# Patient Record
Sex: FEMALE | Race: WHITE | NOT HISPANIC OR LATINO | Employment: OTHER | URBAN - METROPOLITAN AREA
[De-identification: names, ages, dates, MRNs, and addresses within clinical notes are randomized per-mention and may not be internally consistent; named-entity substitution may affect disease eponyms.]

---

## 2017-01-03 ENCOUNTER — HOSPITAL ENCOUNTER (OUTPATIENT)
Dept: CARDIOLOGY | Facility: HOSPITAL | Age: 82
Setting detail: RECURRING SERIES
Discharge: HOME | End: 2017-01-03

## 2017-01-03 PROCEDURE — 85610 PROTHROMBIN TIME: CPT | Performed by: INTERNAL MEDICINE

## 2017-01-03 PROCEDURE — 36416 COLLJ CAPILLARY BLOOD SPEC: CPT | Performed by: INTERNAL MEDICINE

## 2017-01-22 ENCOUNTER — OFFICE VISIT (OUTPATIENT)
Dept: RETAIL CLINIC | Facility: CLINIC | Age: 82
End: 2017-01-22

## 2017-01-22 VITALS — OXYGEN SATURATION: 97 % | TEMPERATURE: 98.2 F | HEART RATE: 54 BPM

## 2017-01-22 DIAGNOSIS — J32.9 SINUSITIS, UNSPECIFIED CHRONICITY, UNSPECIFIED LOCATION: Primary | ICD-10-CM

## 2017-01-22 PROCEDURE — 99213 OFFICE O/P EST LOW 20 MIN: CPT | Performed by: NURSE PRACTITIONER

## 2017-01-22 RX ORDER — DOXYCYCLINE HYCLATE 100 MG/1
100 TABLET, DELAYED RELEASE ORAL 2 TIMES DAILY
Qty: 20 TABLET | Refills: 0 | Status: SHIPPED | OUTPATIENT
Start: 2017-01-22 | End: 2017-02-02

## 2017-01-22 NOTE — PROGRESS NOTES
Subjective   Patient ID: Marleni Hummel is a 81 y.o. female presents with   Chief Complaint   Patient presents with   • URI     3 weeks       HPI Comments: 82 yo wf  PMH: multiple medical problems including chronic anti-coagulation for AFIB. Her present warfarin dose is 3mg daily with 1 1/2 tab on wed. She has been using this stable dose for a long period of time. She is followed by Dr. Tai.   Cc: purulent nasal d/c x 3 weeks. She has tried OTC agents and was seen in [facility] dec 30 and given sudafed with no relief. She has assoc malaise but no cough or fever.       Allergies   Allergen Reactions   • Ace Inhibitors    • Amoxicillin    • Lortab [Hydrocodone-Acetaminophen]    • Macrobid [Nitrofurantoin]    • Morphine And Related    • Oxycodone    • Levaquin [Levofloxacin] Itching, Rash and Other (See Comments)     insomnia       The following portions of the patient's history were reviewed and updated as appropriate: allergies, current medications, past family history, past medical history, past social history, past surgical history and problem list.      Review of Systems   Constitutional: Positive for fatigue. Negative for activity change, fever and unexpected weight change.   HENT: Positive for congestion, postnasal drip, rhinorrhea, sinus pressure and sore throat. Negative for ear pain.         Hoarseness   Eyes: Negative for pain and discharge.   Respiratory: Negative for cough, chest tightness, shortness of breath and wheezing.    Cardiovascular: Negative for chest pain and palpitations.   Gastrointestinal: Negative for abdominal pain, diarrhea and vomiting.   Endocrine: Negative.    Genitourinary: Negative.    Musculoskeletal: Negative for joint swelling.   Skin: Negative for color change, rash and wound.   Allergic/Immunologic: Negative.    Neurological: Negative for seizures and syncope.   Psychiatric/Behavioral: Negative.        Objective     Vitals:    01/22/17 1450   Pulse: 54   Temp: 98.2 °F (36.8 °C)    SpO2: 97%         Physical Exam   Constitutional: She is oriented to person, place, and time. She appears well-developed and well-nourished.  Non-toxic appearance. No distress.   HENT:   Head: Normocephalic and atraumatic. Hair is normal.   Right Ear: Hearing, tympanic membrane, external ear and ear canal normal. No drainage, swelling or tenderness.   Left Ear: Hearing, tympanic membrane, external ear and ear canal normal. No drainage, swelling or tenderness.   Nose: Mucosal edema and rhinorrhea present. No epistaxis.   Mouth/Throat: Uvula is midline and mucous membranes are normal. No oral lesions. No uvula swelling. Posterior oropharyngeal erythema present. No oropharyngeal exudate. Tonsils are 1+ on the right. Tonsils are 1+ on the left. No tonsillar exudate.   Eyes: Conjunctivae, EOM and lids are normal. Pupils are equal, round, and reactive to light. Right eye exhibits no discharge. Left eye exhibits no discharge. No scleral icterus.   Neck: Normal range of motion. Neck supple.   Cardiovascular: Normal rate, regular rhythm and normal heart sounds.  Exam reveals no gallop.    No murmur heard.  Pulmonary/Chest: Breath sounds normal. No stridor. No respiratory distress. She has no wheezes. She has no rales. She exhibits no tenderness.   Abdominal: Soft. There is no tenderness.   Lymphadenopathy:        Head (right side): No tonsillar adenopathy present.        Head (left side): No tonsillar adenopathy present.     She has no cervical adenopathy.   Neurological: She is alert and oriented to person, place, and time. She exhibits normal muscle tone.   Skin: Skin is warm and dry. No rash noted. She is not diaphoretic.   Psychiatric: She has a normal mood and affect. Her behavior is normal. Judgment and thought content normal.   Nursing note and vitals reviewed.        Marleni was seen today for uri.    Diagnoses and all orders for this visit:    Sinusitis, unspecified chronicity, unspecified location  -      doxycycline (DORYX) 100 MG enteric coated tablet; Take 1 tablet by mouth 2 (Two) Times a Day.    Increase fluids, rest, activity as tolerated, Tylenol and nasal saline rinses OTC as needed for pain   she will get INR tomorrow at [facility]. She will inform them of her abx use nad neil inr in 2 weeks.  Follow-up with Primary Care Physician in 24-48 hours if these symptoms worsen or fail to improve as anticipated.

## 2017-01-23 ENCOUNTER — HOSPITAL ENCOUNTER (OUTPATIENT)
Dept: CARDIOLOGY | Facility: HOSPITAL | Age: 82
Setting detail: RECURRING SERIES
Discharge: HOME | End: 2017-01-23

## 2017-01-23 PROCEDURE — 36416 COLLJ CAPILLARY BLOOD SPEC: CPT | Performed by: INTERNAL MEDICINE

## 2017-01-23 PROCEDURE — 85610 PROTHROMBIN TIME: CPT | Performed by: INTERNAL MEDICINE

## 2017-02-02 ENCOUNTER — OFFICE VISIT (OUTPATIENT)
Dept: FAMILY MEDICINE CLINIC | Facility: CLINIC | Age: 82
End: 2017-02-02

## 2017-02-02 VITALS
HEART RATE: 59 BPM | HEIGHT: 63 IN | BODY MASS INDEX: 26.4 KG/M2 | DIASTOLIC BLOOD PRESSURE: 70 MMHG | TEMPERATURE: 97.8 F | RESPIRATION RATE: 16 BRPM | SYSTOLIC BLOOD PRESSURE: 148 MMHG | WEIGHT: 149 LBS

## 2017-02-02 DIAGNOSIS — J30.1 NON-SEASONAL ALLERGIC RHINITIS DUE TO POLLEN: Primary | ICD-10-CM

## 2017-02-02 PROCEDURE — 99213 OFFICE O/P EST LOW 20 MIN: CPT | Performed by: FAMILY MEDICINE

## 2017-02-02 RX ORDER — MONTELUKAST SODIUM 10 MG/1
10 TABLET ORAL NIGHTLY
Qty: 30 TABLET | Refills: 2 | Status: SHIPPED | OUTPATIENT
Start: 2017-02-02 | End: 2017-03-13 | Stop reason: SINTOL

## 2017-02-02 NOTE — PROGRESS NOTES
"Chief Complaint   Patient presents with   • Urgent Care follow up     URI       Subjective   This patient presents the office to follow-up on 2 recent urgent care center visits.  She was seen on December 30, 2016 and diagnosed with viral illness.  She was treated with over-the-counter Sudafed.  Her symptoms persisted and she went back to the urgent care center on January 22.  Both records have been reviewed.  She was placed on doxycycline which helped her condition.  Today she has consistent runny nose with clear mucus and no fever.  She states that she has had chronic runny nose since November.  She has associated itchy eyes and runny nose and occasional sneezing since that time.  Review of Systems   Constitutional: Negative for fever.   HENT: Positive for rhinorrhea.        Objective   Visit Vitals   • /70 (BP Location: Right arm, Patient Position: Sitting, Cuff Size: Adult)   • Pulse 59   • Temp 97.8 °F (36.6 °C) (Oral)   • Resp 16   • Ht 63\" (160 cm)   • Wt 149 lb (67.6 kg)   • BMI 26.39 kg/m2     Body mass index is 26.39 kg/(m^2).  Physical Exam   Constitutional: She is oriented to person, place, and time. She appears well-developed and well-nourished.  Non-toxic appearance. No distress.   HENT:   Right Ear: External ear normal.   Left Ear: External ear normal.   Nose: Rhinorrhea present.   Mouth/Throat: Uvula is midline and mucous membranes are normal. No oral lesions. No uvula swelling. No oropharyngeal exudate or posterior oropharyngeal erythema.   Eyes: Conjunctivae are normal. Right eye exhibits no discharge. Left eye exhibits no discharge.   Neck: Neck supple.   Cardiovascular: Normal rate and regular rhythm.    Pulmonary/Chest: Effort normal. No respiratory distress. She has no wheezes.   Lymphadenopathy:     She has no cervical adenopathy.   Neurological: She is alert and oriented to person, place, and time.   Skin: No rash noted. She is not diaphoretic.   Nursing note and vitals " reviewed.      Assessment/Plan     Problem List Items Addressed This Visit        Respiratory    Allergic rhinitis - Primary    Relevant Medications    montelukast (SINGULAIR) 10 MG tablet          Outpatient Encounter Prescriptions as of 2/2/2017   Medication Sig Dispense Refill   • acetaminophen (TYLENOL) 500 MG tablet Take 1,000 mg by mouth 3 (three) times a day as needed for mild pain (1-3) or moderate pain (4-6).     • atorvastatin (LIPITOR) 20 MG tablet Take 1 tablet by mouth every night for 180 days. 90 tablet 1   • clopidogrel (PLAVIX) 75 MG tablet Take by mouth daily.     • DULoxetine (CYMBALTA) 30 MG capsule Take 1 capsule by mouth daily for 180 days. 90 capsule 1   • KLOR-CON 10 MEQ CR tablet      • levothyroxine (SYNTHROID, LEVOTHROID) 50 MCG tablet Take 1 tablet by mouth daily for 180 days. 90 tablet 1   • metoprolol succinate XL (TOPROL-XL) 50 MG 24 hr tablet Take 1 tablet by mouth daily for 180 days. 90 tablet 1   • phenol (CHLORASEPTIC) 1.4 % liquid liquid Apply 2 sprays to the mouth or throat Every 2 (Two) Hours As Needed (sore throat). 177 mL 0   • senna (SENOKOT) 8.6 MG tablet tablet Take 2 tablets by mouth As Needed.     • sodium chloride (OCEAN NASAL SPRAY) 0.65 % nasal spray 1 spray into each nostril As Needed for congestion. 104 mL 0   • warfarin (COUMADIN) 3 MG tablet TAKE ONE AND ONE-HALF TABLETS ON MONDAY, WEDNESDAY & FRIDAY AND ONE TABLET ON ALL OTHER DAYS OR AS DIRECTED. 115 tablet 1   • [DISCONTINUED] doxycycline (DORYX) 100 MG enteric coated tablet Take 1 tablet by mouth 2 (Two) Times a Day. 20 tablet 0   • [DISCONTINUED] pseudoephedrine (SUDAFED) 30 MG/5ML syrup Take 5 mL by mouth 4 (Four) Times a Day As Needed for congestion. 20 mL 0   • montelukast (SINGULAIR) 10 MG tablet Take 1 tablet by mouth Every Night for 90 days. 30 tablet 2     No facility-administered encounter medications on file as of 2/2/2017.        No orders of the defined types were placed in this  encounter.      Continue with current treatment plan.         RTC as needed or sooner if symptoms worsen or change

## 2017-02-06 ENCOUNTER — HOSPITAL ENCOUNTER (OUTPATIENT)
Dept: CARDIOLOGY | Facility: HOSPITAL | Age: 82
Setting detail: RECURRING SERIES
Discharge: HOME | End: 2017-02-06

## 2017-02-06 PROCEDURE — 85610 PROTHROMBIN TIME: CPT

## 2017-02-06 PROCEDURE — 36416 COLLJ CAPILLARY BLOOD SPEC: CPT

## 2017-02-09 ENCOUNTER — RESULTS ENCOUNTER (OUTPATIENT)
Dept: FAMILY MEDICINE CLINIC | Facility: CLINIC | Age: 82
End: 2017-02-09

## 2017-02-09 DIAGNOSIS — E78.00 HYPERCHOLESTEROLEMIA: ICD-10-CM

## 2017-02-09 DIAGNOSIS — E03.9 ACQUIRED HYPOTHYROIDISM: ICD-10-CM

## 2017-02-09 DIAGNOSIS — N18.30 CHRONIC KIDNEY DISEASE (CKD), STAGE III (MODERATE) (HCC): ICD-10-CM

## 2017-02-09 DIAGNOSIS — I10 ESSENTIAL HYPERTENSION: ICD-10-CM

## 2017-02-09 DIAGNOSIS — D64.9 ANEMIA OF UNKNOWN ETIOLOGY: ICD-10-CM

## 2017-02-20 RX ORDER — CLOPIDOGREL BISULFATE 75 MG/1
TABLET ORAL
Qty: 90 TABLET | Refills: 2 | Status: SHIPPED | OUTPATIENT
Start: 2017-02-20 | End: 2017-02-22 | Stop reason: SDUPTHER

## 2017-02-22 ENCOUNTER — OFFICE VISIT (OUTPATIENT)
Dept: CARDIOLOGY | Facility: CLINIC | Age: 82
End: 2017-02-22

## 2017-02-22 VITALS
DIASTOLIC BLOOD PRESSURE: 78 MMHG | WEIGHT: 148 LBS | HEIGHT: 63 IN | HEART RATE: 50 BPM | SYSTOLIC BLOOD PRESSURE: 146 MMHG

## 2017-02-22 DIAGNOSIS — E78.49 OTHER HYPERLIPIDEMIA: ICD-10-CM

## 2017-02-22 DIAGNOSIS — I48.0 PAROXYSMAL ATRIAL FIBRILLATION (HCC): Primary | ICD-10-CM

## 2017-02-22 DIAGNOSIS — I25.10 CORONARY ARTERY DISEASE INVOLVING NATIVE CORONARY ARTERY OF NATIVE HEART WITHOUT ANGINA PECTORIS: ICD-10-CM

## 2017-02-22 PROCEDURE — 99214 OFFICE O/P EST MOD 30 MIN: CPT | Performed by: INTERNAL MEDICINE

## 2017-02-22 PROCEDURE — 93000 ELECTROCARDIOGRAM COMPLETE: CPT | Performed by: INTERNAL MEDICINE

## 2017-02-22 RX ORDER — TRIMETHOPRIM 100 MG/1
100 TABLET ORAL DAILY
COMMUNITY
Start: 2017-02-07 | End: 2017-06-20 | Stop reason: HOSPADM

## 2017-02-22 NOTE — PROGRESS NOTES
Date of Office Visit: 17  Encounter Provider: Jason Tai MD  Place of Service: [facility] CARDIOLOGY  Patient Name: Marleni Hummel  :1935      Chief Complaint   Patient presents with   • Atrial Fibrillation   • Coronary Artery Disease     History of Present Illness  HPI Comments:  The patient is a pleasant, 81-year-old female with history of hypertension and  hyperlipidemia who presented with a cough and respiratory illness to the emergency room  but was found to be in rapid atrial fibrillation. Her thyroid studies were normal. Her 2-D  echocardiogram was unremarkable. Her perfusion study was unremarkable. She was rate  controlled and anticoagulated. She was also seen by pulmonary. Then, in 2011, she  had been adequately anticoagulated and was cardioverted but went back into atrial  fibrillation. She was started on flecainide and then underwent repeat cardioversion in May  2011. epidural for.  She then had to have hip surgery and had total hip arthroplasty from recent femur  fracture and unfortunately developed an infection of that. I believe had multiple  surgeries on that. Then on 10/02/2015 she came in again and was bradycardic but that was  after she was nauseated and vomiting. Her troponin was borderline elevated. Medications  were adjusted. 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 She did have a circumflex  lesion where they placed a drug eluting stent. She recovered from that. Obviously, we  had to stop the flecainide. We switched her to amiodarone. She then went to a nursing  home, while there concerned about the interaction between her amiodarone and her  psychiatric drug for depression. She had elevated QT interval, so we had to stop the  amiodarone.   She then was  readmitted for another hip surgery on 11/18/2015. She did reasonably well  with that. She now comes in for follow-up.  She was back in a hospital in January 2016 she fell and broke her hip again.  She had had yet another surgery.    She had a follow-up echocardiogram in February 2016 her left ventricular function returned to normal.  No significant valvular disease and normal RV pressure.  She comes in for follow-up.  She's had no total of 3 recurrent urinary tract infections.  Has cystoscopy performed the other day which looked good but she's now taking it daily antibiotic.  She overall though is doing better.  She is walking a lot the house and is can start walking outside of the weather stays good she denies chest pain or pressure.  She does still have some intermittent back problems thought she may need an epidural.  She denies palpitations, near-syncope or syncope.  Denies orthopnea and PND.  Denies any lower extremity edema.  And again overall she feels like she's doing well.    Atrial Fibrillation   Symptoms are negative for dizziness and weakness. Past medical history includes atrial fibrillation and CAD.   Coronary Artery Disease   Pertinent negatives include no dizziness, muscle weakness or weight gain. Her past medical history is significant for past myocardial infarction.         Past Medical History   Diagnosis Date   • Allergic rhinitis    • Arthropathy of knee      right   • Atrial fibrillation    • Back pain    • Bell's palsy    • Chronic kidney disease (CKD), stage III (moderate)    • Cough    • Edema    • Encounter for eye exam 02/2015   • Essential hypertension    • Fatigue    • GERD (gastroesophageal reflux disease)    • Heart attack    • Herpes zoster without complication    • Hip arthritis      left   • History of bone density study 02/28/2008   • Hypercholesterolemia    • IFG (impaired fasting glucose)    • Insomnia    • Osteopenia    • Panic disorder    • Rectal bleeding    • Sleep apnea    •  Stress    • Urinary incontinence    • Vitamin D deficiency          Past Surgical History   Procedure Laterality Date   • Cataract extraction Left 04/01/2013     Dr. Clarke   • Cataract extraction Right 04/16/2013     Dr. Clarke   • Colonoscopy  04/18/2014     Dr. Elizabeth; no polyps   • Joint replacement Left 2006     knee   • Joint replacement Right 2006     knee   • Mammo bilateral  11/2013   • Pap smear  02/2011   • Total hip arthroplasty Right 11/2015         Current Outpatient Prescriptions on File Prior to Visit   Medication Sig Dispense Refill   • acetaminophen (TYLENOL) 500 MG tablet Take 1,000 mg by mouth 3 (three) times a day as needed for mild pain (1-3) or moderate pain (4-6).     • atorvastatin (LIPITOR) 20 MG tablet Take 1 tablet by mouth every night for 180 days. 90 tablet 1   • clopidogrel (PLAVIX) 75 MG tablet Take by mouth daily.     • DULoxetine (CYMBALTA) 30 MG capsule Take 1 capsule by mouth daily for 180 days. 90 capsule 1   • KLOR-CON 10 MEQ CR tablet Take 10 mEq by mouth 2 (Two) Times a Day.     • levothyroxine (SYNTHROID, LEVOTHROID) 50 MCG tablet Take 1 tablet by mouth daily for 180 days. 90 tablet 1   • metoprolol succinate XL (TOPROL-XL) 50 MG 24 hr tablet Take 1 tablet by mouth daily for 180 days. 90 tablet 1   • montelukast (SINGULAIR) 10 MG tablet Take 1 tablet by mouth Every Night for 90 days. 30 tablet 2   • phenol (CHLORASEPTIC) 1.4 % liquid liquid Apply 2 sprays to the mouth or throat Every 2 (Two) Hours As Needed (sore throat). 177 mL 0   • senna (SENOKOT) 8.6 MG tablet tablet Take 2 tablets by mouth As Needed.     • sodium chloride (OCEAN NASAL SPRAY) 0.65 % nasal spray 1 spray into each nostril As Needed for congestion. 104 mL 0   • warfarin (COUMADIN) 3 MG tablet TAKE ONE AND ONE-HALF TABLETS ON MONDAY, WEDNESDAY & FRIDAY AND ONE TABLET ON ALL OTHER DAYS OR AS DIRECTED. 115 tablet 1   • [DISCONTINUED] clopidogrel (PLAVIX) 75 MG tablet TAKE 1 TABLET DAILY 90 tablet 2     No  current facility-administered medications on file prior to visit.          Social History     Social History   • Marital status:      Spouse name: N/A   • Number of children: N/A   • Years of education: N/A     Occupational History   • Not on file.     Social History Main Topics   • Smoking status: Former Smoker     Types: Cigarettes     Quit date: 2/22/1956   • Smokeless tobacco: Not on file   • Alcohol use No      Comment: rare   • Drug use: No   • Sexual activity: Defer     Other Topics Concern   • Not on file     Social History Narrative       History reviewed. No pertinent family history.      Review of Systems   Constitution: Negative for decreased appetite, diaphoresis, fever, weakness, malaise/fatigue, weight gain and weight loss.   HENT: Negative for congestion, headaches, hearing loss, nosebleeds and tinnitus.    Eyes: Negative for blurred vision, double vision, vision loss in left eye, vision loss in right eye and visual disturbance.   Cardiovascular:        As noted in HPI   Respiratory:        As noted HPI   Endocrine: Negative for cold intolerance and heat intolerance.   Hematologic/Lymphatic: Negative for bleeding problem. Does not bruise/bleed easily.   Skin: Negative for color change, flushing, itching and rash.   Musculoskeletal: Negative for arthritis, back pain, joint pain, joint swelling, muscle weakness and myalgias.   Gastrointestinal: Negative for bloating, abdominal pain, constipation, diarrhea, dysphagia, heartburn, hematemesis, hematochezia, melena, nausea and vomiting.   Genitourinary: Negative for bladder incontinence, dysuria, frequency, nocturia and urgency.   Neurological: Negative for dizziness, focal weakness, light-headedness, loss of balance, numbness, paresthesias and vertigo.   Psychiatric/Behavioral: Negative for depression, memory loss and substance abuse.       Procedures      ECG 12 Lead  Date/Time: 2/22/2017 11:24 AM  Performed by: CAMILLE FRANCO  Authorized by:  "CAMILLE FRANCO   Comparison: compared with previous ECG   Comparison to previous ECG: Back in SR  Rhythm: sinus bradycardia  Rate: normal  Conduction: 1st degree  QRS axis: normal and left                 Objective:      Visit Vitals   • /78 (BP Location: Left arm)   • Pulse 50   • Ht 63\" (160 cm)   • Wt 148 lb (67.1 kg)   • BMI 26.22 kg/m2          Physical Exam  Physical Exam   Constitutional: She is oriented to person, place, and time. She appears well-developed and well-nourished. No distress.   HENT:   Head: Normocephalic.   Eyes: Conjunctivae are normal. Pupils are equal, round, and reactive to light. No scleral icterus.   Neck: Normal carotid pulses, no hepatojugular reflux and no JVD present. Carotid bruit is not present. No tracheal deviation, no edema and no erythema present. No thyromegaly present.   Cardiovascular: Normal rate, regular rhythm, S1 normal, S2 normal, normal heart sounds and intact distal pulses.   No extrasystoles are present. PMI is not displaced.  Exam reveals no gallop, no distant heart sounds and no friction rub.    No murmur heard.  Pulses:       Carotid pulses are 2+ on the right side, and 2+ on the left side.       Radial pulses are 2+ on the right side, and 2+ on the left side.        Femoral pulses are 2+ on the right side, and 2+ on the left side.       Dorsalis pedis pulses are 2+ on the right side, and 2+ on the left side.        Posterior tibial pulses are 2+ on the right side, and 2+ on the left side.   Pulmonary/Chest: Effort normal and breath sounds normal. No respiratory distress. She has no decreased breath sounds. She has no wheezes. She has no rhonchi. She has no rales. She exhibits no tenderness.   Abdominal: Soft. Bowel sounds are normal. She exhibits no distension and no mass. There is no hepatosplenomegaly. There is no tenderness. There is no rebound and no guarding.   Musculoskeletal: She exhibits no edema, tenderness or deformity.   Neurological: She is " alert and oriented to person, place, and time.   Skin: Skin is warm and dry. No rash noted. She is not diaphoretic. No cyanosis or erythema. No pallor. Nails show no clubbing.   Psychiatric: She has a normal mood and affect. Her speech is normal and behavior is normal. Judgment and thought content normal.           Assessment:   1. This is a 81-year-old female with a history of paroxysmal atrial fibrillation status post electrocardioversion back to sinus rhythm.   Atrial Fibrillation and Atrial Flutter  Assessment  • The patient has paroxysmal atrial fibrillation  • This is non-valvular in etiology  • The patient's CHADS2-VASc score is 6  • A [id number] score of 2 or more is considered a high risk for a thromboembolic event  • Warfarin prescribed    Plan  • Attempt to maintain sinus rhythm  • Continue warfarin for antithrombotic therapy, bleeding issues discussed  • Continue beta blocker for rhythm control  Back in sinus rhythm.  Since she's asymptomatic would prefer to keep her on long-term anticoagulation.  She is however considering switching to  Eliquis she is to think about that and let us know.  If stable she'll see us skin in follow-up in a year.        2. Coronary artery disease status post angioplasty, drug eluting stent placement of the circumflex vessel in 10/2015.   Coronary Artery Disease  Assessment  • The patient has no angina  • On warfarin    Plan  • Lifestyle modifications discussed include adhering to a heart healthy diet, avoidance of tobacco products, maintenance of a healthy weight, medication compliance, regular exercise and regular monitoring of cholesterol and blood pressure    Subjective - Objective  • There is a history of past MI  • There has been a previous stent procedure using HAYLEY      She is to stop the clopidogrel. We'll see us skin in follow-up in one year      3. Stress induced cardiomyopathy. Initial left ventricular ejection fraction at 20%. Follow-up echocardiogram in February  2016 normal left her systolic function no significant valvular disease  4. Hyperlipidemia. She is now on atorvastatin.   5. Hypertension as outlined above.  6. Obstructive sleep apnea on CPAP.   7. 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  Still painful with difficulty walking.   8.  Recurrent urinary tract infections now on chronic antibiotic therapy.           Plan:

## 2017-02-24 ENCOUNTER — TELEPHONE (OUTPATIENT)
Dept: CARDIOLOGY | Facility: CLINIC | Age: 82
End: 2017-02-24

## 2017-02-24 DIAGNOSIS — I48.21 PERMANENT ATRIAL FIBRILLATION (HCC): Primary | ICD-10-CM

## 2017-03-02 ENCOUNTER — APPOINTMENT (OUTPATIENT)
Dept: GENERAL RADIOLOGY | Facility: HOSPITAL | Age: 82
End: 2017-03-02

## 2017-03-02 PROCEDURE — 71101 X-RAY EXAM UNILAT RIBS/CHEST: CPT | Performed by: GENERAL PRACTICE

## 2017-03-06 ENCOUNTER — HOSPITAL ENCOUNTER (OUTPATIENT)
Dept: CARDIOLOGY | Facility: HOSPITAL | Age: 82
Setting detail: RECURRING SERIES
Discharge: HOME | End: 2017-03-06

## 2017-03-06 PROCEDURE — 85610 PROTHROMBIN TIME: CPT

## 2017-03-06 PROCEDURE — 36416 COLLJ CAPILLARY BLOOD SPEC: CPT

## 2017-03-08 LAB
ALBUMIN SERPL-MCNC: 4.8 G/DL (ref 3.5–5.2)
ALBUMIN/GLOB SERPL: 1.9 G/DL
ALP SERPL-CCNC: 129 U/L (ref 39–117)
ALT SERPL-CCNC: 11 U/L (ref 1–33)
AST SERPL-CCNC: 18 U/L (ref 1–32)
BASOPHILS # BLD AUTO: 0.02 10*3/MM3 (ref 0–0.2)
BASOPHILS NFR BLD AUTO: 0.4 % (ref 0–1.5)
BILIRUB SERPL-MCNC: 0.5 MG/DL (ref 0.1–1.2)
BUN SERPL-MCNC: 27 MG/DL (ref 8–23)
BUN/CREAT SERPL: 19 (ref 7–25)
CALCIUM SERPL-MCNC: 10.1 MG/DL (ref 8.6–10.5)
CHLORIDE SERPL-SCNC: 102 MMOL/L (ref 98–107)
CHOLEST SERPL-MCNC: 214 MG/DL (ref 0–200)
CO2 SERPL-SCNC: 22.6 MMOL/L (ref 22–29)
CREAT SERPL-MCNC: 1.42 MG/DL (ref 0.57–1)
EOSINOPHIL # BLD AUTO: 0.13 10*3/MM3 (ref 0–0.7)
EOSINOPHIL NFR BLD AUTO: 2.7 % (ref 0.3–6.2)
ERYTHROCYTE [DISTWIDTH] IN BLOOD BY AUTOMATED COUNT: 14.2 % (ref 11.7–13)
GLOBULIN SER CALC-MCNC: 2.5 GM/DL
GLUCOSE SERPL-MCNC: 114 MG/DL (ref 65–99)
HCT VFR BLD AUTO: 36.2 % (ref 35.6–45.5)
HDLC SERPL-MCNC: 45 MG/DL (ref 40–60)
HGB BLD-MCNC: 11.6 G/DL (ref 11.9–15.5)
IMM GRANULOCYTES # BLD: 0 10*3/MM3 (ref 0–0.03)
IMM GRANULOCYTES NFR BLD: 0 % (ref 0–0.5)
LDLC SERPL CALC-MCNC: 102 MG/DL (ref 0–100)
LDLC/HDLC SERPL: 2.26 {RATIO}
LYMPHOCYTES # BLD AUTO: 1.41 10*3/MM3 (ref 0.9–4.8)
LYMPHOCYTES NFR BLD AUTO: 29.2 % (ref 19.6–45.3)
MCH RBC QN AUTO: 29.4 PG (ref 26.9–32)
MCHC RBC AUTO-ENTMCNC: 32 G/DL (ref 32.4–36.3)
MCV RBC AUTO: 91.9 FL (ref 80.5–98.2)
MONOCYTES # BLD AUTO: 0.32 10*3/MM3 (ref 0.2–1.2)
MONOCYTES NFR BLD AUTO: 6.6 % (ref 5–12)
NEUTROPHILS # BLD AUTO: 2.95 10*3/MM3 (ref 1.9–8.1)
NEUTROPHILS NFR BLD AUTO: 61.1 % (ref 42.7–76)
PLATELET # BLD AUTO: 234 10*3/MM3 (ref 140–500)
POTASSIUM SERPL-SCNC: 5.4 MMOL/L (ref 3.5–5.2)
PROT SERPL-MCNC: 7.3 G/DL (ref 6–8.5)
RBC # BLD AUTO: 3.94 10*6/MM3 (ref 3.9–5.2)
SODIUM SERPL-SCNC: 140 MMOL/L (ref 136–145)
TRIGL SERPL-MCNC: 336 MG/DL (ref 0–150)
TSH SERPL DL<=0.005 MIU/L-ACNC: 0.97 MIU/ML (ref 0.27–4.2)
VLDLC SERPL CALC-MCNC: 67.2 MG/DL (ref 5–40)
WBC # BLD AUTO: 4.83 10*3/MM3 (ref 4.5–10.7)

## 2017-03-13 ENCOUNTER — OFFICE VISIT (OUTPATIENT)
Dept: FAMILY MEDICINE CLINIC | Facility: CLINIC | Age: 82
End: 2017-03-13

## 2017-03-13 VITALS
BODY MASS INDEX: 26.4 KG/M2 | HEART RATE: 59 BPM | RESPIRATION RATE: 16 BRPM | WEIGHT: 149 LBS | DIASTOLIC BLOOD PRESSURE: 76 MMHG | HEIGHT: 63 IN | SYSTOLIC BLOOD PRESSURE: 150 MMHG | TEMPERATURE: 97.9 F

## 2017-03-13 DIAGNOSIS — I10 ESSENTIAL HYPERTENSION: Primary | ICD-10-CM

## 2017-03-13 DIAGNOSIS — F41.0 PANIC DISORDER: ICD-10-CM

## 2017-03-13 DIAGNOSIS — E03.9 ACQUIRED HYPOTHYROIDISM: ICD-10-CM

## 2017-03-13 DIAGNOSIS — I10 ESSENTIAL HYPERTENSION: ICD-10-CM

## 2017-03-13 DIAGNOSIS — R29.898 WEAKNESS OF RIGHT LEG: ICD-10-CM

## 2017-03-13 DIAGNOSIS — K21.9 GASTROESOPHAGEAL REFLUX DISEASE, ESOPHAGITIS PRESENCE NOT SPECIFIED: ICD-10-CM

## 2017-03-13 DIAGNOSIS — J30.1 NON-SEASONAL ALLERGIC RHINITIS DUE TO POLLEN: ICD-10-CM

## 2017-03-13 DIAGNOSIS — Z96.641 HISTORY OF RIGHT HIP REPLACEMENT: ICD-10-CM

## 2017-03-13 DIAGNOSIS — D64.9 ANEMIA, UNSPECIFIED: ICD-10-CM

## 2017-03-13 DIAGNOSIS — E78.00 HYPERCHOLESTEROLEMIA: ICD-10-CM

## 2017-03-13 DIAGNOSIS — R29.6 FREQUENT FALLS: ICD-10-CM

## 2017-03-13 PROBLEM — J32.9 SINUSITIS: Status: RESOLVED | Noted: 2017-01-22 | Resolved: 2017-03-13

## 2017-03-13 PROCEDURE — 99214 OFFICE O/P EST MOD 30 MIN: CPT | Performed by: FAMILY MEDICINE

## 2017-03-13 RX ORDER — ATORVASTATIN CALCIUM 20 MG/1
20 TABLET, FILM COATED ORAL NIGHTLY
Qty: 90 TABLET | Refills: 1 | Status: SHIPPED | OUTPATIENT
Start: 2017-03-13 | End: 2017-08-14

## 2017-03-13 RX ORDER — DULOXETIN HYDROCHLORIDE 30 MG/1
30 CAPSULE, DELAYED RELEASE ORAL DAILY
Qty: 90 CAPSULE | Refills: 1 | Status: SHIPPED | OUTPATIENT
Start: 2017-03-13 | End: 2017-09-28 | Stop reason: SDUPTHER

## 2017-03-13 RX ORDER — LEVOTHYROXINE SODIUM 0.05 MG/1
TABLET ORAL
Qty: 90 TABLET | Refills: 0 | OUTPATIENT
Start: 2017-03-13

## 2017-03-13 RX ORDER — LEVOTHYROXINE SODIUM 0.05 MG/1
50 TABLET ORAL DAILY
Qty: 90 TABLET | Refills: 1 | Status: SHIPPED | OUTPATIENT
Start: 2017-03-13 | End: 2017-08-14 | Stop reason: SDUPTHER

## 2017-03-13 RX ORDER — METOPROLOL SUCCINATE 50 MG
TABLET, EXTENDED RELEASE 24 HR ORAL
Qty: 90 TABLET | Refills: 0 | OUTPATIENT
Start: 2017-03-13

## 2017-03-13 RX ORDER — DULOXETIN HYDROCHLORIDE 30 MG/1
CAPSULE, DELAYED RELEASE ORAL
Qty: 90 CAPSULE | Refills: 0 | OUTPATIENT
Start: 2017-03-13

## 2017-03-13 RX ORDER — MIRABEGRON 25 MG/1
25 TABLET, FILM COATED, EXTENDED RELEASE ORAL DAILY
COMMUNITY
Start: 2017-02-22 | End: 2019-06-12

## 2017-03-13 RX ORDER — METOPROLOL SUCCINATE 50 MG/1
50 TABLET, EXTENDED RELEASE ORAL DAILY
Qty: 90 TABLET | Refills: 1 | Status: SHIPPED | OUTPATIENT
Start: 2017-03-13 | End: 2017-09-28 | Stop reason: SDUPTHER

## 2017-03-13 RX ORDER — ATORVASTATIN CALCIUM 20 MG/1
TABLET, FILM COATED ORAL
Qty: 90 TABLET | Refills: 0 | OUTPATIENT
Start: 2017-03-13

## 2017-03-13 RX ORDER — AMLODIPINE BESYLATE 2.5 MG/1
2.5 TABLET ORAL DAILY
Qty: 90 TABLET | Refills: 1 | Status: SHIPPED | OUTPATIENT
Start: 2017-03-13 | End: 2017-04-12 | Stop reason: SDUPTHER

## 2017-03-13 RX ORDER — PANTOPRAZOLE SODIUM 40 MG/1
TABLET, DELAYED RELEASE ORAL
Qty: 90 TABLET | Refills: 0 | OUTPATIENT
Start: 2017-03-13

## 2017-03-13 NOTE — PATIENT INSTRUCTIONS
"Fat and Cholesterol Restricted Diet  Getting too much fat and cholesterol in your diet may cause health problems. Following this diet helps keep your fat and cholesterol at normal levels. This can keep you from getting sick.  WHAT TYPES OF FAT SHOULD I CHOOSE?  · Choose monosaturated and polyunsaturated fats. These are found in foods such as olive oil, canola oil, flaxseeds, walnuts, almonds, and seeds.  · Eat more omega-3 fats. Good choices include salmon, mackerel, sardines, tuna, flaxseed oil, and ground flaxseeds.  · Limit saturated fats. These are in animal products such as meats, butter, and cream. They can also be in plant products such as palm oil, palm kernel oil, and coconut oil.  ·   ·   · Avoid foods with partially hydrogenated oils in them. These contain trans fats. Examples of foods that have trans fats are stick margarine, some tub margarines, cookies, crackers, and other baked goods.  WHAT GENERAL GUIDELINES DO I NEED TO FOLLOW?    · Check food labels. Look for the words \"trans fat\" and \"saturated fat.\"  · When preparing a meal:  ¨ Fill half of your plate with vegetables and green salads.  ¨ Fill one fourth of your plate with whole grains. Look for the word \"whole\" as the first word in the ingredient list.  ¨ Fill one fourth of your plate with lean protein foods.  · Limit fruit to two servings a day. Choose fruit instead of juice.  · Eat more foods with soluble fiber. Examples of foods with this type of fiber are apples, broccoli, carrots, beans, peas, and barley. Try to get 20-30 g (grams) of fiber per day.  · Eat more home-cooked foods. Eat less at restaurants and buffets.  · Limit or avoid alcohol.  · Limit foods high in starch and sugar.  · Limit fried foods.  · Cook foods without frying them. Baking, boiling, grilling, and broiling are all great options.  · Lose weight if you are overweight. Losing even a small amount of weight can help your overall health. It can also help prevent diseases such " as diabetes and heart disease.  WHAT FOODS CAN I EAT?  Grains  Whole grains, such as whole wheat or whole grain breads, crackers, cereals, and pasta. Unsweetened oatmeal, bulgur, barley, quinoa, or brown rice. Corn or whole wheat flour tortillas.  Vegetables  Fresh or frozen vegetables (raw, steamed, roasted, or grilled). Green salads.  Fruits  All fresh, canned (in natural juice), or frozen fruits.  Meat and Other Protein Products  Ground beef (85% or leaner), grass-fed beef, or beef trimmed of fat. Skinless chicken or turkey. Ground chicken or turkey. Pork trimmed of fat. All fish and seafood. Eggs. Dried beans, peas, or lentils. Unsalted nuts or seeds. Unsalted canned or dry beans.  Dairy  Low-fat dairy products, such as skim or 1% milk, 2% or reduced-fat cheeses, low-fat ricotta or cottage cheese, or plain low-fat yogurt.  Fats and Oils  Tub margarines without trans fats. Light or reduced-fat mayonnaise and salad dressings. Avocado. Olive, canola, sesame, or safflower oils. Natural peanut or almond butter (choose ones without added sugar and oil).  The items listed above may not be a complete list of recommended foods or beverages. Contact your dietitian for more options.  WHAT FOODS ARE NOT RECOMMENDED?  Grains  White bread. White pasta. White rice. Cornbread. Bagels, pastries, and croissants. Crackers that contain trans fat.  Vegetables  White potatoes. Corn. Creamed or fried vegetables. Vegetables in a cheese sauce.  Fruits  Dried fruits. Canned fruit in light or heavy syrup. Fruit juice.  Meat and Other Protein Products  Fatty cuts of meat. Ribs, chicken wings, finley, sausage, bologna, salami, chitterlings, fatback, hot dogs, bratwurst, and packaged luncheon meats. Liver and organ meats.  Dairy  Whole or 2% milk, cream, half-and-half, and cream cheese. Whole milk cheeses. Whole-fat or sweetened yogurt. Full-fat cheeses. Nondairy creamers and whipped toppings. Processed cheese, cheese spreads, or cheese  curds.  Sweets and Desserts  Corn syrup, sugars, honey, and molasses. Candy. Jam and jelly. Syrup. Sweetened cereals. Cookies, pies, cakes, donuts, muffins, and ice cream.  Fats and Oils  Butter, stick margarine, lard, shortening, ghee, or finley fat. Coconut, palm kernel, or palm oils.  Beverages  Alcohol. Sweetened drinks (such as sodas, lemonade, and fruit drinks or punches).  The items listed above may not be a complete list of foods and beverages to avoid. Contact your dietitian for more information.  Document Released: 06/18/2013 Document Revised: 08/21/2015 Document Reviewed: 03/19/2015  [employer]® Patient Information ©2015 [employer]. This information is not intended to replace advice given to you by your health care provider. Make sure you discuss any questions you have with your health care provider.

## 2017-03-13 NOTE — PROGRESS NOTES
"Chief Complaint   Patient presents with   • Hypertension   • Hyperlipidemia       Subjective   This patient presents the office to review labs and refill medicines.  Blood pressure is above goal.  We will add amlodipine low-dose to her current regimen.  Labs show elevation of potassium.  She is not currently on a water pill so we will discontinue supplemental potassium with short-term follow-up.  Her thyroid condition is stable on current dose of medication.    She has had 3 falls over the past year.  She is noticed some weakness of the right leg due to multiple surgeries.  Referral to physical therapy for gait training and strengthening is indicated.    She has had some intermittent dizziness.  We will discontinue montelukast for that condition.    Labs continue show uncontrolled lipids.  She will pay attention to her diet and exercise.  If this continues we may need to increase her atorvastatin.  With the most recent fall she did fracture her left ribs.  She is improving.    Review of Systems   Constitutional: Positive for fatigue.   Cardiovascular: Negative for chest pain.   Musculoskeletal:        Left rib pain, mild   Neurological: Positive for dizziness (intermittent).       Objective   Visit Vitals   • /76   • Pulse 59   • Temp 97.9 °F (36.6 °C) (Oral)   • Resp 16   • Ht 63\" (160 cm)   • Wt 149 lb (67.6 kg)   • BMI 26.39 kg/m2     Body mass index is 26.39 kg/(m^2).  Physical Exam   Constitutional: She is cooperative. No distress.   Eyes: Conjunctivae and lids are normal.   Neck: Carotid bruit is not present. No tracheal deviation present.   Cardiovascular: Normal rate, regular rhythm and normal heart sounds.    No murmur heard.  Pulmonary/Chest: Effort normal and breath sounds normal. She exhibits tenderness (left lateral rib cage, no deformity, mild tenderness to palpation).   Neurological: She is alert. She is not disoriented.   Skin: Skin is warm and dry.   Psychiatric: She has a normal mood and " affect. Her speech is normal and behavior is normal.   Vitals reviewed.      Assessment/Plan     Problem List Items Addressed This Visit        Cardiovascular and Mediastinum    Essential hypertension - Primary    Relevant Medications    amLODIPine (NORVASC) 2.5 MG tablet    metoprolol succinate XL (TOPROL-XL) 50 MG 24 hr tablet    Other Relevant Orders    Comprehensive metabolic panel    CBC and Differential    Hypercholesterolemia    Relevant Medications    atorvastatin (LIPITOR) 20 MG tablet    Other Relevant Orders    Lipid Panel With LDL/HDL Ratio       Respiratory    Allergic rhinitis       Endocrine    Acquired hypothyroidism    Relevant Medications    levothyroxine (SYNTHROID, LEVOTHROID) 50 MCG tablet    metoprolol succinate XL (TOPROL-XL) 50 MG 24 hr tablet    Other Relevant Orders    TSH       Nervous and Auditory    Weakness of right leg    Relevant Orders    Ambulatory Referral to Physical Therapy       Hematopoietic and Hemostatic    Anemia, unspecified    Relevant Orders    Ambulatory Referral to Hematology       Other    Panic disorder    Relevant Medications    DULoxetine (CYMBALTA) 30 MG capsule    History of right hip replacement    Relevant Orders    Ambulatory Referral to Physical Therapy    Frequent falls    Relevant Orders    Ambulatory Referral to Physical Therapy          Outpatient Encounter Prescriptions as of 3/13/2017   Medication Sig Dispense Refill   • acetaminophen (TYLENOL) 500 MG tablet Take 1,000 mg by mouth 3 (three) times a day as needed for mild pain (1-3) or moderate pain (4-6).     • phenol (CHLORASEPTIC) 1.4 % liquid liquid Apply 2 sprays to the mouth or throat Every 2 (Two) Hours As Needed (sore throat). 177 mL 0   • sodium chloride (OCEAN NASAL SPRAY) 0.65 % nasal spray 1 spray into each nostril As Needed for congestion. 104 mL 0   • trimethoprim (TRIMPEX) 100 MG tablet Take 100 mg by mouth Daily.     • warfarin (COUMADIN) 3 MG tablet TAKE ONE AND ONE-HALF TABLETS ON  MONDAY, WEDNESDAY & FRIDAY AND ONE TABLET ON ALL OTHER DAYS OR AS DIRECTED. 115 tablet 1   • [DISCONTINUED] clopidogrel (PLAVIX) 75 MG tablet Take by mouth daily.     • [DISCONTINUED] KLOR-CON 10 MEQ CR tablet Take 10 mEq by mouth 2 (Two) Times a Day.     • [DISCONTINUED] montelukast (SINGULAIR) 10 MG tablet Take 1 tablet by mouth Every Night for 90 days. 30 tablet 2   • [DISCONTINUED] senna (SENOKOT) 8.6 MG tablet tablet Take 2 tablets by mouth As Needed.     • amLODIPine (NORVASC) 2.5 MG tablet Take 1 tablet by mouth Daily for 180 days. 90 tablet 1   • atorvastatin (LIPITOR) 20 MG tablet Take 1 tablet by mouth Every Night for 180 days. 90 tablet 1   • DULoxetine (CYMBALTA) 30 MG capsule Take 1 capsule by mouth Daily for 180 days. 90 capsule 1   • levothyroxine (SYNTHROID, LEVOTHROID) 50 MCG tablet Take 1 tablet by mouth Daily for 180 days. 90 tablet 1   • metoprolol succinate XL (TOPROL-XL) 50 MG 24 hr tablet Take 1 tablet by mouth Daily for 180 days. 90 tablet 1   • MYRBETRIQ 25 MG tablet sustained-release 24 hour 24 hr tablet      • [DISCONTINUED] atorvastatin (LIPITOR) 20 MG tablet Take 1 tablet by mouth every night for 180 days. 90 tablet 1   • [DISCONTINUED] DULoxetine (CYMBALTA) 30 MG capsule Take 1 capsule by mouth daily for 180 days. 90 capsule 1   • [DISCONTINUED] levothyroxine (SYNTHROID, LEVOTHROID) 50 MCG tablet Take 1 tablet by mouth daily for 180 days. 90 tablet 1   • [DISCONTINUED] metoprolol succinate XL (TOPROL-XL) 50 MG 24 hr tablet Take 1 tablet by mouth daily for 180 days. 90 tablet 1     No facility-administered encounter medications on file as of 3/13/2017.        Orders Placed This Encounter   Procedures   • Comprehensive metabolic panel     Standing Status:   Future     Standing Expiration Date:   12/8/2017   • Lipid Panel With LDL/HDL Ratio     Standing Status:   Future     Standing Expiration Date:   12/8/2017   • TSH     Standing Status:   Future     Standing Expiration Date:   12/8/2017    • Ambulatory Referral to Physical Therapy     Referral Priority:   Routine     Referral Type:   Therapy     Referral Reason:   Specialty Services Required     Referral Location[facility]   [facility]     Referred to Provider:   Feliciano Wiley PT     Requested Specialty:   Physical Therapy     Number of Visits Requested:   1   • Ambulatory Referral to Hematology     Referral Priority:   Routine     Referral Type:   Consultation     Referral Reason:   Specialty Services Required     Requested Specialty:   Hematology     Number of Visits Requested:   1       Continue with current treatment plan.  Low cholesterol diet given       F/U in one month to recheck bp.

## 2017-03-14 ENCOUNTER — TELEPHONE (OUTPATIENT)
Dept: FAMILY MEDICINE CLINIC | Facility: CLINIC | Age: 82
End: 2017-03-14

## 2017-03-14 NOTE — TELEPHONE ENCOUNTER
Pt calls and said you wanted to know the name of an ATB that she was taking she called and reports Trimethoprim

## 2017-03-15 NOTE — TELEPHONE ENCOUNTER
Patient had labs done 3/8/16.  Comprehensive metabolic panel [LAB17] (Order [id number])   Lab   Date: 2/6/2017 Department: [facility] Released By/Authorizing: Ken Andujar MD (auto-released)          Comprehensive metabolic panel   Order: [id number]   Status:  Final result   Visible to patient:  No (Not Released) Dx:  Essential hypertension; Chronic kidne...         Ref Range & Units 7d ago  (3/8/17) 4mo ago  (10/19/16) 5mo ago  (10/10/16) 5mo ago  (10/9/16) 5mo ago  (10/8/16)    Glucose 65 - 99 mg/dL 114 (H) 101 (H) 99 91 87    BUN 8 - 23 mg/dL 27 (H) 29 (H) 12 14 17    Creatinine 0.57 - 1.00 mg/dL 1.42 (H) 1.27 (H) 0.95 1.16 (H) 1.15 (H)    eGFR Non African Am >60 mL/min/1.73 36 (L) 41 (L) 57 (L) 45 (L) 46 (L)   Comments: The MDRD GFR formula is only valid for adults with stable   renal function between ages 18 and 70.       eGFR African Am >60 mL/min/1.73 43 (L)        BUN/Creatinine Ratio 7.0 - 25.0 19.0 22.8 12.6 12.1 14.8    Sodium 136 - 145 mmol/L 140 133 (L) 140 140 139    Potassium 3.5 - 5.2 mmol/L 5.4 (H) 4.3 3.4 (L) 4.0 3.9    Chloride 98 - 107 mmol/L 102 94 (L) 105 104 104    Total CO2 22.0 - 29.0 mmol/L 22.6        Calcium 8.6 - 10.5 mg/dL 10.1 10.4 9.1 8.9 9.1    Total Protein 6.0 - 8.5 g/dL 7.3        Albumin 3.50 - 5.20 g/dL 4.80        Globulin gm/dL 2.5        A/G Ratio g/dL 1.9        Total Bilirubin 0.1 - 1.2 mg/dL 0.5        Alkaline Phosphatase 39 - 117 U/L 129 (H)        AST (SGOT) 1 - 32 U/L 18        ALT (SGPT) 1 - 33 U/L 11       Resulting Agency  [facility]  [facility]   Narrative   Performed at:  [address] [place] [address]  [id number]  : Satish Cordoba MD, Phone:  [phone]      Specimen Collected: 03/08/17 11:01 AM Last Resulted: 03/08/17  8:08 PM

## 2017-03-15 NOTE — TELEPHONE ENCOUNTER
Patient notified.  She verbalized understanding and will  samples of Eliquis 2.5 mg to start 3 days after stopping Warfarin.  She will have CMP repeated 2 weeks after starting Eliquis./ JIGNESH

## 2017-03-15 NOTE — TELEPHONE ENCOUNTER
She can take eliquis 2.5 mg bid needs to stop warfarin and start eliquis 3 days after off warfarin. Needs another CMP in 2 wks.MIKEL

## 2017-03-21 ENCOUNTER — TREATMENT (OUTPATIENT)
Dept: PHYSICAL THERAPY | Facility: CLINIC | Age: 82
End: 2017-03-21

## 2017-03-21 DIAGNOSIS — T84.84XD PAIN DUE TO TOTAL HIP REPLACEMENT, SUBSEQUENT ENCOUNTER: ICD-10-CM

## 2017-03-21 DIAGNOSIS — R29.6 FREQUENT FALLS: ICD-10-CM

## 2017-03-21 DIAGNOSIS — R26.2 DIFFICULTY WALKING: Primary | ICD-10-CM

## 2017-03-21 DIAGNOSIS — Z96.649 PAIN DUE TO TOTAL HIP REPLACEMENT, SUBSEQUENT ENCOUNTER: ICD-10-CM

## 2017-03-21 PROCEDURE — G8978 MOBILITY CURRENT STATUS: HCPCS | Performed by: PHYSICAL THERAPIST

## 2017-03-21 PROCEDURE — G8979 MOBILITY GOAL STATUS: HCPCS | Performed by: PHYSICAL THERAPIST

## 2017-03-21 PROCEDURE — 97110 THERAPEUTIC EXERCISES: CPT | Performed by: PHYSICAL THERAPIST

## 2017-03-21 PROCEDURE — 97162 PT EVAL MOD COMPLEX 30 MIN: CPT | Performed by: PHYSICAL THERAPIST

## 2017-03-21 NOTE — PROGRESS NOTES
Physical Therapy Initial Evaluation and Plan of Care    Patient: Marleni Hummel   : 1935  Diagnosis/ICD-10 Code:  Difficulty walking [R26.2]  Referring practitioner: Ken Andujar MD    Subjective Evaluation    History of Present Illness  Mechanism of injury: Hip Replacement Aug 2015 with complications 2nd Sx the next week to fix the femor, by Esteban Alvarado MD.  To  (R) knee problems of the distal femor cause by the THR.  ORIF of the (R) distal femor.  MI on October 15, 2015.  Allergic to the plate and removed and replaced in 2015, by Dr. Esteban Alvarado.  Went to [facility] until December and had complications with keeping food down and dehydrated and anemic.  Pt fell at home in the bathroom for unknown reason.  Injured the (R) hip again.  EMS took the pt to hospital on 2016.  Installed a red in the (R) femur.  Pt went to [facility] and did better food and tolerated PT.  Returned home 2016.   Performed PT until May 2016 at Dr. Moe office.    Hx of 3 falls at home in the garden in the last year.  Last fall 2 weeks ago.      Dr. 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  Pt has since transfer to Dr. Mccrary.      Dr. Andujar sent the pt to PT because of the falls.       Return to , with Dr. Bandar Mccrary.      Hx:  (B) TKR , (L) Knee did good but the (R) knee is still swollen. 2016 the pt had a Kidney infection.      Pain  At worst pain rating: 3  Location: (R) Hip Region  Relieving factors: medications (Tyenlol )  Exacerbated by: Walking >10 mins, Steps, Bending forward.    Social Support  Lives in: condominium  Lives with: spouse    Patient Goals  Patient goals for therapy: increased strength and improved balance             Objective     Strength/Myotome Testing     Left Hip   Planes of Motion   Flexion: 4+  Abduction: 5  Adduction: 4  External rotation: 5  Internal rotation: 5    Right Hip   Planes of Motion    Flexion: 2+  Abduction: 5  Adduction: 4  External rotation: 5  Internal rotation: 4    Left Knee   Flexion: 5  Extension: 5    Right Knee   Flexion: 4-  Extension: 4+    Left Ankle/Foot   Dorsiflexion: 5    Right Ankle/Foot   Dorsiflexion: 5    Ambulation     Observational Gait   Decreased walking speed and stride length.   Base of support: increased    Additional Observational Gait Details  Pt uses a straight cane for ambulation.           Assessment & Plan     Assessment  Impairments: abnormal gait, activity intolerance, impaired balance, impaired physical strength, lacks appropriate home exercise program, pain with function and weight-bearing intolerance  Assessment details: Pt presents to PT with symptoms consistent with extension complications of a hip replacement and multiple other surgeries.  The pt also demos balance deficits that were not measured today.   Pt would benefit from skilled PT intervention to address the deficits noted.       The patient is returning to Dr. Mccrary on 4-4-17 for re-evaluation.  Because of the pt's nervousness of starting PT before seeing Dr. Mccrary     Prognosis: good  Prognosis details:     SHORT TERM GOALS: Time for Goal Achievement: 4 weeks   1.  Patient to be compliant with HEP.  2.  Pain level < 6/10 overall with activities  3.  Increased lumbar and SIJ mobility to allow for improved pelvic alignment  4.  Increased hip mobility / flexibility to WNL degrees to allow for increased ease with dressing and squatting without pain.    LONG TERM GOALS: Time for Goal Achievement: 8 weeks   1.  Pt. to score > 75% perceived ability on LEFS  2.  Pain level < 2/10 with all activities including running and lateral motions  3.  Hip AROM to WNL to allow for return to recreational/walking/household activities without increased symptoms  4.  Hip and lower abdominal strength to 5/5 to allow for pushing, pulling and more strenuous activities to occur w/o risk of injury or  pain.        Plan  Therapy options: will be seen for skilled physical therapy services  Planned modality interventions: cryotherapy, electrical stimulation/Russian stimulation and thermotherapy (hydrocollator packs)  Planned therapy interventions: ADL retraining, balance/weight-bearing training, flexibility, functional ROM exercises, home exercise program, joint mobilization, manual therapy, neuromuscular re-education, soft tissue mobilization, spinal/joint mobilization, strengthening, stretching and therapeutic activities  Frequency: 2x week  Duration in weeks: 8  Treatment plan discussed with: patient        Discussed with patient about waiting to do further PT until after her orthopedic appointment with Dr. Bandar Mccrary.        Manual Therapy:         mins  57018;  Therapeutic Exercise:    10     mins  04750;     Neuromuscular Luis:        mins  99577;    Therapeutic Activity:          mins  74946;     Gait Training:           mins  91305;     Ultrasound:          mins  15498;    Electrical Stimulation:         mins  41326 ( );  Dry Needling          mins self-pay    Timed Treatment:   10   mins   Total Treatment:     70   mins    PT SIGNATURE: Feliciano Wiley PT   [place] Lic #[id number]    DATE TREATMENT INITIATED: 3-21-17    Medicare Initial Certification  Certification Period: 6-19-17  I certify that the therapy services are furnished while this patient is under my care.  The services outlined above are required by this patient, and will be reviewed every 90 days.     PHYSICIAN: Ken Andujar MD      DATE:     Please sign and return via fax to [phone].. Thank you, [facility].

## 2017-03-23 NOTE — PATIENT INSTRUCTIONS
Pt to return to PT after her return to her orthopedic, Bandar Mccrary MD, on 4-4-17.  HEP instructed in HEP and instructed to call if issues prior next visit.

## 2017-04-04 ENCOUNTER — APPOINTMENT (OUTPATIENT)
Dept: LAB | Facility: HOSPITAL | Age: 82
End: 2017-04-04

## 2017-04-04 LAB
ALBUMIN SERPL-MCNC: 4.1 G/DL (ref 3.5–5.2)
ALBUMIN/GLOB SERPL: 1.5 G/DL
ALP SERPL-CCNC: 109 U/L (ref 39–117)
ALT SERPL W P-5'-P-CCNC: 12 U/L (ref 1–33)
ANION GAP SERPL CALCULATED.3IONS-SCNC: 13 MMOL/L
AST SERPL-CCNC: 17 U/L (ref 1–32)
BILIRUB SERPL-MCNC: 0.5 MG/DL (ref 0.1–1.2)
BUN BLD-MCNC: 24 MG/DL (ref 8–23)
BUN/CREAT SERPL: 20.9 (ref 7–25)
CALCIUM SPEC-SCNC: 9.5 MG/DL (ref 8.6–10.5)
CHLORIDE SERPL-SCNC: 101 MMOL/L (ref 98–107)
CO2 SERPL-SCNC: 25 MMOL/L (ref 22–29)
CREAT BLD-MCNC: 1.15 MG/DL (ref 0.57–1)
GFR SERPL CREATININE-BSD FRML MDRD: 46 ML/MIN/1.73
GLOBULIN UR ELPH-MCNC: 2.8 GM/DL
GLUCOSE BLD-MCNC: 93 MG/DL (ref 65–99)
POTASSIUM BLD-SCNC: 5.3 MMOL/L (ref 3.5–5.2)
PROT SERPL-MCNC: 6.9 G/DL (ref 6–8.5)
SODIUM BLD-SCNC: 139 MMOL/L (ref 136–145)

## 2017-04-04 PROCEDURE — 36415 COLL VENOUS BLD VENIPUNCTURE: CPT

## 2017-04-04 PROCEDURE — 80053 COMPREHEN METABOLIC PANEL: CPT | Performed by: INTERNAL MEDICINE

## 2017-04-10 ENCOUNTER — LAB (OUTPATIENT)
Dept: LAB | Facility: HOSPITAL | Age: 82
End: 2017-04-10

## 2017-04-10 ENCOUNTER — CONSULT (OUTPATIENT)
Dept: ONCOLOGY | Facility: CLINIC | Age: 82
End: 2017-04-10

## 2017-04-10 VITALS
SYSTOLIC BLOOD PRESSURE: 152 MMHG | OXYGEN SATURATION: 98 % | DIASTOLIC BLOOD PRESSURE: 72 MMHG | HEIGHT: 62 IN | HEART RATE: 53 BPM | RESPIRATION RATE: 12 BRPM | BODY MASS INDEX: 27.33 KG/M2 | TEMPERATURE: 98.6 F | WEIGHT: 148.5 LBS

## 2017-04-10 DIAGNOSIS — D64.9 ANEMIA, UNSPECIFIED: Primary | ICD-10-CM

## 2017-04-10 DIAGNOSIS — D64.9 ANEMIA, UNSPECIFIED TYPE: Primary | ICD-10-CM

## 2017-04-10 DIAGNOSIS — N18.30 CHRONIC KIDNEY DISEASE (CKD), STAGE III (MODERATE) (HCC): ICD-10-CM

## 2017-04-10 LAB
BASOPHILS # BLD AUTO: 0.02 10*3/MM3 (ref 0–0.1)
BASOPHILS NFR BLD AUTO: 0.3 % (ref 0–1.1)
CRP SERPL-MCNC: 0.68 MG/DL (ref 0–0.5)
DEPRECATED RDW RBC AUTO: 46.5 FL (ref 37–49)
EOSINOPHIL # BLD AUTO: 0.11 10*3/MM3 (ref 0–0.36)
EOSINOPHIL NFR BLD AUTO: 1.8 % (ref 1–5)
ERYTHROCYTE [DISTWIDTH] IN BLOOD BY AUTOMATED COUNT: 13.6 % (ref 11.7–14.5)
ERYTHROCYTE [SEDIMENTATION RATE] IN BLOOD: 48 MM/HR (ref 0–30)
FERRITIN SERPL-MCNC: 263.3 NG/ML
HCT VFR BLD AUTO: 33.2 % (ref 34–45)
HGB BLD-MCNC: 10.6 G/DL (ref 11.5–14.9)
HGB RETIC QN: 33.5 PG (ref 29.8–36.1)
HOLD SPECIMEN: NORMAL
IMM GRANULOCYTES # BLD: 0.04 10*3/MM3 (ref 0–0.03)
IMM GRANULOCYTES NFR BLD: 0.7 % (ref 0–0.5)
IMM RETICS NFR: 10.6 % (ref 3–15.8)
IRON 24H UR-MRATE: 95 MCG/DL (ref 37–145)
IRON SATN MFR SERPL: 31 % (ref 14–48)
LYMPHOCYTES # BLD AUTO: 1.78 10*3/MM3 (ref 1–3.5)
LYMPHOCYTES NFR BLD AUTO: 29.1 % (ref 20–49)
MCH RBC QN AUTO: 29.5 PG (ref 27–33)
MCHC RBC AUTO-ENTMCNC: 31.9 G/DL (ref 32–35)
MCV RBC AUTO: 92.5 FL (ref 83–97)
MONOCYTES # BLD AUTO: 0.44 10*3/MM3 (ref 0.25–0.8)
MONOCYTES NFR BLD AUTO: 7.2 % (ref 4–12)
NEUTROPHILS # BLD AUTO: 3.73 10*3/MM3 (ref 1.5–7)
NEUTROPHILS NFR BLD AUTO: 60.9 % (ref 39–75)
NRBC BLD MANUAL-RTO: 0 /100 WBC (ref 0–0)
PLATELET # BLD AUTO: 210 10*3/MM3 (ref 150–375)
PMV BLD AUTO: 9.6 FL (ref 8.9–12.1)
RBC # BLD AUTO: 3.59 10*6/MM3 (ref 3.9–5)
RETICS/RBC NFR AUTO: 1.76 % (ref 0.6–2)
TIBC SERPL-MCNC: 302 MCG/DL (ref 249–505)
TRANSFERRIN SERPL-MCNC: 216 MG/DL (ref 200–360)
VIT B12 BLD-MCNC: 614 PG/ML (ref 250–999)
WBC NRBC COR # BLD: 6.12 10*3/MM3 (ref 4–10)
WHOLE BLOOD HOLD SPECIMEN: NORMAL

## 2017-04-10 PROCEDURE — 83540 ASSAY OF IRON: CPT | Performed by: INTERNAL MEDICINE

## 2017-04-10 PROCEDURE — 82607 VITAMIN B-12: CPT | Performed by: INTERNAL MEDICINE

## 2017-04-10 PROCEDURE — 36415 COLL VENOUS BLD VENIPUNCTURE: CPT

## 2017-04-10 PROCEDURE — 85652 RBC SED RATE AUTOMATED: CPT | Performed by: INTERNAL MEDICINE

## 2017-04-10 PROCEDURE — 85025 COMPLETE CBC W/AUTO DIFF WBC: CPT

## 2017-04-10 PROCEDURE — 85046 RETICYTE/HGB CONCENTRATE: CPT | Performed by: INTERNAL MEDICINE

## 2017-04-10 PROCEDURE — 86140 C-REACTIVE PROTEIN: CPT | Performed by: INTERNAL MEDICINE

## 2017-04-10 PROCEDURE — 99204 OFFICE O/P NEW MOD 45 MIN: CPT | Performed by: INTERNAL MEDICINE

## 2017-04-10 PROCEDURE — 84466 ASSAY OF TRANSFERRIN: CPT | Performed by: INTERNAL MEDICINE

## 2017-04-10 PROCEDURE — 82728 ASSAY OF FERRITIN: CPT | Performed by: INTERNAL MEDICINE

## 2017-04-10 RX ORDER — WARFARIN SODIUM 3 MG/1
3 TABLET ORAL
COMMUNITY
End: 2017-04-10

## 2017-04-10 NOTE — PROGRESS NOTES
Subjective     REASON FOR CONSULTATION:  Anemia  Provide an opinion on any further workup or treatment                             REQUESTING PHYSICIAN:  Ken Andujar MD    RECORDS OBTAINED:  Records of the patients history including those obtained from the referring provider were reviewed and summarized in detail.    HISTORY OF PRESENT ILLNESS:  The patient is a 81 y.o. year old female who is here for an opinion about the above issue.  She was referred to us by her primary care physician for evaluation of anemia.  Her hemoglobin is ranged from 8.5 g/dL to11.5 g/dL the past year.  She has a history of multiple surgeries and complications stemming from a right hip replacement surgery in August 2015.  We suspect that a portion of this anemia may certainly be anemia of chronic disease associated with chronic inflammation and healing from these multiple surgeries.    She also is on Eliquis for atrial fibrillation and reports that she does see bright red blood per rectum not infrequently.    She also has mild to moderate renal dysfunction and certainly could have a component of anemia of chronic kidney disease with low erythropoietin.    Her peripheral smear does not show any ominous findings.  She does have some anisocytosis and hypochromia.  White cells and platelets appear normal.    History of Present Illness     Past Medical History:   Diagnosis Date   • Allergic    • Allergic rhinitis    • Arthropathy of knee     right   • Atrial fibrillation    • Back pain    • Bell's palsy    • Chronic kidney disease (CKD), stage III (moderate)    • Cough    • Edema    • Encounter for eye exam 02/2015   • Essential hypertension    • Fatigue    • GERD (gastroesophageal reflux disease)    • H/O Heart murmur    • Heart attack    • Herpes zoster without complication    • Hip arthritis     left   • History of bone density study 02/28/2008   • History of pneumonia    • Hypercholesterolemia    • IFG (impaired fasting glucose)    •  Insomnia    • Nephropathy    • Osteoarthritis     Right hip   • Osteopenia    • Panic disorder    • Rectal bleeding    • Sleep apnea    • Stress    • Urinary incontinence    • Vitamin D deficiency         Past Surgical History:   Procedure Laterality Date   • CATARACT EXTRACTION Left 04/01/2013    Dr. Clarke   • CATARACT EXTRACTION Right 04/16/2013    Dr. Clarke   • COLONOSCOPY  04/18/2014    Dr. Elizabeth; no polyps   • JOINT REPLACEMENT Left 2006    knee   • JOINT REPLACEMENT Right 2006    knee   • MAMMO BILATERAL  11/2013   • PAP SMEAR  02/2011   • TOTAL HIP ARTHROPLASTY Right 11/2015        Current Outpatient Prescriptions on File Prior to Visit   Medication Sig Dispense Refill   • acetaminophen (TYLENOL) 500 MG tablet Take 1,000 mg by mouth 3 (three) times a day as needed for mild pain (1-3) or moderate pain (4-6).     • amLODIPine (NORVASC) 2.5 MG tablet Take 1 tablet by mouth Daily for 180 days. 90 tablet 1   • apixaban (ELIQUIS) 2.5 MG tablet tablet Take 1 tablet by mouth 2 (Two) Times a Day. 56 tablet 0   • atorvastatin (LIPITOR) 20 MG tablet Take 1 tablet by mouth Every Night for 180 days. 90 tablet 1   • DULoxetine (CYMBALTA) 30 MG capsule Take 1 capsule by mouth Daily for 180 days. 90 capsule 1   • levothyroxine (SYNTHROID, LEVOTHROID) 50 MCG tablet Take 1 tablet by mouth Daily for 180 days. 90 tablet 1   • metoprolol succinate XL (TOPROL-XL) 50 MG 24 hr tablet Take 1 tablet by mouth Daily for 180 days. 90 tablet 1   • MYRBETRIQ 25 MG tablet sustained-release 24 hour 24 hr tablet      • phenol (CHLORASEPTIC) 1.4 % liquid liquid Apply 2 sprays to the mouth or throat Every 2 (Two) Hours As Needed (sore throat). 177 mL 0   • sodium chloride (OCEAN NASAL SPRAY) 0.65 % nasal spray 1 spray into each nostril As Needed for congestion. 104 mL 0   • trimethoprim (TRIMPEX) 100 MG tablet Take 100 mg by mouth Daily.       No current facility-administered medications on file prior to visit.         ALLERGIES:     Allergies   Allergen Reactions   • Ace Inhibitors    • Amoxicillin    • Lortab [Hydrocodone-Acetaminophen]    • Macrobid [Nitrofurantoin]    • Morphine And Related    • Oxycodone    • Levaquin [Levofloxacin] Itching, Rash and Other (See Comments)     insomnia        Social History     Social History   • Marital status:      Spouse name: N/A   • Number of children: N/A   • Years of education: N/A     Occupational History   •  Retired     Social History Main Topics   • Smoking status: Former Smoker     Types: Cigarettes     Quit date: 2/22/1956   • Smokeless tobacco: None   • Alcohol use 0.6 - 1.2 oz/week     1 - 2 Glasses of wine per week      Comment: rare   • Drug use: No   • Sexual activity: Defer     Other Topics Concern   • None     Social History Narrative        Family History   Problem Relation Age of Onset   • Hypertension Other         Review of Systems   Constitutional: Positive for appetite change and fatigue. Negative for activity change, fever and unexpected weight change.   HENT: Positive for hearing loss. Negative for nosebleeds, trouble swallowing and voice change.    Eyes: Negative for visual disturbance.   Respiratory: Positive for shortness of breath. Negative for cough and wheezing.    Cardiovascular: Positive for leg swelling. Negative for chest pain and palpitations.   Gastrointestinal: Positive for blood in stool. Negative for abdominal pain, diarrhea, nausea and vomiting.   Genitourinary: Negative for difficulty urinating, frequency, hematuria and urgency.   Musculoskeletal: Positive for back pain and gait problem. Negative for neck pain.   Skin: Negative for rash.   Neurological: Negative for dizziness, seizures, syncope and headaches.        Frequent falls   Hematological: Negative for adenopathy. Bruises/bleeds easily.   Psychiatric/Behavioral: Negative for behavioral problems. The patient is not nervous/anxious.         Objective     Vitals:    04/10/17 1447   BP: 152/72   Pulse:  "53   Resp: 12   Temp: 98.6 °F (37 °C)   TempSrc: Oral   SpO2: 98%   Weight: 148 lb 8 oz (67.4 kg)   Height: 62.4\" (158.5 cm)  Comment: new height   PainSc: 2  Comment: RT knee pain     Current Status 4/10/2017   ECOG score 1       Physical Exam   Constitutional: She is oriented to person, place, and time. She appears well-developed and well-nourished. No distress.   HENT:   Head: Normocephalic.   Eyes: Conjunctivae and EOM are normal. Pupils are equal, round, and reactive to light. No scleral icterus.   Neck: Normal range of motion. Neck supple. No JVD present. No thyromegaly present.   Cardiovascular: Normal rate and regular rhythm.  Exam reveals no gallop and no friction rub.    No murmur heard.  Pulmonary/Chest: Effort normal and breath sounds normal. She has no wheezes. She has no rales.   Abdominal: Soft. She exhibits no distension and no mass. There is no tenderness.   Musculoskeletal: Normal range of motion. She exhibits no edema or deformity.   Lymphadenopathy:     She has no cervical adenopathy.   Neurological: She is alert and oriented to person, place, and time. She has normal reflexes. No cranial nerve deficit.   Skin: Skin is warm and dry. No rash noted. No erythema.   bruises   Psychiatric: She has a normal mood and affect. Her behavior is normal. Judgment normal.        RECENT LABS:  Hematology WBC   Date Value Ref Range Status   04/10/2017 6.12 4.00 - 10.00 10*3/mm3 Final     RBC   Date Value Ref Range Status   04/10/2017 3.59 (L) 3.90 - 5.00 10*6/mm3 Final     Hemoglobin   Date Value Ref Range Status   04/10/2017 10.6 (L) 11.5 - 14.9 g/dL Final     Hematocrit   Date Value Ref Range Status   04/10/2017 33.2 (L) 34.0 - 45.0 % Final     Platelets   Date Value Ref Range Status   04/10/2017 210 150 - 375 10*3/mm3 Final          Assessment/Plan     1.  Chronic anemia which is most likely multifactorial.  As noted above, she is on Eliquis and has had some visible bleeding in the past and could certainly " have iron deficiency as result of this.  She also may have a component of anemia of chronic disease due to her multiple surgeries and complications from her previous right hip replacement.  She also has some degree of renal insufficiency and could have a component of anemia of chronic kidney disease.  2.  Chronic anticoagulation with Eliquis due to atrial fibrillation.  This is managed by her cardiologist Dr. Jason Tai.    Recommendations  1.  I explained to the patient and her family that we cannot give them a definite cause of her anemia today but we will run additional anemia labs in the hopes of finding a correctable cause of anemia.  We also explained that anemia of chronic disease would not be something that we could correct unless her underlying chronic inflammatory process resolves.  2.  Additional labs were drawn today including an iron panel, ferritin, B12 level, and erythrocyte sedimentation rate, C-reactive protein, reticulocyte count and reticulated hemoglobin, and a serum protein electrophoresis with immunofixation.  3.  We have asked the patient to return to our office in 2 weeks to review the results of the laboratory workup but I did tell her that if we see any evidence of a correctable deficiency state that we may contact her sooner by phone with instructions for appropriate supplementation therapy.    Thanks for allowing us to see this nice patient in consultation.

## 2017-04-11 LAB
ALBUMIN SERPL-MCNC: 3.8 G/DL (ref 2.9–4.4)
ALBUMIN/GLOB SERPL: 1.3 {RATIO} (ref 0.7–1.7)
ALPHA1 GLOB FLD ELPH-MCNC: 0.2 G/DL (ref 0–0.4)
ALPHA2 GLOB SERPL ELPH-MCNC: 1 G/DL (ref 0.4–1)
B-GLOBULIN SERPL ELPH-MCNC: 0.9 G/DL (ref 0.7–1.3)
GAMMA GLOB SERPL ELPH-MCNC: 0.9 G/DL (ref 0.4–1.8)
GLOBULIN SER CALC-MCNC: 3 G/DL (ref 2.2–3.9)
IGA SERPL-MCNC: 144 MG/DL (ref 64–422)
IGG SERPL-MCNC: 805 MG/DL (ref 700–1600)
IGM SERPL-MCNC: 119 MG/DL (ref 26–217)
INTERPRETATION SERPL IEP-IMP: ABNORMAL
KAPPA LC SERPL-MCNC: 23.26 MG/L (ref 3.3–19.4)
KAPPA LC/LAMBDA SER: 1.14 {RATIO} (ref 0.26–1.65)
LAMBDA LC FREE SERPL-MCNC: 20.45 MG/L (ref 5.71–26.3)
Lab: ABNORMAL
M-SPIKE: ABNORMAL G/DL
PROT SERPL-MCNC: 6.8 G/DL (ref 6–8.5)

## 2017-04-12 ENCOUNTER — OFFICE VISIT (OUTPATIENT)
Dept: FAMILY MEDICINE CLINIC | Facility: CLINIC | Age: 82
End: 2017-04-12

## 2017-04-12 VITALS
SYSTOLIC BLOOD PRESSURE: 161 MMHG | DIASTOLIC BLOOD PRESSURE: 67 MMHG | HEART RATE: 57 BPM | BODY MASS INDEX: 27.6 KG/M2 | HEIGHT: 62 IN | RESPIRATION RATE: 16 BRPM | WEIGHT: 150 LBS | TEMPERATURE: 98.2 F

## 2017-04-12 DIAGNOSIS — D63.8 ANEMIA OF CHRONIC DISEASE: ICD-10-CM

## 2017-04-12 DIAGNOSIS — I10 ESSENTIAL HYPERTENSION: Primary | ICD-10-CM

## 2017-04-12 PROCEDURE — 99213 OFFICE O/P EST LOW 20 MIN: CPT | Performed by: FAMILY MEDICINE

## 2017-04-12 RX ORDER — AMLODIPINE BESYLATE 2.5 MG/1
2.5 TABLET ORAL 2 TIMES DAILY
Qty: 90 TABLET | Refills: 1
Start: 2017-04-12 | End: 2017-05-17 | Stop reason: SDUPTHER

## 2017-04-12 NOTE — PROGRESS NOTES
"Chief Complaint   Patient presents with   • Hypertension       Subjective   This patient returns the office to reevaluate her arterial hypertension.  It is still above goal.  She is tolerating amlodipine.  We will carefully increase the dose.  She saw the hematologist and he felt that it was anemia of chronic disease.  Follow-up visit with hematology is April 27, 2017.      Review of Systems   Constitutional: Positive for fatigue.   Musculoskeletal:        Left hip area pain   Neurological: Negative for dizziness and syncope.       Objective   /67  Pulse 57  Temp 98.2 °F (36.8 °C) (Oral)   Resp 16  Ht 62.4\" (158.5 cm)  Wt 150 lb (68 kg)  BMI 27.08 kg/m2  Body mass index is 27.08 kg/(m^2).  Physical Exam   Constitutional: She is cooperative. No distress.   Eyes: Conjunctivae and lids are normal.   Neck: Carotid bruit is not present. No tracheal deviation present.   Cardiovascular: Normal rate, regular rhythm and normal heart sounds.    No murmur heard.  Pulmonary/Chest: Effort normal and breath sounds normal.   Neurological: She is alert. She is not disoriented.   Skin: Skin is warm and dry.   Psychiatric: She has a normal mood and affect. Her speech is normal and behavior is normal.   Vitals reviewed.      Assessment/Plan     Problem List Items Addressed This Visit        Cardiovascular and Mediastinum    Essential hypertension - Primary    Relevant Medications    amLODIPine (NORVASC) 2.5 MG tablet       Hematopoietic and Hemostatic    Anemia of chronic disease          Outpatient Encounter Prescriptions as of 4/12/2017   Medication Sig Dispense Refill   • acetaminophen (TYLENOL) 500 MG tablet Take 1,000 mg by mouth 3 (three) times a day as needed for mild pain (1-3) or moderate pain (4-6).     • amLODIPine (NORVASC) 2.5 MG tablet Take 1 tablet by mouth 2 (Two) Times a Day for 180 days. 90 tablet 1   • apixaban (ELIQUIS) 2.5 MG tablet tablet Take 1 tablet by mouth 2 (Two) Times a Day. 56 tablet 0   • " atorvastatin (LIPITOR) 20 MG tablet Take 1 tablet by mouth Every Night for 180 days. 90 tablet 1   • DULoxetine (CYMBALTA) 30 MG capsule Take 1 capsule by mouth Daily for 180 days. 90 capsule 1   • levothyroxine (SYNTHROID, LEVOTHROID) 50 MCG tablet Take 1 tablet by mouth Daily for 180 days. 90 tablet 1   • metoprolol succinate XL (TOPROL-XL) 50 MG 24 hr tablet Take 1 tablet by mouth Daily for 180 days. 90 tablet 1   • MYRBETRIQ 25 MG tablet sustained-release 24 hour 24 hr tablet      • phenol (CHLORASEPTIC) 1.4 % liquid liquid Apply 2 sprays to the mouth or throat Every 2 (Two) Hours As Needed (sore throat). 177 mL 0   • sodium chloride (OCEAN NASAL SPRAY) 0.65 % nasal spray 1 spray into each nostril As Needed for congestion. 104 mL 0   • trimethoprim (TRIMPEX) 100 MG tablet Take 100 mg by mouth Daily.     • [DISCONTINUED] amLODIPine (NORVASC) 2.5 MG tablet Take 1 tablet by mouth Daily for 180 days. 90 tablet 1     No facility-administered encounter medications on file as of 4/12/2017.        No orders of the defined types were placed in this encounter.      Continue with current treatment plan.         rtc in may

## 2017-04-27 ENCOUNTER — OFFICE VISIT (OUTPATIENT)
Dept: ONCOLOGY | Facility: CLINIC | Age: 82
End: 2017-04-27

## 2017-04-27 ENCOUNTER — LAB (OUTPATIENT)
Dept: LAB | Facility: HOSPITAL | Age: 82
End: 2017-04-27

## 2017-04-27 VITALS
BODY MASS INDEX: 27.97 KG/M2 | SYSTOLIC BLOOD PRESSURE: 130 MMHG | DIASTOLIC BLOOD PRESSURE: 80 MMHG | RESPIRATION RATE: 16 BRPM | WEIGHT: 152 LBS | HEIGHT: 62 IN | TEMPERATURE: 98.3 F | HEART RATE: 56 BPM

## 2017-04-27 DIAGNOSIS — N18.30 CHRONIC KIDNEY DISEASE (CKD), STAGE III (MODERATE) (HCC): Primary | ICD-10-CM

## 2017-04-27 DIAGNOSIS — N18.30 CHRONIC KIDNEY DISEASE (CKD), STAGE III (MODERATE) (HCC): ICD-10-CM

## 2017-04-27 DIAGNOSIS — D64.9 ANEMIA, UNSPECIFIED: ICD-10-CM

## 2017-04-27 DIAGNOSIS — D63.8 ANEMIA OF CHRONIC DISEASE: ICD-10-CM

## 2017-04-27 LAB
BASOPHILS # BLD AUTO: 0.04 10*3/MM3 (ref 0–0.1)
BASOPHILS NFR BLD AUTO: 0.5 % (ref 0–1.1)
DEPRECATED RDW RBC AUTO: 44.9 FL (ref 37–49)
EOSINOPHIL # BLD AUTO: 0.16 10*3/MM3 (ref 0–0.36)
EOSINOPHIL NFR BLD AUTO: 2 % (ref 1–5)
ERYTHROCYTE [DISTWIDTH] IN BLOOD BY AUTOMATED COUNT: 13.3 % (ref 11.7–14.5)
HCT VFR BLD AUTO: 35.1 % (ref 34–45)
HGB BLD-MCNC: 11.8 G/DL (ref 11.5–14.9)
HGB RETIC QN: 34.8 PG (ref 29.8–36.1)
IMM GRANULOCYTES # BLD: 0.03 10*3/MM3 (ref 0–0.03)
IMM GRANULOCYTES NFR BLD: 0.4 % (ref 0–0.5)
IMM RETICS NFR: 7.8 % (ref 3–15.8)
LYMPHOCYTES # BLD AUTO: 2.46 10*3/MM3 (ref 1–3.5)
LYMPHOCYTES NFR BLD AUTO: 30.6 % (ref 20–49)
MCH RBC QN AUTO: 30.7 PG (ref 27–33)
MCHC RBC AUTO-ENTMCNC: 33.6 G/DL (ref 32–35)
MCV RBC AUTO: 91.4 FL (ref 83–97)
MONOCYTES # BLD AUTO: 0.51 10*3/MM3 (ref 0.25–0.8)
MONOCYTES NFR BLD AUTO: 6.4 % (ref 4–12)
NEUTROPHILS # BLD AUTO: 4.83 10*3/MM3 (ref 1.5–7)
NEUTROPHILS NFR BLD AUTO: 60.1 % (ref 39–75)
NRBC BLD MANUAL-RTO: 0 /100 WBC (ref 0–0)
PLATELET # BLD AUTO: 185 10*3/MM3 (ref 150–375)
PMV BLD AUTO: 10.8 FL (ref 8.9–12.1)
RBC # BLD AUTO: 3.84 10*6/MM3 (ref 3.9–5)
RETICS/RBC NFR AUTO: 1.76 % (ref 0.6–2)
WBC NRBC COR # BLD: 8.03 10*3/MM3 (ref 4–10)

## 2017-04-27 PROCEDURE — 36416 COLLJ CAPILLARY BLOOD SPEC: CPT

## 2017-04-27 PROCEDURE — 85046 RETICYTE/HGB CONCENTRATE: CPT

## 2017-04-27 PROCEDURE — 99213 OFFICE O/P EST LOW 20 MIN: CPT | Performed by: INTERNAL MEDICINE

## 2017-04-27 PROCEDURE — 85025 COMPLETE CBC W/AUTO DIFF WBC: CPT

## 2017-04-27 NOTE — PROGRESS NOTES
Subjective     REASON FOR FOLLOW UP:  Multifactorial anemia    HISTORY OF PRESENT ILLNESS:  The patient is a 81 y.o. year old female who is here for an opinion about the above issue.  She was referred to us by her primary care physician for evaluation of anemia.  Her hemoglobin is ranged from 8.5 g/dL to11.5 g/dL the past year.  She has a history of multiple surgeries and complications stemming from a right hip replacement surgery in August 2015.  We suspect that a portion of this anemia may certainly be anemia of chronic disease associated with chronic inflammation and healing from these multiple surgeries.    She also is on Eliquis for atrial fibrillation and reports that she does see bright red blood per rectum not infrequently.    She also has mild to moderate renal dysfunction and certainly could have a component of anemia of chronic kidney disease with low erythropoietin.    Her peripheral smear does not show any ominous findings.  She does have some anisocytosis and hypochromia.  White cells and platelets appear normal.    With her initial consult visit here on 4/10/2017 we sita additional labs.  She had elevated markers of inflammation with an erythrocyte sedimentation rate of 48 and a C-reactive protein of 0.68.  She had no evidence of B12 deficiency or iron deficiency.  She does have significant renal dysfunction with an estimated GFR of 46.  We therefore felt that her anemia was most likely multifactorial with component of anemia of chronic disease and a component of anemia of chronic renal insufficiency with low erythropoietin.    History of Present Illness          Current Outpatient Prescriptions on File Prior to Visit   Medication Sig Dispense Refill   • acetaminophen (TYLENOL) 500 MG tablet Take 1,000 mg by mouth 3 (three) times a day as needed for mild pain (1-3) or moderate pain (4-6).     • amLODIPine (NORVASC) 2.5 MG tablet Take 1 tablet by mouth 2 (Two) Times a Day for 180 days. 90 tablet 1    • apixaban (ELIQUIS) 2.5 MG tablet tablet Take 1 tablet by mouth 2 (Two) Times a Day. 180 tablet 0   • atorvastatin (LIPITOR) 20 MG tablet Take 1 tablet by mouth Every Night for 180 days. 90 tablet 1   • DULoxetine (CYMBALTA) 30 MG capsule Take 1 capsule by mouth Daily for 180 days. 90 capsule 1   • levothyroxine (SYNTHROID, LEVOTHROID) 50 MCG tablet Take 1 tablet by mouth Daily for 180 days. 90 tablet 1   • metoprolol succinate XL (TOPROL-XL) 50 MG 24 hr tablet Take 1 tablet by mouth Daily for 180 days. 90 tablet 1   • MYRBETRIQ 25 MG tablet sustained-release 24 hour 24 hr tablet      • phenol (CHLORASEPTIC) 1.4 % liquid liquid Apply 2 sprays to the mouth or throat Every 2 (Two) Hours As Needed (sore throat). 177 mL 0   • sodium chloride (OCEAN NASAL SPRAY) 0.65 % nasal spray 1 spray into each nostril As Needed for congestion. 104 mL 0   • trimethoprim (TRIMPEX) 100 MG tablet Take 100 mg by mouth Daily.       No current facility-administered medications on file prior to visit.         ALLERGIES:    Allergies   Allergen Reactions   • Ace Inhibitors    • Amoxicillin    • Lortab [Hydrocodone-Acetaminophen]    • Macrobid [Nitrofurantoin]    • Morphine And Related    • Oxycodone    • Levaquin [Levofloxacin] Itching, Rash and Other (See Comments)     insomnia           Review of Systems   Constitutional: Positive for appetite change and fatigue. Negative for activity change, fever and unexpected weight change.   HENT: Positive for hearing loss. Negative for nosebleeds, trouble swallowing and voice change.    Eyes: Negative for visual disturbance.   Respiratory: Positive for shortness of breath. Negative for cough and wheezing.    Cardiovascular: Positive for leg swelling. Negative for chest pain and palpitations.   Gastrointestinal: Positive for blood in stool. Negative for abdominal pain, diarrhea, nausea and vomiting.   Genitourinary: Negative for difficulty urinating, frequency, hematuria and urgency.  "  Musculoskeletal: Positive for back pain and gait problem. Negative for neck pain.   Skin: Negative for rash.   Neurological: Negative for dizziness, seizures, syncope and headaches.        Frequent falls   Hematological: Negative for adenopathy. Bruises/bleeds easily.   Psychiatric/Behavioral: Negative for behavioral problems. The patient is not nervous/anxious.         Objective     Vitals:    04/27/17 1554   BP: 130/80   Pulse: 56   Resp: 16   Temp: 98.3 °F (36.8 °C)   Weight: 152 lb (68.9 kg)   Height: 62.4\" (158.5 cm)   PainSc: 0-No pain     Current Status 4/27/2017   ECOG score 0       Physical Exam   Constitutional: She is oriented to person, place, and time. She appears well-developed and well-nourished. No distress.   HENT:   Head: Normocephalic.   Eyes: Conjunctivae and EOM are normal. Pupils are equal, round, and reactive to light. No scleral icterus.   Neck: Normal range of motion. Neck supple. No JVD present. No thyromegaly present.   Cardiovascular: Normal rate and regular rhythm.  Exam reveals no gallop and no friction rub.    No murmur heard.  Pulmonary/Chest: Effort normal and breath sounds normal. She has no wheezes. She has no rales.   Abdominal: Soft. She exhibits no distension and no mass. There is no tenderness.   Musculoskeletal: Normal range of motion. She exhibits no edema or deformity.   Lymphadenopathy:     She has no cervical adenopathy.   Neurological: She is alert and oriented to person, place, and time. She has normal reflexes. No cranial nerve deficit.   Skin: Skin is warm and dry. No rash noted. No erythema.   bruises   Psychiatric: She has a normal mood and affect. Her behavior is normal. Judgment normal.        RECENT LABS:  Hematology WBC   Date Value Ref Range Status   04/27/2017 8.03 4.00 - 10.00 10*3/mm3 Final     RBC   Date Value Ref Range Status   04/27/2017 3.84 (L) 3.90 - 5.00 10*6/mm3 Final     Hemoglobin   Date Value Ref Range Status   04/27/2017 11.8 11.5 - 14.9 g/dL " Final     Hematocrit   Date Value Ref Range Status   04/27/2017 35.1 34.0 - 45.0 % Final     Platelets   Date Value Ref Range Status   04/27/2017 185 150 - 375 10*3/mm3 Final          Assessment/Plan     1.  Chronic anemia which is  multifactorial.  She has a component of anemia of chronic disease due to her multiple surgeries and complications from her previous right hip replacement.  She also has some degree of renal insufficiency and most likely has a component of anemia of chronic kidney disease.  Her anemia is not severe enough at this point to consider any Procrit therapy.  2.  Chronic anticoagulation with Eliquis due to atrial fibrillation.  This is managed by her cardiologist Dr. Jason Tai.    Plan  1.  At this time, I think the best approach is continued observation.  We did not schedule routine follow-up in our office but certainly if her blood counts worsen over time we would be happy to see her again at any point in the future to reevaluate her anemia.        Thanks for allowing us to see this nice patient in consultation

## 2017-05-04 ENCOUNTER — OFFICE VISIT (OUTPATIENT)
Dept: FAMILY MEDICINE CLINIC | Facility: CLINIC | Age: 82
End: 2017-05-04

## 2017-05-04 VITALS
BODY MASS INDEX: 28.34 KG/M2 | DIASTOLIC BLOOD PRESSURE: 71 MMHG | HEART RATE: 57 BPM | TEMPERATURE: 98.1 F | RESPIRATION RATE: 16 BRPM | SYSTOLIC BLOOD PRESSURE: 146 MMHG | WEIGHT: 154 LBS | HEIGHT: 62 IN

## 2017-05-04 DIAGNOSIS — K21.9 GASTROESOPHAGEAL REFLUX DISEASE, ESOPHAGITIS PRESENCE NOT SPECIFIED: ICD-10-CM

## 2017-05-04 DIAGNOSIS — I10 ESSENTIAL HYPERTENSION: Primary | ICD-10-CM

## 2017-05-04 PROBLEM — R01.1 CARDIAC MURMUR: Status: ACTIVE | Noted: 2017-05-04

## 2017-05-04 PROCEDURE — 99213 OFFICE O/P EST LOW 20 MIN: CPT | Performed by: FAMILY MEDICINE

## 2017-05-04 RX ORDER — PANTOPRAZOLE SODIUM 20 MG/1
20 TABLET, DELAYED RELEASE ORAL DAILY
COMMUNITY
End: 2017-05-04 | Stop reason: SDUPTHER

## 2017-05-04 RX ORDER — PANTOPRAZOLE SODIUM 20 MG/1
20 TABLET, DELAYED RELEASE ORAL NIGHTLY
Qty: 90 TABLET | Refills: 1 | Status: SHIPPED | OUTPATIENT
Start: 2017-05-04 | End: 2017-09-28 | Stop reason: SDUPTHER

## 2017-05-08 RX ORDER — POTASSIUM CHLORIDE 750 MG/1
TABLET, EXTENDED RELEASE ORAL
Qty: 180 TABLET | Refills: 2 | Status: SHIPPED | OUTPATIENT
Start: 2017-05-08 | End: 2017-05-18

## 2017-05-17 ENCOUNTER — TELEPHONE (OUTPATIENT)
Dept: FAMILY MEDICINE CLINIC | Facility: CLINIC | Age: 82
End: 2017-05-17

## 2017-05-17 DIAGNOSIS — I10 ESSENTIAL HYPERTENSION: ICD-10-CM

## 2017-05-17 RX ORDER — AMLODIPINE BESYLATE 2.5 MG/1
2.5 TABLET ORAL 2 TIMES DAILY
Qty: 28 TABLET | Refills: 0 | Status: SHIPPED | OUTPATIENT
Start: 2017-05-17 | End: 2017-07-12 | Stop reason: SDUPTHER

## 2017-05-18 ENCOUNTER — OFFICE VISIT (OUTPATIENT)
Dept: CARDIOLOGY | Facility: CLINIC | Age: 82
End: 2017-05-18

## 2017-05-18 VITALS
HEIGHT: 63 IN | HEART RATE: 55 BPM | WEIGHT: 152 LBS | DIASTOLIC BLOOD PRESSURE: 70 MMHG | SYSTOLIC BLOOD PRESSURE: 148 MMHG | BODY MASS INDEX: 26.93 KG/M2

## 2017-05-18 DIAGNOSIS — I42.9 CARDIOMYOPATHY (HCC): ICD-10-CM

## 2017-05-18 DIAGNOSIS — I48.0 PAROXYSMAL ATRIAL FIBRILLATION (HCC): Primary | ICD-10-CM

## 2017-05-18 DIAGNOSIS — I25.10 CORONARY ARTERY DISEASE INVOLVING NATIVE CORONARY ARTERY OF NATIVE HEART WITHOUT ANGINA PECTORIS: ICD-10-CM

## 2017-05-18 DIAGNOSIS — E78.2 MIXED HYPERLIPIDEMIA: ICD-10-CM

## 2017-05-18 PROCEDURE — 93000 ELECTROCARDIOGRAM COMPLETE: CPT | Performed by: INTERNAL MEDICINE

## 2017-05-18 PROCEDURE — 99214 OFFICE O/P EST MOD 30 MIN: CPT | Performed by: INTERNAL MEDICINE

## 2017-06-01 RX ORDER — WARFARIN SODIUM 3 MG/1
TABLET ORAL
Qty: 90 TABLET | Refills: 0 | Status: ON HOLD | OUTPATIENT
Start: 2017-06-01 | End: 2017-06-20

## 2017-06-07 ENCOUNTER — DOCUMENTATION (OUTPATIENT)
Dept: PHYSICAL THERAPY | Facility: CLINIC | Age: 82
End: 2017-06-07

## 2017-06-07 DIAGNOSIS — R29.6 FREQUENT FALLS: ICD-10-CM

## 2017-06-07 DIAGNOSIS — T84.84XD PAIN DUE TO TOTAL HIP REPLACEMENT, SUBSEQUENT ENCOUNTER: ICD-10-CM

## 2017-06-07 DIAGNOSIS — Z96.649 PAIN DUE TO TOTAL HIP REPLACEMENT, SUBSEQUENT ENCOUNTER: ICD-10-CM

## 2017-06-07 DIAGNOSIS — R26.2 DIFFICULTY WALKING: Primary | ICD-10-CM

## 2017-06-07 NOTE — PROGRESS NOTES
Discharge Summary  Discharge Summary from Physical Therapy Report    Patient: Marleni Hummel   : 1935  Diagnosis/ICD-10 Code:  Difficulty walking [R26.2]  Referring practitioner: Ken Andujar MD  Date of Initial Visit: 3-21-17  Date of Last Visit: 3-21-17     Number of Visits: 1    Discharge Status of Patient: Discharged    Goals: Not Met    Comments:  Pt failed to return to PT after her initial visit.  She wanted to wait until after she saw Dr. Mccrary, before continuation, but the pt failed to return to PT.  The pt will be discharged from PT at this time for noncompliance with attendance.     Date of Discharge: 2017        Feliciano Wiley, PT  Physical Therapist

## 2017-06-19 ENCOUNTER — HOSPITAL ENCOUNTER (OUTPATIENT)
Facility: HOSPITAL | Age: 82
Setting detail: OBSERVATION
Discharge: HOME | End: 2017-06-20
Attending: EMERGENCY MEDICINE | Admitting: INTERNAL MEDICINE

## 2017-06-19 ENCOUNTER — APPOINTMENT (OUTPATIENT)
Dept: GENERAL RADIOLOGY | Facility: HOSPITAL | Age: 82
End: 2017-06-19

## 2017-06-19 DIAGNOSIS — E86.0 DEHYDRATION: ICD-10-CM

## 2017-06-19 DIAGNOSIS — I48.91 ATRIAL FIBRILLATION WITH RVR (HCC): Primary | ICD-10-CM

## 2017-06-19 DIAGNOSIS — N18.30 CHRONIC KIDNEY DISEASE (CKD), STAGE III (MODERATE) (HCC): ICD-10-CM

## 2017-06-19 LAB
ALBUMIN SERPL-MCNC: 4.6 G/DL (ref 3.5–5.2)
ALBUMIN/GLOB SERPL: 1.6 G/DL
ALP SERPL-CCNC: 113 U/L (ref 39–117)
ALT SERPL W P-5'-P-CCNC: 23 U/L (ref 1–33)
ANION GAP SERPL CALCULATED.3IONS-SCNC: 17.5 MMOL/L
AST SERPL-CCNC: 27 U/L (ref 1–32)
BASOPHILS # BLD AUTO: 0.02 10*3/MM3 (ref 0–0.2)
BASOPHILS NFR BLD AUTO: 0.3 % (ref 0–1.5)
BILIRUB SERPL-MCNC: 0.5 MG/DL (ref 0.1–1.2)
BUN BLD-MCNC: 36 MG/DL (ref 8–23)
BUN/CREAT SERPL: 17.8 (ref 7–25)
CALCIUM SPEC-SCNC: 9.5 MG/DL (ref 8.6–10.5)
CHLORIDE SERPL-SCNC: 100 MMOL/L (ref 98–107)
CO2 SERPL-SCNC: 21.5 MMOL/L (ref 22–29)
CREAT BLD-MCNC: 2.02 MG/DL (ref 0.57–1)
DEPRECATED RDW RBC AUTO: 45.4 FL (ref 37–54)
EOSINOPHIL # BLD AUTO: 0.04 10*3/MM3 (ref 0–0.7)
EOSINOPHIL NFR BLD AUTO: 0.5 % (ref 0.3–6.2)
ERYTHROCYTE [DISTWIDTH] IN BLOOD BY AUTOMATED COUNT: 13.4 % (ref 11.7–13)
GFR SERPL CREATININE-BSD FRML MDRD: 24 ML/MIN/1.73
GLOBULIN UR ELPH-MCNC: 2.9 GM/DL
GLUCOSE BLD-MCNC: 127 MG/DL (ref 65–99)
HCT VFR BLD AUTO: 38.1 % (ref 35.6–45.5)
HGB BLD-MCNC: 12.3 G/DL (ref 11.9–15.5)
HOLD SPECIMEN: NORMAL
HOLD SPECIMEN: NORMAL
IMM GRANULOCYTES # BLD: 0.02 10*3/MM3 (ref 0–0.03)
IMM GRANULOCYTES NFR BLD: 0.3 % (ref 0–0.5)
INR PPP: 1.19 (ref 0.9–1.1)
LYMPHOCYTES # BLD AUTO: 1.45 10*3/MM3 (ref 0.9–4.8)
LYMPHOCYTES NFR BLD AUTO: 19.6 % (ref 19.6–45.3)
MCH RBC QN AUTO: 29.9 PG (ref 26.9–32)
MCHC RBC AUTO-ENTMCNC: 32.3 G/DL (ref 32.4–36.3)
MCV RBC AUTO: 92.5 FL (ref 80.5–98.2)
MONOCYTES # BLD AUTO: 0.43 10*3/MM3 (ref 0.2–1.2)
MONOCYTES NFR BLD AUTO: 5.8 % (ref 5–12)
NEUTROPHILS # BLD AUTO: 5.42 10*3/MM3 (ref 1.9–8.1)
NEUTROPHILS NFR BLD AUTO: 73.5 % (ref 42.7–76)
PLATELET # BLD AUTO: 208 10*3/MM3 (ref 140–500)
PMV BLD AUTO: 10.4 FL (ref 6–12)
POTASSIUM BLD-SCNC: 4.1 MMOL/L (ref 3.5–5.2)
PROT SERPL-MCNC: 7.5 G/DL (ref 6–8.5)
PROTHROMBIN TIME: 14.6 SECONDS (ref 11.7–14.2)
RBC # BLD AUTO: 4.12 10*6/MM3 (ref 3.9–5.2)
SODIUM BLD-SCNC: 139 MMOL/L (ref 136–145)
TROPONIN T SERPL-MCNC: 0.01 NG/ML (ref 0–0.03)
TROPONIN T SERPL-MCNC: 0.02 NG/ML (ref 0–0.03)
WBC NRBC COR # BLD: 7.38 10*3/MM3 (ref 4.5–10.7)
WHOLE BLOOD HOLD SPECIMEN: NORMAL
WHOLE BLOOD HOLD SPECIMEN: NORMAL

## 2017-06-19 PROCEDURE — 84484 ASSAY OF TROPONIN QUANT: CPT | Performed by: EMERGENCY MEDICINE

## 2017-06-19 PROCEDURE — 93010 ELECTROCARDIOGRAM REPORT: CPT | Performed by: INTERNAL MEDICINE

## 2017-06-19 PROCEDURE — 85610 PROTHROMBIN TIME: CPT | Performed by: EMERGENCY MEDICINE

## 2017-06-19 PROCEDURE — 71020 HC CHEST PA AND LATERAL: CPT

## 2017-06-19 PROCEDURE — 85025 COMPLETE CBC W/AUTO DIFF WBC: CPT | Performed by: EMERGENCY MEDICINE

## 2017-06-19 PROCEDURE — 96366 THER/PROPH/DIAG IV INF ADDON: CPT

## 2017-06-19 PROCEDURE — 84484 ASSAY OF TROPONIN QUANT: CPT | Performed by: PHYSICIAN ASSISTANT

## 2017-06-19 PROCEDURE — 99284 EMERGENCY DEPT VISIT MOD MDM: CPT

## 2017-06-19 PROCEDURE — G0378 HOSPITAL OBSERVATION PER HR: HCPCS

## 2017-06-19 PROCEDURE — 96365 THER/PROPH/DIAG IV INF INIT: CPT

## 2017-06-19 PROCEDURE — 36415 COLL VENOUS BLD VENIPUNCTURE: CPT

## 2017-06-19 PROCEDURE — 93005 ELECTROCARDIOGRAM TRACING: CPT

## 2017-06-19 PROCEDURE — 81001 URINALYSIS AUTO W/SCOPE: CPT | Performed by: PHYSICIAN ASSISTANT

## 2017-06-19 PROCEDURE — 80053 COMPREHEN METABOLIC PANEL: CPT | Performed by: EMERGENCY MEDICINE

## 2017-06-19 RX ORDER — SODIUM CHLORIDE 0.9 % (FLUSH) 0.9 %
10 SYRINGE (ML) INJECTION AS NEEDED
Status: DISCONTINUED | OUTPATIENT
Start: 2017-06-19 | End: 2017-06-20 | Stop reason: HOSPADM

## 2017-06-19 RX ORDER — ASPIRIN 325 MG
325 TABLET ORAL ONCE
Status: DISCONTINUED | OUTPATIENT
Start: 2017-06-19 | End: 2017-06-19

## 2017-06-19 RX ADMIN — SODIUM CHLORIDE 500 ML: 9 INJECTION, SOLUTION INTRAVENOUS at 20:31

## 2017-06-19 RX ADMIN — DILTIAZEM HYDROCHLORIDE 5 MG/HR: 100 INJECTION, POWDER, LYOPHILIZED, FOR SOLUTION INTRAVENOUS at 21:17

## 2017-06-19 NOTE — ED TRIAGE NOTES
Patient presents to ER with light headedness that started on Friday.  Reports today reports that she dull pain between her shoulder blades that is reproducible with movement.  Reports dyspnea that is intermittent with diaphoresis.

## 2017-06-19 NOTE — ED PROVIDER NOTES
EMERGENCY DEPARTMENT ENCOUNTER    CHIEF COMPLAINT  Chief Complaint: Diaphoresis  History given by:Patient  History limited by:Nothing  Room Number: 23/23  PMD: Ken Andujar MD,  (Cardiology)       HPI:  Pt is a 81 y.o. female who presents to the ED complaining of persistent, diffuse diaphoresis, lightheadedness and acute dyspnea on little to no exertion for the past three days. Her Spouse notes that he's had to lay towels down in the patient's bed since she has been sweating so profusely. The patient had several intermittent episodes of emesis production PTA and she also had a few episodes of diarrhea three days ago. She notes that she's also experienced a dull pain between her shoulder blades and unusual foul smelling urine but she denies any chest pain, chest tightness, dysuria, fever or chills since onset. The patient reports that she saw [facility] last week although she did not have the unusual smelling urine at that time. No other complaints at this time.      Duration: 3 days  Timing:Intermittent  Location:Diffuse  Radiation: None  Intensity/Severity:Moderate  Progression:No Change  Associated Symptoms:Diaphoresis, lightheadedness, dyspnea, back pain, vomiting, nausea, diarrhea  Aggravating Factors:Unknown  Alleviating Factors:Unknown  Previous Episodes:Unknown  Treatment before arrival:None mentioned     MEDICAL RECORD REVIEW  Presents to the ED with dyspnea, lightheadedness and pain between shoulder blades. Extensive PMH: Afib on coumadin, CAD, HTN and previous MI. Cardiac cath 10/4/15 after she had anterior ST elevation. She had stents placed at that time.     PAST MEDICAL HISTORY  Active Ambulatory Problems     Diagnosis Date Noted   • Essential hypertension    • Atrial fibrillation    • Bell's palsy    • Chronic kidney disease (CKD), stage III (moderate)    • Fatigue    • GERD (gastroesophageal reflux disease)    • Hypercholesterolemia    • Insomnia    • Osteopenia    • Panic disorder    •  Allergic rhinitis    • Sleep apnea    • History of MI (myocardial infarction) 12/21/2015   • History of right hip replacement 12/21/2015   • Closed fracture of neck of right femur with routine healing 12/21/2015   • History of right knee joint replacement 12/21/2015   • History of left knee replacement 12/21/2015   • Chronic coronary artery disease 01/25/2016   • Acquired hypothyroidism 03/21/2016   • Anemia of chronic disease 09/12/2016   • Hematuria 12/12/2016   • Frequent falls 03/13/2017   • Weakness of right leg 03/13/2017   • Cardiac murmur 05/04/2017     Resolved Ambulatory Problems     Diagnosis Date Noted   • Arthropathy of knee    • Hip arthritis    • IFG (impaired fasting glucose)    • Rectal bleeding    • Vitamin D deficiency    • Urinary incontinence    • Stress-induced cardiomyopathy 01/25/2016   • Urinary tract infection 10/05/2016   • Acute pyelonephritis 10/06/2016   • Acute cystitis 12/03/2016   • Sinusitis 01/22/2017     Past Medical History:   Diagnosis Date   • Allergic    • Allergic rhinitis    • Arthropathy of knee    • Atrial fibrillation    • Back pain    • Bell's palsy    • Chronic kidney disease (CKD), stage III (moderate)    • Cough    • Edema    • Encounter for eye exam 02/2015   • Essential hypertension    • Fatigue    • GERD (gastroesophageal reflux disease)    • H/O Heart murmur    • Heart attack    • Herpes zoster without complication    • Hip arthritis    • History of bone density study 02/28/2008   • History of pneumonia    • Hypercholesterolemia    • IFG (impaired fasting glucose)    • Insomnia    • Nephropathy    • Osteoarthritis    • Osteopenia    • Panic disorder    • Rectal bleeding    • Sleep apnea    • Stress    • Urinary incontinence    • Vitamin D deficiency        PAST SURGICAL HISTORY  Past Surgical History:   Procedure Laterality Date   • CATARACT EXTRACTION Left 04/01/2013    Dr. Clarke   • CATARACT EXTRACTION Right 04/16/2013    Dr. Clarke   • COLONOSCOPY   04/18/2014    Dr. Elizabeth; no polyps   • JOINT REPLACEMENT Left 2006    knee   • JOINT REPLACEMENT Right 2006    knee   • MAMMO BILATERAL  11/2013   • PAP SMEAR  02/2011   • TOTAL HIP ARTHROPLASTY Right 11/2015       FAMILY HISTORY  Family History   Problem Relation Age of Onset   • Hypertension Other    • Hypertension Mother    • Stroke Mother    • Hypertension Maternal Grandmother        SOCIAL HISTORY  Social History     Social History   • Marital status:      Spouse name: N/A   • Number of children: N/A   • Years of education: High school     Occupational History   • Hairdresser Retired     Social History Main Topics   • Smoking status: Former Smoker     Packs/day: 1.00     Years: 3.00     Types: Cigarettes     Quit date: 2/22/1956   • Smokeless tobacco: Never Used   • Alcohol use 0.6 - 1.2 oz/week     1 - 2 Glasses of wine per week      Comment: rare   • Drug use: No   • Sexual activity: Defer     Other Topics Concern   • Not on file     Social History Narrative       ALLERGIES  Ace inhibitors; Amoxicillin; Lortab [hydrocodone-acetaminophen]; Macrobid [nitrofurantoin]; Morphine and related; Oxycodone; and Levaquin [levofloxacin]    REVIEW OF SYSTEMS  Review of Systems   Constitutional: Positive for diaphoresis. Negative for chills.   HENT: Negative for congestion, rhinorrhea and sore throat.    Eyes: Negative for pain.   Respiratory: Positive for shortness of breath. Negative for cough.    Cardiovascular: Negative for chest pain, palpitations and leg swelling.   Gastrointestinal: Positive for diarrhea, nausea and vomiting. Negative for abdominal pain.   Genitourinary: Negative for difficulty urinating, dysuria, flank pain and frequency.   Musculoskeletal: Positive for back pain. Negative for myalgias, neck pain and neck stiffness.   Skin: Negative for rash.   Neurological: Positive for light-headedness. Negative for dizziness, speech difficulty, weakness, numbness and headaches.   Psychiatric/Behavioral:  Negative.    All other systems reviewed and are negative.      PHYSICAL EXAM  ED Triage Vitals   Temp Heart Rate Resp BP SpO2   06/19/17 1907 06/19/17 1907 06/19/17 1907 06/19/17 1907 06/19/17 1907   99.7 °F (37.6 °C) 85 20 164/75 98 %      Temp src Heart Rate Source Patient Position BP Location FiO2 (%)   06/19/17 1907 06/19/17 1907 -- -- --   Tympanic Monitor          Physical Exam   Constitutional: She is oriented to person, place, and time and well-developed, well-nourished, and in no distress. No distress.   HENT:   Head: Normocephalic and atraumatic.   Mouth/Throat: Oropharynx is clear and moist.   Eyes: EOM are normal.   Neck: Normal range of motion. Neck supple.   Cardiovascular: Normal rate and regular rhythm.    Pulmonary/Chest: Effort normal and breath sounds normal. No respiratory distress. She has no wheezes. She exhibits no tenderness.   Abdominal: Soft. She exhibits no distension. There is no tenderness. There is no rebound.   Musculoskeletal: Normal range of motion. She exhibits no edema.   Lymphadenopathy:     She has no cervical adenopathy.   Neurological: She is alert and oriented to person, place, and time.   Skin: Skin is warm and dry. No rash noted. No pallor.   Psychiatric: Mood, memory, affect and judgment normal.   Nursing note and vitals reviewed.      LAB RESULTS  Recent Results (from the past 24 hour(s))   Protime-INR    Collection Time: 06/19/17  7:18 PM   Result Value Ref Range    Protime 14.6 (H) 11.7 - 14.2 Seconds    INR 1.19 (H) 0.90 - 1.10   Comprehensive Metabolic Panel    Collection Time: 06/19/17  7:18 PM   Result Value Ref Range    Glucose 127 (H) 65 - 99 mg/dL    BUN 36 (H) 8 - 23 mg/dL    Creatinine 2.02 (H) 0.57 - 1.00 mg/dL    Sodium 139 136 - 145 mmol/L    Potassium 4.1 3.5 - 5.2 mmol/L    Chloride 100 98 - 107 mmol/L    CO2 21.5 (L) 22.0 - 29.0 mmol/L    Calcium 9.5 8.6 - 10.5 mg/dL    Total Protein 7.5 6.0 - 8.5 g/dL    Albumin 4.60 3.50 - 5.20 g/dL    ALT (SGPT) 23 1 -  33 U/L    AST (SGOT) 27 1 - 32 U/L    Alkaline Phosphatase 113 39 - 117 U/L    Total Bilirubin 0.5 0.1 - 1.2 mg/dL    eGFR Non African Amer 24 (L) >60 mL/min/1.73    Globulin 2.9 gm/dL    A/G Ratio 1.6 g/dL    BUN/Creatinine Ratio 17.8 7.0 - 25.0    Anion Gap 17.5 mmol/L   Troponin    Collection Time: 06/19/17  7:18 PM   Result Value Ref Range    Troponin T 0.016 0.000 - 0.030 ng/mL   Light Blue Top    Collection Time: 06/19/17  7:18 PM   Result Value Ref Range    Extra Tube hold for add-on    Green Top (Gel)    Collection Time: 06/19/17  7:18 PM   Result Value Ref Range    Extra Tube Hold for add-ons.    Lavender Top    Collection Time: 06/19/17  7:18 PM   Result Value Ref Range    Extra Tube hold for add-on    Gold Top - SST    Collection Time: 06/19/17  7:18 PM   Result Value Ref Range    Extra Tube Hold for add-ons.    CBC Auto Differential    Collection Time: 06/19/17  7:18 PM   Result Value Ref Range    WBC 7.38 4.50 - 10.70 10*3/mm3    RBC 4.12 3.90 - 5.20 10*6/mm3    Hemoglobin 12.3 11.9 - 15.5 g/dL    Hematocrit 38.1 35.6 - 45.5 %    MCV 92.5 80.5 - 98.2 fL    MCH 29.9 26.9 - 32.0 pg    MCHC 32.3 (L) 32.4 - 36.3 g/dL    RDW 13.4 (H) 11.7 - 13.0 %    RDW-SD 45.4 37.0 - 54.0 fl    MPV 10.4 6.0 - 12.0 fL    Platelets 208 140 - 500 10*3/mm3    Neutrophil % 73.5 42.7 - 76.0 %    Lymphocyte % 19.6 19.6 - 45.3 %    Monocyte % 5.8 5.0 - 12.0 %    Eosinophil % 0.5 0.3 - 6.2 %    Basophil % 0.3 0.0 - 1.5 %    Immature Grans % 0.3 0.0 - 0.5 %    Neutrophils, Absolute 5.42 1.90 - 8.10 10*3/mm3    Lymphocytes, Absolute 1.45 0.90 - 4.80 10*3/mm3    Monocytes, Absolute 0.43 0.20 - 1.20 10*3/mm3    Eosinophils, Absolute 0.04 0.00 - 0.70 10*3/mm3    Basophils, Absolute 0.02 0.00 - 0.20 10*3/mm3    Immature Grans, Absolute 0.02 0.00 - 0.03 10*3/mm3   Troponin    Collection Time: 06/19/17  9:08 PM   Result Value Ref Range    Troponin T 0.013 0.000 - 0.030 ng/mL       I ordered the above labs and reviewed the  results    RADIOLOGY  XR Chest 2 View   Final Result   No focal pulmonary consolidation. COPD change. Follow-up as   clinical indications persist.       This report was finalized on 6/19/2017 7:40 PM by Dr. Jaspreet Whitt MD.            I ordered the above noted radiological studies and reviewed the images on the PACS system.        EKG    ekg was interpreted by Dr. Dang    COURSE & MEDICAL DECISION MAKING  Pertinent Labs and Imaging studies that were ordered and reviewed are noted above.  Results were reviewed/discussed with the patient and they were also made aware of online assess.  Pt also made aware that some labs, such as cultures, will not be resulted during ER visit and follow up with PMD is necessary.     HEARTSCORE    History  Highly suspicious              2    Moderately suspicious             1    Slightly or non-suspicious             0    ECG  Significant ST depression              2    Nonspecific repol disturbance            1    Normal               0    Age  > or = 65                          2     46-65                           1    < or = 45                          0    Risk factors (hypercholesterolemia, HTN, DM, smoking, pos fam hx, obesity)                            > or = to 3 RF for athero dx             2    1 or 2                 1    No risk factors                0    Troponin > or = 3x normal limit               2    1-3x normal limit    1    < or = Normal limit    0    Score  0 - 3 is low risk    This patient's HEART score is 4 (for age and CAD with stent)     We discussed the shared decision pathway regarding the patient's heart score and choice for being discharged home versus admitted to the hospital for further evaluation. Patient is in agreement to be discharged at this time for follow-up with her family physician and/ or cardiologist.      PROGRESS AND CONSULTS    Progress Notes:    19:52 I have reviewed the patient's labs prior to evaluation. The labs show the  "patient's GFR and kidney function are worse than normal. March GFR 36, 46 April and today it is 24.     22:10 Reviewed pt's history and workup with Dr. Dang.  After a bedside evaluation; Dr Dang agrees with the plan of care.     22:20 Discussed case with . Reviewed history, exam, results and treatments.  Discussed concerns and plan of care. Dr Loera accepts pt to be admitted.    Based on the patient's lab findings and presenting symptoms, the doctor and I feel it is appropriate to admit the patient for further management, evaluation, and treatment.  I have discussed this with the admitting team.  I have also discussed this with the patient/family.  They are in agreement with admission.        MEDICATIONS GIVEN IN ER  Medications   sodium chloride 0.9 % flush 10 mL (not administered)   diltiaZEM (CARDIZEM) 100 mg/100 mL (1 mg/mL) 0.9% NS (5 mg/hr Intravenous New Bag 6/19/17 2117)   sodium chloride 0.9 % bolus 500 mL (500 mL Intravenous New Bag 6/19/17 2031)   diltiazem (CARDIZEM) bolus from bag 1 mg/mL 5 mg (5 mg Intravenous Bolus from Bag 6/19/17 2115)       /92 (Patient Position: Standing)  Pulse 110  Temp 99.7 °F (37.6 °C) (Tympanic)   Resp 17  Ht 63\" (160 cm)  Wt 150 lb (68 kg)  SpO2 94%  BMI 26.57 kg/m2      DIAGNOSIS  Final diagnoses:   Atrial fibrillation with RVR   Dehydration   Chronic kidney disease (CKD), stage III (moderate)       I personally scribed for Carmen Robles PA-C on 6/19/2017 at 10:20 PM.  Electronically signed by Arron Sears on 6/19/2017 at time 10:20 PM             Arron Sears  06/19/17 6604       Carmen Robles PA-C  06/19/17 1092    "

## 2017-06-20 VITALS
RESPIRATION RATE: 18 BRPM | HEART RATE: 73 BPM | DIASTOLIC BLOOD PRESSURE: 64 MMHG | SYSTOLIC BLOOD PRESSURE: 111 MMHG | BODY MASS INDEX: 25.29 KG/M2 | WEIGHT: 142.7 LBS | TEMPERATURE: 98.8 F | HEIGHT: 63 IN | OXYGEN SATURATION: 98 %

## 2017-06-20 PROBLEM — E86.0 DEHYDRATION: Status: ACTIVE | Noted: 2017-06-20

## 2017-06-20 PROBLEM — R19.7 DIARRHEA: Status: RESOLVED | Noted: 2017-06-20 | Resolved: 2017-06-20

## 2017-06-20 PROBLEM — R11.2 NAUSEA & VOMITING: Status: RESOLVED | Noted: 2017-06-20 | Resolved: 2017-06-20

## 2017-06-20 PROBLEM — N17.9 AKI (ACUTE KIDNEY INJURY): Status: ACTIVE | Noted: 2017-06-20

## 2017-06-20 PROBLEM — E86.0 DEHYDRATION: Status: RESOLVED | Noted: 2017-06-20 | Resolved: 2017-06-20

## 2017-06-20 PROBLEM — A08.4 VIRAL GASTROENTERITIS: Status: ACTIVE | Noted: 2017-06-20

## 2017-06-20 PROBLEM — R11.2 NAUSEA & VOMITING: Status: ACTIVE | Noted: 2017-06-20

## 2017-06-20 PROBLEM — R19.7 DIARRHEA: Status: ACTIVE | Noted: 2017-06-20

## 2017-06-20 PROBLEM — I48.91 ATRIAL FIBRILLATION WITH RVR: Status: RESOLVED | Noted: 2017-06-19 | Resolved: 2017-06-20

## 2017-06-20 LAB
ANION GAP SERPL CALCULATED.3IONS-SCNC: 15.4 MMOL/L
BACTERIA UR QL AUTO: NORMAL /HPF
BASOPHILS # BLD AUTO: 0.02 10*3/MM3 (ref 0–0.2)
BASOPHILS NFR BLD AUTO: 0.3 % (ref 0–1.5)
BILIRUB UR QL STRIP: NEGATIVE
BUN BLD-MCNC: 29 MG/DL (ref 8–23)
BUN/CREAT SERPL: 16.6 (ref 7–25)
CALCIUM SPEC-SCNC: 9.1 MG/DL (ref 8.6–10.5)
CHLORIDE SERPL-SCNC: 102 MMOL/L (ref 98–107)
CLARITY UR: CLEAR
CO2 SERPL-SCNC: 21.6 MMOL/L (ref 22–29)
COLOR UR: YELLOW
CREAT BLD-MCNC: 1.53 MG/DL (ref 0.57–1)
CREAT BLD-MCNC: 1.75 MG/DL (ref 0.57–1)
DEPRECATED RDW RBC AUTO: 45.8 FL (ref 37–54)
EOSINOPHIL # BLD AUTO: 0.12 10*3/MM3 (ref 0–0.7)
EOSINOPHIL NFR BLD AUTO: 1.9 % (ref 0.3–6.2)
ERYTHROCYTE [DISTWIDTH] IN BLOOD BY AUTOMATED COUNT: 13.3 % (ref 11.7–13)
GFR SERPL CREATININE-BSD FRML MDRD: 28 ML/MIN/1.73
GFR SERPL CREATININE-BSD FRML MDRD: 33 ML/MIN/1.73
GLUCOSE BLD-MCNC: 109 MG/DL (ref 65–99)
GLUCOSE UR STRIP-MCNC: NEGATIVE MG/DL
HCT VFR BLD AUTO: 33.2 % (ref 35.6–45.5)
HGB BLD-MCNC: 10.7 G/DL (ref 11.9–15.5)
HGB UR QL STRIP.AUTO: ABNORMAL
HYALINE CASTS UR QL AUTO: NORMAL /LPF
IMM GRANULOCYTES # BLD: 0 10*3/MM3 (ref 0–0.03)
IMM GRANULOCYTES NFR BLD: 0 % (ref 0–0.5)
KETONES UR QL STRIP: NEGATIVE
LEUKOCYTE ESTERASE UR QL STRIP.AUTO: NEGATIVE
LYMPHOCYTES # BLD AUTO: 2.41 10*3/MM3 (ref 0.9–4.8)
LYMPHOCYTES NFR BLD AUTO: 37.3 % (ref 19.6–45.3)
MAGNESIUM SERPL-MCNC: 2.3 MG/DL (ref 1.6–2.4)
MCH RBC QN AUTO: 30.2 PG (ref 26.9–32)
MCHC RBC AUTO-ENTMCNC: 32.2 G/DL (ref 32.4–36.3)
MCV RBC AUTO: 93.8 FL (ref 80.5–98.2)
MONOCYTES # BLD AUTO: 0.58 10*3/MM3 (ref 0.2–1.2)
MONOCYTES NFR BLD AUTO: 9 % (ref 5–12)
NEUTROPHILS # BLD AUTO: 3.33 10*3/MM3 (ref 1.9–8.1)
NEUTROPHILS NFR BLD AUTO: 51.5 % (ref 42.7–76)
NITRITE UR QL STRIP: NEGATIVE
PH UR STRIP.AUTO: 6.5 [PH] (ref 5–8)
PHOSPHATE SERPL-MCNC: 4.4 MG/DL (ref 2.5–4.5)
PLATELET # BLD AUTO: 171 10*3/MM3 (ref 140–500)
PMV BLD AUTO: 10.4 FL (ref 6–12)
POTASSIUM BLD-SCNC: 4.3 MMOL/L (ref 3.5–5.2)
PROT UR QL STRIP: NEGATIVE
RBC # BLD AUTO: 3.54 10*6/MM3 (ref 3.9–5.2)
RBC # UR: NORMAL /HPF
REF LAB TEST METHOD: NORMAL
SODIUM BLD-SCNC: 139 MMOL/L (ref 136–145)
SP GR UR STRIP: 1.01 (ref 1–1.03)
SQUAMOUS #/AREA URNS HPF: NORMAL /HPF
UROBILINOGEN UR QL STRIP: ABNORMAL
WBC NRBC COR # BLD: 6.46 10*3/MM3 (ref 4.5–10.7)
WBC UR QL AUTO: NORMAL /HPF

## 2017-06-20 PROCEDURE — G0378 HOSPITAL OBSERVATION PER HR: HCPCS

## 2017-06-20 PROCEDURE — 85025 COMPLETE CBC W/AUTO DIFF WBC: CPT | Performed by: INTERNAL MEDICINE

## 2017-06-20 PROCEDURE — G8979 MOBILITY GOAL STATUS: HCPCS

## 2017-06-20 PROCEDURE — 96366 THER/PROPH/DIAG IV INF ADDON: CPT

## 2017-06-20 PROCEDURE — 97162 PT EVAL MOD COMPLEX 30 MIN: CPT

## 2017-06-20 PROCEDURE — 80048 BASIC METABOLIC PNL TOTAL CA: CPT | Performed by: INTERNAL MEDICINE

## 2017-06-20 PROCEDURE — 82565 ASSAY OF CREATININE: CPT | Performed by: INTERNAL MEDICINE

## 2017-06-20 PROCEDURE — G8978 MOBILITY CURRENT STATUS: HCPCS

## 2017-06-20 PROCEDURE — 84100 ASSAY OF PHOSPHORUS: CPT | Performed by: INTERNAL MEDICINE

## 2017-06-20 PROCEDURE — 83735 ASSAY OF MAGNESIUM: CPT | Performed by: INTERNAL MEDICINE

## 2017-06-20 PROCEDURE — 97110 THERAPEUTIC EXERCISES: CPT

## 2017-06-20 RX ORDER — AMLODIPINE BESYLATE 2.5 MG/1
2.5 TABLET ORAL
Status: DISCONTINUED | OUTPATIENT
Start: 2017-06-20 | End: 2017-06-20 | Stop reason: HOSPADM

## 2017-06-20 RX ORDER — OXYBUTYNIN CHLORIDE 5 MG/1
5 TABLET, EXTENDED RELEASE ORAL DAILY
Status: DISCONTINUED | OUTPATIENT
Start: 2017-06-20 | End: 2017-06-20 | Stop reason: HOSPADM

## 2017-06-20 RX ORDER — ATORVASTATIN CALCIUM 20 MG/1
20 TABLET, FILM COATED ORAL NIGHTLY
Status: DISCONTINUED | OUTPATIENT
Start: 2017-06-20 | End: 2017-06-20 | Stop reason: HOSPADM

## 2017-06-20 RX ORDER — ACETAMINOPHEN 500 MG
1000 TABLET ORAL 3 TIMES DAILY PRN
Status: DISCONTINUED | OUTPATIENT
Start: 2017-06-20 | End: 2017-06-20 | Stop reason: HOSPADM

## 2017-06-20 RX ORDER — DULOXETIN HYDROCHLORIDE 30 MG/1
30 CAPSULE, DELAYED RELEASE ORAL DAILY
Status: DISCONTINUED | OUTPATIENT
Start: 2017-06-20 | End: 2017-06-20 | Stop reason: HOSPADM

## 2017-06-20 RX ORDER — METOPROLOL SUCCINATE 50 MG/1
50 TABLET, EXTENDED RELEASE ORAL DAILY
Status: DISCONTINUED | OUTPATIENT
Start: 2017-06-20 | End: 2017-06-20 | Stop reason: HOSPADM

## 2017-06-20 RX ORDER — LEVOTHYROXINE SODIUM 0.05 MG/1
50 TABLET ORAL DAILY
Status: DISCONTINUED | OUTPATIENT
Start: 2017-06-20 | End: 2017-06-20 | Stop reason: HOSPADM

## 2017-06-20 RX ORDER — ACETAMINOPHEN 325 MG/1
650 TABLET ORAL EVERY 4 HOURS PRN
Status: DISCONTINUED | OUTPATIENT
Start: 2017-06-20 | End: 2017-06-20 | Stop reason: HOSPADM

## 2017-06-20 RX ORDER — SODIUM CHLORIDE 0.9 % (FLUSH) 0.9 %
1-10 SYRINGE (ML) INJECTION AS NEEDED
Status: DISCONTINUED | OUTPATIENT
Start: 2017-06-20 | End: 2017-06-20 | Stop reason: HOSPADM

## 2017-06-20 RX ORDER — ECHINACEA PURPUREA EXTRACT 125 MG
1 TABLET ORAL AS NEEDED
Status: DISCONTINUED | OUTPATIENT
Start: 2017-06-20 | End: 2017-06-20 | Stop reason: HOSPADM

## 2017-06-20 RX ORDER — SODIUM CHLORIDE 9 MG/ML
100 INJECTION, SOLUTION INTRAVENOUS CONTINUOUS
Status: DISCONTINUED | OUTPATIENT
Start: 2017-06-20 | End: 2017-06-20 | Stop reason: HOSPADM

## 2017-06-20 RX ORDER — ONDANSETRON 2 MG/ML
4 INJECTION INTRAMUSCULAR; INTRAVENOUS EVERY 6 HOURS PRN
Status: DISCONTINUED | OUTPATIENT
Start: 2017-06-20 | End: 2017-06-20 | Stop reason: HOSPADM

## 2017-06-20 RX ORDER — PANTOPRAZOLE SODIUM 20 MG/1
20 TABLET, DELAYED RELEASE ORAL NIGHTLY
Status: DISCONTINUED | OUTPATIENT
Start: 2017-06-20 | End: 2017-06-20 | Stop reason: HOSPADM

## 2017-06-20 RX ADMIN — METOPROLOL SUCCINATE 50 MG: 50 TABLET, FILM COATED, EXTENDED RELEASE ORAL at 09:07

## 2017-06-20 RX ADMIN — SODIUM CHLORIDE 250 ML: 9 INJECTION, SOLUTION INTRAVENOUS at 10:41

## 2017-06-20 RX ADMIN — DULOXETINE HYDROCHLORIDE 30 MG: 30 CAPSULE, DELAYED RELEASE ORAL at 09:07

## 2017-06-20 RX ADMIN — APIXABAN 2.5 MG: 2.5 TABLET, FILM COATED ORAL at 09:07

## 2017-06-20 RX ADMIN — SODIUM CHLORIDE 100 ML/HR: 9 INJECTION, SOLUTION INTRAVENOUS at 04:18

## 2017-06-20 RX ADMIN — OXYBUTYNIN CHLORIDE 5 MG: 5 TABLET, EXTENDED RELEASE ORAL at 09:07

## 2017-06-20 RX ADMIN — LEVOTHYROXINE SODIUM 50 MCG: 50 TABLET ORAL at 09:07

## 2017-06-20 RX ADMIN — AMLODIPINE BESYLATE 2.5 MG: 2.5 TABLET ORAL at 09:08

## 2017-06-20 NOTE — PLAN OF CARE
Problem: Patient Care Overview (Adult)  Goal: Plan of Care Review    06/20/17 1017   Coping/Psychosocial Response Interventions   Plan Of Care Reviewed With patient   Outcome Evaluation   Outcome Summary/Follow up Plan Pt. will benefit from skilled inpt. P.T. to address her functional deficits and to assist pt. in regaining her independence with functional mobility.         Problem: Inpatient Physical Therapy  Goal: Bed Mobility Goal LTG- PT    06/20/17 1017   Bed Mobility PT LTG   Bed Mobility PT LTG, Date Established 06/20/17   Bed Mobility PT LTG, Time to Achieve 5 - 7 days   Bed Mobility PT LTG, Activity Type all bed mobility   Bed Mobility PT LTG, [place] Level independent       Goal: Transfer Training Goal 1 LTG- PT    06/20/17 1017   Transfer Training PT LTG   Transfer Training PT LTG, Date Established 06/20/17   Transfer Training PT LTG, Time to Achieve 5 - 7 days   Transfer Training PT LTG, Activity Type all transfers   Transfer Training PT LTG, [place] Level independent   Transfer Training PT LTG, Additional Goal With A.A.D.       Goal: Gait Training Goal LTG- PT    06/20/17 1017   Gait Training PT LTG   Gait Training Goal PT LTG, Date Established 06/20/17   Gait Training Goal PT LTG, Time to Achieve 5 - 7 days   Gait Training Goal PT LTG, [place] Level independent   Gait Training Goal PT LTG, Distance to Achieve 300 feet   Gait Training Goal PT LTG, Additional Goal with A.A.D.

## 2017-06-20 NOTE — PLAN OF CARE
Problem: Patient Care Overview (Adult)  Goal: Plan of Care Review  Outcome: Ongoing (interventions implemented as appropriate)    06/20/17 0620   Coping/Psychosocial Response Interventions   Plan Of Care Reviewed With patient   Patient Care Overview   Progress progress toward functional goals as expected         Problem: Cardiac Output, Decreased (Adult)  Goal: Adequate Cardiac Output/Effective Tissue Perfusion  Outcome: Ongoing (interventions implemented as appropriate)    Problem: Fall Risk (Adult)  Goal: Identify Related Risk Factors and Signs and Symptoms  Outcome: Ongoing (interventions implemented as appropriate)  Goal: Absence of Falls  Outcome: Ongoing (interventions implemented as appropriate)

## 2017-06-20 NOTE — DISCHARGE SUMMARY
Date of Admission: 6/19/2017  Date of Discharge:  6/20/2017  Primary Care Physician: Ken Andujar MD     Discharge Diagnosis:  Active Hospital Problems (** Indicates Principal Problem)    Diagnosis Date Noted   • ANGY (acute kidney injury) [N17.9] 06/20/2017   • Viral gastroenteritis [A08.4] 06/20/2017      Resolved Hospital Problems    Diagnosis Date Noted Date Resolved   • **Atrial fibrillation with RVR [I48.91] 06/19/2017 06/20/2017   • Dehydration [E86.0] 06/20/2017 06/20/2017   • Nausea & vomiting [R11.2] 06/20/2017 06/20/2017   • Diarrhea [R19.7] 06/20/2017 06/20/2017       Presenting Problem/History of Present Illness  Dehydration [E86.0]  Chronic kidney disease (CKD), stage III (moderate) [N18.3]  Atrial fibrillation with RVR [I48.91]     Hospital Course  The patient is a 81 y.o. female who presented with some nausea and general weakness after having had some vomiting and diarrhea a few days prior, which has since resolved.  Please see H&P from 6/19/17 for further details. On admission she was found to be dehydrated with an ANGY on CKD stage 3 (Creatinine of 2 with known baseline of 1.1).  She was given IV fluids and monitored overnight.  She did quite well and was tolerating a regular diet without any nausea, vomiting, or diarrhea.  She had good urine output and was ambulating without difficulty. There were no major events on cardiac monitoring.  Her creatinine improved to 1.5 at the time of discharge.  She was instructed to stay well hydrated and out of the heat for at least a few days, and to avoid nephrotoxic medications such as NSAIDs, which she does not use anyway.  She is to follow up with her PCP within a week.  It is recommended that her renal function is assessed at that point.  Of note, she was tachycardic to the 110s in the ER.  This resolved with IV hydration and she did not require any further medication adjustment.    Exam Today:  Blood pressure 111/64, pulse 73, temperature 98.8 °F (37.1 °C),  "temperature source Oral, resp. rate 18, height 63\" (160 cm), weight 142 lb 11.2 oz (64.7 kg), SpO2 98 %.  General: Alert, no distress  Head:NCAT  EENT: conjunctivae normal, oropharynx clear and moist  Neck:Supple, no JVD  Cardiac: irregular rhythm, normal rate  Respiratory: clear to ascultation without wheezes or rales  Abdomen: soft, non-tender, bowel sounds are normoactive  Extremities: no cyanosis, no dependent edema  Skin:warm, dry, no rashes noted  Neuro:oriented x3, no gross deficits  Psych:appropriate mood and affect  Nursing note and vital signs reviewed.  Procedures Performed:         Consults:   Consults     Date and Time Order Name Status Description    6/19/2017 2201 [facility] (on-call MD unless specified) Completed            Discharge Disposition  Home or Self Care    Discharge Medications:   Marleni Hummel   Home Medication Instructions FABIOLA:[id number]    Printed on:06/20/17 5494   Medication Information                      acetaminophen (TYLENOL) 500 MG tablet  Take 1,000 mg by mouth 3 (three) times a day as needed for mild pain (1-3) or moderate pain (4-6).             amLODIPine (NORVASC) 2.5 MG tablet  Take 1 tablet by mouth 2 (Two) Times a Day.             apixaban (ELIQUIS) 2.5 MG tablet tablet  Take 1 tablet by mouth 2 (Two) Times a Day.             atorvastatin (LIPITOR) 20 MG tablet  Take 1 tablet by mouth Every Night for 180 days.             DULoxetine (CYMBALTA) 30 MG capsule  Take 1 capsule by mouth Daily for 180 days.             levothyroxine (SYNTHROID, LEVOTHROID) 50 MCG tablet  Take 1 tablet by mouth Daily for 180 days.             metoprolol succinate XL (TOPROL-XL) 50 MG 24 hr tablet  Take 1 tablet by mouth Daily for 180 days.             MYRBETRIQ 25 MG tablet sustained-release 24 hour 24 hr tablet               pantoprazole (PROTONIX) 20 MG EC tablet  Take 1 tablet by mouth Every Night for 180 days.             phenol (CHLORASEPTIC) 1.4 % liquid liquid  Apply 2 sprays to the mouth " or throat Every 2 (Two) Hours As Needed (sore throat).             sodium chloride (OCEAN NASAL SPRAY) 0.65 % nasal spray  1 spray into each nostril As Needed for congestion.                 Discharge Diet:   Diet Instructions     Diet: Cardiac; Thin Liquids, No Restrictions       Discharge Diet:  Cardiac   Fluid Consistency:  Thin Liquids, No Restrictions                 Activity at Discharge:   Activity Instructions     Activity as Tolerated       Drink plenty of fluids and avoid sustained exposure to heat for a few days.                 Follow-up Appointments:  Future Appointments  Date Time Provider Department Center   10/2/2017 1:00 PM MD [id number] None   11/22/2017 12:30 PM MD [id number] None     Additional Instructions for the Follow-ups that You Need to Schedule     Discharge Follow-up with PCP    As directed    Follow Up Details:  within 1 week                 Test Results Pending at Discharge       Edgardo Robbins MD  06/20/17  2:52 PM    Time Spent on Discharge Activities: Less than 30 minutes.

## 2017-06-20 NOTE — THERAPY EVALUATION
Acute Care - Physical Therapy Initial Evaluation  [facility]     Patient Name: Marleni Hummel  : 1935  MRN: [id number]  Today's Date: 2017   Onset of Illness/Injury or Date of Surgery Date: 17  Date of Referral to PT: 17  Referring Physician: Edgardo Redmond      Admit Date: 2017     Visit Dx:    ICD-10-CM ICD-9-CM   1. Atrial fibrillation with RVR I48.91 427.31   2. Dehydration E86.0 276.51   3. Chronic kidney disease (CKD), stage III (moderate) N18.3 585.3     Patient Active Problem List   Diagnosis   • Essential hypertension   • Atrial fibrillation   • Bell's palsy   • Chronic kidney disease (CKD), stage III (moderate)   • Fatigue   • GERD (gastroesophageal reflux disease)   • Hypercholesterolemia   • Insomnia   • Osteopenia   • Panic disorder   • Allergic rhinitis   • Sleep apnea   • History of MI (myocardial infarction)   • History of right hip replacement   • Closed fracture of neck of right femur with routine healing   • History of right knee joint replacement   • History of left knee replacement   • Chronic coronary artery disease   • Acquired hypothyroidism   • Anemia of chronic disease   • Hematuria   • Frequent falls   • Weakness of right leg   • Cardiac murmur   • Atrial fibrillation with RVR   • ANGY (acute kidney injury)   • Viral gastroenteritis   • Dehydration   • Nausea & vomiting   • Diarrhea     Past Medical History:   Diagnosis Date   • Allergic    • Allergic rhinitis    • Arthropathy of knee     right   • Atrial fibrillation    • Back pain    • Bell's palsy    • Chronic kidney disease (CKD), stage III (moderate)    • Cough    • Edema    • Encounter for eye exam 2015   • Essential hypertension    • Fatigue    • GERD (gastroesophageal reflux disease)    • H/O Heart murmur    • Heart attack    • Herpes zoster without complication    • Hip arthritis     left   • History of bone density study 2008   • History of pneumonia    • Hypercholesterolemia    •  "IFG (impaired fasting glucose)    • Insomnia    • Nephropathy    • Osteoarthritis     Right hip   • Osteopenia    • Panic disorder    • Rectal bleeding    • Sleep apnea    • Stress    • Urinary incontinence    • Vitamin D deficiency      Past Surgical History:   Procedure Laterality Date   • CATARACT EXTRACTION Left 04/01/2013    Dr. Clarke   • CATARACT EXTRACTION Right 04/16/2013    Dr. Clarke   • COLONOSCOPY  04/18/2014    Dr. Elizabeth; no polyps   • JOINT REPLACEMENT Left 2006    knee   • JOINT REPLACEMENT Right 2006    knee   • MAMMO BILATERAL  11/2013   • PAP SMEAR  02/2011   • TOTAL HIP ARTHROPLASTY Right 11/2015          PT ASSESSMENT (last 72 hours)      PT Evaluation       06/20/17 1013 06/20/17 0033    Rehab Evaluation    Document Type evaluation  -MS     Subjective Information agree to therapy;complains of;weakness;fatigue;dizziness  -MS     Patient Effort, Rehab Treatment good  -MS     Symptoms Noted Comment Pt. reports feeling \"tired\" and dizzy with upright mobility this day.  -MS     General Information    Onset of Illness/Injury or Date of Surgery Date 06/19/17  -MS     Referring Physician Edgardo Redmond  -MS     Pertinent History Of Current Problem Pt. admitted with dehydration; diaphoresis; feeling light headed  -MS     Precautions/Limitations fall precautions   Exit alarm  -MS     Prior Level of Function independent:  -MS     Equipment Currently Used at Home cane, straight  -MS cane, straight  -RR    Plans/Goals Discussed With patient;agreed upon  -MS     Risks Reviewed patient:  -MS     Benefits Reviewed patient:  -MS     Barriers to Rehab none identified  -MS     Living Environment    Lives With  spouse  -RR    Living Arrangements  house  -RR    Home Accessibility  no concerns  -RR    Stair Railings at Home  none  -RR    Type of Financial/Environmental Concern  none  -RR    Transportation Available  car  -RR    Living Environment Comment  none  -RR    Clinical Impression    Date of Referral " to PT 06/20/17  -MS     Criteria for Skilled Therapeutic Interventions Met treatment indicated  -MS     Rehab Potential good, to achieve stated therapy goals  -MS     Pain Assessment    Pain Assessment No/denies pain  -MS     Cognitive Assessment/Intervention    Current Cognitive/Communication Assessment functional  -MS     Orientation Status oriented x 4  -MS     Follows Commands/Answers Questions 100% of the time  -MS     Personal Safety WNL/WFL  -MS     Personal Safety Interventions fall prevention program maintained;gait belt;nonskid shoes/slippers when out of bed;supervised activity  -MS     ROM (Range of Motion)    General ROM no range of motion deficits identified  -MS     MMT (Manual Muscle Testing)    General MMT Assessment --   BUE/LE MMT (3+/5)  -MS     Bed Mobility, Assessment/Treatment    Bed Mob, Supine to Sit, [place] contact guard assist  -MS     Bed Mob, Sit to Supine, [place] contact guard assist  -MS     Transfer Assessment/Treatment    Transfers, Sit-Stand [place] contact guard assist  -MS     Transfers, Stand-Sit [place] contact guard assist  -MS     Gait Assessment/Treatment    Gait, [place] Level contact guard assist;hand held assist  -MS     Gait, Distance (Feet) 100  -MS     Gait, Gait Deviations david decreased;forward flexed posture;step length decreased  -MS     Gait, Safety Issues balance decreased during turns;step length decreased  -MS     Gait, Comment Pt. requires verbal/tactile cues for posture correction during gait cycle. Pt. limited by overall fatigue and feeling dizzy during upright mobility.  -MS     Positioning and Restraints    Pre-Treatment Position in bed  -MS     Post Treatment Position bed  -MS     In Bed notified nsg;supine;call light within reach;encouraged to call for assist;exit alarm on   All lines intact.  V.S.S.  -MS       User Key  (r) = Recorded By, (t) = Taken By, (c) = Cosigned By    Initials Name Provider Type    SULEMAN Shepard RN  Registered Nurse    MS Edgardo Phillips, PT Physical Therapist          Physical Therapy Education     Title: PT OT SLP Therapies (Active)     Topic: Physical Therapy (Active)     Point: Mobility training (Done)    Learning Progress Summary    Learner Readiness Method Response Comment Documented by Status   Patient Acceptance D,E VU,NR  MS 06/20/17 1017 Done               Point: Body mechanics (Done)    Learning Progress Summary    Learner Readiness Method Response Comment Documented by Status   Patient Acceptance D,E VU,NR  MS 06/20/17 1017 Done               Point: Precautions (Done)    Learning Progress Summary    Learner Readiness Method Response Comment Documented by Status   Patient Acceptance D,E VU,NR  MS 06/20/17 1017 Done                      User Key     Initials Effective Dates Name Provider Type Discipline    MS 12/01/15 -  Edgardo Phillips, PT Physical Therapist PT                PT Recommendation and Plan  Anticipated Discharge Disposition: home with assist, home with home health  Planned Therapy Interventions: balance training, bed mobility training, gait training, patient/family education, postural re-education, strengthening, transfer training  PT Frequency: daily  Plan of Care Review  Plan Of Care Reviewed With: patient  Outcome Summary/Follow up Plan: Pt. will benefit from skilled inpt. P.T. to address her functional deficits and to assist pt. in regaining her independence with functional mobility.          IP PT Goals       06/20/17 1017          Bed Mobility PT LTG    Bed Mobility PT LTG, Date Established 06/20/17  -MS      Bed Mobility PT LTG, Time to Achieve 5 - 7 days  -MS      Bed Mobility PT LTG, Activity Type all bed mobility  -MS      Bed Mobility PT LTG, [place] Level independent  -MS      Transfer Training PT LTG    Transfer Training PT LTG, Date Established 06/20/17  -MS      Transfer Training PT LTG, Time to Achieve 5 - 7 days  -MS      Transfer Training PT LTG, Activity Type  all transfers  -MS      Transfer Training PT LTG, [place] Level independent  -MS      Transfer Training PT LTG, Additional Goal With A.A.D.  -MS      Gait Training PT LTG    Gait Training Goal PT LTG, Date Established 06/20/17  -MS      Gait Training Goal PT LTG, Time to Achieve 5 - 7 days  -MS      Gait Training Goal PT LTG, [place] Level independent  -MS      Gait Training Goal PT LTG, Distance to Achieve 300 feet  -MS      Gait Training Goal PT LTG, Additional Goal with A.A.D.  -MS        User Key  (r) = Recorded By, (t) = Taken By, (c) = Cosigned By    Initials Name Provider Type    MS Edgardo MEIER Alan PT Physical Therapist                Outcome Measures       06/20/17 1000          How much help from another person do you currently need...    Turning from your back to your side while in flat bed without using bedrails? 4  -MS      Moving from lying on back to sitting on the side of a flat bed without bedrails? 3  -MS      Moving to and from a bed to a chair (including a wheelchair)? 3  -MS      Standing up from a chair using your arms (e.g., wheelchair, bedside chair)? 3  -MS      Climbing 3-5 steps with a railing? 3  -MS      To walk in hospital room? 3  -MS      AM-PAC 6 Clicks Score 19  -MS      Functional Assessment    Outcome Measure Options AM-PAC 6 Clicks Basic Mobility (PT)  -MS        User Key  (r) = Recorded By, (t) = Taken By, (c) = Cosigned By    Initials Name Provider Type    MS Mcclainew HARDEEP Phillips PT Physical Therapist           Time Calculation:         PT Charges       06/20/17 1018          Time Calculation    Start Time 0931  -MS      Stop Time 0944  -MS      Time Calculation (min) 13 min  -MS      PT Received On 06/20/17  -MS      PT - Next Appointment 06/21/17  -MS      PT Goal Re-Cert Due Date 06/27/17  -MS        User Key  (r) = Recorded By, (t) = Taken By, (c) = Cosigned By    Initials Name Provider Type    MS Edgardo Phillips PT Physical Therapist          Therapy Charges for  Today     Code Description Service Date Service Provider Modifiers Qty    95687607523 HC PT MOBILITY CURRENT 6/20/2017 Edgardo Phillips, PT GP,  1    43711570369 HC PT MOBILITY PROJECTED 6/20/2017 Edgardo Phillips, PT GP,  1    55093754433 HC PT EVAL MOD COMPLEXITY 2 6/20/2017 Edgardo Phillips, PT GP 1    96122273491 HC PT THER PROC EA 15 MIN 6/20/2017 Edgardo Phillips, PT GP 1          PT G-Codes  PT Professional Judgement Used?: Yes  Outcome Measure Options: AM-PAC 6 Clicks Basic Mobility (PT)  Functional Limitation: Mobility: Walking and moving around  Mobility: Walking and Moving Around Current Status (): At least 20 percent but less than 40 percent impaired, limited or restricted  Mobility: Walking and Moving Around Goal Status (): 0 percent impaired, limited or restricted      Edgardo Phillips, PT  6/20/2017

## 2017-06-20 NOTE — ED PROVIDER NOTES
Pt presents to ED complaining of nausea for several days. Pt also complains of SOA, diaphoresis, and abd pain. Pt is on Eliquis for A-fib. Upon exam, pt is alert and oriented x3. HR is irregularly irregular. Lungs are clear bilaterally. Normal neurologic exam.     EKG           EKG time: 1913  Rhythm/Rate: A-fib at 106  QRS, axis: normal   ST and T waves: nonspecific ST wave changes     Interpreted Contemporaneously by me, independently viewed  Unchanged compared to prior 01/06/16      Pt will be admitted due to A-fib, CKD, and dehydration.     I supervised care provided by the midlevel provider.    We have discussed this patient's history, physical exam, and treatment plan.   I have reviewed the note and personally saw and examined the patient and agree with the plan of care.    Documentation assistance provided by alana Hebert.  Information recorded by the scribe was done at my direction and has been verified and validated by me.       Margarita Hebert  06/19/17 [id number]       Gallo Dang MD  06/20/17 7226

## 2017-06-20 NOTE — PROGRESS NOTES
Discharge Planning Assessment  [facility]     Patient Name: Marleni Hummel  MRN: [id number]  Today's Date: 6/20/2017    Admit Date: 6/19/2017          Discharge Needs Assessment       06/20/17 1120    Living Environment    Lives With spouse    Living Arrangements condominium    Home Accessibility no concerns    Transportation Available car;family or friend will provide    Living Environment    Provides Primary Care For no one    Discharge Needs Assessment    Equipment Currently Used at Home cane, straight;commode;walker, rolling;grab bar;shower chair            Discharge Plan       06/20/17 1122    Case Management/Social Work Plan    Plan Home with     Patient/Family In Agreement With Plan yes    Additional Comments Spoke with patient at bedside.  Introduced self, explaiend CCP role, facesheet verified.  Pt lives in condo with .  1 step to enter home, there is an upstairs but pt states she does not go up there.  Has BSC, walker, cane, grab bars, and shower chair at home.   is able to assist if needed.  Pt states that she has been to [facility], and [facility] in past and has used [facility] in past.  Denies any discharge needs at this time.  CCP will follow. DANNI Riggs RN        Discharge Placement     No information found                Demographic Summary       06/20/17 1118    Referral Information    Admission Type observation    Arrived From home or self-care    Referral Source admission list    Reason For Consult discharge planning    Contact Information    Permission Granted to Share Information With family/designee   Rm Hummel [phone]    Primary Care Physician Information    Name Ken Andujar    Phone [phone]            Functional Status       06/20/17 1119    Functional Status Current    Ambulation 2-->assistive person    Transferring 2-->assistive person    Toileting 2-->assistive person    Bathing 0-->independent    Dressing 0-->independent    Eating 0-->independent     Communication 0-->understands/communicates without difficulty    Functional Status Prior    Ambulation 1-->assistive equipment    Transferring 0-->independent    Toileting 0-->independent    Bathing 0-->independent    Dressing 0-->independent    Eating 0-->independent    Communication 0-->understands/communicates without difficulty    Cognitive/Perceptual/Developmental    Current Mental Status/Cognitive Functioning no deficits noted            Psychosocial     None            Abuse/Neglect     None            Legal     None            Substance Abuse     None            Patient Forms     None          Jazmin Riggs

## 2017-06-20 NOTE — PLAN OF CARE
Problem: Patient Care Overview (Adult)  Goal: Plan of Care Review  Outcome: Ongoing (interventions implemented as appropriate)    06/20/17 1056   Coping/Psychosocial Response Interventions   Plan Of Care Reviewed With patient   Patient Care Overview   Progress progress toward functional goals is gradual   Outcome Evaluation   Outcome Summary/Follow up Plan VSS., no voiced c/o of pain noted. 250ml bolus given per MD order. NO S/S of distress or discomfort. will continue to monittor        Goal: Adult Individualization and Mutuality  Outcome: Ongoing (interventions implemented as appropriate)    Problem: Cardiac Output, Decreased (Adult)  Goal: Identify Related Risk Factors and Signs and Symptoms  Outcome: Ongoing (interventions implemented as appropriate)  Goal: Adequate Cardiac Output/Effective Tissue Perfusion  Outcome: Ongoing (interventions implemented as appropriate)    Problem: Fall Risk (Adult)  Goal: Identify Related Risk Factors and Signs and Symptoms  Outcome: Ongoing (interventions implemented as appropriate)  Goal: Absence of Falls  Outcome: Ongoing (interventions implemented as appropriate)

## 2017-06-20 NOTE — H&P
Name: Marleni Hummel ADMIT: 2017   : 1935  PCP: Ken Andujar MD    MRN: [id number] LOS: 0 days   AGE/SEX: 81 y.o. female  ROOM: 562/1     Chief Complaint   Patient presents with   • Syncope   • Nausea   • Vomiting       Subjective   Ms. Hummel is a 81 y.o. female who presents to [facility] complaining of nausea      History of Present Illness  She presented to the emergency room complaining of Diarrhea and lower abdominal pain that started on Friday.  The diarrhea resolved but she continued to feel bad and they had episode of vomiting.  Since Friday she's had profuse diaphoresis.  She was also having shortness of breath but chest x-ray is within normal limits.  Her sweating was so bad that her  had to delay towels down for the patient on bed because she was sweating so much.  She is also had several episodes of vomiting prior to arrival and some diarrhea the past 3 days.  Also had foul-smelling urine but urinalysis was not consistent with UTI.      She has history of paroxysmal atrial fibrillation and she takes Eliquis..  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  She had drug-eluting stent placed in her left circumflex.  In 2016 she had echocardiogram which showed that her EF return returned to normal and her cardiologist is Dr. Tai.    She's been admitted to our service for these above symptoms and atrial fibrillation.The emergency room placed her on diltiazem drip.  Currently her heart rate is in the 70s.  Past Medical History:   Diagnosis Date   • Allergic    • Allergic rhinitis    • Arthropathy of knee     right   • Atrial fibrillation    • Back pain    • Bell's palsy    • Chronic kidney disease (CKD), stage III (moderate)    • Cough    • Edema    • Encounter for eye exam 2015   • Essential hypertension    • Fatigue    • GERD (gastroesophageal reflux disease)    • H/O Heart  murmur    • Heart attack    • Herpes zoster without complication    • Hip arthritis     left   • History of bone density study 02/28/2008   • History of pneumonia    • Hypercholesterolemia    • IFG (impaired fasting glucose)    • Insomnia    • Nephropathy    • Osteoarthritis     Right hip   • Osteopenia    • Panic disorder    • Rectal bleeding    • Sleep apnea    • Stress    • Urinary incontinence    • Vitamin D deficiency      Past Surgical History:   Procedure Laterality Date   • CATARACT EXTRACTION Left 04/01/2013    Dr. Clarke   • CATARACT EXTRACTION Right 04/16/2013    Dr. Clarke   • COLONOSCOPY  04/18/2014    Dr. Elizabeth; no polyps   • JOINT REPLACEMENT Left 2006    knee   • JOINT REPLACEMENT Right 2006    knee   • MAMMO BILATERAL  11/2013   • PAP SMEAR  02/2011   • TOTAL HIP ARTHROPLASTY Right 11/2015     Family History   Problem Relation Age of Onset   • Hypertension Other    • Hypertension Mother    • Stroke Mother    • Hypertension Maternal Grandmother      Social History   Substance Use Topics   • Smoking status: Former Smoker     Packs/day: 1.00     Years: 3.00     Types: Cigarettes     Quit date: 2/22/1956   • Smokeless tobacco: Never Used   • Alcohol use 0.6 - 1.2 oz/week     1 - 2 Glasses of wine per week      Comment: rare     Prescriptions Prior to Admission   Medication Sig Dispense Refill Last Dose   • acetaminophen (TYLENOL) 500 MG tablet Take 1,000 mg by mouth 3 (three) times a day as needed for mild pain (1-3) or moderate pain (4-6).   Taking   • amLODIPine (NORVASC) 2.5 MG tablet Take 1 tablet by mouth 2 (Two) Times a Day. 28 tablet 0 Taking   • apixaban (ELIQUIS) 2.5 MG tablet tablet Take 1 tablet by mouth 2 (Two) Times a Day. 180 tablet 0 Taking   • atorvastatin (LIPITOR) 20 MG tablet Take 1 tablet by mouth Every Night for 180 days. 90 tablet 1 Taking   • DULoxetine (CYMBALTA) 30 MG capsule Take 1 capsule by mouth Daily for 180 days. 90 capsule 1 Taking   • levothyroxine (SYNTHROID,  LEVOTHROID) 50 MCG tablet Take 1 tablet by mouth Daily for 180 days. 90 tablet 1 Taking   • metoprolol succinate XL (TOPROL-XL) 50 MG 24 hr tablet Take 1 tablet by mouth Daily for 180 days. 90 tablet 1 Taking   • MYRBETRIQ 25 MG tablet sustained-release 24 hour 24 hr tablet    Taking   • pantoprazole (PROTONIX) 20 MG EC tablet Take 1 tablet by mouth Every Night for 180 days. 90 tablet 1 Taking   • phenol (CHLORASEPTIC) 1.4 % liquid liquid Apply 2 sprays to the mouth or throat Every 2 (Two) Hours As Needed (sore throat). 177 mL 0 Taking   • sodium chloride (OCEAN NASAL SPRAY) 0.65 % nasal spray 1 spray into each nostril As Needed for congestion. 104 mL 0 Taking   • trimethoprim (TRIMPEX) 100 MG tablet Take 100 mg by mouth Daily.   Taking     Allergies:  Ace inhibitors; Amoxicillin; Lortab [hydrocodone-acetaminophen]; Macrobid [nitrofurantoin]; Morphine and related; Oxycodone; and Levaquin [levofloxacin]    Review of Systems   Constitutional: Positive for diaphoresis and fatigue. Negative for activity change, appetite change, fever and unexpected weight change.   HENT: Negative for nosebleeds, sore throat and trouble swallowing.    Eyes: Negative for pain and visual disturbance.   Respiratory: Positive for shortness of breath. Negative for cough and chest tightness.    Cardiovascular: Negative for chest pain, palpitations and leg swelling.   Gastrointestinal: Negative for abdominal pain, constipation, diarrhea, nausea and vomiting.   Endocrine:        Negative for Diabetes or thyroid disease   Genitourinary: Negative for hematuria.        Foul-smelling urine     Musculoskeletal: Negative.  Negative for back pain, neck pain and neck stiffness.   Skin: Negative for rash and wound.   Neurological: Negative for dizziness, syncope, weakness, light-headedness and headaches.   Hematological: Negative for adenopathy. Does not bruise/bleed easily.   Psychiatric/Behavioral: Negative for agitation, behavioral problems and  confusion.        Objective    Vital Signs  Temp:  [97.6 °F (36.4 °C)-99.7 °F (37.6 °C)] 98.3 °F (36.8 °C)  Heart Rate:  [] 72  Resp:  [16-20] 18  BP: (135-168)/(66-99) 150/82  SpO2:  [94 %-98 %] 97 %  on   ;   O2 Device: room air  Body mass index is 25.28 kg/(m^2).    Physical Exam   Constitutional: She is oriented to person, place, and time. She appears well-developed and well-nourished.   HENT:   Head: Normocephalic and atraumatic.   Mouth/Throat: Oropharynx is clear and moist. No oropharyngeal exudate.   Eyes: Conjunctivae and EOM are normal. Pupils are equal, round, and reactive to light. No scleral icterus.   Neck: Normal range of motion. Neck supple. No JVD present. No thyromegaly present.   Cardiovascular: Normal rate and normal heart sounds.  An irregularly irregular rhythm present. Exam reveals no gallop and no friction rub.    No murmur heard.  Pulmonary/Chest: Effort normal and breath sounds normal. No stridor. No respiratory distress.   Abdominal: Soft. Bowel sounds are normal. She exhibits no distension. There is no tenderness. There is no rebound and no guarding.   Musculoskeletal: She exhibits no edema or tenderness.   Lymphadenopathy:     She has no cervical adenopathy.   Neurological: She is alert and oriented to person, place, and time. No cranial nerve deficit.   Skin: Skin is warm and dry.   Psychiatric: She has a normal mood and affect. Her behavior is normal.       Results Review:   I reviewed the patient's new clinical results.    Results from last 7 days  Lab Units 06/19/17 1918   WBC 10*3/mm3 7.38   HEMOGLOBIN g/dL 12.3   PLATELETS 10*3/mm3 208       Results from last 7 days  Lab Units 06/19/17 1918   SODIUM mmol/L 139   POTASSIUM mmol/L 4.1   CHLORIDE mmol/L 100   TOTAL CO2 mmol/L 21.5*   BUN mg/dL 36*   CREATININE mg/dL 2.02*   GLUCOSE mg/dL 127*   ALBUMIN g/dL 4.60   BILIRUBIN mg/dL 0.5   ALK PHOS U/L 113   AST (SGOT) U/L 27   ALT (SGPT) U/L 23   Estimated Creatinine Clearance:  20.4 mL/min (by C-G formula based on Cr of 2.02).    Results from last 7 days  Lab Units 06/19/17  2108 06/19/17  1918   INR   --  1.19*   TROPONIN T ng/mL 0.013 0.016         Results from last 7 days  Lab Units 06/19/17  2357   NITRITE UA  Negative   WBC UA /HPF None Seen   BACTERIA UA /HPF None Seen   SQUAM EPITHEL UA /HPF None Seen       XR Chest 2 View   Final Result   No focal pulmonary consolidation. COPD change. Follow-up as   clinical indications persist.       This report was finalized on 6/19/2017 7:40 PM by Dr. Jaspreet Whitt MD.            Assessment/Plan   Assessment:     Active Hospital Problems (** Indicates Principal Problem)    Diagnosis Date Noted   • **Atrial fibrillation with RVR [I48.91] 06/19/2017   • ANGY (acute kidney injury) [N17.9] 06/20/2017   • Viral gastroenteritis [A08.4] 06/20/2017   • Dehydration [E86.0] 06/20/2017   • Nausea & vomiting [R11.2] 06/20/2017   • Diarrhea [R19.7] 06/20/2017      Resolved Hospital Problems    Diagnosis Date Noted Date Resolved   No resolved problems to display.         Plan:   I believe she's had a viral gastroenteritis which she is now starting to recover from.  Unfortunately, she developed hypovolemia and acute kidney injury secondary to this.  Her rate is well controlled and I feel we can discontinue her diltiazem drip.  She remains in atrial fibrillation and does take Eliquis.  I will continue Eliquis and her metoprolol succinate.  She has acute kidney injury with creatinine of 2 up from a baseline of around 1.1.  This is likely hypovolemic I will continue IV fluids.  Continue amlodipine for hypertension.  Continue supportive care.  Further care based on patient's condition and as her clinical course unfolds.    I discussed the patients findings and my recommendations with patient, family and nursing staff.          Etseban Loera MD  [facility]  06/20/17  2:48 AM

## 2017-06-21 NOTE — PROGRESS NOTES
Continued Stay Note  [facility]     Patient Name: Marleni Hummel  MRN: [id number]  Today's Date: 6/21/2017    Admit Date: 6/19/2017          Discharge Plan       06/21/17 0817    Final Note    Final Note Pt discharged home.               Discharge Codes       06/21/17 0818    Discharge Codes    Discharge Codes 01  Discharge to home        Expected Discharge Date and Time     Expected Discharge Date Expected Discharge Time    Jun 20, 2017             Jazmin Riggs

## 2017-06-22 ENCOUNTER — OFFICE VISIT (OUTPATIENT)
Dept: FAMILY MEDICINE CLINIC | Facility: CLINIC | Age: 82
End: 2017-06-22

## 2017-06-22 VITALS
SYSTOLIC BLOOD PRESSURE: 150 MMHG | DIASTOLIC BLOOD PRESSURE: 82 MMHG | HEIGHT: 63 IN | HEART RATE: 68 BPM | RESPIRATION RATE: 16 BRPM | OXYGEN SATURATION: 98 % | WEIGHT: 153 LBS | BODY MASS INDEX: 27.11 KG/M2 | TEMPERATURE: 97.8 F

## 2017-06-22 DIAGNOSIS — N17.9 AKI (ACUTE KIDNEY INJURY) (HCC): ICD-10-CM

## 2017-06-22 DIAGNOSIS — R71.0 DECREASED HEMOGLOBIN: ICD-10-CM

## 2017-06-22 DIAGNOSIS — Z87.81 HISTORY OF FEMUR FRACTURE: ICD-10-CM

## 2017-06-22 DIAGNOSIS — M81.0 OSTEOPOROSIS: ICD-10-CM

## 2017-06-22 DIAGNOSIS — E86.0 DEHYDRATION: ICD-10-CM

## 2017-06-22 DIAGNOSIS — R10.32 LEFT GROIN PAIN: ICD-10-CM

## 2017-06-22 DIAGNOSIS — Z13.820 SCREENING FOR OSTEOPOROSIS: ICD-10-CM

## 2017-06-22 DIAGNOSIS — Z09 HOSPITAL DISCHARGE FOLLOW-UP: Primary | ICD-10-CM

## 2017-06-22 LAB
BASOPHILS # BLD AUTO: 0.01 10*3/MM3 (ref 0–0.2)
BASOPHILS NFR BLD AUTO: 0.2 % (ref 0–1.5)
BUN SERPL-MCNC: 23 MG/DL (ref 8–23)
BUN/CREAT SERPL: 18 (ref 7–25)
CALCIUM SERPL-MCNC: 9.7 MG/DL (ref 8.6–10.5)
CHLORIDE SERPL-SCNC: 103 MMOL/L (ref 98–107)
CO2 SERPL-SCNC: 23.3 MMOL/L (ref 22–29)
CREAT SERPL-MCNC: 1.28 MG/DL (ref 0.57–1)
EOSINOPHIL # BLD AUTO: 0.16 10*3/MM3 (ref 0–0.7)
EOSINOPHIL NFR BLD AUTO: 2.5 % (ref 0.3–6.2)
ERYTHROCYTE [DISTWIDTH] IN BLOOD BY AUTOMATED COUNT: 13.4 % (ref 11.7–13)
GLUCOSE SERPL-MCNC: 104 MG/DL (ref 65–99)
HCT VFR BLD AUTO: 34.7 % (ref 35.6–45.5)
HGB BLD-MCNC: 10.9 G/DL (ref 11.9–15.5)
IMM GRANULOCYTES # BLD: 0.02 10*3/MM3 (ref 0–0.03)
IMM GRANULOCYTES NFR BLD: 0.3 % (ref 0–0.5)
LYMPHOCYTES # BLD AUTO: 2 10*3/MM3 (ref 0.9–4.8)
LYMPHOCYTES NFR BLD AUTO: 31 % (ref 19.6–45.3)
MCH RBC QN AUTO: 30.1 PG (ref 26.9–32)
MCHC RBC AUTO-ENTMCNC: 31.4 G/DL (ref 32.4–36.3)
MCV RBC AUTO: 95.9 FL (ref 80.5–98.2)
MONOCYTES # BLD AUTO: 0.58 10*3/MM3 (ref 0.2–1.2)
MONOCYTES NFR BLD AUTO: 9 % (ref 5–12)
NEUTROPHILS # BLD AUTO: 3.68 10*3/MM3 (ref 1.9–8.1)
NEUTROPHILS NFR BLD AUTO: 57 % (ref 42.7–76)
PLATELET # BLD AUTO: 193 10*3/MM3 (ref 140–500)
POTASSIUM SERPL-SCNC: 4.4 MMOL/L (ref 3.5–5.2)
RBC # BLD AUTO: 3.62 10*6/MM3 (ref 3.9–5.2)
SODIUM SERPL-SCNC: 141 MMOL/L (ref 136–145)
WBC # BLD AUTO: 6.45 10*3/MM3 (ref 4.5–10.7)

## 2017-06-22 PROCEDURE — 77080 DXA BONE DENSITY AXIAL: CPT | Performed by: NURSE PRACTITIONER

## 2017-06-22 PROCEDURE — 73502 X-RAY EXAM HIP UNI 2-3 VIEWS: CPT | Performed by: NURSE PRACTITIONER

## 2017-06-22 PROCEDURE — 99214 OFFICE O/P EST MOD 30 MIN: CPT | Performed by: NURSE PRACTITIONER

## 2017-06-22 NOTE — PROGRESS NOTES
Subjective   Marleni Hummel is a 81 y.o. female.     History of Present Illness   Marleni Hummel 81 y.o. female presents today for Emergency Room follow up.  she was treated [facility] for dehydration secondary to viral gastroenteritis.  I reviewed all of the labs and diagnostic testing.  Renal function was decreased.    The patient's medications were not changed:  she does not have a follow up appointment with a specialist:       Hospital note as follows:    Hospital Course  The patient is a 81 y.o. female who presented with some nausea and general weakness after having had some vomiting and diarrhea a few days prior, which has since resolved. Please see H&P from 6/19/17 for further details. On admission she was found to be dehydrated with an ANGY on CKD stage 3 (Creatinine of 2 with known baseline of 1.1). She was given IV fluids and monitored overnight. She did quite well and was tolerating a regular diet without any nausea, vomiting, or diarrhea. She had good urine output and was ambulating without difficulty. There were no major events on cardiac monitoring. Her creatinine improved to 1.5 at the time of discharge. She was instructed to stay well hydrated and out of the heat for at least a few days, and to avoid nephrotoxic medications such as NSAIDs, which she does not use anyway. She is to follow up with her PCP within a week. It is recommended that her renal function is assessed at that point. Of note, she was tachycardic to the 110s in the ER. This resolved with IV hydration and she did not require any further medication adjustment.      Patient reports feeling improved but energy level is not quite back to baseline.  Needs repeat BMP to assess renal function.       Patient also reports history of right femur fracture without injury.  States she had stepped wrong and had severe pain and immobility. She has had 5 surgeries to right hip and leg. Has hx of osteopenia.  She states she has started to have  pain in her right groin that is similar to the pain she felt in her left groin after fracture.  She has not had injury.  Patient reports she does compensate with her left side for the weakness in her right leg.  She would like xray of left hip/pelvis. She is also due for dexa scan.   The following portions of the patient's history were reviewed and updated as appropriate: allergies, current medications, past family history, past medical history, past social history, past surgical history and problem list.    Review of Systems   Constitutional: Negative for unexpected weight change.   Respiratory: Negative for shortness of breath.    Cardiovascular: Negative for chest pain and palpitations.   Psychiatric/Behavioral: Negative for behavioral problems.       Objective   Physical Exam   Constitutional: She is oriented to person, place, and time. She appears well-developed and well-nourished.   Cardiovascular: Normal rate and regular rhythm.    Pulmonary/Chest: Effort normal and breath sounds normal.   Neurological: She is alert and oriented to person, place, and time.   Psychiatric: She has a normal mood and affect. Judgment normal.   Nursing note and vitals reviewed.      Assessment/Plan   Marleni was seen today for dehydration and shortness of breath.    Diagnoses and all orders for this visit:    Hospital discharge follow-up    ANGY (acute kidney injury)  -     Basic metabolic panel    Dehydration  Comments:  resolved  Orders:  -     Basic metabolic panel    Decreased hemoglobin  -     CBC & Differential    Left groin pain  -     XR Pelvis 1 or 2 View (In Office)  -     XR Hip With or Without Pelvis 2 - 3 View Left    Screening for osteoporosis  -     DEXA Bone Density Axial  -     XR Hip With or Without Pelvis 2 - 3 View Left    Osteoporosis  -     denosumab (PROLIA) 60 MG/ML solution syringe; Inject 1 mL under the skin 1 (One) Time for 1 dose.  -     Ambulatory Referral to [facility] For Infusion Treatment    History of  femur fracture  -     denosumab (PROLIA) 60 MG/ML solution syringe; Inject 1 mL under the skin 1 (One) Time for 1 dose.  -     Ambulatory Referral to [facility] For Infusion Treatment             Dexa showed t score of -2.9.  Patient is not taking calcium or vitamin D.  She is note taking bisphosphonate. Due to hx of fracture and tscore, will get patient set up for prolia injections.  Patient will make appt with ortho Dr. Chang for hip pain as xrays today were negative for fracture.

## 2017-06-30 PROBLEM — M81.0 SENILE OSTEOPOROSIS: Status: ACTIVE | Noted: 2017-06-30

## 2017-06-30 RX ORDER — APIXABAN 2.5 MG/1
TABLET, FILM COATED ORAL
Qty: 180 TABLET | Refills: 1 | Status: SHIPPED | OUTPATIENT
Start: 2017-06-30 | End: 2017-12-09 | Stop reason: SDUPTHER

## 2017-07-03 ENCOUNTER — HOSPITAL ENCOUNTER (OUTPATIENT)
Dept: INFUSION THERAPY | Facility: HOSPITAL | Age: 82
Discharge: HOME | End: 2017-07-03
Admitting: NURSE PRACTITIONER

## 2017-07-03 VITALS
RESPIRATION RATE: 18 BRPM | DIASTOLIC BLOOD PRESSURE: 82 MMHG | HEART RATE: 47 BPM | OXYGEN SATURATION: 98 % | HEIGHT: 63 IN | BODY MASS INDEX: 26.58 KG/M2 | SYSTOLIC BLOOD PRESSURE: 150 MMHG | TEMPERATURE: 98.6 F | WEIGHT: 150 LBS

## 2017-07-03 DIAGNOSIS — M81.0 SENILE OSTEOPOROSIS: ICD-10-CM

## 2017-07-03 PROCEDURE — 96401 CHEMO ANTI-NEOPL SQ/IM: CPT

## 2017-07-03 PROCEDURE — 25010000002 DENOSUMAB 60 MG/ML SOLUTION: Performed by: NURSE PRACTITIONER

## 2017-07-03 RX ADMIN — DENOSUMAB 60 MG: 60 INJECTION SUBCUTANEOUS at 11:38

## 2017-07-03 NOTE — PROGRESS NOTES
Tolerated well. Monitored 30 minutes post for first dose protocol without incident. D/C'd per ambulation with cane,  at side.

## 2017-07-03 NOTE — PATIENT INSTRUCTIONS
Denosumab injection  What is this medicine?  DENOSUMAB (den oh tanner mab) slows bone breakdown. Prolia is used to treat osteoporosis in women after menopause and in men. Xgeva is used to prevent bone fractures and other bone problems caused by cancer bone metastases. Xgeva is also used to treat giant cell tumor of the bone.  This medicine may be used for other purposes; ask your health care provider or pharmacist if you have questions.  COMMON BRAND NAME(S): Prolia, XGEVA  What should I tell my health care provider before I take this medicine?  They need to know if you have any of these conditions:  -dental disease  -eczema  -infection or history of infections  -kidney disease or on dialysis  -low blood calcium or vitamin D  -malabsorption syndrome  -scheduled to have surgery or tooth extraction  -taking medicine that contains denosumab  -thyroid or parathyroid disease  -an unusual reaction to denosumab, other medicines, foods, dyes, or preservatives  -pregnant or trying to get pregnant  -breast-feeding  How should I use this medicine?  This medicine is for injection under the skin. It is given by a health care professional in a hospital or clinic setting.  If you are getting Prolia, a special MedGuide will be given to you by the pharmacist with each prescription and refill. Be sure to read this information carefully each time.  For Prolia, talk to your pediatrician regarding the use of this medicine in children. Special care may be needed. For Xgeva, talk to your pediatrician regarding the use of this medicine in children. While this drug may be prescribed for children as young as 13 years for selected conditions, precautions do apply.  Overdosage: If you think you have taken too much of this medicine contact a poison control center or emergency room at once.  NOTE: This medicine is only for you. Do not share this medicine with others.  What if I miss a dose?  It is important not to miss your dose. Call your doctor  or health care professional if you are unable to keep an appointment.  What may interact with this medicine?  Do not take this medicine with any of the following medications:  -other medicines containing denosumab  This medicine may also interact with the following medications:  -medicines that suppress the immune system  -medicines that treat cancer  -steroid medicines like prednisone or cortisone  This list may not describe all possible interactions. Give your health care provider a list of all the medicines, herbs, non-prescription drugs, or dietary supplements you use. Also tell them if you smoke, drink alcohol, or use illegal drugs. Some items may interact with your medicine.  What should I watch for while using this medicine?  Visit your doctor or health care professional for regular checks on your progress. Your doctor or health care professional may order blood tests and other tests to see how you are doing.  Call your doctor or health care professional if you get a cold or other infection while receiving this medicine. Do not treat yourself. This medicine may decrease your body's ability to fight infection.  You should make sure you get enough calcium and vitamin D while you are taking this medicine, unless your doctor tells you not to. Discuss the foods you eat and the vitamins you take with your health care professional.  See your dentist regularly. Brush and floss your teeth as directed. Before you have any dental work done, tell your dentist you are receiving this medicine.  Do not become pregnant while taking this medicine or for 5 months after stopping it. Women should inform their doctor if they wish to become pregnant or think they might be pregnant. There is a potential for serious side effects to an unborn child. Talk to your health care professional or pharmacist for more information.  What side effects may I notice from receiving this medicine?  Side effects that you should report to your doctor  or health care professional as soon as possible:  -allergic reactions like skin rash, itching or hives, swelling of the face, lips, or tongue  -breathing problems  -chest pain  -fast, irregular heartbeat  -feeling faint or lightheaded, falls  -fever, chills, or any other sign of infection  -muscle spasms, tightening, or twitches  -numbness or tingling  -skin blisters or bumps, or is dry, peels, or red  -slow healing or unexplained pain in the mouth or jaw  -unusual bleeding or bruising  Side effects that usually do not require medical attention (report to your doctor or health care professional if they continue or are bothersome):  -muscle pain  -stomach upset, gas  This list may not describe all possible side effects. Call your doctor for medical advice about side effects. You may report side effects to FDA at 2-506-FDA-1145.  Where should I keep my medicine?  This medicine is only given in a clinic, doctor's office, or other health care setting and will not be stored at home.  NOTE: This sheet is a summary. It may not cover all possible information. If you have questions about this medicine, talk to your doctor, pharmacist, or health care provider.     © 2017, [employer]. (2017-01-19 10:06:55)

## 2017-07-12 ENCOUNTER — TELEPHONE (OUTPATIENT)
Dept: FAMILY MEDICINE CLINIC | Facility: CLINIC | Age: 82
End: 2017-07-12

## 2017-07-12 DIAGNOSIS — I10 ESSENTIAL HYPERTENSION: ICD-10-CM

## 2017-07-12 RX ORDER — AMLODIPINE BESYLATE 2.5 MG/1
2.5 TABLET ORAL 2 TIMES DAILY
Qty: 180 TABLET | Refills: 0 | Status: SHIPPED | OUTPATIENT
Start: 2017-07-12 | End: 2017-11-13 | Stop reason: SDUPTHER

## 2017-08-07 ENCOUNTER — OFFICE VISIT (OUTPATIENT)
Dept: FAMILY MEDICINE CLINIC | Facility: CLINIC | Age: 82
End: 2017-08-07

## 2017-08-07 VITALS
RESPIRATION RATE: 16 BRPM | DIASTOLIC BLOOD PRESSURE: 74 MMHG | SYSTOLIC BLOOD PRESSURE: 151 MMHG | HEIGHT: 63 IN | WEIGHT: 153 LBS | HEART RATE: 54 BPM | BODY MASS INDEX: 27.11 KG/M2 | TEMPERATURE: 98.1 F

## 2017-08-07 DIAGNOSIS — Z96.651 HISTORY OF RIGHT KNEE JOINT REPLACEMENT: ICD-10-CM

## 2017-08-07 DIAGNOSIS — Z96.652 HISTORY OF LEFT KNEE REPLACEMENT: ICD-10-CM

## 2017-08-07 DIAGNOSIS — Z96.641 HISTORY OF RIGHT HIP REPLACEMENT: ICD-10-CM

## 2017-08-07 DIAGNOSIS — M12.9 ARTHRITIS INVOLVING MULTIPLE SITES: Primary | ICD-10-CM

## 2017-08-07 PROBLEM — A08.4 VIRAL GASTROENTERITIS: Status: RESOLVED | Noted: 2017-06-20 | Resolved: 2017-08-07

## 2017-08-07 PROBLEM — R29.6 FREQUENT FALLS: Status: RESOLVED | Noted: 2017-03-13 | Resolved: 2017-08-07

## 2017-08-07 PROBLEM — N17.9 AKI (ACUTE KIDNEY INJURY) (HCC): Status: RESOLVED | Noted: 2017-06-20 | Resolved: 2017-08-07

## 2017-08-07 PROCEDURE — 99213 OFFICE O/P EST LOW 20 MIN: CPT | Performed by: FAMILY MEDICINE

## 2017-08-07 NOTE — PROGRESS NOTES
"Chief Complaint   Patient presents with   • Pain     joints       Subjective   This patient presents the office to discuss her arthritic problems.  Her main complaint is left knee.  She also has multiple joints that hurt including her right hip area and right knee.  Her hands have also shown some problems.  She had swelling of the right first index finger at the metacarpal phalangeal joint.  There is no family history of rheumatoid arthritis. Surgical history has been updated. Sx since early 2000's.   Referral to orthopedist is indicated.  Treatment complications include long-term use of Eliquis due to atrial fibrillation.  I have reviewed and updated her medications, medical history and problem list during today's office visit.        Social History   Substance Use Topics   • Smoking status: Former Smoker     Packs/day: 1.00     Years: 3.00     Types: Cigarettes     Quit date: 2/22/1956   • Smokeless tobacco: Never Used   • Alcohol use 0.6 - 1.2 oz/week     1 - 2 Glasses of wine per week      Comment: rare       Review of Systems   Musculoskeletal: Positive for arthralgias.       Objective   /74  Pulse 54  Temp 98.1 °F (36.7 °C) (Oral)   Resp 16  Ht 63\" (160 cm)  Wt 153 lb (69.4 kg)  BMI 27.1 kg/m2  Body mass index is 27.1 kg/(m^2).  Physical Exam   Constitutional: She appears well-developed. No distress.   Musculoskeletal:   mp joint right hand swollen. Scars knees, right hip   Vitals reviewed.        Assessment/Plan     Problem List Items Addressed This Visit        Musculoskeletal and Integument    Arthritis involving multiple sites - Primary    Relevant Orders    Uric Acid    Rheumatoid Factor, Quant    Sedimentation Rate    Ambulatory Referral to Orthopedic Surgery       Other    History of right hip replacement    Relevant Orders    Ambulatory Referral to Orthopedic Surgery    History of right knee joint replacement    Relevant Orders    Ambulatory Referral to Orthopedic Surgery    History of left " knee replacement    Relevant Orders    Ambulatory Referral to Orthopedic Surgery          Outpatient Encounter Prescriptions as of 8/7/2017   Medication Sig Dispense Refill   • acetaminophen (TYLENOL) 500 MG tablet Take 1,000 mg by mouth 3 (three) times a day as needed for mild pain (1-3) or moderate pain (4-6).     • amLODIPine (NORVASC) 2.5 MG tablet Take 1 tablet by mouth 2 (Two) Times a Day. 180 tablet 0   • atorvastatin (LIPITOR) 20 MG tablet Take 1 tablet by mouth Every Night for 180 days. 90 tablet 1   • DULoxetine (CYMBALTA) 30 MG capsule Take 1 capsule by mouth Daily for 180 days. 90 capsule 1   • ELIQUIS 2.5 MG tablet tablet TAKE 1 TABLET TWICE A  tablet 1   • levothyroxine (SYNTHROID, LEVOTHROID) 50 MCG tablet Take 1 tablet by mouth Daily for 180 days. 90 tablet 1   • metoprolol succinate XL (TOPROL-XL) 50 MG 24 hr tablet Take 1 tablet by mouth Daily for 180 days. 90 tablet 1   • MYRBETRIQ 25 MG tablet sustained-release 24 hour 24 hr tablet      • pantoprazole (PROTONIX) 20 MG EC tablet Take 1 tablet by mouth Every Night for 180 days. 90 tablet 1   • phenol (CHLORASEPTIC) 1.4 % liquid liquid Apply 2 sprays to the mouth or throat Every 2 (Two) Hours As Needed (sore throat). 177 mL 0   • sodium chloride (OCEAN NASAL SPRAY) 0.65 % nasal spray 1 spray into each nostril As Needed for congestion. 104 mL 0     No facility-administered encounter medications on file as of 8/7/2017.        Orders Placed This Encounter   Procedures   • Uric Acid   • Rheumatoid Factor, Quant   • Sedimentation Rate   • Ambulatory Referral to Orthopedic Surgery     Referral Priority:   Routine     Referral Type:   Consultation     Referral Reason:   Specialty Services Required     Referred to Provider:   Esteban Moe MD     Requested Specialty:   Orthopedic Surgery     Number of Visits Requested:   1       Continue with current treatment plan.         RTC as needed or sooner if symptoms worsen or change

## 2017-08-08 ENCOUNTER — APPOINTMENT (OUTPATIENT)
Dept: WOMENS IMAGING | Facility: HOSPITAL | Age: 82
End: 2017-08-08

## 2017-08-08 LAB
ALBUMIN SERPL-MCNC: 4.6 G/DL (ref 3.5–5.2)
ALBUMIN/GLOB SERPL: 1.5 G/DL
ALP SERPL-CCNC: 90 U/L (ref 39–117)
ALT SERPL-CCNC: 15 U/L (ref 1–33)
AST SERPL-CCNC: 18 U/L (ref 1–32)
BASOPHILS # BLD AUTO: 0.02 10*3/MM3 (ref 0–0.2)
BASOPHILS NFR BLD AUTO: 0.3 % (ref 0–1.5)
BILIRUB SERPL-MCNC: 0.6 MG/DL (ref 0.1–1.2)
BUN SERPL-MCNC: 19 MG/DL (ref 8–23)
BUN/CREAT SERPL: 17.1 (ref 7–25)
CALCIUM SERPL-MCNC: 9.4 MG/DL (ref 8.6–10.5)
CHLORIDE SERPL-SCNC: 99 MMOL/L (ref 98–107)
CHOLEST SERPL-MCNC: 202 MG/DL (ref 0–200)
CO2 SERPL-SCNC: 24.3 MMOL/L (ref 22–29)
CREAT SERPL-MCNC: 1.11 MG/DL (ref 0.57–1)
EOSINOPHIL # BLD AUTO: 0.11 10*3/MM3 (ref 0–0.7)
EOSINOPHIL NFR BLD AUTO: 1.9 % (ref 0.3–6.2)
ERYTHROCYTE [DISTWIDTH] IN BLOOD BY AUTOMATED COUNT: 14.2 % (ref 11.7–13)
ERYTHROCYTE [SEDIMENTATION RATE] IN BLOOD BY WESTERGREN METHOD: 48 MM/HR (ref 0–30)
GLOBULIN SER CALC-MCNC: 3 GM/DL
GLUCOSE SERPL-MCNC: 104 MG/DL (ref 65–99)
HCT VFR BLD AUTO: 39.8 % (ref 35.6–45.5)
HDLC SERPL-MCNC: 52 MG/DL (ref 40–60)
HGB BLD-MCNC: 12.3 G/DL (ref 11.9–15.5)
IMM GRANULOCYTES # BLD: 0 10*3/MM3 (ref 0–0.03)
IMM GRANULOCYTES NFR BLD: 0 % (ref 0–0.5)
LDLC SERPL CALC-MCNC: 104 MG/DL (ref 0–100)
LDLC/HDLC SERPL: 1.99 {RATIO}
LYMPHOCYTES # BLD AUTO: 1.77 10*3/MM3 (ref 0.9–4.8)
LYMPHOCYTES NFR BLD AUTO: 29.9 % (ref 19.6–45.3)
MCH RBC QN AUTO: 29.9 PG (ref 26.9–32)
MCHC RBC AUTO-ENTMCNC: 30.9 G/DL (ref 32.4–36.3)
MCV RBC AUTO: 96.6 FL (ref 80.5–98.2)
MONOCYTES # BLD AUTO: 0.37 10*3/MM3 (ref 0.2–1.2)
MONOCYTES NFR BLD AUTO: 6.3 % (ref 5–12)
NEUTROPHILS # BLD AUTO: 3.64 10*3/MM3 (ref 1.9–8.1)
NEUTROPHILS NFR BLD AUTO: 61.6 % (ref 42.7–76)
PLATELET # BLD AUTO: 228 10*3/MM3 (ref 140–500)
POTASSIUM SERPL-SCNC: 5.2 MMOL/L (ref 3.5–5.2)
PROT SERPL-MCNC: 7.6 G/DL (ref 6–8.5)
RBC # BLD AUTO: 4.12 10*6/MM3 (ref 3.9–5.2)
RHEUMATOID FACT SERPL-ACNC: <10 IU/ML (ref 0–13.9)
SODIUM SERPL-SCNC: 138 MMOL/L (ref 136–145)
TRIGL SERPL-MCNC: 232 MG/DL (ref 0–150)
TSH SERPL DL<=0.005 MIU/L-ACNC: 0.15 MIU/ML (ref 0.27–4.2)
URATE SERPL-MCNC: 6.9 MG/DL (ref 2.4–5.7)
VLDLC SERPL CALC-MCNC: 46.4 MG/DL (ref 5–40)
WBC # BLD AUTO: 5.91 10*3/MM3 (ref 4.5–10.7)

## 2017-08-08 PROCEDURE — 77063 BREAST TOMOSYNTHESIS BI: CPT | Performed by: RADIOLOGY

## 2017-08-08 PROCEDURE — G0202 SCR MAMMO BI INCL CAD: HCPCS | Performed by: RADIOLOGY

## 2017-08-09 NOTE — PROGRESS NOTES
Please call the patient regarding her abnormal result.TSH is abnormal, Kidneys are improved. Uric acid suggests gout as possible etiology. Lipids are abnormal. Appt to discuss treatment options. Dr. Andujar

## 2017-08-10 ENCOUNTER — RESULTS ENCOUNTER (OUTPATIENT)
Dept: FAMILY MEDICINE CLINIC | Facility: CLINIC | Age: 82
End: 2017-08-10

## 2017-08-10 DIAGNOSIS — E03.9 ACQUIRED HYPOTHYROIDISM: ICD-10-CM

## 2017-08-10 DIAGNOSIS — I10 ESSENTIAL HYPERTENSION: ICD-10-CM

## 2017-08-10 DIAGNOSIS — E78.00 HYPERCHOLESTEROLEMIA: ICD-10-CM

## 2017-08-14 ENCOUNTER — OFFICE VISIT (OUTPATIENT)
Dept: FAMILY MEDICINE CLINIC | Facility: CLINIC | Age: 82
End: 2017-08-14

## 2017-08-14 VITALS
WEIGHT: 153 LBS | BODY MASS INDEX: 27.11 KG/M2 | SYSTOLIC BLOOD PRESSURE: 142 MMHG | RESPIRATION RATE: 16 BRPM | HEART RATE: 60 BPM | DIASTOLIC BLOOD PRESSURE: 63 MMHG | TEMPERATURE: 98.3 F | HEIGHT: 63 IN

## 2017-08-14 DIAGNOSIS — M12.9 ARTHRITIS INVOLVING MULTIPLE SITES: ICD-10-CM

## 2017-08-14 DIAGNOSIS — E03.9 ACQUIRED HYPOTHYROIDISM: Primary | ICD-10-CM

## 2017-08-14 DIAGNOSIS — E78.00 HYPERCHOLESTEROLEMIA: ICD-10-CM

## 2017-08-14 DIAGNOSIS — E79.0 HYPERURICEMIA: ICD-10-CM

## 2017-08-14 PROCEDURE — 99213 OFFICE O/P EST LOW 20 MIN: CPT | Performed by: FAMILY MEDICINE

## 2017-08-14 RX ORDER — LEVOTHYROXINE SODIUM 0.07 MG/1
37.5 TABLET ORAL DAILY
Qty: 30 HALF TABLET | Refills: 1 | Status: SHIPPED | OUTPATIENT
Start: 2017-08-14 | End: 2017-08-15 | Stop reason: SDUPTHER

## 2017-08-14 RX ORDER — COLCHICINE 0.6 MG/1
0.6 TABLET ORAL 2 TIMES DAILY
Qty: 60 TABLET | Refills: 0 | Status: SHIPPED | OUTPATIENT
Start: 2017-08-14 | End: 2017-11-13

## 2017-08-14 NOTE — PROGRESS NOTES
"Chief Complaint   Patient presents with   • Results     labs       Subjective   This patient presents the office to follow-up on recent labs.  She continues to have joint discomfort.  For the most part labs are stable to slightly improved.  She is no longer anemic.  Her blood sugar is stable.  Kidney function has shown interval improvement.  Her TSH is supratherapeutic.  We will decrease her levothyroxine dose to 37.5 mg daily with short-term follow-up.  Rheumatoid arthritis factor was negative.  She did have mild elevation of uric acid which we will repeat prior to visit in 6 weeks.  I have reviewed and updated her medications, medical history and problem list during today's office visit.        Social History   Substance Use Topics   • Smoking status: Former Smoker     Packs/day: 1.00     Years: 3.00     Types: Cigarettes     Quit date: 2/22/1956   • Smokeless tobacco: Never Used   • Alcohol use 0.6 - 1.2 oz/week     1 - 2 Glasses of wine per week      Comment: rare       Review of Systems   Musculoskeletal: Positive for arthralgias.       Objective   /63  Pulse 60  Temp 98.3 °F (36.8 °C) (Oral)   Resp 16  Ht 63\" (160 cm)  Wt 153 lb (69.4 kg)  BMI 27.1 kg/m2  Body mass index is 27.1 kg/(m^2).  Physical Exam   Constitutional: She appears well-developed. No distress.   Musculoskeletal:   No swollen joints, right index is less prominent   Vitals reviewed.      Data Reviewed:    CMP:  Lab Results   Component Value Date     (H) 08/07/2017    BUN 19 08/07/2017    CREATININE 1.11 (H) 08/07/2017    EGFRIFNONA 47 (L) 08/07/2017    EGFRIFAFRI 57 (L) 08/07/2017     08/07/2017    K 5.2 08/07/2017    CL 99 08/07/2017    CALCIUM 9.4 08/07/2017    PROTENTOTREF 7.6 08/07/2017    ALBUMIN 4.60 08/07/2017    LABGLOBREF 3.0 08/07/2017    BILITOT 0.6 08/07/2017    ALKPHOS 90 08/07/2017    AST 18 08/07/2017    ALT 15 08/07/2017     CBC w/ diff:   Lab Results   Component Value Date    WBC 5.91 08/07/2017    RBC " 4.12 08/07/2017    HGB 12.3 08/07/2017    HCT 39.8 08/07/2017    MCV 96.6 08/07/2017    MCH 29.9 08/07/2017    MCHC 30.9 (L) 08/07/2017    RDW 14.2 (H) 08/07/2017     08/07/2017    NEUTRORELPCT 61.6 08/07/2017    AUTOIGPER 0.0 06/20/2017    LYMPHORELPCT 29.9 08/07/2017    MONORELPCT 6.3 08/07/2017    EOSRELPCT 1.9 08/07/2017    BASORELPCT 0.3 08/07/2017     LIPID PANEL:  Lab Results   Component Value Date    CHLPL 202 (H) 08/07/2017    TRIG 232 (H) 08/07/2017    HDL 52 08/07/2017    VLDL 46.4 (H) 08/07/2017     (H) 08/07/2017    LDLHDL 1.99 08/07/2017     TSH:  Lab Results   Component Value Date    TSH 0.154 (L) 08/07/2017       Assessment/Plan     Problem List Items Addressed This Visit        Cardiovascular and Mediastinum    Hypercholesterolemia  Hold atorvastatin until next visit       Endocrine    Acquired hypothyroidism - Primary    Relevant Medications    levothyroxine (SYNTHROID, LEVOTHROID) 37.5 MCG tablet    Other Relevant Orders    T3, Free    TSH+Free T4       Musculoskeletal and Integument    Arthritis involving multiple sites    Relevant Medications    colchicine 0.6 MG tablet    Other Relevant Orders    Sedimentation Rate      Other Visit Diagnoses     Hyperuricemia        Relevant Medications    colchicine 0.6 MG tablet    Other Relevant Orders    Uric Acid          Outpatient Encounter Prescriptions as of 8/14/2017   Medication Sig Dispense Refill   • acetaminophen (TYLENOL) 500 MG tablet Take 1,000 mg by mouth 3 (three) times a day as needed for mild pain (1-3) or moderate pain (4-6).     • amLODIPine (NORVASC) 2.5 MG tablet Take 1 tablet by mouth 2 (Two) Times a Day. 180 tablet 0   • DULoxetine (CYMBALTA) 30 MG capsule Take 1 capsule by mouth Daily for 180 days. 90 capsule 1   • ELIQUIS 2.5 MG tablet tablet TAKE 1 TABLET TWICE A  tablet 1   • levothyroxine (SYNTHROID, LEVOTHROID) 37.5 MCG tablet Take 1 half tablet by mouth Daily for 60 days. 30 half tablet 1   • metoprolol  succinate XL (TOPROL-XL) 50 MG 24 hr tablet Take 1 tablet by mouth Daily for 180 days. 90 tablet 1   • MYRBETRIQ 25 MG tablet sustained-release 24 hour 24 hr tablet      • pantoprazole (PROTONIX) 20 MG EC tablet Take 1 tablet by mouth Every Night for 180 days. 90 tablet 1   • phenol (CHLORASEPTIC) 1.4 % liquid liquid Apply 2 sprays to the mouth or throat Every 2 (Two) Hours As Needed (sore throat). 177 mL 0   • sodium chloride (OCEAN NASAL SPRAY) 0.65 % nasal spray 1 spray into each nostril As Needed for congestion. 104 mL 0   • [DISCONTINUED] atorvastatin (LIPITOR) 20 MG tablet Take 1 tablet by mouth Every Night for 180 days. 90 tablet 1   • [DISCONTINUED] levothyroxine (SYNTHROID, LEVOTHROID) 50 MCG tablet Take 1 tablet by mouth Daily for 180 days. 90 tablet 1   • colchicine 0.6 MG tablet Take 1 tablet by mouth 2 (Two) Times a Day for 30 days. 60 tablet 0     No facility-administered encounter medications on file as of 8/14/2017.        Orders Placed This Encounter   Procedures   • T3, Free     Standing Status:   Future     Standing Expiration Date:   2/10/2018   • TSH+Free T4     Standing Status:   Future     Standing Expiration Date:   2/10/2018   • Uric Acid     Standing Status:   Future     Standing Expiration Date:   2/10/2018   • Sedimentation Rate     Standing Status:   Future     Standing Expiration Date:   2/10/2018       Continue with current treatment plan.  Hold atorvastatin while on colchicine, take lower dose of levothyroxine, get labs in 1 month       F/U in 1 month

## 2017-08-15 ENCOUNTER — TELEPHONE (OUTPATIENT)
Dept: FAMILY MEDICINE CLINIC | Facility: CLINIC | Age: 82
End: 2017-08-15

## 2017-08-15 DIAGNOSIS — E03.9 ACQUIRED HYPOTHYROIDISM: ICD-10-CM

## 2017-08-15 RX ORDER — LEVOTHYROXINE SODIUM 0.07 MG/1
37.5 TABLET ORAL DAILY
Qty: 15 TABLET | Refills: 2 | Status: SHIPPED | OUTPATIENT
Start: 2017-08-15 | End: 2017-09-02 | Stop reason: HOSPADM

## 2017-08-28 ENCOUNTER — RESULTS ENCOUNTER (OUTPATIENT)
Dept: FAMILY MEDICINE CLINIC | Facility: CLINIC | Age: 82
End: 2017-08-28

## 2017-08-28 DIAGNOSIS — M12.9 ARTHRITIS INVOLVING MULTIPLE SITES: ICD-10-CM

## 2017-08-28 DIAGNOSIS — E03.9 ACQUIRED HYPOTHYROIDISM: ICD-10-CM

## 2017-08-28 DIAGNOSIS — E79.0 HYPERURICEMIA: ICD-10-CM

## 2017-08-29 ENCOUNTER — HOSPITAL ENCOUNTER (INPATIENT)
Facility: HOSPITAL | Age: 82
LOS: 3 days | Discharge: HOME HEALTH | End: 2017-09-02
Attending: FAMILY MEDICINE | Admitting: ORTHOPAEDIC SURGERY

## 2017-08-29 ENCOUNTER — APPOINTMENT (OUTPATIENT)
Dept: GENERAL RADIOLOGY | Facility: HOSPITAL | Age: 82
End: 2017-08-29

## 2017-08-29 ENCOUNTER — APPOINTMENT (OUTPATIENT)
Dept: MRI IMAGING | Facility: HOSPITAL | Age: 82
End: 2017-08-29

## 2017-08-29 ENCOUNTER — APPOINTMENT (OUTPATIENT)
Dept: GENERAL RADIOLOGY | Facility: HOSPITAL | Age: 82
End: 2017-08-29
Attending: ORTHOPAEDIC SURGERY

## 2017-08-29 DIAGNOSIS — Z74.09 IMPAIRED FUNCTIONAL MOBILITY, BALANCE, GAIT, AND ENDURANCE: Primary | ICD-10-CM

## 2017-08-29 LAB
ANION GAP SERPL CALCULATED.3IONS-SCNC: 15 MMOL/L
BACTERIA UR QL AUTO: ABNORMAL /HPF
BILIRUB UR QL STRIP: NEGATIVE
BUN BLD-MCNC: 26 MG/DL (ref 8–23)
BUN/CREAT SERPL: 23.6 (ref 7–25)
CALCIUM SPEC-SCNC: 9.7 MG/DL (ref 8.6–10.5)
CHLORIDE SERPL-SCNC: 103 MMOL/L (ref 98–107)
CLARITY UR: CLEAR
CO2 SERPL-SCNC: 23 MMOL/L (ref 22–29)
COLOR UR: YELLOW
CREAT BLD-MCNC: 1.1 MG/DL (ref 0.57–1)
CRP SERPL-MCNC: 0.52 MG/DL (ref 0–0.5)
DEPRECATED RDW RBC AUTO: 46.8 FL (ref 37–54)
ERYTHROCYTE [DISTWIDTH] IN BLOOD BY AUTOMATED COUNT: 13.9 % (ref 11.7–13)
GFR SERPL CREATININE-BSD FRML MDRD: 48 ML/MIN/1.73
GLUCOSE BLD-MCNC: 81 MG/DL (ref 65–99)
GLUCOSE UR STRIP-MCNC: NEGATIVE MG/DL
HCT VFR BLD AUTO: 34.6 % (ref 35.6–45.5)
HGB BLD-MCNC: 11 G/DL (ref 11.9–15.5)
HGB UR QL STRIP.AUTO: ABNORMAL
HYALINE CASTS UR QL AUTO: ABNORMAL /LPF
KETONES UR QL STRIP: NEGATIVE
LEUKOCYTE ESTERASE UR QL STRIP.AUTO: ABNORMAL
MCH RBC QN AUTO: 29.3 PG (ref 26.9–32)
MCHC RBC AUTO-ENTMCNC: 31.8 G/DL (ref 32.4–36.3)
MCV RBC AUTO: 92.3 FL (ref 80.5–98.2)
NITRITE UR QL STRIP: NEGATIVE
PH UR STRIP.AUTO: 5.5 [PH] (ref 5–8)
PLATELET # BLD AUTO: 190 10*3/MM3 (ref 140–500)
PMV BLD AUTO: 10.6 FL (ref 6–12)
POTASSIUM BLD-SCNC: 4.1 MMOL/L (ref 3.5–5.2)
PROT UR QL STRIP: NEGATIVE
RBC # BLD AUTO: 3.75 10*6/MM3 (ref 3.9–5.2)
RBC # UR: ABNORMAL /HPF
REF LAB TEST METHOD: ABNORMAL
SODIUM BLD-SCNC: 141 MMOL/L (ref 136–145)
SP GR UR STRIP: 1.02 (ref 1–1.03)
SQUAMOUS #/AREA URNS HPF: ABNORMAL /HPF
UROBILINOGEN UR QL STRIP: ABNORMAL
WBC NRBC COR # BLD: 7.21 10*3/MM3 (ref 4.5–10.7)
WBC UR QL AUTO: ABNORMAL /HPF

## 2017-08-29 PROCEDURE — 81001 URINALYSIS AUTO W/SCOPE: CPT | Performed by: ORTHOPAEDIC SURGERY

## 2017-08-29 PROCEDURE — 0 GADOBENATE DIMEGLUMINE 529 MG/ML SOLUTION: Performed by: ORTHOPAEDIC SURGERY

## 2017-08-29 PROCEDURE — 72195 MRI PELVIS W/O DYE: CPT

## 2017-08-29 PROCEDURE — 72110 X-RAY EXAM L-2 SPINE 4/>VWS: CPT

## 2017-08-29 PROCEDURE — A9577 INJ MULTIHANCE: HCPCS | Performed by: ORTHOPAEDIC SURGERY

## 2017-08-29 PROCEDURE — 85027 COMPLETE CBC AUTOMATED: CPT | Performed by: ORTHOPAEDIC SURGERY

## 2017-08-29 PROCEDURE — 86140 C-REACTIVE PROTEIN: CPT | Performed by: ORTHOPAEDIC SURGERY

## 2017-08-29 PROCEDURE — G0378 HOSPITAL OBSERVATION PER HR: HCPCS

## 2017-08-29 PROCEDURE — 72158 MRI LUMBAR SPINE W/O & W/DYE: CPT

## 2017-08-29 PROCEDURE — 80048 BASIC METABOLIC PNL TOTAL CA: CPT | Performed by: ORTHOPAEDIC SURGERY

## 2017-08-29 PROCEDURE — 73502 X-RAY EXAM HIP UNI 2-3 VIEWS: CPT

## 2017-08-29 RX ORDER — ASPIRIN 325 MG
325 TABLET ORAL DAILY
Status: DISCONTINUED | OUTPATIENT
Start: 2017-08-30 | End: 2017-08-31

## 2017-08-29 RX ORDER — LEVOTHYROXINE SODIUM 0.05 MG/1
50 TABLET ORAL
Status: DISCONTINUED | OUTPATIENT
Start: 2017-08-30 | End: 2017-09-02 | Stop reason: HOSPADM

## 2017-08-29 RX ORDER — AMLODIPINE BESYLATE 2.5 MG/1
2.5 TABLET ORAL 2 TIMES DAILY
Status: DISCONTINUED | OUTPATIENT
Start: 2017-08-29 | End: 2017-09-02 | Stop reason: HOSPADM

## 2017-08-29 RX ORDER — ONDANSETRON 4 MG/1
4 TABLET, FILM COATED ORAL EVERY 6 HOURS PRN
Status: DISCONTINUED | OUTPATIENT
Start: 2017-08-29 | End: 2017-09-02 | Stop reason: HOSPADM

## 2017-08-29 RX ORDER — HYDROMORPHONE HYDROCHLORIDE 2 MG/1
4 TABLET ORAL EVERY 4 HOURS PRN
Status: DISCONTINUED | OUTPATIENT
Start: 2017-08-29 | End: 2017-09-02 | Stop reason: HOSPADM

## 2017-08-29 RX ORDER — PANTOPRAZOLE SODIUM 20 MG/1
20 TABLET, DELAYED RELEASE ORAL
Status: DISCONTINUED | OUTPATIENT
Start: 2017-08-30 | End: 2017-09-02 | Stop reason: HOSPADM

## 2017-08-29 RX ORDER — DULOXETIN HYDROCHLORIDE 30 MG/1
30 CAPSULE, DELAYED RELEASE ORAL DAILY
Status: DISCONTINUED | OUTPATIENT
Start: 2017-08-30 | End: 2017-09-02 | Stop reason: HOSPADM

## 2017-08-29 RX ORDER — LEVOTHYROXINE SODIUM 0.05 MG/1
50 TABLET ORAL DAILY
COMMUNITY
End: 2017-11-13 | Stop reason: SDUPTHER

## 2017-08-29 RX ORDER — METOPROLOL SUCCINATE 50 MG/1
50 TABLET, EXTENDED RELEASE ORAL
Status: DISCONTINUED | OUTPATIENT
Start: 2017-08-30 | End: 2017-09-02 | Stop reason: HOSPADM

## 2017-08-29 RX ORDER — ACETAMINOPHEN 500 MG
500 TABLET ORAL EVERY 6 HOURS PRN
Status: DISCONTINUED | OUTPATIENT
Start: 2017-08-29 | End: 2017-09-02 | Stop reason: HOSPADM

## 2017-08-29 RX ORDER — COLCHICINE 0.6 MG/1
0.6 TABLET ORAL EVERY 12 HOURS SCHEDULED
Status: DISCONTINUED | OUTPATIENT
Start: 2017-08-29 | End: 2017-09-02 | Stop reason: HOSPADM

## 2017-08-29 RX ORDER — ATORVASTATIN CALCIUM 20 MG/1
20 TABLET, FILM COATED ORAL NIGHTLY
COMMUNITY
End: 2017-11-13 | Stop reason: SDUPTHER

## 2017-08-29 RX ORDER — ATORVASTATIN CALCIUM 20 MG/1
20 TABLET, FILM COATED ORAL NIGHTLY
Status: DISCONTINUED | OUTPATIENT
Start: 2017-08-29 | End: 2017-09-02 | Stop reason: HOSPADM

## 2017-08-29 RX ORDER — TRIMETHOPRIM 100 MG/1
100 TABLET ORAL DAILY
Status: DISCONTINUED | OUTPATIENT
Start: 2017-08-30 | End: 2017-09-01

## 2017-08-29 RX ORDER — CELECOXIB 200 MG/1
200 CAPSULE ORAL 2 TIMES DAILY
Status: DISCONTINUED | OUTPATIENT
Start: 2017-08-30 | End: 2017-09-01

## 2017-08-29 RX ORDER — ONDANSETRON 2 MG/ML
4 INJECTION INTRAMUSCULAR; INTRAVENOUS EVERY 6 HOURS PRN
Status: DISCONTINUED | OUTPATIENT
Start: 2017-08-29 | End: 2017-09-02 | Stop reason: HOSPADM

## 2017-08-29 RX ORDER — HYDROMORPHONE HYDROCHLORIDE 2 MG/1
2 TABLET ORAL EVERY 4 HOURS PRN
Status: DISCONTINUED | OUTPATIENT
Start: 2017-08-29 | End: 2017-09-02 | Stop reason: HOSPADM

## 2017-08-29 RX ORDER — TRIMETHOPRIM 100 MG/1
100 TABLET ORAL DAILY
COMMUNITY
End: 2017-10-19

## 2017-08-29 RX ADMIN — GADOBENATE DIMEGLUMINE 14 ML: 529 INJECTION, SOLUTION INTRAVENOUS at 21:47

## 2017-08-29 RX ADMIN — ACETAMINOPHEN 500 MG: 500 TABLET ORAL at 22:26

## 2017-08-29 RX ADMIN — APIXABAN 2.5 MG: 2.5 TABLET, FILM COATED ORAL at 22:19

## 2017-08-29 RX ADMIN — AMLODIPINE BESYLATE 2.5 MG: 2.5 TABLET ORAL at 22:18

## 2017-08-29 RX ADMIN — ATORVASTATIN CALCIUM 20 MG: 20 TABLET, FILM COATED ORAL at 22:19

## 2017-08-29 RX ADMIN — COLCHICINE 0.6 MG: 0.6 TABLET, FILM COATED ORAL at 22:19

## 2017-08-30 PROBLEM — R82.71 BACTERIURIA: Status: ACTIVE | Noted: 2017-08-30

## 2017-08-30 PROBLEM — S72.002A CLOSED DISPLACED FRACTURE OF LEFT FEMORAL NECK: Status: ACTIVE | Noted: 2017-08-30

## 2017-08-30 LAB — ERYTHROCYTE [SEDIMENTATION RATE] IN BLOOD: 36 MM/HR (ref 0–30)

## 2017-08-30 PROCEDURE — G8978 MOBILITY CURRENT STATUS: HCPCS

## 2017-08-30 PROCEDURE — 85652 RBC SED RATE AUTOMATED: CPT | Performed by: ORTHOPAEDIC SURGERY

## 2017-08-30 PROCEDURE — G8979 MOBILITY GOAL STATUS: HCPCS

## 2017-08-30 PROCEDURE — 93010 ELECTROCARDIOGRAM REPORT: CPT | Performed by: INTERNAL MEDICINE

## 2017-08-30 PROCEDURE — 93005 ELECTROCARDIOGRAM TRACING: CPT | Performed by: HOSPITALIST

## 2017-08-30 PROCEDURE — 25010000002 CEFTRIAXONE PER 250 MG: Performed by: HOSPITALIST

## 2017-08-30 PROCEDURE — 97162 PT EVAL MOD COMPLEX 30 MIN: CPT

## 2017-08-30 RX ORDER — CEFTRIAXONE SODIUM 1 G/50ML
1 INJECTION, SOLUTION INTRAVENOUS EVERY 24 HOURS
Status: DISCONTINUED | OUTPATIENT
Start: 2017-08-30 | End: 2017-09-02 | Stop reason: HOSPADM

## 2017-08-30 RX ADMIN — COLCHICINE 0.6 MG: 0.6 TABLET, FILM COATED ORAL at 21:26

## 2017-08-30 RX ADMIN — ACETAMINOPHEN 500 MG: 500 TABLET ORAL at 04:47

## 2017-08-30 RX ADMIN — APIXABAN 2.5 MG: 2.5 TABLET, FILM COATED ORAL at 08:52

## 2017-08-30 RX ADMIN — CELECOXIB 200 MG: 200 CAPSULE ORAL at 08:51

## 2017-08-30 RX ADMIN — CELECOXIB 200 MG: 200 CAPSULE ORAL at 17:26

## 2017-08-30 RX ADMIN — TRIMETHOPRIM 100 MG: 100 TABLET ORAL at 08:52

## 2017-08-30 RX ADMIN — AMLODIPINE BESYLATE 2.5 MG: 2.5 TABLET ORAL at 17:26

## 2017-08-30 RX ADMIN — COLCHICINE 0.6 MG: 0.6 TABLET, FILM COATED ORAL at 08:52

## 2017-08-30 RX ADMIN — PANTOPRAZOLE SODIUM 20 MG: 20 TABLET, DELAYED RELEASE ORAL at 06:45

## 2017-08-30 RX ADMIN — ACETAMINOPHEN 500 MG: 500 TABLET ORAL at 23:12

## 2017-08-30 RX ADMIN — METOPROLOL SUCCINATE 50 MG: 50 TABLET, FILM COATED, EXTENDED RELEASE ORAL at 08:52

## 2017-08-30 RX ADMIN — LEVOTHYROXINE SODIUM 50 MCG: 50 TABLET ORAL at 06:45

## 2017-08-30 RX ADMIN — AMLODIPINE BESYLATE 2.5 MG: 2.5 TABLET ORAL at 08:52

## 2017-08-30 RX ADMIN — DULOXETINE HYDROCHLORIDE 30 MG: 30 CAPSULE, DELAYED RELEASE ORAL at 08:52

## 2017-08-30 RX ADMIN — ASPIRIN 325 MG: 325 TABLET ORAL at 08:51

## 2017-08-30 RX ADMIN — ACETAMINOPHEN 500 MG: 500 TABLET ORAL at 10:21

## 2017-08-30 RX ADMIN — ACETAMINOPHEN 500 MG: 500 TABLET ORAL at 16:19

## 2017-08-30 RX ADMIN — CEFTRIAXONE SODIUM 1 G: 1 INJECTION, SOLUTION INTRAVENOUS at 21:55

## 2017-08-31 ENCOUNTER — APPOINTMENT (OUTPATIENT)
Dept: GENERAL RADIOLOGY | Facility: HOSPITAL | Age: 82
End: 2017-08-31

## 2017-08-31 ENCOUNTER — ANESTHESIA (OUTPATIENT)
Dept: PERIOP | Facility: HOSPITAL | Age: 82
End: 2017-08-31

## 2017-08-31 ENCOUNTER — ANESTHESIA EVENT (OUTPATIENT)
Dept: PERIOP | Facility: HOSPITAL | Age: 82
End: 2017-08-31

## 2017-08-31 PROCEDURE — C1713 ANCHOR/SCREW BN/BN,TIS/BN: HCPCS | Performed by: ORTHOPAEDIC SURGERY

## 2017-08-31 PROCEDURE — 25010000002 HYDROMORPHONE PER 4 MG: Performed by: NURSE ANESTHETIST, CERTIFIED REGISTERED

## 2017-08-31 PROCEDURE — 25010000002 FENTANYL CITRATE (PF) 100 MCG/2ML SOLUTION: Performed by: NURSE ANESTHETIST, CERTIFIED REGISTERED

## 2017-08-31 PROCEDURE — 25010000002 FENTANYL CITRATE (PF) 100 MCG/2ML SOLUTION: Performed by: ANESTHESIOLOGY

## 2017-08-31 PROCEDURE — 73502 X-RAY EXAM HIP UNI 2-3 VIEWS: CPT

## 2017-08-31 PROCEDURE — 25010000002 PROPOFOL 10 MG/ML EMULSION: Performed by: NURSE ANESTHETIST, CERTIFIED REGISTERED

## 2017-08-31 PROCEDURE — 0QH734Z INSERTION OF INTERNAL FIXATION DEVICE INTO LEFT UPPER FEMUR, PERCUTANEOUS APPROACH: ICD-10-PCS | Performed by: ORTHOPAEDIC SURGERY

## 2017-08-31 PROCEDURE — 87086 URINE CULTURE/COLONY COUNT: CPT | Performed by: HOSPITALIST

## 2017-08-31 PROCEDURE — 76000 FLUOROSCOPY <1 HR PHYS/QHP: CPT

## 2017-08-31 PROCEDURE — 25810000003 DEXTROSE-NACL PER 500 ML: Performed by: ORTHOPAEDIC SURGERY

## 2017-08-31 PROCEDURE — 25010000002 MIDAZOLAM PER 1 MG: Performed by: ANESTHESIOLOGY

## 2017-08-31 PROCEDURE — 25010000002 CEFTRIAXONE PER 250 MG: Performed by: HOSPITALIST

## 2017-08-31 DEVICE — SCRW CANN SD/ST THRD16MM 6.5X85MM: Type: IMPLANTABLE DEVICE | Status: FUNCTIONAL

## 2017-08-31 DEVICE — IMPLANTABLE DEVICE: Type: IMPLANTABLE DEVICE | Status: FUNCTIONAL

## 2017-08-31 RX ORDER — MIDAZOLAM HYDROCHLORIDE 1 MG/ML
2 INJECTION INTRAMUSCULAR; INTRAVENOUS
Status: DISCONTINUED | OUTPATIENT
Start: 2017-08-31 | End: 2017-08-31 | Stop reason: HOSPADM

## 2017-08-31 RX ORDER — DULOXETIN HYDROCHLORIDE 30 MG/1
30 CAPSULE, DELAYED RELEASE ORAL DAILY
Status: DISCONTINUED | OUTPATIENT
Start: 2017-08-31 | End: 2017-09-02 | Stop reason: HOSPADM

## 2017-08-31 RX ORDER — LABETALOL HYDROCHLORIDE 5 MG/ML
5 INJECTION, SOLUTION INTRAVENOUS
Status: DISCONTINUED | OUTPATIENT
Start: 2017-08-31 | End: 2017-08-31 | Stop reason: HOSPADM

## 2017-08-31 RX ORDER — DEXTROSE, SODIUM CHLORIDE, AND POTASSIUM CHLORIDE 5; .45; .15 G/100ML; G/100ML; G/100ML
INJECTION INTRAVENOUS
Status: COMPLETED
Start: 2017-08-31 | End: 2017-08-31

## 2017-08-31 RX ORDER — ATROPINE SULFATE 0.4 MG/ML
AMPUL (ML) INJECTION AS NEEDED
Status: DISCONTINUED | OUTPATIENT
Start: 2017-08-31 | End: 2017-08-31 | Stop reason: SURG

## 2017-08-31 RX ORDER — MAGNESIUM HYDROXIDE 1200 MG/15ML
LIQUID ORAL AS NEEDED
Status: DISCONTINUED | OUTPATIENT
Start: 2017-08-31 | End: 2017-08-31 | Stop reason: HOSPADM

## 2017-08-31 RX ORDER — PROMETHAZINE HYDROCHLORIDE 25 MG/1
25 SUPPOSITORY RECTAL ONCE AS NEEDED
Status: DISCONTINUED | OUTPATIENT
Start: 2017-08-31 | End: 2017-08-31 | Stop reason: HOSPADM

## 2017-08-31 RX ORDER — OXYBUTYNIN CHLORIDE 5 MG/1
5 TABLET, EXTENDED RELEASE ORAL DAILY
Status: DISCONTINUED | OUTPATIENT
Start: 2017-08-31 | End: 2017-09-02 | Stop reason: HOSPADM

## 2017-08-31 RX ORDER — FENTANYL CITRATE 50 UG/ML
50 INJECTION, SOLUTION INTRAMUSCULAR; INTRAVENOUS
Status: DISCONTINUED | OUTPATIENT
Start: 2017-08-31 | End: 2017-08-31 | Stop reason: HOSPADM

## 2017-08-31 RX ORDER — PROMETHAZINE HYDROCHLORIDE 25 MG/ML
6.25 INJECTION, SOLUTION INTRAMUSCULAR; INTRAVENOUS ONCE AS NEEDED
Status: DISCONTINUED | OUTPATIENT
Start: 2017-08-31 | End: 2017-08-31 | Stop reason: HOSPADM

## 2017-08-31 RX ORDER — ROCURONIUM BROMIDE 10 MG/ML
INJECTION, SOLUTION INTRAVENOUS AS NEEDED
Status: DISCONTINUED | OUTPATIENT
Start: 2017-08-31 | End: 2017-08-31 | Stop reason: SURG

## 2017-08-31 RX ORDER — PROPOFOL 10 MG/ML
VIAL (ML) INTRAVENOUS AS NEEDED
Status: DISCONTINUED | OUTPATIENT
Start: 2017-08-31 | End: 2017-08-31 | Stop reason: SURG

## 2017-08-31 RX ORDER — DEXTROSE AND SODIUM CHLORIDE 5; .9 G/100ML; G/100ML
75 INJECTION, SOLUTION INTRAVENOUS CONTINUOUS
Status: DISCONTINUED | OUTPATIENT
Start: 2017-08-31 | End: 2017-09-01

## 2017-08-31 RX ORDER — PROMETHAZINE HYDROCHLORIDE 25 MG/ML
12.5 INJECTION, SOLUTION INTRAMUSCULAR; INTRAVENOUS ONCE AS NEEDED
Status: DISCONTINUED | OUTPATIENT
Start: 2017-08-31 | End: 2017-08-31 | Stop reason: HOSPADM

## 2017-08-31 RX ORDER — NALOXONE HCL 0.4 MG/ML
0.2 VIAL (ML) INJECTION AS NEEDED
Status: DISCONTINUED | OUTPATIENT
Start: 2017-08-31 | End: 2017-08-31 | Stop reason: HOSPADM

## 2017-08-31 RX ORDER — MIDAZOLAM HYDROCHLORIDE 1 MG/ML
1 INJECTION INTRAMUSCULAR; INTRAVENOUS
Status: DISCONTINUED | OUTPATIENT
Start: 2017-08-31 | End: 2017-08-31 | Stop reason: HOSPADM

## 2017-08-31 RX ORDER — VANCOMYCIN HYDROCHLORIDE 1 G/200ML
15 INJECTION, SOLUTION INTRAVENOUS EVERY 12 HOURS
Status: DISCONTINUED | OUTPATIENT
Start: 2017-08-31 | End: 2017-08-31 | Stop reason: HOSPADM

## 2017-08-31 RX ORDER — FAMOTIDINE 10 MG/ML
20 INJECTION, SOLUTION INTRAVENOUS ONCE
Status: COMPLETED | OUTPATIENT
Start: 2017-08-31 | End: 2017-08-31

## 2017-08-31 RX ORDER — DEXTROSE, SODIUM CHLORIDE, AND POTASSIUM CHLORIDE 5; .45; .15 G/100ML; G/100ML; G/100ML
75 INJECTION INTRAVENOUS CONTINUOUS
Status: DISPENSED | OUTPATIENT
Start: 2017-08-31 | End: 2017-09-01

## 2017-08-31 RX ORDER — DIPHENHYDRAMINE HYDROCHLORIDE 50 MG/ML
12.5 INJECTION INTRAMUSCULAR; INTRAVENOUS
Status: DISCONTINUED | OUTPATIENT
Start: 2017-08-31 | End: 2017-08-31 | Stop reason: HOSPADM

## 2017-08-31 RX ORDER — PROMETHAZINE HYDROCHLORIDE 25 MG/1
12.5 TABLET ORAL ONCE AS NEEDED
Status: DISCONTINUED | OUTPATIENT
Start: 2017-08-31 | End: 2017-08-31 | Stop reason: HOSPADM

## 2017-08-31 RX ORDER — ONDANSETRON 2 MG/ML
4 INJECTION INTRAMUSCULAR; INTRAVENOUS ONCE AS NEEDED
Status: DISCONTINUED | OUTPATIENT
Start: 2017-08-31 | End: 2017-08-31 | Stop reason: HOSPADM

## 2017-08-31 RX ORDER — SODIUM CHLORIDE, SODIUM LACTATE, POTASSIUM CHLORIDE, CALCIUM CHLORIDE 600; 310; 30; 20 MG/100ML; MG/100ML; MG/100ML; MG/100ML
9 INJECTION, SOLUTION INTRAVENOUS CONTINUOUS
Status: DISCONTINUED | OUTPATIENT
Start: 2017-08-31 | End: 2017-09-01

## 2017-08-31 RX ORDER — EPHEDRINE SULFATE 50 MG/ML
INJECTION, SOLUTION INTRAVENOUS AS NEEDED
Status: DISCONTINUED | OUTPATIENT
Start: 2017-08-31 | End: 2017-08-31 | Stop reason: SURG

## 2017-08-31 RX ORDER — HYDROMORPHONE HYDROCHLORIDE 1 MG/ML
0.25 INJECTION, SOLUTION INTRAMUSCULAR; INTRAVENOUS; SUBCUTANEOUS
Status: DISCONTINUED | OUTPATIENT
Start: 2017-08-31 | End: 2017-08-31 | Stop reason: HOSPADM

## 2017-08-31 RX ORDER — SODIUM CHLORIDE 0.9 % (FLUSH) 0.9 %
1-10 SYRINGE (ML) INJECTION AS NEEDED
Status: DISCONTINUED | OUTPATIENT
Start: 2017-08-31 | End: 2017-08-31 | Stop reason: HOSPADM

## 2017-08-31 RX ORDER — HYDRALAZINE HYDROCHLORIDE 20 MG/ML
5 INJECTION INTRAMUSCULAR; INTRAVENOUS
Status: DISCONTINUED | OUTPATIENT
Start: 2017-08-31 | End: 2017-08-31 | Stop reason: HOSPADM

## 2017-08-31 RX ORDER — EPHEDRINE SULFATE 50 MG/ML
5 INJECTION, SOLUTION INTRAVENOUS ONCE AS NEEDED
Status: DISCONTINUED | OUTPATIENT
Start: 2017-08-31 | End: 2017-08-31 | Stop reason: HOSPADM

## 2017-08-31 RX ORDER — FLUMAZENIL 0.1 MG/ML
0.2 INJECTION INTRAVENOUS AS NEEDED
Status: DISCONTINUED | OUTPATIENT
Start: 2017-08-31 | End: 2017-08-31 | Stop reason: HOSPADM

## 2017-08-31 RX ORDER — PROMETHAZINE HYDROCHLORIDE 25 MG/1
25 TABLET ORAL ONCE AS NEEDED
Status: DISCONTINUED | OUTPATIENT
Start: 2017-08-31 | End: 2017-08-31 | Stop reason: HOSPADM

## 2017-08-31 RX ADMIN — FAMOTIDINE 20 MG: 10 INJECTION, SOLUTION INTRAVENOUS at 16:59

## 2017-08-31 RX ADMIN — APIXABAN 2.5 MG: 2.5 TABLET, FILM COATED ORAL at 22:44

## 2017-08-31 RX ADMIN — DEXTROSE, SODIUM CHLORIDE, AND POTASSIUM CHLORIDE 75 ML/HR: 5; .45; .15 INJECTION INTRAVENOUS at 19:27

## 2017-08-31 RX ADMIN — MIDAZOLAM 1 MG: 1 INJECTION INTRAMUSCULAR; INTRAVENOUS at 16:59

## 2017-08-31 RX ADMIN — FENTANYL CITRATE 50 MCG: 50 INJECTION INTRAMUSCULAR; INTRAVENOUS at 18:45

## 2017-08-31 RX ADMIN — METOPROLOL SUCCINATE 50 MG: 50 TABLET, FILM COATED, EXTENDED RELEASE ORAL at 09:02

## 2017-08-31 RX ADMIN — HYDROMORPHONE HYDROCHLORIDE 0.25 MG: 1 INJECTION, SOLUTION INTRAMUSCULAR; INTRAVENOUS; SUBCUTANEOUS at 19:00

## 2017-08-31 RX ADMIN — EPHEDRINE SULFATE 20 MG: 50 INJECTION INTRAMUSCULAR; INTRAVENOUS; SUBCUTANEOUS at 17:37

## 2017-08-31 RX ADMIN — FENTANYL CITRATE 25 MCG: 50 INJECTION INTRAMUSCULAR; INTRAVENOUS at 16:59

## 2017-08-31 RX ADMIN — PROPOFOL 140 MG: 10 INJECTION, EMULSION INTRAVENOUS at 17:30

## 2017-08-31 RX ADMIN — ACETAMINOPHEN 500 MG: 500 TABLET ORAL at 10:55

## 2017-08-31 RX ADMIN — SODIUM CHLORIDE, POTASSIUM CHLORIDE, SODIUM LACTATE AND CALCIUM CHLORIDE 9 ML/HR: 600; 310; 30; 20 INJECTION, SOLUTION INTRAVENOUS at 16:58

## 2017-08-31 RX ADMIN — HYDROMORPHONE HYDROCHLORIDE 0.25 MG: 1 INJECTION, SOLUTION INTRAMUSCULAR; INTRAVENOUS; SUBCUTANEOUS at 19:05

## 2017-08-31 RX ADMIN — LEVOTHYROXINE SODIUM 50 MCG: 50 TABLET ORAL at 07:06

## 2017-08-31 RX ADMIN — POTASSIUM CHLORIDE, DEXTROSE MONOHYDRATE AND SODIUM CHLORIDE 75 ML/HR: 150; 5; 450 INJECTION, SOLUTION INTRAVENOUS at 19:27

## 2017-08-31 RX ADMIN — ATROPINE SULFATE 0.2 MG: 0.4 INJECTION, SOLUTION INTRAMUSCULAR; INTRAVENOUS; SUBCUTANEOUS at 17:39

## 2017-08-31 RX ADMIN — FENTANYL CITRATE 50 MCG: 50 INJECTION INTRAMUSCULAR; INTRAVENOUS at 18:52

## 2017-08-31 RX ADMIN — PANTOPRAZOLE SODIUM 20 MG: 20 TABLET, DELAYED RELEASE ORAL at 07:06

## 2017-08-31 RX ADMIN — AMLODIPINE BESYLATE 2.5 MG: 2.5 TABLET ORAL at 21:20

## 2017-08-31 RX ADMIN — CEFTRIAXONE SODIUM 1 G: 1 INJECTION, SOLUTION INTRAVENOUS at 21:22

## 2017-08-31 RX ADMIN — HYDROMORPHONE HYDROCHLORIDE 2 MG: 2 TABLET ORAL at 22:44

## 2017-08-31 RX ADMIN — COLCHICINE 0.6 MG: 0.6 TABLET, FILM COATED ORAL at 21:21

## 2017-08-31 RX ADMIN — ACETAMINOPHEN 500 MG: 500 TABLET ORAL at 21:21

## 2017-08-31 RX ADMIN — DEXTROSE AND SODIUM CHLORIDE 75 ML/HR: 5; 900 INJECTION, SOLUTION INTRAVENOUS at 11:52

## 2017-08-31 RX ADMIN — FENTANYL CITRATE 100 MCG: 50 INJECTION INTRAMUSCULAR; INTRAVENOUS at 17:28

## 2017-08-31 RX ADMIN — ROCURONIUM BROMIDE 40 MG: 10 INJECTION INTRAVENOUS at 17:30

## 2017-08-31 RX ADMIN — SUGAMMADEX 150 MG: 100 INJECTION, SOLUTION INTRAVENOUS at 18:22

## 2017-09-01 LAB
ANION GAP SERPL CALCULATED.3IONS-SCNC: 13.5 MMOL/L
BUN BLD-MCNC: 16 MG/DL (ref 8–23)
BUN/CREAT SERPL: 16 (ref 7–25)
CALCIUM SPEC-SCNC: 8.1 MG/DL (ref 8.6–10.5)
CHLORIDE SERPL-SCNC: 102 MMOL/L (ref 98–107)
CO2 SERPL-SCNC: 22.5 MMOL/L (ref 22–29)
CREAT BLD-MCNC: 1 MG/DL (ref 0.57–1)
DEPRECATED RDW RBC AUTO: 46.8 FL (ref 37–54)
ERYTHROCYTE [DISTWIDTH] IN BLOOD BY AUTOMATED COUNT: 13.7 % (ref 11.7–13)
GFR SERPL CREATININE-BSD FRML MDRD: 53 ML/MIN/1.73
GLUCOSE BLD-MCNC: 139 MG/DL (ref 65–99)
HCT VFR BLD AUTO: 34.2 % (ref 35.6–45.5)
HGB BLD-MCNC: 10.9 G/DL (ref 11.9–15.5)
MCH RBC QN AUTO: 29.9 PG (ref 26.9–32)
MCHC RBC AUTO-ENTMCNC: 31.9 G/DL (ref 32.4–36.3)
MCV RBC AUTO: 93.7 FL (ref 80.5–98.2)
PLATELET # BLD AUTO: 155 10*3/MM3 (ref 140–500)
PMV BLD AUTO: 10.4 FL (ref 6–12)
POTASSIUM BLD-SCNC: 4.8 MMOL/L (ref 3.5–5.2)
POTASSIUM BLD-SCNC: 5.3 MMOL/L (ref 3.5–5.2)
RBC # BLD AUTO: 3.65 10*6/MM3 (ref 3.9–5.2)
SODIUM BLD-SCNC: 138 MMOL/L (ref 136–145)
WBC NRBC COR # BLD: 6.82 10*3/MM3 (ref 4.5–10.7)

## 2017-09-01 PROCEDURE — 97110 THERAPEUTIC EXERCISES: CPT

## 2017-09-01 PROCEDURE — 97535 SELF CARE MNGMENT TRAINING: CPT

## 2017-09-01 PROCEDURE — 97165 OT EVAL LOW COMPLEX 30 MIN: CPT

## 2017-09-01 PROCEDURE — 97164 PT RE-EVAL EST PLAN CARE: CPT

## 2017-09-01 PROCEDURE — 85027 COMPLETE CBC AUTOMATED: CPT | Performed by: INTERNAL MEDICINE

## 2017-09-01 PROCEDURE — 80048 BASIC METABOLIC PNL TOTAL CA: CPT | Performed by: INTERNAL MEDICINE

## 2017-09-01 PROCEDURE — 84132 ASSAY OF SERUM POTASSIUM: CPT | Performed by: INTERNAL MEDICINE

## 2017-09-01 PROCEDURE — 25010000002 ONDANSETRON PER 1 MG: Performed by: ORTHOPAEDIC SURGERY

## 2017-09-01 PROCEDURE — 25010000002 CEFTRIAXONE PER 250 MG: Performed by: HOSPITALIST

## 2017-09-01 RX ORDER — HYDROMORPHONE HYDROCHLORIDE 2 MG/1
2 TABLET ORAL EVERY 4 HOURS PRN
Qty: 56 TABLET | Refills: 0 | Status: SHIPPED | OUTPATIENT
Start: 2017-09-01 | End: 2017-09-07

## 2017-09-01 RX ADMIN — CEFTRIAXONE SODIUM 1 G: 1 INJECTION, SOLUTION INTRAVENOUS at 23:38

## 2017-09-01 RX ADMIN — LEVOTHYROXINE SODIUM 50 MCG: 50 TABLET ORAL at 06:36

## 2017-09-01 RX ADMIN — OXYBUTYNIN CHLORIDE 5 MG: 5 TABLET, EXTENDED RELEASE ORAL at 17:55

## 2017-09-01 RX ADMIN — ATORVASTATIN CALCIUM 20 MG: 20 TABLET, FILM COATED ORAL at 20:31

## 2017-09-01 RX ADMIN — APIXABAN 2.5 MG: 2.5 TABLET, FILM COATED ORAL at 20:31

## 2017-09-01 RX ADMIN — COLCHICINE 0.6 MG: 0.6 TABLET, FILM COATED ORAL at 17:55

## 2017-09-01 RX ADMIN — ONDANSETRON 4 MG: 4 TABLET, FILM COATED ORAL at 11:52

## 2017-09-01 RX ADMIN — DULOXETINE HYDROCHLORIDE 30 MG: 30 CAPSULE, DELAYED RELEASE ORAL at 09:32

## 2017-09-01 RX ADMIN — PANTOPRAZOLE SODIUM 20 MG: 20 TABLET, DELAYED RELEASE ORAL at 09:31

## 2017-09-01 RX ADMIN — COLCHICINE 0.6 MG: 0.6 TABLET, FILM COATED ORAL at 20:31

## 2017-09-01 RX ADMIN — ACETAMINOPHEN 500 MG: 500 TABLET ORAL at 12:55

## 2017-09-01 RX ADMIN — ACETAMINOPHEN 500 MG: 500 TABLET ORAL at 19:07

## 2017-09-01 RX ADMIN — ONDANSETRON 4 MG: 4 TABLET, FILM COATED ORAL at 17:56

## 2017-09-01 RX ADMIN — ACETAMINOPHEN 500 MG: 500 TABLET ORAL at 06:36

## 2017-09-01 RX ADMIN — ONDANSETRON 4 MG: 2 INJECTION INTRAMUSCULAR; INTRAVENOUS at 05:23

## 2017-09-01 RX ADMIN — METOPROLOL SUCCINATE 50 MG: 50 TABLET, FILM COATED, EXTENDED RELEASE ORAL at 09:32

## 2017-09-01 RX ADMIN — APIXABAN 2.5 MG: 2.5 TABLET, FILM COATED ORAL at 09:31

## 2017-09-01 NOTE — PROGRESS NOTES
Procedure(s):  LT HIP PERCUTANEOUS PINNING     LOS: 2 days     Subjective :   Complains of being tired.  Pain some better in left hip.      Objective :    Vital signs in last 24 hours:  Vitals:    08/31/17 2120 08/31/17 2319 09/01/17 0358 09/01/17 0700   BP:  140/73 131/73 121/64   BP Location:  Right arm Right arm Left arm   Patient Position:  Lying Lying Lying   Pulse: 65 56 65 64   Resp:  18 18 18   Temp:  97.3 °F (36.3 °C) 96.8 °F (36 °C) 98.4 °F (36.9 °C)   TempSrc:  Oral Oral Oral   SpO2:  94% 98% 100%   Weight:       Height:           PHYSICAL EXAM:  Patient is calm, in no acute distress, awake and oriented x 3.  Dressing is clean, dry and intact.  No signs of infection.  Swelling is appropriate in amount.  Ecchymosis is appropriate in amount.  Homans test is negative.  Patient is neurovascularly intact distally.    LABS:    Results from last 7 days  Lab Units 09/01/17  0401   WBC 10*3/mm3 6.82   HEMOGLOBIN g/dL 10.9*   HEMATOCRIT % 34.2*   PLATELETS 10*3/mm3 155       Results from last 7 days  Lab Units 09/01/17  0401   SODIUM mmol/L 138   POTASSIUM mmol/L 5.3*   CHLORIDE mmol/L 102   CO2 mmol/L 22.5   BUN mg/dL 16   CREATININE mg/dL 1.00   GLUCOSE mg/dL 139*   CALCIUM mg/dL 8.1*           ASSESSMENT:  Status post Procedure(s):  LT HIP PERCUTANEOUS PINNING      Plan:  Continue Physical Therapy, increase mobility and range of motion as tolerated.  Continue SCDs, Continue Lovenox for DVT prophylaxis while inpatient.  Dispo planning for home tomorrow.    Esteban Moe MD    Date: 9/1/2017  Time: 11:32 AM

## 2017-09-01 NOTE — PLAN OF CARE
Problem: Patient Care Overview (Adult)  Goal: Plan of Care Review  Outcome: Ongoing (interventions implemented as appropriate)    09/01/17 1014   Coping/Psychosocial Response Interventions   Plan Of Care Reviewed With patient   Outcome Evaluation   Outcome Summary/Follow up Plan pt is now s/p L hip pinning and presents with impaired functional mobility in gait and transfers. She had difficulty standing due to R hip and R knee past injuries/surgeries and now new L hip injury. She was able to maintain TTWB status once standing . Due to the difficulty with transferring from sit to stand, she may need short term rehab or 24/7 assist         Problem: Inpatient Physical Therapy  Goal: Bed Mobility Goal LTG- PT  Outcome: Ongoing (interventions implemented as appropriate)    09/01/17 1014   Bed Mobility PT LTG   Bed Mobility PT LTG, Date Established 09/01/17   Bed Mobility PT LTG, Time to Achieve 1 wk   Bed Mobility PT LTG, Activity Type supine to sit/sit to supine   Bed Mobility PT LTG, [place] Level contact guard assist       Goal: Transfer Training Goal 1 LTG- PT  Outcome: Ongoing (interventions implemented as appropriate)    09/01/17 1014   Transfer Training PT LTG   Transfer Training PT LTG, Date Established 09/01/17   Transfer Training PT LTG, Time to Achieve 1 wk   Transfer Training PT LTG, Activity Type sit to stand/stand to sit   Transfer Training PT LTG, [place] Level minimum assist (75% patient effort)       Goal: Gait Training Goal LTG- PT  Outcome: Ongoing (interventions implemented as appropriate)    09/01/17 1014   Gait Training PT LTG   Gait Training Goal PT LTG, Date Established 09/01/17   Gait Training Goal PT LTG, Time to Achieve 1 wk   Gait Training Goal PT LTG, [place] Level minimum assist (75% patient effort)   Gait Training Goal PT LTG, Assist Device walker, rolling   Gait Training Goal PT LTG, Distance to Achieve 50 ft       Goal: Stair Training Goal LTG- PT  Outcome: Revised     09/01/17 1014   Stair Training PT LTG   Stair Training Goal PT LTG, Date Established 09/01/17   Stair Training Goal PT LTG, Time to Achieve 1 wk   Stair Training Goal PT LTG, Number of Steps 2   Stair Training Goal PT LTG, [place] Level moderate assist (50% patient effort)   Stair Training Goal PT LTG, Assist Device 2 handrails   Stair Training Goal PT LTG, Outcome goal revised

## 2017-09-01 NOTE — PROGRESS NOTES
"     LOS: 2 days   Primary Care Physician: Ken Andujar MD     Subjective   I saw the patient this morning.  She was feeling okay.  Pain has been controlled.  No chest pain.  No shortness of breath.  Last bowel movement was 8/29/17    Vital Signs  Body mass index is 27.1 kg/(m^2).  Temp:  [96.8 °F (36 °C)-98.6 °F (37 °C)] 98.3 °F (36.8 °C)  Heart Rate:  [53-81] 64  Resp:  [12-18] 18  BP: (117-192)/(61-99) 117/64      Objective:  General Appearance:  In no acute distress.    Vital signs: (most recent): Blood pressure 117/64, pulse 64, temperature 98.3 °F (36.8 °C), temperature source Oral, resp. rate 18, height 63\" (160 cm), weight 153 lb (69.4 kg), SpO2 95 %.    Lungs:  There are decreased breath sounds.  No wheezes, rales or rhonchi.    Heart: Normal rate.  Regular rhythm.  No murmur.   Abdomen: Abdomen is soft and non-distended.  Bowel sounds are normal.   There is no abdominal tenderness.   There is no splenomegaly. There is no hepatomegaly.   Extremities: There is dependent edema.  (Trace)  Neurological: Patient is alert.          Results Review:    I reviewed the patient's new clinical results.      Results from last 7 days  Lab Units 09/01/17  0401 08/29/17  1937   WBC 10*3/mm3 6.82 7.21   HEMOGLOBIN g/dL 10.9* 11.0*   PLATELETS 10*3/mm3 155 190       Results from last 7 days  Lab Units 09/01/17  1406 09/01/17  0401 08/29/17  1937   SODIUM mmol/L  --  138 141   POTASSIUM mmol/L 4.8 5.3* 4.1   CHLORIDE mmol/L  --  102 103   CO2 mmol/L  --  22.5 23.0   BUN mg/dL  --  16 26*   CREATININE mg/dL  --  1.00 1.10*   CALCIUM mg/dL  --  8.1* 9.7   GLUCOSE mg/dL  --  139* 81         Hemoglobin A1C:No results found for: HGBA1C    Glucose Range:No results found for: POCGLU    Lab Results   Component Value Date    [id number] 614 04/10/2017       Lab Results   Component Value Date    TSH 0.154 (L) 08/07/2017       Assessment & Plan      Medication Review: Yes    Active Hospital Problems (** Indicates Principal Problem)    " Diagnosis Date Noted   • **Closed displaced fracture of left femoral neck [S72.002A] 08/30/2017   • Bacteriuria [R82.71] 08/30/2017   • Chronic coronary artery disease [I25.10] 01/25/2016   • Atrial fibrillation [I48.91]    • Essential hypertension [I10]    • Chronic kidney disease (CKD), stage III (moderate) [N18.3]    • GERD (gastroesophageal reflux disease) [K21.9]    • Arthritis involving multiple sites [M12.9]       Resolved Hospital Problems    Diagnosis Date Noted Date Resolved   No resolved problems to display.       Assessment/Plan  1.  Urinary tract infection.  Continue Rocephin.  Cultures still in progress.  2.  Chronic kidney disease stage III, creatinine stable. Mild hyperkalemia noted.  Recheck was improved.  Low potassium diet. I dc'd celebrex secondary to CKD  3.  PAF.  Eliquis restarted last night  4.  Hypothyroidism, on replacement.  TSH low one month ago.  Not sure if her current dosage was changed at that time.  Will check with patient  5.  Left femur fracture, postop day 1 percutaneous pinning    Sandra Young MD  09/01/17  3:59 PM

## 2017-09-01 NOTE — PLAN OF CARE
Problem: Patient Care Overview (Adult)  Goal: Plan of Care Review  Outcome: Outcome(s) achieved Date Met:  09/01/17 09/01/17 0905   Coping/Psychosocial Response Interventions   Plan Of Care Reviewed With patient   Outcome Evaluation   Outcome Summary/Follow up Plan Pt familiar with and can maintain TTWB during mobility. Pt owns and familiar with AE for LBD from past hip surgeries. Pt has nausea and vomitting during OT (RN aware) and assisted back to bed after using BSC. Ed on pt on hand placement and positinoing to increase safety. Pt wants to return home. Son is available and has assisted pt in the past. Pt aware will need some assist with mobility at this time due to pain. No further skilled acute care OT.          Problem: Inpatient Occupational Therapy  Goal: Patient Education Goal LTG- OT  Outcome: Ongoing (interventions implemented as appropriate)    09/01/17 0905   Patient Education OT LTG   Patient Education OT LTG, Date Established 09/01/17   Patient Education OT LTG, Time to Achieve 1 wk   Patient Education OT LTG, Education Type positioning;adaptive equipment mgmt;precautions per surgeon;posture/body mechanics;home safety   Patient Education OT LTG, Education Understanding verbalizes understanding;demonstrates adequately

## 2017-09-01 NOTE — PLAN OF CARE
Problem: Inpatient Occupational Therapy  Goal: Patient Education Goal LTG- OT    09/01/17 0909   Patient Education OT LTG   Patient Education OT LTG Outcome goal met

## 2017-09-01 NOTE — THERAPY DISCHARGE NOTE
Acute Care - Occupational Therapy Initial Eval/Discharge  [facility]     Patient Name: Marleni Hummel  : 1935  MRN: [id number]  Today's Date: 2017  Onset of Illness/Injury or Date of Surgery Date: 17  Date of Referral to OT: 17  Referring Physician: Dr Canales      Admit Date: 2017       ICD-10-CM ICD-9-CM   1. Impaired functional mobility, balance, gait, and endurance Z74.09 V49.89     Patient Active Problem List   Diagnosis   • Arthritis involving multiple sites   • Essential hypertension   • Atrial fibrillation   • Bell's palsy   • Chronic kidney disease (CKD), stage III (moderate)   • Fatigue   • GERD (gastroesophageal reflux disease)   • Hypercholesterolemia   • Insomnia   • Osteoporosis   • Panic disorder   • Allergic rhinitis   • Sleep apnea   • History of MI (myocardial infarction)   • History of right hip replacement   • Closed fracture of neck of right femur with routine healing   • History of right knee joint replacement   • History of left knee replacement   • Chronic coronary artery disease   • Acquired hypothyroidism   • Anemia of chronic disease   • Hematuria   • Weakness of right leg   • Cardiac murmur   • Senile osteoporosis   • Closed displaced fracture of left femoral neck   • Bacteriuria     Past Medical History:   Diagnosis Date   • Allergic    • Allergic rhinitis    • Arthropathy of knee     right   • Atrial fibrillation    • Back pain    • Bell's palsy    • Chronic kidney disease (CKD), stage III (moderate)    • Cough    • Edema    • Encounter for eye exam 2015   • Essential hypertension    • Fatigue    • GERD (gastroesophageal reflux disease)    • H/O Heart murmur    • Heart attack    • Herpes zoster without complication    • Hip arthritis     left   • History of bone density study 2008   • History of pneumonia    • Hypercholesterolemia    • IFG (impaired fasting glucose)    • Insomnia    • Nephropathy    • Osteoarthritis     Right hip   • Osteopenia     • Panic disorder    • Rectal bleeding    • Sleep apnea    • Stress    • Urinary incontinence    • Vitamin D deficiency      Past Surgical History:   Procedure Laterality Date   • CATARACT EXTRACTION Left 04/01/2013    Dr. Clarke   • CATARACT EXTRACTION Right 04/16/2013    Dr. Clarke   • COLONOSCOPY  04/18/2014    Dr. Elizabeth; no polyps   • JOINT REPLACEMENT Left 2004    knee Rubiack   • JOINT REPLACEMENT Right 2004    knee Popham   • MAMMO BILATERAL  11/2013   • PAP SMEAR  02/2011   • TOTAL HIP ARTHROPLASTY Right 08/2015          OT ASSESSMENT FLOWSHEET (last 72 hours)      OT Evaluation       09/01/17 0909 09/01/17 0854 08/30/17 1240 08/30/17 0327 08/29/17 1742    Rehab Evaluation    Document Type  evaluation;therapy note (daily note);discharge summary;discharge evaluation/summary  -LE evaluation  -MA      Subjective Information  agree to therapy  -LE agree to therapy;complains of;pain  -MA      Patient Effort, Rehab Treatment  good  -LE good  -MA      Symptoms Noted During/After Treatment  --   pt vomits, feels better after, RN called to room.  -LE fatigue;increased pain  -MA      General Information    Patient Profile Review  yes  -LE yes  -MA      Onset of Illness/Injury or Date of Surgery Date  08/29/17  -LE 08/30/17  -MA      Referring Physician  Dr Canales  -SANDRA Moe  -MA      General Observations  sitting on BSC, aid present, pt c/o nausea and feeling hot  -LE patient supine in bed with HOB elevated, spouse at bedside  -MA      Pertinent History Of Current Problem  s/p L hip pinning (pain from hip fracture).     h/o 5 surgeries R hip.  has been NWB R hip in past  -LE       Precautions/Limitations  fall precautions   TTWB L LE  -LE no known precautions/limitations  -MA      Prior Level of Function  independent:  -LE independent:;all household mobility;community mobility  -MA      Equipment Currently Used at Home  cane, straight;commode;dressing device;grab bar;shower chair;walker, rolling   bed rail,  plans to get w/c  -LE cane, straight  -MA cane, straight  -NL     Plans/Goals Discussed With   patient  -MA      Barriers to Rehab   none identified  -MA      Living Environment    Lives With  spouse   adult son is available 24/7 for pt/ assist  -LE   spouse  -AW    Living Arrangements     house;condominium  -AW    Home Accessibility     no concerns  -AW    Stair Railings at Home     none  -AW    Type of Financial/Environmental Concern     none  -AW    Transportation Available    car;family or friend will provide  -NL none  -AW    Living Environment Comment   with dementia but does not require physical assist.  -LE       Clinical Impression    Date of Referral to OT  08/31/17  -LE       OT Diagnosis  need for assist with personal care  -LE       Therapy Frequency  evaluation only  -LE       Anticipated Discharge Disposition home with assist  -LE home with assist   pt has been to [facility] in past, wants to return home, son can A  -LE       Vital Signs    Activity Duration  --   pt c/o no sleep last night, nausea, feels hot.  lays down  -LE       Pain Assessment    Pain Assessment  Aguero-Baker FACES  -LE 0-10  -MA      Aguero-Bundy FACES Pain Rating  6  -LE       Pain Score   3  -MA      Pain Type  Acute pain;Surgical pain  -LE Acute pain  -MA      Pain Location  Hip  -LE Hip  -MA      Pain Orientation  Left  -LE Left  -MA      Pain Intervention(s)  Repositioned   ed on positioning to reduce pain with xfers  -LE Repositioned;Ambulation/increased activity;Rest  -MA      Response to Interventions   tolerated  -MA      Vision Assessment/Intervention    Visual Impairment   WFL  -MA      Cognitive Assessment/Intervention    Current Cognitive/Communication Assessment  functional  -LE functional  -MA      Orientation Status   oriented x 4  -MA      Follows Commands/Answers Questions   100% of the time;able to follow single-step instructions;needs cueing;needs increased time;needs repetition  -MA      Personal Safety    WNL/WFL  -MA      Personal Safety Interventions   fall prevention program maintained;gait belt;nonskid shoes/slippers when out of bed  -MA      ROM (Range of Motion)    General ROM  no range of motion deficits identified   B UE  -LE       General ROM Detail   B LE ROM WFL  -MA      MMT (Manual Muscle Testing)    General MMT Assessment Detail   B LE MMT grossly 4/5  -MA      Mobility Assessment/Training    Extremity Weight-Bearing Status   left lower extremity;other (see comments)  -MA      Left Lower Extremity Weight-Bearing   other (see comments)   not specified in PT orders; patient performed TTWB this date  -MA      Bed Mobility, Assessment/Treatment    Bed Mobility, Assistive Device  --   flat bed, no rail  -LE head of bed elevated  -MA      Bed Mobility, Scoot/Bridge, Sawyer  supervision required  -LE contact guard assist;verbal cues required  -MA      Bed Mob, Supine to Sit, Sawyer   contact guard assist;verbal cues required  -MA      Bed Mob, Sit to Supine, Sawyer  minimum assist (75% patient effort)   assist with legs  -LE       Bed Mobility, Impairments  pain  -LE pain  -MA      Transfer Assessment/Treatment    Transfers, Sit-Stand Sawyer  contact guard assist  -LE contact guard assist;verbal cues required  -MA      Transfers, Stand-Sit Sawyer  contact guard assist  -LE contact guard assist;verbal cues required  -MA      Transfers, Sit-Stand-Sit, Assist Device  rolling walker  -LE rolling walker  -MA      Toilet Transfer, Sawyer  contact guard assist  -LE       Toilet Transfer, Assistive Device  rolling walker  -LE       Walk-In Shower Transfer, Sawyer  --   discuss limits of TTWB on stepping into shower.  -LE       Walk-In Shower Transfer, Assist Device  --   plan sponge bath  -LE       Transfer, Safety Issues  --   pt is able to maintain TTWB (at times does NWB) at walker  -LE step length decreased  -MA      Transfer, Impairments  pain  -LE pain  -MA       Transfer, Comment  cues for hand placement and positioning  -LE VC for posture and hand placement with sit<>stand  -MA      Upper Body Bathing Assessment/Training    UB Bathing Assess/Train, Position  supported sitting  -LE       UB Bathing Assess/Train, [place] Level  set up required  -LE       Lower Body Bathing Assessment/Training    LB Bathing Assess/Train, Position  supported sitting  -LE       LB Bathing Assess/Train, [place] Level  minimum assist (75% patient effort)   assist lower body.  balance support to wash bottom  -LE       Upper Body Dressing Assessment/Training    UB Dressing Assess/Train, Clothing Type  doffing:;donning:;hospital gown  -LE       UB Dressing Assess/Train, Position  supported sitting  -LE       UB Dressing Assess/Train, [place]  set up required  -LE       Lower Body Dressing Assessment/Training    LB Dressing Assess/Train, Comment  assist to jen new brief.  pt familiar with and owns AE for lower body dressing from past hip surgeries  -LE       Toileting Assessment/Training    Toileting Assess/Train, Position  supported sitting;supported standing  -LE       Toileting Assess/Train, Indepen Level  minimum assist (75% patient effort)   assist thread brief (could use reacher), balance support  -LE       Grooming Assessment/Training    Grooming Assess/Train, Comment  set up wash face   -LE       Positioning and Restraints    Pre-Treatment Position  bedside commode  -LE in bed  -MA      Post Treatment Position  bed  -LE chair  -MA      In Bed  notified nsg;supine;call light within reach;encouraged to call for assist;with other staff  -LE       In Chair   notified nsg;sitting;call light within reach;encouraged to call for assist;with family/caregiver;legs elevated  -MA        08/29/17 0703                General Information    Equipment Currently Used at Home cane, straight  -AW        Functional Level Prior    Ambulation 1-->assistive equipment  -AW        Transferring  1-->assistive equipment  -AW        Toileting 0-->independent  -AW        Bathing 0-->independent  -AW        Dressing 0-->independent  -AW        Eating 0-->independent  -AW        Communication 0-->understands/communicates without difficulty  -AW        Swallowing 0-->swallows foods/liquids without difficulty  -AW          User Key  (r) = Recorded By, (t) = Taken By, (c) = Cosigned By    Initials Name Effective Dates    SANDRA Zuniga, OTR 04/13/15 -     GRAHAM Doyle, DUGLAS 06/16/16 -     AW Claudia Guillen, RN 06/21/17 -     THELMA Rodriguez, PT 12/13/16 -           Occupational Therapy Education     Title: PT OT SLP Therapies (Done)     Topic: Occupational Therapy (Resolved)     Point: ADL training (Resolved)    Description: Instruct learner(s) on proper safety adaptation and remediation techniques during self care or transfers.   Instruct in proper use of assistive devices.    Learning Progress Summary    Learner Readiness Method Response Comment Documented by Status   Patient Acceptance E,TB,D JALEEL,SUSY Pt re-ed on using AE for LBD.  Discuss limits of TTWB on stepping into shower. Pt plans sponge bath.  Ed on hand placement/positioning for xfers and pt return demo without cueing.  Pt able to maintain TTWB. LE 09/01/17 0904 Done                      User Key     Initials Effective Dates Name Provider Type Discipline    SANDRA 04/13/15 -  Faye Zuniga OTR Occupational Therapist OT                OT Recommendation and Plan  Anticipated Discharge Disposition: home with assist  Therapy Frequency: evaluation only  Plan of Care Review  Plan Of Care Reviewed With: patient  Outcome Summary/Follow up Plan: Pt familiar with and can maintain TTWB during mobility.  Pt owns and familiar with AE for LBD from past hip surgeries.  Pt  has nausea and vomitting during OT (RN aware) and assisted back to bed after using BSC.  Ed on pt on hand placement and positinoing to increase safety.  Pt wants to return home.  Son is  available and has assisted pt in the past.  Pt aware will need some assist with mobility at this time due to pain.  No further skilled acute care OT.             OT Goals       09/01/17 0909 09/01/17 0905       Patient Education OT LTG    Patient Education OT LTG, Date Established  09/01/17  -LE     Patient Education OT LTG, Time to Achieve  1 wk  -LE     Patient Education OT LTG, Education Type  positioning;adaptive equipment mgmt;precautions per surgeon;posture/body mechanics;home safety  -LE     Patient Education OT LTG, Education Understanding  verbalizes understanding;demonstrates adequately  -LE     Patient Education OT LTG Outcome goal met  -LE        User Key  (r) = Recorded By, (t) = Taken By, (c) = Cosigned By    Initials Name Provider Type    IZAIAH Cabral Occupational Therapist                Outcome Measures       09/01/17 0909 09/01/17 0900 08/30/17 1200    How much help from another person do you currently need...    Turning from your back to your side while in flat bed without using bedrails?   3  -MA    Moving from lying on back to sitting on the side of a flat bed without bedrails?   3  -MA    Moving to and from a bed to a chair (including a wheelchair)?   3  -MA    Standing up from a chair using your arms (e.g., wheelchair, bedside chair)?   3  -MA    Climbing 3-5 steps with a railing?   3  -MA    To walk in hospital room?   3  -MA    AM-PAC 6 Clicks Score   18  -MA    How much help from another is currently needed...    Putting on and taking off regular lower body clothing? 3  -LE      Bathing (including washing, rinsing, and drying) 3  -LE      Toileting (which includes using toilet bed pan or urinal) 3  -LE      Putting on and taking off regular upper body clothing 3  -LE      Taking care of personal grooming (such as brushing teeth) 3  -LE      Eating meals 4  -LE      Score 19  -LE      Functional Assessment    Outcome Measure Options  AM-PAC 6 Clicks Daily Activity (OT)  -LE AM-PAC 6  Clicks Basic Mobility (PT)  -MA      User Key  (r) = Recorded By, (t) = Taken By, (c) = Cosigned By    Initials Name Provider Type    IZAIAH Cabral Occupational Therapist    THELMA Rodriguez, PT Physical Therapist          Time Calculation:         Time Calculation- OT       09/01/17 0909          Time Calculation- OT    OT Start Time 0828  -LE      OT Stop Time 0852  -LE      OT Time Calculation (min) 24 min  -LE      OT Received On 09/01/17  -LE        User Key  (r) = Recorded By, (t) = Taken By, (c) = Cosigned By    Initials Name Provider Type    IZAIAH Cabral Occupational Therapist          Therapy Charges for Today     Code Description Service Date Service Provider Modifiers Qty    [id number]  OT EVAL LOW COMPLEXITY 2 9/1/2017 IZAIAH Moya GO 1    [id number]  OT SELF CARE/MGMT/TRAIN EA 15 MIN 9/1/2017 IZAIAH Moya GO 1               OT Discharge Summary  Anticipated Discharge Disposition: home with assist  Reason for Discharge: All goals achieved  Discharge Destination: Home with assist    IZAIAH Moya  9/1/2017

## 2017-09-01 NOTE — THERAPY RE-EVALUATION
Acute Care - Physical Therapy Initial Evaluation  [facility]     Patient Name: Marleni Hummel  : 1935  MRN: [id number]  Today's Date: 2017   Onset of Illness/Injury or Date of Surgery Date: 17  Date of Referral to PT: 17  Referring Physician: Dr Canales      Admit Date: 2017     Visit Dx:    ICD-10-CM ICD-9-CM   1. Impaired functional mobility, balance, gait, and endurance Z74.09 V49.89     Patient Active Problem List   Diagnosis   • Arthritis involving multiple sites   • Essential hypertension   • Atrial fibrillation   • Bell's palsy   • Chronic kidney disease (CKD), stage III (moderate)   • Fatigue   • GERD (gastroesophageal reflux disease)   • Hypercholesterolemia   • Insomnia   • Osteoporosis   • Panic disorder   • Allergic rhinitis   • Sleep apnea   • History of MI (myocardial infarction)   • History of right hip replacement   • Closed fracture of neck of right femur with routine healing   • History of right knee joint replacement   • History of left knee replacement   • Chronic coronary artery disease   • Acquired hypothyroidism   • Anemia of chronic disease   • Hematuria   • Weakness of right leg   • Cardiac murmur   • Senile osteoporosis   • Closed displaced fracture of left femoral neck   • Bacteriuria     Past Medical History:   Diagnosis Date   • Allergic    • Allergic rhinitis    • Arthropathy of knee     right   • Atrial fibrillation    • Back pain    • Bell's palsy    • Chronic kidney disease (CKD), stage III (moderate)    • Cough    • Edema    • Encounter for eye exam 2015   • Essential hypertension    • Fatigue    • GERD (gastroesophageal reflux disease)    • H/O Heart murmur    • Heart attack    • Herpes zoster without complication    • Hip arthritis     left   • History of bone density study 2008   • History of pneumonia    • Hypercholesterolemia    • IFG (impaired fasting glucose)    • Insomnia    • Nephropathy    • Osteoarthritis     Right hip   •  Osteopenia    • Panic disorder    • Rectal bleeding    • Sleep apnea    • Stress    • Urinary incontinence    • Vitamin D deficiency      Past Surgical History:   Procedure Laterality Date   • CATARACT EXTRACTION Left 04/01/2013    Dr. Clarke   • CATARACT EXTRACTION Right 04/16/2013    Dr. Clarke   • COLONOSCOPY  04/18/2014    Dr. Elizabeth; no polyps   • JOINT REPLACEMENT Left 2004    knee Rubiack   • JOINT REPLACEMENT Right 2004    knee [place]am   • MAMMO BILATERAL  11/2013   • PAP SMEAR  02/2011   • TOTAL HIP ARTHROPLASTY Right 08/2015          PT ASSESSMENT (last 72 hours)      PT Evaluation       09/01/17 0959 09/01/17 0854    Rehab Evaluation    Document Type re-evaluation;therapy note (daily note)  -PC evaluation;therapy note (daily note);discharge summary;discharge evaluation/summary  -LE    Subjective Information agree to therapy  -PC agree to therapy  -LE    Patient Effort, Rehab Treatment good  -PC good  -LE    Symptoms Noted During/After Treatment increased pain  -PC --   pt vomits, feels better after, RN called to room.  -LE    Symptoms Noted Comment pt had a percutaneous hip pinning 8/31  -PC     General Information    Patient Profile Review  yes  -LE    Onset of Illness/Injury or Date of Surgery Date  08/29/17  -LE    Referring Physician  Dr Canales  -LE    General Observations  sitting on BSC, aid present, pt c/o nausea and feeling hot  -LE    Pertinent History Of Current Problem  s/p L hip pinning (pain from hip fracture).     h/o 5 surgeries R hip.  has been NWB R hip in past  -LE    Precautions/Limitations non-weight bearing status;fall precautions   TTWB LLE  -PC fall precautions   TTWB L LE  -LE    Prior Level of Function  independent:  -LE    Equipment Currently Used at Home  cane, straight;commode;dressing device;grab bar;shower chair;walker, rolling   bed rail, plans to get w/c  -LE    Living Environment    Lives With  spouse   adult son is available 24/7 for pt/ assist  -LE    Living  Environment Comment   with dementia but does not require physical assist.  -LE    Vital Signs    Activity Duration  --   pt c/o no sleep last night, nausea, feels hot.  lays down  -LE    Pain Assessment    Pain Assessment 0-10  -PC Aguero-Bundy FACES  -LE    Aguero-Bundy FACES Pain Rating  6  -LE    Pain Score 6  -PC     Pain Type Acute pain;Surgical pain  -PC Acute pain;Surgical pain  -LE    Pain Location Hip  -PC Hip  -LE    Pain Orientation Left  -PC Left  -LE    Pain Intervention(s) Repositioned;Cold applied  -PC Repositioned   ed on positioning to reduce pain with xfers  -LE    Cognitive Assessment/Intervention    Current Cognitive/Communication Assessment functional  -PC functional  -LE    Orientation Status oriented x 4  -PC     Follows Commands/Answers Questions 100% of the time  -PC     Personal Safety WNL/WFL  -PC     Personal Safety Interventions fall prevention program maintained;gait belt  -PC     ROM (Range of Motion)    General ROM  no range of motion deficits identified   B UE  -LE    General ROM Detail pt has pre-existing limited R hip and R knee limited mvmt, R knee 50 degress flexion , R hip limited flexion also from previous fxs  -PC     MMT (Manual Muscle Testing)    General MMT Assessment Detail LLE limited, not formally tested due to recent sx, she was unable to bring LLE to edge of bed without assist  -PC     Mobility Assessment/Training    Extremity Weight-Bearing Status left lower extremity  -PC     Left Lower Extremity Weight-Bearing touch down weight-bearing  -PC     Bed Mobility, Assessment/Treatment    Bed Mobility, Assistive Device  --   flat bed, no rail  -LE    Bed Mobility, Scoot/Bridge, [place]  supervision required  -LE    Bed Mob, Supine to Sit, [place] moderate assist (50% patient effort)   pt needed complete assist to bring LE to edge of bed  -PC     Bed Mob, Sit to Supine, [place]  minimum assist (75% patient effort)   assist with legs  -LE    Bed Mobility,  Impairments  pain  -LE    Transfer Assessment/Treatment    Transfers, Sit-Stand [place] moderate assist (50% patient effort)  -PC contact guard assist  -LE    Transfers, Stand-Sit [place] minimum assist (75% patient effort)  -PC contact guard assist  -LE    Transfers, Sit-Stand-Sit, Assist Device rolling walker  -PC rolling walker  -LE    Toilet Transfer, [place]  contact guard assist  -LE    Toilet Transfer, Assistive Device  rolling walker  -LE    Walk-In Shower Transfer, [place]  --   discuss limits of TTWB on stepping into shower.  -LE    Walk-In Shower Transfer, Assist Device  --   plan sponge bath  -LE    Transfer, Safety Issues  --   pt is able to maintain TTWB (at times does NWB) at walker  -LE    Transfer, Impairments  pain  -LE    Transfer, Comment pt lacks enough R knee and  R hip flexion due to previous injuries to get in position to perform sit to stand without moederate assist  -PC cues for hand placement and positioning  -LE    Gait Assessment/Treatment    Gait, [place] Level minimum assist (75% patient effort)  -PC     Gait, Assistive Device rolling walker  -PC     Gait, Distance (Feet) 5  -PC     Gait, Impairments strength decreased;pain  -PC     Gait, Comment verbal cues for correct sequencing, WBS  -PC     Therapy Exercises    Left Lower Extremity ankle pumps/circles;quad sets;10 reps  -PC     Positioning and Restraints    Pre-Treatment Position in bed  -PC bedside commode  -LE    Post Treatment Position chair  -PC bed  -LE    In Bed  notified nsg;supine;call light within reach;encouraged to call for assist;with other staff  -LE    In Chair reclined;call light within reach;encouraged to call for assist  -PC       08/30/17 1240 08/30/17 9978    Rehab Evaluation    Document Type evaluation  -MA     Subjective Information agree to therapy;complains of;pain  -MA     Patient Effort, Rehab Treatment good  -MA     Symptoms Noted During/After Treatment fatigue;increased pain  -MA      General Information    Patient Profile Review yes  -MA     Onset of Illness/Injury or Date of Surgery Date 08/30/17  -MA     Referring Physician Dr. Moe  -MA     General Observations patient supine in bed with HOB elevated, spouse at bedside  -MA     Precautions/Limitations no known precautions/limitations  -MA     Prior Level of Function independent:;all household mobility;community mobility  -MA     Equipment Currently Used at Home cane, straight  -MA cane, straight  -NL    Plans/Goals Discussed With patient  -MA     Barriers to Rehab none identified  -MA     Living Environment    Transportation Available  car;family or friend will provide  -NL    Clinical Impression    Date of Referral to PT 08/30/17  -MA     PT Diagnosis decr strength, decr endurance, decr funct mobility  -MA     Criteria for Skilled Therapeutic Interventions Met yes;treatment indicated  -MA     Pathology/Pathophysiology Noted (Describe Specifically for Each System) musculoskeletal  -MA     Impairments Found (describe specific impairments) aerobic capacity/endurance;gait, locomotion, and balance;ROM  -MA     Rehab Potential good, to achieve stated therapy goals  -MA     Pain Assessment    Pain Assessment 0-10  -MA     Pain Score 3  -MA     Pain Type Acute pain  -MA     Pain Location Hip  -MA     Pain Orientation Left  -MA     Pain Intervention(s) Repositioned;Ambulation/increased activity;Rest  -MA     Response to Interventions tolerated  -MA     Vision Assessment/Intervention    Visual Impairment WFL  -MA     Cognitive Assessment/Intervention    Current Cognitive/Communication Assessment functional  -MA     Orientation Status oriented x 4  -MA     Follows Commands/Answers Questions 100% of the time;able to follow single-step instructions;needs cueing;needs increased time;needs repetition  -MA     Personal Safety WNL/WFL  -MA     Personal Safety Interventions fall prevention program maintained;gait belt;nonskid shoes/slippers when out of  bed  -MA     ROM (Range of Motion)    General ROM Detail B LE ROM WFL  -MA     MMT (Manual Muscle Testing)    General MMT Assessment Detail B LE MMT grossly 4/5  -MA     Mobility Assessment/Training    Extremity Weight-Bearing Status left lower extremity;other (see comments)  -MA     Left Lower Extremity Weight-Bearing other (see comments)   not specified in PT orders; patient performed TTWB this date  -MA     Bed Mobility, Assessment/Treatment    Bed Mobility, Assistive Device head of bed elevated  -MA     Bed Mobility, Scoot/Bridge, [place] contact guard assist;verbal cues required  -MA     Bed Mob, Supine to Sit, [place] contact guard assist;verbal cues required  -MA     Bed Mobility, Impairments pain  -MA     Transfer Assessment/Treatment    Transfers, Sit-Stand [place] contact guard assist;verbal cues required  -MA     Transfers, Stand-Sit [place] contact guard assist;verbal cues required  -MA     Transfers, Sit-Stand-Sit, Assist Device rolling walker  -MA     Transfer, Safety Issues step length decreased  -MA     Transfer, Impairments pain  -MA     Transfer, Comment VC for posture and hand placement with sit<>stand  -MA     Gait Assessment/Treatment    Gait, [place] Level contact guard assist;verbal cues required  -MA     Gait, Assistive Device rolling walker  -MA     Gait, Distance (Feet) 75  -MA     Gait, Gait Pattern Analysis swing-to gait  -MA     Gait, Gait Deviations left:;step length decreased;bilateral:;david decreased  -MA     Gait, Safety Issues step length decreased  -MA     Gait, Impairments strength decreased;pain  -MA     Gait, Comment VC for david and WBing status  -MA     Positioning and Restraints    Pre-Treatment Position in bed  -MA     Post Treatment Position chair  -MA     In Chair notified nsg;sitting;call light within reach;encouraged to call for assist;with family/caregiver;legs elevated  -MA       08/29/17 1742 08/29/17 1734    General Information     Equipment Currently Used at Home  cane, straight  -AW    Living Environment    Lives With spouse  -AW     Living Arrangements house;condominium  -AW     Home Accessibility no concerns  -AW     Stair Railings at Home none  -AW     Type of Financial/Environmental Concern none  -AW     Transportation Available none  -AW       User Key  (r) = Recorded By, (t) = Taken By, (c) = Cosigned By    Initials Name Provider Type    SANDRA Zuniga, OTR Occupational Therapist    PC Amelia Reid, PT Physical Therapist    GRAHAM Doyle, RN Registered Nurse    SYED Guillen, RN Registered Nurse    THELMA Rodriguez, PT Physical Therapist          Physical Therapy Education     Title: PT OT SLP Therapies (Active)     Topic: Physical Therapy (Active)     Point: Mobility training (Active)    Learning Progress Summary    Learner Readiness Method Response Comment Documented by Status   Patient Acceptance E,D NR   09/01/17 1013 Active    Acceptance E VU  MA 08/30/17 1248 Done   Significant Other Acceptance E VU  MA 08/30/17 1248 Done               Point: Home exercise program (Active)    Learning Progress Summary    Learner Readiness Method Response Comment Documented by Status   Patient Acceptance E,D NR   09/01/17 1013 Active    Acceptance E VU  MA 08/30/17 1248 Done   Significant Other Acceptance E VU  MA 08/30/17 1248 Done               Point: Body mechanics (Active)    Learning Progress Summary    Learner Readiness Method Response Comment Documented by Status   Patient Acceptance E,D NR   09/01/17 1013 Active    Acceptance E VU  MA 08/30/17 1248 Done   Significant Other Acceptance E   MA 08/30/17 1248 Done               Point: Precautions (Active)    Learning Progress Summary    Learner Readiness Method Response Comment Documented by Status   Patient Acceptance E,D NR   09/01/17 1013 Active    Acceptance E VU  MA 08/30/17 1248 Done   Significant Other Acceptance E VU  MA 08/30/17 1248 Done                       User Key     Initials Effective Dates Name Provider Type Discipline    PC 12/01/15 -  Amelia Reid, PT Physical Therapist PT    MA 12/13/16 -  Delores Rodriguez, PT Physical Therapist PT                PT Recommendation and Plan  Anticipated Equipment Needs At Discharge:  (pending patient progress)  Anticipated Discharge Disposition: other (see comments) (pending patient progress)  Planned Therapy Interventions: balance training, bed mobility training, gait training, home exercise program, patient/family education, postural re-education, strengthening, transfer training  PT Frequency: daily  Plan of Care Review  Plan Of Care Reviewed With: patient  Outcome Summary/Follow up Plan: pt is now s/p L hip pinning and presents with impaired functional mobility in gait and transfers.  She had difficulty standing due to  R hip and R knee past injuries/surgeries and now new L hip injury.  She was able to maintain TTWB status once standing .  Due to the difficulty with transferring from sit to stand, she may need  short term rehab or 24/7 assist          IP PT Goals       09/01/17 1014 08/30/17 1248       Bed Mobility PT LTG    Bed Mobility PT LTG, Date Established 09/01/17  -PC 08/30/17  -MA     Bed Mobility PT LTG, Time to Achieve 1 wk  -PC 1 wk  -MA     Bed Mobility PT LTG, Activity Type supine to sit/sit to supine  -PC all bed mobility  -MA     Bed Mobility PT LTG, [place] Level contact guard assist  -PC supervision required  -MA     Transfer Training PT LTG    Transfer Training PT LTG, Date Established 09/01/17  -PC 08/30/17  -MA     Transfer Training PT LTG, Time to Achieve 1 wk  -PC 1 wk  -MA     Transfer Training PT LTG, Activity Type sit to stand/stand to sit  -PC all transfers  -MA     Transfer Training PT LTG, [place] Level minimum assist (75% patient effort)  -PC supervision required  -MA     Transfer Training PT LTG, Assist Device  cane, straight  -MA     Gait Training PT LTG    Gait Training Goal  PT LTG, Date Established 09/01/17  -PC 08/30/17  -MA     Gait Training Goal PT LTG, Time to Achieve 1 wk  -PC 1 wk  -MA     Gait Training Goal PT LTG, [place] Level minimum assist (75% patient effort)  -PC supervision required  -MA     Gait Training Goal PT LTG, Assist Device walker, rolling  -PC cane, straight  -MA     Gait Training Goal PT LTG, Distance to Achieve 50 ft  -  -MA     Stair Training PT LTG    Stair Training Goal PT LTG, Date Established 09/01/17  -PC 08/30/17  -MA     Stair Training Goal PT LTG, Time to Achieve 1 wk  -PC 1 wk  -MA     Stair Training Goal PT LTG, Number of Steps 2  -PC 1  -MA     Stair Training Goal PT LTG, [place] Level moderate assist (50% patient effort)  -PC supervision required  -MA     Stair Training Goal PT LTG, Assist Device 2 handrails  -PC cane, straight  -MA     Stair Training Goal PT LTG, Outcome goal revised  -PC        User Key  (r) = Recorded By, (t) = Taken By, (c) = Cosigned By    Initials Name Provider Type    PC Amelia Reid, PT Physical Therapist    THELMA Rodriguez, PT Physical Therapist                Outcome Measures       09/01/17 1000 09/01/17 0909 09/01/17 0900    How much help from another person do you currently need...    Turning from your back to your side while in flat bed without using bedrails? 3  -PC      Moving from lying on back to sitting on the side of a flat bed without bedrails? 2  -PC      Moving to and from a bed to a chair (including a wheelchair)? 2  -PC      Standing up from a chair using your arms (e.g., wheelchair, bedside chair)? 2  -PC      Climbing 3-5 steps with a railing? 1  -PC      To walk in hospital room? 2  -PC      AM-PAC 6 Clicks Score 12  -PC      How much help from another is currently needed...    Putting on and taking off regular lower body clothing?  3  -LE     Bathing (including washing, rinsing, and drying)  3  -LE     Toileting (which includes using toilet bed pan or urinal)  3  -LE     Putting  on and taking off regular upper body clothing  3  -LE     Taking care of personal grooming (such as brushing teeth)  3  -LE     Eating meals  4  -LE     Score  19  -LE     Functional Assessment    Outcome Measure Options   AM-PAC 6 Clicks Daily Activity (OT)  -LE      08/30/17 1200          How much help from another person do you currently need...    Turning from your back to your side while in flat bed without using bedrails? 3  -MA      Moving from lying on back to sitting on the side of a flat bed without bedrails? 3  -MA      Moving to and from a bed to a chair (including a wheelchair)? 3  -MA      Standing up from a chair using your arms (e.g., wheelchair, bedside chair)? 3  -MA      Climbing 3-5 steps with a railing? 3  -MA      To walk in hospital room? 3  -MA      AM-PAC 6 Clicks Score 18  -MA      Functional Assessment    Outcome Measure Options AM-PAC 6 Clicks Basic Mobility (PT)  -MA        User Key  (r) = Recorded By, (t) = Taken By, (c) = Cosigned By    Initials Name Provider Type    SANDRA Zuniga, OTR Occupational Therapist    PC Amelia Reid, PT Physical Therapist    THELMA Rodriguez, PT Physical Therapist           Time Calculation:         PT Charges       09/01/17 1027          Time Calculation    Start Time 0947  -PC      Stop Time 1005  -PC      Time Calculation (min) 18 min  -PC      PT Received On 09/01/17  -PC      PT - Next Appointment 09/02/17  -PC      PT Goal Re-Cert Due Date 09/08/17  -PC        User Key  (r) = Recorded By, (t) = Taken By, (c) = Cosigned By    Initials Name Provider Type    PC Amelia Reid PT Physical Therapist          Therapy Charges for Today     Code Description Service Date Service Provider Modifiers Qty    13645742100 HC PT RE-EVAL ESTABLISHED PLAN 2 9/1/2017 Amelia Reid, PT GP 1    72215451153 HC PT THER PROC EA 15 MIN 9/1/2017 Amelia Reid, PT GP 1    52042425990 HC PT THER SUPP EA 15 MIN 9/1/2017 Amelia Reid PT GP 1          PT Ruth  PT  Professional Judgement Used?: Yes  Outcome Measure Options: AM-PAC 6 Clicks Daily Activity (OT)  Functional Limitation: Mobility: Walking and moving around  Mobility: Walking and Moving Around Current Status (): At least 20 percent but less than 40 percent impaired, limited or restricted  Mobility: Walking and Moving Around Goal Status (): At least 1 percent but less than 20 percent impaired, limited or restricted      Amelia Reid, PT  9/1/2017

## 2017-09-01 NOTE — PLAN OF CARE
Problem: Patient Care Overview (Adult)  Goal: Plan of Care Review  Outcome: Ongoing (interventions implemented as appropriate)    09/01/17 0216   Coping/Psychosocial Response Interventions   Plan Of Care Reviewed With patient   Patient Care Overview   Progress improving   Outcome Evaluation   Outcome Summary/Follow up Plan po dilaudid for pain, effective, up to BSC voiding. educated on importance monitoring heart rate at home for atrial fib.       Goal: Adult Individualization and Mutuality  Outcome: Ongoing (interventions implemented as appropriate)  Goal: Discharge Needs Assessment  Outcome: Ongoing (interventions implemented as appropriate)    Problem: Pain, Acute (Adult)  Goal: Acceptable Pain Control/Comfort Level  Outcome: Ongoing (interventions implemented as appropriate)    Problem: Fall Risk (Adult)  Goal: Absence of Falls  Outcome: Ongoing (interventions implemented as appropriate)    Problem: Self-Care Deficit (Adult,Obstetrics,Pediatric)  Goal: Improved Ability to Perform BADL and IADL  Outcome: Ongoing (interventions implemented as appropriate)    Problem: Orthopaedic Fracture (Adult)  Goal: Signs and Symptoms of Listed Potential Problems Will be Absent or Manageable (Orthopaedic Fracture)  Outcome: Ongoing (interventions implemented as appropriate)

## 2017-09-01 NOTE — DISCHARGE PLACEMENT REQUEST
"Maria R Hummel (81 y.o. Female)     Date of Birth Social Security Number Address Home Phone MRN    1935  [id number] [phone] [id number]    Hindu Marital Status          [place]        Admission Date Admission Type Admitting Provider Attending Provider Department, Room/Bed    8/29/17 Elective Ken Andujar MD Rhoads, David, MD [facility] 8 Post, P882/1    Discharge Date Discharge Disposition Discharge Destination         [place] [facility] [place]             Attending Provider: Esteban Moe MD     Allergies:  Ace Inhibitors, Amoxicillin, Lortab [Hydrocodone-acetaminophen], Macrobid [Nitrofurantoin], Morphine And Related, Oxycodone, Levaquin [Levofloxacin]    Isolation:  None   Infection:  None   Code Status:  FULL    Ht:  63\" (160 cm)   Wt:  153 lb (69.4 kg)    Admission Cmt:  None   Principal Problem:  Closed displaced fracture of left femoral neck [S72.002A]                 Active Insurance as of 8/29/2017     Primary Coverage     Payor Plan Insurance Group Employer/Plan Group    MEDICARE MEDICARE A & B      Payor Plan Address Payor Plan Phone Number Effective From Effective To    [address] [phone] 12/1/2002     [place] [address]       Subscriber Name Subscriber Birth Date Member ID       MAIRA R HUMMEL 1935 [id number]           Secondary Coverage     Payor Plan Insurance Group Employer/Plan Group     FOR LIFE  FOR LIFE [employer] [id number]      Payor Plan Address Payor Plan Phone Number Effective From Effective To    [address] [phone] 12/21/2015     [place] [address]       Subscriber Name Subscriber Birth Date Member ID       MARIA R HUMMEL 1935 [id number]                 Emergency Contacts      (Rel.) Home Phone Work Phone Mobile Phone    Rm Hummel (Spouse) [phone] -- --    DarrickSharan (Son) [phone] -- --              "

## 2017-09-01 NOTE — PROGRESS NOTES
Discharge Planning Assessment  [facility]     Patient Name: Marleni Hummel  MRN: [id number]  Today's Date: 9/1/2017    Admit Date: 8/29/2017          Discharge Needs Assessment       09/01/17 1221    Living Environment    Lives With spouse    Living Arrangements condominium    Home Accessibility no concerns    Stair Railings at Home none    Type of Financial/Environmental Concern none    Transportation Available car;family or friend will provide    Living Environment Comment Lives with  and son Sharan can assist.     Living Environment    Provides Primary Care For spouse    Primary Care Provided By child(amy) (specify)    Quality Of Family Relationships supportive;helpful;involved    Able to Return to Prior Living Arrangements yes    Discharge Needs Assessment    Concerns To Be Addressed basic needs concerns;discharge planning concerns;home safety concerns    Concerns Comments Plans home with [facility]. Referral called to main ext: [phone] and left vmm. Patient requests a wheelchair and says she uses [facility] for DME. Called referra to Samantha/Hannah.    Readmission Within The Last 30 Days no previous admission in last 30 days    Outpatient/Agency/Support Group Needs homecare agency (specify level of care);other (see comments)    Community Agency Name(S) [facility] and [facility] for DME.     Anticipated Changes Related to Illness none    Equipment Currently Used at Home cane, straight;walker, rolling;dressing device;grab bar;shower chair;other (see comments);commode   Patient says she also has a cushion for a wheelchair.    Equipment Needed After Discharge wheelchair    Discharge Facility/Level Of Care Needs home with home health    Current Discharge Risk dependent with mobility/activities of daily living;physical impairment    Discharge Disposition still a patient    Discharge Contact Information if Applicable Rm Hummel  [phone] and Sharan Hollingsworth son [phone]    Discharge Planning Comments Home with [facility] and  a wheelchair from [facility]            Discharge Plan       09/01/17 1226    Case Management/Social Work Plan    Plan Home with [facility] and a wheelchair from [facility]    Patient/Family In Agreement With Plan yes    Additional Comments Confirmed face sheet correct. IMM 9/1/17.         Discharge Placement     Facility/Agency Request Status Selected? Address Phone Number Fax Number    [facility] Pending - Request Sent     [address] [address] [phone]        Jassi Miller RN 9/1/2017 12:16    Called to main ext: [phone] and left vmm                   Expected Discharge Date and Time     Expected Discharge Date Expected Discharge Time    Sep 2, 2017               Demographic Summary       09/01/17 1220    Referral Information    Admission Type inpatient    Arrived From admitted as an inpatient;home healthcare service    Referral Source admission list    Reason For Consult discharge planning    Record Reviewed clinical discipline documentation;history and physical;medical record    Contact Information    Permission Granted to Share Information With     Primary Care Physician Information    Name Dr. Ken Andujar            Functional Status       09/01/17 1221    Functional Status Current    Ambulation 3-->assistive equipment and person    Transferring 3-->assistive equipment and person    Toileting 3-->assistive equipment and person    Bathing 2-->assistive person    Dressing 2-->assistive person    Eating 0-->independent    Communication 0-->understands/communicates without difficulty    Swallowing (if score 2 or more for any item, consult Rehab Services) 0-->swallows foods/liquids without difficulty    Change in Functional Status Since Onset of Current Illness/Injury yes    Functional Status Prior    Ambulation 1-->assistive equipment    Transferring 1-->assistive equipment    Toileting 1-->assistive equipment    Bathing 1-->assistive  equipment    Dressing 0-->independent    Eating 0-->independent    Communication 0-->understands/communicates without difficulty    Swallowing 0-->swallows foods/liquids without difficulty    Activity Tolerance    Usual Activity Tolerance moderate    Current Activity Tolerance fair            Psychosocial     None            Abuse/Neglect     None            Legal     None            Substance Abuse     None            Patient Forms     None          Jassi Miller RN  Continued Stay Note  [facility]     Patient Name: Marleni Hummel  MRN: [id number]  Today's Date: 9/1/2017    Admit Date: 8/29/2017          Discharge Plan       09/01/17 1226    Case Management/Social Work Plan    Plan Home with [facility] and a wheelchair from [facility]    Patient/Family In Agreement With Plan yes    Additional Comments Confirmed face sheet correct. IMM 9/1/17.     9/1/17 4653 Samantha/[facility] here and has faxed a DWO to Dr. Baum's office for a wheelchair. She also left a copy of a filled out DWO with the patient to try to get MD to sign on round tomorrow.               Discharge Codes     None        Expected Discharge Date and Time     Expected Discharge Date Expected Discharge Time    Sep 2, 2017             Jassi Miller RN

## 2017-09-02 VITALS
WEIGHT: 153 LBS | TEMPERATURE: 97.4 F | DIASTOLIC BLOOD PRESSURE: 63 MMHG | SYSTOLIC BLOOD PRESSURE: 134 MMHG | BODY MASS INDEX: 27.11 KG/M2 | OXYGEN SATURATION: 96 % | HEIGHT: 63 IN | RESPIRATION RATE: 16 BRPM | HEART RATE: 69 BPM

## 2017-09-02 LAB
ANION GAP SERPL CALCULATED.3IONS-SCNC: 12.6 MMOL/L
BACTERIA SPEC AEROBE CULT: NORMAL
BUN BLD-MCNC: 17 MG/DL (ref 8–23)
BUN/CREAT SERPL: 14.9 (ref 7–25)
CALCIUM SPEC-SCNC: 8.4 MG/DL (ref 8.6–10.5)
CHLORIDE SERPL-SCNC: 101 MMOL/L (ref 98–107)
CO2 SERPL-SCNC: 24.4 MMOL/L (ref 22–29)
CREAT BLD-MCNC: 1.14 MG/DL (ref 0.57–1)
DEPRECATED RDW RBC AUTO: 48 FL (ref 37–54)
ERYTHROCYTE [DISTWIDTH] IN BLOOD BY AUTOMATED COUNT: 14.2 % (ref 11.7–13)
GFR SERPL CREATININE-BSD FRML MDRD: 46 ML/MIN/1.73
GLUCOSE BLD-MCNC: 106 MG/DL (ref 65–99)
HCT VFR BLD AUTO: 32.2 % (ref 35.6–45.5)
HGB BLD-MCNC: 10.2 G/DL (ref 11.9–15.5)
MCH RBC QN AUTO: 29.5 PG (ref 26.9–32)
MCHC RBC AUTO-ENTMCNC: 31.7 G/DL (ref 32.4–36.3)
MCV RBC AUTO: 93.1 FL (ref 80.5–98.2)
PLATELET # BLD AUTO: 153 10*3/MM3 (ref 140–500)
PMV BLD AUTO: 10.2 FL (ref 6–12)
POTASSIUM BLD-SCNC: 4.7 MMOL/L (ref 3.5–5.2)
RBC # BLD AUTO: 3.46 10*6/MM3 (ref 3.9–5.2)
SODIUM BLD-SCNC: 138 MMOL/L (ref 136–145)
WBC NRBC COR # BLD: 4.3 10*3/MM3 (ref 4.5–10.7)

## 2017-09-02 PROCEDURE — 85027 COMPLETE CBC AUTOMATED: CPT | Performed by: INTERNAL MEDICINE

## 2017-09-02 PROCEDURE — 25010000002 ONDANSETRON PER 1 MG: Performed by: ORTHOPAEDIC SURGERY

## 2017-09-02 PROCEDURE — 80048 BASIC METABOLIC PNL TOTAL CA: CPT | Performed by: INTERNAL MEDICINE

## 2017-09-02 PROCEDURE — 97110 THERAPEUTIC EXERCISES: CPT

## 2017-09-02 RX ADMIN — APIXABAN 2.5 MG: 2.5 TABLET, FILM COATED ORAL at 09:59

## 2017-09-02 RX ADMIN — ACETAMINOPHEN 500 MG: 500 TABLET ORAL at 06:35

## 2017-09-02 RX ADMIN — AMLODIPINE BESYLATE 2.5 MG: 2.5 TABLET ORAL at 09:59

## 2017-09-02 RX ADMIN — COLCHICINE 0.6 MG: 0.6 TABLET, FILM COATED ORAL at 09:58

## 2017-09-02 RX ADMIN — ONDANSETRON 4 MG: 2 INJECTION INTRAMUSCULAR; INTRAVENOUS at 01:14

## 2017-09-02 RX ADMIN — ONDANSETRON 4 MG: 4 TABLET, FILM COATED ORAL at 09:59

## 2017-09-02 RX ADMIN — OXYBUTYNIN CHLORIDE 5 MG: 5 TABLET, EXTENDED RELEASE ORAL at 09:59

## 2017-09-02 RX ADMIN — PANTOPRAZOLE SODIUM 20 MG: 20 TABLET, DELAYED RELEASE ORAL at 06:35

## 2017-09-02 RX ADMIN — METOPROLOL SUCCINATE 50 MG: 50 TABLET, FILM COATED, EXTENDED RELEASE ORAL at 09:59

## 2017-09-02 RX ADMIN — LEVOTHYROXINE SODIUM 50 MCG: 50 TABLET ORAL at 06:35

## 2017-09-02 RX ADMIN — DULOXETINE HYDROCHLORIDE 30 MG: 30 CAPSULE, DELAYED RELEASE ORAL at 09:59

## 2017-09-02 RX ADMIN — ACETAMINOPHEN 500 MG: 500 TABLET ORAL at 01:14

## 2017-09-02 NOTE — PROGRESS NOTES
Orthopedic Total Progress Note        Patient: Marleni Hummel    Date of Admission: 8/29/2017  5:26 PM    YOB: 1935    Medical Record Number: [id number]    Attending Physician: Esteban Moe MD      POD # 3     Status post: LT HIP PERCUTANEOUS PINNING      Systemic or Specific Complaints: No Complaints      Allergies   Allergen Reactions   • Ace Inhibitors    • Amoxicillin    • Lortab [Hydrocodone-Acetaminophen]    • Macrobid [Nitrofurantoin]    • Morphine And Related    • Oxycodone    • Levaquin [Levofloxacin] Itching, Rash and Other (See Comments)     insomnia         Current Medications:  Scheduled Meds:  amLODIPine 2.5 mg Oral BID   apixaban 2.5 mg Oral Q12H   atorvastatin 20 mg Oral Nightly   ceftriaxone 1 g Intravenous Q24H   colchicine 0.6 mg Oral Q12H   DULoxetine 30 mg Oral Daily   DULoxetine 30 mg Oral Daily   levothyroxine 50 mcg Oral Q AM   metoprolol succinate XL 50 mg Oral Q24H   oxybutynin XL 5 mg Oral Daily   pantoprazole 20 mg Oral Q AM     Continuous Infusions:   PRN Meds:.•  acetaminophen  •  HYDROmorphone **OR** HYDROmorphone  •  ondansetron **OR** ondansetron      Physical Exam: 81 y.o. female  General Appearance:    alert and oriented                Pain Relief: Patient reports some relief       Vitals:    09/01/17 1500 09/01/17 1915 09/01/17 2315 09/02/17 0255   BP: 117/64 133/62 124/66 139/68   BP Location: Right arm Left arm Right arm Right arm   Patient Position: Lying Lying Lying Lying   Pulse: 64 65 64 66   Resp: 18 14 14 14   Temp: 98.3 °F (36.8 °C) 99.4 °F (37.4 °C) 99.3 °F (37.4 °C) 98.2 °F (36.8 °C)   TempSrc: Oral Oral Oral Oral   SpO2: 95% 95% 94% 93%   Weight:       Height:               Extremities:   Operative extremity neurovascular status intact. ROM intact.    Incision intact w/out signs or  symptoms of infection. No           edema, no cyanosis, no calf tenderness     Pulses:     Pulses palpable and equal bilaterally     Skin:     Skin Warm/Dry w/out  ulceration, ecchymosis, rash, or   cyanosis     Activity: Mobilizing Per P.T.       Diagnostic Tests:   Admission on 08/29/2017   Component Date Value Ref Range Status   • WBC 08/29/2017 7.21  4.50 - 10.70 10*3/mm3 Final   • RBC 08/29/2017 3.75* 3.90 - 5.20 10*6/mm3 Final   • Hemoglobin 08/29/2017 11.0* 11.9 - 15.5 g/dL Final   • Hematocrit 08/29/2017 34.6* 35.6 - 45.5 % Final   • MCV 08/29/2017 92.3  80.5 - 98.2 fL Final   • MCH 08/29/2017 29.3  26.9 - 32.0 pg Final   • MCHC 08/29/2017 31.8* 32.4 - 36.3 g/dL Final   • RDW 08/29/2017 13.9* 11.7 - 13.0 % Final   • RDW-SD 08/29/2017 46.8  37.0 - 54.0 fl Final   • MPV 08/29/2017 10.6  6.0 - 12.0 fL Final   • Platelets 08/29/2017 190  140 - 500 10*3/mm3 Final   • Glucose 08/29/2017 81  65 - 99 mg/dL Final   • BUN 08/29/2017 26* 8 - 23 mg/dL Final   • Creatinine 08/29/2017 1.10* 0.57 - 1.00 mg/dL Final   • Sodium 08/29/2017 141  136 - 145 mmol/L Final   • Potassium 08/29/2017 4.1  3.5 - 5.2 mmol/L Final   • Chloride 08/29/2017 103  98 - 107 mmol/L Final   • CO2 08/29/2017 23.0  22.0 - 29.0 mmol/L Final   • Calcium 08/29/2017 9.7  8.6 - 10.5 mg/dL Final   • eGFR Non African Amer 08/29/2017 48* >60 mL/min/1.73 Final   • BUN/Creatinine Ratio 08/29/2017 23.6  7.0 - 25.0 Final   • Anion Gap 08/29/2017 15.0  mmol/L Final   • C-Reactive Protein 08/29/2017 0.52* 0.00 - 0.50 mg/dL Final   • Color, UA 08/29/2017 Yellow  Yellow, Straw Final   • Appearance, UA 08/29/2017 Clear  Clear Final   • pH, UA 08/29/2017 5.5  5.0 - 8.0 Final   • Specific Gravity, UA 08/29/2017 1.022  1.005 - 1.030 Final   • Glucose, UA 08/29/2017 Negative  Negative Final   • Ketones, UA 08/29/2017 Negative  Negative Final   • Bilirubin, UA 08/29/2017 Negative  Negative Final   • Blood, UA 08/29/2017 Small (1+)* Negative Final   • Protein, UA 08/29/2017 Negative  Negative Final   • Leuk Esterase, UA 08/29/2017 Small (1+)* Negative Final   • Nitrite, UA 08/29/2017 Negative  Negative Final   • Urobilinogen, UA  08/29/2017 0.2 E.U./dL  0.2 - 1.0 E.U./dL Final   • RBC, UA 08/29/2017 3-5* None Seen, 0-2 /HPF Final   • WBC, UA 08/29/2017 3-5* None Seen, 0-2 /HPF Final   • Bacteria, UA 08/29/2017 2+* None Seen /HPF Final   • Squamous Epithelial Cells, UA 08/29/2017 0-2  None Seen, 0-2 /HPF Final   • Hyaline Casts, UA 08/29/2017 0-2  None Seen /LPF Final   • Methodology 08/29/2017 Automated Microscopy   Final   • Sed Rate 08/30/2017 36* 0 - 30 mm/hr Final   • Urine Culture 08/31/2017 Growth present, too young to evaluate   Preliminary   • Glucose 09/01/2017 139* 65 - 99 mg/dL Final   • BUN 09/01/2017 16  8 - 23 mg/dL Final   • Creatinine 09/01/2017 1.00  0.57 - 1.00 mg/dL Final   • Sodium 09/01/2017 138  136 - 145 mmol/L Final   • Potassium 09/01/2017 5.3* 3.5 - 5.2 mmol/L Final   • Chloride 09/01/2017 102  98 - 107 mmol/L Final   • CO2 09/01/2017 22.5  22.0 - 29.0 mmol/L Final   • Calcium 09/01/2017 8.1* 8.6 - 10.5 mg/dL Final   • eGFR Non African Amer 09/01/2017 53* >60 mL/min/1.73 Final   • BUN/Creatinine Ratio 09/01/2017 16.0  7.0 - 25.0 Final   • Anion Gap 09/01/2017 13.5  mmol/L Final   • WBC 09/01/2017 6.82  4.50 - 10.70 10*3/mm3 Final   • RBC 09/01/2017 3.65* 3.90 - 5.20 10*6/mm3 Final   • Hemoglobin 09/01/2017 10.9* 11.9 - 15.5 g/dL Final   • Hematocrit 09/01/2017 34.2* 35.6 - 45.5 % Final   • MCV 09/01/2017 93.7  80.5 - 98.2 fL Final   • MCH 09/01/2017 29.9  26.9 - 32.0 pg Final   • MCHC 09/01/2017 31.9* 32.4 - 36.3 g/dL Final   • RDW 09/01/2017 13.7* 11.7 - 13.0 % Final   • RDW-SD 09/01/2017 46.8  37.0 - 54.0 fl Final   • MPV 09/01/2017 10.4  6.0 - 12.0 fL Final   • Platelets 09/01/2017 155  140 - 500 10*3/mm3 Final   • Potassium 09/01/2017 4.8  3.5 - 5.2 mmol/L Final   • Glucose 09/02/2017 106* 65 - 99 mg/dL Final   • BUN 09/02/2017 17  8 - 23 mg/dL Final   • Creatinine 09/02/2017 1.14* 0.57 - 1.00 mg/dL Final   • Sodium 09/02/2017 138  136 - 145 mmol/L Final   • Potassium 09/02/2017 4.7  3.5 - 5.2 mmol/L Final   •  Chloride 09/02/2017 101  98 - 107 mmol/L Final   • CO2 09/02/2017 24.4  22.0 - 29.0 mmol/L Final   • Calcium 09/02/2017 8.4* 8.6 - 10.5 mg/dL Final   • eGFR Non  Amer 09/02/2017 46* >60 mL/min/1.73 Final   • BUN/Creatinine Ratio 09/02/2017 14.9  7.0 - 25.0 Final   • Anion Gap 09/02/2017 12.6  mmol/L Final   • WBC 09/02/2017 4.30* 4.50 - 10.70 10*3/mm3 Final   • RBC 09/02/2017 3.46* 3.90 - 5.20 10*6/mm3 Final   • Hemoglobin 09/02/2017 10.2* 11.9 - 15.5 g/dL Final   • Hematocrit 09/02/2017 32.2* 35.6 - 45.5 % Final   • MCV 09/02/2017 93.1  80.5 - 98.2 fL Final   • MCH 09/02/2017 29.5  26.9 - 32.0 pg Final   • MCHC 09/02/2017 31.7* 32.4 - 36.3 g/dL Final   • RDW 09/02/2017 14.2* 11.7 - 13.0 % Final   • RDW-SD 09/02/2017 48.0  37.0 - 54.0 fl Final   • MPV 09/02/2017 10.2  6.0 - 12.0 fL Final   • Platelets 09/02/2017 153  140 - 500 10*3/mm3 Final       Mri Pelvis Without Contrast    Result Date: 8/30/2017  Narrative:  MRI PELVIS WITHOUT CONTRAST  HISTORY: Left-sided hip pain for approximately one month. Pain radiates to the left groin.  TECHNIQUE: MRI of the pelvis includes axial T1, STIR, as well as coronal T1, STIR through both hips, as well as sagittal PD weighted sequences of the left hip.  COMPARISON: Pelvis and left hip x-rays 08/29/2017.  FINDINGS: There is bone marrow edema along the medial left femoral neck extending approximately 4 x 1.5 cm. This is associated with microtrabecular stress fracture. Left hip joint effusion is present. There is mild osteoarthritis of the left hip joint with marginal osteophyte formation. There is no paralabral cyst formation. The iliopsoas tendon and left hamstring tendon origins are intact. Mild edema extends adjacent to the left gluteus minimus tendon which is intact.  There is metal related artifact overlying the right hip and right proximal femur related to previous instrumentation, limiting evaluation.  Degenerative changes are present in the pubic symphyseal joint.  There is degenerative disc disease in the lumbar spine with multilevel disc space narrowing.      Impression: Stress fracture of the medial left femoral neck. Left hip joint effusion.  This report was finalized on 8/30/2017 12:22 PM by Dr. Wong Sheth MD.      Fl C Arm During Surgery    Result Date: 8/31/2017  Narrative: This procedure was auto-finalized with no dictation required.    Xr Spine Lumbar 4+ View    Result Date: 8/30/2017  Narrative: XR SPINE LUMBAR 4+ VW-, XR HIP W OR WO PELVIS 2-3 VIEW LEFT-  CLINICAL: Left hip pain, back pain.  COMPARISON: Lumbar spine 06/14/2015 and pelvis 09/17/2015.  LEFT HIP FINDINGS: There is mild left hip joint narrowing, no fracture, dislocation or avascular necrosis. The left hemipelvis is satisfactory in appearance. Surrounding soft tissues are normal.  Images of the right hip prosthesis demonstrates hypertrophic bone formation about the joints. The prosthesis itself is satisfactory in appearance.  CONCLUSION: Stable left hip, mild hip joint narrowing.  LUMBAR SPINE FINDINGS: There is multilevel disk degeneration involving the lower thoracic spine and lumbar spine at L3-4, L4-5 and L5-S1. There is lower lumbar facet hypertrophy. No compression deformity, spondylolysis or spondylolisthesis.  CONCLUSION: Lower thoracic and lumbar degenerative change, no acute osseous abnormality.  This report was finalized on 8/30/2017 3:47 PM by Dr. Jose Armando Conte MD.      Mri Lumbar Spine With & Without Contrast    Result Date: 8/29/2017  Narrative: MRI OF THE LUMBAR SPINE WITHOUT AND WITH CONTRAST.  TECHNIQUE: Multisequence multiplanar MRI images of the lumbar spine.  HISTORY: Left hip pain.  COMPARISON: No prior studies for comparison.  FINDINGS:  Vertebral body height and signal is normal, no acute fracture.  Intervertebral discs demonstrate multilevel desiccation changes and multilevel loss of intervertebral disc height. Conus is at the level of L1-2, cauda equina is unremarkable.   T12-L1: Broad disc bulge effaces the thecal sac, mild to moderate bilateral neural foraminal stenosis.  L1-2: Broad disc bulge effaces the thecal sac, mild spinal canal stenosis, mild facet joint disease, mild bilateral neural foraminal stenosis.  L2-3: Broad disc bulge, moderate to severe spinal canal stenosis, mild facet joint disease, mild neural foraminal stenosis.  L3-4: Broad disc bulge causes severe spinal canal stenosis, there appears to be a posterior disc radial tear. Mild bilateral neural foraminal stenosis. Moderate facet joint disease.  L4-5: Broad disc bulge causes severe spinal canal stenosis, mild facet joint disease. Severe right, moderate to severe left neural foraminal stenosis.  L5-S1: Broad disc bulge causes mild spinal canal stenosis, mild to moderate facet joint disease. Severe left, moderate to severe right neural foraminal stenosis.      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 Milder changes are seen at other levels. No acute fracture or dislocation.      Xr Hip With Or Without Pelvis 2 - 3 View Left    Result Date: 8/31/2017  Narrative: LEFT HIP X-RAYS  CLINICAL HISTORY: Internal fixation of femur fracture. Hip pain.  4 spot images obtained with the C-arm demonstrate 3 Ruby pins that appear in satisfactory position within the left femoral neck and head.  Total fluoroscopy time was 1 minute and 14 seconds  This report was finalized on 8/31/2017 7:20 PM by Dr. Esteban Colorado MD.      Xr Hip With Or Without Pelvis 2 - 3 View Left    Result Date: 8/30/2017  Narrative: XR SPINE LUMBAR 4+ VW-, XR HIP W OR WO PELVIS 2-3 VIEW LEFT-  CLINICAL: Left hip pain, back pain.  COMPARISON: Lumbar spine 06/14/2015 and pelvis 09/17/2015.  LEFT HIP FINDINGS: There is mild left hip joint narrowing, no fracture, dislocation or avascular necrosis. The left hemipelvis is satisfactory in appearance. Surrounding soft tissues are  normal.  Images of the right hip prosthesis demonstrates hypertrophic bone formation about the joints. The prosthesis itself is satisfactory in appearance.  CONCLUSION: Stable left hip, mild hip joint narrowing.  LUMBAR SPINE FINDINGS: There is multilevel disk degeneration involving the lower thoracic spine and lumbar spine at L3-4, L4-5 and L5-S1. There is lower lumbar facet hypertrophy. No compression deformity, spondylolysis or spondylolisthesis.  CONCLUSION: Lower thoracic and lumbar degenerative change, no acute osseous abnormality.  This report was finalized on 8/30/2017 3:47 PM by Dr. Jose Armando Conte MD.          Assessment:  Patient Active Problem List   Diagnosis   • Arthritis involving multiple sites   • Essential hypertension   • Atrial fibrillation   • Bell's palsy   • Chronic kidney disease (CKD), stage III (moderate)   • Fatigue   • GERD (gastroesophageal reflux disease)   • Hypercholesterolemia   • Insomnia   • Osteoporosis   • Panic disorder   • Allergic rhinitis   • Sleep apnea   • History of MI (myocardial infarction)   • History of right hip replacement   • Closed fracture of neck of right femur with routine healing   • History of right knee joint replacement   • History of left knee replacement   • Chronic coronary artery disease   • Acquired hypothyroidism   • Anemia of chronic disease   • Hematuria   • Weakness of right leg   • Cardiac murmur   • Senile osteoporosis   • Closed displaced fracture of left femoral neck   • Bacteriuria     Post-operative Pain  Immobility    Plan:    Continue Physical Therapy, increase mobility as tolerated.  Continue SCDs, Continue DVT prophalaxis.  Continue Pain management efforts  Continue Incisional care      Discharge Plan: today to home and home health    Date: 9/2/2017   Time: 6:44 AM    Aldair Mccrary MD

## 2017-09-02 NOTE — PLAN OF CARE
Problem: Patient Care Overview (Adult)  Goal: Plan of Care Review  Outcome: Outcome(s) achieved Date Met:  09/02/17 09/02/17 1045   Coping/Psychosocial Response Interventions   Plan Of Care Reviewed With patient   Patient Care Overview   Progress progress toward functional goals as expected   Outcome Evaluation   Outcome Summary/Follow up Plan pt vss, no s/s of distress noted. pt denies c/p, soa, n/v/d. pt education completed, awaiting d/c to home with help from family. d/c equipment recieved to home. pt pain controlled well at this time, d/c instructions explained and completed. will continue to monitor .         Problem: Pain, Acute (Adult)  Goal: Acceptable Pain Control/Comfort Level  Outcome: Outcome(s) achieved Date Met:  09/02/17    Problem: Fall Risk (Adult)  Goal: Absence of Falls  Outcome: Outcome(s) achieved Date Met:  09/02/17    Problem: Self-Care Deficit (Adult,Obstetrics,Pediatric)  Goal: Improved Ability to Perform BADL and IADL  Outcome: Outcome(s) achieved Date Met:  09/02/17    Problem: Mobility, Physical Impaired (Adult)  Goal: Enhanced Mobility Skills  Outcome: Outcome(s) achieved Date Met:  09/02/17  Goal: Enhanced Functionality Ability  Outcome: Outcome(s) achieved Date Met:  09/02/17    Problem: Orthopaedic Fracture (Adult)  Goal: Signs and Symptoms of Listed Potential Problems Will be Absent or Manageable (Orthopaedic Fracture)  Outcome: Outcome(s) achieved Date Met:  09/02/17

## 2017-09-02 NOTE — PLAN OF CARE
Problem: Patient Care Overview (Adult)  Goal: Plan of Care Review  Outcome: Ongoing (interventions implemented as appropriate)    09/01/17 2021   Coping/Psychosocial Response Interventions   Plan Of Care Reviewed With patient   Patient Care Overview   Progress progress toward functional goals as expected   Outcome Evaluation   Outcome Summary/Follow up Plan patient pain controlled by APAP 500 mg q 6 hrs. Pt. has limited mobility with the right leg, so standing with partial wt bearing may be an issue for patient going home. leaving note for ortho.        Goal: Adult Individualization and Mutuality  Outcome: Outcome(s) achieved Date Met:  09/01/17  Goal: Discharge Needs Assessment  Outcome: Ongoing (interventions implemented as appropriate)    Problem: Pain, Acute (Adult)  Goal: Acceptable Pain Control/Comfort Level  Outcome: Ongoing (interventions implemented as appropriate)    Problem: Fall Risk (Adult)  Goal: Absence of Falls  Outcome: Ongoing (interventions implemented as appropriate)    Problem: Self-Care Deficit (Adult,Obstetrics,Pediatric)  Goal: Improved Ability to Perform BADL and IADL  Outcome: Ongoing (interventions implemented as appropriate)    Problem: Mobility, Physical Impaired (Adult)  Goal: Enhanced Mobility Skills  Outcome: Ongoing (interventions implemented as appropriate)  Goal: Enhanced Functionality Ability  Outcome: Ongoing (interventions implemented as appropriate)    Problem: Orthopaedic Fracture (Adult)  Goal: Signs and Symptoms of Listed Potential Problems Will be Absent or Manageable (Orthopaedic Fracture)  Outcome: Ongoing (interventions implemented as appropriate)    Problem: Perioperative Period (Adult)  Goal: Signs and Symptoms of Listed Potential Problems Will be Absent or Manageable (Perioperative Period)  Outcome: Outcome(s) achieved Date Met:  09/01/17

## 2017-09-02 NOTE — PROGRESS NOTES
Case Management Discharge Note    Final Note: pt dc'd to home with [facility] to follow and DME from [facility]    Discharge Placement     Facility/Agency Request Status Selected? Address Phone Number Fax Number    [facility] Accepted    Yes [address] [place] [address], [facility] [address] [phone]        Jassi Miller RN 9/1/2017 12:16    Called to main ext: [phone] and left vmm                        Discharge Codes: 06  Discharged/transferred to home under care of organized home health service organization in anticipation of skilled care

## 2017-09-02 NOTE — PLAN OF CARE
Problem: Patient Care Overview (Adult)  Goal: Plan of Care Review    09/02/17 0927   Outcome Evaluation   Outcome Summary/Follow up Plan Improved independence w/ mobility during PT, able to maintain TTWB LLE. Discussed safety recommendations, equipment, and PT POC.

## 2017-09-02 NOTE — THERAPY TREATMENT NOTE
Acute Care - Physical Therapy Treatment Note  [facility]     Patient Name: Marleni Hummel  : 1935  MRN: [id number]  Today's Date: 2017  Onset of Illness/Injury or Date of Surgery Date: 17  Date of Referral to PT: 17  Referring Physician: Dr Canales    Admit Date: 2017    Visit Dx:    ICD-10-CM ICD-9-CM   1. Impaired functional mobility, balance, gait, and endurance Z74.09 V49.89     Patient Active Problem List   Diagnosis   • Arthritis involving multiple sites   • Essential hypertension   • Atrial fibrillation   • Bell's palsy   • Chronic kidney disease (CKD), stage III (moderate)   • Fatigue   • GERD (gastroesophageal reflux disease)   • Hypercholesterolemia   • Insomnia   • Osteoporosis   • Panic disorder   • Allergic rhinitis   • Sleep apnea   • History of MI (myocardial infarction)   • History of right hip replacement   • Closed fracture of neck of right femur with routine healing   • History of right knee joint replacement   • History of left knee replacement   • Chronic coronary artery disease   • Acquired hypothyroidism   • Anemia of chronic disease   • Hematuria   • Weakness of right leg   • Cardiac murmur   • Senile osteoporosis   • Closed displaced fracture of left femoral neck   • Bacteriuria               Adult Rehabilitation Note       17 0925 17 0959       Rehab Assessment/Intervention    Discipline physical therapist  -AR physical therapist  -PC     Document Type  re-evaluation;therapy note (daily note)  -PC     Subjective Information  agree to therapy  -PC     Patient Effort, Rehab Treatment good  -AR good  -PC     Symptoms Noted During/After Treatment increased pain  -AR increased pain  -PC     Symptoms Noted Comment  pt had a percutaneous hip pinning   -PC     Precautions/Limitations fall precautions;non-weight bearing status   TTWB LLE  -AR non-weight bearing status;fall precautions   TTWB LLE  -PC     Recorded by [AR] Dari Gant, PT [PC]  Amelia Reid, PT     Pain Assessment    Pain Assessment 0-10  -AR 0-10  -PC     Pain Score 4  -AR 6  -PC     Pain Type Surgical pain  -AR Acute pain;Surgical pain  -PC     Pain Location Hip  -AR Hip  -PC     Pain Orientation  Left  -PC     Pain Intervention(s) Repositioned  -AR Repositioned;Cold applied  -PC     Recorded by [AR] Dari Gant PT [PC] Amelia Reid PT     Cognitive Assessment/Intervention    Current Cognitive/Communication Assessment functional  -AR functional  -PC     Orientation Status oriented x 4  -AR oriented x 4  -PC     Follows Commands/Answers Questions  100% of the time  -PC     Personal Safety  WNL/WFL  -PC     Personal Safety Interventions  fall prevention program maintained;gait belt  -PC     Recorded by [AR] Dari Gant PT [PC] Amelia Reid PT     ROM (Range of Motion)    General ROM Detail  pt has pre-existing limited R hip and R knee limited mvmt, R knee 50 degress flexion , R hip limited flexion also from previous fxs  -PC     Recorded by  [PC] Amelia Reid PT     MMT (Manual Muscle Testing)    General MMT Assessment Detail  LLE limited, not formally tested due to recent sx, she was unable to bring LLE to edge of bed without assist  -PC     Recorded by  [PC] Amelia Reid PT     Mobility Assessment/Training    Extremity Weight-Bearing Status  left lower extremity  -PC     Left Lower Extremity Weight-Bearing  touch down weight-bearing  -PC     Recorded by  [PC] Amelia Reid PT     Bed Mobility, Assessment/Treatment    Bed Mob, Supine to Sit, [place] minimum assist (75% patient effort)  -AR moderate assist (50% patient effort)   pt needed complete assist to bring LE to edge of bed  -PC     Bed Mob, Sit to Supine, [place] not tested  -AR      Bed Mobility, Comment HOB flat, use of bed rail  -AR      Recorded by [AR] Dari Gant PT [PC] Amelia Reid PT     Transfer Assessment/Treatment    Transfers, Bed-Chair [place] minimum assist (75%  patient effort)  -AR      Transfers, Bed-Chair-Bed, Assist Device rolling walker  -AR      Transfers, Sit-Stand [place] minimum assist (75% patient effort)  -AR moderate assist (50% patient effort)  -PC     Transfers, Stand-Sit [place] minimum assist (75% patient effort)  -AR minimum assist (75% patient effort)  -PC     Transfers, Sit-Stand-Sit, Assist Device rolling walker  -AR rolling walker  -PC     Transfer, Comment  pt lacks enough R knee and  R hip flexion due to previous injuries to get in position to perform sit to stand without moederate assist  -PC     Recorded by [AR] Dari Gant PT [PC] Amelia Reid PT     Gait Assessment/Treatment    Gait, [place] Level minimum assist (75% patient effort)  -AR minimum assist (75% patient effort)  -PC     Gait, Assistive Device rolling walker  -AR rolling walker  -PC     Gait, Distance (Feet) 1  -AR 5  -PC     Gait, Gait Deviations antalgic;step length decreased  -AR      Gait, Maintain Weight Bearing Status able to maintain weight bearing status  -AR      Gait, Safety Issues step length decreased  -AR      Gait, Impairments  strength decreased;pain  -PC     Gait, Comment few steps bed>chair  -AR verbal cues for correct sequencing, WBS  -PC     Recorded by [AR] Dari Gant PT [PC] Amelia Reid PT     Therapy Exercises    Left Lower Extremity  ankle pumps/circles;quad sets;10 reps  -PC     Recorded by  [PC] Amelia Reid PT     Positioning and Restraints    Pre-Treatment Position in bed  -AR in bed  -PC     Post Treatment Position chair  -AR chair  -PC     In Chair reclined;sitting;call light within reach;encouraged to call for assist  -AR reclined;call light within reach;encouraged to call for assist  -PC     Recorded by [AR] Dari Gant PT [PC] Amelia Reid, PT       User Key  (r) = Recorded By, (t) = Taken By, (c) = Cosigned By    Initials Name Effective Dates    PC Amelia Reid PT 12/01/15 -     AR Dari Gant PT  06/27/16 -                 IP PT Goals       09/01/17 1014 08/30/17 1248       Bed Mobility PT LTG    Bed Mobility PT LTG, Date Established 09/01/17  -PC 08/30/17  -MA     Bed Mobility PT LTG, Time to Achieve 1 wk  -PC 1 wk  -MA     Bed Mobility PT LTG, Activity Type supine to sit/sit to supine  -PC all bed mobility  -MA     Bed Mobility PT LTG, [place] Level contact guard assist  -PC supervision required  -MA     Transfer Training PT LTG    Transfer Training PT LTG, Date Established 09/01/17  -PC 08/30/17  -MA     Transfer Training PT LTG, Time to Achieve 1 wk  -PC 1 wk  -MA     Transfer Training PT LTG, Activity Type sit to stand/stand to sit  -PC all transfers  -MA     Transfer Training PT LTG, [place] Level minimum assist (75% patient effort)  -PC supervision required  -MA     Transfer Training PT LTG, Assist Device  cane, straight  -MA     Gait Training PT LTG    Gait Training Goal PT LTG, Date Established 09/01/17  -PC 08/30/17  -MA     Gait Training Goal PT LTG, Time to Achieve 1 wk  -PC 1 wk  -MA     Gait Training Goal PT LTG, [place] Level minimum assist (75% patient effort)  -PC supervision required  -MA     Gait Training Goal PT LTG, Assist Device walker, rolling  -PC cane, straight  -MA     Gait Training Goal PT LTG, Distance to Achieve 50 ft  -  -MA     Stair Training PT LTG    Stair Training Goal PT LTG, Date Established 09/01/17  -PC 08/30/17  -MA     Stair Training Goal PT LTG, Time to Achieve 1 wk  -PC 1 wk  -MA     Stair Training Goal PT LTG, Number of Steps 2  -PC 1  -MA     Stair Training Goal PT LTG, [place] Level moderate assist (50% patient effort)  -PC supervision required  -MA     Stair Training Goal PT LTG, Assist Device 2 handrails  -PC cane, straight  -MA     Stair Training Goal PT LTG, Outcome goal revised  -PC        User Key  (r) = Recorded By, (t) = Taken By, (c) = Cosigned By    Initials Name Provider Type    DEEDEE Reid, PT Physical Therapist    MA  Delores Rodriguez, PT Physical Therapist          Physical Therapy Education     Title: PT OT SLP Therapies (Done)     Topic: Physical Therapy (Done)     Point: Mobility training (Done)    Learning Progress Summary    Learner Readiness Method Response Comment Documented by Status   Patient Acceptance E VU  AR 09/02/17 0927 Done    Acceptance E,D NR  PC 09/01/17 1013 Active    Acceptance E VU  MA 08/30/17 1248 Done   Significant Other Acceptance E VU  MA 08/30/17 1248 Done               Point: Home exercise program (Done)    Learning Progress Summary    Learner Readiness Method Response Comment Documented by Status   Patient Acceptance E VU  AR 09/02/17 0927 Done    Acceptance E,D NR   09/01/17 1013 Active    Acceptance E VU  MA 08/30/17 1248 Done   Significant Other Acceptance E VU  MA 08/30/17 1248 Done               Point: Body mechanics (Done)    Learning Progress Summary    Learner Readiness Method Response Comment Documented by Status   Patient Acceptance E VU  AR 09/02/17 0927 Done    Acceptance E,D NR   09/01/17 1013 Active    Acceptance E VU  MA 08/30/17 1248 Done   Significant Other Acceptance E VU  MA 08/30/17 1248 Done               Point: Precautions (Done)    Learning Progress Summary    Learner Readiness Method Response Comment Documented by Status   Patient Acceptance E VU  AR 09/02/17 0927 Done    Acceptance E,D NR   09/01/17 1013 Active    Acceptance E VU  MA 08/30/17 1248 Done   Significant Other Acceptance E VU  MA 08/30/17 1248 Done                      User Key     Initials Effective Dates Name Provider Type Discipline     12/01/15 -  Amelia Reid, PT Physical Therapist PT    AR 06/27/16 -  Dari Gant PT Physical Therapist PT    MA 12/13/16 -  Delores Rodriguez, PT Physical Therapist PT                    PT Recommendation and Plan  Anticipated Equipment Needs At Discharge:  (pending patient progress)  Anticipated Discharge Disposition: other (see comments) (pending patient  progress)  Planned Therapy Interventions: balance training, bed mobility training, gait training, home exercise program, patient/family education, postural re-education, strengthening, transfer training  PT Frequency: daily  Plan of Care Review  Outcome Summary/Follow up Plan: Improved independence w/ mobility during PT, able to maintain TTWB LLE.  Discussed safety recommendations, equipment, and PT POC.            Outcome Measures       09/02/17 0900 09/01/17 1000 09/01/17 0909    How much help from another person do you currently need...    Turning from your back to your side while in flat bed without using bedrails? 3  -AR 3  -PC     Moving from lying on back to sitting on the side of a flat bed without bedrails? 3  -AR 2  -PC     Moving to and from a bed to a chair (including a wheelchair)? 3  -AR 2  -PC     Standing up from a chair using your arms (e.g., wheelchair, bedside chair)? 3  -AR 2  -PC     Climbing 3-5 steps with a railing? 1  -AR 1  -PC     To walk in hospital room? 3  -AR 2  -PC     AM-PAC 6 Clicks Score 16  -AR 12  -PC     How much help from another is currently needed...    Putting on and taking off regular lower body clothing?   3  -LE    Bathing (including washing, rinsing, and drying)   3  -LE    Toileting (which includes using toilet bed pan or urinal)   3  -LE    Putting on and taking off regular upper body clothing   3  -LE    Taking care of personal grooming (such as brushing teeth)   3  -LE    Eating meals   4  -LE    Score   19  -LE      09/01/17 0900 08/30/17 1200       How much help from another person do you currently need...    Turning from your back to your side while in flat bed without using bedrails?  3  -MA     Moving from lying on back to sitting on the side of a flat bed without bedrails?  3  -MA     Moving to and from a bed to a chair (including a wheelchair)?  3  -MA     Standing up from a chair using your arms (e.g., wheelchair, bedside chair)?  3  -MA     Climbing 3-5 steps  with a railing?  3  -MA     To walk in hospital room?  3  -MA     AM-PAC 6 Clicks Score  18  -MA     Functional Assessment    Outcome Measure Options AM-PAC 6 Clicks Daily Activity (OT)  -LE AM-PAC 6 Clicks Basic Mobility (PT)  -MA       User Key  (r) = Recorded By, (t) = Taken By, (c) = Cosigned By    Initials Name Provider Type    SANDRA Zuniga, OTR Occupational Therapist    DEEDEE Reid, PT Physical Therapist    AR Dari Gant, PT Physical Therapist    THELMA Rodriguez, PT Physical Therapist           Time Calculation:         PT Charges       09/02/17 0928          Time Calculation    Start Time 0908  -AR      Stop Time 0928  -AR      Time Calculation (min) 20 min  -AR      PT Received On 09/02/17  -AR      PT - Next Appointment 09/03/17  -AR        User Key  (r) = Recorded By, (t) = Taken By, (c) = Cosigned By    Initials Name Provider Type    SHAREE Gant, PT Physical Therapist          Therapy Charges for Today     Code Description Service Date Service Provider Modifiers Qty    [id number] HC PT THER PROC EA 15 MIN 9/2/2017 Dari Gant, PT GP 1          PT G-Codes  PT Professional Judgement Used?: Yes  Outcome Measure Options: AM-PAC 6 Clicks Daily Activity (OT)  Functional Limitation: Mobility: Walking and moving around  Mobility: Walking and Moving Around Current Status (): At least 20 percent but less than 40 percent impaired, limited or restricted  Mobility: Walking and Moving Around Goal Status (): At least 1 percent but less than 20 percent impaired, limited or restricted    Dari Gant PT  9/2/2017

## 2017-09-02 NOTE — PLAN OF CARE
Problem: Patient Care Overview (Adult)  Goal: Plan of Care Review  Outcome: Ongoing (interventions implemented as appropriate)    09/02/17 0451   Coping/Psychosocial Response Interventions   Plan Of Care Reviewed With patient   Patient Care Overview   Progress progress toward functional goals as expected   Outcome Evaluation   Outcome Summary/Follow up Plan Patient slept the majority of the night with some c/o pain. VSS and on RA. Partial weight bearing on right leg. Note left for MD regarding patient going home vs. rehab per previous RN. Will continue to monitor.

## 2017-09-04 NOTE — PROGRESS NOTES
Case Management Discharge Note    Final Note: pt dc'd to home with [facility] to follow and DME from [place]    Discharge Placement     Facility/Agency Request Status Selected? Address Phone Number Fax Number    [facility] Accepted    Yes [address] [place] [address], [facility] [address] [phone]        Jassi Miller RN 9/1/2017 12:16    Called to main ext: [phone] and left vmm                   Other: Other (Car)    Discharge Codes: 06  Discharged/transferred to home under care of organized home health service organization in anticipation of skilled care

## 2017-09-07 ENCOUNTER — OFFICE VISIT (OUTPATIENT)
Dept: FAMILY MEDICINE CLINIC | Facility: CLINIC | Age: 82
End: 2017-09-07

## 2017-09-07 VITALS
DIASTOLIC BLOOD PRESSURE: 65 MMHG | RESPIRATION RATE: 16 BRPM | SYSTOLIC BLOOD PRESSURE: 137 MMHG | TEMPERATURE: 98.5 F | HEART RATE: 46 BPM

## 2017-09-07 DIAGNOSIS — E03.9 ACQUIRED HYPOTHYROIDISM: ICD-10-CM

## 2017-09-07 DIAGNOSIS — D63.8 ANEMIA OF CHRONIC DISEASE: ICD-10-CM

## 2017-09-07 DIAGNOSIS — E79.0 HYPERURICEMIA: ICD-10-CM

## 2017-09-07 DIAGNOSIS — Z13.6 SCREENING FOR ISCHEMIC HEART DISEASE: ICD-10-CM

## 2017-09-07 DIAGNOSIS — Z09 HOSPITAL DISCHARGE FOLLOW-UP: Primary | ICD-10-CM

## 2017-09-07 DIAGNOSIS — I10 ESSENTIAL HYPERTENSION: ICD-10-CM

## 2017-09-07 DIAGNOSIS — K21.9 GASTROESOPHAGEAL REFLUX DISEASE, ESOPHAGITIS PRESENCE NOT SPECIFIED: ICD-10-CM

## 2017-09-07 PROCEDURE — 99214 OFFICE O/P EST MOD 30 MIN: CPT | Performed by: FAMILY MEDICINE

## 2017-09-07 NOTE — PROGRESS NOTES
Chief Complaint   Patient presents with   • Hospital Follow UP   • Gout       Subjective   This patient presents the office to follow-up on recent hospital stay.  She was admitted on August 29 and discharged on September 2, 2017.  She had fracture of left femoral neck.  She underwent surgical pinning and is now in the recovery phase.  Medication changes included decreasing levothyroxine 50 µg daily.  She was given pain medicines but cannot tolerate those.  She is nonweightbearing for 6 weeks.  She has follow-up with her orthopedist on September 11, 2017.  Care team has been updated during today's visit. She is seeing therapist at home 3 times a week.   I have reviewed and updated her medications, medical history and problem list during today's office visit.  Labs and recent MRI reports reviewed and seen below.   Current outpatient and discharge medications have been reconciled for the patient.  eKn Andujar MD       Social History   Substance Use Topics   • Smoking status: Former Smoker     Packs/day: 1.00     Years: 3.00     Types: Cigarettes     Quit date: 2/22/1956   • Smokeless tobacco: Never Used   • Alcohol use 0.6 - 1.2 oz/week     1 - 2 Glasses of wine per week      Comment: rare       Review of Systems   Constitutional: Negative for fatigue.   Cardiovascular: Negative for chest pain.   Musculoskeletal: Positive for arthralgias.       Objective   /65  Pulse (!) 46  Temp 98.5 °F (36.9 °C) (Oral)   Resp 16  There is no height or weight on file to calculate BMI.  Physical Exam   Constitutional: She is cooperative. No distress.   Eyes: Conjunctivae and lids are normal.   Neck: Carotid bruit is not present. No tracheal deviation present.   Cardiovascular: Normal rate, regular rhythm and normal heart sounds.    No murmur heard.  Pulmonary/Chest: Effort normal and breath sounds normal.   Musculoskeletal:   WC   Neurological: She is alert. She is not disoriented.   Skin: Skin is warm and dry.    Psychiatric: She has a normal mood and affect. Her speech is normal and behavior is normal.   Vitals reviewed.      Data Reviewed:  MRI PELVIS WITHOUT CONTRAST      HISTORY: Left-sided hip pain for approximately one month. Pain radiates  to the left groin.      TECHNIQUE: MRI of the pelvis includes axial T1, STIR, as well as coronal  T1, STIR through both hips, as well as sagittal PD weighted sequences of  the left hip.      COMPARISON: Pelvis and left hip x-rays 08/29/2017.      FINDINGS: There is bone marrow edema along the medial left femoral neck  extending approximately 4 x 1.5 cm. This is associated with  microtrabecular stress fracture. Left hip joint effusion is present.  There is mild osteoarthritis of the left hip joint with marginal  osteophyte formation. There is no paralabral cyst formation. The  iliopsoas tendon and left hamstring tendon origins are intact. Mild  edema extends adjacent to the left gluteus minimus tendon which is  intact.      There is metal related artifact overlying the right hip and right  proximal femur related to previous instrumentation, limiting evaluation.   Degenerative changes are present in the pubic symphyseal joint. There  is degenerative disc disease in the lumbar spine with multilevel disc  space narrowing.      IMPRESSION:  Stress fracture of the medial left femoral neck. Left hip  joint effusion.      This report was finalized on 8/30/2017 12:22 PM by Dr. Wong Sheth MD.    MRI OF THE LUMBAR SPINE WITHOUT AND WITH CONTRAST.      TECHNIQUE: Multisequence multiplanar MRI images of the lumbar spine.      HISTORY: Left hip pain.      COMPARISON: No prior studies for comparison.      FINDINGS:      Vertebral body height and signal is normal, no acute fracture.      Intervertebral discs demonstrate multilevel desiccation changes and  multilevel loss of intervertebral disc height. Conus is at the level of  L1-2, cauda equina is unremarkable.      T12-L1: Broad disc  bulge effaces the thecal sac, mild to moderate  bilateral neural foraminal stenosis.       L1-2: Broad disc bulge effaces the thecal sac, mild spinal canal  stenosis, mild facet joint disease, mild bilateral neural foraminal  stenosis.      L2-3: Broad disc bulge, moderate to severe spinal canal stenosis, mild  facet joint disease, mild neural foraminal stenosis.      L3-4: Broad disc bulge causes severe spinal canal stenosis, there  appears to be a posterior disc radial tear. Mild bilateral neural  foraminal stenosis. Moderate facet joint disease.      L4-5: Broad disc bulge causes severe spinal canal stenosis, mild facet  joint disease. Severe right, moderate to severe left neural foraminal  stenosis.      L5-S1: Broad disc bulge causes mild spinal canal stenosis, mild to  moderate facet joint disease. Severe left, moderate to severe right  neural foraminal stenosis.      IMPRESSION:  Multifactorial multilevel spondylosis of the lumbar spine most severe  changes are seen at L3-4 through L5-S1. Milder changes are seen at other  levels. No acute fracture or dislocation.      This report was finalized on 9/3/2017 6:27 AM by Dr. Wu   CMP:  Lab Results   Component Value Date     (H) 08/07/2017    BUN 17 09/02/2017    CREATININE 1.14 (H) 09/02/2017    EGFRIFNONA 46 (L) 09/02/2017    EGFRIFAFRI 57 (L) 08/07/2017     09/02/2017    K 4.7 09/02/2017     09/02/2017    CALCIUM 8.4 (L) 09/02/2017    PROTENTOTREF 7.6 08/07/2017    ALBUMIN 4.60 08/07/2017    LABGLOBREF 3.0 08/07/2017    BILITOT 0.6 08/07/2017    ALKPHOS 90 08/07/2017    AST 18 08/07/2017    ALT 15 08/07/2017     CBC w/ diff:   Lab Results   Component Value Date    WBC 4.30 (L) 09/02/2017    RBC 3.46 (L) 09/02/2017    HGB 10.2 (L) 09/02/2017    HCT 32.2 (L) 09/02/2017    MCV 93.1 09/02/2017    MCH 29.5 09/02/2017    MCHC 31.7 (L) 09/02/2017    RDW 14.2 (H) 09/02/2017     09/02/2017    NEUTRORELPCT 61.6 08/07/2017    AUTOIGPER 0.0  06/20/2017    LYMPHORELPCT 29.9 08/07/2017    MONORELPCT 6.3 08/07/2017    EOSRELPCT 1.9 08/07/2017    BASORELPCT 0.3 08/07/2017     LIPID PANEL:  Lab Results   Component Value Date    CHLPL 202 (H) 08/07/2017    TRIG 232 (H) 08/07/2017    HDL 52 08/07/2017    VLDL 46.4 (H) 08/07/2017     (H) 08/07/2017    LDLHDL 1.99 08/07/2017     TSH:  Lab Results   Component Value Date    TSH 0.154 (L) 08/07/2017       Assessment/Plan     Problem List Items Addressed This Visit        Cardiovascular and Mediastinum    Essential hypertension    Relevant Orders    Comprehensive metabolic panel    CBC and Differential       Digestive    GERD (gastroesophageal reflux disease)       Endocrine    Acquired hypothyroidism    Relevant Orders    TSH    T4, Free       Hematopoietic and Hemostatic    Anemia of chronic disease    Relevant Orders    CBC and Differential       Other    Hyperuricemia    Relevant Orders    Uric Acid      Other Visit Diagnoses     Hospital discharge follow-up    -  Primary    Screening for ischemic heart disease        Relevant Orders    Lipid Panel With LDL/HDL Ratio          Outpatient Encounter Prescriptions as of 9/7/2017   Medication Sig Dispense Refill   • acetaminophen (TYLENOL) 500 MG tablet Take 1,000 mg by mouth 3 (three) times a day as needed for mild pain (1-3) or moderate pain (4-6).     • amLODIPine (NORVASC) 2.5 MG tablet Take 1 tablet by mouth 2 (Two) Times a Day. 180 tablet 0   • atorvastatin (LIPITOR) 20 MG tablet Take 20 mg by mouth Every Night.     • colchicine 0.6 MG tablet Take 1 tablet by mouth 2 (Two) Times a Day for 30 days. 60 tablet 0   • DULoxetine (CYMBALTA) 30 MG capsule Take 1 capsule by mouth Daily for 180 days. 90 capsule 1   • ELIQUIS 2.5 MG tablet tablet TAKE 1 TABLET TWICE A  tablet 1   • levothyroxine (SYNTHROID, LEVOTHROID) 50 MCG tablet Take 50 mcg by mouth Daily.     • metoprolol succinate XL (TOPROL-XL) 50 MG 24 hr tablet Take 1 tablet by mouth Daily for 180  days. 90 tablet 1   • MYRBETRIQ 25 MG tablet sustained-release 24 hour 24 hr tablet      • pantoprazole (PROTONIX) 20 MG EC tablet Take 1 tablet by mouth Every Night for 180 days. 90 tablet 1   • phenol (CHLORASEPTIC) 1.4 % liquid liquid Apply 2 sprays to the mouth or throat Every 2 (Two) Hours As Needed (sore throat). 177 mL 0   • sodium chloride (OCEAN NASAL SPRAY) 0.65 % nasal spray 1 spray into each nostril As Needed for congestion. 104 mL 0   • trimethoprim (TRIMPEX) 100 MG tablet Take 100 mg by mouth Daily.     • [DISCONTINUED] HYDROmorphone (DILAUDID) 2 MG tablet Take 1 tablet by mouth Every 4 (Four) Hours As Needed for Severe Pain  for up to 7 days. 56 tablet 0     No facility-administered encounter medications on file as of 9/7/2017.        Orders Placed This Encounter   Procedures   • Comprehensive metabolic panel     Standing Status:   Future     Standing Expiration Date:   3/6/2018   • Lipid Panel With LDL/HDL Ratio     Standing Status:   Future     Standing Expiration Date:   3/6/2018   • TSH     Standing Status:   Future     Standing Expiration Date:   3/6/2018   • Uric Acid     Standing Status:   Future     Standing Expiration Date:   3/6/2018   • T4, Free     Standing Status:   Future     Standing Expiration Date:   3/6/2018   • CBC and Differential     Standing Status:   Future     Standing Expiration Date:   3/6/2018     Order Specific Question:   Manual Differential     Answer:   No       Continue with current treatment plan.         RTC as needed or sooner if symptoms worsen or change

## 2017-09-09 DIAGNOSIS — E78.00 HYPERCHOLESTEROLEMIA: ICD-10-CM

## 2017-09-09 DIAGNOSIS — F41.0 PANIC DISORDER: ICD-10-CM

## 2017-09-09 DIAGNOSIS — I10 ESSENTIAL HYPERTENSION: ICD-10-CM

## 2017-09-09 DIAGNOSIS — E03.9 ACQUIRED HYPOTHYROIDISM: ICD-10-CM

## 2017-09-11 RX ORDER — ATORVASTATIN CALCIUM 20 MG/1
TABLET, FILM COATED ORAL
Qty: 90 TABLET | Refills: 1 | OUTPATIENT
Start: 2017-09-11

## 2017-09-11 RX ORDER — METOPROLOL SUCCINATE 50 MG
TABLET, EXTENDED RELEASE 24 HR ORAL
Qty: 90 TABLET | Refills: 1 | OUTPATIENT
Start: 2017-09-11

## 2017-09-11 RX ORDER — LEVOTHYROXINE SODIUM 0.05 MG/1
TABLET ORAL
Qty: 90 TABLET | Refills: 1 | OUTPATIENT
Start: 2017-09-11

## 2017-09-11 RX ORDER — DULOXETIN HYDROCHLORIDE 30 MG/1
CAPSULE, DELAYED RELEASE ORAL
Qty: 90 CAPSULE | Refills: 1 | OUTPATIENT
Start: 2017-09-11

## 2017-09-12 ENCOUNTER — TRANSCRIBE ORDERS (OUTPATIENT)
Dept: ADMINISTRATIVE | Facility: HOSPITAL | Age: 82
End: 2017-09-12

## 2017-09-12 DIAGNOSIS — M54.40 ACUTE LEFT-SIDED LOW BACK PAIN WITH SCIATICA, SCIATICA LATERALITY UNSPECIFIED: Primary | ICD-10-CM

## 2017-09-21 ENCOUNTER — ANESTHESIA (OUTPATIENT)
Dept: PAIN MEDICINE | Facility: HOSPITAL | Age: 82
End: 2017-09-21

## 2017-09-21 ENCOUNTER — ANESTHESIA EVENT (OUTPATIENT)
Dept: PAIN MEDICINE | Facility: HOSPITAL | Age: 82
End: 2017-09-21

## 2017-09-21 ENCOUNTER — HOSPITAL ENCOUNTER (OUTPATIENT)
Dept: PAIN MEDICINE | Facility: HOSPITAL | Age: 82
Discharge: HOME | End: 2017-09-21
Attending: ORTHOPAEDIC SURGERY | Admitting: ORTHOPAEDIC SURGERY

## 2017-09-21 ENCOUNTER — TRANSCRIBE ORDERS (OUTPATIENT)
Dept: PAIN MEDICINE | Facility: HOSPITAL | Age: 82
End: 2017-09-21

## 2017-09-21 ENCOUNTER — HOSPITAL ENCOUNTER (OUTPATIENT)
Dept: GENERAL RADIOLOGY | Facility: HOSPITAL | Age: 82
Discharge: HOME | End: 2017-09-21

## 2017-09-21 VITALS
HEIGHT: 64 IN | RESPIRATION RATE: 16 BRPM | WEIGHT: 150 LBS | SYSTOLIC BLOOD PRESSURE: 155 MMHG | DIASTOLIC BLOOD PRESSURE: 70 MMHG | BODY MASS INDEX: 25.61 KG/M2 | OXYGEN SATURATION: 99 % | HEART RATE: 52 BPM

## 2017-09-21 DIAGNOSIS — M54.40 ACUTE LEFT-SIDED LOW BACK PAIN WITH SCIATICA, SCIATICA LATERALITY UNSPECIFIED: Primary | ICD-10-CM

## 2017-09-21 DIAGNOSIS — R52 PAIN: ICD-10-CM

## 2017-09-21 PROCEDURE — 77003 FLUOROGUIDE FOR SPINE INJECT: CPT

## 2017-09-21 PROCEDURE — 25010000002 MIDAZOLAM PER 1 MG: Performed by: ANESTHESIOLOGY

## 2017-09-21 PROCEDURE — 25010000002 METHYLPREDNISOLONE PER 80 MG: Performed by: ANESTHESIOLOGY

## 2017-09-21 PROCEDURE — C1755 CATHETER, INTRASPINAL: HCPCS

## 2017-09-21 RX ORDER — LIDOCAINE HYDROCHLORIDE 10 MG/ML
1 INJECTION, SOLUTION INFILTRATION; PERINEURAL ONCE AS NEEDED
Status: DISCONTINUED | OUTPATIENT
Start: 2017-09-21 | End: 2017-09-22 | Stop reason: HOSPADM

## 2017-09-21 RX ORDER — DULOXETIN HYDROCHLORIDE 30 MG/1
30 CAPSULE, DELAYED RELEASE ORAL DAILY
COMMUNITY
End: 2017-09-28 | Stop reason: SDUPTHER

## 2017-09-21 RX ORDER — SENNOSIDES 8.6 MG
2 TABLET ORAL NIGHTLY PRN
COMMUNITY
End: 2018-05-10

## 2017-09-21 RX ORDER — MIDAZOLAM HYDROCHLORIDE 1 MG/ML
1 INJECTION INTRAMUSCULAR; INTRAVENOUS AS NEEDED
Status: DISCONTINUED | OUTPATIENT
Start: 2017-09-21 | End: 2017-09-22 | Stop reason: HOSPADM

## 2017-09-21 RX ORDER — METHYLPREDNISOLONE ACETATE 80 MG/ML
80 INJECTION, SUSPENSION INTRA-ARTICULAR; INTRALESIONAL; INTRAMUSCULAR; SOFT TISSUE ONCE
Status: COMPLETED | OUTPATIENT
Start: 2017-09-21 | End: 2017-09-21

## 2017-09-21 RX ORDER — SODIUM CHLORIDE 0.9 % (FLUSH) 0.9 %
1-10 SYRINGE (ML) INJECTION AS NEEDED
Status: DISCONTINUED | OUTPATIENT
Start: 2017-09-21 | End: 2017-09-22 | Stop reason: HOSPADM

## 2017-09-21 RX ORDER — FENTANYL CITRATE 50 UG/ML
50 INJECTION, SOLUTION INTRAMUSCULAR; INTRAVENOUS AS NEEDED
Status: DISCONTINUED | OUTPATIENT
Start: 2017-09-21 | End: 2017-09-22 | Stop reason: HOSPADM

## 2017-09-21 RX ORDER — LIDOCAINE HYDROCHLORIDE 10 MG/ML
0.5 INJECTION, SOLUTION INFILTRATION; PERINEURAL ONCE AS NEEDED
Status: CANCELLED | OUTPATIENT
Start: 2017-10-18

## 2017-09-21 RX ORDER — METOPROLOL SUCCINATE 50 MG/1
50 TABLET, EXTENDED RELEASE ORAL DAILY
COMMUNITY
End: 2017-09-28 | Stop reason: SDUPTHER

## 2017-09-21 RX ORDER — MONTELUKAST SODIUM 10 MG/1
10 TABLET ORAL NIGHTLY
COMMUNITY
End: 2017-11-13

## 2017-09-21 RX ADMIN — METHYLPREDNISOLONE ACETATE 80 MG: 80 INJECTION, SUSPENSION INTRA-ARTICULAR; INTRALESIONAL; INTRAMUSCULAR; SOFT TISSUE at 08:47

## 2017-09-21 RX ADMIN — MIDAZOLAM 1 MG: 1 INJECTION INTRAMUSCULAR; INTRAVENOUS at 08:43

## 2017-09-21 NOTE — H&P
[facility]    History and Physical    Patient Name: Marleni Hummel  :  1935  MRN:  [id number]  Date of Admission: 2017    Subjective     Patient is a 81 y.o. female presents with chief complaint of chronic low back, hips: bilateral and leg: bilateral pain.  Onset of symptoms was gradual starting a few months ago.  Symptoms are associated/aggravated by standing or walking for more than a few minutes. Symptoms improve with rest. She had an MRI that showed multilevel spondylosis with severe spinal stenosis and facet arthropathy. Her last epidurals were in . Of which she had a very good response. She has been off Eliquis 5 days.    The following portions of the patients history were reviewed and updated as appropriate: current medications, allergies, past medical history, past surgical history, past family history, past social history and problem list                Objective     Past Medical History:   Past Medical History:   Diagnosis Date   • Allergic    • Allergic rhinitis    • Arthropathy of knee     right   • Atrial fibrillation    • Back pain    • Bell's palsy    • Chronic kidney disease (CKD), stage III (moderate)    • Cough    • Edema    • Encounter for eye exam 2015   • Essential hypertension    • Fatigue    • GERD (gastroesophageal reflux disease)    • H/O Heart murmur    • Heart attack    • Herpes zoster without complication    • Hip arthritis     left   • History of bone density study 2008   • History of pneumonia    • Hypercholesterolemia    • IFG (impaired fasting glucose)    • Insomnia    • Nephropathy    • Osteoarthritis     Right hip   • Osteopenia    • Panic disorder    • Rectal bleeding    • Sleep apnea    • Stress    • Urinary incontinence    • Vitamin D deficiency      Past Surgical History:   Past Surgical History:   Procedure Laterality Date   • CATARACT EXTRACTION Left 2013    Dr. Clarke   • CATARACT EXTRACTION Right 2013    Dr. Clarke   •  "COLONOSCOPY  04/18/2014    Dr. Elizabeth; no polyps   • HIP PERCUTANEOUS PINNING Left 8/31/2017    Procedure: LT HIP PERCUTANEOUS PINNING;  Surgeon: eSan Canales MD;  Location: [facility] OR;  Service:    • JOINT REPLACEMENT Left 2004    knee Petrick   • JOINT REPLACEMENT Right 2004    knee Popham   • MAMMO BILATERAL  11/2013   • PAP SMEAR  02/2011   • TOTAL HIP ARTHROPLASTY Right 08/2015     Family History:   Family History   Problem Relation Age of Onset   • Hypertension Other    • Hypertension Mother    • Stroke Mother    • Hypertension Maternal Grandmother      Social History:   Social History   Substance Use Topics   • Smoking status: Former Smoker     Packs/day: 1.00     Years: 3.00     Types: Cigarettes     Quit date: 2/22/1956   • Smokeless tobacco: Never Used   • Alcohol use 0.6 - 1.2 oz/week     1 - 2 Glasses of wine per week      Comment: rare       Vital Signs Range for the last 24 hours  Temperature:     Temp Source:     BP:     Pulse:     Respirations:     SPO2:     O2 Amount (l/min):     O2 Devices     Weight: Weight:  [150 lb (68 kg)] 150 lb (68 kg)     Flowsheet Rows         First Filed Value    Admission Height  64\" (162.6 cm) Documented at 09/21/2017 0745    Admission Weight  150 lb (68 kg) Documented at 09/21/2017 0745          --------------------------------------------------------------------------------    Current Outpatient Prescriptions   Medication Sig Dispense Refill   • acetaminophen (TYLENOL) 500 MG tablet Take 1,000 mg by mouth 3 (three) times a day as needed for mild pain (1-3) or moderate pain (4-6).     • amLODIPine (NORVASC) 2.5 MG tablet Take 1 tablet by mouth 2 (Two) Times a Day. 180 tablet 0   • atorvastatin (LIPITOR) 20 MG tablet Take 20 mg by mouth Every Night.     • DULoxetine (CYMBALTA) 30 MG capsule Take 30 mg by mouth Daily.     • levothyroxine (SYNTHROID, LEVOTHROID) 50 MCG tablet Take 50 mcg by mouth Daily.     • metoprolol succinate XL (TOPROL-XL) 50 MG 24 hr tablet " Take 50 mg by mouth Daily.     • montelukast (SINGULAIR) 10 MG tablet Take 10 mg by mouth Every Night. As needed     • MYRBETRIQ 25 MG tablet sustained-release 24 hour 24 hr tablet Take 50 mg by mouth Daily.     • pantoprazole (PROTONIX) 20 MG EC tablet Take 1 tablet by mouth Every Night for 180 days. 90 tablet 1   • senna (SENOKOT) 8.6 MG tablet tablet Take 2 tablets by mouth At Night As Needed for Constipation.     • sodium chloride (OCEAN NASAL SPRAY) 0.65 % nasal spray 1 spray into each nostril As Needed for congestion. 104 mL 0   • colchicine 0.6 MG tablet Take 1 tablet by mouth 2 (Two) Times a Day for 30 days. 60 tablet 0   • DULoxetine (CYMBALTA) 30 MG capsule Take 1 capsule by mouth Daily for 180 days. 90 capsule 1   • ELIQUIS 2.5 MG tablet tablet TAKE 1 TABLET TWICE A  tablet 1   • metoprolol succinate XL (TOPROL-XL) 50 MG 24 hr tablet Take 1 tablet by mouth Daily for 180 days. 90 tablet 1   • trimethoprim (TRIMPEX) 100 MG tablet Take 100 mg by mouth Daily.       Current Facility-Administered Medications   Medication Dose Route Frequency Provider Last Rate Last Dose   • sodium chloride 0.9 % flush 1-10 mL  1-10 mL Intravenous PRN Aldair Fraga MD           --------------------------------------------------------------------------------  Assessment/Plan      Anesthesia Evaluation            Airway   Mallampati: II  Dental    (+) upper dentures and lower dentures    Pulmonary    Cardiovascular     Rhythm: regular  Rate: normal        Neuro/Psych    PE Comment: Not tested due to TKAs  GI/Hepatic/Renal/Endo      Musculoskeletal     (+) Straight Leg Test,   Abdominal    Substance History      OB/GYN          Other                                   Diagnosis and Plan    Treatment Plan  ASA 3      Procedures: Lumbar Epidural Steroid Injection(LESI), With fluoroscopy, With sedation      Anesthetic plan and risks discussed with patient and spouse.          Diagnosis     * Lumbar degenerative disc disease  [M51.36]     * Lumbar spinal stenosis [M48.06]     * Lumbar radicular pain [M54.16]

## 2017-09-21 NOTE — ANESTHESIA PROCEDURE NOTES
PAIN Epidural block    Patient location during procedure: pain clinic  Start Time: 9/21/2017 8:43 AM  Stop Time: 9/21/2017 8:50 AM  Indication:procedure for pain  Performed By  Anesthesiologist: SIXTO BRIZUELA  Preanesthetic Checklist  Completed: patient identified, site marked, surgical consent, pre-op evaluation, timeout performed, IV checked, risks and benefits discussed and monitors and equipment checked  Additional Notes  Post-Op Diagnosis Codes:     * Lumbar degenerative disc disease (M51.36)     * Lumbar spinal stenosis (M48.06)     * Lumbar radicular pain (M54.16)  Performed under fluoroscopic guidance  Prep:  Pt Position:prone  Sterile Tech:cap, gloves, mask and sterile barrier  Prep:chlorhexidine gluconate and isopropyl alcohol  Monitoring:blood pressure monitoring, continuous pulse oximetry and EKG  Procedure:  Approach:right paramedian  Guidance: fluoroscopy  Location:lumbar  Interspace: L5-S1.  Needle Type:Tuohy  Needle Gauge:20  Aspiration:negative  Medications:  Depomedrol:80  Preservative Free Saline:4mL  Comments:No contrast due to Chronic Kidney Disease  Post Assessment:  Post-procedure: Band-aid.  Pt Tolerance:patient tolerated the procedure well with no apparent complications  Complications:no

## 2017-09-24 DIAGNOSIS — I10 ESSENTIAL HYPERTENSION: ICD-10-CM

## 2017-09-25 RX ORDER — AMLODIPINE BESYLATE 2.5 MG/1
TABLET ORAL
Qty: 180 TABLET | Refills: 0 | OUTPATIENT
Start: 2017-09-25

## 2017-09-27 DIAGNOSIS — I10 ESSENTIAL HYPERTENSION: ICD-10-CM

## 2017-09-27 RX ORDER — AMLODIPINE BESYLATE 2.5 MG/1
TABLET ORAL
Qty: 90 TABLET | Refills: 1 | Status: SHIPPED | OUTPATIENT
Start: 2017-09-27 | End: 2017-11-13 | Stop reason: SDUPTHER

## 2017-09-28 ENCOUNTER — TELEPHONE (OUTPATIENT)
Dept: FAMILY MEDICINE CLINIC | Facility: CLINIC | Age: 82
End: 2017-09-28

## 2017-09-28 DIAGNOSIS — F41.0 PANIC DISORDER: ICD-10-CM

## 2017-09-28 DIAGNOSIS — I10 ESSENTIAL HYPERTENSION: ICD-10-CM

## 2017-09-28 DIAGNOSIS — K21.9 GASTROESOPHAGEAL REFLUX DISEASE, ESOPHAGITIS PRESENCE NOT SPECIFIED: ICD-10-CM

## 2017-09-28 RX ORDER — DULOXETIN HYDROCHLORIDE 30 MG/1
30 CAPSULE, DELAYED RELEASE ORAL DAILY
Qty: 90 CAPSULE | Refills: 1 | Status: SHIPPED | OUTPATIENT
Start: 2017-09-28 | End: 2017-11-13 | Stop reason: SDUPTHER

## 2017-09-28 RX ORDER — METOPROLOL SUCCINATE 50 MG/1
50 TABLET, EXTENDED RELEASE ORAL DAILY
Qty: 90 TABLET | Refills: 1 | Status: SHIPPED | OUTPATIENT
Start: 2017-09-28 | End: 2017-11-13 | Stop reason: SDUPTHER

## 2017-09-28 RX ORDER — PANTOPRAZOLE SODIUM 20 MG/1
20 TABLET, DELAYED RELEASE ORAL NIGHTLY
Qty: 90 TABLET | Refills: 1 | Status: SHIPPED | OUTPATIENT
Start: 2017-09-28 | End: 2017-11-13 | Stop reason: SDUPTHER

## 2017-10-07 ENCOUNTER — RESULTS ENCOUNTER (OUTPATIENT)
Dept: FAMILY MEDICINE CLINIC | Facility: CLINIC | Age: 82
End: 2017-10-07

## 2017-10-07 DIAGNOSIS — D63.8 ANEMIA OF CHRONIC DISEASE: ICD-10-CM

## 2017-10-07 DIAGNOSIS — E03.9 ACQUIRED HYPOTHYROIDISM: ICD-10-CM

## 2017-10-07 DIAGNOSIS — E79.0 HYPERURICEMIA: ICD-10-CM

## 2017-10-07 DIAGNOSIS — I10 ESSENTIAL HYPERTENSION: ICD-10-CM

## 2017-10-07 DIAGNOSIS — Z13.6 SCREENING FOR ISCHEMIC HEART DISEASE: ICD-10-CM

## 2017-10-19 ENCOUNTER — ANESTHESIA EVENT (OUTPATIENT)
Dept: PAIN MEDICINE | Facility: HOSPITAL | Age: 82
End: 2017-10-19

## 2017-10-19 ENCOUNTER — ANESTHESIA (OUTPATIENT)
Dept: PAIN MEDICINE | Facility: HOSPITAL | Age: 82
End: 2017-10-19

## 2017-10-19 ENCOUNTER — HOSPITAL ENCOUNTER (OUTPATIENT)
Dept: GENERAL RADIOLOGY | Facility: HOSPITAL | Age: 82
Discharge: HOME | End: 2017-10-19

## 2017-10-19 ENCOUNTER — HOSPITAL ENCOUNTER (OUTPATIENT)
Dept: PAIN MEDICINE | Facility: HOSPITAL | Age: 82
Discharge: HOME | End: 2017-10-19
Admitting: ORTHOPAEDIC SURGERY

## 2017-10-19 VITALS
RESPIRATION RATE: 16 BRPM | DIASTOLIC BLOOD PRESSURE: 80 MMHG | TEMPERATURE: 98.1 F | OXYGEN SATURATION: 97 % | HEART RATE: 72 BPM | SYSTOLIC BLOOD PRESSURE: 130 MMHG

## 2017-10-19 DIAGNOSIS — M54.40 ACUTE LEFT-SIDED LOW BACK PAIN WITH SCIATICA, SCIATICA LATERALITY UNSPECIFIED: ICD-10-CM

## 2017-10-19 DIAGNOSIS — R52 PAIN: ICD-10-CM

## 2017-10-19 PROCEDURE — C1755 CATHETER, INTRASPINAL: HCPCS

## 2017-10-19 PROCEDURE — 77003 FLUOROGUIDE FOR SPINE INJECT: CPT

## 2017-10-19 PROCEDURE — 25010000002 METHYLPREDNISOLONE PER 80 MG: Performed by: ANESTHESIOLOGY

## 2017-10-19 PROCEDURE — 25010000002 MIDAZOLAM PER 1 MG: Performed by: ANESTHESIOLOGY

## 2017-10-19 PROCEDURE — 0 IOPAMIDOL 41 % SOLUTION: Performed by: ANESTHESIOLOGY

## 2017-10-19 RX ORDER — SODIUM CHLORIDE 0.9 % (FLUSH) 0.9 %
1-10 SYRINGE (ML) INJECTION AS NEEDED
Status: DISCONTINUED | OUTPATIENT
Start: 2017-10-19 | End: 2017-10-20 | Stop reason: HOSPADM

## 2017-10-19 RX ORDER — LIDOCAINE HYDROCHLORIDE 10 MG/ML
0.5 INJECTION, SOLUTION INFILTRATION; PERINEURAL ONCE AS NEEDED
Status: DISCONTINUED | OUTPATIENT
Start: 2017-10-19 | End: 2017-10-20 | Stop reason: HOSPADM

## 2017-10-19 RX ORDER — METHYLPREDNISOLONE ACETATE 80 MG/ML
80 INJECTION, SUSPENSION INTRA-ARTICULAR; INTRALESIONAL; INTRAMUSCULAR; SOFT TISSUE ONCE
Status: COMPLETED | OUTPATIENT
Start: 2017-10-19 | End: 2017-10-19

## 2017-10-19 RX ORDER — MIDAZOLAM HYDROCHLORIDE 1 MG/ML
1 INJECTION INTRAMUSCULAR; INTRAVENOUS AS NEEDED
Status: DISCONTINUED | OUTPATIENT
Start: 2017-10-19 | End: 2017-10-20 | Stop reason: HOSPADM

## 2017-10-19 RX ORDER — LIDOCAINE HYDROCHLORIDE 10 MG/ML
1 INJECTION, SOLUTION INFILTRATION; PERINEURAL ONCE AS NEEDED
Status: DISCONTINUED | OUTPATIENT
Start: 2017-10-19 | End: 2017-10-20 | Stop reason: HOSPADM

## 2017-10-19 RX ORDER — FENTANYL CITRATE 50 UG/ML
50 INJECTION, SOLUTION INTRAMUSCULAR; INTRAVENOUS AS NEEDED
Status: DISCONTINUED | OUTPATIENT
Start: 2017-10-19 | End: 2017-10-20 | Stop reason: HOSPADM

## 2017-10-19 RX ADMIN — METHYLPREDNISOLONE ACETATE 80 MG: 80 INJECTION, SUSPENSION INTRA-ARTICULAR; INTRALESIONAL; INTRAMUSCULAR; SOFT TISSUE at 09:17

## 2017-10-19 RX ADMIN — IOPAMIDOL 10 ML: 408 INJECTION, SOLUTION INTRATHECAL at 09:17

## 2017-10-19 RX ADMIN — MIDAZOLAM 1 MG: 1 INJECTION INTRAMUSCULAR; INTRAVENOUS at 09:14

## 2017-10-19 NOTE — H&P
INTERVAL HISTORY:    The patient returns for another Lumbar epidural steroid injection today.  They have received 80% relief for over 6 weeks.  Her pain scale is 4 out of 10 now.  She is doing home exercise and walking as well as taking Cymbalta Tylenol.  Her MRI shows spinal stenosis.      Exam:  /72 (BP Location: Left arm, Patient Position: Sitting)  Pulse 70  Temp 36.7 °C (98.1 °F) (Oral)   Resp 16  SpO2 97%  Airway Mallampatti 2  Alert and oriented      Diagnosis:  Post-Op Diagnosis Codes:     * Spinal stenosis, lumbar region, without neurogenic claudication [M48.061]     * Lumbar neuritis [M54.16]    Plan:  Lumbar epidural steroid injection under fluoroscopic guidance    I have encouraged them to continue:  1.  Physical therapy exercises at home as prescribed by physical therapy or from the pain clinic handout (given to the patient).  Continuation of these exercises every day, or multiple times per week, even when the patient has good pain relief, was stressed to the patient as a preventative measure to decrease the frequency and severity of future pain episodes.  2.  Continue pain medicines as already prescribed.  If patient not currently taking any, it is recommended to begin Acetaminophen 1000 mg po q 8 hours.  If other medicines containing Acetaminophen are currently prescribed, maintain daily dose at 3000mg.    3.  If they can tolerate NSAIDS, it is recommended to take Ibuprofen 600 mg po q 6 hours for 7 days during pain exacerbations.   Alternatively, they may substitute an NSAID of their choice (e.g. Aleve)  4.  Heat and ice to the affected area as tolerated for pain control.  It was discussed that heating pads can cause burns.  5.  Low impact exercise such as walking or water exercise was recommended to maintain overall health and aid in weight control.   6.  Follow up as needed for subsequent injections.  7.  Patient was counseled to abstain from tobacco products.

## 2017-10-19 NOTE — ANESTHESIA PROCEDURE NOTES
PAIN Epidural block    Patient location during procedure: pain clinic  Start Time: 10/19/2017 9:12 AM  Stop Time: 10/19/2017 9:24 AM  Indication:procedure for pain  Performed By  Anesthesiologist: TIMBO DELEON  Preanesthetic Checklist  Completed: patient identified and risks and benefits discussed  Additional Notes  Diagnosis:     Post-Op Diagnosis Codes:     * Spinal stenosis, lumbar region, without neurogenic claudication (M48.061)     * Lumbar neuritis (M54.16)    Sedation:  Versed 1 mg    A lumbar epidural steroid injection with fluoroscopic guidance and an Isovue dye epidurogram was performed.  Under fluoroscopic guidance, the epidural space was identified and accessed, confirmed by loss of resistance to saline followed by injection of Isovue dye, which shows a good epidurogram.  The above medications were injected uneventfully.    Prep:  Pt Position:prone  Sterile Tech:cap, gloves, mask and sterile barrier  Prep:chlorhexidine gluconate and isopropyl alcohol  Monitoring:blood pressure monitoring, continuous pulse oximetry and EKG  Procedure:  Approach:midline  Guidance: fluoroscopy  Location:lumbar  Level:4-5  Needle Type:Tuohy  Needle Gauge:20  Aspiration:negative  Medications:  Depomedrol:80  Preservative Free Saline:3mL  Isovue:2mL    Post Assessment:  Pt Tolerance:patient tolerated the procedure well with no apparent complications  Complications:no

## 2017-11-10 LAB
ALBUMIN SERPL-MCNC: 5 G/DL (ref 3.5–5.2)
ALBUMIN/GLOB SERPL: 1.7 G/DL
ALP SERPL-CCNC: 62 U/L (ref 39–117)
ALT SERPL-CCNC: 17 U/L (ref 1–33)
AST SERPL-CCNC: 16 U/L (ref 1–32)
BASOPHILS # BLD AUTO: 0.02 10*3/MM3 (ref 0–0.2)
BASOPHILS NFR BLD AUTO: 0.2 % (ref 0–1.5)
BILIRUB SERPL-MCNC: 0.7 MG/DL (ref 0.1–1.2)
BUN SERPL-MCNC: 31 MG/DL (ref 8–23)
BUN/CREAT SERPL: 22.3 (ref 7–25)
CALCIUM SERPL-MCNC: 10.4 MG/DL (ref 8.6–10.5)
CHLORIDE SERPL-SCNC: 99 MMOL/L (ref 98–107)
CHOLEST SERPL-MCNC: 228 MG/DL (ref 0–200)
CO2 SERPL-SCNC: 27.4 MMOL/L (ref 22–29)
CREAT SERPL-MCNC: 1.39 MG/DL (ref 0.57–1)
EOSINOPHIL # BLD AUTO: 0.09 10*3/MM3 (ref 0–0.7)
EOSINOPHIL NFR BLD AUTO: 1 % (ref 0.3–6.2)
ERYTHROCYTE [DISTWIDTH] IN BLOOD BY AUTOMATED COUNT: 14.8 % (ref 11.7–13)
GFR SERPLBLD CREATININE-BSD FMLA CKD-EPI: 37 ML/MIN/1.73
GFR SERPLBLD CREATININE-BSD FMLA CKD-EPI: 44 ML/MIN/1.73
GLOBULIN SER CALC-MCNC: 2.9 GM/DL
GLUCOSE SERPL-MCNC: 122 MG/DL (ref 65–99)
HCT VFR BLD AUTO: 41.7 % (ref 35.6–45.5)
HDLC SERPL-MCNC: 67 MG/DL (ref 40–60)
HGB BLD-MCNC: 13.1 G/DL (ref 11.9–15.5)
IMM GRANULOCYTES # BLD: 0.04 10*3/MM3 (ref 0–0.03)
IMM GRANULOCYTES NFR BLD: 0.5 % (ref 0–0.5)
LDLC SERPL CALC-MCNC: 123 MG/DL (ref 0–100)
LDLC/HDLC SERPL: 1.83 {RATIO}
LYMPHOCYTES # BLD AUTO: 2.02 10*3/MM3 (ref 0.9–4.8)
LYMPHOCYTES NFR BLD AUTO: 23.1 % (ref 19.6–45.3)
MCH RBC QN AUTO: 30.5 PG (ref 26.9–32)
MCHC RBC AUTO-ENTMCNC: 31.4 G/DL (ref 32.4–36.3)
MCV RBC AUTO: 97.2 FL (ref 80.5–98.2)
MONOCYTES # BLD AUTO: 0.7 10*3/MM3 (ref 0.2–1.2)
MONOCYTES NFR BLD AUTO: 8 % (ref 5–12)
NEUTROPHILS # BLD AUTO: 5.88 10*3/MM3 (ref 1.9–8.1)
NEUTROPHILS NFR BLD AUTO: 67.2 % (ref 42.7–76)
PLATELET # BLD AUTO: 242 10*3/MM3 (ref 140–500)
POTASSIUM SERPL-SCNC: 5.1 MMOL/L (ref 3.5–5.2)
PROT SERPL-MCNC: 7.9 G/DL (ref 6–8.5)
RBC # BLD AUTO: 4.29 10*6/MM3 (ref 3.9–5.2)
SODIUM SERPL-SCNC: 140 MMOL/L (ref 136–145)
T4 FREE SERPL-MCNC: 2.07 NG/DL (ref 0.93–1.7)
TRIGL SERPL-MCNC: 192 MG/DL (ref 0–150)
TSH SERPL DL<=0.005 MIU/L-ACNC: 0.32 MIU/ML (ref 0.27–4.2)
URATE SERPL-MCNC: 7.5 MG/DL (ref 2.4–5.7)
VLDLC SERPL CALC-MCNC: 38.4 MG/DL (ref 5–40)
WBC # BLD AUTO: 8.75 10*3/MM3 (ref 4.5–10.7)

## 2017-11-13 ENCOUNTER — OFFICE VISIT (OUTPATIENT)
Dept: FAMILY MEDICINE CLINIC | Facility: CLINIC | Age: 82
End: 2017-11-13

## 2017-11-13 VITALS
BODY MASS INDEX: 26.29 KG/M2 | SYSTOLIC BLOOD PRESSURE: 143 MMHG | HEIGHT: 64 IN | TEMPERATURE: 97.2 F | RESPIRATION RATE: 16 BRPM | HEART RATE: 62 BPM | WEIGHT: 154 LBS | DIASTOLIC BLOOD PRESSURE: 82 MMHG

## 2017-11-13 DIAGNOSIS — I25.10 CHRONIC CORONARY ARTERY DISEASE: ICD-10-CM

## 2017-11-13 DIAGNOSIS — K21.9 GASTROESOPHAGEAL REFLUX DISEASE, ESOPHAGITIS PRESENCE NOT SPECIFIED: ICD-10-CM

## 2017-11-13 DIAGNOSIS — Z23 IMMUNIZATION DUE: ICD-10-CM

## 2017-11-13 DIAGNOSIS — I10 ESSENTIAL HYPERTENSION: Primary | ICD-10-CM

## 2017-11-13 DIAGNOSIS — E03.9 ACQUIRED HYPOTHYROIDISM: ICD-10-CM

## 2017-11-13 DIAGNOSIS — E79.0 HYPERURICEMIA: ICD-10-CM

## 2017-11-13 DIAGNOSIS — F41.0 PANIC DISORDER: ICD-10-CM

## 2017-11-13 PROBLEM — S72.002A CLOSED DISPLACED FRACTURE OF LEFT FEMORAL NECK: Status: RESOLVED | Noted: 2017-08-30 | Resolved: 2017-11-13

## 2017-11-13 PROBLEM — R82.71 BACTERIURIA: Status: RESOLVED | Noted: 2017-08-30 | Resolved: 2017-11-13

## 2017-11-13 PROCEDURE — G0008 ADMIN INFLUENZA VIRUS VAC: HCPCS | Performed by: FAMILY MEDICINE

## 2017-11-13 PROCEDURE — 99214 OFFICE O/P EST MOD 30 MIN: CPT | Performed by: FAMILY MEDICINE

## 2017-11-13 RX ORDER — AMLODIPINE BESYLATE 2.5 MG/1
2.5 TABLET ORAL 2 TIMES DAILY
Qty: 180 TABLET | Refills: 1 | Status: SHIPPED | OUTPATIENT
Start: 2017-11-13 | End: 2018-05-10 | Stop reason: SDUPTHER

## 2017-11-13 RX ORDER — LEVOTHYROXINE SODIUM 0.03 MG/1
25 TABLET ORAL DAILY
Qty: 90 TABLET | Refills: 1 | Status: SHIPPED | OUTPATIENT
Start: 2017-11-13 | End: 2018-05-10

## 2017-11-13 RX ORDER — PANTOPRAZOLE SODIUM 20 MG/1
20 TABLET, DELAYED RELEASE ORAL NIGHTLY
Qty: 90 TABLET | Refills: 1 | Status: SHIPPED | OUTPATIENT
Start: 2017-11-13 | End: 2018-05-10 | Stop reason: SDUPTHER

## 2017-11-13 RX ORDER — LEVOTHYROXINE SODIUM 0.05 MG/1
50 TABLET ORAL DAILY
Qty: 90 TABLET | Refills: 1 | Status: SHIPPED | OUTPATIENT
Start: 2017-11-13 | End: 2017-11-13 | Stop reason: SDUPTHER

## 2017-11-13 RX ORDER — ALLOPURINOL 100 MG/1
100 TABLET ORAL DAILY
Qty: 90 TABLET | Refills: 1 | Status: SHIPPED | OUTPATIENT
Start: 2017-11-13 | End: 2018-05-10 | Stop reason: SDUPTHER

## 2017-11-13 RX ORDER — DULOXETIN HYDROCHLORIDE 30 MG/1
30 CAPSULE, DELAYED RELEASE ORAL DAILY
Qty: 90 CAPSULE | Refills: 1 | Status: SHIPPED | OUTPATIENT
Start: 2017-11-13 | End: 2018-05-10 | Stop reason: SDUPTHER

## 2017-11-13 RX ORDER — ATORVASTATIN CALCIUM 20 MG/1
20 TABLET, FILM COATED ORAL NIGHTLY
Qty: 90 TABLET | Refills: 1 | Status: SHIPPED | OUTPATIENT
Start: 2017-11-13 | End: 2018-05-10 | Stop reason: SDUPTHER

## 2017-11-13 RX ORDER — METOPROLOL SUCCINATE 50 MG/1
50 TABLET, EXTENDED RELEASE ORAL DAILY
Qty: 90 TABLET | Refills: 1 | Status: SHIPPED | OUTPATIENT
Start: 2017-11-13 | End: 2018-05-10 | Stop reason: SDUPTHER

## 2017-11-13 NOTE — PROGRESS NOTES
"Chief Complaint   Patient presents with   • Hypertension       Subjective     Marleni presents to the office today to refill her medications and review recent labs. No medication side effects are reported.  Her T4 level is elevated.  We will decrease the thyroxine to 25 µg daily.  Uric acid level remains elevated along with slight worsening of kidney function.  We will add allopurinol to her current regimen.  She has not had recent gout attacks.  GERD symptoms are stable with pantoprazole.  Her panic disorder is controlled with current medication.  Blood pressure is stable.  Lipids are slightly above goal.  She will watch diet and exercise as she is able to help with that condition.  We will continue with atorvastatin at current dose.    I have reviewed and updated her medications, medical history and problem list during today's office visit.        Social History   Substance Use Topics   • Smoking status: Former Smoker     Packs/day: 1.00     Years: 3.00     Types: Cigarettes     Quit date: 2/22/1956   • Smokeless tobacco: Never Used   • Alcohol use 0.6 - 1.2 oz/week     1 - 2 Glasses of wine per week      Comment: rare       Review of Systems   Constitutional: Negative for fatigue.   Cardiovascular: Negative for chest pain.   Genitourinary: Negative for difficulty urinating.       Objective   /82  Pulse 62  Temp 97.2 °F (36.2 °C) (Oral)   Resp 16  Ht 64\" (162.6 cm)  Wt 154 lb (69.9 kg)  BMI 26.43 kg/m2  Body mass index is 26.43 kg/(m^2).  Physical Exam   Constitutional: She is cooperative. No distress.   Eyes: Conjunctivae and lids are normal.   Neck: Carotid bruit is not present. No tracheal deviation present.   Cardiovascular: Normal rate, regular rhythm and normal heart sounds.    No murmur heard.  Pulmonary/Chest: Effort normal and breath sounds normal.   Neurological: She is alert. She is not disoriented.   Skin: Skin is warm and dry.   Psychiatric: She has a normal mood and affect. Her speech is " normal and behavior is normal.   Vitals reviewed.      Data Reviewed:      CMP:  Lab Results   Component Value Date     (H) 11/09/2017    BUN 31 (H) 11/09/2017    CREATININE 1.39 (H) 11/09/2017    EGFRIFNONA 37 (L) 11/09/2017    EGFRIFAFRI 44 (L) 11/09/2017     11/09/2017    K 5.1 11/09/2017    CL 99 11/09/2017    CALCIUM 10.4 11/09/2017    PROTENTOTREF 7.9 11/09/2017    ALBUMIN 5.00 11/09/2017    LABGLOBREF 2.9 11/09/2017    BILITOT 0.7 11/09/2017    ALKPHOS 62 11/09/2017    AST 16 11/09/2017    ALT 17 11/09/2017     CBC w/ diff:   Lab Results   Component Value Date    WBC 8.75 11/09/2017    RBC 4.29 11/09/2017    HGB 13.1 11/09/2017    HCT 41.7 11/09/2017    MCV 97.2 11/09/2017    MCH 30.5 11/09/2017    MCHC 31.4 (L) 11/09/2017    RDW 14.8 (H) 11/09/2017     11/09/2017    NEUTRORELPCT 67.2 11/09/2017    AUTOIGPER 0.0 06/20/2017    LYMPHORELPCT 23.1 11/09/2017    MONORELPCT 8.0 11/09/2017    EOSRELPCT 1.0 11/09/2017    BASORELPCT 0.2 11/09/2017     LIPID PANEL:  Lab Results   Component Value Date    CHLPL 228 (H) 11/09/2017    TRIG 192 (H) 11/09/2017    HDL 67 (H) 11/09/2017    VLDL 38.4 11/09/2017     (H) 11/09/2017    LDLHDL 1.83 11/09/2017     TSH:  Lab Results   Component Value Date    TSH 0.323 11/09/2017       Assessment/Plan     Problem List Items Addressed This Visit        Cardiovascular and Mediastinum    Essential hypertension - Primary    Relevant Medications    amLODIPine (NORVASC) 2.5 MG tablet    metoprolol succinate XL (TOPROL-XL) 50 MG 24 hr tablet    Other Relevant Orders    Comprehensive metabolic panel    CBC and Differential    Chronic coronary artery disease    Relevant Medications    amLODIPine (NORVASC) 2.5 MG tablet    metoprolol succinate XL (TOPROL-XL) 50 MG 24 hr tablet    Other Relevant Orders    Lipid Panel With LDL/HDL Ratio       Digestive    GERD (gastroesophageal reflux disease)    Relevant Medications    pantoprazole (PROTONIX) 20 MG EC tablet        Endocrine    Acquired hypothyroidism    Relevant Medications    metoprolol succinate XL (TOPROL-XL) 50 MG 24 hr tablet    levothyroxine (SYNTHROID, LEVOTHROID) 25 MCG tablet    Other Relevant Orders    TSH    T4, Free       Other    Panic disorder    Relevant Medications    DULoxetine (CYMBALTA) 30 MG capsule    Hyperuricemia    Relevant Medications    allopurinol (ZYLOPRIM) 100 MG tablet    Other Relevant Orders    Uric Acid      Other Visit Diagnoses     Immunization due        Relevant Orders    Flu Vaccine Quad PF 3YR+ (FLUARIX/FLUZONE 5314-5161)          Outpatient Encounter Prescriptions as of 11/13/2017   Medication Sig Dispense Refill   • acetaminophen (TYLENOL) 500 MG tablet Take 1,000 mg by mouth 3 (three) times a day as needed for mild pain (1-3) or moderate pain (4-6).     • amLODIPine (NORVASC) 2.5 MG tablet Take 1 tablet by mouth 2 (Two) Times a Day for 180 days. 180 tablet 1   • atorvastatin (LIPITOR) 20 MG tablet Take 1 tablet by mouth Every Night for 180 days. 90 tablet 1   • DULoxetine (CYMBALTA) 30 MG capsule Take 1 capsule by mouth Daily for 180 days. 90 capsule 1   • ELIQUIS 2.5 MG tablet tablet TAKE 1 TABLET TWICE A  tablet 1   • levothyroxine (SYNTHROID, LEVOTHROID) 25 MCG tablet Take 1 tablet by mouth Daily for 180 days. 90 tablet 1   • metoprolol succinate XL (TOPROL-XL) 50 MG 24 hr tablet Take 1 tablet by mouth Daily for 180 days. 90 tablet 1   • MYRBETRIQ 25 MG tablet sustained-release 24 hour 24 hr tablet Take 50 mg by mouth Daily.     • pantoprazole (PROTONIX) 20 MG EC tablet Take 1 tablet by mouth Every Night for 180 days. 90 tablet 1   • senna (SENOKOT) 8.6 MG tablet tablet Take 2 tablets by mouth At Night As Needed for Constipation.     • sodium chloride (OCEAN NASAL SPRAY) 0.65 % nasal spray 1 spray into each nostril As Needed for congestion. 104 mL 0   • [DISCONTINUED] amLODIPine (NORVASC) 2.5 MG tablet Take 1 tablet by mouth 2 (Two) Times a Day. 180 tablet 0   •  [DISCONTINUED] atorvastatin (LIPITOR) 20 MG tablet Take 20 mg by mouth Every Night.     • [DISCONTINUED] DULoxetine (CYMBALTA) 30 MG capsule Take 1 capsule by mouth Daily for 180 days. 90 capsule 1   • [DISCONTINUED] levothyroxine (SYNTHROID, LEVOTHROID) 50 MCG tablet Take 50 mcg by mouth Daily.     • [DISCONTINUED] levothyroxine (SYNTHROID, LEVOTHROID) 50 MCG tablet Take 1 tablet by mouth Daily for 180 days. 90 tablet 1   • [DISCONTINUED] metoprolol succinate XL (TOPROL-XL) 50 MG 24 hr tablet Take 1 tablet by mouth Daily for 180 days. 90 tablet 1   • [DISCONTINUED] montelukast (SINGULAIR) 10 MG tablet Take 10 mg by mouth Every Night. As needed     • [DISCONTINUED] pantoprazole (PROTONIX) 20 MG EC tablet Take 1 tablet by mouth Every Night for 180 days. 90 tablet 1   • allopurinol (ZYLOPRIM) 100 MG tablet Take 1 tablet by mouth Daily for 180 days. 90 tablet 1   • [DISCONTINUED] amLODIPine (NORVASC) 2.5 MG tablet TAKE 1 TABLET DAILY 90 tablet 1   • [DISCONTINUED] colchicine 0.6 MG tablet Take 1 tablet by mouth 2 (Two) Times a Day for 30 days. 60 tablet 0     No facility-administered encounter medications on file as of 11/13/2017.        Orders Placed This Encounter   Procedures   • Flu Vaccine Quad PF 3YR+ (FLUARIX/FLUZONE 8535-3946)   • Comprehensive metabolic panel     Standing Status:   Future     Standing Expiration Date:   8/10/2018   • Lipid Panel With LDL/HDL Ratio     Standing Status:   Future     Standing Expiration Date:   8/10/2018   • TSH     Standing Status:   Future     Standing Expiration Date:   8/10/2018   • T4, Free     Standing Status:   Future     Standing Expiration Date:   8/10/2018   • Uric Acid     Standing Status:   Future     Standing Expiration Date:   8/10/2018   • CBC and Differential     Standing Status:   Future     Standing Expiration Date:   8/10/2018     Order Specific Question:   Manual Differential     Answer:   No            F/U in 6 months

## 2017-11-22 ENCOUNTER — OFFICE VISIT (OUTPATIENT)
Dept: CARDIOLOGY | Facility: CLINIC | Age: 82
End: 2017-11-22

## 2017-11-22 VITALS
HEART RATE: 74 BPM | HEIGHT: 63 IN | BODY MASS INDEX: 27.11 KG/M2 | WEIGHT: 153 LBS | SYSTOLIC BLOOD PRESSURE: 136 MMHG | DIASTOLIC BLOOD PRESSURE: 84 MMHG

## 2017-11-22 DIAGNOSIS — E78.2 MIXED HYPERLIPIDEMIA: ICD-10-CM

## 2017-11-22 DIAGNOSIS — I42.0 DILATED CARDIOMYOPATHY (HCC): ICD-10-CM

## 2017-11-22 DIAGNOSIS — I25.10 CORONARY ARTERY DISEASE INVOLVING NATIVE CORONARY ARTERY OF NATIVE HEART WITHOUT ANGINA PECTORIS: ICD-10-CM

## 2017-11-22 DIAGNOSIS — I48.0 PAROXYSMAL ATRIAL FIBRILLATION (HCC): Primary | ICD-10-CM

## 2017-11-22 PROCEDURE — 99214 OFFICE O/P EST MOD 30 MIN: CPT | Performed by: INTERNAL MEDICINE

## 2017-11-22 PROCEDURE — 93000 ELECTROCARDIOGRAM COMPLETE: CPT | Performed by: INTERNAL MEDICINE

## 2017-11-22 NOTE — PROGRESS NOTES
Date of Office Visit: 17  Encounter Provider: Jason Tai MD  Place of Service: [facility] CARDIOLOGY  Patient Name: Marleni Hummel  :1935      Chief Complaint   Patient presents with   • Atrial Fibrillation     History of Present Illness  HPI Comments:  The patient is a pleasant, 81-year-old female with history of hypertension and  hyperlipidemia who presented with a cough and respiratory illness to the emergency room  but was found to be in rapid atrial fibrillation. Her thyroid studies were normal. Her 2-D  echocardiogram was unremarkable. Her perfusion study was unremarkable. She was rate  controlled and anticoagulated. She was also seen by pulmonary. Then, in 2011, she  had been adequately anticoagulated and was cardioverted but went back into atrial  fibrillation. She was started on flecainide and then underwent repeat cardioversion in May  2011. epidural for.  She then had to have hip surgery and had total hip arthroplasty from recent femur  fracture and unfortunately developed an infection of that. I believe had multiple  surgeries on that. Then on 10/02/2015 she came in again and was bradycardic but that was  after she was nauseated and vomiting. Her troponin was borderline elevated. Medications  were adjusted. 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 She did have a circumflex  lesion where they placed a drug eluting stent. She recovered from that. Obviously, we  had to stop the flecainide. We switched her to amiodarone. She then went to a nursing  home, while there concerned about the interaction between her amiodarone and her  psychiatric drug for depression. She had elevated QT interval, so we had to stop the  amiodarone.   She then was readmitted for another hip  surgery on 11/18/2015. She did reasonably well  with that. She now comes in for follow-up.  She was back in a hospital in January 2016 she fell and broke her hip again.  She had had yet another surgery.    She had a follow-up echocardiogram in February 2016 her left ventricular function returned to normal.  No significant valvular disease and normal RV pressure.  She comes in for follow-up.  She had been doing reasonably well she did have another fracture of the circumflex or pelvis and in August 2017 had that and it.  She does get some shortness of breath when walking probably about a block but this is been unchanged she has been a little more fatigued over the past month but her  is now turned into, total care she's checking his blood sugars 4 times a day her son comes and helps during the day but after 4 he leaves.  She's had no chest pain or pressure.  No palpitations, near-syncope or syncope no stroke type symptoms no blood in her stool or black tarry stool.    Atrial Fibrillation   Symptoms are negative for dizziness and weakness. Past medical history includes atrial fibrillation and CAD.   Coronary Artery Disease   Pertinent negatives include no dizziness, muscle weakness or weight gain. Her past medical history is significant for past myocardial infarction.         Past Medical History:   Diagnosis Date   • Allergic    • Allergic rhinitis    • Arthropathy of knee     right   • Atrial fibrillation    • Back pain    • Bell's palsy    • Chronic kidney disease (CKD), stage III (moderate)    • Cough    • Edema    • Encounter for eye exam 02/2015   • Essential hypertension    • Fatigue    • GERD (gastroesophageal reflux disease)    • H/O Heart murmur    • Heart attack    • Herpes zoster without complication    • Hip arthritis     left   • History of bone density study 02/28/2008   • History of pneumonia    • Hypercholesterolemia    • IFG (impaired fasting glucose)    • Insomnia    • Nephropathy    •  Osteoarthritis     Right hip   • Osteopenia    • Panic disorder    • Rectal bleeding    • Sleep apnea    • Stress    • Urinary incontinence    • Vitamin D deficiency          Past Surgical History:   Procedure Laterality Date   • CATARACT EXTRACTION Left 04/01/2013    Dr. Clarke   • CATARACT EXTRACTION Right 04/16/2013    Dr. Clarke   • COLONOSCOPY  04/18/2014    Dr. Elizabeth; no polyps   • HIP PERCUTANEOUS PINNING Left 8/31/2017    Procedure: LT HIP PERCUTANEOUS PINNING;  Surgeon: Sean Canales MD;  Location: [facility] OR;  Service:    • JOINT REPLACEMENT Left 2004    knee Petrick   • JOINT REPLACEMENT Right 2004    knee Popham   • MAMMO BILATERAL  11/2013   • PAP SMEAR  02/2011   • TOTAL HIP ARTHROPLASTY Right 08/2015         Current Outpatient Prescriptions on File Prior to Visit   Medication Sig Dispense Refill   • acetaminophen (TYLENOL) 500 MG tablet Take 1,000 mg by mouth 3 (three) times a day as needed for mild pain (1-3) or moderate pain (4-6).     • allopurinol (ZYLOPRIM) 100 MG tablet Take 1 tablet by mouth Daily for 180 days. 90 tablet 1   • amLODIPine (NORVASC) 2.5 MG tablet Take 1 tablet by mouth 2 (Two) Times a Day for 180 days. 180 tablet 1   • atorvastatin (LIPITOR) 20 MG tablet Take 1 tablet by mouth Every Night for 180 days. 90 tablet 1   • DULoxetine (CYMBALTA) 30 MG capsule Take 1 capsule by mouth Daily for 180 days. 90 capsule 1   • ELIQUIS 2.5 MG tablet tablet TAKE 1 TABLET TWICE A  tablet 1   • levothyroxine (SYNTHROID, LEVOTHROID) 25 MCG tablet Take 1 tablet by mouth Daily for 180 days. 90 tablet 1   • metoprolol succinate XL (TOPROL-XL) 50 MG 24 hr tablet Take 1 tablet by mouth Daily for 180 days. 90 tablet 1   • MYRBETRIQ 25 MG tablet sustained-release 24 hour 24 hr tablet Take 25 mg by mouth Daily.     • pantoprazole (PROTONIX) 20 MG EC tablet Take 1 tablet by mouth Every Night for 180 days. 90 tablet 1   • senna (SENOKOT) 8.6 MG tablet tablet Take 2 tablets by mouth At Night  As Needed for Constipation.     • sodium chloride (OCEAN NASAL SPRAY) 0.65 % nasal spray 1 spray into each nostril As Needed for congestion. 104 mL 0     No current facility-administered medications on file prior to visit.          Social History     Social History   • Marital status:      Spouse name: N/A   • Number of children: N/A   • Years of education: High school     Occupational History   • Hairdresser Retired     Social History Main Topics   • Smoking status: Former Smoker     Packs/day: 1.00     Years: 3.00     Types: Cigarettes     Quit date: 2/22/1956   • Smokeless tobacco: Never Used   • Alcohol use 0.6 - 1.2 oz/week     1 - 2 Glasses of wine per week      Comment: rare   • Drug use: No   • Sexual activity: Defer     Other Topics Concern   • Not on file     Social History Narrative       Family History   Problem Relation Age of Onset   • Hypertension Other    • Hypertension Mother    • Stroke Mother    • Hypertension Maternal Grandmother          Review of Systems   Constitution: Positive for malaise/fatigue. Negative for decreased appetite, diaphoresis, fever, weakness, weight gain and weight loss.   HENT: Negative for congestion, hearing loss, nosebleeds and tinnitus.    Eyes: Negative for blurred vision, double vision, vision loss in left eye, vision loss in right eye and visual disturbance.   Cardiovascular:        As noted in HPI   Respiratory:        As noted HPI   Endocrine: Negative for cold intolerance and heat intolerance.   Hematologic/Lymphatic: Negative for bleeding problem. Does not bruise/bleed easily.   Skin: Negative for color change, flushing, itching and rash.   Musculoskeletal: Negative for arthritis, back pain, joint pain, joint swelling, muscle weakness and myalgias.   Gastrointestinal: Negative for bloating, abdominal pain, constipation, diarrhea, dysphagia, heartburn, hematemesis, hematochezia, melena, nausea and vomiting.   Genitourinary: Negative for bladder incontinence,  "dysuria, frequency, nocturia and urgency.   Neurological: Negative for dizziness, focal weakness, headaches, light-headedness, loss of balance, numbness, paresthesias and vertigo.   Psychiatric/Behavioral: Negative for depression, memory loss and substance abuse.       Procedures      ECG 12 Lead  Date/Time: 11/22/2017 1:09 PM  Performed by: CAMILLE FRANCO  Authorized by: CAMILLE FRANCO   Comparison: compared with previous ECG   Comparison to previous ECG: Afib is new  Rhythm: atrial fibrillation  Rate: normal  QRS axis: left                 Objective:    /84 (BP Location: Right arm)  Pulse 74  Ht 63\" (160 cm)  Wt 153 lb (69.4 kg)  BMI 27.1 kg/m2       Physical Exam  Physical Exam   Constitutional: She is oriented to person, place, and time. She appears well-developed and well-nourished. No distress.   HENT:   Head: Normocephalic.   Eyes: Conjunctivae are normal. Pupils are equal, round, and reactive to light. No scleral icterus.   Neck: Normal carotid pulses, no hepatojugular reflux and no JVD present. Carotid bruit is not present. No tracheal deviation, no edema and no erythema present. No thyromegaly present.   Cardiovascular: Normal rate, S1 normal, S2 normal, normal heart sounds and intact distal pulses.  An irregularly irregular rhythm present.  No extrasystoles are present. PMI is not displaced.  Exam reveals no gallop, no distant heart sounds and no friction rub.    No murmur heard.  Pulses:       Carotid pulses are 2+ on the right side, and 2+ on the left side.       Radial pulses are 2+ on the right side, and 2+ on the left side.        Femoral pulses are 2+ on the right side, and 2+ on the left side.       Dorsalis pedis pulses are 2+ on the right side, and 2+ on the left side.        Posterior tibial pulses are 2+ on the right side, and 2+ on the left side.   Pulmonary/Chest: Effort normal and breath sounds normal. No respiratory distress. She has no decreased breath sounds. She has no " wheezes. She has no rhonchi. She has no rales. She exhibits no tenderness.   Abdominal: Soft. Bowel sounds are normal. She exhibits no distension and no mass. There is no hepatosplenomegaly. There is no tenderness. There is no rebound and no guarding.   Musculoskeletal: She exhibits no edema, tenderness or deformity.   Neurological: She is alert and oriented to person, place, and time.   Skin: Skin is warm and dry. No rash noted. She is not diaphoretic. No cyanosis or erythema. No pallor. Nails show no clubbing.   Psychiatric: She has a normal mood and affect. Her speech is normal and behavior is normal. Judgment and thought content normal.           Assessment:   1. This is a 81-year-old female with a history of paroxysmal atrial fibrillation status post electrocardioversion back to sinus rhythm.   Atrial Fibrillation and Atrial Flutter  Assessment  • The patient has paroxysmal atrial fibrillation  • This is non-valvular in etiology  • The patient's CHADS2-VASc score is 6  • A [id number] score of 2 or more is considered a high risk for a thromboembolic event  • Warfarin prescribed    Plan  • Attempt to maintain sinus rhythm  • Continue warfarin for antithrombotic therapy, bleeding issues discussed  • Continue beta blocker for rhythm control  She was back in atrial fibrillation but I don't believe that that's causing her fatigue symptoms.  She does see us in 6 months but if she's getting worse or not improving she'll call us back.      2. Coronary artery disease status post angioplasty, drug eluting stent placement of the circumflex vessel in 10/2015.   Coronary Artery Disease  Assessment  • The patient has no angina  • On warfarin    Plan  • Lifestyle modifications discussed include adhering to a heart healthy diet, avoidance of tobacco products, maintenance of a healthy weight, medication compliance, regular exercise and regular monitoring of cholesterol and blood pressure    Subjective - Objective  • There is a  history of past MI  • There has been a previous stent procedure using HAYLEY          3. Stress induced cardiomyopathy. Initial left ventricular ejection fraction at 20%. Follow-up echocardiogram in February 2016 normal left her systolic function no significant valvular disease  4. Hyperlipidemia. She is now on atorvastatin.   5. Hypertension as outlined above.  6. Obstructive sleep apnea on CPAP.   7. 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 Still painful with difficulty walking.   8. Recurrent urinary tract infections now on chronic antibiotic therapy.    9.  Fatigue.  This is actually been chronic and not sure that it's cardiac related either.  She had this last time she was here and she was in sinus rhythm.         Plan:

## 2017-11-27 ENCOUNTER — TRANSCRIBE ORDERS (OUTPATIENT)
Dept: PAIN MEDICINE | Facility: HOSPITAL | Age: 82
End: 2017-11-27

## 2017-11-27 DIAGNOSIS — M54.40 ACUTE LEFT-SIDED LOW BACK PAIN WITH SCIATICA, SCIATICA LATERALITY UNSPECIFIED: Primary | ICD-10-CM

## 2017-12-05 ENCOUNTER — APPOINTMENT (OUTPATIENT)
Dept: PAIN MEDICINE | Facility: HOSPITAL | Age: 82
End: 2017-12-05

## 2017-12-08 ENCOUNTER — ANESTHESIA (OUTPATIENT)
Dept: PAIN MEDICINE | Facility: HOSPITAL | Age: 82
End: 2017-12-08

## 2017-12-08 ENCOUNTER — HOSPITAL ENCOUNTER (OUTPATIENT)
Dept: GENERAL RADIOLOGY | Facility: HOSPITAL | Age: 82
Discharge: HOME | End: 2017-12-08

## 2017-12-08 ENCOUNTER — HOSPITAL ENCOUNTER (OUTPATIENT)
Dept: PAIN MEDICINE | Facility: HOSPITAL | Age: 82
Discharge: HOME | End: 2017-12-08
Attending: ORTHOPAEDIC SURGERY | Admitting: ORTHOPAEDIC SURGERY

## 2017-12-08 ENCOUNTER — ANESTHESIA EVENT (OUTPATIENT)
Dept: PAIN MEDICINE | Facility: HOSPITAL | Age: 82
End: 2017-12-08

## 2017-12-08 VITALS
DIASTOLIC BLOOD PRESSURE: 82 MMHG | RESPIRATION RATE: 16 BRPM | SYSTOLIC BLOOD PRESSURE: 133 MMHG | HEART RATE: 84 BPM | OXYGEN SATURATION: 98 % | TEMPERATURE: 97.9 F

## 2017-12-08 DIAGNOSIS — R52 PAIN: ICD-10-CM

## 2017-12-08 PROCEDURE — 0 IOPAMIDOL 41 % SOLUTION: Performed by: ANESTHESIOLOGY

## 2017-12-08 PROCEDURE — 25010000002 METHYLPREDNISOLONE PER 80 MG: Performed by: ANESTHESIOLOGY

## 2017-12-08 PROCEDURE — 25010000002 MIDAZOLAM PER 1 MG: Performed by: ANESTHESIOLOGY

## 2017-12-08 PROCEDURE — C1755 CATHETER, INTRASPINAL: HCPCS

## 2017-12-08 PROCEDURE — 77003 FLUOROGUIDE FOR SPINE INJECT: CPT

## 2017-12-08 RX ORDER — LIDOCAINE HYDROCHLORIDE 10 MG/ML
1 INJECTION, SOLUTION INFILTRATION; PERINEURAL ONCE AS NEEDED
Status: DISCONTINUED | OUTPATIENT
Start: 2017-12-08 | End: 2017-12-09 | Stop reason: HOSPADM

## 2017-12-08 RX ORDER — MIDAZOLAM HYDROCHLORIDE 1 MG/ML
1 INJECTION INTRAMUSCULAR; INTRAVENOUS AS NEEDED
Status: DISCONTINUED | OUTPATIENT
Start: 2017-12-08 | End: 2017-12-09 | Stop reason: HOSPADM

## 2017-12-08 RX ORDER — METHYLPREDNISOLONE ACETATE 80 MG/ML
80 INJECTION, SUSPENSION INTRA-ARTICULAR; INTRALESIONAL; INTRAMUSCULAR; SOFT TISSUE ONCE
Status: COMPLETED | OUTPATIENT
Start: 2017-12-08 | End: 2017-12-08

## 2017-12-08 RX ORDER — SODIUM CHLORIDE 0.9 % (FLUSH) 0.9 %
1-10 SYRINGE (ML) INJECTION AS NEEDED
Status: DISCONTINUED | OUTPATIENT
Start: 2017-12-08 | End: 2017-12-09 | Stop reason: HOSPADM

## 2017-12-08 RX ORDER — FENTANYL CITRATE 50 UG/ML
50 INJECTION, SOLUTION INTRAMUSCULAR; INTRAVENOUS AS NEEDED
Status: DISCONTINUED | OUTPATIENT
Start: 2017-12-08 | End: 2017-12-09 | Stop reason: HOSPADM

## 2017-12-08 RX ADMIN — METHYLPREDNISOLONE ACETATE 80 MG: 80 INJECTION, SUSPENSION INTRA-ARTICULAR; INTRALESIONAL; INTRAMUSCULAR; SOFT TISSUE at 09:09

## 2017-12-08 RX ADMIN — IOPAMIDOL 10 ML: 408 INJECTION, SOLUTION INTRATHECAL at 09:09

## 2017-12-08 RX ADMIN — Medication 1 MG: at 09:06

## 2017-12-08 NOTE — INTERVAL H&P NOTE
[facility]  H&P reviewed. No changes since last visit.  Patient states   50-75% improvement since the last procedure/injection.    Diagnosis     * Lumbar radiculopathy [M54.16]     IMPRESSION:  Multifactorial multilevel spondylosis of the lumbar spine most severe  changes are seen at L3-4 through L5-S1. Milder changes are seen at other  levels. No acute fracture or dislocation.      Airway assessed since last visit. Airway class equals: 2.

## 2017-12-08 NOTE — ANESTHESIA PROCEDURE NOTES
PAIN Epidural block    Patient location during procedure: pain clinic  Start Time: 12/8/2017 9:02 AM  Stop Time: 12/8/2017 9:10 AM  Indication:at surgeon's request and procedure for pain  Performed By  Anesthesiologist: JOSE ANTONIO MARTIN  Preanesthetic Checklist  Completed: patient identified, site marked, surgical consent, pre-op evaluation, timeout performed, IV checked, risks and benefits discussed and monitors and equipment checked  Additional Notes  Post-Op Diagnosis Codes:     * Lumbar radiculopathy (M54.16)  Prep:  Pt Position:prone  Sterile Tech:cap, gloves, mask and sterile barrier  Prep:chlorhexidine gluconate and isopropyl alcohol  Monitoring:blood pressure monitoring, continuous pulse oximetry and EKG  Procedure:  Sedation: no   Approach:left paramedian  Guidance: fluoroscopy  Location:lumbar  Level:4-5  Needle Type:[employer]dim[employer]  Needle Gauge:17 G  Cath Depth at skin:7 cm  Aspiration:negative  Test Dose:negative  Medications:  Depomedrol:80 mg  Preservative Free Saline:4mL  Isovue:2mL    Post Assessment:  Dressing:occlusive dressing applied  Pt Tolerance:patient tolerated the procedure well with no apparent complications  Complications:no

## 2017-12-08 NOTE — H&P
H&P  Date of Service: 10/19/2017 8:58 AM  Eddie Sandra MD   Anesthesiology      []Hide copied text  []Hover for attribution information  INTERVAL HISTORY:     The patient returns for another Lumbar epidural steroid injection today.  They have received 80% relief for over 6 weeks.  Her pain scale is 4 out of 10 now.  She is doing home exercise and walking as well as taking Cymbalta Tylenol.  Her MRI shows spinal stenosis.       Exam:  /72 (BP Location: Left arm, Patient Position: Sitting)  Pulse 70  Temp 36.7 °C (98.1 °F) (Oral)   Resp 16  SpO2 97%  Airway Mallampatti 2  Alert and oriented        Diagnosis:  Post-Op Diagnosis Codes:     * Spinal stenosis, lumbar region, without neurogenic claudication [M48.061]     * Lumbar neuritis [M54.16]     Plan:  Lumbar epidural steroid injection under fluoroscopic guidance     I have encouraged them to continue:  1.  Physical therapy exercises at home as prescribed by physical therapy or from the pain clinic handout (given to the patient).  Continuation of these exercises every day, or multiple times per week, even when the patient has good pain relief, was stressed to the patient as a preventative measure to decrease the frequency and severity of future pain episodes.  2.  Continue pain medicines as already prescribed.  If patient not currently taking any, it is recommended to begin Acetaminophen 1000 mg po q 8 hours.  If other medicines containing Acetaminophen are currently prescribed, maintain daily dose at 3000mg.    3.  If they can tolerate NSAIDS, it is recommended to take Ibuprofen 600 mg po q 6 hours for 7 days during pain exacerbations.   Alternatively, they may substitute an NSAID of their choice (e.g. Aleve)  4.  Heat and ice to the affected area as tolerated for pain control.  It was discussed that heating pads can cause burns.  5.  Low impact exercise such as walking or water exercise was recommended to maintain overall health and aid in weight  control.   6.  Follow up as needed for subsequent injections.  7.  Patient was counseled to abstain from tobacco products.         Over 50% improvement

## 2017-12-12 RX ORDER — APIXABAN 2.5 MG/1
TABLET, FILM COATED ORAL
Qty: 180 TABLET | Refills: 1 | Status: SHIPPED | OUTPATIENT
Start: 2017-12-12 | End: 2018-05-22 | Stop reason: SDUPTHER

## 2018-01-03 ENCOUNTER — TELEPHONE (OUTPATIENT)
Dept: FAMILY MEDICINE CLINIC | Facility: CLINIC | Age: 83
End: 2018-01-03

## 2018-01-03 DIAGNOSIS — M81.0 POST-MENOPAUSAL OSTEOPOROSIS: Primary | ICD-10-CM

## 2018-01-03 DIAGNOSIS — Z87.81 HX OF FRACTURE OF FEMUR: ICD-10-CM

## 2018-01-08 ENCOUNTER — HOSPITAL ENCOUNTER (OUTPATIENT)
Dept: INFUSION THERAPY | Facility: HOSPITAL | Age: 83
Discharge: HOME | End: 2018-01-08
Admitting: NURSE PRACTITIONER

## 2018-01-08 VITALS
SYSTOLIC BLOOD PRESSURE: 139 MMHG | OXYGEN SATURATION: 97 % | HEART RATE: 76 BPM | TEMPERATURE: 98.2 F | RESPIRATION RATE: 16 BRPM | DIASTOLIC BLOOD PRESSURE: 80 MMHG

## 2018-01-08 DIAGNOSIS — M81.0 SENILE OSTEOPOROSIS: ICD-10-CM

## 2018-01-08 DIAGNOSIS — M81.0 POST-MENOPAUSAL OSTEOPOROSIS: ICD-10-CM

## 2018-01-08 DIAGNOSIS — Z87.81 HX OF FRACTURE OF FEMUR: ICD-10-CM

## 2018-01-08 LAB
ANION GAP SERPL CALCULATED.3IONS-SCNC: 11.1 MMOL/L
BUN BLD-MCNC: 29 MG/DL (ref 8–23)
BUN/CREAT SERPL: 24 (ref 7–25)
CALCIUM SPEC-SCNC: 9.4 MG/DL (ref 8.6–10.5)
CHLORIDE SERPL-SCNC: 100 MMOL/L (ref 98–107)
CO2 SERPL-SCNC: 26.9 MMOL/L (ref 22–29)
CREAT BLD-MCNC: 1.21 MG/DL (ref 0.57–1)
GFR SERPL CREATININE-BSD FRML MDRD: 43 ML/MIN/1.73
GLUCOSE BLD-MCNC: 104 MG/DL (ref 65–99)
POTASSIUM BLD-SCNC: 4.4 MMOL/L (ref 3.5–5.2)
SODIUM BLD-SCNC: 138 MMOL/L (ref 136–145)

## 2018-01-08 PROCEDURE — 80048 BASIC METABOLIC PNL TOTAL CA: CPT | Performed by: FAMILY MEDICINE

## 2018-01-08 PROCEDURE — 96372 THER/PROPH/DIAG INJ SC/IM: CPT

## 2018-01-08 PROCEDURE — 36415 COLL VENOUS BLD VENIPUNCTURE: CPT

## 2018-01-08 PROCEDURE — 25010000002 DENOSUMAB 60 MG/ML SOLUTION: Performed by: NURSE PRACTITIONER

## 2018-01-08 RX ADMIN — DENOSUMAB 60 MG: 60 INJECTION SUBCUTANEOUS at 13:29

## 2018-01-08 NOTE — PROGRESS NOTES
Please accept these satisfactory lab results. Please keep regular follow up appointment as scheduled.                                                    Dr. Andujar

## 2018-01-09 ENCOUNTER — TELEPHONE (OUTPATIENT)
Dept: CARDIOLOGY | Facility: CLINIC | Age: 83
End: 2018-01-09

## 2018-01-09 NOTE — TELEPHONE ENCOUNTER
Pt is scheduled to have a tooth extracted, ok to hold Eliquis 2 days?  Please advise-amk  [phone]

## 2018-02-01 ENCOUNTER — TELEPHONE (OUTPATIENT)
Dept: FAMILY MEDICINE CLINIC | Facility: CLINIC | Age: 83
End: 2018-02-01

## 2018-02-18 ENCOUNTER — APPOINTMENT (OUTPATIENT)
Dept: CT IMAGING | Facility: HOSPITAL | Age: 83
End: 2018-02-18

## 2018-02-18 ENCOUNTER — APPOINTMENT (OUTPATIENT)
Dept: GENERAL RADIOLOGY | Facility: HOSPITAL | Age: 83
End: 2018-02-18

## 2018-02-18 ENCOUNTER — HOSPITAL ENCOUNTER (EMERGENCY)
Facility: HOSPITAL | Age: 83
Discharge: HOME | End: 2018-02-18
Attending: EMERGENCY MEDICINE | Admitting: EMERGENCY MEDICINE

## 2018-02-18 VITALS
TEMPERATURE: 98 F | SYSTOLIC BLOOD PRESSURE: 128 MMHG | RESPIRATION RATE: 18 BRPM | HEART RATE: 81 BPM | DIASTOLIC BLOOD PRESSURE: 66 MMHG | WEIGHT: 150 LBS | HEIGHT: 63 IN | BODY MASS INDEX: 26.58 KG/M2 | OXYGEN SATURATION: 99 %

## 2018-02-18 DIAGNOSIS — M54.2 NECK PAIN: ICD-10-CM

## 2018-02-18 DIAGNOSIS — Z79.01 ANTICOAGULATED BY ANTICOAGULATION TREATMENT: ICD-10-CM

## 2018-02-18 DIAGNOSIS — S09.90XA INJURY OF HEAD, INITIAL ENCOUNTER: Primary | ICD-10-CM

## 2018-02-18 PROCEDURE — 70450 CT HEAD/BRAIN W/O DYE: CPT

## 2018-02-18 PROCEDURE — 72050 X-RAY EXAM NECK SPINE 4/5VWS: CPT

## 2018-02-18 PROCEDURE — 99283 EMERGENCY DEPT VISIT LOW MDM: CPT

## 2018-02-18 NOTE — ED PROVIDER NOTES
EMERGENCY DEPARTMENT ENCOUNTER    CHIEF COMPLAINT  Chief Complaint: neck pain  History given by: patient  History limited by: none  Room Number: 51/51  PMD: Ken Andujar MD      HPI:  Pt is a 82 y.o. female who presents complaining of left eye pain, neck pain, and mouth pain from a trip and fall last night at 1930. She advised when she fell she struck a door and landed on her left side. She is unsure if she LOC, but she denies CP, SOA, and recent fever. She has been ambulatory w/o difficulty since the fall. Pt has a hx of afib and is currently taking Eloquis.    Duration: one day  Onset: sudden  Timing: constant  Location: neck  Radiation: none  Quality: 'pain'  Intensity/Severity: moderate  Progression: unchanged  Associated Symptoms: left eye pain, mouth pain  Aggravating Factors: movement  Alleviating Factors: none  Previous Episodes: none  Treatment before arrival: none    PAST MEDICAL HISTORY  Active Ambulatory Problems     Diagnosis Date Noted   • Arthritis involving multiple sites    • Essential hypertension    • Atrial fibrillation    • Bell's palsy    • Chronic kidney disease (CKD), stage III (moderate)    • GERD (gastroesophageal reflux disease)    • Hypercholesterolemia    • Insomnia    • Panic disorder    • Allergic rhinitis    • Sleep apnea    • History of MI (myocardial infarction) 12/21/2015   • Chronic coronary artery disease 01/25/2016   • Acquired hypothyroidism 03/21/2016   • Anemia of chronic disease 09/12/2016   • Hematuria 12/12/2016   • Weakness of right leg 03/13/2017   • Cardiac murmur 05/04/2017   • Senile osteoporosis 06/30/2017   • Hyperuricemia 09/07/2017     Resolved Ambulatory Problems     Diagnosis Date Noted   • Fatigue    • Hip arthritis    • IFG (impaired fasting glucose)    • Osteoporosis    • Rectal bleeding    • Vitamin D deficiency    • Urinary incontinence    • History of right hip replacement 12/21/2015   • Closed fracture of neck of right femur with routine healing  12/21/2015   • History of right knee joint replacement 12/21/2015   • History of left knee replacement 12/21/2015   • Stress-induced cardiomyopathy 01/25/2016   • Urinary tract infection 10/05/2016   • Acute pyelonephritis 10/06/2016   • Acute cystitis 12/03/2016   • Sinusitis 01/22/2017   • Frequent falls 03/13/2017   • Atrial fibrillation with RVR 06/19/2017   • ANGY (acute kidney injury) 06/20/2017   • Viral gastroenteritis 06/20/2017   • Dehydration 06/20/2017   • Nausea & vomiting 06/20/2017   • Diarrhea 06/20/2017   • Closed displaced fracture of left femoral neck 08/30/2017   • Bacteriuria 08/30/2017     Past Medical History:   Diagnosis Date   • Allergic    • Allergic rhinitis    • Arthropathy of knee    • Atrial fibrillation    • Back pain    • Bell's palsy    • Chronic kidney disease (CKD), stage III (moderate)    • Cough    • Edema    • Encounter for eye exam 02/2015   • Essential hypertension    • Fatigue    • GERD (gastroesophageal reflux disease)    • H/O Heart murmur    • Heart attack    • Herpes zoster without complication    • Hip arthritis    • History of bone density study 02/28/2008   • History of pneumonia    • Hypercholesterolemia    • IFG (impaired fasting glucose)    • Insomnia    • Nephropathy    • Osteoarthritis    • Osteopenia    • Panic disorder    • Rectal bleeding    • Sleep apnea    • Stress    • Urinary incontinence    • Vitamin D deficiency        PAST SURGICAL HISTORY  Past Surgical History:   Procedure Laterality Date   • CATARACT EXTRACTION Left 04/01/2013    Dr. Clarke   • CATARACT EXTRACTION Right 04/16/2013    Dr. Clarke   • COLONOSCOPY  04/18/2014    Dr. Elizabeth; no polyps   • HIP PERCUTANEOUS PINNING Left 8/31/2017    Procedure: LT HIP PERCUTANEOUS PINNING;  Surgeon: Sean Canales MD;  Location: [facility] OR;  Service:    • JOINT REPLACEMENT Left 2004    knee Liam   • JOINT REPLACEMENT Right 2004    knee Lizeth   • LEG SURGERY Left    • MAMMO BILATERAL  11/2013   •  PAP SMEAR  02/2011   • TOTAL HIP ARTHROPLASTY Right 08/2015       FAMILY HISTORY  Family History   Problem Relation Age of Onset   • Hypertension Other    • Hypertension Mother    • Stroke Mother    • Hypertension Maternal Grandmother        SOCIAL HISTORY  Social History     Social History   • Marital status:      Spouse name: N/A   • Number of children: N/A   • Years of education: High school     Occupational History   • Hairdresser Retired     Social History Main Topics   • Smoking status: Former Smoker     Packs/day: 1.00     Years: 3.00     Types: Cigarettes     Quit date: 2/22/1956   • Smokeless tobacco: Never Used   • Alcohol use 0.6 - 1.2 oz/week     1 - 2 Glasses of wine per week      Comment: rare   • Drug use: No   • Sexual activity: Defer     Other Topics Concern   • Not on file     Social History Narrative       ALLERGIES  Ace inhibitors; Amoxicillin; Lortab [hydrocodone-acetaminophen]; Macrobid [nitrofurantoin]; Morphine and related; Oxycodone; and Levaquin [levofloxacin]    REVIEW OF SYSTEMS  Review of Systems   Constitutional: Negative for fever.   HENT:        Traumatic mouth pain   Eyes: Positive for pain (L>R).   Respiratory: Negative for shortness of breath.    Cardiovascular: Negative for chest pain.   Musculoskeletal: Positive for neck pain.       PHYSICAL EXAM  ED Triage Vitals   Temp Heart Rate Resp BP SpO2   02/18/18 1304 02/18/18 1304 02/18/18 1304 02/18/18 1311 02/18/18 1304   98.8 °F (37.1 °C) 119 16 132/70 99 %      Temp src Heart Rate Source Patient Position BP Location FiO2 (%)   02/18/18 1304 -- -- -- --   Tympanic           Physical Exam   Constitutional: No distress.   HENT:   Head: Normocephalic and atraumatic.   Mild swelling to the left upper with slight abrasion.   Eyes: EOM are normal. Pupils are equal, round, and reactive to light.   Bilaterally periorbital ecchymosis, L>R. Globe intact.   Neck:   Lower cervical tenderness to palpation.   Cardiovascular: An irregularly  irregular rhythm present.   Pulmonary/Chest: No respiratory distress. She exhibits no tenderness.   Abdominal: There is no tenderness.   Musculoskeletal: She exhibits no tenderness.   Neurological: She is alert. She has normal sensation and normal strength.   No neuro focal deficits.   Skin: No rash noted.   Nursing note and vitals reviewed.    RADIOLOGY  CT Head Without Contrast   Final Result   1. No evidence of acute intracranial process.       This report was finalized on 2/18/2018 2:44 PM by Dr. Chon Vera MD.          XR Spine Cervical Complete 4 or 5 View   Final Result   1. Diffuse spondylosis with disc space narrowing most pronounced C5 C3-4  and C5-C6-7.  2. Bilateral foraminal narrowing at C5-6-7.  3. No traumatic abnormality seen on plain film exam.        I ordered the above noted radiological studies. Interpreted by radiologist. Reviewed by me in PACS.       PROCEDURES  Procedures      PROGRESS AND CONSULTS  ED Course   1356  XR cervical spine and CT head ordered.    1501  BP- 132/70 HR- 90 Temp- 98.8 °F (37.1 °C) (Tympanic) O2 sat- 99%  Rechecked the patient who is in NAD and is resting comfortably. Discussed imaging being negative and the plan to d/c with management of sx. Pt and family were instructed to have pt come back to the ED if she develops any new or worsening sx. Pt understands and agrees with the plan, all questions answered.    MEDICAL DECISION MAKING  Results were reviewed/discussed with the patient and they were also made aware of online access. Pt also made aware that some labs, such as cultures, will not be resulted during ER visit and follow up with PMD is necessary.     MDM  Number of Diagnoses or Management Options     Amount and/or Complexity of Data Reviewed  Tests in the radiology section of CPT®: reviewed (XR cervical spine-negative acute fx  CT head-negative)           DIAGNOSIS  Final diagnoses:   Injury of head, initial encounter   Anticoagulated by anticoagulation  treatment   Neck pain       DISPOSITION  DISCHARGE    Patient discharged in stable condition.    Reviewed implications of results, diagnosis, meds, responsibility to follow up, warning signs and symptoms of possible worsening, potential complications and reasons to return to ER.    Patient/Family voiced understanding of above instructions.    Discussed plan for discharge, as there is no emergent indication for admission.  Pt/family is agreeable and understands need for follow up and repeat testing.  Pt is aware that discharge does not mean that nothing is wrong but it indicates no emergency is present that requires admission and they must continue care with follow-up as given below or physician of their choice.     FOLLOW-UP  Ken Andujar MD  [id number] [address]  [phone]      As needed         Medication List      Notice     No changes were made to your prescriptions during this visit.        Latest Documented Vital Signs:  As of 3:06 PM  BP- 132/70 HR- 90 Temp- 98.8 °F (37.1 °C) (Tympanic) O2 sat- 99%    --  Documentation assistance provided by alana Akbar for Dr. Bermudez.  Information recorded by the scribe was done at my direction and has been verified and validated by me.     Ana Lilia Akbar  02/18/18 1509       Esteban Bermudez MD  02/18/18 0721

## 2018-02-21 ENCOUNTER — TELEPHONE (OUTPATIENT)
Dept: SOCIAL WORK | Facility: HOSPITAL | Age: 83
End: 2018-02-21

## 2018-02-22 ENCOUNTER — PATIENT OUTREACH (OUTPATIENT)
Dept: CASE MANAGEMENT | Facility: OTHER | Age: 83
End: 2018-02-22

## 2018-02-22 NOTE — OUTREACH NOTE
"Pt. Reports her eyes are still \"black/blue\" and a lump remains on her head. She is using ice PRN. She called today for PCP appointment and the office will call her back. Explained role of Care Advisor and contact information given to patient.No other questions or concerns voiced at this time. Reviewed Gaps in Care and need to schedule Annual Wellness Visit.  "

## 2018-03-27 ENCOUNTER — TRANSCRIBE ORDERS (OUTPATIENT)
Dept: PAIN MEDICINE | Facility: HOSPITAL | Age: 83
End: 2018-03-27

## 2018-03-27 DIAGNOSIS — M54.40 ACUTE LEFT-SIDED LOW BACK PAIN WITH SCIATICA, SCIATICA LATERALITY UNSPECIFIED: Primary | ICD-10-CM

## 2018-03-28 ENCOUNTER — OFFICE VISIT (OUTPATIENT)
Dept: FAMILY MEDICINE CLINIC | Facility: CLINIC | Age: 83
End: 2018-03-28

## 2018-03-28 VITALS
SYSTOLIC BLOOD PRESSURE: 130 MMHG | RESPIRATION RATE: 16 BRPM | DIASTOLIC BLOOD PRESSURE: 80 MMHG | WEIGHT: 158 LBS | TEMPERATURE: 97.5 F | BODY MASS INDEX: 28 KG/M2 | HEIGHT: 63 IN

## 2018-03-28 DIAGNOSIS — S09.90XD CLOSED HEAD INJURY, SUBSEQUENT ENCOUNTER: Primary | ICD-10-CM

## 2018-03-28 PROCEDURE — 99213 OFFICE O/P EST LOW 20 MIN: CPT | Performed by: FAMILY MEDICINE

## 2018-03-28 NOTE — PROGRESS NOTES
"Chief Complaint   Patient presents with   • Other     Hospital Follow up       Subjective   She presents to the office to follow-up on emergency room visit from February 18.  Her foot got caught and she fell forward and hit the corner of the door.  She had a closed head injury and CT scan results did not show any intracranial hemorrhage or fractures.  Neck x-rays were also reviewed and did not show any acute fractures but did show multilevel arthritic changes.  Her bruises and hematomas have resolved.  She has minimal scalp tenderness on the left forehead area.  We briefly talked about fall prevention of the home and information is been shared.  I have reviewed and updated her medications, medical history and problem list during today's office visit.       Assessment/Plan     Problem List Items Addressed This Visit     None      Visit Diagnoses     Closed head injury, subsequent encounter    -  Primary          No orders of the defined types were placed in this encounter.               Review of Systems   Skin:        Left scalp tenderness   Neurological: Negative for headaches.     Objective   /80   Temp 97.5 °F (36.4 °C) (Oral)   Resp 16   Ht 160 cm (63\")   Wt 71.7 kg (158 lb)   BMI 27.99 kg/m²   Body mass index is 27.99 kg/m².  Physical Exam   Constitutional: She is oriented to person, place, and time. She appears well-developed. No distress.   Musculoskeletal:   Arthritis knee R>L   Neurological: She is alert and oriented to person, place, and time.   Nl balance, nl F-N, Nl XANDER        Social History   Substance Use Topics   • Smoking status: Former Smoker     Packs/day: 1.00     Years: 3.00     Types: Cigarettes     Quit date: 2/22/1956   • Smokeless tobacco: Never Used   • Alcohol use 0.6 - 1.2 oz/week     1 - 2 Glasses of wine per week      Comment: rare       Data Reviewed:                     "

## 2018-03-28 NOTE — PATIENT INSTRUCTIONS
Fall Prevention in the Home  Falls can cause injuries and can affect people from all age groups. There are many simple things that you can do to make your home safe and to help prevent falls.  What can I do on the outside of my home?  · Regularly repair the edges of walkways and driveways and fix any cracks.  · Remove high doorway thresholds.  · Trim any shrubbery on the main path into your home.  · Use bright outdoor lighting.  · Clear walkways of debris and clutter, including tools and rocks.  · Regularly check that handrails are securely fastened and in good repair. Both sides of any steps should have handrails.  · Install guardrails along the edges of any raised decks or porches.  · Have leaves, snow, and ice cleared regularly.  · Use sand or salt on walkways during winter months.  · In the garage, clean up any spills right away, including grease or oil spills.  What can I do in the bathroom?  · Use night lights.  · Install grab bars by the toilet and in the tub and shower. Do not use towel bars as grab bars.  · Use non-skid mats or decals on the floor of the tub or shower.  · If you need to sit down while you are in the shower, use a plastic, non-slip stool.  · Keep the floor dry. Immediately clean up any water that spills on the floor.  · Remove soap buildup in the tub or shower on a regular basis.  · Attach bath mats securely with double-sided non-slip rug tape.  · Remove throw rugs and other tripping hazards from the floor.  What can I do in the bedroom?  · Use night lights.  · Make sure that a bedside light is easy to reach.  · Do not use oversized bedding that drapes onto the floor.  · Have a firm chair that has side arms to use for getting dressed.  · Remove throw rugs and other tripping hazards from the floor.  What can I do in the kitchen?  · Clean up any spills right away.  · Avoid walking on wet floors.  · Place frequently used items in easy-to-reach places.  · If you need to reach for something  above you, use a sturdy step stool that has a grab bar.  · Keep electrical cables out of the way.  · Do not use floor polish or wax that makes floors slippery. If you have to use wax, make sure that it is non-skid floor wax.  · Remove throw rugs and other tripping hazards from the floor.  What can I do in the stairways?  · Do not leave any items on the stairs.  · Make sure that there are handrails on both sides of the stairs. Fix handrails that are broken or loose. Make sure that handrails are as long as the stairways.  · Check any carpeting to make sure that it is firmly attached to the stairs. Fix any carpet that is loose or worn.  · Avoid having throw rugs at the top or bottom of stairways, or secure the rugs with carpet tape to prevent them from moving.  · Make sure that you have a light switch at the top of the stairs and the bottom of the stairs. If you do not have them, have them installed.  What are some other fall prevention tips?  · Wear closed-toe shoes that fit well and support your feet. Wear shoes that have rubber soles or low heels.  · When you use a stepladder, make sure that it is completely opened and that the sides are firmly locked. Have someone hold the ladder while you are using it. Do not climb a closed stepladder.  · Add color or contrast paint or tape to grab bars and handrails in your home. Place contrasting color strips on the first and last steps.  · Use mobility aids as needed, such as canes, walkers, scooters, and crutches.  · Turn on lights if it is dark. Replace any light bulbs that burn out.  · Set up furniture so that there are clear paths. Keep the furniture in the same spot.  · Fix any uneven floor surfaces.  · Choose a carpet design that does not hide the edge of steps of a stairway.  · Be aware of any and all pets.  · Review your medicines with your healthcare provider. Some medicines can cause dizziness or changes in blood pressure, which increase your risk of falling.  Talk  with your health care provider about other ways that you can decrease your risk of falls. This may include working with a physical therapist or  to improve your strength, balance, and endurance.  This information is not intended to replace advice given to you by your health care provider. Make sure you discuss any questions you have with your health care provider.  Document Released: 12/08/2003 Document Revised: 05/16/2017 Document Reviewed: 01/22/2016  Elsevier Interactive Patient Education © 2017 [employer]

## 2018-04-04 ENCOUNTER — ANESTHESIA EVENT (OUTPATIENT)
Dept: PAIN MEDICINE | Facility: HOSPITAL | Age: 83
End: 2018-04-04

## 2018-04-04 ENCOUNTER — HOSPITAL ENCOUNTER (OUTPATIENT)
Dept: PAIN MEDICINE | Facility: HOSPITAL | Age: 83
Discharge: HOME | End: 2018-04-04
Admitting: ORTHOPAEDIC SURGERY

## 2018-04-04 ENCOUNTER — HOSPITAL ENCOUNTER (OUTPATIENT)
Dept: GENERAL RADIOLOGY | Facility: HOSPITAL | Age: 83
Discharge: HOME | End: 2018-04-04

## 2018-04-04 ENCOUNTER — ANESTHESIA (OUTPATIENT)
Dept: PAIN MEDICINE | Facility: HOSPITAL | Age: 83
End: 2018-04-04

## 2018-04-04 VITALS
RESPIRATION RATE: 16 BRPM | HEART RATE: 83 BPM | DIASTOLIC BLOOD PRESSURE: 78 MMHG | SYSTOLIC BLOOD PRESSURE: 125 MMHG | OXYGEN SATURATION: 95 %

## 2018-04-04 DIAGNOSIS — R52 PAIN: ICD-10-CM

## 2018-04-04 DIAGNOSIS — M54.40 ACUTE LEFT-SIDED LOW BACK PAIN WITH SCIATICA, SCIATICA LATERALITY UNSPECIFIED: ICD-10-CM

## 2018-04-04 PROCEDURE — C1755 CATHETER, INTRASPINAL: HCPCS

## 2018-04-04 PROCEDURE — 25010000002 METHYLPREDNISOLONE PER 80 MG: Performed by: ANESTHESIOLOGY

## 2018-04-04 PROCEDURE — 77003 FLUOROGUIDE FOR SPINE INJECT: CPT

## 2018-04-04 PROCEDURE — 0 IOPAMIDOL 41 % SOLUTION: Performed by: ANESTHESIOLOGY

## 2018-04-04 PROCEDURE — 25010000002 MIDAZOLAM PER 1 MG: Performed by: ANESTHESIOLOGY

## 2018-04-04 RX ORDER — METHYLPREDNISOLONE ACETATE 80 MG/ML
80 INJECTION, SUSPENSION INTRA-ARTICULAR; INTRALESIONAL; INTRAMUSCULAR; SOFT TISSUE ONCE
Status: COMPLETED | OUTPATIENT
Start: 2018-04-04 | End: 2018-04-04

## 2018-04-04 RX ORDER — SODIUM CHLORIDE 0.9 % (FLUSH) 0.9 %
1-10 SYRINGE (ML) INJECTION AS NEEDED
Status: DISCONTINUED | OUTPATIENT
Start: 2018-04-04 | End: 2018-04-05 | Stop reason: HOSPADM

## 2018-04-04 RX ORDER — MIDAZOLAM HYDROCHLORIDE 1 MG/ML
1 INJECTION INTRAMUSCULAR; INTRAVENOUS AS NEEDED
Status: DISCONTINUED | OUTPATIENT
Start: 2018-04-04 | End: 2018-04-05 | Stop reason: HOSPADM

## 2018-04-04 RX ORDER — FENTANYL CITRATE 50 UG/ML
50 INJECTION, SOLUTION INTRAMUSCULAR; INTRAVENOUS AS NEEDED
Status: DISCONTINUED | OUTPATIENT
Start: 2018-04-04 | End: 2018-04-05 | Stop reason: HOSPADM

## 2018-04-04 RX ORDER — LIDOCAINE HYDROCHLORIDE 10 MG/ML
1 INJECTION, SOLUTION INFILTRATION; PERINEURAL ONCE AS NEEDED
Status: DISCONTINUED | OUTPATIENT
Start: 2018-04-04 | End: 2018-04-05 | Stop reason: HOSPADM

## 2018-04-04 RX ADMIN — METHYLPREDNISOLONE ACETATE 80 MG: 80 INJECTION, SUSPENSION INTRA-ARTICULAR; INTRALESIONAL; INTRAMUSCULAR; SOFT TISSUE at 10:59

## 2018-04-04 RX ADMIN — IOPAMIDOL 10 ML: 408 INJECTION, SOLUTION INTRATHECAL at 10:59

## 2018-04-04 RX ADMIN — MIDAZOLAM 1 MG: 1 INJECTION INTRAMUSCULAR; INTRAVENOUS at 10:53

## 2018-04-04 NOTE — ANESTHESIA PROCEDURE NOTES
PAIN Epidural block    Patient location during procedure: pain clinic  Start Time: 4/4/2018 10:52 AM  Stop Time: 4/4/2018 11:02 AM  Indication:at surgeon's request and procedure for pain  Performed By  Anesthesiologist: JOSE ANTONIO MARTIN  Preanesthetic Checklist  Completed: patient identified, site marked, surgical consent, pre-op evaluation, timeout performed, IV checked, risks and benefits discussed and monitors and equipment checked  Additional Notes  Post-Op Diagnosis Codes:     * Lumbar spinal stenosis (M48.061)  Prep:  Pt Position:prone  Sterile Tech:cap, gloves, mask and sterile barrier  Prep:chlorhexidine gluconate and isopropyl alcohol  Monitoring:blood pressure monitoring, continuous pulse oximetry and EKG  Procedure:  Sedation: yes   Approach:left paramedian  Guidance: fluoroscopy  Location:lumbar  Level:4-5  Needle Type:Gerry  Needle Gauge:17 G  Cath Depth at skin:7 cm  Aspiration:negative  Test Dose:negative  Medications:  Depomedrol:80 mg  Preservative Free Saline:4mL  Isovue:2mL    Post Assessment:  Dressing:occlusive dressing applied  Pt Tolerance:patient tolerated the procedure well with no apparent complications  Complications:no

## 2018-04-04 NOTE — INTERVAL H&P NOTE
[facility]  H&P reviewed. No changes since last visit.  Patient states   50-75% improvement since the last procedure/injection.    Diagnosis     * Lumbar spinal stenosis [M48.061]      Airway assessed since last visit. Airway class equals: 2.

## 2018-04-04 NOTE — H&P
H&P  Date of Service: 12/8/2017 8:42 AM  Braeden Fonseca MD   Pain Management      []Manual[]Template  []Copied     H&P  Date of Service: 10/19/2017 8:58 AM  Eddie Sandra MD   Anesthesiology      []Hide copied text  []Hover for attribution information  INTERVAL HISTORY:     The patient returns for another Lumbar epidural steroid injection today.  They have received 80% relief for over 6 weeks.  Her pain scale is 4 out of 10 now.  She is doing home exercise and walking as well as taking Cymbalta Tylenol.  Her MRI shows spinal stenosis.       Exam:  /72 (BP Location: Left arm, Patient Position: Sitting)  Pulse 70  Temp 36.7 °C (98.1 °F) (Oral)   Resp 16  SpO2 97%  Airway Mallampatti 2  Alert and oriented        Diagnosis:  Post-Op Diagnosis Codes:     * Spinal stenosis, lumbar region, without neurogenic claudication [M48.061]     * Lumbar neuritis [M54.16]     Plan:  Lumbar epidural steroid injection under fluoroscopic guidance     I have encouraged them to continue:  1.  Physical therapy exercises at home as prescribed by physical therapy or from the pain clinic handout (given to the patient).  Continuation of these exercises every day, or multiple times per week, even when the patient has good pain relief, was stressed to the patient as a preventative measure to decrease the frequency and severity of future pain episodes.  2.  Continue pain medicines as already prescribed.  If patient not currently taking any, it is recommended to begin Acetaminophen 1000 mg po q 8 hours.  If other medicines containing Acetaminophen are currently prescribed, maintain daily dose at 3000mg.    3.  If they can tolerate NSAIDS, it is recommended to take Ibuprofen 600 mg po q 6 hours for 7 days during pain exacerbations.   Alternatively, they may substitute an NSAID of their choice (e.g. Aleve)  4.  Heat and ice to the affected area as tolerated for pain control.  It was discussed that heating pads can cause burns.  5.  Low  impact exercise such as walking or water exercise was recommended to maintain overall health and aid in weight control.   6.  Follow up as needed for subsequent injections.  7.  Patient was counseled to abstain from tobacco products.         Over 50% improvement  ·

## 2018-04-12 ENCOUNTER — RESULTS ENCOUNTER (OUTPATIENT)
Dept: FAMILY MEDICINE CLINIC | Facility: CLINIC | Age: 83
End: 2018-04-12

## 2018-04-12 DIAGNOSIS — I25.10 CHRONIC CORONARY ARTERY DISEASE: ICD-10-CM

## 2018-04-12 DIAGNOSIS — E03.9 ACQUIRED HYPOTHYROIDISM: ICD-10-CM

## 2018-04-12 DIAGNOSIS — I10 ESSENTIAL HYPERTENSION: ICD-10-CM

## 2018-04-12 DIAGNOSIS — E79.0 HYPERURICEMIA: ICD-10-CM

## 2018-04-25 ENCOUNTER — TRANSCRIBE ORDERS (OUTPATIENT)
Dept: ADMINISTRATIVE | Facility: HOSPITAL | Age: 83
End: 2018-04-25

## 2018-04-25 DIAGNOSIS — M46.1 INFLAMMATION OF SACROILIAC JOINT (HCC): Primary | ICD-10-CM

## 2018-04-25 DIAGNOSIS — E79.0 HYPERURICEMIA: ICD-10-CM

## 2018-04-25 DIAGNOSIS — M46.1 SACROILIITIS, NOT ELSEWHERE CLASSIFIED (HCC): Primary | ICD-10-CM

## 2018-04-25 DIAGNOSIS — E03.9 ACQUIRED HYPOTHYROIDISM: ICD-10-CM

## 2018-04-25 DIAGNOSIS — I10 ESSENTIAL HYPERTENSION: ICD-10-CM

## 2018-04-25 RX ORDER — ALLOPURINOL 100 MG/1
TABLET ORAL
Qty: 90 TABLET | Refills: 1 | OUTPATIENT
Start: 2018-04-25

## 2018-04-25 RX ORDER — AMLODIPINE BESYLATE 2.5 MG/1
TABLET ORAL
Qty: 180 TABLET | Refills: 1 | OUTPATIENT
Start: 2018-04-25

## 2018-04-25 RX ORDER — LEVOTHYROXINE SODIUM 0.03 MG/1
TABLET ORAL
Qty: 90 TABLET | Refills: 1 | OUTPATIENT
Start: 2018-04-25

## 2018-05-09 ENCOUNTER — ANESTHESIA EVENT (OUTPATIENT)
Dept: PAIN MEDICINE | Facility: HOSPITAL | Age: 83
End: 2018-05-09

## 2018-05-09 ENCOUNTER — TRANSCRIBE ORDERS (OUTPATIENT)
Dept: PAIN MEDICINE | Facility: HOSPITAL | Age: 83
End: 2018-05-09

## 2018-05-09 ENCOUNTER — ANESTHESIA (OUTPATIENT)
Dept: PAIN MEDICINE | Facility: HOSPITAL | Age: 83
End: 2018-05-09

## 2018-05-09 ENCOUNTER — HOSPITAL ENCOUNTER (OUTPATIENT)
Dept: GENERAL RADIOLOGY | Facility: HOSPITAL | Age: 83
Discharge: HOME | End: 2018-05-09

## 2018-05-09 ENCOUNTER — HOSPITAL ENCOUNTER (OUTPATIENT)
Dept: PAIN MEDICINE | Facility: HOSPITAL | Age: 83
Discharge: HOME | End: 2018-05-09
Attending: ORTHOPAEDIC SURGERY | Admitting: ORTHOPAEDIC SURGERY

## 2018-05-09 VITALS
HEART RATE: 67 BPM | HEIGHT: 63 IN | SYSTOLIC BLOOD PRESSURE: 152 MMHG | DIASTOLIC BLOOD PRESSURE: 82 MMHG | RESPIRATION RATE: 16 BRPM | OXYGEN SATURATION: 96 % | WEIGHT: 150 LBS | TEMPERATURE: 97.7 F | BODY MASS INDEX: 26.58 KG/M2

## 2018-05-09 DIAGNOSIS — M46.1 SACROILIITIS, NOT ELSEWHERE CLASSIFIED (HCC): Primary | ICD-10-CM

## 2018-05-09 DIAGNOSIS — R52 PAIN: ICD-10-CM

## 2018-05-09 DIAGNOSIS — M46.1 SACROILIITIS, NOT ELSEWHERE CLASSIFIED (HCC): ICD-10-CM

## 2018-05-09 LAB
ALBUMIN SERPL-MCNC: 4.6 G/DL (ref 3.5–5.2)
ALBUMIN/GLOB SERPL: 2.2 G/DL
ALP SERPL-CCNC: 44 U/L (ref 39–117)
ALT SERPL-CCNC: 15 U/L (ref 1–33)
AST SERPL-CCNC: 17 U/L (ref 1–32)
BASOPHILS # BLD AUTO: 0.01 10*3/MM3 (ref 0–0.2)
BASOPHILS NFR BLD AUTO: 0.2 % (ref 0–1.5)
BILIRUB SERPL-MCNC: 0.6 MG/DL (ref 0.1–1.2)
BUN SERPL-MCNC: 32 MG/DL (ref 8–23)
BUN/CREAT SERPL: 26 (ref 7–25)
CALCIUM SERPL-MCNC: 9.7 MG/DL (ref 8.6–10.5)
CHLORIDE SERPL-SCNC: 102 MMOL/L (ref 98–107)
CHOLEST SERPL-MCNC: 200 MG/DL (ref 0–200)
CO2 SERPL-SCNC: 27.6 MMOL/L (ref 22–29)
CREAT SERPL-MCNC: 1.23 MG/DL (ref 0.57–1)
EOSINOPHIL # BLD AUTO: 0.09 10*3/MM3 (ref 0–0.7)
EOSINOPHIL NFR BLD AUTO: 1.7 % (ref 0.3–6.2)
ERYTHROCYTE [DISTWIDTH] IN BLOOD BY AUTOMATED COUNT: 15.2 % (ref 11.7–13)
GFR SERPLBLD CREATININE-BSD FMLA CKD-EPI: 42 ML/MIN/1.73
GFR SERPLBLD CREATININE-BSD FMLA CKD-EPI: 51 ML/MIN/1.73
GLOBULIN SER CALC-MCNC: 2.1 GM/DL
GLUCOSE SERPL-MCNC: 98 MG/DL (ref 65–99)
HCT VFR BLD AUTO: 39.7 % (ref 35.6–45.5)
HDLC SERPL-MCNC: 72 MG/DL (ref 40–60)
HGB BLD-MCNC: 12 G/DL (ref 11.9–15.5)
IMM GRANULOCYTES # BLD: 0.02 10*3/MM3 (ref 0–0.03)
IMM GRANULOCYTES NFR BLD: 0.4 % (ref 0–0.5)
LDLC SERPL CALC-MCNC: 105 MG/DL (ref 0–100)
LDLC/HDLC SERPL: 1.45 {RATIO}
LYMPHOCYTES # BLD AUTO: 1.64 10*3/MM3 (ref 0.9–4.8)
LYMPHOCYTES NFR BLD AUTO: 30.9 % (ref 19.6–45.3)
MCH RBC QN AUTO: 29.9 PG (ref 26.9–32)
MCHC RBC AUTO-ENTMCNC: 30.2 G/DL (ref 32.4–36.3)
MCV RBC AUTO: 98.8 FL (ref 80.5–98.2)
MONOCYTES # BLD AUTO: 0.42 10*3/MM3 (ref 0.2–1.2)
MONOCYTES NFR BLD AUTO: 7.9 % (ref 5–12)
NEUTROPHILS # BLD AUTO: 3.14 10*3/MM3 (ref 1.9–8.1)
NEUTROPHILS NFR BLD AUTO: 59.3 % (ref 42.7–76)
PLATELET # BLD AUTO: 200 10*3/MM3 (ref 140–500)
POTASSIUM SERPL-SCNC: 4.8 MMOL/L (ref 3.5–5.2)
PROT SERPL-MCNC: 6.7 G/DL (ref 6–8.5)
RBC # BLD AUTO: 4.02 10*6/MM3 (ref 3.9–5.2)
SODIUM SERPL-SCNC: 142 MMOL/L (ref 136–145)
T4 FREE SERPL-MCNC: 1.87 NG/DL (ref 0.93–1.7)
TRIGL SERPL-MCNC: 117 MG/DL (ref 0–150)
TSH SERPL DL<=0.005 MIU/L-ACNC: 0.46 MIU/ML (ref 0.27–4.2)
URATE SERPL-MCNC: 5.1 MG/DL (ref 2.4–5.7)
VLDLC SERPL CALC-MCNC: 23.4 MG/DL (ref 5–40)
WBC # BLD AUTO: 5.3 10*3/MM3 (ref 4.5–10.7)

## 2018-05-09 PROCEDURE — 77003 FLUOROGUIDE FOR SPINE INJECT: CPT

## 2018-05-09 PROCEDURE — 25010000002 METHYLPREDNISOLONE PER 40 MG: Performed by: ANESTHESIOLOGY

## 2018-05-09 PROCEDURE — 25010000002 MIDAZOLAM PER 1 MG: Performed by: ANESTHESIOLOGY

## 2018-05-09 RX ORDER — BUPIVACAINE HYDROCHLORIDE 2.5 MG/ML
10 INJECTION, SOLUTION EPIDURAL; INFILTRATION; INTRACAUDAL ONCE
Status: COMPLETED | OUTPATIENT
Start: 2018-05-09 | End: 2018-05-09

## 2018-05-09 RX ORDER — LIDOCAINE HYDROCHLORIDE 10 MG/ML
0.5 INJECTION, SOLUTION INFILTRATION; PERINEURAL ONCE AS NEEDED
Status: CANCELLED | OUTPATIENT
Start: 2018-05-18

## 2018-05-09 RX ORDER — SODIUM CHLORIDE 0.9 % (FLUSH) 0.9 %
1-10 SYRINGE (ML) INJECTION AS NEEDED
Status: DISCONTINUED | OUTPATIENT
Start: 2018-05-09 | End: 2018-05-10 | Stop reason: HOSPADM

## 2018-05-09 RX ORDER — MIDAZOLAM HYDROCHLORIDE 1 MG/ML
1 INJECTION INTRAMUSCULAR; INTRAVENOUS AS NEEDED
Status: DISCONTINUED | OUTPATIENT
Start: 2018-05-09 | End: 2018-05-10 | Stop reason: HOSPADM

## 2018-05-09 RX ORDER — METHYLPREDNISOLONE ACETATE 40 MG/ML
40 INJECTION, SUSPENSION INTRA-ARTICULAR; INTRALESIONAL; INTRAMUSCULAR; SOFT TISSUE ONCE
Status: COMPLETED | OUTPATIENT
Start: 2018-05-09 | End: 2018-05-09

## 2018-05-09 RX ORDER — LIDOCAINE HYDROCHLORIDE 10 MG/ML
1 INJECTION, SOLUTION INFILTRATION; PERINEURAL ONCE AS NEEDED
Status: DISCONTINUED | OUTPATIENT
Start: 2018-05-09 | End: 2018-05-10 | Stop reason: HOSPADM

## 2018-05-09 RX ADMIN — MIDAZOLAM HYDROCHLORIDE 1 MG: 2 INJECTION, SOLUTION INTRAMUSCULAR; INTRAVENOUS at 10:03

## 2018-05-09 RX ADMIN — METHYLPREDNISOLONE ACETATE 40 MG: 40 INJECTION, SUSPENSION INTRA-ARTICULAR; INTRALESIONAL; INTRAMUSCULAR; SOFT TISSUE at 10:15

## 2018-05-09 RX ADMIN — BUPIVACAINE HYDROCHLORIDE 10 ML: 2.5 INJECTION, SOLUTION EPIDURAL; INFILTRATION; INTRACAUDAL at 10:15

## 2018-05-09 NOTE — H&P
[facility]    History and Physical    Patient Name: Marleni Hummel  :  1935  MRN:  [id number]  Date of Admission: 2018    Subjective     Patient is an 82-year-old lady with pain in her lower back which radiates to her left lower extremity.  She showed no improvement from her last injection which was a lumbar epidural steroid injection.  Dr. Lai has requested a sacroiliac injection this time.  Her pain level today is a 5/10.    The following portions of the patients history were reviewed and updated as appropriate: current medications, allergies, past medical history, past surgical history, past family history, past social history and problem list                Objective     Past Medical History:   Past Medical History:   Diagnosis Date   • Allergic    • Allergic rhinitis    • Arthropathy of knee     right   • Atrial fibrillation    • Back pain    • Bell's palsy    • Chronic kidney disease (CKD), stage III (moderate)    • Cough    • Edema    • Encounter for eye exam 2015   • Essential hypertension    • Fatigue    • GERD (gastroesophageal reflux disease)    • H/O Heart murmur    • Heart attack    • Herpes zoster without complication    • Hip arthritis     left   • History of bone density study 2008   • History of pneumonia    • Hypercholesterolemia    • IFG (impaired fasting glucose)    • Insomnia    • Nephropathy    • Osteoarthritis     Right hip   • Osteopenia    • Panic disorder    • Rectal bleeding    • Sleep apnea    • Stress    • Urinary incontinence    • Vitamin D deficiency      Past Surgical History:   Past Surgical History:   Procedure Laterality Date   • CATARACT EXTRACTION Left 2013    Dr. Clarke   • CATARACT EXTRACTION Right 2013    Dr. Clarke   • COLONOSCOPY  2014    Dr. Elizabeth; no polyps   • HIP PERCUTANEOUS PINNING Left 2017    Procedure: LT HIP PERCUTANEOUS PINNING;  Surgeon: Sean Canales MD;  Location: [facility] OR;  Service:    •  JOINT REPLACEMENT Left 2004    knee Rubiack   • JOINT REPLACEMENT Right 2004    knee Popham   • LEG SURGERY Left    • MAMMO BILATERAL  11/2013   • PAP SMEAR  02/2011   • TOTAL HIP ARTHROPLASTY Right 08/2015     Family History:   Family History   Problem Relation Age of Onset   • Hypertension Other    • Hypertension Mother    • Stroke Mother    • Hypertension Maternal Grandmother      Social History:   Social History   Substance Use Topics   • Smoking status: Former Smoker     Packs/day: 1.00     Years: 3.00     Types: Cigarettes     Quit date: 2/22/1956   • Smokeless tobacco: Never Used   • Alcohol use 0.6 - 1.2 oz/week     1 - 2 Glasses of wine per week      Comment: rare       Vital Signs Range for the last 24 hours  Temperature:     Temp Source:     BP:     Pulse:     Respirations:     SPO2:     O2 Amount (l/min):     O2 Devices     Weight:           --------------------------------------------------------------------------------    Current Outpatient Prescriptions   Medication Sig Dispense Refill   • acetaminophen (TYLENOL) 500 MG tablet Take 1,000 mg by mouth 3 (three) times a day as needed for mild pain (1-3) or moderate pain (4-6).     • allopurinol (ZYLOPRIM) 100 MG tablet Take 1 tablet by mouth Daily for 180 days. 90 tablet 1   • amLODIPine (NORVASC) 2.5 MG tablet Take 1 tablet by mouth 2 (Two) Times a Day for 180 days. 180 tablet 1   • atorvastatin (LIPITOR) 20 MG tablet Take 1 tablet by mouth Every Night for 180 days. 90 tablet 1   • DULoxetine (CYMBALTA) 30 MG capsule Take 1 capsule by mouth Daily for 180 days. 90 capsule 1   • ELIQUIS 2.5 MG tablet tablet TAKE 1 TABLET TWICE A  tablet 1   • levothyroxine (SYNTHROID, LEVOTHROID) 25 MCG tablet Take 1 tablet by mouth Daily for 180 days. 90 tablet 1   • metoprolol succinate XL (TOPROL-XL) 50 MG 24 hr tablet Take 1 tablet by mouth Daily for 180 days. 90 tablet 1   • MYRBETRIQ 25 MG tablet sustained-release 24 hour 24 hr tablet Take 25 mg by mouth  Daily.     • pantoprazole (PROTONIX) 20 MG EC tablet Take 1 tablet by mouth Every Night for 180 days. 90 tablet 1   • senna (SENOKOT) 8.6 MG tablet tablet Take 2 tablets by mouth At Night As Needed for Constipation.     • sodium chloride (OCEAN NASAL SPRAY) 0.65 % nasal spray 1 spray into each nostril As Needed for congestion. 104 mL 0     No current facility-administered medications for this encounter.        --------------------------------------------------------------------------------  Assessment/Plan      Anesthesia Evaluation                  Airway   Mallampati: II  TM distance: >3 FB  Neck ROM: full  Dental - normal exam     Pulmonary - normal exam    breath sounds clear to auscultation  (+) sleep apnea,   Cardiovascular - normal exam    Rhythm: regular  Rate: normal    (+) hypertension, valvular problems/murmurs murmur, past MI , CAD, dysrhythmias Atrial Fib, hyperlipidemia,       Neuro/Psych  GI/Hepatic/Renal/Endo    (+)  GERD, GI bleeding, hypothyroidism,     Musculoskeletal     (+) back pain, chronic pain,   Abdominal  - normal exam    Abdomen: soft.  Bowel sounds: normal.   Substance History      OB/GYN          Other   (+) arthritis                Diagnosis and Plan    Treatment Plan  ASA 3      Procedures: Nerve block type: SI Joint Injection., With fluoroscopy,       Anesthetic plan and risks discussed with patient.        Diagnosis:    Sacroiliitis [M46.1]

## 2018-05-09 NOTE — ANESTHESIA PROCEDURE NOTES
PAIN SI joint injection    Patient location during procedure: Pain Clinic  Start time: 5/9/2018 9:52 AM  Stop time: 5/9/2018 10:18 AM    Reason for block: procedure for pain  Performed by  Anesthesiologist: ROBYN MENSAH  Preanesthetic Checklist  Completed: patient identified, site marked, surgical consent, pre-op evaluation, timeout performed, risks and benefits discussed and monitors and equipment checked  Prep:  Patient position: prone  Sterile barriers:gloves, mask, sterile barrier and cap  Prep: ChloraPrep  Patient monitoring: blood pressure monitoring, continuous pulse oximetry and EKG  Procedure:  Sedation:yes  Guidance:fluoroscopy  Contrast Medium:no  Location:Left SIJ  Needle Type:Quincke  Needle Gauge:22 G  Aspiration:negative  Medications:  Depomedrol:40 mg  Comment:2 ml of 1/4% bupivicaine    Post Assessment  Injection Assessment: negative  Patient Tolerance:comfortable throughout block  Complications:no  Additional Notes  Injection was performed under fluoroscopic guidance.    Diagnosis:     Sacroiliitis (M46.1)

## 2018-05-10 ENCOUNTER — OFFICE VISIT (OUTPATIENT)
Dept: FAMILY MEDICINE CLINIC | Facility: CLINIC | Age: 83
End: 2018-05-10

## 2018-05-10 VITALS
SYSTOLIC BLOOD PRESSURE: 140 MMHG | HEART RATE: 79 BPM | RESPIRATION RATE: 16 BRPM | TEMPERATURE: 97.5 F | DIASTOLIC BLOOD PRESSURE: 80 MMHG | HEIGHT: 63 IN | BODY MASS INDEX: 27.29 KG/M2 | WEIGHT: 154 LBS

## 2018-05-10 DIAGNOSIS — Z00.00 MEDICARE ANNUAL WELLNESS VISIT, SUBSEQUENT: Primary | ICD-10-CM

## 2018-05-10 DIAGNOSIS — F41.0 PANIC DISORDER: ICD-10-CM

## 2018-05-10 DIAGNOSIS — J30.89 CHRONIC NONSEASONAL ALLERGIC RHINITIS DUE TO POLLEN: ICD-10-CM

## 2018-05-10 DIAGNOSIS — E79.0 HYPERURICEMIA: ICD-10-CM

## 2018-05-10 DIAGNOSIS — K21.9 GASTROESOPHAGEAL REFLUX DISEASE, ESOPHAGITIS PRESENCE NOT SPECIFIED: ICD-10-CM

## 2018-05-10 DIAGNOSIS — E78.00 HYPERCHOLESTEROLEMIA: ICD-10-CM

## 2018-05-10 DIAGNOSIS — I10 ESSENTIAL HYPERTENSION: ICD-10-CM

## 2018-05-10 DIAGNOSIS — E03.9 ACQUIRED HYPOTHYROIDISM: ICD-10-CM

## 2018-05-10 PROCEDURE — 99214 OFFICE O/P EST MOD 30 MIN: CPT | Performed by: FAMILY MEDICINE

## 2018-05-10 PROCEDURE — G0439 PPPS, SUBSEQ VISIT: HCPCS | Performed by: FAMILY MEDICINE

## 2018-05-10 RX ORDER — DULOXETIN HYDROCHLORIDE 30 MG/1
30 CAPSULE, DELAYED RELEASE ORAL DAILY
Qty: 90 CAPSULE | Refills: 1 | Status: SHIPPED | OUTPATIENT
Start: 2018-05-10 | End: 2018-10-22 | Stop reason: SDUPTHER

## 2018-05-10 RX ORDER — METOPROLOL SUCCINATE 50 MG/1
50 TABLET, EXTENDED RELEASE ORAL DAILY
Qty: 90 TABLET | Refills: 1 | Status: SHIPPED | OUTPATIENT
Start: 2018-05-10 | End: 2018-10-22 | Stop reason: SDUPTHER

## 2018-05-10 RX ORDER — AMLODIPINE BESYLATE 2.5 MG/1
2.5 TABLET ORAL 2 TIMES DAILY
Qty: 180 TABLET | Refills: 1 | Status: SHIPPED | OUTPATIENT
Start: 2018-05-10 | End: 2018-10-22 | Stop reason: ALTCHOICE

## 2018-05-10 RX ORDER — ATORVASTATIN CALCIUM 20 MG/1
20 TABLET, FILM COATED ORAL NIGHTLY
Qty: 90 TABLET | Refills: 1 | Status: SHIPPED | OUTPATIENT
Start: 2018-05-10 | End: 2018-10-22 | Stop reason: SDUPTHER

## 2018-05-10 RX ORDER — PANTOPRAZOLE SODIUM 20 MG/1
20 TABLET, DELAYED RELEASE ORAL NIGHTLY
Qty: 90 TABLET | Refills: 1 | Status: SHIPPED | OUTPATIENT
Start: 2018-05-10 | End: 2018-10-22 | Stop reason: SDUPTHER

## 2018-05-10 RX ORDER — ALLOPURINOL 100 MG/1
100 TABLET ORAL DAILY
Qty: 90 TABLET | Refills: 1 | Status: SHIPPED | OUTPATIENT
Start: 2018-05-10 | End: 2018-05-22

## 2018-05-10 RX ORDER — EZETIMIBE 10 MG/1
10 TABLET ORAL NIGHTLY
Qty: 90 TABLET | Refills: 1 | Status: SHIPPED | OUTPATIENT
Start: 2018-05-10 | End: 2018-10-22 | Stop reason: SDUPTHER

## 2018-05-10 NOTE — PATIENT INSTRUCTIONS
Check on insurance coverage and cost for adacel Tdap(tetanus plus whooping cough vaccine) and get the immunization at your local pharmacy  Check on insurance coverage and cost for Shingrix (newest shingles vaccine) and get the immunization at your local pharmacy. It is more effective than the old Zostavax vaccine and is recommended even if you have had the Zostavax vaccine in the past. For more information, please look at the website below:    Https://www.cdc.gov/vaccines/vpd/shingles/public/shingrix/index.html      Medicare Wellness  Personal Prevention Plan of Service     Date of Office Visit:  05/10/2018  Encounter Provider:  Ken Andujar MD  Place of Service:  [facility]  Patient Name: Marleni Hummel  :  1935    As part of the Medicare Wellness portion of your visit today, we are providing you with this personalized preventive plan of services (PPPS). This plan is based upon recommendations of the [place] Preventive Services Task Force (USPSTF) and the Advisory Committee on Immunization Practices (ACIP).    This lists the preventive care services that should be considered, and provides dates of when you are due. Items listed as completed are up-to-date and do not require any further intervention.    Health Maintenance   Topic Date Due   • MEDICARE ANNUAL WELLNESS  2016   • TDAP/TD VACCINES (1 - Tdap) 2018 (Originally 12/10/1954)   • ZOSTER VACCINE  2018 (Originally 3/9/2016)   • INFLUENZA VACCINE  2018   • LIPID PANEL  2019   • DXA SCAN  2019   • MAMMOGRAM  2019   • PNEUMOCOCCAL VACCINES (65+ LOW/MEDIUM RISK)  Completed       Orders Placed This Encounter   Procedures   • TSH+Free T4     Standing Status:   Future     Standing Expiration Date:   2018   • T3, Free     Standing Status:   Future   • Thyroid Antibodies     Standing Status:   Future       Return in about 2 months (around 7/10/2018) for Recheck  thyroid.    Serving Sizes  A serving size is a measured amount of food or drink, such as one slice of bread, that has an associated nutrient content. Knowing the serving size of a food or drink can help you determine how much of that food you should consume.  What is the size of one serving?  The size of one healthy serving depends on the food or drink. To determine a serving size, read the food label. If the food or drink does not have a food label, try to find serving size information online. Or, use the following to estimate the size of one adult serving:  Grain   1 slice bread. ½ bagel. ½ cup pasta.  Vegetable   ½ cup cooked or canned vegetables. 1 cup raw, leafy greens.  Fruit   ½ cup canned fruit. 1 medium fruit. ¼ cup dried fruit.  Meat and Other Protein Sources   1 oz meat, poultry, or fish. ¼ cup cooked beans. 1 egg. ¼ cup nuts or seeds. 1 Tbsp nut butter. ¼ cup tofu or tempeh. 2 Tbsp hummus.  Dairy   An individual container of yogurt (6-8 oz). 1 piece of cheese the size of your thumb (1 oz). 1 cup (8 oz) milk or milk alternative.  Fat   A piece the size of one dice. 1 tsp soft margarine. 1 Tbsp mayonnaise. 1 tsp vegetable oil. 1 Tbsp regular salad dressing. 2 Tbsp low-fat salad dressing.  How many servings should I eat from each food group each day?  The following are the suggested number of servings to try and have every day from each food group. You can also look at your eating throughout the week and aim for meeting these requirements on most days for overall healthy eating.  Grain   6-8 servings. Try to have half of your grains from whole grains, such as whole wheat bread, corn tortillas, oatmeal, brown rice, whole wheat pasta, and bulgur.  Vegetable   At least 2½-3 servings.  Fruit   2 servings.  Meat and Other Protein Foods   5-6 servings. Aim to have lean proteins, such as chicken, turkey, fish, beans, or tofu.  Dairy   3 servings. Choose low-fat or nonfat if you are trying to control your  weight.  Fat   2-3 servings.  Is a serving the same thing as a portion?  No. A portion is the actual amount you eat, which may be more than one serving. Knowing the specific serving size of a food and the nutritional information that goes with it can help you make a healthy decision on what size portion to eat.  What are some tips to help me learn healthy serving sizes?  · Check food labels for serving sizes. Many foods that come as a single portion actually contain multiple servings.  · Determine the serving size of foods you commonly eat and figure out how large a portion you usually eat.  · Measure the number of servings that can be held by the bowls, glasses, cups, and plates you typically use. For example, pour your breakfast cereal into your regular bowl and then pour it into a measuring cup.  · For 1-2 days, measure the serving sizes of all the foods you eat.  · Practice estimating serving sizes and determining how big your portions should be.  This information is not intended to replace advice given to you by your health care provider. Make sure you discuss any questions you have with your health care provider.  Document Released: 09/15/2004 Document Revised: 08/12/2017 Document Reviewed: 03/17/2015  [employer] Interactive Patient Education © 2017 [employer]      Exercising to Lose Weight  Exercising can help you to lose weight. In order to lose weight through exercise, you need to do vigorous-intensity exercise. You can tell that you are exercising with vigorous intensity if you are breathing very hard and fast and cannot hold a conversation while exercising.  Moderate-intensity exercise helps to maintain your current weight. You can tell that you are exercising at a moderate level if you have a higher heart rate and faster breathing, but you are still able to hold a conversation.  How often should I exercise?  Choose an activity that you enjoy and set realistic goals. Your health care provider can help  you to make an activity plan that works for you. Exercise regularly as directed by your health care provider. This may include:  · Doing resistance training twice each week, such as:  ¨ Push-ups.  ¨ Sit-ups.  ¨ Lifting weights.  ¨ Using resistance bands.  · Doing a given intensity of exercise for a given amount of time. Choose from these options:  ¨ 150 minutes of moderate-intensity exercise every week.  ¨ 75 minutes of vigorous-intensity exercise every week.  ¨ A mix of moderate-intensity and vigorous-intensity exercise every week.  Children, pregnant women, people who are out of shape, people who are overweight, and older adults may need to consult a health care provider for individual recommendations. If you have any sort of medical condition, be sure to consult your health care provider before starting a new exercise program.  What are some activities that can help me to lose weight?  · Walking at a rate of at least 4.5 miles an hour.  · Jogging or running at a rate of 5 miles per hour.  · Biking at a rate of at least 10 miles per hour.  · Lap swimming.  · Roller-skating or in-line skating.  · Cross-country skiing.  · Vigorous competitive sports, such as football, basketball, and soccer.  · Jumping rope.  · Aerobic dancing.  How can I be more active in my day-to-day activities?  · Use the stairs instead of the elevator.  · Take a walk during your lunch break.  · If you drive, park your car farther away from work or school.  · If you take public transportation, get off one stop early and walk the rest of the way.  · Make all of your phone calls while standing up and walking around.  · Get up, stretch, and walk around every 30 minutes throughout the day.  What guidelines should I follow while exercising?  · Do not exercise so much that you hurt yourself, feel dizzy, or get very short of breath.  · Consult your health care provider prior to starting a new exercise program.  · Wear comfortable clothes and shoes with  good support.  · Drink plenty of water while you exercise to prevent dehydration or heat stroke. Body water is lost during exercise and must be replaced.  · Work out until you breathe faster and your heart beats faster.  This information is not intended to replace advice given to you by your health care provider. Make sure you discuss any questions you have with your health care provider.  Document Released: 01/20/2012 Document Revised: 05/25/2017 Document Reviewed: 05/21/2015  [employer] Interactive Patient Education © 2017 [employer]      Fall Prevention in the Home  Falls can cause injuries and can affect people from all age groups. There are many simple things that you can do to make your home safe and to help prevent falls.  What can I do on the outside of my home?  · Regularly repair the edges of walkways and driveways and fix any cracks.  · Remove high doorway thresholds.  · Trim any shrubbery on the main path into your home.  · Use bright outdoor lighting.  · Clear walkways of debris and clutter, including tools and rocks.  · Regularly check that handrails are securely fastened and in good repair. Both sides of any steps should have handrails.  · Install guardrails along the edges of any raised decks or porches.  · Have leaves, snow, and ice cleared regularly.  · Use sand or salt on walkways during winter months.  · In the garage, clean up any spills right away, including grease or oil spills.  What can I do in the bathroom?  · Use night lights.  · Install grab bars by the toilet and in the tub and shower. Do not use towel bars as grab bars.  · Use non-skid mats or decals on the floor of the tub or shower.  · If you need to sit down while you are in the shower, use a plastic, non-slip stool.  · Keep the floor dry. Immediately clean up any water that spills on the floor.  · Remove soap buildup in the tub or shower on a regular basis.  · Attach bath mats securely with double-sided non-slip rug tape.  · Remove  throw rugs and other tripping hazards from the floor.  What can I do in the bedroom?  · Use night lights.  · Make sure that a bedside light is easy to reach.  · Do not use oversized bedding that drapes onto the floor.  · Have a firm chair that has side arms to use for getting dressed.  · Remove throw rugs and other tripping hazards from the floor.  What can I do in the kitchen?  · Clean up any spills right away.  · Avoid walking on wet floors.  · Place frequently used items in easy-to-reach places.  · If you need to reach for something above you, use a sturdy step stool that has a grab bar.  · Keep electrical cables out of the way.  · Do not use floor polish or wax that makes floors slippery. If you have to use wax, make sure that it is non-skid floor wax.  · Remove throw rugs and other tripping hazards from the floor.  What can I do in the stairways?  · Do not leave any items on the stairs.  · Make sure that there are handrails on both sides of the stairs. Fix handrails that are broken or loose. Make sure that handrails are as long as the stairways.  · Check any carpeting to make sure that it is firmly attached to the stairs. Fix any carpet that is loose or worn.  · Avoid having throw rugs at the top or bottom of stairways, or secure the rugs with carpet tape to prevent them from moving.  · Make sure that you have a light switch at the top of the stairs and the bottom of the stairs. If you do not have them, have them installed.  What are some other fall prevention tips?  · Wear closed-toe shoes that fit well and support your feet. Wear shoes that have rubber soles or low heels.  · When you use a stepladder, make sure that it is completely opened and that the sides are firmly locked. Have someone hold the ladder while you are using it. Do not climb a closed stepladder.  · Add color or contrast paint or tape to grab bars and handrails in your home. Place contrasting color strips on the first and last steps.  · Use  mobility aids as needed, such as canes, walkers, scooters, and crutches.  · Turn on lights if it is dark. Replace any light bulbs that burn out.  · Set up furniture so that there are clear paths. Keep the furniture in the same spot.  · Fix any uneven floor surfaces.  · Choose a carpet design that does not hide the edge of steps of a stairway.  · Be aware of any and all pets.  · Review your medicines with your healthcare provider. Some medicines can cause dizziness or changes in blood pressure, which increase your risk of falling.  Talk with your health care provider about other ways that you can decrease your risk of falls. This may include working with a physical therapist or  to improve your strength, balance, and endurance.  This information is not intended to replace advice given to you by your health care provider. Make sure you discuss any questions you have with your health care provider.  Document Released: 12/08/2003 Document Revised: 05/16/2017 Document Reviewed: 01/22/2016  [employer] Interactive Patient Education © 2017 [employer]

## 2018-05-10 NOTE — PROGRESS NOTES
QUICK REFERENCE INFORMATION:  The ABCs of the Annual Wellness Visit    Subsequent Medicare Wellness Visit    HEALTH RISK ASSESSMENT    1935    Recent Hospitalizations:  No hospitalization(s) within the last year..        Current Medical Providers:  Patient Care Team:  Ken Andujar MD as PCP - General  Ken Andujar MD as PCP - Claims Attributed  Chuckie Mcalughlin MD as Consulting Physician (Urology)  Jason Tai MD as Consulting Physician (Cardiology)  Geoffrey Mills MD as Consulting Physician (Nephrology)  Anayeli Alanis MD as Consulting Physician (Obstetrics and Gynecology)  BROOK Clarke MD as Consulting Physician (Ophthalmology)  Jose Alfredo Krishnamurthy MD as Consulting Physician (Hematology and Oncology)  Sean Canales MD as Consulting Physician (Orthopedic Surgery)  Esteban Moe MD as Consulting Physician (Orthopedic Surgery)  Kalie Braga RN as Care Coordinator (Population Health)  Feliciano Elizabeth MD as Consulting Physician (Gastroenterology)        Smoking Status:  History   Smoking Status   • Former Smoker   • Packs/day: 1.00   • Years: 3.00   • Types: Cigarettes   • Quit date: 2/22/1956   Smokeless Tobacco   • Never Used       Alcohol Consumption:  History   Alcohol Use   • 0.6 - 1.2 oz/week   • 1 - 2 Glasses of wine per week     Comment: rare       Depression Screen:   PHQ-2/PHQ-9 Depression Screening 5/10/2018   Little interest or pleasure in doing things 0   Feeling down, depressed, or hopeless 0   Total Score 0       Health Habits and Functional and Cognitive Screening:  Functional & Cognitive Status 5/10/2018   Do you have difficulty preparing food and eating? No   Do you have difficulty bathing yourself, getting dressed or grooming yourself? No   Do you have difficulty using the toilet? No   Do you have difficulty moving around from place to place? No   Do you have trouble with steps or getting out of a bed or a chair? Yes   In the past year have you fallen or  experienced a near fall? Yes   Current Diet Well Balanced Diet   Dental Exam Up to date   Eye Exam Up to date   Exercise (times per week) 0 times per week   Current Exercise Activities Include None   Do you need help using the phone?  No   Are you deaf or do you have serious difficulty hearing?  No   Do you need help with transportation? No   Do you need help shopping? Yes   Do you need help preparing meals?  No   Do you need help with housework?  Yes   Do you need help with laundry? No   Do you need help taking your medications? No   Do you need help managing money? No   Do you ever drive or ride in a car without wearing a seat belt? No           Does the patient have evidence of cognitive impairment? No    Aspirin use counseling: Does not need ASA (and currently is not on it)      Recent Lab Results:  CMP:  Lab Results   Component Value Date    GLU 98 05/08/2018    BUN 32 (H) 05/08/2018    CREATININE 1.23 (H) 05/08/2018    EGFRIFNONA 42 (L) 05/08/2018    EGFRIFAFRI 51 (L) 05/08/2018    BCR 26.0 (H) 05/08/2018     05/08/2018    K 4.8 05/08/2018    CO2 27.6 05/08/2018    CALCIUM 9.7 05/08/2018    PROTENTOTREF 6.7 05/08/2018    ALBUMIN 4.60 05/08/2018    LABGLOBREF 2.1 05/08/2018    LABIL2 2.2 05/08/2018    BILITOT 0.6 05/08/2018    ALKPHOS 44 05/08/2018    AST 17 05/08/2018    ALT 15 05/08/2018     Lipid Panel:  Lab Results   Component Value Date    TRIG 117 05/08/2018    HDL 72 (H) 05/08/2018    VLDL 23.4 05/08/2018    LDLHDL 1.45 05/08/2018     HbA1c:       Visual Acuity:  No exam data present    Age-appropriate Screening Schedule:  Refer to the list below for future screening recommendations based on patient's age, sex and/or medical conditions. Orders for these recommended tests are listed in the plan section. The patient has been provided with a written plan.    Health Maintenance   Topic Date Due   • TDAP/TD VACCINES (1 - Tdap) 11/01/2018 (Originally 12/10/1954)   • ZOSTER VACCINE  11/01/2018 (Originally  3/9/2016)   • INFLUENZA VACCINE  08/01/2018   • LIPID PANEL  05/08/2019   • DXA SCAN  06/23/2019   • MAMMOGRAM  08/08/2019   • PNEUMOCOCCAL VACCINES (65+ LOW/MEDIUM RISK)  Completed        Chief Complaint   Patient presents with   • Other     Medicare Wellness   • Hypertension   • Hyperlipidemia       Subjective   History of Present Illness    Marleni Hummel is a 82 y.o. female who presents for an Subsequent Wellness Visit.So here to refill medicines and review labs.  Her thyroid exam shows decreased TSH and elevated T4 despite being on the lowest dose of levothyroxine.  She has been on this medicine since 2016.  We will eliminate the medication with short-term follow-up and repeat labs.  Lipids are improved but not to goal yet.  We will add Zetia to her current regimen to try to attain LDL goal is less than 70 due to her history of myocardial infarction in the past.  She remains under the care of cardiology for her atrial fibrillation and that condition is stable.  Panic disorder is controlled on current therapy.  Initial blood pressure is elevated but recently it was normal.  She is tolerating her current medical regimen. Immunizations due, info shared with patient.    The following portions of the patient's history were reviewed and updated as appropriate: allergies, current medications, past family history, past medical history, past social history, past surgical history and problem list.    Outpatient Medications Prior to Visit   Medication Sig Dispense Refill   • acetaminophen (TYLENOL) 500 MG tablet Take 1,000 mg by mouth 3 (three) times a day as needed for mild pain (1-3) or moderate pain (4-6).     • ELIQUIS 2.5 MG tablet tablet TAKE 1 TABLET TWICE A  tablet 1   • MYRBETRIQ 25 MG tablet sustained-release 24 hour 24 hr tablet Take 25 mg by mouth Daily.     • allopurinol (ZYLOPRIM) 100 MG tablet Take 1 tablet by mouth Daily for 180 days. 90 tablet 1   • amLODIPine (NORVASC) 2.5 MG tablet Take 1  tablet by mouth 2 (Two) Times a Day for 180 days. 180 tablet 1   • atorvastatin (LIPITOR) 20 MG tablet Take 1 tablet by mouth Every Night for 180 days. 90 tablet 1   • DULoxetine (CYMBALTA) 30 MG capsule Take 1 capsule by mouth Daily for 180 days. 90 capsule 1   • levothyroxine (SYNTHROID, LEVOTHROID) 25 MCG tablet Take 1 tablet by mouth Daily for 180 days. 90 tablet 1   • metoprolol succinate XL (TOPROL-XL) 50 MG 24 hr tablet Take 1 tablet by mouth Daily for 180 days. 90 tablet 1   • pantoprazole (PROTONIX) 20 MG EC tablet Take 1 tablet by mouth Every Night for 180 days. 90 tablet 1   • senna (SENOKOT) 8.6 MG tablet tablet Take 2 tablets by mouth At Night As Needed for Constipation.     • sodium chloride (OCEAN NASAL SPRAY) 0.65 % nasal spray 1 spray into each nostril As Needed for congestion. 104 mL 0     No facility-administered medications prior to visit.        Patient Active Problem List   Diagnosis   • Arthritis involving multiple sites   • Essential hypertension   • Atrial fibrillation   • Bell's palsy   • Chronic kidney disease (CKD), stage III (moderate)   • GERD (gastroesophageal reflux disease)   • Hypercholesterolemia   • Insomnia   • Panic disorder   • Chronic nonseasonal allergic rhinitis due to pollen   • Sleep apnea   • History of MI (myocardial infarction)   • Chronic coronary artery disease   • Acquired hypothyroidism   • Anemia of chronic disease   • Hematuria   • Weakness of right leg   • Cardiac murmur   • Senile osteoporosis   • Hyperuricemia       Advance Care Planning:  has an advance directive - a copy has been provided and is in file    Identification of Risk Factors:  Risk factors include: weight , cardiovascular risk, inactivity, increased fall risk, chronic pain, caretaker stress, hearing limitations and incontinence.    Review of Systems   Constitutional: Negative for fatigue.   Cardiovascular: Negative for chest pain.   Psychiatric/Behavioral:        Caregiver stress       Compared to  "one year ago, the patient feels her physical health is better.  Compared to one year ago, the patient feels her mental health is better.    Objective     Physical Exam   Constitutional: She is cooperative. No distress.   Eyes: Conjunctivae and lids are normal.   Neck: Carotid bruit is not present. No tracheal deviation present.   Cardiovascular: Normal rate and normal heart sounds.  An irregularly irregular rhythm present.   No murmur heard.  Pulmonary/Chest: Effort normal and breath sounds normal.   Neurological: She is alert. She is not disoriented.   Skin: Skin is warm and dry.   Psychiatric: She has a normal mood and affect. Her speech is normal and behavior is normal.   Vitals reviewed.      Vitals:    05/10/18 1328 05/10/18 1429   BP: 160/96 140/80   BP Location:  Right arm   Patient Position:  Sitting   Cuff Size:  Large Adult   Pulse: 79    Resp: 16    Temp: 97.5 °F (36.4 °C)    TempSrc: Oral    Weight: 69.9 kg (154 lb)    Height: 160 cm (63\")    PainSc:   6    PainLoc: Hip  Comment: knee        Patient's Body mass index is 27.28 kg/m². BMI is above normal parameters. Recommendations include: exercise counseling and nutrition counseling.      Assessment/Plan   Patient Self-Management and Personalized Health Advice  The patient has been provided with information about: diet, exercise, weight management, prevention of cardiac or vascular disease and fall prevention and preventive services including:   · Exercise counseling provided, Fall Risk assessment done, Nutrition counseling provided, TdaP vaccine, Shingrix.    Visit Diagnoses:    ICD-10-CM ICD-9-CM   1. Medicare annual wellness visit, subsequent  Fall prevention, exercise, diet to lose weight, adacel tdap, shingrix advised Z00.00 V70.0   2. Essential hypertension  Continue same I10 401.9   3. Hypercholesterolemia  Add Zetia, goal LDL <70 due to history of MI in the past E78.00 272.0   4. Chronic nonseasonal allergic rhinitis due to pollen J30.89 477.0 "   5. Acquired hypothyroidism  Stop levothyroxine, recheck labs in mid June and appointment in July E03.9 244.9   6. Hyperuricemia   E79.0 790.6   7. Panic disorder  Continue same medication F41.0 300.01   8. Gastroesophageal reflux disease, esophagitis presence not specified  Continue PPI. Pros and cons of medication discussed with patient, her son and in the presence of her .  K21.9 530.81       Orders Placed This Encounter   Procedures   • TSH+Free T4     Standing Status:   Future     Standing Expiration Date:   11/6/2018   • T3, Free     Standing Status:   Future   • Thyroid Antibodies     Standing Status:   Future       Outpatient Encounter Prescriptions as of 5/10/2018   Medication Sig Dispense Refill   • acetaminophen (TYLENOL) 500 MG tablet Take 1,000 mg by mouth 3 (three) times a day as needed for mild pain (1-3) or moderate pain (4-6).     • allopurinol (ZYLOPRIM) 100 MG tablet Take 1 tablet by mouth Daily for 180 days. 90 tablet 1   • amLODIPine (NORVASC) 2.5 MG tablet Take 1 tablet by mouth 2 (Two) Times a Day for 180 days. 180 tablet 1   • atorvastatin (LIPITOR) 20 MG tablet Take 1 tablet by mouth Every Night for 180 days. 90 tablet 1   • DULoxetine (CYMBALTA) 30 MG capsule Take 1 capsule by mouth Daily for 180 days. 90 capsule 1   • ELIQUIS 2.5 MG tablet tablet TAKE 1 TABLET TWICE A  tablet 1   • metoprolol succinate XL (TOPROL-XL) 50 MG 24 hr tablet Take 1 tablet by mouth Daily for 180 days. 90 tablet 1   • MYRBETRIQ 25 MG tablet sustained-release 24 hour 24 hr tablet Take 25 mg by mouth Daily.     • pantoprazole (PROTONIX) 20 MG EC tablet Take 1 tablet by mouth Every Night for 180 days. 90 tablet 1   • [DISCONTINUED] allopurinol (ZYLOPRIM) 100 MG tablet Take 1 tablet by mouth Daily for 180 days. 90 tablet 1   • [DISCONTINUED] amLODIPine (NORVASC) 2.5 MG tablet Take 1 tablet by mouth 2 (Two) Times a Day for 180 days. 180 tablet 1   • [DISCONTINUED] atorvastatin (LIPITOR) 20 MG tablet Take  1 tablet by mouth Every Night for 180 days. 90 tablet 1   • [DISCONTINUED] DULoxetine (CYMBALTA) 30 MG capsule Take 1 capsule by mouth Daily for 180 days. 90 capsule 1   • [DISCONTINUED] levothyroxine (SYNTHROID, LEVOTHROID) 25 MCG tablet Take 1 tablet by mouth Daily for 180 days. 90 tablet 1   • [DISCONTINUED] metoprolol succinate XL (TOPROL-XL) 50 MG 24 hr tablet Take 1 tablet by mouth Daily for 180 days. 90 tablet 1   • [DISCONTINUED] pantoprazole (PROTONIX) 20 MG EC tablet Take 1 tablet by mouth Every Night for 180 days. 90 tablet 1   • [DISCONTINUED] senna (SENOKOT) 8.6 MG tablet tablet Take 2 tablets by mouth At Night As Needed for Constipation.     • [DISCONTINUED] sodium chloride (OCEAN NASAL SPRAY) 0.65 % nasal spray 1 spray into each nostril As Needed for congestion. 104 mL 0   • ezetimibe (ZETIA) 10 MG tablet Take 1 tablet by mouth Every Night for 180 days. 90 tablet 1     Facility-Administered Encounter Medications as of 5/10/2018   Medication Dose Route Frequency Provider Last Rate Last Dose   • [DISCONTINUED] lidocaine (XYLOCAINE) 1 % injection 1 mL  1 mL Intradermal Once PRN Jaspreet Muñoz MD       • [DISCONTINUED] midazolam (VERSED) injection 1 mg  1 mg Intravenous PRN Jaspreet Muñoz MD   1 mg at 05/09/18 1003   • [DISCONTINUED] sodium chloride 0.9 % flush 1-10 mL  1-10 mL Intravenous PRN Esteban Moe MD       • [DISCONTINUED] sodium chloride 0.9 % flush 1-10 mL  1-10 mL Intravenous PRN Jaspreet Muñoz MD           Reviewed use of high risk medication in the elderly: not applicable  Reviewed for potential of harmful drug interactions in the elderly: not applicable    Follow Up:  Return in about 2 months (around 7/10/2018) for Recheck thyroid.     An After Visit Summary and PPPS with all of these plans were given to the patient.

## 2018-05-22 ENCOUNTER — OFFICE VISIT (OUTPATIENT)
Dept: CARDIOLOGY | Facility: CLINIC | Age: 83
End: 2018-05-22

## 2018-05-22 VITALS
HEART RATE: 65 BPM | SYSTOLIC BLOOD PRESSURE: 142 MMHG | DIASTOLIC BLOOD PRESSURE: 82 MMHG | HEIGHT: 63 IN | RESPIRATION RATE: 22 BRPM | WEIGHT: 156 LBS | BODY MASS INDEX: 27.64 KG/M2

## 2018-05-22 DIAGNOSIS — I25.10 CORONARY ARTERY DISEASE INVOLVING NATIVE CORONARY ARTERY OF NATIVE HEART WITHOUT ANGINA PECTORIS: Primary | ICD-10-CM

## 2018-05-22 DIAGNOSIS — I48.21 PERMANENT ATRIAL FIBRILLATION (HCC): ICD-10-CM

## 2018-05-22 DIAGNOSIS — I51.81 STRESS-INDUCED CARDIOMYOPATHY: ICD-10-CM

## 2018-05-22 DIAGNOSIS — E78.2 MIXED HYPERLIPIDEMIA: ICD-10-CM

## 2018-05-22 PROCEDURE — 99214 OFFICE O/P EST MOD 30 MIN: CPT | Performed by: INTERNAL MEDICINE

## 2018-05-22 PROCEDURE — 93000 ELECTROCARDIOGRAM COMPLETE: CPT | Performed by: INTERNAL MEDICINE

## 2018-05-22 NOTE — PROGRESS NOTES
Date of Office Visit: 18  Encounter Provider: Jason Tai MD  Place of Service: [facility] CARDIOLOGY  Patient Name: Marleni Hummel  :1935  Referral Provider:Ken Andujar MD      Chief Complaint   Patient presents with   • Atrial Fibrillation     History of Present Illness   The patient is a pleasant, 82-year-old female with history of hypertension and  hyperlipidemia who presented with a cough and respiratory illness to the emergency room  but was found to be in rapid atrial fibrillation. Her thyroid studies were normal. Her 2-D  echocardiogram was unremarkable. Her perfusion study was unremarkable. She was rate  controlled and anticoagulated. She was also seen by pulmonary. Then, in 2011, she  had been adequately anticoagulated and was cardioverted but went back into atrial  fibrillation. She was started on flecainide and then underwent repeat cardioversion in May  2011. epidural for.  She then had to have hip surgery and had total hip arthroplasty from recent femur  fracture and unfortunately developed an infection of that. I believe had multiple  surgeries on that. Then on 10/02/2015 she came in again and was bradycardic but that was  after she was nauseated and vomiting. Her troponin was borderline elevated. Medications  were adjusted. 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 She did have a circumflex  lesion where they placed a drug eluting stent. She recovered from that. Obviously, we  had to stop the flecainide. We switched her to amiodarone. She then went to a nursing  home, while there concerned about the interaction between her amiodarone and her  psychiatric drug for depression. She had elevated QT interval, so we had to stop the  amiodarone.   She then was  readmitted for another hip surgery on 11/18/2015. She did reasonably well  with that. She now comes in for follow-up.  She was back in a hospital in January 2016 she fell and broke her hip again.  She had had yet another surgery.    She had a follow-up echocardiogram in February 2016 her left ventricular function returned to normal.  No significant valvular disease and normal RV pressure.     She comes in for follow up. The patient has occasional chest pain 2-3 times a week moves around last only a few seconds, no pressure and heaviness. No shortness of breath, othopnea or PND. No palpitations, near syncope or syncope. No stroke type symptoms like paralysis, paresthesia, abrupt vision loss and dysarthria. No bleeding like blood in the stool or dark stools.  She does have fatigue but doesn't sleep well because she worries and is up all night.  Check she does state her breathing is better and overall she feels like she's doing better.  She has had some back problems has been getting some epidurals and now getting some sciatic nerve injections.      Atrial Fibrillation   Symptoms are negative for dizziness and weakness. Past medical history includes atrial fibrillation and CAD.   Coronary Artery Disease   Pertinent negatives include no dizziness, muscle weakness or weight gain. Her past medical history is significant for past myocardial infarction.         Past Medical History:   Diagnosis Date   • Allergic    • Allergic rhinitis    • Arthropathy of knee     right   • Atrial fibrillation    • Back pain    • Bell's palsy    • Chronic kidney disease (CKD), stage III (moderate)    • Cough    • Edema    • Encounter for eye exam 02/2015   • Essential hypertension    • Fatigue    • GERD (gastroesophageal reflux disease)    • H/O Heart murmur    • Heart attack    • Herpes zoster without complication    • Hip arthritis     left   • History of bone density study 02/28/2008   • History of pneumonia    • Hypercholesterolemia    •  IFG (impaired fasting glucose)    • Insomnia    • Nephropathy    • Osteoarthritis     Right hip   • Osteopenia    • Panic disorder    • Rectal bleeding    • Sleep apnea    • Stress    • Urinary incontinence    • Vitamin D deficiency          Past Surgical History:   Procedure Laterality Date   • CATARACT EXTRACTION Left 04/01/2013    Dr. Clarke   • CATARACT EXTRACTION Right 04/16/2013    Dr. Clarke   • COLONOSCOPY  04/18/2014    Dr. Elizabeth; no polyps   • EPIDURAL BLOCK     • HIP PERCUTANEOUS PINNING Left 8/31/2017    Procedure: LT HIP PERCUTANEOUS PINNING;  Surgeon: Sean Canales MD;  Location: [facility] MAIN OR;  Service:    • JOINT REPLACEMENT Left 2004    knee Petrick   • JOINT REPLACEMENT Right 2004    knee Popham   • LEG SURGERY Left    • MAMMO BILATERAL  11/2013   • PAP SMEAR  02/2011   • TOTAL HIP ARTHROPLASTY Right 08/2015         Current Outpatient Prescriptions on File Prior to Visit   Medication Sig Dispense Refill   • acetaminophen (TYLENOL) 500 MG tablet Take 1,000 mg by mouth 3 (three) times a day as needed for mild pain (1-3) or moderate pain (4-6).     • amLODIPine (NORVASC) 2.5 MG tablet Take 1 tablet by mouth 2 (Two) Times a Day for 180 days. 180 tablet 1   • atorvastatin (LIPITOR) 20 MG tablet Take 1 tablet by mouth Every Night for 180 days. 90 tablet 1   • DULoxetine (CYMBALTA) 30 MG capsule Take 1 capsule by mouth Daily for 180 days. 90 capsule 1   • ezetimibe (ZETIA) 10 MG tablet Take 1 tablet by mouth Every Night for 180 days. 90 tablet 1   • metoprolol succinate XL (TOPROL-XL) 50 MG 24 hr tablet Take 1 tablet by mouth Daily for 180 days. 90 tablet 1   • MYRBETRIQ 25 MG tablet sustained-release 24 hour 24 hr tablet Take 25 mg by mouth Daily.     • pantoprazole (PROTONIX) 20 MG EC tablet Take 1 tablet by mouth Every Night for 180 days. 90 tablet 1   • [DISCONTINUED] allopurinol (ZYLOPRIM) 100 MG tablet Take 1 tablet by mouth Daily for 180 days. 90 tablet 1   • [DISCONTINUED] ELIQUIS 2.5 MG  tablet tablet TAKE 1 TABLET TWICE A  tablet 1     No current facility-administered medications on file prior to visit.          Social History     Social History   • Marital status:      Spouse name: N/A   • Number of children: N/A   • Years of education: High school     Occupational History   • Hairdresser Retired     Social History Main Topics   • Smoking status: Former Smoker     Packs/day: 1.00     Years: 3.00     Types: Cigarettes     Quit date: 2/22/1956   • Smokeless tobacco: Never Used   • Alcohol use 0.6 - 1.2 oz/week     1 - 2 Glasses of wine per week      Comment: rare   • Drug use: No   • Sexual activity: Defer     Other Topics Concern   • Not on file     Social History Narrative   • No narrative on file       Family History   Problem Relation Age of Onset   • Hypertension Other    • Hypertension Mother    • Stroke Mother    • Hypertension Maternal Grandmother          Review of Systems   Constitution: Negative for decreased appetite, diaphoresis, fever, weakness, malaise/fatigue, weight gain and weight loss.   HENT: Negative for congestion, hearing loss, nosebleeds and tinnitus.    Eyes: Negative for blurred vision, double vision, vision loss in left eye, vision loss in right eye and visual disturbance.   Cardiovascular:        As noted in HPI   Respiratory:        As noted HPI   Endocrine: Negative for cold intolerance and heat intolerance.   Hematologic/Lymphatic: Negative for bleeding problem. Does not bruise/bleed easily.   Skin: Negative for color change, flushing, itching and rash.   Musculoskeletal: Negative for arthritis, back pain, joint pain, joint swelling, muscle weakness and myalgias.   Gastrointestinal: Negative for bloating, abdominal pain, constipation, diarrhea, dysphagia, heartburn, hematemesis, hematochezia, melena, nausea and vomiting.   Genitourinary: Negative for bladder incontinence, dysuria, frequency, nocturia and urgency.   Neurological: Negative for dizziness,  "focal weakness, headaches, light-headedness, loss of balance, numbness, paresthesias and vertigo.   Psychiatric/Behavioral: Negative for depression, memory loss and substance abuse.       Procedures      ECG 12 Lead  Date/Time: 5/22/2018 1:06 PM  Performed by: CAMILLE FRANCO  Authorized by: CAMILLE FRANCO   Comparison: compared with previous ECG   Similar to previous ECG  Rhythm: atrial fibrillation  Rate: normal  QRS axis: left                  Objective:    /82 (BP Location: Left arm)   Pulse 65   Resp 22   Ht 160 cm (63\")   Wt 70.8 kg (156 lb)   BMI 27.63 kg/m²        Physical Exam  Physical Exam   Constitutional: She is oriented to person, place, and time. She appears well-developed and well-nourished. No distress.   HENT:   Head: Normocephalic.   Eyes: Conjunctivae are normal. Pupils are equal, round, and reactive to light. No scleral icterus.   Neck: Normal carotid pulses, no hepatojugular reflux and no JVD present. Carotid bruit is not present. No tracheal deviation, no edema and no erythema present. No thyromegaly present.   Cardiovascular: Normal rate, S1 normal, S2 normal, normal heart sounds and intact distal pulses.  An irregularly irregular rhythm present.  No extrasystoles are present. PMI is not displaced.  Exam reveals no gallop, no distant heart sounds and no friction rub.    No murmur heard.  Pulses:       Carotid pulses are 2+ on the right side, and 2+ on the left side.       Radial pulses are 2+ on the right side, and 2+ on the left side.        Femoral pulses are 2+ on the right side, and 2+ on the left side.       Dorsalis pedis pulses are 2+ on the right side, and 2+ on the left side.        Posterior tibial pulses are 2+ on the right side, and 2+ on the left side.   Pulmonary/Chest: Effort normal and breath sounds normal. No respiratory distress. She has no decreased breath sounds. She has no wheezes. She has no rhonchi. She has no rales. She exhibits no tenderness. "   Abdominal: Soft. Bowel sounds are normal. She exhibits no distension and no mass. There is no hepatosplenomegaly. There is no tenderness. There is no rebound and no guarding.   Musculoskeletal: She exhibits no edema, tenderness or deformity.   Neurological: She is alert and oriented to person, place, and time.   Skin: Skin is warm and dry. No rash noted. She is not diaphoretic. No cyanosis or erythema. No pallor. Nails show no clubbing.   Psychiatric: She has a normal mood and affect. Her speech is normal and behavior is normal. Judgment and thought content normal.           Assessment:    1. This is a 82-year-old female with a history of paroxysmal atrial fibrillation status post electrocardioversion back to sinus rhythm.   Atrial Fibrillation and Atrial Flutter  Assessment  • The patient has persistent atrial fibrillation  • This is non-valvular in etiology  • The patient's CHADS2-VASc score is 6  • A [id number] score of 2 or more is considered a high risk for a thromboembolic event  • Warfarin prescribed    Plan  • Attempt to maintain sinus rhythm  • Continue warfarin for antithrombotic therapy, bleeding issues discussed  • Continue beta blocker for rhythm control  She is in atrial fibrillation but asymptomatic we'll continue the same.  She will see us in follow-up in 6 months.      2. Coronary artery disease status post angioplasty, drug eluting stent placement of the circumflex vessel in 10/2015.   Coronary Artery Disease  Assessment  • The patient has no angina  • On warfarin    Plan  • Lifestyle modifications discussed include adhering to a heart healthy diet, avoidance of tobacco products, maintenance of a healthy weight, medication compliance, regular exercise and regular monitoring of cholesterol and blood pressure    Subjective - Objective  • There is a history of past MI  • There has been a previous stent procedure using HAYLEY          3. Stress induced cardiomyopathy. Initial left ventricular ejection  fraction at 20%. Follow-up echocardiogram in February 2016 normal left her systolic function no significant valvular disease  4. Hyperlipidemia. She is now on atorvastatin.   5. Hypertension as outlined above.  6. Obstructive sleep apnea on CPAP.   7. 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 Still painful with difficulty walking.   8. Recurrent urinary tract infections now on chronic antibiotic therapy.    9.  Fatigue.  May be getting better.         Plan:

## 2018-06-06 ENCOUNTER — ANESTHESIA EVENT (OUTPATIENT)
Dept: PAIN MEDICINE | Facility: HOSPITAL | Age: 83
End: 2018-06-06

## 2018-06-06 ENCOUNTER — HOSPITAL ENCOUNTER (OUTPATIENT)
Dept: GENERAL RADIOLOGY | Facility: HOSPITAL | Age: 83
Discharge: HOME | End: 2018-06-06

## 2018-06-06 ENCOUNTER — HOSPITAL ENCOUNTER (OUTPATIENT)
Dept: PAIN MEDICINE | Facility: HOSPITAL | Age: 83
Discharge: HOME | End: 2018-06-06
Admitting: ORTHOPAEDIC SURGERY

## 2018-06-06 ENCOUNTER — ANESTHESIA (OUTPATIENT)
Dept: PAIN MEDICINE | Facility: HOSPITAL | Age: 83
End: 2018-06-06

## 2018-06-06 VITALS
RESPIRATION RATE: 16 BRPM | HEART RATE: 105 BPM | BODY MASS INDEX: 26.58 KG/M2 | DIASTOLIC BLOOD PRESSURE: 80 MMHG | HEIGHT: 63 IN | OXYGEN SATURATION: 97 % | TEMPERATURE: 97.5 F | SYSTOLIC BLOOD PRESSURE: 114 MMHG | WEIGHT: 150 LBS

## 2018-06-06 DIAGNOSIS — R52 PAIN: ICD-10-CM

## 2018-06-06 DIAGNOSIS — M46.1 SACROILIITIS, NOT ELSEWHERE CLASSIFIED (HCC): ICD-10-CM

## 2018-06-06 PROCEDURE — C1755 CATHETER, INTRASPINAL: HCPCS

## 2018-06-06 PROCEDURE — 77003 FLUOROGUIDE FOR SPINE INJECT: CPT

## 2018-06-06 RX ORDER — MIDAZOLAM HYDROCHLORIDE 1 MG/ML
1 INJECTION INTRAMUSCULAR; INTRAVENOUS AS NEEDED
Status: DISCONTINUED | OUTPATIENT
Start: 2018-06-06 | End: 2018-06-07 | Stop reason: HOSPADM

## 2018-06-06 RX ORDER — BUPIVACAINE HYDROCHLORIDE 2.5 MG/ML
10 INJECTION, SOLUTION INFILTRATION; PERINEURAL ONCE
Status: DISCONTINUED | OUTPATIENT
Start: 2018-06-06 | End: 2018-06-07 | Stop reason: HOSPADM

## 2018-06-06 RX ORDER — BUPIVACAINE HYDROCHLORIDE 2.5 MG/ML
10 INJECTION, SOLUTION INFILTRATION; PERINEURAL ONCE
Status: CANCELLED | OUTPATIENT
Start: 2018-06-07 | End: 2018-06-07

## 2018-06-06 RX ORDER — LIDOCAINE HYDROCHLORIDE 10 MG/ML
0.5 INJECTION, SOLUTION INFILTRATION; PERINEURAL ONCE AS NEEDED
Status: DISCONTINUED | OUTPATIENT
Start: 2018-06-06 | End: 2018-06-07 | Stop reason: HOSPADM

## 2018-06-06 RX ORDER — FENTANYL CITRATE 50 UG/ML
50 INJECTION, SOLUTION INTRAMUSCULAR; INTRAVENOUS AS NEEDED
Status: DISCONTINUED | OUTPATIENT
Start: 2018-06-06 | End: 2018-06-07 | Stop reason: HOSPADM

## 2018-06-06 RX ORDER — SODIUM CHLORIDE 0.9 % (FLUSH) 0.9 %
1-10 SYRINGE (ML) INJECTION AS NEEDED
Status: DISCONTINUED | OUTPATIENT
Start: 2018-06-06 | End: 2018-06-07 | Stop reason: HOSPADM

## 2018-06-06 RX ORDER — METHYLPREDNISOLONE ACETATE 80 MG/ML
80 INJECTION, SUSPENSION INTRA-ARTICULAR; INTRALESIONAL; INTRAMUSCULAR; SOFT TISSUE ONCE
Status: DISCONTINUED | OUTPATIENT
Start: 2018-06-06 | End: 2018-06-07 | Stop reason: HOSPADM

## 2018-06-06 RX ORDER — LIDOCAINE HYDROCHLORIDE 10 MG/ML
1 INJECTION, SOLUTION INFILTRATION; PERINEURAL ONCE AS NEEDED
Status: DISCONTINUED | OUTPATIENT
Start: 2018-06-06 | End: 2018-06-07 | Stop reason: HOSPADM

## 2018-06-06 RX ORDER — METHYLPREDNISOLONE ACETATE 80 MG/ML
40 INJECTION, SUSPENSION INTRA-ARTICULAR; INTRALESIONAL; INTRAMUSCULAR; SOFT TISSUE ONCE
Status: CANCELLED | OUTPATIENT
Start: 2018-06-06

## 2018-06-06 NOTE — H&P (VIEW-ONLY)
[facility]    History and Physical    Patient Name: Marleni Hummel  :  1935  MRN:  [id number]  Date of Admission: 2018    Subjective     Patient is an 82-year-old lady with pain in her lower back which radiates to her left lower extremity.  She showed no improvement from her last injection which was a lumbar epidural steroid injection.  Dr. Lai has requested a sacroiliac injection this time.  Her pain level today is a 5/10.    The following portions of the patients history were reviewed and updated as appropriate: current medications, allergies, past medical history, past surgical history, past family history, past social history and problem list                Objective     Past Medical History:   Past Medical History:   Diagnosis Date   • Allergic    • Allergic rhinitis    • Arthropathy of knee     right   • Atrial fibrillation    • Back pain    • Bell's palsy    • Chronic kidney disease (CKD), stage III (moderate)    • Cough    • Edema    • Encounter for eye exam 2015   • Essential hypertension    • Fatigue    • GERD (gastroesophageal reflux disease)    • H/O Heart murmur    • Heart attack    • Herpes zoster without complication    • Hip arthritis     left   • History of bone density study 2008   • History of pneumonia    • Hypercholesterolemia    • IFG (impaired fasting glucose)    • Insomnia    • Nephropathy    • Osteoarthritis     Right hip   • Osteopenia    • Panic disorder    • Rectal bleeding    • Sleep apnea    • Stress    • Urinary incontinence    • Vitamin D deficiency      Past Surgical History:   Past Surgical History:   Procedure Laterality Date   • CATARACT EXTRACTION Left 2013    Dr. Clarke   • CATARACT EXTRACTION Right 2013    Dr. Clarke   • COLONOSCOPY  2014    Dr. Elizabeth; no polyps   • HIP PERCUTANEOUS PINNING Left 2017    Procedure: LT HIP PERCUTANEOUS PINNING;  Surgeon: Sean Canales MD;  Location: [facility] OR;  Service:    •  JOINT REPLACEMENT Left 2004    knee Rubiack   • JOINT REPLACEMENT Right 2004    knee Popham   • LEG SURGERY Left    • MAMMO BILATERAL  11/2013   • PAP SMEAR  02/2011   • TOTAL HIP ARTHROPLASTY Right 08/2015     Family History:   Family History   Problem Relation Age of Onset   • Hypertension Other    • Hypertension Mother    • Stroke Mother    • Hypertension Maternal Grandmother      Social History:   Social History   Substance Use Topics   • Smoking status: Former Smoker     Packs/day: 1.00     Years: 3.00     Types: Cigarettes     Quit date: 2/22/1956   • Smokeless tobacco: Never Used   • Alcohol use 0.6 - 1.2 oz/week     1 - 2 Glasses of wine per week      Comment: rare       Vital Signs Range for the last 24 hours  Temperature:     Temp Source:     BP:     Pulse:     Respirations:     SPO2:     O2 Amount (l/min):     O2 Devices     Weight:           --------------------------------------------------------------------------------    Current Outpatient Prescriptions   Medication Sig Dispense Refill   • acetaminophen (TYLENOL) 500 MG tablet Take 1,000 mg by mouth 3 (three) times a day as needed for mild pain (1-3) or moderate pain (4-6).     • allopurinol (ZYLOPRIM) 100 MG tablet Take 1 tablet by mouth Daily for 180 days. 90 tablet 1   • amLODIPine (NORVASC) 2.5 MG tablet Take 1 tablet by mouth 2 (Two) Times a Day for 180 days. 180 tablet 1   • atorvastatin (LIPITOR) 20 MG tablet Take 1 tablet by mouth Every Night for 180 days. 90 tablet 1   • DULoxetine (CYMBALTA) 30 MG capsule Take 1 capsule by mouth Daily for 180 days. 90 capsule 1   • ELIQUIS 2.5 MG tablet tablet TAKE 1 TABLET TWICE A  tablet 1   • levothyroxine (SYNTHROID, LEVOTHROID) 25 MCG tablet Take 1 tablet by mouth Daily for 180 days. 90 tablet 1   • metoprolol succinate XL (TOPROL-XL) 50 MG 24 hr tablet Take 1 tablet by mouth Daily for 180 days. 90 tablet 1   • MYRBETRIQ 25 MG tablet sustained-release 24 hour 24 hr tablet Take 25 mg by mouth  Daily.     • pantoprazole (PROTONIX) 20 MG EC tablet Take 1 tablet by mouth Every Night for 180 days. 90 tablet 1   • senna (SENOKOT) 8.6 MG tablet tablet Take 2 tablets by mouth At Night As Needed for Constipation.     • sodium chloride (OCEAN NASAL SPRAY) 0.65 % nasal spray 1 spray into each nostril As Needed for congestion. 104 mL 0     No current facility-administered medications for this encounter.        --------------------------------------------------------------------------------  Assessment/Plan      Anesthesia Evaluation                  Airway   Mallampati: II  TM distance: >3 FB  Neck ROM: full  Dental - normal exam     Pulmonary - normal exam    breath sounds clear to auscultation  (+) sleep apnea,   Cardiovascular - normal exam    Rhythm: regular  Rate: normal    (+) hypertension, valvular problems/murmurs murmur, past MI , CAD, dysrhythmias Atrial Fib, hyperlipidemia,       Neuro/Psych  GI/Hepatic/Renal/Endo    (+)  GERD, GI bleeding, hypothyroidism,     Musculoskeletal     (+) back pain, chronic pain,   Abdominal  - normal exam    Abdomen: soft.  Bowel sounds: normal.   Substance History      OB/GYN          Other   (+) arthritis                Diagnosis and Plan    Treatment Plan  ASA 3      Procedures: Nerve block type: SI Joint Injection., With fluoroscopy,       Anesthetic plan and risks discussed with patient.        Diagnosis:    Sacroiliitis [M46.1]

## 2018-06-06 NOTE — ANESTHESIA PROCEDURE NOTES
PAIN SI joint injection      Performed by  Anesthesiologist: SIXTO BRIZUELA  Preanesthetic Checklist  Completed: patient identified, site marked, surgical consent, pre-op evaluation, timeout performed, IV checked, risks and benefits discussed and monitors and equipment checked  Prep:  Patient position: prone  Sterile barriers:cap, gloves, mask and sterile barrier  Prep: ChloraPrep  Patient monitoring: blood pressure monitoring, continuous pulse oximetry and EKG  Procedure:  Sedation:no  Guidance:fluoroscopy  Contrast Medium:no  Location:Left SIJ  Needle Gauge:22 G  Aspiration:negative    Post Assessment  Injection Assessment: negative  Additional Notes  Procedure aborted, unable to advance needle into S-I joint after multiple attempts.

## 2018-06-06 NOTE — INTERVAL H&P NOTE
[facility]  H&P reviewed. No changes since last visit.  Patient states   % improvement since the last procedure/injection.    Diagnosis     * Sacroiliitis [M46.1]      Airway assessed since last visit. Airway class equals: 2.

## 2018-06-07 ENCOUNTER — TRANSCRIBE ORDERS (OUTPATIENT)
Dept: PAIN MEDICINE | Facility: HOSPITAL | Age: 83
End: 2018-06-07

## 2018-06-07 ENCOUNTER — RESULTS ENCOUNTER (OUTPATIENT)
Dept: FAMILY MEDICINE CLINIC | Facility: CLINIC | Age: 83
End: 2018-06-07

## 2018-06-07 DIAGNOSIS — E03.9 ACQUIRED HYPOTHYROIDISM: ICD-10-CM

## 2018-06-07 DIAGNOSIS — M46.1 SACROILIITIS, NOT ELSEWHERE CLASSIFIED (HCC): Primary | ICD-10-CM

## 2018-06-20 ENCOUNTER — HOSPITAL ENCOUNTER (OUTPATIENT)
Dept: PAIN MEDICINE | Facility: HOSPITAL | Age: 83
End: 2018-06-20

## 2018-06-25 ENCOUNTER — TRANSCRIBE ORDERS (OUTPATIENT)
Dept: PAIN MEDICINE | Facility: HOSPITAL | Age: 83
End: 2018-06-25

## 2018-06-25 DIAGNOSIS — M46.1 SACROILIITIS, NOT ELSEWHERE CLASSIFIED (HCC): Primary | ICD-10-CM

## 2018-07-06 LAB
T3FREE SERPL-MCNC: 3.2 PG/ML (ref 2–4.4)
T4 FREE SERPL-MCNC: 1.23 NG/DL (ref 0.93–1.7)
THYROGLOB AB SERPL-ACNC: <1 IU/ML (ref 0–0.9)
THYROPEROXIDASE AB SERPL-ACNC: 18 IU/ML (ref 0–34)
TSH SERPL DL<=0.005 MIU/L-ACNC: 2.09 MIU/ML (ref 0.27–4.2)

## 2018-07-10 ENCOUNTER — HOSPITAL ENCOUNTER (OUTPATIENT)
Dept: PAIN MEDICINE | Facility: HOSPITAL | Age: 83
Discharge: HOME | End: 2018-07-10
Admitting: ANESTHESIOLOGY

## 2018-07-10 ENCOUNTER — ANESTHESIA EVENT (OUTPATIENT)
Dept: PAIN MEDICINE | Facility: HOSPITAL | Age: 83
End: 2018-07-10

## 2018-07-10 ENCOUNTER — HOSPITAL ENCOUNTER (OUTPATIENT)
Dept: GENERAL RADIOLOGY | Facility: HOSPITAL | Age: 83
Discharge: HOME | End: 2018-07-10

## 2018-07-10 ENCOUNTER — ANESTHESIA (OUTPATIENT)
Dept: PAIN MEDICINE | Facility: HOSPITAL | Age: 83
End: 2018-07-10

## 2018-07-10 VITALS
OXYGEN SATURATION: 97 % | SYSTOLIC BLOOD PRESSURE: 133 MMHG | RESPIRATION RATE: 16 BRPM | DIASTOLIC BLOOD PRESSURE: 94 MMHG | TEMPERATURE: 98.2 F | HEART RATE: 73 BPM

## 2018-07-10 DIAGNOSIS — M46.1 SACROILIITIS, NOT ELSEWHERE CLASSIFIED (HCC): ICD-10-CM

## 2018-07-10 DIAGNOSIS — R52 PAIN: ICD-10-CM

## 2018-07-10 PROCEDURE — 25010000002 METHYLPREDNISOLONE PER 80 MG: Performed by: ANESTHESIOLOGY

## 2018-07-10 PROCEDURE — C1755 CATHETER, INTRASPINAL: HCPCS

## 2018-07-10 PROCEDURE — 25010000002 MIDAZOLAM PER 1 MG: Performed by: ANESTHESIOLOGY

## 2018-07-10 PROCEDURE — 77003 FLUOROGUIDE FOR SPINE INJECT: CPT

## 2018-07-10 RX ORDER — METHYLPREDNISOLONE ACETATE 80 MG/ML
80 INJECTION, SUSPENSION INTRA-ARTICULAR; INTRALESIONAL; INTRAMUSCULAR; SOFT TISSUE ONCE
Status: COMPLETED | OUTPATIENT
Start: 2018-07-10 | End: 2018-07-10

## 2018-07-10 RX ORDER — LIDOCAINE HYDROCHLORIDE 10 MG/ML
1 INJECTION, SOLUTION INFILTRATION; PERINEURAL ONCE AS NEEDED
Status: DISCONTINUED | OUTPATIENT
Start: 2018-07-10 | End: 2018-07-11 | Stop reason: HOSPADM

## 2018-07-10 RX ORDER — METHYLPREDNISOLONE ACETATE 40 MG/ML
40 INJECTION, SUSPENSION INTRA-ARTICULAR; INTRALESIONAL; INTRAMUSCULAR; SOFT TISSUE ONCE
Status: DISCONTINUED | OUTPATIENT
Start: 2018-07-10 | End: 2018-07-10

## 2018-07-10 RX ORDER — MIDAZOLAM HYDROCHLORIDE 1 MG/ML
1 INJECTION INTRAMUSCULAR; INTRAVENOUS AS NEEDED
Status: DISCONTINUED | OUTPATIENT
Start: 2018-07-10 | End: 2018-07-11 | Stop reason: HOSPADM

## 2018-07-10 RX ORDER — SODIUM CHLORIDE 0.9 % (FLUSH) 0.9 %
1-10 SYRINGE (ML) INJECTION AS NEEDED
Status: DISCONTINUED | OUTPATIENT
Start: 2018-07-10 | End: 2018-07-11 | Stop reason: HOSPADM

## 2018-07-10 RX ORDER — BUPIVACAINE HYDROCHLORIDE 2.5 MG/ML
10 INJECTION, SOLUTION EPIDURAL; INFILTRATION; INTRACAUDAL ONCE
Status: COMPLETED | OUTPATIENT
Start: 2018-07-10 | End: 2018-07-10

## 2018-07-10 RX ORDER — FENTANYL CITRATE 50 UG/ML
50 INJECTION, SOLUTION INTRAMUSCULAR; INTRAVENOUS AS NEEDED
Status: DISCONTINUED | OUTPATIENT
Start: 2018-07-10 | End: 2018-07-11 | Stop reason: HOSPADM

## 2018-07-10 RX ADMIN — METHYLPREDNISOLONE ACETATE 80 MG: 80 INJECTION, SUSPENSION INTRA-ARTICULAR; INTRALESIONAL; INTRAMUSCULAR; SOFT TISSUE at 12:42

## 2018-07-10 RX ADMIN — MIDAZOLAM 1 MG: 1 INJECTION INTRAMUSCULAR; INTRAVENOUS at 12:34

## 2018-07-10 RX ADMIN — BUPIVACAINE HYDROCHLORIDE 10 ML: 2.5 INJECTION, SOLUTION EPIDURAL; INFILTRATION; INTRACAUDAL at 12:41

## 2018-07-10 NOTE — H&P
[facility]    History and Physical    Patient Name: Marleni Hummel  :  1935  MRN:  [id number]  Date of Admission: 7/10/2018    Subjective     Patient is a 82-year-old female with pain in her sacral area and radiates to her left hip and left lower extremity.  She reports approximate 80% relief following her last sacroiliac injection.  Her pain level today ranges between a 3 and 4/10.    The following portions of the patients history were reviewed and updated as appropriate: current medications, allergies, past medical history, past surgical history, past family history, past social history and problem list                Objective     Past Medical History:   Past Medical History:   Diagnosis Date   • Allergic    • Allergic rhinitis    • Arthropathy of knee     right   • Atrial fibrillation (CMS/HCC)    • Back pain    • Bell's palsy    • Chronic kidney disease (CKD), stage III (moderate)    • Cough    • Edema    • Encounter for eye exam 2015   • Essential hypertension    • Fatigue    • GERD (gastroesophageal reflux disease)    • H/O Heart murmur    • Heart attack    • Herpes zoster without complication    • Hip arthritis     left   • History of bone density study 2008   • History of pneumonia    • Hypercholesterolemia    • IFG (impaired fasting glucose)    • Insomnia    • Nephropathy    • Osteoarthritis     Right hip   • Osteopenia    • Panic disorder    • Rectal bleeding    • Sleep apnea    • Stress    • Urinary incontinence    • Vitamin D deficiency      Past Surgical History:   Past Surgical History:   Procedure Laterality Date   • CATARACT EXTRACTION Left 2013    Dr. Clarke   • CATARACT EXTRACTION Right 2013    Dr. Clarke   • COLONOSCOPY  2014    Dr. Elizabeth; no polyps   • EPIDURAL BLOCK     • HIP PERCUTANEOUS PINNING Left 2017    Procedure: LT HIP PERCUTANEOUS PINNING;  Surgeon: Sean Canales MD;  Location: [facility] OR;  Service:    • JOINT REPLACEMENT Left  2004    knee Petrick   • JOINT REPLACEMENT Right 2004    knee Popham   • LEG SURGERY Left    • MAMMO BILATERAL  11/2013   • PAP SMEAR  02/2011   • TOTAL HIP ARTHROPLASTY Right 08/2015     Family History:   Family History   Problem Relation Age of Onset   • Hypertension Other    • Hypertension Mother    • Stroke Mother    • Hypertension Maternal Grandmother      Social History:   Social History   Substance Use Topics   • Smoking status: Former Smoker     Packs/day: 1.00     Years: 3.00     Types: Cigarettes     Quit date: 2/22/1956   • Smokeless tobacco: Never Used   • Alcohol use 0.6 - 1.2 oz/week     1 - 2 Glasses of wine per week      Comment: rare       Vital Signs Range for the last 24 hours  Temperature: Temp:  [36.8 °C (98.2 °F)] 36.8 °C (98.2 °F)   Temp Source: Temp src: Oral   BP: BP: (117)/(82) 117/82   Pulse: Heart Rate:  [71] 71   Respirations: Resp:  [16] 16   SPO2: SpO2:  [99 %] 99 %   O2 Amount (l/min):     O2 Devices Device (Oxygen Therapy): room air   Weight:           --------------------------------------------------------------------------------    Current Outpatient Prescriptions   Medication Sig Dispense Refill   • acetaminophen (TYLENOL) 500 MG tablet Take 1,000 mg by mouth 3 (three) times a day as needed for mild pain (1-3) or moderate pain (4-6).     • amLODIPine (NORVASC) 2.5 MG tablet Take 1 tablet by mouth 2 (Two) Times a Day for 180 days. 180 tablet 1   • atorvastatin (LIPITOR) 20 MG tablet Take 1 tablet by mouth Every Night for 180 days. 90 tablet 1   • DULoxetine (CYMBALTA) 30 MG capsule Take 1 capsule by mouth Daily for 180 days. 90 capsule 1   • ezetimibe (ZETIA) 10 MG tablet Take 1 tablet by mouth Every Night for 180 days. 90 tablet 1   • metoprolol succinate XL (TOPROL-XL) 50 MG 24 hr tablet Take 1 tablet by mouth Daily for 180 days. 90 tablet 1   • MYRBETRIQ 25 MG tablet sustained-release 24 hour 24 hr tablet Take 25 mg by mouth Daily.     • pantoprazole (PROTONIX) 20 MG EC tablet  Take 1 tablet by mouth Every Night for 180 days. 90 tablet 1   • apixaban (ELIQUIS) 2.5 MG tablet tablet Take 1 tablet by mouth 2 (Two) Times a Day. 180 tablet 1     Current Facility-Administered Medications   Medication Dose Route Frequency Provider Last Rate Last Dose   • sodium chloride 0.9 % flush 1-10 mL  1-10 mL Intravenous PRN Jaspreet Muñoz MD           --------------------------------------------------------------------------------  Assessment/Plan      Anesthesia Evaluation     Patient summary reviewed and Nursing notes reviewed   NPO Solid Status: > 8 hours  NPO Liquid Status: > 2 hours           Airway   Mallampati: II  TM distance: >3 FB  Neck ROM: full  Dental - normal exam     Pulmonary - normal exam    breath sounds clear to auscultation  (+) sleep apnea,   Cardiovascular - normal exam    Rhythm: regular  Rate: normal    (+) hypertension, valvular problems/murmurs murmur, past MI , CAD, dysrhythmias Atrial Fib, hyperlipidemia,   (-) angina, orthopnea, PND, CARROLL      Neuro/Psych- negative ROS  GI/Hepatic/Renal/Endo    (+)  GERD, GI bleeding, hypothyroidism,     Musculoskeletal     (+) back pain,   Abdominal  - normal exam    Abdomen: soft.  Bowel sounds: normal.   Substance History - negative use     OB/GYN negative ob/gyn ROS         Other   (+) arthritis                Diagnosis and Plan    Treatment Plan  ASA 3   Patient has had previous injection/procedure with % improvement.   Procedures: Nerve block type: Sacroiliac Injection., With fluoroscopy,       Anesthetic plan and risks discussed with patient.          Diagnosis     * Sacroiliitis (CMS/[facility]) [M46.1]

## 2018-07-10 NOTE — ANESTHESIA PROCEDURE NOTES
PAIN SI joint injection    Patient location during procedure: Pain Clinic  Start time: 7/10/2018 12:28 PM  Stop time: 7/10/2018 12:44 PM    Reason for block: procedure for pain  Performed by  Anesthesiologist: ROBYN MENSAH  Preanesthetic Checklist  Completed: patient identified, site marked, surgical consent, pre-op evaluation, timeout performed, risks and benefits discussed and monitors and equipment checked  Prep:  Patient position: prone  Sterile barriers:gloves, mask, sterile barrier and cap  Prep: ChloraPrep  Patient monitoring: blood pressure monitoring, continuous pulse oximetry and EKG  Procedure:  Sedation:yes  Guidance:fluoroscopy  Contrast Medium:no  Location:Left SIJ  Needle Type:Quincke  Needle Gauge:22 G  Aspiration:negative  Medications:  Depomedrol:40 mg  Comment:2 ml of 1/2% bupivicaine    Post Assessment  Injection Assessment: negative  Patient Tolerance:comfortable throughout block  Complications:no  Additional Notes  Injection was performed under fluoroscopic guidance.    Post-Op Diagnosis Codes:     * Sacroiliitis (CMS/[facility]) (M46.1)

## 2018-07-11 ENCOUNTER — TRANSCRIBE ORDERS (OUTPATIENT)
Dept: PAIN MEDICINE | Facility: HOSPITAL | Age: 83
End: 2018-07-11

## 2018-07-11 DIAGNOSIS — M46.1 SACROILIITIS, NOT ELSEWHERE CLASSIFIED (HCC): Primary | ICD-10-CM

## 2018-07-12 ENCOUNTER — RESULTS ENCOUNTER (OUTPATIENT)
Dept: FAMILY MEDICINE CLINIC | Facility: CLINIC | Age: 83
End: 2018-07-12

## 2018-07-12 ENCOUNTER — OFFICE VISIT (OUTPATIENT)
Dept: FAMILY MEDICINE CLINIC | Facility: CLINIC | Age: 83
End: 2018-07-12

## 2018-07-12 VITALS
HEART RATE: 62 BPM | TEMPERATURE: 97.5 F | HEIGHT: 63 IN | BODY MASS INDEX: 28 KG/M2 | RESPIRATION RATE: 16 BRPM | WEIGHT: 158 LBS | DIASTOLIC BLOOD PRESSURE: 79 MMHG | SYSTOLIC BLOOD PRESSURE: 155 MMHG

## 2018-07-12 DIAGNOSIS — E78.00 HYPERCHOLESTEROLEMIA: ICD-10-CM

## 2018-07-12 DIAGNOSIS — M81.0 SENILE OSTEOPOROSIS: Primary | ICD-10-CM

## 2018-07-12 DIAGNOSIS — M81.0 OSTEOPOROSIS, UNSPECIFIED OSTEOPOROSIS TYPE, UNSPECIFIED PATHOLOGICAL FRACTURE PRESENCE: ICD-10-CM

## 2018-07-12 DIAGNOSIS — I10 ESSENTIAL HYPERTENSION: ICD-10-CM

## 2018-07-12 DIAGNOSIS — M81.0 SENILE OSTEOPOROSIS: ICD-10-CM

## 2018-07-12 DIAGNOSIS — E03.9 ACQUIRED HYPOTHYROIDISM: Primary | ICD-10-CM

## 2018-07-12 PROCEDURE — 99213 OFFICE O/P EST LOW 20 MIN: CPT | Performed by: FAMILY MEDICINE

## 2018-07-12 NOTE — PATIENT INSTRUCTIONS
Check on insurance coverage and cost for adacel Tdap(tetanus plus whooping cough vaccine) and get the immunization at your local pharmacy    Check on insurance coverage and cost for Shingrix (newest shingles vaccine) and get the immunization at your local pharmacy. It is more effective than the old Zostavax vaccine and is recommended even if you have had the Zostavax vaccine in the past. For more information, please look at the website below:    https://www.cdc.gov/vaccines/vpd/shingles/public/shingrix/index.html

## 2018-07-12 NOTE — PROGRESS NOTES
"Chief Complaint   Patient presents with   • Hyperlipidemia   • Hypothyroidism       Subjective   This patient returns the office to follow-up on labs for thyroid.  She initially thought that labs would be done also for her cholesterol but this will be reevaluated either to her visit in November.  She tolerated discontinuance of levothyroxine.  Her current labs are within normal limits.    I have reviewed and updated her medications, medical history and problem list during today's office visit.     Patient Care Team:  Ken Andujar MD as PCP - General  Ken Andujar MD as PCP - Claims Attributed  Chuckie Mclaughlin MD as Consulting Physician (Urology)  Jason Tai MD as Consulting Physician (Cardiology)  Geoffrey Mills MD as Consulting Physician (Nephrology)  Anayeli Alanis MD as Consulting Physician (Obstetrics and Gynecology)  BROOK Clarke MD as Consulting Physician (Ophthalmology)  Jose Alfredo Krishnamurthy MD as Consulting Physician (Hematology and Oncology)  Sean Canales MD as Consulting Physician (Orthopedic Surgery)  Esteban Moe MD as Consulting Physician (Orthopedic Surgery)  Kalie Braga RN as Care Coordinator (Population Health)  Feliciano Elizabeth MD as Consulting Physician (Gastroenterology)    Social History   Substance Use Topics   • Smoking status: Former Smoker     Packs/day: 1.00     Years: 3.00     Types: Cigarettes     Quit date: 2/22/1956   • Smokeless tobacco: Never Used   • Alcohol use 0.6 - 1.2 oz/week     1 - 2 Glasses of wine per week      Comment: rare       Review of Systems   Psychiatric/Behavioral: The patient is not nervous/anxious.        Objective     /79   Pulse 62   Temp 97.5 °F (36.4 °C) (Oral)   Resp 16   Ht 160 cm (63\")   Wt 71.7 kg (158 lb)   BMI 27.99 kg/m²     Body mass index is 27.99 kg/m².    Physical Exam   Constitutional: She is oriented to person, place, and time. She appears well-developed. No distress.   Eyes: Conjunctivae and lids are " normal.   Neck: Trachea normal and phonation normal. No tracheal tenderness present. Carotid bruit is not present. No thyroid mass and no thyromegaly present.   Cardiovascular: Normal rate, regular rhythm and normal heart sounds.    Pulmonary/Chest: Effort normal and breath sounds normal.   Neurological: She is alert and oriented to person, place, and time.   Skin: Skin is warm and dry.   Psychiatric: She has a normal mood and affect. Her behavior is normal.   Vitals reviewed.       Data Reviewed:             Assessment/Plan     Problem List Items Addressed This Visit     Essential hypertension    Relevant Orders    Comprehensive metabolic panel    CBC and Differential    Hypercholesterolemia    Relevant Orders    Lipid Panel With LDL/HDL Ratio    Acquired hypothyroidism - Primary    Relevant Orders    TSH    T4, Free          Orders Placed This Encounter   Procedures   • Comprehensive metabolic panel     Standing Status:   Future     Standing Expiration Date:   1/8/2019   • Lipid Panel With LDL/HDL Ratio     Standing Status:   Future     Standing Expiration Date:   1/8/2019   • TSH     Standing Status:   Future     Standing Expiration Date:   1/8/2019   • T4, Free     Standing Status:   Future     Standing Expiration Date:   1/8/2019   • CBC and Differential     Standing Status:   Future     Standing Expiration Date:   1/8/2019     Order Specific Question:   Manual Differential     Answer:   No         Current Outpatient Prescriptions:   •  acetaminophen (TYLENOL) 500 MG tablet, Take 1,000 mg by mouth 3 (three) times a day as needed for mild pain (1-3) or moderate pain (4-6)., Disp: , Rfl:   •  amLODIPine (NORVASC) 2.5 MG tablet, Take 1 tablet by mouth 2 (Two) Times a Day for 180 days., Disp: 180 tablet, Rfl: 1  •  apixaban (ELIQUIS) 2.5 MG tablet tablet, Take 1 tablet by mouth 2 (Two) Times a Day., Disp: 180 tablet, Rfl: 1  •  atorvastatin (LIPITOR) 20 MG tablet, Take 1 tablet by mouth Every Night for 180 days.,  Disp: 90 tablet, Rfl: 1  •  DULoxetine (CYMBALTA) 30 MG capsule, Take 1 capsule by mouth Daily for 180 days., Disp: 90 capsule, Rfl: 1  •  ezetimibe (ZETIA) 10 MG tablet, Take 1 tablet by mouth Every Night for 180 days., Disp: 90 tablet, Rfl: 1  •  metoprolol succinate XL (TOPROL-XL) 50 MG 24 hr tablet, Take 1 tablet by mouth Daily for 180 days., Disp: 90 tablet, Rfl: 1  •  MYRBETRIQ 25 MG tablet sustained-release 24 hour 24 hr tablet, Take 25 mg by mouth Daily., Disp: , Rfl:   •  pantoprazole (PROTONIX) 20 MG EC tablet, Take 1 tablet by mouth Every Night for 180 days., Disp: 90 tablet, Rfl: 1    Return in about 4 months (around 11/12/2018) for Recheck.

## 2018-07-13 ENCOUNTER — INFUSION (OUTPATIENT)
Dept: ONCOLOGY | Facility: HOSPITAL | Age: 83
End: 2018-07-13

## 2018-07-13 VITALS
OXYGEN SATURATION: 97 % | HEART RATE: 82 BPM | BODY MASS INDEX: 27.81 KG/M2 | SYSTOLIC BLOOD PRESSURE: 152 MMHG | DIASTOLIC BLOOD PRESSURE: 89 MMHG | WEIGHT: 157 LBS | TEMPERATURE: 97.9 F

## 2018-07-13 DIAGNOSIS — M81.0 SENILE OSTEOPOROSIS: Primary | ICD-10-CM

## 2018-07-13 PROCEDURE — 96372 THER/PROPH/DIAG INJ SC/IM: CPT | Performed by: NURSE PRACTITIONER

## 2018-07-13 PROCEDURE — 25010000002 DENOSUMAB 60 MG/ML SOLUTION: Performed by: FAMILY MEDICINE

## 2018-07-13 RX ADMIN — DENOSUMAB 60 MG: 60 INJECTION SUBCUTANEOUS at 14:23

## 2018-07-13 NOTE — PROGRESS NOTES
Pt ambulated into clinic for prolia. Went over meds with patient, no changes. Prolia given, instructed to make f/u for 6 months. Pt ambulated out with .

## 2018-08-27 ENCOUNTER — HOSPITAL ENCOUNTER (OUTPATIENT)
Dept: GENERAL RADIOLOGY | Facility: HOSPITAL | Age: 83
Discharge: HOME | End: 2018-08-27

## 2018-08-27 ENCOUNTER — ANESTHESIA EVENT (OUTPATIENT)
Dept: PAIN MEDICINE | Facility: HOSPITAL | Age: 83
End: 2018-08-27

## 2018-08-27 ENCOUNTER — HOSPITAL ENCOUNTER (OUTPATIENT)
Dept: PAIN MEDICINE | Facility: HOSPITAL | Age: 83
Discharge: HOME | End: 2018-08-27
Attending: ORTHOPAEDIC SURGERY | Admitting: ORTHOPAEDIC SURGERY

## 2018-08-27 ENCOUNTER — ANESTHESIA (OUTPATIENT)
Dept: PAIN MEDICINE | Facility: HOSPITAL | Age: 83
End: 2018-08-27

## 2018-08-27 VITALS
HEART RATE: 75 BPM | SYSTOLIC BLOOD PRESSURE: 154 MMHG | TEMPERATURE: 97.5 F | OXYGEN SATURATION: 99 % | BODY MASS INDEX: 26.58 KG/M2 | RESPIRATION RATE: 16 BRPM | HEIGHT: 63 IN | DIASTOLIC BLOOD PRESSURE: 95 MMHG | WEIGHT: 150 LBS

## 2018-08-27 DIAGNOSIS — R52 PAIN: ICD-10-CM

## 2018-08-27 PROCEDURE — 25010000002 MIDAZOLAM PER 1 MG: Performed by: ANESTHESIOLOGY

## 2018-08-27 PROCEDURE — 25010000002 METHYLPREDNISOLONE PER 80 MG: Performed by: ANESTHESIOLOGY

## 2018-08-27 RX ORDER — SODIUM CHLORIDE 0.9 % (FLUSH) 0.9 %
1-10 SYRINGE (ML) INJECTION AS NEEDED
Status: DISCONTINUED | OUTPATIENT
Start: 2018-08-27 | End: 2018-08-28 | Stop reason: HOSPADM

## 2018-08-27 RX ORDER — LIDOCAINE HYDROCHLORIDE 10 MG/ML
1 INJECTION, SOLUTION INFILTRATION; PERINEURAL ONCE AS NEEDED
Status: DISCONTINUED | OUTPATIENT
Start: 2018-08-27 | End: 2018-08-28 | Stop reason: HOSPADM

## 2018-08-27 RX ORDER — METHYLPREDNISOLONE ACETATE 80 MG/ML
80 INJECTION, SUSPENSION INTRA-ARTICULAR; INTRALESIONAL; INTRAMUSCULAR; SOFT TISSUE ONCE
Status: COMPLETED | OUTPATIENT
Start: 2018-08-27 | End: 2018-08-27

## 2018-08-27 RX ORDER — MIDAZOLAM HYDROCHLORIDE 1 MG/ML
1 INJECTION INTRAMUSCULAR; INTRAVENOUS AS NEEDED
Status: DISCONTINUED | OUTPATIENT
Start: 2018-08-27 | End: 2018-08-28 | Stop reason: HOSPADM

## 2018-08-27 RX ORDER — FENTANYL CITRATE 50 UG/ML
50 INJECTION, SOLUTION INTRAMUSCULAR; INTRAVENOUS AS NEEDED
Status: DISCONTINUED | OUTPATIENT
Start: 2018-08-27 | End: 2018-08-28 | Stop reason: HOSPADM

## 2018-08-27 RX ORDER — BUPIVACAINE HYDROCHLORIDE 2.5 MG/ML
30 INJECTION, SOLUTION EPIDURAL; INFILTRATION; INTRACAUDAL ONCE
Status: COMPLETED | OUTPATIENT
Start: 2018-08-27 | End: 2018-08-27

## 2018-08-27 RX ADMIN — MIDAZOLAM HYDROCHLORIDE 1 MG: 2 INJECTION, SOLUTION INTRAMUSCULAR; INTRAVENOUS at 12:21

## 2018-08-27 RX ADMIN — BUPIVACAINE HYDROCHLORIDE 3 ML: 2.5 INJECTION, SOLUTION EPIDURAL; INFILTRATION; INTRACAUDAL at 12:29

## 2018-08-27 RX ADMIN — METHYLPREDNISOLONE ACETATE 40 MG: 80 INJECTION, SUSPENSION INTRA-ARTICULAR; INTRALESIONAL; INTRAMUSCULAR; SOFT TISSUE at 12:30

## 2018-08-27 NOTE — H&P
[facility]    History and Physical    Patient Name: Marleni Hummel  :  1935  MRN:  [id number]  Date of Admission: 2018    Subjective     Patient is an 82-year-old female with pain in her sacral area the radiates to her left buttock.  He reports approximate 70% relief following her last sacroiliac injection.  Her pain level today is a 4/10.  She is here for repeat sacroiliac joint injection.    The following portions of the patients history were reviewed and updated as appropriate: current medications, allergies, past medical history, past surgical history, past family history, past social history and problem list                Objective     Past Medical History:   Past Medical History:   Diagnosis Date   • Allergic    • Allergic rhinitis    • Arthropathy of knee     right   • Atrial fibrillation (CMS/HCC)    • Back pain    • Bell's palsy    • Chronic kidney disease (CKD), stage III (moderate)    • Cough    • Edema    • Encounter for eye exam 2015   • Essential hypertension    • Fatigue    • GERD (gastroesophageal reflux disease)    • H/O Heart murmur    • Heart attack    • Herpes zoster without complication    • Hip arthritis     left   • History of bone density study 2008   • History of pneumonia    • Hypercholesterolemia    • IFG (impaired fasting glucose)    • Insomnia    • Nephropathy    • Osteoarthritis     Right hip   • Osteopenia    • Panic disorder    • Rectal bleeding    • Sleep apnea    • Stress    • Urinary incontinence    • Vitamin D deficiency      Past Surgical History:   Past Surgical History:   Procedure Laterality Date   • CATARACT EXTRACTION Left 2013    Dr. Clarke   • CATARACT EXTRACTION Right 2013    Dr. Clarke   • COLONOSCOPY  2014    Dr. Elizabeth; no polyps   • EPIDURAL BLOCK     • HIP PERCUTANEOUS PINNING Left 2017    Procedure: LT HIP PERCUTANEOUS PINNING;  Surgeon: Sean Canales MD;  Location: [facility] OR;  Service:    • JOINT  "REPLACEMENT Left 2004    knee Liam   • JOINT REPLACEMENT Right 2004    knee Popham   • LEG SURGERY Left    • MAMMO BILATERAL  11/2013   • PAP SMEAR  02/2011   • TOTAL HIP ARTHROPLASTY Right 08/2015     Family History:   Family History   Problem Relation Age of Onset   • Hypertension Other    • Hypertension Mother    • Stroke Mother    • Hypertension Maternal Grandmother      Social History:   Social History   Substance Use Topics   • Smoking status: Former Smoker     Packs/day: 1.00     Years: 3.00     Types: Cigarettes     Quit date: 2/22/1956   • Smokeless tobacco: Never Used   • Alcohol use 0.6 - 1.2 oz/week     1 - 2 Glasses of wine per week      Comment: rare       Vital Signs Range for the last 24 hours  Temperature: Temp:  [36.4 °C (97.5 °F)] 36.4 °C (97.5 °F)   Temp Source:     BP: BP: (162)/(84) 162/84   Pulse: Heart Rate:  [62] 62   Respirations: Resp:  [16] 16   SPO2: SpO2:  [100 %] 100 %   O2 Amount (l/min):     O2 Devices Device (Oxygen Therapy): room air   Weight: Weight:  [68 kg (150 lb)] 68 kg (150 lb)     Flowsheet Rows      First Filed Value   Admission Height  160 cm (63\") Documented at 08/27/2018 1204   Admission Weight  68 kg (150 lb) Documented at 08/27/2018 1204          --------------------------------------------------------------------------------    Current Outpatient Prescriptions   Medication Sig Dispense Refill   • acetaminophen (TYLENOL) 500 MG tablet Take 1,000 mg by mouth 3 (three) times a day as needed for mild pain (1-3) or moderate pain (4-6).     • amLODIPine (NORVASC) 2.5 MG tablet Take 1 tablet by mouth 2 (Two) Times a Day for 180 days. 180 tablet 1   • atorvastatin (LIPITOR) 20 MG tablet Take 1 tablet by mouth Every Night for 180 days. 90 tablet 1   • DULoxetine (CYMBALTA) 30 MG capsule Take 1 capsule by mouth Daily for 180 days. 90 capsule 1   • ezetimibe (ZETIA) 10 MG tablet Take 1 tablet by mouth Every Night for 180 days. 90 tablet 1   • metoprolol succinate XL " (TOPROL-XL) 50 MG 24 hr tablet Take 1 tablet by mouth Daily for 180 days. 90 tablet 1   • MYRBETRIQ 25 MG tablet sustained-release 24 hour 24 hr tablet Take 25 mg by mouth Daily.     • pantoprazole (PROTONIX) 20 MG EC tablet Take 1 tablet by mouth Every Night for 180 days. 90 tablet 1   • apixaban (ELIQUIS) 2.5 MG tablet tablet Take 1 tablet by mouth 2 (Two) Times a Day. 180 tablet 1     Current Facility-Administered Medications   Medication Dose Route Frequency Provider Last Rate Last Dose   • sodium chloride 0.9 % flush 1-10 mL  1-10 mL Intravenous PRN Esteban Moe MD           --------------------------------------------------------------------------------  Assessment/Plan      Anesthesia Evaluation     Patient summary reviewed and Nursing notes reviewed   NPO Solid Status: > 8 hours  NPO Liquid Status: > 2 hours           Airway   Mallampati: II  TM distance: >3 FB  Neck ROM: full  Dental - normal exam     Pulmonary - normal exam    breath sounds clear to auscultation  (+) sleep apnea,   Cardiovascular - normal exam    Rhythm: regular  Rate: normal    (+) hypertension, valvular problems/murmurs murmur, past MI , CAD, dysrhythmias Atrial Fib, hyperlipidemia,   (-) angina, orthopnea, PND, CARROLL      Neuro/Psych  (+) psychiatric history Anxiety,     (-) left straight leg raise test, right straight leg raise test  GI/Hepatic/Renal/Endo    (+)  GERD, GI bleeding, hypothyroidism,     Musculoskeletal     (+) back pain, chronic pain,   Abdominal  - normal exam    Abdomen: soft.  Bowel sounds: normal.   Substance History - negative use     OB/GYN negative ob/gyn ROS         Other   (+) arthritis                Diagnosis and Plan    Treatment Plan  ASA 3   Patient has had previous injection/procedure with 50-75% improvement.   Procedures: Nerve block type: Sacroiliiac Injection., With fluoroscopy,       Anesthetic plan and risks discussed with patient.          Diagnosis     * Sacroiliitis (CMS/HCC) [M46.1]

## 2018-08-27 NOTE — ANESTHESIA PROCEDURE NOTES
PAIN SI joint injection    Patient location during procedure: Pain Clinic  Start time: 8/27/2018 12:17 PM  Stop time: 8/27/2018 12:32 PM    Reason for block: procedure for pain  Performed by  Anesthesiologist: ROBYN MENSAH  Preanesthetic Checklist  Completed: patient identified, site marked, surgical consent, pre-op evaluation, timeout performed, risks and benefits discussed and monitors and equipment checked  Prep:  Patient position: prone  Sterile barriers:gloves, mask, sterile barrier and cap  Prep: ChloraPrep  Patient monitoring: blood pressure monitoring, continuous pulse oximetry and EKG  Procedure:  Sedation:no  Guidance:fluoroscopy  Contrast Medium:no  Location:Left SIJ  Needle Type:Quincke  Needle Gauge:22 G  Aspiration:negative  Medications:  Depomedrol:40 mg  Comment:2 ml of 1/2% bupivicaine    Post Assessment  Injection Assessment: negative  Patient Tolerance:comfortable throughout block  Complications:no  Additional Notes  Injection was performed under fluoroscopic guidance.    Post-Op Diagnosis Codes:     * Sacroiliitis (CMS/[facility]) (M46.1)

## 2018-10-01 ENCOUNTER — RESULTS ENCOUNTER (OUTPATIENT)
Dept: FAMILY MEDICINE CLINIC | Facility: CLINIC | Age: 83
End: 2018-10-01

## 2018-10-01 DIAGNOSIS — E03.9 ACQUIRED HYPOTHYROIDISM: ICD-10-CM

## 2018-10-01 DIAGNOSIS — I10 ESSENTIAL HYPERTENSION: ICD-10-CM

## 2018-10-01 DIAGNOSIS — E78.00 HYPERCHOLESTEROLEMIA: ICD-10-CM

## 2018-10-08 DIAGNOSIS — I10 ESSENTIAL HYPERTENSION: ICD-10-CM

## 2018-10-08 RX ORDER — METOPROLOL SUCCINATE 50 MG/1
TABLET, EXTENDED RELEASE ORAL
Qty: 90 TABLET | Refills: 1 | OUTPATIENT
Start: 2018-10-08

## 2018-10-09 ENCOUNTER — OFFICE VISIT (OUTPATIENT)
Dept: ORTHOPEDIC SURGERY | Facility: CLINIC | Age: 83
End: 2018-10-09

## 2018-10-09 VITALS — WEIGHT: 161.8 LBS | BODY MASS INDEX: 28.67 KG/M2 | HEIGHT: 63 IN | TEMPERATURE: 98.4 F

## 2018-10-09 DIAGNOSIS — M25.552 HIP PAIN, LEFT: Primary | ICD-10-CM

## 2018-10-09 PROCEDURE — 73502 X-RAY EXAM HIP UNI 2-3 VIEWS: CPT | Performed by: ORTHOPAEDIC SURGERY

## 2018-10-09 PROCEDURE — 99203 OFFICE O/P NEW LOW 30 MIN: CPT | Performed by: ORTHOPAEDIC SURGERY

## 2018-10-09 NOTE — PROGRESS NOTES
Patient: Marleni Hummel  YOB: 1935 82 y.o. female  Medical Record Number: [id number]    Chief Complaints:   Chief Complaint   Patient presents with   • Left Hip - Establish Care   • 2nd opinion       History of Present Illness:Marleni Hummel is a 82 y.o. female who presents with  complaints of left lateral hip pain which is severe and constant.  She is here for second opinion.  She has been followed by Dr. Alvarado for multiple orthopedic issues including this hip pain.  Additionally she is seen by Dr. Oliva for her lumbar spine.  She has had both lumbar epidurals by Dr. Oliva and hip bursa injections by Dr. Alvarado and has not had much relief with either of these interventions.  Despite this she has a severe aching pain about the lateral aspect of the hip which occasionally will radiate to her anterior groin region.  She is status post percutaneous screws by Dr. Alvarado a few years back.  She has pain with certain activities she has severe pain when she wakes up in the morning does about a bed.    Allergies:   Allergies   Allergen Reactions   • Ace Inhibitors    • Amoxicillin    • Lortab [Hydrocodone-Acetaminophen]    • Macrobid [Nitrofurantoin]    • Morphine And Related    • Oxycodone    • Levaquin [Levofloxacin] Itching, Rash and Other (See Comments)     insomnia       Medications:   Current Outpatient Prescriptions   Medication Sig Dispense Refill   • acetaminophen (TYLENOL) 500 MG tablet Take 1,000 mg by mouth 3 (three) times a day as needed for mild pain (1-3) or moderate pain (4-6).     • amLODIPine (NORVASC) 2.5 MG tablet Take 1 tablet by mouth 2 (Two) Times a Day for 180 days. 180 tablet 1   • apixaban (ELIQUIS) 2.5 MG tablet tablet Take 1 tablet by mouth 2 (Two) Times a Day. 180 tablet 1   • atorvastatin (LIPITOR) 20 MG tablet Take 1 tablet by mouth Every Night for 180 days. 90 tablet 1   • DULoxetine (CYMBALTA) 30 MG capsule Take 1 capsule by mouth Daily for 180 days. 90 capsule 1  "  • ezetimibe (ZETIA) 10 MG tablet Take 1 tablet by mouth Every Night for 180 days. 90 tablet 1   • metoprolol succinate XL (TOPROL-XL) 50 MG 24 hr tablet Take 1 tablet by mouth Daily for 180 days. 90 tablet 1   • MYRBETRIQ 25 MG tablet sustained-release 24 hour 24 hr tablet Take 25 mg by mouth Daily.     • pantoprazole (PROTONIX) 20 MG EC tablet Take 1 tablet by mouth Every Night for 180 days. 90 tablet 1     No current facility-administered medications for this visit.          The following portions of the patient's history were reviewed and updated as appropriate: allergies, current medications, past family history, past medical history, past social history, past surgical history and problem list.    Review of Systems:   A 14 point review of systems was performed. All systems negative except pertinent positives/negative listed in HPI above    Physical Exam:   Vitals:    10/09/18 1149   Temp: 98.4 °F (36.9 °C)   TempSrc: Temporal Artery    Weight: 73.4 kg (161 lb 12.8 oz)   Height: 160 cm (63\")       General: A and O x 3, ASA, NAD    SCLERA:    Normal    DENTITION:   Normal  Hip:  left    LEG ALIGNMENT:     Neutral   ,    equal leg lengths    GAIT:     antalgic    SKIN:     No abnormality    RANGE OF MOTION:      Full without joint irritability    STRENGTH:     5 / 5    hip flexion and abduction    DISTAL PULSES:    Paplable    DISTAL SENSATION :   Intact    LYMPHATICS:     No   lymphadenopathy    OTHER:          - positive Stinchfeld test      - Negative log roll      +Tenderness to palpation trochanteric bursa        Radiology:  Xrays 2views left hip (AP bilateral hips, and lateral of the hip) ordered and reviewed for evaluation of hip pain  demonstrating  mild joint space narrowing.  There are 3 percutaneous screws from previous fracture.  I have no previous films for comparison    Assessment/Plan: Left hip region pain.  The etiology of this is difficult to determine.  I think the next most appropriate step " would be a fluoroscopy guided cortisone injection into her left hip.  I explained to her that if she had significant relief from that then she should entertain hip replacement.  If she has no relief from that then I think she should focus on her lumbar spine as a source.  She will follow back up with Dr. Alvarado and I be happy to be involved in any way in the future.      Abraham Chang MD  10/9/2018

## 2018-10-18 LAB
ALBUMIN SERPL-MCNC: 4.6 G/DL (ref 3.5–5.2)
ALBUMIN/GLOB SERPL: 1.8 G/DL
ALP SERPL-CCNC: 65 U/L (ref 39–117)
ALT SERPL-CCNC: 19 U/L (ref 1–33)
AST SERPL-CCNC: 18 U/L (ref 1–32)
BASOPHILS # BLD AUTO: 0.02 10*3/MM3 (ref 0–0.2)
BASOPHILS NFR BLD AUTO: 0.2 % (ref 0–1.5)
BILIRUB SERPL-MCNC: 0.9 MG/DL (ref 0.1–1.2)
BUN SERPL-MCNC: 24 MG/DL (ref 8–23)
BUN/CREAT SERPL: 20.2 (ref 7–25)
CALCIUM SERPL-MCNC: 10.1 MG/DL (ref 8.6–10.5)
CHLORIDE SERPL-SCNC: 98 MMOL/L (ref 98–107)
CHOLEST SERPL-MCNC: 146 MG/DL (ref 0–200)
CO2 SERPL-SCNC: 25.9 MMOL/L (ref 22–29)
CREAT SERPL-MCNC: 1.19 MG/DL (ref 0.57–1)
EOSINOPHIL # BLD AUTO: 0.15 10*3/MM3 (ref 0–0.7)
EOSINOPHIL NFR BLD AUTO: 1.8 % (ref 0.3–6.2)
ERYTHROCYTE [DISTWIDTH] IN BLOOD BY AUTOMATED COUNT: 14.2 % (ref 11.7–13)
GLOBULIN SER CALC-MCNC: 2.6 GM/DL
GLUCOSE SERPL-MCNC: 109 MG/DL (ref 65–99)
HCT VFR BLD AUTO: 38.9 % (ref 35.6–45.5)
HDLC SERPL-MCNC: 62 MG/DL (ref 40–60)
HGB BLD-MCNC: 11.8 G/DL (ref 11.9–15.5)
IMM GRANULOCYTES # BLD: 0.04 10*3/MM3 (ref 0–0.03)
IMM GRANULOCYTES NFR BLD: 0.5 % (ref 0–0.5)
LDLC SERPL CALC-MCNC: 58 MG/DL (ref 0–100)
LDLC/HDLC SERPL: 0.93 {RATIO}
LYMPHOCYTES # BLD AUTO: 1.5 10*3/MM3 (ref 0.9–4.8)
LYMPHOCYTES NFR BLD AUTO: 17.6 % (ref 19.6–45.3)
MCH RBC QN AUTO: 29.9 PG (ref 26.9–32)
MCHC RBC AUTO-ENTMCNC: 30.3 G/DL (ref 32.4–36.3)
MCV RBC AUTO: 98.5 FL (ref 80.5–98.2)
MONOCYTES # BLD AUTO: 0.62 10*3/MM3 (ref 0.2–1.2)
MONOCYTES NFR BLD AUTO: 7.3 % (ref 5–12)
NEUTROPHILS # BLD AUTO: 6.19 10*3/MM3 (ref 1.9–8.1)
NEUTROPHILS NFR BLD AUTO: 72.6 % (ref 42.7–76)
PLATELET # BLD AUTO: 243 10*3/MM3 (ref 140–500)
POTASSIUM SERPL-SCNC: 4.9 MMOL/L (ref 3.5–5.2)
PROT SERPL-MCNC: 7.2 G/DL (ref 6–8.5)
RBC # BLD AUTO: 3.95 10*6/MM3 (ref 3.9–5.2)
SODIUM SERPL-SCNC: 138 MMOL/L (ref 136–145)
T4 FREE SERPL-MCNC: 1.73 NG/DL (ref 0.93–1.7)
TRIGL SERPL-MCNC: 132 MG/DL (ref 0–150)
TSH SERPL DL<=0.005 MIU/L-ACNC: 0.53 MIU/ML (ref 0.27–4.2)
VLDLC SERPL CALC-MCNC: 26.4 MG/DL (ref 5–40)
WBC # BLD AUTO: 8.52 10*3/MM3 (ref 4.5–10.7)

## 2018-10-19 DIAGNOSIS — E78.00 HYPERCHOLESTEROLEMIA: ICD-10-CM

## 2018-10-19 RX ORDER — EZETIMIBE 10 MG/1
TABLET ORAL
Qty: 90 TABLET | Refills: 1 | OUTPATIENT
Start: 2018-10-19

## 2018-10-22 ENCOUNTER — OFFICE VISIT (OUTPATIENT)
Dept: FAMILY MEDICINE CLINIC | Facility: CLINIC | Age: 83
End: 2018-10-22

## 2018-10-22 VITALS
WEIGHT: 162 LBS | HEIGHT: 63 IN | SYSTOLIC BLOOD PRESSURE: 151 MMHG | DIASTOLIC BLOOD PRESSURE: 82 MMHG | RESPIRATION RATE: 16 BRPM | HEART RATE: 76 BPM | BODY MASS INDEX: 28.7 KG/M2 | TEMPERATURE: 97.3 F

## 2018-10-22 DIAGNOSIS — I25.10 CHRONIC CORONARY ARTERY DISEASE: ICD-10-CM

## 2018-10-22 DIAGNOSIS — I10 ESSENTIAL HYPERTENSION: Primary | ICD-10-CM

## 2018-10-22 DIAGNOSIS — F41.0 PANIC DISORDER: ICD-10-CM

## 2018-10-22 DIAGNOSIS — Z23 IMMUNIZATION DUE: ICD-10-CM

## 2018-10-22 DIAGNOSIS — R79.89 ELEVATED SERUM FREE T4 LEVEL: ICD-10-CM

## 2018-10-22 DIAGNOSIS — N18.30 CHRONIC KIDNEY DISEASE (CKD), STAGE III (MODERATE) (HCC): ICD-10-CM

## 2018-10-22 DIAGNOSIS — K21.9 GASTROESOPHAGEAL REFLUX DISEASE, ESOPHAGITIS PRESENCE NOT SPECIFIED: ICD-10-CM

## 2018-10-22 DIAGNOSIS — E78.00 HYPERCHOLESTEROLEMIA: ICD-10-CM

## 2018-10-22 PROCEDURE — 99214 OFFICE O/P EST MOD 30 MIN: CPT | Performed by: FAMILY MEDICINE

## 2018-10-22 PROCEDURE — G0008 ADMIN INFLUENZA VIRUS VAC: HCPCS | Performed by: FAMILY MEDICINE

## 2018-10-22 PROCEDURE — 90662 IIV NO PRSV INCREASED AG IM: CPT | Performed by: FAMILY MEDICINE

## 2018-10-22 RX ORDER — PANTOPRAZOLE SODIUM 20 MG/1
20 TABLET, DELAYED RELEASE ORAL NIGHTLY
Qty: 90 TABLET | Refills: 1 | Status: SHIPPED | OUTPATIENT
Start: 2018-10-22 | End: 2019-04-03 | Stop reason: SDUPTHER

## 2018-10-22 RX ORDER — EZETIMIBE 10 MG/1
10 TABLET ORAL NIGHTLY
Qty: 90 TABLET | Refills: 1 | Status: SHIPPED | OUTPATIENT
Start: 2018-10-22 | End: 2019-04-03 | Stop reason: SDUPTHER

## 2018-10-22 RX ORDER — AMLODIPINE AND OLMESARTAN MEDOXOMIL 5; 40 MG/1; MG/1
1 TABLET ORAL DAILY
Qty: 90 TABLET | Refills: 1 | Status: SHIPPED | OUTPATIENT
Start: 2018-10-22 | End: 2018-11-27 | Stop reason: HOSPADM

## 2018-10-22 RX ORDER — DULOXETIN HYDROCHLORIDE 30 MG/1
30 CAPSULE, DELAYED RELEASE ORAL DAILY
Qty: 90 CAPSULE | Refills: 1 | Status: SHIPPED | OUTPATIENT
Start: 2018-10-22 | End: 2019-04-03 | Stop reason: SDUPTHER

## 2018-10-22 RX ORDER — ATORVASTATIN CALCIUM 20 MG/1
20 TABLET, FILM COATED ORAL NIGHTLY
Qty: 90 TABLET | Refills: 1 | Status: SHIPPED | OUTPATIENT
Start: 2018-10-22 | End: 2019-04-03 | Stop reason: SDUPTHER

## 2018-10-22 RX ORDER — METOPROLOL SUCCINATE 50 MG/1
50 TABLET, EXTENDED RELEASE ORAL DAILY
Qty: 90 TABLET | Refills: 1 | Status: SHIPPED | OUTPATIENT
Start: 2018-10-22 | End: 2019-04-03 | Stop reason: SDUPTHER

## 2018-10-22 NOTE — ASSESSMENT & PLAN NOTE
Hypertension is worsening.  Continue current medications.  Blood pressure will be reassessed at the next regular appointment in 6 weeks.

## 2018-10-22 NOTE — PROGRESS NOTES
Chief Complaint   Patient presents with   • Hypertension   • Hyperlipidemia   • Thyroid Problem       Subjective     Marleni Hummel presents to the office today to refill her medications and review recent labs. No medication side effects are reported. Her blood pressure is not fully controlled on current therapy we will switch to Jimbo daily.  She will continue metoprolol.  Lipids are to goal on current therapy.  Her thyroid continues to show very mild elevation of free T4.  She is not taking medication currently.  GERD symptoms are controlled with medication.  Her labs did show mild improvement of chronic kidney disease.    She is planning on back surgery in the near future with possible left hip surgery after that.     I have reviewed and updated her medications, medical history and problem list during today's office visit.      Patient Care Team:  Ken Andujar MD as PCP - General  Ken Andujar MD as PCP - Claims Attributed  Chuckie Mclaughlin MD as Consulting Physician (Urology)  Jason Tai MD as Consulting Physician (Cardiology)  Geoffrey Mills MD as Consulting Physician (Nephrology)  Anayeli Alanis MD as Consulting Physician (Obstetrics and Gynecology)  BROOK Clarke MD as Consulting Physician (Ophthalmology)  Jose Alfredo Krishnamurthy MD as Consulting Physician (Hematology and Oncology)  Sean Canales MD as Consulting Physician (Orthopedic Surgery)  Esteban Moe MD as Consulting Physician (Orthopedic Surgery)  Feliciano Elizabeth MD as Consulting Physician (Gastroenterology)    Social History   Substance Use Topics   • Smoking status: Former Smoker     Packs/day: 1.00     Years: 3.00     Types: Cigarettes     Quit date: 2/22/1956   • Smokeless tobacco: Never Used   • Alcohol use 0.6 - 1.2 oz/week     1 - 2 Glasses of wine per week      Comment: rare       Review of Systems   Constitutional: Negative for fatigue.   Cardiovascular: Negative for chest pain.   Musculoskeletal: Positive  "for arthralgias and back pain.       Objective     /82   Pulse 76   Temp 97.3 °F (36.3 °C) (Oral)   Resp 16   Ht 160 cm (63\")   Wt 73.5 kg (162 lb)   BMI 28.70 kg/m²     Body mass index is 28.7 kg/m².    Physical Exam   Constitutional: She is oriented to person, place, and time. She appears well-developed. No distress.   Eyes: Conjunctivae and lids are normal.   Neck: Carotid bruit is not present.   Cardiovascular: Normal rate and normal heart sounds.  An irregularly irregular rhythm present.   Pulmonary/Chest: Effort normal and breath sounds normal.   Neurological: She is alert and oriented to person, place, and time.   Skin: Skin is warm and dry.   Psychiatric: She has a normal mood and affect. Her behavior is normal.   Vitals reviewed.      Data Reviewed:    Xr Hip With Or Without Pelvis 2 - 3 View Left    Result Date: 10/9/2018  Impression: Ordering physician's impression is located in the Encounter Note dated 10/09/18. X-ray performed in the DR room.       CMP:  Lab Results   Component Value Date     (H) 10/17/2018    BUN 24 (H) 10/17/2018    CREATININE 1.19 (H) 10/17/2018    EGFRIFNONA 44 (L) 10/17/2018    EGFRIFAFRI 53 (L) 10/17/2018     10/17/2018    K 4.9 10/17/2018    CL 98 10/17/2018    CALCIUM 10.1 10/17/2018    PROTENTOTREF 7.2 10/17/2018    ALBUMIN 4.60 10/17/2018    LABGLOBREF 2.6 10/17/2018    BILITOT 0.9 10/17/2018    ALKPHOS 65 10/17/2018    AST 18 10/17/2018    ALT 19 10/17/2018     CBC w/ diff:   Lab Results   Component Value Date    WBC 8.75 11/09/2017    RBC 3.95 10/17/2018    HGB 11.8 (L) 10/17/2018    HCT 38.9 10/17/2018    MCV 98.5 (H) 10/17/2018    MCH 29.9 10/17/2018    MCHC 30.3 (L) 10/17/2018    RDW 14.2 (H) 10/17/2018     10/17/2018    NEUTRORELPCT 72.6 10/17/2018    AUTOIGPER 0.0 06/20/2017    LYMPHORELPCT 17.6 (L) 10/17/2018    MONORELPCT 7.3 10/17/2018    EOSRELPCT 1.8 10/17/2018    BASORELPCT 0.2 10/17/2018     LIPID PANEL:  Lab Results   Component " Value Date    CHLPL 146 10/17/2018    TRIG 132 10/17/2018    HDL 62 (H) 10/17/2018    VLDL 26.4 10/17/2018    LDL 58 10/17/2018    LDLHDL 0.93 10/17/2018     TSH:  Lab Results   Component Value Date    TSH 0.529 10/17/2018       Assessment/Plan     Problem List Items Addressed This Visit     Essential hypertension - Primary     Hypertension is worsening.  Continue current medications.  Blood pressure will be reassessed at the next regular appointment in 6 weeks.         Relevant Medications    amlodipine-olmesartan (BETTY) 5-40 MG per tablet    metoprolol succinate XL (TOPROL-XL) 50 MG 24 hr tablet    GERD (gastroesophageal reflux disease)     The current medical regimen is effective;  continue present plan and medications.           Relevant Medications    pantoprazole (PROTONIX) 20 MG EC tablet    Hypercholesterolemia     Lipid abnormalities are improving with treatment.  Pharmacotherapy as ordered.  Lipids will be reassessed in 6 months.         Relevant Medications    atorvastatin (LIPITOR) 20 MG tablet    ezetimibe (ZETIA) 10 MG tablet    Panic disorder     Psychological condition is unchanged.  Continue current treatment regimen.  Psychological condition  will be reassessed at the next regular appointment.         Relevant Medications    DULoxetine (CYMBALTA) 30 MG capsule    Elevated serum free T4 level     Will recheck labs at 6 month appointment.          Chronic kidney disease (CKD), stage III (moderate) (CMS/HCC)     Renal condition is improving with treatment.  Continue current treatment regimen.  Renal condition will be reassessed in 6 months.         Chronic coronary artery disease    Relevant Medications    metoprolol succinate XL (TOPROL-XL) 50 MG 24 hr tablet      Other Visit Diagnoses     Immunization due        Relevant Orders    Flu Vaccine High Dose PF 65YR+ ([id number])          Orders Placed This Encounter   Procedures   • Flu Vaccine High Dose PF 65YR+ ([id number])         Current Outpatient  Prescriptions:   •  acetaminophen (TYLENOL) 500 MG tablet, Take 1,000 mg by mouth 3 (three) times a day as needed for mild pain (1-3) or moderate pain (4-6)., Disp: , Rfl:   •  apixaban (ELIQUIS) 2.5 MG tablet tablet, Take 1 tablet by mouth 2 (Two) Times a Day., Disp: 180 tablet, Rfl: 1  •  atorvastatin (LIPITOR) 20 MG tablet, Take 1 tablet by mouth Every Night for 180 days., Disp: 90 tablet, Rfl: 1  •  DULoxetine (CYMBALTA) 30 MG capsule, Take 1 capsule by mouth Daily for 180 days., Disp: 90 capsule, Rfl: 1  •  ezetimibe (ZETIA) 10 MG tablet, Take 1 tablet by mouth Every Night for 180 days., Disp: 90 tablet, Rfl: 1  •  metoprolol succinate XL (TOPROL-XL) 50 MG 24 hr tablet, Take 1 tablet by mouth Daily for 180 days., Disp: 90 tablet, Rfl: 1  •  MYRBETRIQ 25 MG tablet sustained-release 24 hour 24 hr tablet, Take 25 mg by mouth Daily., Disp: , Rfl:   •  pantoprazole (PROTONIX) 20 MG EC tablet, Take 1 tablet by mouth Every Night for 180 days., Disp: 90 tablet, Rfl: 1  •  amlodipine-olmesartan (BETTY) 5-40 MG per tablet, Take 1 tablet by mouth Daily for 180 days., Disp: 90 tablet, Rfl: 1    Return in about 6 weeks (around 12/3/2018) for BP Check.

## 2018-10-22 NOTE — ASSESSMENT & PLAN NOTE
Renal condition is improving with treatment.  Continue current treatment regimen.  Renal condition will be reassessed in 6 months.

## 2018-10-27 ENCOUNTER — HOSPITAL ENCOUNTER (EMERGENCY)
Facility: HOSPITAL | Age: 83
Discharge: HOME | End: 2018-10-27
Attending: EMERGENCY MEDICINE | Admitting: EMERGENCY MEDICINE

## 2018-10-27 ENCOUNTER — APPOINTMENT (OUTPATIENT)
Dept: GENERAL RADIOLOGY | Facility: HOSPITAL | Age: 83
End: 2018-10-27

## 2018-10-27 VITALS
RESPIRATION RATE: 16 BRPM | TEMPERATURE: 99.1 F | BODY MASS INDEX: 28.35 KG/M2 | SYSTOLIC BLOOD PRESSURE: 145 MMHG | HEIGHT: 63 IN | DIASTOLIC BLOOD PRESSURE: 75 MMHG | OXYGEN SATURATION: 98 % | WEIGHT: 160 LBS | HEART RATE: 76 BPM

## 2018-10-27 DIAGNOSIS — S51.811A SKIN TEAR OF RIGHT FOREARM WITHOUT COMPLICATION, INITIAL ENCOUNTER: ICD-10-CM
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
## 2018-10-27 DIAGNOSIS — S30.0XXA CONTUSION OF LOWER BACK, INITIAL ENCOUNTER: Primary | ICD-10-CM

## 2018-10-27 PROCEDURE — 25010000002 TDAP 5-2.5-18.5 LF-MCG/0.5 SUSPENSION: Performed by: NURSE PRACTITIONER

## 2018-10-27 PROCEDURE — 90715 TDAP VACCINE 7 YRS/> IM: CPT | Performed by: NURSE PRACTITIONER

## 2018-10-27 PROCEDURE — 99283 EMERGENCY DEPT VISIT LOW MDM: CPT

## 2018-10-27 PROCEDURE — 90471 IMMUNIZATION ADMIN: CPT | Performed by: NURSE PRACTITIONER

## 2018-10-27 PROCEDURE — 72110 X-RAY EXAM L-2 SPINE 4/>VWS: CPT

## 2018-10-27 RX ADMIN — TETANUS TOXOID, REDUCED DIPHTHERIA TOXOID AND ACELLULAR PERTUSSIS VACCINE, ADSORBED 0.5 ML: 5; 2.5; 8; 8; 2.5 SUSPENSION INTRAMUSCULAR at 18:14

## 2018-10-30 ENCOUNTER — TELEPHONE (OUTPATIENT)
Dept: FAMILY MEDICINE CLINIC | Facility: CLINIC | Age: 83
End: 2018-10-30

## 2018-11-06 DIAGNOSIS — E79.0 HYPERURICEMIA: ICD-10-CM

## 2018-11-06 RX ORDER — ALLOPURINOL 100 MG/1
TABLET ORAL
Qty: 90 TABLET | Refills: 1 | OUTPATIENT
Start: 2018-11-06

## 2018-11-19 RX ORDER — APIXABAN 2.5 MG/1
TABLET, FILM COATED ORAL
Qty: 180 TABLET | Refills: 1 | Status: SHIPPED | OUTPATIENT
Start: 2018-11-19 | End: 2019-05-17 | Stop reason: SDUPTHER

## 2018-11-26 ENCOUNTER — TELEPHONE (OUTPATIENT)
Dept: FAMILY MEDICINE CLINIC | Facility: CLINIC | Age: 83
End: 2018-11-26

## 2018-11-27 ENCOUNTER — OFFICE VISIT (OUTPATIENT)
Dept: CARDIOLOGY | Facility: CLINIC | Age: 83
End: 2018-11-27

## 2018-11-27 VITALS
BODY MASS INDEX: 28.35 KG/M2 | SYSTOLIC BLOOD PRESSURE: 130 MMHG | DIASTOLIC BLOOD PRESSURE: 70 MMHG | HEIGHT: 63 IN | WEIGHT: 160 LBS

## 2018-11-27 DIAGNOSIS — I51.81 STRESS-INDUCED CARDIOMYOPATHY: ICD-10-CM

## 2018-11-27 DIAGNOSIS — E78.2 MIXED HYPERLIPIDEMIA: ICD-10-CM

## 2018-11-27 DIAGNOSIS — I48.21 PERMANENT ATRIAL FIBRILLATION (HCC): ICD-10-CM

## 2018-11-27 DIAGNOSIS — R06.02 SHORTNESS OF BREATH: ICD-10-CM

## 2018-11-27 DIAGNOSIS — I25.10 CORONARY ARTERY DISEASE INVOLVING NATIVE CORONARY ARTERY OF NATIVE HEART WITHOUT ANGINA PECTORIS: Primary | ICD-10-CM

## 2018-11-27 PROCEDURE — 99214 OFFICE O/P EST MOD 30 MIN: CPT | Performed by: INTERNAL MEDICINE

## 2018-11-27 PROCEDURE — 93000 ELECTROCARDIOGRAM COMPLETE: CPT | Performed by: INTERNAL MEDICINE

## 2018-11-27 RX ORDER — AMLODIPINE BESYLATE 5 MG/1
5 TABLET ORAL DAILY
COMMUNITY
End: 2018-12-05

## 2018-11-27 NOTE — PROGRESS NOTES
Date of Office Visit: 18  Encounter Provider: Jason Tai MD  Place of Service: [facility] CARDIOLOGY  Patient Name: Marleni Hummel  :1935  Referral Provider:No ref. provider found      No chief complaint on file.    History of Present Illness   The patient is a pleasant, 82-year-old female with history of hypertension and  hyperlipidemia who presented with a cough and respiratory illness to the emergency room  but was found to be in rapid atrial fibrillation. Her thyroid studies were normal. Her 2-D  echocardiogram was unremarkable. Her perfusion study was unremarkable. She was rate  controlled and anticoagulated. She was also seen by pulmonary. Then, in 2011, she  had been adequately anticoagulated and was cardioverted but went back into atrial  fibrillation. She was started on flecainide and then underwent repeat cardioversion in May  2011. epidural for.  She then had to have hip surgery and had total hip arthroplasty from recent femur  fracture and unfortunately developed an infection of that. I believe had multiple  surgeries on that. Then on 10/02/2015 she came in again and was bradycardic but that was  after she was nauseated and vomiting. Her troponin was borderline elevated. Medications  were adjusted. 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 She did have a circumflex  lesion where they placed a drug eluting stent. She recovered from that. Obviously, we  had to stop the flecainide. We switched her to amiodarone. She then went to a nursing  home, while there concerned about the interaction between her amiodarone and her  psychiatric drug for depression. She had elevated QT interval, so we had to stop the  amiodarone.   She then was readmitted for another hip surgery on  11/18/2015. She did reasonably well  with that. She now comes in for follow-up. The patient denies chest pain, pressure and heaviness.  Over the past 2 months she's just been markedly short of breath with minimal activity and fatigue no orthopnea or PND no real lower extremity edema it's occasional chills.  She also gets intermittently diaphoretic and can occur at any time.  No palpitations, near syncope or syncope. No stroke type symptoms like paralysis, paresthesia, abrupt vision loss and dysarthria. No bleeding like blood in the stool or dark stools.  Are still a significant amount of stress at home she does care for her  has dementia but does get support from the family son stays with him during the day during the week and daughter helps at night on the weekend.  Overall however she does feel like she's doing worse.  She also may need hip replacement surgery.  She does have sleep apnea and has not been treating that.      Atrial Fibrillation   Symptoms are negative for dizziness and weakness. Past medical history includes atrial fibrillation and CAD.   Coronary Artery Disease   Pertinent negatives include no dizziness, muscle weakness or weight gain. Her past medical history is significant for past myocardial infarction.         Past Medical History:   Diagnosis Date   • Allergic    • Allergic rhinitis    • Arthropathy of knee     right   • Atrial fibrillation (CMS/HCC)    • Back pain    • Bell's palsy    • Chronic kidney disease (CKD), stage III (moderate) (CMS/HCC)    • Cough    • Edema    • Encounter for eye exam 02/2015   • Essential hypertension    • Fatigue    • GERD (gastroesophageal reflux disease)    • H/O Heart murmur    • Heart attack (CMS/HCC)    • Herpes zoster without complication    • Hip arthritis     left   • History of bone density study 02/28/2008   • History of pneumonia    • Hypercholesterolemia    • IFG (impaired fasting glucose)    • Insomnia    • Nephropathy    • Osteoarthritis      Right hip   • Osteopenia    • Panic disorder    • Rectal bleeding    • Sleep apnea    • Stress    • Urinary incontinence    • Vitamin D deficiency          Past Surgical History:   Procedure Laterality Date   • CATARACT EXTRACTION Left 04/01/2013    Dr. Clarke   • CATARACT EXTRACTION Right 04/16/2013    Dr. Clarke   • COLONOSCOPY  04/18/2014    Dr. Elizabeth; no polyps   • EPIDURAL BLOCK     • KNEE SURGERY Bilateral    • LEG SURGERY Left    • MAMMO BILATERAL  11/2013   • PAP SMEAR  02/2011   • TOTAL HIP ARTHROPLASTY Right 08/2015   • TOTAL HIP ARTHROPLASTY Right 09/2016         Current Outpatient Medications on File Prior to Visit   Medication Sig Dispense Refill   • acetaminophen (TYLENOL) 500 MG tablet Take 1,000 mg by mouth 3 (three) times a day as needed for mild pain (1-3) or moderate pain (4-6).     • amLODIPine (NORVASC) 5 MG tablet Take 5 mg by mouth Daily.     • atorvastatin (LIPITOR) 20 MG tablet Take 1 tablet by mouth Every Night for 180 days. 90 tablet 1   • DULoxetine (CYMBALTA) 30 MG capsule Take 1 capsule by mouth Daily for 180 days. 90 capsule 1   • ELIQUIS 2.5 MG tablet tablet TAKE 1 TABLET TWICE A  tablet 1   • ezetimibe (ZETIA) 10 MG tablet Take 1 tablet by mouth Every Night for 180 days. 90 tablet 1   • metoprolol succinate XL (TOPROL-XL) 50 MG 24 hr tablet Take 1 tablet by mouth Daily for 180 days. 90 tablet 1   • MYRBETRIQ 25 MG tablet sustained-release 24 hour 24 hr tablet Take 25 mg by mouth Daily.     • pantoprazole (PROTONIX) 20 MG EC tablet Take 1 tablet by mouth Every Night for 180 days. 90 tablet 1   • sulfamethoxazole-trimethoprim (BACTRIM,SEPTRA) 400-80 MG tablet Take 1 tablet by mouth 2 (Two) Times a Day for 7 days. 14 tablet 0   • [DISCONTINUED] amlodipine-olmesartan (BETTY) 5-40 MG per tablet Take 1 tablet by mouth Daily for 180 days. 90 tablet 1     No current facility-administered medications on file prior to visit.          Social History     Socioeconomic History   •  Marital status:      Spouse name: Not on file   • Number of children: Not on file   • Years of education: High school   • Highest education level: Not on file   Social Needs   • Financial resource strain: Not on file   • Food insecurity - worry: Not on file   • Food insecurity - inability: Not on file   • Transportation needs - medical: Not on file   • Transportation needs - non-medical: Not on file   Occupational History   • Occupation: [employer]     Employer: RETIRED   Tobacco Use   • Smoking status: Former Smoker     Packs/day: 1.00     Years: 3.00     Pack years: 3.00     Types: Cigarettes     Last attempt to quit: 1956     Years since quittin.8   • Smokeless tobacco: Never Used   Substance and Sexual Activity   • Alcohol use: No     Comment: rare   • Drug use: No   • Sexual activity: Defer   Other Topics Concern   • Not on file   Social History Narrative   • Not on file       Family History   Problem Relation Age of Onset   • Hypertension Other    • Hypertension Mother    • Stroke Mother    • Hypertension Maternal Grandmother          Review of Systems   Constitution: Positive for malaise/fatigue. Negative for decreased appetite, diaphoresis, fever, weakness, weight gain and weight loss.   HENT: Negative for congestion, hearing loss, nosebleeds and tinnitus.    Eyes: Negative for blurred vision, double vision, vision loss in left eye, vision loss in right eye and visual disturbance.   Cardiovascular:        As noted in HPI   Respiratory:        As noted HPI   Endocrine: Negative for cold intolerance and heat intolerance.   Hematologic/Lymphatic: Negative for bleeding problem. Does not bruise/bleed easily.   Skin: Negative for color change, flushing, itching and rash.   Musculoskeletal: Negative for arthritis, back pain, joint pain, joint swelling, muscle weakness and myalgias.   Gastrointestinal: Negative for bloating, abdominal pain, constipation, diarrhea, dysphagia, heartburn, hematemesis,  "hematochezia, melena, nausea and vomiting.   Genitourinary: Negative for bladder incontinence, dysuria, frequency, nocturia and urgency.   Neurological: Negative for dizziness, focal weakness, headaches, light-headedness, loss of balance, numbness, paresthesias and vertigo.   Psychiatric/Behavioral: Negative for depression, memory loss and substance abuse.       Procedures      ECG 12 Lead  Date/Time: 11/27/2018 11:45 AM  Performed by: Jason Tai MD  Authorized by: Jason Tai MD   Comparison: compared with previous ECG   Similar to previous ECG  Rhythm: atrial fibrillation  Rate: normal  QRS axis: normal                  Objective:    /70 (BP Location: Right arm)   Ht 160 cm (63\")   Wt 72.6 kg (160 lb)   BMI 28.34 kg/m²        Physical Exam  Physical Exam   Constitutional: She is oriented to person, place, and time. She appears well-developed and well-nourished. No distress.   HENT:   Head: Normocephalic.   Eyes: Conjunctivae are normal. Pupils are equal, round, and reactive to light. No scleral icterus.   Neck: Normal carotid pulses, no hepatojugular reflux and no JVD present. Carotid bruit is not present. No tracheal deviation, no edema and no erythema present. No thyromegaly present.   Cardiovascular: Normal rate, S1 normal, S2 normal, normal heart sounds and intact distal pulses. An irregularly irregular rhythm present.  No extrasystoles are present. PMI is not displaced. Exam reveals no gallop, no distant heart sounds and no friction rub.   No murmur heard.  Pulses:       Carotid pulses are 2+ on the right side, and 2+ on the left side.       Radial pulses are 2+ on the right side, and 2+ on the left side.        Femoral pulses are 2+ on the right side, and 2+ on the left side.       Dorsalis pedis pulses are 2+ on the right side, and 2+ on the left side.        Posterior tibial pulses are 2+ on the right side, and 2+ on the left side.   Pulmonary/Chest: Effort normal and breath sounds " normal. No respiratory distress. She has no decreased breath sounds. She has no wheezes. She has no rhonchi. She has no rales. She exhibits no tenderness.   Abdominal: Soft. Bowel sounds are normal. She exhibits no distension and no mass. There is no hepatosplenomegaly. There is no tenderness. There is no rebound and no guarding.   Musculoskeletal: She exhibits edema (1+ bilateral tibial). She exhibits no tenderness or deformity.   Neurological: She is alert and oriented to person, place, and time.   Skin: Skin is warm and dry. No rash noted. She is not diaphoretic. No cyanosis or erythema. No pallor. Nails show no clubbing.   Psychiatric: She has a normal mood and affect. Her speech is normal and behavior is normal. Judgment and thought content normal.           Assessment:    1. This is a 82-year-old female with a history of paroxysmal atrial fibrillation status post electrocardioversion back to sinus rhythm.   Atrial Fibrillation and Atrial Flutter  Assessment  • The patient has persistent atrial fibrillation  • This is non-valvular in etiology  • The patient's CHADS2-VASc score is 6  • A [id number] score of 2 or more is considered a high risk for a thromboembolic event  • Warfarin prescribed    Plan  • Attempt to maintain sinus rhythm  • Continue warfarin for antithrombotic therapy, bleeding issues discussed  • Continue beta blocker for rhythm control  This appears unchanged.      2. Coronary artery disease status post angioplasty, drug eluting stent placement of the circumflex vessel in 10/2015.   Coronary Artery Disease  Assessment  • The patient has no angina  • On warfarin    Plan  • Lifestyle modifications discussed include adhering to a heart healthy diet, avoidance of tobacco products, maintenance of a healthy weight, medication compliance, regular exercise and regular monitoring of cholesterol and blood pressure    Subjective - Objective  • There is a history of past MI  • There has been a previous  stent procedure using HAYLEY      No real angina.       3. Stress induced cardiomyopathy. Initial left ventricular ejection fraction at 20%. Follow-up echocardiogram in February 2016 normal left her systolic function no significant valvular disease  4. Hyperlipidemia. She is now on atorvastatin.   5. Hypertension as outlined above.  6. Obstructive sleep apnea on CPAP.   7. 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 Still painful with difficulty walking.   8. Recurrent urinary tract infections now on chronic antibiotic therapy.    9.  Fatigue/shortness of breath.  Unclear etiology.  It could be due to ischemic heart disease.  It could be to LV dysfunction.  I think it's probably related to her untreated sleep apnea and equal is important distress that she's been under at home taking care of her .  We'll get her set up for perfusion stress testing and echocardiogram and make recommendations pending those results.     Plan:

## 2018-12-04 ENCOUNTER — HOSPITAL ENCOUNTER (OUTPATIENT)
Dept: CARDIOLOGY | Facility: HOSPITAL | Age: 83
Discharge: HOME | End: 2018-12-04
Attending: INTERNAL MEDICINE | Admitting: INTERNAL MEDICINE

## 2018-12-04 ENCOUNTER — TELEPHONE (OUTPATIENT)
Dept: CARDIOLOGY | Facility: CLINIC | Age: 83
End: 2018-12-04

## 2018-12-04 ENCOUNTER — HOSPITAL ENCOUNTER (OUTPATIENT)
Dept: CARDIOLOGY | Facility: HOSPITAL | Age: 83
Discharge: HOME | End: 2018-12-04
Attending: INTERNAL MEDICINE

## 2018-12-04 VITALS
HEIGHT: 63 IN | WEIGHT: 160 LBS | SYSTOLIC BLOOD PRESSURE: 138 MMHG | HEART RATE: 98 BPM | OXYGEN SATURATION: 98 % | BODY MASS INDEX: 28.35 KG/M2 | DIASTOLIC BLOOD PRESSURE: 86 MMHG

## 2018-12-04 LAB
AORTIC ARCH: 2.2 CM
AORTIC DIMENSIONLESS INDEX: 0.6 (DI)
ASCENDING AORTA: 2.9 CM
BH CV ECHO MEAS - ACS: 1.8 CM
BH CV ECHO MEAS - AO MAX PG (FULL): 5.1 MMHG
BH CV ECHO MEAS - AO MAX PG: 7.4 MMHG
BH CV ECHO MEAS - AO MEAN PG (FULL): 2.9 MMHG
BH CV ECHO MEAS - AO MEAN PG: 4.1 MMHG
BH CV ECHO MEAS - AO ROOT AREA (BSA CORRECTED): 1.6
BH CV ECHO MEAS - AO ROOT AREA: 6 CM^2
BH CV ECHO MEAS - AO ROOT DIAM: 2.8 CM
BH CV ECHO MEAS - AO V2 MAX: 135.7 CM/SEC
BH CV ECHO MEAS - AO V2 MEAN: 95.6 CM/SEC
BH CV ECHO MEAS - AO V2 VTI: 25.9 CM
BH CV ECHO MEAS - ASC AORTA: 2.9 CM
BH CV ECHO MEAS - AVA(I,A): 1.6 CM^2
BH CV ECHO MEAS - AVA(I,D): 1.6 CM^2
BH CV ECHO MEAS - AVA(V,A): 1.5 CM^2
BH CV ECHO MEAS - AVA(V,D): 1.5 CM^2
BH CV ECHO MEAS - BSA(HAYCOCK): 1.8 M^2
BH CV ECHO MEAS - BSA: 1.8 M^2
BH CV ECHO MEAS - BZI_BMI: 28.3 KILOGRAMS/M^2
BH CV ECHO MEAS - BZI_METRIC_HEIGHT: 160 CM
BH CV ECHO MEAS - BZI_METRIC_WEIGHT: 72.6 KG
BH CV ECHO MEAS - EDV(CUBED): 153.1 ML
BH CV ECHO MEAS - EDV(MOD-SP2): 65 ML
BH CV ECHO MEAS - EDV(MOD-SP4): 62 ML
BH CV ECHO MEAS - EDV(TEICH): 138.3 ML
BH CV ECHO MEAS - EF(CUBED): 68.5 %
BH CV ECHO MEAS - EF(MOD-BP): 65 %
BH CV ECHO MEAS - EF(MOD-SP2): 64.6 %
BH CV ECHO MEAS - EF(MOD-SP4): 66.1 %
BH CV ECHO MEAS - EF(TEICH): 59.6 %
BH CV ECHO MEAS - ESV(CUBED): 48.3 ML
BH CV ECHO MEAS - ESV(MOD-SP2): 23 ML
BH CV ECHO MEAS - ESV(MOD-SP4): 21 ML
BH CV ECHO MEAS - ESV(TEICH): 55.9 ML
BH CV ECHO MEAS - FS: 31.9 %
BH CV ECHO MEAS - IVS/LVPW: 1
BH CV ECHO MEAS - IVSD: 1.1 CM
BH CV ECHO MEAS - LV DIASTOLIC VOL/BSA (35-75): 35.3 ML/M^2
BH CV ECHO MEAS - LV MASS(C)D: 227 GRAMS
BH CV ECHO MEAS - LV MASS(C)DI: 129.1 GRAMS/M^2
BH CV ECHO MEAS - LV MAX PG: 2.3 MMHG
BH CV ECHO MEAS - LV MEAN PG: 1.2 MMHG
BH CV ECHO MEAS - LV SYSTOLIC VOL/BSA (12-30): 11.9 ML/M^2
BH CV ECHO MEAS - LV V1 MAX: 75.9 CM/SEC
BH CV ECHO MEAS - LV V1 MEAN: 51.7 CM/SEC
BH CV ECHO MEAS - LV V1 VTI: 15.3 CM
BH CV ECHO MEAS - LVIDD: 5.4 CM
BH CV ECHO MEAS - LVIDS: 3.6 CM
BH CV ECHO MEAS - LVLD AP2: 6.1 CM
BH CV ECHO MEAS - LVLD AP4: 6.2 CM
BH CV ECHO MEAS - LVLS AP2: 5.1 CM
BH CV ECHO MEAS - LVLS AP4: 5.3 CM
BH CV ECHO MEAS - LVOT AREA (M): 2.5 CM^2
BH CV ECHO MEAS - LVOT AREA: 2.7 CM^2
BH CV ECHO MEAS - LVOT DIAM: 1.8 CM
BH CV ECHO MEAS - LVPWD: 1.1 CM
BH CV ECHO MEAS - MV DEC SLOPE: 468.1 CM/SEC^2
BH CV ECHO MEAS - MV DEC TIME: 0.19 SEC
BH CV ECHO MEAS - MV E MAX VEL: 104 CM/SEC
BH CV ECHO MEAS - MV MAX PG: 5.6 MMHG
BH CV ECHO MEAS - MV MEAN PG: 3.1 MMHG
BH CV ECHO MEAS - MV P1/2T MAX VEL: 115.4 CM/SEC
BH CV ECHO MEAS - MV P1/2T: 72.2 MSEC
BH CV ECHO MEAS - MV V2 MAX: 118.1 CM/SEC
BH CV ECHO MEAS - MV V2 MEAN: 81.8 CM/SEC
BH CV ECHO MEAS - MV V2 VTI: 24.5 CM
BH CV ECHO MEAS - MVA P1/2T LCG: 1.9 CM^2
BH CV ECHO MEAS - MVA(P1/2T): 3 CM^2
BH CV ECHO MEAS - MVA(VTI): 1.7 CM^2
BH CV ECHO MEAS - PA ACC TIME: 0.08 SEC
BH CV ECHO MEAS - PA MAX PG (FULL): 2.5 MMHG
BH CV ECHO MEAS - PA MAX PG: 4.1 MMHG
BH CV ECHO MEAS - PA PR(ACCEL): 42.6 MMHG
BH CV ECHO MEAS - PA V2 MAX: 101.8 CM/SEC
BH CV ECHO MEAS - PULM DIAS VEL: 36.6 CM/SEC
BH CV ECHO MEAS - PULM S/D: 0.78
BH CV ECHO MEAS - PULM SYS VEL: 28.4 CM/SEC
BH CV ECHO MEAS - PVA(V,A): 2.6 CM^2
BH CV ECHO MEAS - PVA(V,D): 2.6 CM^2
BH CV ECHO MEAS - QP/QS: 0.96
BH CV ECHO MEAS - RAP SYSTOLE: 3 MMHG
BH CV ECHO MEAS - RV MAX PG: 1.6 MMHG
BH CV ECHO MEAS - RV MEAN PG: 0.72 MMHG
BH CV ECHO MEAS - RV V1 MAX: 63.5 CM/SEC
BH CV ECHO MEAS - RV V1 MEAN: 39.1 CM/SEC
BH CV ECHO MEAS - RV V1 VTI: 9.5 CM
BH CV ECHO MEAS - RVOT AREA: 4.1 CM^2
BH CV ECHO MEAS - RVOT DIAM: 2.3 CM
BH CV ECHO MEAS - RVSP: 21 MMHG
BH CV ECHO MEAS - SI(AO): 88.5 ML/M^2
BH CV ECHO MEAS - SI(CUBED): 59.6 ML/M^2
BH CV ECHO MEAS - SI(LVOT): 23.2 ML/M^2
BH CV ECHO MEAS - SI(MOD-SP2): 23.9 ML/M^2
BH CV ECHO MEAS - SI(MOD-SP4): 23.3 ML/M^2
BH CV ECHO MEAS - SI(TEICH): 46.8 ML/M^2
BH CV ECHO MEAS - SUP REN AO DIAM: 2.05 CM
BH CV ECHO MEAS - SV(AO): 155.7 ML
BH CV ECHO MEAS - SV(CUBED): 104.8 ML
BH CV ECHO MEAS - SV(LVOT): 40.8 ML
BH CV ECHO MEAS - SV(MOD-SP2): 42 ML
BH CV ECHO MEAS - SV(MOD-SP4): 41 ML
BH CV ECHO MEAS - SV(RVOT): 39.1 ML
BH CV ECHO MEAS - SV(TEICH): 82.4 ML
BH CV ECHO MEAS - TAPSE (>1.6): 2.2 CM2
BH CV ECHO MEAS - TR MAX VEL: 213.8 CM/SEC
BH CV NUCLEAR PRIOR STUDY: 2
BH CV STRESS BP STAGE 1: NORMAL
BH CV STRESS COMMENTS STAGE 1: NORMAL
BH CV STRESS DOSE REGADENOSON STAGE 1: 0.4
BH CV STRESS DURATION MIN STAGE 1: 0
BH CV STRESS DURATION SEC STAGE 1: 10
BH CV STRESS HR STAGE 1: 108
BH CV STRESS PROTOCOL 1: NORMAL
BH CV STRESS RECOVERY BP: NORMAL MMHG
BH CV STRESS RECOVERY HR: 105 BPM
BH CV STRESS STAGE 1: 1
BH CV VAS BP RIGHT ARM: NORMAL MMHG
BH CV XLRA - RV BASE: 2.9 CM
BH CV XLRA - TDI S': 10 CM/SEC
LEFT ATRIUM VOLUME INDEX: 25 ML/M2
LV EF NUC BP: 61 %
MAXIMAL PREDICTED HEART RATE: 140 BPM
MAXIMAL PREDICTED HEART RATE: 140 BPM
PERCENT MAX PREDICTED HR: 77.14 %
SINUS: 2.5 CM
STJ: 2.7 CM
STRESS BASELINE BP: NORMAL MMHG
STRESS BASELINE HR: 91 BPM
STRESS PERCENT HR: 91 %
STRESS POST EXERCISE DUR SEC: 10 SEC
STRESS POST PEAK BP: NORMAL MMHG
STRESS POST PEAK HR: 108 BPM
STRESS TARGET HR: 119 BPM
STRESS TARGET HR: 119 BPM

## 2018-12-04 PROCEDURE — 93306 TTE W/DOPPLER COMPLETE: CPT | Performed by: INTERNAL MEDICINE

## 2018-12-04 PROCEDURE — 0 RUBIDIUM CHLORIDE: Performed by: INTERNAL MEDICINE

## 2018-12-04 PROCEDURE — 93018 CV STRESS TEST I&R ONLY: CPT | Performed by: INTERNAL MEDICINE

## 2018-12-04 PROCEDURE — A9555 RB82 RUBIDIUM: HCPCS

## 2018-12-04 PROCEDURE — 93016 CV STRESS TEST SUPVJ ONLY: CPT | Performed by: INTERNAL MEDICINE

## 2018-12-04 PROCEDURE — 25010000002 PERFLUTREN (DEFINITY) 8.476 MG IN SODIUM CHLORIDE 0.9 % 10 ML INJECTION: Performed by: INTERNAL MEDICINE

## 2018-12-04 PROCEDURE — 93306 TTE W/DOPPLER COMPLETE: CPT

## 2018-12-04 PROCEDURE — 25010000002 REGADENOSON 0.4 MG/5ML SOLUTION: Performed by: INTERNAL MEDICINE

## 2018-12-04 PROCEDURE — A9555 RB82 RUBIDIUM: HCPCS | Performed by: INTERNAL MEDICINE

## 2018-12-04 PROCEDURE — 93017 CV STRESS TEST TRACING ONLY: CPT

## 2018-12-04 PROCEDURE — 78492 MYOCRD IMG PET MLT RST&STRS: CPT | Performed by: INTERNAL MEDICINE

## 2018-12-04 PROCEDURE — 78492 MYOCRD IMG PET MLT RST&STRS: CPT

## 2018-12-04 PROCEDURE — 0 RUBIDIUM CHLORIDE

## 2018-12-04 PROCEDURE — 25010000002 AMINOPHYLLINE PER 250 MG: Performed by: INTERNAL MEDICINE

## 2018-12-04 RX ORDER — AMINOPHYLLINE DIHYDRATE 25 MG/ML
125 INJECTION, SOLUTION INTRAVENOUS ONCE
Status: COMPLETED | OUTPATIENT
Start: 2018-12-04 | End: 2018-12-04

## 2018-12-04 RX ADMIN — AMINOPHYLLINE 125 MG: 25 INJECTION, SOLUTION INTRAVENOUS at 11:36

## 2018-12-04 RX ADMIN — RUBIDIUM CHLORIDE RB-82 1 DOSE: 150 INJECTION, SOLUTION INTRAVENOUS at 11:23

## 2018-12-04 RX ADMIN — PERFLUTREN 2 ML: 6.52 INJECTION, SUSPENSION INTRAVENOUS at 11:13

## 2018-12-04 RX ADMIN — REGADENOSON 0.4 MG: 0.08 INJECTION, SOLUTION INTRAVENOUS at 11:35

## 2018-12-05 ENCOUNTER — OFFICE VISIT (OUTPATIENT)
Dept: FAMILY MEDICINE CLINIC | Facility: CLINIC | Age: 83
End: 2018-12-05

## 2018-12-05 VITALS
SYSTOLIC BLOOD PRESSURE: 138 MMHG | DIASTOLIC BLOOD PRESSURE: 70 MMHG | TEMPERATURE: 97.2 F | HEIGHT: 63 IN | HEART RATE: 96 BPM | WEIGHT: 160 LBS | BODY MASS INDEX: 28.35 KG/M2 | RESPIRATION RATE: 16 BRPM

## 2018-12-05 DIAGNOSIS — E78.00 HYPERCHOLESTEROLEMIA: ICD-10-CM

## 2018-12-05 DIAGNOSIS — I10 ESSENTIAL HYPERTENSION: Primary | ICD-10-CM

## 2018-12-05 PROCEDURE — 99213 OFFICE O/P EST LOW 20 MIN: CPT | Performed by: FAMILY MEDICINE

## 2018-12-05 NOTE — PROGRESS NOTES
"Chief Complaint   Patient presents with   • Hypertension       Subjective     Marleni Hummel presents to the office today to refill her medications. No medication side effects are reported.  She is here to recheck blood pressure.  It is controlled just on metoprolol.  She could not tolerate the Jimbo product previously.  That product caused dizziness.  Other problems continue to be caregiver stress.    I have reviewed and updated her medications, medical history and problem list during today's office visit.     Patient Care Team:  Ken Andujar MD as PCP - General  Ken Andujar MD as PCP - Claims Attributed  Chuckie Mclaughlin MD as Consulting Physician (Urology)  Jason Tai MD as Consulting Physician (Cardiology)  Geoffrey Mills MD as Consulting Physician (Nephrology)  Anayeli Alanis MD as Consulting Physician (Obstetrics and Gynecology)  BROOK Clarke MD as Consulting Physician (Ophthalmology)  Jose Alfredo Krishnamurthy MD as Consulting Physician (Hematology and Oncology)  Sean Canales MD as Consulting Physician (Orthopedic Surgery)  Esteban Moe MD as Consulting Physician (Orthopedic Surgery)  Feliciano Elizabeth MD as Consulting Physician (Gastroenterology)    Social History     Tobacco Use   • Smoking status: Former Smoker     Packs/day: 1.00     Years: 3.00     Pack years: 3.00     Types: Cigarettes     Last attempt to quit: 1956     Years since quittin.8   • Smokeless tobacco: Never Used   Substance Use Topics   • Alcohol use: No     Comment: rare       Review of Systems   Cardiovascular: Negative for chest pain.   Neurological: Positive for headaches.   Psychiatric/Behavioral:        Stress       Objective     /70 (BP Location: Right arm, Patient Position: Sitting, Cuff Size: Large Adult)   Pulse 96   Temp 97.2 °F (36.2 °C) (Oral)   Resp 16   Ht 160 cm (63\")   Wt 72.6 kg (160 lb)   BMI 28.34 kg/m²     Body mass index is 28.34 kg/m².    Physical Exam "   Constitutional: She is oriented to person, place, and time. She appears well-developed. No distress.   Eyes: Conjunctivae and lids are normal.   Neck: Carotid bruit is not present.   Cardiovascular: Normal rate, regular rhythm and normal heart sounds.   Pulmonary/Chest: Effort normal and breath sounds normal.   Neurological: She is alert and oriented to person, place, and time.   Skin: Skin is warm and dry.   Psychiatric: She has a normal mood and affect. Her behavior is normal.   Vitals reviewed.      Data Reviewed:             Lab Results   Component Value Date     (H) 10/17/2018    BUN 24 (H) 10/17/2018    BUN 29 (H) 01/08/2018    CREATININE 1.19 (H) 10/17/2018    CREATININE 1.21 (H) 01/08/2018    EGFRIFNONA 44 (L) 10/17/2018    EGFRIFNONA 43 (L) 01/08/2018    EGFRIFAFRI 53 (L) 10/17/2018     10/17/2018     01/08/2018    K 4.9 10/17/2018    K 4.4 01/08/2018    CL 98 10/17/2018     01/08/2018    CO2 25.9 10/17/2018    CO2 26.9 01/08/2018    CALCIUM 10.1 10/17/2018    CALCIUM 9.4 01/08/2018    ALBUMIN 4.60 10/17/2018    ALBUMIN 4.60 06/19/2017    LABGLOBREF 2.6 10/17/2018    BILITOT 0.9 10/17/2018    BILITOT 0.5 06/19/2017    ALKPHOS 65 10/17/2018    ALKPHOS 113 06/19/2017    AST 18 10/17/2018    AST 27 06/19/2017    ALT 19 10/17/2018    ALT 23 06/19/2017    CHLPL 146 10/17/2018    TRIG 132 10/17/2018    HDL 62 (H) 10/17/2018    VLDL 26.4 10/17/2018    LDL 58 10/17/2018    LDLHDL 0.93 10/17/2018    WBC 8.75 11/09/2017    WBC 4.30 (L) 09/02/2017    RBC 3.95 10/17/2018    HCT 38.9 10/17/2018    HCT 32.2 (L) 09/02/2017    MCV 98.5 (H) 10/17/2018    MCV 93.1 09/02/2017    MCH 29.9 10/17/2018    MCH 29.5 09/02/2017    MCHC 30.3 (L) 10/17/2018    MCHC 31.7 (L) 09/02/2017    RDW 14.2 (H) 10/17/2018    RDW 14.2 (H) 09/02/2017    TSH 0.529 10/17/2018          Assessment/Plan     Problem List Items Addressed This Visit     Essential hypertension - Primary    Relevant Orders    Comprehensive  Metabolic Panel    CBC & Differential    TSH    Hypercholesterolemia    Relevant Orders    Lipid Panel With / Chol / HDL Ratio    CK          Orders Placed This Encounter   Procedures   • Comprehensive Metabolic Panel     Standing Status:   Future     Standing Expiration Date:   6/3/2019   • Lipid Panel With / Chol / HDL Ratio     Standing Status:   Future     Standing Expiration Date:   6/3/2019   • CK     Standing Status:   Future     Standing Expiration Date:   6/3/2019   • TSH     Standing Status:   Future     Standing Expiration Date:   6/3/2019   • CBC & Differential     Standing Status:   Future     Standing Expiration Date:   6/3/2019     Order Specific Question:   Manual Differential     Answer:   No         Current Outpatient Medications:   •  acetaminophen (TYLENOL) 500 MG tablet, Take 1,000 mg by mouth 3 (three) times a day as needed for mild pain (1-3) or moderate pain (4-6)., Disp: , Rfl:   •  atorvastatin (LIPITOR) 20 MG tablet, Take 1 tablet by mouth Every Night for 180 days., Disp: 90 tablet, Rfl: 1  •  DULoxetine (CYMBALTA) 30 MG capsule, Take 1 capsule by mouth Daily for 180 days., Disp: 90 capsule, Rfl: 1  •  ELIQUIS 2.5 MG tablet tablet, TAKE 1 TABLET TWICE A DAY, Disp: 180 tablet, Rfl: 1  •  ezetimibe (ZETIA) 10 MG tablet, Take 1 tablet by mouth Every Night for 180 days., Disp: 90 tablet, Rfl: 1  •  metoprolol succinate XL (TOPROL-XL) 50 MG 24 hr tablet, Take 1 tablet by mouth Daily for 180 days., Disp: 90 tablet, Rfl: 1  •  MYRBETRIQ 25 MG tablet sustained-release 24 hour 24 hr tablet, Take 25 mg by mouth Daily., Disp: , Rfl:   •  pantoprazole (PROTONIX) 20 MG EC tablet, Take 1 tablet by mouth Every Night for 180 days., Disp: 90 tablet, Rfl: 1    Return in about 4 months (around 4/5/2019) for Recheck.

## 2019-01-08 ENCOUNTER — HOSPITAL ENCOUNTER (EMERGENCY)
Facility: HOSPITAL | Age: 84
Discharge: HOME | End: 2019-01-08
Attending: EMERGENCY MEDICINE | Admitting: EMERGENCY MEDICINE

## 2019-01-08 ENCOUNTER — APPOINTMENT (OUTPATIENT)
Dept: CT IMAGING | Facility: HOSPITAL | Age: 84
End: 2019-01-08

## 2019-01-08 VITALS
BODY MASS INDEX: 26.58 KG/M2 | HEART RATE: 75 BPM | SYSTOLIC BLOOD PRESSURE: 161 MMHG | TEMPERATURE: 99.2 F | RESPIRATION RATE: 16 BRPM | OXYGEN SATURATION: 100 % | HEIGHT: 63 IN | WEIGHT: 150 LBS | DIASTOLIC BLOOD PRESSURE: 93 MMHG

## 2019-01-08 DIAGNOSIS — R51.9 NONINTRACTABLE HEADACHE, UNSPECIFIED CHRONICITY PATTERN, UNSPECIFIED HEADACHE TYPE: Primary | ICD-10-CM

## 2019-01-08 LAB
ANION GAP SERPL CALCULATED.3IONS-SCNC: 10.6 MMOL/L
BASOPHILS # BLD AUTO: 0.02 10*3/MM3 (ref 0–0.2)
BASOPHILS NFR BLD AUTO: 0.3 % (ref 0–1.5)
BUN BLD-MCNC: 20 MG/DL (ref 8–23)
BUN/CREAT SERPL: 15.5 (ref 7–25)
CALCIUM SPEC-SCNC: 9.9 MG/DL (ref 8.6–10.5)
CHLORIDE SERPL-SCNC: 96 MMOL/L (ref 98–107)
CO2 SERPL-SCNC: 25.4 MMOL/L (ref 22–29)
CREAT BLD-MCNC: 1.29 MG/DL (ref 0.57–1)
CRP SERPL-MCNC: 0.08 MG/DL (ref 0–0.5)
DEPRECATED RDW RBC AUTO: 46.8 FL (ref 37–54)
EOSINOPHIL # BLD AUTO: 0.09 10*3/MM3 (ref 0–0.7)
EOSINOPHIL NFR BLD AUTO: 1.3 % (ref 0.3–6.2)
ERYTHROCYTE [DISTWIDTH] IN BLOOD BY AUTOMATED COUNT: 13.2 % (ref 11.7–13)
ERYTHROCYTE [SEDIMENTATION RATE] IN BLOOD: 25 MM/HR (ref 0–30)
GFR SERPL CREATININE-BSD FRML MDRD: 40 ML/MIN/1.73
GLUCOSE BLD-MCNC: 116 MG/DL (ref 65–99)
HCT VFR BLD AUTO: 37.6 % (ref 35.6–45.5)
HGB BLD-MCNC: 11.6 G/DL (ref 11.9–15.5)
IMM GRANULOCYTES # BLD AUTO: 0 10*3/MM3 (ref 0–0.03)
IMM GRANULOCYTES NFR BLD AUTO: 0 % (ref 0–0.5)
LYMPHOCYTES # BLD AUTO: 1.64 10*3/MM3 (ref 0.9–4.8)
LYMPHOCYTES NFR BLD AUTO: 23.7 % (ref 19.6–45.3)
MCH RBC QN AUTO: 30 PG (ref 26.9–32)
MCHC RBC AUTO-ENTMCNC: 30.9 G/DL (ref 32.4–36.3)
MCV RBC AUTO: 97.2 FL (ref 80.5–98.2)
MONOCYTES # BLD AUTO: 0.6 10*3/MM3 (ref 0.2–1.2)
MONOCYTES NFR BLD AUTO: 8.7 % (ref 5–12)
NEUTROPHILS # BLD AUTO: 4.58 10*3/MM3 (ref 1.9–8.1)
NEUTROPHILS NFR BLD AUTO: 66 % (ref 42.7–76)
PLATELET # BLD AUTO: 198 10*3/MM3 (ref 140–500)
PMV BLD AUTO: 10.1 FL (ref 6–12)
POTASSIUM BLD-SCNC: 4.4 MMOL/L (ref 3.5–5.2)
RBC # BLD AUTO: 3.87 10*6/MM3 (ref 3.9–5.2)
SODIUM BLD-SCNC: 132 MMOL/L (ref 136–145)
WBC NRBC COR # BLD: 6.93 10*3/MM3 (ref 4.5–10.7)

## 2019-01-08 PROCEDURE — 96361 HYDRATE IV INFUSION ADD-ON: CPT

## 2019-01-08 PROCEDURE — 63710000001 DIPHENHYDRAMINE PER 50 MG: Performed by: EMERGENCY MEDICINE

## 2019-01-08 PROCEDURE — 86140 C-REACTIVE PROTEIN: CPT | Performed by: EMERGENCY MEDICINE

## 2019-01-08 PROCEDURE — 70450 CT HEAD/BRAIN W/O DYE: CPT

## 2019-01-08 PROCEDURE — 80048 BASIC METABOLIC PNL TOTAL CA: CPT | Performed by: EMERGENCY MEDICINE

## 2019-01-08 PROCEDURE — 85025 COMPLETE CBC W/AUTO DIFF WBC: CPT | Performed by: EMERGENCY MEDICINE

## 2019-01-08 PROCEDURE — 25010000002 PROCHLORPERAZINE EDISYLATE PER 10 MG: Performed by: EMERGENCY MEDICINE

## 2019-01-08 PROCEDURE — 85652 RBC SED RATE AUTOMATED: CPT | Performed by: EMERGENCY MEDICINE

## 2019-01-08 PROCEDURE — 96374 THER/PROPH/DIAG INJ IV PUSH: CPT

## 2019-01-08 PROCEDURE — 99284 EMERGENCY DEPT VISIT MOD MDM: CPT

## 2019-01-08 RX ORDER — SODIUM CHLORIDE 0.9 % (FLUSH) 0.9 %
10 SYRINGE (ML) INJECTION AS NEEDED
Status: DISCONTINUED | OUTPATIENT
Start: 2019-01-08 | End: 2019-01-08 | Stop reason: HOSPADM

## 2019-01-08 RX ORDER — DIPHENHYDRAMINE HCL 25 MG
25 CAPSULE ORAL ONCE
Status: COMPLETED | OUTPATIENT
Start: 2019-01-08 | End: 2019-01-08

## 2019-01-08 RX ADMIN — SODIUM CHLORIDE, POTASSIUM CHLORIDE, SODIUM LACTATE AND CALCIUM CHLORIDE 1000 ML: 600; 310; 30; 20 INJECTION, SOLUTION INTRAVENOUS at 17:14

## 2019-01-08 RX ADMIN — PROCHLORPERAZINE EDISYLATE 10 MG: 5 INJECTION INTRAMUSCULAR; INTRAVENOUS at 17:16

## 2019-01-08 RX ADMIN — DIPHENHYDRAMINE HYDROCHLORIDE 25 MG: 25 CAPSULE ORAL at 17:14

## 2019-01-08 NOTE — ED PROVIDER NOTES
EMERGENCY DEPARTMENT ENCOUNTER    Room Number:  22/22  Date seen:  1/8/2019  Time seen: 4:52 PM  PCP: Ken Andujar MD  Historian: Patient      HPI:  Chief Complaint: Headache  Context: Marleni Hummel is a 83 y.o. female who presents to the ED c/o constant, severe left sided headache x1 month, worse today. Headache is aggravated by standing up and loud noises, improved when laying down. Was seen at urgent care at onset of symptoms and was told it was due to HTN medication, and stress. Denies nausea, vomiting, dizziness, numbness, tingling. No other complaints at this time.     Pain Location: Left occiput, left side of head  Radiation: none  Quality: n/a  Intensity/Severity: severe  Duration: 1 month  Onset quality: gradual  Timing: constant  Progression: worsening  Aggravating Factors: standing, noise  Alleviating Factors: laying flat  Previous Episodes: none  Treatment before arrival: none  Associated Symptoms: none    PAST MEDICAL HISTORY  Active Ambulatory Problems     Diagnosis Date Noted   • Arthritis involving multiple sites    • Essential hypertension    • Atrial fibrillation (CMS/HCC)    • Bell's palsy    • Chronic kidney disease (CKD), stage III (moderate) (CMS/HCC)    • GERD (gastroesophageal reflux disease)    • Hypercholesterolemia    • Insomnia    • Panic disorder    • Chronic nonseasonal allergic rhinitis due to pollen    • Sleep apnea    • History of MI (myocardial infarction) 12/21/2015   • Chronic coronary artery disease 01/25/2016   • Elevated serum free T4 level 03/21/2016   • Anemia of chronic disease 09/12/2016   • Hematuria 12/12/2016   • Weakness of right leg 03/13/2017   • Cardiac murmur 05/04/2017   • Senile osteoporosis 06/30/2017   • Hyperuricemia 09/07/2017     Resolved Ambulatory Problems     Diagnosis Date Noted   • Fatigue    • Hip arthritis    • IFG (impaired fasting glucose)    • Osteoporosis    • Rectal bleeding    • Vitamin D deficiency    • Urinary incontinence    • History of  right hip replacement 12/21/2015   • Closed fracture of neck of right femur with routine healing 12/21/2015   • History of right knee joint replacement 12/21/2015   • History of left knee replacement 12/21/2015   • Stress-induced cardiomyopathy 01/25/2016   • Urinary tract infection 10/05/2016   • Acute pyelonephritis 10/06/2016   • Acute cystitis 12/03/2016   • Sinusitis 01/22/2017   • Frequent falls 03/13/2017   • Atrial fibrillation with RVR (CMS/[facility]) 06/19/2017   • ANGY (acute kidney injury) (CMS/[facility]) 06/20/2017   • Viral gastroenteritis 06/20/2017   • Dehydration 06/20/2017   • Nausea & vomiting 06/20/2017   • Diarrhea 06/20/2017   • Closed displaced fracture of left femoral neck (CMS/[facility]) 08/30/2017   • Bacteriuria 08/30/2017     Past Medical History:   Diagnosis Date   • Allergic    • Allergic rhinitis    • Arthropathy of knee    • Atrial fibrillation (CMS/[facility])    • Back pain    • Bell's palsy    • Chronic kidney disease (CKD), stage III (moderate) (CMS/[facility])    • Cough    • Edema    • Encounter for eye exam 02/2015   • Essential hypertension    • Fatigue    • GERD (gastroesophageal reflux disease)    • H/O Heart murmur    • Heart attack (CMS/[facility])    • Herpes zoster without complication    • Hip arthritis    • History of bone density study 02/28/2008   • History of pneumonia    • Hypercholesterolemia    • IFG (impaired fasting glucose)    • Insomnia    • Nephropathy    • Osteoarthritis    • Osteopenia    • Panic disorder    • Rectal bleeding    • Sleep apnea    • Stress    • Urinary incontinence    • Vitamin D deficiency          PAST SURGICAL HISTORY  Past Surgical History:   Procedure Laterality Date   • CATARACT EXTRACTION Left 04/01/2013    Dr. Clarke   • CATARACT EXTRACTION Right 04/16/2013    Dr. Clarke   • COLONOSCOPY  04/18/2014    Dr. Elizabeth; no polyps   • EPIDURAL BLOCK     • HIP PERCUTANEOUS PINNING Left 8/31/2017    Procedure: LT HIP PERCUTANEOUS PINNING;  Surgeon: Sean Canales MD;  Location:  [facility] MAIN OR;  Service:    • KNEE SURGERY Bilateral    • LEG SURGERY Left    • MAMMO BILATERAL  2013   • PAP SMEAR  2011   • TOTAL HIP ARTHROPLASTY Right 2015   • TOTAL HIP ARTHROPLASTY Right 2016         FAMILY HISTORY  Family History   Problem Relation Age of Onset   • Hypertension Other    • Hypertension Mother    • Stroke Mother    • Hypertension Maternal Grandmother          SOCIAL HISTORY  Social History     Socioeconomic History   • Marital status:      Spouse name: Not on file   • Number of children: Not on file   • Years of education: High school   • Highest education level: Not on file   Social Needs   • Financial resource strain: Not on file   • Food insecurity - worry: Not on file   • Food insecurity - inability: Not on file   • Transportation needs - medical: Not on file   • Transportation needs - non-medical: Not on file   Occupational History   • Occupation: [employer]     Employer: RETIRED   Tobacco Use   • Smoking status: Former Smoker     Packs/day: 1.00     Years: 3.00     Pack years: 3.00     Types: Cigarettes     Last attempt to quit: 1956     Years since quittin.9   • Smokeless tobacco: Never Used   Substance and Sexual Activity   • Alcohol use: No     Comment: rare   • Drug use: No   • Sexual activity: Defer   Other Topics Concern   • Not on file   Social History Narrative   • Not on file         ALLERGIES  Ace inhibitors; Amoxicillin; Lortab [hydrocodone-acetaminophen]; Macrobid [nitrofurantoin]; Morphine and related; Oxycodone; and Levaquin [levofloxacin]        REVIEW OF SYSTEMS  Review of Systems   Constitutional: Negative for fever.   HENT: Negative for sore throat.    Respiratory: Negative for shortness of breath.    Cardiovascular: Negative for chest pain.   Gastrointestinal: Negative for abdominal pain.   Endocrine: Negative for polyuria.   Genitourinary: Negative for dysuria.   Musculoskeletal: Negative for neck pain.   Skin: Negative for rash.    Neurological: Positive for headaches. Negative for dizziness and facial asymmetry.   All other systems reviewed and are negative.           PHYSICAL EXAM  ED Triage Vitals [01/08/19 1639]   Temp Heart Rate Resp BP SpO2   99.2 °F (37.3 °C) 67 -- -- 100 %      Temp src Heart Rate Source Patient Position BP Location FiO2 (%)   Tympanic Monitor -- -- --         GENERAL: not distressed  HENT: nares patent  EYES: no scleral icterus  CV: regular rhythm, regular rate  RESPIRATORY: normal effort  ABDOMEN: soft  MUSCULOSKELETAL: no deformity  NEURO:     Mental status  LOC: awake, alert and fully oriented to person, place, time, and situation.  Attention and memory intact. Fund of knowledge normal for age and education.  Language is fluent with normal naming, comprehension, and repetition.   There is no neglect.    Cranial nerves  PERRL  Visual fields full to confrontation.  EOMI with full versions, normal pursuits, and saccades.   Facial strength is full and symmetric.   Facial sensation is intact in V1-V3.  Hearing is intact to finger rub bilaterally.   Tongue protrudes midline.   Uvula and palate elevate symmetrically.  Speech is clear without notable labial, lingual, or guttural dysarthria.   Shoulder shrug and sternocleidomastoid activation are full and symmetric.    Motor  Normal bulk and tone.   Strength is 5/5 in bilateral upper and lower extremities.     There is no pronator drift.    Sensory  Sensation is intact to light touch in bilateral upper and lower extremities.     Coordination  Normal rapid alternating movements.  Normal finger-nose-finger and heel-to-shin testing.    Station and gait  Normal station.  Normal gait.    Reflexes  No meningismus.       NIHSS 0    SKIN: warm, dry    Vital signs and nursing notes reviewed.          LAB RESULTS  Recent Results (from the past 24 hour(s))   Basic Metabolic Panel    Collection Time: 01/08/19  5:09 PM   Result Value Ref Range    Glucose 116 (H) 65 - 99 mg/dL    BUN  20 8 - 23 mg/dL    Creatinine 1.29 (H) 0.57 - 1.00 mg/dL    Sodium 132 (L) 136 - 145 mmol/L    Potassium 4.4 3.5 - 5.2 mmol/L    Chloride 96 (L) 98 - 107 mmol/L    CO2 25.4 22.0 - 29.0 mmol/L    Calcium 9.9 8.6 - 10.5 mg/dL    eGFR Non African Amer 40 (L) >60 mL/min/1.73    BUN/Creatinine Ratio 15.5 7.0 - 25.0    Anion Gap 10.6 mmol/L   C-reactive Protein    Collection Time: 01/08/19  5:09 PM   Result Value Ref Range    C-Reactive Protein 0.08 0.00 - 0.50 mg/dL   Sedimentation Rate    Collection Time: 01/08/19  5:09 PM   Result Value Ref Range    Sed Rate 25 0 - 30 mm/hr   CBC Auto Differential    Collection Time: 01/08/19  5:09 PM   Result Value Ref Range    WBC 6.93 4.50 - 10.70 10*3/mm3    RBC 3.87 (L) 3.90 - 5.20 10*6/mm3    Hemoglobin 11.6 (L) 11.9 - 15.5 g/dL    Hematocrit 37.6 35.6 - 45.5 %    MCV 97.2 80.5 - 98.2 fL    MCH 30.0 26.9 - 32.0 pg    MCHC 30.9 (L) 32.4 - 36.3 g/dL    RDW 13.2 (H) 11.7 - 13.0 %    RDW-SD 46.8 37.0 - 54.0 fl    MPV 10.1 6.0 - 12.0 fL    Platelets 198 140 - 500 10*3/mm3    Neutrophil % 66.0 42.7 - 76.0 %    Lymphocyte % 23.7 19.6 - 45.3 %    Monocyte % 8.7 5.0 - 12.0 %    Eosinophil % 1.3 0.3 - 6.2 %    Basophil % 0.3 0.0 - 1.5 %    Immature Grans % 0.0 0.0 - 0.5 %    Neutrophils, Absolute 4.58 1.90 - 8.10 10*3/mm3    Lymphocytes, Absolute 1.64 0.90 - 4.80 10*3/mm3    Monocytes, Absolute 0.60 0.20 - 1.20 10*3/mm3    Eosinophils, Absolute 0.09 0.00 - 0.70 10*3/mm3    Basophils, Absolute 0.02 0.00 - 0.20 10*3/mm3    Immature Grans, Absolute 0.00 0.00 - 0.03 10*3/mm3       Ordered the above labs and reviewed the results.        RADIOLOGY  CT Head Without Contrast    (Results Pending)          Ordered the above noted radiological studies. Reviewed by me in PACS.        PROCEDURES  Procedures    MEDICATIONS GIVEN IN ER  Medications   sodium chloride 0.9 % flush 10 mL (not administered)   lactated ringers bolus 1,000 mL (1,000 mL Intravenous New Bag 1/8/19 8947)   prochlorperazine  (COMPAZINE) injection 10 mg (10 mg Intravenous Given 1/8/19 1716)   diphenhydrAMINE (BENADRYL) capsule 25 mg (25 mg Oral Given 1/8/19 1714)       PROGRESS AND CONSULTS         MEDICAL DECISION MAKING      MDM  Number of Diagnoses or Management Options  Nonintractable headache, unspecified chronicity pattern, unspecified headache type:   Diagnosis management comments: Concnerned initially for SAH just based on patient description. However, this would be atypical given 1 month of sx. It almost seems more gradual and progressive with how she describes it. However, she then repeatedly states it's the worst headache of her life. Ct without tumor. Labs not c/w GCA. She says she feels much better with medicines. I offered MRA per radiology's request. However, family declines. Son takes me aside and said that urgent care instructed patient to say it was the worst headache of her life in order to be seen more promptly.        Amount and/or Complexity of Data Reviewed  Clinical lab tests: ordered and reviewed (Normal ESR/CRP)  Tests in the radiology section of CPT®: ordered and reviewed (Acutely negative CT)  Discussion of test results with the performing providers: yes (Dr. Martin)  Decide to obtain previous medical records or to obtain history from someone other than the patient: yes  Obtain history from someone other than the patient: yes (Son and )          Will treat with benadryl, compazine, fluids and obtain CT head.    1828: CT head negative for acute pathology per radiology.        DIAGNOSIS  Final diagnoses:   Nonintractable headache, unspecified chronicity pattern, unspecified headache type         DISPOSITION  Discharge            Latest Documented Vital Signs:  As of 6:26 PM  BP- 153/79 HR- 67 Temp- 99.2 °F (37.3 °C) (Tympanic) O2 sat- 100%        --  Documentation assistance provided by alana Mojica for Dr. Cecil MD.  Information recorded by the alana was done at my direction and has been verified  and validated by me.           Sara Mojica  01/08/19 1826       Sara Mojica  01/08/19 1828       Eddie Jacob II, MD  01/10/19 0916

## 2019-01-08 NOTE — ED TRIAGE NOTES
"PT REPORTS INTERMITTENT H/A X 1 MONTH; THE MOST RECENT H/A STARTING LAST NIGHT. PT DENIES BLURRED VISION, DIZZINESS, VOMITING OR ANY OTHER SYMPTOMS. \"THIS IS THE WORST HEADACHE EVER.\"  "

## 2019-01-09 ENCOUNTER — PATIENT OUTREACH (OUTPATIENT)
Dept: CASE MANAGEMENT | Facility: OTHER | Age: 84
End: 2019-01-09

## 2019-01-09 NOTE — OUTREACH NOTE
Care Management Plan 1/9/2019   Lifestyle Goals Maintain blood pressure < 130/80;Routine follow-up with doctor(s);Reduce blood pressure;Self monitor blood pressure   Barriers Disease education   Self Management Home BP Monitoring;Medication Adherence   Annual Wellness Visit:  Patient Has Completed   Specific Disease Process Teaching Hypertension   Does patient have depression diagnosis? No   Advanced Directives: Patient Has   Ed Visits past 12 months: 2 or 3   Medication Adherence Medications understood   Goal Progress Making Progress Toward Goal(s)   Readiness Scale 9   Confidence Scale 8   Health Literacy Good     The main concerns and/or symptoms the patient would like to address are: (ER visit for headache) Pt. Reports her headache is better. She feels like her headache may be related to having high blood pressure. Teaching done on s/s of HTN, pt.states she does not add any additional salt to her diet and she stays well hydrated drinking 3 large glasses of water daily (or more some days) Pt is able to take her B/P at home, teaching done on keeping a daily log of B/P and vary the time she takes her B/P. She will take this to her next PCP appointment 1/17/19. Health Maintenance gaps all reviewed, she will ask PCP about Hep A injections and she is not interested in Zoster. ACP will be brought in to be scanned. MyChart not in use.    Education/instruction provided by Care Coordinator: Explained role of Care Advisor and contact information given to patient.Nurse provided patient education.No other questions or concerns voiced at this time.    Follow Up Outreach Due: next week    Kalie Braga RN

## 2019-01-15 ENCOUNTER — INFUSION (OUTPATIENT)
Dept: ONCOLOGY | Facility: HOSPITAL | Age: 84
End: 2019-01-15

## 2019-01-15 VITALS
DIASTOLIC BLOOD PRESSURE: 83 MMHG | SYSTOLIC BLOOD PRESSURE: 143 MMHG | OXYGEN SATURATION: 96 % | HEART RATE: 74 BPM | BODY MASS INDEX: 28.77 KG/M2 | WEIGHT: 162.4 LBS | TEMPERATURE: 98.2 F

## 2019-01-15 DIAGNOSIS — M81.0 SENILE OSTEOPOROSIS: Primary | ICD-10-CM

## 2019-01-15 NOTE — PROGRESS NOTES
Patient here for Prolia today. Asked Dental questions to lj. Patient states she had a filling today at the dentist. Unable to give Prolia today. Education provided to patient along with number to call for next appointment. No further questions at this time.

## 2019-01-17 ENCOUNTER — OFFICE VISIT (OUTPATIENT)
Dept: FAMILY MEDICINE CLINIC | Facility: CLINIC | Age: 84
End: 2019-01-17

## 2019-01-17 VITALS
RESPIRATION RATE: 16 BRPM | WEIGHT: 159 LBS | DIASTOLIC BLOOD PRESSURE: 78 MMHG | BODY MASS INDEX: 28.17 KG/M2 | SYSTOLIC BLOOD PRESSURE: 151 MMHG | HEIGHT: 63 IN | TEMPERATURE: 97 F | HEART RATE: 69 BPM

## 2019-01-17 DIAGNOSIS — I10 ESSENTIAL HYPERTENSION: Primary | ICD-10-CM

## 2019-01-17 DIAGNOSIS — G44.52 NEW DAILY PERSISTENT HEADACHE: ICD-10-CM

## 2019-01-17 PROCEDURE — 99214 OFFICE O/P EST MOD 30 MIN: CPT | Performed by: FAMILY MEDICINE

## 2019-01-17 RX ORDER — AMLODIPINE BESYLATE 5 MG/1
5 TABLET ORAL DAILY
Qty: 90 TABLET | Refills: 1 | Status: SHIPPED | OUTPATIENT
Start: 2019-01-17 | End: 2019-04-03

## 2019-01-17 NOTE — PROGRESS NOTES
Chief Complaint   Patient presents with   • Headache     Hospital Follow Up       Subjective   New-onset of headaches.  Recently seen at urgent care center.  CT scan negative for headache cause.  Blood pressure is elevated since stoppingprevious angiomas tense and receptor blocker therapy.  She is continuing on metoprolol.  We will add amlodipine to her current regimen with short-term follow-up in April.  Headaches are left sided and seemed to be exacerbated by stressors or not eating at the proper time or not having enough fluid.  Sometimes headaches awaken her from sleep.  Headaches are throbbing in nature and can be severe and happen on a daily basis over the past month.  Some association with nausea but no vomiting.  No issues with vision or hearing during headaches.  I have reviewed and updated her medications, medical history and problem list during today's office visit.     Patient Care Team:  Ken Andujar MD as PCP - General  Chuckie Mclaughlin MD as Consulting Physician (Urology)  Jason Tai MD as Consulting Physician (Cardiology)  Geoffrey Mills MD as Consulting Physician (Nephrology)  Anayeli Alanis MD as Consulting Physician (Obstetrics and Gynecology)  BROOK Clarke MD as Consulting Physician (Ophthalmology)  Jose Alfredo Krishnamurthy MD as Consulting Physician (Hematology and Oncology)  Sean Canales MD as Consulting Physician (Orthopedic Surgery)  Esteban Moe MD as Consulting Physician (Orthopedic Surgery)  Feliciano Elizabeth MD as Consulting Physician (Gastroenterology)  Kalie Braga, DUGLAS as Care Coordinator (Population Health)    Social History     Tobacco Use   • Smoking status: Former Smoker     Packs/day: 1.00     Years: 3.00     Pack years: 3.00     Types: Cigarettes     Last attempt to quit: 1956     Years since quittin.9   • Smokeless tobacco: Never Used   Substance Use Topics   • Alcohol use: No     Comment: rare       Review of Systems  "  Gastrointestinal: Positive for nausea.   Neurological: Positive for headache.       Objective     /78   Pulse 69   Temp 97 °F (36.1 °C) (Oral)   Resp 16   Ht 160 cm (63\")   Wt 72.1 kg (159 lb)   BMI 28.17 kg/m²     Body mass index is 28.17 kg/m².    Physical Exam   Constitutional: She is oriented to person, place, and time. She appears well-developed. No distress.   Eyes: Conjunctivae and lids are normal.   Neck: Carotid bruit is not present.   Cardiovascular: Normal rate, regular rhythm and normal heart sounds.   Pulmonary/Chest: Effort normal and breath sounds normal.   Neurological: She is alert and oriented to person, place, and time.   Skin: Skin is warm and dry.   Psychiatric: She has a normal mood and affect. Her behavior is normal.   Vitals reviewed.       Data Reviewed:    Ct Head Without Contrast    Result Date: 1/9/2019  Impression: No acute process identified. Further evaluation could be performed with a MRI/MRA examination of the brain as indicated.    Radiation dose reduction techniques were utilized, including automated exposure control and exposure modulation based on body size.  This report was finalized on 1/9/2019 9:24 AM by Dr. Richard Martin M.D.            Lab Results   Component Value Date     (H) 10/17/2018    BUN 20 01/08/2019    CREATININE 1.29 (H) 01/08/2019    EGFRIFNONA 40 (L) 01/08/2019    EGFRIFAFRI 53 (L) 10/17/2018     (L) 01/08/2019    K 4.4 01/08/2019    CL 96 (L) 01/08/2019    CO2 25.4 01/08/2019    CALCIUM 9.9 01/08/2019    ALBUMIN 4.60 10/17/2018    ALBUMIN 4.60 06/19/2017    LABGLOBREF 2.6 10/17/2018    BILITOT 0.9 10/17/2018    BILITOT 0.5 06/19/2017    ALKPHOS 65 10/17/2018    ALKPHOS 113 06/19/2017    AST 18 10/17/2018    AST 27 06/19/2017    ALT 19 10/17/2018    ALT 23 06/19/2017    CHLPL 146 10/17/2018    TRIG 132 10/17/2018    HDL 62 (H) 10/17/2018    VLDL 26.4 10/17/2018    LDL 58 10/17/2018    LDLHDL 0.93 10/17/2018    WBC 6.93 01/08/2019    " WBC 8.52 10/17/2018    RBC 3.87 (L) 01/08/2019    RBC 3.95 10/17/2018    HCT 37.6 01/08/2019    MCV 97.2 01/08/2019    MCH 30.0 01/08/2019    MCHC 30.9 (L) 01/08/2019    RDW 13.2 (H) 01/08/2019    TSH 0.529 10/17/2018          Assessment/Plan     Problem List Items Addressed This Visit     Essential hypertension - Primary     Hypertension is worsening.  Medication changes per orders. re add amlodipine  Blood pressure will be reassessed at the next regular appointment.         Relevant Medications    amLODIPine (NORVASC) 5 MG tablet    New daily persistent headache     Headaches are newly identified.  Advised to keep a headache diary.             Relevant Medications    amLODIPine (NORVASC) 5 MG tablet          No orders of the defined types were placed in this encounter.        Current Outpatient Medications:   •  acetaminophen (TYLENOL) 500 MG tablet, Take 1,000 mg by mouth 3 (three) times a day as needed for mild pain (1-3) or moderate pain (4-6)., Disp: , Rfl:   •  atorvastatin (LIPITOR) 20 MG tablet, Take 1 tablet by mouth Every Night for 180 days., Disp: 90 tablet, Rfl: 1  •  DULoxetine (CYMBALTA) 30 MG capsule, Take 1 capsule by mouth Daily for 180 days., Disp: 90 capsule, Rfl: 1  •  ELIQUIS 2.5 MG tablet tablet, TAKE 1 TABLET TWICE A DAY, Disp: 180 tablet, Rfl: 1  •  ezetimibe (ZETIA) 10 MG tablet, Take 1 tablet by mouth Every Night for 180 days., Disp: 90 tablet, Rfl: 1  •  metoprolol succinate XL (TOPROL-XL) 50 MG 24 hr tablet, Take 1 tablet by mouth Daily for 180 days., Disp: 90 tablet, Rfl: 1  •  MYRBETRIQ 25 MG tablet sustained-release 24 hour 24 hr tablet, Take 25 mg by mouth Daily., Disp: , Rfl:   •  pantoprazole (PROTONIX) 20 MG EC tablet, Take 1 tablet by mouth Every Night for 180 days., Disp: 90 tablet, Rfl: 1  •  amLODIPine (NORVASC) 5 MG tablet, Take 1 tablet by mouth Daily for 180 days., Disp: 90 tablet, Rfl: 1    Return in 3 months (on 4/3/2019) for Next scheduled follow up.

## 2019-01-17 NOTE — ASSESSMENT & PLAN NOTE
Hypertension is worsening.  Medication changes per orders. re add amlodipine  Blood pressure will be reassessed at the next regular appointment.

## 2019-01-18 ENCOUNTER — PATIENT OUTREACH (OUTPATIENT)
Dept: CASE MANAGEMENT | Facility: OTHER | Age: 84
End: 2019-01-18

## 2019-01-23 ENCOUNTER — PATIENT OUTREACH (OUTPATIENT)
Dept: CASE MANAGEMENT | Facility: OTHER | Age: 84
End: 2019-01-23

## 2019-01-23 NOTE — OUTREACH NOTE
Pt. Reports her B/P has been in good range since adding the new medication. She knows to call PCP with B/P log as directed. Pt. Is maintaining a low salt diet and staying hydrated.Nurse provided patient education.No other questions or concerns voiced at this time. Pt. Declines further calls.

## 2019-01-25 ENCOUNTER — EPISODE CHANGES (OUTPATIENT)
Dept: CASE MANAGEMENT | Facility: OTHER | Age: 84
End: 2019-01-25

## 2019-01-25 ENCOUNTER — TRANSCRIBE ORDERS (OUTPATIENT)
Dept: ADMINISTRATIVE | Facility: HOSPITAL | Age: 84
End: 2019-01-25

## 2019-01-25 DIAGNOSIS — M25.552 LEFT HIP PAIN: Primary | ICD-10-CM

## 2019-01-28 ENCOUNTER — EPISODE CHANGES (OUTPATIENT)
Dept: CASE MANAGEMENT | Facility: OTHER | Age: 84
End: 2019-01-28

## 2019-02-03 ENCOUNTER — RESULTS ENCOUNTER (OUTPATIENT)
Dept: FAMILY MEDICINE CLINIC | Facility: CLINIC | Age: 84
End: 2019-02-03

## 2019-02-03 DIAGNOSIS — E78.00 HYPERCHOLESTEROLEMIA: ICD-10-CM

## 2019-02-03 DIAGNOSIS — I10 ESSENTIAL HYPERTENSION: ICD-10-CM

## 2019-02-09 PROCEDURE — 93005 ELECTROCARDIOGRAM TRACING: CPT | Performed by: ORTHOPAEDIC SURGERY

## 2019-02-09 PROCEDURE — 93010 ELECTROCARDIOGRAM REPORT: CPT | Performed by: INTERNAL MEDICINE

## 2019-02-11 ENCOUNTER — HOSPITAL ENCOUNTER (OUTPATIENT)
Dept: GENERAL RADIOLOGY | Facility: HOSPITAL | Age: 84
Discharge: HOME | End: 2019-02-11
Attending: ORTHOPAEDIC SURGERY | Admitting: ORTHOPAEDIC SURGERY

## 2019-02-11 DIAGNOSIS — M25.552 LEFT HIP PAIN: ICD-10-CM

## 2019-02-11 PROCEDURE — 77002 NEEDLE LOCALIZATION BY XRAY: CPT

## 2019-02-11 PROCEDURE — 25010000002 METHYLPREDNISOLONE PER 80 MG: Performed by: RADIOLOGY

## 2019-02-11 PROCEDURE — 93005 ELECTROCARDIOGRAM TRACING: CPT | Performed by: ORTHOPAEDIC SURGERY

## 2019-02-11 PROCEDURE — 25010000002 IOPAMIDOL 61 % SOLUTION: Performed by: RADIOLOGY

## 2019-02-11 RX ORDER — BUPIVACAINE HYDROCHLORIDE 2.5 MG/ML
10 INJECTION, SOLUTION EPIDURAL; INFILTRATION; INTRACAUDAL ONCE
Status: COMPLETED | OUTPATIENT
Start: 2019-02-11 | End: 2019-02-11

## 2019-02-11 RX ORDER — METHYLPREDNISOLONE ACETATE 80 MG/ML
80 INJECTION, SUSPENSION INTRA-ARTICULAR; INTRALESIONAL; INTRAMUSCULAR; SOFT TISSUE ONCE
Status: COMPLETED | OUTPATIENT
Start: 2019-02-11 | End: 2019-02-11

## 2019-02-11 RX ORDER — LIDOCAINE HYDROCHLORIDE 10 MG/ML
10 INJECTION, SOLUTION INFILTRATION; PERINEURAL ONCE
Status: COMPLETED | OUTPATIENT
Start: 2019-02-11 | End: 2019-02-11

## 2019-02-11 RX ADMIN — LIDOCAINE HYDROCHLORIDE 8 ML: 10 INJECTION, SOLUTION INFILTRATION; PERINEURAL at 12:47

## 2019-02-11 RX ADMIN — BUPIVACAINE HYDROCHLORIDE 2 ML: 2.5 INJECTION, SOLUTION EPIDURAL; INFILTRATION; INTRACAUDAL; PERINEURAL at 12:52

## 2019-02-11 RX ADMIN — METHYLPREDNISOLONE ACETATE 80 MG: 80 INJECTION, SUSPENSION INTRA-ARTICULAR; INTRALESIONAL; INTRAMUSCULAR; SOFT TISSUE at 12:51

## 2019-02-11 RX ADMIN — IOPAMIDOL 50 ML: 612 INJECTION, SOLUTION INTRAVENOUS at 12:48

## 2019-02-19 ENCOUNTER — INFUSION (OUTPATIENT)
Dept: ONCOLOGY | Facility: HOSPITAL | Age: 84
End: 2019-02-19

## 2019-02-19 VITALS
WEIGHT: 158.8 LBS | DIASTOLIC BLOOD PRESSURE: 72 MMHG | TEMPERATURE: 97.8 F | SYSTOLIC BLOOD PRESSURE: 126 MMHG | HEART RATE: 85 BPM | BODY MASS INDEX: 28.13 KG/M2 | OXYGEN SATURATION: 98 %

## 2019-02-19 DIAGNOSIS — M81.0 SENILE OSTEOPOROSIS: Primary | ICD-10-CM

## 2019-02-19 PROCEDURE — 96372 THER/PROPH/DIAG INJ SC/IM: CPT | Performed by: NURSE PRACTITIONER

## 2019-02-19 PROCEDURE — 25010000002 DENOSUMAB 60 MG/ML SOLUTION: Performed by: FAMILY MEDICINE

## 2019-02-19 RX ADMIN — DENOSUMAB 60 MG: 60 INJECTION SUBCUTANEOUS at 14:21

## 2019-02-19 NOTE — PROGRESS NOTES
Patient arrived ambulatory. Patient to get prolia injection. Patient states that she is not currently on a Calcium supplement but is on a vit.  D supplement and will speak with her MD regarding starting a calcium supplement. Patient educated on the importance of withholding dental work for 90 days after the injection and if she needed dental work to make sure the dentist is made aware of the date of her prolia injection. Patient stated understanding. Prolia injection was given in right arm and was tolerated without difficulty. Patient left ambulatory.

## 2019-03-21 LAB
ALBUMIN SERPL-MCNC: 4.7 G/DL (ref 3.5–5.2)
ALBUMIN/GLOB SERPL: 2.4 G/DL
ALP SERPL-CCNC: 60 U/L (ref 39–117)
ALT SERPL-CCNC: 12 U/L (ref 1–33)
AST SERPL-CCNC: 14 U/L (ref 1–32)
BASOPHILS # BLD AUTO: 0.03 10*3/MM3 (ref 0–0.2)
BASOPHILS NFR BLD AUTO: 0.5 % (ref 0–1.5)
BILIRUB SERPL-MCNC: 0.7 MG/DL (ref 0.2–1.2)
BUN SERPL-MCNC: 19 MG/DL (ref 8–23)
BUN/CREAT SERPL: 16.8 (ref 7–25)
CALCIUM SERPL-MCNC: 10.1 MG/DL (ref 8.6–10.5)
CHLORIDE SERPL-SCNC: 98 MMOL/L (ref 98–107)
CHOLEST SERPL-MCNC: 145 MG/DL (ref 0–200)
CHOLEST/HDLC SERPL: 2.5 {RATIO}
CK SERPL-CCNC: 54 U/L (ref 20–180)
CO2 SERPL-SCNC: 27.5 MMOL/L (ref 22–29)
CREAT SERPL-MCNC: 1.13 MG/DL (ref 0.57–1)
EOSINOPHIL # BLD AUTO: 0.09 10*3/MM3 (ref 0–0.4)
EOSINOPHIL NFR BLD AUTO: 1.4 % (ref 0.3–6.2)
ERYTHROCYTE [DISTWIDTH] IN BLOOD BY AUTOMATED COUNT: 13.2 % (ref 12.3–15.4)
GLOBULIN SER CALC-MCNC: 2 GM/DL
GLUCOSE SERPL-MCNC: 107 MG/DL (ref 65–99)
HCT VFR BLD AUTO: 38.9 % (ref 34–46.6)
HDLC SERPL-MCNC: 58 MG/DL (ref 40–60)
HGB BLD-MCNC: 11.6 G/DL (ref 12–15.9)
IMM GRANULOCYTES # BLD AUTO: 0.03 10*3/MM3 (ref 0–0.05)
IMM GRANULOCYTES NFR BLD AUTO: 0.5 % (ref 0–0.5)
LDLC SERPL CALC-MCNC: 57 MG/DL (ref 0–100)
LYMPHOCYTES # BLD AUTO: 1.74 10*3/MM3 (ref 0.7–3.1)
LYMPHOCYTES NFR BLD AUTO: 26.7 % (ref 19.6–45.3)
MCH RBC QN AUTO: 29.4 PG (ref 26.6–33)
MCHC RBC AUTO-ENTMCNC: 29.8 G/DL (ref 31.5–35.7)
MCV RBC AUTO: 98.7 FL (ref 79–97)
MONOCYTES # BLD AUTO: 0.41 10*3/MM3 (ref 0.1–0.9)
MONOCYTES NFR BLD AUTO: 6.3 % (ref 5–12)
NEUTROPHILS # BLD AUTO: 4.21 10*3/MM3 (ref 1.4–7)
NEUTROPHILS NFR BLD AUTO: 64.6 % (ref 42.7–76)
NRBC BLD AUTO-RTO: 0 /100 WBC (ref 0–0)
PLATELET # BLD AUTO: 189 10*3/MM3 (ref 140–450)
POTASSIUM SERPL-SCNC: 4.3 MMOL/L (ref 3.5–5.2)
PROT SERPL-MCNC: 6.7 G/DL (ref 6–8.5)
RBC # BLD AUTO: 3.94 10*6/MM3 (ref 3.77–5.28)
SODIUM SERPL-SCNC: 137 MMOL/L (ref 136–145)
TRIGL SERPL-MCNC: 152 MG/DL (ref 0–150)
TSH SERPL DL<=0.005 MIU/L-ACNC: 0.85 MIU/ML (ref 0.27–4.2)
VLDLC SERPL CALC-MCNC: 30.4 MG/DL (ref 5–40)
WBC # BLD AUTO: 6.51 10*3/MM3 (ref 3.4–10.8)

## 2019-04-03 ENCOUNTER — OFFICE VISIT (OUTPATIENT)
Dept: FAMILY MEDICINE CLINIC | Facility: CLINIC | Age: 84
End: 2019-04-03

## 2019-04-03 VITALS
RESPIRATION RATE: 16 BRPM | TEMPERATURE: 96.9 F | DIASTOLIC BLOOD PRESSURE: 81 MMHG | HEIGHT: 63 IN | BODY MASS INDEX: 28.35 KG/M2 | SYSTOLIC BLOOD PRESSURE: 142 MMHG | OXYGEN SATURATION: 98 % | HEART RATE: 70 BPM | WEIGHT: 160 LBS

## 2019-04-03 DIAGNOSIS — K21.9 GASTROESOPHAGEAL REFLUX DISEASE WITHOUT ESOPHAGITIS: ICD-10-CM

## 2019-04-03 DIAGNOSIS — I10 ESSENTIAL HYPERTENSION: Primary | ICD-10-CM

## 2019-04-03 DIAGNOSIS — E78.00 HYPERCHOLESTEROLEMIA: ICD-10-CM

## 2019-04-03 DIAGNOSIS — G44.53 THUNDERCLAP HEADACHE: ICD-10-CM

## 2019-04-03 DIAGNOSIS — F41.0 PANIC DISORDER: ICD-10-CM

## 2019-04-03 PROBLEM — G44.52 NEW DAILY PERSISTENT HEADACHE: Status: RESOLVED | Noted: 2018-12-17 | Resolved: 2019-04-03

## 2019-04-03 PROCEDURE — 99214 OFFICE O/P EST MOD 30 MIN: CPT | Performed by: FAMILY MEDICINE

## 2019-04-03 RX ORDER — METOPROLOL SUCCINATE 50 MG/1
50 TABLET, EXTENDED RELEASE ORAL DAILY
Qty: 90 TABLET | Refills: 1 | Status: SHIPPED | OUTPATIENT
Start: 2019-04-03 | End: 2019-09-30 | Stop reason: SDUPTHER

## 2019-04-03 RX ORDER — ATORVASTATIN CALCIUM 20 MG/1
20 TABLET, FILM COATED ORAL NIGHTLY
Qty: 90 TABLET | Refills: 1 | Status: SHIPPED | OUTPATIENT
Start: 2019-04-03 | End: 2019-09-30 | Stop reason: SDUPTHER

## 2019-04-03 RX ORDER — DULOXETIN HYDROCHLORIDE 30 MG/1
30 CAPSULE, DELAYED RELEASE ORAL DAILY
Qty: 90 CAPSULE | Refills: 1 | Status: SHIPPED | OUTPATIENT
Start: 2019-04-03 | End: 2019-09-30 | Stop reason: SDUPTHER

## 2019-04-03 RX ORDER — EZETIMIBE 10 MG/1
10 TABLET ORAL NIGHTLY
Qty: 90 TABLET | Refills: 1 | Status: SHIPPED | OUTPATIENT
Start: 2019-04-03 | End: 2019-09-30 | Stop reason: SDUPTHER

## 2019-04-03 RX ORDER — PANTOPRAZOLE SODIUM 20 MG/1
20 TABLET, DELAYED RELEASE ORAL NIGHTLY
Qty: 90 TABLET | Refills: 1 | Status: SHIPPED | OUTPATIENT
Start: 2019-04-03 | End: 2019-09-30 | Stop reason: SDUPTHER

## 2019-04-03 NOTE — PROGRESS NOTES
Chief Complaint   Patient presents with   • Hypertension   • Headache       Subjective     Marleni Hummel presents to the office today to refill her medications and review recent labs. No medication side effects are reported.  BP is stable, GERD is stable. Cholesterol is stable. Panic is stable, but caregiver stress is challenging.   Over the last month, the patient has had headaches.  She states that the headache is along the left posterior neck and left scalp region.  Headache is described as the worst in her life and occurred about 1 month ago.  She was seen in the emergency room and CT scan was done which showed no acute disease.  It was suggested that she might need MRI/MRA of the brain if symptoms continue.  Since that time the headache continues.  Intensity currently is 7 out of 10 and does interfere with rest.  Occasionally she has associated nausea with headaches.    I have reviewed and updated her medications, medical history and problem list during today's office visit.      Patient Care Team:  Ken Andujar MD as PCP - General  Ken Andujar MD as PCP - Claims Attributed  Chuckie Mclaughlin MD as Consulting Physician (Urology)  Jason Tai MD as Consulting Physician (Cardiology)  Geoffrey Mills MD as Consulting Physician (Nephrology)  Anayeli Alanis MD as Consulting Physician (Obstetrics and Gynecology)  BROOK Clarke MD as Consulting Physician (Ophthalmology)  Jose Alfredo Krishnamurthy MD as Consulting Physician (Hematology and Oncology)  Sean Canales MD as Consulting Physician (Orthopedic Surgery)  Esteban Moe MD as Consulting Physician (Orthopedic Surgery)  Feliciano Elizabeth MD as Consulting Physician (Gastroenterology)    Social History     Tobacco Use   • Smoking status: Former Smoker     Packs/day: 1.00     Years: 3.00     Pack years: 3.00     Types: Cigarettes     Last attempt to quit: 1956     Years since quittin.1   • Smokeless tobacco: Never Used  "  Substance Use Topics   • Alcohol use: No     Comment: rare       Review of Systems   Gastrointestinal: Negative for nausea.   Neurological: Positive for headaches.   Psychiatric/Behavioral: The patient is nervous/anxious.        Objective     /81   Pulse 70   Temp 96.9 °F (36.1 °C) (Oral)   Resp 16   Ht 160 cm (63\")   Wt 72.6 kg (160 lb)   SpO2 98%   BMI 28.34 kg/m²     Body mass index is 28.34 kg/m².    Physical Exam   Constitutional: She is oriented to person, place, and time. She appears well-developed. She appears distressed (appears in some distress, mild).   Eyes: Conjunctivae and lids are normal.   Neck: Carotid bruit is not present.   Cardiovascular: Normal rate, regular rhythm and normal heart sounds.   Pulmonary/Chest: Effort normal and breath sounds normal.   Neurological: She is alert and oriented to person, place, and time.   Skin: Skin is warm and dry. She is not diaphoretic.   Psychiatric: She has a normal mood and affect. Her behavior is normal.   Vitals reviewed.      Data Reviewed:             Lab Results   Component Value Date     (H) 03/20/2019    BUN 19 03/20/2019    BUN 20 01/08/2019    CREATININE 1.13 (H) 03/20/2019    CREATININE 1.29 (H) 01/08/2019    EGFRIFNONA 46 (L) 03/20/2019    EGFRIFNONA 40 (L) 01/08/2019    EGFRIFAFRI 56 (L) 03/20/2019     03/20/2019     (L) 01/08/2019    K 4.3 03/20/2019    K 4.4 01/08/2019    CL 98 03/20/2019    CL 96 (L) 01/08/2019    CO2 27.5 03/20/2019    CO2 25.4 01/08/2019    CALCIUM 10.1 03/20/2019    CALCIUM 9.9 01/08/2019    ALBUMIN 4.70 03/20/2019    ALBUMIN 4.60 06/19/2017    LABGLOBREF 2.0 03/20/2019    BILITOT 0.7 03/20/2019    BILITOT 0.5 06/19/2017    ALKPHOS 60 03/20/2019    ALKPHOS 113 06/19/2017    AST 14 03/20/2019    AST 27 06/19/2017    ALT 12 03/20/2019    ALT 23 06/19/2017    CHLPL 145 03/20/2019    TRIG 152 (H) 03/20/2019    HDL 58 03/20/2019    VLDL 30.4 03/20/2019    LDL 57 03/20/2019    LDLHDL 0.93 " 10/17/2018    WBC 6.51 03/20/2019    RBC 3.94 03/20/2019    HCT 38.9 03/20/2019    HCT 37.6 01/08/2019    MCV 98.7 (H) 03/20/2019    MCV 97.2 01/08/2019    MCH 29.4 03/20/2019    MCH 30.0 01/08/2019    MCHC 29.8 (L) 03/20/2019    MCHC 30.9 (L) 01/08/2019    RDW 13.2 03/20/2019    RDW 13.2 (H) 01/08/2019    TSH 0.849 03/20/2019          Assessment/Plan     Diagnoses and all orders for this visit:    1. Essential hypertension (Primary)  -     metoprolol succinate XL (TOPROL-XL) 50 MG 24 hr tablet; Take 1 tablet by mouth Daily for 180 days.  Dispense: 90 tablet; Refill: 1    2. Thunderclap headache  Assessment & Plan:  Headaches are newly identified.  Continue current treatment regimen.  Have short-term follow-up after MRA of head is complete.  Anticipate this follow-up appointment in about 2 weeks        Orders:  -     MRI Angiogram Head With & Without Contrast; Future    3. Hypercholesterolemia  -     ezetimibe (ZETIA) 10 MG tablet; Take 1 tablet by mouth Every Night for 180 days.  Dispense: 90 tablet; Refill: 1  -     atorvastatin (LIPITOR) 20 MG tablet; Take 1 tablet by mouth Every Night for 180 days.  Dispense: 90 tablet; Refill: 1    4. Panic disorder  -     DULoxetine (CYMBALTA) 30 MG capsule; Take 1 capsule by mouth Daily for 180 days.  Dispense: 90 capsule; Refill: 1    5. Gastroesophageal reflux disease without esophagitis  -     pantoprazole (PROTONIX) 20 MG EC tablet; Take 1 tablet by mouth Every Night for 180 days.  Dispense: 90 tablet; Refill: 1      Orders Placed This Encounter   Procedures   • MRI Angiogram Head With & Without Contrast     Standing Status:   Future     Standing Expiration Date:   9/30/2019     Order Specific Question:   Possibility Surgery May Be Needed Based on Result of This Exam     Answer:   Yes         Current Outpatient Medications:   •  acetaminophen (TYLENOL) 500 MG tablet, Take 1,000 mg by mouth 3 (three) times a day as needed for mild pain (1-3) or moderate pain (4-6)., Disp:  , Rfl:   •  atorvastatin (LIPITOR) 20 MG tablet, Take 1 tablet by mouth Every Night for 180 days., Disp: 90 tablet, Rfl: 1  •  DULoxetine (CYMBALTA) 30 MG capsule, Take 1 capsule by mouth Daily for 180 days., Disp: 90 capsule, Rfl: 1  •  ELIQUIS 2.5 MG tablet tablet, TAKE 1 TABLET TWICE A DAY, Disp: 180 tablet, Rfl: 1  •  ezetimibe (ZETIA) 10 MG tablet, Take 1 tablet by mouth Every Night for 180 days., Disp: 90 tablet, Rfl: 1  •  metoprolol succinate XL (TOPROL-XL) 50 MG 24 hr tablet, Take 1 tablet by mouth Daily for 180 days., Disp: 90 tablet, Rfl: 1  •  MYRBETRIQ 25 MG tablet sustained-release 24 hour 24 hr tablet, Take 25 mg by mouth Daily., Disp: , Rfl:   •  pantoprazole (PROTONIX) 20 MG EC tablet, Take 1 tablet by mouth Every Night for 180 days., Disp: 90 tablet, Rfl: 1    Return in about 2 weeks (around 4/17/2019), or if symptoms worsen or fail to improve, for Recheck.  Patient does know to go to emergency room if headache intensity worsens.

## 2019-04-03 NOTE — ASSESSMENT & PLAN NOTE
Headaches are newly identified.  Continue current treatment regimen.  Have short-term follow-up after MRA of head is complete.  Anticipate this follow-up appointment in about 2 weeks

## 2019-04-15 ENCOUNTER — HOSPITAL ENCOUNTER (OUTPATIENT)
Dept: MRI IMAGING | Facility: HOSPITAL | Age: 84
Discharge: HOME | End: 2019-04-15
Admitting: FAMILY MEDICINE

## 2019-04-15 DIAGNOSIS — G44.53 THUNDERCLAP HEADACHE: ICD-10-CM

## 2019-04-15 PROCEDURE — 70544 MR ANGIOGRAPHY HEAD W/O DYE: CPT

## 2019-04-17 ENCOUNTER — OFFICE VISIT (OUTPATIENT)
Dept: FAMILY MEDICINE CLINIC | Facility: CLINIC | Age: 84
End: 2019-04-17

## 2019-04-17 VITALS
TEMPERATURE: 96.9 F | DIASTOLIC BLOOD PRESSURE: 87 MMHG | BODY MASS INDEX: 28.35 KG/M2 | WEIGHT: 160 LBS | RESPIRATION RATE: 18 BRPM | SYSTOLIC BLOOD PRESSURE: 159 MMHG | OXYGEN SATURATION: 98 % | HEART RATE: 76 BPM | HEIGHT: 63 IN

## 2019-04-17 DIAGNOSIS — N30.01 ACUTE CYSTITIS WITH HEMATURIA: ICD-10-CM

## 2019-04-17 DIAGNOSIS — R45.7 CAREGIVER STRESS SYNDROME: ICD-10-CM

## 2019-04-17 DIAGNOSIS — G44.209 MUSCLE TENSION HEADACHE: Primary | ICD-10-CM

## 2019-04-17 PROBLEM — F43.89 CAREGIVER STRESS SYNDROME: Status: ACTIVE | Noted: 2019-04-17

## 2019-04-17 PROCEDURE — 99213 OFFICE O/P EST LOW 20 MIN: CPT | Performed by: FAMILY MEDICINE

## 2019-04-17 NOTE — PROGRESS NOTES
Chief Complaint   Patient presents with   • Follow-up     MRI for headaches pt had done on Monday.   • Hypertension       Subjective   Patient returns the office to follow-up on recent angiogram of the head.  The MR I MRA has been reviewed below and showed no evidence of stroke or aneurysm.  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  She states her headaches are slightly better.  They seem to have improved since her  is placed in 3 days a week a  situation from 10 AM to 3 PM.  Caregiver stress has been a significant factor for recent conditions.    She was just recently treated yesterday for urinary tract infection and has started on antibiotic course.  I have reviewed and updated her medications, medical history and problem list during today's office visit.     Patient Care Team:  Ken Andujar MD as PCP - General  Ken Andujar MD as PCP - Claims Attributed  Chuckie Mclaughlin MD as Consulting Physician (Urology)  Jason Tai MD as Consulting Physician (Cardiology)  Geoffrey Mills MD as Consulting Physician (Nephrology)  Anayeli Alanis MD as Consulting Physician (Obstetrics and Gynecology)  BROOK Clarke MD as Consulting Physician (Ophthalmology)  Jose Alfredo Krishnamurthy MD as Consulting Physician (Hematology and Oncology)  Sean Canales MD as Consulting Physician (Orthopedic Surgery)  Esteban Moe MD as Consulting Physician (Orthopedic Surgery)  Feliciano Elizabeth MD as Consulting Physician (Gastroenterology)    Social History     Tobacco Use   • Smoking status: Former Smoker     Packs/day: 1.00     Years: 3.00     Pack years: 3.00     Types: Cigarettes     Last attempt to quit: 1956     Years since quittin.1   • Smokeless tobacco: Never Used   Substance Use Topics   • Alcohol use: No     Comment: rare       Review of Systems   Constitutional: Negative for fever.   Psychiatric/Behavioral: Positive for stress.       Objective     /87   Pulse 76   Temp  "96.9 °F (36.1 °C) (Oral)   Resp 18   Ht 160 cm (63\")   Wt 72.6 kg (160 lb)   SpO2 98%   BMI 28.34 kg/m²     Body mass index is 28.34 kg/m².    Physical Exam   Constitutional: She is oriented to person, place, and time. She appears well-developed. No distress.   Cardiovascular: Normal rate and normal heart sounds.   Pulmonary/Chest: Effort normal and breath sounds normal.   Abdominal: Soft. There is no tenderness. There is no CVA tenderness.   Neurological: She is alert and oriented to person, place, and time.   Skin: She is not diaphoretic.   Psychiatric: She has a normal mood and affect. Her speech is normal. She is attentive.   Vitals reviewed.       Data Reviewed:    Mri Angiogram Head Without Contrast    Result Date: 4/16/2019  Impression: No evidence of focal high-grade stenosis or of aneurysm.  This report was finalized on 4/16/2019 9:37 AM by Dr. Richard Martin M.D.                   Assessment/Plan     Diagnoses and all orders for this visit:    1. Muscle tension headache (Primary)  Assessment & Plan:  Headaches are improving with lifestyle modifications.  noticed improvement with change in  for  Rm 3 days a week.           2. Caregiver stress syndrome  Assessment & Plan:  Psychological condition is improving with lifestyle modifications.  Continue current treatment regimen.  is going to  3 days a week from 10am-3pm.  Psychological condition  will be reassessed at the next regular appointment.      3. Acute cystitis with hematuria  Comments:  new problem, finish abx, get UA and culture in one week for test of cure  Orders:  -     Urine Culture - Urine, Urine, Clean Catch; Future  -     Urinalysis With Microscopic - Urine, Clean Catch; Future      Orders Placed This Encounter   Procedures   • Urine Culture - Urine, Urine, Clean Catch     Standing Status:   Future     Standing Expiration Date:   4/17/2020   • Urinalysis With Microscopic - Urine, Clean Catch     Standing " Status:   Future     Standing Expiration Date:   4/17/2020         Current Outpatient Medications:   •  acetaminophen (TYLENOL) 500 MG tablet, Take 1,000 mg by mouth 3 (three) times a day as needed for mild pain (1-3) or moderate pain (4-6)., Disp: , Rfl:   •  atorvastatin (LIPITOR) 20 MG tablet, Take 1 tablet by mouth Every Night for 180 days., Disp: 90 tablet, Rfl: 1  •  DULoxetine (CYMBALTA) 30 MG capsule, Take 1 capsule by mouth Daily for 180 days., Disp: 90 capsule, Rfl: 1  •  ELIQUIS 2.5 MG tablet tablet, TAKE 1 TABLET TWICE A DAY, Disp: 180 tablet, Rfl: 1  •  ezetimibe (ZETIA) 10 MG tablet, Take 1 tablet by mouth Every Night for 180 days., Disp: 90 tablet, Rfl: 1  •  metoprolol succinate XL (TOPROL-XL) 50 MG 24 hr tablet, Take 1 tablet by mouth Daily for 180 days., Disp: 90 tablet, Rfl: 1  •  MYRBETRIQ 25 MG tablet sustained-release 24 hour 24 hr tablet, Take 25 mg by mouth Daily., Disp: , Rfl:   •  pantoprazole (PROTONIX) 20 MG EC tablet, Take 1 tablet by mouth Every Night for 180 days., Disp: 90 tablet, Rfl: 1  •  sulfamethoxazole-trimethoprim (BACTRIM DS) 800-160 MG per tablet, Take 1 tablet by mouth 2 (Two) Times a Day., Disp: 14 tablet, Rfl: 0    Return in about 5 months (around 9/17/2019) for Recheck.

## 2019-04-17 NOTE — ASSESSMENT & PLAN NOTE
Headaches are improving with lifestyle modifications.  noticed improvement with change in  for  Rm 3 days a week.

## 2019-04-17 NOTE — ASSESSMENT & PLAN NOTE
Psychological condition is improving with lifestyle modifications.  Continue current treatment regimen.  is going to  3 days a week from 10am-3pm.  Psychological condition  will be reassessed at the next regular appointment.

## 2019-04-17 NOTE — PROGRESS NOTES
Please call Marleni about the normal result. Please send her a copy of the result.  Have her call us if there is any further questions. Thanks, Dr. Andujar

## 2019-04-20 ENCOUNTER — TELEPHONE (OUTPATIENT)
Dept: RETAIL CLINIC | Facility: CLINIC | Age: 84
End: 2019-04-20

## 2019-04-20 ENCOUNTER — OFFICE VISIT (OUTPATIENT)
Dept: RETAIL CLINIC | Facility: CLINIC | Age: 84
End: 2019-04-20

## 2019-04-20 VITALS
TEMPERATURE: 97.8 F | OXYGEN SATURATION: 98 % | RESPIRATION RATE: 18 BRPM | DIASTOLIC BLOOD PRESSURE: 70 MMHG | HEART RATE: 80 BPM | SYSTOLIC BLOOD PRESSURE: 114 MMHG

## 2019-04-20 DIAGNOSIS — N30.00 ACUTE CYSTITIS WITHOUT HEMATURIA: Primary | ICD-10-CM

## 2019-04-20 PROCEDURE — 99213 OFFICE O/P EST LOW 20 MIN: CPT | Performed by: NURSE PRACTITIONER

## 2019-04-20 RX ORDER — CIPROFLOXACIN 500 MG/1
500 TABLET, FILM COATED ORAL 2 TIMES DAILY
Qty: 14 TABLET | Refills: 0 | Status: SHIPPED | OUTPATIENT
Start: 2019-04-20 | End: 2019-04-27

## 2019-04-20 RX ORDER — CEPHALEXIN 500 MG/1
500 CAPSULE ORAL 3 TIMES DAILY
Qty: 21 CAPSULE | Refills: 0 | Status: SHIPPED | OUTPATIENT
Start: 2019-04-20 | End: 2019-04-27

## 2019-04-20 NOTE — TELEPHONE ENCOUNTER
Rasheed called and stated that after the first dose of the antibiotic patient is throwing up.  Instructed her to stop antibiotic now.  Will send rx for keflex at this time but recommend taking her to ER at this time due to inability to take any antibiotics and symptoms worsneing.

## 2019-04-20 NOTE — PATIENT INSTRUCTIONS
Urinary Tract Infection, Adult  A urinary tract infection (UTI) is an infection of any part of the urinary tract. The urinary tract includes the:  · Kidneys.  · Ureters.  · Bladder.  · Urethra.    These organs make, store, and get rid of pee (urine) in the body.  Follow these instructions at home:  · Take over-the-counter and prescription medicines only as told by your doctor.  · If you were prescribed an antibiotic medicine, take it as told by your doctor. Do not stop taking the antibiotic even if you start to feel better.  · Avoid the following drinks:  ? Alcohol.  ? Caffeine.  ? Tea.  ? Carbonated drinks.  · Drink enough fluid to keep your pee clear or pale yellow.  · Keep all follow-up visits as told by your doctor. This is important.  · Make sure to:  ? Empty your bladder often and completely. Do not to hold pee for long periods of time.  ? Empty your bladder before and after sex.  ? Wipe from front to back after a bowel movement if you are female. Use each tissue one time when you wipe.  Contact a doctor if:  · You have back pain.  · You have a fever.  · You feel sick to your stomach (nauseous).  · You throw up (vomit).  · Your symptoms do not get better after 3 days.  · Your symptoms go away and then come back.  Get help right away if:  · You have very bad back pain.  · You have very bad lower belly (abdominal) pain.  · You are throwing up and cannot keep down any medicines or water.  This information is not intended to replace advice given to you by your health care provider. Make sure you discuss any questions you have with your health care provider.  Document Released: 06/05/2009 Document Revised: 06/12/2018 Document Reviewed: 11/07/2016  [employer] Interactive Patient Education © 2019 [employer]

## 2019-04-20 NOTE — PROGRESS NOTES
Subjective   Marleni Hummel is a 83 y.o. female.     Urinary Tract Infection    This is a new problem. The current episode started in the past 7 days. The problem has been gradually worsening. The quality of the pain is described as aching. The pain is at a severity of 4/10. The pain is mild. There has been no fever. She is not sexually active. There is no history of pyelonephritis. Associated symptoms include chills, flank pain, nausea and urgency. Pertinent negatives include no hematuria or vomiting. Associated symptoms comments: Low back pain. Treatments tried: was treated with bactrim on tuesday, symptoms worse, some small hives on legs,, coricidin. Her past medical history is significant for recurrent UTIs. There is no history of kidney stones or a urological procedure.    Patient was seen in urgent care on 4/16 and treated with bactrim, no improvement and some hives on legs now. [facility]    The following portions of the patient's history were reviewed and updated as appropriate: allergies, current medications, past family history, past medical history, past social history, past surgical history and problem list.    Review of Systems   Constitutional: Positive for chills.   HENT: Negative.    Respiratory: Negative.    Cardiovascular: Negative.    Gastrointestinal: Positive for nausea. Negative for vomiting.   Genitourinary: Positive for decreased urine volume, flank pain, pelvic pain and urgency. Negative for hematuria.   Neurological: Positive for headache.       Objective   Physical Exam   Constitutional: She is cooperative. No distress.   HENT:   Head: Normocephalic.   Right Ear: Hearing, tympanic membrane, external ear and ear canal normal.   Left Ear: Hearing, tympanic membrane, external ear and ear canal normal.   Nose: Nose normal.   Mouth/Throat: Oropharynx is clear and moist.   Eyes: Conjunctivae, EOM and lids are normal. Pupils are equal, round, and reactive  to light.   Neck: Trachea normal and full passive range of motion without pain.   Cardiovascular: Normal rate, regular rhythm and normal pulses.   Pulmonary/Chest: Effort normal and breath sounds normal.   Abdominal: There is tenderness in the suprapubic area.   Lower back pain, across entire back, no true cva pain noted.   Neurological: She is alert.   Grandaugther who is with patient states she has been a little confused off and on today.   Skin: Skin is warm. Capillary refill takes less than 2 seconds.   Psychiatric: She has a normal mood and affect. Her speech is normal and behavior is normal.   Vitals reviewed.        Assessment/Plan   Marleni was seen today for urinary tract infection.    Diagnoses and all orders for this visit:    Acute cystitis without hematuria    Other orders  -     ciprofloxacin (CIPRO) 500 MG tablet; Take 1 tablet by mouth 2 (Two) Times a Day for 7 days.      Antibiotic changed at this time.  Educated patient and granddaughter on s/s of any allergic reaction to med and to stop immediately.  Instructed to follow up with Dr. Andujar or urology on MOnday for culture results and to follow up with any symptoms.  If symptoms continue to worsen or confusion gets more she was instructed to go strait to er for evaluation.  Verbalized understanding at this time.

## 2019-04-23 ENCOUNTER — RESULTS ENCOUNTER (OUTPATIENT)
Dept: FAMILY MEDICINE CLINIC | Facility: CLINIC | Age: 84
End: 2019-04-23

## 2019-04-23 DIAGNOSIS — N30.01 ACUTE CYSTITIS WITH HEMATURIA: ICD-10-CM

## 2019-04-24 ENCOUNTER — TRANSCRIBE ORDERS (OUTPATIENT)
Dept: ADMINISTRATIVE | Facility: HOSPITAL | Age: 84
End: 2019-04-24

## 2019-04-24 DIAGNOSIS — M25.552 LEFT HIP PAIN: Primary | ICD-10-CM

## 2019-05-01 ENCOUNTER — HOSPITAL ENCOUNTER (OUTPATIENT)
Dept: GENERAL RADIOLOGY | Facility: HOSPITAL | Age: 84
Discharge: HOME | End: 2019-05-01
Admitting: ORTHOPAEDIC SURGERY

## 2019-05-01 DIAGNOSIS — M25.552 LEFT HIP PAIN: ICD-10-CM

## 2019-05-01 PROCEDURE — 25010000002 IOPAMIDOL 61 % SOLUTION: Performed by: RADIOLOGY

## 2019-05-01 PROCEDURE — 77002 NEEDLE LOCALIZATION BY XRAY: CPT

## 2019-05-01 PROCEDURE — 25010000002 METHYLPREDNISOLONE PER 125 MG: Performed by: RADIOLOGY

## 2019-05-01 RX ORDER — LIDOCAINE HYDROCHLORIDE 10 MG/ML
5 INJECTION, SOLUTION INFILTRATION; PERINEURAL ONCE
Status: COMPLETED | OUTPATIENT
Start: 2019-05-01 | End: 2019-05-01

## 2019-05-01 RX ORDER — METHYLPREDNISOLONE SODIUM SUCCINATE 125 MG/2ML
80 INJECTION, POWDER, LYOPHILIZED, FOR SOLUTION INTRAMUSCULAR; INTRAVENOUS
Status: COMPLETED | OUTPATIENT
Start: 2019-05-01 | End: 2019-05-01

## 2019-05-01 RX ORDER — BUPIVACAINE HYDROCHLORIDE 2.5 MG/ML
10 INJECTION, SOLUTION EPIDURAL; INFILTRATION; INTRACAUDAL ONCE
Status: COMPLETED | OUTPATIENT
Start: 2019-05-01 | End: 2019-05-01

## 2019-05-01 RX ADMIN — LIDOCAINE HYDROCHLORIDE 3 ML: 10 INJECTION, SOLUTION INFILTRATION; PERINEURAL at 11:40

## 2019-05-01 RX ADMIN — BUPIVACAINE HYDROCHLORIDE 5 ML: 2.5 INJECTION, SOLUTION EPIDURAL; INFILTRATION; INTRACAUDAL; PERINEURAL at 11:40

## 2019-05-01 RX ADMIN — METHYLPREDNISOLONE SODIUM SUCCINATE 80 MG: 125 INJECTION, POWDER, LYOPHILIZED, FOR SOLUTION INTRAMUSCULAR; INTRAVENOUS at 11:40

## 2019-05-01 RX ADMIN — IOPAMIDOL 1 ML: 612 INJECTION, SOLUTION INTRAVENOUS at 11:40

## 2019-05-09 LAB
APPEARANCE UR: CLEAR
BACTERIA #/AREA URNS HPF: NORMAL /HPF
BACTERIA UR CULT: ABNORMAL
BACTERIA UR CULT: ABNORMAL
BILIRUB UR QL STRIP: NEGATIVE
CASTS URNS MICRO: NORMAL
COLOR UR: YELLOW
EPI CELLS #/AREA URNS HPF: NORMAL /HPF
GLUCOSE UR QL: NEGATIVE
HGB UR QL STRIP: NEGATIVE
KETONES UR QL STRIP: NEGATIVE
LEUKOCYTE ESTERASE UR QL STRIP: (no result)
NITRITE UR QL STRIP: NEGATIVE
OTHER ANTIBIOTIC SUSC ISLT: ABNORMAL
PH UR STRIP: 5.5 [PH] (ref 5–8)
PROT UR QL STRIP: NEGATIVE
RBC #/AREA URNS HPF: NORMAL /HPF
SP GR UR: 1.02 (ref 1–1.03)
UROBILINOGEN UR STRIP-MCNC: (no result) MG/DL
WBC #/AREA URNS HPF: NORMAL /HPF

## 2019-05-10 DIAGNOSIS — B37.9 YEAST INFECTION: Primary | ICD-10-CM

## 2019-05-10 RX ORDER — CIPROFLOXACIN 250 MG/1
250 TABLET, FILM COATED ORAL EVERY 12 HOURS SCHEDULED
Qty: 14 TABLET | Refills: 0 | Status: SHIPPED | OUTPATIENT
Start: 2019-05-10 | End: 2019-05-29

## 2019-05-10 RX ORDER — FLUCONAZOLE 150 MG/1
150 TABLET ORAL ONCE
Qty: 2 TABLET | Refills: 0 | Status: SHIPPED | OUTPATIENT
Start: 2019-05-10 | End: 2019-05-10

## 2019-05-10 NOTE — TELEPHONE ENCOUNTER
Called and spoke with patient and explained side effects vs allergies. Pt states she is not allergic to any of the ATB because she has never had those effects.

## 2019-05-10 NOTE — TELEPHONE ENCOUNTER
Pt states that she still has symptoms of UTI. I looked at her Urine culture and the bactrim is not susceptible. Please change this for the patient and send to [facility]. She also reports now having a yeast infections from the bactrim.

## 2019-05-10 NOTE — TELEPHONE ENCOUNTER
She is allergic to Levaquin, Macrobid, Amoxil;------------------? What allergies are;  No other choices    Est CC 44

## 2019-05-15 ENCOUNTER — OFFICE VISIT (OUTPATIENT)
Dept: FAMILY MEDICINE CLINIC | Facility: CLINIC | Age: 84
End: 2019-05-15

## 2019-05-15 VITALS
BODY MASS INDEX: 28.17 KG/M2 | HEART RATE: 73 BPM | SYSTOLIC BLOOD PRESSURE: 112 MMHG | OXYGEN SATURATION: 97 % | TEMPERATURE: 97.6 F | WEIGHT: 159 LBS | HEIGHT: 63 IN | DIASTOLIC BLOOD PRESSURE: 80 MMHG | RESPIRATION RATE: 16 BRPM

## 2019-05-15 DIAGNOSIS — N39.0 URINARY TRACT INFECTION WITHOUT HEMATURIA, SITE UNSPECIFIED: Primary | ICD-10-CM

## 2019-05-15 PROCEDURE — 99213 OFFICE O/P EST LOW 20 MIN: CPT | Performed by: PHYSICIAN ASSISTANT

## 2019-05-15 NOTE — PROGRESS NOTES
Subjective   Marleni Hummel is a 83 y.o. female.     History of Present Illness   Marleni Hummel 83 y.o.female complains of urinary symptoms. she complains of urgency and frequency. She has had symptoms for several weeks. The symptoms are moderate.  Patient denies fever, gross blood in urine, dysuria, nausea, vomiting and abdominal pain.  Patient has tried  Several antibiotics---less cloudy, no more burning; no more pelvic bladder pressure; some loss appetite. Was on Septra and changed to Cipro d/t cx.  Patient does have a history of recurrent UTI. Patient does have a history of pyelonephritis.  Here with son  She filled Cipro Friday and started ot and still has frequency and urgency  No more burning; onset was mos ago     Diflucan worked and tolerated it    Est CC was 44  No rash on Cipro;  Some arthritis noted shoulder  I will get urine with cx ready for her to go to lab if any symptoms at end of tx  Has been to urologist in past for UTI  Can send her back to urologist and has one  The following portions of the patient's history were reviewed and updated as appropriate: allergies, current medications, past family history, past medical history, past social history, past surgical history and problem list.    Review of Systems   Constitutional: Negative for activity change, appetite change and unexpected weight change.   HENT: Negative for nosebleeds and trouble swallowing.    Eyes: Negative for pain and visual disturbance.   Respiratory: Negative for chest tightness, shortness of breath and wheezing.    Cardiovascular: Negative for chest pain and palpitations.   Gastrointestinal: Negative for abdominal pain and blood in stool.   Endocrine: Negative.    Genitourinary: Positive for frequency and urgency. Negative for difficulty urinating and hematuria.   Musculoskeletal: Negative for joint swelling.   Skin: Negative for color change and rash.   Allergic/Immunologic: Negative.    Neurological: Negative for syncope  and speech difficulty.   Hematological: Negative for adenopathy.   Psychiatric/Behavioral: Negative for agitation and confusion.   All other systems reviewed and are negative.      Objective   Physical Exam   Constitutional: She is oriented to person, place, and time. She appears well-developed and well-nourished. No distress.   HENT:   Head: Normocephalic and atraumatic.   Eyes: Conjunctivae and EOM are normal. Pupils are equal, round, and reactive to light. Right eye exhibits no discharge. Left eye exhibits no discharge. No scleral icterus.   Neck: Normal range of motion. Neck supple. No tracheal deviation present.   Cardiovascular: Normal pulses.   No murmur heard.  irreg irreg---hx a fib   Pulmonary/Chest: Effort normal and breath sounds normal. No respiratory distress. She has no wheezes. She has no rales.   Abdominal: Soft. Bowel sounds are normal. There is no tenderness.   Musculoskeletal: Normal range of motion.   Neurological: She is alert and oriented to person, place, and time. She exhibits normal muscle tone. Coordination normal.   Skin: Skin is warm. No rash noted. No erythema. No pallor.   Psychiatric: She has a normal mood and affect. Her behavior is normal. Judgment and thought content normal.   Nursing note and vitals reviewed.      Assessment/Plan   There are no diagnoses linked to this encounter.    Will finish Cipro and is better  Stop if rash  Increase water  Stop if allergic reaction

## 2019-05-17 RX ORDER — APIXABAN 2.5 MG/1
TABLET, FILM COATED ORAL
Qty: 180 TABLET | Refills: 1 | Status: SHIPPED | OUTPATIENT
Start: 2019-05-17 | End: 2019-11-13 | Stop reason: SDUPTHER

## 2019-07-31 ENCOUNTER — OFFICE VISIT (OUTPATIENT)
Dept: FAMILY MEDICINE CLINIC | Facility: CLINIC | Age: 84
End: 2019-07-31

## 2019-07-31 VITALS
RESPIRATION RATE: 18 BRPM | BODY MASS INDEX: 28 KG/M2 | HEART RATE: 75 BPM | SYSTOLIC BLOOD PRESSURE: 165 MMHG | TEMPERATURE: 97.9 F | HEIGHT: 63 IN | WEIGHT: 158 LBS | OXYGEN SATURATION: 95 % | DIASTOLIC BLOOD PRESSURE: 82 MMHG

## 2019-07-31 DIAGNOSIS — B96.89 BACTERIAL VAGINOSIS: ICD-10-CM

## 2019-07-31 DIAGNOSIS — N76.0 BACTERIAL VAGINOSIS: ICD-10-CM

## 2019-07-31 DIAGNOSIS — R30.0 DYSURIA: Primary | ICD-10-CM

## 2019-07-31 DIAGNOSIS — N39.0 URINARY TRACT INFECTION WITHOUT HEMATURIA, SITE UNSPECIFIED: Primary | ICD-10-CM

## 2019-07-31 LAB
BILIRUB BLD-MCNC: NEGATIVE MG/DL
CLARITY, POC: CLEAR
COLOR UR: YELLOW
GLUCOSE UR STRIP-MCNC: NEGATIVE MG/DL
KETONES UR QL: NEGATIVE
LEUKOCYTE EST, POC: NEGATIVE
NITRITE UR-MCNC: NEGATIVE MG/ML
PH UR: 6 [PH] (ref 5–8)
PROT UR STRIP-MCNC: NEGATIVE MG/DL
RBC # UR STRIP: ABNORMAL /UL
SP GR UR: 1.01 (ref 1–1.03)
UROBILINOGEN UR QL: NORMAL

## 2019-07-31 PROCEDURE — 81003 URINALYSIS AUTO W/O SCOPE: CPT | Performed by: FAMILY MEDICINE

## 2019-07-31 PROCEDURE — 99214 OFFICE O/P EST MOD 30 MIN: CPT | Performed by: FAMILY MEDICINE

## 2019-07-31 RX ORDER — METRONIDAZOLE 500 MG/1
500 TABLET ORAL 2 TIMES DAILY
Qty: 14 TABLET | Refills: 0 | Status: SHIPPED | OUTPATIENT
Start: 2019-07-31 | End: 2019-08-08

## 2019-07-31 NOTE — PROGRESS NOTES
"Chief Complaint:   Subjective    Rooming Tab(CC,VS,Pt Hx,Fall Screen)   Chief Complaint   Patient presents with   • Urinary Tract Infection     2 weeks          History of Present Illness   Marleni Hummel is a 83 y.o. female who presents to the office today with chief complaint of urinary urgency along with some dysuria.  This has been worse over the past 2 weeks.  No fever.  Some left flank back pain noted.  She also has had some discharge that has odor.  She has known urgency and some incontinence.  She has tried Myrbetriq without success.  Last visit with urology was probably back in the spring or possibly even in February.  Remote history of cystoscopy in the past.  .  Patient has also been under some stress due to worsening illness of her  who is currently in the hospital and facing uncertain disposition.    I have reviewed and updated her medications, medical history and problem list during today's office visit.     Problem List Tab  Medications Tab  Synopsis Tab  Chart Review Tab  Care Everywhere Tab  Immunizations Tab  Patient History Tab  Social History     Tobacco Use   • Smoking status: Former Smoker     Packs/day: 1.00     Years: 3.00     Pack years: 3.00     Types: Cigarettes     Last attempt to quit: 1956     Years since quittin.4   • Smokeless tobacco: Never Used   Substance Use Topics   • Alcohol use: No     Comment: rare       Review of Systems   Constitutional: Negative for fever.   Genitourinary: Positive for urinary incontinence.        See hpi   Psychiatric/Behavioral: Positive for stress.         Physical Examination:   Objective   Rooming Tab(CC,VS,Pt Hx,Fall Screen)  /82   Pulse 75   Temp 97.9 °F (36.6 °C) (Oral)   Resp 18   Ht 160 cm (62.99\")   Wt 71.7 kg (158 lb)   SpO2 95%   BMI 28.00 kg/m²     Body mass index is 28 kg/m².    Physical Exam   Constitutional: She is oriented to person, place, and time. She appears well-developed. No distress.   Eyes: " Conjunctivae and lids are normal.   Neck: Carotid bruit is not present.   Cardiovascular: Normal rate, regular rhythm and normal heart sounds.   Pulmonary/Chest: Effort normal and breath sounds normal.   Abdominal: There is no CVA tenderness.       Neurological: She is alert and oriented to person, place, and time.   Skin: Skin is warm and dry.   Psychiatric: She has a normal mood and affect. Her behavior is normal.   Vitals reviewed.       Data Reviewed:                      Assessment/Plan:   Assessment/Plan   Order Review Tab  Health Maintenance Tab  Patient Plan/Order Tab  Diagnoses and all orders for this visit:    1. Dysuria (Primary)  Comments:  Will try 1 week therapy with follow-up in office next week.  Reevaluate flank tenderness next visit.  Orders:  -     Urine Culture - Urine, Urine, Clean Catch    2. Bacterial vaginosis  -     metroNIDAZOLE (FLAGYL) 500 MG tablet; Take 1 tablet by mouth 2 (Two) Times a Day for 7 days.  Dispense: 14 tablet; Refill: 0       Follow up:   Wrapup Tab  Return in about 1 week (around 8/7/2019) for Recheck.

## 2019-08-02 LAB
BACTERIA UR CULT: NORMAL
BACTERIA UR CULT: NORMAL
CONV .: NORMAL
Lab: NORMAL
Lab: NORMAL

## 2019-08-05 NOTE — PROGRESS NOTES
Please call Marleni about resubmitting the urine culture since no test was ordered.  Make sure that the order says urine culture with diagnosis of dysuria..  Please refer to documentation above.. Thanks, Dr. Andujar

## 2019-08-05 NOTE — PROGRESS NOTES
Call Marleni and let her know labs are satisfactory. No new orders are needed. Send a copy of the labs to her. Tell Marleni to keep same appointment followup schedule as arranged previously.

## 2019-08-08 ENCOUNTER — OFFICE VISIT (OUTPATIENT)
Dept: FAMILY MEDICINE CLINIC | Facility: CLINIC | Age: 84
End: 2019-08-08

## 2019-08-08 VITALS
DIASTOLIC BLOOD PRESSURE: 85 MMHG | WEIGHT: 158 LBS | HEART RATE: 65 BPM | SYSTOLIC BLOOD PRESSURE: 165 MMHG | OXYGEN SATURATION: 99 % | RESPIRATION RATE: 18 BRPM | BODY MASS INDEX: 28 KG/M2 | HEIGHT: 63 IN

## 2019-08-08 DIAGNOSIS — N76.0 ACUTE VAGINITIS: Primary | ICD-10-CM

## 2019-08-08 DIAGNOSIS — R45.7 CAREGIVER STRESS SYNDROME: ICD-10-CM

## 2019-08-08 PROCEDURE — 99213 OFFICE O/P EST LOW 20 MIN: CPT | Performed by: FAMILY MEDICINE

## 2019-08-08 RX ORDER — FLUCONAZOLE 150 MG/1
150 TABLET ORAL ONCE
Qty: 1 TABLET | Refills: 0 | Status: SHIPPED | OUTPATIENT
Start: 2019-08-08 | End: 2019-08-08

## 2019-08-08 NOTE — PROGRESS NOTES
"Chief Complaint:   Subjective    Rooming Tab(CC,VS,Pt Hx,Fall Screen)   Chief Complaint   Patient presents with   • Urinary Tract Infection     follow up pt stated she is stilling having itching          History of Present Illness   Marleni Hummel is a 83 y.o. female who presents to the office today to follow-up on urine condition.  Today her main issue is itching.  The discharge is cleared up.  The back pain is much improved.  No fevers.  She finished her antibiotics yesterday.    I have reviewed and updated her medications, medical history and problem list during today's office visit.     Problem List Tab  Medications Tab  Synopsis Tab  Chart Review Tab  Care Everywhere Tab  Immunizations Tab  Patient History Tab  Social History     Tobacco Use   • Smoking status: Former Smoker     Packs/day: 1.00     Years: 3.00     Pack years: 3.00     Types: Cigarettes     Last attempt to quit: 1956     Years since quittin.5   • Smokeless tobacco: Never Used   Substance Use Topics   • Alcohol use: No     Comment: rare       Review of Systems   Constitutional: Negative for fever.   Genitourinary: Negative for urgency.        See HPI   Psychiatric/Behavioral: Positive for stress.         Physical Examination:   Objective   Rooming Tab(CC,VS,Pt Hx,Fall Screen)  /85   Pulse 65   Resp 18   Ht 160 cm (62.99\")   Wt 71.7 kg (158 lb)   SpO2 99%   BMI 28.00 kg/m²     Body mass index is 28 kg/m².    Physical Exam   Constitutional: She appears well-developed.   Abdominal: There is no tenderness. There is CVA tenderness.   Psychiatric:   stressed She is attentive.        Data Reviewed:                      Assessment/Plan:   Assessment/Plan   Order Review Tab  Health Maintenance Tab  Patient Plan/Order Tab  Diagnoses and all orders for this visit:    1. Post Antibiotic vaginitis (Primary)  -     fluconazole (DIFLUCAN) 150 MG tablet; Take 1 tablet by mouth 1 (One) Time for 1 dose.  Dispense: 1 tablet; Refill: 0    2. " Caregiver stress syndrome  Assessment & Plan:  Ref psychology       Follow up:   Wrapup Tab  Return in 6 weeks (on 9/16/2019) for Next scheduled follow up.

## 2019-08-21 ENCOUNTER — OFFICE VISIT (OUTPATIENT)
Dept: FAMILY MEDICINE CLINIC | Facility: CLINIC | Age: 84
End: 2019-08-21

## 2019-08-21 VITALS
DIASTOLIC BLOOD PRESSURE: 87 MMHG | HEART RATE: 54 BPM | BODY MASS INDEX: 27.46 KG/M2 | RESPIRATION RATE: 18 BRPM | WEIGHT: 155 LBS | OXYGEN SATURATION: 99 % | HEIGHT: 63 IN | SYSTOLIC BLOOD PRESSURE: 168 MMHG

## 2019-08-21 DIAGNOSIS — M81.0 SENILE OSTEOPOROSIS: Primary | ICD-10-CM

## 2019-08-21 DIAGNOSIS — N76.2 ACUTE VULVITIS: ICD-10-CM

## 2019-08-21 DIAGNOSIS — I10 ESSENTIAL HYPERTENSION: ICD-10-CM

## 2019-08-21 PROCEDURE — 99214 OFFICE O/P EST MOD 30 MIN: CPT | Performed by: FAMILY MEDICINE

## 2019-08-21 RX ORDER — OLMESARTAN MEDOXOMIL 40 MG/1
40 TABLET ORAL DAILY
Qty: 30 TABLET | Refills: 1 | Status: SHIPPED | OUTPATIENT
Start: 2019-08-21 | End: 2019-09-30

## 2019-08-21 RX ORDER — CLOTRIMAZOLE AND BETAMETHASONE DIPROPIONATE 10; .64 MG/G; MG/G
CREAM TOPICAL 2 TIMES DAILY
Qty: 15 G | Refills: 0 | Status: SHIPPED | OUTPATIENT
Start: 2019-08-21 | End: 2019-08-28

## 2019-08-21 NOTE — ASSESSMENT & PLAN NOTE
Hypertension is unchanged.  Medication changes per orders. add olmesartan to current regimen  Blood pressure will be reassessed in 4 weeks.

## 2019-08-21 NOTE — ASSESSMENT & PLAN NOTE
The current medical regimen is effective;  continue present plan and medications.  Labs needed prior to prolia shot

## 2019-08-21 NOTE — PROGRESS NOTES
"Chief Complaint:   Subjective    Rooming Tab(CC,VS,Pt Hx,Fall Screen)   Chief Complaint   Patient presents with   • Urinary Tract Infection     still has itching    • Labs Only     pt wants to get calcium test done    • Hypertension         History of Present Illness   Marleni Hummel is a 83 y.o. female who presents to the office today to follow up on vaginal itching. It is external. No drainage. The diflucan did not help her situation.   She needs a calcium prior to getting prolia shot.   BP continues to be elevated.   Stress levels high since her  is now on day 9 of palliative care.      I have reviewed and updated her medications, medical history and problem list during today's office visit.     Problem List Tab  Medications Tab  Synopsis Tab  Chart Review Tab  Care Everywhere Tab  Immunizations Tab  Patient History Tab  Social History     Tobacco Use   • Smoking status: Former Smoker     Packs/day: 1.00     Years: 3.00     Pack years: 3.00     Types: Cigarettes     Last attempt to quit: 1956     Years since quittin.5   • Smokeless tobacco: Never Used   Substance Use Topics   • Alcohol use: No     Comment: rare       Review of Systems   Genitourinary:        See HPI   Psychiatric/Behavioral: Positive for stress.         Physical Examination:   Objective   Rooming Tab(CC,VS,Pt Hx,Fall Screen)  /87   Pulse 54   Resp 18   Ht 160 cm (62.99\")   Wt 70.3 kg (155 lb)   SpO2 99%   BMI 27.46 kg/m²     Body mass index is 27.46 kg/m².    Physical Exam   Constitutional: She is oriented to person, place, and time. She appears well-developed. No distress.   Eyes: Conjunctivae and lids are normal.   Neck: Carotid bruit is not present.   Cardiovascular: Normal rate, regular rhythm and normal heart sounds.   Pulmonary/Chest: Effort normal and breath sounds normal.   Genitourinary:   Genitourinary Comments: Vulvar erythema, mild, no vaginal discharge   Neurological: She is alert and oriented to " person, place, and time.   Skin: Skin is warm and dry.   Psychiatric: She has a normal mood and affect. Her behavior is normal.   Vitals reviewed.       Data Reviewed:                      Assessment/Plan:   Assessment/Plan   Order Review Tab  Health Maintenance Tab  Patient Plan/Order Tab  Diagnoses and all orders for this visit:    1. Senile osteoporosis (Primary)  Assessment & Plan:  The current medical regimen is effective;  continue present plan and medications.  Labs needed prior to prolia shot    Orders:  -     Calcium  -     BUN  -     Creatinine, Serum  -     Electrolyte Panel    2. Essential hypertension  Assessment & Plan:  Hypertension is unchanged.  Medication changes per orders. add olmesartan to current regimen  Blood pressure will be reassessed in 4 weeks.    Orders:  -     olmesartan (BENICAR) 40 MG tablet; Take 1 tablet by mouth Daily for 60 days.  Dispense: 30 tablet; Refill: 1    3. Acute vulvitis  -     clotrimazole-betamethasone (LOTRISONE) 1-0.05 % cream; Apply  topically to the appropriate area as directed 2 (Two) Times a Day for 7 days.  Dispense: 15 g; Refill: 0     Follow up:   Wrapup Tab  Return in 4 weeks (on 9/16/2019) for Next scheduled follow up.

## 2019-08-30 DIAGNOSIS — M81.0 OSTEOPOROSIS, UNSPECIFIED OSTEOPOROSIS TYPE, UNSPECIFIED PATHOLOGICAL FRACTURE PRESENCE: Primary | ICD-10-CM

## 2019-08-30 LAB
BUN SERPL-MCNC: 21 MG/DL (ref 8–23)
CHLORIDE SERPL-SCNC: 100 MMOL/L (ref 98–107)
CO2 SERPL-SCNC: 26.8 MMOL/L (ref 22–29)
CREAT SERPL-MCNC: 1.21 MG/DL (ref 0.57–1)
POTASSIUM SERPL-SCNC: 4.5 MMOL/L (ref 3.5–5.2)
SODIUM SERPL-SCNC: 140 MMOL/L (ref 136–145)

## 2019-09-04 LAB
CALCIUM SERPL-MCNC: 9.7 MG/DL (ref 8.6–10.5)
WRITTEN AUTHORIZATION: NORMAL

## 2019-09-05 ENCOUNTER — INFUSION (OUTPATIENT)
Dept: ONCOLOGY | Facility: HOSPITAL | Age: 84
End: 2019-09-05

## 2019-09-05 VITALS
WEIGHT: 157.4 LBS | BODY MASS INDEX: 27.89 KG/M2 | DIASTOLIC BLOOD PRESSURE: 76 MMHG | TEMPERATURE: 97.9 F | OXYGEN SATURATION: 95 % | SYSTOLIC BLOOD PRESSURE: 150 MMHG | HEART RATE: 64 BPM

## 2019-09-05 DIAGNOSIS — M81.0 SENILE OSTEOPOROSIS: Primary | ICD-10-CM

## 2019-09-05 PROCEDURE — 25010000002 DENOSUMAB 60 MG/ML SOLUTION PREFILLED SYRINGE: Performed by: NURSE PRACTITIONER

## 2019-09-05 PROCEDURE — 96372 THER/PROPH/DIAG INJ SC/IM: CPT | Performed by: NURSE PRACTITIONER

## 2019-09-05 RX ADMIN — DENOSUMAB 60 MG: 60 INJECTION SUBCUTANEOUS at 14:24

## 2019-09-05 NOTE — PROGRESS NOTES
Arrived ambulatory with assistance of a cane  for prolia injection. Indication and side effects reviewed. Denies recent dental work. Labs and medications verified. Patient states she does not take a calcium supplement as she produces too much naturally. Prolia administered in right arm without incidence. Instructed to call prescribing MD for any concerns or questions and instructed on how to schedule future appts.  Pt vu and discharged ambulatory.

## 2019-09-24 ENCOUNTER — TELEPHONE (OUTPATIENT)
Dept: FAMILY MEDICINE CLINIC | Facility: CLINIC | Age: 84
End: 2019-09-24

## 2019-09-24 DIAGNOSIS — E78.00 HYPERCHOLESTEROLEMIA: ICD-10-CM

## 2019-09-24 DIAGNOSIS — N18.30 CHRONIC KIDNEY DISEASE (CKD), STAGE III (MODERATE) (HCC): ICD-10-CM

## 2019-09-24 DIAGNOSIS — I48.0 PAROXYSMAL ATRIAL FIBRILLATION (HCC): Primary | ICD-10-CM

## 2019-09-24 DIAGNOSIS — R31.9 HEMATURIA, UNSPECIFIED TYPE: ICD-10-CM

## 2019-09-28 LAB
ALBUMIN SERPL-MCNC: 4.7 G/DL (ref 3.5–5.2)
ALBUMIN/GLOB SERPL: 2.4 G/DL
ALP SERPL-CCNC: 62 U/L (ref 39–117)
ALT SERPL-CCNC: 12 U/L (ref 1–33)
APPEARANCE UR: CLEAR
AST SERPL-CCNC: 15 U/L (ref 1–32)
BACTERIA #/AREA URNS HPF: NORMAL /HPF
BACTERIA UR CULT: NORMAL
BACTERIA UR CULT: NORMAL
BASOPHILS # BLD AUTO: 0.02 10*3/MM3 (ref 0–0.2)
BASOPHILS NFR BLD AUTO: 0.3 % (ref 0–1.5)
BILIRUB SERPL-MCNC: 0.7 MG/DL (ref 0.2–1.2)
BILIRUB UR QL STRIP: NEGATIVE
BUN SERPL-MCNC: 22 MG/DL (ref 8–23)
BUN/CREAT SERPL: 19 (ref 7–25)
CALCIUM SERPL-MCNC: 9.2 MG/DL (ref 8.6–10.5)
CHLORIDE SERPL-SCNC: 102 MMOL/L (ref 98–107)
CHOLEST SERPL-MCNC: 142 MG/DL (ref 0–200)
CO2 SERPL-SCNC: 27.5 MMOL/L (ref 22–29)
COLOR UR: YELLOW
CREAT SERPL-MCNC: 1.16 MG/DL (ref 0.57–1)
EOSINOPHIL # BLD AUTO: 0.11 10*3/MM3 (ref 0–0.4)
EOSINOPHIL NFR BLD AUTO: 1.7 % (ref 0.3–6.2)
EPI CELLS #/AREA URNS HPF: NORMAL /HPF
ERYTHROCYTE [DISTWIDTH] IN BLOOD BY AUTOMATED COUNT: 13.3 % (ref 12.3–15.4)
GLOBULIN SER CALC-MCNC: 2 GM/DL
GLUCOSE SERPL-MCNC: 109 MG/DL (ref 65–99)
GLUCOSE UR QL: NEGATIVE
HCT VFR BLD AUTO: 36.5 % (ref 34–46.6)
HDLC SERPL-MCNC: 56 MG/DL (ref 40–60)
HGB BLD-MCNC: 11.7 G/DL (ref 12–15.9)
HGB UR QL STRIP: NEGATIVE
IMM GRANULOCYTES # BLD AUTO: 0.03 10*3/MM3 (ref 0–0.05)
IMM GRANULOCYTES NFR BLD AUTO: 0.5 % (ref 0–0.5)
KETONES UR QL STRIP: NEGATIVE
LDLC SERPL CALC-MCNC: 55 MG/DL (ref 0–100)
LDLC/HDLC SERPL: 0.98 {RATIO}
LEUKOCYTE ESTERASE UR QL STRIP: ABNORMAL
LYMPHOCYTES # BLD AUTO: 1.61 10*3/MM3 (ref 0.7–3.1)
LYMPHOCYTES NFR BLD AUTO: 25.5 % (ref 19.6–45.3)
MCH RBC QN AUTO: 30.1 PG (ref 26.6–33)
MCHC RBC AUTO-ENTMCNC: 32.1 G/DL (ref 31.5–35.7)
MCV RBC AUTO: 93.8 FL (ref 79–97)
MICRO URNS: ABNORMAL
MONOCYTES # BLD AUTO: 0.4 10*3/MM3 (ref 0.1–0.9)
MONOCYTES NFR BLD AUTO: 6.3 % (ref 5–12)
MUCOUS THREADS URNS QL MICRO: PRESENT /HPF
NEUTROPHILS # BLD AUTO: 4.14 10*3/MM3 (ref 1.7–7)
NEUTROPHILS NFR BLD AUTO: 65.7 % (ref 42.7–76)
NITRITE UR QL STRIP: NEGATIVE
NRBC BLD AUTO-RTO: 0.2 /100 WBC (ref 0–0.2)
PH UR STRIP: 6 [PH] (ref 5–7.5)
PLATELET # BLD AUTO: 184 10*3/MM3 (ref 140–450)
POTASSIUM SERPL-SCNC: 4.7 MMOL/L (ref 3.5–5.2)
PROT SERPL-MCNC: 6.7 G/DL (ref 6–8.5)
PROT UR QL STRIP: NEGATIVE
RBC # BLD AUTO: 3.89 10*6/MM3 (ref 3.77–5.28)
RBC #/AREA URNS HPF: NORMAL /HPF
SODIUM SERPL-SCNC: 139 MMOL/L (ref 136–145)
SP GR UR: 1.02 (ref 1–1.03)
TRIGL SERPL-MCNC: 155 MG/DL (ref 0–150)
TSH SERPL DL<=0.005 MIU/L-ACNC: 0.85 UIU/ML (ref 0.27–4.2)
URINALYSIS REFLEX: ABNORMAL
UROBILINOGEN UR STRIP-MCNC: 0.2 MG/DL (ref 0.2–1)
VLDLC SERPL CALC-MCNC: 31 MG/DL
WBC # BLD AUTO: 6.31 10*3/MM3 (ref 3.4–10.8)
WBC #/AREA URNS HPF: NORMAL /HPF

## 2019-09-30 ENCOUNTER — OFFICE VISIT (OUTPATIENT)
Dept: FAMILY MEDICINE CLINIC | Facility: CLINIC | Age: 84
End: 2019-09-30

## 2019-09-30 VITALS
BODY MASS INDEX: 28.35 KG/M2 | WEIGHT: 160 LBS | HEART RATE: 60 BPM | SYSTOLIC BLOOD PRESSURE: 124 MMHG | HEIGHT: 63 IN | RESPIRATION RATE: 16 BRPM | OXYGEN SATURATION: 98 % | DIASTOLIC BLOOD PRESSURE: 64 MMHG

## 2019-09-30 DIAGNOSIS — F43.21 GRIEF REACTION: ICD-10-CM

## 2019-09-30 DIAGNOSIS — E78.00 HYPERCHOLESTEROLEMIA: ICD-10-CM

## 2019-09-30 DIAGNOSIS — N18.30 CHRONIC KIDNEY DISEASE (CKD), STAGE III (MODERATE) (HCC): ICD-10-CM

## 2019-09-30 DIAGNOSIS — I10 ESSENTIAL HYPERTENSION: Primary | ICD-10-CM

## 2019-09-30 DIAGNOSIS — Z23 NEED FOR IMMUNIZATION AGAINST INFLUENZA: ICD-10-CM

## 2019-09-30 DIAGNOSIS — K21.9 GASTROESOPHAGEAL REFLUX DISEASE WITHOUT ESOPHAGITIS: ICD-10-CM

## 2019-09-30 DIAGNOSIS — F41.0 PANIC DISORDER: ICD-10-CM

## 2019-09-30 PROBLEM — R45.7 CAREGIVER STRESS SYNDROME: Status: RESOLVED | Noted: 2019-04-17 | Resolved: 2019-09-30

## 2019-09-30 PROBLEM — F43.89 CAREGIVER STRESS SYNDROME: Status: RESOLVED | Noted: 2019-04-17 | Resolved: 2019-09-30

## 2019-09-30 PROBLEM — G44.209 MUSCLE TENSION HEADACHE: Status: RESOLVED | Noted: 2019-04-03 | Resolved: 2019-09-30

## 2019-09-30 PROBLEM — N76.2 ACUTE VULVITIS: Status: RESOLVED | Noted: 2019-08-21 | Resolved: 2019-09-30

## 2019-09-30 PROBLEM — F43.20 GRIEF REACTION: Status: ACTIVE | Noted: 2019-09-30

## 2019-09-30 PROCEDURE — 90653 IIV ADJUVANT VACCINE IM: CPT | Performed by: FAMILY MEDICINE

## 2019-09-30 PROCEDURE — 99214 OFFICE O/P EST MOD 30 MIN: CPT | Performed by: FAMILY MEDICINE

## 2019-09-30 PROCEDURE — G0008 ADMIN INFLUENZA VIRUS VAC: HCPCS | Performed by: FAMILY MEDICINE

## 2019-09-30 RX ORDER — EZETIMIBE 10 MG/1
10 TABLET ORAL NIGHTLY
Qty: 90 TABLET | Refills: 1 | Status: SHIPPED | OUTPATIENT
Start: 2019-09-30 | End: 2020-08-13 | Stop reason: SDUPTHER

## 2019-09-30 RX ORDER — METOPROLOL SUCCINATE 50 MG/1
50 TABLET, EXTENDED RELEASE ORAL DAILY
Qty: 90 TABLET | Refills: 1 | Status: SHIPPED | OUTPATIENT
Start: 2019-09-30 | End: 2020-04-02 | Stop reason: SDUPTHER

## 2019-09-30 RX ORDER — ATORVASTATIN CALCIUM 20 MG/1
20 TABLET, FILM COATED ORAL NIGHTLY
Qty: 90 TABLET | Refills: 1 | Status: SHIPPED | OUTPATIENT
Start: 2019-09-30 | End: 2020-04-02 | Stop reason: SDUPTHER

## 2019-09-30 RX ORDER — DULOXETIN HYDROCHLORIDE 30 MG/1
30 CAPSULE, DELAYED RELEASE ORAL DAILY
Qty: 90 CAPSULE | Refills: 1 | Status: SHIPPED | OUTPATIENT
Start: 2019-09-30 | End: 2020-04-02 | Stop reason: SDUPTHER

## 2019-09-30 RX ORDER — PANTOPRAZOLE SODIUM 20 MG/1
20 TABLET, DELAYED RELEASE ORAL NIGHTLY
Qty: 90 TABLET | Refills: 1 | Status: SHIPPED | OUTPATIENT
Start: 2019-09-30 | End: 2020-04-02 | Stop reason: SDUPTHER

## 2019-09-30 NOTE — ASSESSMENT & PLAN NOTE
Hypertension is improving with lifestyle modifications.  Continue current treatment regimen.  Blood pressure will be reassessed at the next regular appointment.

## 2019-09-30 NOTE — ASSESSMENT & PLAN NOTE
Renal condition is unchanged.  Continue current treatment regimen.  Renal condition will be reassessed in 6 months.

## 2019-09-30 NOTE — PROGRESS NOTES
"Chief Complaint:   Subjective    Rooming Tab(CC,VS,Pt Hx,Fall Screen)   Chief Complaint   Patient presents with   • Hypertension     med refill    • Hyperlipidemia         History of Present Illness   Marleni Hummel is a 83 y.o. female who presents to the office today to refill medicines.  Blood pressure controlled.  She never did start olmesartan.  Her blood pressure is controlled on beta-blocker therapy only.  GERD symptoms are stable.  Her stress is somewhat improved.  Since last visit her  has passed away.  She is still grieving and getting used to living in a house alone.  Today she is here with her son.  Insomnia still is a problem.  Labs are stable.    I have reviewed and updated her medications, medical history and problem list during today's office visit.     Problem List Tab  Medications Tab  Synopsis Tab  Chart Review Tab  Care Everywhere Tab  Immunizations Tab  Patient History Tab  Social History     Tobacco Use   • Smoking status: Former Smoker     Packs/day: 1.00     Years: 3.00     Pack years: 3.00     Types: Cigarettes     Last attempt to quit: 1956     Years since quittin.6   • Smokeless tobacco: Never Used   Substance Use Topics   • Alcohol use: No     Comment: rare       Review of Systems   Constitutional: Negative for fatigue.   Cardiovascular: Negative for chest pain.   Psychiatric/Behavioral:        See HPI         Physical Examination:   Objective   Rooming Tab(CC,VS,Pt Hx,Fall Screen)  /64   Pulse 60   Resp 16   Ht 160 cm (62.99\")   Wt 72.6 kg (160 lb)   SpO2 98%   BMI 28.35 kg/m²     Body mass index is 28.35 kg/m².    Physical Exam   Constitutional: She is oriented to person, place, and time. She appears well-developed. No distress.   Eyes: Conjunctivae and lids are normal.   Neck: Carotid bruit is not present.   Cardiovascular: Normal rate, regular rhythm and normal heart sounds.   Pulmonary/Chest: Effort normal and breath sounds normal.   Neurological: She is " alert and oriented to person, place, and time.   Skin: Skin is warm and dry.   Psychiatric: She has a normal mood and affect. Her behavior is normal.   Vitals reviewed.       Data Reviewed:              Lab Results   Component Value Date     (H) 09/26/2019    BUN 22 09/26/2019    CREATININE 1.16 (H) 09/26/2019    EGFRIFNONA 45 (L) 09/26/2019    EGFRIFAFRI 54 (L) 09/26/2019     09/26/2019    K 4.7 09/26/2019     09/26/2019    CALCIUM 9.2 09/26/2019    ALBUMIN 4.70 09/26/2019    BILITOT 0.7 09/26/2019    ALKPHOS 62 09/26/2019    AST 15 09/26/2019    ALT 12 09/26/2019    CHLPL 142 09/26/2019    TRIG 155 (H) 09/26/2019    HDL 56 09/26/2019    VLDL 31 09/26/2019    LDL 55 09/26/2019    LDLHDL 0.98 09/26/2019    WBC 6.31 09/26/2019    RBC 3.89 09/26/2019    HCT 36.5 09/26/2019    MCV 93.8 09/26/2019    MCH 30.1 09/26/2019    TSH 0.848 09/26/2019          Assessment/Plan:   Assessment/Plan   Order Review Tab  Health Maintenance Tab  Patient Plan/Order Tab  Diagnoses and all orders for this visit:    1. Essential hypertension (Primary)  Assessment & Plan:  Hypertension is improving with lifestyle modifications.  Continue current treatment regimen.  Blood pressure will be reassessed at the next regular appointment.    Orders:  -     metoprolol succinate XL (TOPROL-XL) 50 MG 24 hr tablet; Take 1 tablet by mouth Daily for 180 days.  Dispense: 90 tablet; Refill: 1  -     Comprehensive Metabolic Panel; Future  -     CBC & Differential; Future  -     TSH; Future    2. Need for immunization against influenza  -     Fluad Tri 65yr+    3. Gastroesophageal reflux disease without esophagitis  Assessment & Plan:  The current medical regimen is effective;  continue present plan and medications.      Orders:  -     pantoprazole (PROTONIX) 20 MG EC tablet; Take 1 tablet by mouth Every Night for 180 days.  Dispense: 90 tablet; Refill: 1  -     CBC & Differential; Future    4. Hypercholesterolemia  Assessment &  Plan:  Lipid abnormalities are unchanged.  Pharmacotherapy as ordered.  Lipids will be reassessed in 6 months.    Orders:  -     ezetimibe (ZETIA) 10 MG tablet; Take 1 tablet by mouth Every Night for 180 days.  Dispense: 90 tablet; Refill: 1  -     atorvastatin (LIPITOR) 20 MG tablet; Take 1 tablet by mouth Every Night for 180 days.  Dispense: 90 tablet; Refill: 1  -     Lipid Panel With / Chol / HDL Ratio; Future  -     CK; Future    5. Panic disorder  Assessment & Plan:  Psychological condition is unchanged.  Continue current treatment regimen.  Psychological condition  will be reassessed at the next regular appointment.    Orders:  -     DULoxetine (CYMBALTA) 30 MG capsule; Take 1 capsule by mouth Daily for 180 days.  Dispense: 90 capsule; Refill: 1    6. Chronic kidney disease (CKD), stage III (moderate) (CMS/HCC)  Assessment & Plan:  Renal condition is unchanged.  Continue current treatment regimen.  Renal condition will be reassessed in 6 months.    Orders:  -     Comprehensive Metabolic Panel; Future    7. Grief reaction  Assessment & Plan:  New problem, information on grief shared with patient in AVS       Follow up:   Wrapup Tab  Return in about 6 months (around 3/30/2020) for Medicare Wellness and regular visit, 30 minutes.

## 2019-09-30 NOTE — PATIENT INSTRUCTIONS
Coping With Loss, Adult  People experience loss in many different ways throughout their lives. Events such as moving, changing jobs, and losing friends can create a sense of loss. The loss may be as serious as a major health change, divorce, death of a pet, or death of a loved one. All of these types of loss are likely to create a physical and emotional reaction known as grief. Grief is the result of a major change or an absence of something or someone that you count on. Grief is a normal reaction to loss.  How to recognize changes  A variety of factors can affect your grieving experience, including:  · The nature of your loss.  · Your relationship to what or whom you lost.  · Your understanding of grief and how to cope with it.  · Your support system.  The way that you deal with your grief will affect your ability to function as you normally do. When you are grieving, you may experience:  · Numbness, shock, sadness, anxiety, anger, denial, and guilt.  · Thoughts about death.  · Unexpected crying.  · A physical sensation of emptiness in your gut.  · Problems sleeping and eating.  · Fatigue.  · Loss of interest in normal activities.  · Dreaming about or imagining seeing the person who .  · A need to remember what or whom you lost.  · Difficulty thinking about anything other than your loss for a period of time.  · Relief. If you have been expecting the loss for a while, you may feel a sense of relief when it happens.  Where to find support  To get support for coping with loss:  · Ask your health care provider for help and recommendations, such as grief counseling or therapy.  · Think about joining a support group for people who are coping with loss.  Follow these instructions at home:    · Be patient with yourself and others. Allow the grieving process to happen, and remember that grieving takes time.  ? It is likely that you may never feel completely done with some grief. You may find a way to move on while still  cherishing memories and feelings about your loss.  ? Accepting your loss is a process. It can take months or longer to adjust.  · Express your feelings in healthy ways, such as:  ? Talking with others about your loss. It may be helpful to find others who have had a similar loss, such as a support group.  ? Writing down your feelings in a journal.  ? Doing physical activities to release stress and emotional energy.  ? Doing creative activities like painting, sculpting, or playing or listening to music.  ? Practicing resilience. This is the ability to recover and adjust after facing challenges. Reading some resources that encourage resilience may help you to learn ways to practice those behaviors.  · Keep to your normal routine as much as possible. If you have trouble focusing or doing normal activities, it is acceptable to take some time away from your normal routine.  · Spend time with friends and loved ones.  · Eat a healthy diet, get plenty of sleep, and rest when you feel tired.  Where to find more information  You can find more information about coping with loss from:  · American Society of Clinical Oncology: www.cancer.net  · American Psychological Association: www.apa.org  Contact a health care provider if:  · Your grief is extreme and keeps getting worse.  · You have ongoing grief that does not improve.  · Your body shows symptoms of grief, such as illness.  · You feel depressed, anxious, or lonely.  Get help right away if:  · You have thoughts about hurting yourself or others.  If you ever feel like you may hurt yourself or others, or have thoughts about taking your own life, get help right away. You can go to your nearest emergency department or call:  · Your local emergency services (911 in the [place]  · A suicide crisis helpline, such as the National Suicide Prevention Lifeline at 1-655.964.1883. This is open 24 hours a day.  Summary  · Grief is a normal part of experiencing a loss. It is the result of a  major change or an absence of something or someone that you count on.  · The depth of grief and the period of recovery depend on the type of loss as well as your ability to adjust to the change and process your feelings.  · Processing grief requires patience and a willingness to accept your feelings and talk about your loss with people who are supportive.  · It is important to find resources that work for you and to realize that we are all different when it comes to grief. There is not one single grieving process that works for everyone in the same way.  · Be aware that when grief becomes extreme, it can lead to more severe issues like isolation, depression, anxiety, or suicidal thoughts. Talk with your health care provider if you have any of these issues.  This information is not intended to replace advice given to you by your health care provider. Make sure you discuss any questions you have with your health care provider.  Document Released: 05/03/2018 Document Revised: 05/03/2018 Document Reviewed: 05/03/2018  [employer] Interactive Patient Education © 2019 [employer]

## 2019-11-13 RX ORDER — APIXABAN 2.5 MG/1
TABLET, FILM COATED ORAL
Qty: 180 TABLET | Refills: 4 | Status: SHIPPED | OUTPATIENT
Start: 2019-11-13 | End: 2020-02-23

## 2019-11-26 ENCOUNTER — TELEPHONE (OUTPATIENT)
Dept: CARDIOLOGY | Facility: CLINIC | Age: 84
End: 2019-11-26

## 2019-11-26 DIAGNOSIS — Z51.81 ENCOUNTER FOR THERAPEUTIC DRUG LEVEL MONITORING: Primary | ICD-10-CM

## 2019-11-26 RX ORDER — FUROSEMIDE 20 MG/1
20 TABLET ORAL DAILY
Qty: 15 TABLET | Refills: 0 | OUTPATIENT
Start: 2019-11-26 | End: 2019-11-30

## 2019-11-26 RX ORDER — POTASSIUM CHLORIDE 750 MG/1
10 TABLET, FILM COATED, EXTENDED RELEASE ORAL DAILY
Qty: 15 TABLET | Refills: 0 | OUTPATIENT
Start: 2019-11-26 | End: 2019-11-30

## 2019-11-26 NOTE — TELEPHONE ENCOUNTER
11/26/19  2:21 PM  Marleni Hummel  1935  Home Phone [phone]     Myrna Barton called today. She said that she has been SOA for the last couple months, but it has been progressively getting worse. She said it has gotten so bad that she can barely walk from her bedroom to her bathroom. She has to stop and take breaths before she can continue. She said she is wheezing and very fatigued. Her B/P is 140/83 and HR 70.    Cardiac-related medications:  Eliquis 2.5 mg BID  Atorvastatin 20 mg q pm  Zetia 10 mg q pm  Metoprolol 50 mg daily    She has not been seen since 11-. She and her  had an appt scheduled her for today, but her  passed away. When she called to cancel his appt, she said her appt must have gotten cancelled too. I don't see a cancelled appt for her, so I'm not sure what happened. She is scheduled for January. Do you think she needs to be seen sooner because of her symptoms?    Thank you!    Rosy Gresham, RN  Triage [facility] [place]

## 2019-11-26 NOTE — TELEPHONE ENCOUNTER
Janeen scheduled pt for for 12-2-19. I also called pt and went over/orders/recommendations/instructions. She verbalized understanding.    Rosy Gresham, RN  [place] [facility] [place]

## 2019-11-26 NOTE — TELEPHONE ENCOUNTER
Yes I would like to see her next week please let me know if I do not have anything available I will get her seen.  In the meantime, would like to start her on low-dose furosemide 20 mg with low dose potassium replacement. I will plan to check BMP when she comes to the office next week.  Prescription sent to [facility].  If she is able to weigh herself daily advised her to do that every morning.  She is to limit sodium intake.  Bring the list of her daily weights to the office visit next week.      Janeen-can you please get her on my schedule next week.  3p slot any day if available is also okay    Thank you   AL

## 2019-11-30 ENCOUNTER — APPOINTMENT (OUTPATIENT)
Dept: GENERAL RADIOLOGY | Facility: HOSPITAL | Age: 84
End: 2019-11-30

## 2019-11-30 ENCOUNTER — HOSPITAL ENCOUNTER (EMERGENCY)
Facility: HOSPITAL | Age: 84
Discharge: HOME | End: 2019-11-30
Attending: EMERGENCY MEDICINE | Admitting: EMERGENCY MEDICINE

## 2019-11-30 VITALS
SYSTOLIC BLOOD PRESSURE: 118 MMHG | HEART RATE: 82 BPM | OXYGEN SATURATION: 94 % | DIASTOLIC BLOOD PRESSURE: 84 MMHG | BODY MASS INDEX: 27.82 KG/M2 | TEMPERATURE: 98 F | WEIGHT: 157 LBS | HEIGHT: 63 IN | RESPIRATION RATE: 16 BRPM

## 2019-11-30 DIAGNOSIS — R55 NEAR SYNCOPE: Primary | ICD-10-CM

## 2019-11-30 DIAGNOSIS — N18.9 ACUTE KIDNEY INJURY SUPERIMPOSED ON CHRONIC KIDNEY DISEASE (HCC): ICD-10-CM

## 2019-11-30 DIAGNOSIS — N17.9 ACUTE KIDNEY INJURY SUPERIMPOSED ON CHRONIC KIDNEY DISEASE (HCC): ICD-10-CM

## 2019-11-30 LAB
ALBUMIN SERPL-MCNC: 4.9 G/DL (ref 3.5–5.2)
ALBUMIN/GLOB SERPL: 1.7 G/DL
ALP SERPL-CCNC: 66 U/L (ref 39–117)
ALT SERPL W P-5'-P-CCNC: 14 U/L (ref 1–33)
ANION GAP SERPL CALCULATED.3IONS-SCNC: 13.2 MMOL/L (ref 5–15)
AST SERPL-CCNC: 21 U/L (ref 1–32)
BASOPHILS # BLD AUTO: 0.03 10*3/MM3 (ref 0–0.2)
BASOPHILS NFR BLD AUTO: 0.4 % (ref 0–1.5)
BILIRUB SERPL-MCNC: 1.1 MG/DL (ref 0.2–1.2)
BUN BLD-MCNC: 26 MG/DL (ref 8–23)
BUN/CREAT SERPL: 15.5 (ref 7–25)
CALCIUM SPEC-SCNC: 9.9 MG/DL (ref 8.6–10.5)
CHLORIDE SERPL-SCNC: 98 MMOL/L (ref 98–107)
CO2 SERPL-SCNC: 25.8 MMOL/L (ref 22–29)
CREAT BLD-MCNC: 1.68 MG/DL (ref 0.57–1)
DEPRECATED RDW RBC AUTO: 45.1 FL (ref 37–54)
EOSINOPHIL # BLD AUTO: 0.12 10*3/MM3 (ref 0–0.4)
EOSINOPHIL NFR BLD AUTO: 1.6 % (ref 0.3–6.2)
ERYTHROCYTE [DISTWIDTH] IN BLOOD BY AUTOMATED COUNT: 13.5 % (ref 12.3–15.4)
GFR SERPL CREATININE-BSD FRML MDRD: 29 ML/MIN/1.73
GLOBULIN UR ELPH-MCNC: 2.9 GM/DL
GLUCOSE BLD-MCNC: 116 MG/DL (ref 65–99)
HCT VFR BLD AUTO: 37.8 % (ref 34–46.6)
HGB BLD-MCNC: 12.7 G/DL (ref 12–15.9)
IMM GRANULOCYTES # BLD AUTO: 0.03 10*3/MM3 (ref 0–0.05)
IMM GRANULOCYTES NFR BLD AUTO: 0.4 % (ref 0–0.5)
LYMPHOCYTES # BLD AUTO: 1.04 10*3/MM3 (ref 0.7–3.1)
LYMPHOCYTES NFR BLD AUTO: 13.9 % (ref 19.6–45.3)
MCH RBC QN AUTO: 30.5 PG (ref 26.6–33)
MCHC RBC AUTO-ENTMCNC: 33.6 G/DL (ref 31.5–35.7)
MCV RBC AUTO: 90.6 FL (ref 79–97)
MONOCYTES # BLD AUTO: 0.43 10*3/MM3 (ref 0.1–0.9)
MONOCYTES NFR BLD AUTO: 5.7 % (ref 5–12)
NEUTROPHILS # BLD AUTO: 5.83 10*3/MM3 (ref 1.7–7)
NEUTROPHILS NFR BLD AUTO: 78 % (ref 42.7–76)
NRBC BLD AUTO-RTO: 0 /100 WBC (ref 0–0.2)
NT-PROBNP SERPL-MCNC: 455.9 PG/ML (ref 5–1800)
PLATELET # BLD AUTO: 208 10*3/MM3 (ref 140–450)
PMV BLD AUTO: 10 FL (ref 6–12)
POTASSIUM BLD-SCNC: 4.7 MMOL/L (ref 3.5–5.2)
PROT SERPL-MCNC: 7.8 G/DL (ref 6–8.5)
RBC # BLD AUTO: 4.17 10*6/MM3 (ref 3.77–5.28)
SODIUM BLD-SCNC: 137 MMOL/L (ref 136–145)
TROPONIN T SERPL-MCNC: <0.01 NG/ML (ref 0–0.03)
WBC NRBC COR # BLD: 7.48 10*3/MM3 (ref 3.4–10.8)

## 2019-11-30 PROCEDURE — 93010 ELECTROCARDIOGRAM REPORT: CPT | Performed by: INTERNAL MEDICINE

## 2019-11-30 PROCEDURE — 84484 ASSAY OF TROPONIN QUANT: CPT | Performed by: PHYSICIAN ASSISTANT

## 2019-11-30 PROCEDURE — 83880 ASSAY OF NATRIURETIC PEPTIDE: CPT | Performed by: PHYSICIAN ASSISTANT

## 2019-11-30 PROCEDURE — 71046 X-RAY EXAM CHEST 2 VIEWS: CPT

## 2019-11-30 PROCEDURE — 85025 COMPLETE CBC W/AUTO DIFF WBC: CPT | Performed by: PHYSICIAN ASSISTANT

## 2019-11-30 PROCEDURE — 93005 ELECTROCARDIOGRAM TRACING: CPT | Performed by: PHYSICIAN ASSISTANT

## 2019-11-30 PROCEDURE — 80053 COMPREHEN METABOLIC PANEL: CPT | Performed by: PHYSICIAN ASSISTANT

## 2019-11-30 PROCEDURE — 99284 EMERGENCY DEPT VISIT MOD MDM: CPT

## 2019-11-30 RX ADMIN — SODIUM CHLORIDE, POTASSIUM CHLORIDE, SODIUM LACTATE AND CALCIUM CHLORIDE 500 ML: 600; 310; 30; 20 INJECTION, SOLUTION INTRAVENOUS at 18:01

## 2019-12-02 ENCOUNTER — OFFICE VISIT (OUTPATIENT)
Dept: CARDIOLOGY | Facility: CLINIC | Age: 84
End: 2019-12-02

## 2019-12-02 ENCOUNTER — HOSPITAL ENCOUNTER (OUTPATIENT)
Dept: CARDIOLOGY | Facility: HOSPITAL | Age: 84
Discharge: HOME | End: 2019-12-02
Admitting: NURSE PRACTITIONER

## 2019-12-02 VITALS
DIASTOLIC BLOOD PRESSURE: 88 MMHG | HEART RATE: 77 BPM | WEIGHT: 157 LBS | SYSTOLIC BLOOD PRESSURE: 150 MMHG | BODY MASS INDEX: 27.82 KG/M2 | HEIGHT: 63 IN

## 2019-12-02 DIAGNOSIS — I48.21 PERMANENT ATRIAL FIBRILLATION (HCC): ICD-10-CM

## 2019-12-02 DIAGNOSIS — R06.09 DOE (DYSPNEA ON EXERTION): ICD-10-CM

## 2019-12-02 DIAGNOSIS — Z51.81 ENCOUNTER FOR THERAPEUTIC DRUG LEVEL MONITORING: ICD-10-CM

## 2019-12-02 DIAGNOSIS — I25.10 CORONARY ARTERY DISEASE INVOLVING NATIVE CORONARY ARTERY OF NATIVE HEART WITHOUT ANGINA PECTORIS: Primary | ICD-10-CM

## 2019-12-02 LAB
ANION GAP SERPL CALCULATED.3IONS-SCNC: 13.7 MMOL/L (ref 5–15)
BUN BLD-MCNC: 26 MG/DL (ref 8–23)
BUN/CREAT SERPL: 19.3 (ref 7–25)
CALCIUM SPEC-SCNC: 10 MG/DL (ref 8.6–10.5)
CHLORIDE SERPL-SCNC: 96 MMOL/L (ref 98–107)
CO2 SERPL-SCNC: 26.3 MMOL/L (ref 22–29)
CREAT BLD-MCNC: 1.35 MG/DL (ref 0.57–1)
GFR SERPL CREATININE-BSD FRML MDRD: 38 ML/MIN/1.73
GLUCOSE BLD-MCNC: 100 MG/DL (ref 65–99)
POTASSIUM BLD-SCNC: 4.2 MMOL/L (ref 3.5–5.2)
SODIUM BLD-SCNC: 136 MMOL/L (ref 136–145)

## 2019-12-02 PROCEDURE — 93000 ELECTROCARDIOGRAM COMPLETE: CPT | Performed by: NURSE PRACTITIONER

## 2019-12-02 PROCEDURE — 80048 BASIC METABOLIC PNL TOTAL CA: CPT | Performed by: NURSE PRACTITIONER

## 2019-12-02 PROCEDURE — 99214 OFFICE O/P EST MOD 30 MIN: CPT | Performed by: NURSE PRACTITIONER

## 2019-12-02 PROCEDURE — 36415 COLL VENOUS BLD VENIPUNCTURE: CPT

## 2019-12-02 NOTE — PROGRESS NOTES
Date of Office Visit: 19  Encounter Provider: ENRRIQUE Curtis  Place of Service: [facility] CARDIOLOGY  Patient Name: Marleni Hummel  :1935    Chief Complaint   Patient presents with   • Atrial Fibrillation   • Coronary Artery Disease   • Follow-up   :     HPI: Marleni Hummel is a 83 y.o. female  with history of hypertension, hyperlipidemia, atrial fibrillation, stress-induced cardiomyopathy, obstructive sleep apnea, and dyspnea.  She is followed by Dr. Jason Tai.  I will see her for the first time today and have reviewed her medical record.  She has history of atrial fibrillation with rapid ventricular response in the setting of normal thyroid studies and unremarkable echocardiogram.  She had a perfusion stress test which was also unremarkable.  She had continued issues with dyspnea and was evaluated by pulmonary due to respiratory illness.  In 2011 she went back into atrial fibrillation after cardioversion.  She was started on flecainide and underwent repeat cardioversion.  She has some issues with right femur and hip surgery had some infection and multiple surgeries after that.  In 2015 she was found to be bradycardic after having some nausea and vomiting which was felt to be vagal response.  Her troponin was borderline elevated medications were adjusted.  She ultimately had ST elevation infarct taken to the cardiac catheterization laboratory which is felt to have cardiomyopathy with an ejection fraction of 20%.  She did have a circumflex lesion where she had drug-eluting stent placed.  She recovered from that.  Obviously, we had to stop flecainide and she was switched to amiodarone.  She had elevated QT interval so that was stopped.      She complained of shortness of breath in 2018.  She had an echocardiogram which showed normal left ventricular systolic function with an EF of 65%, borderline concentric hypertrophy, mild to  moderate Sunil dilated left atrial cavity.  The right ventricular cavity was mild to moderately dilated with mild mitral regurgitation, mild tricuspid regurgitation and normal RVSP.  At that time, she was under significant stress at home with her  battling dementia    In 2019 she reported increased shortness of breath and was started on Lasix 20 mg.  She had an episode of near syncope after starting Lasix so that was stopped.  She also reported having poor appetite and that she had not been eating well after her  .  She had normal troponin and normal BNP.  She also had acute kidney injury and was found to have a creatinine of 1.68, BUN 26, GFR 29 compared to 2 months prior in September when creatinine was 1.16, BUN 22, GFR 54.  She received 500 cc IV fluid.  She presents today for emergency department room follow-up.  She currently lives alone has been adjusting to that since the death of her .  She reports shortness of breath for years at least 2.  She has shortness of breath with walking and activities of daily living.  She takes multiple breaks.  She denies chest pain tightness pressure, palpitation, edema.    Allergies   Allergen Reactions   • Ace Inhibitors Cough   • Morphine Swelling   • Oxycodone    • Levaquin [Levofloxacin] Itching and Rash     insomnia       Past Medical History:   Diagnosis Date   • Acute vulvitis 2019   • Allergic    • Allergic rhinitis    • Arthropathy of knee     right   • Atrial fibrillation (CMS/HCC)    • Back pain    • Bell's palsy    • Caregiver stress syndrome 2019   • Chronic kidney disease (CKD), stage III (moderate) (CMS/HCC)    • Cough    • Edema    • Elevated serum free T4 level 3/21/2016   • Encounter for eye exam 2015   • Essential hypertension    • Fatigue    • GERD (gastroesophageal reflux disease)    • H/O Heart murmur    • Heart attack (CMS/HCC)    • Hematuria 2016   • Herpes zoster without complication    • Hip  "arthritis     left   • History of bone density study 02/28/2008   • History of pneumonia    • Hypercholesterolemia    • IFG (impaired fasting glucose)    • Insomnia    • Muscle tension headache 4/3/2019   • Nephropathy    • New daily persistent headache 12/17/2018   • Osteoarthritis     Right hip   • Osteopenia    • Panic disorder    • Rectal bleeding    • Sleep apnea    • Stress    • Urinary incontinence    • Vitamin D deficiency        Past Surgical History:   Procedure Laterality Date   • CATARACT EXTRACTION Left 04/01/2013    Dr. Clarke   • CATARACT EXTRACTION Right 04/16/2013    Dr. Clarke   • COLONOSCOPY  04/18/2014    Dr. Elizabeth; no polyps   • EPIDURAL BLOCK     • HIP PERCUTANEOUS PINNING Left 8/31/2017    Procedure: LT HIP PERCUTANEOUS PINNING;  Surgeon: Sean Canales MD;  Location: [facility] OR;  Service:    • KNEE SURGERY Bilateral    • LEG SURGERY Left    • MAMMO BILATERAL  11/2013   • PAP SMEAR  02/2011   • TOTAL HIP ARTHROPLASTY Right 08/2015   • TOTAL HIP ARTHROPLASTY Right 09/2016         Family and social history reviewed.     Review of Systems   Constitution: Positive for malaise/fatigue.   Cardiovascular: Positive for near-syncope.   Respiratory: Positive for shortness of breath.    Gastrointestinal: Positive for nausea.   Neurological: Positive for light-headedness.   Psychiatric/Behavioral: Positive for depression.     All other systems were reviewed and are negative          Objective:     Vitals:    12/02/19 1456   BP: 150/88   BP Location: Left arm   Patient Position: Sitting   Pulse: 77   Weight: 71.2 kg (157 lb)   Height: 160 cm (63\")     Body mass index is 27.81 kg/m².    PHYSICAL EXAM:  Physical Exam   Constitutional: She is oriented to person, place, and time. She appears well-developed and well-nourished. No distress.   HENT:   Head: Normocephalic.   Eyes: Conjunctivae are normal.   Neck: Normal range of motion. No JVD present.   Cardiovascular: Normal rate, normal heart sounds " and intact distal pulses. An irregularly irregular rhythm present.   No murmur heard.  Pulses:       Carotid pulses are 2+ on the right side, and 2+ on the left side.       Radial pulses are 2+ on the right side, and 2+ on the left side.        Posterior tibial pulses are 2+ on the right side, and 2+ on the left side.   Pulmonary/Chest: Effort normal and breath sounds normal. No respiratory distress. She has no wheezes. She has no rhonchi. She has no rales. She exhibits no tenderness.   Abdominal: Soft. Bowel sounds are normal. She exhibits no distension.   Musculoskeletal: Normal range of motion. She exhibits no edema.   Neurological: She is alert and oriented to person, place, and time.   Skin: Skin is warm, dry and intact. No rash noted. She is not diaphoretic. No cyanosis.   Psychiatric: She has a normal mood and affect. Her behavior is normal. Judgment and thought content normal.         ECG 12 Lead  Date/Time: 12/2/2019 3:08 PM  Performed by: Oralia Lutz APRN  Authorized by: Oralia Lutz APRN   Comparison: compared with previous ECG   Similar to previous ECG  Rhythm: atrial fibrillation  Conduction: left anterior fascicular block  QRS axis: left    Clinical impression: abnormal EKG            Current Outpatient Medications   Medication Sig Dispense Refill   • acetaminophen (TYLENOL) 500 MG tablet Take 1,000 mg by mouth 3 (three) times a day as needed for mild pain (1-3) or moderate pain (4-6).     • atorvastatin (LIPITOR) 20 MG tablet Take 1 tablet by mouth Every Night for 180 days. 90 tablet 1   • Cholecalciferol (VITAMIN D3) 5000 units capsule capsule Take 5,000 Units by mouth Daily.     • DULoxetine (CYMBALTA) 30 MG capsule Take 1 capsule by mouth Daily for 180 days. 90 capsule 1   • ELIQUIS 2.5 MG tablet tablet TAKE 1 TABLET TWICE A  tablet 4   • ezetimibe (ZETIA) 10 MG tablet Take 1 tablet by mouth Every Night for 180 days. 90 tablet 1   • metoprolol succinate XL (TOPROL-XL) 50 MG 24 hr tablet  Take 1 tablet by mouth Daily for 180 days. 90 tablet 1   • Mirabegron ER (MYRBETRIQ) 25 MG tablet sustained-release 24 hour 24 hr tablet Take 25 mg by mouth Daily.     • pantoprazole (PROTONIX) 20 MG EC tablet Take 1 tablet by mouth Every Night for 180 days. 90 tablet 1     No current facility-administered medications for this visit.      Assessment:       Diagnosis Plan   1. Coronary artery disease involving native coronary artery of native heart without angina pectoris  Stress Test With Myocardial Perfusion One Day   2. Encounter for therapeutic drug level monitoring  Basic Metabolic Panel   3. CARROLL (dyspnea on exertion)  Adult Transthoracic Echo Complete W/ Cont if Necessary Per Protocol    Stress Test With Myocardial Perfusion One Day   4. Permanent atrial fibrillation          Orders Placed This Encounter   Procedures   • Basic Metabolic Panel     Standing Status:   Future     Number of Occurrences:   1     Standing Expiration Date:   12/2/2020   • Stress Test With Myocardial Perfusion One Day     Standing Status:   Future     Standing Expiration Date:   12/1/2020     Order Specific Question:   What stress agent will be used?     Answer:   Regadenoson (Lexiscan)     Order Specific Question:   Difficulty walking criteria?     Answer:   Poor Exercise Tolerance     Order Specific Question:   Difficulty walking criteria?     Answer:   Severe COPD O2 dependence, CHF, Dyspnea     Order Specific Question:   Reason for Exam:     Answer:   SOB, anginal equivalent   • ECG 12 Lead     This order was created via procedure documentation   • Adult Transthoracic Echo Complete W/ Cont if Necessary Per Protocol     Standing Status:   Future     Standing Expiration Date:   12/1/2020     Order Specific Question:   Reason for exam?     Answer:   Dyspnea         Plan:   1.  83-year-old female with chronic atrial fibrillation despite at least 2 cardioversions anticoagulated with Eliquis rate is controlled  Atrial Fibrillation and  Atrial Flutter  Assessment  • The patient has persistent atrial fibrillation  • This is non-valvular in etiology  • The patient's CHADS2-VASc score is 6  • A [id number] score of 2 or more is considered a high risk for a thromboembolic event  • Warfarin prescribed    Plan  • Attempt to maintain sinus rhythm  • Continue warfarin for antithrombotic therapy, bleeding issues discussed  • Continue beta blocker for rhythm control    2.  Coronary artery disease status post drug-eluting stent placed to the left circumflex in October 2015 with initial left ventricular ejection fraction of 20% last normal October 2018  Coronary Artery Disease  Assessment  • The patient has no angina  • On warfarin    Plan  • Lifestyle modifications discussed include adhering to a heart healthy diet, avoidance of tobacco products, maintenance of a healthy weight, medication compliance, regular exercise and regular monitoring of cholesterol and blood pressure    Subjective - Objective  • There is a history of past MI  • There has been a previous stent procedure using HAYLEY        She continues to complain of dyspnea on exertion is concerned about a new blockage.  She will return for perfusion stress test and echocardiogram to rule out ischemia and reevaluate left ventricular systolic function  3.  Hypertension blood pressure today is elevated she is to follow this at home call if this is sustained  4.  Hyperlipidemia on atorvastatin 20 mg  5.  Acute renal insufficiency repeat BMP today shows improved creatinine down to 1.35 from 1.6 encouraged to increase water intake  6.  History of prolonged QT on amiodarone    Follow-up to be determined once her echo and nuclear has resulted.  If those are unremarkable we will have her follow-up in 1 year        It has been a pleasure to participate in this patient's care.      Thank you,  ENRRIQUE Curtis      **I used Dragon to dictate this note:**

## 2019-12-16 ENCOUNTER — TELEPHONE (OUTPATIENT)
Dept: CARDIOLOGY | Facility: CLINIC | Age: 84
End: 2019-12-16

## 2019-12-16 ENCOUNTER — HOSPITAL ENCOUNTER (OUTPATIENT)
Dept: CARDIOLOGY | Facility: HOSPITAL | Age: 84
Discharge: HOME | End: 2019-12-16

## 2019-12-16 ENCOUNTER — HOSPITAL ENCOUNTER (OUTPATIENT)
Dept: CARDIOLOGY | Facility: HOSPITAL | Age: 84
Discharge: HOME | End: 2019-12-16
Admitting: NURSE PRACTITIONER

## 2019-12-16 VITALS
SYSTOLIC BLOOD PRESSURE: 130 MMHG | DIASTOLIC BLOOD PRESSURE: 70 MMHG | WEIGHT: 157 LBS | HEIGHT: 63 IN | BODY MASS INDEX: 27.82 KG/M2 | HEART RATE: 82 BPM

## 2019-12-16 DIAGNOSIS — R06.09 DOE (DYSPNEA ON EXERTION): ICD-10-CM

## 2019-12-16 DIAGNOSIS — I25.10 CORONARY ARTERY DISEASE INVOLVING NATIVE CORONARY ARTERY OF NATIVE HEART WITHOUT ANGINA PECTORIS: ICD-10-CM

## 2019-12-16 LAB
AORTIC ARCH: 2.5 CM
AORTIC ROOT ANNULUS: 2 CM
ASCENDING AORTA: 2.8 CM
BH CV ECHO MEAS - ACS: 1.8 CM
BH CV ECHO MEAS - AO MAX PG (FULL): 4.5 MMHG
BH CV ECHO MEAS - AO MAX PG: 6.7 MMHG
BH CV ECHO MEAS - AO MEAN PG (FULL): 2.9 MMHG
BH CV ECHO MEAS - AO MEAN PG: 4 MMHG
BH CV ECHO MEAS - AO V2 MAX: 129 CM/SEC
BH CV ECHO MEAS - AO V2 MEAN: 96.3 CM/SEC
BH CV ECHO MEAS - AO V2 VTI: 24.7 CM
BH CV ECHO MEAS - ASC AORTA: 2.8 CM
BH CV ECHO MEAS - AVA(I,A): 1.5 CM^2
BH CV ECHO MEAS - AVA(I,D): 1.5 CM^2
BH CV ECHO MEAS - AVA(V,A): 1.6 CM^2
BH CV ECHO MEAS - AVA(V,D): 1.6 CM^2
BH CV ECHO MEAS - BSA(HAYCOCK): 1.8 M^2
BH CV ECHO MEAS - BSA: 1.7 M^2
BH CV ECHO MEAS - BZI_BMI: 27.8 KILOGRAMS/M^2
BH CV ECHO MEAS - BZI_METRIC_HEIGHT: 160 CM
BH CV ECHO MEAS - BZI_METRIC_WEIGHT: 71.2 KG
BH CV ECHO MEAS - EDV(MOD-SP2): 54 ML
BH CV ECHO MEAS - EDV(MOD-SP4): 61 ML
BH CV ECHO MEAS - EDV(TEICH): 106.1 ML
BH CV ECHO MEAS - EF(CUBED): 70.8 %
BH CV ECHO MEAS - EF(MOD-BP): 64 %
BH CV ECHO MEAS - EF(MOD-SP2): 63 %
BH CV ECHO MEAS - EF(MOD-SP4): 65.6 %
BH CV ECHO MEAS - EF(TEICH): 62.4 %
BH CV ECHO MEAS - ESV(MOD-SP2): 20 ML
BH CV ECHO MEAS - ESV(MOD-SP4): 21 ML
BH CV ECHO MEAS - ESV(TEICH): 39.9 ML
BH CV ECHO MEAS - FS: 33.7 %
BH CV ECHO MEAS - IVS/LVPW: 0.86
BH CV ECHO MEAS - IVSD: 0.79 CM
BH CV ECHO MEAS - LAT PEAK E' VEL: 9 CM/SEC
BH CV ECHO MEAS - LV DIASTOLIC VOL/BSA (35-75): 35 ML/M^2
BH CV ECHO MEAS - LV MASS(C)D: 135.3 GRAMS
BH CV ECHO MEAS - LV MASS(C)DI: 77.6 GRAMS/M^2
BH CV ECHO MEAS - LV MAX PG: 2.1 MMHG
BH CV ECHO MEAS - LV MEAN PG: 1 MMHG
BH CV ECHO MEAS - LV SYSTOLIC VOL/BSA (12-30): 12 ML/M^2
BH CV ECHO MEAS - LV V1 MAX: 73.2 CM/SEC
BH CV ECHO MEAS - LV V1 MEAN: 48.4 CM/SEC
BH CV ECHO MEAS - LV V1 VTI: 13.8 CM
BH CV ECHO MEAS - LVIDD: 4.8 CM
BH CV ECHO MEAS - LVIDS: 3.2 CM
BH CV ECHO MEAS - LVLD AP2: 6.1 CM
BH CV ECHO MEAS - LVLD AP4: 6 CM
BH CV ECHO MEAS - LVLS AP2: 5.4 CM
BH CV ECHO MEAS - LVLS AP4: 5.1 CM
BH CV ECHO MEAS - LVOT AREA (M): 2.8 CM^2
BH CV ECHO MEAS - LVOT AREA: 2.8 CM^2
BH CV ECHO MEAS - LVOT DIAM: 1.9 CM
BH CV ECHO MEAS - LVPWD: 0.91 CM
BH CV ECHO MEAS - MED PEAK E' VEL: 9 CM/SEC
BH CV ECHO MEAS - MR MAX PG: 30.4 MMHG
BH CV ECHO MEAS - MR MAX VEL: 275.5 CM/SEC
BH CV ECHO MEAS - MV DEC SLOPE: 448.3 CM/SEC^2
BH CV ECHO MEAS - MV DEC TIME: 0.21 SEC
BH CV ECHO MEAS - MV E MAX VEL: 94.1 CM/SEC
BH CV ECHO MEAS - MV MAX PG: 3.6 MMHG
BH CV ECHO MEAS - MV MEAN PG: 1.3 MMHG
BH CV ECHO MEAS - MV P1/2T MAX VEL: 95.6 CM/SEC
BH CV ECHO MEAS - MV P1/2T: 62.4 MSEC
BH CV ECHO MEAS - MV V2 MAX: 95 CM/SEC
BH CV ECHO MEAS - MV V2 MEAN: 51.6 CM/SEC
BH CV ECHO MEAS - MV V2 VTI: 27.8 CM
BH CV ECHO MEAS - MVA P1/2T LCG: 2.3 CM^2
BH CV ECHO MEAS - MVA(P1/2T): 3.5 CM^2
BH CV ECHO MEAS - MVA(VTI): 1.4 CM^2
BH CV ECHO MEAS - PA ACC TIME: 0.13 SEC
BH CV ECHO MEAS - PA MAX PG (FULL): 2.1 MMHG
BH CV ECHO MEAS - PA MAX PG: 2.7 MMHG
BH CV ECHO MEAS - PA PR(ACCEL): 18.8 MMHG
BH CV ECHO MEAS - PA V2 MAX: 81.4 CM/SEC
BH CV ECHO MEAS - PVA(V,A): 1.7 CM^2
BH CV ECHO MEAS - PVA(V,D): 1.7 CM^2
BH CV ECHO MEAS - QP/QS: 0.77
BH CV ECHO MEAS - RAP SYSTOLE: 8 MMHG
BH CV ECHO MEAS - RV MAX PG: 0.6 MMHG
BH CV ECHO MEAS - RV MEAN PG: 0.33 MMHG
BH CV ECHO MEAS - RV V1 MAX: 38.8 CM/SEC
BH CV ECHO MEAS - RV V1 MEAN: 26.8 CM/SEC
BH CV ECHO MEAS - RV V1 VTI: 8.3 CM
BH CV ECHO MEAS - RVOT AREA: 3.5 CM^2
BH CV ECHO MEAS - RVOT DIAM: 2.1 CM
BH CV ECHO MEAS - RVSP: 31 MMHG
BH CV ECHO MEAS - SI(CUBED): 44.1 ML/M^2
BH CV ECHO MEAS - SI(LVOT): 21.8 ML/M^2
BH CV ECHO MEAS - SI(MOD-SP2): 19.5 ML/M^2
BH CV ECHO MEAS - SI(MOD-SP4): 22.9 ML/M^2
BH CV ECHO MEAS - SI(TEICH): 37.9 ML/M^2
BH CV ECHO MEAS - SUP REN AO DIAM: 1.8 CM
BH CV ECHO MEAS - SV(CUBED): 77 ML
BH CV ECHO MEAS - SV(LVOT): 38.1 ML
BH CV ECHO MEAS - SV(MOD-SP2): 34 ML
BH CV ECHO MEAS - SV(MOD-SP4): 40 ML
BH CV ECHO MEAS - SV(RVOT): 29.2 ML
BH CV ECHO MEAS - SV(TEICH): 66.2 ML
BH CV ECHO MEAS - TAPSE (>1.6): 1.8 CM2
BH CV ECHO MEAS - TR MAX VEL: 237.5 CM/SEC
BH CV ECHO MEASUREMENTS AVERAGE E/E' RATIO: 10.46
BH CV NUCLEAR PRIOR STUDY: 2
BH CV STRESS BP STAGE 1: NORMAL
BH CV STRESS COMMENTS STAGE 1: NORMAL
BH CV STRESS DOSE REGADENOSON STAGE 1: 0.4
BH CV STRESS DURATION MIN STAGE 1: 0
BH CV STRESS DURATION SEC STAGE 1: 10
BH CV STRESS HR STAGE 1: 92
BH CV STRESS PROTOCOL 1: NORMAL
BH CV STRESS RECOVERY BP: NORMAL MMHG
BH CV STRESS RECOVERY HR: 81 BPM
BH CV STRESS STAGE 1: 1
BH CV XLRA - RV BASE: 2.4 CM
BH CV XLRA - RV LENGTH: 4.9 CM
BH CV XLRA - RV MID: 2.6 CM
BH CV XLRA - TDI S': 11 CM/SEC
LEFT ATRIUM VOLUME INDEX: 29 ML/M2
LV EF 2D ECHO EST: 64 %
LV EF NUC BP: 65 %
MAXIMAL PREDICTED HEART RATE: 138 BPM
PERCENT MAX PREDICTED HR: 66.67 %
SINUS: 3 CM
STJ: 2.6 CM
STRESS BASELINE BP: NORMAL MMHG
STRESS BASELINE HR: 65 BPM
STRESS PERCENT HR: 78 %
STRESS POST EXERCISE DUR SEC: 10 SEC
STRESS POST PEAK BP: NORMAL MMHG
STRESS POST PEAK HR: 92 BPM
STRESS TARGET HR: 117 BPM

## 2019-12-16 PROCEDURE — 93016 CV STRESS TEST SUPVJ ONLY: CPT | Performed by: INTERNAL MEDICINE

## 2019-12-16 PROCEDURE — 93018 CV STRESS TEST I&R ONLY: CPT | Performed by: INTERNAL MEDICINE

## 2019-12-16 PROCEDURE — 78452 HT MUSCLE IMAGE SPECT MULT: CPT

## 2019-12-16 PROCEDURE — 0 TECHNETIUM TETROFOSMIN KIT: Performed by: NURSE PRACTITIONER

## 2019-12-16 PROCEDURE — 25010000002 REGADENOSON 0.4 MG/5ML SOLUTION: Performed by: NURSE PRACTITIONER

## 2019-12-16 PROCEDURE — 93017 CV STRESS TEST TRACING ONLY: CPT

## 2019-12-16 PROCEDURE — 93306 TTE W/DOPPLER COMPLETE: CPT

## 2019-12-16 PROCEDURE — 93306 TTE W/DOPPLER COMPLETE: CPT | Performed by: INTERNAL MEDICINE

## 2019-12-16 PROCEDURE — 78452 HT MUSCLE IMAGE SPECT MULT: CPT | Performed by: INTERNAL MEDICINE

## 2019-12-16 PROCEDURE — A9502 TC99M TETROFOSMIN: HCPCS | Performed by: NURSE PRACTITIONER

## 2019-12-16 RX ADMIN — REGADENOSON 0.4 MG: 0.08 INJECTION, SOLUTION INTRAVENOUS at 12:25

## 2019-12-16 RX ADMIN — TETROFOSMIN 1 DOSE: 1.38 INJECTION, POWDER, LYOPHILIZED, FOR SOLUTION INTRAVENOUS at 12:25

## 2019-12-16 RX ADMIN — TETROFOSMIN 1 DOSE: 1.38 INJECTION, POWDER, LYOPHILIZED, FOR SOLUTION INTRAVENOUS at 11:36

## 2019-12-16 NOTE — TELEPHONE ENCOUNTER
LVM discussing echo results.  Normal EF mild LVH trace aortic regurgitation mild mitral and tricuspid regurgitation.  Perfusion stress test negative for ischemia.  No structural/perfusion issue that would cause near syncope.  She is to call with any questions or concerns otherwise we will see her in 1 month.

## 2019-12-16 NOTE — TELEPHONE ENCOUNTER
· Left ventricular systolic function is normal. Calculated EF = 64%. Estimated EF was in agreement with the calculated EF. Estimated EF = 64%. Normal left ventricular cavity size noted. All left ventricular wall segments contract normally. Left ventricular wall thickness is consistent with borderline concentric hypertrophy. Left ventricular diastolic function was indeterminate.  · The aortic valve is abnormal in structure. There is moderate thickening of the aortic valve. Trace aortic valve regurgitation is present. No aortic valve stenosis is present.  · Mild MAC is present. Mild mitral valve regurgitation is present.  · Mild tricuspid valve regurgitation is present. Estimated right ventricular systolic pressure from tricuspid regurgitation is normal (<35 mmHg). Calculated right ventricular systolic pressure from tricuspid regurgitation is 31 mmHg.

## 2020-01-13 ENCOUNTER — OFFICE VISIT (OUTPATIENT)
Dept: CARDIOLOGY | Facility: CLINIC | Age: 85
End: 2020-01-13

## 2020-01-13 VITALS
SYSTOLIC BLOOD PRESSURE: 120 MMHG | WEIGHT: 157.2 LBS | HEART RATE: 76 BPM | BODY MASS INDEX: 27.85 KG/M2 | HEIGHT: 63 IN | DIASTOLIC BLOOD PRESSURE: 60 MMHG

## 2020-01-13 DIAGNOSIS — I48.21 PERMANENT ATRIAL FIBRILLATION (HCC): ICD-10-CM

## 2020-01-13 DIAGNOSIS — E78.2 MIXED HYPERLIPIDEMIA: ICD-10-CM

## 2020-01-13 DIAGNOSIS — I51.81 STRESS-INDUCED CARDIOMYOPATHY: ICD-10-CM

## 2020-01-13 DIAGNOSIS — I25.10 CORONARY ARTERY DISEASE INVOLVING NATIVE CORONARY ARTERY OF NATIVE HEART WITHOUT ANGINA PECTORIS: Primary | ICD-10-CM

## 2020-01-13 PROCEDURE — 93000 ELECTROCARDIOGRAM COMPLETE: CPT | Performed by: INTERNAL MEDICINE

## 2020-01-13 PROCEDURE — 99214 OFFICE O/P EST MOD 30 MIN: CPT | Performed by: INTERNAL MEDICINE

## 2020-01-13 NOTE — PROGRESS NOTES
Date of Office Visit: 20  Encounter Provider: Jason Tai MD  Place of Service: [facility] CARDIOLOGY  Patient Name: Marleni Hummel  :1935  Referral Provider:No ref. provider found      Chief Complaint   Patient presents with   • Follow-up     History of Present Illness   The patient is a pleasant, 82-year-old female with history of hypertension and  hyperlipidemia who presented with a cough and respiratory illness to the emergency room  but was found to be in rapid atrial fibrillation. Her thyroid studies were normal. Her 2-D  echocardiogram was unremarkable. Her perfusion study was unremarkable. She was rate  controlled and anticoagulated. She was also seen by pulmonary. Then, in 2011, she  had been adequately anticoagulated and was cardioverted but went back into atrial  fibrillation. She was started on flecainide and then underwent repeat cardioversion in May  2011. epidural for.  She then had to have hip surgery and had total hip arthroplasty from recent femur  fracture and unfortunately developed an infection of that. I believe had multiple  surgeries on that. Then on 10/02/2015 she came in again and was bradycardic but that was  after she was nauseated and vomiting. Her troponin was borderline elevated. Medications  were adjusted. 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 She did have a circumflex  lesion where they placed a drug eluting stent. She recovered from that. Obviously, we  had to stop the flecainide. We switched her to amiodarone. She then went to a nursing  home, while there concerned about the interaction between her amiodarone and her  psychiatric drug for depression. She had elevated QT interval, so we had to stop the  amiodarone.   She then was readmitted for  another hip surgery on 11/18/2015. She did reasonably well  with that. She now comes in for follow-up.     Atrial Fibrillation   Symptoms are negative for dizziness and weakness. Past medical history includes atrial fibrillation and CAD.   Coronary Artery Disease   Pertinent negatives include no dizziness, muscle weakness or weight gain. Her past medical history is significant for past myocardial infarction.         Past Medical History:   Diagnosis Date   • Acute vulvitis 8/21/2019   • Allergic    • Allergic rhinitis    • Arthropathy of knee     right   • Atrial fibrillation (CMS/[facility])    • Back pain    • Bell's palsy    • Caregiver stress syndrome 4/17/2019   • Chronic kidney disease (CKD), stage III (moderate) (CMS/[facility])    • Cough    • Edema    • Elevated serum free T4 level 3/21/2016   • Encounter for eye exam 02/2015   • Essential hypertension    • Fatigue    • GERD (gastroesophageal reflux disease)    • H/O Heart murmur    • Heart attack (CMS/[facility])    • Hematuria 12/12/2016   • Herpes zoster without complication    • Hip arthritis     left   • History of bone density study 02/28/2008   • History of pneumonia    • Hypercholesterolemia    • IFG (impaired fasting glucose)    • Insomnia    • Muscle tension headache 4/3/2019   • Nephropathy    • New daily persistent headache 12/17/2018   • Osteoarthritis     Right hip   • Osteopenia    • Panic disorder    • Rectal bleeding    • Sleep apnea    • Stress    • Urinary incontinence    • Vitamin D deficiency          Past Surgical History:   Procedure Laterality Date   • CATARACT EXTRACTION Left 04/01/2013    Dr. Clarke   • CATARACT EXTRACTION Right 04/16/2013    Dr. Clarke   • COLONOSCOPY  04/18/2014    Dr. Elizabeth; no polyps   • EPIDURAL BLOCK     • HIP PERCUTANEOUS PINNING Left 8/31/2017    Procedure: LT HIP PERCUTANEOUS PINNING;  Surgeon: Sean Canales MD;  Location: [facility] OR;  Service:    • KNEE SURGERY Bilateral    • LEG SURGERY Left    • MAMMO BILATERAL   2013   • PAP SMEAR  2011   • TOTAL HIP ARTHROPLASTY Right 2015   • TOTAL HIP ARTHROPLASTY Right 2016         Current Outpatient Medications on File Prior to Visit   Medication Sig Dispense Refill   • acetaminophen (TYLENOL) 500 MG tablet Take 1,000 mg by mouth 3 (three) times a day as needed for mild pain (1-3) or moderate pain (4-6).     • atorvastatin (LIPITOR) 20 MG tablet Take 1 tablet by mouth Every Night for 180 days. 90 tablet 1   • Cholecalciferol (VITAMIN D3) 5000 units capsule capsule Take 5,000 Units by mouth Daily.     • DULoxetine (CYMBALTA) 30 MG capsule Take 1 capsule by mouth Daily for 180 days. 90 capsule 1   • ELIQUIS 2.5 MG tablet tablet TAKE 1 TABLET TWICE A  tablet 4   • ezetimibe (ZETIA) 10 MG tablet Take 1 tablet by mouth Every Night for 180 days. 90 tablet 1   • metoprolol succinate XL (TOPROL-XL) 50 MG 24 hr tablet Take 1 tablet by mouth Daily for 180 days. 90 tablet 1   • Mirabegron ER (MYRBETRIQ) 25 MG tablet sustained-release 24 hour 24 hr tablet Take 25 mg by mouth Daily.     • pantoprazole (PROTONIX) 20 MG EC tablet Take 1 tablet by mouth Every Night for 180 days. 90 tablet 1     No current facility-administered medications on file prior to visit.          Social History     Socioeconomic History   • Marital status:      Spouse name: Not on file   • Number of children: Not on file   • Years of education: High school   • Highest education level: Not on file   Occupational History   • Occupation: [employer]     Employer: RETIRED   Tobacco Use   • Smoking status: Former Smoker     Packs/day: 1.00     Years: 3.00     Pack years: 3.00     Types: Cigarettes     Last attempt to quit: 1956     Years since quittin.9   • Smokeless tobacco: Never Used   Substance and Sexual Activity   • Alcohol use: No     Comment: rare   • Drug use: No   • Sexual activity: Defer   Lifestyle   • Physical activity:     Days per week: 0 days     Minutes per session: 0 min   •  "Stress: Not on file   Social History Narrative      2019       Family History   Problem Relation Age of Onset   • Hypertension Other    • Hypertension Mother    • Stroke Mother    • Hypertension Maternal Grandmother          Review of Systems   Constitution: Positive for malaise/fatigue. Negative for decreased appetite, diaphoresis, fever, weight gain and weight loss.   HENT: Negative for congestion, hearing loss, nosebleeds and tinnitus.    Eyes: Negative for blurred vision, double vision, vision loss in left eye, vision loss in right eye and visual disturbance.   Cardiovascular:        As noted in HPI   Respiratory:        As noted HPI   Endocrine: Negative for cold intolerance and heat intolerance.   Hematologic/Lymphatic: Negative for bleeding problem. Does not bruise/bleed easily.   Skin: Negative for color change, flushing, itching and rash.   Musculoskeletal: Positive for joint pain. Negative for arthritis, back pain, joint swelling, muscle weakness and myalgias.   Gastrointestinal: Negative for bloating, abdominal pain, constipation, diarrhea, dysphagia, heartburn, hematemesis, hematochezia, melena, nausea and vomiting.   Genitourinary: Negative for bladder incontinence, dysuria, frequency, nocturia and urgency.   Neurological: Negative for dizziness, focal weakness, headaches, light-headedness, loss of balance, numbness, paresthesias, vertigo and weakness.   Psychiatric/Behavioral: Negative for depression, memory loss and substance abuse.       Procedures      ECG 12 Lead  Date/Time: 2020 2:30 PM  Performed by: Jason Tai MD  Authorized by: Jason Tai MD   Comparison: compared with previous ECG   Similar to previous ECG  Rhythm: atrial fibrillation  Rate: normal  Q wave noted on lead: Old anterior MI.    QRS axis: left                  Objective:    /60 (BP Location: Right arm)   Pulse 76   Ht 160 cm (63\")   Wt 71.3 kg (157 lb 3.2 oz)   BMI 27.85 kg/m²        " Physical Exam  Physical Exam   Constitutional: She is oriented to person, place, and time. She appears well-developed and well-nourished. No distress.   HENT:   Head: Normocephalic.   Eyes: Pupils are equal, round, and reactive to light. Conjunctivae are normal. No scleral icterus.   Neck: Normal carotid pulses, no hepatojugular reflux and no JVD present. Carotid bruit is not present. No tracheal deviation, no edema and no erythema present. No thyromegaly present.   Cardiovascular: Normal rate, regular rhythm, S1 normal, S2 normal, normal heart sounds and intact distal pulses.  No extrasystoles are present. PMI is not displaced. Exam reveals no gallop, no distant heart sounds and no friction rub.   No murmur heard.  Pulses:       Carotid pulses are 2+ on the right side, and 2+ on the left side.       Radial pulses are 2+ on the right side, and 2+ on the left side.        Femoral pulses are 2+ on the right side, and 2+ on the left side.       Dorsalis pedis pulses are 2+ on the right side, and 2+ on the left side.        Posterior tibial pulses are 2+ on the right side, and 2+ on the left side.   Pulmonary/Chest: Effort normal and breath sounds normal. No respiratory distress. She has no decreased breath sounds. She has no wheezes. She has no rhonchi. She has no rales. She exhibits no tenderness.   Abdominal: Soft. Bowel sounds are normal. She exhibits no distension and no mass. There is no hepatosplenomegaly. There is no tenderness. There is no rebound and no guarding.   Musculoskeletal: She exhibits no edema, tenderness or deformity.   Neurological: She is alert and oriented to person, place, and time.   Skin: Skin is warm and dry. No rash noted. She is not diaphoretic. No cyanosis or erythema. No pallor. Nails show no clubbing.   Psychiatric: She has a normal mood and affect. Her speech is normal and behavior is normal. Judgment and thought content normal.           Assessment:    1. This is a 82-year-old female  with a history of persistentl atrial fibrillation.. The patient's CHADS2-VASc score is 6.  Failed multiple cardioversions prolonged QT on amiodarone now rate controlled and anticoagulated.  She appears to be stable will continue the same.  Blood work done November December GFR is stable at 38.  proBNP was normal at that time.  2. Coronary artery disease status post angioplasty, drug eluting stent placement of the circumflex vessel in 10/2015.  No angina to continue the same.  3. Stress induced cardiomyopathy. Initial left ventricular ejection fraction at 20%. Follow-up echocardiogram in February 2016 normal left her systolic function no significant valvular disease.  No recurrence  4. Hyperlipidemia. She is now on atorvastatin.   5. Hypertension as outlined above.  6. Obstructive sleep apnea not using her CPAP and she does not seem interested in pursuing that further.  7. 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 Still painful with difficulty walking.   8. Recurrent urinary tract infections now on chronic antibiotic therapy.    9.  Fatigue/shortness of breath.    I think this is all due to her musculoskeletal issues and difficulty walking.  As I said proBNP was normal clinically she is not in heart failure does not appear to be angina.  She can continue to find a different ways to try to exercise see if that helps.         Plan:

## 2020-02-23 ENCOUNTER — APPOINTMENT (OUTPATIENT)
Dept: MRI IMAGING | Facility: HOSPITAL | Age: 85
End: 2020-02-23

## 2020-02-23 ENCOUNTER — HOSPITAL ENCOUNTER (OUTPATIENT)
Facility: HOSPITAL | Age: 85
Setting detail: OBSERVATION
Discharge: HOME | End: 2020-02-25
Attending: EMERGENCY MEDICINE | Admitting: INTERNAL MEDICINE

## 2020-02-23 ENCOUNTER — APPOINTMENT (OUTPATIENT)
Dept: GENERAL RADIOLOGY | Facility: HOSPITAL | Age: 85
End: 2020-02-23

## 2020-02-23 ENCOUNTER — APPOINTMENT (OUTPATIENT)
Dept: CT IMAGING | Facility: HOSPITAL | Age: 85
End: 2020-02-23

## 2020-02-23 DIAGNOSIS — R42 DIZZINESS: Primary | ICD-10-CM

## 2020-02-23 DIAGNOSIS — H81.393 PERIPHERAL VERTIGO OF BOTH EARS: ICD-10-CM

## 2020-02-23 PROBLEM — R20.0 LEFT FACIAL NUMBNESS: Status: ACTIVE | Noted: 2020-02-23

## 2020-02-23 PROBLEM — R29.90 STROKE-LIKE SYMPTOMS: Status: ACTIVE | Noted: 2020-02-23

## 2020-02-23 LAB
ALBUMIN SERPL-MCNC: 4.5 G/DL (ref 3.5–5.2)
ALBUMIN/GLOB SERPL: 2 G/DL
ALP SERPL-CCNC: 59 U/L (ref 39–117)
ALT SERPL W P-5'-P-CCNC: 12 U/L (ref 1–33)
ANION GAP SERPL CALCULATED.3IONS-SCNC: 9.9 MMOL/L (ref 5–15)
AST SERPL-CCNC: 20 U/L (ref 1–32)
BASOPHILS # BLD AUTO: 0.03 10*3/MM3 (ref 0–0.2)
BASOPHILS NFR BLD AUTO: 0.5 % (ref 0–1.5)
BILIRUB SERPL-MCNC: 0.8 MG/DL (ref 0.2–1.2)
BUN BLD-MCNC: 22 MG/DL (ref 8–23)
BUN/CREAT SERPL: 18.2 (ref 7–25)
CALCIUM SPEC-SCNC: 9.3 MG/DL (ref 8.6–10.5)
CHLORIDE SERPL-SCNC: 100 MMOL/L (ref 98–107)
CO2 SERPL-SCNC: 24.1 MMOL/L (ref 22–29)
CREAT BLD-MCNC: 1.21 MG/DL (ref 0.57–1)
DEPRECATED RDW RBC AUTO: 46.3 FL (ref 37–54)
EOSINOPHIL # BLD AUTO: 0.15 10*3/MM3 (ref 0–0.4)
EOSINOPHIL NFR BLD AUTO: 2.6 % (ref 0.3–6.2)
ERYTHROCYTE [DISTWIDTH] IN BLOOD BY AUTOMATED COUNT: 13.5 % (ref 12.3–15.4)
GFR SERPL CREATININE-BSD FRML MDRD: 43 ML/MIN/1.73
GLOBULIN UR ELPH-MCNC: 2.3 GM/DL
GLUCOSE BLD-MCNC: 128 MG/DL (ref 65–99)
GLUCOSE BLDC GLUCOMTR-MCNC: 133 MG/DL (ref 70–130)
HCT VFR BLD AUTO: 34.2 % (ref 34–46.6)
HGB BLD-MCNC: 11.1 G/DL (ref 12–15.9)
HOLD SPECIMEN: NORMAL
HOLD SPECIMEN: NORMAL
IMM GRANULOCYTES # BLD AUTO: 0.02 10*3/MM3 (ref 0–0.05)
IMM GRANULOCYTES NFR BLD AUTO: 0.3 % (ref 0–0.5)
INR PPP: 1.22 (ref 0.9–1.1)
LYMPHOCYTES # BLD AUTO: 1.55 10*3/MM3 (ref 0.7–3.1)
LYMPHOCYTES NFR BLD AUTO: 26.7 % (ref 19.6–45.3)
MCH RBC QN AUTO: 30.1 PG (ref 26.6–33)
MCHC RBC AUTO-ENTMCNC: 32.5 G/DL (ref 31.5–35.7)
MCV RBC AUTO: 92.7 FL (ref 79–97)
MONOCYTES # BLD AUTO: 0.5 10*3/MM3 (ref 0.1–0.9)
MONOCYTES NFR BLD AUTO: 8.6 % (ref 5–12)
NEUTROPHILS # BLD AUTO: 3.56 10*3/MM3 (ref 1.7–7)
NEUTROPHILS NFR BLD AUTO: 61.3 % (ref 42.7–76)
NRBC BLD AUTO-RTO: 0 /100 WBC (ref 0–0.2)
PLATELET # BLD AUTO: 179 10*3/MM3 (ref 140–450)
PMV BLD AUTO: 10.4 FL (ref 6–12)
POTASSIUM BLD-SCNC: 4.7 MMOL/L (ref 3.5–5.2)
PROT SERPL-MCNC: 6.8 G/DL (ref 6–8.5)
PROTHROMBIN TIME: 15.2 SECONDS (ref 11.7–14.2)
RBC # BLD AUTO: 3.69 10*6/MM3 (ref 3.77–5.28)
SODIUM BLD-SCNC: 134 MMOL/L (ref 136–145)
TROPONIN T SERPL-MCNC: <0.01 NG/ML (ref 0–0.03)
WBC NRBC COR # BLD: 5.81 10*3/MM3 (ref 3.4–10.8)
WHOLE BLOOD HOLD SPECIMEN: NORMAL
WHOLE BLOOD HOLD SPECIMEN: NORMAL

## 2020-02-23 PROCEDURE — 71046 X-RAY EXAM CHEST 2 VIEWS: CPT

## 2020-02-23 PROCEDURE — G0378 HOSPITAL OBSERVATION PER HR: HCPCS

## 2020-02-23 PROCEDURE — 99284 EMERGENCY DEPT VISIT MOD MDM: CPT

## 2020-02-23 PROCEDURE — 96361 HYDRATE IV INFUSION ADD-ON: CPT

## 2020-02-23 PROCEDURE — 70498 CT ANGIOGRAPHY NECK: CPT

## 2020-02-23 PROCEDURE — 80053 COMPREHEN METABOLIC PANEL: CPT | Performed by: NURSE PRACTITIONER

## 2020-02-23 PROCEDURE — 85610 PROTHROMBIN TIME: CPT | Performed by: NURSE PRACTITIONER

## 2020-02-23 PROCEDURE — 84484 ASSAY OF TROPONIN QUANT: CPT | Performed by: NURSE PRACTITIONER

## 2020-02-23 PROCEDURE — 99205 OFFICE O/P NEW HI 60 MIN: CPT | Performed by: PSYCHIATRY & NEUROLOGY

## 2020-02-23 PROCEDURE — 96360 HYDRATION IV INFUSION INIT: CPT

## 2020-02-23 PROCEDURE — 70551 MRI BRAIN STEM W/O DYE: CPT

## 2020-02-23 PROCEDURE — 25010000002 IOPAMIDOL 61 % SOLUTION: Performed by: EMERGENCY MEDICINE

## 2020-02-23 PROCEDURE — 85025 COMPLETE CBC W/AUTO DIFF WBC: CPT | Performed by: NURSE PRACTITIONER

## 2020-02-23 PROCEDURE — 82962 GLUCOSE BLOOD TEST: CPT

## 2020-02-23 PROCEDURE — 70450 CT HEAD/BRAIN W/O DYE: CPT

## 2020-02-23 PROCEDURE — 70496 CT ANGIOGRAPHY HEAD: CPT

## 2020-02-23 RX ORDER — ASPIRIN 300 MG/1
300 SUPPOSITORY RECTAL DAILY
Status: DISCONTINUED | OUTPATIENT
Start: 2020-02-23 | End: 2020-02-25 | Stop reason: HOSPADM

## 2020-02-23 RX ORDER — OXYBUTYNIN CHLORIDE 5 MG/1
5 TABLET, EXTENDED RELEASE ORAL DAILY
Status: DISCONTINUED | OUTPATIENT
Start: 2020-02-24 | End: 2020-02-25 | Stop reason: HOSPADM

## 2020-02-23 RX ORDER — MELATONIN
2000 DAILY
Status: DISCONTINUED | OUTPATIENT
Start: 2020-02-24 | End: 2020-02-25 | Stop reason: HOSPADM

## 2020-02-23 RX ORDER — NITROGLYCERIN 20 MG/100ML
10-50 INJECTION INTRAVENOUS
Status: DISCONTINUED | OUTPATIENT
Start: 2020-02-23 | End: 2020-02-23

## 2020-02-23 RX ORDER — METOPROLOL SUCCINATE 50 MG/1
50 TABLET, EXTENDED RELEASE ORAL DAILY
Status: DISCONTINUED | OUTPATIENT
Start: 2020-02-24 | End: 2020-02-25 | Stop reason: HOSPADM

## 2020-02-23 RX ORDER — SODIUM CHLORIDE 9 MG/ML
100 INJECTION, SOLUTION INTRAVENOUS CONTINUOUS
Status: DISCONTINUED | OUTPATIENT
Start: 2020-02-23 | End: 2020-02-24

## 2020-02-23 RX ORDER — ATORVASTATIN CALCIUM 80 MG/1
80 TABLET, FILM COATED ORAL NIGHTLY
Status: DISCONTINUED | OUTPATIENT
Start: 2020-02-23 | End: 2020-02-24

## 2020-02-23 RX ORDER — PANTOPRAZOLE SODIUM 20 MG/1
20 TABLET, DELAYED RELEASE ORAL NIGHTLY
Status: DISCONTINUED | OUTPATIENT
Start: 2020-02-23 | End: 2020-02-23 | Stop reason: CLARIF

## 2020-02-23 RX ORDER — SODIUM CHLORIDE 0.9 % (FLUSH) 0.9 %
10 SYRINGE (ML) INJECTION AS NEEDED
Status: DISCONTINUED | OUTPATIENT
Start: 2020-02-23 | End: 2020-02-25 | Stop reason: HOSPADM

## 2020-02-23 RX ORDER — ATORVASTATIN CALCIUM 20 MG/1
20 TABLET, FILM COATED ORAL NIGHTLY
Status: DISCONTINUED | OUTPATIENT
Start: 2020-02-23 | End: 2020-02-25 | Stop reason: HOSPADM

## 2020-02-23 RX ORDER — ASPIRIN 81 MG/1
81 TABLET, CHEWABLE ORAL DAILY
Status: DISCONTINUED | OUTPATIENT
Start: 2020-02-23 | End: 2020-02-25 | Stop reason: HOSPADM

## 2020-02-23 RX ORDER — SODIUM CHLORIDE 0.9 % (FLUSH) 0.9 %
10 SYRINGE (ML) INJECTION EVERY 12 HOURS SCHEDULED
Status: DISCONTINUED | OUTPATIENT
Start: 2020-02-23 | End: 2020-02-25 | Stop reason: HOSPADM

## 2020-02-23 RX ORDER — DULOXETIN HYDROCHLORIDE 30 MG/1
30 CAPSULE, DELAYED RELEASE ORAL DAILY
Status: DISCONTINUED | OUTPATIENT
Start: 2020-02-24 | End: 2020-02-25 | Stop reason: HOSPADM

## 2020-02-23 RX ORDER — PANTOPRAZOLE SODIUM 40 MG/1
40 TABLET, DELAYED RELEASE ORAL NIGHTLY
Status: DISCONTINUED | OUTPATIENT
Start: 2020-02-23 | End: 2020-02-25 | Stop reason: HOSPADM

## 2020-02-23 RX ADMIN — IOPAMIDOL 95 ML: 612 INJECTION, SOLUTION INTRAVENOUS at 16:04

## 2020-02-23 RX ADMIN — SODIUM CHLORIDE, PRESERVATIVE FREE 10 ML: 5 INJECTION INTRAVENOUS at 20:34

## 2020-02-23 RX ADMIN — APIXABAN 2.5 MG: 2.5 TABLET, FILM COATED ORAL at 21:55

## 2020-02-23 RX ADMIN — PANTOPRAZOLE SODIUM 40 MG: 40 TABLET, DELAYED RELEASE ORAL at 21:55

## 2020-02-23 RX ADMIN — ATORVASTATIN CALCIUM 20 MG: 20 TABLET, FILM COATED ORAL at 21:55

## 2020-02-23 RX ADMIN — SODIUM CHLORIDE, PRESERVATIVE FREE 10 ML: 5 INJECTION INTRAVENOUS at 14:34

## 2020-02-23 RX ADMIN — ASPIRIN 81 MG: 81 TABLET, CHEWABLE ORAL at 18:02

## 2020-02-23 RX ADMIN — SODIUM CHLORIDE 100 ML/HR: 9 INJECTION, SOLUTION INTRAVENOUS at 20:34

## 2020-02-24 DIAGNOSIS — R42 DIZZINESS: Primary | ICD-10-CM

## 2020-02-24 LAB
CHOLEST SERPL-MCNC: 115 MG/DL (ref 0–200)
GLUCOSE BLDC GLUCOMTR-MCNC: 117 MG/DL (ref 70–130)
GLUCOSE BLDC GLUCOMTR-MCNC: 122 MG/DL (ref 70–130)
HBA1C MFR BLD: 5.5 % (ref 4.8–5.6)
HDLC SERPL-MCNC: 47 MG/DL (ref 40–60)
LDLC SERPL CALC-MCNC: 41 MG/DL (ref 0–100)
LDLC/HDLC SERPL: 0.87 {RATIO}
TRIGL SERPL-MCNC: 136 MG/DL (ref 0–150)
VLDLC SERPL-MCNC: 27.2 MG/DL (ref 5–40)

## 2020-02-24 PROCEDURE — 97110 THERAPEUTIC EXERCISES: CPT

## 2020-02-24 PROCEDURE — 82962 GLUCOSE BLOOD TEST: CPT

## 2020-02-24 PROCEDURE — 99214 OFFICE O/P EST MOD 30 MIN: CPT | Performed by: NURSE PRACTITIONER

## 2020-02-24 PROCEDURE — 36415 COLL VENOUS BLD VENIPUNCTURE: CPT | Performed by: INTERNAL MEDICINE

## 2020-02-24 PROCEDURE — 80061 LIPID PANEL: CPT | Performed by: INTERNAL MEDICINE

## 2020-02-24 PROCEDURE — 96361 HYDRATE IV INFUSION ADD-ON: CPT

## 2020-02-24 PROCEDURE — G0378 HOSPITAL OBSERVATION PER HR: HCPCS

## 2020-02-24 PROCEDURE — 97165 OT EVAL LOW COMPLEX 30 MIN: CPT

## 2020-02-24 PROCEDURE — 97162 PT EVAL MOD COMPLEX 30 MIN: CPT

## 2020-02-24 PROCEDURE — 83036 HEMOGLOBIN GLYCOSYLATED A1C: CPT | Performed by: INTERNAL MEDICINE

## 2020-02-24 RX ORDER — CHOLECALCIFEROL (VITAMIN D3) 125 MCG
5 CAPSULE ORAL NIGHTLY
Status: DISCONTINUED | OUTPATIENT
Start: 2020-02-24 | End: 2020-02-25 | Stop reason: HOSPADM

## 2020-02-24 RX ADMIN — SODIUM CHLORIDE 100 ML/HR: 9 INJECTION, SOLUTION INTRAVENOUS at 06:37

## 2020-02-24 RX ADMIN — SODIUM CHLORIDE, PRESERVATIVE FREE 10 ML: 5 INJECTION INTRAVENOUS at 20:28

## 2020-02-24 RX ADMIN — OXYBUTYNIN CHLORIDE 5 MG: 5 TABLET, EXTENDED RELEASE ORAL at 09:18

## 2020-02-24 RX ADMIN — APIXABAN 2.5 MG: 2.5 TABLET, FILM COATED ORAL at 20:28

## 2020-02-24 RX ADMIN — VITAMIN D, TAB 1000IU (100/BT) 2000 UNITS: 25 TAB at 09:19

## 2020-02-24 RX ADMIN — METOPROLOL SUCCINATE 50 MG: 50 TABLET, EXTENDED RELEASE ORAL at 09:18

## 2020-02-24 RX ADMIN — APIXABAN 2.5 MG: 2.5 TABLET, FILM COATED ORAL at 09:19

## 2020-02-24 RX ADMIN — SODIUM CHLORIDE 100 ML/HR: 9 INJECTION, SOLUTION INTRAVENOUS at 17:20

## 2020-02-24 RX ADMIN — DULOXETINE HYDROCHLORIDE 30 MG: 30 CAPSULE, DELAYED RELEASE ORAL at 09:18

## 2020-02-24 RX ADMIN — PANTOPRAZOLE SODIUM 40 MG: 40 TABLET, DELAYED RELEASE ORAL at 20:28

## 2020-02-24 RX ADMIN — ASPIRIN 81 MG: 81 TABLET, CHEWABLE ORAL at 09:19

## 2020-02-24 RX ADMIN — ATORVASTATIN CALCIUM 20 MG: 20 TABLET, FILM COATED ORAL at 20:28

## 2020-02-25 VITALS
RESPIRATION RATE: 18 BRPM | HEART RATE: 63 BPM | HEIGHT: 63 IN | WEIGHT: 159.83 LBS | SYSTOLIC BLOOD PRESSURE: 155 MMHG | DIASTOLIC BLOOD PRESSURE: 75 MMHG | BODY MASS INDEX: 28.32 KG/M2 | OXYGEN SATURATION: 98 % | TEMPERATURE: 98 F

## 2020-02-25 PROBLEM — R29.90 STROKE-LIKE SYMPTOMS: Status: RESOLVED | Noted: 2020-02-23 | Resolved: 2020-02-25

## 2020-02-25 PROBLEM — R20.0 LEFT FACIAL NUMBNESS: Status: RESOLVED | Noted: 2020-02-23 | Resolved: 2020-02-25

## 2020-02-25 PROBLEM — H81.399 PERIPHERAL VERTIGO: Status: ACTIVE | Noted: 2020-02-25

## 2020-02-25 PROCEDURE — G0378 HOSPITAL OBSERVATION PER HR: HCPCS

## 2020-02-25 PROCEDURE — 97112 NEUROMUSCULAR REEDUCATION: CPT

## 2020-02-25 PROCEDURE — 97535 SELF CARE MNGMENT TRAINING: CPT

## 2020-02-25 RX ADMIN — ASPIRIN 81 MG: 81 TABLET, CHEWABLE ORAL at 08:30

## 2020-02-25 RX ADMIN — METOPROLOL SUCCINATE 50 MG: 50 TABLET, EXTENDED RELEASE ORAL at 08:30

## 2020-02-25 RX ADMIN — VITAMIN D, TAB 1000IU (100/BT) 2000 UNITS: 25 TAB at 08:31

## 2020-02-25 RX ADMIN — SODIUM CHLORIDE, PRESERVATIVE FREE 10 ML: 5 INJECTION INTRAVENOUS at 08:31

## 2020-02-25 RX ADMIN — DULOXETINE HYDROCHLORIDE 30 MG: 30 CAPSULE, DELAYED RELEASE ORAL at 08:31

## 2020-02-25 RX ADMIN — OXYBUTYNIN CHLORIDE 5 MG: 5 TABLET, EXTENDED RELEASE ORAL at 08:30

## 2020-02-25 RX ADMIN — APIXABAN 2.5 MG: 2.5 TABLET, FILM COATED ORAL at 08:31

## 2020-02-26 ENCOUNTER — TRANSITIONAL CARE MANAGEMENT TELEPHONE ENCOUNTER (OUTPATIENT)
Dept: FAMILY MEDICINE CLINIC | Facility: CLINIC | Age: 85
End: 2020-02-26

## 2020-02-26 NOTE — OUTREACH NOTE
Spoke with pt, still has vertigo, but seems not quite as severe. Does have some nausea associated with the vertigo, but is able to eat and drink. Quite tired from hospital stay but slept a long time at home last night. Confirmed receipt and understanding of d/c orders and there were no changes made to pt meds in hospital. Pt has an appt 02/28/20 at  [facility] For Vestibular Eval. This is in same bldg as Dr Andujar. Pt wants to see about fwp appts with this and will come by Dr Andujar's ofc when finished with this appt on Fri and sched TCM.

## 2020-02-27 ENCOUNTER — RESULTS ENCOUNTER (OUTPATIENT)
Dept: FAMILY MEDICINE CLINIC | Facility: CLINIC | Age: 85
End: 2020-02-27

## 2020-02-27 ENCOUNTER — TELEPHONE (OUTPATIENT)
Dept: CARDIOLOGY | Facility: CLINIC | Age: 85
End: 2020-02-27

## 2020-02-27 DIAGNOSIS — E78.00 HYPERCHOLESTEROLEMIA: ICD-10-CM

## 2020-02-27 DIAGNOSIS — I10 ESSENTIAL HYPERTENSION: ICD-10-CM

## 2020-02-27 DIAGNOSIS — N18.30 CHRONIC KIDNEY DISEASE (CKD), STAGE III (MODERATE) (HCC): ICD-10-CM

## 2020-02-27 DIAGNOSIS — K21.9 GASTROESOPHAGEAL REFLUX DISEASE WITHOUT ESOPHAGITIS: ICD-10-CM

## 2020-02-27 NOTE — TELEPHONE ENCOUNTER
Yes, the podiatrist(Dr. Spangler) is requesting Ms. Hummel hold her Eliquis for 3 days prior.  Thanks/f    Dr. Spangler# [phone]

## 2020-02-27 NOTE — TELEPHONE ENCOUNTER
Dr. Tai's pt    Pt called and is having a procedure(ingrown toe nail) on 3/6/20.  She would like to know how many days she can hold her Eliquis?  Please advise./[facility]    # [phone]

## 2020-02-27 NOTE — TELEPHONE ENCOUNTER
Is the podiatrist requesting that she hold her Eliquis for removal of ingrown toenail?  If so, how many days are they requesting that it is held for?  Please contact podiatrist's office to inquire further.

## 2020-02-28 ENCOUNTER — TREATMENT (OUTPATIENT)
Dept: PHYSICAL THERAPY | Facility: CLINIC | Age: 85
End: 2020-02-28

## 2020-02-28 DIAGNOSIS — R42 DIZZINESS: ICD-10-CM

## 2020-02-28 DIAGNOSIS — H81.11 BPPV (BENIGN PAROXYSMAL POSITIONAL VERTIGO), RIGHT: Primary | ICD-10-CM

## 2020-02-28 PROCEDURE — 95992 CANALITH REPOSITIONING PROC: CPT | Performed by: PHYSICAL THERAPIST

## 2020-02-28 PROCEDURE — 97161 PT EVAL LOW COMPLEX 20 MIN: CPT | Performed by: PHYSICAL THERAPIST

## 2020-02-28 NOTE — TELEPHONE ENCOUNTER
Courtney with Dr. Spangler's office called back to relay Dr. DOLL  informed patient to stop Eliquis 1 day prior to toenail removal and restart the day after.  Do you think this is OK? / JIGNESH

## 2020-02-28 NOTE — TELEPHONE ENCOUNTER
Okay with this from a heart standpoint if they feel that this is safe from a bleeding standpoint.
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
## 2020-02-28 NOTE — TELEPHONE ENCOUNTER
I called and s/w Courtney at Dr. Spangler's office.  She understands verbally and will inform Ms. Hummel as well./amber

## 2020-02-28 NOTE — TELEPHONE ENCOUNTER
In the past Dr. Tai was okay with patient holding Eliquis 2 days prior to tooth extraction if needed though he preferred just 1 day if possible.     Would relay the same recommendations to surgeon to see if they are okay with minimizing time off of Eliquis if safe from a bleeding standpoint.  Recommended resuming as soon as possible afterwards when cleared to resume by the health care provider removing the ingrown toenail.

## 2020-03-03 ENCOUNTER — TREATMENT (OUTPATIENT)
Dept: PHYSICAL THERAPY | Facility: CLINIC | Age: 85
End: 2020-03-03

## 2020-03-03 DIAGNOSIS — R42 DIZZINESS: ICD-10-CM

## 2020-03-03 DIAGNOSIS — H81.11 BPPV (BENIGN PAROXYSMAL POSITIONAL VERTIGO), RIGHT: Primary | ICD-10-CM

## 2020-03-03 PROCEDURE — 95992 CANALITH REPOSITIONING PROC: CPT | Performed by: PHYSICAL THERAPIST

## 2020-03-03 NOTE — PROGRESS NOTES
Physical Therapy Daily Progress Note  Visit: 2    Marleni Hummel reports: I felt better for about 6 hrs, then the symptoms gradually came back.  I am still dizzy today.      Subjective     Objective   See Exercise, Manual, and Modality Logs for complete treatment.     See Vestibular Section of Logs for testing and Rx.    S/P Rx provided pt was escorted to a chair with CGA to a full seated position.  Pt sat for 20 mins with head stationary.      Pt not to lay down or do activities that change head level beyond 45 degrees from upright until laying down for bed for the day.        Assessment & Plan     Assessment  Assessment details: The pt demos improved with BPPV.  Continues to demo (R) side involvement with DHP testing.      Plan  Plan details: Re-assess upon next visit for continued BPPV symptoms and treat appropriately.           Manual Therapy:          mins  76637;  Therapeutic Exercise:          mins  58605;     Neuromuscular Luis:         mins  79964;    Therapeutic Activity:           mins  95294;     Gait Training:            mins  11625;     Ultrasound:           mins  18755;    Electrical Stimulation:          mins  04419 ( );  Dry Needling           mins self-pay  Traction           mins 85886  Canalith Repositioning    15     mins 44123      Timed Treatment:   15   mins   Total Treatment:     35   mins    Feliciano Wiley, PT  [place] License #: [id number]    Physical Therapist

## 2020-03-10 ENCOUNTER — TREATMENT (OUTPATIENT)
Dept: PHYSICAL THERAPY | Facility: CLINIC | Age: 85
End: 2020-03-10

## 2020-03-10 DIAGNOSIS — R42 DIZZINESS: ICD-10-CM

## 2020-03-10 DIAGNOSIS — H81.11 BPPV (BENIGN PAROXYSMAL POSITIONAL VERTIGO), RIGHT: Primary | ICD-10-CM

## 2020-03-10 PROCEDURE — 95992 CANALITH REPOSITIONING PROC: CPT | Performed by: PHYSICAL THERAPIST

## 2020-03-10 NOTE — PROGRESS NOTES
Physical Therapy Daily Progress Note  Visit: 3    Marleni Hummel reports: Overall I am feeling better.  I am noticing the dizziness less.  I am still avoiding a lot of activities.  I did notice dizziness the other day when looking out the window.      Subjective     Objective   See Exercise, Manual, and Modality Logs for complete treatment.     See Vestibular Section of Logs for testing and Rx.    S/P Rx provided pt was escorted to a chair with CGA to a full seated position.  Pt sat for 20 mins with head stationary.      Pt not to lay down or do activities that change head level beyond 45 degrees from upright until laying down for bed for the day.        Assessment & Plan     Assessment  Assessment details: The pt is dulce maria Rx well with continued to demo symptoms on the (R) side with a strong nystagmus, but lessening duration.  Pt had a lot of increased vertigo sensation in the 3rd position.      Plan  Plan details: Re-assess upon next visit for continued BPPV symptoms and treat appropriately.           Manual Therapy:          mins  52405;  Therapeutic Exercise:          mins  56803;     Neuromuscular Luis:         mins  33076;    Therapeutic Activity:           mins  30015;     Gait Training:            mins  62243;     Ultrasound:           mins  19303;    Electrical Stimulation:          mins  47213 ( );  Dry Needling           mins self-pay  Traction           mins 28466  Canalith Repositioning    15     mins 44679      Timed Treatment:  15    mins   Total Treatment:     35   mins    DAILY Black License #: [id number]    Physical Therapist

## 2020-03-12 ENCOUNTER — INFUSION (OUTPATIENT)
Dept: ONCOLOGY | Facility: HOSPITAL | Age: 85
End: 2020-03-12

## 2020-03-12 VITALS
DIASTOLIC BLOOD PRESSURE: 63 MMHG | BODY MASS INDEX: 27.53 KG/M2 | OXYGEN SATURATION: 98 % | HEART RATE: 70 BPM | TEMPERATURE: 97.9 F | SYSTOLIC BLOOD PRESSURE: 119 MMHG | WEIGHT: 155.4 LBS

## 2020-03-12 DIAGNOSIS — M81.0 SENILE OSTEOPOROSIS: Primary | ICD-10-CM

## 2020-03-12 PROCEDURE — 96372 THER/PROPH/DIAG INJ SC/IM: CPT

## 2020-03-12 PROCEDURE — 25010000002 DENOSUMAB 60 MG/ML SOLUTION PREFILLED SYRINGE: Performed by: FAMILY MEDICINE

## 2020-03-12 RX ADMIN — DENOSUMAB 60 MG: 60 INJECTION SUBCUTANEOUS at 14:03

## 2020-03-12 NOTE — NURSING NOTE
Arrived  for prolia injection. Indication and side effects reviewed. Denies recent dental work. Labs and medications verified. Prolia administered in right  arm without incidence. Instructed to call prescribing MD for any concerns or questions. F/U appt has been previously made and this was given to her on appt. Magnet.  Pt vu and discharged ambulatory.

## 2020-03-18 ENCOUNTER — TREATMENT (OUTPATIENT)
Dept: PHYSICAL THERAPY | Facility: CLINIC | Age: 85
End: 2020-03-18

## 2020-03-18 DIAGNOSIS — H81.11 BPPV (BENIGN PAROXYSMAL POSITIONAL VERTIGO), RIGHT: Primary | ICD-10-CM

## 2020-03-18 DIAGNOSIS — R42 DIZZINESS: ICD-10-CM

## 2020-03-18 LAB
ALBUMIN SERPL-MCNC: 4.6 G/DL (ref 3.5–5.2)
ALBUMIN/GLOB SERPL: 2.1 G/DL
ALP SERPL-CCNC: 71 U/L (ref 39–117)
ALT SERPL-CCNC: 13 U/L (ref 1–33)
AST SERPL-CCNC: 14 U/L (ref 1–32)
BASOPHILS # BLD AUTO: 0.04 10*3/MM3 (ref 0–0.2)
BASOPHILS NFR BLD AUTO: 0.6 % (ref 0–1.5)
BILIRUB SERPL-MCNC: 0.8 MG/DL (ref 0.2–1.2)
BUN SERPL-MCNC: 21 MG/DL (ref 8–23)
BUN/CREAT SERPL: 15.2 (ref 7–25)
CALCIUM SERPL-MCNC: 9.6 MG/DL (ref 8.6–10.5)
CHLORIDE SERPL-SCNC: 98 MMOL/L (ref 98–107)
CHOLEST SERPL-MCNC: 148 MG/DL (ref 0–200)
CHOLEST/HDLC SERPL: 2.74 {RATIO}
CK SERPL-CCNC: 63 U/L (ref 20–180)
CO2 SERPL-SCNC: 28.2 MMOL/L (ref 22–29)
CREAT SERPL-MCNC: 1.38 MG/DL (ref 0.57–1)
EOSINOPHIL # BLD AUTO: 0.13 10*3/MM3 (ref 0–0.4)
EOSINOPHIL NFR BLD AUTO: 2 % (ref 0.3–6.2)
ERYTHROCYTE [DISTWIDTH] IN BLOOD BY AUTOMATED COUNT: 13 % (ref 12.3–15.4)
GLOBULIN SER CALC-MCNC: 2.2 GM/DL
GLUCOSE SERPL-MCNC: 115 MG/DL (ref 65–99)
HCT VFR BLD AUTO: 36 % (ref 34–46.6)
HDLC SERPL-MCNC: 54 MG/DL (ref 40–60)
HGB BLD-MCNC: 12.2 G/DL (ref 12–15.9)
IMM GRANULOCYTES # BLD AUTO: 0.02 10*3/MM3 (ref 0–0.05)
IMM GRANULOCYTES NFR BLD AUTO: 0.3 % (ref 0–0.5)
LDLC SERPL CALC-MCNC: 53 MG/DL (ref 0–100)
LYMPHOCYTES # BLD AUTO: 1.57 10*3/MM3 (ref 0.7–3.1)
LYMPHOCYTES NFR BLD AUTO: 24.4 % (ref 19.6–45.3)
MCH RBC QN AUTO: 31.1 PG (ref 26.6–33)
MCHC RBC AUTO-ENTMCNC: 33.9 G/DL (ref 31.5–35.7)
MCV RBC AUTO: 91.8 FL (ref 79–97)
MONOCYTES # BLD AUTO: 0.48 10*3/MM3 (ref 0.1–0.9)
MONOCYTES NFR BLD AUTO: 7.5 % (ref 5–12)
NEUTROPHILS # BLD AUTO: 4.2 10*3/MM3 (ref 1.7–7)
NEUTROPHILS NFR BLD AUTO: 65.2 % (ref 42.7–76)
NRBC BLD AUTO-RTO: 0 /100 WBC (ref 0–0.2)
PLATELET # BLD AUTO: 203 10*3/MM3 (ref 140–450)
POTASSIUM SERPL-SCNC: 4.6 MMOL/L (ref 3.5–5.2)
PROT SERPL-MCNC: 6.8 G/DL (ref 6–8.5)
RBC # BLD AUTO: 3.92 10*6/MM3 (ref 3.77–5.28)
SODIUM SERPL-SCNC: 138 MMOL/L (ref 136–145)
TRIGL SERPL-MCNC: 203 MG/DL (ref 0–150)
TSH SERPL DL<=0.005 MIU/L-ACNC: 2.21 UIU/ML (ref 0.27–4.2)
VLDLC SERPL CALC-MCNC: 40.6 MG/DL
WBC # BLD AUTO: 6.44 10*3/MM3 (ref 3.4–10.8)

## 2020-03-18 PROCEDURE — 95992 CANALITH REPOSITIONING PROC: CPT | Performed by: PHYSICAL THERAPIST

## 2020-03-18 PROCEDURE — 97112 NEUROMUSCULAR REEDUCATION: CPT | Performed by: PHYSICAL THERAPIST

## 2020-03-18 NOTE — PROGRESS NOTES
Physical Therapy Daily Progress Note  Visit: 4    Marleni Hummel reports: I am still noticing dizziness with looking down, laying down, sitting up and rolling over.      Subjective     Objective   See Exercise, Manual, and Modality Logs for complete treatment.     See Vestibular Section of Logs for testing and Rx.    S/P Rx provided pt was escorted to a chair with CGA to a full seated position.  Pt sat for 20 mins with head stationary.      Pt not to lay down or do activities that change head level beyond 45 degrees from upright until laying down for bed for the day.        Assessment & Plan     Assessment  Assessment details: Pt's symptoms seemed much better with the DH test, but strong on the roll test.  Performed the BBQ Roll x2 with symptoms produced later in the positions the second time.      Plan  Plan details: Re-assess upon next visit for continued BPPV symptoms and treat appropriately.           Manual Therapy:          mins  65998;  Therapeutic Exercise:          mins  85688;     Neuromuscular Luis:    15     mins  24272 (testing and education);    Therapeutic Activity:           mins  40936;     Gait Training:            mins  50555;     Ultrasound:           mins  21823;    Electrical Stimulation:          mins  16642 ( );  Dry Needling           mins self-pay  Traction           mins 89351  Canalith Repositioning    15     mins 29714      Timed Treatment:   30   mins   Total Treatment:     50   mins    Feliciano Wiley PT  [place] License #: [id number]    Physical Therapist

## 2020-03-25 ENCOUNTER — TELEPHONE (OUTPATIENT)
Dept: FAMILY MEDICINE CLINIC | Facility: CLINIC | Age: 85
End: 2020-03-25

## 2020-03-25 NOTE — TELEPHONE ENCOUNTER
Patient calling requesting something for vertigo  - seen at urgent care for 2/23/2020 - ( pt babar is pending )  [phone]

## 2020-03-25 NOTE — TELEPHONE ENCOUNTER
Patient calling requesting medication for vertigo. Pt seen at urgent care on 2/23/2020  (my chart is pending ) thanks

## 2020-03-26 NOTE — TELEPHONE ENCOUNTER
Kitty I also sent a message to Dillon to see if she could set her up for my chart and schedule video visit today.

## 2020-07-21 DIAGNOSIS — E78.00 HYPERCHOLESTEROLEMIA: ICD-10-CM

## 2020-07-21 RX ORDER — EZETIMIBE 10 MG/1
TABLET ORAL
Qty: 90 TABLET | Refills: 3 | OUTPATIENT
Start: 2020-07-21

## 2020-08-13 ENCOUNTER — OFFICE VISIT (OUTPATIENT)
Dept: FAMILY MEDICINE CLINIC | Facility: CLINIC | Age: 85
End: 2020-08-13

## 2020-08-13 VITALS
WEIGHT: 153 LBS | OXYGEN SATURATION: 98 % | BODY MASS INDEX: 27.11 KG/M2 | RESPIRATION RATE: 18 BRPM | SYSTOLIC BLOOD PRESSURE: 148 MMHG | DIASTOLIC BLOOD PRESSURE: 74 MMHG | TEMPERATURE: 96.9 F | HEIGHT: 63 IN | HEART RATE: 72 BPM

## 2020-08-13 DIAGNOSIS — R61 CHRONIC NIGHT SWEATS: Primary | ICD-10-CM

## 2020-08-13 DIAGNOSIS — E78.00 HYPERCHOLESTEROLEMIA: ICD-10-CM

## 2020-08-13 PROCEDURE — 99213 OFFICE O/P EST LOW 20 MIN: CPT | Performed by: FAMILY MEDICINE

## 2020-08-13 RX ORDER — EZETIMIBE 10 MG/1
10 TABLET ORAL NIGHTLY
Qty: 90 TABLET | Refills: 0 | Status: SHIPPED | OUTPATIENT
Start: 2020-08-13 | End: 2020-10-01 | Stop reason: SDUPTHER

## 2020-08-13 NOTE — PROGRESS NOTES
"   Subjective       Chief Complaint   Patient presents with   • Night Sweats         HPI:       This patient presents for night sweats. No weight loss noted. Duration of one year. Correlates to duloxetine.  She also needs refill of Zetia.  It was inadvertently omitted from recent refills request  History of Present Illness       Review of Systems   Constitutional: Negative for unexpected weight change.        Night sweats      I have reviewed the ROS as documented above. Ken Andujar MD     This patient's ACP documentation is up to date, and there's nothing further left to document.        PE:   Objective   /74   Pulse 72   Temp 96.9 °F (36.1 °C) (Tympanic)   Resp 18   Ht 160 cm (63\")   Wt 69.4 kg (153 lb)   SpO2 98%   BMI 27.10 kg/m²  Body mass index is 27.1 kg/m².    Physical Exam   Constitutional: She is oriented to person, place, and time. She appears well-developed. No distress.   Eyes: Conjunctivae and lids are normal.   Neck: Carotid bruit is not present.   Cardiovascular: Normal rate, regular rhythm and normal heart sounds.   Pulmonary/Chest: Effort normal and breath sounds normal.   Neurological: She is alert and oriented to person, place, and time.   Skin: Skin is warm and dry.   Psychiatric: She has a normal mood and affect. Her behavior is normal.   Vitals reviewed.      Procedures      Data Reviewed:                  A/P:     Assessment/Plan   Diagnoses and all orders for this visit:    1. Chronic night sweats (Primary)  Comments:  New problem.  Suspect due to duloxetine.  Discontinue medicine.  Short-term follow-up in September 2. Hypercholesterolemia  -     ezetimibe (ZETIA) 10 MG tablet; Take 1 tablet by mouth Every Night for 90 days.  Dispense: 90 tablet; Refill: 0          Follow up:    Return in about 7 weeks (around 9/30/2020) for Medicare Wellness and regular visit, 30 minutes.     "

## 2020-08-17 ENCOUNTER — OFFICE VISIT (OUTPATIENT)
Dept: FAMILY MEDICINE CLINIC | Facility: CLINIC | Age: 85
End: 2020-08-17

## 2020-08-17 ENCOUNTER — OFFICE VISIT (OUTPATIENT)
Dept: CARDIOLOGY | Facility: CLINIC | Age: 85
End: 2020-08-17

## 2020-08-17 VITALS
DIASTOLIC BLOOD PRESSURE: 76 MMHG | RESPIRATION RATE: 18 BRPM | TEMPERATURE: 97.1 F | HEIGHT: 63 IN | OXYGEN SATURATION: 98 % | BODY MASS INDEX: 27.29 KG/M2 | WEIGHT: 154 LBS | HEART RATE: 70 BPM | SYSTOLIC BLOOD PRESSURE: 144 MMHG

## 2020-08-17 VITALS
HEART RATE: 67 BPM | HEIGHT: 63 IN | WEIGHT: 154 LBS | BODY MASS INDEX: 27.29 KG/M2 | DIASTOLIC BLOOD PRESSURE: 70 MMHG | SYSTOLIC BLOOD PRESSURE: 170 MMHG

## 2020-08-17 DIAGNOSIS — I10 ESSENTIAL HYPERTENSION: ICD-10-CM

## 2020-08-17 DIAGNOSIS — N28.89 LEFT KIDNEY MASS: Primary | ICD-10-CM

## 2020-08-17 DIAGNOSIS — R06.09 DOE (DYSPNEA ON EXERTION): ICD-10-CM

## 2020-08-17 DIAGNOSIS — I25.10 CORONARY ARTERY DISEASE INVOLVING NATIVE CORONARY ARTERY OF NATIVE HEART WITHOUT ANGINA PECTORIS: Primary | ICD-10-CM

## 2020-08-17 DIAGNOSIS — F41.0 PANIC DISORDER: ICD-10-CM

## 2020-08-17 PROCEDURE — 99214 OFFICE O/P EST MOD 30 MIN: CPT | Performed by: FAMILY MEDICINE

## 2020-08-17 PROCEDURE — 99214 OFFICE O/P EST MOD 30 MIN: CPT | Performed by: NURSE PRACTITIONER

## 2020-08-17 PROCEDURE — 93000 ELECTROCARDIOGRAM COMPLETE: CPT | Performed by: NURSE PRACTITIONER

## 2020-08-17 RX ORDER — DULOXETIN HYDROCHLORIDE 30 MG/1
30 CAPSULE, DELAYED RELEASE ORAL DAILY
Qty: 90 CAPSULE | Refills: 1 | Status: SHIPPED | OUTPATIENT
Start: 2020-08-17 | End: 2020-10-01 | Stop reason: SDUPTHER

## 2020-08-17 RX ORDER — DULOXETIN HYDROCHLORIDE 30 MG/1
30 CAPSULE, DELAYED RELEASE ORAL DAILY
COMMUNITY
End: 2020-08-17 | Stop reason: SDUPTHER

## 2020-08-17 NOTE — PROGRESS NOTES
Date of Office Visit: 20  Encounter Provider: ENRRIQUE Curtis  Place of Service: [facility] CARDIOLOGY  Patient Name: Marleni Hummel  :1935    Chief Complaint   Patient presents with   • Coronary Artery Disease   • Follow-up   :     HPI: Marleni Hummel is a 84 y.o. female  with history of hypertension, hyperlipidemia, atrial fibrillation, stress-induced cardiomyopathy, obstructive sleep apnea, and dyspnea.  She is followed by Dr. Jason Tai and I will follow up with her today.       She has history of atrial fibrillation with rapid ventricular response in the setting of normal thyroid studies and unremarkable echocardiogram.  She had a perfusion stress test which was also unremarkable.  She had continued issues with dyspnea and was evaluated by pulmonary due to respiratory illness.  In 2011 she went back into atrial fibrillation after cardioversion.  She was started on flecainide and underwent repeat cardioversion.  She has some issues with right femur and hip surgery had some infection and multiple surgeries after that.  In 2015 she was found to be bradycardic after having some nausea and vomiting which was felt to be vagal response.  Her troponin was borderline elevated medications were adjusted.  She ultimately had ST elevation infarct taken to the cardiac catheterization laboratory which is felt to have cardiomyopathy with an ejection fraction of 20%.  She did have a circumflex lesion where she had drug-eluting stent placed.  She recovered from that.  Obviously, we had to stop flecainide and she was switched to amiodarone.  She had elevated QT interval so that was stopped.       She complained of shortness of breath in 2018.  She had an echocardiogram which showed normal left ventricular systolic function with an EF of 65%, borderline concentric hypertrophy, mild to moderate Sunil dilated left atrial cavity.  The right ventricular  cavity was mild to moderately dilated with mild mitral regurgitation, mild tricuspid regurgitation and normal RVSP.  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  She had an episode of near syncope after starting Lasix so that was stopped.  She also reported having poor appetite and that she had not been eating well after her  .  She had normal troponin and normal BNP.  She also had acute kidney injury and was found to have a creatinine of 1.68, BUN 26, GFR 29 compared to 2 months prior in September when creatinine was 1.16, BUN 22, GFR 54.  She received 500 cc IV fluid.      In 2019, she c/o CARROLL and had echo and perfusion stress test.  Echocardiogram revealed normal left ventricular ejection fraction and EF of 64%, borderline concentric hypertrophy, moderate thickening of the aortic valve with trace aortic valve regurgitation no aortic stenosis, mild mitral annular calcification was present with mild mitral regurgitation.  RVSP was normal.  Perfusion stress test was negative for ischemia.    She presents today for reassessment.  She has been having some increased shortness of breath with exertion as well as sweating profusely in the day and night.  She does not have chest pain associated with that sweating.  At night she has started to notice a stabbing sharp pain when lying down which only last for seconds.  This resolved spontaneously and has not occurred during the day but she reports this reminds her of when she needed stents placed to her heart.  She fell 3 weeks ago trying to line dance broke her tailbone.  She had an MRI of the spine which noted a renal cyst.  She is to follow-up with her PCP regarding that.  She is not been able to exercise due to orthopedic issues with her right leg.  She still is on Cymbalta and plans to discuss discontinuing that with  her PCP today as that was felt to be contributing to her sweating.  She has not been following her blood pressure routinely at home.  She has history of sleep apnea but stopped treating that like 5 years ago.  She lives alone.  She is accompanied by her daughter today.      Allergies   Allergen Reactions   • Morphine Anaphylaxis   • Oxycodone Anaphylaxis   • Ace Inhibitors Cough   • Levaquin [Levofloxacin] Itching and Rash     insomnia       Past Medical History:   Diagnosis Date   • Acute vulvitis 8/21/2019   • Allergic    • Allergic rhinitis    • Anemia of chronic disease    • Arthropathy of knee     right   • Atrial fibrillation (CMS/HCC)    • Back pain    • Bell's palsy    • Caregiver stress syndrome 4/17/2019   • Chronic coronary artery disease    • Chronic kidney disease (CKD), stage III (moderate) (CMS/HCC)    • Cough    • Edema    • Elevated serum free T4 level 3/21/2016   • Encounter for eye exam 02/2015   • Essential hypertension    • Fatigue    • GERD (gastroesophageal reflux disease)    • H/O Heart murmur    • Heart attack (CMS/HCC)    • Hematuria 12/12/2016   • Herpes zoster without complication    • Hip arthritis     left   • History of bone density study 02/28/2008   • History of pneumonia    • Hypercholesterolemia    • IFG (impaired fasting glucose)    • Insomnia    • Mixed hyperlipidemia    • Muscle tension headache 4/3/2019   • Nephropathy    • New daily persistent headache 12/17/2018   • Osteoarthritis     Right hip   • Osteopenia    • Panic disorder    • Peripheral vertigo    • Rectal bleeding    • Sleep apnea    • Stress    • Stress-induced cardiomyopathy    • Urinary incontinence    • Vitamin D deficiency        Past Surgical History:   Procedure Laterality Date   • CATARACT EXTRACTION Left 04/01/2013    Dr. Clarke   • CATARACT EXTRACTION Right 04/16/2013    Dr. Clarke   • COLONOSCOPY  04/18/2014    Dr. Elizabeth; no polyps   • EPIDURAL BLOCK     • HIP PERCUTANEOUS PINNING Left 8/31/2017     "Procedure: LT HIP PERCUTANEOUS PINNING;  Surgeon: Sean Canales MD;  Location: [facility] OR;  Service:    • KNEE SURGERY Bilateral    • LEG SURGERY Left    • MAMMO BILATERAL  11/2013   • PAP SMEAR  02/2011   • TOTAL HIP ARTHROPLASTY Right 08/2015   • TOTAL HIP ARTHROPLASTY Right 09/2016         Family and social history reviewed.     Review of Systems   Constitution: Positive for malaise/fatigue.   Cardiovascular: Positive for chest pain, dyspnea on exertion and leg swelling.   Musculoskeletal: Positive for joint pain (right hip).     All other systems were reviewed and are negative          Objective:     Vitals:    08/17/20 1432   BP: 170/70   BP Location: Left arm   Patient Position: Sitting   Pulse: 67   Weight: 69.9 kg (154 lb)   Height: 160 cm (63\")     Body mass index is 27.28 kg/m².    PHYSICAL EXAM:  Physical Exam   Constitutional: She is oriented to person, place, and time. She appears well-developed and well-nourished. No distress.   HENT:   Head: Normocephalic.   Eyes: Conjunctivae are normal.   Neck: Normal range of motion. No JVD present.   Cardiovascular: Normal rate, normal heart sounds and intact distal pulses. An irregularly irregular rhythm present.   No murmur heard.  Pulses:       Carotid pulses are 2+ on the right side, and 2+ on the left side.       Radial pulses are 2+ on the right side, and 2+ on the left side.        Posterior tibial pulses are 2+ on the right side, and 2+ on the left side.   Pulmonary/Chest: Effort normal and breath sounds normal. No respiratory distress. She has no wheezes. She has no rhonchi. She has no rales. She exhibits no tenderness.   Abdominal: Soft. Bowel sounds are normal. She exhibits no distension.   Musculoskeletal: Normal range of motion. She exhibits no edema.   Neurological: She is alert and oriented to person, place, and time.   Skin: Skin is warm, dry and intact. No rash noted. She is not diaphoretic. No cyanosis.   Psychiatric: She has a normal mood " and affect. Her behavior is normal. Judgment and thought content normal.         ECG 12 Lead  Date/Time: 8/17/2020 2:50 PM  Performed by: Oralia Lutz APRN  Authorized by: Oralia Lutz APRN   Comparison: compared with previous ECG   Similar to previous ECG  Rhythm: atrial fibrillation  Rate: normal  Q waves: V1 and V2      Clinical impression: abnormal EKG            Current Outpatient Medications   Medication Sig Dispense Refill   • acetaminophen (TYLENOL) 500 MG tablet Take 1,000 mg by mouth 3 (three) times a day as needed for mild pain (1-3) or moderate pain (4-6).     • apixaban (ELIQUIS) 2.5 MG tablet tablet Take 2.5 mg by mouth 2 (Two) Times a Day.     • atorvastatin (LIPITOR) 20 MG tablet Take 1 tablet by mouth Every Night for 180 days. 90 tablet 1   • Cholecalciferol (VITAMIN D3) 5000 units capsule capsule Take 5,000 Units by mouth Daily.     • DULoxetine (CYMBALTA) 30 MG capsule Take 30 mg by mouth Daily.     • ezetimibe (ZETIA) 10 MG tablet Take 1 tablet by mouth Every Night for 90 days. 90 tablet 0   • metoprolol succinate XL (TOPROL-XL) 50 MG 24 hr tablet Take 1 tablet by mouth Daily for 180 days. 90 tablet 1   • Mirabegron ER (Myrbetriq) 50 MG tablet sustained-release 24 hour 24 hr tablet Take 50 mg by mouth Daily.     • pantoprazole (PROTONIX) 20 MG EC tablet Take 1 tablet by mouth Every Night for 180 days. 90 tablet 1     No current facility-administered medications for this visit.      Assessment:       Diagnosis Plan   1. Coronary artery disease involving native coronary artery of native heart without angina pectoris  ECG 12 Lead    Stress Test With Myocardial Perfusion One Day    Adult Transthoracic Echo Complete W/ Cont if Necessary Per Protocol   2. CARROLL (dyspnea on exertion)  Adult Transthoracic Echo Complete W/ Cont if Necessary Per Protocol   3. Essential hypertension          Orders Placed This Encounter   Procedures   • Stress Test With Myocardial Perfusion One Day     Standing Status:    Future     Standing Expiration Date:   8/17/2021     Order Specific Question:   What stress agent will be used?     Answer:   Regadenoson (Lexiscan)     Order Specific Question:   Difficulty walking criteria?     Answer:   Musculoskeletal (hips, knees, feet, back, amputee)     Order Specific Question:   Reason for exam?     Answer:   Chest Pain   • ECG 12 Lead     This order was created via procedure documentation   • Adult Transthoracic Echo Complete W/ Cont if Necessary Per Protocol     Standing Status:   Future     Standing Expiration Date:   8/17/2021     Order Specific Question:   Reason for exam?     Answer:   Chest Pain     Order Specific Question:   Reason for exam?     Answer:   Dyspnea     Order Specific Question:   Chest pain specification?     Answer:   Ischemic Equivalent         Plan:       1.84 year-old female with chronic atrial fibrillation despite at least 2 cardioversions anticoagulated with Eliquis rate is controlled. CHADS2-VASc score is 6.  Anticoagulated with Eliquis  2.  Coronary artery disease status post drug-eluting stent placed to the left circumflex in October 2015 with initial left ventricular ejection fraction of 20% last normal October 2018.  Normal perfusion stress test December 2019  -She is having some atypical symptoms which remind her of when she has had intervention in the past.  She is to return for perfusion stress testing  3.  Hypertension blood pressure today is elevated she is to follow this at home call if this is sustained  4.  Hyperlipidemia on atorvastatin 20 mg target LDL 70 or less this was at goal in March of  this year  5.    History of renal insufficiency was found to have a renal lesion is to have follow-up arranged by her PCP for that  6.  History of prolonged QT on amiodarone  7.  Dyspnea on exertion repeat echocardiogram at that is unremarkable encouraged increase physical activity  8.  History of obstructive sleep apnea-she stopped treating that 5 years  ago  9.  Night sweats-Cymbalta felt to be contributing however she still on that plans to discuss with her PCP today      Follow-up in 6 months if echocardiogram perfusion stress test unremarkable.          It has been a pleasure to participate in this patient's care.      Thank you,  ENRRIQUE Curtis      **I used Dragon to dictate this note:**

## 2020-08-17 NOTE — ASSESSMENT & PLAN NOTE
New problem found incidentally on MRI dated August 8, 2020.  Masslike lesion is complex and posterior aspect of left kidney measures 3.1 x 3.5 cm.  Work-up follow-up recommended is ultrasound.  Also referral to urologist for further evaluation.

## 2020-08-17 NOTE — PROGRESS NOTES
"   Subjective       Chief Complaint   Patient presents with   • Other     MRI results          HPI:       This patient presents for follow-up of recent MRI.  She was dancing and lost her balance and fell and after about a week with ongoing symptoms eventually got MRI of low back.  MRI was obtained on August 8 and results are reviewed today.  The results show multilevel arthritic changes including spinal stenosis and facet stenosis.  It also showed a 3.1 x 3.5 cm masslike lesion arising from posterior aspect of the left kidney.  It is for this last reason that she presents the office today.  Recommended follow-up was further evaluation of this masslike structure of the left kidney.  She also is following with her orthopedist for the traumatic sacral fracture which is healing.  Her back pain is controlled with over-the-counter BIOflex and Tylenol and heat.  She has follow-up for sacral fracture with orthopedist in about 3 months.  She will also discuss the findings of the MRI with her orthopedist during that visit.  Patient would like to delay stopping duloxetine because of upcoming stress test and work-up of kidney for this timeframe.  We talked about probability of this medicine being the cause of night sweats.  History of Present Illness       Review of Systems   Genitourinary:        Some urinary leakage   Neurological: Negative for numbness.      I have reviewed the ROS as documented above. Ken Andujar MD     This patient's ACP documentation is up to date, and there's nothing further left to document.        PE:   Objective   /76   Pulse 70   Temp 97.1 °F (36.2 °C) (Tympanic)   Resp 18   Ht 160 cm (63\")   Wt 69.9 kg (154 lb)   SpO2 98%   BMI 27.28 kg/m²  Body mass index is 27.28 kg/m².    Physical Exam   Constitutional: She appears well-developed. No distress.       Procedures      Data Reviewed:                  A/P:     Assessment/Plan   Diagnoses and all orders for this visit:    1. Left kidney " mass (Primary)  Assessment & Plan:  New problem found incidentally on MRI dated August 8, 2020.  Masslike lesion is complex and posterior aspect of left kidney measures 3.1 x 3.5 cm.  Work-up follow-up recommended is ultrasound.  Also referral to urologist for further evaluation.    Orders:  -     Ambulatory Referral to Urology  -      Renal Limited; Future    2. Panic disorder  -     DULoxetine (CYMBALTA) 30 MG capsule; Take 1 capsule by mouth Daily for 180 days.  Dispense: 90 capsule; Refill: 1        Follow up:    Return if symptoms worsen or fail to improve.

## 2020-08-19 ENCOUNTER — TELEPHONE (OUTPATIENT)
Dept: FAMILY MEDICINE CLINIC | Facility: CLINIC | Age: 85
End: 2020-08-19

## 2020-08-19 NOTE — TELEPHONE ENCOUNTER
Silvia   [phone]    Stated pt refused to do US they called and told pt that she had to drink 24 oz of water and not go to restroom , because she cant drink that much water at once and not go to rest room for 1/2 hours and wants to know if she can just do the CAT scan.

## 2020-08-19 NOTE — TELEPHONE ENCOUNTER
Call patient, she has abnormal kidney function. Safer to do ultrasound. Since she can't do this, set up urgent referral to her urologist. If she does not have one, then set her up to see Dr. Fraga from [facility].

## 2020-08-30 ENCOUNTER — RESULTS ENCOUNTER (OUTPATIENT)
Dept: FAMILY MEDICINE CLINIC | Facility: CLINIC | Age: 85
End: 2020-08-30

## 2020-08-30 DIAGNOSIS — K21.9 GASTROESOPHAGEAL REFLUX DISEASE WITHOUT ESOPHAGITIS: ICD-10-CM

## 2020-08-30 DIAGNOSIS — I10 ESSENTIAL HYPERTENSION: ICD-10-CM

## 2020-08-30 DIAGNOSIS — E78.00 HYPERCHOLESTEROLEMIA: ICD-10-CM

## 2020-09-04 ENCOUNTER — HOSPITAL ENCOUNTER (OUTPATIENT)
Dept: CARDIOLOGY | Facility: HOSPITAL | Age: 85
Discharge: HOME | End: 2020-09-04

## 2020-09-04 ENCOUNTER — TELEPHONE (OUTPATIENT)
Dept: CARDIOLOGY | Facility: CLINIC | Age: 85
End: 2020-09-04

## 2020-09-04 ENCOUNTER — HOSPITAL ENCOUNTER (OUTPATIENT)
Dept: CARDIOLOGY | Facility: HOSPITAL | Age: 85
Discharge: HOME | End: 2020-09-04
Admitting: NURSE PRACTITIONER

## 2020-09-04 VITALS — BODY MASS INDEX: 27.3 KG/M2 | WEIGHT: 154.1 LBS | HEIGHT: 63 IN

## 2020-09-04 VITALS
SYSTOLIC BLOOD PRESSURE: 144 MMHG | WEIGHT: 154 LBS | HEART RATE: 66 BPM | DIASTOLIC BLOOD PRESSURE: 70 MMHG | BODY MASS INDEX: 27.29 KG/M2 | HEIGHT: 63 IN

## 2020-09-04 DIAGNOSIS — I25.10 CORONARY ARTERY DISEASE INVOLVING NATIVE CORONARY ARTERY OF NATIVE HEART WITHOUT ANGINA PECTORIS: ICD-10-CM

## 2020-09-04 DIAGNOSIS — R06.09 DOE (DYSPNEA ON EXERTION): ICD-10-CM

## 2020-09-04 LAB
BH CV NUCLEAR PRIOR STUDY: 3
BH CV STRESS BP STAGE 1: NORMAL
BH CV STRESS COMMENTS STAGE 1: NORMAL
BH CV STRESS DOSE REGADENOSON STAGE 1: 0.4
BH CV STRESS DURATION MIN STAGE 1: 0
BH CV STRESS DURATION SEC STAGE 1: 10
BH CV STRESS HR STAGE 1: 74
BH CV STRESS PROTOCOL 1: NORMAL
BH CV STRESS RECOVERY BP: NORMAL MMHG
BH CV STRESS RECOVERY HR: 81 BPM
BH CV STRESS STAGE 1: 1
LV EF NUC BP: 75 %
MAXIMAL PREDICTED HEART RATE: 138 BPM
PERCENT MAX PREDICTED HR: 53.62 %
STRESS BASELINE BP: NORMAL MMHG
STRESS BASELINE HR: 62 BPM
STRESS PERCENT HR: 63 %
STRESS POST EXERCISE DUR SEC: 10 SEC
STRESS POST PEAK BP: NORMAL MMHG
STRESS POST PEAK HR: 74 BPM
STRESS TARGET HR: 117 BPM

## 2020-09-04 PROCEDURE — 93017 CV STRESS TEST TRACING ONLY: CPT

## 2020-09-04 PROCEDURE — 25010000002 REGADENOSON 0.4 MG/5ML SOLUTION: Performed by: NURSE PRACTITIONER

## 2020-09-04 PROCEDURE — 0 RUBIDIUM CHLORIDE: Performed by: NURSE PRACTITIONER

## 2020-09-04 PROCEDURE — 78492 MYOCRD IMG PET MLT RST&STRS: CPT | Performed by: INTERNAL MEDICINE

## 2020-09-04 PROCEDURE — 93018 CV STRESS TEST I&R ONLY: CPT | Performed by: INTERNAL MEDICINE

## 2020-09-04 PROCEDURE — A9555 RB82 RUBIDIUM: HCPCS | Performed by: NURSE PRACTITIONER

## 2020-09-04 PROCEDURE — 78492 MYOCRD IMG PET MLT RST&STRS: CPT

## 2020-09-04 PROCEDURE — 93306 TTE W/DOPPLER COMPLETE: CPT | Performed by: INTERNAL MEDICINE

## 2020-09-04 PROCEDURE — 93016 CV STRESS TEST SUPVJ ONLY: CPT | Performed by: INTERNAL MEDICINE

## 2020-09-04 PROCEDURE — 93306 TTE W/DOPPLER COMPLETE: CPT

## 2020-09-04 RX ADMIN — REGADENOSON 0.4 MG: 0.08 INJECTION, SOLUTION INTRAVENOUS at 11:55

## 2020-09-04 NOTE — TELEPHONE ENCOUNTER
Received BP log from patient.  I have placed this in your inbox for review. / JIGNESH     Patient can be reached at [phone]

## 2020-09-04 NOTE — TELEPHONE ENCOUNTER
Called and spoke with patient.  Discussed blood pressure log and no changes are necessary.  She is to call if she sustains elevated above 160.  Also discussed stress test which showed no evidence of ischemia.  Echocardiogram has no change from prior normal LV function mild MR.  She is to follow-up in 6 months call me with questions or concerns.      Lillie-please cancel her appointment late January with Dr. Tai and schedule her with me in 6 months thank you

## 2020-09-08 LAB
AORTIC ARCH: 2.4 CM
ASCENDING AORTA: 2.9 CM
BH CV ECHO MEAS - ACS: 2 CM
BH CV ECHO MEAS - AI DEC SLOPE: 216.2 CM/SEC^2
BH CV ECHO MEAS - AI MAX PG: 63.9 MMHG
BH CV ECHO MEAS - AI MAX VEL: 399.6 CM/SEC
BH CV ECHO MEAS - AI P1/2T: 541.4 MSEC
BH CV ECHO MEAS - AO MAX PG (FULL): 6 MMHG
BH CV ECHO MEAS - AO MAX PG: 9 MMHG
BH CV ECHO MEAS - AO MEAN PG (FULL): 2.8 MMHG
BH CV ECHO MEAS - AO MEAN PG: 4.4 MMHG
BH CV ECHO MEAS - AO ROOT AREA (BSA CORRECTED): 1.3
BH CV ECHO MEAS - AO ROOT AREA: 4.3 CM^2
BH CV ECHO MEAS - AO ROOT DIAM: 2.3 CM
BH CV ECHO MEAS - AO V2 MAX: 149.9 CM/SEC
BH CV ECHO MEAS - AO V2 MEAN: 99.2 CM/SEC
BH CV ECHO MEAS - AO V2 VTI: 30.9 CM
BH CV ECHO MEAS - ASC AORTA: 2.9 CM
BH CV ECHO MEAS - AVA(I,A): 1.3 CM^2
BH CV ECHO MEAS - AVA(I,D): 1.3 CM^2
BH CV ECHO MEAS - AVA(V,A): 1.3 CM^2
BH CV ECHO MEAS - AVA(V,D): 1.3 CM^2
BH CV ECHO MEAS - BSA(HAYCOCK): 1.8 M^2
BH CV ECHO MEAS - BSA: 1.7 M^2
BH CV ECHO MEAS - BZI_BMI: 27.3 KILOGRAMS/M^2
BH CV ECHO MEAS - BZI_METRIC_HEIGHT: 160 CM
BH CV ECHO MEAS - BZI_METRIC_WEIGHT: 69.9 KG
BH CV ECHO MEAS - EDV(MOD-SP2): 61 ML
BH CV ECHO MEAS - EDV(MOD-SP4): 77 ML
BH CV ECHO MEAS - EDV(TEICH): 114.2 ML
BH CV ECHO MEAS - EF(CUBED): 74.7 %
BH CV ECHO MEAS - EF(MOD-BP): 69 %
BH CV ECHO MEAS - EF(MOD-SP2): 68.9 %
BH CV ECHO MEAS - EF(MOD-SP4): 68.8 %
BH CV ECHO MEAS - EF(TEICH): 66.4 %
BH CV ECHO MEAS - ESV(MOD-SP2): 19 ML
BH CV ECHO MEAS - ESV(MOD-SP4): 24 ML
BH CV ECHO MEAS - ESV(TEICH): 38.4 ML
BH CV ECHO MEAS - FS: 36.7 %
BH CV ECHO MEAS - IVS/LVPW: 0.84
BH CV ECHO MEAS - IVSD: 0.9 CM
BH CV ECHO MEAS - LAT PEAK E' VEL: 10.1 CM/SEC
BH CV ECHO MEAS - LV DIASTOLIC VOL/BSA (35-75): 44.5 ML/M^2
BH CV ECHO MEAS - LV MASS(C)D: 174.3 GRAMS
BH CV ECHO MEAS - LV MASS(C)DI: 100.7 GRAMS/M^2
BH CV ECHO MEAS - LV MAX PG: 3 MMHG
BH CV ECHO MEAS - LV MEAN PG: 1.6 MMHG
BH CV ECHO MEAS - LV SYSTOLIC VOL/BSA (12-30): 13.9 ML/M^2
BH CV ECHO MEAS - LV V1 MAX: 87 CM/SEC
BH CV ECHO MEAS - LV V1 MEAN: 59.2 CM/SEC
BH CV ECHO MEAS - LV V1 VTI: 18.8 CM
BH CV ECHO MEAS - LVIDD: 4.9 CM
BH CV ECHO MEAS - LVIDS: 3.1 CM
BH CV ECHO MEAS - LVLD AP2: 6.1 CM
BH CV ECHO MEAS - LVLD AP4: 6.6 CM
BH CV ECHO MEAS - LVLS AP2: 5.6 CM
BH CV ECHO MEAS - LVLS AP4: 5.1 CM
BH CV ECHO MEAS - LVOT AREA (M): 2.3 CM^2
BH CV ECHO MEAS - LVOT AREA: 2.2 CM^2
BH CV ECHO MEAS - LVOT DIAM: 1.7 CM
BH CV ECHO MEAS - LVPWD: 1.1 CM
BH CV ECHO MEAS - MED PEAK E' VEL: 9.9 CM/SEC
BH CV ECHO MEAS - MR MAX PG: 126.6 MMHG
BH CV ECHO MEAS - MR MAX VEL: 562.5 CM/SEC
BH CV ECHO MEAS - MV DEC SLOPE: 446.6 CM/SEC^2
BH CV ECHO MEAS - MV DEC TIME: 0.21 SEC
BH CV ECHO MEAS - MV E MAX VEL: 103 CM/SEC
BH CV ECHO MEAS - MV MAX PG: 7.3 MMHG
BH CV ECHO MEAS - MV MEAN PG: 3.5 MMHG
BH CV ECHO MEAS - MV P1/2T MAX VEL: 147.7 CM/SEC
BH CV ECHO MEAS - MV P1/2T: 96.8 MSEC
BH CV ECHO MEAS - MV V2 MAX: 134.7 CM/SEC
BH CV ECHO MEAS - MV V2 MEAN: 86.6 CM/SEC
BH CV ECHO MEAS - MV V2 VTI: 33.4 CM
BH CV ECHO MEAS - MVA P1/2T LCG: 1.5 CM^2
BH CV ECHO MEAS - MVA(P1/2T): 2.3 CM^2
BH CV ECHO MEAS - MVA(VTI): 1.2 CM^2
BH CV ECHO MEAS - PA ACC TIME: 0.14 SEC
BH CV ECHO MEAS - PA MAX PG (FULL): 1.4 MMHG
BH CV ECHO MEAS - PA MAX PG: 2.8 MMHG
BH CV ECHO MEAS - PA PR(ACCEL): 17.2 MMHG
BH CV ECHO MEAS - PA V2 MAX: 84.2 CM/SEC
BH CV ECHO MEAS - PULM DIAS VEL: 63 CM/SEC
BH CV ECHO MEAS - PULM S/D: 0.63
BH CV ECHO MEAS - PULM SYS VEL: 39.8 CM/SEC
BH CV ECHO MEAS - PVA(V,A): 2.8 CM^2
BH CV ECHO MEAS - PVA(V,D): 2.8 CM^2
BH CV ECHO MEAS - QP/QS: 1.3
BH CV ECHO MEAS - RAP SYSTOLE: 8 MMHG
BH CV ECHO MEAS - RV MAX PG: 1.4 MMHG
BH CV ECHO MEAS - RV MEAN PG: 0.83 MMHG
BH CV ECHO MEAS - RV V1 MAX: 59.1 CM/SEC
BH CV ECHO MEAS - RV V1 MEAN: 43.3 CM/SEC
BH CV ECHO MEAS - RV V1 VTI: 13.5 CM
BH CV ECHO MEAS - RVOT AREA: 4 CM^2
BH CV ECHO MEAS - RVOT DIAM: 2.2 CM
BH CV ECHO MEAS - RVSP: 38 MMHG
BH CV ECHO MEAS - SI(AO): 76.2 ML/M^2
BH CV ECHO MEAS - SI(CUBED): 51.6 ML/M^2
BH CV ECHO MEAS - SI(LVOT): 23.9 ML/M^2
BH CV ECHO MEAS - SI(MOD-SP2): 24.3 ML/M^2
BH CV ECHO MEAS - SI(MOD-SP4): 30.6 ML/M^2
BH CV ECHO MEAS - SI(TEICH): 43.8 ML/M^2
BH CV ECHO MEAS - SUP REN AO DIAM: 1.7 CM
BH CV ECHO MEAS - SV(AO): 131.8 ML
BH CV ECHO MEAS - SV(CUBED): 89.2 ML
BH CV ECHO MEAS - SV(LVOT): 41.3 ML
BH CV ECHO MEAS - SV(MOD-SP2): 42 ML
BH CV ECHO MEAS - SV(MOD-SP4): 53 ML
BH CV ECHO MEAS - SV(RVOT): 53.6 ML
BH CV ECHO MEAS - SV(TEICH): 75.8 ML
BH CV ECHO MEAS - TAPSE (>1.6): 2.4 CM
BH CV ECHO MEAS - TR MAX VEL: 272.8 CM/SEC
BH CV ECHO MEASUREMENTS AVERAGE E/E' RATIO: 10.3
BH CV XLRA - RV BASE: 2.5 CM
BH CV XLRA - RV LENGTH: 5.8 CM
BH CV XLRA - RV MID: 2 CM
BH CV XLRA - TDI S': 11.3 CM/SEC
LEFT ATRIUM VOLUME INDEX: 26 ML/M2
LV EF 2D ECHO EST: 69 %
MAXIMAL PREDICTED HEART RATE: 138 BPM
SINUS: 3.1 CM
STJ: 3 CM
STRESS TARGET HR: 117 BPM

## 2020-09-12 LAB
ALBUMIN SERPL-MCNC: 4.6 G/DL (ref 3.5–5.2)
ALBUMIN/GLOB SERPL: 2.7 G/DL
ALP SERPL-CCNC: 59 U/L (ref 39–117)
ALT SERPL-CCNC: 15 U/L (ref 1–33)
AST SERPL-CCNC: 17 U/L (ref 1–32)
BASOPHILS # BLD AUTO: 0.03 10*3/MM3 (ref 0–0.2)
BASOPHILS NFR BLD AUTO: 0.5 % (ref 0–1.5)
BILIRUB SERPL-MCNC: 0.7 MG/DL (ref 0–1.2)
BUN SERPL-MCNC: 24 MG/DL (ref 8–23)
BUN/CREAT SERPL: 19.8 (ref 7–25)
CALCIUM SERPL-MCNC: 9.5 MG/DL (ref 8.6–10.5)
CHLORIDE SERPL-SCNC: 101 MMOL/L (ref 98–107)
CHOLEST SERPL-MCNC: 153 MG/DL (ref 0–200)
CHOLEST/HDLC SERPL: 2.47 {RATIO}
CK SERPL-CCNC: 61 U/L (ref 20–180)
CO2 SERPL-SCNC: 27.4 MMOL/L (ref 22–29)
CREAT SERPL-MCNC: 1.21 MG/DL (ref 0.57–1)
EOSINOPHIL # BLD AUTO: 0.14 10*3/MM3 (ref 0–0.4)
EOSINOPHIL NFR BLD AUTO: 2.4 % (ref 0.3–6.2)
ERYTHROCYTE [DISTWIDTH] IN BLOOD BY AUTOMATED COUNT: 13.4 % (ref 12.3–15.4)
GLOBULIN SER CALC-MCNC: 1.7 GM/DL
GLUCOSE SERPL-MCNC: 109 MG/DL (ref 65–99)
HCT VFR BLD AUTO: 33.4 % (ref 34–46.6)
HDLC SERPL-MCNC: 62 MG/DL (ref 40–60)
HGB BLD-MCNC: 11.1 G/DL (ref 12–15.9)
IMM GRANULOCYTES # BLD AUTO: 0.01 10*3/MM3 (ref 0–0.05)
IMM GRANULOCYTES NFR BLD AUTO: 0.2 % (ref 0–0.5)
LDLC SERPL CALC-MCNC: 64 MG/DL (ref 0–100)
LYMPHOCYTES # BLD AUTO: 1.41 10*3/MM3 (ref 0.7–3.1)
LYMPHOCYTES NFR BLD AUTO: 24.1 % (ref 19.6–45.3)
MCH RBC QN AUTO: 30.7 PG (ref 26.6–33)
MCHC RBC AUTO-ENTMCNC: 33.2 G/DL (ref 31.5–35.7)
MCV RBC AUTO: 92.5 FL (ref 79–97)
MONOCYTES # BLD AUTO: 0.43 10*3/MM3 (ref 0.1–0.9)
MONOCYTES NFR BLD AUTO: 7.4 % (ref 5–12)
NEUTROPHILS # BLD AUTO: 3.83 10*3/MM3 (ref 1.7–7)
NEUTROPHILS NFR BLD AUTO: 65.4 % (ref 42.7–76)
NRBC BLD AUTO-RTO: 0 /100 WBC (ref 0–0.2)
PLATELET # BLD AUTO: 177 10*3/MM3 (ref 140–450)
POTASSIUM SERPL-SCNC: 4.6 MMOL/L (ref 3.5–5.2)
PROT SERPL-MCNC: 6.3 G/DL (ref 6–8.5)
RBC # BLD AUTO: 3.61 10*6/MM3 (ref 3.77–5.28)
SODIUM SERPL-SCNC: 137 MMOL/L (ref 136–145)
TRIGL SERPL-MCNC: 135 MG/DL (ref 0–150)
TSH SERPL DL<=0.005 MIU/L-ACNC: 3.49 UIU/ML (ref 0.27–4.2)
VLDLC SERPL CALC-MCNC: 27 MG/DL
WBC # BLD AUTO: 5.85 10*3/MM3 (ref 3.4–10.8)

## 2020-09-16 ENCOUNTER — INFUSION (OUTPATIENT)
Dept: ONCOLOGY | Facility: HOSPITAL | Age: 85
End: 2020-09-16

## 2020-09-16 VITALS — HEIGHT: 62 IN | WEIGHT: 154.2 LBS | BODY MASS INDEX: 28.37 KG/M2

## 2020-09-16 DIAGNOSIS — M81.0 SENILE OSTEOPOROSIS: Primary | ICD-10-CM

## 2020-09-16 PROCEDURE — 25010000002 DENOSUMAB 60 MG/ML SOLUTION PREFILLED SYRINGE: Performed by: FAMILY MEDICINE

## 2020-09-16 PROCEDURE — 96372 THER/PROPH/DIAG INJ SC/IM: CPT

## 2020-09-16 RX ADMIN — DENOSUMAB 60 MG: 60 INJECTION SUBCUTANEOUS at 14:40

## 2020-09-16 NOTE — NURSING NOTE
Arrived  for prolia injection. Indication and side effects reviewed. Denies recent dental work. Labs and medications verified. Prolia administered in left  arm without incidence. Instructed to call prescribing MD for any concerns or questions and instructed on how to schedule future appts.  Pt vu and discharged ambulatory.

## 2020-10-01 ENCOUNTER — OFFICE VISIT (OUTPATIENT)
Dept: FAMILY MEDICINE CLINIC | Facility: CLINIC | Age: 85
End: 2020-10-01

## 2020-10-01 VITALS
HEIGHT: 62 IN | OXYGEN SATURATION: 98 % | DIASTOLIC BLOOD PRESSURE: 66 MMHG | HEART RATE: 62 BPM | TEMPERATURE: 96.6 F | BODY MASS INDEX: 28.34 KG/M2 | RESPIRATION RATE: 16 BRPM | SYSTOLIC BLOOD PRESSURE: 122 MMHG | WEIGHT: 154 LBS

## 2020-10-01 DIAGNOSIS — K21.9 GASTROESOPHAGEAL REFLUX DISEASE WITHOUT ESOPHAGITIS: ICD-10-CM

## 2020-10-01 DIAGNOSIS — I10 ESSENTIAL HYPERTENSION: ICD-10-CM

## 2020-10-01 DIAGNOSIS — Z91.81 AT MODERATE RISK FOR FALL: ICD-10-CM

## 2020-10-01 DIAGNOSIS — E78.00 HYPERCHOLESTEROLEMIA: ICD-10-CM

## 2020-10-01 DIAGNOSIS — F41.0 PANIC DISORDER: ICD-10-CM

## 2020-10-01 DIAGNOSIS — Z00.00 MEDICARE ANNUAL WELLNESS VISIT, SUBSEQUENT: Primary | ICD-10-CM

## 2020-10-01 DIAGNOSIS — N18.32 STAGE 3B CHRONIC KIDNEY DISEASE (HCC): ICD-10-CM

## 2020-10-01 DIAGNOSIS — Z23 IMMUNIZATION DUE: ICD-10-CM

## 2020-10-01 PROBLEM — R42 DIZZINESS: Status: RESOLVED | Noted: 2020-02-23 | Resolved: 2020-10-01

## 2020-10-01 PROCEDURE — G0008 ADMIN INFLUENZA VIRUS VAC: HCPCS | Performed by: FAMILY MEDICINE

## 2020-10-01 PROCEDURE — 99214 OFFICE O/P EST MOD 30 MIN: CPT | Performed by: FAMILY MEDICINE

## 2020-10-01 PROCEDURE — G0439 PPPS, SUBSEQ VISIT: HCPCS | Performed by: FAMILY MEDICINE

## 2020-10-01 PROCEDURE — 90694 VACC AIIV4 NO PRSRV 0.5ML IM: CPT | Performed by: FAMILY MEDICINE

## 2020-10-01 RX ORDER — DULOXETIN HYDROCHLORIDE 30 MG/1
30 CAPSULE, DELAYED RELEASE ORAL DAILY
Qty: 90 CAPSULE | Refills: 1 | Status: SHIPPED | OUTPATIENT
Start: 2020-10-01 | End: 2021-04-19 | Stop reason: SDUPTHER

## 2020-10-01 RX ORDER — PANTOPRAZOLE SODIUM 20 MG/1
20 TABLET, DELAYED RELEASE ORAL NIGHTLY
Qty: 90 TABLET | Refills: 1 | Status: SHIPPED | OUTPATIENT
Start: 2020-10-01 | End: 2021-10-04

## 2020-10-01 RX ORDER — EZETIMIBE 10 MG/1
10 TABLET ORAL NIGHTLY
Qty: 90 TABLET | Refills: 0 | Status: SHIPPED | OUTPATIENT
Start: 2020-10-01 | End: 2021-02-17

## 2020-10-01 RX ORDER — ATORVASTATIN CALCIUM 20 MG/1
20 TABLET, FILM COATED ORAL NIGHTLY
Qty: 90 TABLET | Refills: 1 | Status: SHIPPED | OUTPATIENT
Start: 2020-10-01 | End: 2021-04-19 | Stop reason: SDUPTHER

## 2020-10-01 RX ORDER — METOPROLOL SUCCINATE 50 MG/1
50 TABLET, EXTENDED RELEASE ORAL DAILY
Qty: 90 TABLET | Refills: 1 | Status: SHIPPED | OUTPATIENT
Start: 2020-10-01 | End: 2021-04-19 | Stop reason: SDUPTHER

## 2020-10-01 NOTE — PATIENT INSTRUCTIONS
Check on insurance coverage and cost for Shingrix (newest shingles vaccine) and get the immunization at your local pharmacy. It is more effective than the old Zostavax vaccine and is recommended even if you have had the Zostavax vaccine in the past. For more information, please look at the website below:    https://www.cdc.gov/vaccines/vpd/shingles/public/shingrix/index.html      Calorie Counting for Weight Loss  Calories are units of energy. Your body needs a certain amount of calories from food to keep you going throughout the day. When you eat more calories than your body needs, your body stores the extra calories as fat. When you eat fewer calories than your body needs, your body burns fat to get the energy it needs.  Calorie counting means keeping track of how many calories you eat and drink each day. Calorie counting can be helpful if you need to lose weight. If you make sure to eat fewer calories than your body needs, you should lose weight. Ask your health care provider what a healthy weight is for you.  For calorie counting to work, you will need to eat the right number of calories in a day in order to lose a healthy amount of weight per week. A dietitian can help you determine how many calories you need in a day and will give you suggestions on how to reach your calorie goal.  · A healthy amount of weight to lose per week is usually 1-2 lb (0.5-0.9 kg). This usually means that your daily calorie intake should be reduced by 500-750 calories.  · Eating 1,200 - 1,500 calories per day can help most women lose weight.  · Eating 1,500 - 1,800 calories per day can help most men lose weight.  What is my plan?  My goal is to have __________ calories per day.  If I have this many calories per day, I should lose around __________ pounds per week.  What do I need to know about calorie counting?  In order to meet your daily calorie goal, you will need to:  · Find out how many calories are in each food you would like to  eat. Try to do this before you eat.  · Decide how much of the food you plan to eat.  · Write down what you ate and how many calories it had. Doing this is called keeping a food log.  To successfully lose weight, it is important to balance calorie counting with a healthy lifestyle that includes regular activity. Aim for 150 minutes of moderate exercise (such as walking) or 75 minutes of vigorous exercise (such as running) each week.  Where do I find calorie information?    The number of calories in a food can be found on a Nutrition Facts label. If a food does not have a Nutrition Facts label, try to look up the calories online or ask your dietitian for help.  Remember that calories are listed per serving. If you choose to have more than one serving of a food, you will have to multiply the calories per serving by the amount of servings you plan to eat. For example, the label on a package of bread might say that a serving size is 1 slice and that there are 90 calories in a serving. If you eat 1 slice, you will have eaten 90 calories. If you eat 2 slices, you will have eaten 180 calories.  How do I keep a food log?  Immediately after each meal, record the following information in your food log:  · What you ate. Don't forget to include toppings, sauces, and other extras on the food.  · How much you ate. This can be measured in cups, ounces, or number of items.  · How many calories each food and drink had.  · The total number of calories in the meal.  Keep your food log near you, such as in a small notebook in your pocket, or use a mobile paresh or website. Some programs will calculate calories for you and show you how many calories you have left for the day to meet your goal.  What are some calorie counting tips?    · Use your calories on foods and drinks that will fill you up and not leave you hungry:  ? Some examples of foods that fill you up are nuts and nut butters, vegetables, lean proteins, and high-fiber foods like  "whole grains. High-fiber foods are foods with more than 5 g fiber per serving.  ? Drinks such as sodas, specialty coffee drinks, alcohol, and juices have a lot of calories, yet do not fill you up.  · Eat nutritious foods and avoid empty calories. Empty calories are calories you get from foods or beverages that do not have many vitamins or protein, such as candy, sweets, and soda. It is better to have a nutritious high-calorie food (such as an avocado) than a food with few nutrients (such as a bag of chips).  · Know how many calories are in the foods you eat most often. This will help you calculate calorie counts faster.  · Pay attention to calories in drinks. Low-calorie drinks include water and unsweetened drinks.  · Pay attention to nutrition labels for \"low fat\" or \"fat free\" foods. These foods sometimes have the same amount of calories or more calories than the full fat versions. They also often have added sugar, starch, or salt, to make up for flavor that was removed with the fat.  · Find a way of tracking calories that works for you. Get creative. Try different apps or programs if writing down calories does not work for you.  What are some portion control tips?  · Know how many calories are in a serving. This will help you know how many servings of a certain food you can have.  · Use a measuring cup to measure serving sizes. You could also try weighing out portions on a kitchen scale. With time, you will be able to estimate serving sizes for some foods.  · Take some time to put servings of different foods on your favorite plates, bowls, and cups so you know what a serving looks like.  · Try not to eat straight from a bag or box. Doing this can lead to overeating. Put the amount you would like to eat in a cup or on a plate to make sure you are eating the right portion.  · Use smaller plates, glasses, and bowls to prevent overeating.  · Try not to multitask (for example, watch TV or use your computer) while " "eating. If it is time to eat, sit down at a table and enjoy your food. This will help you to know when you are full. It will also help you to be aware of what you are eating and how much you are eating.  What are tips for following this plan?  Reading food labels  · Check the calorie count compared to the serving size. The serving size may be smaller than what you are used to eating.  · Check the source of the calories. Make sure the food you are eating is high in vitamins and protein and low in saturated and trans fats.  Shopping  · Read nutrition labels while you shop. This will help you make healthy decisions before you decide to purchase your food.  · Make a grocery list and stick to it.  Cooking  · Try to cook your favorite foods in a healthier way. For example, try baking instead of frying.  · Use low-fat dairy products.  Meal planning  · Use more fruits and vegetables. Half of your plate should be fruits and vegetables.  · Include lean proteins like poultry and fish.  How do I count calories when eating out?  · Ask for smaller portion sizes.  · Consider sharing an entree and sides instead of getting your own entree.  · If you get your own entree, eat only half. Ask for a box at the beginning of your meal and put the rest of your entree in it so you are not tempted to eat it.  · If calories are listed on the menu, choose the lower calorie options.  · Choose dishes that include vegetables, fruits, whole grains, low-fat dairy products, and lean protein.  · Choose items that are boiled, broiled, grilled, or steamed. Stay away from items that are buttered, battered, fried, or served with cream sauce. Items labeled \"crispy\" are usually fried, unless stated otherwise.  · Choose water, low-fat milk, unsweetened iced tea, or other drinks without added sugar. If you want an alcoholic beverage, choose a lower calorie option such as a glass of wine or light beer.  · Ask for dressings, sauces, and syrups on the side. " These are usually high in calories, so you should limit the amount you eat.  · If you want a salad, choose a garden salad and ask for grilled meats. Avoid extra toppings like finley, cheese, or fried items. Ask for the dressing on the side, or ask for olive oil and vinegar or lemon to use as dressing.  · Estimate how many servings of a food you are given. For example, a serving of cooked rice is ½ cup or about the size of half a baseball. Knowing serving sizes will help you be aware of how much food you are eating at restaurants. The list below tells you how big or small some common portion sizes are based on everyday objects:  ? 1 oz--4 stacked dice.  ? 3 oz--1 deck of cards.  ? 1 tsp--1 die.  ? 1 Tbsp--½ a ping-pong ball.  ? 2 Tbsp--1 ping-pong ball.  ? ½ cup--½ baseball.  ? 1 cup--1 baseball.  Summary  · Calorie counting means keeping track of how many calories you eat and drink each day. If you eat fewer calories than your body needs, you should lose weight.  · A healthy amount of weight to lose per week is usually 1-2 lb (0.5-0.9 kg). This usually means reducing your daily calorie intake by 500-750 calories.  · The number of calories in a food can be found on a Nutrition Facts label. If a food does not have a Nutrition Facts label, try to look up the calories online or ask your dietitian for help.  · Use your calories on foods and drinks that will fill you up, and not on foods and drinks that will leave you hungry.  · Use smaller plates, glasses, and bowls to prevent overeating.  This information is not intended to replace advice given to you by your health care provider. Make sure you discuss any questions you have with your health care provider.  Document Released: 12/18/2006 Document Revised: 09/06/2019 Document Reviewed: 11/17/2017  Elsevier Patient Education © 2020 [employer]      Exercising to Lose Weight  Exercise is structured, repetitive physical activity to improve fitness and health. Getting  regular exercise is important for everyone. It is especially important if you are overweight. Being overweight increases your risk of heart disease, stroke, diabetes, high blood pressure, and several types of cancer. Reducing your calorie intake and exercising can help you lose weight.  Exercise is usually categorized as moderate or vigorous intensity. To lose weight, most people need to do a certain amount of moderate-intensity or vigorous-intensity exercise each week.  Moderate-intensity exercise    Moderate-intensity exercise is any activity that gets you moving enough to burn at least three times more energy (calories) than if you were sitting.  Examples of moderate exercise include:  · Walking a mile in 15 minutes.  · Doing light yard work.  · Biking at an easy pace.  Most people should get at least 150 minutes (2 hours and 30 minutes) a week of moderate-intensity exercise to maintain their body weight.  Vigorous-intensity exercise  Vigorous-intensity exercise is any activity that gets you moving enough to burn at least six times more calories than if you were sitting. When you exercise at this intensity, you should be working hard enough that you are not able to carry on a conversation.  Examples of vigorous exercise include:  · Running.  · Playing a team sport, such as football, basketball, and soccer.  · Jumping rope.  Most people should get at least 75 minutes (1 hour and 15 minutes) a week of vigorous-intensity exercise to maintain their body weight.  How can exercise affect me?  When you exercise enough to burn more calories than you eat, you lose weight. Exercise also reduces body fat and builds muscle. The more muscle you have, the more calories you burn. Exercise also:  · Improves mood.  · Reduces stress and tension.  · Improves your overall fitness, flexibility, and endurance.  · Increases bone strength.  The amount of exercise you need to lose weight depends on:  · Your age.  · The type of  exercise.  · Any health conditions you have.  · Your overall physical ability.  Talk to your health care provider about how much exercise you need and what types of activities are safe for you.  What actions can I take to lose weight?  Nutrition    · Make changes to your diet as told by your health care provider or diet and nutrition specialist (dietitian). This may include:  ? Eating fewer calories.  ? Eating more protein.  ? Eating less unhealthy fats.  ? Eating a diet that includes fresh fruits and vegetables, whole grains, low-fat dairy products, and lean protein.  ? Avoiding foods with added fat, salt, and sugar.  · Drink plenty of water while you exercise to prevent dehydration or heat stroke.  Activity  · Choose an activity that you enjoy and set realistic goals. Your health care provider can help you make an exercise plan that works for you.  · Exercise at a moderate or vigorous intensity most days of the week.  ? The intensity of exercise may vary from person to person. You can tell how intense a workout is for you by paying attention to your breathing and heartbeat. Most people will notice their breathing and heartbeat get faster with more intense exercise.  · Do resistance training twice each week, such as:  ? Push-ups.  ? Sit-ups.  ? Lifting weights.  ? Using resistance bands.  · Getting short amounts of exercise can be just as helpful as long structured periods of exercise. If you have trouble finding time to exercise, try to include exercise in your daily routine.  ? Get up, stretch, and walk around every 30 minutes throughout the day.  ? Go for a walk during your lunch break.  ? Park your car farther away from your destination.  ? If you take public transportation, get off one stop early and walk the rest of the way.  ? Make phone calls while standing up and walking around.  ? Take the stairs instead of elevators or escalators.  · Wear comfortable clothes and shoes with good support.  · Do not  exercise so much that you hurt yourself, feel dizzy, or get very short of breath.  Where to find more information  · [place] Department of Health and Human Services: www.hhs.gov  · Centers for Disease Control and Prevention (CDC): www.cdc.gov  Contact a health care provider:  · Before starting a new exercise program.  · If you have questions or concerns about your weight.  · If you have a medical problem that keeps you from exercising.  Get help right away if you have any of the following while exercising:  · Injury.  · Dizziness.  · Difficulty breathing or shortness of breath that does not go away when you stop exercising.  · Chest pain.  · Rapid heartbeat.  Summary  · Being overweight increases your risk of heart disease, stroke, diabetes, high blood pressure, and several types of cancer.  · Losing weight happens when you burn more calories than you eat.  · Reducing the amount of calories you eat in addition to getting regular moderate or vigorous exercise each week helps you lose weight.  This information is not intended to replace advice given to you by your health care provider. Make sure you discuss any questions you have with your health care provider.  Document Released: 01/20/2012 Document Revised: 12/31/2018 Document Reviewed: 12/31/2018  [employer] Patient Education © 2020 [employer]        Fall Prevention in the Home, Adult  Falls can cause injuries and can affect people from all age groups. There are many simple things that you can do to make your home safe and to help prevent falls. Ask for help when making these changes, if needed.  What actions can I take to prevent falls?  General instructions  · Use good lighting in all rooms. Replace any light bulbs that burn out.  · Turn on lights if it is dark. Use night-lights.  · Place frequently used items in easy-to-reach places. Lower the shelves around your home if necessary.  · Set up furniture so that there are clear paths around it. Avoid moving your  furniture around.  · Remove throw rugs and other tripping hazards from the floor.  · Avoid walking on wet floors.  · Fix any uneven floor surfaces.  · Add color or contrast paint or tape to grab bars and handrails in your home. Place contrasting color strips on the first and last steps of stairways.  · When you use a stepladder, make sure that it is completely opened and that the sides are firmly locked. Have someone hold the ladder while you are using it. Do not climb a closed stepladder.  · Be aware of any and all pets.  What can I do in the bathroom?         · Keep the floor dry. Immediately clean up any water that spills onto the floor.  · Remove soap buildup in the tub or shower on a regular basis.  · Use non-skid mats or decals on the floor of the tub or shower.  · Attach bath mats securely with double-sided, non-slip rug tape.  · If you need to sit down while you are in the shower, use a plastic, non-slip stool.  · Install grab bars by the toilet and in the tub and shower. Do not use towel bars as grab bars.  What can I do in the bedroom?  · Make sure that a bedside light is easy to reach.  · Do not use oversized bedding that drapes onto the floor.  · Have a firm chair that has side arms to use for getting dressed.  What can I do in the kitchen?  · Clean up any spills right away.  · If you need to reach for something above you, use a sturdy step stool that has a grab bar.  · Keep electrical cables out of the way.  · Do not use floor polish or wax that makes floors slippery. If you must use wax, make sure that it is non-skid floor wax.  What can I do in the stairways?  · Do not leave any items on the stairs.  · Make sure that you have a light switch at the top of the stairs and the bottom of the stairs. Have them installed if you do not have them.  · Make sure that there are handrails on both sides of the stairs. Fix handrails that are broken or loose. Make sure that handrails are as long as the  stairways.  · Install non-slip stair treads on all stairs in your home.  · Avoid having throw rugs at the top or bottom of stairways, or secure the rugs with carpet tape to prevent them from moving.  · Choose a carpet design that does not hide the edge of steps on the stairway.  · Check any carpeting to make sure that it is firmly attached to the stairs. Fix any carpet that is loose or worn.  What can I do on the outside of my home?  · Use bright outdoor lighting.  · Regularly repair the edges of walkways and driveways and fix any cracks.  · Remove high doorway thresholds.  · Trim any shrubbery on the main path into your home.  · Regularly check that handrails are securely fastened and in good repair. Both sides of any steps should have handrails.  · Install guardrails along the edges of any raised decks or porches.  · Clear walkways of debris and clutter, including tools and rocks.  · Have leaves, snow, and ice cleared regularly.  · Use sand or salt on walkways during winter months.  · In the garage, clean up any spills right away, including grease or oil spills.  What other actions can I take?  · Wear closed-toe shoes that fit well and support your feet. Wear shoes that have rubber soles or low heels.  · Use mobility aids as needed, such as canes, walkers, scooters, and crutches.  · Review your medicines with your health care provider. Some medicines can cause dizziness or changes in blood pressure, which increase your risk of falling.  Talk with your health care provider about other ways that you can decrease your risk of falls. This may include working with a physical therapist or  to improve your strength, balance, and endurance.  Where to find more information  · Centers for Disease Control and Prevention, [place]: https://www.cdc.gov  · [employer] [place] [employer] Aging: https://sr7gwfd.ramiro.nih.gov  Contact a health care provider if:  · You are afraid of falling at home.  · You feel weak, drowsy, or dizzy at  home.  · You fall at home.  Summary  · There are many simple things that you can do to make your home safe and to help prevent falls.  · Ways to make your home safe include removing tripping hazards and installing grab bars in the bathroom.  · Ask for help when making these changes in your home.  This information is not intended to replace advice given to you by your health care provider. Make sure you discuss any questions you have with your health care provider.  Document Released: 12/08/2003 Document Revised: 11/30/2018 Document Reviewed: 08/02/2018  [employer]vier Patient Education © 2020 [employer]      Sit-to-Stand Exercise    The sit-to-stand exercise (also known as the chair stand or chair rise exercise) strengthens your lower body and helps you maintain or improve your mobility and independence. The goal is to do the sit-to-stand exercise without using your hands. This will be easier as you become stronger. You should always talk with your health care provider before starting any exercise program, especially if you have had recent surgery.  Do the exercise exactly as told by your health care provider and adjust it as directed. It is normal to feel mild stretching, pulling, tightness, or discomfort as you do this exercise, but you should stop right away if you feel sudden pain or your pain gets worse. Do not begin doing this exercise until told by your health care provider.  What the sit-to-stand exercise does  The sit-to-stand exercise helps to strengthen the muscles in your thighs and the muscles in the center of your body that give you stability (core muscles). This exercise is especially helpful if:  · You have had knee or hip surgery.  · You have trouble getting up from a chair, out of a car, or off the toilet.  How to do the sit-to-stand exercise  1. Sit toward the front edge of a sturdy chair without armrests. Your knees should be bent and your feet should be flat on the floor and shoulder-width  apart.  2. Place your hands lightly on each side of the seat. Keep your back and neck as straight as possible, with your chest slightly forward.  3. Breathe in slowly. Lean forward and slightly shift your weight to the front of your feet.  4. Breathe out as you slowly stand up. Use your hands as little as possible.  5. Stand and pause for a full breath in and out.  6. Breathe in as you sit down slowly. Tighten your core and abdominal muscles to control your lowering as much as possible.  7. Breathe out slowly.  8. Do this exercise 10-15 times. If needed, do it fewer times until you build up strength.  9. Rest for 1 minute, then do another set of 10-15 repetitions.  To change the difficulty of the sit-to-stand exercise  · If the exercise is too difficult, use a chair with sturdy armrests, and push off the armrests to help you come to the standing position. You can also use the armrests to help slowly lower yourself back to sitting. As this gets easier, try to use your arms less. You can also place a firm cushion or pillow on the chair to make the surface higher.  · If this exercise is too easy, do not use your arms to help raise or lower yourself. You can also wear a weighted vest, use hand weights, increase your repetitions, or try a lower chair.  General tips  · You may feel tired when starting an exercise routine. This is normal.  · You may have muscle soreness that lasts a few days. This is normal. As you get stronger, you may not feel muscle soreness.  · Use smooth, steady movements.  · Do not  hold your breath during strength exercises. This can cause unsafe changes in your blood pressure.  · Breathe in slowly through your nose, and breathe out slowly through your mouth.  Summary  · Strengthening your lower body is an important step to help you move safely and independently.  · The sit-to-stand exercise helps strengthen the muscles in your thighs and core.  · You should always talk with your health care  provider before starting any exercise program, especially if you have had recent surgery.  This information is not intended to replace advice given to you by your health care provider. Make sure you discuss any questions you have with your health care provider.  Document Released: 02/08/2018 Document Revised: 10/16/2019 Document Reviewed: 02/08/2018  Elsevier Patient Education © 2020 [employer]

## 2020-10-01 NOTE — PROGRESS NOTES
The ABCs of the Annual Wellness Visit  Subsequent Medicare Wellness Visit    Chief Complaint   Patient presents with   • Medicare Wellness-subsequent       Subjective   History of Present Illness:  Marleni Hummel is a 84 y.o. female who presents for a Subsequent Medicare Wellness Visit.  She is also here to review labs and refill medicines.  Cholesterol is stable to improved.  GERD symptoms stable.  Blood pressure controlled.  Panic disorder stable.    She is planning on getting surgery to remove mass from left kidney in the near future.  We will delay mammogram and DEXA scanning until she is postoperative and controlled after surgery.  Patient had labs and these have been reviewed.  Interval improvement in kidney function noted.  Her hemoglobin is back down to 11.6 which was similar in February of this year.    Preventative measures also include immunizations including Shingrix.  Information shared in after visit summary.  Flu shot given today.    HEALTH RISK ASSESSMENT    Recent Hospitalizations:  No hospitalization(s) within the last year.    Current Medical Providers:  Patient Care Team:  Ken Andujar MD as PCP - General  Ken Andujar MD as PCP - Claims Attributed  Chuckie Mclaughlin MD as Consulting Physician (Urology)  Jason Tai MD as Consulting Physician (Cardiology)  Geoffrey Mills MD as Consulting Physician (Nephrology)  Anayeli Alanis MD as Consulting Physician (Obstetrics and Gynecology)  BROOK Clarke MD as Consulting Physician (Ophthalmology)  Jose Alfredo Krishnamruthy MD as Consulting Physician (Hematology and Oncology)  Sean Canales MD as Consulting Physician (Orthopedic Surgery)  Esteban Moe MD as Consulting Physician (Orthopedic Surgery)  Feliciano Elizabeth MD as Consulting Physician (Gastroenterology)    Smoking Status:  Social History     Tobacco Use   Smoking Status Former Smoker   • Packs/day: 1.00   • Years: 3.00   • Pack years: 3.00   • Types: Cigarettes   •  Quit date: 1956   • Years since quittin.6   Smokeless Tobacco Never Used   Tobacco Comment    decaf coffee        Alcohol Consumption:  Social History     Substance and Sexual Activity   Alcohol Use Yes   • Alcohol/week: 1.0 standard drinks   • Types: 1 Glasses of wine per week    Comment: rare       Depression Screen:   PHQ-2/PHQ-9 Depression Screening 10/1/2020   Little interest or pleasure in doing things 1   Feeling down, depressed, or hopeless 1   Trouble falling or staying asleep, or sleeping too much 1   Feeling tired or having little energy 1   Poor appetite or overeating 1   Feeling bad about yourself - or that you are a failure or have let yourself or your family down 0   Trouble concentrating on things, such as reading the newspaper or watching television 0   Moving or speaking so slowly that other people could have noticed. Or the opposite - being so fidgety or restless that you have been moving around a lot more than usual 1   Thoughts that you would be better off dead, or of hurting yourself in some way 0   Total Score 6   If you checked off any problems, how difficult have these problems made it for you to do your work, take care of things at home, or get along with other people? Somewhat difficult       Fall Risk Screen:  STEADI Fall Risk Assessment was completed, and patient is at MODERATE risk for falls. Assessment completed on:10/1/2020    Health Habits and Functional and Cognitive Screening:  Functional & Cognitive Status 10/1/2020   Do you have difficulty preparing food and eating? No   Do you have difficulty bathing yourself, getting dressed or grooming yourself? No   Do you have difficulty using the toilet? No   Do you have difficulty moving around from place to place? Yes   Do you have trouble with steps or getting out of a bed or a chair? Yes   Current Diet Well Balanced Diet   Dental Exam Up to date   Eye Exam Up to date   Exercise (times per week) -   Current Exercise Activities  Include None   Do you need help using the phone?  No   Are you deaf or do you have serious difficulty hearing?  No   Do you need help with transportation? No   Do you need help shopping? Yes   Do you need help preparing meals?  No   Do you need help with housework?  Yes   Do you need help with laundry? No   Do you need help taking your medications? No   Do you need help managing money? No   Do you ever drive or ride in a car without wearing a seat belt? No   Have you felt unusual stress, anger or loneliness in the last month? Yes   Who do you live with? Alone   If you need help, do you have trouble finding someone available to you? No   Have you been bothered in the last four weeks by sexual problems? No   Do you have difficulty concentrating, remembering or making decisions? No         Does the patient have evidence of cognitive impairment? No    Asprin use counseling:Contraindicated from taking ASA    Age-appropriate Screening Schedule:  Refer to the list below for future screening recommendations based on patient's age, sex and/or medical conditions. Orders for these recommended tests are listed in the plan section. The patient has been provided with a written plan.    Health Maintenance   Topic Date Due   • ZOSTER VACCINE (1 of 2) 12/10/1987   • MAMMOGRAM  04/01/2021 (Originally 8/8/2019)   • DXA SCAN  04/01/2021 (Originally 6/23/2019)   • LIPID PANEL  09/11/2021   • TDAP/TD VACCINES (2 - Td) 10/27/2028   • INFLUENZA VACCINE  Completed          The following portions of the patient's history were reviewed and updated as appropriate: allergies, current medications, past family history, past medical history, past social history, past surgical history and problem list.    Outpatient Medications Prior to Visit   Medication Sig Dispense Refill   • acetaminophen (TYLENOL) 500 MG tablet Take 1,000 mg by mouth 3 (three) times a day as needed for mild pain (1-3) or moderate pain (4-6).     • apixaban (ELIQUIS) 2.5 MG  tablet tablet Take 2.5 mg by mouth 2 (Two) Times a Day.     • Cholecalciferol (VITAMIN D3) 5000 units capsule capsule Take 5,000 Units by mouth Daily.     • Mirabegron ER (Myrbetriq) 50 MG tablet sustained-release 24 hour 24 hr tablet Take 50 mg by mouth Daily.     • atorvastatin (LIPITOR) 20 MG tablet Take 1 tablet by mouth Every Night for 180 days. 90 tablet 1   • DULoxetine (CYMBALTA) 30 MG capsule Take 1 capsule by mouth Daily for 180 days. 90 capsule 1   • ezetimibe (ZETIA) 10 MG tablet Take 1 tablet by mouth Every Night for 90 days. 90 tablet 0   • metoprolol succinate XL (TOPROL-XL) 50 MG 24 hr tablet Take 1 tablet by mouth Daily for 180 days. 90 tablet 1   • pantoprazole (PROTONIX) 20 MG EC tablet Take 1 tablet by mouth Every Night for 180 days. 90 tablet 1     No facility-administered medications prior to visit.        Patient Active Problem List   Diagnosis   • Arthritis involving multiple sites   • Essential hypertension   • Atrial fibrillation (CMS/HCC)   • History of Bell's palsy   • Chronic kidney disease (CKD), stage III (moderate) (CMS/HCC)   • Gastroesophageal reflux disease without esophagitis   • Hypercholesterolemia   • Insomnia   • Panic disorder   • Chronic nonseasonal allergic rhinitis due to pollen   • Sleep apnea   • History of MI (myocardial infarction)   • Chronic coronary artery disease   • Anemia of chronic disease   • Weakness of right leg   • Cardiac murmur   • Senile osteoporosis   • Hyperuricemia   • Grief reaction   • Peripheral vertigo   • Left kidney mass       Advanced Care Planning:  ACP discussion was held with the patient during this visit. Patient has an advance directive in EMR which is still valid.     Review of Systems   Constitutional: Positive for fatigue.   Neurological: Positive for weakness.       Compared to one year ago, the patient feels her physical health is worse. Due to kidney mass  Compared to one year ago, the patient feels her mental health is the  "same.    Reviewed chart for potential of high risk medication in the elderly: yes  Reviewed chart for potential of harmful drug interactions in the elderly:yes    Objective         Vitals:    10/01/20 1400   BP: 122/66   Pulse: 62   Resp: 16   Temp: 96.6 °F (35.9 °C)   TempSrc: Tympanic   SpO2: 98%   Weight: 69.9 kg (154 lb)   Height: 157.5 cm (62\")   PainSc: 0-No pain       Body mass index is 28.17 kg/m².  Discussed the patient's BMI with her. The BMI is above average; BMI management plan is completed.    Physical Exam  Vitals signs reviewed.   Constitutional:       General: She is not in acute distress.  Eyes:      General: Lids are normal.      Conjunctiva/sclera: Conjunctivae normal.   Neck:      Vascular: No carotid bruit.      Trachea: No tracheal deviation.   Cardiovascular:      Rate and Rhythm: Normal rate and regular rhythm.      Heart sounds: Normal heart sounds. No murmur.   Pulmonary:      Effort: Pulmonary effort is normal.      Breath sounds: Normal breath sounds.   Skin:     General: Skin is warm and dry.   Neurological:      Mental Status: She is alert. She is not disoriented.   Psychiatric:         Speech: Speech normal.         Behavior: Behavior normal. Behavior is cooperative.         Lab Results   Component Value Date     (H) 09/11/2020    CHLPL 153 09/11/2020    TRIG 135 09/11/2020    HDL 62 (H) 09/11/2020    LDL 64 09/11/2020    VLDL 27 09/11/2020        Assessment/Plan   Medicare Risks and Personalized Health Plan  CMS Preventative Services Quick Reference  Advance Directive Discussion  Cardiovascular risk  Diabetic Lab Screening   Fall Risk  Immunizations Discussed/Encouraged (specific immunizations; Shingrix )  Inactivity/Sedentary  Obesity/Overweight     The above risks/problems have been discussed with the patient.  Pertinent information has been shared with the patient in the After Visit Summary.  Follow up plans and orders are seen below in the Assessment/Plan " Section.    Diagnoses and all orders for this visit:    1. Medicare annual wellness visit, subsequent (Primary)  Comments:  Information on fall prevention and immunization shared in after visit summary.  Diet and exercise information shared as well    2. Hypercholesterolemia  Assessment & Plan:  Lipid abnormalities are unchanged.  Pharmacotherapy as ordered.  Lipids will be reassessed in 6 months.    Orders:  -     atorvastatin (LIPITOR) 20 MG tablet; Take 1 tablet by mouth Every Night for 180 days.  Dispense: 90 tablet; Refill: 1  -     ezetimibe (ZETIA) 10 MG tablet; Take 1 tablet by mouth Every Night for 90 days.  Dispense: 90 tablet; Refill: 0    3. Panic disorder  Assessment & Plan:  Psychological condition is unchanged.  Continue current treatment regimen.  Psychological condition  will be reassessed at the next regular appointment.    Orders:  -     DULoxetine (CYMBALTA) 30 MG capsule; Take 1 capsule by mouth Daily for 180 days.  Dispense: 90 capsule; Refill: 1    4. Essential hypertension  Assessment & Plan:  Hypertension is unchanged.  Continue current treatment regimen.  Blood pressure will be reassessed at the next regular appointment.    Orders:  -     metoprolol succinate XL (TOPROL-XL) 50 MG 24 hr tablet; Take 1 tablet by mouth Daily for 180 days.  Dispense: 90 tablet; Refill: 1    5. Gastroesophageal reflux disease without esophagitis  Assessment & Plan:  The current medical regimen is effective;  continue present plan and medications.        Orders:  -     pantoprazole (PROTONIX) 20 MG EC tablet; Take 1 tablet by mouth Every Night for 180 days.  Dispense: 90 tablet; Refill: 1    6. Immunization due  -     Fluad Quad 65+ yrs ([id number])    7. Stage 3b chronic kidney disease  Assessment & Plan:  Renal condition is improving with treatment.  Continue current treatment regimen.  Renal condition will be reassessed in 6 months.      8. At moderate risk for fall  Fall prevention information and sit to stand  exercise shared in after visit summary.  Follow Up:  Return in about 6 months (around 4/1/2021) for Recheck/Medication.     An After Visit Summary and PPPS were given to the patient.

## 2020-10-12 ENCOUNTER — TELEPHONE (OUTPATIENT)
Dept: CARDIOLOGY | Facility: CLINIC | Age: 85
End: 2020-10-12

## 2020-10-12 NOTE — TELEPHONE ENCOUNTER
Received fax request for cardiac clearance.  Patient is scheduled for a hand assisted laparoscopic nephrectomy under general anesthesia by Dr. Chuckie Mclaughlin.   He requests patient hold Plavix x 7 days and Eliquis x 3 days prior to procedure.     I have placed the request in your inbox for review. / JIGNESH Mclaughlin MD  phone [phone]  fax# [phone]

## 2020-10-13 NOTE — TELEPHONE ENCOUNTER
Faxed completed cardiac clearance request form from Dr. Mclaughlin. Fax# [phone].  Faxed confirmation received. / JIGNESH

## 2020-10-14 ENCOUNTER — TELEPHONE (OUTPATIENT)
Dept: FAMILY MEDICINE CLINIC | Facility: CLINIC | Age: 85
End: 2020-10-14

## 2020-10-14 DIAGNOSIS — Z12.31 ENCOUNTER FOR SCREENING MAMMOGRAM FOR MALIGNANT NEOPLASM OF BREAST: Primary | ICD-10-CM

## 2020-11-03 ENCOUNTER — TRANSCRIBE ORDERS (OUTPATIENT)
Dept: PREADMISSION TESTING | Facility: HOSPITAL | Age: 85
End: 2020-11-03

## 2020-11-03 DIAGNOSIS — Z01.818 OTHER SPECIFIED PRE-OPERATIVE EXAMINATION: Primary | ICD-10-CM

## 2020-11-09 ENCOUNTER — HOSPITAL ENCOUNTER (OUTPATIENT)
Dept: GENERAL RADIOLOGY | Facility: HOSPITAL | Age: 85
Discharge: HOME | End: 2020-11-09

## 2020-11-09 ENCOUNTER — APPOINTMENT (OUTPATIENT)
Dept: PREADMISSION TESTING | Facility: HOSPITAL | Age: 85
End: 2020-11-09

## 2020-11-09 VITALS
HEART RATE: 61 BPM | RESPIRATION RATE: 16 BRPM | WEIGHT: 152.7 LBS | SYSTOLIC BLOOD PRESSURE: 127 MMHG | OXYGEN SATURATION: 99 % | TEMPERATURE: 97.6 F | HEIGHT: 62 IN | BODY MASS INDEX: 28.1 KG/M2 | DIASTOLIC BLOOD PRESSURE: 73 MMHG

## 2020-11-09 LAB
ABO GROUP BLD: NORMAL
ANION GAP SERPL CALCULATED.3IONS-SCNC: 10.6 MMOL/L (ref 5–15)
BLD GP AB SCN SERPL QL: NEGATIVE
BUN SERPL-MCNC: 24 MG/DL (ref 8–23)
BUN/CREAT SERPL: 19 (ref 7–25)
CALCIUM SPEC-SCNC: 9.7 MG/DL (ref 8.6–10.5)
CHLORIDE SERPL-SCNC: 102 MMOL/L (ref 98–107)
CO2 SERPL-SCNC: 24.4 MMOL/L (ref 22–29)
CREAT SERPL-MCNC: 1.26 MG/DL (ref 0.57–1)
DEPRECATED RDW RBC AUTO: 44.5 FL (ref 37–54)
ERYTHROCYTE [DISTWIDTH] IN BLOOD BY AUTOMATED COUNT: 13.1 % (ref 12.3–15.4)
GFR SERPL CREATININE-BSD FRML MDRD: 41 ML/MIN/1.73
GLUCOSE SERPL-MCNC: 124 MG/DL (ref 65–99)
HCT VFR BLD AUTO: 34.5 % (ref 34–46.6)
HGB BLD-MCNC: 11.4 G/DL (ref 12–15.9)
MCH RBC QN AUTO: 30.6 PG (ref 26.6–33)
MCHC RBC AUTO-ENTMCNC: 33 G/DL (ref 31.5–35.7)
MCV RBC AUTO: 92.5 FL (ref 79–97)
PLATELET # BLD AUTO: 169 10*3/MM3 (ref 140–450)
PMV BLD AUTO: 10 FL (ref 6–12)
POTASSIUM SERPL-SCNC: 4.8 MMOL/L (ref 3.5–5.2)
RBC # BLD AUTO: 3.73 10*6/MM3 (ref 3.77–5.28)
RH BLD: POSITIVE
SODIUM SERPL-SCNC: 137 MMOL/L (ref 136–145)
T&S EXPIRATION DATE: NORMAL
WBC # BLD AUTO: 5.51 10*3/MM3 (ref 3.4–10.8)

## 2020-11-09 PROCEDURE — 85027 COMPLETE CBC AUTOMATED: CPT | Performed by: UROLOGY

## 2020-11-09 PROCEDURE — 86900 BLOOD TYPING SEROLOGIC ABO: CPT | Performed by: UROLOGY

## 2020-11-09 PROCEDURE — 86920 COMPATIBILITY TEST SPIN: CPT

## 2020-11-09 PROCEDURE — 36415 COLL VENOUS BLD VENIPUNCTURE: CPT

## 2020-11-09 PROCEDURE — 71046 X-RAY EXAM CHEST 2 VIEWS: CPT

## 2020-11-09 PROCEDURE — 80048 BASIC METABOLIC PNL TOTAL CA: CPT | Performed by: UROLOGY

## 2020-11-09 PROCEDURE — 86901 BLOOD TYPING SEROLOGIC RH(D): CPT | Performed by: UROLOGY

## 2020-11-09 PROCEDURE — 86850 RBC ANTIBODY SCREEN: CPT | Performed by: UROLOGY

## 2020-11-09 RX ORDER — MULTIPLE VITAMINS W/ MINERALS TAB 9MG-400MCG
1 TAB ORAL DAILY
COMMUNITY
End: 2021-12-06

## 2020-11-09 NOTE — DISCHARGE INSTRUCTIONS
Take the following medications the morning of surgery with a small sip of water:    METOPROLOL, CYMBALTA    If you are on prescription narcotic pain medication to control your pain you may also take that medication the morning of surgery.    General Instructions:  • Do not eat or drink anything after midnight the night before surgery.  • Bring any papers given to you in the doctor’s office.  • Wear clean comfortable clothes.  • Do not wear contact lenses, false eyelashes or make-up.  Bring a case for your glasses.   • Remove all piercings.  Leave jewelry and any other valuables at home.  • Hair extensions with metal clips must be removed prior to surgery.  • The Pre-Admission Testing nurse will instruct you to bring medications if unable to obtain an accurate list in Pre-Admission Testing.   • REPORT TO MAIN SURGERY ON  11- AT 0530 AM       If you were given a blood bank ID arm band remember to bring it with you the day of surgery.    Preventing a Surgical Site Infection:  • For 2 to 3 days before surgery, avoid shaving with a razor because the razor can irritate skin and make it easier to develop an infection.    • Any areas of open skin can increase the risk of a post-operative wound infection by allowing bacteria to enter and travel throughout the body.  Notify your surgeon if you have any skin wounds / rashes even if it is not near the expected surgical site.  The area will need assessed to determine if surgery should be delayed until it is healed.  • The night prior to surgery shower using a fresh bar of anti-bacterial soap (such as Dial) and clean washcloth.  Sleep in a clean bed with clean clothing.  Do not allow pets to sleep with you.  • Shower on the morning of surgery using a fresh bar of anti-bacterial soap (such as Dial) and clean washcloth.  Dry with a clean towel and dress in clean clothing.  • Ask your surgeon if you will be receiving antibiotics prior to surgery.  • Make sure you, your  family, and all healthcare providers clean their hands with soap and water or an alcohol based hand  before caring for you or your wound.    Day of surgery:  Your arrival time is approximately two hours before your scheduled surgery time.  Upon arrival, a Pre-op nurse and Anesthesiologist will review your health history, obtain vital signs, and answer questions you may have.  The only belongings needed at this time will be your home medications and if applicable your C-PAP/BI-PAP machine.  If you are staying overnight your family can leave the rest of your belongings in the car and bring them to your room later.  A Pre-op nurse will start an IV and you may receive medication in preparation for surgery, including something to help you relax.  While you are in surgery your family should notify the waiting room  if they leave the waiting room area and provide a contact phone number.    Please be aware that surgery does come with discomfort.  We want to make every effort to control your discomfort so please discuss any uncontrolled symptoms with your nurse.   Your doctor will most likely have prescribed pain medications.        If you are staying overnight following surgery, you will be transported to your hospital room following the recovery period.  [facility] has all private rooms.    If you have any questions please call Pre-Admission Testing at [phone].  Deductibles and co-payments are collected on the day of service. Please be prepared to pay the required co-pay, deductible or deposit on the day of service as defined by your plan.    Patient Education for Self-Quarantine Process    Following your COVID testing, we strongly recommend that you do not leave your home after you have been tested for COVID except to get medical care. This includes not going to work, school or to public areas.  If this is not possible for you to do please limit your activities to only required  outings.  Be sure to wear a mask when you are with other people, practice social distancing and wash your hands frequently.      The following items provide additional details to keep you safe.  • Wash your hands with soap and water frequently for at least 20 seconds.   • Avoid touching your eyes, nose and mouth with unwashed hands.  • Do not share anything - utensils, towels, food from the same bowl.   • Have your own utensils, drinking glass, dishes, towels and bedding.   • Do not have visitors.   • Do use FaceTime to stay in touch with family and friends.  • You should stay in a specific room away from others if possible.   • Stay at least 6 feet away from others in the home if you cannot have a dedicated room to yourself.   • Do not snuggle with your pet. While the CDC says there is no evidence that pets can spread COVID-19 or be infected from humans, it is probably best to avoid “petting, snuggling, being kissed or licked and sharing food (during self-quarantine)”, according to the CDC.   • Sanitize household surfaces daily. Include all high touch areas (door handles, light switches, phones, countertops, etc.)  • Do not share a bathroom with others, if possible.   • Wear a mask around others in your home if you are unable to stay in a separate room or 6 feet apart. If  you are unable to wear a mask, have your family member wear a mask if they must be within 6 feet of you.   Call your surgeon immediately if you experience any of the following symptoms:  • Sore Throat  • Shortness of Breath or difficulty breathing  • Cough  • Chills  • Body soreness or muscle pain  • Headache  • Fever  • New loss of taste or smell  • Do not arrive for your surgery ill.  Your procedure will need to be rescheduled to another time.  You will need to call your physician before the day of surgery to avoid any unnecessary exposure to hospital staff as well as other patients.

## 2020-11-13 ENCOUNTER — LAB (OUTPATIENT)
Dept: LAB | Facility: HOSPITAL | Age: 85
End: 2020-11-13

## 2020-11-13 DIAGNOSIS — Z01.818 OTHER SPECIFIED PRE-OPERATIVE EXAMINATION: ICD-10-CM

## 2020-11-13 PROCEDURE — U0004 COV-19 TEST NON-CDC HGH THRU: HCPCS

## 2020-11-13 PROCEDURE — C9803 HOPD COVID-19 SPEC COLLECT: HCPCS

## 2020-11-14 LAB — SARS-COV-2 RNA RESP QL NAA+PROBE: NOT DETECTED

## 2020-11-16 ENCOUNTER — ANESTHESIA (OUTPATIENT)
Dept: PERIOP | Facility: HOSPITAL | Age: 85
End: 2020-11-16

## 2020-11-16 ENCOUNTER — ANESTHESIA EVENT (OUTPATIENT)
Dept: PERIOP | Facility: HOSPITAL | Age: 85
End: 2020-11-16

## 2020-11-16 ENCOUNTER — HOSPITAL ENCOUNTER (INPATIENT)
Facility: HOSPITAL | Age: 85
LOS: 5 days | Discharge: HOME HEALTH | End: 2020-11-21
Attending: UROLOGY | Admitting: UROLOGY

## 2020-11-16 DIAGNOSIS — N28.89 LEFT RENAL MASS: Primary | ICD-10-CM

## 2020-11-16 DIAGNOSIS — R53.1 GENERALIZED WEAKNESS: ICD-10-CM

## 2020-11-16 PROCEDURE — 88307 TISSUE EXAM BY PATHOLOGIST: CPT | Performed by: UROLOGY

## 2020-11-16 PROCEDURE — 25010000002 FENTANYL CITRATE (PF) 100 MCG/2ML SOLUTION: Performed by: NURSE ANESTHETIST, CERTIFIED REGISTERED

## 2020-11-16 PROCEDURE — 25010000002 PROPOFOL 10 MG/ML EMULSION: Performed by: NURSE ANESTHETIST, CERTIFIED REGISTERED

## 2020-11-16 PROCEDURE — 25010000002 HYDROMORPHONE PER 4 MG: Performed by: UROLOGY

## 2020-11-16 PROCEDURE — 88341 IMHCHEM/IMCYTCHM EA ADD ANTB: CPT | Performed by: UROLOGY

## 2020-11-16 PROCEDURE — 25010000002 ONDANSETRON PER 1 MG: Performed by: NURSE ANESTHETIST, CERTIFIED REGISTERED

## 2020-11-16 PROCEDURE — 25010000002 ONDANSETRON PER 1 MG: Performed by: UROLOGY

## 2020-11-16 PROCEDURE — 0TT10ZZ RESECTION OF LEFT KIDNEY, OPEN APPROACH: ICD-10-PCS | Performed by: UROLOGY

## 2020-11-16 PROCEDURE — 25010000002 NEOSTIGMINE PER 0.5 MG: Performed by: NURSE ANESTHETIST, CERTIFIED REGISTERED

## 2020-11-16 PROCEDURE — 88321 CONSLTJ&REPRT SLD PREP ELSWR: CPT

## 2020-11-16 PROCEDURE — 25010000002 ONDANSETRON PER 1 MG: Performed by: ANESTHESIOLOGY

## 2020-11-16 PROCEDURE — 88342 IMHCHEM/IMCYTCHM 1ST ANTB: CPT | Performed by: UROLOGY

## 2020-11-16 PROCEDURE — 88341 IMHCHEM/IMCYTCHM EA ADD ANTB: CPT

## 2020-11-16 PROCEDURE — 25010000002 DEXAMETHASONE PER 1 MG: Performed by: NURSE ANESTHETIST, CERTIFIED REGISTERED

## 2020-11-16 PROCEDURE — 25010000003 CEFAZOLIN IN DEXTROSE 2-4 GM/100ML-% SOLUTION: Performed by: UROLOGY

## 2020-11-16 PROCEDURE — 25010000002 VANCOMYCIN PER 500 MG: Performed by: UROLOGY

## 2020-11-16 DEVICE — CLIP LIG HEMOLOK PA LG 6CT PRP: Type: IMPLANTABLE DEVICE | Site: ABDOMEN | Status: FUNCTIONAL

## 2020-11-16 DEVICE — ENDOPATH ETS45 2.5MM RELOADS (VASCULAR/THIN)
Type: IMPLANTABLE DEVICE | Site: ABDOMEN | Status: FUNCTIONAL
Brand: ENDOPATH

## 2020-11-16 DEVICE — FLOSEAL HEMOSTATIC MATRIX, 5ML
Type: IMPLANTABLE DEVICE | Site: ABDOMEN | Status: FUNCTIONAL
Brand: FLOSEAL HEMOSTATIC MATRIX

## 2020-11-16 RX ORDER — ACETAMINOPHEN 325 MG/1
650 TABLET ORAL EVERY 4 HOURS PRN
Status: DISCONTINUED | OUTPATIENT
Start: 2020-11-16 | End: 2020-11-21 | Stop reason: HOSPADM

## 2020-11-16 RX ORDER — LIDOCAINE HYDROCHLORIDE 20 MG/ML
INJECTION, SOLUTION INFILTRATION; PERINEURAL AS NEEDED
Status: DISCONTINUED | OUTPATIENT
Start: 2020-11-16 | End: 2020-11-16 | Stop reason: SURG

## 2020-11-16 RX ORDER — PANTOPRAZOLE SODIUM 20 MG/1
20 TABLET, DELAYED RELEASE ORAL NIGHTLY
Status: DISCONTINUED | OUTPATIENT
Start: 2020-11-16 | End: 2020-11-16

## 2020-11-16 RX ORDER — SODIUM CHLORIDE 450 MG/100ML
100 INJECTION, SOLUTION INTRAVENOUS CONTINUOUS
Status: DISCONTINUED | OUTPATIENT
Start: 2020-11-16 | End: 2020-11-17

## 2020-11-16 RX ORDER — NALOXONE HCL 0.4 MG/ML
0.2 VIAL (ML) INJECTION AS NEEDED
Status: DISCONTINUED | OUTPATIENT
Start: 2020-11-16 | End: 2020-11-16 | Stop reason: HOSPADM

## 2020-11-16 RX ORDER — SODIUM CHLORIDE 9 MG/ML
INJECTION, SOLUTION INTRAVENOUS AS NEEDED
Status: DISCONTINUED | OUTPATIENT
Start: 2020-11-16 | End: 2020-11-16 | Stop reason: HOSPADM

## 2020-11-16 RX ORDER — FAMOTIDINE 10 MG/ML
20 INJECTION, SOLUTION INTRAVENOUS ONCE
Status: COMPLETED | OUTPATIENT
Start: 2020-11-16 | End: 2020-11-16

## 2020-11-16 RX ORDER — PROMETHAZINE HYDROCHLORIDE 25 MG/1
25 SUPPOSITORY RECTAL ONCE AS NEEDED
Status: DISCONTINUED | OUTPATIENT
Start: 2020-11-16 | End: 2020-11-16 | Stop reason: HOSPADM

## 2020-11-16 RX ORDER — DEXAMETHASONE SODIUM PHOSPHATE 10 MG/ML
INJECTION INTRAMUSCULAR; INTRAVENOUS AS NEEDED
Status: DISCONTINUED | OUTPATIENT
Start: 2020-11-16 | End: 2020-11-16 | Stop reason: SURG

## 2020-11-16 RX ORDER — ONDANSETRON 2 MG/ML
INJECTION INTRAMUSCULAR; INTRAVENOUS AS NEEDED
Status: DISCONTINUED | OUTPATIENT
Start: 2020-11-16 | End: 2020-11-16 | Stop reason: SURG

## 2020-11-16 RX ORDER — NITROGLYCERIN 0.4 MG/1
0.4 TABLET SUBLINGUAL
Status: DISCONTINUED | OUTPATIENT
Start: 2020-11-16 | End: 2020-11-21 | Stop reason: HOSPADM

## 2020-11-16 RX ORDER — DIPHENHYDRAMINE HYDROCHLORIDE 50 MG/ML
12.5 INJECTION INTRAMUSCULAR; INTRAVENOUS
Status: DISCONTINUED | OUTPATIENT
Start: 2020-11-16 | End: 2020-11-16 | Stop reason: HOSPADM

## 2020-11-16 RX ORDER — CEFAZOLIN SODIUM 2 G/100ML
2 INJECTION, SOLUTION INTRAVENOUS EVERY 12 HOURS
Status: COMPLETED | OUTPATIENT
Start: 2020-11-16 | End: 2020-11-17

## 2020-11-16 RX ORDER — GLYCOPYRROLATE 0.2 MG/ML
INJECTION INTRAMUSCULAR; INTRAVENOUS AS NEEDED
Status: DISCONTINUED | OUTPATIENT
Start: 2020-11-16 | End: 2020-11-16 | Stop reason: SURG

## 2020-11-16 RX ORDER — BUPIVACAINE HYDROCHLORIDE 2.5 MG/ML
INJECTION, SOLUTION EPIDURAL; INFILTRATION; INTRACAUDAL AS NEEDED
Status: DISCONTINUED | OUTPATIENT
Start: 2020-11-16 | End: 2020-11-16 | Stop reason: HOSPADM

## 2020-11-16 RX ORDER — METOPROLOL SUCCINATE 50 MG/1
50 TABLET, EXTENDED RELEASE ORAL DAILY
Status: DISCONTINUED | OUTPATIENT
Start: 2020-11-17 | End: 2020-11-18

## 2020-11-16 RX ORDER — FENTANYL CITRATE 50 UG/ML
50 INJECTION, SOLUTION INTRAMUSCULAR; INTRAVENOUS
Status: DISCONTINUED | OUTPATIENT
Start: 2020-11-16 | End: 2020-11-16 | Stop reason: HOSPADM

## 2020-11-16 RX ORDER — SODIUM CHLORIDE 0.9 % (FLUSH) 0.9 %
3-10 SYRINGE (ML) INJECTION AS NEEDED
Status: DISCONTINUED | OUTPATIENT
Start: 2020-11-16 | End: 2020-11-16 | Stop reason: HOSPADM

## 2020-11-16 RX ORDER — PROPOFOL 10 MG/ML
VIAL (ML) INTRAVENOUS AS NEEDED
Status: DISCONTINUED | OUTPATIENT
Start: 2020-11-16 | End: 2020-11-16 | Stop reason: SURG

## 2020-11-16 RX ORDER — ONDANSETRON 2 MG/ML
4 INJECTION INTRAMUSCULAR; INTRAVENOUS ONCE AS NEEDED
Status: COMPLETED | OUTPATIENT
Start: 2020-11-16 | End: 2020-11-16

## 2020-11-16 RX ORDER — FENTANYL CITRATE 50 UG/ML
INJECTION, SOLUTION INTRAMUSCULAR; INTRAVENOUS AS NEEDED
Status: DISCONTINUED | OUTPATIENT
Start: 2020-11-16 | End: 2020-11-16 | Stop reason: SURG

## 2020-11-16 RX ORDER — ONDANSETRON 4 MG/1
4 TABLET, FILM COATED ORAL EVERY 6 HOURS PRN
Status: DISCONTINUED | OUTPATIENT
Start: 2020-11-16 | End: 2020-11-21 | Stop reason: HOSPADM

## 2020-11-16 RX ORDER — AMOXICILLIN 250 MG
2 CAPSULE ORAL 2 TIMES DAILY
Status: DISCONTINUED | OUTPATIENT
Start: 2020-11-16 | End: 2020-11-21 | Stop reason: HOSPADM

## 2020-11-16 RX ORDER — DULOXETIN HYDROCHLORIDE 30 MG/1
30 CAPSULE, DELAYED RELEASE ORAL DAILY
Status: DISCONTINUED | OUTPATIENT
Start: 2020-11-17 | End: 2020-11-21 | Stop reason: HOSPADM

## 2020-11-16 RX ORDER — EPHEDRINE SULFATE 50 MG/ML
5 INJECTION, SOLUTION INTRAVENOUS ONCE AS NEEDED
Status: DISCONTINUED | OUTPATIENT
Start: 2020-11-16 | End: 2020-11-16 | Stop reason: HOSPADM

## 2020-11-16 RX ORDER — ATORVASTATIN CALCIUM 20 MG/1
20 TABLET, FILM COATED ORAL NIGHTLY
Status: DISCONTINUED | OUTPATIENT
Start: 2020-11-16 | End: 2020-11-21 | Stop reason: HOSPADM

## 2020-11-16 RX ORDER — ACETAMINOPHEN 650 MG/1
650 SUPPOSITORY RECTAL EVERY 4 HOURS PRN
Status: DISCONTINUED | OUTPATIENT
Start: 2020-11-16 | End: 2020-11-21 | Stop reason: HOSPADM

## 2020-11-16 RX ORDER — ONDANSETRON 2 MG/ML
4 INJECTION INTRAMUSCULAR; INTRAVENOUS EVERY 6 HOURS PRN
Status: DISCONTINUED | OUTPATIENT
Start: 2020-11-16 | End: 2020-11-21 | Stop reason: HOSPADM

## 2020-11-16 RX ORDER — MIDAZOLAM HYDROCHLORIDE 1 MG/ML
1 INJECTION INTRAMUSCULAR; INTRAVENOUS
Status: DISCONTINUED | OUTPATIENT
Start: 2020-11-16 | End: 2020-11-16 | Stop reason: HOSPADM

## 2020-11-16 RX ORDER — LIDOCAINE HYDROCHLORIDE 10 MG/ML
0.5 INJECTION, SOLUTION EPIDURAL; INFILTRATION; INTRACAUDAL; PERINEURAL ONCE AS NEEDED
Status: DISCONTINUED | OUTPATIENT
Start: 2020-11-16 | End: 2020-11-16 | Stop reason: HOSPADM

## 2020-11-16 RX ORDER — LABETALOL HYDROCHLORIDE 5 MG/ML
5 INJECTION, SOLUTION INTRAVENOUS
Status: DISCONTINUED | OUTPATIENT
Start: 2020-11-16 | End: 2020-11-16 | Stop reason: HOSPADM

## 2020-11-16 RX ORDER — ACETAMINOPHEN 500 MG
1000 TABLET ORAL 3 TIMES DAILY PRN
Status: DISCONTINUED | OUTPATIENT
Start: 2020-11-16 | End: 2020-11-21 | Stop reason: HOSPADM

## 2020-11-16 RX ORDER — SODIUM CHLORIDE, SODIUM LACTATE, POTASSIUM CHLORIDE, CALCIUM CHLORIDE 600; 310; 30; 20 MG/100ML; MG/100ML; MG/100ML; MG/100ML
9 INJECTION, SOLUTION INTRAVENOUS CONTINUOUS
Status: DISCONTINUED | OUTPATIENT
Start: 2020-11-16 | End: 2020-11-17

## 2020-11-16 RX ORDER — PANTOPRAZOLE SODIUM 40 MG/1
40 TABLET, DELAYED RELEASE ORAL NIGHTLY
Status: DISCONTINUED | OUTPATIENT
Start: 2020-11-16 | End: 2020-11-21 | Stop reason: HOSPADM

## 2020-11-16 RX ORDER — HYDRALAZINE HYDROCHLORIDE 20 MG/ML
5 INJECTION INTRAMUSCULAR; INTRAVENOUS
Status: DISCONTINUED | OUTPATIENT
Start: 2020-11-16 | End: 2020-11-16 | Stop reason: HOSPADM

## 2020-11-16 RX ORDER — DIPHENHYDRAMINE HCL 25 MG
25 CAPSULE ORAL
Status: DISCONTINUED | OUTPATIENT
Start: 2020-11-16 | End: 2020-11-16 | Stop reason: HOSPADM

## 2020-11-16 RX ORDER — ROCURONIUM BROMIDE 10 MG/ML
INJECTION, SOLUTION INTRAVENOUS AS NEEDED
Status: DISCONTINUED | OUTPATIENT
Start: 2020-11-16 | End: 2020-11-16 | Stop reason: SURG

## 2020-11-16 RX ORDER — HYDROMORPHONE HYDROCHLORIDE 1 MG/ML
0.5 INJECTION, SOLUTION INTRAMUSCULAR; INTRAVENOUS; SUBCUTANEOUS
Status: DISCONTINUED | OUTPATIENT
Start: 2020-11-16 | End: 2020-11-21 | Stop reason: HOSPADM

## 2020-11-16 RX ORDER — VANCOMYCIN HYDROCHLORIDE 1 G/200ML
1000 INJECTION, SOLUTION INTRAVENOUS ONCE
Status: COMPLETED | OUTPATIENT
Start: 2020-11-16 | End: 2020-11-16

## 2020-11-16 RX ORDER — SODIUM CHLORIDE 0.9 % (FLUSH) 0.9 %
3 SYRINGE (ML) INJECTION EVERY 12 HOURS SCHEDULED
Status: DISCONTINUED | OUTPATIENT
Start: 2020-11-16 | End: 2020-11-16 | Stop reason: HOSPADM

## 2020-11-16 RX ORDER — PROMETHAZINE HYDROCHLORIDE 25 MG/1
25 TABLET ORAL ONCE AS NEEDED
Status: DISCONTINUED | OUTPATIENT
Start: 2020-11-16 | End: 2020-11-16 | Stop reason: HOSPADM

## 2020-11-16 RX ORDER — EPHEDRINE SULFATE 50 MG/ML
INJECTION, SOLUTION INTRAVENOUS AS NEEDED
Status: DISCONTINUED | OUTPATIENT
Start: 2020-11-16 | End: 2020-11-16 | Stop reason: SURG

## 2020-11-16 RX ORDER — NALOXONE HCL 0.4 MG/ML
0.1 VIAL (ML) INJECTION
Status: DISCONTINUED | OUTPATIENT
Start: 2020-11-16 | End: 2020-11-21 | Stop reason: HOSPADM

## 2020-11-16 RX ORDER — HYDROCODONE BITARTRATE AND ACETAMINOPHEN 5; 325 MG/1; MG/1
1 TABLET ORAL EVERY 4 HOURS PRN
Status: DISCONTINUED | OUTPATIENT
Start: 2020-11-16 | End: 2020-11-21 | Stop reason: HOSPADM

## 2020-11-16 RX ADMIN — FENTANYL CITRATE 50 MCG: 50 INJECTION, SOLUTION INTRAMUSCULAR; INTRAVENOUS at 11:24

## 2020-11-16 RX ADMIN — SODIUM CHLORIDE 100 ML/HR: 4.5 INJECTION, SOLUTION INTRAVENOUS at 15:35

## 2020-11-16 RX ADMIN — ONDANSETRON 4 MG: 2 INJECTION INTRAMUSCULAR; INTRAVENOUS at 06:41

## 2020-11-16 RX ADMIN — NEOSTIGMINE METHYLSULFATE 4 MG: 1 INJECTION INTRAMUSCULAR; INTRAVENOUS; SUBCUTANEOUS at 10:25

## 2020-11-16 RX ADMIN — VANCOMYCIN HYDROCHLORIDE 1000 MG: 1 INJECTION, SOLUTION INTRAVENOUS at 07:28

## 2020-11-16 RX ADMIN — PROPOFOL 140 MG: 10 INJECTION, EMULSION INTRAVENOUS at 08:01

## 2020-11-16 RX ADMIN — ROCURONIUM BROMIDE 40 MG: 10 INJECTION INTRAVENOUS at 08:01

## 2020-11-16 RX ADMIN — EPHEDRINE SULFATE 10 MG: 50 INJECTION INTRAVENOUS at 08:16

## 2020-11-16 RX ADMIN — FENTANYL CITRATE 50 MCG: 50 INJECTION, SOLUTION INTRAMUSCULAR; INTRAVENOUS at 10:59

## 2020-11-16 RX ADMIN — ROCURONIUM BROMIDE 10 MG: 10 INJECTION INTRAVENOUS at 09:25

## 2020-11-16 RX ADMIN — LIDOCAINE HYDROCHLORIDE 80 MG: 20 INJECTION, SOLUTION INFILTRATION; PERINEURAL at 08:01

## 2020-11-16 RX ADMIN — METOPROLOL TARTRATE 2.5 MG: 5 INJECTION, SOLUTION INTRAVENOUS at 09:01

## 2020-11-16 RX ADMIN — ATORVASTATIN CALCIUM 20 MG: 20 TABLET, FILM COATED ORAL at 21:28

## 2020-11-16 RX ADMIN — SODIUM CHLORIDE, POTASSIUM CHLORIDE, SODIUM LACTATE AND CALCIUM CHLORIDE 9 ML/HR: 600; 310; 30; 20 INJECTION, SOLUTION INTRAVENOUS at 06:39

## 2020-11-16 RX ADMIN — GLYCOPYRROLATE 0.6 MG: 0.2 INJECTION INTRAMUSCULAR; INTRAVENOUS at 10:24

## 2020-11-16 RX ADMIN — FENTANYL CITRATE 100 MCG: 50 INJECTION INTRAMUSCULAR; INTRAVENOUS at 08:01

## 2020-11-16 RX ADMIN — ONDANSETRON 4 MG: 2 INJECTION INTRAMUSCULAR; INTRAVENOUS at 15:35

## 2020-11-16 RX ADMIN — ONDANSETRON 4 MG: 2 INJECTION INTRAMUSCULAR; INTRAVENOUS at 10:58

## 2020-11-16 RX ADMIN — FENTANYL CITRATE 50 MCG: 50 INJECTION INTRAMUSCULAR; INTRAVENOUS at 08:47

## 2020-11-16 RX ADMIN — FENTANYL CITRATE 50 MCG: 50 INJECTION INTRAMUSCULAR; INTRAVENOUS at 09:25

## 2020-11-16 RX ADMIN — ONDANSETRON HYDROCHLORIDE 4 MG: 2 SOLUTION INTRAMUSCULAR; INTRAVENOUS at 09:50

## 2020-11-16 RX ADMIN — HYDROMORPHONE HYDROCHLORIDE 0.5 MG: 1 INJECTION, SOLUTION INTRAMUSCULAR; INTRAVENOUS; SUBCUTANEOUS at 15:35

## 2020-11-16 RX ADMIN — CEFAZOLIN SODIUM 2 G: 2 INJECTION, SOLUTION INTRAVENOUS at 15:36

## 2020-11-16 RX ADMIN — DOCUSATE SODIUM 50MG AND SENNOSIDES 8.6MG 2 TABLET: 8.6; 5 TABLET, FILM COATED ORAL at 21:28

## 2020-11-16 RX ADMIN — PANTOPRAZOLE SODIUM 40 MG: 40 TABLET, DELAYED RELEASE ORAL at 21:28

## 2020-11-16 RX ADMIN — DEXAMETHASONE SODIUM PHOSPHATE 6 MG: 10 INJECTION INTRAMUSCULAR; INTRAVENOUS at 08:30

## 2020-11-16 RX ADMIN — FAMOTIDINE 20 MG: 10 INJECTION INTRAVENOUS at 06:40

## 2020-11-16 RX ADMIN — FENTANYL CITRATE 50 MCG: 50 INJECTION, SOLUTION INTRAMUSCULAR; INTRAVENOUS at 13:04

## 2020-11-16 RX ADMIN — HYDROCODONE BITARTRATE AND ACETAMINOPHEN 1 TABLET: 5; 325 TABLET ORAL at 21:29

## 2020-11-16 NOTE — ANESTHESIA PREPROCEDURE EVALUATION
Anesthesia Evaluation     Patient summary reviewed and Nursing notes reviewed   history of anesthetic complications: PONV  NPO Solid Status: > 8 hours  NPO Liquid Status: > 4 hours           Airway   Mallampati: II  Dental    (+) partials    Pulmonary - normal exam   (+) a smoker Former, sleep apnea,   Cardiovascular - normal exam    (+) hypertension less than 2 medications, valvular problems/murmurs murmur, past MI  >12 months, CAD, dysrhythmias Atrial Fib, hyperlipidemia,       Neuro/Psych  (+) headaches, numbness, psychiatric history,     GI/Hepatic/Renal/Endo    (+)  GERD, GI bleeding lower resolved, renal disease CRI,     Musculoskeletal     (+) back pain,   Abdominal    Substance History      OB/GYN          Other                        Anesthesia Plan    ASA 4     general     intravenous induction     Anesthetic plan, all risks, benefits, and alternatives have been provided, discussed and informed consent has been obtained with: patient.

## 2020-11-16 NOTE — ANESTHESIA PROCEDURE NOTES
Airway  Urgency: elective    Date/Time: 11/16/2020 8:03 AM  Airway not difficult    General Information and Staff    Patient location during procedure: OR  CRNA: Yen Tafoya CRNA    Indications and Patient Condition  Indications for airway management: airway protection    Preoxygenated: yes  Mask difficulty assessment: 1 - vent by mask    Final Airway Details  Final airway type: endotracheal airway      Successful airway: ETT  Cuffed: yes   Successful intubation technique: direct laryngoscopy  Endotracheal tube insertion site: oral  Blade: Ranjit  Blade size: 3  ETT size (mm): 7.0  Cormack-Lehane Classification: grade I - full view of glottis  Placement verified by: chest auscultation and capnometry   Measured from: lips  ETT/EBT  to lips (cm): 21  Number of attempts at approach: 1  Assessment: lips, teeth, and gum same as pre-op and atraumatic intubation    Additional Comments   ett cuff up at MOP

## 2020-11-16 NOTE — H&P
[facility] H&P      Patient Identification:  NAME:  Marleni Hummel  Age:  84 y.o.   Sex:  female   :  1935   MRN:  [id number]       Chief complaint:  Left renal mass    History of present illness:      83 yo female with solid left renal mass, partially endophytic.  Not present 2 years ago on MRI of spine, not present on CT or renal US in 2016.  Solid with central necrosis.  Here for left JULIO Nx.    Has chronic A Fib, off eliquis since Thursday last week.  Had normal stress test this past summer and is cleared to proceed.  No prior upper abdominal surgery.    Baseline creat 1.2  H/H   Feeling well, neg COVID test    Past medical history:  Past Medical History:   Diagnosis Date   • Acute vulvitis 2019   • Allergic    • Allergic rhinitis    • Anemia of chronic disease    • Arthropathy of knee     right   • Atrial fibrillation (CMS/HCC)    • Back pain    • Bell's palsy    • Caregiver stress syndrome 2019   • Chronic coronary artery disease    • Chronic kidney disease (CKD), stage III (moderate)    • Edema    • Elevated serum free T4 level 3/21/2016   • Encounter for eye exam 2020   • Essential hypertension    • Fatigue    • GERD (gastroesophageal reflux disease)    • H/O Heart murmur    • Heart attack (CMS/HCC) 10/05/2015   • Hematuria 2016   • Herpes zoster without complication    • Hip arthritis     left   • History of bone density study 2008   • History of pelvic fracture    • History of pneumonia    • History of transfusion        • Hypercholesterolemia    • IFG (impaired fasting glucose)    • Insomnia    • Left kidney mass 2020   • Limited joint range of motion     RIGHT KNEE   • Mixed hyperlipidemia    • Muscle tension headache 4/3/2019   • New daily persistent headache 2018   • Osteoarthritis     Right hip   • Osteoporosis    • Panic disorder    • Peripheral vertigo    • PONV (postoperative nausea and vomiting)    • Rectal bleeding     HISTORY OF    • Sleep apnea     DOES NOT USE C-PAP   • Stress    • Stress-induced cardiomyopathy    • Urinary incontinence    • Vitamin D deficiency        Past surgical history:  Past Surgical History:   Procedure Laterality Date   • CATARACT EXTRACTION Left 04/01/2013    Dr. Clarke   • CATARACT EXTRACTION Right 04/16/2013    Dr. Clarke   • COLONOSCOPY  04/18/2014    Dr. Elizabeth; no polyps   • EPIDURAL BLOCK     • HIP PERCUTANEOUS PINNING Left 8/31/2017    Procedure: LT HIP PERCUTANEOUS PINNING;  Surgeon: Sean Canales MD;  Location: [facility] OR;  Service:    • MAMMO BILATERAL  11/2013   • PAP SMEAR  02/2011   • TIBIA FRACTURE SURGERY Right 2015    REMOVED PLATE LATER IN 2015   • TONSILLECTOMY  1947   • TOTAL HIP ARTHROPLASTY Right 08/2015   • TOTAL HIP ARTHROPLASTY Right 09/2016   • TOTAL KNEE ARTHROPLASTY Bilateral 2004       Allergies:  Morphine, Oxycodone, Ace inhibitors, and Levaquin [levofloxacin]    Home medications:  Medications Prior to Admission   Medication Sig Dispense Refill Last Dose   • acetaminophen (TYLENOL) 500 MG tablet Take 1,000 mg by mouth 3 (three) times a day as needed for mild pain (1-3) or moderate pain (4-6).   Past Week at Unknown time   • atorvastatin (LIPITOR) 20 MG tablet Take 1 tablet by mouth Every Night for 180 days. 90 tablet 1 11/15/2020 at 2100   • Denosumab (PROLIA SC) Inject 1 dose under the skin into the appropriate area as directed Every 6 (Six) Months. Pt unsure of dose   9/15/2020   • DULoxetine (CYMBALTA) 30 MG capsule Take 1 capsule by mouth Daily for 180 days. 90 capsule 1 11/16/2020 at 0445   • ezetimibe (ZETIA) 10 MG tablet Take 1 tablet by mouth Every Night for 90 days. 90 tablet 0 11/15/2020 at 2100   • metoprolol succinate XL (TOPROL-XL) 50 MG 24 hr tablet Take 1 tablet by mouth Daily for 180 days. (Patient taking differently: Take 50 mg by mouth Every Morning.) 90 tablet 1 11/16/2020 at 0445   • Mirabegron ER (Myrbetriq) 50 MG tablet sustained-release 24 hour 24 hr  tablet Take 50 mg by mouth Every Evening.   11/15/2020 at 2100   • pantoprazole (PROTONIX) 20 MG EC tablet Take 1 tablet by mouth Every Night for 180 days. 90 tablet 1 11/15/2020 at 2100   • apixaban (ELIQUIS) 2.5 MG tablet tablet Take 2.5 mg by mouth 2 (Two) Times a Day. TO STOP 3 DAYS BEFORE SURGERY   2020 at 2100   • Cholecalciferol (VITAMIN D3) 5000 units capsule capsule Take 5,000 Units by mouth Daily.   2020   • multivitamin with minerals (HAIR/SKIN/NAILS PO) Take 1 tablet by mouth Daily.   2020        Hospital medications:  sodium chloride, 3 mL, Intravenous, Q12H  vancomycin, 1,000 mg, Intravenous, Once      lactated ringers, 9 mL/hr, Last Rate: 9 mL/hr (20 0639)      •  fentanyl  •  lidocaine PF 1%  •  midazolam  •  sodium chloride    Family history:  Family History   Problem Relation Age of Onset   • Hypertension Other    • Hypertension Mother    • Stroke Mother    • Heart disease Mother    • Hypertension Maternal Grandmother    • Malig Hyperthermia Neg Hx        Social history:  Social History     Tobacco Use   • Smoking status: Former Smoker     Packs/day: 1.00     Years: 3.00     Pack years: 3.00     Types: Cigarettes     Quit date: 1956     Years since quittin.7   • Smokeless tobacco: Never Used   Substance Use Topics   • Alcohol use: Not Currently     Alcohol/week: 3.0 standard drinks     Types: 3 Glasses of wine per week     Comment: rare   • Drug use: Never       Review of systems:      Positive for:  nothing  Negative for:  Chest pain, cough, sob, o/w neg 10 point ROS    Objective:  TMax 24 hours:   Temp (24hrs), Av.2 °F (36.8 °C), Min:98.2 °F (36.8 °C), Max:98.2 °F (36.8 °C)      Vitals Ranges:   Temp:  [98.2 °F (36.8 °C)] 98.2 °F (36.8 °C)  Heart Rate:  [64] 64  Resp:  [20] 20  BP: (168)/(61) 168/61    Intake/Output Last 3 shifts:  No intake/output data recorded.     Physical Exam:    General Appearance:    Alert, cooperative, NAD   HEENT:    No trauma, pupils  reactive, hearing intact   Back:     No CVA tenderness   Lungs:     Respirations unlabored, no wheezing    Heart:    RRR, intact peripheral pulses   Abdomen:     Soft, NDNT, no masses, no guarding   :    Pelvic not performed, bladder nondistended and nontender   Extremities:   No edema, no deformity   Skin:   No bleeding, bruising or rashes   Neuro/Psych:   Orientation intact, mood/affect pleasant, no focal findings       Results review:   I reviewed the patient's new clinical results.    Data review:  Lab Results (last 24 hours)     ** No results found for the last 24 hours. **           Imaging:  Imaging Results (Last 24 Hours)     ** No results found for the last 24 hours. **             Assessment:       Left renal mass    Left renal mass  CRF    Plan:     Left JULIO Nx, poss open  -RBO explained, agrees to proceed  -Will have post op renal insufficiency  -T/C x 2 units, SCDs  -IV vanco octor - allergic to amoxil in our office chart  -Signed IC, site marked    Chuckie Mclaughlin MD  11/16/20  07:37 EST

## 2020-11-16 NOTE — PLAN OF CARE
Goal Outcome Evaluation:  Plan of Care Reviewed With: patient  Progress: no change  Outcome Summary: Pt admitted to floor; 2 lap sites and abdominal incision- clean, tender; pt getting IV zofran to manage nausea; Khan cath in place; clear liquids; assist x1 to BSC; pt is in afib and A&Ox4; pt on 2 L of O2 for comfort;l VSS.

## 2020-11-16 NOTE — PERIOPERATIVE NURSING NOTE
Dr. Mclaughlin at bedside aware pt's last dose of Eliquis Thursday night. SCDS on in preop, Vanc running,  pt type and cross active and confirmed with blood bank 2 units PRBC ready. MD aware needs to sign H&P

## 2020-11-16 NOTE — ANESTHESIA POSTPROCEDURE EVALUATION
Patient: Marleni Hummel    Procedure Summary     Date: 11/16/20 Room / Location: [facility] OR [address] [place] [address] MAIN OR    Anesthesia Start: 0756 Anesthesia Stop: 1040    Procedure: LAPAROSCOPIC NEPHRECTOMY (Left Abdomen) Diagnosis:     Surgeon: Chuckie Mclaughlin MD Provider: Esteban Curran MD    Anesthesia Type: general ASA Status: 4          Anesthesia Type: general    Vitals  Vitals Value Taken Time   /81 11/16/20 1340   Temp 36.9 °C (98.4 °F) 11/16/20 1040   Pulse 80 11/16/20 1342   Resp 16 11/16/20 1325   SpO2 98 % 11/16/20 1342   Vitals shown include unvalidated device data.        Post Anesthesia Care and Evaluation    Patient location during evaluation: PACU  Patient participation: complete - patient participated  Level of consciousness: awake and alert  Pain management: adequate  Airway patency: patent  Anesthetic complications: No anesthetic complications    Cardiovascular status: acceptable  Respiratory status: acceptable  Hydration status: acceptable    Comments: --------------------            11/16/20               1325     --------------------   BP:       126/84     Pulse:      90       Resp:       16       Temp:                SpO2:      98%      --------------------

## 2020-11-17 LAB
ANION GAP SERPL CALCULATED.3IONS-SCNC: 9.3 MMOL/L (ref 5–15)
BASOPHILS # BLD AUTO: 0.01 10*3/MM3 (ref 0–0.2)
BASOPHILS NFR BLD AUTO: 0.1 % (ref 0–1.5)
BUN SERPL-MCNC: 22 MG/DL (ref 8–23)
BUN/CREAT SERPL: 11.5 (ref 7–25)
CALCIUM SPEC-SCNC: 8 MG/DL (ref 8.6–10.5)
CHLORIDE SERPL-SCNC: 104 MMOL/L (ref 98–107)
CO2 SERPL-SCNC: 20.7 MMOL/L (ref 22–29)
CREAT SERPL-MCNC: 1.92 MG/DL (ref 0.57–1)
DEPRECATED RDW RBC AUTO: 40.9 FL (ref 37–54)
EOSINOPHIL # BLD AUTO: 0 10*3/MM3 (ref 0–0.4)
EOSINOPHIL NFR BLD AUTO: 0 % (ref 0.3–6.2)
ERYTHROCYTE [DISTWIDTH] IN BLOOD BY AUTOMATED COUNT: 12.6 % (ref 12.3–15.4)
FERRITIN SERPL-MCNC: 208 NG/ML (ref 13–150)
GFR SERPL CREATININE-BSD FRML MDRD: 25 ML/MIN/1.73
GLUCOSE SERPL-MCNC: 137 MG/DL (ref 65–99)
HCT VFR BLD AUTO: 34 % (ref 34–46.6)
HGB BLD-MCNC: 11.1 G/DL (ref 12–15.9)
IMM GRANULOCYTES # BLD AUTO: 0.03 10*3/MM3 (ref 0–0.05)
IMM GRANULOCYTES NFR BLD AUTO: 0.3 % (ref 0–0.5)
IRON 24H UR-MRATE: 38 MCG/DL (ref 37–145)
IRON SATN MFR SERPL: 13 % (ref 20–50)
LYMPHOCYTES # BLD AUTO: 0.48 10*3/MM3 (ref 0.7–3.1)
LYMPHOCYTES NFR BLD AUTO: 4.9 % (ref 19.6–45.3)
MCH RBC QN AUTO: 29.3 PG (ref 26.6–33)
MCHC RBC AUTO-ENTMCNC: 32.6 G/DL (ref 31.5–35.7)
MCV RBC AUTO: 89.7 FL (ref 79–97)
MONOCYTES # BLD AUTO: 0.85 10*3/MM3 (ref 0.1–0.9)
MONOCYTES NFR BLD AUTO: 8.7 % (ref 5–12)
NEUTROPHILS NFR BLD AUTO: 8.41 10*3/MM3 (ref 1.7–7)
NEUTROPHILS NFR BLD AUTO: 86 % (ref 42.7–76)
NRBC BLD AUTO-RTO: 0 /100 WBC (ref 0–0.2)
PLATELET # BLD AUTO: 179 10*3/MM3 (ref 140–450)
PMV BLD AUTO: 10.5 FL (ref 6–12)
POTASSIUM SERPL-SCNC: 5.1 MMOL/L (ref 3.5–5.2)
RBC # BLD AUTO: 3.79 10*6/MM3 (ref 3.77–5.28)
SODIUM SERPL-SCNC: 134 MMOL/L (ref 136–145)
TIBC SERPL-MCNC: 291 MCG/DL (ref 298–536)
TRANSFERRIN SERPL-MCNC: 195 MG/DL (ref 200–360)
WBC # BLD AUTO: 9.78 10*3/MM3 (ref 3.4–10.8)

## 2020-11-17 PROCEDURE — 94799 UNLISTED PULMONARY SVC/PX: CPT

## 2020-11-17 PROCEDURE — 83883 ASSAY NEPHELOMETRY NOT SPEC: CPT | Performed by: INTERNAL MEDICINE

## 2020-11-17 PROCEDURE — 86900 BLOOD TYPING SEROLOGIC ABO: CPT

## 2020-11-17 PROCEDURE — 86901 BLOOD TYPING SEROLOGIC RH(D): CPT

## 2020-11-17 PROCEDURE — 25010000002 ONDANSETRON PER 1 MG: Performed by: UROLOGY

## 2020-11-17 PROCEDURE — 82728 ASSAY OF FERRITIN: CPT | Performed by: INTERNAL MEDICINE

## 2020-11-17 PROCEDURE — 84466 ASSAY OF TRANSFERRIN: CPT | Performed by: INTERNAL MEDICINE

## 2020-11-17 PROCEDURE — 86334 IMMUNOFIX E-PHORESIS SERUM: CPT | Performed by: INTERNAL MEDICINE

## 2020-11-17 PROCEDURE — 25010000003 CEFAZOLIN IN DEXTROSE 2-4 GM/100ML-% SOLUTION: Performed by: UROLOGY

## 2020-11-17 PROCEDURE — 83540 ASSAY OF IRON: CPT | Performed by: INTERNAL MEDICINE

## 2020-11-17 PROCEDURE — 82784 ASSAY IGA/IGD/IGG/IGM EACH: CPT | Performed by: INTERNAL MEDICINE

## 2020-11-17 PROCEDURE — 85025 COMPLETE CBC W/AUTO DIFF WBC: CPT | Performed by: UROLOGY

## 2020-11-17 PROCEDURE — 80048 BASIC METABOLIC PNL TOTAL CA: CPT | Performed by: UROLOGY

## 2020-11-17 RX ORDER — SODIUM CHLORIDE 9 MG/ML
100 INJECTION, SOLUTION INTRAVENOUS CONTINUOUS
Status: DISCONTINUED | OUTPATIENT
Start: 2020-11-17 | End: 2020-11-20

## 2020-11-17 RX ADMIN — METOPROLOL SUCCINATE 50 MG: 50 TABLET, EXTENDED RELEASE ORAL at 08:10

## 2020-11-17 RX ADMIN — PANTOPRAZOLE SODIUM 40 MG: 40 TABLET, DELAYED RELEASE ORAL at 20:02

## 2020-11-17 RX ADMIN — DOCUSATE SODIUM 50MG AND SENNOSIDES 8.6MG 2 TABLET: 8.6; 5 TABLET, FILM COATED ORAL at 08:10

## 2020-11-17 RX ADMIN — SODIUM CHLORIDE 100 ML/HR: 9 INJECTION, SOLUTION INTRAVENOUS at 08:14

## 2020-11-17 RX ADMIN — ATORVASTATIN CALCIUM 20 MG: 20 TABLET, FILM COATED ORAL at 20:02

## 2020-11-17 RX ADMIN — DOCUSATE SODIUM 50MG AND SENNOSIDES 8.6MG 2 TABLET: 8.6; 5 TABLET, FILM COATED ORAL at 20:02

## 2020-11-17 RX ADMIN — HYDROCODONE BITARTRATE AND ACETAMINOPHEN 1 TABLET: 5; 325 TABLET ORAL at 08:09

## 2020-11-17 RX ADMIN — DULOXETINE HYDROCHLORIDE 30 MG: 30 CAPSULE, DELAYED RELEASE ORAL at 08:10

## 2020-11-17 RX ADMIN — ONDANSETRON 4 MG: 2 INJECTION INTRAMUSCULAR; INTRAVENOUS at 05:05

## 2020-11-17 RX ADMIN — SODIUM CHLORIDE 500 ML: 9 INJECTION, SOLUTION INTRAVENOUS at 15:54

## 2020-11-17 RX ADMIN — HYDROCODONE BITARTRATE AND ACETAMINOPHEN 1 TABLET: 5; 325 TABLET ORAL at 17:07

## 2020-11-17 RX ADMIN — CEFAZOLIN SODIUM 2 G: 2 INJECTION, SOLUTION INTRAVENOUS at 03:35

## 2020-11-17 RX ADMIN — SODIUM CHLORIDE 100 ML/HR: 9 INJECTION, SOLUTION INTRAVENOUS at 23:46

## 2020-11-17 NOTE — PROGRESS NOTES
POD #1 left JULIO Nx  Vomiting post op, better overnight  Slept most of day yesterday  Not much PO intake  Did not ambulate as ordered    Afebrile, stable VSs  Good rate control   cc overnight  Resting, NAD, conversant  On O2  Unlabored respirations  Strong peripheral pulses  Surgical tenderness noted  Incisions CDI    Creat 1.92  H/H 11/34    Fluid bolus this AM  Nephrology consult while here - anticipate moderate CRF post op  Await pathology  Slow on diet  IS use  Ambulation must be done today - I spoke with RNs  Home when tolerates diet

## 2020-11-17 NOTE — CONSULTS
Referring Provider:  Chuckie Mclaughlin MD  Reason for Consultation: Evaluation of patient with acute on chronic kidney disease    Subjective     Chief complaint No chief complaint on file.      History of present illness:    This is a very pleasant 84-year-old  female who has history of chronic kidney disease, she reported that she has not been seen by a nephrologist before but it was recommended that at one point in the past that she needed to be seen by a nephrologist.  Her baseline creatinine around 1.2-1.3 as noted on 11/9/2020.  She underwent left nephrectomy yesterday in anticipation of worsening renal function, nephrology consult was requested.  Patient has a history of atrial fibrillation, chronic back pain, coronary artery disease, GERD, degenerative joint disease, prior history of zoster, dyslipidemia, history of osteoporosis and osteopenia, history of stress incontinence, she has right total hip arthroplasty and bilateral total knee replacement.    Present she denies any chest pain or shortness of air, no orthopnea or PND, no nausea or vomiting, she has abdominal discomfort related to her surgery, she has not voided since her Khan catheter was removed earlier this morning, but she has no urge to void at this time, she had no lower extremity edema, no lightheadedness, she is not diabetic.    Past Medical History:   Diagnosis Date   • Acute vulvitis 8/21/2019   • Allergic    • Allergic rhinitis    • Anemia of chronic disease    • Arthropathy of knee     right   • Atrial fibrillation (CMS/HCC)    • Back pain    • Bell's palsy    • Caregiver stress syndrome 4/17/2019   • Chronic coronary artery disease    • Chronic kidney disease (CKD), stage III (moderate)    • Edema    • Elevated serum free T4 level 3/21/2016   • Encounter for eye exam 09/2020   • Essential hypertension    • Fatigue    • GERD (gastroesophageal reflux disease)    • H/O Heart murmur    • Heart attack (CMS/HCC) 10/05/2015    • Hematuria 12/12/2016   • Herpes zoster without complication    • Hip arthritis     left   • History of bone density study 02/28/2008   • History of pelvic fracture 2015   • History of pneumonia    • History of transfusion     2015   • Hypercholesterolemia    • IFG (impaired fasting glucose)    • Insomnia    • Left kidney mass 07/2020   • Limited joint range of motion     RIGHT KNEE   • Mixed hyperlipidemia    • Muscle tension headache 4/3/2019   • New daily persistent headache 12/17/2018   • Osteoarthritis     Right hip   • Osteoporosis    • Panic disorder    • Peripheral vertigo    • PONV (postoperative nausea and vomiting)    • Rectal bleeding     HISTORY OF   • Sleep apnea     DOES NOT USE C-PAP   • Stress    • Stress-induced cardiomyopathy    • Urinary incontinence    • Vitamin D deficiency      Past Surgical History:   Procedure Laterality Date   • CATARACT EXTRACTION Left 04/01/2013    Dr. Clarke   • CATARACT EXTRACTION Right 04/16/2013    Dr. Clarke   • COLONOSCOPY  04/18/2014    Dr. Elizabeth; no polyps   • EPIDURAL BLOCK     • HIP PERCUTANEOUS PINNING Left 8/31/2017    Procedure: LT HIP PERCUTANEOUS PINNING;  Surgeon: Sean Canales MD;  Location: [facility];  Service:    • MAMMO BILATERAL  11/2013   • NEPHRECTOMY Left 11/16/2020    Procedure: LAPAROSCOPIC NEPHRECTOMY;  Surgeon: Chuckie Mclaughlin MD;  Location: [facility];  Service: Urology;  Laterality: Left;   • PAP SMEAR  02/2011   • TIBIA FRACTURE SURGERY Right 2015    REMOVED PLATE LATER IN 2015   • TONSILLECTOMY  1947   • TOTAL HIP ARTHROPLASTY Right 08/2015   • TOTAL HIP ARTHROPLASTY Right 09/2016   • TOTAL KNEE ARTHROPLASTY Bilateral 2004     Family History   Problem Relation Age of Onset   • Hypertension Other    • Hypertension Mother    • Stroke Mother    • Heart disease Mother    • Hypertension Maternal Grandmother    • Malig Hyperthermia Neg Hx        Social History     Tobacco Use   • Smoking status: Former Smoker      Packs/day: 1.00     Years: 3.00     Pack years: 3.00     Types: Cigarettes     Quit date: 1956     Years since quittin.7   • Smokeless tobacco: Never Used   Substance Use Topics   • Alcohol use: Not Currently     Alcohol/week: 3.0 standard drinks     Types: 3 Glasses of wine per week     Comment: rare   • Drug use: Never     Medications Prior to Admission   Medication Sig Dispense Refill Last Dose   • acetaminophen (TYLENOL) 500 MG tablet Take 1,000 mg by mouth 3 (three) times a day as needed for mild pain (1-3) or moderate pain (4-6).   Past Week at Unknown time   • apixaban (ELIQUIS) 2.5 MG tablet tablet Take 2.5 mg by mouth 2 (Two) Times a Day. TO STOP 3 DAYS BEFORE SURGERY   Past Week at 2100   • atorvastatin (LIPITOR) 20 MG tablet Take 1 tablet by mouth Every Night for 180 days. 90 tablet 1 11/15/2020 at 2100   • Cholecalciferol (VITAMIN D3) 5000 units capsule capsule Take 5,000 Units by mouth Daily.   11/15/2020 at Unknown time   • DULoxetine (CYMBALTA) 30 MG capsule Take 1 capsule by mouth Daily for 180 days. 90 capsule 1 2020 at 0445   • ezetimibe (ZETIA) 10 MG tablet Take 1 tablet by mouth Every Night for 90 days. 90 tablet 0 11/15/2020 at 2100   • metoprolol succinate XL (TOPROL-XL) 50 MG 24 hr tablet Take 1 tablet by mouth Daily for 180 days. (Patient taking differently: Take 50 mg by mouth Every Morning.) 90 tablet 1 2020 at 0445   • Mirabegron ER (Myrbetriq) 50 MG tablet sustained-release 24 hour 24 hr tablet Take 50 mg by mouth Every Evening.   11/15/2020 at 2100   • multivitamin with minerals (HAIR/SKIN/NAILS PO) Take 1 tablet by mouth Daily.   11/15/2020 at Unknown time   • pantoprazole (PROTONIX) 20 MG EC tablet Take 1 tablet by mouth Every Night for 180 days. 90 tablet 1 11/15/2020 at 2100   • Denosumab (PROLIA SC) Inject 1 dose under the skin into the appropriate area as directed Every 6 (Six) Months. Pt unsure of dose   Unknown at Unknown time     Allergies:  Morphine,  "Oxycodone, Ace inhibitors, and Levaquin [levofloxacin]    Review of Systems  14 point review of system was performed and it was negative other than what noted above in the HPI    Objective     Vital Signs  Temp:  [98.3 °F (36.8 °C)-98.4 °F (36.9 °C)] 98.3 °F (36.8 °C)  Heart Rate:  [] 97  Resp:  [16-18] 16  BP: (111-152)/(59-93) 111/59    Flowsheet Rows      First Filed Value   Admission Height  157.5 cm (62\") Documented at 11/16/2020 0601   Admission Weight  68.3 kg (150 lb 9.2 oz) Documented at 11/16/2020 0601           I/O this shift:  In: 300 [P.O.:300]  Out: -   I/O last 3 completed shifts:  In: 1200 [I.V.:1200]  Out: 825 [Urine:775; Blood:50]    Intake/Output Summary (Last 24 hours) at 11/17/2020 1124  Last data filed at 11/17/2020 0818  Gross per 24 hour   Intake 700 ml   Output 725 ml   Net -25 ml       Physical Exam:  General Appearance: alert, oriented x 3, no acute distress, appears to be chronically ill  Skin: warm and dry  HEENT: pupils round and reactive to light, oral mucosa normal, nonicteric sclerae  Neck: supple, no JVD, trachea midline, faint right carotid bruit  Lungs: Creased breath sounds in the bases, unlabored breathing effort  Heart: Irregularly irregular, normal S1 and S2, no S3, no rub  Abdomen: soft, tenderness associated with surgery yesterday, normoactive bowel sound : no palpable bladder,  Extremities: no edema, cyanosis or clubbing  Lymphatics: No cervical or supraclavicular lymphadenopathy.  Neuro: normal speech and mental status        Results Review:  Results for orders placed or performed during the hospital encounter of 11/16/20   Basic Metabolic Panel    Specimen: Blood   Result Value Ref Range    Glucose 137 (H) 65 - 99 mg/dL    BUN 22 8 - 23 mg/dL    Creatinine 1.92 (H) 0.57 - 1.00 mg/dL    Sodium 134 (L) 136 - 145 mmol/L    Potassium 5.1 3.5 - 5.2 mmol/L    Chloride 104 98 - 107 mmol/L    CO2 20.7 (L) 22.0 - 29.0 mmol/L    Calcium 8.0 (L) 8.6 - 10.5 mg/dL    eGFR Non "  Amer 25 (L) >60 mL/min/1.73    BUN/Creatinine Ratio 11.5 7.0 - 25.0    Anion Gap 9.3 5.0 - 15.0 mmol/L   CBC Auto Differential    Specimen: Blood   Result Value Ref Range    WBC 9.78 3.40 - 10.80 10*3/mm3    RBC 3.79 3.77 - 5.28 10*6/mm3    Hemoglobin 11.1 (L) 12.0 - 15.9 g/dL    Hematocrit 34.0 34.0 - 46.6 %    MCV 89.7 79.0 - 97.0 fL    MCH 29.3 26.6 - 33.0 pg    MCHC 32.6 31.5 - 35.7 g/dL    RDW 12.6 12.3 - 15.4 %    RDW-SD 40.9 37.0 - 54.0 fl    MPV 10.5 6.0 - 12.0 fL    Platelets 179 140 - 450 10*3/mm3    Neutrophil % 86.0 (H) 42.7 - 76.0 %    Lymphocyte % 4.9 (L) 19.6 - 45.3 %    Monocyte % 8.7 5.0 - 12.0 %    Eosinophil % 0.0 (L) 0.3 - 6.2 %    Basophil % 0.1 0.0 - 1.5 %    Immature Grans % 0.3 0.0 - 0.5 %    Neutrophils, Absolute 8.41 (H) 1.70 - 7.00 10*3/mm3    Lymphocytes, Absolute 0.48 (L) 0.70 - 3.10 10*3/mm3    Monocytes, Absolute 0.85 0.10 - 0.90 10*3/mm3    Eosinophils, Absolute 0.00 0.00 - 0.40 10*3/mm3    Basophils, Absolute 0.01 0.00 - 0.20 10*3/mm3    Immature Grans, Absolute 0.03 0.00 - 0.05 10*3/mm3    nRBC 0.0 0.0 - 0.2 /100 WBC   Prepare RBC, 2 Units   Result Value Ref Range    Product Code X4835P22     Unit Number [id number]     UNIT  ABO A     UNIT  RH POS     Crossmatch Interpretation Compatible     Dispense Status XM     Blood Expiration Date [id number]     Blood Type Barcode 6200     Product Code H0758Q04     Unit Number [id number]     UNIT  ABO A     UNIT  RH POS     Crossmatch Interpretation Compatible     Dispense Status XM     Blood Expiration Date [id number]     Blood Type Barcode 6200      Imaging Results (Last 72 Hours)     ** No results found for the last 72 hours. **            atorvastatin, 20 mg, Oral, Nightly  DULoxetine, 30 mg, Oral, Daily  metoprolol succinate XL, 50 mg, Oral, Daily  pantoprazole, 40 mg, Oral, Nightly  sennosides-docusate, 2 tablet, Oral, BID      sodium chloride, 100 mL/hr, Last Rate: 100 mL/hr (11/17/20 0814)        Assessment/Plan    1.  Acute kidney disease on chronic kidney disease associated with the loss of renal mass based on the patient who had chronic kidney disease stage III at baseline with baseline creatinine around 1.3.  With the removal left kidney right kidney is unable to compensate at this time and the creatinine has risen to 1.92.  Her electrolytes within acceptable range at this time.  The patient appears to be euvolemic  2.  Chronic kidney disease at baseline probably associate with nephrosclerosis  3.  Status post left nephrectomy because of renal mass pathology is still pending  4.  Chronic anemia, hemoglobin 11.1 probably associate with chronic kidney disease  5.  History of depression  6.  GERD  7.  Dyslipidemia  8.  Degenerative joint disease      Plan:  1.  Check random urine for sodium, chloride and protein to creatinine ratio  2.  Check iron stores and immunofixation  3.  I agree with the present treatment  4.  When her diet is started we will restrict sodium intake to 2 g daily  5.  Avoid all type of nonsteroidal anti-inflammatory drugs  6.  Avoid nephrotoxins and adjust medication for decreased GFR if needed  7.  Surveillance labs            Thank you  for asking me to see this patient in consultation        I discussed the patient's findings and my recommendations with the patient    Joseph Leonard MD  11/17/20  11:24 EST      Much of this encounter note is an electronic transcription/translation of spoken language to printed text. The electronic translation of spoken language may permit erroneous, or at times, nonsensical words or phrases to be inadvertently transcribed; Although I have reviewed the note for such errors, some may still exist

## 2020-11-17 NOTE — PLAN OF CARE
Goal Outcome Evaluation:  Plan of Care Reviewed With: patient  Progress: no change  Outcome Summary: Pt is a post op day 1 of a Left partial nephrectomy. Pt has 2 lap sites and a midline abdominal incision. All incisions are dermabonded. Pt continues with the bruno to bedside drainage. Draining clear to yellow urine. Pt continues with a clear liquid diet, tolerated well. Nausea and vomiting have gotten better. Pt has been sleeping between care. Pt continues with PO pain meds that provide relief. Pt educated on the importnace of monitoring heart rate related to comorbidity of AFIB. Pt voiced understanding. Pt is resting at this time, will continue to monitor.

## 2020-11-17 NOTE — NURSING NOTE
Patient unable to void after 2 attempts.  Bladder scan 238 and does not feel like she needs to urinate.  Spoke with Dr. Mclaughlin and orders for NS bolus of 500 mls and bladder scan again and in and out cath > 500mls.

## 2020-11-18 LAB
ALBUMIN SERPL-MCNC: 3.2 G/DL (ref 3.5–5.2)
ANION GAP SERPL CALCULATED.3IONS-SCNC: 7.1 MMOL/L (ref 5–15)
BASOPHILS # BLD AUTO: 0.02 10*3/MM3 (ref 0–0.2)
BASOPHILS NFR BLD AUTO: 0.2 % (ref 0–1.5)
BUN SERPL-MCNC: 24 MG/DL (ref 8–23)
BUN/CREAT SERPL: 12.8 (ref 7–25)
CALCIUM SPEC-SCNC: 7.2 MG/DL (ref 8.6–10.5)
CHLORIDE SERPL-SCNC: 104 MMOL/L (ref 98–107)
CHLORIDE UR-SCNC: <20 MMOL/L
CO2 SERPL-SCNC: 21.9 MMOL/L (ref 22–29)
CREAT SERPL-MCNC: 1.88 MG/DL (ref 0.57–1)
CREAT UR-MCNC: 88.1 MG/DL
DEPRECATED RDW RBC AUTO: 43.2 FL (ref 37–54)
EOSINOPHIL # BLD AUTO: 0.04 10*3/MM3 (ref 0–0.4)
EOSINOPHIL NFR BLD AUTO: 0.5 % (ref 0.3–6.2)
ERYTHROCYTE [DISTWIDTH] IN BLOOD BY AUTOMATED COUNT: 12.9 % (ref 12.3–15.4)
GFR SERPL CREATININE-BSD FRML MDRD: 26 ML/MIN/1.73
GLUCOSE SERPL-MCNC: 102 MG/DL (ref 65–99)
HCT VFR BLD AUTO: 28.7 % (ref 34–46.6)
HGB BLD-MCNC: 9.5 G/DL (ref 12–15.9)
IGA SERPL-MCNC: 106 MG/DL (ref 64–422)
IGG SERPL-MCNC: 647 MG/DL (ref 586–1602)
IGM SERPL-MCNC: 82 MG/DL (ref 26–217)
IMM GRANULOCYTES # BLD AUTO: 0.04 10*3/MM3 (ref 0–0.05)
IMM GRANULOCYTES NFR BLD AUTO: 0.5 % (ref 0–0.5)
KAPPA LC FREE SER-MCNC: 17.3 MG/L (ref 3.3–19.4)
KAPPA LC FREE/LAMBDA FREE SER: 1.24 {RATIO} (ref 0.26–1.65)
LAMBDA LC FREE SERPL-MCNC: 13.9 MG/L (ref 5.7–26.3)
LYMPHOCYTES # BLD AUTO: 0.84 10*3/MM3 (ref 0.7–3.1)
LYMPHOCYTES NFR BLD AUTO: 10.2 % (ref 19.6–45.3)
MAGNESIUM SERPL-MCNC: 2 MG/DL (ref 1.6–2.4)
MCH RBC QN AUTO: 30.5 PG (ref 26.6–33)
MCHC RBC AUTO-ENTMCNC: 33.1 G/DL (ref 31.5–35.7)
MCV RBC AUTO: 92.3 FL (ref 79–97)
MONOCYTES # BLD AUTO: 0.7 10*3/MM3 (ref 0.1–0.9)
MONOCYTES NFR BLD AUTO: 8.5 % (ref 5–12)
NEUTROPHILS NFR BLD AUTO: 6.62 10*3/MM3 (ref 1.7–7)
NEUTROPHILS NFR BLD AUTO: 80.1 % (ref 42.7–76)
NRBC BLD AUTO-RTO: 0 /100 WBC (ref 0–0.2)
PHOSPHATE SERPL-MCNC: 2.4 MG/DL (ref 2.5–4.5)
PLATELET # BLD AUTO: 137 10*3/MM3 (ref 140–450)
PMV BLD AUTO: 10.4 FL (ref 6–12)
POTASSIUM SERPL-SCNC: 4.7 MMOL/L (ref 3.5–5.2)
PROT PATTERN SERPL IFE-IMP: NORMAL
PROT UR-MCNC: 32.4 MG/DL
PROT/CREAT UR: 367.8 MG/G CREA (ref 0–200)
RBC # BLD AUTO: 3.11 10*6/MM3 (ref 3.77–5.28)
SODIUM SERPL-SCNC: 133 MMOL/L (ref 136–145)
SODIUM UR-SCNC: 64 MMOL/L
URATE SERPL-MCNC: 6.2 MG/DL (ref 2.4–5.7)
WBC # BLD AUTO: 8.26 10*3/MM3 (ref 3.4–10.8)

## 2020-11-18 PROCEDURE — 25010000002 IRON SUCROSE PER 1 MG: Performed by: INTERNAL MEDICINE

## 2020-11-18 PROCEDURE — 84300 ASSAY OF URINE SODIUM: CPT | Performed by: INTERNAL MEDICINE

## 2020-11-18 PROCEDURE — 97161 PT EVAL LOW COMPLEX 20 MIN: CPT

## 2020-11-18 PROCEDURE — 83735 ASSAY OF MAGNESIUM: CPT | Performed by: INTERNAL MEDICINE

## 2020-11-18 PROCEDURE — 82570 ASSAY OF URINE CREATININE: CPT | Performed by: INTERNAL MEDICINE

## 2020-11-18 PROCEDURE — 85025 COMPLETE CBC W/AUTO DIFF WBC: CPT | Performed by: INTERNAL MEDICINE

## 2020-11-18 PROCEDURE — 97530 THERAPEUTIC ACTIVITIES: CPT

## 2020-11-18 PROCEDURE — 80069 RENAL FUNCTION PANEL: CPT | Performed by: INTERNAL MEDICINE

## 2020-11-18 PROCEDURE — 84156 ASSAY OF PROTEIN URINE: CPT | Performed by: INTERNAL MEDICINE

## 2020-11-18 PROCEDURE — 82436 ASSAY OF URINE CHLORIDE: CPT | Performed by: INTERNAL MEDICINE

## 2020-11-18 PROCEDURE — 63710000001 DIPHENHYDRAMINE PER 50 MG: Performed by: INTERNAL MEDICINE

## 2020-11-18 PROCEDURE — 84550 ASSAY OF BLOOD/URIC ACID: CPT | Performed by: INTERNAL MEDICINE

## 2020-11-18 RX ORDER — METOPROLOL SUCCINATE 25 MG/1
25 TABLET, EXTENDED RELEASE ORAL DAILY
Status: DISCONTINUED | OUTPATIENT
Start: 2020-11-18 | End: 2020-11-21 | Stop reason: HOSPADM

## 2020-11-18 RX ORDER — DIPHENHYDRAMINE HCL 50 MG
50 CAPSULE ORAL ONCE
Status: COMPLETED | OUTPATIENT
Start: 2020-11-18 | End: 2020-11-18

## 2020-11-18 RX ORDER — BISACODYL 10 MG
10 SUPPOSITORY, RECTAL RECTAL DAILY
Status: DISCONTINUED | OUTPATIENT
Start: 2020-11-18 | End: 2020-11-21 | Stop reason: HOSPADM

## 2020-11-18 RX ORDER — ACETAMINOPHEN 325 MG/1
650 TABLET ORAL ONCE
Status: COMPLETED | OUTPATIENT
Start: 2020-11-18 | End: 2020-11-18

## 2020-11-18 RX ADMIN — DOCUSATE SODIUM 50MG AND SENNOSIDES 8.6MG 2 TABLET: 8.6; 5 TABLET, FILM COATED ORAL at 20:50

## 2020-11-18 RX ADMIN — SODIUM CHLORIDE 100 ML/HR: 9 INJECTION, SOLUTION INTRAVENOUS at 09:51

## 2020-11-18 RX ADMIN — DULOXETINE HYDROCHLORIDE 30 MG: 30 CAPSULE, DELAYED RELEASE ORAL at 09:16

## 2020-11-18 RX ADMIN — PANTOPRAZOLE SODIUM 40 MG: 40 TABLET, DELAYED RELEASE ORAL at 20:49

## 2020-11-18 RX ADMIN — SODIUM CHLORIDE 100 ML/HR: 9 INJECTION, SOLUTION INTRAVENOUS at 20:51

## 2020-11-18 RX ADMIN — DIPHENHYDRAMINE HYDROCHLORIDE 50 MG: 50 CAPSULE ORAL at 11:39

## 2020-11-18 RX ADMIN — IRON SUCROSE 100 MG: 20 INJECTION, SOLUTION INTRAVENOUS at 11:40

## 2020-11-18 RX ADMIN — METOPROLOL SUCCINATE 25 MG: 25 TABLET, EXTENDED RELEASE ORAL at 09:15

## 2020-11-18 RX ADMIN — ACETAMINOPHEN 650 MG: 325 TABLET, FILM COATED ORAL at 11:39

## 2020-11-18 RX ADMIN — DOCUSATE SODIUM 50MG AND SENNOSIDES 8.6MG 2 TABLET: 8.6; 5 TABLET, FILM COATED ORAL at 09:15

## 2020-11-18 RX ADMIN — ATORVASTATIN CALCIUM 20 MG: 20 TABLET, FILM COATED ORAL at 20:50

## 2020-11-18 RX ADMIN — HYDROCODONE BITARTRATE AND ACETAMINOPHEN 1 TABLET: 5; 325 TABLET ORAL at 09:14

## 2020-11-18 RX ADMIN — BISACODYL 10 MG: 10 SUPPOSITORY RECTAL at 09:17

## 2020-11-18 NOTE — PLAN OF CARE
Problem: Adult Inpatient Plan of Care  Goal: Plan of Care Review  Outcome: Ongoing, Progressing  Flowsheets (Taken 11/18/2020 0653)  Plan of Care Reviewed With: patient  Outcome Summary: Prn norco given for complaints of pain. Straight cath once this shift for bladder scan of 560mL. Pt up to BSC but still struggling to void. Needs to get up and ambulate today. Will continue to monitor.

## 2020-11-18 NOTE — PROGRESS NOTES
POD2 s/p laparoscopic radical left nephroureterectomy    S: Feels very fatigued. She has not ambulated as of yet. No flatus or BM. +GERD. Required straight catheterization overnight.    O: AVSS  Good UO  Abd soft, nondistended  Inc c/d/i    Cr 1.88 (1.92), Hb 9.5, WBC 8.2    Plan:  - ambulate  - dulcolax  - physical therapy  - appreciate nephrology recommendations, will avoid NSAIDS

## 2020-11-18 NOTE — PROGRESS NOTES
Discharge Planning Assessment  [facility]     Patient Name: Marleni Hummel  MRN: [id number]  Today's Date: 11/18/2020    Admit Date: 11/16/2020    Discharge Needs Assessment     Row Name 11/18/20 1315       Living Environment    Lives With  child(amy), adult    Name(s) of Who Lives With Patient  daughterSilvia    Current Living Arrangements  home/apartment/condo    Provides Primary Care For  no one    Family Caregiver if Needed  child(amy), adult    Quality of Family Relationships  involved;helpful;supportive    Able to Return to Prior Arrangements  yes       Resource/Environmental Concerns    Resource/Environmental Concerns  none       Transition Planning    Patient/Family Anticipates Transition to  home with family    Patient/Family Anticipated Services at Transition  none    Transportation Anticipated  family or friend will provide       Discharge Needs Assessment    Equipment Currently Used at Home  walker, rolling    Concerns to be Addressed  discharge planning    Provided Post Acute Provider List?  Refused    Refused Provider List Comment  Declines HH referral    Discharge Coordination/Progress  Home with daughter        Discharge Plan     Row Name 11/18/20 1316       Plan    Plan  Home with daughter, declines HH referral    Patient/Family in Agreement with Plan  yes    Plan Comments  IMM letter checked. CCP met with pt to verify information and discuss d/c planning. Pt resides with her daughter in a two level condo in which she accesses only the main level with no steps. Pt uses a walker as needed, has used [facility] in the past, and been to rehab at [facility] and an unspecified [facility] facility. [facility] [place] [facility] [place]. Pt declines HH referral/denies needs at this time. CCP to follow to assist should needs arise. Alia Rodriguez LCSW        Continued Care and Services - Admitted Since 11/16/2020    Coordination has not been started for this encounter.          Demographic Summary     Row Name 11/18/20 1315       General Information    Admission Type  inpatient    Arrived From  home    Required Notices Provided  Important Message from Medicare    Referral Source  admission list    Reason for Consult  discharge planning    Preferred Language  English        Functional Status     Row Name 11/18/20 1315       Functional Status    Usual Activity Tolerance  good    Current Activity Tolerance  moderate       Functional Status, IADL    Medications  independent    Meal Preparation  independent    Housekeeping  independent    Laundry  independent    Shopping  independent       Mental Status Summary    Recent Changes in Mental Status/Cognitive Functioning  no changes        Psychosocial    No documentation.       Abuse/Neglect    No documentation.       Legal    No documentation.       Substance Abuse    No documentation.       Patient Forms    No documentation.           Tanvi Rodriguez LCSW

## 2020-11-18 NOTE — PROGRESS NOTES
"   LOS: 2 days    Patient Care Team:  Ken Andujar MD as PCP - General  Chuckie Mclaughlin MD as Consulting Physician (Urology)  Jason Tai MD as Consulting Physician (Cardiology)  Geoffrey Mills MD as Consulting Physician (Nephrology)  Anayeli Alanis MD as Consulting Physician (Obstetrics and Gynecology)  BROOK Clarke MD as Consulting Physician (Ophthalmology)  Jose Alfredo Krishnamurthy MD as Consulting Physician (Hematology and Oncology)  Sean Canales MD as Consulting Physician (Orthopedic Surgery)  Esteban Moe MD as Consulting Physician (Orthopedic Surgery)  Feliciano Elizabeth MD as Consulting Physician (Gastroenterology)    Chief Complaint:  No chief complaint on file.    Follow UP ANGY on CKD3  Subjective     Interval History:     Exhausted.  In and out cath yesterday. Did get up to chair and walked.  Took regular diet this am, ate a little. No flatus, but belching. Has GERD.  Pain as expected.  At home has soa with exertion and right hip pain with walking.     Review of Systems:   See above    Objective     Vital Signs  Temp:  [97.6 °F (36.4 °C)-97.8 °F (36.6 °C)] 97.8 °F (36.6 °C)  Heart Rate:  [81-96] 96  Resp:  [20] 20  BP: ()/(52-75) 119/75    Flowsheet Rows      First Filed Value   Admission Height  157.5 cm (62\") Documented at 11/16/2020 0601   Admission Weight  68.3 kg (150 lb 9.2 oz) Documented at 11/16/2020 0601          I/O this shift:  In: 120 [P.O.:120]  Out: -   I/O last 3 completed shifts:  In: 800 [P.O.:800]  Out: 1275 [Urine:1275]    Intake/Output Summary (Last 24 hours) at 11/18/2020 0844  Last data filed at 11/18/2020 0728  Gross per 24 hour   Intake 620 ml   Output 550 ml   Net 70 ml       Physical Exam:  Appears stated age. Lying in bed. Awake alert. Nasal 02  Oral mucosa moist. Mask applied  No scleral icterus  Heart iRREG, iRREG, No s3  Lungs decreased bs bases.  Abd Distended, + bs, body wall edema trace. Incisions intact. Tender as expected  Ext no " edema  Skin pale, no rash      Results Review:    Results from last 7 days   Lab Units 11/18/20 0444 11/17/20 0455   SODIUM mmol/L 133* 134*   POTASSIUM mmol/L 4.7 5.1   CHLORIDE mmol/L 104 104   CO2 mmol/L 21.9* 20.7*   BUN mg/dL 24* 22   CREATININE mg/dL 1.88* 1.92*   CALCIUM mg/dL 7.2* 8.0*   GLUCOSE mg/dL 102* 137*       Estimated Creatinine Clearance: 20.9 mL/min (A) (by C-G formula based on SCr of 1.88 mg/dL (H)).    Results from last 7 days   Lab Units 11/18/20 0444   MAGNESIUM mg/dL 2.0   PHOSPHORUS mg/dL 2.4*       Results from last 7 days   Lab Units 11/18/20 0444   URIC ACID mg/dL 6.2*       Results from last 7 days   Lab Units 11/18/20 0444 11/17/20 0455   WBC 10*3/mm3 8.26 9.78   HEMOGLOBIN g/dL 9.5* 11.1*   PLATELETS 10*3/mm3 137* 179               Imaging Results (Last 24 Hours)     ** No results found for the last 24 hours. **        atorvastatin, 20 mg, Oral, Nightly  bisacodyl, 10 mg, Rectal, Daily  DULoxetine, 30 mg, Oral, Daily  metoprolol succinate XL, 25 mg, Oral, Daily  pantoprazole, 40 mg, Oral, Nightly  sennosides-docusate, 2 tablet, Oral, BID      sodium chloride, 100 mL/hr, Last Rate: 100 mL/hr (11/17/20 4026)        Medication Review:   Current Facility-Administered Medications   Medication Dose Route Frequency Provider Last Rate Last Admin   • acetaminophen (TYLENOL) tablet 650 mg  650 mg Oral Q4H PRN Chuckie Mclaughlin MD        Or   • acetaminophen (TYLENOL) suppository 650 mg  650 mg Rectal Q4H PRN Chuckie Mclaughlin MD       • acetaminophen (TYLENOL) tablet 1,000 mg  1,000 mg Oral TID PRN Chuckie Mclaughlin MD       • atorvastatin (LIPITOR) tablet 20 mg  20 mg Oral Nightly Chuckie Mclaughlin MD   20 mg at 11/17/20 2002   • bisacodyl (DULCOLAX) suppository 10 mg  10 mg Rectal Daily Arden Montes Jr., MD       • DULoxetine (CYMBALTA) DR capsule 30 mg  30 mg Oral Daily Chuckie Mclaughlin MD   30 mg at 11/17/20 0810   •  HYDROcodone-acetaminophen (NORCO) 5-325 MG per tablet 1 tablet  1 tablet Oral Q4H PRN Chuckie Mclaughlin MD   1 tablet at 11/17/20 1707   • HYDROmorphone (DILAUDID) injection 0.5 mg  0.5 mg Intravenous Q2H PRN Chuckie Mclaughlin MD   0.5 mg at 11/16/20 1535    And   • naloxone (NARCAN) injection 0.1 mg  0.1 mg Intravenous Q5 Min PRN Chuckie Mclaughlin MD       • metoprolol succinate XL (TOPROL-XL) 24 hr tablet 25 mg  25 mg Oral Daily Yazmin Parmar MD       • nitroglycerin (NITROSTAT) SL tablet 0.4 mg  0.4 mg Sublingual Q5 Min PRN Chuckie Mclaughlin MD       • ondansetron (ZOFRAN) tablet 4 mg  4 mg Oral Q6H PRN Chuckie Mclaughlin MD        Or   • ondansetron (ZOFRAN) injection 4 mg  4 mg Intravenous Q6H PRN Chuckie Mclaughlin MD   4 mg at 11/17/20 0505   • pantoprazole (PROTONIX) EC tablet 40 mg  40 mg Oral Nightly Chuckie Mclaughlin MD   40 mg at 11/17/20 2002   • sennosides-docusate (PERICOLACE) 8.6-50 MG per tablet 2 tablet  2 tablet Oral BID Chuckie Mclaughlin MD   2 tablet at 11/17/20 2002   • sodium chloride 0.9 % infusion  100 mL/hr Intravenous Continuous Chuckie Mclaughlin  mL/hr at 11/17/20 2346 100 mL/hr at 11/17/20 2346       Assessment/Plan   1. ANGY on CKD 3. basline creatinine 1.3.  Stable after left nephrectomy.  BP a little too controlled, reduce metoprolol with parameters.  Cont IVF until taking po well.   Replace Phos with milk/    Requiring straight cath.  Ambulation should help.  2. 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 Path pending  3. HTN too controlled at times. Reduce metoprolol  4. Anemia CKD and blood loss.  Iron deficient.  IV iron.  5. Mild TCP watch closely .  6. Persistent afib. On eliquis at home.     IS and walking encouraged.       Yazmin Parmar MD  11/18/20  08:44 EST

## 2020-11-18 NOTE — PLAN OF CARE
Problem: Adult Inpatient Plan of Care  Goal: Plan of Care Review  Recent Flowsheet Documentation  Taken 11/18/2020 1500 by Faye Rojas PT  Plan of Care Reviewed With: patient  Outcome Summary: Pt 83 yo female post op lap L nephrectomy, upon asssessment, antalgic slowed gait david- chronic R hip pain. Pt ambulated 30ft MIn A rwx altered gait mechanics, assist for IV pole management. Pt reports baseline doesnt use AD, independent w mobility. Pt may benefit from skilled PT acutely to address functional deficits and assist w DC planning- pt hopeful to return home w daughter assist- no steps to enter.      ..Patient was wearing a face mask during this therapy encounter. Therapist used appropriate personal protective equipment including eye protection, mask, and gloves.  Mask used was standard procedure mask. Appropriate PPE was worn during the entire therapy session. Hand hygiene was completed before and after therapy session. Patient is not in enhanced droplet precautions.

## 2020-11-18 NOTE — THERAPY EVALUATION
Acute Care - Physical Therapy Initial Evaluation  [facility]     Patient Name: Marleni Hummel  : 1935  MRN: [id number]  Today's Date: 2020   Onset of Illness/Injury or Date of Surgery: 20       PT Assessment (last 12 hours)      PT Evaluation and Treatment     Row Name 20 1500          Physical Therapy Time and Intention    Subjective Information  complains of;weakness;fatigue;pain  -     Document Type  evaluation  -     Mode of Treatment  individual therapy;physical therapy  -     Patient Effort  good  -     Symptoms Noted During/After Treatment  none  -     Row Name 20 1500          General Information    Patient Profile Reviewed  yes  -     Onset of Illness/Injury or Date of Surgery  20  -     Referring Physician  Simon  -     Patient Observations  alert;cooperative;agree to therapy  -     General Observations of Patient  pt reclined in chair no acute distress  -     Prior Level of Function  independent:  -     Equipment Currently Used at Home  none has rwx per pt report  -     Pertinent History of Current Functional Problem  s/p lap L nephrectomy  -     Existing Precautions/Restrictions  no known precautions/restrictions  -     Risks Reviewed  patient:  -     Benefits Reviewed  patient:  -     Row Name 20 1500          Home Use of Assistive/Adaptive Equipment    Equipment Currently Used at Home  walker, rolling  -     Row Name 20 1500          Cognition    Affect/Mental Status (Cognitive)  WFL  -     Orientation Status (Cognition)  oriented x 4  -     Row Name 20 1500          Pain Scale: Word Pre/Post-Treatment    Pain: Word Scale, Pretreatment  6 - moderate-severe pain  -LH     Posttreatment Pain Rating  6 - moderate-severe pain  -     Pain Location - Side  Left  -     Pain Location - Orientation  incisional  -     Pain Intervention(s)  Ambulation/increased activity;Repositioned  -     Row Name 20  "1500          Range of Motion Comprehensive    Comment, General Range of Motion  BLEs functional  -[facility] Name 11/18/20 1500          Strength (Manual Muscle Testing)    Strength (Manual Muscle Testing)  strength is WNL;bilateral lower extremities  -[facility] Name 11/18/20 1500          Bed Mobility    Bed Mobility  supine-sit;sit-supine  -     Comment (Bed Mobility)  NT in chair  -[facility] Name 11/18/20 1500          Transfers    Transfers  sit-stand transfer;stand-sit transfer  -     Sit-Stand [place] (Transfers)  minimum assist (75% patient effort);verbal cues;nonverbal cues (demo/gesture)  -     Stand-Sit [place] (Transfers)  minimum assist (75% patient effort);verbal cues;nonverbal cues (demo/gesture)  -[facility] Name 11/18/20 1500          Sit-Stand Transfer    Assistive Device (Sit-Stand Transfers)  walker, front-wheeled  -[facility] Name 11/18/20 1500          Stand-Sit Transfer    Assistive Device (Stand-Sit Transfers)  walker, front-wheeled  -[facility] Name 11/18/20 1500          Gait/Stairs (Locomotion)    [place] Level (Gait)  minimum assist (75% patient effort);1 person to manage equipment  -     Assistive Device (Gait)  walker, front-wheeled  -     Distance in Feet (Gait)  30  -     Pattern (Gait)  step-through  -     Deviations/Abnormal Patterns (Gait)  david decreased;antalgic  -     Bilateral Gait Deviations  forward flexed posture;heel strike decreased  -     Right Sided Gait Deviations  -- R hip ER (hx of \"hardware\"/multiple hip sxs)  -[facility] Name 11/18/20 1500          Motor Skills    Therapeutic Exercise  knee;ankle  -[facility] Name 11/18/20 1500          Knee (Therapeutic Exercise)    Knee (Therapeutic Exercise)  AROM (active range of motion)  -     Knee AROM (Therapeutic Exercise)  bilateral;LAQ (long arc quad);10 repetitions;sitting  -[facility] Name 11/18/20 1500          Ankle (Therapeutic Exercise)    Ankle (Therapeutic Exercise)  AROM " (active range of motion)  -     Ankle AROM (Therapeutic Exercise)  bilateral;dorsiflexion;plantarflexion;10 repetitions;sitting  -     Row Name             Wound 11/16/20 0826 Left abdomen Incision    Wound - Properties Group Placement Date: 11/16/20  -SR Placement Time: 0826  -SR Present on Hospital Admission: N  -SR Side: Left  -SR Location: abdomen  -SR Primary Wound Type: Incision  -SR    Retired Wound - Properties Group Date first assessed: 11/16/20  -SR Time first assessed: 0826  -SR Present on Hospital Admission: N  -SR Side: Left  -SR Location: abdomen  -SR Primary Wound Type: Incision  -SR    Row Name             Wound 11/16/20 0829 abdomen Incision    Wound - Properties Group Placement Date: 11/16/20  -SR Placement Time: 0829 -SR Present on Hospital Admission: N  -SR Location: abdomen  -SR Primary Wound Type: Incision  -SR    Retired Wound - Properties Group Date first assessed: 11/16/20  -SR Time first assessed: 0829  -SR Present on Hospital Admission: N  -SR Location: abdomen  -SR Primary Wound Type: Incision  -SR    Row Name 11/18/20 1500          Plan of Care Review    Plan of Care Reviewed With  patient  -     Outcome Summary  Pt 85 yo female post op lap L nephrectomy, upon asssessment, antalgic slowed gait david- chronic R hip pain. Pt ambulated 30ft MIn A rwx altered gait mechanics, assist for IV pole management. Pt reports baseline doesnt use AD, independent w mobility. Pt may benefit from skilled PT acutely to address functional deficits and assist w DC planning- pt hopeful to return home w daughter assist- no steps to enter.   -     Row Name 11/18/20 1500          Vital Signs    O2 Delivery Pre Treatment  supplemental O2 2L  -     O2 Delivery Post Treatment  supplemental O2 2L  -     Row Name 11/18/20 1500          Physical Therapy Goals    Bed Mobility Goal Selection (PT)  bed mobility, PT goal 1  -     Transfer Goal Selection (PT)  transfer, PT goal 1  -     Gait Training  Goal Selection (PT)  gait training, PT goal 1  -     Row Name 11/18/20 1500          Bed Mobility Goal 1 (PT)    Activity/Assistive Device (Bed Mobility Goal 1, PT)  bed mobility activities, all  -     [place] Level/Cues Needed (Bed Mobility Goal 1, PT)  standby assist  -     Time Frame (Bed Mobility Goal 1, PT)  2 weeks  -     Row Name 11/18/20 1500          Transfer Goal 1 (PT)    Activity/Assistive Device (Transfer Goal 1, PT)  sit-to-stand/stand-to-sit;bed-to-chair/chair-to-bed;walker, rolling  -     [place] Level/Cues Needed (Transfer Goal 1, PT)  standby assist  -     Time Frame (Transfer Goal 1, PT)  2 weeks  -Critical access hospital Name 11/18/20 1500          Gait Training Goal 1 (PT)    Activity/Assistive Device (Gait Training Goal 1, PT)  walker, rolling;assistive device use  -     [place] Level (Gait Training Goal 1, PT)  standby assist  -     Distance (Gait Training Goal 1, PT)  150  -     Time Frame (Gait Training Goal 1, PT)  2 weeks  -Critical access hospital Name 11/18/20 1500          Positioning and Restraints    Pre-Treatment Position  sitting in chair/recliner  -     Post Treatment Position  chair  -LH     In Chair  reclined;notified nsg;call light within reach;encouraged to call for assist;exit alarm on  -Critical access hospital Name 11/18/20 1500          Therapy Assessment/Plan (PT)    Rehab Potential (PT)  good, to achieve stated therapy goals  -     Criteria for Skilled Interventions Met (PT)  yes  -Critical access hospital Name 11/18/20 1500          PT Evaluation Complexity    History, PT Evaluation Complexity  1-2 personal factors and/or comorbidities  -     Examination of Body Systems (PT Eval Complexity)  1-2 elements  -     Clinical Presentation (PT Evaluation Complexity)  evolving  -     Clinical Decision Making (PT Evaluation Complexity)  moderate complexity  -     Overall Complexity (PT Evaluation Complexity)  low complexity  -       User Key  (r) = Recorded By, (t) = Taken By, (c) =  Cosigned By    Initials Name Provider Type     Faye Rojas, PT Physical Therapist    Magnolia Adames RN Registered Nurse        Physical Therapy Education                 Title: PT OT SLP Therapies (Done)     Topic: Physical Therapy (Done)     Point: Mobility training (Done)     Learning Progress Summary           Patient Acceptance, E, VU,NR by  at 11/18/2020 1542                   Point: Home exercise program (Done)     Learning Progress Summary           Patient Acceptance, E, VU,NR by  at 11/18/2020 1542                   Point: Body mechanics (Done)     Learning Progress Summary           Patient Acceptance, E, VU,NR by  at 11/18/2020 1542                   Point: Precautions (Done)     Learning Progress Summary           Patient Acceptance, E, VU,NR by  at 11/18/2020 1542                               User Key     Initials Effective Dates Name Provider Type Discipline     04/03/18 -  Faye Rojas, PT Physical Therapist PT              PT Recommendation and Plan  Anticipated Discharge Disposition (PT): home with assist, home with home health  Planned Therapy Interventions (PT): balance training, bed mobility training, gait training, home exercise program, ROM (range of motion), stair training, strengthening, stretching, transfer training  Therapy Frequency (PT): daily  Plan of Care Reviewed With: patient  Outcome Summary: Pt 83 yo female post op lap L nephrectomy, upon asssessment, antalgic slowed gait david- chronic R hip pain. Pt ambulated 30ft MIn A rwx altered gait mechanics, assist for IV pole management. Pt reports baseline doesnt use AD, independent w mobility. Pt may benefit from skilled PT acutely to address functional deficits and assist w DC planning- pt hopeful to return home w daughter assist- no steps to enter.   Outcome Measures     Row Name 11/18/20 1500             How much help from another person do you currently need...    Turning from your back to your side while in flat  bed without using bedrails?  3  -LH      Moving from lying on back to sitting on the side of a flat bed without bedrails?  3  -LH      Moving to and from a bed to a chair (including a wheelchair)?  3  -LH      Standing up from a chair using your arms (e.g., wheelchair, bedside chair)?  3  -LH      Climbing 3-5 steps with a railing?  3  -LH      To walk in hospital room?  3  -      AM-PAC 6 Clicks Score (PT)  18  -         Functional Assessment    Outcome Measure Options  AM-PAC 6 Clicks Basic Mobility (PT)  -        User Key  (r) = Recorded By, (t) = Taken By, (c) = Cosigned By    Initials Name Provider Type     Faye Rojas, PT Physical Therapist           Time Calculation:   PT Charges     Row Name 11/18/20 1544             Time Calculation    Start Time  1353  -      Stop Time  1413  -      Time Calculation (min)  20 min  -      PT Received On  11/18/20  -      PT - Next Appointment  11/19/20  -      PT Goal Re-Cert Due Date  12/02/20  -         Time Calculation- PT    Total Timed Code Minutes- PT  10 minute(s)  -        User Key  (r) = Recorded By, (t) = Taken By, (c) = Cosigned By    Initials Name Provider Type     Faye Rojas, PT Physical Therapist        Therapy Charges for Today     Code Description Service Date Service Provider Modifiers Qty    [id number]  PT EVAL LOW COMPLEXITY 2 11/18/2020 Faye Rojas, PT GP 1    [id number]  PT THERAPEUTIC ACT EA 15 MIN 11/18/2020 Faye Rojas, PT GP 1          PT G-Codes  Outcome Measure Options: AM-PAC 6 Clicks Basic Mobility (PT)  AM-PAC 6 Clicks Score (PT): 18    Faye Rojas PT  11/18/2020

## 2020-11-19 LAB
ALBUMIN SERPL-MCNC: 3.2 G/DL (ref 3.5–5.2)
ALBUMIN/GLOB SERPL: 1.3 G/DL
ALP SERPL-CCNC: 45 U/L (ref 39–117)
ALT SERPL W P-5'-P-CCNC: <5 U/L (ref 1–33)
ANION GAP SERPL CALCULATED.3IONS-SCNC: 8.3 MMOL/L (ref 5–15)
AST SERPL-CCNC: 19 U/L (ref 1–32)
BASOPHILS # BLD AUTO: 0.02 10*3/MM3 (ref 0–0.2)
BASOPHILS NFR BLD AUTO: 0.3 % (ref 0–1.5)
BILIRUB SERPL-MCNC: 0.7 MG/DL (ref 0–1.2)
BUN SERPL-MCNC: 23 MG/DL (ref 8–23)
BUN/CREAT SERPL: 13.1 (ref 7–25)
CALCIUM SPEC-SCNC: 7 MG/DL (ref 8.6–10.5)
CHLORIDE SERPL-SCNC: 108 MMOL/L (ref 98–107)
CO2 SERPL-SCNC: 17.7 MMOL/L (ref 22–29)
CREAT SERPL-MCNC: 1.76 MG/DL (ref 0.57–1)
DEPRECATED RDW RBC AUTO: 43.5 FL (ref 37–54)
EOSINOPHIL # BLD AUTO: 0.1 10*3/MM3 (ref 0–0.4)
EOSINOPHIL NFR BLD AUTO: 1.5 % (ref 0.3–6.2)
ERYTHROCYTE [DISTWIDTH] IN BLOOD BY AUTOMATED COUNT: 12.9 % (ref 12.3–15.4)
GFR SERPL CREATININE-BSD FRML MDRD: 28 ML/MIN/1.73
GLOBULIN UR ELPH-MCNC: 2.4 GM/DL
GLUCOSE SERPL-MCNC: 81 MG/DL (ref 65–99)
HCT VFR BLD AUTO: 27.1 % (ref 34–46.6)
HGB BLD-MCNC: 8.9 G/DL (ref 12–15.9)
IMM GRANULOCYTES # BLD AUTO: 0.02 10*3/MM3 (ref 0–0.05)
IMM GRANULOCYTES NFR BLD AUTO: 0.3 % (ref 0–0.5)
LYMPHOCYTES # BLD AUTO: 0.74 10*3/MM3 (ref 0.7–3.1)
LYMPHOCYTES NFR BLD AUTO: 10.9 % (ref 19.6–45.3)
MCH RBC QN AUTO: 30.5 PG (ref 26.6–33)
MCHC RBC AUTO-ENTMCNC: 32.8 G/DL (ref 31.5–35.7)
MCV RBC AUTO: 92.8 FL (ref 79–97)
MONOCYTES # BLD AUTO: 0.49 10*3/MM3 (ref 0.1–0.9)
MONOCYTES NFR BLD AUTO: 7.2 % (ref 5–12)
NEUTROPHILS NFR BLD AUTO: 5.42 10*3/MM3 (ref 1.7–7)
NEUTROPHILS NFR BLD AUTO: 79.8 % (ref 42.7–76)
NRBC BLD AUTO-RTO: 0 /100 WBC (ref 0–0.2)
PHOSPHATE SERPL-MCNC: 2 MG/DL (ref 2.5–4.5)
PLATELET # BLD AUTO: 120 10*3/MM3 (ref 140–450)
PMV BLD AUTO: 10.2 FL (ref 6–12)
POTASSIUM SERPL-SCNC: 4.5 MMOL/L (ref 3.5–5.2)
PROT SERPL-MCNC: 5.6 G/DL (ref 6–8.5)
RBC # BLD AUTO: 2.92 10*6/MM3 (ref 3.77–5.28)
SODIUM SERPL-SCNC: 134 MMOL/L (ref 136–145)
WBC # BLD AUTO: 6.79 10*3/MM3 (ref 3.4–10.8)

## 2020-11-19 PROCEDURE — 25010000002 IRON SUCROSE PER 1 MG: Performed by: INTERNAL MEDICINE

## 2020-11-19 PROCEDURE — 85025 COMPLETE CBC W/AUTO DIFF WBC: CPT | Performed by: UROLOGY

## 2020-11-19 PROCEDURE — 97110 THERAPEUTIC EXERCISES: CPT

## 2020-11-19 PROCEDURE — 80053 COMPREHEN METABOLIC PANEL: CPT | Performed by: INTERNAL MEDICINE

## 2020-11-19 PROCEDURE — 25010000002 CALCIUM GLUCONATE PER 10 ML: Performed by: INTERNAL MEDICINE

## 2020-11-19 PROCEDURE — 84100 ASSAY OF PHOSPHORUS: CPT | Performed by: INTERNAL MEDICINE

## 2020-11-19 RX ADMIN — DOCUSATE SODIUM 50MG AND SENNOSIDES 8.6MG 2 TABLET: 8.6; 5 TABLET, FILM COATED ORAL at 20:29

## 2020-11-19 RX ADMIN — HYDROCODONE BITARTRATE AND ACETAMINOPHEN 1 TABLET: 5; 325 TABLET ORAL at 06:15

## 2020-11-19 RX ADMIN — PANTOPRAZOLE SODIUM 40 MG: 40 TABLET, DELAYED RELEASE ORAL at 20:29

## 2020-11-19 RX ADMIN — BISACODYL 10 MG: 10 SUPPOSITORY RECTAL at 18:31

## 2020-11-19 RX ADMIN — ATORVASTATIN CALCIUM 20 MG: 20 TABLET, FILM COATED ORAL at 20:29

## 2020-11-19 RX ADMIN — SODIUM CHLORIDE 100 ML/HR: 9 INJECTION, SOLUTION INTRAVENOUS at 06:17

## 2020-11-19 RX ADMIN — POTASSIUM & SODIUM PHOSPHATES POWDER PACK 280-160-250 MG 1 PACKET: 280-160-250 PACK at 12:16

## 2020-11-19 RX ADMIN — DOCUSATE SODIUM 50MG AND SENNOSIDES 8.6MG 2 TABLET: 8.6; 5 TABLET, FILM COATED ORAL at 10:19

## 2020-11-19 RX ADMIN — POTASSIUM & SODIUM PHOSPHATES POWDER PACK 280-160-250 MG 1 PACKET: 280-160-250 PACK at 10:19

## 2020-11-19 RX ADMIN — METOPROLOL SUCCINATE 25 MG: 25 TABLET, EXTENDED RELEASE ORAL at 10:19

## 2020-11-19 RX ADMIN — HYDROCODONE BITARTRATE AND ACETAMINOPHEN 1 TABLET: 5; 325 TABLET ORAL at 22:22

## 2020-11-19 RX ADMIN — DULOXETINE HYDROCHLORIDE 30 MG: 30 CAPSULE, DELAYED RELEASE ORAL at 12:16

## 2020-11-19 RX ADMIN — IRON SUCROSE 100 MG: 20 INJECTION, SOLUTION INTRAVENOUS at 12:17

## 2020-11-19 RX ADMIN — POTASSIUM & SODIUM PHOSPHATES POWDER PACK 280-160-250 MG 1 PACKET: 280-160-250 PACK at 18:31

## 2020-11-19 RX ADMIN — CALCIUM GLUCONATE 1 G: 98 INJECTION, SOLUTION INTRAVENOUS at 10:19

## 2020-11-19 RX ADMIN — SODIUM CHLORIDE 100 ML/HR: 9 INJECTION, SOLUTION INTRAVENOUS at 12:54

## 2020-11-19 RX ADMIN — HYDROCODONE BITARTRATE AND ACETAMINOPHEN 1 TABLET: 5; 325 TABLET ORAL at 12:18

## 2020-11-19 RX ADMIN — SODIUM CHLORIDE 100 ML/HR: 9 INJECTION, SOLUTION INTRAVENOUS at 22:22

## 2020-11-19 NOTE — PROGRESS NOTES
POD3 s/p laparoscopic radical left nephrectomy    S: Comfortable, fatigued. +flatus but no BM. Dr. Leonard is overseeing electrolyte replacement in the setting of acute on chronic renal insufficiency.    AVSS  Abd soft, nondistended  Inc c/d/i    Cr 1.76, Hb 8.9, WBC 6.8    Plan:  - dulcolax suppository  - ambulate  - minimize narcotics  - electrolyte replacement

## 2020-11-19 NOTE — PROGRESS NOTES
"   LOS: 3 days    Patient Care Team:  Ken Andujar MD as PCP - General  Chuckie Mclaughlin MD as Consulting Physician (Urology)  Jason Tai MD as Consulting Physician (Cardiology)  Geoffrey Mills MD as Consulting Physician (Nephrology)  Anayeli Alanis MD as Consulting Physician (Obstetrics and Gynecology)  BROOK Clarke MD as Consulting Physician (Ophthalmology)  Jose Alfredo Krishnamurthy MD as Consulting Physician (Hematology and Oncology)  Sean Canales MD as Consulting Physician (Orthopedic Surgery)  Esteban Moe MD as Consulting Physician (Orthopedic Surgery)  Feliciano Elizabeth MD as Consulting Physician (Gastroenterology)    Chief Complaint:  No chief complaint on file.    Follow-up acute kidney injury on chronic kidney disease  Subjective     Interval History:   Patient complaining of feeling weak and fatigued, she took pain meds early this morning, no nausea or vomiting, no abdominal pain, no lightheadedness      Review of Systems:   As noted above    Objective     Vital Signs  Temp:  [97.6 °F (36.4 °C)-98.3 °F (36.8 °C)] 98.3 °F (36.8 °C)  Heart Rate:  [78-88] 88  Resp:  [20] 20  BP: (110-153)/(59-91) 110/59    Flowsheet Rows      First Filed Value   Admission Height  157.5 cm (62\") Documented at 11/16/2020 0601   Admission Weight  68.3 kg (150 lb 9.2 oz) Documented at 11/16/2020 0601          No intake/output data recorded.  I/O last 3 completed shifts:  In: 120 [P.O.:120]  Out: 1500 [Urine:1500]    Intake/Output Summary (Last 24 hours) at 11/19/2020 0746  Last data filed at 11/18/2020 2100  Gross per 24 hour   Intake 0 ml   Output 950 ml   Net -950 ml       Physical Exam:  General Appearance: alert, oriented x 3, no acute distress, appears to be chronically ill  Skin: warm and dry  HEENT: pupils round and reactive to light, oral mucosa normal, nonicteric sclerae  Neck: supple, no JVD, trachea midline, faint right carotid bruit  Lungs: Creased breath sounds in the bases, " unlabored breathing effort  Heart: Irregularly irregular, normal S1 and S2, no S3, no rub  Abdomen: soft,  mild tenderness related to surgery, normoactive bowel sounds : no palpable bladder,  Extremities: no edema, cyanosis or clubbing  Lymphatics: No cervical or supraclavicular lymphadenopathy.  Neuro: normal speech and mental status      Results Review:    Results from last 7 days   Lab Units 11/19/20 0435 11/18/20 0444 11/17/20 0455   SODIUM mmol/L 134* 133* 134*   POTASSIUM mmol/L 4.5 4.7 5.1   CHLORIDE mmol/L 108* 104 104   CO2 mmol/L 17.7* 21.9* 20.7*   BUN mg/dL 23 24* 22   CREATININE mg/dL 1.76* 1.88* 1.92*   CALCIUM mg/dL 7.0* 7.2* 8.0*   BILIRUBIN mg/dL 0.7  --   --    ALK PHOS U/L 45  --   --    ALT (SGPT) U/L <5  --   --    AST (SGOT) U/L 19  --   --    GLUCOSE mg/dL 81 102* 137*       Estimated Creatinine Clearance: 22.3 mL/min (A) (by C-G formula based on SCr of 1.76 mg/dL (H)).    Results from last 7 days   Lab Units 11/19/20 0435 11/18/20 0444   MAGNESIUM mg/dL  --  2.0   PHOSPHORUS mg/dL 2.0* 2.4*       Results from last 7 days   Lab Units 11/18/20 0444   URIC ACID mg/dL 6.2*       Results from last 7 days   Lab Units 11/19/20 0435 11/18/20 0444 11/17/20 0455   WBC 10*3/mm3 6.79 8.26 9.78   HEMOGLOBIN g/dL 8.9* 9.5* 11.1*   PLATELETS 10*3/mm3 120* 137* 179               Imaging Results (Last 24 Hours)     ** No results found for the last 24 hours. **        atorvastatin, 20 mg, Oral, Nightly  bisacodyl, 10 mg, Rectal, Daily  DULoxetine, 30 mg, Oral, Daily  iron sucrose, 100 mg, Intravenous, Daily  metoprolol succinate XL, 25 mg, Oral, Daily  pantoprazole, 40 mg, Oral, Nightly  sennosides-docusate, 2 tablet, Oral, BID      sodium chloride, 100 mL/hr, Last Rate: 100 mL/hr (11/19/20 0617)        Medication Review:   Current Facility-Administered Medications   Medication Dose Route Frequency Provider Last Rate Last Admin   • acetaminophen (TYLENOL) tablet 650 mg  650 mg Oral Q4H PRN  Chuckie Mclaughlin MD        Or   • acetaminophen (TYLENOL) suppository 650 mg  650 mg Rectal Q4H PRN Chuckie Mclaughlin MD       • acetaminophen (TYLENOL) tablet 1,000 mg  1,000 mg Oral TID PRN Chuckie Mclaughlin MD       • atorvastatin (LIPITOR) tablet 20 mg  20 mg Oral Nightly Chuckie Mclaughlin MD   20 mg at 11/18/20 2050   • bisacodyl (DULCOLAX) suppository 10 mg  10 mg Rectal Daily Arden Montes Jr., MD   10 mg at 11/18/20 0917   • DULoxetine (CYMBALTA) DR capsule 30 mg  30 mg Oral Daily Chuckie Mclaughlin MD   30 mg at 11/18/20 0916   • HYDROcodone-acetaminophen (NORCO) 5-325 MG per tablet 1 tablet  1 tablet Oral Q4H PRN Chuckie Mclaughlin MD   1 tablet at 11/19/20 0615   • HYDROmorphone (DILAUDID) injection 0.5 mg  0.5 mg Intravenous Q2H PRN Chuckie Mclaughlin MD   0.5 mg at 11/16/20 1535    And   • naloxone (NARCAN) injection 0.1 mg  0.1 mg Intravenous Q5 Min PRN Chuckie Mclaughlin MD       • iron sucrose (VENOFER) 100 mg in sodium chloride 0.9 % 100 mL IVPB  100 mg Intravenous Daily Yazmin Parmar MD   100 mg at 11/18/20 1140   • metoprolol succinate XL (TOPROL-XL) 24 hr tablet 25 mg  25 mg Oral Daily Yazmin Parmar MD   25 mg at 11/18/20 0915   • nitroglycerin (NITROSTAT) SL tablet 0.4 mg  0.4 mg Sublingual Q5 Min PRN Chuckie Mclaughlin MD       • ondansetron (ZOFRAN) tablet 4 mg  4 mg Oral Q6H PRN Chuckie Mcalughlin MD        Or   • ondansetron (ZOFRAN) injection 4 mg  4 mg Intravenous Q6H PRN Chuckie Mclaughlin MD   4 mg at 11/17/20 0505   • pantoprazole (PROTONIX) EC tablet 40 mg  40 mg Oral Nightly Chuckie Mclaughlin MD   40 mg at 11/18/20 2049   • sennosides-docusate (PERICOLACE) 8.6-50 MG per tablet 2 tablet  2 tablet Oral BID Chuckie Mclaughlin MD   2 tablet at 11/18/20 2050   • sodium chloride 0.9 % infusion  100 mL/hr Intravenous Continuous  Chuckie Mclaughlin  mL/hr at 11/19/20 0617 100 mL/hr at 11/19/20 0617       Assessment/Plan   1.  Acute kidney disease on chronic kidney disease associated with the loss of renal mass based on the patient who had chronic kidney disease stage III at baseline with baseline creatinine around 1.3.  With the removal left kidney right kidney is unable to compensate at this time and the creatinine has risen to 1.92.  Today the creatinine 1.76, the electrolyte within acceptable range other than total CO2 17.7.  Phosphorus is 2, albumin 3.2, calcium 7. 2.  Chronic kidney disease at baseline probably associate with nephrosclerosis  3.  Status post left nephrectomy because of renal mass, the pathology revealed renal cell carcinoma  4.  Chronic anemia, hemoglobin today is 8.9  5.  History of depression  6.  GERD  7.  Dyslipidemia  8.  Degenerative joint disease        Plan:  1.  Replete calcium and phosphorus  2.  Add oral sodium bicarbonate  3.  Surveillance labs        Joseph Leonard MD  11/19/20  07:46 EST

## 2020-11-19 NOTE — THERAPY TREATMENT NOTE
Patient Name: Marleni Hummel  : 1935    MRN: [id number]                              Today's Date: 2020       Admit Date: 2020    Visit Dx:     ICD-10-CM ICD-9-CM   1. Generalized weakness  R53.1 780.79   2. Left renal mass  N28.89 593.9     Patient Active Problem List   Diagnosis   • Arthritis involving multiple sites   • Essential hypertension   • Atrial fibrillation (CMS/HCC)   • History of Bell's palsy   • Chronic kidney disease (CKD), stage III (moderate) (CMS/HCC)   • Gastroesophageal reflux disease without esophagitis   • Hypercholesterolemia   • Insomnia   • Panic disorder   • Chronic nonseasonal allergic rhinitis due to pollen   • Sleep apnea   • History of MI (myocardial infarction)   • Chronic coronary artery disease   • Anemia of chronic disease   • Weakness of right leg   • Cardiac murmur   • Senile osteoporosis   • Hyperuricemia   • Grief reaction   • Peripheral vertigo   • Left kidney mass   • Left renal mass     Past Medical History:   Diagnosis Date   • Acute vulvitis 2019   • Allergic    • Allergic rhinitis    • Anemia of chronic disease    • Arthropathy of knee     right   • Atrial fibrillation (CMS/HCC)    • Back pain    • Bell's palsy    • Caregiver stress syndrome 2019   • Chronic coronary artery disease    • Chronic kidney disease (CKD), stage III (moderate)    • Edema    • Elevated serum free T4 level 3/21/2016   • Encounter for eye exam 2020   • Essential hypertension    • Fatigue    • GERD (gastroesophageal reflux disease)    • H/O Heart murmur    • Heart attack (CMS/HCC) 10/05/2015   • Hematuria 2016   • Herpes zoster without complication    • Hip arthritis     left   • History of bone density study 2008   • History of pelvic fracture    • History of pneumonia    • History of transfusion        • Hypercholesterolemia    • IFG (impaired fasting glucose)    • Insomnia    • Left kidney mass 2020   • Limited joint range of motion      RIGHT KNEE   • Mixed hyperlipidemia    • Muscle tension headache 4/3/2019   • New daily persistent headache 12/17/2018   • Osteoarthritis     Right hip   • Osteoporosis    • Panic disorder    • Peripheral vertigo    • PONV (postoperative nausea and vomiting)    • Rectal bleeding     HISTORY OF   • Sleep apnea     DOES NOT USE C-PAP   • Stress    • Stress-induced cardiomyopathy    • Urinary incontinence    • Vitamin D deficiency      Past Surgical History:   Procedure Laterality Date   • CATARACT EXTRACTION Left 04/01/2013    Dr. Clarke   • CATARACT EXTRACTION Right 04/16/2013    Dr. Clarke   • COLONOSCOPY  04/18/2014    Dr. Elizabeth; no polyps   • EPIDURAL BLOCK     • HIP PERCUTANEOUS PINNING Left 8/31/2017    Procedure: LT HIP PERCUTANEOUS PINNING;  Surgeon: Sean Canales MD;  Location: [facility] OR;  Service:    • MAMMO BILATERAL  11/2013   • NEPHRECTOMY Left 11/16/2020    Procedure: LAPAROSCOPIC NEPHRECTOMY;  Surgeon: Chuckie Mclaughlin MD;  Location: [facility] OR;  Service: Urology;  Laterality: Left;   • PAP SMEAR  02/2011   • TIBIA FRACTURE SURGERY Right 2015    REMOVED PLATE LATER IN 2015   • TONSILLECTOMY  1947   • TOTAL HIP ARTHROPLASTY Right 08/2015   • TOTAL HIP ARTHROPLASTY Right 09/2016   • TOTAL KNEE ARTHROPLASTY Bilateral 2004     General Information     Row Name 11/19/20 1002          Physical Therapy Time and Intention    Document Type  therapy note (daily note)  -DJ     Mode of Treatment  individual therapy;physical therapy  -DJ     Row Name 11/19/20 1002          General Information    Patient Profile Reviewed  yes  -DJ     Existing Precautions/Restrictions  fall  -DJ     Row Name 11/19/20 1002          Stairs Within Home, Primary    Stairs, Within Home, Primary  1 flight in home but optional  -DJ     Row Name 11/19/20 1002          Cognition    Orientation Status (Cognition)  oriented x 4 Pleasant and cooperative  -DJ     Row Name 11/19/20 1002          Safety Issues, Functional  Mobility    Impairments Affecting Function (Mobility)  endurance/activity tolerance  -DJ     Comment, Safety Issues/Impairments (Mobility)  gt belt, grippy socks  -DJ       User Key  (r) = Recorded By, (t) = Taken By, (c) = Cosigned By    Initials Name Provider Type    Sherry Stephen, PT Physical Therapist        Mobility     Row Name 11/19/20 1003          Bed Mobility    Comment (Bed Mobility)  UIC  -DJ     Row Name 11/19/20 1003          Transfers    Comment (Transfers)  sit/stand from recliner chair  -DJ     Row Name 11/19/20 1003          Bed-Chair Transfer    Bed-Chair [place] (Transfers)  not tested  -DJ     Row Name 11/19/20 1003          Sit-Stand Transfer    Sit-Stand [place] (Transfers)  contact guard;verbal cues  -DJ     Assistive Device (Sit-Stand Transfers)  walker, front-wheeled  -DJ     Row Name 11/19/20 1003          Gait/Stairs (Locomotion)    [place] Level (Gait)  contact guard;verbal cues;1 person to manage equipment  -DJ     Assistive Device (Gait)  walker, front-wheeled  -DJ     Distance in Feet (Gait)  30'  -DJ     Deviations/Abnormal Patterns (Gait)  david decreased;antalgic  -DJ     Bilateral Gait Deviations  forward flexed posture  -DJ     [place] Level (Stairs)  not tested  -DJ     Comment (Gait/Stairs)  Pt amb VERY slowly and cautiously with r wx and CGA/vc to stand up straight and engage abs. Pt amb 30', pacve is slow, balance is fair, endurance is poor. Min c/o R hip pain  -DJ       User Key  (r) = Recorded By, (t) = Taken By, (c) = Cosigned By    Initials Name Provider Type    Sherry Stephen, PT Physical Therapist        Obj/Interventions     Row Name 11/19/20 1005          Motor Skills    Therapeutic Exercise  -- AP, engaging abs durinf functional mobility; bracing with pillow for coughing and sneezing  -DJ     Row Name 11/19/20 1005          Balance    Balance Interventions  standing;weight shifting activity  -DJ     Comment, Balance  fair balance with r wx   -DJ       User Key  (r) = Recorded By, (t) = Taken By, (c) = Cosigned By    Initials Name Provider Type    DJ Sherry Kearns, PT Physical Therapist        Goals/Plan    No documentation.       Clinical Impression     Row Name 11/19/20 1006          Pain    Additional Documentation  Pain Scale: Numbers Pre/Post-Treatment (Group)  -DJ     Row Name 11/19/20 1006          Pain Scale: Numbers Pre/Post-Treatment    Posttreatment Pain Rating  6/10  -DJ     Pain Location  abdomen  -DJ     Pre/Posttreatment Pain Comment  expected c/o abd pain with movement  -DJ     Pain Intervention(s)  Rest;Repositioned  -DJ     Row Name 11/19/20 1006          Plan of Care Review    Plan of Care Reviewed With  patient  -DJ     Progress  no change  -DJ     Outcome Summary  Pt req CGA for sit/stand transfer today and amb 30' with r wx and CGA/vc for posture and to engage abs. Pt is slow and fatigues quickly. No increase in amb distance today due to fatigue. Pt encouraged to eat better today to keep strength up. Cont PT for ther ex, gt/transfer training, bed mobility, balance, and activity tolerance as appropriate.  -DJ     Row Name 11/19/20 1006          Therapy Assessment/Plan (PT)    Patient/Family Therapy Goals Statement (PT)  return home  -DJ     Criteria for Skilled Interventions Met (PT)  yes;skilled treatment is necessary  -DJ     Row Name 11/19/20 1006          Vital Signs    O2 Delivery Pre Treatment  room air  -DJ     O2 Delivery Intra Treatment  room air  -DJ     O2 Delivery Post Treatment  room air  -DJ     Pre Patient Position  Sitting  -DJ     Intra Patient Position  Standing  -DJ     Post Patient Position  Sitting  -DJ     Row Name 11/19/20 1006          Positioning and Restraints    Pre-Treatment Position  sitting in chair/recliner  -DJ     Post Treatment Position  chair  -DJ     In Chair  notified nsg;reclined;call light within reach;encouraged to call for assist  -DJ       User Key  (r) = Recorded By, (t) = Taken By, (c) =  Cosigned By    Initials Name Provider Type    Sherry Stephen, PT Physical Therapist        Outcome Measures     Row Name 11/19/20 1009          How much help from another person do you currently need...    Turning from your back to your side while in flat bed without using bedrails?  3  -DJ     Moving from lying on back to sitting on the side of a flat bed without bedrails?  3  -DJ     Moving to and from a bed to a chair (including a wheelchair)?  3  -DJ     Standing up from a chair using your arms (e.g., wheelchair, bedside chair)?  3  -DJ     Climbing 3-5 steps with a railing?  2  -DJ     To walk in hospital room?  3  -DJ     AM-PAC 6 Clicks Score (PT)  17  -DJ     Row Name 11/19/20 1009          Functional Assessment    Outcome Measure Options  AM-PAC 6 Clicks Basic Mobility (PT)  -DJ       User Key  (r) = Recorded By, (t) = Taken By, (c) = Cosigned By    Initials Name Provider Type    Sherry Stephen, PT Physical Therapist        Physical Therapy Education                 Title: PT OT SLP Therapies (Done)     Topic: Physical Therapy (Done)     Point: Mobility training (Done)     Learning Progress Summary           Patient Acceptance, E, VU,NR by  at 11/19/2020 1010    Acceptance, E, VU,NR by  at 11/18/2020 1542                   Point: Home exercise program (Done)     Learning Progress Summary           Patient Acceptance, E, VU,NR by  at 11/19/2020 1010    Acceptance, E, VU,NR by  at 11/18/2020 1542                   Point: Body mechanics (Done)     Learning Progress Summary           Patient Acceptance, E, VU,NR by  at 11/19/2020 1010    Acceptance, E, VU,NR by  at 11/18/2020 1542                   Point: Precautions (Done)     Learning Progress Summary           Patient Acceptance, E, VU,NR by  at 11/19/2020 1010    Acceptance, E, VU,NR by  at 11/18/2020 1542                               User Key     Initials Effective Dates Name Provider Type [facility] 04/03/18 -  Faye Rojas, PT  Physical Therapist PT    DJ 10/25/19 -  Sherry Kearns PT Physical Therapist PT              PT Recommendation and Plan     Plan of Care Reviewed With: patient  Progress: no change  Outcome Summary: Pt req CGA for sit/stand transfer today and amb 30' with r wx and CGA/vc for posture and to engage abs. Pt is slow and fatigues quickly. No increase in amb distance today due to fatigue. Pt encouraged to eat better today to keep strength up. Cont PT for ther ex, gt/transfer training, bed mobility, balance, and activity tolerance as appropriate.     Time Calculation:   PT Charges     Row Name 11/19/20 1011             Time Calculation    Start Time  0943  -DJ      Stop Time  1003  -DJ      Time Calculation (min)  20 min  -DJ      PT Non-Billable Time (min)  10 min  -DJ      PT Received On  11/19/20  -DJ      PT - Next Appointment  11/20/20  -LENARD        User Key  (r) = Recorded By, (t) = Taken By, (c) = Cosigned By    Initials Name Provider Type    DJ Sherry Kearns PT Physical Therapist        Therapy Charges for Today     Code Description Service Date Service Provider Modifiers Qty    25304042558 HC PT THER PROC EA 15 MIN 11/19/2020 Sherry Kaerns PT GP 1          PT G-Codes  Outcome Measure Options: AM-PAC 6 Clicks Basic Mobility (PT)  AM-PAC 6 Clicks Score (PT): 17    Sherry Kearns PT  11/19/2020

## 2020-11-19 NOTE — PLAN OF CARE
Problem: Adult Inpatient Plan of Care  Goal: Plan of Care Review  Recent Flowsheet Documentation  Taken 11/19/2020 1006 by Sherry Kearns, DAILY  Progress: no change  Plan of Care Reviewed With: patient  Outcome Summary: Pt req CGA for sit/stand transfer today and amb 30' with r wx and CGA/vc for posture and to engage abs. Pt is slow and fatigues quickly. No increase in amb distance today due to fatigue. Pt encouraged to eat better today to keep strength up. Cont PT for ther ex, gt/transfer training, bed mobility, balance, and activity tolerance as appropriate.  Patient was intermittently wearing a face mask during this therapy encounter. Therapist used appropriate personal protective equipment including eye protection, mask, and gloves.  Mask used was standard procedure mask. Appropriate PPE was worn during the entire therapy session. Hand hygiene was completed before and after therapy session. Patient is not in enhanced droplet precautions.

## 2020-11-20 LAB
ALBUMIN SERPL-MCNC: 3.1 G/DL (ref 3.5–5.2)
ANION GAP SERPL CALCULATED.3IONS-SCNC: 6.4 MMOL/L (ref 5–15)
BASOPHILS # BLD AUTO: 0.02 10*3/MM3 (ref 0–0.2)
BASOPHILS NFR BLD AUTO: 0.3 % (ref 0–1.5)
BUN SERPL-MCNC: 26 MG/DL (ref 8–23)
BUN/CREAT SERPL: 16.6 (ref 7–25)
CALCIUM SPEC-SCNC: 6.9 MG/DL (ref 8.6–10.5)
CHLORIDE SERPL-SCNC: 108 MMOL/L (ref 98–107)
CO2 SERPL-SCNC: 17.6 MMOL/L (ref 22–29)
CREAT SERPL-MCNC: 1.57 MG/DL (ref 0.57–1)
DEPRECATED RDW RBC AUTO: 44.4 FL (ref 37–54)
DX PRELIMINARY: NORMAL
EOSINOPHIL # BLD AUTO: 0.13 10*3/MM3 (ref 0–0.4)
EOSINOPHIL NFR BLD AUTO: 1.7 % (ref 0.3–6.2)
ERYTHROCYTE [DISTWIDTH] IN BLOOD BY AUTOMATED COUNT: 13.1 % (ref 12.3–15.4)
GFR SERPL CREATININE-BSD FRML MDRD: 32 ML/MIN/1.73
GLUCOSE SERPL-MCNC: 82 MG/DL (ref 65–99)
HCT VFR BLD AUTO: 27.5 % (ref 34–46.6)
HGB BLD-MCNC: 9.3 G/DL (ref 12–15.9)
IMM GRANULOCYTES # BLD AUTO: 0.02 10*3/MM3 (ref 0–0.05)
IMM GRANULOCYTES NFR BLD AUTO: 0.3 % (ref 0–0.5)
LAB AP CASE REPORT: NORMAL
LAB AP DIAGNOSIS COMMENT: NORMAL
LAB AP SPECIAL STAINS: NORMAL
LAB AP SYNOPTIC CHECKLIST: NORMAL
LYMPHOCYTES # BLD AUTO: 0.75 10*3/MM3 (ref 0.7–3.1)
LYMPHOCYTES NFR BLD AUTO: 9.7 % (ref 19.6–45.3)
MAGNESIUM SERPL-MCNC: 1.9 MG/DL (ref 1.6–2.4)
MCH RBC QN AUTO: 31.2 PG (ref 26.6–33)
MCHC RBC AUTO-ENTMCNC: 33.8 G/DL (ref 31.5–35.7)
MCV RBC AUTO: 92.3 FL (ref 79–97)
MONOCYTES # BLD AUTO: 0.61 10*3/MM3 (ref 0.1–0.9)
MONOCYTES NFR BLD AUTO: 7.9 % (ref 5–12)
NEUTROPHILS NFR BLD AUTO: 6.18 10*3/MM3 (ref 1.7–7)
NEUTROPHILS NFR BLD AUTO: 80.1 % (ref 42.7–76)
NRBC BLD AUTO-RTO: 0 /100 WBC (ref 0–0.2)
PATH REPORT.FINAL DX SPEC: NORMAL
PATH REPORT.GROSS SPEC: NORMAL
PHOSPHATE SERPL-MCNC: 2.5 MG/DL (ref 2.5–4.5)
PLATELET # BLD AUTO: 135 10*3/MM3 (ref 140–450)
PMV BLD AUTO: 10.4 FL (ref 6–12)
POTASSIUM SERPL-SCNC: 4.5 MMOL/L (ref 3.5–5.2)
RBC # BLD AUTO: 2.98 10*6/MM3 (ref 3.77–5.28)
SODIUM SERPL-SCNC: 132 MMOL/L (ref 136–145)
URATE SERPL-MCNC: 6.4 MG/DL (ref 2.4–5.7)
WBC # BLD AUTO: 7.71 10*3/MM3 (ref 3.4–10.8)

## 2020-11-20 PROCEDURE — 83735 ASSAY OF MAGNESIUM: CPT | Performed by: INTERNAL MEDICINE

## 2020-11-20 PROCEDURE — 80069 RENAL FUNCTION PANEL: CPT | Performed by: INTERNAL MEDICINE

## 2020-11-20 PROCEDURE — 85025 COMPLETE CBC W/AUTO DIFF WBC: CPT | Performed by: INTERNAL MEDICINE

## 2020-11-20 PROCEDURE — 97110 THERAPEUTIC EXERCISES: CPT

## 2020-11-20 PROCEDURE — 84550 ASSAY OF BLOOD/URIC ACID: CPT | Performed by: INTERNAL MEDICINE

## 2020-11-20 PROCEDURE — 25010000002 ONDANSETRON PER 1 MG: Performed by: UROLOGY

## 2020-11-20 PROCEDURE — 25010000002 IRON SUCROSE PER 1 MG: Performed by: INTERNAL MEDICINE

## 2020-11-20 PROCEDURE — 94799 UNLISTED PULMONARY SVC/PX: CPT

## 2020-11-20 RX ORDER — HYDROCODONE BITARTRATE AND ACETAMINOPHEN 5; 325 MG/1; MG/1
1-2 TABLET ORAL EVERY 4 HOURS PRN
Qty: 8 TABLET | Refills: 0 | Status: SHIPPED | OUTPATIENT
Start: 2020-11-20 | End: 2020-11-21 | Stop reason: SDUPTHER

## 2020-11-20 RX ORDER — CALCIUM CARBONATE 200(500)MG
2 TABLET,CHEWABLE ORAL 2 TIMES DAILY
Status: DISCONTINUED | OUTPATIENT
Start: 2020-11-20 | End: 2020-11-21 | Stop reason: HOSPADM

## 2020-11-20 RX ORDER — DOCUSATE SODIUM 250 MG
250 CAPSULE ORAL 2 TIMES DAILY PRN
Qty: 30 CAPSULE | Refills: 1 | Status: SHIPPED | OUTPATIENT
Start: 2020-11-20 | End: 2020-11-21 | Stop reason: SDUPTHER

## 2020-11-20 RX ORDER — CALCIUM CARBONATE 500 MG/1
2 TABLET ORAL 2 TIMES DAILY
Qty: 56 TABLET | Refills: 0 | Status: SHIPPED | OUTPATIENT
Start: 2020-11-20 | End: 2020-12-04

## 2020-11-20 RX ADMIN — ATORVASTATIN CALCIUM 20 MG: 20 TABLET, FILM COATED ORAL at 21:16

## 2020-11-20 RX ADMIN — ANTACID TABLETS 2 TABLET: 500 TABLET, CHEWABLE ORAL at 21:18

## 2020-11-20 RX ADMIN — ONDANSETRON 4 MG: 2 INJECTION INTRAMUSCULAR; INTRAVENOUS at 11:37

## 2020-11-20 RX ADMIN — DULOXETINE HYDROCHLORIDE 30 MG: 30 CAPSULE, DELAYED RELEASE ORAL at 10:17

## 2020-11-20 RX ADMIN — ANTACID TABLETS 2 TABLET: 500 TABLET, CHEWABLE ORAL at 10:17

## 2020-11-20 RX ADMIN — DOCUSATE SODIUM 50MG AND SENNOSIDES 8.6MG 2 TABLET: 8.6; 5 TABLET, FILM COATED ORAL at 10:17

## 2020-11-20 RX ADMIN — METOPROLOL SUCCINATE 25 MG: 25 TABLET, EXTENDED RELEASE ORAL at 10:17

## 2020-11-20 RX ADMIN — DOCUSATE SODIUM 50MG AND SENNOSIDES 8.6MG 2 TABLET: 8.6; 5 TABLET, FILM COATED ORAL at 21:16

## 2020-11-20 RX ADMIN — HYDROCODONE BITARTRATE AND ACETAMINOPHEN 1 TABLET: 5; 325 TABLET ORAL at 13:07

## 2020-11-20 RX ADMIN — PANTOPRAZOLE SODIUM 40 MG: 40 TABLET, DELAYED RELEASE ORAL at 21:16

## 2020-11-20 RX ADMIN — IRON SUCROSE 100 MG: 20 INJECTION, SOLUTION INTRAVENOUS at 13:04

## 2020-11-20 NOTE — PROGRESS NOTES
Continued Stay Note  [facility]     Patient Name: Marleni Hummel  MRN: [id number]  Today's Date: 11/20/2020    Admit Date: 11/16/2020    Discharge Plan     Row Name 11/20/20 0337       Plan    Plan  Home with daughter's assist, referral to [facility]    Provided Post Acute Provider List?  Yes    Post Acute Provider List  Home Health    Delivered To  Patient;Support Person    Support Person  daughter    Method of Delivery  In person    Patient/Family in Agreement with Plan  yes    Plan Comments  CCP followed up with pt and daughter at bedside who agree to home health referral and select [facility] (placed in [facility], called to [phone]). Daughter plans to stay with pt in her home at d/c to assist as needed. Alia Rodriguez LCSW        Discharge Codes    No documentation.       Expected Discharge Date and Time     Expected Discharge Date Expected Discharge Time    Nov 20, 2020             Tanvi Rodriguez LCSW

## 2020-11-20 NOTE — PLAN OF CARE
Problem: Adult Inpatient Plan of Care  Goal: Plan of Care Review  Outcome: Ongoing, Progressing  Flowsheets (Taken 11/20/2020 0441)  Plan of Care Reviewed With: patient  Outcome Summary: Pt rested well, complaints of pain tx with prn norco. A&O x4 on RA. Pt had a BM overnight. IV fluids. VSS, will continue to monitor.

## 2020-11-20 NOTE — DISCHARGE SUMMARY
Date of admission:  11/16/2020   Date of discharge:   11/21/2020  Admitting diagnosis:  Left renal mass  Discharge diagnosis:  Same    Procedures:  Left hand-assisted laparoscopic radical nephrectomy    Hospital course:  The patient was taken to the OR on the date of admission for the above procedure. Postoperatively, her diet and activity were advanced without difficulty. She developed acute renal failure, as expected,, and a nephrology consultation was requested for assistance with electrolyte abnormalities. She remained afebrile and hemodynamically stable throughout his admission. She ambulated, and his pain was well controlled on oral medications. Path revealed oncocytoma.    Discharge medications:       Discharge Medications      New Medications      Instructions Start Date   docusate sodium 250 MG capsule  Commonly known as: COLACE   250 mg, Oral, 2 Times Daily PRN      HYDROcodone-acetaminophen 5-325 MG per tablet  Commonly known as: NORCO   1-2 tablets, Oral, Every 4 Hours PRN         Changes to Medications      Instructions Start Date   metoprolol succinate XL 50 MG 24 hr tablet  Commonly known as: TOPROL-XL  What changed: when to take this   50 mg, Oral, Daily         Continue These Medications      Instructions Start Date   acetaminophen 500 MG tablet  Commonly known as: TYLENOL   1,000 mg, Oral, 3 Times Daily PRN      atorvastatin 20 MG tablet  Commonly known as: LIPITOR   20 mg, Oral, Nightly      DULoxetine 30 MG capsule  Commonly known as: CYMBALTA   30 mg, Oral, Daily      ezetimibe 10 MG tablet  Commonly known as: ZETIA   10 mg, Oral, Nightly      multivitamin with minerals tablet tablet   1 tablet, Oral, Daily      Myrbetriq 50 MG tablet sustained-release 24 hour 24 hr tablet  Generic drug: Mirabegron ER   50 mg, Oral, Every Evening      pantoprazole 20 MG EC tablet  Commonly known as: PROTONIX   20 mg, Oral, Nightly      PROLIA SC   1 dose, Subcutaneous, Every 6 Months, Pt unsure of dose        vitamin D3 125 MCG (5000 UT) capsule capsule   5,000 Units, Oral, Daily         Stop These Medications    apixaban 2.5 MG tablet tablet  Commonly known as: ELITRUDI             ROV:  2 week

## 2020-11-20 NOTE — PROGRESS NOTES
"   LOS: 4 days    Patient Care Team:  Ken Andujar MD as PCP - General  Chuckie Mclaughlin MD as Consulting Physician (Urology)  Jason Tai MD as Consulting Physician (Cardiology)  Geoffrey Mills MD as Consulting Physician (Nephrology)  Anayeli Alanis MD as Consulting Physician (Obstetrics and Gynecology)  BROOK Clarke MD as Consulting Physician (Ophthalmology)  Jose Alfredo Krishnamurthy MD as Consulting Physician (Hematology and Oncology)  Sean Canales MD as Consulting Physician (Orthopedic Surgery)  Esteban Moe MD as Consulting Physician (Orthopedic Surgery)  Feliciano Elizabeth MD as Consulting Physician (Gastroenterology)    Chief Complaint:  No chief complaint on file.    Follow-up acute kidney injury on chronic kidney disease  Subjective     Interval History:   Patient complaining of feeling better today, she took pain meds early this morning, no nausea or vomiting, no abdominal pain, no lightheadedness      Review of Systems:   As noted above    Objective     Vital Signs  Temp:  [97.3 °F (36.3 °C)-98.6 °F (37 °C)] 98.6 °F (37 °C)  Heart Rate:  [85-92] 91  Resp:  [18] 18  BP: (112-132)/(75-97) 132/97    Flowsheet Rows      First Filed Value   Admission Height  157.5 cm (62\") Documented at 11/16/2020 0601   Admission Weight  68.3 kg (150 lb 9.2 oz) Documented at 11/16/2020 0601          I/O this shift:  In: 60 [P.O.:60]  Out: -   I/O last 3 completed shifts:  In: 420 [P.O.:420]  Out: 200 [Urine:200]    Intake/Output Summary (Last 24 hours) at 11/20/2020 0925  Last data filed at 11/20/2020 0844  Gross per 24 hour   Intake 480 ml   Output --   Net 480 ml       Physical Exam:  General Appearance: alert, oriented x 3, no acute distress, appears to be chronically ill  Skin: warm and dry  HEENT: pupils round and reactive to light, oral mucosa normal, nonicteric sclerae  Neck: supple, no JVD, trachea midline, faint right carotid bruit  Lungs: Creased breath sounds in the bases, unlabored " breathing effort  Heart: Irregularly irregular, normal S1 and S2, no S3, no rub  Abdomen: soft,  mild tenderness related to surgery, normoactive bowel sounds : no palpable bladder,  Extremities: no edema, cyanosis or clubbing  Lymphatics: No cervical or supraclavicular lymphadenopathy.  Neuro: normal speech and mental status      Results Review:    Results from last 7 days   Lab Units 11/20/20 0453 11/19/20 0435 11/18/20 0444   SODIUM mmol/L 132* 134* 133*   POTASSIUM mmol/L 4.5 4.5 4.7   CHLORIDE mmol/L 108* 108* 104   CO2 mmol/L 17.6* 17.7* 21.9*   BUN mg/dL 26* 23 24*   CREATININE mg/dL 1.57* 1.76* 1.88*   CALCIUM mg/dL 6.9* 7.0* 7.2*   BILIRUBIN mg/dL  --  0.7  --    ALK PHOS U/L  --  45  --    ALT (SGPT) U/L  --  <5  --    AST (SGOT) U/L  --  19  --    GLUCOSE mg/dL 82 81 102*       Estimated Creatinine Clearance: 25 mL/min (A) (by C-G formula based on SCr of 1.57 mg/dL (H)).    Results from last 7 days   Lab Units 11/20/20 0453 11/19/20 0435 11/18/20 0444   MAGNESIUM mg/dL 1.9  --  2.0   PHOSPHORUS mg/dL 2.5 2.0* 2.4*       Results from last 7 days   Lab Units 11/20/20 0453 11/18/20 0444   URIC ACID mg/dL 6.4* 6.2*       Results from last 7 days   Lab Units 11/20/20 0453 11/19/20 0435 11/18/20 0444 11/17/20 0455   WBC 10*3/mm3 7.71 6.79 8.26 9.78   HEMOGLOBIN g/dL 9.3* 8.9* 9.5* 11.1*   PLATELETS 10*3/mm3 135* 120* 137* 179               Imaging Results (Last 24 Hours)     ** No results found for the last 24 hours. **        atorvastatin, 20 mg, Oral, Nightly  bisacodyl, 10 mg, Rectal, Daily  DULoxetine, 30 mg, Oral, Daily  iron sucrose, 100 mg, Intravenous, Daily  metoprolol succinate XL, 25 mg, Oral, Daily  pantoprazole, 40 mg, Oral, Nightly  sennosides-docusate, 2 tablet, Oral, BID      sodium chloride, 100 mL/hr, Last Rate: 100 mL/hr (11/19/20 2222)        Medication Review:   Current Facility-Administered Medications   Medication Dose Route Frequency Provider Last Rate Last Admin   •  acetaminophen (TYLENOL) tablet 650 mg  650 mg Oral Q4H PRN Chuckie Mclaughlin MD        Or   • acetaminophen (TYLENOL) suppository 650 mg  650 mg Rectal Q4H PRN Chuckie Mclaughlin MD       • acetaminophen (TYLENOL) tablet 1,000 mg  1,000 mg Oral TID PRN Chuckie Mclaughlin MD       • atorvastatin (LIPITOR) tablet 20 mg  20 mg Oral Nightly Chuckie Mclaughlin MD   20 mg at 11/19/20 2029   • bisacodyl (DULCOLAX) suppository 10 mg  10 mg Rectal Daily Arden Montes Jr., MD   10 mg at 11/19/20 1831   • DULoxetine (CYMBALTA) DR capsule 30 mg  30 mg Oral Daily Chuckie Mclaughlin MD   30 mg at 11/19/20 1216   • HYDROcodone-acetaminophen (NORCO) 5-325 MG per tablet 1 tablet  1 tablet Oral Q4H PRN Chuckie Mclaughlin MD   1 tablet at 11/19/20 2222   • HYDROmorphone (DILAUDID) injection 0.5 mg  0.5 mg Intravenous Q2H PRN Chuckie Mclaughlin MD   0.5 mg at 11/16/20 1535    And   • naloxone (NARCAN) injection 0.1 mg  0.1 mg Intravenous Q5 Min PRN Chuckie Mclaughlin MD       • iron sucrose (VENOFER) 100 mg in sodium chloride 0.9 % 100 mL IVPB  100 mg Intravenous Daily Yazmin Parmar MD   100 mg at 11/19/20 1217   • metoprolol succinate XL (TOPROL-XL) 24 hr tablet 25 mg  25 mg Oral Daily Yazmin Parmar MD   25 mg at 11/19/20 1019   • nitroglycerin (NITROSTAT) SL tablet 0.4 mg  0.4 mg Sublingual Q5 Min PRN Chuckie Mclaughlin MD       • ondansetron (ZOFRAN) tablet 4 mg  4 mg Oral Q6H PRN Chuckie Mclaughlin MD        Or   • ondansetron (ZOFRAN) injection 4 mg  4 mg Intravenous Q6H PRN Chuckie Mclaughlin MD   4 mg at 11/17/20 0505   • pantoprazole (PROTONIX) EC tablet 40 mg  40 mg Oral Nightly Chuckie Mclaughlin MD   40 mg at 11/19/20 2029   • sennosides-docusate (PERICOLACE) 8.6-50 MG per tablet 2 tablet  2 tablet Oral BID Chuckie Mclaughlin MD   2 tablet at 11/19/20 2029   • sodium chloride  0.9 % infusion  100 mL/hr Intravenous Continuous Chuckie Mclaughlin  mL/hr at 11/19/20 2222 100 mL/hr at 11/19/20 2222       Assessment/Plan   1.  Acute kidney disease on chronic kidney disease associated with the loss of renal mass based on the patient who had chronic kidney disease stage III at baseline with baseline creatinine around 1.3.  With the removal left kidney right kidney is unable to compensate at this time and the creatinine has risen to 1.92.  Today the creatinine is down to 1.57, electrolyte within normal range other than sodium 132 and total CO2 of 17.6, uric acid 6.4, phosphorus 2.5, magnesium 1.9, calcium 6.9 and albumin three-point  2.  Chronic kidney disease at baseline probably associate with nephrosclerosis  3.  Status post left nephrectomy because of renal mass, the pathology revealed renal cell carcinoma  4.  Chronic anemia, hemoglobin today is 9.3  5.  History of depression  6.  GERD  7.  Dyslipidemia  8.  Degenerative joint disease  9.  Metabolic acidosis treated with oral sodium bicarbonate  10.  Hypocalcemia, normal magnesium      Plan:  1.  Discontinue IV fluid, add calcium carbonate  2.  Surveillance labs        Joseph Leonard MD  11/20/20  09:25 EST

## 2020-11-20 NOTE — THERAPY TREATMENT NOTE
Patient Name: Marleni Hummel  : 1935    MRN: [id number]                              Today's Date: 2020       Admit Date: 2020    Visit Dx:     ICD-10-CM ICD-9-CM   1. Generalized weakness  R53.1 780.79   2. Left renal mass  N28.89 593.9     Patient Active Problem List   Diagnosis   • Arthritis involving multiple sites   • Essential hypertension   • Atrial fibrillation (CMS/HCC)   • History of Bell's palsy   • Chronic kidney disease (CKD), stage III (moderate) (CMS/HCC)   • Gastroesophageal reflux disease without esophagitis   • Hypercholesterolemia   • Insomnia   • Panic disorder   • Chronic nonseasonal allergic rhinitis due to pollen   • Sleep apnea   • History of MI (myocardial infarction)   • Chronic coronary artery disease   • Anemia of chronic disease   • Weakness of right leg   • Cardiac murmur   • Senile osteoporosis   • Hyperuricemia   • Grief reaction   • Peripheral vertigo   • Left kidney mass   • Left renal mass     Past Medical History:   Diagnosis Date   • Acute vulvitis 2019   • Allergic    • Allergic rhinitis    • Anemia of chronic disease    • Arthropathy of knee     right   • Atrial fibrillation (CMS/HCC)    • Back pain    • Bell's palsy    • Caregiver stress syndrome 2019   • Chronic coronary artery disease    • Chronic kidney disease (CKD), stage III (moderate)    • Edema    • Elevated serum free T4 level 3/21/2016   • Encounter for eye exam 2020   • Essential hypertension    • Fatigue    • GERD (gastroesophageal reflux disease)    • H/O Heart murmur    • Heart attack (CMS/HCC) 10/05/2015   • Hematuria 2016   • Herpes zoster without complication    • Hip arthritis     left   • History of bone density study 2008   • History of pelvic fracture    • History of pneumonia    • History of transfusion        • Hypercholesterolemia    • IFG (impaired fasting glucose)    • Insomnia    • Left kidney mass 2020   • Limited joint range of motion      RIGHT KNEE   • Mixed hyperlipidemia    • Muscle tension headache 4/3/2019   • New daily persistent headache 12/17/2018   • Osteoarthritis     Right hip   • Osteoporosis    • Panic disorder    • Peripheral vertigo    • PONV (postoperative nausea and vomiting)    • Rectal bleeding     HISTORY OF   • Sleep apnea     DOES NOT USE C-PAP   • Stress    • Stress-induced cardiomyopathy    • Urinary incontinence    • Vitamin D deficiency      Past Surgical History:   Procedure Laterality Date   • CATARACT EXTRACTION Left 04/01/2013    Dr. Clarke   • CATARACT EXTRACTION Right 04/16/2013    Dr. Clarke   • COLONOSCOPY  04/18/2014    Dr. Elizabeth; no polyps   • EPIDURAL BLOCK     • HIP PERCUTANEOUS PINNING Left 8/31/2017    Procedure: LT HIP PERCUTANEOUS PINNING;  Surgeon: Sean Canales MD;  Location: [facility] MAIN OR;  Service:    • MAMMO BILATERAL  11/2013   • NEPHRECTOMY Left 11/16/2020    Procedure: LAPAROSCOPIC NEPHRECTOMY;  Surgeon: Chuckie Mclaughlin MD;  Location: [facility] MAIN OR;  Service: Urology;  Laterality: Left;   • PAP SMEAR  02/2011   • TIBIA FRACTURE SURGERY Right 2015    REMOVED PLATE LATER IN 2015   • TONSILLECTOMY  1947   • TOTAL HIP ARTHROPLASTY Right 08/2015   • TOTAL HIP ARTHROPLASTY Right 09/2016   • TOTAL KNEE ARTHROPLASTY Bilateral 2004     General Information     Row Name 11/20/20 1149          Physical Therapy Time and Intention    Document Type  therapy note (daily note)  -DJ     Mode of Treatment  individual therapy;physical therapy  -DJ     Row Name 11/20/20 1149          General Information    Patient Profile Reviewed  yes  -DJ     Existing Precautions/Restrictions  fall  -DJ     Row Name 11/20/20 1149          Cognition    Orientation Status (Cognition)  oriented x 4 Pleasant and cooperative; cande present and helpful  -DJ     Row Name 11/20/20 1149          Safety Issues, Functional Mobility    Comment, Safety Issues/Impairments (Mobility)  gt belt grippy socks  -DJ       User Key  (r) =  Recorded By, (t) = Taken By, (c) = Cosigned By    Initials Name Provider Type    Sherry Stephen PT Physical Therapist        Mobility     Row Name 11/20/20 1150          Bed Mobility    Bed Mobility  supine-sit  -DJ     Supine-Sit [place] (Bed Mobility)  minimum assist (75% patient effort);verbal cues  -DJ     Assistive Device (Bed Mobility)  bed rails;head of bed elevated  -DJ     Comment (Bed Mobility)  Pt moves slowly and cautiously using pillow for abd brace  -DJ     Row Name 11/20/20 1150          Transfers    Comment (Transfers)  sit/stand from EOB  -DJ     Row Name 11/20/20 1150          Bed-Chair Transfer    Bed-Chair [place] (Transfers)  not tested  -DJ     Row Name 11/20/20 1150          Sit-Stand Transfer    Sit-Stand [place] (Transfers)  contact guard;verbal cues  -DJ     Assistive Device (Sit-Stand Transfers)  walker, front-wheeled  -DJ     Row Name 11/20/20 1150          Gait/Stairs (Locomotion)    [place] Level (Gait)  contact guard;verbal cues  -DJ     Assistive Device (Gait)  walker, front-wheeled  -DJ     Distance in Feet (Gait)  20'  -DJ     Deviations/Abnormal Patterns (Gait)  antalgic;david decreased;gait speed decreased;stride length decreased  -DJ     Bilateral Gait Deviations  forward flexed posture  -DJ     [place]annister Level (Stairs)  not tested  -DJ     Comment (Gait/Stairs)  Pt amb decreased distance today (20') but with improved pace and balance. Pt fatigues very quickly. Posture is flexed, endurance is poor and limits further amb distance, balance is fair  -DJ       User Key  (r) = Recorded By, (t) = Taken By, (c) = Cosigned By    Initials Name Provider Type    Sherry Stephen PT Physical Therapist        Obj/Interventions     Row Name 11/20/20 1152          Motor Skills    Therapeutic Exercise  -- AP, abd bracing with coughing, sneezing, LE movement  -DJ     Row Name 11/20/20 1152          Balance    Balance Assessment  sitting static balance;standing static  "balance  -DJ     Static Sitting Balance  WFL  -DJ     Static Standing Balance  WFL  -DJ       User Key  (r) = Recorded By, (t) = Taken By, (c) = Cosigned By    Initials Name Provider Type    Sherry Stephen, PT Physical Therapist        Goals/Plan    No documentation.       Clinical Impression     Row Name 11/20/20 1152          Pain    Additional Documentation  Pain Scale: Numbers Pre/Post-Treatment (Group)  -DJ     Row Name 11/20/20 3606          Pain Scale: Numbers Pre/Post-Treatment    Pre/Posttreatment Pain Comment  Pt c/o abd pain with movement. Pt also states \" Im not doing well today\" - reports she has been coughing and spit up a lot of mucous  -DJ     Pain Intervention(s)  Repositioned;Rest  -DJ     Row Name 11/20/20 1154          Plan of Care Review    Plan of Care Reviewed With  patient  -DJ     Progress  no change  -DJ     Outcome Summary  Pt stated, \"Im not doing well today\" because she had been coughing and spitting up mucous. Pt req min A for bed mobility to move LE due to abd pain. Pt req CGA for sit/stand transfer. Pt only amb 20' around bed to chair today but demonstrated improved speed and posture. Pt fatigues quickly. Encouraged pt to brace abd with pillow and to pace activities throughout the day. Cont PT for ther ex, gt/transfer training, bed mobility, balance, and activity tolerance as appropriate. Family prefers d/c home vs rehab due to Covid concerns  -DJ     Row Name 11/20/20 1159          Therapy Assessment/Plan (PT)    Criteria for Skilled Interventions Met (PT)  yes;skilled treatment is necessary  -DJ     Row Name 11/20/20 1155          Vital Signs    O2 Delivery Pre Treatment  room air  -DJ     O2 Delivery Intra Treatment  room air  -DJ     O2 Delivery Post Treatment  room air  -DJ     Pre Patient Position  Supine  -DJ     Intra Patient Position  Standing  -DJ     Post Patient Position  Sitting  -DJ     Row Name 11/20/20 1156          Positioning and Restraints    Pre-Treatment Position "  in bed  -DJ     Post Treatment Position  chair  -DJ     In Chair  reclined;with nsg;encouraged to call for assist;exit alarm on;call light within reach  -DJ       User Key  (r) = Recorded By, (t) = Taken By, (c) = Cosigned By    Initials Name Provider Type    Sherry Stephen, PT Physical Therapist        Outcome Measures     Row Name 11/20/20 1158          How much help from another person do you currently need...    Turning from your back to your side while in flat bed without using bedrails?  3  -DJ     Moving from lying on back to sitting on the side of a flat bed without bedrails?  3  -DJ     Moving to and from a bed to a chair (including a wheelchair)?  3  -DJ     Standing up from a chair using your arms (e.g., wheelchair, bedside chair)?  3  -DJ     Climbing 3-5 steps with a railing?  2  -DJ     To walk in hospital room?  3  -DJ     AM-PAC 6 Clicks Score (PT)  17  -DJ     Row Name 11/20/20 1158          Functional Assessment    Outcome Measure Options  AM-PAC 6 Clicks Basic Mobility (PT)  -DJ       User Key  (r) = Recorded By, (t) = Taken By, (c) = Cosigned By    Initials Name Provider Type    Sherry Stephen, DAILY Physical Therapist        Physical Therapy Education                 Title: PT OT SLP Therapies (Done)     Topic: Physical Therapy (Done)     Point: Mobility training (Done)     Learning Progress Summary           Patient Acceptance, TB, VU by LENARD at 11/20/2020 1159    Acceptance, E, VU,NR by LENARD at 11/19/2020 1010    Acceptance, E, VU,NR by  at 11/18/2020 1542   Family Acceptance, TB, VU by LENARD at 11/20/2020 1159                   Point: Home exercise program (Done)     Learning Progress Summary           Patient Acceptance, TB, VU by LENARD at 11/20/2020 1159    Acceptance, E, VU,NR by LENARD at 11/19/2020 1010    Acceptance, E, VU,NR by  at 11/18/2020 1542   Family Acceptance, TB, VU by LENARD at 11/20/2020 1159                   Point: Body mechanics (Done)     Learning Progress Summary           Patient  "Acceptance, TB, VU by  at 11/20/2020 1159    Acceptance, E, VU,NR by  at 11/19/2020 1010    Acceptance, E, VU,NR by  at 11/18/2020 1542   Family Acceptance, TB, VU by  at 11/20/2020 1159                   Point: Precautions (Done)     Learning Progress Summary           Patient Acceptance, TB, VU by  at 11/20/2020 1159    Acceptance, E, VU,NR by  at 11/19/2020 1010    Acceptance, E, VU,NR by  at 11/18/2020 1542   Family Acceptance, TB, VU by  at 11/20/2020 1159                               User Key     Initials Effective Dates Name Provider Type [facility] 04/03/18 -  Faye Rojas, PT Physical Therapist PT     10/25/19 -  Sherry Kearns, PT Physical Therapist PT              PT Recommendation and Plan     Plan of Care Reviewed With: patient  Progress: no change  Outcome Summary: Pt stated, \"Im not doing well today\" because she had been coughing and spitting up mucous. Pt req min A for bed mobility to move LE due to abd pain. Pt req CGA for sit/stand transfer. Pt only amb 20' around bed to chair today but demonstrated improved speed and posture. Pt fatigues quickly. Encouraged pt to brace abd with pillow and to pace activities throughout the day. Cont PT for ther ex, gt/transfer training, bed mobility, balance, and activity tolerance as appropriate. Family prefers d/c home vs rehab due to Covid concerns     Time Calculation:   PT Charges     Row Name 11/20/20 1200             Time Calculation    Start Time  1109  -DJ      Stop Time  1126  -DJ      Time Calculation (min)  17 min  -DJ      PT Non-Billable Time (min)  10 min  -DJ      PT Received On  11/20/20  -DJ      PT - Next Appointment  11/21/20  -DJ        User Key  (r) = Recorded By, (t) = Taken By, (c) = Cosigned By    Initials Name Provider Type    Sherry Stephen, PT Physical Therapist        Therapy Charges for Today     Code Description Service Date Service Provider Modifiers Qty    37064308645 HC PT THER PROC EA 15 MIN 11/19/2020 " Sherry Kearns, PT GP 1    01317711948 HC PT THER PROC EA 15 MIN 11/20/2020 Sheryr Kearns, PT GP 1          PT G-Codes  Outcome Measure Options: AM-PAC 6 Clicks Basic Mobility (PT)  AM-PAC 6 Clicks Score (PT): 17    Sherry Kearns, PT  11/20/2020

## 2020-11-20 NOTE — PROGRESS NOTES
"Adult Nutrition  Assessment/PES    Patient Name:  Marleni Hummel  YOB: 1935  MRN: [id number]  Admit Date:  11/16/2020    Assessment Date:  11/20/2020    Comments:  Nutrition screen completed for decrease po intake. Pt on [place], GI soft, 2000mg Na diet with no appetite. She did agree to a olman boost milkshake. Food pref's obtained. Will continue to monitor.    Reason for Assessment     Row Name 11/20/20 1233          Reason for Assessment    Reason For Assessment  identified at risk by screening criteria     Diagnosis  -- left kidney mass, CKD, AFIB, HTN, GERD         Nutrition/Diet History     Row Name 11/20/20 1235          Nutrition/Diet History    Typical Food/Fluid Intake  decrease po intake, spoke with dgt     Food Preferences  Korean toast, eggs, soups     Supplemental Drinks/Foods/Additives  will drink boost plus         Anthropometrics     Row Name 11/20/20 1235          Anthropometrics    Height  157.5 cm (62.01\")     Weight  68.3 kg (150 lb 9.2 oz) not weighed by RD        Ideal Body Weight (IBW)    Ideal Body Weight (IBW) (kg)  50.45     % Ideal Body Weight  135.38        Body Mass Index (BMI)    BMI (kg/m2)  27.59     BMI Assessment  BMI 25-29.9: overweight         Labs/Tests/Procedures/Meds     Row Name 11/20/20 1237          Labs/Procedures/Meds    Lab Results Reviewed  reviewed     Lab Results Comments  na, bun, cr, phos, alb, h/h        Medications    Pertinent Medications Reviewed  reviewed     Pertinent Medications Comments  lipitor, laxative, protonix,         Physical Findings     Row Name 11/20/20 1238          Physical Findings    Skin  -- maxime 17         Estimated/Assessed Needs     Row Name 11/20/20 1235          Calculation Measurements    Height  157.5 cm (62.01\")         Nutrition Prescription Ordered     Row Name 11/20/20 1239          Nutrition Prescription PO    Current PO Diet  Regular     Common Modifiers  GI Soft/[place];Low Sodium     Low Sodium Details  2,000 mg " Sodium         Evaluation of Received Nutrient/Fluid Intake     Row Name 11/20/20 1239          PO Evaluation    % PO Intake  poor po               Problem/Interventions:  Problem 1     Row Name 11/20/20 1240          Nutrition Diagnoses Problem 1    Problem 1  Inadequate Nutrient Intake     Etiology (related to)  Medical Diagnosis               Intervention Goal     Row Name 11/20/20 1240          Intervention Goal    General  Maintain nutrition;Reduce/improve symptoms;Meet nutritional needs for age/condition;Disease management/therapy     PO  Increase intake;PO intake (%)     PO Intake %  75 %     Weight  Maintain weight         Nutrition Intervention     Row Name 11/20/20 1241          Nutrition Intervention    RD/Tech Action  Follow Tx progress;Care plan reviewd;Interview for preference;Encourage intake;Supplement provided         Nutrition Prescription     Row Name 11/20/20 1241          Nutrition Prescription PO    PO Prescription  Begin/change supplement     Supplement  Boost Plus         Education/Evaluation     Row Name 11/20/20 1241          Education    Education  Will Instruct as appropriate        Monitor/Evaluation    Monitor  Per protocol           Electronically signed by:  Jennifer Maldonado RD  11/20/20 12:42 EST

## 2020-11-20 NOTE — NURSING NOTE
Spoke with Dr. Montes in regards to patient discharge.  Daughter Silvia is concerned because the patient has not really ate anything substantial since surgery and is still very weak.  She has intermittent nausea but mainly no appetite.  RD was consulted today.  Dr. Montes said to hold discharge and re-evaluate tomorrow.   I have asked Dr. Montes to give Silvia a call so they can discuss the plan of care and Silvia's concerns.  Her phone number is in the chart.

## 2020-11-20 NOTE — PLAN OF CARE
"  Problem: Adult Inpatient Plan of Care  Goal: Plan of Care Review  Recent Flowsheet Documentation  Taken 11/20/2020 1153 by Sherry Kearns, DAILY  Progress: no change  Plan of Care Reviewed With: patient  Outcome Summary: Pt stated, \"Im not doing well today\" because she had been coughing and spitting up mucous. Pt req min A for bed mobility to move LE due to abd pain. Pt req CGA for sit/stand transfer. Pt only amb 20' around bed to chair today but demonstrated improved speed and posture. Pt fatigues quickly. Encouraged pt to brace abd with pillow and to pace activities throughout the day. Cont PT for ther ex, gt/transfer training, bed mobility, balance, and activity tolerance as appropriate. Family prefers d/c home vs rehab due to Covid concerns  Patient was intermittently wearing a face mask during this therapy encounter. Therapist used appropriate personal protective equipment including eye protection, mask, and gloves.  Mask used was standard procedure mask. Appropriate PPE was worn during the entire therapy session. Hand hygiene was completed before and after therapy session. Patient is not in enhanced droplet precautions.       "

## 2020-11-20 NOTE — DISCHARGE PLACEMENT REQUEST
"Maria R Gardner (84 y.o. Female)     Date of Birth Social Security Number Address Home Phone MRN    1935  [id number] [facility] [address] [phone] [id number]    Rastafarian Marital Status          Catholic        Admission Date Admission Type Admitting Provider Attending Provider Department, Room/Bed    11/16/20 Elective Chuckie Mclaughlin MD Schrepferman, Christopher G, MD [address], [address]    Discharge Date Discharge Disposition Discharge Destination                       Attending Provider: Chuckie Mclaughlin MD    Allergies: Morphine, Oxycodone, Ace Inhibitors, Levaquin [Levofloxacin]    Isolation: None   Infection: None   Code Status: CPR    Ht: 157.5 cm (62.01\")   Wt: 68.3 kg (150 lb 9.2 oz)    Admission Cmt: None   Principal Problem: Left renal mass [N28.89]                 Active Insurance as of 11/16/2020     Primary Coverage     Payor Plan Insurance Group Employer/Plan Group    MEDICARE MEDICARE A & B      Payor Plan Address Payor Plan Phone Number Payor Plan Fax Number Effective Dates    [address] [phone]  12/1/2002 - None Entered    [facility] [id number]       Subscriber Name Subscriber Birth Date Member ID       MARIA R GARDNER 1935 [id number]           Secondary Coverage     Payor Plan Insurance Group Employer/Plan Group     FOR LIFE  FOR LIFE MC [id number]       Payor Plan Address Payor Plan Phone Number Payor Plan Fax Number Effective Dates    [address] [phone]  1/8/2018 - None Entered    [facility] [address]       Subscriber Name Subscriber Birth Date Member ID       TAMI GARDNER 12/23/1926 [id number]                 Emergency Contacts      (Rel.) Home Phone Work Phone Mobile Phone    DarrickSharan (Son) [phone] -- --    NEAL HERNANDEZ (Daughter) -- -- [phone]              "

## 2020-11-21 ENCOUNTER — READMISSION MANAGEMENT (OUTPATIENT)
Dept: CALL CENTER | Facility: HOSPITAL | Age: 85
End: 2020-11-21

## 2020-11-21 VITALS
HEART RATE: 84 BPM | OXYGEN SATURATION: 92 % | WEIGHT: 150.57 LBS | DIASTOLIC BLOOD PRESSURE: 64 MMHG | RESPIRATION RATE: 18 BRPM | HEIGHT: 62 IN | BODY MASS INDEX: 27.71 KG/M2 | SYSTOLIC BLOOD PRESSURE: 135 MMHG | TEMPERATURE: 98.6 F

## 2020-11-21 PROBLEM — D36.9 ONCOCYTOMA: Status: ACTIVE | Noted: 2020-11-21

## 2020-11-21 LAB
ALBUMIN SERPL-MCNC: 2.9 G/DL (ref 3.5–5.2)
ANION GAP SERPL CALCULATED.3IONS-SCNC: 7.1 MMOL/L (ref 5–15)
BASOPHILS # BLD AUTO: 0.01 10*3/MM3 (ref 0–0.2)
BASOPHILS NFR BLD AUTO: 0.1 % (ref 0–1.5)
BUN SERPL-MCNC: 26 MG/DL (ref 8–23)
BUN/CREAT SERPL: 16.6 (ref 7–25)
CALCIUM SPEC-SCNC: 7.4 MG/DL (ref 8.6–10.5)
CHLORIDE SERPL-SCNC: 104 MMOL/L (ref 98–107)
CO2 SERPL-SCNC: 17.9 MMOL/L (ref 22–29)
CREAT SERPL-MCNC: 1.57 MG/DL (ref 0.57–1)
DEPRECATED RDW RBC AUTO: 45 FL (ref 37–54)
EOSINOPHIL # BLD AUTO: 0.18 10*3/MM3 (ref 0–0.4)
EOSINOPHIL NFR BLD AUTO: 2.1 % (ref 0.3–6.2)
ERYTHROCYTE [DISTWIDTH] IN BLOOD BY AUTOMATED COUNT: 13 % (ref 12.3–15.4)
GFR SERPL CREATININE-BSD FRML MDRD: 32 ML/MIN/1.73
GLUCOSE SERPL-MCNC: 122 MG/DL (ref 65–99)
HCT VFR BLD AUTO: 28.9 % (ref 34–46.6)
HGB BLD-MCNC: 9.4 G/DL (ref 12–15.9)
IMM GRANULOCYTES # BLD AUTO: 0.03 10*3/MM3 (ref 0–0.05)
IMM GRANULOCYTES NFR BLD AUTO: 0.4 % (ref 0–0.5)
LYMPHOCYTES # BLD AUTO: 0.95 10*3/MM3 (ref 0.7–3.1)
LYMPHOCYTES NFR BLD AUTO: 11.1 % (ref 19.6–45.3)
MAGNESIUM SERPL-MCNC: 1.9 MG/DL (ref 1.6–2.4)
MCH RBC QN AUTO: 30.8 PG (ref 26.6–33)
MCHC RBC AUTO-ENTMCNC: 32.5 G/DL (ref 31.5–35.7)
MCV RBC AUTO: 94.8 FL (ref 79–97)
MONOCYTES # BLD AUTO: 0.85 10*3/MM3 (ref 0.1–0.9)
MONOCYTES NFR BLD AUTO: 9.9 % (ref 5–12)
NEUTROPHILS NFR BLD AUTO: 6.54 10*3/MM3 (ref 1.7–7)
NEUTROPHILS NFR BLD AUTO: 76.4 % (ref 42.7–76)
NRBC BLD AUTO-RTO: 0.1 /100 WBC (ref 0–0.2)
PHOSPHATE SERPL-MCNC: 1.7 MG/DL (ref 2.5–4.5)
PLATELET # BLD AUTO: 146 10*3/MM3 (ref 140–450)
PMV BLD AUTO: 10.3 FL (ref 6–12)
POTASSIUM SERPL-SCNC: 4.4 MMOL/L (ref 3.5–5.2)
RBC # BLD AUTO: 3.05 10*6/MM3 (ref 3.77–5.28)
SODIUM SERPL-SCNC: 129 MMOL/L (ref 136–145)
URATE SERPL-MCNC: 6.1 MG/DL (ref 2.4–5.7)
WBC # BLD AUTO: 8.56 10*3/MM3 (ref 3.4–10.8)

## 2020-11-21 PROCEDURE — 85025 COMPLETE CBC W/AUTO DIFF WBC: CPT | Performed by: UROLOGY

## 2020-11-21 PROCEDURE — 83735 ASSAY OF MAGNESIUM: CPT | Performed by: INTERNAL MEDICINE

## 2020-11-21 PROCEDURE — 84550 ASSAY OF BLOOD/URIC ACID: CPT | Performed by: INTERNAL MEDICINE

## 2020-11-21 PROCEDURE — 80069 RENAL FUNCTION PANEL: CPT | Performed by: INTERNAL MEDICINE

## 2020-11-21 RX ORDER — DOCUSATE SODIUM 250 MG
250 CAPSULE ORAL 2 TIMES DAILY
Qty: 60 CAPSULE | Refills: 1 | Status: SHIPPED | OUTPATIENT
Start: 2020-11-21

## 2020-11-21 RX ORDER — HYDROCODONE BITARTRATE AND ACETAMINOPHEN 5; 325 MG/1; MG/1
1 TABLET ORAL EVERY 4 HOURS PRN
Qty: 20 TABLET | Refills: 0 | Status: SHIPPED | OUTPATIENT
Start: 2020-11-21 | End: 2021-03-05 | Stop reason: ALTCHOICE

## 2020-11-21 RX ORDER — HYDROCODONE BITARTRATE AND ACETAMINOPHEN 5; 325 MG/1; MG/1
1 TABLET ORAL EVERY 4 HOURS PRN
Qty: 20 TABLET | Refills: 0 | Status: SHIPPED | OUTPATIENT
Start: 2020-11-21 | End: 2020-11-21 | Stop reason: SDUPTHER

## 2020-11-21 RX ADMIN — ANTACID TABLETS 2 TABLET: 500 TABLET, CHEWABLE ORAL at 09:18

## 2020-11-21 RX ADMIN — DOCUSATE SODIUM 50MG AND SENNOSIDES 8.6MG 2 TABLET: 8.6; 5 TABLET, FILM COATED ORAL at 09:18

## 2020-11-21 RX ADMIN — METOPROLOL SUCCINATE 25 MG: 25 TABLET, EXTENDED RELEASE ORAL at 09:17

## 2020-11-21 RX ADMIN — DULOXETINE HYDROCHLORIDE 30 MG: 30 CAPSULE, DELAYED RELEASE ORAL at 09:18

## 2020-11-21 RX ADMIN — BISACODYL 10 MG: 10 SUPPOSITORY RECTAL at 09:18

## 2020-11-21 NOTE — OUTREACH NOTE
Prep Survey      Responses   [facility] patient discharged from?  [place]   Is LACE score < 7 ?  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  General Surgery   Does the patient have Home health ordered?  Yes   What is the Home health agency?   [facility]   Is there a DME ordered?  No   Prep survey completed?  Yes          Emilie Winters RN

## 2020-11-21 NOTE — PROGRESS NOTES
"   LOS: 5 days    Patient Care Team:  Ken Andujar MD as PCP - General  Chuckie Mclaughlin MD as Consulting Physician (Urology)  Jason Tai MD as Consulting Physician (Cardiology)  Geoffrey Mills MD as Consulting Physician (Nephrology)  Anayeli Alanis MD as Consulting Physician (Obstetrics and Gynecology)  BROOK Clarke MD as Consulting Physician (Ophthalmology)  Jose Alfredo Krishnamurthy MD as Consulting Physician (Hematology and Oncology)  Sean Canales MD as Consulting Physician (Orthopedic Surgery)  Esteban Moe MD as Consulting Physician (Orthopedic Surgery)  Feliciano Elizabeth MD as Consulting Physician (Gastroenterology)    Chief Complaint:  No chief complaint on file.    Follow-up acute kidney injury on chronic kidney disease  Subjective     Interval History:   Patient complaining of feeling better today, she took pain meds early this morning, no nausea or vomiting, no abdominal pain, no lightheadedness      Review of Systems:   As noted above    Objective     Vital Signs  Temp:  [98.3 °F (36.8 °C)-98.6 °F (37 °C)] 98.6 °F (37 °C)  Heart Rate:  [] 84  Resp:  [18-20] 18  BP: (118-155)/(59-99) 135/64    Flowsheet Rows      First Filed Value   Admission Height  157.5 cm (62\") Documented at 11/16/2020 0601   Admission Weight  68.3 kg (150 lb 9.2 oz) Documented at 11/16/2020 0601          No intake/output data recorded.  I/O last 3 completed shifts:  In: 450 [P.O.:450]  Out: -     Intake/Output Summary (Last 24 hours) at 11/21/2020 1245  Last data filed at 11/20/2020 2056  Gross per 24 hour   Intake 390 ml   Output --   Net 390 ml       Physical Exam:  General Appearance: alert, oriented x 3, no acute distress, appears to be chronically ill  Skin: warm and dry  HEENT: pupils round and reactive to light, oral mucosa normal, nonicteric sclerae  Neck: supple, no JVD, trachea midline, faint right carotid bruit  Lungs: Creased breath sounds in the bases, unlabored breathing " effort  Heart: Irregularly irregular, normal S1 and S2, no S3, no rub  Abdomen: soft,  mild tenderness related to surgery, normoactive bowel sounds : no palpable bladder,  Extremities: no edema, cyanosis or clubbing  Lymphatics: No cervical or supraclavicular lymphadenopathy.  Neuro: normal speech and mental status      Results Review:    Results from last 7 days   Lab Units 11/21/20 0359 11/20/20 0453 11/19/20 0435   SODIUM mmol/L 129* 132* 134*   POTASSIUM mmol/L 4.4 4.5 4.5   CHLORIDE mmol/L 104 108* 108*   CO2 mmol/L 17.9* 17.6* 17.7*   BUN mg/dL 26* 26* 23   CREATININE mg/dL 1.57* 1.57* 1.76*   CALCIUM mg/dL 7.4* 6.9* 7.0*   BILIRUBIN mg/dL  --   --  0.7   ALK PHOS U/L  --   --  45   ALT (SGPT) U/L  --   --  <5   AST (SGOT) U/L  --   --  19   GLUCOSE mg/dL 122* 82 81       Estimated Creatinine Clearance: 25 mL/min (A) (by C-G formula based on SCr of 1.57 mg/dL (H)).    Results from last 7 days   Lab Units 11/21/20 0359 11/20/20 0453 11/19/20 0435 11/18/20 0444   MAGNESIUM mg/dL 1.9 1.9  --  2.0   PHOSPHORUS mg/dL 1.7* 2.5 2.0* 2.4*       Results from last 7 days   Lab Units 11/21/20 0359 11/20/20 0453 11/18/20  0444   URIC ACID mg/dL 6.1* 6.4* 6.2*       Results from last 7 days   Lab Units 11/21/20 0359 11/20/20 0453 11/19/20 0435 11/18/20 0444 11/17/20 0455   WBC 10*3/mm3 8.56 7.71 6.79 8.26 9.78   HEMOGLOBIN g/dL 9.4* 9.3* 8.9* 9.5* 11.1*   PLATELETS 10*3/mm3 146 135* 120* 137* 179               Imaging Results (Last 24 Hours)     ** No results found for the last 24 hours. **        atorvastatin, 20 mg, Oral, Nightly  bisacodyl, 10 mg, Rectal, Daily  calcium carbonate, 2 tablet, Oral, BID  DULoxetine, 30 mg, Oral, Daily  metoprolol succinate XL, 25 mg, Oral, Daily  pantoprazole, 40 mg, Oral, Nightly  sennosides-docusate, 2 tablet, Oral, BID           Medication Review:   Current Facility-Administered Medications   Medication Dose Route Frequency Provider Last Rate Last Admin   • acetaminophen  (TYLENOL) tablet 650 mg  650 mg Oral Q4H PRN Chuckie Mclaughlin MD        Or   • acetaminophen (TYLENOL) suppository 650 mg  650 mg Rectal Q4H PRN Chuckie Mclaughlin MD       • acetaminophen (TYLENOL) tablet 1,000 mg  1,000 mg Oral TID PRN Chuckie Mclaughlin MD       • atorvastatin (LIPITOR) tablet 20 mg  20 mg Oral Nightly Chuckie Mclaughlin MD   20 mg at 11/20/20 2116   • bisacodyl (DULCOLAX) suppository 10 mg  10 mg Rectal Daily Arden Montes Jr., MD   10 mg at 11/21/20 0918   • calcium carbonate (TUMS) chewable tablet 500 mg (200 mg elemental)  2 tablet Oral BID Joseph Leonard MD   2 tablet at 11/21/20 0918   • DULoxetine (CYMBALTA) DR capsule 30 mg  30 mg Oral Daily Chuckie Mclaughlin MD   30 mg at 11/21/20 0918   • HYDROcodone-acetaminophen (NORCO) 5-325 MG per tablet 1 tablet  1 tablet Oral Q4H PRN Chuckie Mclaughlin MD   1 tablet at 11/20/20 1307   • HYDROmorphone (DILAUDID) injection 0.5 mg  0.5 mg Intravenous Q2H PRN Chuckie Mclaughlin MD   0.5 mg at 11/16/20 1535    And   • naloxone (NARCAN) injection 0.1 mg  0.1 mg Intravenous Q5 Min PRN Chuckie Mclaughlin MD       • metoprolol succinate XL (TOPROL-XL) 24 hr tablet 25 mg  25 mg Oral Daily Yazmin Parmar MD   25 mg at 11/21/20 0917   • nitroglycerin (NITROSTAT) SL tablet 0.4 mg  0.4 mg Sublingual Q5 Min PRN Chuckie Mclaughlin MD       • ondansetron (ZOFRAN) tablet 4 mg  4 mg Oral Q6H PRN Chuckie Mclaughlin MD        Or   • ondansetron (ZOFRAN) injection 4 mg  4 mg Intravenous Q6H PRN Chuckie Mclaughlin MD   4 mg at 11/20/20 1137   • pantoprazole (PROTONIX) EC tablet 40 mg  40 mg Oral Nightly Chuckie Mclaughlin MD   40 mg at 11/20/20 2116   • sennosides-docusate (PERICOLACE) 8.6-50 MG per tablet 2 tablet  2 tablet Oral BID Chuckie Mclaughlin MD   2 tablet at 11/21/20 5401       Assessment/Plan   1.  Acute  kidney disease on chronic kidney disease associated with the loss of renal mass based on the patient who had chronic kidney disease stage III at baseline with baseline creatinine around 1.3.    2.  Chronic kidney disease at baseline probably associate with nephrosclerosis  3.  Status post left nephrectomy because of renal mass, the pathology revealed renal cell carcinoma  4.  Chronic anemia, hemoglobin today is 9.3  5.  History of depression  6.  GERD  7.  Dyslipidemia  8.  Degenerative joint disease  9.  Metabolic acidosis treated with oral sodium bicarbonate  10.  Hypocalcemia, normal magnesium      Plan:  -Continue calcium carbonate to be taken between meals  -Liberalize phosphorus intake can utilize high phosphorus drinks like Coke or Pepsi for now.  -We will need follow-up in 1 week with renal panel per nephrology        Deidre Woody MD  11/21/20  12:45 EST

## 2020-11-21 NOTE — PLAN OF CARE
Goal Outcome Evaluation:  Plan of Care Reviewed With: patient  Progress: improving  Outcome Summary: Pt on RA, Afib on heart monitor. Pt no c/o pain this shift, rested well. Incisions open to air. Boost given last night. Poss D/C today. VSS. Will continue to monitor.

## 2020-11-22 PROBLEM — C64.2 RENAL CELL CARCINOMA OF LEFT KIDNEY (HCC): Status: ACTIVE | Noted: 2020-11-22

## 2020-11-23 ENCOUNTER — TRANSITIONAL CARE MANAGEMENT TELEPHONE ENCOUNTER (OUTPATIENT)
Dept: CALL CENTER | Facility: HOSPITAL | Age: 85
End: 2020-11-23

## 2020-11-23 NOTE — PROGRESS NOTES
Case Management Discharge Note      Final Note: DC'd home with [facility] following 11/21    Provided Post Acute Provider List?: Yes  Post Acute Provider List: Home Health  Refused Provider List Comment: Declines HH referral  Delivered To: Patient, Support Person  Support Person: daughter  Method of Delivery: In person             Transportation Services  Private: Car    Final Discharge Disposition Code: 06 - home with home health care

## 2020-11-23 NOTE — OUTREACH NOTE
Call Center TCM Note      Responses   [facility] patient discharged from?  [place]   Does the patient have one of the following disease processes/diagnoses(primary or secondary)?  General Surgery   TCM attempt successful?  No   Unsuccessful attempts  Attempt 1          Francesca Cordoba RN    11/23/2020, 13:02 EST

## 2020-11-23 NOTE — OUTREACH NOTE
Call Center TCM Note      Responses   [facility] patient discharged from?  [place]   Does the patient have one of the following disease processes/diagnoses(primary or secondary)?  General Surgery   TCM attempt successful?  No   Unsuccessful attempts  Attempt 2          Francesca Cordoba RN    11/23/2020, 13:51 EST

## 2020-11-24 ENCOUNTER — TRANSITIONAL CARE MANAGEMENT TELEPHONE ENCOUNTER (OUTPATIENT)
Dept: CALL CENTER | Facility: HOSPITAL | Age: 85
End: 2020-11-24

## 2020-11-24 LAB
BH BB BLOOD EXPIRATION DATE: NORMAL
BH BB BLOOD EXPIRATION DATE: NORMAL
BH BB BLOOD TYPE BARCODE: 6200
BH BB BLOOD TYPE BARCODE: 6200
BH BB DISPENSE STATUS: NORMAL
BH BB DISPENSE STATUS: NORMAL
BH BB PRODUCT CODE: NORMAL
BH BB PRODUCT CODE: NORMAL
BH BB UNIT NUMBER: NORMAL
BH BB UNIT NUMBER: NORMAL
CROSSMATCH INTERPRETATION: NORMAL
CROSSMATCH INTERPRETATION: NORMAL
UNIT  ABO: NORMAL
UNIT  ABO: NORMAL
UNIT  RH: NORMAL
UNIT  RH: NORMAL

## 2020-11-24 NOTE — OUTREACH NOTE
Call Center TCM Note      Responses   [facility] patient discharged from?  [place]   Does the patient have one of the following disease processes/diagnoses(primary or secondary)?  General Surgery   TCM attempt successful?  No   Unsuccessful attempts  Attempt 3          Gallo Andujar RN    11/24/2020, 12:05 EST

## 2020-12-08 ENCOUNTER — READMISSION MANAGEMENT (OUTPATIENT)
Dept: CALL CENTER | Facility: HOSPITAL | Age: 85
End: 2020-12-08

## 2020-12-08 NOTE — OUTREACH NOTE
General Surgery Week 3 Survey      Responses   [facility] patient discharged from?  [place]   Does the patient have one of the following disease processes/diagnoses(primary or secondary)?  General Surgery   Week 3 attempt successful?  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  Yes   Is the patient having any side effects they believe may be caused by any medication additions or changes?  No   Does the patient have all medications related to this admission filled (includes all antibiotics, pain medications, etc.)  Yes   Is the patient taking all medications as directed (includes completed medication regime)?  Yes   Does the patient have a follow up appointment scheduled with their surgeon?  Yes   Has the patient kept scheduled appointments due by today?  Yes   What is the Home health agency?   [facility]   Has home health visited the patient within 72 hours of discharge?  Yes   Psychosocial issues?  No   Did the patient receive a copy of their discharge instructions?  Yes   Nursing interventions  Reviewed instructions with patient   What is the patient's perception of their health status since discharge?  Improving   Nursing interventions  Nurse provided patient education   Is the patient/caregiver able to teach back the hierarchy of who to call/visit for symptoms/problems? PCP, Specialist, Home health nurse, Urgent Care, ED, 911  Yes   Additional teach back comments  With  at time of call, ended without education.   Week 3 call completed?  Yes   Wrap up additional comments  Improving she says.          Emilie Winters RN

## 2020-12-28 ENCOUNTER — TELEMEDICINE (OUTPATIENT)
Dept: FAMILY MEDICINE CLINIC | Facility: CLINIC | Age: 85
End: 2020-12-28

## 2020-12-28 DIAGNOSIS — I10 ESSENTIAL HYPERTENSION: ICD-10-CM

## 2020-12-28 DIAGNOSIS — D30.02 RENAL ONCOCYTOMA OF LEFT KIDNEY: Primary | ICD-10-CM

## 2020-12-28 DIAGNOSIS — R09.82 PND (POST-NASAL DRIP): ICD-10-CM

## 2020-12-28 DIAGNOSIS — E78.00 HYPERCHOLESTEROLEMIA: ICD-10-CM

## 2020-12-28 DIAGNOSIS — N18.32 STAGE 3B CHRONIC KIDNEY DISEASE (HCC): ICD-10-CM

## 2020-12-28 DIAGNOSIS — C64.2 RENAL CELL CARCINOMA OF LEFT KIDNEY (HCC): ICD-10-CM

## 2020-12-28 PROBLEM — N28.89 LEFT KIDNEY MASS: Status: RESOLVED | Noted: 2020-08-17 | Resolved: 2020-12-28

## 2020-12-28 PROBLEM — D36.9 ONCOCYTOMA: Status: RESOLVED | Noted: 2020-11-21 | Resolved: 2020-12-28

## 2020-12-28 PROBLEM — N28.89 LEFT RENAL MASS: Status: RESOLVED | Noted: 2020-11-16 | Resolved: 2020-12-28

## 2020-12-28 PROCEDURE — 99443 PR PHYS/QHP TELEPHONE EVALUATION 21-30 MIN: CPT | Performed by: FAMILY MEDICINE

## 2020-12-28 RX ORDER — FEXOFENADINE HCL 180 MG/1
180 TABLET ORAL DAILY
Qty: 30 TABLET | Refills: 5
Start: 2020-12-28 | End: 2021-08-23

## 2020-12-28 NOTE — PROGRESS NOTES
Mode of Visit: Telephone originally this visit set up for video but patient and daughter could not get this to work through either my chart video or [employer].  We change the visit type to telephone visit  The visit included telephone interaction. No technical issues occurred during this visit.   more than 10 minutes up to 20 minutes.   Subjective       Chief Complaint   Patient presents with   • Hospital Follow Up Visit     Follow up          HPI:      Marleni Hummel is a 85 y.o. female who presents to  [address] [facility] today to update progress regarding most recent surgery.  She had left nephrectomy and diagnosis of left renal cell carcinoma with pathology showing oncocytoma.  She has follow-up with urology in March.  She is now back to her baseline and is no longer requiring pain medicines.  She will ask urology when she may resume driving.  No medication changes.  Her only problem today is drippy nose and she will try fexofenadine over-the-counter.  I have reviewed and updated her medications, medical history and problem list during today's office visit.     Social History     Tobacco Use   • Smoking status: Former Smoker     Packs/day: 1.00     Years: 3.00     Pack years: 3.00     Types: Cigarettes     Quit date: 1956     Years since quittin.8   • Smokeless tobacco: Never Used   Substance Use Topics   • Alcohol use: Not Currently     Alcohol/week: 3.0 standard drinks     Types: 3 Glasses of wine per week     Comment: rare       Review of Systems      PE:   Objective   There were no vitals taken for this visit.    There is no height or weight on file to calculate BMI.    Physical Exam     Data Reviewed:   Information below has been reviewed by Ken Andujar M.D. on 2020.  Data Reviewed:  [facility] IM - SCAN (2020)          A/P:     Assessment/Plan   Diagnoses and all orders for this visit:    1. Renal oncocytoma of left kidney  (Primary)  Comments:  New problem.  Continue work-up and follow-up with specialty.  Orders:  -     Comprehensive Metabolic Panel; Future    2. PND (post-nasal drip)  -     fexofenadine (Allegra Allergy) 180 MG tablet; Take 1 tablet by mouth Daily for 180 days.  Dispense: 30 tablet; Refill: 5    3. Renal cell carcinoma of left kidney (CMS/HCC)  -     Comprehensive Metabolic Panel; Future    4. Essential hypertension  -     Comprehensive Metabolic Panel; Future  -     CBC & Differential; Future  -     TSH; Future    5. Hypercholesterolemia  -     Lipid Panel With / Chol / HDL Ratio; Future  -     CK; Future    6. Stage 3b chronic kidney disease  -     Comprehensive Metabolic Panel; Future          Follow up:    Return in about 3 months (around 3/28/2021) for Recheck/Medication.

## 2021-01-11 ENCOUNTER — TELEPHONE (OUTPATIENT)
Dept: CARDIOLOGY | Facility: CLINIC | Age: 86
End: 2021-01-11

## 2021-01-11 NOTE — TELEPHONE ENCOUNTER
Pt notified to hold Eliquis 48 hrs prior to tooth extraction per Oralia Lutz's instruction.  Patient verbalized understanding./ JIGNESH

## 2021-01-11 NOTE — TELEPHONE ENCOUNTER
Patient called to inquire how long to hold Eliquis for tooth extraction. / JIGNESH     Patient's phone  [phone]

## 2021-02-16 DIAGNOSIS — E78.00 HYPERCHOLESTEROLEMIA: ICD-10-CM

## 2021-02-17 RX ORDER — EZETIMIBE 10 MG/1
TABLET ORAL
Qty: 90 TABLET | Refills: 0 | Status: SHIPPED | OUTPATIENT
Start: 2021-02-17 | End: 2021-04-19 | Stop reason: SDUPTHER

## 2021-03-02 DIAGNOSIS — Z23 IMMUNIZATION DUE: ICD-10-CM

## 2021-03-05 ENCOUNTER — OFFICE VISIT (OUTPATIENT)
Dept: CARDIOLOGY | Facility: CLINIC | Age: 86
End: 2021-03-05

## 2021-03-05 VITALS
BODY MASS INDEX: 27.79 KG/M2 | HEIGHT: 62 IN | SYSTOLIC BLOOD PRESSURE: 136 MMHG | DIASTOLIC BLOOD PRESSURE: 72 MMHG | HEART RATE: 67 BPM | WEIGHT: 151 LBS

## 2021-03-05 DIAGNOSIS — I10 ESSENTIAL HYPERTENSION: ICD-10-CM

## 2021-03-05 DIAGNOSIS — E78.2 MIXED HYPERLIPIDEMIA: ICD-10-CM

## 2021-03-05 DIAGNOSIS — I51.81 STRESS-INDUCED CARDIOMYOPATHY: ICD-10-CM

## 2021-03-05 DIAGNOSIS — I25.10 CORONARY ARTERY DISEASE INVOLVING NATIVE CORONARY ARTERY OF NATIVE HEART WITHOUT ANGINA PECTORIS: Primary | ICD-10-CM

## 2021-03-05 DIAGNOSIS — I48.21 PERMANENT ATRIAL FIBRILLATION (HCC): ICD-10-CM

## 2021-03-05 PROCEDURE — 99214 OFFICE O/P EST MOD 30 MIN: CPT | Performed by: NURSE PRACTITIONER

## 2021-03-05 PROCEDURE — 93000 ELECTROCARDIOGRAM COMPLETE: CPT | Performed by: NURSE PRACTITIONER

## 2021-03-05 NOTE — PROGRESS NOTES
Date of Office Visit: 21  Encounter Provider: ENRRIQUE Curtis  Place of Service: [facility]  Patient Name: Marleni Hummel  :1935    Chief Complaint   Patient presents with   • Coronary artery disease involving native coronary artery of    • Follow-up   :     HPI: Marleni Hummel is a 85 y.o. female  with history of hypertension, hyperlipidemia, atrial fibrillation, stress-induced cardiomyopathy, obstructive sleep apnea, left renal carcinoma status post nephrectomy and dyspnea.  She is followed by Dr. Jason Tai and I will follow up with her today.         She has history of atrial fibrillation with rapid ventricular response in the setting of normal thyroid studies and unremarkable echocardiogram.  She had a perfusion stress test which was also unremarkable.  She had continued issues with dyspnea and was evaluated by pulmonary due to respiratory illness.  In 2011 she went back into atrial fibrillation after cardioversion.  She was started on flecainide and underwent repeat cardioversion.  She has some issues with right femur and hip surgery had some infection and multiple surgeries after that.  In 2015 she was found to be bradycardic after having some nausea and vomiting which was felt to be vagal response.  Her troponin was borderline elevated medications were adjusted.  She ultimately had ST elevation infarct taken to the cardiac catheterization laboratory which is felt to have cardiomyopathy with an ejection fraction of 20%.  She did have a circumflex lesion where she had drug-eluting stent placed.  She recovered from that.  Obviously, we had to stop flecainide and she was switched to amiodarone.  She had elevated QT interval so that was stopped.        She complained of shortness of breath in 2018.  She had an echocardiogram which showed cordelia EF of 65%, borderline concentric hypertrophy, mild to moderately dilated left  atrial cavity.  The right ventricular cavity was mild to moderately dilated with mild mitral regurgitation, mild tricuspid regurgitation and normal RVSP.  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  She had an episode of near syncope after starting Lasix so that was stopped.  She also reported having poor appetite and that she had not been eating well after her  .  She had normal troponin and normal BNP.  She also had acute kidney injury and was found to have a creatinine of 1.68, BUN 26, GFR 29 compared to 2 months prior in September when creatinine was 1.16, BUN 22, GFR 54.  She received 500 cc IV fluid.        In 2019, she c/o CARROLL and had echo and perfusion stress test.  Echocardiogram revealed normal left ventricular ejection fraction and EF of 64%, borderline concentric hypertrophy, moderate thickening of the aortic valve with trace aortic valve regurgitation no aortic stenosis, mild mitral annular calcification was present with mild mitral regurgitation.  RVSP was normal.  Perfusion stress test was negative for ischemia.      She has history of sleep apnea but stopped treating that like 5-6 years ago.  She lives alone.  She is accompanied by her daughter today.  She had left nephrectomy in November due to renal cell carcinoma.  She is to have a full body scan soon.  She started taking vitamin B12 a month ago for low energy and noticed slight improvement.  She denies chest pain tightness pressure, palpitation, shortness breath, edema, lightheadedness.  She is maintained on Eliquis 2.5 mg twice daily        Allergies   Allergen Reactions   • Morphine Anaphylaxis   • Oxycodone Anaphylaxis   • Ace Inhibitors Cough   • Levaquin [Levofloxacin] Itching and Rash     insomnia           Family and social history reviewed.     ROS  All other systems were reviewed and  "are negative          Objective:     Vitals:    03/05/21 1440   BP: 136/72   BP Location: Left arm   Patient Position: Sitting   Pulse: 67   Weight: 68.5 kg (151 lb)   Height: 157.5 cm (62\")     Body mass index is 27.62 kg/m².    PHYSICAL EXAM:  Constitutional:       General: Not in acute distress.     Appearance: Well-developed. Not diaphoretic.   HENT:      Head: Normocephalic.   Pulmonary:      Effort: Pulmonary effort is normal. No respiratory distress.      Breath sounds: Normal breath sounds. No wheezing. No rhonchi. No rales.   Cardiovascular:      Normal rate. Irregularly irregular rhythm.   Pulses:     Radial: 2+ bilaterally.  Skin:     General: Skin is warm and dry. There is no cyanosis.      Findings: No rash.   Neurological:      Mental Status: Alert and oriented to person, place, and time.   Psychiatric:         Behavior: Behavior normal.         Thought Content: Thought content normal.         Judgment: Judgment normal.           ECG 12 Lead    Date/Time: 3/5/2021 3:58 PM  Performed by: Oralia Lutz APRN  Authorized by: Oralia Lutz APRN   Comparison: compared with previous ECG   Rhythm: atrial fibrillation  Rate: normal    Clinical impression: abnormal EKG              Current Outpatient Medications   Medication Sig Dispense Refill   • acetaminophen (TYLENOL) 500 MG tablet Take 1,000 mg by mouth 3 (three) times a day as needed for mild pain (1-3) or moderate pain (4-6).     • apixaban (ELIQUIS) 2.5 MG tablet tablet Take 2.5 mg by mouth 2 (Two) Times a Day.     • atorvastatin (LIPITOR) 20 MG tablet Take 1 tablet by mouth Every Night for 180 days. 90 tablet 1   • Cholecalciferol (VITAMIN D3) 5000 units capsule capsule Take 5,000 Units by mouth Daily.     • Denosumab (PROLIA SC) Inject 1 dose under the skin into the appropriate area as directed Every 6 (Six) Months. Pt unsure of dose     • docusate sodium (COLACE) 250 MG capsule Take 1 capsule by mouth 2 (Two) Times a Day. 60 capsule 1   • DULoxetine " (CYMBALTA) 30 MG capsule Take 1 capsule by mouth Daily for 180 days. 90 capsule 1   • ezetimibe (ZETIA) 10 MG tablet TAKE 1 TABLET EVERY NIGHT 90 tablet 0   • fexofenadine (Allegra Allergy) 180 MG tablet Take 1 tablet by mouth Daily for 180 days. 30 tablet 5   • metoprolol succinate XL (TOPROL-XL) 50 MG 24 hr tablet Take 1 tablet by mouth Daily for 180 days. (Patient taking differently: Take 50 mg by mouth Every Morning.) 90 tablet 1   • Mirabegron ER (Myrbetriq) 50 MG tablet sustained-release 24 hour 24 hr tablet Take 50 mg by mouth Every Evening.     • multivitamin with minerals (HAIR/SKIN/NAILS PO) Take 1 tablet by mouth Daily.     • pantoprazole (PROTONIX) 20 MG EC tablet Take 1 tablet by mouth Every Night for 180 days. 90 tablet 1     No current facility-administered medications for this visit.      Assessment:       Diagnosis Plan   1. Coronary artery disease involving native coronary artery of native heart without angina pectoris     2. Essential hypertension     3. Permanent atrial fibrillation (CMS/HCC)     4. Mixed hyperlipidemia     5. Stress-induced cardiomyopathy          Orders Placed This Encounter   Procedures   • ECG 12 Lead     This order was created via procedure documentation         Plan:       1. 84 year-old female with chronic atrial fibrillation despite at least 2 cardioversions anticoagulated with Eliquis rate is controlled. CHADS2-VASc score is 6.  Anticoagulated with Eliquis  2. Coronary artery disease status post drug-eluting stent placed to the left circumflex in October 2015 with initial left ventricular ejection fraction of 20% last normal 09/2020.  Normal perfusion stress test 09/2020  - no angina  3. Hypertension blood pressure today is stable  4.  Hyperlipidemia on atorvastatin 20 mg   5.  History of renal insufficiency was found to have a renal lesion is to have follow-up arranged by her PCP metastatic disease that  6.  History of prolonged QT on amiodarone  7.    Mild mitral valve  regurgitation with moderate mitral annular calcification, calcification of the aortic valve and trace regurgitation on echo September 2020  8.  History of obstructive sleep apnea-she stopped treating that 5-6 years ago  9.   Left renal carcinoma status post total nephrectomy she is to have a full body scan        Follow up in 6 months with Dr. Parmar call with questions or concerns            It has been a pleasure to participate in this patient's care.      Thank you,  ENRRIQUE Curtis      **I used Dragon to dictate this note:**

## 2021-03-18 ENCOUNTER — INFUSION (OUTPATIENT)
Dept: ONCOLOGY | Facility: HOSPITAL | Age: 86
End: 2021-03-18

## 2021-03-18 VITALS — BODY MASS INDEX: 27.62 KG/M2 | TEMPERATURE: 97.1 F | HEIGHT: 62 IN | RESPIRATION RATE: 18 BRPM

## 2021-03-18 DIAGNOSIS — M81.0 SENILE OSTEOPOROSIS: Primary | ICD-10-CM

## 2021-03-18 PROCEDURE — 96372 THER/PROPH/DIAG INJ SC/IM: CPT

## 2021-03-18 PROCEDURE — 25010000002 DENOSUMAB 60 MG/ML SOLUTION PREFILLED SYRINGE: Performed by: OBSTETRICS & GYNECOLOGY

## 2021-03-18 RX ADMIN — DENOSUMAB 60 MG: 60 INJECTION SUBCUTANEOUS at 16:18

## 2021-03-18 NOTE — NURSING NOTE
Arrived  for prolia injection. Indication and side effects reviewed. Pt initially denied any dental work, but then remember she did have a tooth extracted on January 13th. She called her dentist office and they confirmed the date of the procedure and were okay with her to receive her injection. Labs and medications verified. Prolia administered in L arm without incidence. Instructed to call prescribing MD for any concerns or questions and instructed on how to schedule future appts.  Pt vu and discharged ambulatory.

## 2021-04-13 ENCOUNTER — APPOINTMENT (OUTPATIENT)
Dept: WOMENS IMAGING | Facility: HOSPITAL | Age: 86
End: 2021-04-13

## 2021-04-13 PROCEDURE — 77063 BREAST TOMOSYNTHESIS BI: CPT | Performed by: RADIOLOGY

## 2021-04-13 PROCEDURE — 77067 SCR MAMMO BI INCL CAD: CPT | Performed by: RADIOLOGY

## 2021-04-19 ENCOUNTER — OFFICE VISIT (OUTPATIENT)
Dept: FAMILY MEDICINE CLINIC | Facility: CLINIC | Age: 86
End: 2021-04-19

## 2021-04-19 VITALS
SYSTOLIC BLOOD PRESSURE: 146 MMHG | HEIGHT: 62 IN | DIASTOLIC BLOOD PRESSURE: 86 MMHG | TEMPERATURE: 97.1 F | BODY MASS INDEX: 27.79 KG/M2 | HEART RATE: 87 BPM | RESPIRATION RATE: 16 BRPM | OXYGEN SATURATION: 97 % | WEIGHT: 151 LBS

## 2021-04-19 DIAGNOSIS — K21.9 GASTROESOPHAGEAL REFLUX DISEASE WITHOUT ESOPHAGITIS: ICD-10-CM

## 2021-04-19 DIAGNOSIS — R09.82 PND (POST-NASAL DRIP): ICD-10-CM

## 2021-04-19 DIAGNOSIS — F41.0 PANIC DISORDER: ICD-10-CM

## 2021-04-19 DIAGNOSIS — I10 ESSENTIAL HYPERTENSION: Primary | ICD-10-CM

## 2021-04-19 DIAGNOSIS — E78.00 HYPERCHOLESTEROLEMIA: ICD-10-CM

## 2021-04-19 PROBLEM — Z90.5 HISTORY OF LEFT NEPHRECTOMY: Status: ACTIVE | Noted: 2021-04-19

## 2021-04-19 PROCEDURE — 99214 OFFICE O/P EST MOD 30 MIN: CPT | Performed by: FAMILY MEDICINE

## 2021-04-19 RX ORDER — DULOXETIN HYDROCHLORIDE 30 MG/1
30 CAPSULE, DELAYED RELEASE ORAL DAILY
Qty: 90 CAPSULE | Refills: 1 | Status: SHIPPED | OUTPATIENT
Start: 2021-04-19 | End: 2021-10-04 | Stop reason: SDUPTHER

## 2021-04-19 RX ORDER — EZETIMIBE 10 MG/1
10 TABLET ORAL NIGHTLY
Qty: 90 TABLET | Refills: 1 | Status: SHIPPED | OUTPATIENT
Start: 2021-04-19 | End: 2021-09-30

## 2021-04-19 RX ORDER — ATORVASTATIN CALCIUM 20 MG/1
20 TABLET, FILM COATED ORAL NIGHTLY
Qty: 90 TABLET | Refills: 1 | Status: SHIPPED | OUTPATIENT
Start: 2021-04-19 | End: 2021-09-30

## 2021-04-19 RX ORDER — METOPROLOL SUCCINATE 50 MG/1
50 TABLET, EXTENDED RELEASE ORAL EVERY MORNING
Qty: 90 TABLET | Refills: 1 | Status: SHIPPED | OUTPATIENT
Start: 2021-04-19 | End: 2021-09-21

## 2021-04-19 NOTE — ASSESSMENT & PLAN NOTE
GERD symptoms are controlled.  Kidney function is worsening.  Will discontinue proton pump inhibitor and patient will treat GERD symptoms with Tums.

## 2021-04-19 NOTE — PROGRESS NOTES
"Chief Complaint  Hypertension (6 month follow up)    Subjective      History of Present Illness      Marleni Hummel presents to [facility]               Objective     Vital Signs:   /86   Pulse 87   Temp 97.1 °F (36.2 °C) (Tympanic)   Resp 16   Ht 157.5 cm (62\")   Wt 68.5 kg (151 lb)   SpO2 97%   BMI 27.62 kg/m²       Physical Exam  Vitals reviewed.   Constitutional:       General: She is not in acute distress.  Eyes:      General: Lids are normal.      Conjunctiva/sclera: Conjunctivae normal.   Neck:      Vascular: No carotid bruit.      Trachea: No tracheal deviation.   Cardiovascular:      Rate and Rhythm: Normal rate and regular rhythm.      Heart sounds: Normal heart sounds. No murmur heard.     Pulmonary:      Effort: Pulmonary effort is normal.      Breath sounds: Normal breath sounds.   Skin:     General: Skin is warm and dry.   Neurological:      Mental Status: She is alert. She is not disoriented.   Psychiatric:         Speech: Speech normal.         Behavior: Behavior normal. Behavior is cooperative.              Result Review :     The data below has been reviewed by Ken Andujar MD on 04/19/2021.  Lab Results - Last 18 Months   Lab Units 03/12/21  0926 11/21/20  0359 11/20/20  0453 11/19/20  0435 11/18/20  0444 09/11/20  1044 03/18/20  1032 03/18/20  1032 02/24/20  0553 02/23/20  1434   GLUCOSE mg/dL 110*  --   --   --   --  109*  --  115*  --   --    BUN mg/dL 52* 26* 26* 23 24* 24*   < > 21  --  22   CREATININE mg/dL 2.00* 1.57* 1.57* 1.76* 1.88* 1.21*   < > 1.38*  --  1.21*   EGFR IF NONAFRICN AM mL/min/1.73 24* 32* 32* 28* 26* 43*   < > 37*  --  43*   EGFR IF AFRICN AM mL/min/1.73 29*  --   --   --   --  52*  --  44*  --   --    SODIUM mmol/L 138 129* 132* 134* 133* 137   < > 138  --  134*   POTASSIUM mmol/L 4.9 4.4 4.5 4.5 4.7 4.6   < > 4.6  --  4.7   CHLORIDE mmol/L 101 104 108* 108* 104 101   < > 98  --  100   CALCIUM mg/dL 10.0 7.4* 6.9* 7.0* 7.2* " 9.5   < > 9.6  --  9.3   ALBUMIN g/dL 4.60 2.90* 3.10* 3.20* 3.20* 4.60  --  4.60  --  4.50   BILIRUBIN mg/dL 0.7  --   --  0.7  --  0.7  --  0.8  --  0.8   ALK PHOS U/L 47  --   --  45  --  59  --  71  --  59   AST (SGOT) U/L 19  --   --  19  --  17  --  14  --  20   ALT (SGPT) U/L 11  --   --  <5  --  15  --  13  --  12   CHOLESTEROL mg/dL 147  --   --   --   --  153  --  148  --   --    TRIGLYCERIDES mg/dL 137  --   --   --   --  135  --  203* 136  --    HDL CHOL mg/dL 53  --   --   --   --  62*  --  54 47  --    VLDL CHOL mg/dL  --   --   --   --   --   --   --  40.6 27.2  --    VLDL CHOLESTEROL ALE mg/dL 24  --   --   --   --  27  --   --   --   --    LDL CHOL mg/dL 70  --   --   --   --  64  --  53 41  --    LDL/HDL RATIO   --   --   --   --   --   --   --   --  0.87  --    CK TOTAL U/L 54  --   --   --   --  61  --  63  --   --    HEMOGLOBIN A1C %  --   --   --   --   --   --   --   --  5.50  --    WBC 10*3/mm3 5.29 8.56 7.71 6.79 8.26 5.85   < > 6.44  --  5.81   RBC 10*6/mm3 3.62* 3.05* 2.98* 2.92* 3.11* 3.61*   < > 3.92  --  3.69*   HEMATOCRIT % 33.7* 28.9* 27.5* 27.1* 28.7* 33.4*   < > 36.0  --  34.2   MCV fL 93.1 94.8 92.3 92.8 92.3 92.5   < > 91.8  --  92.7   MCH pg 31.8 30.8 31.2 30.5 30.5 30.7   < > 31.1  --  30.1   TSH uIU/mL 3.280  --   --   --   --  3.490  --  2.210  --   --    INR   --   --   --   --   --   --   --   --   --  1.22*   URIC ACID mg/dL  --  6.1* 6.4*  --  6.2*  --   --   --   --   --     < > = values in this interval not displayed.       Information below has been reviewed by Ken Andujar M.D. on 04/19/2021.  Data Reviewed:  SCANNED - IMAGING (11/16/2020)  SCANNED - IMAGING (03/01/2021)                   Assessment/Plan     Assessment and Plan      Diagnoses and all orders for this visit:    1. Hypercholesterolemia  Assessment & Plan:  Lipid abnormalities are improving with treatment.  Pharmacotherapy as ordered.  Lipids will be reassessed in 6 months.    Orders:  -     ezetimibe  (ZETIA) 10 MG tablet; Take 1 tablet by mouth Every Night for 180 days.  Dispense: 90 tablet; Refill: 1  -     atorvastatin (LIPITOR) 20 MG tablet; Take 1 tablet by mouth Every Night for 180 days.  Dispense: 90 tablet; Refill: 1    2. PND (post-nasal drip)    3. Panic disorder  Assessment & Plan:  Psychological condition is unchanged.  Continue current treatment regimen.  Psychological condition  will be reassessed at the next regular appointment.    Orders:  -     DULoxetine (CYMBALTA) 30 MG capsule; Take 1 capsule by mouth Daily for 180 days.  Dispense: 90 capsule; Refill: 1    4. Essential hypertension  Assessment & Plan:  Hypertension is unchanged.  Continue current treatment regimen.  Blood pressure will be reassessed at the next regular appointment.    Orders:  -     metoprolol succinate XL (TOPROL-XL) 50 MG 24 hr tablet; Take 1 tablet by mouth Every Morning for 180 days.  Dispense: 90 tablet; Refill: 1    5. Gastroesophageal reflux disease without esophagitis  Assessment & Plan:  GERD symptoms are controlled.  Kidney function is worsening.  Will discontinue proton pump inhibitor and patient will treat GERD symptoms with Tums.            Follow Up   No follow-ups on file.    Patient was given instructions and counseling regarding her condition or for health maintenance advice. Please see specific information pulled into the AVS if appropriate.

## 2021-05-07 ENCOUNTER — TELEPHONE (OUTPATIENT)
Dept: CARDIOLOGY | Facility: CLINIC | Age: 86
End: 2021-05-07

## 2021-05-07 NOTE — TELEPHONE ENCOUNTER
Correct she is to be on it. I [facility] with a message that she to remain on eliquis 2.5 mg twice dialy

## 2021-05-07 NOTE — TELEPHONE ENCOUNTER
Patient called to report [facility] would not fill Eliquis because we had informed them we were stopping it.  I have reviewed the chart and do see any mention that patient is to stop taking it.  I did see he held it for a dental procedure.  I called patient back and left a message informing her that I did not see any documentation in the chart that she was to stop it. I asked that if she was informed by a provider to discontinue use then please call back and let me know.  / JIGNESH Masters, I am waiting for patient to call back.  Please advise if she is to continue on Eliquis. Thank you./ JIGNESH

## 2021-05-14 ENCOUNTER — TELEPHONE (OUTPATIENT)
Dept: FAMILY MEDICINE CLINIC | Facility: CLINIC | Age: 86
End: 2021-05-14

## 2021-05-14 DIAGNOSIS — M81.0 SENILE OSTEOPOROSIS: Primary | ICD-10-CM

## 2021-05-18 ENCOUNTER — TELEPHONE (OUTPATIENT)
Dept: FAMILY MEDICINE CLINIC | Facility: CLINIC | Age: 86
End: 2021-05-18

## 2021-06-03 ENCOUNTER — TELEPHONE (OUTPATIENT)
Dept: CARDIOLOGY | Facility: CLINIC | Age: 86
End: 2021-06-03

## 2021-06-03 NOTE — TELEPHONE ENCOUNTER
Patient called to ask how long she should hold Eliquis prior to her Epidural injection scheduled for Monday. Please advise. / JIGNESH     Patient can be reached at [phone]

## 2021-06-03 NOTE — TELEPHONE ENCOUNTER
Called and left a message for patient informing her if the provider giving the injection only wants her to hold for 2 days then that is fine.  She can hold it up to 3 days prior to procedure per Oralia.  I asked that she call back should she have any further questions./ JIGNESH

## 2021-07-17 ENCOUNTER — TELEPHONE (OUTPATIENT)
Dept: URGENT CARE | Facility: CLINIC | Age: 86
End: 2021-07-17

## 2021-07-17 NOTE — TELEPHONE ENCOUNTER
"Patient reports she was prescribed sulfa antibiotics by NP at urologist office and was seen here the next day for hives from sulfa, had antibiotic changed to amoxicillin, and was treated for scabies. She reports she still has rash but is no longer itching, she is no longer having dysuria and is going to the bathroom to urinate less frequently \"I am on the mend.\" Her urine culture from UC was sent after her starting on antibiotics. Continue medications prescribed by NP Mike De La Garza.   "

## 2021-07-29 ENCOUNTER — HOSPITAL ENCOUNTER (OUTPATIENT)
Dept: BONE DENSITY | Facility: HOSPITAL | Age: 86
Discharge: HOME | End: 2021-07-29
Admitting: FAMILY MEDICINE

## 2021-07-29 DIAGNOSIS — M81.0 SENILE OSTEOPOROSIS: ICD-10-CM

## 2021-07-29 PROCEDURE — 77080 DXA BONE DENSITY AXIAL: CPT

## 2021-07-29 NOTE — PROGRESS NOTES
Call patient and let her know that the bone density shows osteoporosis.  Make sure she is still getting Prolia every 6 months.  Thanks,

## 2021-07-30 ENCOUNTER — TELEPHONE (OUTPATIENT)
Dept: FAMILY MEDICINE CLINIC | Facility: CLINIC | Age: 86
End: 2021-07-30

## 2021-08-23 ENCOUNTER — OFFICE VISIT (OUTPATIENT)
Dept: FAMILY MEDICINE CLINIC | Facility: CLINIC | Age: 86
End: 2021-08-23

## 2021-08-23 VITALS
WEIGHT: 149 LBS | DIASTOLIC BLOOD PRESSURE: 70 MMHG | RESPIRATION RATE: 16 BRPM | HEART RATE: 74 BPM | OXYGEN SATURATION: 97 % | SYSTOLIC BLOOD PRESSURE: 122 MMHG | HEIGHT: 62 IN | BODY MASS INDEX: 27.42 KG/M2 | TEMPERATURE: 97.5 F

## 2021-08-23 DIAGNOSIS — R05.3 CHRONIC COUGH: Primary | ICD-10-CM

## 2021-08-23 DIAGNOSIS — R09.82 POST-NASAL DRAINAGE: ICD-10-CM

## 2021-08-23 PROCEDURE — 99213 OFFICE O/P EST LOW 20 MIN: CPT | Performed by: NURSE PRACTITIONER

## 2021-08-23 RX ORDER — FLUTICASONE PROPIONATE 50 MCG
2 SPRAY, SUSPENSION (ML) NASAL DAILY
Qty: 48 G | Refills: 3 | Status: SHIPPED | OUTPATIENT
Start: 2021-08-23 | End: 2022-01-29

## 2021-08-23 NOTE — PROGRESS NOTES
Subjective   Marleni Hummel is a 85 y.o. female.     History of Present Illness   Marleni Hummel 85 y.o. female who presents for evaluation of upper respiratory congestion. Symptoms include congestion, post nasal drip, dry cough, runny nose and clear nasal discharge.  Onset of symptoms was 1 year ago, unchanged since that time. Patient denies wheezing, hemoptysis, pleuritic chest pain, fever, dyspnea on exertion, sinus pain.   Evaluation to date: has been seen in this office.  Treatment to date:  OTC antihistamines and nasal steroid sprary. She does not feel the antihistamine has helped but states the Flonase did while she was using. Reports symptoms are worse certain times of the year spring to fall.       The following portions of the patient's history were reviewed and updated as appropriate: allergies, current medications, past family history, past medical history, past social history, past surgical history and problem list.    Review of Systems   Constitutional: Negative for fatigue and unexpected weight change.   HENT: Positive for postnasal drip and rhinorrhea. Negative for ear discharge, ear pain, sinus pressure, sinus pain and sore throat.    Respiratory: Positive for cough. Negative for chest tightness, shortness of breath and wheezing.    Cardiovascular: Negative for chest pain and palpitations.   Neurological: Negative for dizziness and headaches.   Psychiatric/Behavioral: Negative for behavioral problems.       Objective   Physical Exam  Vitals and nursing note reviewed.   Constitutional:       Appearance: Normal appearance. She is well-developed.   HENT:      Right Ear: Tympanic membrane, ear canal and external ear normal.      Left Ear: Tympanic membrane, ear canal and external ear normal.      Nose: Rhinorrhea present.      Mouth/Throat:      Comments: Cobblestoning to posterior pharynx   Cardiovascular:      Rate and Rhythm: Normal rate and regular rhythm.   Pulmonary:      Effort: Pulmonary  effort is normal.      Breath sounds: Normal breath sounds.   Neurological:      Mental Status: She is alert and oriented to person, place, and time.   Psychiatric:         Mood and Affect: Mood normal.         Behavior: Behavior normal.         Thought Content: Thought content normal.         Judgment: Judgment normal.         Assessment/Plan   Diagnoses and all orders for this visit:    1. Chronic cough (Primary)  -     fluticasone (Flonase) 50 MCG/ACT nasal spray; 2 sprays into the nostril(s) as directed by provider Daily.  Dispense: 48 g; Refill: 3    2. Post-nasal drainage  -     fluticasone (Flonase) 50 MCG/ACT nasal spray; 2 sprays into the nostril(s) as directed by provider Daily.  Dispense: 48 g; Refill: 3      Gave referral information for Dr. Shaw.  Patient will call to schedule own appt.

## 2021-08-26 DIAGNOSIS — E78.00 HYPERCHOLESTEROLEMIA: ICD-10-CM

## 2021-08-26 DIAGNOSIS — I10 ESSENTIAL HYPERTENSION: ICD-10-CM

## 2021-08-30 ENCOUNTER — TELEPHONE (OUTPATIENT)
Dept: FAMILY MEDICINE CLINIC | Facility: CLINIC | Age: 86
End: 2021-08-30

## 2021-08-30 DIAGNOSIS — E87.5 HYPERKALEMIA: Primary | ICD-10-CM

## 2021-08-31 DIAGNOSIS — E87.5 HYPERKALEMIA: ICD-10-CM

## 2021-08-31 DIAGNOSIS — E87.5 HYPERKALEMIA: Primary | ICD-10-CM

## 2021-08-31 LAB
ALBUMIN SERPL-MCNC: 4.7 G/DL (ref 3.5–5.2)
ALBUMIN/GLOB SERPL: 2 G/DL
ALP SERPL-CCNC: 47 U/L (ref 39–117)
ALT SERPL-CCNC: 14 U/L (ref 1–33)
AST SERPL-CCNC: 22 U/L (ref 1–32)
BASOPHILS # BLD AUTO: 0.04 10*3/MM3 (ref 0–0.2)
BASOPHILS NFR BLD AUTO: 0.7 % (ref 0–1.5)
BILIRUB SERPL-MCNC: 0.5 MG/DL (ref 0–1.2)
BUN SERPL-MCNC: 35 MG/DL (ref 8–23)
BUN/CREAT SERPL: 17.6 (ref 7–25)
CALCIUM SERPL-MCNC: 10.2 MG/DL (ref 8.6–10.5)
CHLORIDE SERPL-SCNC: 103 MMOL/L (ref 98–107)
CHOLEST SERPL-MCNC: 126 MG/DL (ref 0–200)
CHOLEST/HDLC SERPL: 2.07 {RATIO}
CK SERPL-CCNC: 53 U/L (ref 20–180)
CO2 SERPL-SCNC: 24.8 MMOL/L (ref 22–29)
CREAT SERPL-MCNC: 1.99 MG/DL (ref 0.57–1)
EOSINOPHIL # BLD AUTO: 0.17 10*3/MM3 (ref 0–0.4)
EOSINOPHIL NFR BLD AUTO: 3.1 % (ref 0.3–6.2)
ERYTHROCYTE [DISTWIDTH] IN BLOOD BY AUTOMATED COUNT: 12.8 % (ref 12.3–15.4)
GLOBULIN SER CALC-MCNC: 2.4 GM/DL
GLUCOSE SERPL-MCNC: 112 MG/DL (ref 65–99)
HCT VFR BLD AUTO: 33.3 % (ref 34–46.6)
HDLC SERPL-MCNC: 61 MG/DL (ref 40–60)
HGB BLD-MCNC: 11.1 G/DL (ref 12–15.9)
IMM GRANULOCYTES # BLD AUTO: 0.02 10*3/MM3 (ref 0–0.05)
IMM GRANULOCYTES NFR BLD AUTO: 0.4 % (ref 0–0.5)
LDLC SERPL CALC-MCNC: 47 MG/DL (ref 0–100)
LYMPHOCYTES # BLD AUTO: 1.37 10*3/MM3 (ref 0.7–3.1)
LYMPHOCYTES NFR BLD AUTO: 24.6 % (ref 19.6–45.3)
MCH RBC QN AUTO: 31.4 PG (ref 26.6–33)
MCHC RBC AUTO-ENTMCNC: 33.3 G/DL (ref 31.5–35.7)
MCV RBC AUTO: 94.3 FL (ref 79–97)
MONOCYTES # BLD AUTO: 0.41 10*3/MM3 (ref 0.1–0.9)
MONOCYTES NFR BLD AUTO: 7.4 % (ref 5–12)
NEUTROPHILS # BLD AUTO: 3.56 10*3/MM3 (ref 1.7–7)
NEUTROPHILS NFR BLD AUTO: 63.8 % (ref 42.7–76)
NRBC BLD AUTO-RTO: 0 /100 WBC (ref 0–0.2)
PLATELET # BLD AUTO: 190 10*3/MM3 (ref 140–450)
POTASSIUM SERPL-SCNC: 6.2 MMOL/L (ref 3.5–5.2)
PROT SERPL-MCNC: 7.1 G/DL (ref 6–8.5)
RBC # BLD AUTO: 3.53 10*6/MM3 (ref 3.77–5.28)
SODIUM SERPL-SCNC: 138 MMOL/L (ref 136–145)
TRIGL SERPL-MCNC: 96 MG/DL (ref 0–150)
TSH SERPL DL<=0.005 MIU/L-ACNC: 4.06 UIU/ML (ref 0.27–4.2)
VLDLC SERPL CALC-MCNC: 18 MG/DL (ref 5–40)
WBC # BLD AUTO: 5.57 10*3/MM3 (ref 3.4–10.8)

## 2021-08-31 NOTE — PROGRESS NOTES
Neena, her potassium is very high, call her to get this stat potassium level this morning. I have signed the order. Thanks, Dr. Andujar

## 2021-08-31 NOTE — TELEPHONE ENCOUNTER
She is getting repeat stat labs today at 2 pm, thanks [employer]. If you get a call about her after hours, then just call me. I have my cell phone on. Abe

## 2021-08-31 NOTE — TELEPHONE ENCOUNTER
On call and lab reports pt's K+ of 6.2. Called pt and she reports feeling fine w/o issues. Review of chart shows this not to be an issue prior and pt not on meds to do this. Will have pt simply repeat this labs tomorrow and f/u with Dr Andujar.

## 2021-09-01 LAB
BUN SERPL-MCNC: 28 MG/DL (ref 8–23)
BUN/CREAT SERPL: 15.6 (ref 7–25)
CALCIUM SERPL-MCNC: 10.5 MG/DL (ref 8.6–10.5)
CHLORIDE SERPL-SCNC: 100 MMOL/L (ref 98–107)
CO2 SERPL-SCNC: 26.2 MMOL/L (ref 22–29)
CREAT SERPL-MCNC: 1.79 MG/DL (ref 0.57–1)
GLUCOSE SERPL-MCNC: 113 MG/DL (ref 65–99)
POTASSIUM SERPL-SCNC: 5.3 MMOL/L (ref 3.5–5.2)
SODIUM SERPL-SCNC: 138 MMOL/L (ref 136–145)

## 2021-09-20 DIAGNOSIS — I10 ESSENTIAL HYPERTENSION: ICD-10-CM

## 2021-09-21 RX ORDER — METOPROLOL SUCCINATE 50 MG/1
TABLET, EXTENDED RELEASE ORAL
Qty: 90 TABLET | Refills: 0 | Status: SHIPPED | OUTPATIENT
Start: 2021-09-21 | End: 2021-10-04 | Stop reason: SDUPTHER

## 2021-09-23 ENCOUNTER — INFUSION (OUTPATIENT)
Dept: ONCOLOGY | Facility: HOSPITAL | Age: 86
End: 2021-09-23

## 2021-09-23 ENCOUNTER — LAB (OUTPATIENT)
Dept: OTHER | Facility: HOSPITAL | Age: 86
End: 2021-09-23

## 2021-09-23 VITALS — TEMPERATURE: 96.8 F | RESPIRATION RATE: 18 BRPM | BODY MASS INDEX: 27.25 KG/M2 | HEIGHT: 62 IN

## 2021-09-23 DIAGNOSIS — M81.0 SENILE OSTEOPOROSIS: Primary | ICD-10-CM

## 2021-09-23 PROCEDURE — 25010000002 DENOSUMAB 60 MG/ML SOLUTION PREFILLED SYRINGE: Performed by: OBSTETRICS & GYNECOLOGY

## 2021-09-23 PROCEDURE — 96372 THER/PROPH/DIAG INJ SC/IM: CPT

## 2021-09-23 RX ADMIN — DENOSUMAB 60 MG: 60 INJECTION SUBCUTANEOUS at 14:50

## 2021-09-23 NOTE — NURSING NOTE
Arrived ambulatory  for prolia injection. Indication and side effects reviewed. Denies recent dental work. Labs and medications verified. Prolia administered in  right arm without incidence. Instructed to call prescribing MD for any concerns or questions and instructed on how to schedule future appts.  Pt vu and discharged ambulatory.

## 2021-09-30 DIAGNOSIS — E78.00 HYPERCHOLESTEROLEMIA: ICD-10-CM

## 2021-09-30 RX ORDER — EZETIMIBE 10 MG/1
TABLET ORAL
Qty: 14 TABLET | Refills: 0 | Status: SHIPPED | OUTPATIENT
Start: 2021-09-30 | End: 2021-10-04 | Stop reason: SDUPTHER

## 2021-09-30 RX ORDER — ATORVASTATIN CALCIUM 20 MG/1
TABLET, FILM COATED ORAL
Qty: 14 TABLET | Refills: 0 | Status: SHIPPED | OUTPATIENT
Start: 2021-09-30 | End: 2021-10-04 | Stop reason: SDUPTHER

## 2021-10-04 ENCOUNTER — OFFICE VISIT (OUTPATIENT)
Dept: FAMILY MEDICINE CLINIC | Facility: CLINIC | Age: 86
End: 2021-10-04

## 2021-10-04 VITALS
WEIGHT: 152 LBS | DIASTOLIC BLOOD PRESSURE: 84 MMHG | HEIGHT: 62 IN | HEART RATE: 95 BPM | RESPIRATION RATE: 18 BRPM | OXYGEN SATURATION: 98 % | SYSTOLIC BLOOD PRESSURE: 146 MMHG | TEMPERATURE: 98 F | BODY MASS INDEX: 27.97 KG/M2

## 2021-10-04 DIAGNOSIS — F41.0 PANIC DISORDER: ICD-10-CM

## 2021-10-04 DIAGNOSIS — E79.0 HYPERURICEMIA: ICD-10-CM

## 2021-10-04 DIAGNOSIS — E78.00 HYPERCHOLESTEROLEMIA: ICD-10-CM

## 2021-10-04 DIAGNOSIS — N18.32 STAGE 3B CHRONIC KIDNEY DISEASE (HCC): ICD-10-CM

## 2021-10-04 DIAGNOSIS — I10 ESSENTIAL HYPERTENSION: Primary | ICD-10-CM

## 2021-10-04 PROCEDURE — 99214 OFFICE O/P EST MOD 30 MIN: CPT | Performed by: FAMILY MEDICINE

## 2021-10-04 RX ORDER — ATORVASTATIN CALCIUM 20 MG/1
20 TABLET, FILM COATED ORAL NIGHTLY
Qty: 90 TABLET | Refills: 1 | Status: SHIPPED | OUTPATIENT
Start: 2021-10-04 | End: 2022-03-07 | Stop reason: SDUPTHER

## 2021-10-04 RX ORDER — DULOXETIN HYDROCHLORIDE 30 MG/1
30 CAPSULE, DELAYED RELEASE ORAL DAILY
Qty: 90 CAPSULE | Refills: 1 | Status: SHIPPED | OUTPATIENT
Start: 2021-10-04 | End: 2022-03-07 | Stop reason: ALTCHOICE

## 2021-10-04 RX ORDER — EZETIMIBE 10 MG/1
10 TABLET ORAL NIGHTLY
Qty: 90 TABLET | Refills: 1 | Status: SHIPPED | OUTPATIENT
Start: 2021-10-04 | End: 2022-03-07 | Stop reason: SDUPTHER

## 2021-10-04 RX ORDER — METOPROLOL SUCCINATE 50 MG/1
75 TABLET, EXTENDED RELEASE ORAL DAILY
Qty: 135 TABLET | Refills: 1 | Status: SHIPPED | OUTPATIENT
Start: 2021-10-04 | End: 2022-03-07 | Stop reason: SDUPTHER

## 2021-10-04 NOTE — ASSESSMENT & PLAN NOTE
Kidney function has maintained stability after her nephrectomy last November.  Most recent creatinine is 1.79 which is actually an improvement.

## 2021-10-04 NOTE — PROGRESS NOTES
"Chief Complaint  Hypertension    Subjective          Marleni presents to [facility] to refill medications and review recent lab results. No medication side effects are reported. Overall the patient is feeling well.  Initial blood pressure is elevated.          Objective   Vital Signs:   Vitals:    10/04/21 1330 10/04/21 1407   BP: 156/69 146/84   BP Location:  Right arm   Patient Position:  Sitting   Cuff Size:  Adult   Pulse: 95    Resp: 18    Temp: 98 °F (36.7 °C)    TempSrc: Skin    SpO2: 98%    Weight: 68.9 kg (152 lb)    Height: 157.5 cm (62\")         Physical Exam  Vitals reviewed.   Constitutional:       General: She is not in acute distress.  Eyes:      General: Lids are normal.      Conjunctiva/sclera: Conjunctivae normal.   Neck:      Vascular: No carotid bruit.      Trachea: No tracheal deviation.   Cardiovascular:      Rate and Rhythm: Normal rate and regular rhythm.      Heart sounds: Normal heart sounds. No murmur heard.      Pulmonary:      Effort: Pulmonary effort is normal.      Breath sounds: Normal breath sounds.   Skin:     General: Skin is warm and dry.   Neurological:      Mental Status: She is alert. She is not disoriented.   Psychiatric:         Speech: Speech normal.         Behavior: Behavior normal. Behavior is cooperative.          Result Review :       Comprehensive Metabolic Panel (08/30/2021 10:43)  CBC & Differential (08/30/2021 10:43)  Lipid Panel With / Chol / HDL Ratio (08/30/2021 10:43)  CK (08/30/2021 10:43)  TSH (08/30/2021 10:43)  Basic Metabolic Panel (08/31/2021 14:12)  Potassium (08/31/2021 13:53)           Assessment and Plan    Diagnoses and all orders for this visit:    1. Essential hypertension (Primary)  Assessment & Plan:  Blood pressure not quite to goal.  Will increase metoprolol to 75 mg daily.    Orders:  -     metoprolol succinate XL (TOPROL-XL) 50 MG 24 hr tablet; Take 1.5 tablets by mouth Daily for 180 days.  Dispense: 135 tablet; " Refill: 1  -     Comprehensive Metabolic Panel; Future  -     CBC & Differential; Future  -     TSH; Future    2. Hypercholesterolemia  Assessment & Plan:  Condition is stable. The current medical regimen is effective;  continue present plan and medications.    Orders:  -     atorvastatin (LIPITOR) 20 MG tablet; Take 1 tablet by mouth Every Night.  Dispense: 90 tablet; Refill: 1  -     ezetimibe (ZETIA) 10 MG tablet; Take 1 tablet by mouth Every Night.  Dispense: 90 tablet; Refill: 1  -     Comprehensive Metabolic Panel; Future  -     CBC & Differential; Future  -     Lipid Panel; Future  -     CK; Future    3. Panic disorder  Assessment & Plan:  Condition is stable. The current medical regimen is effective;  continue present plan and medications.    Orders:  -     DULoxetine (CYMBALTA) 30 MG capsule; Take 1 capsule by mouth Daily for 180 days.  Dispense: 90 capsule; Refill: 1    4. Hyperuricemia  Assessment & Plan:  Check uric acid levels in 6 months.    Orders:  -     Uric Acid; Future    5. Stage 3b chronic kidney disease (HCC)  Assessment & Plan:  Kidney function has maintained stability after her nephrectomy last November.  Most recent creatinine is 1.79 which is actually an improvement.        Follow Up   Return in about 2 months (around 12/4/2021) for Medicare Wellness Visit, schedule 30 min.  Patient was given instructions and counseling regarding her condition or for health maintenance advice. Please see specific information pulled into the AVS if appropriate.

## 2021-10-13 ENCOUNTER — OFFICE VISIT (OUTPATIENT)
Dept: CARDIOLOGY | Facility: CLINIC | Age: 86
End: 2021-10-13

## 2021-10-13 VITALS
BODY MASS INDEX: 27.79 KG/M2 | OXYGEN SATURATION: 100 % | HEIGHT: 62 IN | WEIGHT: 151 LBS | SYSTOLIC BLOOD PRESSURE: 140 MMHG | DIASTOLIC BLOOD PRESSURE: 72 MMHG | HEART RATE: 83 BPM

## 2021-10-13 DIAGNOSIS — C64.2 RENAL CELL CARCINOMA OF LEFT KIDNEY (HCC): ICD-10-CM

## 2021-10-13 DIAGNOSIS — I25.10 CHRONIC CORONARY ARTERY DISEASE: Primary | ICD-10-CM

## 2021-10-13 DIAGNOSIS — E78.00 HYPERCHOLESTEROLEMIA: ICD-10-CM

## 2021-10-13 DIAGNOSIS — I25.2 HISTORY OF MI (MYOCARDIAL INFARCTION): ICD-10-CM

## 2021-10-13 DIAGNOSIS — N18.32 STAGE 3B CHRONIC KIDNEY DISEASE (HCC): ICD-10-CM

## 2021-10-13 PROCEDURE — 93000 ELECTROCARDIOGRAM COMPLETE: CPT | Performed by: INTERNAL MEDICINE

## 2021-10-13 PROCEDURE — 99214 OFFICE O/P EST MOD 30 MIN: CPT | Performed by: INTERNAL MEDICINE

## 2021-10-13 NOTE — PROGRESS NOTES
CARDIOLOGY    Sybil Parmar MD    ENCOUNTER DATE:  10/13/2021    Marleni Hummel / 85 y.o. / female        CHIEF COMPLAINT / REASON FOR OFFICE VISIT     Coronary Artery Disease (6 month follow up) and Surgical Clearance      HISTORY OF PRESENT ILLNESS       HPI    Marleni Hummel is a 85 y.o. female     This is a patient who previously followed with Dr. Hernandez. She has a history of hypertension, hyperlipidemia, atrial fibrillation, stress induced cardiomyopathy, obstructive sleep apnea, left renal carcinoma status post nephrectomy.     She had atrial fibrillation, rapid ventricular response in the setting of abnormal thyroid studies and unremarkable echo. Nuclear stress test was also unremarkable. In March 2011 she went back into atrial fibrillation after cardioversion and was started on flecainide and had a repeat cardioversion. In October 2015 she had a non-ST elevation myocardial infarction and heart catheterization which showed a cardiomyopathy with an ejection fraction of 20%. She did have a lesion of her circumflex and was stented with a drug eluting stent. Flecainide was discontinued and switched to amiodarone. Her QT interval increased and so amiodarone was discontinued.    In 2018 she had an echocardiogram showing ejection fraction of 65% with borderline left ventricular hypertrophy and mild to moderate left atrial enlargement. She had no significant valve disease. In November 2019 she had shortness of breath and was started on Lasix but had an episode of near syncope and so that was discontinued. In 2019 she had shortness of breath and had a nuclear stress test which was negative for ischemia. Echo showed normal left ventricular function and was otherwise unchanged.     She has chronic shortness of breath.  She is limited in what she can do physically because of back and problems with her right leg.  She is going to have an epidural injection soon.  She is not having any chest pain except for  "indigestion.  She denies fatigue.    REVIEW OF SYSTEMS     Review of Systems   Constitutional: Negative for chills, fever, weight gain and weight loss.   Cardiovascular: Negative for leg swelling.   Respiratory: Negative for cough, snoring and wheezing.    Hematologic/Lymphatic: Negative for bleeding problem. Does not bruise/bleed easily.   Skin: Negative for color change.   Musculoskeletal: Positive for back pain, joint pain, myalgias and stiffness. Negative for falls.   Gastrointestinal: Negative for melena.   Genitourinary: Negative for hematuria.   Neurological: Negative for excessive daytime sleepiness.   Psychiatric/Behavioral: Negative for depression. The patient is not nervous/anxious.          VITAL SIGNS     Visit Vitals  /72 (BP Location: Left arm)   Pulse 83   Ht 157.5 cm (62\")   Wt 68.5 kg (151 lb)   SpO2 100%   BMI 27.62 kg/m²         Wt Readings from Last 3 Encounters:   10/13/21 68.5 kg (151 lb)   10/04/21 68.9 kg (152 lb)   08/23/21 67.6 kg (149 lb)     Body mass index is 27.62 kg/m².      PHYSICAL EXAMINATION     Constitutional:       General: Not in acute distress.  Neck:      Vascular: No carotid bruit or JVD.   Pulmonary:      Effort: Pulmonary effort is normal.      Breath sounds: Normal breath sounds.   Cardiovascular:      Normal rate. Irregularly irregular rhythm.      Murmurs: There is no murmur.   Psychiatric:         Mood and Affect: Mood and affect normal.           REVIEWED DATA       ECG 12 Lead    Date/Time: 10/13/2021 10:18 AM  Performed by: Sybil Parmar MD  Authorized by: Sybil Parmar MD   Comparison: compared with previous ECG from 3/5/2021  Similar to previous ECG  Rhythm: atrial fibrillation  BPM: 69  Conduction: left anterior fascicular block  ST Segments: ST segments normal  T Waves: T waves normal    Clinical impression: abnormal EKG              Lipid Panel    Lipid Panel 3/12/21 8/30/21   Total Cholesterol 147 126   Triglycerides 137 96   HDL Cholesterol 53 " 61 (A)   VLDL Cholesterol 24 18   LDL Cholesterol  70 47   (A) Abnormal value       Comments are available for some flowsheets but are not being displayed.             Lab Results   Component Value Date    GLUCOSE 122 (H) 11/21/2020    BUN 28 (H) 08/31/2021    CREATININE 1.79 (H) 08/31/2021    EGFRIFNONA 27 (L) 08/31/2021    EGFRIFAFRI 33 (L) 08/31/2021    BCR 15.6 08/31/2021    K 5.3 (H) 08/31/2021    CO2 26.2 08/31/2021    CALCIUM 10.5 08/31/2021    PROTENTOTREF 7.1 08/30/2021    ALBUMIN 4.70 08/30/2021    LABIL2 2.0 08/30/2021    AST 22 08/30/2021    ALT 14 08/30/2021       ASSESSMENT & PLAN      Diagnosis Plan   1. Chronic coronary artery disease     2. Hypercholesterolemia     3. Stage 3b chronic kidney disease (HCC)     4. History of MI (myocardial infarction)     5. Renal cell carcinoma of left kidney (HCC)       1.  Permanent atrial fibrillation.  Rate controlled.  On Eliquis.  No bleeding issues.  2.  Coronary artery disease status post drug-eluting stent to the circumflex in October 2015.  Normal nuclear stress test in September 2020.  No anginal symptoms.  Continue medical therapy.  3.  Hypertension.  Blood pressure is stable.  4.  Hyperlipidemia.  On atorvastatin and Zetia.  Lipids followed by Dr. Andujar  5.  History of renal insufficiency with a history of nephrectomy due to cancer.  6.  History of prolonged QT interval on amiodarone.  8.  History of obstructive sleep apnea not currently on therapy.    Continue current medications.    She can hold Eliquis for 3 days prior to an epidural injection.  Follow-up with Oralia in 6 months.    Preprocedure letter in epic.    Orders Placed This Encounter   Procedures   • ECG 12 Lead     This order was created via procedure documentation     Order Specific Question:   Release to patient     Answer:   Immediate           MEDICATIONS         Discharge Medications          Accurate as of October 13, 2021 10:21 AM. If you have any questions, ask your nurse or doctor.             Changes to Medications      Instructions Start Date   docusate sodium 250 MG capsule  Commonly known as: COLACE  What changed:   · when to take this  · reasons to take this   250 mg, Oral, 2 Times Daily         Continue These Medications      Instructions Start Date   acetaminophen 500 MG tablet  Commonly known as: TYLENOL   1,000 mg, Oral, 3 Times Daily PRN      apixaban 2.5 MG tablet tablet  Commonly known as: ELIQUIS   2.5 mg, Oral, Every 12 Hours Scheduled      atorvastatin 20 MG tablet  Commonly known as: LIPITOR   20 mg, Oral, Nightly      diazePAM 5 MG tablet  Commonly known as: VALIUM   5 mg, Oral, Every 8 Hours PRN      DULoxetine 30 MG capsule  Commonly known as: CYMBALTA   30 mg, Oral, Daily      ezetimibe 10 MG tablet  Commonly known as: ZETIA   10 mg, Oral, Nightly      fluticasone 50 MCG/ACT nasal spray  Commonly known as: Flonase   2 sprays, Nasal, Daily      metoprolol succinate XL 50 MG 24 hr tablet  Commonly known as: TOPROL-XL   75 mg, Oral, Daily      multivitamin with minerals tablet tablet   1 tablet, Oral, Daily      Myrbetriq 50 MG tablet sustained-release 24 hour 24 hr tablet  Generic drug: Mirabegron ER   50 mg, Oral, Every Evening      nystatin-triamcinolone 585630-1.1 UNIT/GM-% cream  Commonly known as: MYCOLOG II   No dose, route, or frequency recorded.      permethrin 5 % cream  Commonly known as: ELIMITE   Apply at bedtime from neck to soles of feet and shower in the morning.      PROLIA SC   1 dose, Subcutaneous, Every 6 Months, Pt unsure of dose       vitamin D3 125 MCG (5000 UT) capsule capsule   5,000 Units, Oral, Daily               Sybil Parmar MD  10/13/21  10:21 EDT    **Duc Disclaimer:   Much of this encounter note is an electronic transcription/translation of spoken language to printed text. The electronic translation of spoken language may permit erroneous, or at times, nonsensical words or phrases to be inadvertently transcribed. Although I have reviewed  the note for such errors, some may still exist.

## 2021-11-29 ENCOUNTER — APPOINTMENT (OUTPATIENT)
Dept: MRI IMAGING | Facility: HOSPITAL | Age: 86
End: 2021-11-29

## 2021-11-29 ENCOUNTER — HOSPITAL ENCOUNTER (INPATIENT)
Facility: HOSPITAL | Age: 86
LOS: 4 days | Discharge: HOME | End: 2021-12-03
Attending: EMERGENCY MEDICINE | Admitting: STUDENT IN AN ORGANIZED HEALTH CARE EDUCATION/TRAINING PROGRAM

## 2021-11-29 ENCOUNTER — APPOINTMENT (OUTPATIENT)
Dept: CT IMAGING | Facility: HOSPITAL | Age: 86
End: 2021-11-29

## 2021-11-29 DIAGNOSIS — M48.062 SPINAL STENOSIS, LUMBAR REGION WITH NEUROGENIC CLAUDICATION: Chronic | ICD-10-CM

## 2021-11-29 DIAGNOSIS — D63.8 ANEMIA OF CHRONIC DISEASE: ICD-10-CM

## 2021-11-29 DIAGNOSIS — I63.81 CEREBROVASCULAR ACCIDENT (CVA) DUE TO STENOSIS OF SMALL ARTERY (HCC): Primary | ICD-10-CM

## 2021-11-29 DIAGNOSIS — N18.4 ACUTE RENAL FAILURE SUPERIMPOSED ON STAGE 4 CHRONIC KIDNEY DISEASE, UNSPECIFIED ACUTE RENAL FAILURE TYPE (HCC): ICD-10-CM

## 2021-11-29 DIAGNOSIS — N17.9 ACUTE RENAL FAILURE SUPERIMPOSED ON STAGE 4 CHRONIC KIDNEY DISEASE, UNSPECIFIED ACUTE RENAL FAILURE TYPE (HCC): ICD-10-CM

## 2021-11-29 LAB
ABO GROUP BLD: NORMAL
ALBUMIN SERPL-MCNC: 4.5 G/DL (ref 3.5–5.2)
ALBUMIN/GLOB SERPL: 2 G/DL
ALP SERPL-CCNC: 48 U/L (ref 39–117)
ALT SERPL W P-5'-P-CCNC: 19 U/L (ref 1–33)
ANION GAP SERPL CALCULATED.3IONS-SCNC: 10.3 MMOL/L (ref 5–15)
AST SERPL-CCNC: 23 U/L (ref 1–32)
BASOPHILS # BLD AUTO: 0.02 10*3/MM3 (ref 0–0.2)
BASOPHILS NFR BLD AUTO: 0.3 % (ref 0–1.5)
BILIRUB SERPL-MCNC: 0.7 MG/DL (ref 0–1.2)
BLD GP AB SCN SERPL QL: NEGATIVE
BUN SERPL-MCNC: 41 MG/DL (ref 8–23)
BUN/CREAT SERPL: 19.3 (ref 7–25)
CALCIUM SPEC-SCNC: 10 MG/DL (ref 8.6–10.5)
CHLORIDE SERPL-SCNC: 100 MMOL/L (ref 98–107)
CO2 SERPL-SCNC: 27.7 MMOL/L (ref 22–29)
CREAT SERPL-MCNC: 2.12 MG/DL (ref 0.57–1)
DEPRECATED RDW RBC AUTO: 44.1 FL (ref 37–54)
EOSINOPHIL # BLD AUTO: 0.2 10*3/MM3 (ref 0–0.4)
EOSINOPHIL NFR BLD AUTO: 3.5 % (ref 0.3–6.2)
ERYTHROCYTE [DISTWIDTH] IN BLOOD BY AUTOMATED COUNT: 12.9 % (ref 12.3–15.4)
GFR SERPL CREATININE-BSD FRML MDRD: 22 ML/MIN/1.73
GLOBULIN UR ELPH-MCNC: 2.3 GM/DL
GLUCOSE SERPL-MCNC: 128 MG/DL (ref 65–99)
HCT VFR BLD AUTO: 32.9 % (ref 34–46.6)
HGB BLD-MCNC: 11.1 G/DL (ref 12–15.9)
IMM GRANULOCYTES # BLD AUTO: 0.01 10*3/MM3 (ref 0–0.05)
IMM GRANULOCYTES NFR BLD AUTO: 0.2 % (ref 0–0.5)
INR PPP: 1.11 (ref 0.9–1.1)
LYMPHOCYTES # BLD AUTO: 1.48 10*3/MM3 (ref 0.7–3.1)
LYMPHOCYTES NFR BLD AUTO: 25.6 % (ref 19.6–45.3)
MCH RBC QN AUTO: 31.6 PG (ref 26.6–33)
MCHC RBC AUTO-ENTMCNC: 33.7 G/DL (ref 31.5–35.7)
MCV RBC AUTO: 93.7 FL (ref 79–97)
MONOCYTES # BLD AUTO: 0.44 10*3/MM3 (ref 0.1–0.9)
MONOCYTES NFR BLD AUTO: 7.6 % (ref 5–12)
NEUTROPHILS NFR BLD AUTO: 3.62 10*3/MM3 (ref 1.7–7)
NEUTROPHILS NFR BLD AUTO: 62.8 % (ref 42.7–76)
NRBC BLD AUTO-RTO: 0 /100 WBC (ref 0–0.2)
PLATELET # BLD AUTO: 161 10*3/MM3 (ref 140–450)
PMV BLD AUTO: 9.6 FL (ref 6–12)
POTASSIUM SERPL-SCNC: 4.6 MMOL/L (ref 3.5–5.2)
PROT SERPL-MCNC: 6.8 G/DL (ref 6–8.5)
PROTHROMBIN TIME: 14.1 SECONDS (ref 11.7–14.2)
RBC # BLD AUTO: 3.51 10*6/MM3 (ref 3.77–5.28)
RH BLD: POSITIVE
SARS-COV-2 ORF1AB RESP QL NAA+PROBE: NOT DETECTED
SODIUM SERPL-SCNC: 138 MMOL/L (ref 136–145)
T&S EXPIRATION DATE: NORMAL
WBC NRBC COR # BLD: 5.77 10*3/MM3 (ref 3.4–10.8)

## 2021-11-29 PROCEDURE — 86901 BLOOD TYPING SEROLOGIC RH(D): CPT | Performed by: EMERGENCY MEDICINE

## 2021-11-29 PROCEDURE — 85610 PROTHROMBIN TIME: CPT | Performed by: EMERGENCY MEDICINE

## 2021-11-29 PROCEDURE — 70547 MR ANGIOGRAPHY NECK W/O DYE: CPT

## 2021-11-29 PROCEDURE — 86900 BLOOD TYPING SEROLOGIC ABO: CPT | Performed by: EMERGENCY MEDICINE

## 2021-11-29 PROCEDURE — 93005 ELECTROCARDIOGRAM TRACING: CPT | Performed by: EMERGENCY MEDICINE

## 2021-11-29 PROCEDURE — 70450 CT HEAD/BRAIN W/O DYE: CPT

## 2021-11-29 PROCEDURE — 70544 MR ANGIOGRAPHY HEAD W/O DYE: CPT

## 2021-11-29 PROCEDURE — 85025 COMPLETE CBC W/AUTO DIFF WBC: CPT | Performed by: EMERGENCY MEDICINE

## 2021-11-29 PROCEDURE — 99284 EMERGENCY DEPT VISIT MOD MDM: CPT

## 2021-11-29 PROCEDURE — 70551 MRI BRAIN STEM W/O DYE: CPT

## 2021-11-29 PROCEDURE — U0004 COV-19 TEST NON-CDC HGH THRU: HCPCS | Performed by: EMERGENCY MEDICINE

## 2021-11-29 PROCEDURE — 93010 ELECTROCARDIOGRAM REPORT: CPT | Performed by: INTERNAL MEDICINE

## 2021-11-29 PROCEDURE — 80053 COMPREHEN METABOLIC PANEL: CPT | Performed by: EMERGENCY MEDICINE

## 2021-11-29 PROCEDURE — 82565 ASSAY OF CREATININE: CPT

## 2021-11-29 PROCEDURE — 82962 GLUCOSE BLOOD TEST: CPT

## 2021-11-29 PROCEDURE — 86850 RBC ANTIBODY SCREEN: CPT | Performed by: EMERGENCY MEDICINE

## 2021-11-29 PROCEDURE — U0005 INFEC AGEN DETEC AMPLI PROBE: HCPCS | Performed by: EMERGENCY MEDICINE

## 2021-11-29 RX ORDER — SODIUM CHLORIDE 0.9 % (FLUSH) 0.9 %
10 SYRINGE (ML) INJECTION AS NEEDED
Status: DISCONTINUED | OUTPATIENT
Start: 2021-11-29 | End: 2021-12-03 | Stop reason: HOSPADM

## 2021-11-29 RX ORDER — ASPIRIN 325 MG
325 TABLET ORAL ONCE
Status: COMPLETED | OUTPATIENT
Start: 2021-11-29 | End: 2021-11-29

## 2021-11-29 RX ADMIN — SODIUM CHLORIDE 1000 ML: 9 INJECTION, SOLUTION INTRAVENOUS at 18:27

## 2021-11-29 RX ADMIN — ASPIRIN 325 MG: 325 TABLET ORAL at 22:05

## 2021-11-30 ENCOUNTER — APPOINTMENT (OUTPATIENT)
Dept: CARDIOLOGY | Facility: HOSPITAL | Age: 86
End: 2021-11-30

## 2021-11-30 PROBLEM — Z85.528 HISTORY OF RENAL CELL CARCINOMA: Status: ACTIVE | Noted: 2021-11-30

## 2021-11-30 PROBLEM — G45.9 TIA (TRANSIENT ISCHEMIC ATTACK): Status: ACTIVE | Noted: 2021-11-30

## 2021-11-30 LAB
ALBUMIN SERPL-MCNC: 4.2 G/DL (ref 3.5–5.2)
ALBUMIN/GLOB SERPL: 2.2 G/DL
ALP SERPL-CCNC: 37 U/L (ref 39–117)
ALT SERPL W P-5'-P-CCNC: 16 U/L (ref 1–33)
ANION GAP SERPL CALCULATED.3IONS-SCNC: 12.1 MMOL/L (ref 5–15)
APTT PPP: 105.7 SECONDS (ref 22.7–35.4)
APTT PPP: 29.1 SECONDS (ref 22.7–35.4)
AST SERPL-CCNC: 18 U/L (ref 1–32)
BASOPHILS # BLD AUTO: 0.01 10*3/MM3 (ref 0–0.2)
BASOPHILS NFR BLD AUTO: 0.2 % (ref 0–1.5)
BILIRUB SERPL-MCNC: 0.6 MG/DL (ref 0–1.2)
BUN SERPL-MCNC: 36 MG/DL (ref 8–23)
BUN/CREAT SERPL: 19.8 (ref 7–25)
CALCIUM SPEC-SCNC: 9.1 MG/DL (ref 8.6–10.5)
CHLORIDE SERPL-SCNC: 103 MMOL/L (ref 98–107)
CHOLEST SERPL-MCNC: 142 MG/DL (ref 0–200)
CO2 SERPL-SCNC: 23.9 MMOL/L (ref 22–29)
CREAT SERPL-MCNC: 1.82 MG/DL (ref 0.57–1)
DEPRECATED RDW RBC AUTO: 42.4 FL (ref 37–54)
DEPRECATED RDW RBC AUTO: 43.7 FL (ref 37–54)
EOSINOPHIL # BLD AUTO: 0.23 10*3/MM3 (ref 0–0.4)
EOSINOPHIL NFR BLD AUTO: 4.1 % (ref 0.3–6.2)
ERYTHROCYTE [DISTWIDTH] IN BLOOD BY AUTOMATED COUNT: 12.7 % (ref 12.3–15.4)
ERYTHROCYTE [DISTWIDTH] IN BLOOD BY AUTOMATED COUNT: 12.8 % (ref 12.3–15.4)
GFR SERPL CREATININE-BSD FRML MDRD: 27 ML/MIN/1.73
GLOBULIN UR ELPH-MCNC: 1.9 GM/DL
GLUCOSE BLDC GLUCOMTR-MCNC: 153 MG/DL (ref 70–130)
GLUCOSE BLDC GLUCOMTR-MCNC: 160 MG/DL (ref 70–130)
GLUCOSE BLDC GLUCOMTR-MCNC: 97 MG/DL (ref 70–130)
GLUCOSE BLDC GLUCOMTR-MCNC: 99 MG/DL (ref 70–130)
GLUCOSE SERPL-MCNC: 101 MG/DL (ref 65–99)
HBA1C MFR BLD: 5.5 % (ref 4.8–5.6)
HCT VFR BLD AUTO: 28.4 % (ref 34–46.6)
HCT VFR BLD AUTO: 30.3 % (ref 34–46.6)
HDLC SERPL-MCNC: 50 MG/DL (ref 40–60)
HGB BLD-MCNC: 10.1 G/DL (ref 12–15.9)
HGB BLD-MCNC: 9.7 G/DL (ref 12–15.9)
HOLD SPECIMEN: NORMAL
HOLD SPECIMEN: NORMAL
IMM GRANULOCYTES # BLD AUTO: 0.02 10*3/MM3 (ref 0–0.05)
IMM GRANULOCYTES NFR BLD AUTO: 0.4 % (ref 0–0.5)
INR PPP: 1.12 (ref 0.9–1.1)
LDLC SERPL CALC-MCNC: 65 MG/DL (ref 0–100)
LDLC/HDLC SERPL: 1.21 {RATIO}
LYMPHOCYTES # BLD AUTO: 1.41 10*3/MM3 (ref 0.7–3.1)
LYMPHOCYTES NFR BLD AUTO: 25.3 % (ref 19.6–45.3)
MCH RBC QN AUTO: 31.3 PG (ref 26.6–33)
MCH RBC QN AUTO: 31.5 PG (ref 26.6–33)
MCHC RBC AUTO-ENTMCNC: 33.3 G/DL (ref 31.5–35.7)
MCHC RBC AUTO-ENTMCNC: 34.2 G/DL (ref 31.5–35.7)
MCV RBC AUTO: 92.2 FL (ref 79–97)
MCV RBC AUTO: 93.8 FL (ref 79–97)
MONOCYTES # BLD AUTO: 0.51 10*3/MM3 (ref 0.1–0.9)
MONOCYTES NFR BLD AUTO: 9.2 % (ref 5–12)
NEUTROPHILS NFR BLD AUTO: 3.39 10*3/MM3 (ref 1.7–7)
NEUTROPHILS NFR BLD AUTO: 60.8 % (ref 42.7–76)
NRBC BLD AUTO-RTO: 0 /100 WBC (ref 0–0.2)
PLATELET # BLD AUTO: 138 10*3/MM3 (ref 140–450)
PLATELET # BLD AUTO: 149 10*3/MM3 (ref 140–450)
PMV BLD AUTO: 9.8 FL (ref 6–12)
PMV BLD AUTO: 9.9 FL (ref 6–12)
POTASSIUM SERPL-SCNC: 4.6 MMOL/L (ref 3.5–5.2)
PROT SERPL-MCNC: 6.1 G/DL (ref 6–8.5)
PROTHROMBIN TIME: 14.3 SECONDS (ref 11.7–14.2)
QT INTERVAL: 407 MS
RBC # BLD AUTO: 3.08 10*6/MM3 (ref 3.77–5.28)
RBC # BLD AUTO: 3.23 10*6/MM3 (ref 3.77–5.28)
SODIUM SERPL-SCNC: 139 MMOL/L (ref 136–145)
TRIGL SERPL-MCNC: 158 MG/DL (ref 0–150)
TSH SERPL DL<=0.05 MIU/L-ACNC: 1.94 UIU/ML (ref 0.27–4.2)
VLDLC SERPL-MCNC: 27 MG/DL (ref 5–40)
WBC NRBC COR # BLD: 5.57 10*3/MM3 (ref 3.4–10.8)
WBC NRBC COR # BLD: 6.24 10*3/MM3 (ref 3.4–10.8)
WHOLE BLOOD HOLD SPECIMEN: NORMAL
WHOLE BLOOD HOLD SPECIMEN: NORMAL

## 2021-11-30 PROCEDURE — 93306 TTE W/DOPPLER COMPLETE: CPT | Performed by: INTERNAL MEDICINE

## 2021-11-30 PROCEDURE — 80061 LIPID PANEL: CPT | Performed by: NURSE PRACTITIONER

## 2021-11-30 PROCEDURE — 97165 OT EVAL LOW COMPLEX 30 MIN: CPT

## 2021-11-30 PROCEDURE — 85025 COMPLETE CBC W/AUTO DIFF WBC: CPT | Performed by: STUDENT IN AN ORGANIZED HEALTH CARE EDUCATION/TRAINING PROGRAM

## 2021-11-30 PROCEDURE — 83036 HEMOGLOBIN GLYCOSYLATED A1C: CPT | Performed by: NURSE PRACTITIONER

## 2021-11-30 PROCEDURE — 80053 COMPREHEN METABOLIC PANEL: CPT | Performed by: NURSE PRACTITIONER

## 2021-11-30 PROCEDURE — 93306 TTE W/DOPPLER COMPLETE: CPT

## 2021-11-30 PROCEDURE — 84443 ASSAY THYROID STIM HORMONE: CPT | Performed by: NURSE PRACTITIONER

## 2021-11-30 PROCEDURE — 99222 1ST HOSP IP/OBS MODERATE 55: CPT | Performed by: STUDENT IN AN ORGANIZED HEALTH CARE EDUCATION/TRAINING PROGRAM

## 2021-11-30 PROCEDURE — 85730 THROMBOPLASTIN TIME PARTIAL: CPT | Performed by: STUDENT IN AN ORGANIZED HEALTH CARE EDUCATION/TRAINING PROGRAM

## 2021-11-30 PROCEDURE — 25010000002 HEPARIN (PORCINE) PER 1000 UNITS: Performed by: STUDENT IN AN ORGANIZED HEALTH CARE EDUCATION/TRAINING PROGRAM

## 2021-11-30 PROCEDURE — 82962 GLUCOSE BLOOD TEST: CPT

## 2021-11-30 PROCEDURE — 85027 COMPLETE CBC AUTOMATED: CPT | Performed by: NURSE PRACTITIONER

## 2021-11-30 PROCEDURE — 36415 COLL VENOUS BLD VENIPUNCTURE: CPT | Performed by: NURSE PRACTITIONER

## 2021-11-30 PROCEDURE — 85610 PROTHROMBIN TIME: CPT | Performed by: STUDENT IN AN ORGANIZED HEALTH CARE EDUCATION/TRAINING PROGRAM

## 2021-11-30 RX ORDER — BISACODYL 10 MG
10 SUPPOSITORY, RECTAL RECTAL DAILY PRN
Status: DISCONTINUED | OUTPATIENT
Start: 2021-11-30 | End: 2021-12-03 | Stop reason: HOSPADM

## 2021-11-30 RX ORDER — OXYBUTYNIN CHLORIDE 10 MG/1
10 TABLET, EXTENDED RELEASE ORAL DAILY
Status: DISCONTINUED | OUTPATIENT
Start: 2021-11-30 | End: 2021-12-03 | Stop reason: HOSPADM

## 2021-11-30 RX ORDER — DULOXETIN HYDROCHLORIDE 30 MG/1
30 CAPSULE, DELAYED RELEASE ORAL DAILY
Status: DISCONTINUED | OUTPATIENT
Start: 2021-11-30 | End: 2021-12-03 | Stop reason: HOSPADM

## 2021-11-30 RX ORDER — CALCIUM CARBONATE 200(500)MG
2 TABLET,CHEWABLE ORAL 3 TIMES DAILY PRN
Status: DISCONTINUED | OUTPATIENT
Start: 2021-11-30 | End: 2021-12-03 | Stop reason: HOSPADM

## 2021-11-30 RX ORDER — SODIUM CHLORIDE 9 MG/ML
75 INJECTION, SOLUTION INTRAVENOUS CONTINUOUS
Status: DISCONTINUED | OUTPATIENT
Start: 2021-11-30 | End: 2021-12-01

## 2021-11-30 RX ORDER — ASPIRIN 325 MG
325 TABLET ORAL DAILY
Status: DISCONTINUED | OUTPATIENT
Start: 2021-11-30 | End: 2021-11-30

## 2021-11-30 RX ORDER — ASPIRIN 300 MG/1
300 SUPPOSITORY RECTAL DAILY
Status: DISCONTINUED | OUTPATIENT
Start: 2021-11-30 | End: 2021-11-30

## 2021-11-30 RX ORDER — HEPARIN SODIUM 5000 [USP'U]/ML
50 INJECTION, SOLUTION INTRAVENOUS; SUBCUTANEOUS ONCE
Status: COMPLETED | OUTPATIENT
Start: 2021-11-30 | End: 2021-11-30

## 2021-11-30 RX ORDER — FLUTICASONE PROPIONATE 50 MCG
2 SPRAY, SUSPENSION (ML) NASAL DAILY
Status: DISCONTINUED | OUTPATIENT
Start: 2021-11-30 | End: 2021-12-03 | Stop reason: HOSPADM

## 2021-11-30 RX ORDER — ONDANSETRON 2 MG/ML
4 INJECTION INTRAMUSCULAR; INTRAVENOUS EVERY 6 HOURS PRN
Status: DISCONTINUED | OUTPATIENT
Start: 2021-11-30 | End: 2021-12-03 | Stop reason: HOSPADM

## 2021-11-30 RX ORDER — DOCUSATE SODIUM 100 MG/1
200 CAPSULE, LIQUID FILLED ORAL 2 TIMES DAILY
Status: DISCONTINUED | OUTPATIENT
Start: 2021-11-30 | End: 2021-12-03 | Stop reason: HOSPADM

## 2021-11-30 RX ORDER — ATORVASTATIN CALCIUM 80 MG/1
80 TABLET, FILM COATED ORAL NIGHTLY
Status: DISCONTINUED | OUTPATIENT
Start: 2021-11-30 | End: 2021-12-03 | Stop reason: HOSPADM

## 2021-11-30 RX ORDER — HEPARIN SODIUM 10000 [USP'U]/100ML
12 INJECTION, SOLUTION INTRAVENOUS
Status: DISCONTINUED | OUTPATIENT
Start: 2021-11-30 | End: 2021-12-03

## 2021-11-30 RX ORDER — ACETAMINOPHEN 325 MG/1
650 TABLET ORAL EVERY 4 HOURS PRN
Status: DISCONTINUED | OUTPATIENT
Start: 2021-11-30 | End: 2021-12-03 | Stop reason: HOSPADM

## 2021-11-30 RX ORDER — HEPARIN SODIUM 5000 [USP'U]/ML
30-58.8 INJECTION, SOLUTION INTRAVENOUS; SUBCUTANEOUS EVERY 6 HOURS PRN
Status: DISCONTINUED | OUTPATIENT
Start: 2021-11-30 | End: 2021-12-03

## 2021-11-30 RX ORDER — FAMOTIDINE 20 MG/1
20 TABLET, FILM COATED ORAL
Status: DISCONTINUED | OUTPATIENT
Start: 2021-11-30 | End: 2021-12-01

## 2021-11-30 RX ORDER — LABETALOL HYDROCHLORIDE 5 MG/ML
10 INJECTION, SOLUTION INTRAVENOUS
Status: DISCONTINUED | OUTPATIENT
Start: 2021-11-30 | End: 2021-12-03 | Stop reason: HOSPADM

## 2021-11-30 RX ORDER — ACETAMINOPHEN 650 MG/1
650 SUPPOSITORY RECTAL EVERY 4 HOURS PRN
Status: DISCONTINUED | OUTPATIENT
Start: 2021-11-30 | End: 2021-12-03 | Stop reason: HOSPADM

## 2021-11-30 RX ORDER — DIAZEPAM 5 MG/1
5 TABLET ORAL EVERY 8 HOURS PRN
Status: DISCONTINUED | OUTPATIENT
Start: 2021-11-30 | End: 2021-12-03 | Stop reason: HOSPADM

## 2021-11-30 RX ADMIN — SODIUM CHLORIDE 75 ML/HR: 9 INJECTION, SOLUTION INTRAVENOUS at 21:35

## 2021-11-30 RX ADMIN — METOPROLOL SUCCINATE 75 MG: 25 TABLET, EXTENDED RELEASE ORAL at 09:40

## 2021-11-30 RX ADMIN — ASPIRIN 325 MG: 325 TABLET ORAL at 09:40

## 2021-11-30 RX ADMIN — HEPARIN SODIUM 2500 UNITS: 5000 INJECTION INTRAVENOUS; SUBCUTANEOUS at 15:29

## 2021-11-30 RX ADMIN — HEPARIN SODIUM 12 UNITS/KG/HR: 10000 INJECTION, SOLUTION INTRAVENOUS at 15:25

## 2021-11-30 RX ADMIN — FAMOTIDINE 20 MG: 20 TABLET, FILM COATED ORAL at 22:49

## 2021-11-30 RX ADMIN — DOCUSATE SODIUM 200 MG: 100 CAPSULE, LIQUID FILLED ORAL at 09:40

## 2021-11-30 RX ADMIN — ANTACID TABLETS 2 TABLET: 500 TABLET, CHEWABLE ORAL at 13:58

## 2021-11-30 RX ADMIN — OXYBUTYNIN CHLORIDE 10 MG: 10 TABLET, EXTENDED RELEASE ORAL at 09:41

## 2021-11-30 RX ADMIN — SODIUM CHLORIDE 75 ML/HR: 9 INJECTION, SOLUTION INTRAVENOUS at 06:45

## 2021-11-30 RX ADMIN — ACETAMINOPHEN 650 MG: 325 TABLET, FILM COATED ORAL at 20:41

## 2021-11-30 RX ADMIN — ATORVASTATIN CALCIUM 80 MG: 80 TABLET, FILM COATED ORAL at 20:41

## 2021-11-30 RX ADMIN — ACETAMINOPHEN 650 MG: 325 TABLET, FILM COATED ORAL at 13:58

## 2021-11-30 RX ADMIN — DULOXETINE HYDROCHLORIDE 30 MG: 30 CAPSULE, DELAYED RELEASE ORAL at 09:41

## 2021-12-01 ENCOUNTER — APPOINTMENT (OUTPATIENT)
Dept: CT IMAGING | Facility: HOSPITAL | Age: 86
End: 2021-12-01

## 2021-12-01 LAB
AORTIC DIMENSIONLESS INDEX: 0.6 (DI)
APTT PPP: 78.3 SECONDS (ref 22.7–35.4)
BASOPHILS # BLD AUTO: 0.04 10*3/MM3 (ref 0–0.2)
BASOPHILS NFR BLD AUTO: 0.8 % (ref 0–1.5)
BH CV ECHO MEAS - AI DEC SLOPE: 245.8 CM/SEC^2
BH CV ECHO MEAS - AI MAX PG: 86.1 MMHG
BH CV ECHO MEAS - AI MAX VEL: 464 CM/SEC
BH CV ECHO MEAS - AI P1/2T: 553 MSEC
BH CV ECHO MEAS - AO MAX PG (FULL): 10.2 MMHG
BH CV ECHO MEAS - AO MAX PG: 14 MMHG
BH CV ECHO MEAS - AO MEAN PG (FULL): 4.5 MMHG
BH CV ECHO MEAS - AO MEAN PG: 6.4 MMHG
BH CV ECHO MEAS - AO ROOT AREA (BSA CORRECTED): 1.6
BH CV ECHO MEAS - AO ROOT AREA: 6 CM^2
BH CV ECHO MEAS - AO ROOT DIAM: 2.8 CM
BH CV ECHO MEAS - AO V2 MAX: 187.3 CM/SEC
BH CV ECHO MEAS - AO V2 MEAN: 117.8 CM/SEC
BH CV ECHO MEAS - AO V2 VTI: 38 CM
BH CV ECHO MEAS - ASC AORTA: 3 CM
BH CV ECHO MEAS - AVA(I,A): 1.4 CM^2
BH CV ECHO MEAS - AVA(I,D): 1.4 CM^2
BH CV ECHO MEAS - AVA(V,A): 1.3 CM^2
BH CV ECHO MEAS - AVA(V,D): 1.3 CM^2
BH CV ECHO MEAS - BSA(HAYCOCK): 1.7 M^2
BH CV ECHO MEAS - BSA: 1.7 M^2
BH CV ECHO MEAS - BZI_BMI: 27.4 KILOGRAMS/M^2
BH CV ECHO MEAS - BZI_METRIC_HEIGHT: 157.5 CM
BH CV ECHO MEAS - BZI_METRIC_WEIGHT: 68 KG
BH CV ECHO MEAS - EDV(CUBED): 120.2 ML
BH CV ECHO MEAS - EDV(MOD-SP2): 62 ML
BH CV ECHO MEAS - EDV(MOD-SP4): 65 ML
BH CV ECHO MEAS - EDV(TEICH): 114.7 ML
BH CV ECHO MEAS - EF(CUBED): 71.8 %
BH CV ECHO MEAS - EF(MOD-BP): 62 %
BH CV ECHO MEAS - EF(MOD-SP2): 61.3 %
BH CV ECHO MEAS - EF(MOD-SP4): 60 %
BH CV ECHO MEAS - EF(TEICH): 63.3 %
BH CV ECHO MEAS - ESV(CUBED): 34 ML
BH CV ECHO MEAS - ESV(MOD-SP2): 24 ML
BH CV ECHO MEAS - ESV(MOD-SP4): 26 ML
BH CV ECHO MEAS - ESV(TEICH): 42.2 ML
BH CV ECHO MEAS - FS: 34.4 %
BH CV ECHO MEAS - IVS/LVPW: 0.94
BH CV ECHO MEAS - IVSD: 0.98 CM
BH CV ECHO MEAS - LAT PEAK E' VEL: 10.1 CM/SEC
BH CV ECHO MEAS - LV DIASTOLIC VOL/BSA (35-75): 38.4 ML/M^2
BH CV ECHO MEAS - LV MASS(C)D: 182.4 GRAMS
BH CV ECHO MEAS - LV MASS(C)DI: 107.8 GRAMS/M^2
BH CV ECHO MEAS - LV MAX PG: 3.8 MMHG
BH CV ECHO MEAS - LV MEAN PG: 1.9 MMHG
BH CV ECHO MEAS - LV SYSTOLIC VOL/BSA (12-30): 15.4 ML/M^2
BH CV ECHO MEAS - LV V1 MAX: 97.4 CM/SEC
BH CV ECHO MEAS - LV V1 MEAN: 64.9 CM/SEC
BH CV ECHO MEAS - LV V1 VTI: 22.1 CM
BH CV ECHO MEAS - LVIDD: 4.9 CM
BH CV ECHO MEAS - LVIDS: 3.2 CM
BH CV ECHO MEAS - LVLD AP2: 6.5 CM
BH CV ECHO MEAS - LVLD AP4: 6.1 CM
BH CV ECHO MEAS - LVLS AP2: 5.3 CM
BH CV ECHO MEAS - LVLS AP4: 5.3 CM
BH CV ECHO MEAS - LVOT AREA (M): 2.5 CM^2
BH CV ECHO MEAS - LVOT AREA: 2.4 CM^2
BH CV ECHO MEAS - LVOT DIAM: 1.8 CM
BH CV ECHO MEAS - LVPWD: 1.1 CM
BH CV ECHO MEAS - MED PEAK E' VEL: 7.4 CM/SEC
BH CV ECHO MEAS - MR MAX PG: 83.2 MMHG
BH CV ECHO MEAS - MR MAX VEL: 456.1 CM/SEC
BH CV ECHO MEAS - MV A DUR: 0.11 SEC
BH CV ECHO MEAS - MV A MAX VEL: 54.8 CM/SEC
BH CV ECHO MEAS - MV DEC SLOPE: 606.1 CM/SEC^2
BH CV ECHO MEAS - MV DEC TIME: 207 SEC
BH CV ECHO MEAS - MV E MAX VEL: 111 CM/SEC
BH CV ECHO MEAS - MV E/A: 2
BH CV ECHO MEAS - MV MAX PG: 8.7 MMHG
BH CV ECHO MEAS - MV MEAN PG: 2.6 MMHG
BH CV ECHO MEAS - MV P1/2T MAX VEL: 149.6 CM/SEC
BH CV ECHO MEAS - MV P1/2T: 72.3 MSEC
BH CV ECHO MEAS - MV V2 MAX: 147.2 CM/SEC
BH CV ECHO MEAS - MV V2 MEAN: 72.2 CM/SEC
BH CV ECHO MEAS - MV V2 VTI: 42.2 CM
BH CV ECHO MEAS - MVA P1/2T LCG: 1.5 CM^2
BH CV ECHO MEAS - MVA(P1/2T): 3 CM^2
BH CV ECHO MEAS - MVA(VTI): 1.3 CM^2
BH CV ECHO MEAS - PA ACC TIME: 0.1 SEC
BH CV ECHO MEAS - PA MAX PG (FULL): 2.1 MMHG
BH CV ECHO MEAS - PA MAX PG: 3.1 MMHG
BH CV ECHO MEAS - PA PR(ACCEL): 35 MMHG
BH CV ECHO MEAS - PA V2 MAX: 88.4 CM/SEC
BH CV ECHO MEAS - PI END-D VEL: 95.4 CM/SEC
BH CV ECHO MEAS - RAP SYSTOLE: 8 MMHG
BH CV ECHO MEAS - RV MAX PG: 1.1 MMHG
BH CV ECHO MEAS - RV MEAN PG: 0.55 MMHG
BH CV ECHO MEAS - RV V1 MAX: 51.8 CM/SEC
BH CV ECHO MEAS - RV V1 MEAN: 35.4 CM/SEC
BH CV ECHO MEAS - RV V1 VTI: 12.9 CM
BH CV ECHO MEAS - RVSP: 31 MMHG
BH CV ECHO MEAS - SI(AO): 134.2 ML/M^2
BH CV ECHO MEAS - SI(CUBED): 51 ML/M^2
BH CV ECHO MEAS - SI(LVOT): 31.7 ML/M^2
BH CV ECHO MEAS - SI(MOD-SP2): 22.5 ML/M^2
BH CV ECHO MEAS - SI(MOD-SP4): 23.1 ML/M^2
BH CV ECHO MEAS - SI(TEICH): 42.9 ML/M^2
BH CV ECHO MEAS - SV(AO): 227 ML
BH CV ECHO MEAS - SV(CUBED): 86.3 ML
BH CV ECHO MEAS - SV(LVOT): 53.6 ML
BH CV ECHO MEAS - SV(MOD-SP2): 38 ML
BH CV ECHO MEAS - SV(MOD-SP4): 39 ML
BH CV ECHO MEAS - SV(TEICH): 72.6 ML
BH CV ECHO MEAS - TAPSE (>1.6): 1.1 CM
BH CV ECHO MEAS - TR MAX VEL: 237.8 CM/SEC
BH CV ECHO MEASUREMENTS AVERAGE E/E' RATIO: 12.69
BH CV XLRA - RV BASE: 3.3 CM
BH CV XLRA - RV LENGTH: 6.6 CM
BH CV XLRA - RV MID: 2 CM
BH CV XLRA - TDI S': 8.3 CM/SEC
DEPRECATED RDW RBC AUTO: 43.5 FL (ref 37–54)
EOSINOPHIL # BLD AUTO: 0.22 10*3/MM3 (ref 0–0.4)
EOSINOPHIL NFR BLD AUTO: 4.6 % (ref 0.3–6.2)
ERYTHROCYTE [DISTWIDTH] IN BLOOD BY AUTOMATED COUNT: 12.8 % (ref 12.3–15.4)
GLUCOSE BLDC GLUCOMTR-MCNC: 102 MG/DL (ref 70–130)
GLUCOSE BLDC GLUCOMTR-MCNC: 84 MG/DL (ref 70–130)
GLUCOSE BLDC GLUCOMTR-MCNC: 96 MG/DL (ref 70–130)
GLUCOSE BLDC GLUCOMTR-MCNC: 97 MG/DL (ref 70–130)
GLUCOSE BLDC GLUCOMTR-MCNC: 97 MG/DL (ref 70–130)
HCT VFR BLD AUTO: 27.4 % (ref 34–46.6)
HGB BLD-MCNC: 9.2 G/DL (ref 12–15.9)
IMM GRANULOCYTES # BLD AUTO: 0.02 10*3/MM3 (ref 0–0.05)
IMM GRANULOCYTES NFR BLD AUTO: 0.4 % (ref 0–0.5)
LEFT ATRIUM VOLUME INDEX: 31.2 ML/M2
LYMPHOCYTES # BLD AUTO: 1.53 10*3/MM3 (ref 0.7–3.1)
LYMPHOCYTES NFR BLD AUTO: 32.3 % (ref 19.6–45.3)
MCH RBC QN AUTO: 31.5 PG (ref 26.6–33)
MCHC RBC AUTO-ENTMCNC: 33.6 G/DL (ref 31.5–35.7)
MCV RBC AUTO: 93.8 FL (ref 79–97)
MONOCYTES # BLD AUTO: 0.42 10*3/MM3 (ref 0.1–0.9)
MONOCYTES NFR BLD AUTO: 8.9 % (ref 5–12)
NEUTROPHILS NFR BLD AUTO: 2.51 10*3/MM3 (ref 1.7–7)
NEUTROPHILS NFR BLD AUTO: 53 % (ref 42.7–76)
NRBC BLD AUTO-RTO: 0 /100 WBC (ref 0–0.2)
PLATELET # BLD AUTO: 126 10*3/MM3 (ref 140–450)
PMV BLD AUTO: 9.9 FL (ref 6–12)
RBC # BLD AUTO: 2.92 10*6/MM3 (ref 3.77–5.28)
WBC NRBC COR # BLD: 4.74 10*3/MM3 (ref 3.4–10.8)

## 2021-12-01 PROCEDURE — 25010000002 MAGNESIUM SULFATE 2 GM/50ML SOLUTION: Performed by: STUDENT IN AN ORGANIZED HEALTH CARE EDUCATION/TRAINING PROGRAM

## 2021-12-01 PROCEDURE — 82962 GLUCOSE BLOOD TEST: CPT

## 2021-12-01 PROCEDURE — 25010000002 ONDANSETRON PER 1 MG: Performed by: NURSE PRACTITIONER

## 2021-12-01 PROCEDURE — 85025 COMPLETE CBC W/AUTO DIFF WBC: CPT | Performed by: STUDENT IN AN ORGANIZED HEALTH CARE EDUCATION/TRAINING PROGRAM

## 2021-12-01 PROCEDURE — 85730 THROMBOPLASTIN TIME PARTIAL: CPT | Performed by: STUDENT IN AN ORGANIZED HEALTH CARE EDUCATION/TRAINING PROGRAM

## 2021-12-01 PROCEDURE — 70450 CT HEAD/BRAIN W/O DYE: CPT

## 2021-12-01 PROCEDURE — 63710000001 DIPHENHYDRAMINE PER 50 MG: Performed by: STUDENT IN AN ORGANIZED HEALTH CARE EDUCATION/TRAINING PROGRAM

## 2021-12-01 RX ORDER — FAMOTIDINE 20 MG/1
20 TABLET, FILM COATED ORAL DAILY
Status: DISCONTINUED | OUTPATIENT
Start: 2021-12-01 | End: 2021-12-03 | Stop reason: HOSPADM

## 2021-12-01 RX ORDER — MAGNESIUM SULFATE HEPTAHYDRATE 40 MG/ML
2 INJECTION, SOLUTION INTRAVENOUS ONCE
Status: COMPLETED | OUTPATIENT
Start: 2021-12-01 | End: 2021-12-01

## 2021-12-01 RX ORDER — AMLODIPINE BESYLATE 5 MG/1
5 TABLET ORAL
Status: DISCONTINUED | OUTPATIENT
Start: 2021-12-01 | End: 2021-12-03 | Stop reason: HOSPADM

## 2021-12-01 RX ORDER — DIPHENHYDRAMINE HCL 25 MG
25 CAPSULE ORAL ONCE
Status: DISCONTINUED | OUTPATIENT
Start: 2021-12-01 | End: 2021-12-03 | Stop reason: HOSPADM

## 2021-12-01 RX ORDER — HYDRALAZINE HYDROCHLORIDE 25 MG/1
25 TABLET, FILM COATED ORAL EVERY 8 HOURS SCHEDULED
Status: DISCONTINUED | OUTPATIENT
Start: 2021-12-01 | End: 2021-12-03 | Stop reason: HOSPADM

## 2021-12-01 RX ADMIN — ACETAMINOPHEN 650 MG: 325 TABLET, FILM COATED ORAL at 18:52

## 2021-12-01 RX ADMIN — OXYBUTYNIN CHLORIDE 10 MG: 10 TABLET, EXTENDED RELEASE ORAL at 08:09

## 2021-12-01 RX ADMIN — FLUTICASONE PROPIONATE 2 SPRAY: 50 SPRAY, METERED NASAL at 18:53

## 2021-12-01 RX ADMIN — ONDANSETRON 4 MG: 2 INJECTION INTRAMUSCULAR; INTRAVENOUS at 19:45

## 2021-12-01 RX ADMIN — FAMOTIDINE 20 MG: 20 TABLET, FILM COATED ORAL at 22:05

## 2021-12-01 RX ADMIN — MAGNESIUM SULFATE HEPTAHYDRATE 2 G: 2 INJECTION, SOLUTION INTRAVENOUS at 18:48

## 2021-12-01 RX ADMIN — METOPROLOL SUCCINATE 75 MG: 25 TABLET, EXTENDED RELEASE ORAL at 08:13

## 2021-12-01 RX ADMIN — DOCUSATE SODIUM 200 MG: 100 CAPSULE, LIQUID FILLED ORAL at 08:08

## 2021-12-01 RX ADMIN — SODIUM CHLORIDE 75 ML/HR: 9 INJECTION, SOLUTION INTRAVENOUS at 10:52

## 2021-12-01 RX ADMIN — DULOXETINE HYDROCHLORIDE 30 MG: 30 CAPSULE, DELAYED RELEASE ORAL at 08:09

## 2021-12-01 RX ADMIN — AMLODIPINE BESYLATE 5 MG: 5 TABLET ORAL at 12:34

## 2021-12-01 RX ADMIN — HYDRALAZINE HYDROCHLORIDE 25 MG: 25 TABLET, FILM COATED ORAL at 13:51

## 2021-12-01 RX ADMIN — FAMOTIDINE 20 MG: 20 TABLET, FILM COATED ORAL at 06:50

## 2021-12-01 RX ADMIN — ATORVASTATIN CALCIUM 80 MG: 80 TABLET, FILM COATED ORAL at 22:05

## 2021-12-01 RX ADMIN — HYDRALAZINE HYDROCHLORIDE 25 MG: 25 TABLET, FILM COATED ORAL at 22:05

## 2021-12-01 RX ADMIN — ANTACID TABLETS 2 TABLET: 500 TABLET, CHEWABLE ORAL at 09:43

## 2021-12-01 NOTE — PLAN OF CARE
"Goal Outcome Evaluation:              Stable throughout the day.  NIH 1 this AM.  NIH at 1800 2- NIH done due to left frontal \"dull\" headache (patient did not notify RN for 1 hour).  Subtle symptom of left side of left eye \"blurry\" vision but can still see and identify objects on left side of left eye. Tylenol and mag sulfate given per neurology for headache (patient refused the benadryl that was ordered because it keeps her awake at night).  Dr. Gamboa aware of patient condition and that CT head ordered STAT.  MD agrees with course of treatment.  Meds added for HTN today. Dr. Gamboa aware heparin gtt still on and therapeutic.  BP at time of headache 168/78.    "

## 2021-12-01 NOTE — PROGRESS NOTES
Name: Marelni Hummel ADMIT: 2021   : 1935  PCP: Ken Andujar MD    MRN: [id number] LOS: 2 days   AGE/SEX: 85 y.o. female  ROOM: [facility]     Subjective   Subjective     Patient having some more GERD symptoms today.  She tells me that her nephrologist does not order to be on pantoprazole.  She was on it previously for 10 years, but he discontinued it.  She is not sure if it caused any interstitial nephritis or other kidney damage.       Objective   Objective   Vital Signs  Temp:  [97.3 °F (36.3 °C)-99 °F (37.2 °C)] 98.7 °F (37.1 °C)  Heart Rate:  [56-71] 62  Resp:  [17-18] 18  BP: (141-191)/() 191/85  SpO2:  [93 %-100 %] 94 %  on   ;   Device (Oxygen Therapy): room air  Body mass index is 27.44 kg/m².  Physical Exam  Constitutional:       General: She is not in acute distress.  Cardiovascular:      Rate and Rhythm: Normal rate and regular rhythm.      Heart sounds: Normal heart sounds.   Pulmonary:      Effort: Pulmonary effort is normal.      Breath sounds: Normal breath sounds.   Abdominal:      General: Bowel sounds are normal.      Palpations: Abdomen is soft.   Musculoskeletal:         General: No tenderness.      Right lower leg: No edema.      Left lower leg: No edema.   Neurological:      Mental Status: She is alert.   Psychiatric:         Mood and Affect: Mood normal.         Behavior: Behavior normal.         Results Review     I reviewed the patient's new clinical results.  Results from last 7 days   Lab Units 21  0609 21  1422 21  0649 21  1812   WBC 10*3/mm3 4.74 5.57 6.24 5.77   HEMOGLOBIN g/dL 9.2* 10.1* 9.7* 11.1*   PLATELETS 10*3/mm3 126* 149 138* 161     Results from last 7 days   Lab Units 21  0649 21  1806   SODIUM mmol/L 139 138   POTASSIUM mmol/L 4.6 4.6   CHLORIDE mmol/L 103 100   CO2 mmol/L 23.9 27.7   BUN mg/dL 36* 41*   CREATININE mg/dL 1.82* 2.12*   GLUCOSE mg/dL 101* 128*   Estimated Creatinine Clearance: 21.2 mL/min (A) (by  C-G formula based on SCr of 1.82 mg/dL (H)).  Results from last 7 days   Lab Units 11/30/21  0649 11/29/21  1806   ALBUMIN g/dL 4.20 4.50   BILIRUBIN mg/dL 0.6 0.7   ALK PHOS U/L 37* 48   AST (SGOT) U/L 18 23   ALT (SGPT) U/L 16 19     Results from last 7 days   Lab Units 11/30/21  0649 11/29/21  1806   CALCIUM mg/dL 9.1 10.0   ALBUMIN g/dL 4.20 4.50       COVID19   Date Value Ref Range Status   11/29/2021 Not Detected Not Detected - Ref. Range Final     SARS-CoV-2 PCR   Date Value Ref Range Status   11/13/2020 Not Detected Not Detected Final     Comment:     Nucleic acid specific to SARS-CoV-2 (COVID-19) virus was not detected in  this sample by the TaqPath (TM) COVID-19 Combo Kit.          SARS-CoV-2 (COVID-19) nucleic acid testing performed using [employer] Aptima (R) SARS-CoV-2 Assay or [employer] TaqPath (TM)  COVID-19 Combo Kit as indicated above under Emergency Use Authorization (EUA) from the FDA. Aptima (R) and TaqPath (TM) test performance  characteristics verified by [facility] in accordance with the FDAs Guidance Document (Policy for Diagnostic Tests for Coronavirus Disease-2019  during the Public Health Emergency) issued on March 16, 2020. The laboratory is regulated under CLIA as qualified to perform high-complexity testing  Unless otherwise noted, all testing was performed at [facility], CLIA No. [id number], [facility] Licensee No. [id number]     Hemoglobin A1C   Date/Time Value Ref Range Status   11/30/2021 0649 5.50 4.80 - 5.60 % Final     Glucose   Date/Time Value Ref Range Status   12/01/2021 1120 97 70 - 130 mg/dL Final     Comment:     Meter: [id number] : [id number] Emerysweta Salas BILLY   12/01/2021 0750 97 70 - 130 mg/dL Final     Comment:     Meter: [id number] : [id number] Sebastian Josue NA   12/01/2021 0516 96 70 - 130 mg/dL Final     Comment:     Meter: [id number] : [id number] Paul CERVANTES   11/30/2021 1727 160 (H) 70 - 130 mg/dL Final     Comment:     Meter:  [id number] : [id number] Dawit Alanis NA   11/30/2021 1058 153 (H) 70 - 130 mg/dL Final     Comment:     Meter: [id number] : [id number] Sebastian Salas NA   11/30/2021 0746 99 70 - 130 mg/dL Final     Comment:     Meter: [id number] : [id number] Sebastian Salas NA   11/29/2021 2358 97 70 - 130 mg/dL Final     Comment:     Meter: [id number] : [id number] Jose Elias CERVANTES       Adult transthoracic echo complete  · Left ventricular ejection fraction appears to be 61 - 65%. Left   ventricular systolic function is normal.  · Left ventricular diastolic function was indeterminate.  · Normal right ventricular cavity size and systolic function noted.  · The left atrial cavity is moderately dilated  · Saline test results are negative.  · Mild to moderate aortic valve regurgitation is present  · The aortic valve leaflets are mildly to moderately calcified (aortic   sclerosis)  · Trace to mild tricuspid valve regurgitation is present.  · Calculated right ventricular systolic pressure from tricuspid   regurgitation is 31 mmHg.  · There is no evidence of pericardial effusion.       Scheduled Medications  amLODIPine, 5 mg, Oral, Q24H  atorvastatin, 80 mg, Oral, Nightly  docusate sodium, 200 mg, Oral, BID  DULoxetine, 30 mg, Oral, Daily  [START ON 12/2/2021] famotidine, 20 mg, Oral, Daily  fluticasone, 2 spray, Nasal, Daily  hydrALAZINE, 25 mg, Oral, Q8H  metoprolol succinate XL, 75 mg, Oral, Daily  oxybutynin XL, 10 mg, Oral, Daily    Infusions  heparin (porcine), 12 Units/kg/hr, Last Rate: 9 Units/kg/hr (11/30/21 2346)  sodium chloride, 75 mL/hr, Last Rate: 75 mL/hr (12/01/21 1052)    Diet  Diet Regular; Cardiac, Consistent Carbohydrate       Assessment/Plan     Active Hospital Problems    Diagnosis  POA   • **TIA (transient ischemic attack) [G45.9]  Yes   • History of renal cell carcinoma [Z85.528]  Not Applicable   • Cerebrovascular accident (CVA) due to stenosis of small artery (HCC) [I63.81]  Yes   • History of  left nephrectomy [Z90.5]  Not Applicable   • Anemia of chronic disease [D63.8]  Yes   • Chronic coronary artery disease [I25.10]  Yes   • History of MI (myocardial infarction) [I25.2]  Not Applicable   • Sleep apnea [G47.30]  Yes   • Hypercholesterolemia [E78.00]  Yes   • Gastroesophageal reflux disease without esophagitis [K21.9]  Yes   • Essential hypertension [I10]  Yes   • Chronic kidney disease (CKD), stage III (moderate) (CMS/HCC) [N18.30]  Yes   • Atrial fibrillation (HCC) [I48.91]  Yes      Resolved Hospital Problems   No resolved problems to display.       85 y.o. female admitted with TIA (transient ischemic attack).    · TIA after holding Eliquis in anticipation of lumbar epidural injection-on heparin drip, with plans for epidural injection tomorrow with pain management.  · Essential hypertension-bp is uncontrolled. Add amlodipine and hydralazine.  · GERD-started famotidine and tums. Pt reports that her nephrologist wishes for her to avoid PPIs  · Chronic afib on eliquis at home  · History of renal cell carcinoma s/p left nephrectomy with CKD3-4  · CAD  · Sleep apnea  · Borderline RAMBO in the setting of CKD  · Lumbar spinal stenosis  · heparin drip for DVT prophylaxis.  · Full code.  · Discussed with patient and nursing staff.  · Anticipate discharge home likely Friday after her LSI on Thursday      Ulises Phillips MD  [facility]  12/01/21  14:13 EST    I wore protective equipment throughout this patient encounter including a face mask, gloves and protective eyewear.  Hand hygiene was performed before donning protective equipment and after removal when leaving the room.

## 2021-12-01 NOTE — PLAN OF CARE
Goal Outcome Evaluation:      VSS. Patient oriented x4, forgetful at times. NIH of 2. She is up with assist x1 to bathroom. Heparin gtt supratherapeutic, adjusted per protocol and next PTT to be drawn. Patient slept well in between care.         Problem: Adult Inpatient Plan of Care  Goal: Plan of Care Review  Outcome: Ongoing, Progressing  Goal: Patient-Specific Goal (Individualized)  Outcome: Ongoing, Progressing  Goal: Absence of Hospital-Acquired Illness or Injury  Outcome: Ongoing, Progressing  Intervention: Identify and Manage Fall Risk  Recent Flowsheet Documentation  Taken 12/1/2021 0410 by Iman West RN  Safety Promotion/Fall Prevention: safety round/check completed  Taken 12/1/2021 0205 by Iman West RN  Safety Promotion/Fall Prevention: safety round/check completed  Taken 12/1/2021 0007 by Iman West RN  Safety Promotion/Fall Prevention: safety round/check completed  Taken 11/30/2021 2245 by Iman West RN  Safety Promotion/Fall Prevention: safety round/check completed  Taken 11/30/2021 2040 by Iman West RN  Safety Promotion/Fall Prevention:   activity supervised   assistive device/personal items within reach   clutter free environment maintained   fall prevention program maintained   nonskid shoes/slippers when out of bed   safety round/check completed  Intervention: Prevent Skin Injury  Recent Flowsheet Documentation  Taken 12/1/2021 0410 by Iman West RN  Body Position: position changed independently  Taken 12/1/2021 0205 by Iman West RN  Body Position: position changed independently  Taken 12/1/2021 0007 by Iman West RN  Body Position:   position changed independently   side-lying, left  Taken 11/30/2021 2245 by Iman West RN  Body Position: position changed independently  Taken 11/30/2021 2040 by Iman West RN  Body Position: position changed independently  Intervention: Prevent and Manage VTE (venous thromboembolism) Risk  Recent  Flowsheet Documentation  Taken 11/30/2021 2040 by Iman West RN  VTE Prevention/Management: (Heparin) --  Goal: Optimal Comfort and Wellbeing  Outcome: Ongoing, Progressing  Intervention: Provide Person-Centered Care  Recent Flowsheet Documentation  Taken 11/30/2021 2040 by Iman West RN  Trust Relationship/Rapport:   care explained   emotional support provided   empathic listening provided   questions answered   questions encouraged   thoughts/feelings acknowledged  Goal: Readiness for Transition of Care  Outcome: Ongoing, Progressing     Problem: Fall Injury Risk  Goal: Absence of Fall and Fall-Related Injury  Outcome: Ongoing, Progressing  Intervention: Identify and Manage Contributors to Fall Injury Risk  Recent Flowsheet Documentation  Taken 11/30/2021 2040 by Iman West RN  Medication Review/Management: medications reviewed  Intervention: Promote Injury-Free Environment  Recent Flowsheet Documentation  Taken 12/1/2021 0410 by Iman West RN  Safety Promotion/Fall Prevention: safety round/check completed  Taken 12/1/2021 0205 by Iman West RN  Safety Promotion/Fall Prevention: safety round/check completed  Taken 12/1/2021 0007 by Iman West RN  Safety Promotion/Fall Prevention: safety round/check completed  Taken 11/30/2021 2245 by Iman West RN  Safety Promotion/Fall Prevention: safety round/check completed  Taken 11/30/2021 2040 by Iman West RN  Safety Promotion/Fall Prevention:   activity supervised   assistive device/personal items within reach   clutter free environment maintained   fall prevention program maintained   nonskid shoes/slippers when out of bed   safety round/check completed     Problem: Skin Injury Risk Increased  Goal: Skin Health and Integrity  Outcome: Ongoing, Progressing  Intervention: Optimize Skin Protection  Recent Flowsheet Documentation  Taken 12/1/2021 0410 by Iman West RN  Head of Bed (HOB): HOB at 15 degrees  Taken 12/1/2021  0205 by Iman West RN  Head of Bed (HOB): HOB at 15 degrees  Taken 12/1/2021 0007 by Iman West RN  Head of Bed (HOB): HOB at 15 degrees  Taken 11/30/2021 2245 by Iman West RN  Head of Bed (HOB): HOB at 15 degrees  Taken 11/30/2021 2040 by Iman West RN  Head of Bed (HOB): HOB at 20-30 degrees

## 2021-12-02 ENCOUNTER — ANESTHESIA (OUTPATIENT)
Dept: PAIN MEDICINE | Facility: HOSPITAL | Age: 86
End: 2021-12-02

## 2021-12-02 ENCOUNTER — ANESTHESIA EVENT (OUTPATIENT)
Dept: PAIN MEDICINE | Facility: HOSPITAL | Age: 86
End: 2021-12-02

## 2021-12-02 ENCOUNTER — APPOINTMENT (OUTPATIENT)
Dept: GENERAL RADIOLOGY | Facility: HOSPITAL | Age: 86
End: 2021-12-02

## 2021-12-02 ENCOUNTER — APPOINTMENT (OUTPATIENT)
Dept: PAIN MEDICINE | Facility: HOSPITAL | Age: 86
End: 2021-12-02

## 2021-12-02 PROBLEM — M48.062 SPINAL STENOSIS, LUMBAR REGION WITH NEUROGENIC CLAUDICATION: Chronic | Status: ACTIVE | Noted: 2021-12-02

## 2021-12-02 LAB
ANION GAP SERPL CALCULATED.3IONS-SCNC: 10.1 MMOL/L (ref 5–15)
APTT PPP: 28.1 SECONDS (ref 22.7–35.4)
APTT PPP: 73.7 SECONDS (ref 22.7–35.4)
BASOPHILS # BLD AUTO: 0.02 10*3/MM3 (ref 0–0.2)
BASOPHILS NFR BLD AUTO: 0.3 % (ref 0–1.5)
BUN SERPL-MCNC: 24 MG/DL (ref 8–23)
BUN/CREAT SERPL: 14.4 (ref 7–25)
CALCIUM SPEC-SCNC: 8.7 MG/DL (ref 8.6–10.5)
CHLORIDE SERPL-SCNC: 102 MMOL/L (ref 98–107)
CO2 SERPL-SCNC: 22.9 MMOL/L (ref 22–29)
CREAT SERPL-MCNC: 1.67 MG/DL (ref 0.57–1)
DEPRECATED RDW RBC AUTO: 44 FL (ref 37–54)
EOSINOPHIL # BLD AUTO: 0.26 10*3/MM3 (ref 0–0.4)
EOSINOPHIL NFR BLD AUTO: 3.9 % (ref 0.3–6.2)
ERYTHROCYTE [DISTWIDTH] IN BLOOD BY AUTOMATED COUNT: 12.4 % (ref 12.3–15.4)
GFR SERPL CREATININE-BSD FRML MDRD: 29 ML/MIN/1.73
GLUCOSE BLDC GLUCOMTR-MCNC: 112 MG/DL (ref 70–130)
GLUCOSE BLDC GLUCOMTR-MCNC: 82 MG/DL (ref 70–130)
GLUCOSE BLDC GLUCOMTR-MCNC: 88 MG/DL (ref 70–130)
GLUCOSE SERPL-MCNC: 90 MG/DL (ref 65–99)
HCT VFR BLD AUTO: 31.1 % (ref 34–46.6)
HGB BLD-MCNC: 10.1 G/DL (ref 12–15.9)
IMM GRANULOCYTES # BLD AUTO: 0.03 10*3/MM3 (ref 0–0.05)
IMM GRANULOCYTES NFR BLD AUTO: 0.5 % (ref 0–0.5)
LYMPHOCYTES # BLD AUTO: 1.32 10*3/MM3 (ref 0.7–3.1)
LYMPHOCYTES NFR BLD AUTO: 20 % (ref 19.6–45.3)
MCH RBC QN AUTO: 31.3 PG (ref 26.6–33)
MCHC RBC AUTO-ENTMCNC: 32.5 G/DL (ref 31.5–35.7)
MCV RBC AUTO: 96.3 FL (ref 79–97)
MONOCYTES # BLD AUTO: 0.54 10*3/MM3 (ref 0.1–0.9)
MONOCYTES NFR BLD AUTO: 8.2 % (ref 5–12)
NEUTROPHILS NFR BLD AUTO: 4.44 10*3/MM3 (ref 1.7–7)
NEUTROPHILS NFR BLD AUTO: 67.1 % (ref 42.7–76)
NRBC BLD AUTO-RTO: 0 /100 WBC (ref 0–0.2)
PLATELET # BLD AUTO: 142 10*3/MM3 (ref 140–450)
PMV BLD AUTO: 10.2 FL (ref 6–12)
POTASSIUM SERPL-SCNC: 5.2 MMOL/L (ref 3.5–5.2)
RBC # BLD AUTO: 3.23 10*6/MM3 (ref 3.77–5.28)
SODIUM SERPL-SCNC: 135 MMOL/L (ref 136–145)
WBC NRBC COR # BLD: 6.61 10*3/MM3 (ref 3.4–10.8)

## 2021-12-02 PROCEDURE — 25010000002 MIDAZOLAM PER 1 MG: Performed by: ANESTHESIOLOGY

## 2021-12-02 PROCEDURE — 25010000002 HEPARIN (PORCINE) PER 1000 UNITS: Performed by: STUDENT IN AN ORGANIZED HEALTH CARE EDUCATION/TRAINING PROGRAM

## 2021-12-02 PROCEDURE — 85730 THROMBOPLASTIN TIME PARTIAL: CPT | Performed by: STUDENT IN AN ORGANIZED HEALTH CARE EDUCATION/TRAINING PROGRAM

## 2021-12-02 PROCEDURE — 82962 GLUCOSE BLOOD TEST: CPT

## 2021-12-02 PROCEDURE — 25010000002 METHYLPREDNISOLONE PER 80 MG: Performed by: ANESTHESIOLOGY

## 2021-12-02 PROCEDURE — 85025 COMPLETE CBC W/AUTO DIFF WBC: CPT | Performed by: STUDENT IN AN ORGANIZED HEALTH CARE EDUCATION/TRAINING PROGRAM

## 2021-12-02 PROCEDURE — 77003 FLUOROGUIDE FOR SPINE INJECT: CPT

## 2021-12-02 PROCEDURE — 25010000002 ONDANSETRON PER 1 MG: Performed by: NURSE PRACTITIONER

## 2021-12-02 PROCEDURE — 80048 BASIC METABOLIC PNL TOTAL CA: CPT | Performed by: INTERNAL MEDICINE

## 2021-12-02 PROCEDURE — 3E0R3BZ INTRODUCTION OF ANESTHETIC AGENT INTO SPINAL CANAL, PERCUTANEOUS APPROACH: ICD-10-PCS | Performed by: ANESTHESIOLOGY

## 2021-12-02 PROCEDURE — 3E0R33Z INTRODUCTION OF ANTI-INFLAMMATORY INTO SPINAL CANAL, PERCUTANEOUS APPROACH: ICD-10-PCS | Performed by: ANESTHESIOLOGY

## 2021-12-02 RX ORDER — LIDOCAINE HYDROCHLORIDE 10 MG/ML
1 INJECTION, SOLUTION INFILTRATION; PERINEURAL ONCE AS NEEDED
Status: DISCONTINUED | OUTPATIENT
Start: 2021-12-02 | End: 2021-12-03 | Stop reason: HOSPADM

## 2021-12-02 RX ORDER — SODIUM CHLORIDE 0.9 % (FLUSH) 0.9 %
1-10 SYRINGE (ML) INJECTION AS NEEDED
Status: DISCONTINUED | OUTPATIENT
Start: 2021-12-02 | End: 2021-12-03 | Stop reason: HOSPADM

## 2021-12-02 RX ORDER — MIDAZOLAM HYDROCHLORIDE 1 MG/ML
1 INJECTION INTRAMUSCULAR; INTRAVENOUS AS NEEDED
Status: DISCONTINUED | OUTPATIENT
Start: 2021-12-02 | End: 2021-12-03 | Stop reason: HOSPADM

## 2021-12-02 RX ORDER — METHYLPREDNISOLONE ACETATE 80 MG/ML
80 INJECTION, SUSPENSION INTRA-ARTICULAR; INTRALESIONAL; INTRAMUSCULAR; SOFT TISSUE ONCE
Status: COMPLETED | OUTPATIENT
Start: 2021-12-02 | End: 2021-12-02

## 2021-12-02 RX ORDER — FENTANYL CITRATE 50 UG/ML
50 INJECTION, SOLUTION INTRAMUSCULAR; INTRAVENOUS AS NEEDED
Status: DISCONTINUED | OUTPATIENT
Start: 2021-12-02 | End: 2021-12-03 | Stop reason: HOSPADM

## 2021-12-02 RX ADMIN — HEPARIN SODIUM 4000 UNITS: 5000 INJECTION INTRAVENOUS; SUBCUTANEOUS at 19:48

## 2021-12-02 RX ADMIN — DULOXETINE HYDROCHLORIDE 30 MG: 30 CAPSULE, DELAYED RELEASE ORAL at 10:33

## 2021-12-02 RX ADMIN — OXYBUTYNIN CHLORIDE 10 MG: 10 TABLET, EXTENDED RELEASE ORAL at 16:01

## 2021-12-02 RX ADMIN — FLUTICASONE PROPIONATE 2 SPRAY: 50 SPRAY, METERED NASAL at 10:32

## 2021-12-02 RX ADMIN — MIDAZOLAM 1 MG: 1 INJECTION INTRAMUSCULAR; INTRAVENOUS at 14:14

## 2021-12-02 RX ADMIN — FAMOTIDINE 20 MG: 20 TABLET, FILM COATED ORAL at 22:01

## 2021-12-02 RX ADMIN — ACETAMINOPHEN 650 MG: 325 TABLET, FILM COATED ORAL at 10:33

## 2021-12-02 RX ADMIN — HYDRALAZINE HYDROCHLORIDE 25 MG: 25 TABLET, FILM COATED ORAL at 16:01

## 2021-12-02 RX ADMIN — ONDANSETRON 4 MG: 2 INJECTION INTRAMUSCULAR; INTRAVENOUS at 11:26

## 2021-12-02 RX ADMIN — ATORVASTATIN CALCIUM 80 MG: 80 TABLET, FILM COATED ORAL at 22:01

## 2021-12-02 RX ADMIN — HEPARIN SODIUM 9 UNITS/KG/HR: 10000 INJECTION, SOLUTION INTRAVENOUS at 04:15

## 2021-12-02 RX ADMIN — HYDRALAZINE HYDROCHLORIDE 25 MG: 25 TABLET, FILM COATED ORAL at 22:01

## 2021-12-02 RX ADMIN — METOPROLOL SUCCINATE 75 MG: 25 TABLET, EXTENDED RELEASE ORAL at 10:32

## 2021-12-02 RX ADMIN — HEPARIN SODIUM 12 UNITS/KG/HR: 10000 INJECTION, SOLUTION INTRAVENOUS at 19:48

## 2021-12-02 RX ADMIN — METHYLPREDNISOLONE ACETATE 80 MG: 80 INJECTION, SUSPENSION INTRA-ARTICULAR; INTRALESIONAL; INTRAMUSCULAR; SOFT TISSUE at 14:18

## 2021-12-02 NOTE — PLAN OF CARE
Goal Outcome Evaluation:      Vitals more stable, BP has improved. Patient had emesis at start of shift, then reports that headache has improved after emesis. NIH score of 2, continues to have left hemianopia. Patient up with assist with generalized weakness and unsteady gait at times. Patient has slept well in between care.         Problem: Adult Inpatient Plan of Care  Goal: Plan of Care Review  Outcome: Ongoing, Progressing  Goal: Patient-Specific Goal (Individualized)  Outcome: Ongoing, Progressing  Goal: Absence of Hospital-Acquired Illness or Injury  Outcome: Ongoing, Progressing  Intervention: Identify and Manage Fall Risk  Recent Flowsheet Documentation  Taken 12/2/2021 0415 by Iman West RN  Safety Promotion/Fall Prevention: safety round/check completed  Taken 12/2/2021 0249 by Iman West RN  Safety Promotion/Fall Prevention: safety round/check completed  Taken 12/2/2021 0042 by Iman West RN  Safety Promotion/Fall Prevention: safety round/check completed  Taken 12/1/2021 2200 by Iman West RN  Safety Promotion/Fall Prevention: safety round/check completed  Taken 12/1/2021 2030 by Iman West RN  Safety Promotion/Fall Prevention: safety round/check completed  Taken 12/1/2021 1940 by Iman West RN  Safety Promotion/Fall Prevention: (Patient charisma to CT) other (see comments)  Intervention: Prevent Skin Injury  Recent Flowsheet Documentation  Taken 12/2/2021 0415 by Iman West RN  Body Position: position changed independently  Taken 12/2/2021 0249 by Iman West RN  Body Position: position changed independently  Taken 12/2/2021 0042 by Iman West RN  Body Position: position changed independently  Taken 12/1/2021 2200 by Iman West RN  Body Position: position changed independently  Taken 12/1/2021 2030 by Iman West RN  Body Position: position changed independently  Taken 12/1/2021 1940 by Iman West RN  Body Position: position changed  independently  Goal: Optimal Comfort and Wellbeing  Outcome: Ongoing, Progressing  Goal: Readiness for Transition of Care  Outcome: Ongoing, Progressing     Problem: Fall Injury Risk  Goal: Absence of Fall and Fall-Related Injury  Outcome: Ongoing, Progressing  Intervention: Promote Injury-Free Environment  Recent Flowsheet Documentation  Taken 12/2/2021 0415 by Iman West RN  Safety Promotion/Fall Prevention: safety round/check completed  Taken 12/2/2021 0249 by Iman West RN  Safety Promotion/Fall Prevention: safety round/check completed  Taken 12/2/2021 0042 by Iman West RN  Safety Promotion/Fall Prevention: safety round/check completed  Taken 12/1/2021 2200 by Iman West RN  Safety Promotion/Fall Prevention: safety round/check completed  Taken 12/1/2021 2030 by Iman West RN  Safety Promotion/Fall Prevention: safety round/check completed  Taken 12/1/2021 1940 by Iman West RN  Safety Promotion/Fall Prevention: (Patient charisma to CT) other (see comments)     Problem: Skin Injury Risk Increased  Goal: Skin Health and Integrity  Outcome: Ongoing, Progressing  Intervention: Optimize Skin Protection  Recent Flowsheet Documentation  Taken 12/2/2021 0042 by Iman West RN  Head of Bed (HOB): HOB at 15 degrees  Taken 12/1/2021 2030 by Iman West RN  Head of Bed (HOB): HOB at 15 degrees

## 2021-12-02 NOTE — SIGNIFICANT NOTE
12/02/21 1326   OTHER   Discipline physical therapist   Rehab Time/Intention   Session Not Performed unable to evaluate, medical status change  (RN asked PT to hold this date as pt is experiencing nausea, blurred vision and a HA.  PT will follow up 12/3 pending pt medical status.)   Recommendation   PT - Next Appointment 12/03/21

## 2021-12-02 NOTE — PROGRESS NOTES
Name: Marleni Hummel ADMIT: 2021   : 1935  PCP: Ken Andujar MD    MRN: [id number] LOS: 3 days   AGE/SEX: 85 y.o. female  ROOM: [facility]/   Subjective   Chief Complaint   Patient presents with   • Speech Problem      No CP SOA NVD. Asking about discharge after epidural. Discussed monitoring after her procedure this evening.    Objective   Vital Signs  Temp:  [97.7 °F (36.5 °C)-98.7 °F (37.1 °C)] 97.9 °F (36.6 °C)  Heart Rate:  [52-68] 52  Resp:  [16-18] 16  BP: (113-191)/(60-91) 137/67  SpO2:  [94 %-100 %] 96 %  on  Flow (L/min):  [2] 2;   Device (Oxygen Therapy): nasal cannula  Body mass index is 27.44 kg/m².    Physical Exam  Vitals and nursing note reviewed.   Constitutional:       General: She is not in acute distress.     Appearance: She is not diaphoretic.   HENT:      Head: Normocephalic and atraumatic.   Eyes:      General:         Right eye: No discharge.         Left eye: No discharge.      Conjunctiva/sclera: Conjunctivae normal.   Cardiovascular:      Rate and Rhythm: Normal rate. Rhythm irregular.      Pulses: Normal pulses.   Pulmonary:      Effort: Pulmonary effort is normal.      Breath sounds: No wheezing.   Abdominal:      General: There is no distension.      Palpations: Abdomen is soft.      Tenderness: There is no abdominal tenderness. There is no guarding or rebound.   Musculoskeletal:         General: Tenderness present.      Cervical back: Neck supple. No tenderness.      Right lower leg: No edema.      Left lower leg: No edema.   Skin:     General: Skin is warm and dry.   Neurological:      Mental Status: She is alert. Mental status is at baseline.   Psychiatric:         Mood and Affect: Mood normal.         Behavior: Behavior normal.         Results Review:       I reviewed the patient's new clinical results.     I reviewed imaging, agree with interpretation.     I reviewed telemetry/EKG results, afib, rate ok     I reviewed other test results, agree with  interpretation.    Results from last 7 days   Lab Units 12/02/21  0656 12/01/21  0609 11/30/21  1422 11/30/21  0649   WBC 10*3/mm3 6.61 4.74 5.57 6.24   HEMOGLOBIN g/dL 10.1* 9.2* 10.1* 9.7*   PLATELETS 10*3/mm3 142 126* 149 138*     Results from last 7 days   Lab Units 11/30/21  0649 11/29/21  1806   SODIUM mmol/L 139 138   POTASSIUM mmol/L 4.6 4.6   CHLORIDE mmol/L 103 100   CO2 mmol/L 23.9 27.7   BUN mg/dL 36* 41*   CREATININE mg/dL 1.82* 2.12*   GLUCOSE mg/dL 101* 128*   Estimated Creatinine Clearance: 21.2 mL/min (A) (by C-G formula based on SCr of 1.82 mg/dL (H)).  Results from last 7 days   Lab Units 11/30/21  0649 11/29/21  1806   CALCIUM mg/dL 9.1 10.0   ALBUMIN g/dL 4.20 4.50     Results from last 7 days   Lab Units 12/02/21  0656 12/01/21  0609 11/30/21  2131 11/30/21  1422 11/30/21  1422 11/29/21  1806   INR   --   --   --   --  1.12* 1.11*   APTT seconds 73.7* 78.3* 105.7*   < > 29.1  --     < > = values in this interval not displayed.        amLODIPine, 5 mg, Oral, Q24H  atorvastatin, 80 mg, Oral, Nightly  diphenhydrAMINE, 25 mg, Oral, Once  docusate sodium, 200 mg, Oral, BID  DULoxetine, 30 mg, Oral, Daily  famotidine, 20 mg, Oral, Daily  fluticasone, 2 spray, Nasal, Daily  hydrALAZINE, 25 mg, Oral, Q8H  metoprolol succinate XL, 75 mg, Oral, Daily  oxybutynin XL, 10 mg, Oral, Daily      heparin (porcine), 12 Units/kg/hr, Last Rate: Stopped (12/02/21 0800)    NPO Diet    Assessment/Plan      Active Hospital Problems    Diagnosis  POA   • **TIA (transient ischemic attack) [G45.9]  Yes   • History of renal cell carcinoma [Z85.528]  Not Applicable   • Cerebrovascular accident (CVA) due to stenosis of small artery (HCC) [I63.81]  Yes   • History of left nephrectomy [Z90.5]  Not Applicable   • Anemia of chronic disease [D63.8]  Yes   • Chronic coronary artery disease [I25.10]  Yes   • History of MI (myocardial infarction) [I25.2]  Not Applicable   • Sleep apnea [G47.30]  Yes   • Hypercholesterolemia  [E78.00]  Yes   • Gastroesophageal reflux disease without esophagitis [K21.9]  Yes   • Essential hypertension [I10]  Yes   • Chronic kidney disease (CKD), stage III (moderate) (CMS/HCC) [N18.30]  Yes   • Atrial fibrillation (HCC) [I48.91]  Yes      Resolved Hospital Problems   No resolved problems to display.       · TIA after holding Eliquis in anticipation of lumbar epidural injection-Heparin until post procedure then resume Eliquis when able  · Essential hypertension-Improved today. Monitor with current regimen.  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    During the entire encounter, I was wearing recommended PPE including face mask and eye protection. Hand sanitization was performed prior to entering room and upon exit.

## 2021-12-02 NOTE — ANESTHESIA PROCEDURE NOTES
PAIN Epidural block      Patient reassessed immediately prior to procedure    Patient location during procedure: pain clinic  Start Time: 12/2/2021 2:01 PM  Stop Time: 12/2/2021 2:20 PM  Indication:procedure for pain  Performed By  Anesthesiologist: Jaspreet Muñoz MD  Preanesthetic Checklist  Completed: patient identified, site marked, risks and benefits discussed, surgical consent, monitors and equipment checked, pre-op evaluation and timeout performed  Additional Notes  Post-Op Diagnosis Codes:     * Spinal stenosis of lumbar region with neurogenic claudication (M48.062)     * Sacroiliitis (HCC) (M46.1)    The patient was observed in recovery with no evidence of neurological deficits or other problems. All questions were answered. The patient was discharged with appropriate instructions.  Prep:  Pt Position:prone  Sterile Tech:cap, gloves, mask and sterile barrier  Prep:chlorhexidine gluconate and isopropyl alcohol  Monitoring:blood pressure monitoring, continuous pulse oximetry and EKG  Procedure:Sedation: yes (Midazolam 1 mg IV)     Approach:left paramedian  Guidance: fluoroscopy  Location:lumbar  Level:4-5 (Interlaminar)  Needle Type:f-star [employer]tead  Needle Gauge:20 G  Aspiration:negative  Medications:  Depomedrol:80 mg  Preservative Free Saline:3mL  Comments:Contrast not utilized due to poor kidney function.  Post Assessment:  Dressing:occlusive dressing applied  Pt Tolerance:patient tolerated the procedure well with no apparent complications  Complications:no

## 2021-12-02 NOTE — INTERVAL H&P NOTE
[facility]  H&P reviewed. No changes since last visit.  Patient states   0% improvement since the last procedure/injection.    Diagnosis     * Spinal stenosis of lumbar region with neurogenic claudication [M48.062]     * Sacroiliitis (HCC) [M46.1]      Airway assessed since last visit. Airway class equals: 2.  She has pain in her lower back which radiates to her right equal to left lower extremity.  Today she rates her pain as a 7/10.  She is here for a lumbar epidural steroid injection.

## 2021-12-02 NOTE — NURSING NOTE
Report given to Sarah RN on floor.  Pt s/p LESI with 1mg IV versed given prior to procedure.  Pt c/o no pain at this time.  Pt has bandaide on back, clean and intacted.  Pt restrictions not to drive for 24 hours or no heating today, Sarah voiced understanding.

## 2021-12-02 NOTE — NURSING NOTE
Came to assist DUGLAS Paz in restarting heparin for this patient. Patient states they were going to restart eliquis and wanted to clarify. Page out to MD.

## 2021-12-03 ENCOUNTER — READMISSION MANAGEMENT (OUTPATIENT)
Dept: CALL CENTER | Facility: HOSPITAL | Age: 86
End: 2021-12-03

## 2021-12-03 VITALS
OXYGEN SATURATION: 95 % | HEIGHT: 62 IN | DIASTOLIC BLOOD PRESSURE: 66 MMHG | RESPIRATION RATE: 20 BRPM | HEART RATE: 61 BPM | SYSTOLIC BLOOD PRESSURE: 145 MMHG | BODY MASS INDEX: 27.6 KG/M2 | TEMPERATURE: 98.7 F | WEIGHT: 150 LBS

## 2021-12-03 LAB
ANION GAP SERPL CALCULATED.3IONS-SCNC: 10.6 MMOL/L (ref 5–15)
ANION GAP SERPL CALCULATED.3IONS-SCNC: 7.7 MMOL/L (ref 5–15)
APTT PPP: 162 SECONDS (ref 22.7–35.4)
APTT PPP: 32.4 SECONDS (ref 22.7–35.4)
BASOPHILS # BLD AUTO: 0.02 10*3/MM3 (ref 0–0.2)
BASOPHILS NFR BLD AUTO: 0.4 % (ref 0–1.5)
BUN SERPL-MCNC: 33 MG/DL (ref 8–23)
BUN SERPL-MCNC: 34 MG/DL (ref 8–23)
BUN/CREAT SERPL: 17.6 (ref 7–25)
BUN/CREAT SERPL: 19.3 (ref 7–25)
CALCIUM SPEC-SCNC: 7.9 MG/DL (ref 8.6–10.5)
CALCIUM SPEC-SCNC: 8.1 MG/DL (ref 8.6–10.5)
CHLORIDE SERPL-SCNC: 107 MMOL/L (ref 98–107)
CHLORIDE SERPL-SCNC: 107 MMOL/L (ref 98–107)
CO2 SERPL-SCNC: 19.4 MMOL/L (ref 22–29)
CO2 SERPL-SCNC: 23.3 MMOL/L (ref 22–29)
CREAT SERPL-MCNC: 1.71 MG/DL (ref 0.57–1)
CREAT SERPL-MCNC: 1.93 MG/DL (ref 0.57–1)
DEPRECATED RDW RBC AUTO: 46 FL (ref 37–54)
EOSINOPHIL # BLD AUTO: 0.09 10*3/MM3 (ref 0–0.4)
EOSINOPHIL NFR BLD AUTO: 1.6 % (ref 0.3–6.2)
ERYTHROCYTE [DISTWIDTH] IN BLOOD BY AUTOMATED COUNT: 12.7 % (ref 12.3–15.4)
GFR SERPL CREATININE-BSD FRML MDRD: 25 ML/MIN/1.73
GFR SERPL CREATININE-BSD FRML MDRD: 29 ML/MIN/1.73
GLUCOSE BLDC GLUCOMTR-MCNC: 100 MG/DL (ref 70–130)
GLUCOSE BLDC GLUCOMTR-MCNC: 111 MG/DL (ref 70–130)
GLUCOSE BLDC GLUCOMTR-MCNC: 138 MG/DL (ref 70–130)
GLUCOSE SERPL-MCNC: 109 MG/DL (ref 65–99)
GLUCOSE SERPL-MCNC: 127 MG/DL (ref 65–99)
HCT VFR BLD AUTO: 29.9 % (ref 34–46.6)
HGB BLD-MCNC: 9.4 G/DL (ref 12–15.9)
IMM GRANULOCYTES # BLD AUTO: 0.01 10*3/MM3 (ref 0–0.05)
IMM GRANULOCYTES NFR BLD AUTO: 0.2 % (ref 0–0.5)
LYMPHOCYTES # BLD AUTO: 0.67 10*3/MM3 (ref 0.7–3.1)
LYMPHOCYTES NFR BLD AUTO: 12.1 % (ref 19.6–45.3)
MCH RBC QN AUTO: 31 PG (ref 26.6–33)
MCHC RBC AUTO-ENTMCNC: 31.4 G/DL (ref 31.5–35.7)
MCV RBC AUTO: 98.7 FL (ref 79–97)
MONOCYTES # BLD AUTO: 0.36 10*3/MM3 (ref 0.1–0.9)
MONOCYTES NFR BLD AUTO: 6.5 % (ref 5–12)
NEUTROPHILS NFR BLD AUTO: 4.37 10*3/MM3 (ref 1.7–7)
NEUTROPHILS NFR BLD AUTO: 79.2 % (ref 42.7–76)
NRBC BLD AUTO-RTO: 0 /100 WBC (ref 0–0.2)
PLATELET # BLD AUTO: 136 10*3/MM3 (ref 140–450)
PMV BLD AUTO: 10.1 FL (ref 6–12)
POTASSIUM SERPL-SCNC: 5.2 MMOL/L (ref 3.5–5.2)
POTASSIUM SERPL-SCNC: 5.9 MMOL/L (ref 3.5–5.2)
QT INTERVAL: 456 MS
RBC # BLD AUTO: 3.03 10*6/MM3 (ref 3.77–5.28)
SODIUM SERPL-SCNC: 137 MMOL/L (ref 136–145)
SODIUM SERPL-SCNC: 138 MMOL/L (ref 136–145)
WBC NRBC COR # BLD: 5.52 10*3/MM3 (ref 3.4–10.8)

## 2021-12-03 PROCEDURE — 80048 BASIC METABOLIC PNL TOTAL CA: CPT | Performed by: INTERNAL MEDICINE

## 2021-12-03 PROCEDURE — 82962 GLUCOSE BLOOD TEST: CPT

## 2021-12-03 PROCEDURE — 97110 THERAPEUTIC EXERCISES: CPT

## 2021-12-03 PROCEDURE — 97162 PT EVAL MOD COMPLEX 30 MIN: CPT

## 2021-12-03 PROCEDURE — 93005 ELECTROCARDIOGRAM TRACING: CPT | Performed by: INTERNAL MEDICINE

## 2021-12-03 PROCEDURE — 85025 COMPLETE CBC W/AUTO DIFF WBC: CPT | Performed by: STUDENT IN AN ORGANIZED HEALTH CARE EDUCATION/TRAINING PROGRAM

## 2021-12-03 PROCEDURE — 93010 ELECTROCARDIOGRAM REPORT: CPT | Performed by: INTERNAL MEDICINE

## 2021-12-03 PROCEDURE — 85730 THROMBOPLASTIN TIME PARTIAL: CPT | Performed by: INTERNAL MEDICINE

## 2021-12-03 RX ORDER — SODIUM CHLORIDE 9 MG/ML
125 INJECTION, SOLUTION INTRAVENOUS CONTINUOUS
Status: DISCONTINUED | OUTPATIENT
Start: 2021-12-03 | End: 2021-12-03

## 2021-12-03 RX ADMIN — FLUTICASONE PROPIONATE 2 SPRAY: 50 SPRAY, METERED NASAL at 08:15

## 2021-12-03 RX ADMIN — OXYBUTYNIN CHLORIDE 10 MG: 10 TABLET, EXTENDED RELEASE ORAL at 08:16

## 2021-12-03 RX ADMIN — DOCUSATE SODIUM 200 MG: 100 CAPSULE, LIQUID FILLED ORAL at 08:16

## 2021-12-03 RX ADMIN — SODIUM CHLORIDE 1000 ML: 9 INJECTION, SOLUTION INTRAVENOUS at 09:15

## 2021-12-03 RX ADMIN — DULOXETINE HYDROCHLORIDE 30 MG: 30 CAPSULE, DELAYED RELEASE ORAL at 08:16

## 2021-12-03 RX ADMIN — HYDRALAZINE HYDROCHLORIDE 25 MG: 25 TABLET, FILM COATED ORAL at 14:53

## 2021-12-03 RX ADMIN — APIXABAN 2.5 MG: 2.5 TABLET, FILM COATED ORAL at 12:13

## 2021-12-03 RX ADMIN — SODIUM CHLORIDE, PRESERVATIVE FREE 10 ML: 5 INJECTION INTRAVENOUS at 08:21

## 2021-12-03 RX ADMIN — HYDRALAZINE HYDROCHLORIDE 25 MG: 25 TABLET, FILM COATED ORAL at 06:06

## 2021-12-03 RX ADMIN — METOPROLOL SUCCINATE 75 MG: 25 TABLET, EXTENDED RELEASE ORAL at 08:16

## 2021-12-03 RX ADMIN — AMLODIPINE BESYLATE 5 MG: 5 TABLET ORAL at 08:16

## 2021-12-03 NOTE — PLAN OF CARE
Goal Outcome Evaluation:  Plan of Care Reviewed With: patient        Progress: no change  Outcome Summary: Pt down for epidural this shift, Hep gtt held prior to procedure, Neuro MD made aware of pt return to floor and order rec'd to restart pt on Hep gtt and transiton to eliquis in AM, pt up in chair for part of shift, [facility] this shift a 2, pt c/o headache x1 controlled with prn tylenol, pt c/o nausea x1 and controlled with prn zofran, HR down in the 50s this shift, MD made aware, will continue to monitor.

## 2021-12-03 NOTE — PLAN OF CARE
Goal Outcome Evaluation:   No new event this shift.     Problem: Adult Inpatient Plan of Care  Goal: Plan of Care Review  Outcome: Ongoing, Progressing  Goal: Patient-Specific Goal (Individualized)  Outcome: Ongoing, Progressing  Goal: Absence of Hospital-Acquired Illness or Injury  Outcome: Ongoing, Progressing  Intervention: Identify and Manage Fall Risk  Recent Flowsheet Documentation  Taken 12/3/2021 0402 by Binta West RN  Safety Promotion/Fall Prevention: safety round/check completed  Taken 12/3/2021 0201 by Binta West RN  Safety Promotion/Fall Prevention: safety round/check completed  Taken 12/3/2021 0000 by Binta West RN  Safety Promotion/Fall Prevention: safety round/check completed  Taken 12/2/2021 2200 by Binta West RN  Safety Promotion/Fall Prevention:   fall prevention program maintained   nonskid shoes/slippers when out of bed   safety round/check completed   activity supervised  Taken 12/2/2021 2035 by Binta West RN  Safety Promotion/Fall Prevention: safety round/check completed  Intervention: Prevent Skin Injury  Recent Flowsheet Documentation  Taken 12/3/2021 0402 by Binta West RN  Body Position: position changed independently  Taken 12/3/2021 0201 by Binta West RN  Body Position: position changed independently  Taken 12/3/2021 0000 by Binta West RN  Body Position: position changed independently  Taken 12/2/2021 2200 by Binta West RN  Body Position: position changed independently  Skin Protection:   adhesive use limited   tubing/devices free from skin contact  Taken 12/2/2021 2035 by Binta West RN  Body Position: position changed independently  Intervention: Prevent and Manage VTE (venous thromboembolism) Risk  Recent Flowsheet Documentation  Taken 12/2/2021 2200 by Binta West RN  VTE Prevention/Management:   bilateral   dorsiflexion/plantar flexion performed   sequential compression devices on  Goal: Optimal  Comfort and Wellbeing  Outcome: Ongoing, Progressing  Intervention: Provide Person-Centered Care  Recent Flowsheet Documentation  Taken 12/2/2021 2200 by Binta West RN  Trust Relationship/Rapport:   care explained   thoughts/feelings acknowledged  Goal: Readiness for Transition of Care  Outcome: Ongoing, Progressing     Problem: Fall Injury Risk  Goal: Absence of Fall and Fall-Related Injury  Outcome: Ongoing, Progressing  Intervention: Identify and Manage Contributors to Fall Injury Risk  Recent Flowsheet Documentation  Taken 12/2/2021 2200 by Binta West RN  Medication Review/Management: medications reviewed  Intervention: Promote Injury-Free Environment  Recent Flowsheet Documentation  Taken 12/3/2021 0402 by Binta West RN  Safety Promotion/Fall Prevention: safety round/check completed  Taken 12/3/2021 0201 by Binta West RN  Safety Promotion/Fall Prevention: safety round/check completed  Taken 12/3/2021 0000 by Binta West RN  Safety Promotion/Fall Prevention: safety round/check completed  Taken 12/2/2021 2200 by Binta West RN  Safety Promotion/Fall Prevention:   fall prevention program maintained   nonskid shoes/slippers when out of bed   safety round/check completed   activity supervised  Taken 12/2/2021 2035 by Binta West RN  Safety Promotion/Fall Prevention: safety round/check completed     Problem: Skin Injury Risk Increased  Goal: Skin Health and Integrity  Outcome: Ongoing, Progressing  Intervention: Optimize Skin Protection  Recent Flowsheet Documentation  Taken 12/2/2021 2200 by Binta West RN  Pressure Reduction Techniques:   frequent weight shift encouraged   weight shift assistance provided  Pressure Reduction Devices: alternating pressure pump (ADD)  Skin Protection:   adhesive use limited   tubing/devices free from skin contact     Problem: Cerebral Tissue Perfusion Risk (Stroke, Ischemic/Transient Ischemic Attack)  Goal: Optimal Cerebral  Tissue Perfusion  Outcome: Ongoing, Progressing     Problem: Communication Impairment (Stroke, Ischemic/Transient Ischemic Attack)  Goal: Improved Communication Skills  Outcome: Ongoing, Progressing

## 2021-12-03 NOTE — CONSULTS
[facility] Consult Note      Patient Name: Marleni Hummel  : 1935  MRN: [id number]  Primary Care Physician:  Ken Andujar MD  Referring Physician: Richard Smith,*  Date of admission: 2021    Subjective     Reason for Consult:  hyperK and CKD3    HPI:   Marleni Hummel is a 85 y.o. female with CKD 3 (baseline SCR 1.5) against a background left nephrectomy , admitted  for further evaluation of slurred speech and left-sided weakness.  TIA suspected.  SCR 2.1 on arrival and level 1.7 today.  Full PMH outlined below; she reports knowledge of mild elevation in potassium levels previously.    Hospitalization notable for use of IV heparin.  Slurred speech resolved, and left hemiparesis also has improved.  No urinary complaints.  Appetite fine.  No shortness of breath.  No orthopnea.    Review of Systems:   14 point review of systems is otherwise negative except for mentioned above on HPI    Personal History     Past Medical History:   Diagnosis Date   • Acute vulvitis 2019   • Allergic    • Allergic rhinitis    • Anemia of chronic disease    • Arthropathy of knee     right   • Atrial fibrillation (HCC)    • Back pain    • Bell's palsy    • Caregiver stress syndrome 2019   • Chronic coronary artery disease    • Chronic kidney disease (CKD), stage III (moderate) (HCC)    • Edema    • Elevated serum free T4 level 2016   • Encounter for eye exam 2020   • Essential hypertension    • Fatigue    • GERD (gastroesophageal reflux disease)    • H/O Heart murmur    • Heart attack (HCC) 10/05/2015   • Hematuria 2016   • Herpes zoster without complication    • Hip arthritis     left   • History of bone density study 2008   • History of pelvic fracture    • History of pneumonia    • History of transfusion        • Hypercholesterolemia    • IFG (impaired fasting glucose)    • Insomnia    • Left kidney mass 2020   • Limited  joint range of motion     RIGHT KNEE   • Mixed hyperlipidemia    • Muscle tension headache 04/03/2019   • New daily persistent headache 12/17/2018   • 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   • Osteoarthritis     Right hip   • Osteoporosis    • Panic disorder    • Peripheral vertigo    • PONV (postoperative nausea and vomiting)    • Rectal bleeding     HISTORY OF   • Sleep apnea     DOES NOT USE C-PAP   • Spinal stenosis, lumbar region with neurogenic claudication 12/02/2021   • Stress    • Stress-induced cardiomyopathy    • Urinary incontinence    • Vitamin D deficiency        Past Surgical History:   Procedure Laterality Date   • CATARACT EXTRACTION Left 04/01/2013    Dr. Clarke   • CATARACT EXTRACTION Right 04/16/2013    Dr. Clarke   • COLONOSCOPY  04/18/2014    Dr. Elizabeth; no polyps   • EPIDURAL BLOCK     • HIP PERCUTANEOUS PINNING Left 8/31/2017    Procedure: LT HIP PERCUTANEOUS PINNING;  Surgeon: Sean Canales MD;  Location: [facility];  Service:    • MAMMO BILATERAL  11/2013   • NEPHRECTOMY Left 11/16/2020    Procedure: LAPAROSCOPIC NEPHRECTOMY;  Surgeon: Chuckie Mclaughlin MD;  Location: [facility];  Service: Urology;  Laterality: Left;   • PAP SMEAR  02/2011   • TIBIA FRACTURE SURGERY Right 2015    REMOVED PLATE LATER IN 2015   • TONSILLECTOMY  1947   • TOTAL HIP ARTHROPLASTY Right 08/2015   • TOTAL HIP ARTHROPLASTY Right 09/2016   • TOTAL KNEE ARTHROPLASTY Bilateral 2004       Family History: family history includes Heart disease in her mother; Hypertension in her maternal grandmother, mother, and another family member; Stroke in her mother.    Social History:  reports that she quit smoking about 65 years ago. Her smoking use included cigarettes. She has a 3.00 pack-year smoking history. She has never used smokeless tobacco. She reports previous alcohol use of about 3.0 standard drinks of alcohol per week. She reports that she does not use drugs.    Home Medications:  Prior to Admission medications     Medication Sig Start Date End Date Taking? Authorizing Provider   acetaminophen (TYLENOL) 500 MG tablet Take 1,000 mg by mouth 3 (three) times a day as needed for mild pain (1-3) or moderate pain (4-6).   Yes Zach Hidalgo MD   apixaban (ELIQUIS) 2.5 MG tablet tablet Take 1 tablet by mouth Every 12 (Twelve) Hours. 5/7/21  Yes Oralia Lutz APRN   atorvastatin (LIPITOR) 20 MG tablet Take 1 tablet by mouth Every Night. 10/4/21  Yes Ken Andujar MD   Cholecalciferol (VITAMIN D3) 5000 units capsule capsule Take 5,000 Units by mouth Daily.   Yes Zach Hidalgo MD   docusate sodium (COLACE) 250 MG capsule Take 1 capsule by mouth 2 (Two) Times a Day.  Patient taking differently: Take 250 mg by mouth Daily As Needed. 11/21/20  Yes Chuckie Fraga MD   DULoxetine (CYMBALTA) 30 MG capsule Take 1 capsule by mouth Daily for 180 days. 10/4/21 4/2/22 Yes Ken Andujar MD   ezetimibe (ZETIA) 10 MG tablet Take 1 tablet by mouth Every Night. 10/4/21  Yes Ken Andujar MD   fluticasone (Flonase) 50 MCG/ACT nasal spray 2 sprays into the nostril(s) as directed by provider Daily. 8/23/21  Yes Vi Oliveira APRN   metoprolol succinate XL (TOPROL-XL) 50 MG 24 hr tablet Take 1.5 tablets by mouth Daily for 180 days. 10/4/21 4/2/22 Yes Ken Andujar MD   Mirabegron ER (Myrbetriq) 50 MG tablet sustained-release 24 hour 24 hr tablet Take 50 mg by mouth Every Evening.   Yes Zach Hidalgo MD   multivitamin with minerals (HAIR/SKIN/NAILS PO) Take 1 tablet by mouth Daily.   Yes Zach Hidalgo MD   Denosumab (PROLIA SC) Inject 1 dose under the skin into the appropriate area as directed Every 6 (Six) Months. Pt unsure of dose    Zach Hidalgo MD   diazePAM (VALIUM) 5 MG tablet Take 5 mg by mouth Every 8 (Eight) Hours As Needed. 4/29/21   Emergency, Nurse Epic, RN   nystatin-triamcinolone (MYCOLOG II) 550249-1.1 UNIT/GM-% cream  10/8/21   Provider, MD Zach   permethrin (ELIMITE) 5  % cream Apply at bedtime from neck to soles of feet and shower in the morning.  Patient not taking: Reported on 11/30/2021 7/14/21   Mike De La Garza, ENRRIQUE       Allergies:  Allergies   Allergen Reactions   • Morphine Anaphylaxis   • Oxycodone Anaphylaxis   • Ace Inhibitors Cough   • Bactrim [Sulfamethoxazole-Trimethoprim] Rash   • Levaquin [Levofloxacin] Itching and Rash     insomnia       Objective     Vitals:   Temp:  [98.4 °F (36.9 °C)-98.9 °F (37.2 °C)] 98.7 °F (37.1 °C)  Heart Rate:  [59-72] 61  Resp:  [16-20] 20  BP: (112-149)/(58-75) 145/66  Flow (L/min):  [2] 2  No intake or output data in the 24 hours ending 12/03/21 1551    Physical Exam:   Constitutional: Awake, alert, NAD, appropriate, slight aphasia  HEENT: Sclera anicteric, no conjunctival injection AT/NC  Neck: Supple, trachea at midline, no JVD  Respiratory: Clear to auscultation bilaterally, nonlabored respiration  Cardiovascular: Irregularly irregular, not tachycardic, no rub   Gastrointestinal: BS +, soft, nontender and nondistended  : No palpable bladder  Musculoskeletal: Trace edema  Psychiatric: Appropriate affect, cooperative  Neurologic: Oriented x3, moving all extremities, normal speech and mental status  Skin: Warm and dry       Scheduled Meds:     amLODIPine, 5 mg, Oral, Q24H  apixaban, 2.5 mg, Oral, Q12H  atorvastatin, 80 mg, Oral, Nightly  diphenhydrAMINE, 25 mg, Oral, Once  docusate sodium, 200 mg, Oral, BID  DULoxetine, 30 mg, Oral, Daily  famotidine, 20 mg, Oral, Daily  fluticasone, 2 spray, Nasal, Daily  hydrALAZINE, 25 mg, Oral, Q8H  iopamidol, 12 mL, Epidural, Once in imaging  metoprolol succinate XL, 75 mg, Oral, Daily  oxybutynin XL, 10 mg, Oral, Daily      IV Meds:        Results Reviewed:   I have personally reviewed the results from the time of this admission to 12/3/2021 15:51 EST     Lab Results   Component Value Date    GLUCOSE 109 (H) 12/03/2021    CALCIUM 7.9 (L) 12/03/2021     12/03/2021    K 5.2 12/03/2021     CO2 19.4 (L) 12/03/2021     12/03/2021    BUN 33 (H) 12/03/2021    CREATININE 1.71 (H) 12/03/2021    EGFRIFAFRI 33 (L) 08/31/2021    EGFRIFNONA 29 (L) 12/03/2021    BCR 19.3 12/03/2021    ANIONGAP 10.6 12/03/2021      Lab Results   Component Value Date    MG 1.9 11/21/2020    PHOS 1.7 (C) 11/21/2020    ALBUMIN 4.20 11/30/2021           Assessment / Plan     ASSESSMENT:  1.  CKD3, stable.  Left nephrectomy 13 months ago.  Volume status fine.  Electrolytes acceptable, with high-normal potassium upon repeat this morning; expected following serum bicarbonate following IVF.  2.  Hyperkalemia, multifactorial, stable.  Suspect use of IV heparin is primary culprit; heparin blocks production of aldosterone (aldosterone promotes potassium secretion into the urine).  Unrestricted diet (with respect to potassium) and her abnormal kidney function likely lowered her threshold for mild hyperkalemia.  3.  Suspected TIA    PLAN:  1.  No objection to discharge today from renal view  2.  We discussed fundamentals of a low potassium diet  3.  Will arrange follow-up in our office  4.  Discussed with Dr. Solano earlier this evening    Thank you for involving us in the care of Marleni SCOUT Hummel.  Please feel free to call with any questions.    Mark Young MD  12/03/21  15:51 EST    [facility]  [phone]      Much of this encounter note is an electronic transcription/translation of spoken language to printed text. The electronic translation of spoken language may permit erroneous, or at times, nonsensical words or phrases to be inadvertently transcribed; Although I have reviewed the note for such errors, some may still exist.

## 2021-12-03 NOTE — PLAN OF CARE
Goal Outcome Evaluation:  Plan of Care Reviewed With: patient           Outcome Summary: 86yo white female admitted 11/29/21 for TIA and L UE weakness. PMH includes hbp, afib, CKD 3, acid reflux, high cholesterol, sleep apnea, MI, anemia, [place], panic disorder, vertigo, osteoporosis, spinal stenosis, kidney removal. PLOF: Pt lives at home alone and used a r wx for mobility. She has 1 talha, she was independent with ADLs and mobility. She is primarily limited at this tiem by generalized weakness and decreased activity tolerance that limit her functional mobility. Today, she was in bathroom upon PT arrival. Pt was essentially independent with sit/stand from commode. Pt amb 160' with r wx slowly with CGA, poor endurance limits further amb distance. She would benefit from skilled PT for ther ex, gt/transfer training, bed mobility, balance, endurance and HEP instruction. Pt plans to return home and attend outpt PT.  Patient was intermittently wearing a face mask during this therapy encounter. Therapist used appropriate personal protective equipment including eye protection, mask, and gloves.  Mask used was standard procedure mask. Appropriate PPE was worn during the entire therapy session. Hand hygiene was completed before and after therapy session. Patient is not in enhanced droplet precautions.

## 2021-12-03 NOTE — THERAPY EVALUATION
Patient Name: Marleni Hummel  : 1935    MRN: [id number]                              Today's Date: 12/3/2021       Admit Date: 2021    Visit Dx:     ICD-10-CM ICD-9-CM   1. Cerebrovascular accident (CVA) due to stenosis of small artery (HCC)  I63.81 434.91   2. Acute renal failure superimposed on stage 4 chronic kidney disease, unspecified acute renal failure type (HCC)  N17.9 584.9    N18.4 585.4   3. Anemia of chronic disease  D63.8 285.29   4. Spinal stenosis, lumbar region with neurogenic claudication  M48.062 724.03     Patient Active Problem List   Diagnosis   • Arthritis involving multiple sites   • Essential hypertension   • Atrial fibrillation (HCC)   • History of Bell's palsy   • Chronic kidney disease (CKD), stage III (moderate) (CMS/HCC)   • Gastroesophageal reflux disease without esophagitis   • Hypercholesterolemia   • Insomnia   • Panic disorder   • Chronic nonseasonal allergic rhinitis due to pollen   • Sleep apnea   • History of MI (myocardial infarction)   • Chronic coronary artery disease   • Anemia of chronic disease   • Weakness of right leg   • Cardiac murmur   • Senile osteoporosis   • Hyperuricemia   • Grief reaction   • Peripheral vertigo   • Renal cell carcinoma of left kidney (HCC)   • Renal oncocytoma of left kidney   • History of left nephrectomy   • Cerebrovascular accident (CVA) due to stenosis of small artery (HCC)   • History of renal cell carcinoma   • TIA (transient ischemic attack)   • Spinal stenosis, lumbar region with neurogenic claudication     Past Medical History:   Diagnosis Date   • Acute vulvitis 2019   • Allergic    • Allergic rhinitis    • Anemia of chronic disease    • Arthropathy of knee     right   • Atrial fibrillation (HCC)    • Back pain    • Bell's palsy    • Caregiver stress syndrome 2019   • Chronic coronary artery disease    • Chronic kidney disease (CKD), stage III (moderate) ([facility])    • Edema    • Elevated serum free T4 level  03/21/2016   • Encounter for eye exam 09/2020   • Essential hypertension    • Fatigue    • GERD (gastroesophageal reflux disease)    • H/O Heart murmur    • Heart attack (HCC) 10/05/2015   • Hematuria 12/12/2016   • Herpes zoster without complication    • Hip arthritis     left   • History of bone density study 02/28/2008   • History of pelvic fracture 2015   • History of pneumonia    • History of transfusion     2015   • Hypercholesterolemia    • IFG (impaired fasting glucose)    • Insomnia    • Left kidney mass 07/2020   • Limited joint range of motion     RIGHT KNEE   • Mixed hyperlipidemia    • Muscle tension headache 04/03/2019   • New daily persistent headache 12/17/2018   • 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   • Osteoarthritis     Right hip   • Osteoporosis    • Panic disorder    • Peripheral vertigo    • PONV (postoperative nausea and vomiting)    • Rectal bleeding     HISTORY OF   • Sleep apnea     DOES NOT USE C-PAP   • Spinal stenosis, lumbar region with neurogenic claudication 12/02/2021   • Stress    • Stress-induced cardiomyopathy    • Urinary incontinence    • Vitamin D deficiency      Past Surgical History:   Procedure Laterality Date   • CATARACT EXTRACTION Left 04/01/2013    Dr. Clarke   • CATARACT EXTRACTION Right 04/16/2013    Dr. Clarke   • COLONOSCOPY  04/18/2014    Dr. Elizabeth; no polyps   • EPIDURAL BLOCK     • HIP PERCUTANEOUS PINNING Left 8/31/2017    Procedure: LT HIP PERCUTANEOUS PINNING;  Surgeon: Sean Canales MD;  Location: [facility] OR;  Service:    • MAMMO BILATERAL  11/2013   • NEPHRECTOMY Left 11/16/2020    Procedure: LAPAROSCOPIC NEPHRECTOMY;  Surgeon: Chuckie Mclaughlin MD;  Location: [facility] OR;  Service: Urology;  Laterality: Left;   • PAP SMEAR  02/2011   • TIBIA FRACTURE SURGERY Right 2015    REMOVED PLATE LATER IN 2015   • TONSILLECTOMY  1947   • TOTAL HIP ARTHROPLASTY Right 08/2015   • TOTAL HIP ARTHROPLASTY Right 09/2016   • TOTAL KNEE ARTHROPLASTY Bilateral 2004       General Information     Row Name 12/03/21 1535          Physical Therapy Time and Intention    Document Type evaluation  -DJ     Mode of Treatment individual therapy; physical therapy  -DJ     Row Name 12/03/21 1549 12/03/21 1535       General Information    Patient Profile Reviewed -- yes  -DJ    Prior Level of Function independent:; ADL's; all household mobility  -DJ --    Existing Precautions/Restrictions fall  -DJ --    Barriers to Rehab medically complex  -DJ --    Row Name 12/03/21 1549          Living Environment    Lives With alone  -DJ     Row Name 12/03/21 1549          Home Main Entrance    Number of Stairs, Main Entrance one  -DJ     Row Name 12/03/21 1549          Stairs Within Home, Primary    Stairs, Within Home, Primary optional  -DJ     Row Name 12/03/21 1549          Safety Issues, Functional Mobility    Impairments Affecting Function (Mobility) balance; endurance/activity tolerance  -DJ     Comment, Safety Issues/Impairments (Mobility) gt belt nonskid socks  -DJ           User Key  (r) = Recorded By, (t) = Taken By, (c) = Cosigned By    Initials Name Provider Type    DJ Sherry Kearns, PT Physical Therapist               Mobility     Row Name 12/03/21 1550          Bed Mobility    Bed Mobility sit-supine; scooting/bridging  -DJ     Scooting/Bridging [place] (Bed Mobility) standby assist; verbal cues  -DJ     Sit-Supine [place] (Bed Mobility) standby assist; verbal cues  -DJ     Assistive Device (Bed Mobility) bed rails; head of bed elevated  -DJ     Comment (Bed Mobility) pt moves well in bed; pt on commode when PT arrived  -DJ     Row Name 12/03/21 1550          Transfers    Comment (Transfers) sit/stand from commode  -DJ     Row Name 12/03/21 1550          Bed-Chair Transfer    Bed-Chair [place] (Transfers) not tested  -DJ     Row Name 12/03/21 1550          Sit-Stand Transfer    Sit-Stand [place] (Transfers) modified independence; verbal cues  -DJ     Assistive Device  (Sit-Stand Transfers) walker, front-wheeled  -DJ     Row Name 12/03/21 1550          Gait/Stairs (Locomotion)    [place] Level (Gait) contact guard; verbal cues  -DJ     Assistive Device (Gait) walker, front-wheeled  -DJ     Distance in Feet (Gait) 160'  -DJ     Deviations/Abnormal Patterns (Gait) david decreased; gait speed decreased; stride length decreased  -DJ     Bilateral Gait Deviations forward flexed posture  -DJ     [place] Level (Stairs) not tested  -DJ     Comment (Gait/Stairs) Pt amb 160' with r wx and CGA; slow pace, decreased endurance limtis further distance  -DJ           User Key  (r) = Recorded By, (t) = Taken By, (c) = Cosigned By    Initials Name Provider Type    Sherry Stephen, DAILY Physical Therapist               Obj/Interventions     Row Name 12/03/21 1554          Range of Motion Comprehensive    General Range of Motion no range of motion deficits identified  -DJ     Row Name 12/03/21 1554          Strength Comprehensive (MMT)    Comment, General Manual Muscle Testing (MMT) Assessment good extremity strength  -DJ     Row Name 12/03/21 1554          Motor Skills    Motor Skills functional endurance  -DJ     Functional Endurance fatigues with amb  -DJ     Row Name 12/03/21 1554          Balance    Balance Assessment sitting static balance; standing static balance; standing dynamic balance  -DJ     Static Sitting Balance WNL; unsupported; sitting in chair  -DJ     Static Standing Balance WFL; supported; standing  -DJ     Dynamic Standing Balance WFL; supported; standing  -DJ     Balance Interventions sitting; standing; sit to stand; supported; weight shifting activity  -DJ     Comment, Balance fair with r wx  -DJ     Row Name 12/03/21 1554          Sensory Assessment (Somatosensory)    Sensory Assessment (Somatosensory) not tested  -DJ           User Key  (r) = Recorded By, (t) = Taken By, (c) = Cosigned By    Initials Name Provider Type    Sherry Stephen, PT Physical Therapist                Goals/Plan     Row Name 12/03/21 1601          Bed Mobility Goal 1 (PT)    Activity/Assistive Device (Bed Mobility Goal 1, PT) sit to supine; supine to sit  -DJ     [place] Level/Cues Needed (Bed Mobility Goal 1, PT) modified independence; verbal cues required  -DJ     Time Frame (Bed Mobility Goal 1, PT) 1 week  -DJ     Row Name 12/03/21 1601          Transfer Goal 1 (PT)    Activity/Assistive Device (Transfer Goal 1, PT) sit-to-stand/stand-to-sit  -DJ     [place] Level/Cues Needed (Transfer Goal 1, PT) modified independence; verbal cues required  -DJ     Time Frame (Transfer Goal 1, PT) 5 days  -DJ     Row Name 12/03/21 1601          Gait Training Goal 1 (PT)    Activity/Assistive Device (Gait Training Goal 1, PT) gait (walking locomotion); assistive device use; improve balance and speed; increase endurance/gait distance; increase energy conservation; walker, rolling  -DJ     [place] Level (Gait Training Goal 1, PT) standby assist  -DJ     Distance (Gait Training Goal 1, PT) >250'  -DJ     Time Frame (Gait Training Goal 1, PT) 5 days  -DJ     Row Name 12/03/21 1601          Patient Education Goal (PT)    Activity (Patient Education Goal, PT) HEP  -DJ     [place]/Cues/Accuracy (Memory Goal 2, PT) demonstrates adequately; verbalizes understanding  -DJ     Time Frame (Patient Education Goal, PT) 3 days; short term goal (STG)  -DJ           User Key  (r) = Recorded By, (t) = Taken By, (c) = Cosigned By    Initials Name Provider Type    Sherry Stephen, PT Physical Therapist               Clinical Impression     Row Name 12/03/21 9639          Pain    Additional Documentation Pain Scale: Numbers Pre/Post-Treatment (Group)  -DJ     Row Name 12/03/21 6141          Pain Scale: Numbers Pre/Post-Treatment    Pretreatment Pain Rating 0/10 - no pain  -DJ     Row Name 12/03/21 1783          Plan of Care Review    Plan of Care Reviewed With patient  -DJ     Outcome Summary 86yo white female  admitted 11/29/21 for TIA and L UE weakness. PMH includes hbp, afib, CKD 3, acid reflux, high cholesterol, sleep apnea, MI, anemia, [place] Palsey, panic disorder, vertigo, osteoporosis, spinal stenosis, kidney removal. PLOF: Pt lives at home alone and used a r wx for mobility. She has 1 talha, she was independent with ADLs and mobility. She is primarily limited at this tiem by generalized weakness and decreased activity tolerance that limit her functional mobility. Today, she was in bathroom upon PT arrival. Pt was essentially independent with sit/stand from commode. Pt amb 160' with r wx slowly with CGA, poor endurance limits further amb distance. She would benefit from skilled PT for ther ex, gt/transfer training, bed mobility, balance, endurance and HEP instruction. Pt plans to return home and attend outpt PT.  -DJ     Row Name 12/03/21 1853          Therapy Assessment/Plan (PT)    Patient/Family Therapy Goals Statement (PT) home asap  -DJ     Rehab Potential (PT) good, to achieve stated therapy goals  -DJ     Criteria for Skilled Interventions Met (PT) yes; meets criteria; skilled treatment is necessary  -DJ     Row Name 12/03/21 3375          Vital Signs    O2 Delivery Pre Treatment room air  -DJ     O2 Delivery Intra Treatment room air  -DJ     O2 Delivery Post Treatment room air  -DJ     Pre Patient Position Sitting  -DJ     Intra Patient Position Standing  -DJ     Post Patient Position Supine  -DJ     Row Name 12/03/21 1447          Positioning and Restraints    Pre-Treatment Position bathroom  -DJ     Post Treatment Position bed  -DJ     In Bed notified nsg; supine; call light within reach; encouraged to call for assist  -DJ           User Key  (r) = Recorded By, (t) = Taken By, (c) = Cosigned By    Initials Name Provider Type    Sherry Stephen, PT Physical Therapist               Outcome Measures     Row Name 12/03/21 0114          How much help from another person do you currently need...    Turning from  your back to your side while in flat bed without using bedrails? 4  -DJ     Moving from lying on back to sitting on the side of a flat bed without bedrails? 3  -DJ     Moving to and from a bed to a chair (including a wheelchair)? 3  -DJ     Standing up from a chair using your arms (e.g., wheelchair, bedside chair)? 4  -DJ     Climbing 3-5 steps with a railing? 2  -DJ     To walk in hospital room? 3  -DJ     AM-PAC 6 Clicks Score (PT) 19  -DJ     Row Name 12/03/21 1603          Modified [place] Scale    Modified [place] Scale 4 - Moderately severe disability.  Unable to walk without assistance, and unable to attend to own bodily needs without assistance.  -DJ     Row Name 12/03/21 1603          Functional Assessment    Outcome Measure Options AM-PAC 6 Clicks Basic Mobility (PT)  -           User Key  (r) = Recorded By, (t) = Taken By, (c) = Cosigned By    Initials Name Provider Type    Sherry Stephen, PT Physical Therapist                             Physical Therapy Education                 Title: PT OT SLP Therapies (In Progress)     Topic: Physical Therapy (In Progress)     Point: Mobility training (Done)     Learning Progress Summary           Patient DILCIA Mendez VU by LENARD at 12/3/2021 1603                   Point: Home exercise program (Not Started)     Learner Progress:  Not documented in this visit.          Point: Body mechanics (Done)     Learning Progress Summary           Patient DILCIA Mendez, VU by LENARD at 12/3/2021 1603                   Point: Precautions (Done)     Learning Progress Summary           Patient DILCIA Mendez, VU by LENARD at 12/3/2021 1603                               User Key     Initials Effective Dates Name Provider Type Discipline    LENARD 10/25/19 -  Sherry Kearns, PT Physical Therapist PT              PT Recommendation and Plan  Planned Therapy Interventions (PT): balance training, bed mobility training, gait training, home exercise program, strengthening, postural re-education, patient/family  education, transfer training  Plan of Care Reviewed With: patient  Outcome Summary: 86yo white female admitted 11/29/21 for TIA and L UE weakness. PMH includes hbp, afib, CKD 3, acid reflux, high cholesterol, sleep apnea, MI, anemia, [place] Palsey, panic disorder, vertigo, osteoporosis, spinal stenosis, kidney removal. PLOF: Pt lives at home alone and used a r wx for mobility. She has 1 talha, she was independent with ADLs and mobility. She is primarily limited at this tiem by generalized weakness and decreased activity tolerance that limit her functional mobility. Today, she was in bathroom upon PT arrival. Pt was essentially independent with sit/stand from commode. Pt amb 160' with r wx slowly with CGA, poor endurance limits further amb distance. She would benefit from skilled PT for ther ex, gt/transfer training, bed mobility, balance, endurance and HEP instruction. Pt plans to return home and attend outpt PT.     Time Calculation:    PT Charges     Row Name 12/03/21 1605             Time Calculation    Start Time 1528  -DJ      Stop Time 1551  -DJ      Time Calculation (min) 23 min  -DJ      PT Non-Billable Time (min) 10 min  -DJ      PT Received On 12/03/21  -DJ      PT - Next Appointment 12/04/21  -DJ      PT Goal Re-Cert Due Date 12/08/21  -DJ            User Key  (r) = Recorded By, (t) = Taken By, (c) = Cosigned By    Initials Name Provider Type    Sherry Stephen PT Physical Therapist              Therapy Charges for Today     Code Description Service Date Service Provider Modifiers Qty    27705042296 HC PT EVAL MOD COMPLEXITY 2 12/3/2021 Sherry Kearns, PT GP 1    14705492668 HC PT THER PROC EA 15 MIN 12/3/2021 Sherry Kearns, PT GP 2          PT G-Codes  Outcome Measure Options: AM-PAC 6 Clicks Basic Mobility (PT)  AM-PAC 6 Clicks Score (PT): 19  AM-PAC 6 Clicks Score (OT): 23  Modified [place] Scale: 4 - Moderately severe disability.  Unable to walk without assistance, and unable to attend to own bodily needs  without assistance.    Sherry Kearns, PT  12/3/2021

## 2021-12-03 NOTE — DISCHARGE SUMMARY
Date of Admission: 11/29/2021  Date of Discharge:  12/3/2021  Primary Care Physician: Ken Andujar MD     Discharge Diagnosis:  Active Hospital Problems    Diagnosis  POA   • **TIA (transient ischemic attack) [G45.9]  Yes   • Spinal stenosis, lumbar region with neurogenic claudication [M48.062]  Yes   • History of renal cell carcinoma [Z85.528]  Not Applicable   • Cerebrovascular accident (CVA) due to stenosis of small artery (HCC) [I63.81]  Yes   • History of left nephrectomy [Z90.5]  Not Applicable   • Anemia of chronic disease [D63.8]  Yes   • Chronic coronary artery disease [I25.10]  Yes   • History of MI (myocardial infarction) [I25.2]  Not Applicable   • Sleep apnea [G47.30]  Yes   • Hypercholesterolemia [E78.00]  Yes   • Gastroesophageal reflux disease without esophagitis [K21.9]  Yes   • Essential hypertension [I10]  Yes   • Chronic kidney disease (CKD), stage III (moderate) (CMS/HCC) [N18.30]  Yes   • Atrial fibrillation (HCC) [I48.91]  Yes      Resolved Hospital Problems   No resolved problems to display.       Presenting Problem/History of Present Illness:  Anemia of chronic disease [D63.8]  Acute renal failure superimposed on stage 4 chronic kidney disease, unspecified acute renal failure type (HCC) [N17.9, N18.4]  Cerebrovascular accident (CVA) due to stenosis of small artery (HCC) [I63.81]     Ms. Hummel is a 85 y.o. former smoker with a history of sleep apnea, HLD, renal cell carcinoma status post left nephrectomy, MI, HTN, GERD, CKD stage III, CAD, anemia, and atrial fibrillation on [facility] that presents to [facility] complaining of difficulty speaking.  Patient reports her symptoms began around 5pm yesterday while talking on the phone with her daughter.  Her daughter went over to the house to check on her and found her with slurred speech and left-sided weakness so EMS was notified.  Upon EMS arrival, it was noted that she had expressive aphasia with mild weakness of her left  arm.  She was brought to the ER for further evaluation.  Team D was called in the ED in which patient had an MRI performed that shows no acute intracranial abnormalities.  She has been admitted to our service for further evaluation and treatment.    Hospital Course:  The patient is a 85 y.o. female who presented with TIA after holding eliquis for procedure. She was admitted and neurology evaluated. She was placed on heparin drip for the epidural. After epidural her home eliquis has been resumed.    She did have hyperkalemia this morning and this improved with IVF. Nephrology evaluated and it probably was from NPO status and the heparin drip. She should continue to follow up with her providers to monitor CKD and electrolytes.    She has CKD3 and hx of nephrectomy for renal cell carcinoma. She is on eliquis for AFib.    Discussed with Dr Young today.    Exam Today:  Constitutional:       General: She is not in acute distress.     Appearance: She is not diaphoretic.   HENT:      Head: Normocephalic and atraumatic.   Eyes:      General:         Right eye: No discharge.         Left eye: No discharge.      Conjunctiva/sclera: Conjunctivae normal.   Cardiovascular:      Rate and Rhythm: Normal rate. Rhythm irregular.      Pulses: Normal pulses.   Pulmonary:      Effort: Pulmonary effort is normal.      Breath sounds: No wheezing.   Abdominal:      General: There is no distension.      Palpations: Abdomen is soft.      Tenderness: There is no abdominal tenderness. There is no guarding or rebound.   Musculoskeletal:         General: Tenderness present.      Cervical back: Neck supple. No tenderness.      Right lower leg: No edema.      Left lower leg: No edema.   Skin:     General: Skin is warm and dry.   Neurological:      Mental Status: She is alert. Mental status is at baseline.   Psychiatric:         Mood and Affect: Mood normal.         Behavior: Behavior normal.     Results:  CT Head  1. No acute intracranial  abnormality is seen. There is mild small vessel  disease in the cerebral white matter, calcified plaques in the  intracranial segment of the distal right vertebral artery and cavernous  segments of the internal carotid arteries bilaterally. The remainder of  the head CT is within normal limits. The results were communicated to  Dr. Gamboa, the stroke neurologist while the patient was still on our  scanner 11/29/2021 at 6:15 PM and due to the patient's low GFR no CT  angiogram of the head and neck and no CT perfusion study was performed  and the patient is going to get a STAT MRI of the brain and MRA of the  head and neck to further evaluate. Results were also communicated to Dr. Ray in the ER by telephone 11/29/2021 at 6:20 PM.    MRI Brain/MRA Head and Neck  1. No acute intracranial abnormality.    2. No intracranial stenosis or occlusion.  3. Mild narrowing involving the proximal right internal carotid artery,  in the range of 20-30%. Remainder of the cervical vasculature appears  widely patent.    CT Head  No acute intracranial hemorrhage or hydrocephalus. If there is further  clinical concern, MRI could be considered for further evaluation.    TTE  · Left ventricular ejection fraction appears to be 61 - 65%. Left ventricular systolic function is normal.  · Left ventricular diastolic function was indeterminate.  · Normal right ventricular cavity size and systolic function noted.  · The left atrial cavity is moderately dilated  · Saline test results are negative.  · Mild to moderate aortic valve regurgitation is present  · The aortic valve leaflets are mildly to moderately calcified (aortic sclerosis)  · Trace to mild tricuspid valve regurgitation is present.  · Calculated right ventricular systolic pressure from tricuspid regurgitation is 31 mmHg.  · There is no evidence of pericardial effusion.    Procedures Performed:         Consults:   Consults     Date and Time Order Name Status Description     12/3/2021  8:22 AM Inpatient Nephrology Consult      11/30/2021 12:49 AM Inpatient Neurology Consult Stroke Completed     11/29/2021  9:22 PM [facility] (on-call MD unless specified) Details             Discharge Disposition:  Home or Self Care    Discharge Medications:     Discharge Medications      Changes to Medications      Instructions Start Date   docusate sodium 250 MG capsule  Commonly known as: COLACE  What changed:   · when to take this  · reasons to take this   250 mg, Oral, 2 Times Daily         Continue These Medications      Instructions Start Date   acetaminophen 500 MG tablet  Commonly known as: TYLENOL  Notes to patient: You will just take this as needed for mild pain or discomfort.   1,000 mg, Oral, 3 Times Daily PRN      apixaban 2.5 MG tablet tablet  Commonly known as: ELIQUIS  Notes to patient: You already took this medication today at 1213. You will need to take it again tonight 12 hours from last dose.   2.5 mg, Oral, Every 12 Hours Scheduled      atorvastatin 20 MG tablet  Commonly known as: LIPITOR  Notes to patient: You will need to take this medication tonight   20 mg, Oral, Nightly      diazePAM 5 MG tablet  Commonly known as: VALIUM   5 mg, Oral, Every 8 Hours PRN      DULoxetine 30 MG capsule  Commonly known as: CYMBALTA  Notes to patient: You already took this medication today.   30 mg, Oral, Daily      ezetimibe 10 MG tablet  Commonly known as: ZETIA  Notes to patient: You will need to take this medication tonight..   10 mg, Oral, Nightly      fluticasone 50 MCG/ACT nasal spray  Commonly known as: Flonase  Notes to patient: You already took this medication today.   2 sprays, Nasal, Daily      metoprolol succinate XL 50 MG 24 hr tablet  Commonly known as: TOPROL-XL   75 mg, Oral, Daily      multivitamin with minerals tablet tablet   1 tablet, Oral, Daily      Myrbetriq 50 MG tablet sustained-release 24 hour 24 hr tablet  Generic drug: Mirabegron ER   50 mg, Oral, Every Evening       nystatin-triamcinolone 417160-7.1 UNIT/GM-% cream  Commonly known as: MYCOLOG II   No dose, route, or frequency recorded.      permethrin 5 % cream  Commonly known as: ELIMITE   Apply at bedtime from neck to soles of feet and shower in the morning.      PROLIA SC   1 dose, Subcutaneous, Every 6 Months, Pt unsure of dose       vitamin D3 125 MCG (5000 UT) capsule capsule   5,000 Units, Oral, Daily             Discharge Diet:   Diet Instructions     Advance Diet As Tolerated            Activity at Discharge:   Activity Instructions     Activity as Tolerated            Follow-up Appointments:   Follow-up Information     Ken Andujar MD Follow up.    Specialty: Family Medicine  Contact information:  [id number] [id number] [phone]                         Test Results Pending at Discharge:       Mark Solano MD  12/03/21  16:38 EST    Time Spent on Discharge Activities: >30 minutes    Dictated portions using Dragon dictation software.  During the entire encounter, I was wearing recommended PPE including face mask and eye protection. Hand sanitization was performed prior to entering room and upon exit.

## 2021-12-03 NOTE — PROGRESS NOTES
Name: Marleni Hummel ADMIT: 2021   : 1935  PCP: Ken Andujar MD    MRN: [id number] LOS: 4 days   AGE/SEX: 85 y.o. female  ROOM: [facility]/   Subjective   Chief Complaint   Patient presents with   • Speech Problem      No CP SOA NVD. Feeling well and wants to be discharged. Discussed potassium level today and my plan to give NS bolus and then repeat labs. She was agreeable to stay and have nephrology review.    Objective   Vital Signs  Temp:  [97.1 °F (36.2 °C)-98.7 °F (37.1 °C)] 98.7 °F (37.1 °C)  Heart Rate:  [54-72] 65  Resp:  [14-20] 20  BP: (112-151)/(58-83) 142/58  SpO2:  [85 %-100 %] 100 %  on  Flow (L/min):  [2] 2;   Device (Oxygen Therapy): nasal cannula  Body mass index is 27.44 kg/m².    Physical Exam  Vitals and nursing note reviewed.   Constitutional:       General: She is not in acute distress.     Appearance: She is not diaphoretic.   HENT:      Head: Normocephalic and atraumatic.   Eyes:      General:         Right eye: No discharge.         Left eye: No discharge.      Conjunctiva/sclera: Conjunctivae normal.   Cardiovascular:      Rate and Rhythm: Normal rate. Rhythm irregular.      Pulses: Normal pulses.   Pulmonary:      Effort: Pulmonary effort is normal.      Breath sounds: No wheezing.   Abdominal:      General: There is no distension.      Palpations: Abdomen is soft.      Tenderness: There is no abdominal tenderness. There is no guarding or rebound.   Musculoskeletal:         General: Tenderness present.      Cervical back: Neck supple. No tenderness.      Right lower leg: No edema.      Left lower leg: No edema.   Skin:     General: Skin is warm and dry.   Neurological:      Mental Status: She is alert. Mental status is at baseline.   Psychiatric:         Mood and Affect: Mood normal.         Behavior: Behavior normal.         Results Review:       I reviewed the patient's new clinical results.     I reviewed imaging, agree with interpretation.     I reviewed telemetry/EKG  results, afib, rate ok      Results from last 7 days   Lab Units 12/03/21 0227 12/02/21  0656 12/01/21  0609 11/30/21  1422   WBC 10*3/mm3 5.52 6.61 4.74 5.57   HEMOGLOBIN g/dL 9.4* 10.1* 9.2* 10.1*   PLATELETS 10*3/mm3 136* 142 126* 149     Results from last 7 days   Lab Units 12/03/21 0227 12/02/21  1607 11/30/21  0649 11/29/21  1806   SODIUM mmol/L 138 135* 139 138   POTASSIUM mmol/L 5.9* 5.2 4.6 4.6   CHLORIDE mmol/L 107 102 103 100   CO2 mmol/L 23.3 22.9 23.9 27.7   BUN mg/dL 34* 24* 36* 41*   CREATININE mg/dL 1.93* 1.67* 1.82* 2.12*   GLUCOSE mg/dL 127* 90 101* 128*   Estimated Creatinine Clearance: 20 mL/min (A) (by C-G formula based on SCr of 1.93 mg/dL (H)).  Results from last 7 days   Lab Units 12/03/21 0227 12/02/21  1607 11/30/21  0649 11/29/21  1806   CALCIUM mg/dL 8.1* 8.7 9.1 10.0   ALBUMIN g/dL  --   --  4.20 4.50     Results from last 7 days   Lab Units 12/03/21 0227 12/02/21  1734 12/02/21  0656 11/30/21  2131 11/30/21  1422 11/29/21  1806   INR   --   --   --   --  1.12* 1.11*   APTT seconds 162.0* 28.1 73.7*   < > 29.1  --     < > = values in this interval not displayed.        amLODIPine, 5 mg, Oral, Q24H  atorvastatin, 80 mg, Oral, Nightly  diphenhydrAMINE, 25 mg, Oral, Once  docusate sodium, 200 mg, Oral, BID  DULoxetine, 30 mg, Oral, Daily  famotidine, 20 mg, Oral, Daily  fluticasone, 2 spray, Nasal, Daily  hydrALAZINE, 25 mg, Oral, Q8H  iopamidol, 12 mL, Epidural, Once in imaging  metoprolol succinate XL, 75 mg, Oral, Daily  oxybutynin XL, 10 mg, Oral, Daily  sodium chloride, 1,000 mL, Intravenous, Once      heparin (porcine), 12 Units/kg/hr, Last Rate: 9 Units/kg/hr (12/03/21 0503)    Diet Regular; Low Potassium    Assessment/Plan      Active Hospital Problems    Diagnosis  POA   • **TIA (transient ischemic attack) [G45.9]  Yes   • Spinal stenosis, lumbar region with neurogenic claudication [M48.062]  Yes   • History of renal cell carcinoma [Z85.528]  Not Applicable   • Cerebrovascular  accident (CVA) due to stenosis of small artery (HCC) [I63.81]  Yes   • History of left nephrectomy [Z90.5]  Not Applicable   • Anemia of chronic disease [D63.8]  Yes   • Chronic coronary artery disease [I25.10]  Yes   • History of MI (myocardial infarction) [I25.2]  Not Applicable   • Sleep apnea [G47.30]  Yes   • Hypercholesterolemia [E78.00]  Yes   • Gastroesophageal reflux disease without esophagitis [K21.9]  Yes   • Essential hypertension [I10]  Yes   • Chronic kidney disease (CKD), stage III (moderate) (CMS/HCC) [N18.30]  Yes   • Atrial fibrillation (HCC) [I48.91]  Yes      Resolved Hospital Problems   No resolved problems to display.       · TIA after holding Eliquis in anticipation of lumbar epidural injection- Epidural yesterday. Heparin was continued overnight. Stop heparin and resume eliquis.  · Essential hypertension-Improved today. Monitor with current regimen.  · GERD-Famotidine  · Chronic afib: rate ok, resume eliquis when able as above  · History of renal cell carcinoma s/p left nephrectomy with CKD3-4: Hyperkalemia this morning without symptoms or ekg change. She was NPO yesterday. Give 1L NS. Repeat labs, consult nephrology.  · CAD  · Sleep apnea  · Borderline RAMBO in the setting of CKD  · Lumbar spinal stenosis  · Disposition: Home/potentially this afternoon if repeat labs ok, otherwise possibly tomorrow.    Mark Solano MD  [facility]  12/03/21  09:50 EST    Dictated portions using Dragon dictation software.    During the entire encounter, I was wearing recommended PPE including face mask and eye protection. Hand sanitization was performed prior to entering room and upon exit.

## 2021-12-04 NOTE — OUTREACH NOTE
Prep Survey      Responses   [facility] patient discharged from? [facility]   Is LACE score < 7 ? No   Emergency Room discharge w/ pulse ox? No   Eligibility [facility]   Date of Admission 11/29/21   Date of Discharge 12/03/21   Discharge Disposition Home or Self Care   Discharge diagnosis transient ischemic attack   Does the patient have one of the following disease processes/diagnoses(primary or secondary)? Stroke (TIA)   Does the patient have Home health ordered? No   Is there a DME ordered? No   Prep survey completed? Yes          Shanti Gale RN

## 2021-12-06 ENCOUNTER — TRANSITIONAL CARE MANAGEMENT TELEPHONE ENCOUNTER (OUTPATIENT)
Dept: CALL CENTER | Facility: HOSPITAL | Age: 86
End: 2021-12-06

## 2021-12-06 ENCOUNTER — OFFICE VISIT (OUTPATIENT)
Dept: FAMILY MEDICINE CLINIC | Facility: CLINIC | Age: 86
End: 2021-12-06

## 2021-12-06 VITALS
TEMPERATURE: 97.2 F | HEART RATE: 64 BPM | BODY MASS INDEX: 27.79 KG/M2 | WEIGHT: 151 LBS | DIASTOLIC BLOOD PRESSURE: 81 MMHG | SYSTOLIC BLOOD PRESSURE: 182 MMHG | OXYGEN SATURATION: 98 % | RESPIRATION RATE: 16 BRPM | HEIGHT: 62 IN

## 2021-12-06 DIAGNOSIS — Z09 HOSPITAL DISCHARGE FOLLOW-UP: Primary | ICD-10-CM

## 2021-12-06 DIAGNOSIS — E78.00 HYPERCHOLESTEROLEMIA: ICD-10-CM

## 2021-12-06 DIAGNOSIS — G47.39 OTHER SLEEP APNEA: ICD-10-CM

## 2021-12-06 DIAGNOSIS — G45.9 TIA (TRANSIENT ISCHEMIC ATTACK): ICD-10-CM

## 2021-12-06 DIAGNOSIS — I10 ESSENTIAL HYPERTENSION: ICD-10-CM

## 2021-12-06 PROCEDURE — 99495 TRANSJ CARE MGMT MOD F2F 14D: CPT | Performed by: FAMILY MEDICINE

## 2021-12-06 PROCEDURE — 1111F DSCHRG MED/CURRENT MED MERGE: CPT | Performed by: FAMILY MEDICINE

## 2021-12-06 RX ORDER — AMLODIPINE BESYLATE 2.5 MG/1
2.5 TABLET ORAL DAILY
Qty: 90 TABLET | Refills: 0 | Status: SHIPPED | OUTPATIENT
Start: 2021-12-06 | End: 2022-03-07

## 2021-12-06 NOTE — ASSESSMENT & PLAN NOTE
TIA affecting right arm and speech.  Currently clear.  Patient on Eliquis.  Needs gentle improvement of blood pressure.  Short-term follow-up.  No new medications.  Check labs prior to next visit.

## 2021-12-06 NOTE — PROGRESS NOTES
Transitional Care Follow Up Visit  Subjective     Marleni is a 85 y.o. female who presents for a transitional care management visit.    Within 48 business hours after discharge our office contacted her via telephone to coordinate her care and needs.      I reviewed and discussed the details of that call along with the discharge summary, hospital problems, inpatient lab results, inpatient diagnostic studies, and consultation reports with Marleni.     Current outpatient and discharge medications have been reconciled for the patient.  Reviewed by: Ken Andujar MD      Date of TCM Phone Call 2/26/2020 11/21/2020 12/3/2021   [facility]   Date of Admission 2/23/2020 11/16/2020 11/29/2021   Date of Discharge 2/25/2020 11/21/2020 12/3/2021   Discharge Disposition Home or Self Care Home or Self Care Home or Self Care       Risk for Readmission (LACE) Score: 13 (12/3/2021  6:01 AM)      Patient was admitted to hospital on November 29 and out on December 3.  She was diagnosed with TIA which affected his speech and also strength in her right arm.  Her symptoms have mostly gone back to normal.  Medications have been reviewed.  Most recent lipid panel shows LDL cholesterol below 70.  Her x-rays show white matter disease but no completed stroke.  Her blood pressure today is elevated above goal.  Previously she was on amlodipine up to 5 mg and this was discontinued upon coming home.  She was on amlodipine briefly in the hospital and was well controlled.  We will carefully resume this medication with short-term follow-up.  She has an appointment with nephrology in early January.  Medicines have been reconciled.  CT scans and recent labs have been reviewed during today's visit.    Patient has history of sleep apnea but has not used her CPAP machine for many years.  She does need referral back to sleep medicine for reevaluation of that condition.       Course During  "Hospital Stay The following information was reviewed by: Ken Andujar MD on 12/06/2021:   ED to Hosp-Admission (Discharged) with Mark Solano MD; Greg Ray MD (11/29/2021)  Lipid Panel (11/30/2021 06:49)     The following portions of the patient's history were reviewed and updated as appropriate: allergies, current medications, past family history, past medical history, past social history, past surgical history and problem list.     Vitals:    12/06/21 1545   BP: (!) 182/81   Pulse: 64   Resp: 16   Temp: 97.2 °F (36.2 °C)   TempSrc: Skin   SpO2: 98%   Weight: 68.5 kg (151 lb)   Height: 157.5 cm (62\")       Advance Care Planning   ACP discussion was held with the patient during this visit. Patient has an advance directive in EMR which is still valid.            Objective   Physical Exam  Vitals reviewed.   Constitutional:       General: She is not in acute distress.  Eyes:      General: Lids are normal.      Conjunctiva/sclera: Conjunctivae normal.   Neck:      Vascular: No carotid bruit.      Trachea: No tracheal deviation.   Cardiovascular:      Rate and Rhythm: Normal rate and regular rhythm.      Heart sounds: Normal heart sounds. No murmur heard.      Pulmonary:      Effort: Pulmonary effort is normal.      Breath sounds: Normal breath sounds.   Skin:     General: Skin is warm and dry.   Neurological:      Mental Status: She is alert. She is not disoriented.   Psychiatric:         Speech: Speech normal.         Behavior: Behavior normal. Behavior is cooperative.         DATA REVIEWED:    The following data was reviewed by: Ken Andujar MD on 12/06/2021:    CT Head Without Contrast    Result Date: 12/1/2021  Impression:  No acute intracranial hemorrhage or hydrocephalus. If there is further clinical concern, MRI could be considered for further evaluation.  This report was finalized on 12/1/2021 8:31 PM by Dr. Jaspreet Whitt M.D.      CT Head Without Contrast    Result Date: " 11/30/2021  Impression: 1. No acute intracranial abnormality is seen. There is mild small vessel disease in the cerebral white matter, calcified plaques in the intracranial segment of the distal right vertebral artery and cavernous segments of the internal carotid arteries bilaterally. The remainder of the head CT is within normal limits. The results were communicated to Dr. Gamboa, the stroke neurologist while the patient was still on our scanner 11/29/2021 at 6:15 PM and due to the patient's low GFR no CT angiogram of the head and neck and no CT perfusion study was performed and the patient is going to get a STAT MRI of the brain and MRA of the head and neck to further evaluate. Results were also communicated to Dr. Ray in the ER by telephone 11/29/2021 at 6:20 PM.  Radiation dose reduction techniques were utilized, including automated exposure control and exposure modulation based on body size.  This report was finalized on 11/30/2021 6:11 AM by Dr. Pawan Kate M.D.      MRI Angiogram Head Without Contrast    Result Date: 11/29/2021  Impression: 1. No acute intracranial abnormality.  2. No intracranial stenosis or occlusion. 3. Mild narrowing involving the proximal right internal carotid artery, in the range of 20-30%. Remainder of the cervical vasculature appears widely patent.  This report was finalized on 11/29/2021 10:11 PM by Dr. Beata Osuna M.D.      MRI Angiogram Neck Without Contrast    Result Date: 11/29/2021  Impression: 1. No acute intracranial abnormality.  2. No intracranial stenosis or occlusion. 3. Mild narrowing involving the proximal right internal carotid artery, in the range of 20-30%. Remainder of the cervical vasculature appears widely patent.  This report was finalized on 11/29/2021 10:11 PM by Dr. Beata Osuna M.D.      MRI Brain Without Contrast    Result Date: 11/29/2021  Impression: 1. No acute intracranial abnormality.  2. No intracranial stenosis or occlusion. 3. Mild  narrowing involving the proximal right internal carotid artery, in the range of 20-30%. Remainder of the cervical vasculature appears widely patent.  This report was finalized on 11/29/2021 10:11 PM by Dr. Beata Osuna M.D.        Assessment/Plan     Diagnoses and all orders for this visit:    1. Hospital discharge follow-up (Primary)    2. Essential hypertension  Assessment & Plan:  Hypertension is worsening.  Add amlodipine 2.5 mg daily to current regimen  Medication changes per orders.  Blood pressure will be reassessed in 3 months.    Orders:  -     amLODIPine (NORVASC) 2.5 MG tablet; Take 1 tablet by mouth Daily for 90 days.  Dispense: 90 tablet; Refill: 0  -     Comprehensive Metabolic Panel; Future  -     CBC & Differential; Future    3. Other sleep apnea  -     Ambulatory Referral to Sleep Medicine    4. TIA (transient ischemic attack)  Assessment & Plan:    TIA affecting right arm and speech.  Currently clear.  Patient on Eliquis.  Needs gentle improvement of blood pressure.  Short-term follow-up.  No new medications.  Check labs prior to next visit.    Orders:  -     Lipid Panel With / Chol / HDL Ratio; Future    5. Hypercholesterolemia  -     Lipid Panel With / Chol / HDL Ratio; Future  -     CK; Future      Follow Up     Return in about 3 months (around 3/6/2022) for Medicare Wellness & regular visit, [id number] min.

## 2021-12-06 NOTE — OUTREACH NOTE
Call Center TCM Note      Responses   [facility] patient discharged from? [place]   Does the patient have one of the following disease processes/diagnoses(primary or secondary)? Stroke (TIA)   TCM attempt successful? Yes  [Silvia, daughter]   Call start time 1417   Call end time 1422   Discharge diagnosis transient ischemic attack   Person spoke with today (if not patient) and relationship C   Meds reviewed with patient/caregiver? Yes   Is the patient having any side effects they believe may be caused by any medication additions or changes? No   Does the patient have all medications ordered at discharge? N/A   Is the patient taking all medications as directed (includes completed medication regime)? Yes   Does the patient have a primary care provider?  Yes   Does the patient have an appointment with their PCP within 7 days of discharge? Yes   Has the patient kept scheduled appointments due by today? N/A   Psychosocial issues? No   Does the patient require any assistance with activities of daily living such as eating, bathing, dressing, walking, etc.? No   Does the patient have any residual symptoms from stroke/TIA? No   Does the patient understand the diet ordered at discharge? Yes   Did the patient receive a copy of their discharge instructions? Yes   Nursing interventions Reviewed instructions with patient   What is the patient's perception of their health status since discharge? Improving   Nursing interventions Nurse provided patient education   Is the patient able to teach back FAST for Stroke? Yes   Is the patient/caregiver able to teach back the risk factors for a stroke? Physical inactivity and obesity,  High Cholesterol,  Diabetes,  Smoking,  High blood pressure-goal below 120/80,  Sleep apnea,  History of TIAs   Is the patient/caregiver able to teach back signs and symptoms related to disease process for when to call PCP? Yes   Is the patient/caregiver able to teach back signs and symptoms related  to disease process for when to call 911? Yes   If the patient is a current smoker, are they able to teach back resources for cessation? Not a smoker   Is the patient/caregiver able to teach back the hierarchy of who to call/visit for symptoms/problems? PCP, Specialist, Home health nurse, Urgent Care, ED, 911 Yes   TCM call completed? Yes   Wrap up additional comments Silvia,daughter, states pt is doing really good,  no residual s/s. No questions/concerns.          Daisha Carrera RN    12/6/2021, 14:27 EST

## 2021-12-06 NOTE — ASSESSMENT & PLAN NOTE
Hypertension is worsening.  Add amlodipine 2.5 mg daily to current regimen  Medication changes per orders.  Blood pressure will be reassessed in 3 months.

## 2021-12-08 LAB — CREAT BLDA-MCNC: 2.3 MG/DL (ref 0.6–1.3)

## 2021-12-14 ENCOUNTER — READMISSION MANAGEMENT (OUTPATIENT)
Dept: CALL CENTER | Facility: HOSPITAL | Age: 86
End: 2021-12-14

## 2021-12-14 NOTE — OUTREACH NOTE
Stroke Week 2 Survey      Responses   [facility] patient discharged from? [place]   Does the patient have one of the following disease processes/diagnoses(primary or secondary)? Stroke (TIA)   Week 2 attempt successful? Yes   Call start time 0850   Call end time 0854   Discharge diagnosis transient ischemic attack   Person spoke with today (if not patient) and relationship Daughter / Silvia   Is the patient taking all medications as directed (includes completed medication regime)? Yes   Medication comments PCP added BP medications   Does the patient have a primary care provider?  Yes   Has the patient kept scheduled appointments due by today? Yes   Psychosocial issues? No   Does the patient require any assistance with activities of daily living such as eating, bathing, dressing, walking, etc.? No   Does the patient have any residual symptoms from stroke/TIA? No   Does the patient understand the diet ordered at discharge? Yes   What is the patient's perception of their health status since discharge? Improving   Is the patient able to teach back FAST for Stroke? Yes   Is the patient/caregiver able to teach back the risk factors for a stroke? High blood pressure-goal below 120/80,  History of TIAs,  High Cholesterol   Is the patient/caregiver able to teach back signs and symptoms related to disease process for when to call PCP? Yes   Is the patient/caregiver able to teach back signs and symptoms related to disease process for when to call 911? Yes   If the patient is a current smoker, are they able to teach back resources for cessation? Not a smoker   Is the patient/caregiver able to teach back the hierarchy of who to call/visit for symptoms/problems? PCP, Specialist, Home health nurse, Urgent Care, ED, 911 Yes   Additional teach back comments States she is doing much better.  Saw her PCP and her bp was 180 over something and he added bp medication and it has gone down.  They monitor it closely.     Week 2  call completed? Yes   Revoked No further contact(revokes)-requires comment   Is the patient interested in additional calls from an ambulatory ?  NOTE:  applies to high risk patients requiring additional follow-up. No   Graduated/Revoked comments Denies questions or needs at this time          Yazmin Zuniga LPN

## 2022-01-29 ENCOUNTER — APPOINTMENT (OUTPATIENT)
Dept: CT IMAGING | Facility: HOSPITAL | Age: 87
End: 2022-01-29

## 2022-01-29 ENCOUNTER — HOSPITAL ENCOUNTER (EMERGENCY)
Facility: HOSPITAL | Age: 87
Discharge: HOME | End: 2022-01-29
Attending: EMERGENCY MEDICINE | Admitting: EMERGENCY MEDICINE

## 2022-01-29 VITALS
TEMPERATURE: 98.6 F | DIASTOLIC BLOOD PRESSURE: 82 MMHG | OXYGEN SATURATION: 95 % | HEART RATE: 57 BPM | RESPIRATION RATE: 16 BRPM | SYSTOLIC BLOOD PRESSURE: 157 MMHG

## 2022-01-29 DIAGNOSIS — R51.9 NONINTRACTABLE HEADACHE, UNSPECIFIED CHRONICITY PATTERN, UNSPECIFIED HEADACHE TYPE: ICD-10-CM

## 2022-01-29 DIAGNOSIS — D68.9 COAGULOPATHY: ICD-10-CM

## 2022-01-29 DIAGNOSIS — R10.30 LOWER ABDOMINAL PAIN: ICD-10-CM

## 2022-01-29 DIAGNOSIS — R11.2 NON-INTRACTABLE VOMITING WITH NAUSEA, UNSPECIFIED VOMITING TYPE: Primary | ICD-10-CM

## 2022-01-29 DIAGNOSIS — N18.9 CHRONIC RENAL FAILURE, UNSPECIFIED CKD STAGE: ICD-10-CM

## 2022-01-29 LAB
ALBUMIN SERPL-MCNC: 4.4 G/DL (ref 3.5–5.2)
ALBUMIN/GLOB SERPL: 1.6 G/DL
ALP SERPL-CCNC: 42 U/L (ref 39–117)
ALT SERPL W P-5'-P-CCNC: 13 U/L (ref 1–33)
ANION GAP SERPL CALCULATED.3IONS-SCNC: 9.4 MMOL/L (ref 5–15)
AST SERPL-CCNC: 19 U/L (ref 1–32)
BACTERIA UR QL AUTO: ABNORMAL /HPF
BASOPHILS # BLD AUTO: 0.03 10*3/MM3 (ref 0–0.2)
BASOPHILS NFR BLD AUTO: 0.5 % (ref 0–1.5)
BILIRUB SERPL-MCNC: 0.7 MG/DL (ref 0–1.2)
BILIRUB UR QL STRIP: NEGATIVE
BUN SERPL-MCNC: 32 MG/DL (ref 8–23)
BUN/CREAT SERPL: 17.3 (ref 7–25)
CALCIUM SPEC-SCNC: 9.6 MG/DL (ref 8.6–10.5)
CHLORIDE SERPL-SCNC: 99 MMOL/L (ref 98–107)
CLARITY UR: CLEAR
CO2 SERPL-SCNC: 23.6 MMOL/L (ref 22–29)
COLOR UR: YELLOW
CREAT SERPL-MCNC: 1.85 MG/DL (ref 0.57–1)
DEPRECATED RDW RBC AUTO: 45.5 FL (ref 37–54)
EOSINOPHIL # BLD AUTO: 0.09 10*3/MM3 (ref 0–0.4)
EOSINOPHIL NFR BLD AUTO: 1.4 % (ref 0.3–6.2)
ERYTHROCYTE [DISTWIDTH] IN BLOOD BY AUTOMATED COUNT: 13.1 % (ref 12.3–15.4)
GFR SERPL CREATININE-BSD FRML MDRD: 26 ML/MIN/1.73
GLOBULIN UR ELPH-MCNC: 2.8 GM/DL
GLUCOSE SERPL-MCNC: 114 MG/DL (ref 65–99)
GLUCOSE UR STRIP-MCNC: NEGATIVE MG/DL
HCT VFR BLD AUTO: 34.6 % (ref 34–46.6)
HGB BLD-MCNC: 11.6 G/DL (ref 12–15.9)
HGB UR QL STRIP.AUTO: ABNORMAL
HYALINE CASTS UR QL AUTO: ABNORMAL /LPF
IMM GRANULOCYTES # BLD AUTO: 0.02 10*3/MM3 (ref 0–0.05)
IMM GRANULOCYTES NFR BLD AUTO: 0.3 % (ref 0–0.5)
INR PPP: 1.26 (ref 0.9–1.1)
KETONES UR QL STRIP: NEGATIVE
LEUKOCYTE ESTERASE UR QL STRIP.AUTO: NEGATIVE
LIPASE SERPL-CCNC: 38 U/L (ref 13–60)
LYMPHOCYTES # BLD AUTO: 1.12 10*3/MM3 (ref 0.7–3.1)
LYMPHOCYTES NFR BLD AUTO: 17.3 % (ref 19.6–45.3)
MAGNESIUM SERPL-MCNC: 2.3 MG/DL (ref 1.6–2.4)
MCH RBC QN AUTO: 31.6 PG (ref 26.6–33)
MCHC RBC AUTO-ENTMCNC: 33.5 G/DL (ref 31.5–35.7)
MCV RBC AUTO: 94.3 FL (ref 79–97)
MONOCYTES # BLD AUTO: 0.48 10*3/MM3 (ref 0.1–0.9)
MONOCYTES NFR BLD AUTO: 7.4 % (ref 5–12)
NEUTROPHILS NFR BLD AUTO: 4.73 10*3/MM3 (ref 1.7–7)
NEUTROPHILS NFR BLD AUTO: 73.1 % (ref 42.7–76)
NITRITE UR QL STRIP: NEGATIVE
NRBC BLD AUTO-RTO: 0 /100 WBC (ref 0–0.2)
PH UR STRIP.AUTO: 5.5 [PH] (ref 5–8)
PLATELET # BLD AUTO: 175 10*3/MM3 (ref 140–450)
PMV BLD AUTO: 9.9 FL (ref 6–12)
POTASSIUM SERPL-SCNC: 5.1 MMOL/L (ref 3.5–5.2)
PROT SERPL-MCNC: 7.2 G/DL (ref 6–8.5)
PROT UR QL STRIP: ABNORMAL
PROTHROMBIN TIME: 15.6 SECONDS (ref 11.7–14.2)
RBC # BLD AUTO: 3.67 10*6/MM3 (ref 3.77–5.28)
RBC # UR STRIP: ABNORMAL /HPF
REF LAB TEST METHOD: ABNORMAL
SODIUM SERPL-SCNC: 132 MMOL/L (ref 136–145)
SP GR UR STRIP: 1.02 (ref 1–1.03)
SQUAMOUS #/AREA URNS HPF: ABNORMAL /HPF
TROPONIN T SERPL-MCNC: <0.01 NG/ML (ref 0–0.03)
UROBILINOGEN UR QL STRIP: ABNORMAL
WBC # UR STRIP: ABNORMAL /HPF
WBC NRBC COR # BLD: 6.47 10*3/MM3 (ref 3.4–10.8)

## 2022-01-29 PROCEDURE — 84484 ASSAY OF TROPONIN QUANT: CPT | Performed by: EMERGENCY MEDICINE

## 2022-01-29 PROCEDURE — 96374 THER/PROPH/DIAG INJ IV PUSH: CPT

## 2022-01-29 PROCEDURE — 25010000002 ONDANSETRON PER 1 MG: Performed by: EMERGENCY MEDICINE

## 2022-01-29 PROCEDURE — 93005 ELECTROCARDIOGRAM TRACING: CPT | Performed by: EMERGENCY MEDICINE

## 2022-01-29 PROCEDURE — 81001 URINALYSIS AUTO W/SCOPE: CPT | Performed by: EMERGENCY MEDICINE

## 2022-01-29 PROCEDURE — 80053 COMPREHEN METABOLIC PANEL: CPT | Performed by: EMERGENCY MEDICINE

## 2022-01-29 PROCEDURE — 83735 ASSAY OF MAGNESIUM: CPT | Performed by: EMERGENCY MEDICINE

## 2022-01-29 PROCEDURE — 83690 ASSAY OF LIPASE: CPT | Performed by: EMERGENCY MEDICINE

## 2022-01-29 PROCEDURE — 85025 COMPLETE CBC W/AUTO DIFF WBC: CPT | Performed by: EMERGENCY MEDICINE

## 2022-01-29 PROCEDURE — 96360 HYDRATION IV INFUSION INIT: CPT

## 2022-01-29 PROCEDURE — 85610 PROTHROMBIN TIME: CPT | Performed by: EMERGENCY MEDICINE

## 2022-01-29 PROCEDURE — 74176 CT ABD & PELVIS W/O CONTRAST: CPT

## 2022-01-29 PROCEDURE — 96361 HYDRATE IV INFUSION ADD-ON: CPT

## 2022-01-29 PROCEDURE — 93010 ELECTROCARDIOGRAM REPORT: CPT | Performed by: INTERNAL MEDICINE

## 2022-01-29 PROCEDURE — 70450 CT HEAD/BRAIN W/O DYE: CPT

## 2022-01-29 PROCEDURE — 36415 COLL VENOUS BLD VENIPUNCTURE: CPT

## 2022-01-29 PROCEDURE — 99284 EMERGENCY DEPT VISIT MOD MDM: CPT

## 2022-01-29 RX ORDER — ONDANSETRON 2 MG/ML
4 INJECTION INTRAMUSCULAR; INTRAVENOUS ONCE
Status: COMPLETED | OUTPATIENT
Start: 2022-01-29 | End: 2022-01-29

## 2022-01-29 RX ORDER — FEXOFENADINE HCL 180 MG/1
180 TABLET ORAL DAILY PRN
COMMUNITY
End: 2022-04-20 | Stop reason: ALTCHOICE

## 2022-01-29 RX ORDER — SODIUM CHLORIDE 0.9 % (FLUSH) 0.9 %
10 SYRINGE (ML) INJECTION AS NEEDED
Status: DISCONTINUED | OUTPATIENT
Start: 2022-01-29 | End: 2022-01-29 | Stop reason: HOSPADM

## 2022-01-29 RX ORDER — SODIUM CHLORIDE 9 MG/ML
125 INJECTION, SOLUTION INTRAVENOUS CONTINUOUS
Status: DISCONTINUED | OUTPATIENT
Start: 2022-01-29 | End: 2022-01-29 | Stop reason: HOSPADM

## 2022-01-29 RX ADMIN — ONDANSETRON 4 MG: 2 INJECTION INTRAMUSCULAR; INTRAVENOUS at 13:37

## 2022-01-29 RX ADMIN — SODIUM CHLORIDE 125 ML/HR: 9 INJECTION, SOLUTION INTRAVENOUS at 13:38

## 2022-01-30 LAB — QT INTERVAL: 447 MS

## 2022-02-03 ENCOUNTER — PATIENT OUTREACH (OUTPATIENT)
Dept: CASE MANAGEMENT | Facility: OTHER | Age: 87
End: 2022-02-03

## 2022-02-03 NOTE — OUTREACH NOTE
Ambulatory Case Management Note    Care Evaluation    Questions/Answers      Most Recent Value   Suggested Appointments Other (See Comment)  [Patient followed up with [facility] Monday, 1/31/22. ]   Annual Wellness Visit:  Patient Will Schedule  [scheduled for March 2022]   Other Patient Education/Resources  [employer] Education Method --  [active]   Advanced Directives: Patient Has   Medication Adherence Medications understood   Healthy Lifestyle (Self-Efficacy) recognizes when to contact medical assistance,  self-reports important symptoms to medical professional      Patient Outreach    Introduced self, explained ACM RN role and provided contact information. Spoke with patient regarding health and wellness. Patient states she is doing better. She was able to follow up with [facility] on 1/31/22 after ED visit. Patient received Amoxicillin injection and PO prescription. Patient feels better today and knows to follow up with Urology for further needs. Social Determinants survey sent to patient via [employer]. Follow up review scheduled in 1 month.     There are no recently modified care plans to display for this patient.      Bren Sarmiento RN  Ambulatory Case Management    2/3/2022, 12:10 EST

## 2022-02-14 ENCOUNTER — TELEPHONE (OUTPATIENT)
Dept: CARDIOLOGY | Facility: CLINIC | Age: 87
End: 2022-02-14

## 2022-02-14 ENCOUNTER — TELEPHONE (OUTPATIENT)
Dept: PAIN MEDICINE | Facility: HOSPITAL | Age: 87
End: 2022-02-14

## 2022-02-14 NOTE — TELEPHONE ENCOUNTER
RECEIVED EMAIL THAT PT WANTED TO SCHEDULE HER 2ND EPIDURAL. CALLED PT AND SHE STATED THAT SHE CANNOT COME OFF BLOOD THINNER. I INSTRUCTED HER TO CONSULT HER HEART DR. SHE WAS GIVEN OUR DIRECT NUMBER AND WILL CALL BACK IF ABLE TO STOP BLOOD THINNERS.

## 2022-02-14 NOTE — TELEPHONE ENCOUNTER
She can hold Eliquis 3 days prior to the epidural and have the epidural and day 4.  She should resume Eliquis as soon as possible after the procedure but should ask the doctor performing the procedure when she should resume it.

## 2022-02-14 NOTE — TELEPHONE ENCOUNTER
Patient calling stating she  has been having epidurals in her back and is needing to have some more done. She is calling to get approval to continue having these epidurals, and asking if it is ok for her to Hold her Eliquis prior to the epidurals. She is asking how long she can hold the Eliquis. Patient states she needs to have them done soon, however she is unsure if it is safe since she has a history of strokes. Please advise.

## 2022-02-17 ENCOUNTER — TELEPHONE (OUTPATIENT)
Dept: FAMILY MEDICINE CLINIC | Facility: CLINIC | Age: 87
End: 2022-02-17

## 2022-02-17 NOTE — TELEPHONE ENCOUNTER
Caller: NEAL HERNANDEZ    Relationship to patient: Emergency Contact    Best call back number: [phone]    Patient is needing: PATIENT'S DAUGHTER CALLED IN AND SAID THE PATIENT NEEDED TO BE SOON BECAUSE SHE HAS NAUSEA AND VOMITING. SHE WAS TOLD BY ANOTHER DOCTOR THAT SHE HAD AN INFECTION THAT IS CLEARED UP BUT SHE DOES NOT NEED TO WAIT UNTIL 3/7 TO BE SEEN. PATIENT WAS OFFERED APPOINTMENTS THIS AFTERNOON WITH THE APRN'S BUT THE PATIENT REFUSED AND SAID SHE WOULD NOT COME IN UNTIL Monday. PATIENT HAS AN APPOINTMENT ON 2/21 AT 1:15PM.

## 2022-02-17 NOTE — TELEPHONE ENCOUNTER
Noted.  Call patient tomorrow and see if she needs to be seen in the urgent care or emergency room if her symptoms are changing or worsening.

## 2022-02-21 ENCOUNTER — OFFICE VISIT (OUTPATIENT)
Dept: FAMILY MEDICINE CLINIC | Facility: CLINIC | Age: 87
End: 2022-02-21

## 2022-02-21 VITALS
SYSTOLIC BLOOD PRESSURE: 114 MMHG | HEART RATE: 71 BPM | WEIGHT: 144 LBS | TEMPERATURE: 96.9 F | RESPIRATION RATE: 16 BRPM | HEIGHT: 62 IN | BODY MASS INDEX: 26.5 KG/M2 | OXYGEN SATURATION: 98 % | DIASTOLIC BLOOD PRESSURE: 70 MMHG

## 2022-02-21 DIAGNOSIS — R11.2 NAUSEA AND VOMITING IN ADULT: Primary | ICD-10-CM

## 2022-02-21 DIAGNOSIS — R19.7 DIARRHEA, UNSPECIFIED TYPE: ICD-10-CM

## 2022-02-21 PROCEDURE — 99213 OFFICE O/P EST LOW 20 MIN: CPT

## 2022-02-21 NOTE — PROGRESS NOTES
"Chief Complaint  Nausea and Vomiting    Subjective          Marleni Hummel presents to [facility]    History of Present Illness    aMrleni Hummel 86 y.o. female who presents for evaluation of nausea and vomiting.     She was diagnosed with a UTI by [facility]. Urine culture was positive and she was started on Macrobid. She started experiencing vomiting and nausea after starting Macrobid. She then presented to [facility] ED on 01/29/2022 for nausea, vomiting, and generalized weakness. CT of abdomen and pelvis and CT of the head was performed. CT abdomen and pelvis showed colonic diverticulosis and no acute inflammatory process of bowel, no hydronephrosis, and status-post left nephrectomy. CT head showed no acute intracranial hemorrhage or hydrocephalus. She was then discharged home.     The nausea and vomiting both stopped on Thursday. She reports taking one dose of Pepto Bismol and then her symptoms improved. All symptoms have resolved since Thursday. Her appetite has returned. She is eating a regular diet with no nausea, vomiting, diarrhea, or fever. She is able to tolerate fluids and food now. She is voiding regularly now. She was having regular bowel movements every day, other than one episode of a small amount of loose stool this morning.     She  denies medication side effects.    The following data was reviewed by: ENRRIQUE Woo on 02/21/2022:  ED with Feliciano Blount MD (01/29/2022)  CT Head Without Contrast (01/29/2022 15:50)  CT Abdomen Pelvis Without Contrast (01/29/2022 14:39)    Objective     Vital Signs:   /70   Pulse 71   Temp 96.9 °F (36.1 °C)   Resp 16   Ht 157.5 cm (62\")   Wt 65.3 kg (144 lb)   SpO2 98%   BMI 26.34 kg/m²      Review of Systems   Constitutional: Negative for chills and fever.   Gastrointestinal: Positive for diarrhea (one loose stool this morning), nausea (resolved) and vomiting (resolved). Negative for abdominal " distention, abdominal pain, anal bleeding, blood in stool, constipation, rectal pain and indigestion.   Genitourinary: Negative for decreased urine volume, difficulty urinating, dysuria, flank pain, frequency, pelvic pain, pelvic pressure and urgency.   Musculoskeletal: Negative for back pain.   Neurological: Negative for dizziness, facial asymmetry, speech difficulty, weakness, light-headedness, numbness, headache and confusion.         Physical Exam  Vitals and nursing note reviewed.   Constitutional:       General: She is not in acute distress.     Appearance: Normal appearance. She is well-developed. She is not ill-appearing or toxic-appearing.   HENT:      Head: Normocephalic.      Right Ear: External ear normal.      Left Ear: External ear normal.   Eyes:      General: No scleral icterus.     Pupils: Pupils are equal, round, and reactive to light.   Neck:      Thyroid: No thyromegaly.   Cardiovascular:      Rate and Rhythm: Normal rate and regular rhythm.      Heart sounds: Murmur heard.       Pulmonary:      Effort: Pulmonary effort is normal. No respiratory distress.      Breath sounds: Normal breath sounds. No stridor.   Abdominal:      General: Abdomen is flat. Bowel sounds are normal. There is no distension. There are no signs of injury.      Palpations: Abdomen is soft.      Tenderness: There is no abdominal tenderness. There is no right CVA tenderness, left CVA tenderness, guarding or rebound.      Comments: Negative bilateral CVA tenderness. Bowel sounds are active in all quadrants.    Musculoskeletal:         General: No deformity.      Cervical back: Normal range of motion and neck supple.   Skin:     General: Skin is warm.      Coloration: Skin is not jaundiced.   Neurological:      General: No focal deficit present.      Mental Status: She is alert and oriented to person, place, and time.      Motor: No weakness.      Gait: Gait normal.   Psychiatric:         Mood and Affect: Mood normal.          Behavior: Behavior normal.         Thought Content: Thought content normal.         Judgment: Judgment normal.                     Assessment and Plan      Diagnoses and all orders for this visit:    1. Nausea and vomiting in adult (Primary)  Comments:  Resolved  Labs today    2. Diarrhea, unspecified type  Comments:  One loose stool this AM  Clear liquid diet for 24 hours then bland diet for 24 hours  Drink plenty of fluids  Labs done today that were ordered by Dr. Andujar            Follow Up     Return in 2 weeks (on 3/7/2022) for Next scheduled follow up scheduled with Dr. Andujar.    Patient was given instructions and counseling regarding her condition or for health maintenance advice. Please see specific information pulled into the AVS if appropriate.     -Clear liquid diet and bowel rest for 24 hours, then bland diet for 24 hours, then slowly introduce foods back into diet.  -Start taking a probiotic daily.  -Drink plenty of fluids and get plenty of rest.  -Report to the ED for worsening abdominal pain, fever, chills, vomiting, stools that are bloody/black/tarry, inability to have a bowel movement, or any other worsening signs or symptoms.  -Labs were done today that were ordered by Dr. Andujar on 02/01/2022 for further evaluation.  -Follow-up with PCP for scheduled appointment on 03/07/2022.

## 2022-02-21 NOTE — OP NOTE
Operative Report    [facility] MAIN OR    Patient: Marleni Hummel  Age:      84 y.o.  :     1935  Sex:      female    Medical Record:  [id number]    Date of Operation/Procedure:  2020    Pre-op Diagnosis:   Left renal mass    Post-Op Diagnosis Codes:  same    Name of Operation/Procedure:  Procedure(s) and Anesthesia Type:    Left hand-assisted lap nephrectomy    Surgeon:  Chuckie Mclaughlin MD  Assistant:  Sybil Angel RN    Findings/Complications:     Solid left renal mass palpable, completed without difficulty  No complications    Description of procedure:    Patient was taken to the OR and placed under GA.  She was placed in left side up decubitus position and axillary roll was placed.  All pressure points were padded and airway was secured.  Surgical time out was taken, site marking completed, imaging was reviewed.     A hand-assist upper midline incision was made and the rectus fascia identified.  The fascia was opened in its midline and the peritoneum entered without difficulty.  Manual inspection of the abdomen demonstrated minimal adhesions near the splenic flexure.  The Gelport was placed.  A 5 mm trocar was placed through the Gelport and insufflation established.  Visual inspection of the abdomen was performed using a 5 mm camera.  I then placed a 12 mm working port in the LLQ and a 12 mm camera port in the periumbilical area.    I then carefully mobilized the adhesions of the left lai-colon from the lateral sidewall and from the anterior surface of the kidney and hilum.  The lower pole was mobilized by identifying the psoas tendon.  More medially the ureter was easily identified and encircled.  The ureter was mobilized inferiorly as much as possible and divided between Hemo-o-lock clips.  The lower pole was further mobilized along the aorta to the level of the left renal vein.  The ureter was dissected away from the left gonadal vein, which was clipped distally and divided  using the Harmonic scalpel off the lower pole fat.  I mobilized the medial lower pole of the kidney along the ureter into the hilum proper.  I then mobilized the Gerota's fascia off the adrenal gland and incised the peritoneum along the upper pole.  The kidney was posteriorly adherent near the adrenal gland and the mass.  I was then able to divide some fat above the renal artery and vein, allowing space there to admit the vascular stapler.  The entire hilum was stapled using the 45 mm vascular loads on the articulating AVINASH stapler.  Good hemostasis was observed.  I completed the nephrectomy by continuing to mobilize the upper pole, sparing the adrenal gland.  The adrenal was closely apposed to the kidney with little fat seen - hemo-lock clips were used to control adrenal bleeding, which was minor.  I then divided the splenorenal ligament using the Harmonic scalpel only.  Finally the lateral attachments were divided with the Harmonic scalpel and the specimen was free.  It was removed through the GelPort.  Careful irrigation was used and no bleeding seen.  Floseal was placed on the hilum, the distal stump of the gonadal vein, and the adrenal bed.  The 12 mm port sites were removed and closed with 0-Vicryl and the Endo Close device.  No bleeding seen even with pneumoperitoneum set at 5 mm. The GelPort was removed and the midline incision closed with 0-looped PDS in a running fashion.  The wounds were irrigated.  The skin was closed with a running 4-0 Monocryl suture as were all port site skin incisions.  All were dressed with Telfa and Tegaderm.      The patient was awakened and taken to the  in good condition, no complications.  The assistant surgeon was present for the entire case.    Estimated Blood Loss: 100ml    Specimens:  Left kidney    Fluids/Drains:  Erin Mclaughlin MD  11/16/2020  07:51 EST     + L flank pain

## 2022-03-07 ENCOUNTER — OFFICE VISIT (OUTPATIENT)
Dept: FAMILY MEDICINE CLINIC | Facility: CLINIC | Age: 87
End: 2022-03-07

## 2022-03-07 VITALS
SYSTOLIC BLOOD PRESSURE: 138 MMHG | RESPIRATION RATE: 16 BRPM | HEIGHT: 62 IN | DIASTOLIC BLOOD PRESSURE: 72 MMHG | HEART RATE: 67 BPM | BODY MASS INDEX: 26.13 KG/M2 | OXYGEN SATURATION: 98 % | WEIGHT: 142 LBS | TEMPERATURE: 97.2 F

## 2022-03-07 DIAGNOSIS — I10 ESSENTIAL HYPERTENSION: ICD-10-CM

## 2022-03-07 DIAGNOSIS — E78.00 HYPERCHOLESTEROLEMIA: ICD-10-CM

## 2022-03-07 DIAGNOSIS — N18.4 CKD (CHRONIC KIDNEY DISEASE) STAGE 4, GFR 15-29 ML/MIN: ICD-10-CM

## 2022-03-07 DIAGNOSIS — I10 ESSENTIAL HYPERTENSION: Primary | ICD-10-CM

## 2022-03-07 DIAGNOSIS — F41.0 PANIC DISORDER: ICD-10-CM

## 2022-03-07 PROCEDURE — 99214 OFFICE O/P EST MOD 30 MIN: CPT | Performed by: FAMILY MEDICINE

## 2022-03-07 RX ORDER — EZETIMIBE 10 MG/1
10 TABLET ORAL NIGHTLY
Qty: 90 TABLET | Refills: 1 | Status: SHIPPED | OUTPATIENT
Start: 2022-03-07 | End: 2022-07-11 | Stop reason: SDUPTHER

## 2022-03-07 RX ORDER — ESCITALOPRAM OXALATE 10 MG/1
10 TABLET ORAL DAILY
Qty: 90 TABLET | Refills: 1 | Status: SHIPPED | OUTPATIENT
Start: 2022-03-07 | End: 2022-05-23 | Stop reason: SDUPTHER

## 2022-03-07 RX ORDER — AMLODIPINE BESYLATE 2.5 MG/1
TABLET ORAL
Qty: 30 TABLET | Refills: 0 | Status: SHIPPED | OUTPATIENT
Start: 2022-03-07 | End: 2022-03-07 | Stop reason: SDUPTHER

## 2022-03-07 RX ORDER — DULOXETIN HYDROCHLORIDE 30 MG/1
30 CAPSULE, DELAYED RELEASE ORAL DAILY
Qty: 90 CAPSULE | Refills: 1 | Status: CANCELLED | OUTPATIENT
Start: 2022-03-07 | End: 2022-09-03

## 2022-03-07 RX ORDER — METOPROLOL SUCCINATE 50 MG/1
75 TABLET, EXTENDED RELEASE ORAL DAILY
Qty: 135 TABLET | Refills: 1 | Status: SHIPPED | OUTPATIENT
Start: 2022-03-07 | End: 2022-07-11 | Stop reason: SDUPTHER

## 2022-03-07 RX ORDER — AMLODIPINE BESYLATE 2.5 MG/1
2.5 TABLET ORAL DAILY
Qty: 90 TABLET | Refills: 1 | Status: SHIPPED | OUTPATIENT
Start: 2022-03-07 | End: 2022-07-11 | Stop reason: SDUPTHER

## 2022-03-07 RX ORDER — ATORVASTATIN CALCIUM 20 MG/1
20 TABLET, FILM COATED ORAL NIGHTLY
Qty: 90 TABLET | Refills: 1 | Status: SHIPPED | OUTPATIENT
Start: 2022-03-07 | End: 2022-07-11 | Stop reason: SDUPTHER

## 2022-03-07 NOTE — PROGRESS NOTES
"Chief Complaint  Hypertension (3 month follow up )    Subjective          Marleni presents to [facility]   to refill medications and review labs.  Overall she feels well. No medication side effects are reported. Labs reviewed and seen below.             Objective   Vital Signs:   Vitals:    03/07/22 1326 03/07/22 1410   BP: 164/82 138/72   BP Location:  Right arm   Patient Position:  Sitting   Cuff Size:  Adult   Pulse: 67    Resp: 16    Temp: 97.2 °F (36.2 °C)    TempSrc: Skin    SpO2: 98%    Weight: 64.4 kg (142 lb)    Height: 157.5 cm (62\")         Physical Exam  Vitals reviewed.   Constitutional:       General: She is not in acute distress.  Cardiovascular:      Rate and Rhythm: Normal rate and regular rhythm.      Heart sounds: Normal heart sounds.   Pulmonary:      Effort: Pulmonary effort is normal.      Breath sounds: Normal breath sounds.   Neurological:      General: No focal deficit present.      Mental Status: She is alert.   Psychiatric:         Attention and Perception: Attention normal.         Mood and Affect: Mood normal.         Behavior: Behavior normal.          Result Review :     The following data was reviewed by: Ken Andujar MD on 03/07/2022:  CK (02/23/2022 08:56)  Lipid Panel With / Chol / HDL Ratio (02/23/2022 08:56)-  CBC & Differential (02/23/2022 08:56)  Comprehensive Metabolic Panel (02/23/2022 08:56)-creatinine 2.09           Assessment and Plan    Diagnoses and all orders for this visit:    1. Essential hypertension (Primary)  Assessment & Plan:  Condition is stable. The current medical regimen is effective;  continue present plan and medications.    Orders:  -     amLODIPine (NORVASC) 2.5 MG tablet; Take 1 tablet by mouth Daily.  Dispense: 90 tablet; Refill: 1  -     metoprolol succinate XL (TOPROL-XL) 50 MG 24 hr tablet; Take 1.5 tablets by mouth Daily for 180 days.  Dispense: 135 tablet; Refill: 1    2. Hypercholesterolemia  Assessment & " Plan:  Condition is stable. The current medical regimen is effective;  continue present plan and medications.    Orders:  -     atorvastatin (LIPITOR) 20 MG tablet; Take 1 tablet by mouth Every Night.  Dispense: 90 tablet; Refill: 1  -     ezetimibe (ZETIA) 10 MG tablet; Take 1 tablet by mouth Every Night.  Dispense: 90 tablet; Refill: 1    3. Panic disorder  Assessment & Plan:  Due to worsening kidney function, stop duloxetine and switch to escitalopram at bedtime dosing.  Follow-up 2 months    Orders:  -     escitalopram (LEXAPRO) 10 MG tablet; Take 1 tablet by mouth Daily for 180 days.  Dispense: 90 tablet; Refill: 1    4. CKD (chronic kidney disease) stage 4, GFR 15-29 ml/min ([facility])  Assessment & Plan:  Renal condition is worsening.  Medication changes per orders.  Patient has follow-up appointment with nephrologist in June.  Renal condition will be reassessed by me in 6 months.        Follow Up   Return in about 2 months (around 5/7/2022) for recheck of current condition.  Patient was given instructions and counseling regarding her condition or for health maintenance advice. Please see specific information pulled into the AVS if appropriate.

## 2022-03-07 NOTE — ASSESSMENT & PLAN NOTE
Due to worsening kidney function, stop duloxetine and switch to escitalopram at bedtime dosing.  Follow-up 2 months

## 2022-03-07 NOTE — ASSESSMENT & PLAN NOTE
Renal condition is worsening.  Medication changes per orders.  Patient has follow-up appointment with nephrologist in June.  Renal condition will be reassessed by me in 6 months.

## 2022-03-25 ENCOUNTER — LAB (OUTPATIENT)
Dept: OTHER | Facility: HOSPITAL | Age: 87
End: 2022-03-25

## 2022-03-25 ENCOUNTER — INFUSION (OUTPATIENT)
Dept: ONCOLOGY | Facility: HOSPITAL | Age: 87
End: 2022-03-25

## 2022-03-25 VITALS — HEIGHT: 62 IN | TEMPERATURE: 97 F | BODY MASS INDEX: 26.98 KG/M2 | WEIGHT: 146.6 LBS

## 2022-03-25 DIAGNOSIS — M81.0 SENILE OSTEOPOROSIS: ICD-10-CM

## 2022-03-25 DIAGNOSIS — M81.0 SENILE OSTEOPOROSIS: Primary | ICD-10-CM

## 2022-03-25 LAB
ALBUMIN SERPL-MCNC: 4.5 G/DL (ref 3.5–5.2)
ALBUMIN/GLOB SERPL: 2 G/DL
ALP SERPL-CCNC: 47 U/L (ref 39–117)
ALT SERPL W P-5'-P-CCNC: 22 U/L (ref 1–33)
ANION GAP SERPL CALCULATED.3IONS-SCNC: 11 MMOL/L (ref 5–15)
AST SERPL-CCNC: 24 U/L (ref 1–32)
BILIRUB SERPL-MCNC: 0.8 MG/DL (ref 0–1.2)
BUN SERPL-MCNC: 34 MG/DL (ref 8–23)
BUN/CREAT SERPL: 17.4 (ref 7–25)
CALCIUM SPEC-SCNC: 10.4 MG/DL (ref 8.6–10.5)
CHLORIDE SERPL-SCNC: 102 MMOL/L (ref 98–107)
CO2 SERPL-SCNC: 27 MMOL/L (ref 22–29)
CREAT SERPL-MCNC: 1.95 MG/DL (ref 0.57–1)
EGFRCR SERPLBLD CKD-EPI 2021: 25 ML/MIN/1.73
GLOBULIN UR ELPH-MCNC: 2.2 GM/DL
GLUCOSE SERPL-MCNC: 118 MG/DL (ref 65–99)
MAGNESIUM SERPL-MCNC: 2 MG/DL (ref 1.6–2.4)
PHOSPHATE SERPL-MCNC: 4.4 MG/DL (ref 2.5–4.5)
POTASSIUM SERPL-SCNC: 4.8 MMOL/L (ref 3.5–5.2)
PROT SERPL-MCNC: 6.7 G/DL (ref 6–8.5)
SODIUM SERPL-SCNC: 140 MMOL/L (ref 136–145)

## 2022-03-25 PROCEDURE — 25010000002 DENOSUMAB 60 MG/ML SOLUTION PREFILLED SYRINGE: Performed by: OBSTETRICS & GYNECOLOGY

## 2022-03-25 PROCEDURE — 80053 COMPREHEN METABOLIC PANEL: CPT | Performed by: OBSTETRICS & GYNECOLOGY

## 2022-03-25 PROCEDURE — 96372 THER/PROPH/DIAG INJ SC/IM: CPT

## 2022-03-25 PROCEDURE — 83735 ASSAY OF MAGNESIUM: CPT | Performed by: OBSTETRICS & GYNECOLOGY

## 2022-03-25 PROCEDURE — 84100 ASSAY OF PHOSPHORUS: CPT | Performed by: OBSTETRICS & GYNECOLOGY

## 2022-03-25 RX ADMIN — DENOSUMAB 60 MG: 60 INJECTION SUBCUTANEOUS at 14:47

## 2022-04-20 ENCOUNTER — OFFICE VISIT (OUTPATIENT)
Dept: CARDIOLOGY | Facility: CLINIC | Age: 87
End: 2022-04-20

## 2022-04-20 ENCOUNTER — TELEPHONE (OUTPATIENT)
Dept: FAMILY MEDICINE CLINIC | Facility: CLINIC | Age: 87
End: 2022-04-20

## 2022-04-20 VITALS
SYSTOLIC BLOOD PRESSURE: 130 MMHG | HEIGHT: 62 IN | HEART RATE: 54 BPM | OXYGEN SATURATION: 99 % | BODY MASS INDEX: 26.67 KG/M2 | DIASTOLIC BLOOD PRESSURE: 80 MMHG | WEIGHT: 144.9 LBS

## 2022-04-20 DIAGNOSIS — E78.2 MIXED HYPERLIPIDEMIA: ICD-10-CM

## 2022-04-20 DIAGNOSIS — C64.2 RENAL CELL CARCINOMA OF LEFT KIDNEY: ICD-10-CM

## 2022-04-20 DIAGNOSIS — I25.10 CORONARY ARTERY DISEASE INVOLVING NATIVE CORONARY ARTERY OF NATIVE HEART WITHOUT ANGINA PECTORIS: Primary | ICD-10-CM

## 2022-04-20 DIAGNOSIS — I51.81 STRESS-INDUCED CARDIOMYOPATHY: ICD-10-CM

## 2022-04-20 DIAGNOSIS — I48.21 PERMANENT ATRIAL FIBRILLATION: ICD-10-CM

## 2022-04-20 DIAGNOSIS — E78.00 HYPERCHOLESTEROLEMIA: ICD-10-CM

## 2022-04-20 DIAGNOSIS — I10 ESSENTIAL HYPERTENSION: ICD-10-CM

## 2022-04-20 DIAGNOSIS — Z12.31 ENCOUNTER FOR SCREENING MAMMOGRAM FOR MALIGNANT NEOPLASM OF BREAST: Primary | ICD-10-CM

## 2022-04-20 DIAGNOSIS — N18.32 STAGE 3B CHRONIC KIDNEY DISEASE: ICD-10-CM

## 2022-04-20 PROCEDURE — 99214 OFFICE O/P EST MOD 30 MIN: CPT | Performed by: NURSE PRACTITIONER

## 2022-04-20 NOTE — TELEPHONE ENCOUNTER
Caller: NEAL HERNANDEZ    Relationship: Emergency Contact    Best call back number: [phone]    What orders are you requesting (i.e. lab or imaging): MAMMOGRAM     In what timeframe would the patient need to come in: PATIENT IS SCHEDULED FOR 05/05/2022 AT 10:45 AM    Where will you receive your lab/imaging services: [facility] [place]    Additional notes: PATIENT HAS BEEN SCHEDULED FOR 05/05/2022 FOR A MAMMOGRAM BUT THE PATIENT IS NEEDING AN ORDER BEFORE THE APPOINTMENT.

## 2022-04-20 NOTE — PROGRESS NOTES
Date of Office Visit: 22  Encounter Provider: ENRRIQUE Curtis  Place of Service: [facility] CARDIOLOGY  Patient Name: Marleni Hummel  :1935    Chief Complaint   Patient presents with   • Follow-up   • Sleep Apnea   • Atrial Fibrillation   • Congestive Heart Failure   • Hypertension   :     HPI: Marleni Hummel is a 86 y.o. female  with  hypertension, hyperlipidemia, atrial fibrillation, stress-induced cardiomyopathy, obstructive sleep apnea, left renal carcinoma status post nephrectomy and dyspnea.  She was previously followed by Dr. Tai and is now followed by Dr. Parmar.  I will follow up with her today.         She has history of atrial fibrillation with rapid ventricular response in the setting of normal thyroid studies and unremarkable echocardiogram.  She had a perfusion stress test which was also unremarkable.  She had continued issues with dyspnea and was evaluated by pulmonary due to respiratory illness.  In 2011 she went back into atrial fibrillation after cardioversion.  She was started on flecainide and underwent repeat cardioversion.  She has some issues with right femur and hip surgery had some infection and multiple surgeries after that.  In 2015 she was found to be bradycardic after having some nausea and vomiting which was felt to be vagal response.  Her troponin was borderline elevated medications were adjusted.  She ultimately had ST elevation infarct taken to the cardiac catheterization laboratory which is felt to have cardiomyopathy with an ejection fraction of 20%.  She did have a circumflex lesion where she had drug-eluting stent placed.  She recovered from that.  Obviously, we had to stop flecainide and she was switched to amiodarone.  She had elevated QT interval so that was stopped.  She later was started on Lasix.     In 2019, she c/o CARROLL and had echo and perfusion stress test.  Echocardiogram revealed normal left  ventricular ejection fraction and EF of 64%, borderline concentric hypertrophy, moderate thickening of the aortic valve with trace aortic valve regurgitation no aortic stenosis, mild mitral annular calcification was present with mild mitral regurgitation.  RVSP was normal.  Perfusion stress test was negative for ischemia.       She had expressive aphasia and mild weakness in November 2021.  MRI did not show acute stroke.  It was felt that she had TIA but she also had hyperkalemia that improved with IV fluid.  No medications were changed at discharge.  There was a note that patient was holding Eliquis for procedure however patient states she had not started to hold Eliquis at that time.  After that admission she was treated with Macrobid PCP.  She had side effect from macrobid. She ultimately was started on amlodipine outpatient by her PCP    She presents today accompanied by her daughter.  Patient states her main concern is intermittent reflux but she is now on Prevacid as opposed to Protonix.  She does not need to take Prevacid often.  She denies neurologic changes.  She denies chest pain, shortness of breath, near-syncope or syncope or bleeding such as blood in the urine or stool.  She does not exercise.  Her blood pressure has been better controlled since being on amlodipine.  She had an epidural last week for chronic back pain and had improvement.  She had Eliquis 3 days prior and thankfully did not suffer any neurologic changes afterwards.          Allergies   Allergen Reactions   • Morphine Anaphylaxis   • Oxycodone Anaphylaxis   • Ace Inhibitors Cough   • Bactrim [Sulfamethoxazole-Trimethoprim] Rash   • Levaquin [Levofloxacin] Itching and Rash     insomnia           Family and social history reviewed.     ROS  All other systems were reviewed and are negative          Objective:     Vitals:    04/20/22 1106   BP: 130/80   BP Location: Right arm   Patient Position: Sitting   Cuff Size: Adult   Pulse: 54   SpO2:  "99%   Weight: 65.7 kg (144 lb 14.4 oz)   Height: 157.5 cm (62\")     Body mass index is 26.5 kg/m².    PHYSICAL EXAM:  Cardiovascular:      Bradycardia present. Irregularly irregular rhythm.         Procedures      Current Outpatient Medications   Medication Sig Dispense Refill   • acetaminophen (TYLENOL) 500 MG tablet Take 1,000 mg by mouth 3 (three) times a day as needed for mild pain (1-3) or moderate pain (4-6).     • amLODIPine (NORVASC) 2.5 MG tablet Take 1 tablet by mouth Daily. 90 tablet 1   • apixaban (ELIQUIS) 2.5 MG tablet tablet Take 1 tablet by mouth Every 12 (Twelve) Hours. 180 tablet 3   • atorvastatin (LIPITOR) 20 MG tablet Take 1 tablet by mouth Every Night. 90 tablet 1   • Cholecalciferol (VITAMIN D3) 5000 units capsule capsule Take 5,000 Units by mouth Daily.     • Denosumab (PROLIA SC) Inject 1 dose under the skin into the appropriate area as directed Every 6 (Six) Months.     • docusate sodium (COLACE) 250 MG capsule Take 1 capsule by mouth 2 (Two) Times a Day. (Patient taking differently: Take 250 mg by mouth Daily As Needed.) 60 capsule 1   • escitalopram (LEXAPRO) 10 MG tablet Take 1 tablet by mouth Daily for 180 days. 90 tablet 1   • ezetimibe (ZETIA) 10 MG tablet Take 1 tablet by mouth Every Night. 90 tablet 1   • metoprolol succinate XL (TOPROL-XL) 50 MG 24 hr tablet Take 1.5 tablets by mouth Daily for 180 days. (Patient taking differently: Take 50 mg by mouth Daily.) 135 tablet 1   • Mirabegron ER (MYRBETRIQ) 50 MG tablet sustained-release 24 hour 24 hr tablet Take 50 mg by mouth Every Evening.     • fexofenadine (ALLEGRA) 180 MG tablet Take 180 mg by mouth Daily As Needed.       No current facility-administered medications for this visit.     Assessment:       Diagnosis Plan   1. Coronary artery disease involving native coronary artery of native heart without angina pectoris     2. Permanent atrial fibrillation (HCC)     3. Stress-induced cardiomyopathy     4. Stage 3b chronic kidney " disease (HCC)     5. Hypercholesterolemia     6. Essential hypertension          No orders of the defined types were placed in this encounter.        Plan:         1. 86 year-old female with chronic atrial fibrillation despite at least 2 cardioversions anticoagulated with Eliquis rate is controlled. CHADS2-VASc score is 6.  Anticoagulated with Eliquis  2. Coronary artery disease status post drug-eluting stent placed to the left circumflex in October 2015 with initial left ventricular ejection fraction of 20% last normal 09/2020.  Normal perfusion stress test 09/2020  - no angina   3. Hypertension blood pressure today is stable  4.  Hyperlipidemia on atorvastatin 20 mg   5.  Chronic kidney disease, stage 3 followed by Dr. Leonard  6.  History of prolonged QT on amiodarone  7.    Mild mitral valve regurgitation with moderate mitral annular calcification, calcification of the aortic valve and trace regurgitation on echo September 2020  8.  History of obstructive sleep apnea-she stopped treating that 5-6 years ago  9.   Left renal carcinoma status post total nephrectomy   10.  TIA 11/2021 in retrospect, neurologic changes and weakness could have been related to developing UTI  11.  Chronic back pain she is following with Dr. Hook..  She recently had symptom improvement with epidural.  She did well at this time holding Eliquis just 3 days          It has been a pleasure to participate in this patient's care.      Thank you,  ENRRIQUE Curtis      **I used Dragon to dictate this note:**

## 2022-05-02 RX ORDER — APIXABAN 2.5 MG/1
TABLET, FILM COATED ORAL
Qty: 180 TABLET | Refills: 3 | Status: SHIPPED | OUTPATIENT
Start: 2022-05-02 | End: 2022-11-07 | Stop reason: SDUPTHER

## 2022-05-05 ENCOUNTER — APPOINTMENT (OUTPATIENT)
Dept: WOMENS IMAGING | Facility: HOSPITAL | Age: 87
End: 2022-05-05

## 2022-05-05 PROCEDURE — 77063 BREAST TOMOSYNTHESIS BI: CPT | Performed by: RADIOLOGY

## 2022-05-05 PROCEDURE — 77067 SCR MAMMO BI INCL CAD: CPT | Performed by: RADIOLOGY

## 2022-05-23 ENCOUNTER — OFFICE VISIT (OUTPATIENT)
Dept: FAMILY MEDICINE CLINIC | Facility: CLINIC | Age: 87
End: 2022-05-23

## 2022-05-23 VITALS
OXYGEN SATURATION: 99 % | RESPIRATION RATE: 16 BRPM | TEMPERATURE: 97.2 F | DIASTOLIC BLOOD PRESSURE: 75 MMHG | HEIGHT: 62 IN | WEIGHT: 142 LBS | BODY MASS INDEX: 26.13 KG/M2 | SYSTOLIC BLOOD PRESSURE: 155 MMHG | HEART RATE: 63 BPM

## 2022-05-23 DIAGNOSIS — F41.0 PANIC DISORDER: ICD-10-CM

## 2022-05-23 PROCEDURE — 99214 OFFICE O/P EST MOD 30 MIN: CPT | Performed by: FAMILY MEDICINE

## 2022-05-23 RX ORDER — ESCITALOPRAM OXALATE 20 MG/1
20 TABLET ORAL DAILY
Qty: 90 TABLET | Refills: 0 | Status: SHIPPED | OUTPATIENT
Start: 2022-05-23 | End: 2022-07-11 | Stop reason: SDUPTHER

## 2022-05-23 NOTE — PROGRESS NOTES
"Chief Complaint  Anxiety and Back Pain    Subjective          Marleni presents to [facility] to follow-up on recent change.  No perceived side effects to escitalopram but she feels it may not be strong enough.  She has had episodes of shaking while cooking and also ongoing issues with her legs periodically.  Some increased fatigue is reported.          Objective   Vital Signs:   Vitals:    05/23/22 1336   BP: 155/75   Pulse: 63   Resp: 16   Temp: 97.2 °F (36.2 °C)   TempSrc: Skin   SpO2: 99%   Weight: 64.4 kg (142 lb)   Height: 157.5 cm (62\")               Physical Exam  Constitutional:       General: She is not in acute distress.  Neurological:      Mental Status: She is alert and oriented to person, place, and time.   Psychiatric:         Attention and Perception: Attention normal. She is attentive.         Mood and Affect: Mood normal. Mood is not anxious.         Speech: Speech normal.         Behavior: Behavior normal.         Thought Content: Thought content normal. Thought content does not include homicidal or suicidal ideation.         Judgment: Judgment normal.          Result Review :                Assessment and Plan    Diagnoses and all orders for this visit:    1. Panic disorder  Assessment & Plan:  Psychological condition is worsening.  Medication changes per orders. increase lexapro to 20 mg daily  Psychological condition  will be reassessed in 6 weeks. .    Orders:  -     escitalopram (LEXAPRO) 20 MG tablet; Take 1 tablet by mouth Daily for 90 days.  Dispense: 90 tablet; Refill: 0      Follow Up   Return in about 6 weeks (around 7/4/2022) for recheck of current condition.  Patient was given instructions and counseling regarding her condition or for health maintenance advice. Please see specific information pulled into the AVS if appropriate.   "

## 2022-05-23 NOTE — ASSESSMENT & PLAN NOTE
Psychological condition is worsening.  Medication changes per orders. increase lexapro to 20 mg daily  Psychological condition  will be reassessed in 6 weeks. .

## 2022-05-25 DIAGNOSIS — F41.0 PANIC DISORDER: ICD-10-CM

## 2022-05-25 RX ORDER — DULOXETIN HYDROCHLORIDE 30 MG/1
CAPSULE, DELAYED RELEASE ORAL
Qty: 90 CAPSULE | Refills: 3 | OUTPATIENT
Start: 2022-05-25

## 2022-05-30 ENCOUNTER — HOSPITAL ENCOUNTER (OUTPATIENT)
Facility: HOSPITAL | Age: 87
Setting detail: OBSERVATION
Discharge: HOME | End: 2022-05-31
Attending: EMERGENCY MEDICINE | Admitting: HOSPITALIST

## 2022-05-30 ENCOUNTER — APPOINTMENT (OUTPATIENT)
Dept: GENERAL RADIOLOGY | Facility: HOSPITAL | Age: 87
End: 2022-05-30

## 2022-05-30 DIAGNOSIS — R05.9 COUGH: ICD-10-CM

## 2022-05-30 DIAGNOSIS — N17.9 ACUTE KIDNEY INJURY: Primary | ICD-10-CM

## 2022-05-30 DIAGNOSIS — E87.5 HYPERKALEMIA: ICD-10-CM

## 2022-05-30 LAB
ALBUMIN SERPL-MCNC: 4.2 G/DL (ref 3.5–5.2)
ALBUMIN/GLOB SERPL: 1.4 G/DL
ALP SERPL-CCNC: 70 U/L (ref 39–117)
ALT SERPL W P-5'-P-CCNC: 16 U/L (ref 1–33)
ANION GAP SERPL CALCULATED.3IONS-SCNC: 11.5 MMOL/L (ref 5–15)
AST SERPL-CCNC: 20 U/L (ref 1–32)
BASOPHILS # BLD AUTO: 0.03 10*3/MM3 (ref 0–0.2)
BASOPHILS NFR BLD AUTO: 0.4 % (ref 0–1.5)
BILIRUB SERPL-MCNC: 0.5 MG/DL (ref 0–1.2)
BUN SERPL-MCNC: 35 MG/DL (ref 8–23)
BUN/CREAT SERPL: 14.7 (ref 7–25)
CALCIUM SPEC-SCNC: 9.9 MG/DL (ref 8.6–10.5)
CHLORIDE SERPL-SCNC: 99 MMOL/L (ref 98–107)
CO2 SERPL-SCNC: 23.5 MMOL/L (ref 22–29)
CREAT SERPL-MCNC: 2.38 MG/DL (ref 0.57–1)
D-LACTATE SERPL-SCNC: 1.3 MMOL/L (ref 0.5–2)
DEPRECATED RDW RBC AUTO: 47.4 FL (ref 37–54)
EGFRCR SERPLBLD CKD-EPI 2021: 19.7 ML/MIN/1.73
EOSINOPHIL # BLD AUTO: 0.34 10*3/MM3 (ref 0–0.4)
EOSINOPHIL NFR BLD AUTO: 4.7 % (ref 0.3–6.2)
ERYTHROCYTE [DISTWIDTH] IN BLOOD BY AUTOMATED COUNT: 13.1 % (ref 12.3–15.4)
GLOBULIN UR ELPH-MCNC: 3.1 GM/DL
GLUCOSE SERPL-MCNC: 107 MG/DL (ref 65–99)
HCT VFR BLD AUTO: 33.6 % (ref 34–46.6)
HGB BLD-MCNC: 11.1 G/DL (ref 12–15.9)
HOLD SPECIMEN: NORMAL
IMM GRANULOCYTES # BLD AUTO: 0.03 10*3/MM3 (ref 0–0.05)
IMM GRANULOCYTES NFR BLD AUTO: 0.4 % (ref 0–0.5)
LYMPHOCYTES # BLD AUTO: 1.36 10*3/MM3 (ref 0.7–3.1)
LYMPHOCYTES NFR BLD AUTO: 18.9 % (ref 19.6–45.3)
MAGNESIUM SERPL-MCNC: 2.8 MG/DL (ref 1.6–2.4)
MCH RBC QN AUTO: 32.1 PG (ref 26.6–33)
MCHC RBC AUTO-ENTMCNC: 33 G/DL (ref 31.5–35.7)
MCV RBC AUTO: 97.1 FL (ref 79–97)
MONOCYTES # BLD AUTO: 0.7 10*3/MM3 (ref 0.1–0.9)
MONOCYTES NFR BLD AUTO: 9.7 % (ref 5–12)
NEUTROPHILS NFR BLD AUTO: 4.74 10*3/MM3 (ref 1.7–7)
NEUTROPHILS NFR BLD AUTO: 65.9 % (ref 42.7–76)
NRBC BLD AUTO-RTO: 0 /100 WBC (ref 0–0.2)
NT-PROBNP SERPL-MCNC: 1208 PG/ML (ref 0–1800)
PLATELET # BLD AUTO: 214 10*3/MM3 (ref 140–450)
PMV BLD AUTO: 10 FL (ref 6–12)
POTASSIUM SERPL-SCNC: 5.6 MMOL/L (ref 3.5–5.2)
PROCALCITONIN SERPL-MCNC: 0.04 NG/ML (ref 0–0.25)
PROT SERPL-MCNC: 7.3 G/DL (ref 6–8.5)
RBC # BLD AUTO: 3.46 10*6/MM3 (ref 3.77–5.28)
SODIUM SERPL-SCNC: 134 MMOL/L (ref 136–145)
TROPONIN T SERPL-MCNC: <0.01 NG/ML (ref 0–0.03)
WBC NRBC COR # BLD: 7.2 10*3/MM3 (ref 3.4–10.8)
WHOLE BLOOD HOLD COAG: NORMAL
WHOLE BLOOD HOLD SPECIMEN: NORMAL

## 2022-05-30 PROCEDURE — 94640 AIRWAY INHALATION TREATMENT: CPT

## 2022-05-30 PROCEDURE — 99284 EMERGENCY DEPT VISIT MOD MDM: CPT

## 2022-05-30 PROCEDURE — 83880 ASSAY OF NATRIURETIC PEPTIDE: CPT | Performed by: PHYSICIAN ASSISTANT

## 2022-05-30 PROCEDURE — G0378 HOSPITAL OBSERVATION PER HR: HCPCS

## 2022-05-30 PROCEDURE — 93010 ELECTROCARDIOGRAM REPORT: CPT | Performed by: INTERNAL MEDICINE

## 2022-05-30 PROCEDURE — 0202U NFCT DS 22 TRGT SARS-COV-2: CPT | Performed by: PHYSICIAN ASSISTANT

## 2022-05-30 PROCEDURE — 93005 ELECTROCARDIOGRAM TRACING: CPT | Performed by: PHYSICIAN ASSISTANT

## 2022-05-30 PROCEDURE — 94664 DEMO&/EVAL PT USE INHALER: CPT

## 2022-05-30 PROCEDURE — 84145 PROCALCITONIN (PCT): CPT | Performed by: PHYSICIAN ASSISTANT

## 2022-05-30 PROCEDURE — 80053 COMPREHEN METABOLIC PANEL: CPT | Performed by: PHYSICIAN ASSISTANT

## 2022-05-30 PROCEDURE — 71045 X-RAY EXAM CHEST 1 VIEW: CPT

## 2022-05-30 PROCEDURE — 36415 COLL VENOUS BLD VENIPUNCTURE: CPT

## 2022-05-30 PROCEDURE — 83605 ASSAY OF LACTIC ACID: CPT | Performed by: PHYSICIAN ASSISTANT

## 2022-05-30 PROCEDURE — 84484 ASSAY OF TROPONIN QUANT: CPT | Performed by: PHYSICIAN ASSISTANT

## 2022-05-30 PROCEDURE — 83735 ASSAY OF MAGNESIUM: CPT | Performed by: PHYSICIAN ASSISTANT

## 2022-05-30 PROCEDURE — 85025 COMPLETE CBC W/AUTO DIFF WBC: CPT | Performed by: PHYSICIAN ASSISTANT

## 2022-05-30 RX ORDER — IPRATROPIUM BROMIDE AND ALBUTEROL SULFATE 2.5; .5 MG/3ML; MG/3ML
3 SOLUTION RESPIRATORY (INHALATION) ONCE
Status: COMPLETED | OUTPATIENT
Start: 2022-05-30 | End: 2022-05-30

## 2022-05-30 RX ORDER — METOPROLOL SUCCINATE 50 MG/1
50 TABLET, EXTENDED RELEASE ORAL DAILY
Status: DISCONTINUED | OUTPATIENT
Start: 2022-05-31 | End: 2022-05-31 | Stop reason: HOSPADM

## 2022-05-30 RX ORDER — MELATONIN
5000 DAILY
Status: DISCONTINUED | OUTPATIENT
Start: 2022-05-31 | End: 2022-05-31 | Stop reason: HOSPADM

## 2022-05-30 RX ORDER — UREA 10 %
3 LOTION (ML) TOPICAL NIGHTLY PRN
Status: DISCONTINUED | OUTPATIENT
Start: 2022-05-30 | End: 2022-05-31 | Stop reason: HOSPADM

## 2022-05-30 RX ORDER — BENZONATATE 100 MG/1
100 CAPSULE ORAL 3 TIMES DAILY PRN
Status: DISCONTINUED | OUTPATIENT
Start: 2022-05-30 | End: 2022-05-31 | Stop reason: HOSPADM

## 2022-05-30 RX ORDER — AMLODIPINE BESYLATE 2.5 MG/1
2.5 TABLET ORAL DAILY
Status: DISCONTINUED | OUTPATIENT
Start: 2022-05-31 | End: 2022-05-31 | Stop reason: HOSPADM

## 2022-05-30 RX ORDER — NITROGLYCERIN 0.4 MG/1
0.4 TABLET SUBLINGUAL
Status: DISCONTINUED | OUTPATIENT
Start: 2022-05-30 | End: 2022-05-31 | Stop reason: HOSPADM

## 2022-05-30 RX ORDER — SODIUM CHLORIDE 0.9 % (FLUSH) 0.9 %
10 SYRINGE (ML) INJECTION AS NEEDED
Status: DISCONTINUED | OUTPATIENT
Start: 2022-05-30 | End: 2022-05-31 | Stop reason: HOSPADM

## 2022-05-30 RX ORDER — ESCITALOPRAM OXALATE 20 MG/1
20 TABLET ORAL DAILY
Status: DISCONTINUED | OUTPATIENT
Start: 2022-05-31 | End: 2022-05-31 | Stop reason: HOSPADM

## 2022-05-30 RX ORDER — ONDANSETRON 2 MG/ML
4 INJECTION INTRAMUSCULAR; INTRAVENOUS EVERY 6 HOURS PRN
Status: DISCONTINUED | OUTPATIENT
Start: 2022-05-30 | End: 2022-05-31 | Stop reason: HOSPADM

## 2022-05-30 RX ORDER — ACETAMINOPHEN 325 MG/1
650 TABLET ORAL EVERY 4 HOURS PRN
Status: DISCONTINUED | OUTPATIENT
Start: 2022-05-30 | End: 2022-05-31 | Stop reason: HOSPADM

## 2022-05-30 RX ORDER — ATORVASTATIN CALCIUM 20 MG/1
20 TABLET, FILM COATED ORAL NIGHTLY
Status: DISCONTINUED | OUTPATIENT
Start: 2022-05-30 | End: 2022-05-31 | Stop reason: HOSPADM

## 2022-05-30 RX ORDER — CEFDINIR 300 MG/1
300 CAPSULE ORAL NIGHTLY
Status: DISCONTINUED | OUTPATIENT
Start: 2022-05-30 | End: 2022-05-31

## 2022-05-30 RX ORDER — ONDANSETRON 4 MG/1
4 TABLET, FILM COATED ORAL EVERY 6 HOURS PRN
Status: DISCONTINUED | OUTPATIENT
Start: 2022-05-30 | End: 2022-05-31 | Stop reason: HOSPADM

## 2022-05-30 RX ADMIN — IPRATROPIUM BROMIDE AND ALBUTEROL SULFATE 3 ML: .5; 3 SOLUTION RESPIRATORY (INHALATION) at 17:57

## 2022-05-30 RX ADMIN — SODIUM CHLORIDE 1000 ML: 9 INJECTION, SOLUTION INTRAVENOUS at 19:02

## 2022-05-30 RX ADMIN — CEFDINIR 300 MG: 300 CAPSULE ORAL at 23:06

## 2022-05-30 RX ADMIN — ATORVASTATIN CALCIUM 20 MG: 20 TABLET, FILM COATED ORAL at 23:06

## 2022-05-30 RX ADMIN — SODIUM ZIRCONIUM CYCLOSILICATE 10 G: 10 POWDER, FOR SUSPENSION ORAL at 23:06

## 2022-05-30 RX ADMIN — APIXABAN 2.5 MG: 2.5 TABLET, FILM COATED ORAL at 23:06

## 2022-05-31 ENCOUNTER — READMISSION MANAGEMENT (OUTPATIENT)
Dept: CALL CENTER | Facility: HOSPITAL | Age: 87
End: 2022-05-31

## 2022-05-31 VITALS
BODY MASS INDEX: 24.72 KG/M2 | RESPIRATION RATE: 18 BRPM | SYSTOLIC BLOOD PRESSURE: 142 MMHG | OXYGEN SATURATION: 94 % | HEART RATE: 61 BPM | DIASTOLIC BLOOD PRESSURE: 60 MMHG | TEMPERATURE: 99.4 F | HEIGHT: 63 IN | WEIGHT: 139.5 LBS

## 2022-05-31 PROBLEM — E87.5 HYPERKALEMIA: Status: ACTIVE | Noted: 2022-05-31

## 2022-05-31 PROBLEM — J20.6 ACUTE BRONCHITIS DUE TO RHINOVIRUS: Status: ACTIVE | Noted: 2022-05-31

## 2022-05-31 LAB
ALBUMIN SERPL-MCNC: 3.9 G/DL (ref 3.5–5.2)
ANION GAP SERPL CALCULATED.3IONS-SCNC: 12 MMOL/L (ref 5–15)
B PARAPERT DNA SPEC QL NAA+PROBE: NOT DETECTED
B PERT DNA SPEC QL NAA+PROBE: NOT DETECTED
BUN SERPL-MCNC: 35 MG/DL (ref 8–23)
BUN/CREAT SERPL: 16.7 (ref 7–25)
C PNEUM DNA NPH QL NAA+NON-PROBE: NOT DETECTED
CALCIUM SPEC-SCNC: 8.9 MG/DL (ref 8.6–10.5)
CHLORIDE SERPL-SCNC: 102 MMOL/L (ref 98–107)
CO2 SERPL-SCNC: 21 MMOL/L (ref 22–29)
CREAT SERPL-MCNC: 2.09 MG/DL (ref 0.57–1)
DEPRECATED RDW RBC AUTO: 48.4 FL (ref 37–54)
EGFRCR SERPLBLD CKD-EPI 2021: 23 ML/MIN/1.73
ERYTHROCYTE [DISTWIDTH] IN BLOOD BY AUTOMATED COUNT: 13.2 % (ref 12.3–15.4)
FLUAV SUBTYP SPEC NAA+PROBE: NOT DETECTED
FLUBV RNA ISLT QL NAA+PROBE: NOT DETECTED
GLUCOSE SERPL-MCNC: 126 MG/DL (ref 65–99)
HADV DNA SPEC NAA+PROBE: NOT DETECTED
HCOV 229E RNA SPEC QL NAA+PROBE: NOT DETECTED
HCOV HKU1 RNA SPEC QL NAA+PROBE: NOT DETECTED
HCOV NL63 RNA SPEC QL NAA+PROBE: NOT DETECTED
HCOV OC43 RNA SPEC QL NAA+PROBE: NOT DETECTED
HCT VFR BLD AUTO: 30.9 % (ref 34–46.6)
HGB BLD-MCNC: 9.8 G/DL (ref 12–15.9)
HMPV RNA NPH QL NAA+NON-PROBE: NOT DETECTED
HPIV1 RNA ISLT QL NAA+PROBE: NOT DETECTED
HPIV2 RNA SPEC QL NAA+PROBE: NOT DETECTED
HPIV3 RNA NPH QL NAA+PROBE: NOT DETECTED
HPIV4 P GENE NPH QL NAA+PROBE: NOT DETECTED
M PNEUMO IGG SER IA-ACNC: NOT DETECTED
MAGNESIUM SERPL-MCNC: 2 MG/DL (ref 1.6–2.4)
MCH RBC QN AUTO: 31.4 PG (ref 26.6–33)
MCHC RBC AUTO-ENTMCNC: 31.7 G/DL (ref 31.5–35.7)
MCV RBC AUTO: 99 FL (ref 79–97)
PHOSPHATE SERPL-MCNC: 3.8 MG/DL (ref 2.5–4.5)
PLATELET # BLD AUTO: 178 10*3/MM3 (ref 140–450)
PMV BLD AUTO: 10.1 FL (ref 6–12)
POTASSIUM SERPL-SCNC: 4.6 MMOL/L (ref 3.5–5.2)
QT INTERVAL: 447 MS
RBC # BLD AUTO: 3.12 10*6/MM3 (ref 3.77–5.28)
RHINOVIRUS RNA SPEC NAA+PROBE: DETECTED
RSV RNA NPH QL NAA+NON-PROBE: NOT DETECTED
SARS-COV-2 RNA NPH QL NAA+NON-PROBE: NOT DETECTED
SODIUM SERPL-SCNC: 135 MMOL/L (ref 136–145)
URATE SERPL-MCNC: 6.5 MG/DL (ref 2.4–5.7)
WBC NRBC COR # BLD: 6.6 10*3/MM3 (ref 3.4–10.8)

## 2022-05-31 PROCEDURE — 83735 ASSAY OF MAGNESIUM: CPT | Performed by: INTERNAL MEDICINE

## 2022-05-31 PROCEDURE — 84550 ASSAY OF BLOOD/URIC ACID: CPT | Performed by: INTERNAL MEDICINE

## 2022-05-31 PROCEDURE — 85027 COMPLETE CBC AUTOMATED: CPT | Performed by: INTERNAL MEDICINE

## 2022-05-31 PROCEDURE — 36415 COLL VENOUS BLD VENIPUNCTURE: CPT | Performed by: INTERNAL MEDICINE

## 2022-05-31 PROCEDURE — G0378 HOSPITAL OBSERVATION PER HR: HCPCS

## 2022-05-31 PROCEDURE — 80069 RENAL FUNCTION PANEL: CPT | Performed by: INTERNAL MEDICINE

## 2022-05-31 RX ORDER — ALBUTEROL SULFATE 90 UG/1
2 AEROSOL, METERED RESPIRATORY (INHALATION) EVERY 4 HOURS PRN
Qty: 6.7 G | Refills: 0 | OUTPATIENT
Start: 2022-05-31 | End: 2022-06-15

## 2022-05-31 RX ADMIN — Medication 5000 UNITS: at 09:21

## 2022-05-31 RX ADMIN — METOPROLOL SUCCINATE 50 MG: 50 TABLET, EXTENDED RELEASE ORAL at 09:21

## 2022-05-31 RX ADMIN — AMLODIPINE BESYLATE 2.5 MG: 2.5 TABLET ORAL at 09:21

## 2022-05-31 RX ADMIN — ESCITALOPRAM 20 MG: 20 TABLET, FILM COATED ORAL at 09:21

## 2022-05-31 RX ADMIN — ACETAMINOPHEN 650 MG: 325 TABLET ORAL at 04:25

## 2022-05-31 RX ADMIN — APIXABAN 2.5 MG: 2.5 TABLET, FILM COATED ORAL at 09:21

## 2022-06-01 ENCOUNTER — TRANSITIONAL CARE MANAGEMENT TELEPHONE ENCOUNTER (OUTPATIENT)
Dept: CALL CENTER | Facility: HOSPITAL | Age: 87
End: 2022-06-01

## 2022-06-01 NOTE — OUTREACH NOTE
Call Center TCM Note    Flowsheet Row Responses   [facility] patient discharged from? [place]   Does the patient have one of the following disease processes/diagnoses(primary or secondary)? Other   TCM attempt successful? No   Unsuccessful attempts Attempt 2          Samantha Plascencia MA    6/1/2022, 14:51 EDT

## 2022-06-01 NOTE — OUTREACH NOTE
Call Center TCM Note    Flowsheet Row Responses   [facility] patient discharged from? [place]   Does the patient have one of the following disease processes/diagnoses(primary or secondary)? Other   TCM attempt successful? No   Unsuccessful attempts Attempt 1          Samantha Plascencia MA    6/1/2022, 09:53 EDT

## 2022-06-02 ENCOUNTER — TRANSITIONAL CARE MANAGEMENT TELEPHONE ENCOUNTER (OUTPATIENT)
Dept: CALL CENTER | Facility: HOSPITAL | Age: 87
End: 2022-06-02

## 2022-06-02 NOTE — OUTREACH NOTE
Call Center TCM Note    Flowsheet Row Responses   [facility] patient discharged from? [place]   Does the patient have one of the following disease processes/diagnoses(primary or secondary)? Other   TCM attempt successful? No  [No current verbal release from PCP office on file]   Unsuccessful attempts Attempt 3          Eveline Morgan RN    6/2/2022, 13:18 EDT

## 2022-06-10 ENCOUNTER — READMISSION MANAGEMENT (OUTPATIENT)
Dept: CALL CENTER | Facility: HOSPITAL | Age: 87
End: 2022-06-10

## 2022-06-10 NOTE — OUTREACH NOTE
Medical Week 2 Survey    Flowsheet Row Responses   [facility] [place] patient discharged from? [place]   Does the patient have one of the following disease processes/diagnoses(primary or secondary)? Other   Week 2 attempt successful? No   Unsuccessful attempts Attempt 1          STEVEN MCGOWAN - Registered Nurse

## 2022-06-14 ENCOUNTER — READMISSION MANAGEMENT (OUTPATIENT)
Dept: CALL CENTER | Facility: HOSPITAL | Age: 87
End: 2022-06-14

## 2022-06-14 NOTE — OUTREACH NOTE
Medical Week 2 Survey    Flowsheet Row Responses   [facility] patient discharged from? [place]   Does the patient have one of the following disease processes/diagnoses(primary or secondary)? Other   Week 2 attempt successful? No   Unsuccessful attempts Attempt 2          MATT POST - Licensed Nurse

## 2022-06-20 ENCOUNTER — OFFICE VISIT (OUTPATIENT)
Dept: FAMILY MEDICINE CLINIC | Facility: CLINIC | Age: 87
End: 2022-06-20

## 2022-06-20 VITALS
DIASTOLIC BLOOD PRESSURE: 81 MMHG | BODY MASS INDEX: 26.68 KG/M2 | HEART RATE: 84 BPM | WEIGHT: 145 LBS | SYSTOLIC BLOOD PRESSURE: 147 MMHG | TEMPERATURE: 97.7 F | OXYGEN SATURATION: 98 % | HEIGHT: 62 IN

## 2022-06-20 DIAGNOSIS — J20.6 ACUTE BRONCHITIS DUE TO RHINOVIRUS: Primary | ICD-10-CM

## 2022-06-20 PROCEDURE — 99214 OFFICE O/P EST MOD 30 MIN: CPT | Performed by: FAMILY MEDICINE

## 2022-06-20 RX ORDER — DOXYCYCLINE HYCLATE 100 MG
100 TABLET ORAL 2 TIMES DAILY
Qty: 20 TABLET | Refills: 0 | Status: SHIPPED | OUTPATIENT
Start: 2022-06-20 | End: 2022-06-30

## 2022-06-20 NOTE — PROGRESS NOTES
"Chief Complaint  Cough and Bronchitis    Subjective          Marleni presents to [facility]  To follow-up on bronchitis.  Patient was diagnosed with rhinovirus recently through respiratory panel.  She continues to cough.  Cough has been productive with green mucus.  This is unchanged.  She is not running fever.  She is getting tired of coughing.  The Tessalon Perles are not completely effective.  Unfortunately she is allergic to narcotics and cannot be placed on stronger cough suppressants.        Objective   Vital Signs:   Vitals:    06/20/22 1522   BP: 147/81   Pulse: 84   Temp: 97.7 °F (36.5 °C)   SpO2: 98%   Weight: 65.8 kg (145 lb)   Height: 157.5 cm (62.01\")                Physical Exam  Constitutional:       General: She is not in acute distress.     Comments: Some cough during exam.    Pulmonary:      Effort: Pulmonary effort is normal.      Breath sounds: Normal breath sounds.   Neurological:      Mental Status: She is alert.          Result Review :                Assessment and Plan    Diagnoses and all orders for this visit:    1. Acute bronchitis due to Rhinovirus (Primary)  Assessment & Plan:  Continue same, if symptoms persist to next appointment then trial of antibiotics.     Orders:  -     doxycycline (VIBRAMYICN) 100 MG tablet; Take 1 tablet by mouth 2 (Two) Times a Day for 10 days. No dairy products within 2 hours of a dose  Dispense: 20 tablet; Refill: 0      Follow Up   Return if symptoms worsen or fail to improve.  Patient was given instructions and counseling regarding her condition or for health maintenance advice. Please see specific information pulled into the AVS if appropriate.   "

## 2022-06-21 ENCOUNTER — READMISSION MANAGEMENT (OUTPATIENT)
Dept: CALL CENTER | Facility: HOSPITAL | Age: 87
End: 2022-06-21

## 2022-06-21 NOTE — OUTREACH NOTE
Medical Week 4 Survey    Flowsheet Row Responses   [facility] patient discharged from? [place]   Does the patient have one of the following disease processes/diagnoses(primary or secondary)? Other   Week 4 attempt successful? Yes   Call start time 1333   Call end time 1336   Discharge diagnosis Bronchitis   Meds reviewed with patient/caregiver? Yes   Is the patient having any side effects they believe may be caused by any medication additions or changes? No   Prescription comments additional round of antibiotics added 6/20/22   Is the patient taking all medications as directed (includes completed medication regime)? Yes   Has the patient kept scheduled appointments due by today? Yes   Is the patient still receiving Home Health Services? Yes   Psychosocial issues? No   What is the patient's perception of their health status since discharge? Improving   Is the patient/caregiver able to teach back signs and symptoms related to disease process for when to call PCP? Yes   Is the patient/caregiver able to teach back signs and symptoms related to disease process for when to call 911? Yes   Is the patient/caregiver able to teach back the hierarchy of who to call/visit for symptoms/problems? PCP, Specialist, Home health nurse, Urgent Care, ED, 911 Yes   Additional teach back comments states bronchitis resolving   Week 4 Call Completed? Yes   Would the patient like one additional call? No   Graduated Yes   Is the patient interested in additional calls from an ambulatory ?  NOTE:  applies to high risk patients requiring additional follow-up. No   Did the patient feel the follow up calls were helpful during their recovery period? Yes   Was the number of calls appropriate? Yes          BAINCA POST - Registered Nurse

## 2022-07-11 ENCOUNTER — OFFICE VISIT (OUTPATIENT)
Dept: FAMILY MEDICINE CLINIC | Facility: CLINIC | Age: 87
End: 2022-07-11

## 2022-07-11 VITALS
DIASTOLIC BLOOD PRESSURE: 64 MMHG | OXYGEN SATURATION: 100 % | RESPIRATION RATE: 18 BRPM | TEMPERATURE: 98.3 F | HEIGHT: 62 IN | BODY MASS INDEX: 26.68 KG/M2 | SYSTOLIC BLOOD PRESSURE: 138 MMHG | HEART RATE: 70 BPM | WEIGHT: 145 LBS

## 2022-07-11 DIAGNOSIS — E78.00 HYPERCHOLESTEROLEMIA: ICD-10-CM

## 2022-07-11 DIAGNOSIS — I10 ESSENTIAL HYPERTENSION: Primary | ICD-10-CM

## 2022-07-11 DIAGNOSIS — F41.0 PANIC DISORDER: ICD-10-CM

## 2022-07-11 PROCEDURE — 99214 OFFICE O/P EST MOD 30 MIN: CPT | Performed by: FAMILY MEDICINE

## 2022-07-11 RX ORDER — AMLODIPINE BESYLATE 2.5 MG/1
2.5 TABLET ORAL DAILY
Qty: 90 TABLET | Refills: 1 | Status: SHIPPED | OUTPATIENT
Start: 2022-07-11 | End: 2022-12-14 | Stop reason: SDUPTHER

## 2022-07-11 RX ORDER — METOPROLOL SUCCINATE 50 MG/1
75 TABLET, EXTENDED RELEASE ORAL DAILY
Qty: 135 TABLET | Refills: 1 | Status: SHIPPED | OUTPATIENT
Start: 2022-07-11 | End: 2022-12-14 | Stop reason: SDUPTHER

## 2022-07-11 RX ORDER — ATORVASTATIN CALCIUM 20 MG/1
20 TABLET, FILM COATED ORAL NIGHTLY
Qty: 90 TABLET | Refills: 1 | Status: SHIPPED | OUTPATIENT
Start: 2022-07-11 | End: 2022-12-14 | Stop reason: SDUPTHER

## 2022-07-11 RX ORDER — EZETIMIBE 10 MG/1
10 TABLET ORAL NIGHTLY
Qty: 90 TABLET | Refills: 1 | Status: SHIPPED | OUTPATIENT
Start: 2022-07-11 | End: 2022-12-14 | Stop reason: SDUPTHER

## 2022-07-11 RX ORDER — ESCITALOPRAM OXALATE 20 MG/1
20 TABLET ORAL DAILY
Qty: 90 TABLET | Refills: 1 | Status: SHIPPED | OUTPATIENT
Start: 2022-07-11 | End: 2022-12-14 | Stop reason: SDUPTHER

## 2022-07-11 NOTE — PROGRESS NOTES
"Chief Complaint  Follow-up (Bronchitis/Anxiety med check )    Subjective          Marleni presents to [facility]  To follow-up on bronchitis.  Those symptoms are resolved except for fatigue.  She will need some refills of medications.  Her next follow-up will be in 6 months.  The increased dose of escitalopram has helped.  No other side effects with medications reported.        Objective   Vital Signs:   Vitals:    07/11/22 1350   BP: 138/64   Pulse: 70   Resp: 18   Temp: 98.3 °F (36.8 °C)   SpO2: 100%   Weight: 65.8 kg (145 lb)   Height: 157.5 cm (62\")                Physical Exam  Vitals reviewed.   Constitutional:       General: She is not in acute distress.  Eyes:      General: Lids are normal.      Conjunctiva/sclera: Conjunctivae normal.   Neck:      Vascular: No carotid bruit.      Trachea: No tracheal deviation.   Cardiovascular:      Rate and Rhythm: Normal rate and regular rhythm.      Heart sounds: Normal heart sounds. No murmur heard.  Pulmonary:      Effort: Pulmonary effort is normal.      Breath sounds: Normal breath sounds.   Skin:     General: Skin is warm and dry.   Neurological:      Mental Status: She is alert. She is not disoriented.   Psychiatric:         Speech: Speech normal.         Behavior: Behavior normal. Behavior is cooperative.          Result Review :                Assessment and Plan    Diagnoses and all orders for this visit:    1. Essential hypertension (Primary)  Assessment & Plan:  Condition is stable. The current medical regimen is effective;  continue present plan and medications.    Orders:  -     amLODIPine (NORVASC) 2.5 MG tablet; Take 1 tablet by mouth Daily.  Dispense: 90 tablet; Refill: 1  -     metoprolol succinate XL (TOPROL-XL) 50 MG 24 hr tablet; Take 1.5 tablets by mouth Daily for 180 days.  Dispense: 135 tablet; Refill: 1  -     Comprehensive Metabolic Panel; Future  -     CBC & Differential; Future  -     TSH; Future    2. " Hypercholesterolemia  Assessment & Plan:  Condition is stable. The current medical regimen is effective;  continue present plan and medications.    Orders:  -     atorvastatin (LIPITOR) 20 MG tablet; Take 1 tablet by mouth Every Night.  Dispense: 90 tablet; Refill: 1  -     ezetimibe (ZETIA) 10 MG tablet; Take 1 tablet by mouth Every Night.  Dispense: 90 tablet; Refill: 1  -     Lipid Panel With / Chol / HDL Ratio; Future  -     CK; Future    3. Panic disorder  Assessment & Plan:  Condition is stable. The current medical regimen is effective;  continue present plan and medications.    Orders:  -     escitalopram (LEXAPRO) 20 MG tablet; Take 1 tablet by mouth Daily for 180 days.  Dispense: 90 tablet; Refill: 1      Follow Up   Return in about 6 months (around 1/11/2023) for Medicare Wellness & regular visit, [id number] min.  Patient was given instructions and counseling regarding her condition or for health maintenance advice. Please see specific information pulled into the AVS if appropriate.

## 2022-08-08 ENCOUNTER — TELEPHONE (OUTPATIENT)
Dept: FAMILY MEDICINE CLINIC | Facility: CLINIC | Age: 87
End: 2022-08-08

## 2022-08-08 NOTE — TELEPHONE ENCOUNTER
Caller: NEAL HERNANDEZ    Relationship to patient: Emergency Contact    Best call back number: [phone] (H)    Patient is needing:  TO BE SCHEDULED FOR LABS

## 2022-09-26 ENCOUNTER — TELEPHONE (OUTPATIENT)
Dept: FAMILY MEDICINE CLINIC | Facility: CLINIC | Age: 87
End: 2022-09-26

## 2022-09-26 NOTE — TELEPHONE ENCOUNTER
Caller: NEAL HERNANDEZ    Relationship: Emergency Contact    Best call back number: [phone]    Do you require a callback: YES-KIDNEY DOCTOR WOULD LIKE COPY OF LAST LABS.    ATTENTION LEIF OSUNA  [phone]

## 2022-09-27 ENCOUNTER — INFUSION (OUTPATIENT)
Dept: ONCOLOGY | Facility: HOSPITAL | Age: 87
End: 2022-09-27

## 2022-09-27 ENCOUNTER — LAB (OUTPATIENT)
Dept: OTHER | Facility: HOSPITAL | Age: 87
End: 2022-09-27

## 2022-09-27 VITALS — WEIGHT: 138.2 LBS | RESPIRATION RATE: 18 BRPM | BODY MASS INDEX: 25.28 KG/M2 | TEMPERATURE: 96.5 F

## 2022-09-27 DIAGNOSIS — M81.0 SENILE OSTEOPOROSIS: ICD-10-CM

## 2022-09-27 DIAGNOSIS — M81.0 SENILE OSTEOPOROSIS: Primary | ICD-10-CM

## 2022-09-27 LAB — 25(OH)D3 SERPL-MCNC: 159 NG/ML (ref 30–100)

## 2022-09-27 PROCEDURE — 25010000002 DENOSUMAB 60 MG/ML SOLUTION PREFILLED SYRINGE: Performed by: OBSTETRICS & GYNECOLOGY

## 2022-09-27 PROCEDURE — 82306 VITAMIN D 25 HYDROXY: CPT | Performed by: OBSTETRICS & GYNECOLOGY

## 2022-09-27 PROCEDURE — 96372 THER/PROPH/DIAG INJ SC/IM: CPT

## 2022-09-27 RX ADMIN — DENOSUMAB 60 MG: 60 INJECTION SUBCUTANEOUS at 16:25

## 2022-11-07 ENCOUNTER — OFFICE VISIT (OUTPATIENT)
Dept: CARDIOLOGY | Facility: CLINIC | Age: 87
End: 2022-11-07

## 2022-11-07 VITALS
WEIGHT: 135 LBS | BODY MASS INDEX: 24.84 KG/M2 | DIASTOLIC BLOOD PRESSURE: 66 MMHG | RESPIRATION RATE: 18 BRPM | HEART RATE: 60 BPM | SYSTOLIC BLOOD PRESSURE: 136 MMHG | HEIGHT: 62 IN | OXYGEN SATURATION: 98 %

## 2022-11-07 DIAGNOSIS — E78.00 HYPERCHOLESTEROLEMIA: ICD-10-CM

## 2022-11-07 DIAGNOSIS — N18.4 CKD (CHRONIC KIDNEY DISEASE) STAGE 4, GFR 15-29 ML/MIN: ICD-10-CM

## 2022-11-07 DIAGNOSIS — I25.10 CHRONIC CORONARY ARTERY DISEASE: Primary | ICD-10-CM

## 2022-11-07 DIAGNOSIS — I48.21 PERMANENT ATRIAL FIBRILLATION: ICD-10-CM

## 2022-11-07 DIAGNOSIS — I10 ESSENTIAL HYPERTENSION: ICD-10-CM

## 2022-11-07 PROCEDURE — 99214 OFFICE O/P EST MOD 30 MIN: CPT | Performed by: INTERNAL MEDICINE

## 2022-11-07 PROCEDURE — 93000 ELECTROCARDIOGRAM COMPLETE: CPT | Performed by: INTERNAL MEDICINE

## 2022-11-07 RX ORDER — FAMOTIDINE 10 MG
10 TABLET ORAL NIGHTLY
COMMUNITY

## 2022-11-07 NOTE — PROGRESS NOTES
CARDIOLOGY    Sybil Parmar MD    ENCOUNTER DATE:  11/07/2022    Marleni Hummel / 86 y.o. / female        CHIEF COMPLAINT / REASON FOR OFFICE VISIT     Heart Problem (6 Month follow up CAD )      HISTORY OF PRESENT ILLNESS       HPI    Marleni Hummel is a 86 y.o. female     This is a patient who previously followed with Dr. Tai. She has a history of hypertension, hyperlipidemia, atrial fibrillation, stress induced cardiomyopathy, obstructive sleep apnea, left renal carcinoma status post nephrectomy.      She had atrial fibrillation with rapid ventricular response in the setting of abnormal thyroid studies and unremarkable echo. Nuclear stress test was also unremarkable. In March 2011 she went back into atrial fibrillation after cardioversion and was started on flecainide and had a repeat cardioversion. In October 2015 she had a non-ST elevation myocardial infarction and heart catheterization which showed a cardiomyopathy with an ejection fraction of 20%. She did have a lesion of her circumflex and was stented with a drug eluting stent. Flecainide was discontinued and switched to amiodarone. Her QT interval increased and so amiodarone was discontinued.     In 2018 she had an echocardiogram showing ejection fraction of 65% with borderline left ventricular hypertrophy and mild to moderate left atrial enlargement. She had no significant valve disease. In November 2019 she had shortness of breath and was started on Lasix but had an episode of near syncope and so that was discontinued. In 2019 she had shortness of breath and had a nuclear stress test which was negative for ischemia. Echo showed normal left ventricular function and was otherwise unchanged.       She says she feels fatigued. She is not doing routine exercise but does cooking and things around her house. No bleeding issues.         REVIEW OF SYSTEMS     Review of Systems   Constitutional: Negative for chills, fever, weight gain and weight  "loss.   Cardiovascular: Negative for leg swelling.   Respiratory: Positive for cough. Negative for snoring and wheezing.    Hematologic/Lymphatic: Negative for bleeding problem. Does not bruise/bleed easily.   Skin: Negative for color change.   Musculoskeletal: Negative for falls, joint pain and myalgias.   Gastrointestinal: Negative for melena.   Genitourinary: Negative for hematuria.   Neurological: Negative for excessive daytime sleepiness.   Psychiatric/Behavioral: Negative for depression. The patient is nervous/anxious.          VITAL SIGNS     Visit Vitals  /66 (BP Location: Right arm, Patient Position: Sitting, Cuff Size: Adult)   Pulse 60   Resp 18   Ht 157.5 cm (62\")   Wt 61.2 kg (135 lb)   SpO2 98%   BMI 24.69 kg/m²         Wt Readings from Last 3 Encounters:   11/07/22 61.2 kg (135 lb)   09/27/22 62.7 kg (138 lb 3.2 oz)   07/11/22 65.8 kg (145 lb)     Body mass index is 24.69 kg/m².      PHYSICAL EXAMINATION     Constitutional:       General: Not in acute distress.  Neck:      Vascular: No carotid bruit or JVD.   Pulmonary:      Effort: Pulmonary effort is normal.      Breath sounds: Normal breath sounds.   Cardiovascular:      Normal rate. Irregularly irregular rhythm.      Murmurs: There is no murmur.   Psychiatric:         Mood and Affect: Mood and affect normal.           REVIEWED DATA       ECG 12 Lead    Date/Time: 11/7/2022 11:05 AM  Performed by: Sybil Parmar MD  Authorized by: Sybil Parmar MD   Comparison: compared with previous ECG from 5/30/2022  Similar to previous ECG  Rhythm: atrial fibrillation  BPM: 60  Conduction: conduction normal  ST Segments: ST segments normal  T Waves: T waves normal    Clinical impression: abnormal EKG              Lipid Panel    Lipid Panel 11/30/21 2/23/22 9/22/22   Total Cholesterol 142     Total Cholesterol  169 138   Triglycerides 158 (A) 189 (A) 140   HDL Cholesterol 50 58 52   VLDL Cholesterol 27 32 24   LDL Cholesterol  65 79 62   LDL/HDL " Ratio 1.21     (A) Abnormal value       Comments are available for some flowsheets but are not being displayed.             Lab Results   Component Value Date    GLUCOSE 102 (H) 09/22/2022    BUN 41 (H) 09/22/2022    CREATININE 2.03 (H) 09/22/2022    EGFRIFNONA 21 (L) 02/23/2022    EGFRIFAFRI 25 (L) 02/23/2022    BCR 20.2 09/22/2022    K 4.3 09/22/2022    CO2 27.3 09/22/2022    CALCIUM 9.1 09/22/2022    PROTENTOTREF 6.1 09/22/2022    ALBUMIN 4.50 09/22/2022    LABIL2 2.8 09/22/2022    AST 20 09/22/2022    ALT 13 09/22/2022       ASSESSMENT & PLAN      Diagnosis Plan   1. Chronic coronary artery disease        2. Hypercholesterolemia        3. Permanent atrial fibrillation (HCC)        4. CKD (chronic kidney disease) stage 4, GFR 15-29 ml/min ([facility])        5. Essential hypertension          1.  Permanent atrial fibrillation.  Rate controlled.  On Eliquis.  No bleeding issues.  2.  Coronary artery disease status post drug-eluting stent to the circumflex in October 2015.  Normal nuclear stress test in September 2020.  No anginal symptoms.  Continue medical therapy.  3.  Hypertension.  Blood pressure is stable.  4.  Hyperlipidemia.  On atorvastatin and Zetia.  Lipids followed by Dr. Andujar  5.  History of renal insufficiency with a history of nephrectomy due to cancer.  6.  History of prolonged QT interval on amiodarone.  8.  History of obstructive sleep apnea not currently on therapy.      Follow-up with me in 1 year unless change in her symptoms sooner.    Orders Placed This Encounter   Procedures   • ECG 12 Lead     This order was created via procedure documentation     Order Specific Question:   Release to patient     Answer:   Routine Release     1.  Permanent atrial fibrillation.  Rate controlled.  On Eliquis.  No bleeding issues.  2.  Coronary artery disease status post drug-eluting stent to the circumflex in October 2015.  Normal nuclear stress test in September 2020.  No anginal symptoms.  Continue medical  therapy.  3.  Hypertension.  Blood pressure is stable.  4.  Hyperlipidemia.  On atorvastatin and Zetia.  Lipids followed by Dr. Andujar  5.  History of renal insufficiency with a history of nephrectomy due to cancer.  6.  History of prolonged QT interval on amiodarone.  8.  History of obstructive sleep apnea not currently on therapy.         MEDICATIONS         Discharge Medications          Accurate as of November 7, 2022 11:06 AM. If you have any questions, ask your nurse or doctor.            Changes to Medications      Instructions Start Date   docusate sodium 250 MG capsule  Commonly known as: COLACE  What changed:   · when to take this  · reasons to take this   250 mg, Oral, 2 Times Daily         Continue These Medications      Instructions Start Date   acetaminophen 500 MG tablet  Commonly known as: TYLENOL   1,000 mg, Oral, 3 Times Daily PRN      amLODIPine 2.5 MG tablet  Commonly known as: NORVASC   2.5 mg, Oral, Daily      atorvastatin 20 MG tablet  Commonly known as: LIPITOR   20 mg, Oral, Nightly      Eliquis 2.5 MG tablet tablet  Generic drug: apixaban   TAKE 1 TABLET EVERY 12 HOURS (TWICE A DAY)      escitalopram 20 MG tablet  Commonly known as: LEXAPRO   20 mg, Oral, Daily      ezetimibe 10 MG tablet  Commonly known as: ZETIA   10 mg, Oral, Nightly      famotidine 10 MG tablet  Commonly known as: PEPCID   10 mg, Oral, 2 Times Daily PRN      metoprolol succinate XL 50 MG 24 hr tablet  Commonly known as: TOPROL-XL   75 mg, Oral, Daily      Mirabegron ER 50 MG tablet sustained-release 24 hour 24 hr tablet  Commonly known as: MYRBETRIQ   50 mg, Oral, Every Evening      PROLIA SC   1 dose, Subcutaneous, Every 6 Months         Stop These Medications    vitamin D3 125 MCG (5000 UT) capsule capsule  Stopped by: MD Sybil Hastings MD  11/07/22  11:06 EST    Part of this note may be an electronic transcription/translation of spoken language to printed text using the Dragon  dictation system.

## 2022-12-14 ENCOUNTER — OFFICE VISIT (OUTPATIENT)
Dept: FAMILY MEDICINE CLINIC | Facility: CLINIC | Age: 87
End: 2022-12-14

## 2022-12-14 VITALS
OXYGEN SATURATION: 98 % | SYSTOLIC BLOOD PRESSURE: 128 MMHG | HEART RATE: 54 BPM | DIASTOLIC BLOOD PRESSURE: 74 MMHG | HEIGHT: 62 IN | TEMPERATURE: 97.3 F | BODY MASS INDEX: 24.66 KG/M2 | RESPIRATION RATE: 18 BRPM | WEIGHT: 134 LBS

## 2022-12-14 DIAGNOSIS — E78.00 HYPERCHOLESTEROLEMIA: ICD-10-CM

## 2022-12-14 DIAGNOSIS — I10 ESSENTIAL HYPERTENSION: ICD-10-CM

## 2022-12-14 DIAGNOSIS — F41.0 PANIC DISORDER: ICD-10-CM

## 2022-12-14 DIAGNOSIS — K57.92 DIVERTICULITIS: Primary | ICD-10-CM

## 2022-12-14 DIAGNOSIS — K58.1 IRRITABLE BOWEL SYNDROME WITH CONSTIPATION: ICD-10-CM

## 2022-12-14 PROBLEM — E87.5 HYPERKALEMIA: Status: RESOLVED | Noted: 2022-05-31 | Resolved: 2022-12-14

## 2022-12-14 PROBLEM — J20.6 ACUTE BRONCHITIS DUE TO RHINOVIRUS: Status: RESOLVED | Noted: 2022-05-31 | Resolved: 2022-12-14

## 2022-12-14 PROBLEM — N17.9 ACUTE KIDNEY INJURY: Status: RESOLVED | Noted: 2022-05-30 | Resolved: 2022-12-14

## 2022-12-14 PROBLEM — K57.90 DIVERTICULOSIS: Status: ACTIVE | Noted: 2022-12-14

## 2022-12-14 PROBLEM — C64.2 MALIGNANT NEOPLASM OF LEFT KIDNEY, EXCEPT RENAL PELVIS: Status: ACTIVE | Noted: 2022-06-07

## 2022-12-14 PROCEDURE — 99214 OFFICE O/P EST MOD 30 MIN: CPT | Performed by: FAMILY MEDICINE

## 2022-12-14 RX ORDER — EZETIMIBE 10 MG/1
10 TABLET ORAL NIGHTLY
Qty: 90 TABLET | Refills: 2 | Status: SHIPPED | OUTPATIENT
Start: 2022-12-14 | End: 2023-01-09 | Stop reason: SDUPTHER

## 2022-12-14 RX ORDER — SODIUM BICARBONATE 650 MG/1
650 TABLET ORAL
COMMUNITY
Start: 2022-12-07 | End: 2023-03-07

## 2022-12-14 RX ORDER — ESCITALOPRAM OXALATE 20 MG/1
20 TABLET ORAL DAILY
Qty: 90 TABLET | Refills: 2 | Status: SHIPPED | OUTPATIENT
Start: 2022-12-14 | End: 2023-01-09 | Stop reason: SDUPTHER

## 2022-12-14 RX ORDER — AMLODIPINE BESYLATE 2.5 MG/1
2.5 TABLET ORAL DAILY
Qty: 90 TABLET | Refills: 2 | Status: SHIPPED | OUTPATIENT
Start: 2022-12-14 | End: 2023-01-09 | Stop reason: SDUPTHER

## 2022-12-14 RX ORDER — FAMOTIDINE 10 MG
1 TABLET ORAL
COMMUNITY
End: 2023-03-22 | Stop reason: HOSPADM

## 2022-12-14 RX ORDER — AMOXICILLIN AND CLAVULANATE POTASSIUM 875; 125 MG/1; MG/1
1 TABLET, FILM COATED ORAL 2 TIMES DAILY
Qty: 20 TABLET | Refills: 0 | Status: SHIPPED | OUTPATIENT
Start: 2022-12-14 | End: 2022-12-24

## 2022-12-14 RX ORDER — METOPROLOL SUCCINATE 50 MG/1
75 TABLET, EXTENDED RELEASE ORAL DAILY
Qty: 135 TABLET | Refills: 2 | Status: SHIPPED | OUTPATIENT
Start: 2022-12-14 | End: 2023-01-09 | Stop reason: SDUPTHER

## 2022-12-14 RX ORDER — ATORVASTATIN CALCIUM 20 MG/1
20 TABLET, FILM COATED ORAL NIGHTLY
Qty: 90 TABLET | Refills: 2 | Status: SHIPPED | OUTPATIENT
Start: 2022-12-14 | End: 2023-01-09 | Stop reason: SDUPTHER

## 2022-12-14 NOTE — ASSESSMENT & PLAN NOTE
Irritable: Constipation dependent newly identified.  Add Linzess to current regimen.  Recheck 3 weeks

## 2022-12-14 NOTE — ASSESSMENT & PLAN NOTE
New problem since Thanksgiving.  Pain off and on located left lower quadrant.  Treat with antibiotics.  Recheck in 3 weeks.

## 2022-12-14 NOTE — PROGRESS NOTES
"   Chief Complaint  Abdominal Pain (Hx diverticulitis, pain in LLQ x 3wks )    Subjective          Marleni presents to [facility]  Originally for AWV. This visit will be changed due to LLQ abdomen pain off and on since thanksgiving. History of diverticulitis. No fever, No vomiting or diarrhea. Dry heaves at night.         Objective   Vital Signs:   Vitals:    12/14/22 1328   BP: 128/74   Pulse: 54   Resp: 18   Temp: 97.3 °F (36.3 °C)   SpO2: 98%   Weight: 60.8 kg (134 lb)   Height: 157.5 cm (62\")        BMI is within normal parameters. No other follow-up for BMI required.        Physical Exam  Constitutional:       General: She is not in acute distress.  Abdominal:      General: Abdomen is flat.      Palpations: Abdomen is soft.      Tenderness: There is abdominal tenderness in the left lower quadrant.   Neurological:      Mental Status: She is alert.          Result Review :                Assessment and Plan    Diagnoses and all orders for this visit:    1. Diverticulitis (Primary)  Assessment & Plan:  New problem since Thanksgiving.  Pain off and on located left lower quadrant.  Treat with antibiotics.  Recheck in 3 weeks.    Orders:  -     amoxicillin-clavulanate (Augmentin) 875-125 MG per tablet; Take 1 tablet by mouth 2 (Two) Times a Day for 10 days.  Dispense: 20 tablet; Refill: 0    2. Irritable bowel syndrome with constipation  Assessment & Plan:  Irritable: Constipation dependent newly identified.  Add Linzess to current regimen.  Recheck 3 weeks    Orders:  -     linaclotide (Linzess) 72 MCG capsule capsule; Take 1 capsule by mouth Every Morning Before Breakfast for 270 days.  Dispense: 90 capsule; Refill: 2    3. Essential hypertension  -     amLODIPine (NORVASC) 2.5 MG tablet; Take 1 tablet by mouth Daily for 270 days.  Dispense: 90 tablet; Refill: 2  -     metoprolol succinate XL (TOPROL-XL) 50 MG 24 hr tablet; Take 1.5 tablets by mouth Daily for 270 days.  Dispense: 135 " tablet; Refill: 2    4. Hypercholesterolemia  -     atorvastatin (LIPITOR) 20 MG tablet; Take 1 tablet by mouth Every Night for 270 days.  Dispense: 90 tablet; Refill: 2  -     ezetimibe (ZETIA) 10 MG tablet; Take 1 tablet by mouth Every Night for 270 days.  Dispense: 90 tablet; Refill: 2    5. Panic disorder  -     escitalopram (LEXAPRO) 20 MG tablet; Take 1 tablet by mouth Daily for 270 days.  Dispense: 90 tablet; Refill: 2      Follow Up   Return in about 3 weeks (around 1/4/2023) for Medicare Wellness Visit, schedule 30 min, recheck of current condition.  Patient was given instructions and counseling regarding her condition or for health maintenance advice. Please see specific information pulled into the AVS if appropriate.

## 2022-12-23 ENCOUNTER — PRIOR AUTHORIZATION (OUTPATIENT)
Dept: FAMILY MEDICINE CLINIC | Facility: CLINIC | Age: 87
End: 2022-12-23

## 2023-01-03 ENCOUNTER — TELEPHONE (OUTPATIENT)
Dept: FAMILY MEDICINE CLINIC | Facility: CLINIC | Age: 88
End: 2023-01-03
Payer: MEDICARE

## 2023-01-03 DIAGNOSIS — R10.9 ABDOMINAL PAIN, UNSPECIFIED ABDOMINAL LOCATION: Primary | ICD-10-CM

## 2023-01-03 NOTE — TELEPHONE ENCOUNTER
Dorothy, reach out to the patient and see if she is still having abdomen discomfort.  If she is, then set her up to see her gastroenterologist as soon as possible.  If she does not have one, then set her up to see Dr. Miller.  Let her know that the insurance would not cover Linzess for this condition for her.  Thanks, Dr. Andujar

## 2023-01-09 ENCOUNTER — OFFICE VISIT (OUTPATIENT)
Dept: FAMILY MEDICINE CLINIC | Facility: CLINIC | Age: 88
End: 2023-01-09
Payer: MEDICARE

## 2023-01-09 VITALS
RESPIRATION RATE: 18 BRPM | SYSTOLIC BLOOD PRESSURE: 108 MMHG | DIASTOLIC BLOOD PRESSURE: 54 MMHG | HEIGHT: 62 IN | TEMPERATURE: 97.2 F | HEART RATE: 49 BPM | WEIGHT: 133 LBS | OXYGEN SATURATION: 98 % | BODY MASS INDEX: 24.48 KG/M2

## 2023-01-09 DIAGNOSIS — Z00.00 MEDICARE ANNUAL WELLNESS VISIT, SUBSEQUENT: Primary | ICD-10-CM

## 2023-01-09 DIAGNOSIS — I10 ESSENTIAL HYPERTENSION: ICD-10-CM

## 2023-01-09 DIAGNOSIS — F41.0 PANIC DISORDER: ICD-10-CM

## 2023-01-09 DIAGNOSIS — Z23 IMMUNIZATION DUE: ICD-10-CM

## 2023-01-09 DIAGNOSIS — E78.00 HYPERCHOLESTEROLEMIA: ICD-10-CM

## 2023-01-09 PROCEDURE — G0439 PPPS, SUBSEQ VISIT: HCPCS | Performed by: FAMILY MEDICINE

## 2023-01-09 PROCEDURE — 1126F AMNT PAIN NOTED NONE PRSNT: CPT | Performed by: FAMILY MEDICINE

## 2023-01-09 PROCEDURE — 1159F MED LIST DOCD IN RCRD: CPT | Performed by: FAMILY MEDICINE

## 2023-01-09 PROCEDURE — 90677 PCV20 VACCINE IM: CPT | Performed by: FAMILY MEDICINE

## 2023-01-09 PROCEDURE — 1160F RVW MEDS BY RX/DR IN RCRD: CPT | Performed by: FAMILY MEDICINE

## 2023-01-09 PROCEDURE — G0009 ADMIN PNEUMOCOCCAL VACCINE: HCPCS | Performed by: FAMILY MEDICINE

## 2023-01-09 PROCEDURE — 1170F FXNL STATUS ASSESSED: CPT | Performed by: FAMILY MEDICINE

## 2023-01-09 PROCEDURE — 1125F AMNT PAIN NOTED PAIN PRSNT: CPT | Performed by: FAMILY MEDICINE

## 2023-01-09 RX ORDER — ATORVASTATIN CALCIUM 20 MG/1
20 TABLET, FILM COATED ORAL NIGHTLY
Qty: 90 TABLET | Refills: 2 | Status: SHIPPED | OUTPATIENT
Start: 2023-01-09 | End: 2023-10-06

## 2023-01-09 RX ORDER — AMLODIPINE BESYLATE 2.5 MG/1
2.5 TABLET ORAL DAILY
Qty: 90 TABLET | Refills: 2 | Status: SHIPPED | OUTPATIENT
Start: 2023-01-09 | End: 2023-03-22 | Stop reason: HOSPADM

## 2023-01-09 RX ORDER — EZETIMIBE 10 MG/1
10 TABLET ORAL NIGHTLY
Qty: 90 TABLET | Refills: 2 | Status: SHIPPED | OUTPATIENT
Start: 2023-01-09 | End: 2023-03-22 | Stop reason: HOSPADM

## 2023-01-09 RX ORDER — METOPROLOL SUCCINATE 50 MG/1
75 TABLET, EXTENDED RELEASE ORAL DAILY
Qty: 135 TABLET | Refills: 2 | Status: SHIPPED | OUTPATIENT
Start: 2023-01-09 | End: 2023-03-22 | Stop reason: HOSPADM

## 2023-01-09 RX ORDER — ESCITALOPRAM OXALATE 20 MG/1
20 TABLET ORAL DAILY
Qty: 90 TABLET | Refills: 2 | Status: SHIPPED | OUTPATIENT
Start: 2023-01-09 | End: 2023-10-06

## 2023-01-09 NOTE — PATIENT INSTRUCTIONS
Call Dr. Brandon Miller at area code [phone] to schedule follow-up appointment for recent and recurrent diverticulosis/diverticulitis diagnosis.  Referral order has been placed already.      You are due for Shingrix vaccination series ( the newest shingles vaccine).  It is a two shot series spaced 2-6 months apart. Please get this vaccine series started at your earliest convenience at your local pharmacy to help avoid shingles outbreak. It is more effective than the old Zostavax vaccine and is recommended even if you have had the Zostavax vaccine in the past.  Once the Shingrix series is completed, it does not need to be repeated.   For more information, please look at the website below:  [facility] Shingrix Vaccine Information      Medicare Wellness  Personal Prevention Plan of Service     Date of Office Visit:    Encounter Provider:  Ken Andujar MD  Place of Service:  [facility]  Patient Name: Marleni Hummel  :  1935    As part of the Medicare Wellness portion of your visit today, we are providing you with this personalized preventive plan of services (PPPS). This plan is based upon recommendations of the [place] Preventive Services Task Force (USPSTF) and the Advisory Committee on Immunization Practices (ACIP).    This lists the preventive care services that should be considered, and provides dates of when you are due. Items listed as completed are up-to-date and do not require any further intervention.    Health Maintenance   Topic Date Due    ZOSTER VACCINE (1 of 2) Never done    Pneumococcal Vaccine 65+ (2 - PCV) 2017    DXA SCAN  2023    ANNUAL WELLNESS VISIT  2023    LIPID PANEL  2024    MAMMOGRAM  2024    TDAP/TD VACCINES (2 - Td or Tdap) 10/27/2028    COVID-19 Vaccine  Completed    INFLUENZA VACCINE  Completed       Orders Placed This Encounter   Procedures    Pneumococcal Conjugate Vaccine 20-Valent All       No follow-ups on  file.

## 2023-01-09 NOTE — PROGRESS NOTES
The ABCs of the Annual Wellness Visit  Subsequent Medicare Wellness Visit    Subjective      Marleni Hummel is a 87 y.o. female who presents for a Subsequent Medicare Wellness Visit.  She has improved from her recent diverticulitis.  He does need her medications sent to the correct pharmacy of [facility].    The following portions of the patient's history were reviewed and   updated as appropriate: allergies, current medications, past family history, past medical history, past social history, past surgical history and problem list.    Compared to one year ago, the patient feels her physical   health is better.    Compared to one year ago, the patient feels her mental   health is the same.    Recent Hospitalizations:  This patient has had a [facility] admission record on file within the last 365 days.    Current Medical Providers:  Patient Care Team:  Ken Andujar MD as PCP - General  Chuckie Mclaughlin MD as Consulting Physician (Urology)  Geoffrey Mills MD as Consulting Physician (Nephrology)  Anayeli Alanis MD as Consulting Physician (Obstetrics and Gynecology)  BROOK Clarke MD as Consulting Physician (Ophthalmology)  Jose Alfredo Krishnamurthy MD as Consulting Physician (Hematology and Oncology)  Sean Canales MD as Consulting Physician (Orthopedic Surgery)  Esteban Moe MD as Consulting Physician (Orthopedic Surgery)  Feliciano Elizabeth MD as Consulting Physician (Gastroenterology)  Wallace Hook MD as Consulting Physician (Pain Medicine)  Sarah Beth Marcus APRN as Nurse Practitioner (Nephrology)  Brandon Miller MD as Consulting Physician (Gastroenterology)    Outpatient Medications Prior to Visit   Medication Sig Dispense Refill   • acetaminophen (TYLENOL) 500 MG tablet Take 1,000 mg by mouth 3 (three) times a day as needed for mild pain (1-3) or moderate pain (4-6).     • apixaban (Eliquis) 2.5 MG tablet tablet Take 1 tablet by mouth Every 12 (Twelve) Hours. 180 tablet  3   • Denosumab (PROLIA SC) Inject 1 dose under the skin into the appropriate area as directed Every 6 (Six) Months.     • docusate sodium (COLACE) 250 MG capsule Take 1 capsule by mouth 2 (Two) Times a Day. (Patient taking differently: Take 1 capsule by mouth Daily As Needed.) 60 capsule 1   • famotidine (PEPCID) 10 MG tablet Take 1 tablet by mouth 2 (Two) Times a Day As Needed for Heartburn.     • famotidine (PEPCID) 10 MG tablet Take 1 tablet by mouth.     • Mirabegron ER (MYRBETRIQ) 50 MG tablet sustained-release 24 hour 24 hr tablet Take 50 mg by mouth Every Evening.     • sodium bicarbonate 650 MG tablet Take 650 mg by mouth.     • amLODIPine (NORVASC) 2.5 MG tablet Take 1 tablet by mouth Daily for 270 days. 90 tablet 2   • atorvastatin (LIPITOR) 20 MG tablet Take 1 tablet by mouth Every Night for 270 days. 90 tablet 2   • escitalopram (LEXAPRO) 20 MG tablet Take 1 tablet by mouth Daily for 270 days. 90 tablet 2   • ezetimibe (ZETIA) 10 MG tablet Take 1 tablet by mouth Every Night for 270 days. 90 tablet 2   • linaclotide (Linzess) 72 MCG capsule capsule Take 1 capsule by mouth Every Morning Before Breakfast for 270 days. 90 capsule 2   • metoprolol succinate XL (TOPROL-XL) 50 MG 24 hr tablet Take 1.5 tablets by mouth Daily for 270 days. 135 tablet 2     No facility-administered medications prior to visit.       No opioid medication identified on active medication list. I have reviewed chart for other potential  high risk medication/s and harmful drug interactions in the elderly.          Aspirin is not on active medication list.  Aspirin use is contraindicated for this patient due to: current use of Eliquis.  .    Patient Active Problem List   Diagnosis   • Arthritis involving multiple sites   • Essential hypertension   • Permanent atrial fibrillation (HCC)   • History of Bell's palsy   • CKD (chronic kidney disease) stage 4, GFR 15-29 ml/min ([facility])   • Gastroesophageal reflux disease without esophagitis   •  Hypercholesterolemia   • Insomnia   • Localized osteoporosis without current pathological fracture   • Panic disorder   • Chronic nonseasonal allergic rhinitis due to pollen   • Other sleep apnea   • History of MI (myocardial infarction)   • Chronic coronary artery disease   • Anemia of chronic disease   • Weakness of right leg   • Cardiac murmur   • Senile osteoporosis   • Hyperuricemia   • Grief reaction   • Peripheral vertigo   • Renal cell carcinoma of left kidney (HCC)   • Renal oncocytoma of left kidney   • History of left nephrectomy   • Cerebrovascular accident (CVA) due to stenosis of small artery (HCC)   • History of renal cell carcinoma   • TIA (transient ischemic attack)   • Spinal stenosis, lumbar region with neurogenic claudication   • Malignant neoplasm of left kidney, except renal pelvis (HCC)   • Diverticulosis   • Irritable bowel syndrome with constipation   • Diverticulitis     Advance Care Planning  Advance Directive is on file.  ACP discussion was held with the patient during this visit. Patient has an advance directive in EMR which is still valid.      Objective    Vitals:    01/09/23 1338   BP: 108/54   Pulse: (!) 49   Resp: 18   Temp: 97.2 °F (36.2 °C)   SpO2: 98%   Weight: 60.3 kg (133 lb)   Height: 157.5 cm (62\")     Estimated body mass index is 24.33 kg/m² as calculated from the following:    Height as of this encounter: 157.5 cm (62\").    Weight as of this encounter: 60.3 kg (133 lb).    BMI is within normal parameters. No other follow-up for BMI required.      Does the patient have evidence of cognitive impairment?   No    Lab Results   Component Value Date    CHLPL 132 01/04/2023    TRIG 118 01/04/2023    HDL 56 01/04/2023    LDL 55 01/04/2023    VLDL 21 01/04/2023          HEALTH RISK ASSESSMENT    Smoking Status:  Social History     Tobacco Use   Smoking Status Former   • Packs/day: 1.00   • Years: 3.00   • Pack years: 3.00   • Types: Cigarettes   • Quit date: 2/22/1956   • Years  since quittin.9   Smokeless Tobacco Never   Tobacco Comments    caffeine - 1/2 cup coffee daily      Alcohol Consumption:  Social History     Substance and Sexual Activity   Alcohol Use Not Currently   • Alcohol/week: 3.0 standard drinks   • Types: 3 Glasses of wine per week    Comment: couple times a week     Fall Risk Screen:    TRISTAN Fall Risk Assessment was completed, and patient is at LOW risk for falls.Assessment completed on:2023    Depression Screening:  PHQ-2/PHQ-9 Depression Screening 2023   Little Interest or Pleasure in Doing Things 0-->not at all   Feeling Down, Depressed or Hopeless 0-->not at all   PHQ-9: Brief Depression Severity Measure Score 0       Health Habits and Functional and Cognitive Screening:  Functional & Cognitive Status 2023   Do you have difficulty preparing food and eating? No   Do you have difficulty bathing yourself, getting dressed or grooming yourself? No   Do you have difficulty using the toilet? No   Do you have difficulty moving around from place to place? No   Do you have trouble with steps or getting out of a bed or a chair? No   Current Diet Well Balanced Diet   Dental Exam Up to date   Eye Exam Up to date   Exercise (times per week) 0 times per week   Current Exercise Activities Include -   Do you need help using the phone?  No   Are you deaf or do you have serious difficulty hearing?  Yes   Do you need help with transportation? Yes   Do you need help shopping? Yes   Do you need help preparing meals?  No   Do you need help with housework?  Yes   Do you need help with laundry? No   Do you need help taking your medications? No   Do you need help managing money? No   Do you ever drive or ride in a car without wearing a seat belt? No   Have you felt unusual stress, anger or loneliness in the last month? No   Who do you live with? Alone   If you need help, do you have trouble finding someone available to you? No   Have you been bothered in the last four weeks  by sexual problems? -   Do you have difficulty concentrating, remembering or making decisions? No       Age-appropriate Screening Schedule:  Refer to the list below for future screening recommendations based on patient's age, sex and/or medical conditions. Orders for these recommended tests are listed in the plan section. The patient has been provided with a written plan.    Health Maintenance   Topic Date Due   • ZOSTER VACCINE (1 of 2) Never done   • DXA SCAN  07/29/2023   • LIPID PANEL  01/04/2024   • MAMMOGRAM  05/12/2024   • TDAP/TD VACCINES (2 - Td or Tdap) 10/27/2028   • INFLUENZA VACCINE  Completed                CMS Preventative Services Quick Reference  Risk Factors Identified During Encounter:    Immunizations Discussed/Encouraged: Prevnar 20 (Pneumococcal 20-valent conjugate) and Shingrix  Inactivity/Sedentary: Patient was advised to exercise at least 150 minutes a week per CDC recommendations.    The above risks/problems have been discussed with the patient.  Pertinent information has been shared with the patient in the After Visit Summary.    Diagnoses and all orders for this visit:    1. Medicare annual wellness visit, subsequent (Primary)    2. Essential hypertension  -     amLODIPine (NORVASC) 2.5 MG tablet; Take 1 tablet by mouth Daily for 270 days.  Dispense: 90 tablet; Refill: 2  -     metoprolol succinate XL (TOPROL-XL) 50 MG 24 hr tablet; Take 1.5 tablets by mouth Daily for 270 days.  Dispense: 135 tablet; Refill: 2    3. Hypercholesterolemia  -     atorvastatin (LIPITOR) 20 MG tablet; Take 1 tablet by mouth Every Night for 270 days.  Dispense: 90 tablet; Refill: 2  -     ezetimibe (ZETIA) 10 MG tablet; Take 1 tablet by mouth Every Night for 270 days.  Dispense: 90 tablet; Refill: 2    4. Panic disorder  -     escitalopram (LEXAPRO) 20 MG tablet; Take 1 tablet by mouth Daily for 270 days.  Dispense: 90 tablet; Refill: 2    5. Immunization due  -     Pneumococcal Conjugate Vaccine 20-Valent  All        Follow Up:   Next Medicare Wellness visit to be scheduled in 1 year.      An After Visit Summary and PPPS were made available to the patient.

## 2023-03-17 ENCOUNTER — TELEPHONE (OUTPATIENT)
Dept: FAMILY MEDICINE CLINIC | Facility: CLINIC | Age: 88
End: 2023-03-17

## 2023-03-17 ENCOUNTER — APPOINTMENT (OUTPATIENT)
Dept: GENERAL RADIOLOGY | Facility: HOSPITAL | Age: 88
DRG: 682 | End: 2023-03-17
Payer: MEDICARE

## 2023-03-17 ENCOUNTER — HOSPITAL ENCOUNTER (INPATIENT)
Facility: HOSPITAL | Age: 88
LOS: 2 days | Discharge: HOME | DRG: 682 | End: 2023-03-22
Attending: EMERGENCY MEDICINE | Admitting: HOSPITALIST
Payer: MEDICARE

## 2023-03-17 DIAGNOSIS — R06.09 DOE (DYSPNEA ON EXERTION): Primary | ICD-10-CM

## 2023-03-17 DIAGNOSIS — R00.1 BRADYCARDIA: ICD-10-CM

## 2023-03-17 DIAGNOSIS — I50.9 ACUTE CONGESTIVE HEART FAILURE, UNSPECIFIED HEART FAILURE TYPE: ICD-10-CM

## 2023-03-17 LAB
ALBUMIN SERPL-MCNC: 4 G/DL (ref 3.5–5.2)
ALBUMIN/GLOB SERPL: 1.5 G/DL
ALP SERPL-CCNC: 51 U/L (ref 39–117)
ALT SERPL W P-5'-P-CCNC: 14 U/L (ref 1–33)
ANION GAP SERPL CALCULATED.3IONS-SCNC: 13.3 MMOL/L (ref 5–15)
AST SERPL-CCNC: 19 U/L (ref 1–32)
BASOPHILS # BLD AUTO: 0.03 10*3/MM3 (ref 0–0.2)
BASOPHILS NFR BLD AUTO: 0.5 % (ref 0–1.5)
BILIRUB SERPL-MCNC: 0.6 MG/DL (ref 0–1.2)
BUN SERPL-MCNC: 40 MG/DL (ref 8–23)
BUN/CREAT SERPL: 19.1 (ref 7–25)
CALCIUM SPEC-SCNC: 10.6 MG/DL (ref 8.6–10.5)
CHLORIDE SERPL-SCNC: 98 MMOL/L (ref 98–107)
CO2 SERPL-SCNC: 23.7 MMOL/L (ref 22–29)
CREAT SERPL-MCNC: 2.09 MG/DL (ref 0.57–1)
DEPRECATED RDW RBC AUTO: 42.3 FL (ref 37–54)
EGFRCR SERPLBLD CKD-EPI 2021: 22.8 ML/MIN/1.73
EOSINOPHIL # BLD AUTO: 0.15 10*3/MM3 (ref 0–0.4)
EOSINOPHIL NFR BLD AUTO: 2.3 % (ref 0.3–6.2)
ERYTHROCYTE [DISTWIDTH] IN BLOOD BY AUTOMATED COUNT: 13 % (ref 12.3–15.4)
GLOBULIN UR ELPH-MCNC: 2.7 GM/DL
GLUCOSE SERPL-MCNC: 125 MG/DL (ref 65–99)
HCT VFR BLD AUTO: 34.5 % (ref 34–46.6)
HGB BLD-MCNC: 11.2 G/DL (ref 12–15.9)
HOLD SPECIMEN: NORMAL
HOLD SPECIMEN: NORMAL
IMM GRANULOCYTES # BLD AUTO: 0.03 10*3/MM3 (ref 0–0.05)
IMM GRANULOCYTES NFR BLD AUTO: 0.5 % (ref 0–0.5)
LYMPHOCYTES # BLD AUTO: 1.54 10*3/MM3 (ref 0.7–3.1)
LYMPHOCYTES NFR BLD AUTO: 23.2 % (ref 19.6–45.3)
MCH RBC QN AUTO: 29.2 PG (ref 26.6–33)
MCHC RBC AUTO-ENTMCNC: 32.5 G/DL (ref 31.5–35.7)
MCV RBC AUTO: 90.1 FL (ref 79–97)
MONOCYTES # BLD AUTO: 0.48 10*3/MM3 (ref 0.1–0.9)
MONOCYTES NFR BLD AUTO: 7.2 % (ref 5–12)
NEUTROPHILS NFR BLD AUTO: 4.41 10*3/MM3 (ref 1.7–7)
NEUTROPHILS NFR BLD AUTO: 66.3 % (ref 42.7–76)
NRBC BLD AUTO-RTO: 0 /100 WBC (ref 0–0.2)
NT-PROBNP SERPL-MCNC: 6227 PG/ML (ref 0–1800)
PLATELET # BLD AUTO: 222 10*3/MM3 (ref 140–450)
PMV BLD AUTO: 9.6 FL (ref 6–12)
POTASSIUM SERPL-SCNC: 5.1 MMOL/L (ref 3.5–5.2)
PROT SERPL-MCNC: 6.7 G/DL (ref 6–8.5)
QT INTERVAL: 433 MS
RBC # BLD AUTO: 3.83 10*6/MM3 (ref 3.77–5.28)
SODIUM SERPL-SCNC: 135 MMOL/L (ref 136–145)
TROPONIN T SERPL HS-MCNC: 43 NG/L
WBC NRBC COR # BLD: 6.64 10*3/MM3 (ref 3.4–10.8)
WHOLE BLOOD HOLD COAG: NORMAL
WHOLE BLOOD HOLD SPECIMEN: NORMAL

## 2023-03-17 PROCEDURE — G0378 HOSPITAL OBSERVATION PER HR: HCPCS

## 2023-03-17 PROCEDURE — 93010 ELECTROCARDIOGRAM REPORT: CPT | Performed by: INTERNAL MEDICINE

## 2023-03-17 PROCEDURE — 80053 COMPREHEN METABOLIC PANEL: CPT

## 2023-03-17 PROCEDURE — 25010000002 FUROSEMIDE PER 20 MG: Performed by: EMERGENCY MEDICINE

## 2023-03-17 PROCEDURE — 84484 ASSAY OF TROPONIN QUANT: CPT

## 2023-03-17 PROCEDURE — 85025 COMPLETE CBC W/AUTO DIFF WBC: CPT

## 2023-03-17 PROCEDURE — 93005 ELECTROCARDIOGRAM TRACING: CPT

## 2023-03-17 PROCEDURE — 83880 ASSAY OF NATRIURETIC PEPTIDE: CPT

## 2023-03-17 PROCEDURE — 36415 COLL VENOUS BLD VENIPUNCTURE: CPT

## 2023-03-17 PROCEDURE — 99285 EMERGENCY DEPT VISIT HI MDM: CPT

## 2023-03-17 PROCEDURE — 71046 X-RAY EXAM CHEST 2 VIEWS: CPT

## 2023-03-17 RX ORDER — FLUTICASONE PROPIONATE 50 MCG
2 SPRAY, SUSPENSION (ML) NASAL DAILY
Status: DISCONTINUED | OUTPATIENT
Start: 2023-03-18 | End: 2023-03-22 | Stop reason: HOSPADM

## 2023-03-17 RX ORDER — DOCUSATE SODIUM 100 MG/1
100 CAPSULE, LIQUID FILLED ORAL 2 TIMES DAILY
Status: DISCONTINUED | OUTPATIENT
Start: 2023-03-17 | End: 2023-03-22 | Stop reason: HOSPADM

## 2023-03-17 RX ORDER — CHOLECALCIFEROL (VITAMIN D3) 125 MCG
10 CAPSULE ORAL NIGHTLY PRN
COMMUNITY
End: 2023-03-22 | Stop reason: HOSPADM

## 2023-03-17 RX ORDER — FUROSEMIDE 10 MG/ML
80 INJECTION INTRAMUSCULAR; INTRAVENOUS ONCE
Status: COMPLETED | OUTPATIENT
Start: 2023-03-17 | End: 2023-03-17

## 2023-03-17 RX ORDER — ESCITALOPRAM OXALATE 10 MG/1
20 TABLET ORAL DAILY
Status: DISCONTINUED | OUTPATIENT
Start: 2023-03-17 | End: 2023-03-22 | Stop reason: HOSPADM

## 2023-03-17 RX ORDER — ATORVASTATIN CALCIUM 20 MG/1
20 TABLET, FILM COATED ORAL NIGHTLY
Status: DISCONTINUED | OUTPATIENT
Start: 2023-03-17 | End: 2023-03-22 | Stop reason: HOSPADM

## 2023-03-17 RX ORDER — METOPROLOL SUCCINATE 25 MG/1
50 TABLET, EXTENDED RELEASE ORAL DAILY
Status: DISCONTINUED | OUTPATIENT
Start: 2023-03-18 | End: 2023-03-22

## 2023-03-17 RX ORDER — SODIUM BICARBONATE 650 MG/1
650 TABLET ORAL 3 TIMES DAILY
Status: DISCONTINUED | OUTPATIENT
Start: 2023-03-17 | End: 2023-03-19

## 2023-03-17 RX ORDER — AMLODIPINE BESYLATE 5 MG/1
2.5 TABLET ORAL DAILY
Status: DISCONTINUED | OUTPATIENT
Start: 2023-03-18 | End: 2023-03-19

## 2023-03-17 RX ORDER — SODIUM CHLORIDE 0.9 % (FLUSH) 0.9 %
10 SYRINGE (ML) INJECTION AS NEEDED
Status: DISCONTINUED | OUTPATIENT
Start: 2023-03-17 | End: 2023-03-22 | Stop reason: HOSPADM

## 2023-03-17 RX ADMIN — APIXABAN 2.5 MG: 2.5 TABLET, FILM COATED ORAL at 22:46

## 2023-03-17 RX ADMIN — MIRABEGRON 50 MG: 25 TABLET, FILM COATED, EXTENDED RELEASE ORAL at 22:53

## 2023-03-17 RX ADMIN — FUROSEMIDE 80 MG: 10 INJECTION, SOLUTION INTRAMUSCULAR; INTRAVENOUS at 20:08

## 2023-03-17 RX ADMIN — ATORVASTATIN CALCIUM 20 MG: 20 TABLET, FILM COATED ORAL at 22:46

## 2023-03-17 RX ADMIN — SODIUM BICARBONATE 650 MG: 650 TABLET ORAL at 22:46

## 2023-03-17 NOTE — ED PROVIDER NOTES
EMERGENCY DEPARTMENT ENCOUNTER    Room Number:  S402/1  Date seen:  3/17/2023  PCP: Ken Andujar MD  Historian: Patient      HPI:  Chief Complaint: Shortness of breath and weakness  A complete HPI/ROS/PMH/PSH/SH/FH are unobtainable due to: None  Context: Marleni Hummel is a 87 y.o. female who presents to the ED c/o shortness of breath and weakness.  Patient states she has history of chronic kidney disease.  States she has had increasing shortness of breath over the last day.  Has had increasing weakness.  States she has been unable to walk through her house without significant shortness of breath..  Has had no lower extremity edema.  Has had no vomiting or diarrhea.  Patient has had no recent medication changes.  No cough or congestion.  No fevers or chills.  Patient called her primary doctor and was sent in for evaluation            PAST MEDICAL HISTORY  Active Ambulatory Problems     Diagnosis Date Noted   • Arthritis involving multiple sites    • Essential hypertension    • Permanent atrial fibrillation (HCC)    • History of Bell's palsy    • CKD (chronic kidney disease) stage 4, GFR 15-29 ml/min ([facility])    • Gastroesophageal reflux disease without esophagitis    • Hypercholesterolemia    • Insomnia    • Localized osteoporosis without current pathological fracture    • Panic disorder    • Chronic nonseasonal allergic rhinitis due to pollen    • Other sleep apnea    • History of MI (myocardial infarction) 12/21/2015   • Chronic coronary artery disease 01/25/2016   • Anemia of chronic disease 09/12/2016   • Weakness of right leg 03/13/2017   • Cardiac murmur 05/04/2017   • Senile osteoporosis 06/30/2017   • Hyperuricemia 09/07/2017   • Grief reaction 09/30/2019   • Peripheral vertigo 02/25/2020   • Renal cell carcinoma of left kidney (HCC) 11/22/2020   • Renal oncocytoma of left kidney 12/28/2020   • History of left nephrectomy 04/19/2021   • Cerebrovascular accident (CVA) due to stenosis of small artery (HCC)  11/29/2021   • History of renal cell carcinoma 11/30/2021   • TIA (transient ischemic attack) 11/30/2021   • Spinal stenosis, lumbar region with neurogenic claudication 12/02/2021   • Malignant neoplasm of left kidney, except renal pelvis (HCC) 06/07/2022   • Diverticulosis 12/14/2022   • Irritable bowel syndrome with constipation 12/14/2022   • Diverticulitis 12/14/2022     Resolved Ambulatory Problems     Diagnosis Date Noted   • Fatigue    • Hip arthritis    • IFG (impaired fasting glucose)    • Rectal bleeding    • Vitamin D deficiency    • Urinary incontinence    • History of right hip replacement 12/21/2015   • Closed fracture of neck of right femur with routine healing 12/21/2015   • History of right knee joint replacement 12/21/2015   • History of left knee replacement 12/21/2015   • Stress-induced cardiomyopathy 01/25/2016   • Elevated serum free T4 level 03/21/2016   • Urinary tract infection 10/05/2016   • Acute pyelonephritis 10/06/2016   • Acute cystitis 12/03/2016   • Hematuria 12/12/2016   • Sinusitis 01/22/2017   • Frequent falls 03/13/2017   • Atrial fibrillation with RVR ([facility]) 06/19/2017   • ANGY (acute kidney injury) ([facility]) 06/20/2017   • Viral gastroenteritis 06/20/2017   • Dehydration 06/20/2017   • Nausea & vomiting 06/20/2017   • Diarrhea 06/20/2017   • Closed displaced fracture of left femoral neck ([facility]) 08/30/2017   • Bacteriuria 08/30/2017   • New daily persistent headache 12/17/2018   • Muscle tension headache 04/03/2019   • Caregiver stress syndrome 04/17/2019   • Acute vulvitis 08/21/2019   • Dizziness 02/23/2020   • Left facial numbness 02/23/2020   • Stroke-like symptoms 02/23/2020   • Left kidney mass 08/17/2020   • Left renal mass 11/16/2020   • 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   • Acute kidney injury (HCC) 05/30/2022   • Acute bronchitis due to Rhinovirus 05/31/2022   • Hyperkalemia 05/31/2022     Past Medical History:   Diagnosis Date   • Allergic    • Allergic rhinitis    • Arthropathy of knee     • Atrial fibrillation (HCC)    • Back pain    • Bell's palsy    • Chronic kidney disease (CKD), stage III (moderate) (HCC)    • Edema    • Encounter for eye exam 09/2020   • GERD (gastroesophageal reflux disease)    • H/O Heart murmur    • Heart attack (HCC) 10/05/2015   • Herpes zoster without complication    • History of bone density study 02/28/2008   • History of pelvic fracture 2015   • History of pneumonia    • History of transfusion    • Limited joint range of motion    • Mixed hyperlipidemia    • Osteoarthritis    • Osteoporosis    • PONV (postoperative nausea and vomiting)    • Sleep apnea    • Stress          PAST SURGICAL HISTORY  Past Surgical History:   Procedure Laterality Date   • CATARACT EXTRACTION Left 04/01/2013    Dr. Clarke   • CATARACT EXTRACTION Right 04/16/2013    Dr. Clarke   • COLONOSCOPY  04/18/2014    Dr. Elizabeth; no polyps   • EPIDURAL BLOCK     • HIP PERCUTANEOUS PINNING Left 8/31/2017    Procedure: LT HIP PERCUTANEOUS PINNING;  Surgeon: Sean Canales MD;  Location: [facility];  Service:    • MAMMO BILATERAL  11/2013   • NEPHRECTOMY Left 11/16/2020    Procedure: LAPAROSCOPIC NEPHRECTOMY;  Surgeon: Chuckie Mclaughlin MD;  Location: [facility];  Service: Urology;  Laterality: Left;   • PAP SMEAR  02/2011   • TIBIA FRACTURE SURGERY Right 2015    REMOVED PLATE LATER IN 2015   • TONSILLECTOMY  1947   • TOTAL HIP ARTHROPLASTY Right 08/2015   • TOTAL HIP ARTHROPLASTY Right 09/2016   • TOTAL KNEE ARTHROPLASTY Bilateral 2004         FAMILY HISTORY  Family History   Problem Relation Age of Onset   • Hypertension Other    • Hypertension Mother    • Stroke Mother    • Heart disease Mother    • Hypertension Maternal Grandmother    • Malig Hyperthermia Neg Hx          SOCIAL HISTORY  Social History     Socioeconomic History   • Marital status:    • Years of education: High school   Tobacco Use   • Smoking status: Former     Packs/day: 1.00     Years: 3.00     Pack  years: 3.00     Types: Cigarettes     Quit date: 1956     Years since quittin.1   • Smokeless tobacco: Never   • Tobacco comments:     caffeine - 1/2 cup coffee daily    Vaping Use   • Vaping Use: Never used   Substance and Sexual Activity   • Alcohol use: Not Currently     Alcohol/week: 3.0 standard drinks     Types: 3 Glasses of wine per week     Comment: couple times a week   • Drug use: Never   • Sexual activity: Defer         ALLERGIES  Morphine, Oxycodone, Ace inhibitors, Bactrim [sulfamethoxazole-trimethoprim], Levofloxacin, and Torsemide        REVIEW OF SYSTEMS  Review of Systems   Shortness of breath generalized weakness      PHYSICAL EXAM  ED Triage Vitals [23 1632]   Temp Heart Rate Resp BP SpO2   97.7 °F (36.5 °C) 58 18 129/85 100 %      Temp src Heart Rate Source Patient Position BP Location FiO2 (%)   Oral Monitor -- -- --       Physical Exam      GENERAL: no acute distress  HENT: nares patent  EYES: no scleral icterus  CV: regular rhythm, normal rate  RESPIRATORY: normal effort  ABDOMEN: soft  MUSCULOSKELETAL: no deformity  NEURO: alert, moves all extremities, follows commands  PSYCH:  calm, cooperative  SKIN: warm, dry    Vital signs and nursing notes reviewed.    Patient was wearing a face mask when I entered the room and they continued to wear a mask throughout their stay in the ED.  I wore PPE, including  face mask with shield or face mask with goggles whenever I was in the room with patient.         LAB RESULTS  Recent Results (from the past 24 hour(s))   Comprehensive Metabolic Panel    Collection Time: 23  5:10 PM    Specimen: Blood   Result Value Ref Range    Glucose 125 (H) 65 - 99 mg/dL    BUN 40 (H) 8 - 23 mg/dL    Creatinine 2.09 (H) 0.57 - 1.00 mg/dL    Sodium 135 (L) 136 - 145 mmol/L    Potassium 5.1 3.5 - 5.2 mmol/L    Chloride 98 98 - 107 mmol/L    CO2 23.7 22.0 - 29.0 mmol/L    Calcium 10.6 (H) 8.6 - 10.5 mg/dL    Total Protein 6.7 6.0 - 8.5 g/dL    Albumin 4.0  3.5 - 5.2 g/dL    ALT (SGPT) 14 1 - 33 U/L    AST (SGOT) 19 1 - 32 U/L    Alkaline Phosphatase 51 39 - 117 U/L    Total Bilirubin 0.6 0.0 - 1.2 mg/dL    Globulin 2.7 gm/dL    A/G Ratio 1.5 g/dL    BUN/Creatinine Ratio 19.1 7.0 - 25.0    Anion Gap 13.3 5.0 - 15.0 mmol/L    eGFR 22.8 (L) >60.0 mL/min/1.73   BNP    Collection Time: 03/17/23  5:10 PM    Specimen: Blood   Result Value Ref Range    proBNP 6,227.0 (H) 0.0 - 1,800.0 pg/mL   Single High Sensitivity Troponin T    Collection Time: 03/17/23  5:10 PM    Specimen: Blood   Result Value Ref Range    HS Troponin T 43 (H) <10 ng/L   Green Top (Gel)    Collection Time: 03/17/23  5:10 PM   Result Value Ref Range    Extra Tube Hold for add-ons.    Lavender Top    Collection Time: 03/17/23  5:10 PM   Result Value Ref Range    Extra Tube hold for add-on    Gold Top - SST    Collection Time: 03/17/23  5:10 PM   Result Value Ref Range    Extra Tube Hold for add-ons.    Light Blue Top    Collection Time: 03/17/23  5:10 PM   Result Value Ref Range    Extra Tube Hold for add-ons.    CBC Auto Differential    Collection Time: 03/17/23  5:10 PM    Specimen: Blood   Result Value Ref Range    WBC 6.64 3.40 - 10.80 10*3/mm3    RBC 3.83 3.77 - 5.28 10*6/mm3    Hemoglobin 11.2 (L) 12.0 - 15.9 g/dL    Hematocrit 34.5 34.0 - 46.6 %    MCV 90.1 79.0 - 97.0 fL    MCH 29.2 26.6 - 33.0 pg    MCHC 32.5 31.5 - 35.7 g/dL    RDW 13.0 12.3 - 15.4 %    RDW-SD 42.3 37.0 - 54.0 fl    MPV 9.6 6.0 - 12.0 fL    Platelets 222 140 - 450 10*3/mm3    Neutrophil % 66.3 42.7 - 76.0 %    Lymphocyte % 23.2 19.6 - 45.3 %    Monocyte % 7.2 5.0 - 12.0 %    Eosinophil % 2.3 0.3 - 6.2 %    Basophil % 0.5 0.0 - 1.5 %    Immature Grans % 0.5 0.0 - 0.5 %    Neutrophils, Absolute 4.41 1.70 - 7.00 10*3/mm3    Lymphocytes, Absolute 1.54 0.70 - 3.10 10*3/mm3    Monocytes, Absolute 0.48 0.10 - 0.90 10*3/mm3    Eosinophils, Absolute 0.15 0.00 - 0.40 10*3/mm3    Basophils, Absolute 0.03 0.00 - 0.20 10*3/mm3    Immature  Grans, Absolute 0.03 0.00 - 0.05 10*3/mm3    nRBC 0.0 0.0 - 0.2 /100 WBC   ECG 12 Lead ED Triage Standing Order; SOA    Collection Time: 03/17/23  5:47 PM   Result Value Ref Range    QT Interval 433 ms       Ordered the above labs and reviewed the results.        RADIOLOGY  XR Chest 2 View    Result Date: 3/17/2023  Chest radiograph  HISTORY:Severe  TECHNIQUE: Two PA and lateral radiographs  COMPARISON:Chest radiograph same back to 11/09/2020      FINDINGS AND IMPRESSION: No new pulmonary consolidation, pleural effusion or pneumothorax is seen. Sequela of prior to and almost disease is present. Cardiac silhouette is within normal limits for size. Pulmonary opacification overlying the anterior aspect of the lungs on the lateral radiograph was also seen on 6/15/2022 and 11/09/2020.  This report was finalized on 3/17/2023 5:10 PM by Dr. Boubacar Aguilar M.D.        Ordered the above noted radiological studies.  Chest x-ray independently interpreted by me and shows no evidence of pneumonia          PROCEDURES  Procedures        EKG          EKG time: 1747  Rhythm/Rate: Atrial flutter with 4:1 block.  RAte 46  P waves and CT: Flutter waves  QRS, axis: Normal QRS  ST and T waves: Nonspecific ST-T wave    Interpreted Contemporaneously by me, independently viewed  Prior EKG from 5/30/2022 shows atrial fibrillation with a rate of 61      MEDICATIONS GIVEN IN ER  Medications   sodium chloride 0.9 % flush 10 mL (has no administration in time range)   fluticasone (FLONASE) 50 MCG/ACT nasal spray 2 spray (has no administration in time range)   amLODIPine (NORVASC) tablet 2.5 mg (has no administration in time range)   apixaban (ELIQUIS) tablet 2.5 mg (has no administration in time range)   atorvastatin (LIPITOR) tablet 20 mg (has no administration in time range)   docusate sodium (COLACE) capsule 100 mg (100 mg Oral Not Given 3/17/23 2126)   escitalopram (LEXAPRO) tablet 20 mg (has no administration in time range)   sodium  bicarbonate tablet 650 mg (has no administration in time range)   metoprolol succinate XL (TOPROL-XL) 24 hr tablet 50 mg (has no administration in time range)   Mirabegron ER (MYRBETRIQ) 24 hr tablet 50 mg (has no administration in time range)   furosemide (LASIX) injection 80 mg (80 mg Intravenous Given 3/17/23 2008)                   MEDICAL DECISION MAKING, PROGRESS, and CONSULTS    All labs have been independently reviewed by me.  All radiology studies have been reviewed by me and I have also reviewed the radiology report.   EKG's independently viewed and interpreted by me.  Discussion below represents my analysis of pertinent findings related to patient's condition, differential diagnosis, treatment plan and final disposition.      Additional sources:  - Discussed/ obtained information from independent historians: Patient's family member states she does seem more short of breath with exertion    - External (non-ED) record review: Epic reviewed and patient last seen by her nephrologist 2/15/2023.  Diagnosed with stage IV kidney disease    - Chronic or social conditions impacting care: None none    - Shared decision making: None      Orders placed during this visit:  Orders Placed This Encounter   Procedures   • XR Chest 2 View   • [place] Draw   • Comprehensive Metabolic Panel   • BNP   • Single High Sensitivity Troponin T   • CBC Auto Differential   • NPO Diet NPO Type: Strict NPO   • Undress & Gown   • Continuous Pulse Oximetry   • Vital Signs   • Daily Weights   • Strict Intake & Output   • Cardiac Monitoring   • Vital Signs   • Code Status and Medical Interventions:   • LHA (on-call MD unless specified) Details   • Inpatient Cardiology Consult   • Inpatient Nephrology Consult   • LCG (on-call MD unless specified)   • Inpatient Case Management  Consult   • PT Consult: Eval & Treat Discharge Placement Assessment, Functional Mobility Below Baseline   • Oxygen Therapy- Nasal Cannula; 2 LPM;  Titrate for SPO2: equal to or greater than, 92%   • ECG 12 Lead ED Triage Standing Order; SOA   • Adult Transthoracic Echo Complete W/ Cont if Necessary Per Protocol   • Insert Peripheral IV   • Initiate Observation Status   • CBC & Differential   • Green Top (Gel)   • Lavender Top   • Gold Top - SST   • Light Blue Top         Additional orders considered but not ordered:  None        Differential diagnosis:    Differential includes but not limited to congestive heart failure, pneumonia, symptomatic bradycardia      Independent interpretation of labs, radiology studies, and discussions with consultants:  ED Course as of 03/17/23 2201   Fri Mar 17, 2023   1943 19:43 EDT  Patient presents for shortness of breath with exertion.  Patient appears to be in congestive failure with BNP of 6000.  Patient also has renal insufficiency that is not new.  Patient also seems to be over beta blocked as her EKG heart rate is 46.  Discussed with Dr. Dias.  Will be admitted.  Lasix.  Will need to hold metoprolol [SL]      ED Course User Index  [SL] Levi Fonseca MD                 DIAGNOSIS  Final diagnoses:   CARROLL (dyspnea on exertion)   Acute congestive heart failure, unspecified heart failure type (HCC)   Bradycardia         DISPOSITION  admit            Latest Documented Vital Signs:  As of 22:01 EDT  BP- 151/69 HR- 54 Temp- 97.8 °F (36.6 °C) (Oral) O2 sat- 97%              --    Please note that portions of this were completed with a voice recognition program.       Note Disclaimer: At [facility], we believe that sharing information builds trust and better relationships. You are receiving this note because you are receiving care at [facility] or recently visited. It is possible you will see health information before a provider has talked with you about it. This kind of information can be easy to misunderstand. To help you fully understand what it means for your health, we urge you to discuss this note with your  provider.           Levi Fonseca MD  03/17/23 2095

## 2023-03-17 NOTE — ED NOTES
Pt arrives via EMS from home for soa with exertion that worsened over the last week. Pt also reports fatigue. Pt has history of a-fib.

## 2023-03-17 NOTE — TELEPHONE ENCOUNTER
Caller: NEAL HERNANDEZ    Relationship to patient: Emergency Contact    Best call back number: [phone]    Patient is needing: PATIENTS DAUGHTER CALLED NEAL  AND STATED HER MOTHER WAS HAVING PROBLEMS BREATHING BUT HER SATS WAS IN THE 90'S AND HEART RATE WAS LOW. PATIENT STATED SHE WANTED TO COME IN THE OFFICE OR GO TO THE ER PATIENT WAS ADVISED TO GO TO THE ER . PATIENTS DAUGHTER STATED THEY WERE GOING TO [facility] [place], operated by [facility].PLEASE CALL

## 2023-03-18 ENCOUNTER — APPOINTMENT (OUTPATIENT)
Dept: CARDIOLOGY | Facility: HOSPITAL | Age: 88
DRG: 682 | End: 2023-03-18
Payer: MEDICARE

## 2023-03-18 PROBLEM — I50.31 DIASTOLIC CHF, ACUTE (HCC): Status: ACTIVE | Noted: 2023-03-18

## 2023-03-18 LAB
25(OH)D3 SERPL-MCNC: 53.6 NG/ML (ref 30–100)
AORTIC DIMENSIONLESS INDEX: 0.4 (DI)
B PARAPERT DNA SPEC QL NAA+PROBE: NOT DETECTED
B PERT DNA SPEC QL NAA+PROBE: NOT DETECTED
BH CV ECHO MEAS - AO MAX PG: 8.8 MMHG
BH CV ECHO MEAS - AO MEAN PG: 4.4 MMHG
BH CV ECHO MEAS - AO V2 MAX: 148.3 CM/SEC
BH CV ECHO MEAS - AO V2 VTI: 26.5 CM
BH CV ECHO MEAS - AVA(I,D): 1.18 CM2
BH CV ECHO MEAS - EDV(CUBED): 109 ML
BH CV ECHO MEAS - EDV(MOD-SP2): 68 ML
BH CV ECHO MEAS - EDV(MOD-SP4): 91 ML
BH CV ECHO MEAS - EF(MOD-BP): 60.4 %
BH CV ECHO MEAS - EF(MOD-SP2): 63.2 %
BH CV ECHO MEAS - EF(MOD-SP4): 51.6 %
BH CV ECHO MEAS - ESV(CUBED): 40.4 ML
BH CV ECHO MEAS - ESV(MOD-SP2): 25 ML
BH CV ECHO MEAS - ESV(MOD-SP4): 44 ML
BH CV ECHO MEAS - FS: 28.2 %
BH CV ECHO MEAS - IVS/LVPW: 1 CM
BH CV ECHO MEAS - IVSD: 0.73 CM
BH CV ECHO MEAS - LAT PEAK E' VEL: 9.7 CM/SEC
BH CV ECHO MEAS - LV DIASTOLIC VOL/BSA (35-75): 58.9 CM2
BH CV ECHO MEAS - LV MASS(C)D: 111.1 GRAMS
BH CV ECHO MEAS - LV MAX PG: 1.51 MMHG
BH CV ECHO MEAS - LV MEAN PG: 0.7 MMHG
BH CV ECHO MEAS - LV SYSTOLIC VOL/BSA (12-30): 28.5 CM2
BH CV ECHO MEAS - LV V1 MAX: 61.5 CM/SEC
BH CV ECHO MEAS - LV V1 VTI: 11.6 CM
BH CV ECHO MEAS - LVIDD: 4.8 CM
BH CV ECHO MEAS - LVIDS: 3.4 CM
BH CV ECHO MEAS - LVOT AREA: 2.7 CM2
BH CV ECHO MEAS - LVOT DIAM: 1.85 CM
BH CV ECHO MEAS - LVPWD: 0.73 CM
BH CV ECHO MEAS - MED PEAK E' VEL: 7.2 CM/SEC
BH CV ECHO MEAS - MV DEC SLOPE: 360.7 CM/SEC2
BH CV ECHO MEAS - MV DEC TIME: 137 MSEC
BH CV ECHO MEAS - MV E MAX VEL: 87.4 CM/SEC
BH CV ECHO MEAS - MV MAX PG: 2.5 MMHG
BH CV ECHO MEAS - MV MEAN PG: 0.87 MMHG
BH CV ECHO MEAS - MV P1/2T: 66.6 MSEC
BH CV ECHO MEAS - MV V2 VTI: 34.1 CM
BH CV ECHO MEAS - MVA(P1/2T): 3.3 CM2
BH CV ECHO MEAS - MVA(VTI): 0.92 CM2
BH CV ECHO MEAS - PA ACC TIME: 0.09 SEC
BH CV ECHO MEAS - PA PR(ACCEL): 39.4 MMHG
BH CV ECHO MEAS - PA V2 MAX: 75.5 CM/SEC
BH CV ECHO MEAS - QP/QS: 1.16
BH CV ECHO MEAS - RAP SYSTOLE: 3 MMHG
BH CV ECHO MEAS - RV MAX PG: 1.34 MMHG
BH CV ECHO MEAS - RV V1 MAX: 57.8 CM/SEC
BH CV ECHO MEAS - RV V1 VTI: 12.5 CM
BH CV ECHO MEAS - RVOT DIAM: 1.93 CM
BH CV ECHO MEAS - RVSP: 11.8 MMHG
BH CV ECHO MEAS - SI(MOD-SP2): 27.9 ML/M2
BH CV ECHO MEAS - SI(MOD-SP4): 30.4 ML/M2
BH CV ECHO MEAS - SV(LVOT): 31.4 ML
BH CV ECHO MEAS - SV(MOD-SP2): 43 ML
BH CV ECHO MEAS - SV(MOD-SP4): 47 ML
BH CV ECHO MEAS - SV(RVOT): 36.5 ML
BH CV ECHO MEAS - TAPSE (>1.6): 1.6 CM
BH CV ECHO MEAS - TR MAX PG: 8.8 MMHG
BH CV ECHO MEAS - TR MAX VEL: 148.3 CM/SEC
BH CV ECHO MEASUREMENTS AVERAGE E/E' RATIO: 10.34
BH CV XLRA - RV BASE: 3.6 CM
BH CV XLRA - RV LENGTH: 6 CM
BH CV XLRA - RV MID: 2.6 CM
BH CV XLRA - TDI S': 9.7 CM/SEC
C PNEUM DNA NPH QL NAA+NON-PROBE: NOT DETECTED
FLUAV SUBTYP SPEC NAA+PROBE: NOT DETECTED
FLUBV RNA ISLT QL NAA+PROBE: NOT DETECTED
GEN 5 2HR TROPONIN T REFLEX: 48 NG/L
HADV DNA SPEC NAA+PROBE: NOT DETECTED
HCOV 229E RNA SPEC QL NAA+PROBE: NOT DETECTED
HCOV HKU1 RNA SPEC QL NAA+PROBE: NOT DETECTED
HCOV NL63 RNA SPEC QL NAA+PROBE: NOT DETECTED
HCOV OC43 RNA SPEC QL NAA+PROBE: NOT DETECTED
HMPV RNA NPH QL NAA+NON-PROBE: NOT DETECTED
HPIV1 RNA ISLT QL NAA+PROBE: NOT DETECTED
HPIV2 RNA SPEC QL NAA+PROBE: NOT DETECTED
HPIV3 RNA NPH QL NAA+PROBE: NOT DETECTED
HPIV4 P GENE NPH QL NAA+PROBE: NOT DETECTED
LEFT ATRIUM VOLUME INDEX: 36.5 ML/M2
LEFT ATRIUM VOLUME: 56 ML
M PNEUMO IGG SER IA-ACNC: NOT DETECTED
MAXIMAL PREDICTED HEART RATE: 135 BPM
PTH-INTACT SERPL-MCNC: 81.5 PG/ML (ref 15–65)
RHINOVIRUS RNA SPEC NAA+PROBE: NOT DETECTED
RSV RNA NPH QL NAA+NON-PROBE: NOT DETECTED
SARS-COV-2 RNA NPH QL NAA+NON-PROBE: NOT DETECTED
SINUS: 2.9 CM
STRESS TARGET HR: 115 BPM
TROPONIN T DELTA: -1 NG/L
TROPONIN T SERPL HS-MCNC: 49 NG/L
URATE SERPL-MCNC: 8.5 MG/DL (ref 2.4–5.7)

## 2023-03-18 PROCEDURE — G0378 HOSPITAL OBSERVATION PER HR: HCPCS

## 2023-03-18 PROCEDURE — 25510000001 PERFLUTREN (DEFINITY) 8.476 MG IN SODIUM CHLORIDE (PF) 0.9 % 10 ML INJECTION: Performed by: STUDENT IN AN ORGANIZED HEALTH CARE EDUCATION/TRAINING PROGRAM

## 2023-03-18 PROCEDURE — 84550 ASSAY OF BLOOD/URIC ACID: CPT | Performed by: STUDENT IN AN ORGANIZED HEALTH CARE EDUCATION/TRAINING PROGRAM

## 2023-03-18 PROCEDURE — 82306 VITAMIN D 25 HYDROXY: CPT | Performed by: INTERNAL MEDICINE

## 2023-03-18 PROCEDURE — 93306 TTE W/DOPPLER COMPLETE: CPT | Performed by: INTERNAL MEDICINE

## 2023-03-18 PROCEDURE — 0202U NFCT DS 22 TRGT SARS-COV-2: CPT | Performed by: STUDENT IN AN ORGANIZED HEALTH CARE EDUCATION/TRAINING PROGRAM

## 2023-03-18 PROCEDURE — 83970 ASSAY OF PARATHORMONE: CPT | Performed by: INTERNAL MEDICINE

## 2023-03-18 PROCEDURE — 99232 SBSQ HOSP IP/OBS MODERATE 35: CPT | Performed by: INTERNAL MEDICINE

## 2023-03-18 PROCEDURE — 93306 TTE W/DOPPLER COMPLETE: CPT

## 2023-03-18 PROCEDURE — 84484 ASSAY OF TROPONIN QUANT: CPT | Performed by: STUDENT IN AN ORGANIZED HEALTH CARE EDUCATION/TRAINING PROGRAM

## 2023-03-18 RX ORDER — BUMETANIDE 1 MG/1
1 TABLET ORAL DAILY
Status: DISCONTINUED | OUTPATIENT
Start: 2023-03-18 | End: 2023-03-20

## 2023-03-18 RX ADMIN — MIRABEGRON 50 MG: 25 TABLET, FILM COATED, EXTENDED RELEASE ORAL at 18:11

## 2023-03-18 RX ADMIN — BUMETANIDE 1 MG: 1 TABLET ORAL at 18:10

## 2023-03-18 RX ADMIN — AMLODIPINE BESYLATE 2.5 MG: 5 TABLET ORAL at 08:32

## 2023-03-18 RX ADMIN — ESCITALOPRAM OXALATE 20 MG: 10 TABLET, FILM COATED ORAL at 08:33

## 2023-03-18 RX ADMIN — SODIUM BICARBONATE 650 MG: 650 TABLET ORAL at 20:36

## 2023-03-18 RX ADMIN — SODIUM BICARBONATE 650 MG: 650 TABLET ORAL at 08:33

## 2023-03-18 RX ADMIN — APIXABAN 2.5 MG: 2.5 TABLET, FILM COATED ORAL at 08:34

## 2023-03-18 RX ADMIN — ATORVASTATIN CALCIUM 20 MG: 20 TABLET, FILM COATED ORAL at 20:36

## 2023-03-18 RX ADMIN — APIXABAN 2.5 MG: 2.5 TABLET, FILM COATED ORAL at 20:36

## 2023-03-18 RX ADMIN — DOCUSATE SODIUM 100 MG: 100 CAPSULE, LIQUID FILLED ORAL at 08:34

## 2023-03-18 RX ADMIN — SODIUM BICARBONATE 650 MG: 650 TABLET ORAL at 18:11

## 2023-03-18 RX ADMIN — PERFLUTREN 2 ML: 6.52 INJECTION, SUSPENSION INTRAVENOUS at 11:41

## 2023-03-18 NOTE — SIGNIFICANT NOTE
03/18/23 1129   OTHER   Discipline physical therapist   Rehab Time/Intention   Session Not Performed other (see comments)  (Pt currently off the floor for ECHO. PT may follow up with pt this afternoon, time permitting, or next service date for evaluation as appropriate.)   Recommendation   PT - Next Appointment 03/19/23

## 2023-03-18 NOTE — ED NOTES
Nursing report ED to floor  Marleni Hummel  87 y.o.  female    HPI :   Chief Complaint   Patient presents with    Shortness of Breath    Fatigue       Admitting doctor:   Toby Dias MD    Admitting diagnosis:   The primary encounter diagnosis was CARROLL (dyspnea on exertion). Diagnoses of Acute congestive heart failure, unspecified heart failure type (HCC) and Bradycardia were also pertinent to this visit.    Code status:   Current Code Status       Date Active Code Status Order ID Comments User Context       Prior            Allergies:   Morphine, Oxycodone, Ace inhibitors, Bactrim [sulfamethoxazole-trimethoprim], Levofloxacin, and Torsemide    Isolation:   No active isolations    Intake and Output  No intake or output data in the 24 hours ending 03/17/23 2014    Weight:       03/17/23  1632   Weight: 59 kg (130 lb)       Most recent vitals:   Vitals:    03/17/23 1905 03/17/23 1943 03/17/23 1946 03/17/23 2013   BP:  (!) 146/113 147/71    Pulse: 51 53 (!) 47 53   Resp:       Temp:       TempSrc:       SpO2: 98% 96% 97% 96%   Weight:       Height:           Active LDAs/IV Access:   Lines, Drains & Airways       Active LDAs       Name Placement date Placement time Site Days    Peripheral IV 03/17/23 1858 Anterior;Proximal;Right Forearm 03/17/23  1858  Forearm  less than 1                    Labs (abnormal labs have a star):   Labs Reviewed   COMPREHENSIVE METABOLIC PANEL - Abnormal; Notable for the following components:       Result Value    Glucose 125 (*)     BUN 40 (*)     Creatinine 2.09 (*)     Sodium 135 (*)     Calcium 10.6 (*)     eGFR 22.8 (*)     All other components within normal limits    Narrative:     GFR Normal >60  Chronic Kidney Disease <60  Kidney Failure <15    The GFR formula is only valid for adults with stable renal function between ages 18 and 70.   BNP (IN-HOUSE) - Abnormal; Notable for the following components:    proBNP 6,227.0 (*)     All other components within normal limits    Narrative:      Among patients with dyspnea, NT-proBNP is highly sensitive for the detection of acute congestive heart failure. In addition NT-proBNP of <300 pg/ml effectively rules out acute congestive heart failure with 99% negative predictive value.    Results may be falsely decreased if patient taking Biotin.     SINGLE HSTROPONIN T - Abnormal; Notable for the following components:    HS Troponin T 43 (*)     All other components within normal limits    Narrative:     High Sensitive Troponin T Reference Range:  <10.0 ng/L- Negative Female for AMI  <15.0 ng/L- Negative Male for AMI  >=10 - Abnormal Female indicating possible myocardial injury.  >=15 - Abnormal Male indicating possible myocardial injury.   Clinicians would have to utilize clinical acumen, EKG, Troponin, and serial changes to determine if it is an Acute Myocardial Infarction or myocardial injury due to an underlying chronic condition.        CBC WITH AUTO DIFFERENTIAL - Abnormal; Notable for the following components:    Hemoglobin 11.2 (*)     All other components within normal limits   RAINBOW DRAW    Narrative:     The following orders were created for panel order [place] Draw.  Procedure                               Abnormality         Status                     ---------                               -----------         ------                     Green Top (Gel)[[id number]]                                  Final result               Lavender Top[[id number]]                                     Final result               Gold Top - SST[[id number]]                                   Final result               Light Blue Top[[id number]]                                   Final result                 Please view results for these tests on the individual orders.   CBC AND DIFFERENTIAL    Narrative:     The following orders were created for panel order CBC & Differential.  Procedure                               Abnormality         Status                     ---------                                -----------         ------                     CBC Auto Differential[[id number]]        Abnormal            Final result                 Please view results for these tests on the individual orders.   GREEN TOP   LAVENDER TOP   GOLD TOP - SST   LIGHT BLUE TOP       EKG:   ECG 12 Lead ED Triage Standing Order; SOA   Preliminary Result   HEART RATE= 46  bpm   RR Interval= 1304  ms   OH Interval= 225  ms   P Horizontal Axis= -5  deg   P Front Axis= 75  deg   QRSD Interval= 83  ms   QT Interval= 433  ms   QRS Axis= -66  deg   T Wave Axis= 48  deg   - ABNORMAL ECG -   Predominant 4:1 AV block   Abnormal R-wave progression, late transition   Inferior infarct, old   Electronically Signed By:    Date and Time of Study: 2023 17:47:15          Meds given in ED:   Medications   sodium chloride 0.9 % flush 10 mL (has no administration in time range)   furosemide (LASIX) injection 80 mg (80 mg Intravenous Given 3/17/23 2008)       Imaging results:  XR Chest 2 View    Result Date: 3/17/2023  FINDINGS AND IMPRESSION: No new pulmonary consolidation, pleural effusion or pneumothorax is seen. Sequela of prior to and almost disease is present. Cardiac silhouette is within normal limits for size. Pulmonary opacification overlying the anterior aspect of the lungs on the lateral radiograph was also seen on 6/15/2022 and 2020.  This report was finalized on 3/17/2023 5:10 PM by Dr. Boubacar Aguilar M.D.       Ambulatory status:   - up with assit    Social issues:   Social History     Socioeconomic History    Marital status:     Years of education: High school   Tobacco Use    Smoking status: Former     Packs/day: 1.00     Years: 3.00     Pack years: 3.00     Types: Cigarettes     Quit date: 1956     Years since quittin.1    Smokeless tobacco: Never    Tobacco comments:     caffeine - 1/2 cup coffee daily    Vaping Use    Vaping Use: Never used   Substance and Sexual Activity     Alcohol use: Not Currently     Alcohol/week: 3.0 standard drinks     Types: 3 Glasses of wine per week     Comment: couple times a week    Drug use: Never    Sexual activity: Defer       NIH Stroke Scale:         Catherine Del Castillo RN  03/17/23 20:14 EDT

## 2023-03-18 NOTE — CONSULTS
Date of Hospital Visit: 3/17/2023  Date of consult: 3/18/2023  Encounter Provider: Forest Cordero MD  Place of Service: [facility] CARDIOLOGY  Patient Name: Marleni Hummel  :1935  Referral Provider: Toby Dias MD    Chief complaint-exertional fatigue and shortness of breath    History of Present Illness  Mrs Hummel came to the ER for evaluation of progressively worsening exertional shortness of breath and fatigue specially over the past few days.  She denies any orthopnea, PND, palpitations, presyncope syncope or extremity swelling or exertional chest discomfort.  She had severe retrosternal pressure when she had a heart attack in .  She denies any cough, wheezing or fever.      Past Medical History:   Diagnosis Date   • Acute bronchitis due to Rhinovirus 2022   • Acute vulvitis 2019   • Allergic    • Allergic rhinitis    • Anemia of chronic disease    • Arthropathy of knee     right   • Atrial fibrillation (HCC)    • Back pain    • Bell's palsy    • Caregiver stress syndrome 2019   • Chronic coronary artery disease    • Chronic kidney disease (CKD), stage III (moderate) ([facility])    • Edema    • Elevated serum free T4 level 2016   • Encounter for eye exam 2020   • Essential hypertension    • Fatigue    • GERD (gastroesophageal reflux disease)    • H/O Heart murmur    • Heart attack (HCC) 10/05/2015   • Hematuria 2016   • Herpes zoster without complication    • Hip arthritis     left   • History of bone density study 2008   • History of pelvic fracture    • History of pneumonia    • History of transfusion        • Hypercholesterolemia    • IFG (impaired fasting glucose)    • Insomnia    • Left kidney mass 2020   • Limited joint range of motion     RIGHT KNEE   • Mixed hyperlipidemia    • Muscle tension headache 2019   • New daily persistent headache 2018   • Oncocytoma 2020   • Osteoarthritis      Right hip   • Osteoporosis    • Panic disorder    • Peripheral vertigo    • PONV (postoperative nausea and vomiting)    • Rectal bleeding     HISTORY OF   • Sleep apnea     DOES NOT USE C-PAP   • Spinal stenosis, lumbar region with neurogenic claudication 12/02/2021   • Stress    • Stress-induced cardiomyopathy    • Urinary incontinence    • Vitamin D deficiency        Past Surgical History:   Procedure Laterality Date   • CATARACT EXTRACTION Left 04/01/2013    Dr. Clarke   • CATARACT EXTRACTION Right 04/16/2013    Dr. Clarke   • COLONOSCOPY  04/18/2014    Dr. Elizabeth; no polyps   • EPIDURAL BLOCK     • HIP PERCUTANEOUS PINNING Left 8/31/2017    Procedure: LT HIP PERCUTANEOUS PINNING;  Surgeon: Sean Canales MD;  Location: [facility] OR;  Service:    • MAMMO BILATERAL  11/2013   • NEPHRECTOMY Left 11/16/2020    Procedure: LAPAROSCOPIC NEPHRECTOMY;  Surgeon: Chuckie Mclaughlin MD;  Location: [facility] OR;  Service: Urology;  Laterality: Left;   • PAP SMEAR  02/2011   • TIBIA FRACTURE SURGERY Right 2015    REMOVED PLATE LATER IN 2015   • TONSILLECTOMY  1947   • TOTAL HIP ARTHROPLASTY Right 08/2015   • TOTAL HIP ARTHROPLASTY Right 09/2016   • TOTAL KNEE ARTHROPLASTY Bilateral 2004       Medications Prior to Admission   Medication Sig Dispense Refill Last Dose   • famotidine (PEPCID) 10 MG tablet Take 1 tablet by mouth Every Night.   Patient Taking Differently   • acetaminophen (TYLENOL) 500 MG tablet Take 1,000 mg by mouth 3 (three) times a day as needed for mild pain (1-3) or moderate pain (4-6).      • amLODIPine (NORVASC) 2.5 MG tablet Take 1 tablet by mouth Daily for 270 days. 90 tablet 2    • apixaban (Eliquis) 2.5 MG tablet tablet Take 1 tablet by mouth Every 12 (Twelve) Hours. 180 tablet 3    • atorvastatin (LIPITOR) 20 MG tablet Take 1 tablet by mouth Every Night for 270 days. 90 tablet 2    • Denosumab (PROLIA SC) Inject 1 dose under the skin into the appropriate area as directed Every 6 (Six)  Months.      • docusate sodium (COLACE) 250 MG capsule Take 1 capsule by mouth 2 (Two) Times a Day. (Patient taking differently: Take 1 capsule by mouth Daily As Needed.) 60 capsule 1    • escitalopram (LEXAPRO) 20 MG tablet Take 1 tablet by mouth Daily for 270 days. 90 tablet 2    • ezetimibe (ZETIA) 10 MG tablet Take 1 tablet by mouth Every Night for 270 days. 90 tablet 2    • famotidine (PEPCID) 10 MG tablet Take 1 tablet by mouth.      • melatonin 5 MG tablet tablet Take 2 tablets by mouth At Night As Needed.      • metoprolol succinate XL (TOPROL-XL) 50 MG 24 hr tablet Take 1.5 tablets by mouth Daily for 270 days. 135 tablet 2    • Mirabegron ER (MYRBETRIQ) 50 MG tablet sustained-release 24 hour 24 hr tablet Take 50 mg by mouth Every Evening.          Current Meds  Scheduled Meds:amLODIPine, 2.5 mg, Oral, Daily  apixaban, 2.5 mg, Oral, Q12H  atorvastatin, 20 mg, Oral, Nightly  docusate sodium, 100 mg, Oral, BID  escitalopram, 20 mg, Oral, Daily  fluticasone, 2 spray, Each Nare, Daily  metoprolol succinate XL, 50 mg, Oral, Daily  Mirabegron ER, 50 mg, Oral, Q PM  sodium bicarbonate, 650 mg, Oral, TID      Continuous Infusions:   PRN Meds:.•  sodium chloride    Allergies as of 2023 - Reviewed 2023   Allergen Reaction Noted   • Morphine Anaphylaxis 2019   • Oxycodone Anaphylaxis 2015   • Ace inhibitors Cough and Unknown (See Comments) 2016   • Bactrim [sulfamethoxazole-trimethoprim] Rash 2021   • Levofloxacin Itching and Rash 2015   • Torsemide Dizziness 2022       Social History     Socioeconomic History   • Marital status:    • Years of education: High school   Tobacco Use   • Smoking status: Former     Packs/day: 1.00     Years: 3.00     Pack years: 3.00     Types: Cigarettes     Quit date: 1956     Years since quittin.1   • Smokeless tobacco: Never   • Tobacco comments:     caffeine - 1/2 cup coffee daily    Vaping Use   • Vaping Use: Never used  "  Substance and Sexual Activity   • Alcohol use: Not Currently     Alcohol/week: 3.0 standard drinks     Types: 3 Glasses of wine per week     Comment: couple times a week   • Drug use: Never   • Sexual activity: Defer       Family History   Problem Relation Age of Onset   • Hypertension Other    • Hypertension Mother    • Stroke Mother    • Heart disease Mother    • Hypertension Maternal Grandmother    • Malig Hyperthermia Neg Hx        REVIEW OF SYSTEMS:   All systems reviewed and pertinent positives include in HPI otherwise negative review of systems.       Objective:   Temp:  [96.5 °F (35.8 °C)-99.5 °F (37.5 °C)] 96.5 °F (35.8 °C)  Heart Rate:  [47-76] 76  Resp:  [16-18] 16  BP: (115-151)/() 144/72  Body mass index is 22.13 kg/m².  Flowsheet Rows    Flowsheet Row First Filed Value   Admission Height 157.5 cm (62\") Documented at 03/17/2023 1632   Admission Weight 59 kg (130 lb) Documented at 03/17/2023 1632        Vitals:    03/18/23 1328   BP: 144/72   Pulse: 76   Resp: 16   Temp: 96.5 °F (35.8 °C)   SpO2: 97%       General Appearance:    Alert, cooperative, in no acute distress   Head:    Normocephalic, without obvious abnormality, atraumatic   Eyes:            Lids and lashes normal, conjunctivae and sclerae normal, no   icterus, no pallor, corneas clear, PERRLA   Ears:    Ears appear intact with no abnormalities noted   Throat:   No oral lesions, no thrush, oral mucosa moist   Neck:   No adenopathy, supple, trachea midline, no thyromegaly, no   carotid bruit, no JVD   Back:     No kyphosis present, no scoliosis present, no skin lesions, erythema or scars, no tenderness to percussion or palpation, range of motion normal   Lungs:     Clear to auscultation,respirations regular, even and unlabored    Heart:    Regular rhythm and normal rate, normal S1 and S2, no murmur, no gallop, no rub, no click   Chest Wall:    No abnormalities observed   Abdomen:     Normal bowel sounds, no masses, no organomegaly, soft " nontender, nondistended, no guarding, no rebound  tenderness   Extremities:   Moves all extremities well, no edema, no cyanosis, no redness   Pulses:   Pulses palpable and equal bilaterally. Normal radial, carotid, femoral, dorsalis pedis and posterior tibial pulses bilaterally. Normal abdominal aorta   Skin:  Neurology:   Psychiatric:   No bleeding, bruising or rash   Normal speech and cranial nerve exam, no focal deficit   Alert and oriented x 3, normal mood and affect                 Review of Data:      Results from last 7 days   Lab Units 03/17/23  1710   SODIUM mmol/L 135*   POTASSIUM mmol/L 5.1   CHLORIDE mmol/L 98   CO2 mmol/L 23.7   BUN mg/dL 40*   CREATININE mg/dL 2.09*   CALCIUM mg/dL 10.6*   BILIRUBIN mg/dL 0.6   ALK PHOS U/L 51   ALT (SGPT) U/L 14   AST (SGOT) U/L 19   GLUCOSE mg/dL 125*     Results from last 7 days   Lab Units 03/18/23  0633 03/18/23  0349 03/17/23  1710   HSTROP T ng/L 48* 49* 43*     @LABRCNTbnp@  Results from last 7 days   Lab Units 03/17/23  1710   WBC 10*3/mm3 6.64   HEMOGLOBIN g/dL 11.2*   HEMATOCRIT % 34.5   PLATELETS 10*3/mm3 222             @LABRCNTIP(chol,trig,hdl,ldl)    I personally viewed and interpreted the patient's EKG/Telemetry data  )  Patient Active Problem List   Diagnosis   • Arthritis involving multiple sites   • Essential hypertension   • Permanent atrial fibrillation (HCC)   • History of Bell's palsy   • CKD (chronic kidney disease) stage 4, GFR 15-29 ml/min ([facility])   • Gastroesophageal reflux disease without esophagitis   • Hypercholesterolemia   • Insomnia   • Localized osteoporosis without current pathological fracture   • Panic disorder   • Chronic nonseasonal allergic rhinitis due to pollen   • Other sleep apnea   • History of MI (myocardial infarction)   • Chronic coronary artery disease   • Anemia of chronic disease   • Weakness of right leg   • Cardiac murmur   • Senile osteoporosis   • Hyperuricemia   • Grief reaction   • Peripheral vertigo   • Renal cell  carcinoma of left kidney (HCC)   • Renal oncocytoma of left kidney   • History of left nephrectomy   • Cerebrovascular accident (CVA) due to stenosis of small artery (HCC)   • History of renal cell carcinoma   • TIA (transient ischemic attack)   • Spinal stenosis, lumbar region with neurogenic claudication   • Malignant neoplasm of left kidney, except renal pelvis (HCC)   • Diverticulosis   • Irritable bowel syndrome with constipation   • Diverticulitis   • CARROLL (dyspnea on exertion)   • Diastolic CHF, acute (HCC)         Assessment and Plan:    Mrs Hummel is here for evaluation of exertional fatigue and shortness of breath.  Past medical history significant for CKD, persistent atrial fibrillation that is rate controlled and anticoagulated with Eliquis, coronary artery disease with left circumflex stent in 2015.  During ER evaluation noted to have mild bradycardia from atrial flutter with slow ventricular response with EKG showing heart rate in the 40s.  Blood pressure and oxygenation were normal.  She has elevated proBNP.  Echo showed mild anteroseptal wall hypokinesis but preserved left ventricular ejection fraction and normal diastolic function.  Last stress test was in 2020.  Elevated high sensitive troponin without any delta.   She received Lasix 80 mg IV in the ER and metoprolol has been on hold.  Heart rate today is in the 60s and 70s.  Patient reports feeling better.    1.  Exertional shortness of breath/fatigue-likely multifactorial from diastolic CHF, bradycardia.  Cannot rule out anginal equal symptoms.  She has some response to IV Lasix and withholding metoprolol.  Get myocardial perfusion study in a.m. Start oral diuretic.   Euvolemic on exam today  2.  Atrial flutter with slow ventricular response with HR of 46   3.  Elevated troponin-without evidence for ACS  4.  Essential hypertension  5. CKD/prior history of renal cell carcinoma and nephrectomy    Thank you for consulting with cardiology and allowing  me to participate in care of this patient. Cardiology will follow along.    Forest Cordero MD  03/18/23  14:41 EDT.  Time spent in reviewing chart, discussion and examination:

## 2023-03-18 NOTE — CONSULTS
[facility] Consult Note      Patient Name: Marleni Hummel  : 1935  MRN: [id number]  Primary Care Physician:  Ken Andujar MD  Referring Physician: Toby Dias MD  Date of admission: 3/17/2023    Subjective     Reason for Consult:  CKD4    HPI:   Marleni Hummel is a 87 y.o. female with CKD4 (baseline SCR 2 mg/dL) followed by Dr. Joseph Leonard of our group, admitted yesterday for further evaluation of progressive shortness of breath for the preceding 1 week.  Reported progressive dyspnea with even minimal exertion (walking in her kitchen), new orthopnea, and profound weakness.  SCR on arrival 2.1.  CXR negative for gross CHF.  Symptoms have improved, though, following diuresis with IV Lasix.  Full PMH outlined below; pertinent is left nephrectomy in  for malignancy; CAF on AC; hypertension on beta-blocker.    · Has lost 20-30 pounds since last year; this is unintentional  · Progressive dyspnea with even minimal exertion for the past 1 week, with new orthopnea for the last 2 days  · No significant change to leg swelling or abdominal girth  · No urinary complaints; bowel movements normal (she alternates between diarrhea and constipation)  · No chest pain  · No fever or chills    Review of Systems:   14 point review of systems is otherwise negative except for mentioned above on HPI    Personal History     Past Medical History:   Diagnosis Date   • Acute bronchitis due to Rhinovirus 2022   • Acute vulvitis 2019   • Allergic    • Allergic rhinitis    • Anemia of chronic disease    • Arthropathy of knee     right   • Atrial fibrillation (HCC)    • Back pain    • Bell's palsy    • Caregiver stress syndrome 2019   • Chronic coronary artery disease    • Chronic kidney disease (CKD), stage III (moderate) (HCC)    • Edema    • Elevated serum free T4 level 2016   • Encounter for eye exam 2020   • Essential hypertension    • Fatigue    • GERD  (gastroesophageal reflux disease)    • H/O Heart murmur    • Heart attack (HCC) 10/05/2015   • Hematuria 12/12/2016   • Herpes zoster without complication    • Hip arthritis     left   • History of bone density study 02/28/2008   • History of pelvic fracture 2015   • History of pneumonia    • History of transfusion     2015   • Hypercholesterolemia    • IFG (impaired fasting glucose)    • Insomnia    • Left kidney mass 07/2020   • Limited joint range of motion     RIGHT KNEE   • Mixed hyperlipidemia    • Muscle tension headache 04/03/2019   • New daily persistent headache 12/17/2018   • 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   • Osteoarthritis     Right hip   • Osteoporosis    • Panic disorder    • Peripheral vertigo    • PONV (postoperative nausea and vomiting)    • Rectal bleeding     HISTORY OF   • Sleep apnea     DOES NOT USE C-PAP   • Spinal stenosis, lumbar region with neurogenic claudication 12/02/2021   • Stress    • Stress-induced cardiomyopathy    • Urinary incontinence    • Vitamin D deficiency        Past Surgical History:   Procedure Laterality Date   • CATARACT EXTRACTION Left 04/01/2013    Dr. Clarke   • CATARACT EXTRACTION Right 04/16/2013    Dr. Clarke   • COLONOSCOPY  04/18/2014    Dr. Elizabeth; no polyps   • EPIDURAL BLOCK     • HIP PERCUTANEOUS PINNING Left 8/31/2017    Procedure: LT HIP PERCUTANEOUS PINNING;  Surgeon: Sean Canales MD;  Location: [facility] OR;  Service:    • MAMMO BILATERAL  11/2013   • NEPHRECTOMY Left 11/16/2020    Procedure: LAPAROSCOPIC NEPHRECTOMY;  Surgeon: Chuckie Mclaughlin MD;  Location: [facility] OR;  Service: Urology;  Laterality: Left;   • PAP SMEAR  02/2011   • TIBIA FRACTURE SURGERY Right 2015    REMOVED PLATE LATER IN 2015   • TONSILLECTOMY  1947   • TOTAL HIP ARTHROPLASTY Right 08/2015   • TOTAL HIP ARTHROPLASTY Right 09/2016   • TOTAL KNEE ARTHROPLASTY Bilateral 2004       Family History: family history includes Heart disease in her mother; Hypertension in her  maternal grandmother, mother, and another family member; Stroke in her mother.    Social History:  reports that she quit smoking about 67 years ago. Her smoking use included cigarettes. She has a 3.00 pack-year smoking history. She has never used smokeless tobacco. She reports that she does not currently use alcohol after a past usage of about 3.0 standard drinks per week. She reports that she does not use drugs.    Home Medications:  Prior to Admission medications    Medication Sig Start Date End Date Taking? Authorizing Provider   famotidine (PEPCID) 10 MG tablet Take 1 tablet by mouth Every Night.   Yes Zach Hidalgo MD   acetaminophen (TYLENOL) 500 MG tablet Take 1,000 mg by mouth 3 (three) times a day as needed for mild pain (1-3) or moderate pain (4-6).    Zach Hidalgo MD   amLODIPine (NORVASC) 2.5 MG tablet Take 1 tablet by mouth Daily for 270 days. 1/9/23 10/6/23  Ken Andujar MD   apixaban (Eliquis) 2.5 MG tablet tablet Take 1 tablet by mouth Every 12 (Twelve) Hours. 11/7/22   Sybil Parmar MD   atorvastatin (LIPITOR) 20 MG tablet Take 1 tablet by mouth Every Night for 270 days. 1/9/23 10/6/23  Ken Andujar MD   Denosumab (PROLIA SC) Inject 1 dose under the skin into the appropriate area as directed Every 6 (Six) Months.    Zach Hidalgo MD   docusate sodium (COLACE) 250 MG capsule Take 1 capsule by mouth 2 (Two) Times a Day.  Patient taking differently: Take 1 capsule by mouth Daily As Needed. 11/21/20   Chuckie Fraga MD   escitalopram (LEXAPRO) 20 MG tablet Take 1 tablet by mouth Daily for 270 days. 1/9/23 10/6/23  Ken Andujar MD   ezetimibe (ZETIA) 10 MG tablet Take 1 tablet by mouth Every Night for 270 days. 1/9/23 10/6/23  Ken Andujar MD   famotidine (PEPCID) 10 MG tablet Take 1 tablet by mouth.    Zach Hidalgo MD   melatonin 5 MG tablet tablet Take 2 tablets by mouth At Night As Needed.    Zach Hidalgo MD   metoprolol succinate XL  (TOPROL-XL) 50 MG 24 hr tablet Take 1.5 tablets by mouth Daily for 270 days. 1/9/23 10/6/23  Ken Andujar MD   Mirabegron ER (MYRBETRIQ) 50 MG tablet sustained-release 24 hour 24 hr tablet Take 50 mg by mouth Every Evening.    Provider, MD Zach       Allergies:  Allergies   Allergen Reactions   • Morphine Anaphylaxis   • Oxycodone Anaphylaxis   • Ace Inhibitors Cough and Unknown (See Comments)   • Bactrim [Sulfamethoxazole-Trimethoprim] Rash   • Levofloxacin Itching and Rash     insomnia  insomnia  insomnia   • Torsemide Dizziness       Objective     Vitals:   Temp:  [96.5 °F (35.8 °C)-99.5 °F (37.5 °C)] 96.5 °F (35.8 °C)  Heart Rate:  [47-76] 76  Resp:  [16-18] 16  BP: (115-151)/() 144/72    Intake/Output Summary (Last 24 hours) at 3/18/2023 1905  Last data filed at 3/18/2023 1328  Gross per 24 hour   Intake --   Output 1450 ml   Net -1450 ml       Physical Exam:   Constitutional: Awake, alert, NAD, frail  HEENT: Sclera anicteric, no conjunctival injection, AT/NC  Neck: Supple, no carotid bruit, trachea at midline, no JVD  Respiratory: Few crackles left base, but otherwise clear nonlabored on RA   Cardiovascular: Irregularly irregular, bradycardic, 2/6M, no rub  Gastrointestinal: BS +, soft, nontender and nondistended  : No palpable bladder external urinary catheter  Musculoskeletal: No significant edema; +clubbing  Psychiatric: Appropriate affect, cooperative, oriented  Neurologic:  moving all extremities, normal speech, no asterixis  Skin: Warm and dry       Scheduled Meds:     amLODIPine, 2.5 mg, Oral, Daily  apixaban, 2.5 mg, Oral, Q12H  atorvastatin, 20 mg, Oral, Nightly  bumetanide, 1 mg, Oral, Daily  docusate sodium, 100 mg, Oral, BID  escitalopram, 20 mg, Oral, Daily  fluticasone, 2 spray, Each Nare, Daily  metoprolol succinate XL, 50 mg, Oral, Daily  Mirabegron ER, 50 mg, Oral, Q PM  sodium bicarbonate, 650 mg, Oral, TID      IV Meds:        Results Reviewed:   I have personally reviewed  the results from the time of this admission to 3/18/2023 19:05 EDT     Lab Results   Component Value Date    GLUCOSE 125 (H) 03/17/2023    CALCIUM 10.6 (H) 03/17/2023     (L) 03/17/2023    K 5.1 03/17/2023    CO2 23.7 03/17/2023    CL 98 03/17/2023    BUN 40 (H) 03/17/2023    CREATININE 2.09 (H) 03/17/2023    EGFRIFAFRI 25 (L) 02/23/2022    EGFRIFNONA 21 (L) 02/23/2022    BCR 19.1 03/17/2023    ANIONGAP 13.3 03/17/2023      Lab Results   Component Value Date    MG 2.0 05/31/2022    PHOS 3.8 05/31/2022    ALBUMIN 4.0 03/17/2023           Assessment / Plan     ASSESSMENT:  1.  CKD4, with stable function.  Does not look grossly volume overloaded at present; her CARROLL has improved following diuresis yesterday.  Potassium is normal; anion gap fine.  Significant hypercalcemia noted; has had recent problems with hypervitaminosis D (by office notes, her vitamin D supplement was discontinued in November '22)  2.  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  Weight loss, unintentional  4.  Dyspnea with exertion, improving  5.  CAF on AC, with significant bradycardia  6.  Hypertension, with acceptable control    PLAN:  1.  PTH and vitamin D (she had a vitamin D level of 159 in September '22).  Depending on these results, and the repeat Ca level in AM, will consider further work-up for hypercalcemia  2.  No need for further IV diuresis; agree with resumption of oral diuretic  3.  Surveillance labs    Thank you for involving us in the care of Marleni Hummel.  Please feel free to call with any questions.    Mark Young MD  03/18/23  19:05 EDT    [facility]  [phone]      Please note that portions of this note were completed with a voice recognition program.

## 2023-03-18 NOTE — PROGRESS NOTES
Patient off the floor for additional testing    Given past history of CAF I would anticipate etiology to be diastolic considering 2021 EF was normal    Cardiology consult pending    Given CKD stage IV/RCC with nephrectomy, nephrology consult also pending for diuretic recommendations        Addendum:    Came back by and patient returned from echo.  She is resting comfortably in bed conversational and pleasant.  She is accompanied by family x2 at bedside and case discussed amongst all.  She is not having any current chest pain and ultimately she is breathing better.  She is nearly supine and she is not in any distress and she states her shortness of breath comes with exertion and ambulation.  Denies any fever chills nausea or vomiting.  Patient admits to past history of A-fib.  Patient denies any overt edema of lower extremities and states she notices swelling and tightness sometimes in her midsection    97.8, 50, 16, 115/74, 95% room air  Conversational pleasant.  Nontoxic.  Appears in no acute respiratory distress as she is laying nearly supine with O2 saturation stable on room air.  NCAT ENT grossly clear no JVD  S1-S2 RRR clear to auscultation bilaterally.  No increased work of breathing  Soft nontender nondistended positive bowel sounds  No major edematous change, moving all  Cranial nerves intact with no focal deficit    Labs/chart reviewed    Nephrology and cardiology consults are pending.  Given CKD stage IV with a past history of RCC and nephrectomy, I asked for renal to guide diuretics.  She received 80 mg IV x1 and I have to say she does not appear that volume overloaded at this time.  Her creatinine has taken a slight bump up to the level of 2 with a uric acid level of 8.5 and will await further renal recommendations for diuretics    Echocardiogram is complete and awaiting cardiology consult as well as interpretation of the above study.  Previous EF was normal and I would anticipate diastolic dysfunction  due to A-fib.  Bradycardia noted    Continue AC with Eliquis    HLD on statin    HTN stable    Further recommendations to follow

## 2023-03-18 NOTE — NURSING NOTE
Spoke with Dr. Cordero regarding stat cardiology consult. Discuss pt. Orders for NPO except ice chips and meds and strict I/Os. Cardiology will see in AM.

## 2023-03-18 NOTE — PLAN OF CARE
Goal Outcome Evaluation:      Hr 40-50s. Cardio and nephro consulted. Npo. Purewick in place. Daily weight. Remains on room air.

## 2023-03-18 NOTE — H&P
Patient Name:  Marleni Hummel  YOB: 1935  MRN:  [id number]  Admit Date:  3/17/2023  Patient Care Team:  Ken Andujar MD as PCP - General  Chuckie Mclaughlin MD as Consulting Physician (Urology)  Geoffrey Mills MD as Consulting Physician (Nephrology)  Anayeli Alanis MD as Consulting Physician (Obstetrics and Gynecology)  BROOK Clarke MD as Consulting Physician (Ophthalmology)  Jose Alfredo Krishnamurthy MD as Consulting Physician (Hematology and Oncology)  Sean Canales MD as Consulting Physician (Orthopedic Surgery)  Esteban Moe MD as Consulting Physician (Orthopedic Surgery)  Feliciano Elizabeth MD as Consulting Physician (Gastroenterology)  Wallace Hook MD as Consulting Physician (Pain Medicine)  Sarah Beth Marcus APRN as Nurse Practitioner (Nephrology)  Brandon Miller MD as Consulting Physician (Gastroenterology)      Subjective   History Present Illness     Chief Complaint   Patient presents with   • Shortness of Breath   • Fatigue         History of Present Illness  Ms. Hummel is a 87 y.o. with past medical history CKD, persistent atrial fibrillation on Eliquis presenting with dyspnea on exertion over the last few months but acutely worse last few days.  States she gets short of breath walking on the hallway in her home.  No lower extremity edema.  Has had some bradycardia with heart rates in the 40s to 50s.  No recent changes in the metoprolol dose.  Denies any fevers, chills.  Does have a productive cough in the morning that goes away for the rest of the day that the patient attributes to allergies.  No change in cough for many months.  No nausea, vomiting, diarrhea, rash    Review of Systems   Constitutional: Negative for chills and fever.   HENT: Positive for rhinorrhea. Negative for sore throat.    Respiratory: Positive for cough and shortness of breath.    Cardiovascular: Negative for chest pain and leg swelling.   Gastrointestinal: Negative for  abdominal pain, constipation, diarrhea, nausea and vomiting.   Genitourinary: Negative for dysuria, frequency and urgency.   Musculoskeletal: Negative for back pain and myalgias.   Skin: Negative for rash.   Neurological: Negative for dizziness and headaches.        Personal History     Past Medical History:   Diagnosis Date   • Acute bronchitis due to Rhinovirus 5/31/2022   • Acute vulvitis 08/21/2019   • Allergic    • Allergic rhinitis    • Anemia of chronic disease    • Arthropathy of knee     right   • Atrial fibrillation ([facility])    • Back pain    • Bell's palsy    • Caregiver stress syndrome 04/17/2019   • Chronic coronary artery disease    • Chronic kidney disease (CKD), stage III (moderate) (Formerly [facility])    • Edema    • Elevated serum free T4 level 03/21/2016   • Encounter for eye exam 09/2020   • Essential hypertension    • Fatigue    • GERD (gastroesophageal reflux disease)    • H/O Heart murmur    • Heart attack ([facility]) 10/05/2015   • Hematuria 12/12/2016   • Herpes zoster without complication    • Hip arthritis     left   • History of bone density study 02/28/2008   • History of pelvic fracture 2015   • History of pneumonia    • History of transfusion     2015   • Hypercholesterolemia    • IFG (impaired fasting glucose)    • Insomnia    • Left kidney mass 07/2020   • Limited joint range of motion     RIGHT KNEE   • Mixed hyperlipidemia    • Muscle tension headache 04/03/2019   • New daily persistent headache 12/17/2018   • 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   • Osteoarthritis     Right hip   • Osteoporosis    • Panic disorder    • Peripheral vertigo    • PONV (postoperative nausea and vomiting)    • Rectal bleeding     HISTORY OF   • Sleep apnea     DOES NOT USE C-PAP   • Spinal stenosis, lumbar region with neurogenic claudication 12/02/2021   • Stress    • Stress-induced cardiomyopathy    • Urinary incontinence    • Vitamin D deficiency      Past Surgical History:   Procedure Laterality Date   • CATARACT EXTRACTION Left  2013    Dr. Clarke   • CATARACT EXTRACTION Right 2013    Dr. Clarke   • COLONOSCOPY  2014    Dr. Elizabeth; no polyps   • EPIDURAL BLOCK     • HIP PERCUTANEOUS PINNING Left 2017    Procedure: LT HIP PERCUTANEOUS PINNING;  Surgeon: Sean Canales MD;  Location: [facility] OR;  Service:    • MAMMO BILATERAL  2013   • NEPHRECTOMY Left 2020    Procedure: LAPAROSCOPIC NEPHRECTOMY;  Surgeon: Chuckie Mclaughlin MD;  Location: [facility] OR;  Service: Urology;  Laterality: Left;   • PAP SMEAR  2011   • TIBIA FRACTURE SURGERY Right     REMOVED PLATE LATER IN    • TONSILLECTOMY  194   • TOTAL HIP ARTHROPLASTY Right 2015   • TOTAL HIP ARTHROPLASTY Right 2016   • TOTAL KNEE ARTHROPLASTY Bilateral      Family History   Problem Relation Age of Onset   • Hypertension Other    • Hypertension Mother    • Stroke Mother    • Heart disease Mother    • Hypertension Maternal Grandmother    • Malig Hyperthermia Neg Hx      Social History     Tobacco Use   • Smoking status: Former     Packs/day: 1.00     Years: 3.00     Pack years: 3.00     Types: Cigarettes     Quit date: 1956     Years since quittin.1   • Smokeless tobacco: Never   • Tobacco comments:     caffeine - 1/2 cup coffee daily    Vaping Use   • Vaping Use: Never used   Substance Use Topics   • Alcohol use: Not Currently     Alcohol/week: 3.0 standard drinks     Types: 3 Glasses of wine per week     Comment: couple times a week   • Drug use: Never     No current facility-administered medications on file prior to encounter.     Current Outpatient Medications on File Prior to Encounter   Medication Sig Dispense Refill   • famotidine (PEPCID) 10 MG tablet Take 1 tablet by mouth Every Night.     • acetaminophen (TYLENOL) 500 MG tablet Take 1,000 mg by mouth 3 (three) times a day as needed for mild pain (1-3) or moderate pain (4-6).     • amLODIPine (NORVASC) 2.5 MG tablet Take 1 tablet by mouth Daily for  270 days. 90 tablet 2   • apixaban (Eliquis) 2.5 MG tablet tablet Take 1 tablet by mouth Every 12 (Twelve) Hours. 180 tablet 3   • atorvastatin (LIPITOR) 20 MG tablet Take 1 tablet by mouth Every Night for 270 days. 90 tablet 2   • Denosumab (PROLIA SC) Inject 1 dose under the skin into the appropriate area as directed Every 6 (Six) Months.     • docusate sodium (COLACE) 250 MG capsule Take 1 capsule by mouth 2 (Two) Times a Day. (Patient taking differently: Take 1 capsule by mouth Daily As Needed.) 60 capsule 1   • escitalopram (LEXAPRO) 20 MG tablet Take 1 tablet by mouth Daily for 270 days. 90 tablet 2   • ezetimibe (ZETIA) 10 MG tablet Take 1 tablet by mouth Every Night for 270 days. 90 tablet 2   • famotidine (PEPCID) 10 MG tablet Take 1 tablet by mouth.     • melatonin 5 MG tablet tablet Take 2 tablets by mouth At Night As Needed.     • metoprolol succinate XL (TOPROL-XL) 50 MG 24 hr tablet Take 1.5 tablets by mouth Daily for 270 days. 135 tablet 2   • Mirabegron ER (MYRBETRIQ) 50 MG tablet sustained-release 24 hour 24 hr tablet Take 50 mg by mouth Every Evening.       Allergies   Allergen Reactions   • Morphine Anaphylaxis   • Oxycodone Anaphylaxis   • Ace Inhibitors Cough and Unknown (See Comments)   • Bactrim [Sulfamethoxazole-Trimethoprim] Rash   • Levofloxacin Itching and Rash     insomnia  insomnia  insomnia   • Torsemide Dizziness       Objective    Objective     Vital Signs  Temp:  [97.7 °F (36.5 °C)-99.5 °F (37.5 °C)] 99.5 °F (37.5 °C)  Heart Rate:  [47-58] 50  Resp:  [16-18] 16  BP: (116-151)/() 116/66  SpO2:  [96 %-100 %] 97 %  on   ;   Device (Oxygen Therapy): room air  Body mass index is 23.78 kg/m².    Physical Exam  Constitutional:       General: She is not in acute distress.     Appearance: She is ill-appearing. She is not diaphoretic.      Comments: Elderly   HENT:      Mouth/Throat:      Mouth: Mucous membranes are moist.      Pharynx: Oropharynx is clear.   Eyes:      Extraocular  Movements: Extraocular movements intact.      Conjunctiva/sclera: Conjunctivae normal.   Cardiovascular:      Rate and Rhythm: Normal rate and regular rhythm.      Heart sounds: No murmur heard.    No gallop.   Pulmonary:      Effort: Pulmonary effort is normal. No respiratory distress.      Breath sounds: No wheezing.      Comments: Decreased breath sounds in all lung fields  Abdominal:      General: Abdomen is flat. There is no distension.      Palpations: Abdomen is soft.      Tenderness: There is no abdominal tenderness.   Musculoskeletal:         General: No swelling or deformity. Normal range of motion.   Skin:     General: Skin is warm and dry.   Neurological:      General: No focal deficit present.      Mental Status: She is alert and oriented to person, place, and time.   Psychiatric:         Mood and Affect: Mood normal.         Thought Content: Thought content normal.         Judgment: Judgment normal.         Results Review:  I reviewed the patient's new clinical results.  I reviewed the patient's new imaging results and agree with the interpretation.  I reviewed the patient's other test results and agree with the interpretation  I personally viewed and interpreted the patient's EKG/Telemetry data  Discussed with ED provider.    Lab Results (last 24 hours)     Procedure Component Value Units Date/Time    CBC & Differential [[id number]]  (Abnormal) Collected: 03/17/23 1710    Specimen: Blood Updated: 03/17/23 1729    Narrative:      The following orders were created for panel order CBC & Differential.  Procedure                               Abnormality         Status                     ---------                               -----------         ------                     CBC Auto Differential[[id number]]        Abnormal            Final result                 Please view results for these tests on the individual orders.    Comprehensive Metabolic Panel [[id number]]  (Abnormal) Collected: 03/17/23 1710     Specimen: Blood Updated: 03/17/23 1743     Glucose 125 mg/dL      BUN 40 mg/dL      Creatinine 2.09 mg/dL      Sodium 135 mmol/L      Potassium 5.1 mmol/L      Chloride 98 mmol/L      CO2 23.7 mmol/L      Calcium 10.6 mg/dL      Total Protein 6.7 g/dL      Albumin 4.0 g/dL      ALT (SGPT) 14 U/L      AST (SGOT) 19 U/L      Alkaline Phosphatase 51 U/L      Total Bilirubin 0.6 mg/dL      Globulin 2.7 gm/dL      A/G Ratio 1.5 g/dL      BUN/Creatinine Ratio 19.1     Anion Gap 13.3 mmol/L      eGFR 22.8 mL/min/1.73     Narrative:      GFR Normal >60  Chronic Kidney Disease <60  Kidney Failure <15    The GFR formula is only valid for adults with stable renal function between ages 18 and 70.    BNP [[id number]]  (Abnormal) Collected: 03/17/23 1710    Specimen: Blood Updated: 03/17/23 1741     proBNP 6,227.0 pg/mL     Narrative:      Among patients with dyspnea, NT-proBNP is highly sensitive for the detection of acute congestive heart failure. In addition NT-proBNP of <300 pg/ml effectively rules out acute congestive heart failure with 99% negative predictive value.    Results may be falsely decreased if patient taking Biotin.      Single High Sensitivity Troponin T [[id number]]  (Abnormal) Collected: 03/17/23 1710    Specimen: Blood Updated: 03/17/23 1743     HS Troponin T 43 ng/L     Narrative:      High Sensitive Troponin T Reference Range:  <10.0 ng/L- Negative Female for AMI  <15.0 ng/L- Negative Male for AMI  >=10 - Abnormal Female indicating possible myocardial injury.  >=15 - Abnormal Male indicating possible myocardial injury.   Clinicians would have to utilize clinical acumen, EKG, Troponin, and serial changes to determine if it is an Acute Myocardial Infarction or myocardial injury due to an underlying chronic condition.         CBC Auto Differential [[id number]]  (Abnormal) Collected: 03/17/23 1710    Specimen: Blood Updated: 03/17/23 1729     WBC 6.64 10*3/mm3      RBC 3.83 10*6/mm3      Hemoglobin 11.2 g/dL       Hematocrit 34.5 %      MCV 90.1 fL      MCH 29.2 pg      MCHC 32.5 g/dL      RDW 13.0 %      RDW-SD 42.3 fl      MPV 9.6 fL      Platelets 222 10*3/mm3      Neutrophil % 66.3 %      Lymphocyte % 23.2 %      Monocyte % 7.2 %      Eosinophil % 2.3 %      Basophil % 0.5 %      Immature Grans % 0.5 %      Neutrophils, Absolute 4.41 10*3/mm3      Lymphocytes, Absolute 1.54 10*3/mm3      Monocytes, Absolute 0.48 10*3/mm3      Eosinophils, Absolute 0.15 10*3/mm3      Basophils, Absolute 0.03 10*3/mm3      Immature Grans, Absolute 0.03 10*3/mm3      nRBC 0.0 /100 WBC           Imaging Results (Last 24 Hours)     Procedure Component Value Units Date/Time    XR Chest 2 View [[id number]] Collected: 03/17/23 1707     Updated: 03/17/23 1713    Narrative:      Chest radiograph     HISTORY:Severe     TECHNIQUE: Two PA and lateral radiographs     COMPARISON:Chest radiograph same back to 11/09/2020       Impression:      FINDINGS AND IMPRESSION:  No new pulmonary consolidation, pleural effusion or pneumothorax is  seen. Sequela of prior to and almost disease is present. Cardiac  silhouette is within normal limits for size. Pulmonary opacification  overlying the anterior aspect of the lungs on the lateral radiograph was  also seen on 6/15/2022 and 11/09/2020.     This report was finalized on 3/17/2023 5:10 PM by Dr. Boubacar Aguilar M.D.             Results for orders placed during the hospital encounter of 11/29/21    Adult transthoracic echo complete    Interpretation Summary  · Left ventricular ejection fraction appears to be 61 - 65%. Left ventricular systolic function is normal.  · Left ventricular diastolic function was indeterminate.  · Normal right ventricular cavity size and systolic function noted.  · The left atrial cavity is moderately dilated  · Saline test results are negative.  · Mild to moderate aortic valve regurgitation is present  · The aortic valve leaflets are mildly to moderately calcified (aortic sclerosis)  ·  Trace to mild tricuspid valve regurgitation is present.  · Calculated right ventricular systolic pressure from tricuspid regurgitation is 31 mmHg.  · There is no evidence of pericardial effusion.      ECG 12 Lead ED Triage Standing Order; SOA   Final Result   HEART RATE= 46  bpm   RR Interval= 1304  ms   TN Interval= 225  ms   P Horizontal Axis= -5  deg   P Front Axis= 75  deg   QRSD Interval= 83  ms   QT Interval= 433  ms   QRS Axis= -66  deg   T Wave Axis= 48  deg   - ABNORMAL ECG -   Abnormal R-wave progression, late transition   Inferior infarct, old   atrial flutter; new   Electronically Signed By: Salbador Julian) ([facility]) 17-Mar-2023 22:02:28   Date and Time of Study: 2023-03-17 17:47:15           Assessment/Plan     Active Hospital Problems    Diagnosis  POA   • **CARROLL (dyspnea on exertion) [R06.09]  Yes   • Diastolic CHF, acute (HCC) [I50.31]  Yes   • Spinal stenosis, lumbar region with neurogenic claudication [M48.062]  Yes   • Anemia of chronic disease [D63.8]  Yes   • Arthritis involving multiple sites [M12.9]  Yes   • Essential hypertension [I10]  Yes   • Permanent atrial fibrillation (HCC) [I48.21]  Yes   • CKD (chronic kidney disease) stage 4, GFR 15-29 ml/min (HCC) [N18.4]  Yes   • Gastroesophageal reflux disease without esophagitis [K21.9]  Yes      Resolved Hospital Problems   No resolved problems to display.     Dyspnea on exertion  Suspect heart failure exacerbation  -Echo from 2021 reviewed with normal EF and indeterminate LV function  -BNP 6000, previously 1200  -Lasix 80 mg IV x1 given  -Chest x-ray with no acute findings  -Cardiology consulted, appreciate recommendations  -Echo pending  -RVP pending    Persistent atrial fibrillation  Bradycardia  Hypertension  Hyperlipidemia  -Continue apixaban, statin  -Patient with heart rate in the 40s to 50s, decrease metoprolol to 50 from 75 with hold parameters, appreciate cardiology's assistance    CKD stage IV, stable, baseline  RCC status post  nephrectomy in remission  - follows Dr. Leonard as an outpatient, will consult nephrology for assistance with any further diuresis      · I discussed the patient's findings and my recommendations with patient, nursing staff and care team on multidisciplinary rounds.    VTE Prophylaxis - Eliquis (home med).  Code Status - DNR.  Discussed with patient       Toby Dias MD  [facility]  03/18/23  00:41 EDT

## 2023-03-19 ENCOUNTER — APPOINTMENT (OUTPATIENT)
Dept: NUCLEAR MEDICINE | Facility: HOSPITAL | Age: 88
DRG: 682 | End: 2023-03-19
Payer: MEDICARE

## 2023-03-19 LAB
ALBUMIN SERPL-MCNC: 4.2 G/DL (ref 3.5–5.2)
ANION GAP SERPL CALCULATED.3IONS-SCNC: 13 MMOL/L (ref 5–15)
BH CV REST NUCLEAR ISOTOPE DOSE: 11.1 MCI
BH CV STRESS COMMENTS STAGE 1: NORMAL
BH CV STRESS DOSE REGADENOSON STAGE 1: 0.4
BH CV STRESS DURATION MIN STAGE 1: 0
BH CV STRESS DURATION SEC STAGE 1: 10
BH CV STRESS NUCLEAR ISOTOPE DOSE: 33 MCI
BH CV STRESS PROTOCOL 1: NORMAL
BH CV STRESS RECOVERY BP: NORMAL MMHG
BH CV STRESS RECOVERY HR: 99 BPM
BH CV STRESS STAGE 1: 1
BUN SERPL-MCNC: 54 MG/DL (ref 8–23)
BUN/CREAT SERPL: 19 (ref 7–25)
CA-I BLD-MCNC: 5 MG/DL (ref 4.6–5.4)
CA-I SERPL ISE-MCNC: 1.24 MMOL/L (ref 1.15–1.35)
CALCIUM SPEC-SCNC: 9.7 MG/DL (ref 8.6–10.5)
CHLORIDE SERPL-SCNC: 94 MMOL/L (ref 98–107)
CO2 SERPL-SCNC: 29 MMOL/L (ref 22–29)
CREAT SERPL-MCNC: 2.84 MG/DL (ref 0.57–1)
EGFRCR SERPLBLD CKD-EPI 2021: 15.8 ML/MIN/1.73
GLUCOSE SERPL-MCNC: 112 MG/DL (ref 65–99)
LV EF NUC BP: 68 %
MAGNESIUM SERPL-MCNC: 3.2 MG/DL (ref 1.6–2.4)
MAXIMAL PREDICTED HEART RATE: 135 BPM
PERCENT MAX PREDICTED HR: 74.81 %
PHOSPHATE SERPL-MCNC: 5.8 MG/DL (ref 2.5–4.5)
POTASSIUM SERPL-SCNC: 4.8 MMOL/L (ref 3.5–5.2)
SODIUM SERPL-SCNC: 136 MMOL/L (ref 136–145)
STRESS BASELINE BP: NORMAL MMHG
STRESS BASELINE HR: 70 BPM
STRESS PERCENT HR: 88 %
STRESS POST PEAK BP: NORMAL MMHG
STRESS POST PEAK HR: 101 BPM
STRESS TARGET HR: 115 BPM

## 2023-03-19 PROCEDURE — 0 TECHNETIUM SESTAMIBI: Performed by: HOSPITALIST

## 2023-03-19 PROCEDURE — 25010000002 AMINOPHYLLINE PER 250 MG: Performed by: INTERNAL MEDICINE

## 2023-03-19 PROCEDURE — 93016 CV STRESS TEST SUPVJ ONLY: CPT | Performed by: INTERNAL MEDICINE

## 2023-03-19 PROCEDURE — 99232 SBSQ HOSP IP/OBS MODERATE 35: CPT | Performed by: INTERNAL MEDICINE

## 2023-03-19 PROCEDURE — 97162 PT EVAL MOD COMPLEX 30 MIN: CPT

## 2023-03-19 PROCEDURE — 83735 ASSAY OF MAGNESIUM: CPT | Performed by: INTERNAL MEDICINE

## 2023-03-19 PROCEDURE — G0378 HOSPITAL OBSERVATION PER HR: HCPCS

## 2023-03-19 PROCEDURE — 25010000002 REGADENOSON 0.4 MG/5ML SOLUTION: Performed by: HOSPITALIST

## 2023-03-19 PROCEDURE — 93018 CV STRESS TEST I&R ONLY: CPT | Performed by: INTERNAL MEDICINE

## 2023-03-19 PROCEDURE — 78452 HT MUSCLE IMAGE SPECT MULT: CPT

## 2023-03-19 PROCEDURE — 80069 RENAL FUNCTION PANEL: CPT | Performed by: INTERNAL MEDICINE

## 2023-03-19 PROCEDURE — A9500 TC99M SESTAMIBI: HCPCS | Performed by: HOSPITALIST

## 2023-03-19 PROCEDURE — 93017 CV STRESS TEST TRACING ONLY: CPT

## 2023-03-19 PROCEDURE — 82330 ASSAY OF CALCIUM: CPT | Performed by: INTERNAL MEDICINE

## 2023-03-19 PROCEDURE — 97530 THERAPEUTIC ACTIVITIES: CPT

## 2023-03-19 PROCEDURE — 78452 HT MUSCLE IMAGE SPECT MULT: CPT | Performed by: INTERNAL MEDICINE

## 2023-03-19 RX ORDER — AMINOPHYLLINE DIHYDRATE 25 MG/ML
125 INJECTION, SOLUTION INTRAVENOUS ONCE
Status: COMPLETED | OUTPATIENT
Start: 2023-03-19 | End: 2023-03-19

## 2023-03-19 RX ORDER — AMLODIPINE BESYLATE 5 MG/1
2.5 TABLET ORAL DAILY
Status: DISCONTINUED | OUTPATIENT
Start: 2023-03-20 | End: 2023-03-20

## 2023-03-19 RX ORDER — AMLODIPINE BESYLATE 5 MG/1
5 TABLET ORAL DAILY
Status: DISCONTINUED | OUTPATIENT
Start: 2023-03-20 | End: 2023-03-19

## 2023-03-19 RX ADMIN — MIRABEGRON 50 MG: 25 TABLET, FILM COATED, EXTENDED RELEASE ORAL at 17:37

## 2023-03-19 RX ADMIN — REGADENOSON 0.4 MG: 0.08 INJECTION, SOLUTION INTRAVENOUS at 10:10

## 2023-03-19 RX ADMIN — SODIUM BICARBONATE 650 MG: 650 TABLET ORAL at 12:04

## 2023-03-19 RX ADMIN — ESCITALOPRAM OXALATE 20 MG: 10 TABLET, FILM COATED ORAL at 12:04

## 2023-03-19 RX ADMIN — TECHNETIUM TC 99M SESTAMIBI 1 DOSE: 1 INJECTION INTRAVENOUS at 10:10

## 2023-03-19 RX ADMIN — TECHNETIUM TC 99M SESTAMIBI 1 DOSE: 1 INJECTION INTRAVENOUS at 08:05

## 2023-03-19 RX ADMIN — APIXABAN 2.5 MG: 2.5 TABLET, FILM COATED ORAL at 20:08

## 2023-03-19 RX ADMIN — APIXABAN 2.5 MG: 2.5 TABLET, FILM COATED ORAL at 12:04

## 2023-03-19 RX ADMIN — ATORVASTATIN CALCIUM 20 MG: 20 TABLET, FILM COATED ORAL at 20:08

## 2023-03-19 RX ADMIN — BUMETANIDE 1 MG: 1 TABLET ORAL at 12:04

## 2023-03-19 RX ADMIN — DOCUSATE SODIUM 100 MG: 100 CAPSULE, LIQUID FILLED ORAL at 12:04

## 2023-03-19 RX ADMIN — DOCUSATE SODIUM 100 MG: 100 CAPSULE, LIQUID FILLED ORAL at 20:08

## 2023-03-19 RX ADMIN — AMINOPHYLLINE 125 MG: 25 INJECTION, SOLUTION INTRAVENOUS at 10:30

## 2023-03-19 RX ADMIN — METOPROLOL SUCCINATE 50 MG: 25 TABLET, EXTENDED RELEASE ORAL at 15:36

## 2023-03-19 NOTE — THERAPY EVALUATION
Patient Name: Marleni Hummel  : 1935    MRN: [id number]                              Today's Date: 3/19/2023       Admit Date: 3/17/2023    Visit Dx:     ICD-10-CM ICD-9-CM   1. CARROLL (dyspnea on exertion)  R06.09 786.09   2. Acute congestive heart failure, unspecified heart failure type (HCC)  I50.9 428.0   3. Bradycardia  R00.1 427.89     Patient Active Problem List   Diagnosis   • Arthritis involving multiple sites   • Essential hypertension   • Permanent atrial fibrillation (HCC)   • History of Bell's palsy   • CKD (chronic kidney disease) stage 4, GFR 15-29 ml/min ([facility])   • Gastroesophageal reflux disease without esophagitis   • Hypercholesterolemia   • Insomnia   • Localized osteoporosis without current pathological fracture   • Panic disorder   • Chronic nonseasonal allergic rhinitis due to pollen   • Other sleep apnea   • History of MI (myocardial infarction)   • Chronic coronary artery disease   • Anemia of chronic disease   • Weakness of right leg   • Cardiac murmur   • Senile osteoporosis   • Hyperuricemia   • Grief reaction   • Peripheral vertigo   • Renal cell carcinoma of left kidney ([facility])   • Renal oncocytoma of left kidney   • History of left nephrectomy   • Cerebrovascular accident (CVA) due to stenosis of small artery (HCC)   • History of renal cell carcinoma   • TIA (transient ischemic attack)   • Spinal stenosis, lumbar region with neurogenic claudication   • Malignant neoplasm of left kidney, except renal pelvis (HCC)   • Diverticulosis   • Irritable bowel syndrome with constipation   • Diverticulitis   • CARROLL (dyspnea on exertion)   • Diastolic CHF, acute ([facility])     Past Medical History:   Diagnosis Date   • Acute bronchitis due to Rhinovirus 2022   • Acute vulvitis 2019   • Allergic    • Allergic rhinitis    • Anemia of chronic disease    • Arthropathy of knee     right   • Atrial fibrillation (HCC)    • Back pain    • Bell's palsy    • Caregiver stress syndrome 2019    • Chronic coronary artery disease    • Chronic kidney disease (CKD), stage III (moderate) ([facility])    • Edema    • Elevated serum free T4 level 03/21/2016   • Encounter for eye exam 09/2020   • Essential hypertension    • Fatigue    • GERD (gastroesophageal reflux disease)    • H/O Heart murmur    • Heart attack (HCC) 10/05/2015   • Hematuria 12/12/2016   • Herpes zoster without complication    • Hip arthritis     left   • History of bone density study 02/28/2008   • History of pelvic fracture 2015   • History of pneumonia    • History of transfusion     2015   • Hypercholesterolemia    • IFG (impaired fasting glucose)    • Insomnia    • Left kidney mass 07/2020   • Limited joint range of motion     RIGHT KNEE   • Mixed hyperlipidemia    • Muscle tension headache 04/03/2019   • New daily persistent headache 12/17/2018   • 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   • Osteoarthritis     Right hip   • Osteoporosis    • Panic disorder    • Peripheral vertigo    • PONV (postoperative nausea and vomiting)    • Rectal bleeding     HISTORY OF   • Sleep apnea     DOES NOT USE C-PAP   • Spinal stenosis, lumbar region with neurogenic claudication 12/02/2021   • Stress    • Stress-induced cardiomyopathy    • Urinary incontinence    • Vitamin D deficiency      Past Surgical History:   Procedure Laterality Date   • CATARACT EXTRACTION Left 04/01/2013    Dr. Clarke   • CATARACT EXTRACTION Right 04/16/2013    Dr. Clarke   • COLONOSCOPY  04/18/2014    Dr. Elizabeth; no polyps   • EPIDURAL BLOCK     • HIP PERCUTANEOUS PINNING Left 8/31/2017    Procedure: LT HIP PERCUTANEOUS PINNING;  Surgeon: Sean Canales MD;  Location: [facility];  Service:    • MAMMO BILATERAL  11/2013   • NEPHRECTOMY Left 11/16/2020    Procedure: LAPAROSCOPIC NEPHRECTOMY;  Surgeon: Chuckie Mclaughlin MD;  Location: [facility] OR;  Service: Urology;  Laterality: Left;   • PAP SMEAR  02/2011   • TIBIA FRACTURE SURGERY Right 2015    REMOVED PLATE LATER IN 2015   •  TONSILLECTOMY  1947   • TOTAL HIP ARTHROPLASTY Right 08/2015   • TOTAL HIP ARTHROPLASTY Right 09/2016   • TOTAL KNEE ARTHROPLASTY Bilateral 2004      General Information     Row Name 03/19/23 1509          Physical Therapy Time and Intention    Document Type evaluation  -MG     Mode of Treatment individual therapy;physical therapy  -MG     Row Name 03/19/23 1509          General Information    Patient Profile Reviewed yes  -MG     Prior Level of Function independent:  Uses rollator. Daughter Silvia comes over every day.  -MG     Existing Precautions/Restrictions fall  -MG     Barriers to Rehab none identified  -MG     Row Name 03/19/23 1509          Living Environment    People in Home alone  -MG     Row Name 03/19/23 1509          Home Main Entrance    Number of Stairs, Main Entrance other (see comments)  Cond. Ramped entrance.  -MG     Row Name 03/19/23 1509          Stairs Within Home, Primary    Stairs, Within Home, Primary Has upstairs, but she stays on the main floor.  -MG     Row Name 03/19/23 1509          Cognition    Orientation Status (Cognition) oriented x 3  -MG     Row Name 03/19/23 1509          Safety Issues, Functional Mobility    Impairments Affecting Function (Mobility) balance;shortness of breath;strength  -MG     Comment, Safety Issues/Impairments (Mobility) Gait belt and non-skid socks donned.  -MG           User Key  (r) = Recorded By, (t) = Taken By, (c) = Cosigned By    Initials Name Provider Type    MG Inna Pulliam, PT Physical Therapist               Mobility     Row Name 03/19/23 8904          Bed Mobility    Bed Mobility supine-sit;sit-supine  -MG     Supine-Sit [place] (Bed Mobility) modified independence  -MG     Sit-Supine [place] (Bed Mobility) modified independence  -MG     Assistive Device (Bed Mobility) bed rails  -MG     Comment, (Bed Mobility) Slight dizziness on coming to sitting that dissipated quickly.  -MG     Row Name 03/19/23 6333          Sit-Stand Transfer     Sit-Stand [place] (Transfers) standby assist  -MG     Assistive Device (Sit-Stand Transfers) walker, front-wheeled  -MG     Comment, (Sit-Stand Transfer) No dizziness on standing.  -MG     Row Name 03/19/23 1655          Gait/Stairs (Locomotion)    [place] Level (Gait) standby assist;modified independence  -MG     Assistive Device (Gait) walker, front-wheeled  -MG     Distance in Feet (Gait) 160  -MG     Deviations/Abnormal Patterns (Gait) gait speed decreased  -MG     Bilateral Gait Deviations forward flexed posture  -MG     Comment, (Gait/Stairs) Pt ambulating in room and around ns station w/ RW demoing decreased gait speed and slight flexed fwd posture. Pt stating feeling SOB on last few feet to room. Satting 98% once sitting EOB.  -MG           User Key  (r) = Recorded By, (t) = Taken By, (c) = Cosigned By    Initials Name Provider Type    MG Inna Pulliam, PT Physical Therapist               Obj/Interventions     Row Name 03/19/23 1511          Range of Motion Comprehensive    General Range of Motion bilateral upper extremity ROM WFL;bilateral lower extremity ROM WFL  -MG     Row Name 03/19/23 1511          Strength Comprehensive (MMT)    General Manual Muscle Testing (MMT) Assessment lower extremity strength deficits identified  -MG     Comment, General Manual Muscle Testing (MMT) Assessment Generalized weakness. Grossly 4/5 functionally.  -MG     Row Name 03/19/23 1511          Balance    Balance Assessment sitting static balance;standing dynamic balance;standing static balance;sitting dynamic balance  -MG     Static Sitting Balance standby assist  -MG     Dynamic Sitting Balance standby assist  -MG     Position, Sitting Balance sitting edge of bed  -MG     Static Standing Balance standby assist  -MG     Dynamic Standing Balance standby assist;modified independence  -MG     Position/Device Used, Standing Balance supported;walker, front-wheeled  -MG     Comment, Balance No balance deficits noted.   -MG     Row Name 03/19/23 1511          Sensory Assessment (Somatosensory)    Sensory Assessment (Somatosensory) UE sensation intact;LE sensation intact  Denies N/T  -MG           User Key  (r) = Recorded By, (t) = Taken By, (c) = Cosigned By    Initials Name Provider Type    MG Inna Pulliam, PT Physical Therapist               Goals/Plan    No documentation.                Clinical Impression     Row Name 03/19/23 1511          Pain    Pretreatment Pain Rating 0/10 - no pain  -MG     Posttreatment Pain Rating 0/10 - no pain  -MG     Pain Intervention(s) Medication (See MAR);Ambulation/increased activity;Repositioned;Rest  -MG     Row Name 03/19/23 1511          Plan of Care Review    Plan of Care Reviewed With patient;daughter  -MG     Progress improving  -MG     Outcome Evaluation Pt is a 88 y/o F who presented to ED from home for CARROLL and fatigue that has worsened over the past week. Pt reports being independent at baseline with use of rollator. Pt states living alone in a condo,  but her daughter comes to visit every day and fixes her meals. Pt is currently Mod-I for bed mobility with use of bed rail only, SBA for STS tsf and CGA progressing to Mod-I w/ RW for ambulation of 160'. Pt stating feeling SOB around 130'. Pt satting 98% on room air once back into the room and cued for PLB to slow RR; quick recovery. Pt stating feeling close to her baseline, but generally weak. Activity rec given for pt to sit UIC/EOB at least TID and ambulate w/nsg or daughter at least TID. As pt is Mod-I, close to her baseline and v/u of all education, PT will sign-off. Please re-consult if there is a status change. Rec HH PT if pt would like; did state that she has had before and still has the exercise program that she can do on her own.  -MG     Row Name 03/19/23 1511          Therapy Assessment/Plan (PT)    Criteria for Skilled Interventions Met (PT) no problems identified which require skilled intervention  -MG     Therapy  Frequency (PT) evaluation only  -MG     Row Name 03/19/23 1511          Vital Signs    Pretreatment Heart Rate (beats/min) 81  -MG     Pre SpO2 (%) 95  -MG     O2 Delivery Pre Treatment room air  -MG     O2 Delivery Intra Treatment room air  -MG     O2 Delivery Post Treatment room air  -MG     Pre Patient Position Supine  -MG     Intra Patient Position Standing  -MG     Post Patient Position Supine  -MG     Row Name 03/19/23 1511          Positioning and Restraints    Pre-Treatment Position in bed  -MG     Post Treatment Position bed  -MG     In Bed notified nsg;supine;call light within reach;encouraged to call for assist;exit alarm on;with family/caregiver;fowlers  -MG           User Key  (r) = Recorded By, (t) = Taken By, (c) = Cosigned By    Initials Name Provider Type    Inna Alvarado, DAILY Physical Therapist               Outcome Measures     Row Name 03/19/23 1702          How much help from another person do you currently need...    Turning from your back to your side while in flat bed without using bedrails? 4  -MG     Moving from lying on back to sitting on the side of a flat bed without bedrails? 4  -MG     Moving to and from a bed to a chair (including a wheelchair)? 4  -MG     Standing up from a chair using your arms (e.g., wheelchair, bedside chair)? 4  -MG     Climbing 3-5 steps with a railing? 3  -MG     To walk in hospital room? 4  -MG     AM-PAC 6 Clicks Score (PT) 23  -MG     Highest level of mobility 7 --> Walked 25 feet or more  -MG           User Key  (r) = Recorded By, (t) = Taken By, (c) = Cosigned By    Initials Name Provider Type    Inna Alvarado, DAILY Physical Therapist                             Physical Therapy Education     Title: PT OT SLP Therapies (In Progress)     Topic: Physical Therapy (In Progress)     Point: Mobility training (Done)     Learning Progress Summary           Patient Acceptance, E, VU by  at 3/19/2023 1703                   Point: Home exercise program (Not  Started)     Learner Progress:  Not documented in this visit.          Point: Body mechanics (Done)     Learning Progress Summary           Patient Acceptance, E, VU by MG at 3/19/2023 1703                   Point: Precautions (Done)     Learning Progress Summary           Patient Acceptance, E, VU by MG at 3/19/2023 1703                               User Key     Initials Effective Dates Name Provider Type Discipline    MG 05/24/22 -  Inna Pulliam, PT Physical Therapist PT              PT Recommendation and Plan     Plan of Care Reviewed With: patient, daughter  Progress: improving  Outcome Evaluation: Pt is a 86 y/o F who presented to ED from home for CARROLL and fatigue that has worsened over the past week. Pt reports being independent at baseline with use of rollator. Pt states living alone in a condo,  but her daughter comes to visit every day and fixes her meals. Pt is currently Mod-I for bed mobility with use of bed rail only, SBA for STS tsf and CGA progressing to Mod-I w/ RW for ambulation of 160'. Pt stating feeling SOB around 130'. Pt satting 98% on room air once back into the room and cued for PLB to slow RR; quick recovery. Pt stating feeling close to her baseline, but generally weak. Activity rec given for pt to sit UIC/EOB at least TID and ambulate w/nsg or daughter at least TID. As pt is Mod-I, close to her baseline and v/u of all education, PT will sign-off. Please re-consult if there is a status change. Rec HH PT if pt would like; did state that she has had before and still has the exercise program that she can do on her own.     Time Calculation:    PT Charges     Row Name 03/19/23 1703             Time Calculation    Start Time 1506  -MG      Stop Time 1526  -MG      Time Calculation (min) 20 min  -MG      PT Received On 03/19/23  -MG         Time Calculation- PT    Total Timed Code Minutes- PT 15 minute(s)  -MG            User Key  (r) = Recorded By, (t) = Taken By, (c) = Cosigned By    Initials  Name Provider Type    MG Inna Pulliam, PT Physical Therapist              Therapy Charges for Today     Code Description Service Date Service Provider Modifiers Qty    80743049268 HC PT EVAL MOD COMPLEXITY 3 3/19/2023 Inna Pulliam, PT GP 1    35876728095 HC PT THERAPEUTIC ACT EA 15 MIN 3/19/2023 Inna Pulliam, PT GP 1          PT G-Codes  AM-PAC 6 Clicks Score (PT): 23  PT Discharge Summary  Anticipated Discharge Disposition (PT): home with assist, home with home health    Inna Pulliam, PT  3/19/2023

## 2023-03-19 NOTE — PROGRESS NOTES
[facility] Progress Note      Patient Name: Marleni Hummel  : 1935  MRN: [id number]  Primary Care Physician:  Ken Andujar MD  Date of admission: 3/17/2023    Subjective     Interval History:   Breathing is comfortable on RA, but orthopnea continues  Appetite fine; no N/V  Had stress test this morning; no chest pain overnight    Review of Systems:   As noted above    Objective     Vitals:   Temp:  [97.7 °F (36.5 °C)-98.7 °F (37.1 °C)] 98 °F (36.7 °C)  Heart Rate:  [64-76] 65  Resp:  [18] 18  BP: (118-155)/(64-82) 124/71    Intake/Output Summary (Last 24 hours) at 3/19/2023 1546  Last data filed at 3/18/2023 2034  Gross per 24 hour   Intake 120 ml   Output --   Net 120 ml       Physical Exam:    Constitutional: Awake, alert, NAD, frail  HEENT: Sclera anicteric, no conjunctival injection, AT/NC  Neck: Supple, no carotid bruit, trachea at midline, no JVD  Respiratory:  Mostly clear, nonlabored on RA   Cardiovascular: Irregularly irregular, 2/6M, no rub  Gastrointestinal: BS +, soft, nontender and nondistended  : No palpable bladder external urinary catheter  Musculoskeletal: Trace edema; +clubbing  Psychiatric: Appropriate affect, cooperative, oriented  Neurologic:  moving all extremities, normal speech, no asterixis  Skin: Warm and dry           Scheduled Meds:     [START ON 3/20/2023] amLODIPine, 2.5 mg, Oral, Daily  apixaban, 2.5 mg, Oral, Q12H  atorvastatin, 20 mg, Oral, Nightly  bumetanide, 1 mg, Oral, Daily  docusate sodium, 100 mg, Oral, BID  escitalopram, 20 mg, Oral, Daily  fluticasone, 2 spray, Each Nare, Daily  metoprolol succinate XL, 50 mg, Oral, Daily  Mirabegron ER, 50 mg, Oral, Q PM  sodium bicarbonate, 650 mg, Oral, TID      IV Meds:        Results Reviewed:   I have personally reviewed the results from the time of this admission to 3/19/2023 15:46 EDT     Results from last 7 days   Lab Units 23  0604 23  1710   SODIUM mmol/L 136 135*   POTASSIUM  mmol/L 4.8 5.1   CHLORIDE mmol/L 94* 98   CO2 mmol/L 29.0 23.7   BUN mg/dL 54* 40*   CREATININE mg/dL 2.84* 2.09*   CALCIUM mg/dL 9.7 10.6*   BILIRUBIN mg/dL  --  0.6   ALK PHOS U/L  --  51   ALT (SGPT) U/L  --  14   AST (SGOT) U/L  --  19   GLUCOSE mg/dL 112* 125*       Estimated Creatinine Clearance: 12.7 mL/min (A) (by C-G formula based on SCr of 2.84 mg/dL (H)).    Results from last 7 days   Lab Units 03/19/23  0604   MAGNESIUM mg/dL 3.2*   PHOSPHORUS mg/dL 5.8*       Results from last 7 days   Lab Units 03/18/23  0349   URIC ACID mg/dL 8.5*       Results from last 7 days   Lab Units 03/17/23  1710   WBC 10*3/mm3 6.64   HEMOGLOBIN g/dL 11.2*   PLATELETS 10*3/mm3 222             Assessment / Plan     ASSESSMENT:  1.  ANGY on CKD4 (solitary kidney on the right), with UOP not fully measured, likely prerenal due to recent CHF and diuretics needed to treat it.  Potassium is normal; elevated serum bicarbonate on oral alkali.  Hypercalcemia has resolved  2.  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  Weight loss, unintentional  4.  Dyspnea with exertion, improving; stress test done 3/19  5.  CAF on AC, with significant bradycardia  6.  Hypertension, with acceptable control    PLAN:  1.  Discontinue oral sodium bicarbonate  2.  Oral Bumex 1 mg daily  3.  Place hold orders on amlodipine to avoid any inadvertent hypotension  4.  Follow-up results of stress test    Thank you for involving us in the care of Marleni Hummel.  Please feel free to call with any questions.    Mark Young MD  03/19/23  15:46 EDT    [facility]  [phone]    Please note that portions of this note were completed with a voice recognition program.

## 2023-03-19 NOTE — PROGRESS NOTES
"    DAILY PROGRESS NOTE  [facility]    Patient Identification:  Name: Marleni Hummel  Age: 87 y.o.  Sex: female  :  1935  MRN: [id number]         Primary Care Physician: Ken Andujar MD    Subjective:  Interval History: Feeling and breathing better.  No chest pain no fever no dysuria no altered mentation    Objective: Resting comfortably in bed.  Conversational and pleasant.  Nontoxic.  Son at bedside and case discussed at length    Scheduled Meds:[START ON 3/20/2023] amLODIPine, 5 mg, Oral, Daily  apixaban, 2.5 mg, Oral, Q12H  atorvastatin, 20 mg, Oral, Nightly  bumetanide, 1 mg, Oral, Daily  docusate sodium, 100 mg, Oral, BID  escitalopram, 20 mg, Oral, Daily  fluticasone, 2 spray, Each Nare, Daily  metoprolol succinate XL, 50 mg, Oral, Daily  Mirabegron ER, 50 mg, Oral, Q PM  sodium bicarbonate, 650 mg, Oral, TID      Continuous Infusions:     Vital signs in last 24 hours:  Temp:  [96.5 °F (35.8 °C)-98.7 °F (37.1 °C)] 97.7 °F (36.5 °C)  Heart Rate:  [64-76] 64  Resp:  [16-18] 18  BP: (118-155)/(64-82) 128/64    Intake/Output:    Intake/Output Summary (Last 24 hours) at 3/19/2023 1135  Last data filed at 3/18/2023 2034  Gross per 24 hour   Intake 120 ml   Output 150 ml   Net -30 ml       Exam:  /64 (BP Location: Right arm, Patient Position: Lying)   Pulse 64   Temp 97.7 °F (36.5 °C) (Oral)   Resp 18   Ht 157.5 cm (62.01\")   Wt 55.4 kg (122 lb 3.2 oz)   SpO2 94%   BMI 22.34 kg/m²     General Appearance:    Alert, cooperative, no distress, AAOx3                         Throat:   Oral mucosa pink and moist                           Neck:   Supple, symmetrical, trachea midline, no JVD                         Lungs:    Clear to auscultation bilaterally, respirations unlabored                 Chest Wall:    No tenderness or deformity                          Heart:    Regular rate and rhythm, S1 and S2 normal                  Abdomen:     Soft, nontender, bowel sounds active      "             Extremities:   Trace edema                        Pulses:   Pulses palpable in lower extremities                             Data Review:  Labs in chart were reviewed.    Assessment:  Active Hospital Problems    Diagnosis  POA   • **CARROLL (dyspnea on exertion) [R06.09]  Yes   • Diastolic CHF, acute ([facility]) [I50.31]  Yes   • Spinal stenosis, lumbar region with neurogenic claudication [M48.062]  Yes   • Anemia of chronic disease [D63.8]  Yes   • Arthritis involving multiple sites [M12.9]  Yes   • Essential hypertension [I10]  Yes   • Permanent atrial fibrillation ([facility]) [I48.21]  Yes   • CKD (chronic kidney disease) stage 4, GFR 15-29 ml/min ([facility]) [N18.4]  Yes   • Gastroesophageal reflux disease without esophagitis [K21.9]  Yes      Resolved Hospital Problems   No resolved problems to display.       Plan:    Slight bump in creatinine to 2.8 -CKD 4 at baseline    Renal managing diuretics transitioning over to p.o. (Bumex)    Cardiology checking stress -report still pending        Disposition -await stress test results and recheck BMP in a.m. with uric acid in hopes that we can discharge safely tomorrow.     -Walker/rollator dependent prior to admission PT evaluation still pending   -Doubtful any significant DC needs needed but CCP can assist if there are    Martin Pelletier MD  3/19/2023  11:35 EDT

## 2023-03-19 NOTE — PLAN OF CARE
Goal Outcome Evaluation:            VSS remain stable and HR is starting to stay in the 80s with no dizziness or weakness. Cardiologist started pt back on the Metoprolol XL 50mg once daily and will monitor heart rate. Nephrology is monitoring her creatinine was trending higher and check labs in morning and will possibly discharge tomorrow.

## 2023-03-19 NOTE — PLAN OF CARE
Goal Outcome Evaluation:  Plan of Care Reviewed With: patient, daughter        Progress: improving  Outcome Evaluation: Pt is a 88 y/o F who presented to ED from home for CARROLL and fatigue that has worsened over the past week. Pt reports being independent at baseline with use of rollator. Pt states living alone in a condo,  but her daughter comes to visit every day and fixes her meals. Pt is currently Mod-I for bed mobility with use of bed rail only, SBA for STS tsf and CGA progressing to Mod-I w/ RW for ambulation of 160'. Pt stating feeling SOB around 130'. Pt satting 98% on room air once back into the room and cued for PLB to slow RR; quick recovery. Pt stating feeling close to her baseline, but generally weak. Activity rec given for pt to sit UIC/EOB at least TID and ambulate w/nsg or daughter at least TID. As pt is Mod-I, close to her baseline and v/u of all education, PT will sign-off. Please re-consult if there is a status change. Rec HH PT if pt would like; did state that she has had before and still has the exercise program that she can do on her own.

## 2023-03-19 NOTE — PROGRESS NOTES
"CC: Bradycardia    Interval History: No new acute events overnight      Vital Signs  Temp:  [97.7 °F (36.5 °C)-98.7 °F (37.1 °C)] 98 °F (36.7 °C)  Heart Rate:  [64-76] 65  Resp:  [18] 18  BP: (118-155)/(64-82) 124/71    Intake/Output Summary (Last 24 hours) at 3/19/2023 1449  Last data filed at 3/18/2023 2034  Gross per 24 hour   Intake 120 ml   Output --   Net 120 ml     Flowsheet Rows    Flowsheet Row First Filed Value   Admission Height 157.5 cm (62\") Documented at 03/17/2023 1632   Admission Weight 59 kg (130 lb) Documented at 03/17/2023 1632          PHYSICAL EXAM:  General: No acute distress  Resp:NL Rate, symmetric chest expansion,unlabored, clear  CV:NL rate and rhythm, NL PMI, NL S1 and S2, no Murmur, no gallop, no rub, No JVD.   ABD:Nl sounds, no masses or tenderness, nondistended, no guarding or rebound  Neuro: alert,cooperative and oriented  Extr:Normal pedal pulses, No edema or cyanosis, moves all extremities      Results Review:    Results from last 7 days   Lab Units 03/19/23  0604   SODIUM mmol/L 136   POTASSIUM mmol/L 4.8   CHLORIDE mmol/L 94*   CO2 mmol/L 29.0   BUN mg/dL 54*   CREATININE mg/dL 2.84*   GLUCOSE mg/dL 112*   CALCIUM mg/dL 9.7     Results from last 7 days   Lab Units 03/18/23  0633 03/18/23  0349 03/17/23  1710   HSTROP T ng/L 48* 49* 43*     Results from last 7 days   Lab Units 03/17/23  1710   WBC 10*3/mm3 6.64   HEMOGLOBIN g/dL 11.2*   HEMATOCRIT % 34.5   PLATELETS 10*3/mm3 222             Results from last 7 days   Lab Units 03/19/23  0604   MAGNESIUM mg/dL 3.2*         I reviewed the patient's new clinical results.  I personally viewed and interpreted the patient's EKG/Telemetry data        Medication Review:   Meds reviewed         Assessment/Plan    1.  Exertional shortness of breath/fatigue-likely multifactorial from diastolic CHF, bradycardia. Echo with mild anteroseptal wall hypokinesis.  Myocardial perfusion study done today is negative for significant coronary artery " disease.   2.  Atrial flutter with slow ventricular response with HR of 46   3.  Elevated troponin without significant delta or EKG changes.   4.  Essential hypertension  5. CKD/prior history of renal cell carcinoma and nephrectomy  6.  Prior history of coronary artery disease with left circumflex stent-continue medical    I agree with restarting beta-blocker as heart rate is picking up.  Agree with reduced dose of 50 instead of 75 mg extended release metoprolol  Discharge patient home on heart monitor-order placed    No further testing from cardiac standpoint.    She will be followed up in cardiology clinic 1 month after discharge   Thank you for consulting with cardiology     Forest Cordero MD  03/19/23  14:49 EDT

## 2023-03-20 LAB
ALBUMIN SERPL-MCNC: 4.3 G/DL (ref 3.5–5.2)
ANION GAP SERPL CALCULATED.3IONS-SCNC: 15.5 MMOL/L (ref 5–15)
BUN SERPL-MCNC: 64 MG/DL (ref 8–23)
BUN/CREAT SERPL: 19.2 (ref 7–25)
CALCIUM SPEC-SCNC: 9.8 MG/DL (ref 8.6–10.5)
CHLORIDE SERPL-SCNC: 92 MMOL/L (ref 98–107)
CO2 SERPL-SCNC: 27.5 MMOL/L (ref 22–29)
CREAT SERPL-MCNC: 3.34 MG/DL (ref 0.57–1)
CREAT UR-MCNC: 75 MG/DL
EGFRCR SERPLBLD CKD-EPI 2021: 13 ML/MIN/1.73
GEN 5 2HR TROPONIN T REFLEX: 61 NG/L
GLUCOSE SERPL-MCNC: 114 MG/DL (ref 65–99)
MAGNESIUM SERPL-MCNC: 2.4 MG/DL (ref 1.6–2.4)
PHOSPHATE SERPL-MCNC: 6.4 MG/DL (ref 2.5–4.5)
POTASSIUM SERPL-SCNC: 4.8 MMOL/L (ref 3.5–5.2)
QT INTERVAL: 475 MS
SODIUM SERPL-SCNC: 135 MMOL/L (ref 136–145)
SODIUM UR-SCNC: 56 MMOL/L
TROPONIN T DELTA: 2 NG/L
TROPONIN T SERPL HS-MCNC: 59 NG/L
URATE SERPL-MCNC: 10.1 MG/DL (ref 2.4–5.7)

## 2023-03-20 PROCEDURE — 80069 RENAL FUNCTION PANEL: CPT | Performed by: HOSPITALIST

## 2023-03-20 PROCEDURE — 84550 ASSAY OF BLOOD/URIC ACID: CPT | Performed by: HOSPITALIST

## 2023-03-20 PROCEDURE — 82570 ASSAY OF URINE CREATININE: CPT | Performed by: INTERNAL MEDICINE

## 2023-03-20 PROCEDURE — 93005 ELECTROCARDIOGRAM TRACING: CPT | Performed by: HOSPITALIST

## 2023-03-20 PROCEDURE — 84484 ASSAY OF TROPONIN QUANT: CPT | Performed by: HOSPITALIST

## 2023-03-20 PROCEDURE — 93010 ELECTROCARDIOGRAM REPORT: CPT | Performed by: STUDENT IN AN ORGANIZED HEALTH CARE EDUCATION/TRAINING PROGRAM

## 2023-03-20 PROCEDURE — 83735 ASSAY OF MAGNESIUM: CPT | Performed by: HOSPITALIST

## 2023-03-20 PROCEDURE — 84300 ASSAY OF URINE SODIUM: CPT | Performed by: INTERNAL MEDICINE

## 2023-03-20 RX ORDER — POLYETHYLENE GLYCOL 3350 17 G/17G
17 POWDER, FOR SOLUTION ORAL DAILY
Status: DISCONTINUED | OUTPATIENT
Start: 2023-03-20 | End: 2023-03-22 | Stop reason: HOSPADM

## 2023-03-20 RX ORDER — SODIUM CHLORIDE 9 MG/ML
75 INJECTION, SOLUTION INTRAVENOUS CONTINUOUS
Status: ACTIVE | OUTPATIENT
Start: 2023-03-20 | End: 2023-03-21

## 2023-03-20 RX ADMIN — DOCUSATE SODIUM 100 MG: 100 CAPSULE, LIQUID FILLED ORAL at 20:06

## 2023-03-20 RX ADMIN — METOPROLOL SUCCINATE 50 MG: 25 TABLET, EXTENDED RELEASE ORAL at 08:39

## 2023-03-20 RX ADMIN — APIXABAN 2.5 MG: 2.5 TABLET, FILM COATED ORAL at 08:35

## 2023-03-20 RX ADMIN — DOCUSATE SODIUM 100 MG: 100 CAPSULE, LIQUID FILLED ORAL at 08:34

## 2023-03-20 RX ADMIN — ESCITALOPRAM OXALATE 20 MG: 10 TABLET, FILM COATED ORAL at 08:34

## 2023-03-20 RX ADMIN — SODIUM CHLORIDE 75 ML/HR: 9 INJECTION, SOLUTION INTRAVENOUS at 20:07

## 2023-03-20 RX ADMIN — ATORVASTATIN CALCIUM 20 MG: 20 TABLET, FILM COATED ORAL at 20:06

## 2023-03-20 RX ADMIN — AMLODIPINE BESYLATE 2.5 MG: 5 TABLET ORAL at 08:35

## 2023-03-20 RX ADMIN — POLYETHYLENE GLYCOL 3350 17 G: 17 POWDER, FOR SOLUTION ORAL at 20:06

## 2023-03-20 RX ADMIN — BUMETANIDE 1 MG: 1 TABLET ORAL at 08:35

## 2023-03-20 RX ADMIN — APIXABAN 2.5 MG: 2.5 TABLET, FILM COATED ORAL at 20:06

## 2023-03-20 RX ADMIN — MIRABEGRON 50 MG: 25 TABLET, FILM COATED, EXTENDED RELEASE ORAL at 18:32

## 2023-03-20 NOTE — PROGRESS NOTES
"   LOS: 0 days   Patient Care Team:  Ken Andujar MD as PCP - General  Chuckie Mclaughlin MD as Consulting Physician (Urology)  Geoffrey Mills MD as Consulting Physician (Nephrology)  Anayeli Alanis MD as Consulting Physician (Obstetrics and Gynecology)  BROOK Clarke MD as Consulting Physician (Ophthalmology)  Jose Alfredo Krishnamurthy MD as Consulting Physician (Hematology and Oncology)  Sean Canales MD as Consulting Physician (Orthopedic Surgery)  Esteban Moe MD as Consulting Physician (Orthopedic Surgery)  Feliciano Elizabeth MD as Consulting Physician (Gastroenterology)  Wallace Hook MD as Consulting Physician (Pain Medicine)  Sarah Beth Marcus APRN as Nurse Practitioner (Nephrology)  Brandon Miller MD as Consulting Physician (Gastroenterology)    Chief Complaint: CP     Interval History: Asked to evaluate for chest pain today. Patient had 3 sharp stabbing pains back to back, each lasting <1 second. Did not have pressure pain like she had with her MI. Breathing is fine at rest.       Objective   Vital Signs  Temp:  [97.7 °F (36.5 °C)-98.4 °F (36.9 °C)] 98.1 °F (36.7 °C)  Heart Rate:  [55-87] 64  Resp:  [18] 18  BP: (108-143)/(62-73) 116/73  No intake or output data in the 24 hours ending 03/20/23 1501    Last Weight and Admission Weight        03/20/23  0519   Weight: 57.3 kg (126 lb 4.8 oz)     Flowsheet Rows    Flowsheet Row First Filed Value   Admission Height 157.5 cm (62\") Documented at 03/17/2023 1632   Admission Weight 59 kg (130 lb) Documented at 03/17/2023 1632                  Physical Exam  Vitals reviewed.   Constitutional:       Appearance: She is well-developed.   HENT:      Head: Normocephalic.      Nose: Nose normal.      Mouth/Throat:      Pharynx: Oropharynx is clear.   Eyes:      Conjunctiva/sclera: Conjunctivae normal.   Neck:      Vascular: No JVD.   Cardiovascular:      Rate and Rhythm: Normal rate. Rhythm irregular.      Pulses: Normal pulses.      Heart " sounds: Normal heart sounds.   Pulmonary:      Effort: Pulmonary effort is normal.      Breath sounds: Normal breath sounds.   Abdominal:      Palpations: Abdomen is soft.      Tenderness: There is no abdominal tenderness.   Musculoskeletal:         General: No swelling. Normal range of motion.      Cervical back: Normal range of motion.   Skin:     General: Skin is warm and dry.      Findings: No erythema.   Neurological:      General: No focal deficit present.      Mental Status: She is alert.      Cranial Nerves: No cranial nerve deficit.   Psychiatric:         Mood and Affect: Mood normal.         Behavior: Behavior normal.         Thought Content: Thought content normal.         Results Review:      Results from last 7 days   Lab Units 03/20/23  0310 03/19/23  0604 03/17/23  1710   SODIUM mmol/L 135* 136 135*   POTASSIUM mmol/L 4.8 4.8 5.1   CHLORIDE mmol/L 92* 94* 98   CO2 mmol/L 27.5 29.0 23.7   BUN mg/dL 64* 54* 40*   CREATININE mg/dL 3.34* 2.84* 2.09*   GLUCOSE mg/dL 114* 112* 125*   CALCIUM mg/dL 9.8 9.7 10.6*     Results from last 7 days   Lab Units 03/20/23  1306 03/18/23  0633 03/18/23  0349   HSTROP T ng/L 59* 48* 49*     Results from last 7 days   Lab Units 03/17/23  1710   WBC 10*3/mm3 6.64   HEMOGLOBIN g/dL 11.2*   HEMATOCRIT % 34.5   PLATELETS 10*3/mm3 222             Results from last 7 days   Lab Units 03/20/23  1306   MAGNESIUM mg/dL 2.4           I reviewed the patient's new clinical results.  I personally viewed and interpreted the patient's EKG/Telemetry data        Medication Review:   amLODIPine, 2.5 mg, Oral, Daily  apixaban, 2.5 mg, Oral, Q12H  atorvastatin, 20 mg, Oral, Nightly  bumetanide, 1 mg, Oral, Daily  docusate sodium, 100 mg, Oral, BID  escitalopram, 20 mg, Oral, Daily  fluticasone, 2 spray, Each Nare, Daily  metoprolol succinate XL, 50 mg, Oral, Daily  Mirabegron ER, 50 mg, Oral, Q PM             Assessment & Plan     1. Chest pain --she has experienced classic anginal chest  pain in the past.  Today, she had subsecond long stabbing pains that are noncardiac.  Her EKG shows no acute changes.  Her troponin is indeterminate and minimally elevated above prior levels but she has worsening renal function. Her delta is only 2 which is flat.  She just had a nonischemic stress test yesterday.  Again, this chest pain is noncardiac and does not require further work-up.    2. Acute on chronic dCHF, ANGY on CKD-- now appears somewhat volume deplete. Nephrology following.     3. Permanent atrial fibrillation -- continue renally dosed apixaban    4. Bradycardia -- rate consistently in the 60s now    Will see as needed.     Pantera Orozco MD  03/20/23  15:01 EDT

## 2023-03-20 NOTE — NURSING NOTE
Called and spoke with Dr. Slater in regards to pt having some chest pain no longer occurring troponin levels ordered and Magnesium and EKG stat. EKG showed some changes with afib and block Dr. Slater reviewed and cardiology was consulted again due to changes. Troponin level is elevated at 59 an Dr. Slater and cardiology informed

## 2023-03-20 NOTE — NURSING NOTE
Cardiology called back and informed team of changes on EKG and troponin levels was up and they will come see her.

## 2023-03-20 NOTE — PROGRESS NOTES
[facility]     LOS: 0 days     Subjective:    CC:Shortness of Breath and Fatigue    DIET:  Diet Order   Procedures   • Diet: Renal Diets; Low Potassium; Texture: Regular Texture (IDDSI 7); Fluid Consistency: Thin (IDDSI 0)     Tolerating oral intake  Breathing stable  No chest pain      Objective:    Vital Signs:  Temp:  [97.7 °F (36.5 °C)-98.4 °F (36.9 °C)] 97.7 °F (36.5 °C)  Heart Rate:  [55-87] 55  Resp:  [18] 18  BP: (108-143)/(62-71) 143/64    SpO2:  [94 %-95 %] 94 %  on   ;   Device (Oxygen Therapy): room air  Body mass index is 23.09 kg/m².    Physical Exam  Constitutional:       Appearance: Normal appearance.   HENT:      Head: Normocephalic and atraumatic.   Cardiovascular:      Rate and Rhythm: Normal rate and regular rhythm.      Heart sounds: No murmur heard.    No friction rub.   Pulmonary:      Effort: Pulmonary effort is normal.      Breath sounds: Normal breath sounds.   Abdominal:      General: Bowel sounds are normal. There is no distension.      Palpations: Abdomen is soft.      Tenderness: There is no abdominal tenderness.   Skin:     General: Skin is warm and dry.   Neurological:      Mental Status: She is alert.   Psychiatric:         Mood and Affect: Mood normal.         Behavior: Behavior normal.         Results Review:    Glucose   Date Value Ref Range Status   03/20/2023 114 (H) 65 - 99 mg/dL Final   03/19/2023 112 (H) 65 - 99 mg/dL Final   03/17/2023 125 (H) 65 - 99 mg/dL Final     Results from last 7 days   Lab Units 03/17/23  1710   WBC 10*3/mm3 6.64   HEMOGLOBIN g/dL 11.2*   HEMATOCRIT % 34.5   PLATELETS 10*3/mm3 222     Results from last 7 days   Lab Units 03/20/23  0310 03/19/23  0604 03/17/23  1710   SODIUM mmol/L 135*   < > 135*   POTASSIUM mmol/L 4.8   < > 5.1   CHLORIDE mmol/L 92*   < > 98   CO2 mmol/L 27.5   < > 23.7   BUN mg/dL 64*   < > 40*   CREATININE mg/dL 3.34*   < > 2.09*   CALCIUM mg/dL 9.8   < > 10.6*   BILIRUBIN mg/dL  --   --  0.6   ALK  PHOS U/L  --   --  51   ALT (SGPT) U/L  --   --  14   AST (SGOT) U/L  --   --  19   GLUCOSE mg/dL 114*   < > 125*    < > = values in this interval not displayed.         Results from last 7 days   Lab Units 03/19/23  0604   MAGNESIUM mg/dL 3.2*     Results from last 7 days   Lab Units 03/18/23  0633 03/18/23  0349 03/17/23  1710   HSTROP T ng/L 48* 49* 43*     Cultures:  No results found for: BLOODCX, URINECX, WOUNDCX, MRSACX, RESPCX, STOOLCX    I have reviewed daily medications and changes in CPOE    Scheduled meds  amLODIPine, 2.5 mg, Oral, Daily  apixaban, 2.5 mg, Oral, Q12H  atorvastatin, 20 mg, Oral, Nightly  bumetanide, 1 mg, Oral, Daily  docusate sodium, 100 mg, Oral, BID  escitalopram, 20 mg, Oral, Daily  fluticasone, 2 spray, Each Nare, Daily  metoprolol succinate XL, 50 mg, Oral, Daily  Mirabegron ER, 50 mg, Oral, Q PM           PRN meds  •  sodium chloride        CARROLL (dyspnea on exertion)    Arthritis involving multiple sites    Essential hypertension    Permanent atrial fibrillation (HCC)    CKD (chronic kidney disease) stage 4, GFR 15-29 ml/min ([facility])    Gastroesophageal reflux disease without esophagitis    Anemia of chronic disease    Spinal stenosis, lumbar region with neurogenic claudication    Diastolic CHF, acute ([facility])        Assessment/Plan:    ANGY on CKD4 / hx nephrectomy  -worsened renal function  -bumex 1 mg  -nephrology following    CAD    Weight loss, unintentional    Dyspnea with exertion, improving; stress test done 3/19    Atrial fibrillation on AC, with significant bradycardia    Hypertension, with acceptable control    Cardiology has signed off      Richrad Slater MD  03/20/23  10:18 EDT

## 2023-03-20 NOTE — PROGRESS NOTES
[facility] Progress Note      Patient Name: Marleni Hummel  : 1935  MRN: [id number]  Primary Care Physician:  Ken Andujar MD  Date of admission: 3/17/2023    Subjective     Interval History:   Breathing is comfortable on RA; slightly dizzy when walking earlier  Appetite mediocre; no N/V  Stabbing chest pain earlier today; not felt to be angina  Very constipated    Review of Systems:   As noted above    Objective     Vitals:   Temp:  [97.7 °F (36.5 °C)-98.4 °F (36.9 °C)] 98.1 °F (36.7 °C)  Heart Rate:  [55-87] 64  Resp:  [18] 18  BP: (108-143)/(62-73) 116/73  No intake or output data in the 24 hours ending 23    Physical Exam:    Constitutional: Awake, alert, NAD, frail  HEENT: Sclera anicteric, no conjunctival injection, AT/NC  Neck: Supple, no carotid bruit, trachea at midline, no JVD  Respiratory:  Mostly clear, nonlabored on RA   Cardiovascular: Irregularly irregular, 2/6M, no rub  Gastrointestinal: BS +, soft, nontender and nondistended  : No palpable bladder, external urinary catheter  Musculoskeletal: Trace edema; +clubbing  Psychiatric: Appropriate affect, cooperative, oriented  Neurologic:  moving all extremities, normal speech, no asterixis  Skin: Warm and dry           Scheduled Meds:     amLODIPine, 2.5 mg, Oral, Daily  apixaban, 2.5 mg, Oral, Q12H  atorvastatin, 20 mg, Oral, Nightly  bumetanide, 1 mg, Oral, Daily  docusate sodium, 100 mg, Oral, BID  escitalopram, 20 mg, Oral, Daily  fluticasone, 2 spray, Each Nare, Daily  metoprolol succinate XL, 50 mg, Oral, Daily  Mirabegron ER, 50 mg, Oral, Q PM      IV Meds:        Results Reviewed:   I have personally reviewed the results from the time of this admission to 3/20/2023 19:15 EDT     Results from last 7 days   Lab Units 23  0310 23  0604 23  1710   SODIUM mmol/L 135* 136 135*   POTASSIUM mmol/L 4.8 4.8 5.1   CHLORIDE mmol/L 92* 94* 98   CO2 mmol/L 27.5 29.0 23.7   BUN mg/dL 64*  54* 40*   CREATININE mg/dL 3.34* 2.84* 2.09*   CALCIUM mg/dL 9.8 9.7 10.6*   BILIRUBIN mg/dL  --   --  0.6   ALK PHOS U/L  --   --  51   ALT (SGPT) U/L  --   --  14   AST (SGOT) U/L  --   --  19   GLUCOSE mg/dL 114* 112* 125*       Estimated Creatinine Clearance: 11.1 mL/min (A) (by C-G formula based on SCr of 3.34 mg/dL (H)).    Results from last 7 days   Lab Units 03/20/23  1306 03/20/23  0310 03/19/23  0604   MAGNESIUM mg/dL 2.4  --  3.2*   PHOSPHORUS mg/dL  --  6.4* 5.8*       Results from last 7 days   Lab Units 03/20/23  0310 03/18/23  0349   URIC ACID mg/dL 10.1* 8.5*       Results from last 7 days   Lab Units 03/17/23  1710   WBC 10*3/mm3 6.64   HEMOGLOBIN g/dL 11.2*   PLATELETS 10*3/mm3 222             Assessment / Plan     ASSESSMENT:  1.  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  Might be volume-contracted given poor oral intake; potassium normal   2.  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  Weight loss, unintentional  4.  Dyspnea with exertion, improving; stress test done 3/19  5.  CAF on AC, with significant bradycardia  6.  Hypertension, over-controlled    PLAN:  1.  Stop diuretic and give 1 L of saline to expand volume  2.  FENa  3.  Bladder scan every shift  4.  MiraLAX  5.  Stop amlodipine    Thank you for involving us in the care of Marleni Hummel.  Please feel free to call with any questions.    Mark Young MD  03/20/23  19:15 EDT    [facility]  [phone]    Please note that portions of this note were completed with a voice recognition program.

## 2023-03-21 LAB
ALBUMIN SERPL-MCNC: 3.9 G/DL (ref 3.5–5.2)
ANION GAP SERPL CALCULATED.3IONS-SCNC: 16.7 MMOL/L (ref 5–15)
BUN SERPL-MCNC: 71 MG/DL (ref 8–23)
BUN/CREAT SERPL: 22.3 (ref 7–25)
CALCIUM SPEC-SCNC: 9.3 MG/DL (ref 8.6–10.5)
CHLORIDE SERPL-SCNC: 97 MMOL/L (ref 98–107)
CO2 SERPL-SCNC: 23.3 MMOL/L (ref 22–29)
CREAT SERPL-MCNC: 3.18 MG/DL (ref 0.57–1)
EGFRCR SERPLBLD CKD-EPI 2021: 13.8 ML/MIN/1.73
GLUCOSE SERPL-MCNC: 110 MG/DL (ref 65–99)
PHOSPHATE SERPL-MCNC: 5.4 MG/DL (ref 2.5–4.5)
POTASSIUM SERPL-SCNC: 4.6 MMOL/L (ref 3.5–5.2)
SODIUM SERPL-SCNC: 137 MMOL/L (ref 136–145)

## 2023-03-21 PROCEDURE — 80069 RENAL FUNCTION PANEL: CPT | Performed by: INTERNAL MEDICINE

## 2023-03-21 RX ADMIN — SODIUM CHLORIDE 75 ML/HR: 9 INJECTION, SOLUTION INTRAVENOUS at 08:25

## 2023-03-21 RX ADMIN — METOPROLOL SUCCINATE 50 MG: 25 TABLET, EXTENDED RELEASE ORAL at 08:20

## 2023-03-21 RX ADMIN — DOCUSATE SODIUM 100 MG: 100 CAPSULE, LIQUID FILLED ORAL at 08:21

## 2023-03-21 RX ADMIN — APIXABAN 2.5 MG: 2.5 TABLET, FILM COATED ORAL at 08:21

## 2023-03-21 RX ADMIN — ESCITALOPRAM OXALATE 20 MG: 10 TABLET, FILM COATED ORAL at 08:21

## 2023-03-21 RX ADMIN — ATORVASTATIN CALCIUM 20 MG: 20 TABLET, FILM COATED ORAL at 20:00

## 2023-03-21 RX ADMIN — MIRABEGRON 50 MG: 25 TABLET, FILM COATED, EXTENDED RELEASE ORAL at 17:11

## 2023-03-21 RX ADMIN — DOCUSATE SODIUM 100 MG: 100 CAPSULE, LIQUID FILLED ORAL at 20:00

## 2023-03-21 RX ADMIN — APIXABAN 2.5 MG: 2.5 TABLET, FILM COATED ORAL at 20:00

## 2023-03-21 NOTE — PROGRESS NOTES
[facility]     LOS: 1 day     Subjective:    CC:Shortness of Breath and Fatigue    DIET:  Diet Order   Procedures   • Diet: Renal Diets; Low Potassium; Texture: Regular Texture (IDDSI 7); Fluid Consistency: Thin (IDDSI 0)   eating ok  Breathing stable  No chest pain    Objective:    Vital Signs:  Temp:  [98 °F (36.7 °C)-98.8 °F (37.1 °C)] 98.8 °F (37.1 °C)  Heart Rate:  [63-66] 63  Resp:  [16-18] 16  BP: (116-160)/(60-91) 131/60    SpO2:  [93 %] 93 %  on   ;   Device (Oxygen Therapy): room air  Body mass index is 23.22 kg/m².    Physical Exam  Constitutional:       Appearance: Normal appearance.   HENT:      Head: Normocephalic and atraumatic.   Cardiovascular:      Rate and Rhythm: Normal rate and regular rhythm.      Heart sounds: No murmur heard.    No friction rub.   Pulmonary:      Effort: Pulmonary effort is normal.      Breath sounds: Normal breath sounds.   Abdominal:      General: Bowel sounds are normal. There is no distension.      Palpations: Abdomen is soft.      Tenderness: There is no abdominal tenderness.   Skin:     General: Skin is warm and dry.   Neurological:      Mental Status: She is alert.   Psychiatric:         Mood and Affect: Mood normal.         Behavior: Behavior normal.         Results Review:    Glucose   Date Value Ref Range Status   03/21/2023 110 (H) 65 - 99 mg/dL Final   03/20/2023 114 (H) 65 - 99 mg/dL Final   03/19/2023 112 (H) 65 - 99 mg/dL Final     Results from last 7 days   Lab Units 03/17/23  1710   WBC 10*3/mm3 6.64   HEMOGLOBIN g/dL 11.2*   HEMATOCRIT % 34.5   PLATELETS 10*3/mm3 222     Results from last 7 days   Lab Units 03/21/23  0414 03/19/23  0604 03/17/23  1710   SODIUM mmol/L 137   < > 135*   POTASSIUM mmol/L 4.6   < > 5.1   CHLORIDE mmol/L 97*   < > 98   CO2 mmol/L 23.3   < > 23.7   BUN mg/dL 71*   < > 40*   CREATININE mg/dL 3.18*   < > 2.09*   CALCIUM mg/dL 9.3   < > 10.6*   BILIRUBIN mg/dL  --   --  0.6   ALK PHOS U/L  --   --  51    ALT (SGPT) U/L  --   --  14   AST (SGOT) U/L  --   --  19   GLUCOSE mg/dL 110*   < > 125*    < > = values in this interval not displayed.         Results from last 7 days   Lab Units 03/20/23  1306   MAGNESIUM mg/dL 2.4     Results from last 7 days   Lab Units 03/20/23  1429 03/20/23  1306 03/18/23  0633   HSTROP T ng/L 61* 59* 48*     Cultures:  No results found for: BLOODCX, URINECX, WOUNDCX, MRSACX, RESPCX, STOOLCX    I have reviewed daily medications and changes in CPOE    Scheduled meds  apixaban, 2.5 mg, Oral, Q12H  atorvastatin, 20 mg, Oral, Nightly  docusate sodium, 100 mg, Oral, BID  escitalopram, 20 mg, Oral, Daily  fluticasone, 2 spray, Each Nare, Daily  metoprolol succinate XL, 50 mg, Oral, Daily  Mirabegron ER, 50 mg, Oral, Q PM  polyethylene glycol, 17 g, Oral, Daily           PRN meds  •  sodium chloride        CARROLL (dyspnea on exertion)    Arthritis involving multiple sites    Essential hypertension    Permanent atrial fibrillation (HCC)    CKD (chronic kidney disease) stage 4, GFR 15-29 ml/min ([facility])    Gastroesophageal reflux disease without esophagitis    Anemia of chronic disease    Spinal stenosis, lumbar region with neurogenic claudication    Diastolic CHF, acute ([facility])        Assessment/Plan:    ANGY on CKD4 / hx nephrectomy  -slightly better with fluids  -bumex 1 mg, will hold  -nephrology following    Chest pain  -no ischemic changes on ekg, stress test unremarkable    Weight loss, unintentional    Dyspnea with exertion, improving; stress test done 3/19  -echo with ef 68%    Atrial fibrillation on AC, with significant bradycardia  -eliquis  -heart rate is stable    Hypertension, with acceptable control    Cardiology has signed off      Richard Slater MD  03/21/23  09:16 EDT

## 2023-03-21 NOTE — PROGRESS NOTES
[facility] Progress Note      Patient Name: Marleni Hummel  : 1935  MRN: [id number]  Primary Care Physician:  Ken Andujar MD  Date of admission: 3/17/2023    Subjective     Interval History:   Follow up Janie on CKD IV. Urine not measured.  Intake not recorded.   Eating well. Bowels moving. Drinking .  Review of Systems:   As noted above    Objective     Vitals:   Temp:  [98 °F (36.7 °C)-98.8 °F (37.1 °C)] 98.8 °F (37.1 °C)  Heart Rate:  [52-66] 52  Resp:  [16-18] 16  BP: (120-160)/(60-91) 120/74    Intake/Output Summary (Last 24 hours) at 3/21/2023 1323  Last data filed at 3/21/2023 0628  Gross per 24 hour   Intake --   Output 400 ml   Net -400 ml       Physical Exam:    Constitutional: Awake, alert, NAD, stronger.   HEENT: Sclera anicteric, oral mucosa moist.   Neck: Supple, no carotid bruit.  Heart : Irregularly irregular, 2/6M, no rub. Dominic.   Lungs clear to auscultation.   Abd: BS +, soft, nontender and nondistended  :Purwik  Ext: Trace edema; +clubbing  Neurologic:  moving all extremities, normal speech.  Skin: Warm and dry           Scheduled Meds:     apixaban, 2.5 mg, Oral, Q12H  atorvastatin, 20 mg, Oral, Nightly  docusate sodium, 100 mg, Oral, BID  escitalopram, 20 mg, Oral, Daily  fluticasone, 2 spray, Each Nare, Daily  metoprolol succinate XL, 50 mg, Oral, Daily  Mirabegron ER, 50 mg, Oral, Q PM  polyethylene glycol, 17 g, Oral, Daily      IV Meds:        Results Reviewed:   I have personally reviewed the results from the time of this admission to 3/21/2023 13:23 EDT     Results from last 7 days   Lab Units 23  0414 23  0310 23  0604 23  1710   SODIUM mmol/L 137 135* 136 135*   POTASSIUM mmol/L 4.6 4.8 4.8 5.1   CHLORIDE mmol/L 97* 92* 94* 98   CO2 mmol/L 23.3 27.5 29.0 23.7   BUN mg/dL 71* 64* 54* 40*   CREATININE mg/dL 3.18* 3.34* 2.84* 2.09*   CALCIUM mg/dL 9.3 9.8 9.7 10.6*   BILIRUBIN mg/dL  --   --   --  0.6   ALK PHOS U/L  --    --   --  51   ALT (SGPT) U/L  --   --   --  14   AST (SGOT) U/L  --   --   --  19   GLUCOSE mg/dL 110* 114* 112* 125*       Estimated Creatinine Clearance: 11.8 mL/min (A) (by C-G formula based on SCr of 3.18 mg/dL (H)).    Results from last 7 days   Lab Units 03/21/23  0414 03/20/23  1306 03/20/23  0310 03/19/23  0604   MAGNESIUM mg/dL  --  2.4  --  3.2*   PHOSPHORUS mg/dL 5.4*  --  6.4* 5.8*       Results from last 7 days   Lab Units 03/20/23  0310 03/18/23  0349   URIC ACID mg/dL 10.1* 8.5*       Results from last 7 days   Lab Units 03/17/23  1710   WBC 10*3/mm3 6.64   HEMOGLOBIN g/dL 11.2*   PLATELETS 10*3/mm3 222             Assessment / Plan     ASSESSMENT:  1.  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   UOP still  not fully measured. Creatinine a little better after IVF 1 liter.  Off diuretic for now.   2.  SP Left nephrectomy in 2020 for malignancy  3.  Weight loss, unintentional.  4.  Dyspnea with exertion, improving; stress test done 3/19.  5.  Persistent afib on AC, bradycardic.  Discussed metoprolol with Dr. Orozco.  Leave current dose.   6.  Hypertension, over-controlled. norvasc dc yesterday.    PLAN:  1. Keep off diuretic today.  2. Monitor chemistries.   3. Discussed diet restrictions and loosening these some to enjoy her meals  4. Ok for dc tomorrow if improving. Will decide on diuretic tomorrow.    Yazmin Parmar MD  03/21/23  13:23 EDT    [facility]  [phone]

## 2023-03-22 ENCOUNTER — READMISSION MANAGEMENT (OUTPATIENT)
Dept: CALL CENTER | Facility: HOSPITAL | Age: 88
End: 2023-03-22
Payer: MEDICARE

## 2023-03-22 ENCOUNTER — APPOINTMENT (OUTPATIENT)
Dept: CARDIOLOGY | Facility: HOSPITAL | Age: 88
DRG: 682 | End: 2023-03-22
Payer: MEDICARE

## 2023-03-22 VITALS
OXYGEN SATURATION: 95 % | BODY MASS INDEX: 23.83 KG/M2 | HEIGHT: 62 IN | RESPIRATION RATE: 18 BRPM | DIASTOLIC BLOOD PRESSURE: 57 MMHG | SYSTOLIC BLOOD PRESSURE: 137 MMHG | HEART RATE: 46 BPM | TEMPERATURE: 97.8 F | WEIGHT: 129.5 LBS

## 2023-03-22 LAB
ALBUMIN SERPL-MCNC: 3.8 G/DL (ref 3.5–5.2)
ANION GAP SERPL CALCULATED.3IONS-SCNC: 11.3 MMOL/L (ref 5–15)
BUN SERPL-MCNC: 63 MG/DL (ref 8–23)
BUN/CREAT SERPL: 22.4 (ref 7–25)
CALCIUM SPEC-SCNC: 8.9 MG/DL (ref 8.6–10.5)
CHLORIDE SERPL-SCNC: 101 MMOL/L (ref 98–107)
CO2 SERPL-SCNC: 24.7 MMOL/L (ref 22–29)
CREAT SERPL-MCNC: 2.81 MG/DL (ref 0.57–1)
EGFRCR SERPLBLD CKD-EPI 2021: 16 ML/MIN/1.73
GLUCOSE SERPL-MCNC: 96 MG/DL (ref 65–99)
MAXIMAL PREDICTED HEART RATE: 135 BPM
PHOSPHATE SERPL-MCNC: 4.8 MG/DL (ref 2.5–4.5)
POTASSIUM SERPL-SCNC: 4.5 MMOL/L (ref 3.5–5.2)
SODIUM SERPL-SCNC: 137 MMOL/L (ref 136–145)
STRESS TARGET HR: 115 BPM

## 2023-03-22 PROCEDURE — 93246 EXT ECG>7D<15D RECORDING: CPT

## 2023-03-22 PROCEDURE — 80069 RENAL FUNCTION PANEL: CPT | Performed by: INTERNAL MEDICINE

## 2023-03-22 RX ORDER — POLYETHYLENE GLYCOL 3350 17 G/17G
17 POWDER, FOR SOLUTION ORAL DAILY
Start: 2023-03-23

## 2023-03-22 RX ORDER — METOPROLOL SUCCINATE 25 MG/1
25 TABLET, EXTENDED RELEASE ORAL DAILY
Qty: 30 TABLET | Refills: 0 | Status: SHIPPED | OUTPATIENT
Start: 2023-03-23 | End: 2023-03-27 | Stop reason: SDUPTHER

## 2023-03-22 RX ORDER — METOPROLOL SUCCINATE 25 MG/1
25 TABLET, EXTENDED RELEASE ORAL DAILY
Status: DISCONTINUED | OUTPATIENT
Start: 2023-03-23 | End: 2023-03-22 | Stop reason: HOSPADM

## 2023-03-22 RX ADMIN — ESCITALOPRAM OXALATE 20 MG: 10 TABLET, FILM COATED ORAL at 10:06

## 2023-03-22 RX ADMIN — APIXABAN 2.5 MG: 2.5 TABLET, FILM COATED ORAL at 10:06

## 2023-03-22 RX ADMIN — POLYETHYLENE GLYCOL 3350 17 G: 17 POWDER, FOR SOLUTION ORAL at 10:06

## 2023-03-22 RX ADMIN — DOCUSATE SODIUM 100 MG: 100 CAPSULE, LIQUID FILLED ORAL at 10:06

## 2023-03-22 RX ADMIN — FLUTICASONE PROPIONATE 2 SPRAY: 50 SPRAY, METERED NASAL at 11:56

## 2023-03-22 NOTE — CASE MANAGEMENT/SOCIAL WORK
Case Management Discharge Note      Final Note: Home via family, no additional CCP needs. Alicia RN/CCP         Selected Continued Care - Admitted Since 3/17/2023     Destination    No services have been selected for the patient.              Durable Medical Equipment    No services have been selected for the patient.              Dialysis/Infusion    No services have been selected for the patient.              Home Medical Care    No services have been selected for the patient.              Therapy    No services have been selected for the patient.              Community Resources    No services have been selected for the patient.              Community & DME    No services have been selected for the patient.                       Final Discharge Disposition Code: 01 - home or self-care

## 2023-03-22 NOTE — CASE MANAGEMENT/SOCIAL WORK
Continued Stay Note  [facility]     Patient Name: Marleni Hummel  MRN: [id number]  Today's Date: 3/22/2023    Admit Date: 3/17/2023    Plan: Home via family   Discharge Plan     Row Name 03/22/23 1155       Plan    Plan Home via family    Patient/Family in Agreement with Plan yes    Plan Comments CCP met with pt at bedside to discuss DC. CCP explained PT rec HH at DC if pt would like. Pt verbalized understanding but declines HH referral at this time. DC plan is home, daughter to transport. Alicia RN/CCP    Final Discharge Disposition Code 01 - home or self-care    Final Note Home via family, no additional CCP needs. Alicia RN/CCP               Discharge Codes    No documentation.               Expected Discharge Date and Time     Expected Discharge Date Expected Discharge Time    Mar 22, 2023             Verona Pacheco RN

## 2023-03-22 NOTE — PLAN OF CARE
Goal Outcome Evaluation:     Problem: Cardiac Output Decreased (Heart Failure)  Goal: Optimal Cardiac Output  Outcome: Ongoing, Progressing  Intervention: Optimize Cardiac Output  Recent Flowsheet Documentation  Taken 3/22/2023 0943 by Ayana Day RN  Environmental Support: calm environment promoted     Problem: Adjustment to Illness (Heart Failure)  Goal: Optimal Coping  Intervention: Support Psychosocial Response  Recent Flowsheet Documentation  Taken 3/22/2023 0943 by Ayana Day RN  Supportive Measures: active listening utilized  Family/Support System Care:   support provided   self-care encouraged         HR is slighlty low in the high 40-59. Resting well. A/ox4. Plan to discharged today. Care plan carefully updated.

## 2023-03-22 NOTE — DISCHARGE SUMMARY
[facility]  [phone]    DISCHARGE SUMMARY  [facility]    Patient Identification:  Name: Marleni Hummel  Age: 87 y.o.  Sex: female  :  1935  MRN: [id number]  Primary Care Physician: Ken Andujar MD    Admit date: 3/17/2023  Discharge date and time:      Discharge Diagnoses:  CARROLL (dyspnea on exertion)    Arthritis involving multiple sites    Essential hypertension    Permanent atrial fibrillation (HCC)    CKD (chronic kidney disease) stage 4, GFR 15-29 ml/min ([facility])    Gastroesophageal reflux disease without esophagitis    Anemia of chronic disease    Spinal stenosis, lumbar region with neurogenic claudication    Diastolic CHF, acute (HCC)       History of present illness from H&P:    Ms. Hummel is a 87 y.o. with past medical history CKD, persistent atrial fibrillation on Eliquis presenting with dyspnea on exertion over the last few months but acutely worse last few days.  States she gets short of breath walking on the hallway in her home.  No lower extremity edema.  Has had some bradycardia with heart rates in the 40s to 50s.  No recent changes in the metoprolol dose.  Denies any fevers, chills.  Does have a productive cough in the morning that goes away for the rest of the day that the patient attributes to allergies.  No change in cough for many months.  No nausea, vomiting, diarrhea, rash    Hospital Course:     Patient was admitted to the hospital because of increasing shortness of air.  Patient was found to have some bradycardia and her metoprolol dose was decreased to 50 mg.  She continues to have some bradycardia so we we will further lower the dose to 25.  Patient was seen in consultation by cardiology.  She was given diuretics during hospital stay.  She had a bump in her creatinine.  Her creatinine is is improved.  Creatinine at baseline is 2.0.  Creatinine is now 2.8.  Peak creatinine during hospitalization was 3.3.  Patient is feeling  well today anxious for discharge.  She is not on any diuretic coming into the hospital we will hold on further diuretics.  Patient was seen by nephrology during hospital stay  .    The patient was seen and examined on the day of discharge.    Consults:   Consults     Date and Time Order Name Status Description    3/20/2023  1:51 PM Inpatient Cardiology Consult      3/17/2023  8:28 PM LARISSA (on-call MD unless specified)      3/17/2023  8:23 PM Inpatient Nephrology Consult      3/17/2023  7:35 PM Inpatient Cardiology Consult      3/17/2023  7:05 PM LHA (on-call MD unless specified) Details            Results from last 7 days   Lab Units 03/17/23  1710   WBC 10*3/mm3 6.64   HEMOGLOBIN g/dL 11.2*   HEMATOCRIT % 34.5   PLATELETS 10*3/mm3 222       Results from last 7 days   Lab Units 03/22/23  0403   SODIUM mmol/L 137   POTASSIUM mmol/L 4.5   CHLORIDE mmol/L 101   CO2 mmol/L 24.7   BUN mg/dL 63*   CREATININE mg/dL 2.81*   GLUCOSE mg/dL 96   CALCIUM mg/dL 8.9       Significant Diagnostic Studies:   No results found for: WBC, HGB, HCT, PLT  Sodium   Date Value Ref Range Status   03/22/2023 137 136 - 145 mmol/L Final     Potassium   Date Value Ref Range Status   03/22/2023 4.5 3.5 - 5.2 mmol/L Final     Chloride   Date Value Ref Range Status   03/22/2023 101 98 - 107 mmol/L Final     CO2   Date Value Ref Range Status   03/22/2023 24.7 22.0 - 29.0 mmol/L Final     BUN   Date Value Ref Range Status   03/22/2023 63 (H) 8 - 23 mg/dL Final     Creatinine   Date Value Ref Range Status   03/22/2023 2.81 (H) 0.57 - 1.00 mg/dL Final     Glucose   Date Value Ref Range Status   03/22/2023 96 65 - 99 mg/dL Final     Calcium   Date Value Ref Range Status   03/22/2023 8.9 8.6 - 10.5 mg/dL Final     Magnesium   Date Value Ref Range Status   03/20/2023 2.4 1.6 - 2.4 mg/dL Final     Phosphorus   Date Value Ref Range Status   03/22/2023 4.8 (H) 2.5 - 4.5 mg/dL Final     No results found for: AST, ALT, ALKPHOS  No results found for: APTT,  INR  No results found for: COLORU, CLARITYU, SPECGRAV, PHUR, PROTEINUR, GLUCOSEU, KETONESU, BLOODU, NITRITE, LEUKOCYTESUR, BILIRUBINUR, UROBILINOGEN, RBCUA, WBCUA, BACTERIA, UACOMMENT  HS Troponin T   Date Value Ref Range Status   03/20/2023 61 (C) <10 ng/L Final   03/20/2023 59 (C) <10 ng/L Final     No components found for: HGBA1C;2  No components found for: TSH;2    Imaging Results (All)     Procedure Component Value Units Date/Time    XR Chest 2 View [[id number]] Collected: 03/17/23 1707     Updated: 03/17/23 1713    Narrative:      Chest radiograph     HISTORY:Severe     TECHNIQUE: Two PA and lateral radiographs     COMPARISON:Chest radiograph same back to 11/09/2020       Impression:      FINDINGS AND IMPRESSION:  No new pulmonary consolidation, pleural effusion or pneumothorax is  seen. Sequela of prior to and almost disease is present. Cardiac  silhouette is within normal limits for size. Pulmonary opacification  overlying the anterior aspect of the lungs on the lateral radiograph was  also seen on 6/15/2022 and 11/09/2020.     This report was finalized on 3/17/2023 5:10 PM by Dr. Boubacar Aguilar M.D.         No results found for: SITE, ALLENTEST, PHART, YVE0GRQ, PO2ART, OCY8WMD, BASEEXCESS, O3BTEJNP, HGBBG, HCTABG, OXYHEMOGLOBI, METHHGBN, CARBOXYHGB, CO2CT, BAROMETRIC, MODALITY, FIO2       Discharge Medications      New Medications      Instructions Start Date   polyethylene glycol 17 g packet  Commonly known as: MIRALAX   17 g, Oral, Daily   Start Date: March 23, 2023        Changes to Medications      Instructions Start Date   docusate sodium 250 MG capsule  Commonly known as: COLACE  What changed:   · when to take this  · reasons to take this   250 mg, Oral, 2 Times Daily      famotidine 10 MG tablet  Commonly known as: PEPCID  What changed: Another medication with the same name was removed. Continue taking this medication, and follow the directions you see here.   10 mg, Oral, Nightly      metoprolol  succinate XL 25 MG 24 hr tablet  Commonly known as: TOPROL-XL  What changed:   · medication strength  · how much to take   25 mg, Oral, Daily   Start Date: March 23, 2023        Continue These Medications      Instructions Start Date   apixaban 2.5 MG tablet tablet  Commonly known as: Eliquis   2.5 mg, Oral, Every 12 Hours Scheduled      atorvastatin 20 MG tablet  Commonly known as: LIPITOR   20 mg, Oral, Nightly      escitalopram 20 MG tablet  Commonly known as: LEXAPRO   20 mg, Oral, Daily      Mirabegron ER 50 MG tablet sustained-release 24 hour 24 hr tablet  Commonly known as: MYRBETRIQ   50 mg, Oral, Every Evening         Stop These Medications    acetaminophen 500 MG tablet  Commonly known as: TYLENOL     amLODIPine 2.5 MG tablet  Commonly known as: NORVASC     ezetimibe 10 MG tablet  Commonly known as: ZETIA     melatonin 5 MG tablet tablet     PROLIA SC              Patient Instructions:       Future Appointments   Date Time Provider Department Center   3/29/2023  1:30 PM REFERRED INJECTION CHAIR EP  [facility] EP [facility]   4/21/2023 12:20 PM Sybil Parmar MD [facility] CD LCGKR [facility]   7/10/2023  1:30 PM Ken Andujar MD [facility] PC JTWN2 [facility]   10/4/2023  2:00 PM REFERRED INJECTION CHAIR EP  [facility] EP [facility]   11/10/2023 10:00 AM Oralia Lutz APRN MGFITZ CD LCGKR [facility]         Follow-up Information     Sybil Parmar MD Follow up in 6 week(s).    Specialty: Cardiology  Contact information:  [address]  [facility] [place] [address]  [phone]             Sybil Parmar MD .    Specialty: Cardiology  Contact information:  [address]  [facility] [address]  [facility] [place] [address]  [phone]             [facility]. Go on 4/5/2023.    Why: Has lab appt for nephrology associates Renal panel April 5 at 2:15 pm.  Contact information:  [address]  [facility] [place] [address] [phone]           Sarah Beth Marcus APRN. Go on 4/18/2023.    Specialty: Nephrology  Why: has  janlow up with ENRRIQUE Maddenkaron April 18 at 2:20 pm  Contact information:  [address]  [address]  [facility] [address]  [phone]             Joseph Leonard MD. Go on 5/17/2023.    Specialty: Nephrology  Why: Has followup for labs May 17 at 12:45 pm then APPT with DR. Leonard May 24 at 12:45 pm.  Contact information:  [address]  [address]  [facility] [place] [address]  [phone]             Ken Andujar MD .    Specialty: Family Medicine  Contact information:  [address]  [address]  [facility] [place] [address]  [phone]                         Discharge Order (From admission, onward)     Start     Ordered    03/22/23 1145  Discharge patient  Once        Comments: Need to ask cardiology about dosing of metoprolol and get dose of diuretic for discharge   Expected Discharge Date: 03/22/23    Discharge Disposition: Home or Self Care    Physician of Record for Attribution - Please select from Treatment Team: RICHARD SLATER [[id number]]    Review needed by CMO to determine Physician of Record: No       Question Answer Comment   Physician of Record for Attribution - Please select from Treatment Team RICHARD SLATER    Review needed by CMO to determine Physician of Record No        03/22/23 1145                Diet Order   Procedures   • Diet: Renal Diets; Low Potassium; Texture: Regular Texture (IDDSI 7); Fluid Consistency: Thin (IDDSI 0)         Discharge instructions:  Follow up with your primary care provider in 1-2 weeks with a cbc and cmp         Total time spent discharging patient including evaluation, post hospitalization follow up,  medication and post hospitalization instructions and education, total time exceeds 30 minutes.    Signed:  Richard Slater MD  3/22/2023  11:46 EDT

## 2023-03-22 NOTE — PROGRESS NOTES
[facility] Progress Note      Patient Name: Marleni Hummel  : 1935  MRN: [id number]  Primary Care Physician:  Ken Andujar MD  Date of admission: 3/17/2023    Subjective     Interval History:   Follow up Janie on CKD IV. Urine not all captured. Weight up .  Feels sleepy, but ready for dc.  Eating. Bowels moving.   Review of Systems:   As noted above    Objective     Vitals:   Temp:  [97.5 °F (36.4 °C)-98.8 °F (37.1 °C)] 97.8 °F (36.6 °C)  Heart Rate:  [46-60] 46  Resp:  [16-18] 18  BP: (118-137)/(57-74) 137/57    Intake/Output Summary (Last 24 hours) at 3/22/2023 1041  Last data filed at 3/22/2023 0327  Gross per 24 hour   Intake 960 ml   Output 700 ml   Net 260 ml       Physical Exam:    Constitutional: Awake, alert, NAD, looks better.  HEENT: Sclera anicteric, oral mucosa moist.   Neck: Supple, no carotid bruit.  Heart : Irregularly irregular, 2/6M, no rub. Dominic.   Lungs clear to auscultation.   Abd: BS +, soft, nontender and nondistended  Ext: no edema. Lower ext  Neurologic:  moving all extremities, normal speech.  Skin: Warm and dry           Scheduled Meds:     apixaban, 2.5 mg, Oral, Q12H  atorvastatin, 20 mg, Oral, Nightly  docusate sodium, 100 mg, Oral, BID  escitalopram, 20 mg, Oral, Daily  fluticasone, 2 spray, Each Nare, Daily  [START ON 3/23/2023] metoprolol succinate XL, 25 mg, Oral, Daily  Mirabegron ER, 50 mg, Oral, Q PM  polyethylene glycol, 17 g, Oral, Daily      IV Meds:        Results Reviewed:   I have personally reviewed the results from the time of this admission to 3/22/2023 10:41 EDT     Results from last 7 days   Lab Units 23  0403 23  0414 23  0310 23  0604 23  1710   SODIUM mmol/L 137 137 135*   < > 135*   POTASSIUM mmol/L 4.5 4.6 4.8   < > 5.1   CHLORIDE mmol/L 101 97* 92*   < > 98   CO2 mmol/L 24.7 23.3 27.5   < > 23.7   BUN mg/dL 63* 71* 64*   < > 40*   CREATININE mg/dL 2.81* 3.18* 3.34*   < > 2.09*   CALCIUM mg/dL  8.9 9.3 9.8   < > 10.6*   BILIRUBIN mg/dL  --   --   --   --  0.6   ALK PHOS U/L  --   --   --   --  51   ALT (SGPT) U/L  --   --   --   --  14   AST (SGOT) U/L  --   --   --   --  19   GLUCOSE mg/dL 96 110* 114*   < > 125*    < > = values in this interval not displayed.       Estimated Creatinine Clearance: 13.6 mL/min (A) (by C-G formula based on SCr of 2.81 mg/dL (H)).    Results from last 7 days   Lab Units 03/22/23  0403 03/21/23  0414 03/20/23  1306 03/20/23  0310 03/19/23  0604   MAGNESIUM mg/dL  --   --  2.4  --  3.2*   PHOSPHORUS mg/dL 4.8* 5.4*  --  6.4* 5.8*       Results from last 7 days   Lab Units 03/20/23  0310 03/18/23  0349   URIC ACID mg/dL 10.1* 8.5*       Results from last 7 days   Lab Units 03/17/23  1710   WBC 10*3/mm3 6.64   HEMOGLOBIN g/dL 11.2*   PLATELETS 10*3/mm3 222             Assessment / Plan     ASSESSMENT:  1.  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   UOP still  not fully measured. Creatinine improving.   Off diuretic for now.   2.  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  3.  Weight loss, unintentional.  4.  Dyspnea with exertion, improving; stress test done 3/19.  5.  Persistent afib on AC, bradycardic.  Discussed metoprolol with Dr. Orozco.  Leave current dose.   6.  Hypertension, over-controlled. norvasc dc yesterday. Much better.     PLAN:  1. Follow up appts for labs and APRN visit April 5 and April 18, respectively in Epic.  2. Home on no diuretic.     Yazmin Parmar MD  03/22/23  10:41 EDT    [facility]  [phone]

## 2023-03-22 NOTE — OUTREACH NOTE
Prep Survey    Flowsheet Row Responses   [facility] patient discharged from? [place]   Is LACE score < 7 ? No   Eligibility [facility]   Date of Admission 03/17/23   Date of Discharge 03/22/23   Discharge Disposition Home or Self Care   Discharge diagnosis dyspnea on exertion   Does the patient have one of the following disease processes/diagnoses(primary or secondary)? Other   Does the patient have Home health ordered? No   Is there a DME ordered? No   Prep survey completed? Yes          Essie MCGOWAN - Registered Nurse

## 2023-03-23 ENCOUNTER — TRANSITIONAL CARE MANAGEMENT TELEPHONE ENCOUNTER (OUTPATIENT)
Dept: CALL CENTER | Facility: HOSPITAL | Age: 88
End: 2023-03-23
Payer: MEDICARE

## 2023-03-23 NOTE — OUTREACH NOTE
Call Center TCM Note    Flowsheet Row Responses   [facility] patient discharged from? [place]   Does the patient have one of the following disease processes/diagnoses(primary or secondary)? Other   TCM attempt successful? Yes   Discharge diagnosis dyspnea on exertion   List who call center can speak with NO CURRENT VERBAL RELEASE ON FILE WITH PCP OFC   Person spoke with today (if not patient) and relationship Spoke with dtr Silvia who was with pt   Meds reviewed with patient/caregiver? Yes   Is the patient having any side effects they believe may be caused by any medication additions or changes? No   Does the patient have all medications ordered at discharge? Yes   Is the patient taking all medications as directed (includes completed medication regime)? Yes   Comments TCM APPT WITH PCP DR KHAN IS 03/27/2023   Does the patient have an appointment with their PCP within 7 days of discharge? Yes   Has home health visited the patient within 72 hours of discharge? N/A   Psychosocial issues? No   Did the patient receive a copy of their discharge instructions? Yes   Nursing interventions Reviewed instructions with patient   What is the patient's perception of their health status since discharge? Improving   Is the patient/caregiver able to teach back signs and symptoms related to disease process for when to call PCP? Yes   Is the patient/caregiver able to teach back signs and symptoms related to disease process for when to call 911? Yes   Is the patient/caregiver able to teach back the hierarchy of who to call/visit for symptoms/problems? PCP, Specialist, Home health nurse, Urgent Care, ED, 911 Yes   If the patient is a current smoker, are they able to teach back resources for cessation? Not a smoker   TCM call completed? Yes   Wrap up additional comments D/C DX: dyspnea on exertion - Per dtr Silvia who was with pt, (PCP SCOOBY NEEDS TO UPDATE VERBAL RELEASE PLEASE) Pt rested well last night, appetit is good. All  medication changes in place. No questions at this time. TCM APPT with PCP Dr Andujar has now been sched for 03/27/2023.           Samantha Plascencia MA    3/23/2023, 10:28 EDT

## 2023-03-27 ENCOUNTER — OFFICE VISIT (OUTPATIENT)
Dept: FAMILY MEDICINE CLINIC | Facility: CLINIC | Age: 88
End: 2023-03-27
Payer: MEDICARE

## 2023-03-27 VITALS
OXYGEN SATURATION: 98 % | SYSTOLIC BLOOD PRESSURE: 122 MMHG | DIASTOLIC BLOOD PRESSURE: 64 MMHG | HEART RATE: 57 BPM | HEIGHT: 62 IN | RESPIRATION RATE: 18 BRPM | TEMPERATURE: 97.4 F | WEIGHT: 131.8 LBS | BODY MASS INDEX: 24.25 KG/M2

## 2023-03-27 DIAGNOSIS — I50.31 DIASTOLIC CHF, ACUTE: Primary | ICD-10-CM

## 2023-03-27 PROBLEM — K57.92 DIVERTICULITIS: Status: RESOLVED | Noted: 2022-12-14 | Resolved: 2023-03-27

## 2023-03-27 PROBLEM — R06.09 DOE (DYSPNEA ON EXERTION): Status: RESOLVED | Noted: 2023-03-17 | Resolved: 2023-03-27

## 2023-03-27 RX ORDER — METOPROLOL SUCCINATE 25 MG/1
25 TABLET, EXTENDED RELEASE ORAL DAILY
Qty: 90 TABLET | Refills: 1 | Status: SHIPPED | OUTPATIENT
Start: 2023-03-27 | End: 2023-09-23

## 2023-03-27 NOTE — PROGRESS NOTES
Transitional Care Follow Up Visit  Subjective     Marleni is a 87 y.o. female who presents for a transitional care management visit.    Within 48 business hours after discharge our office contacted her via telephone to coordinate her care and needs.      I reviewed and discussed the details of that call along with the discharge summary, hospital problems, inpatient lab results, inpatient diagnostic studies, and consultation reports with Marleni.     Current outpatient and discharge medications have been reconciled for the patient.  Reviewed by: Ken Andujar MD      Date of TCM Phone Call 2/26/2020 11/21/2020 12/3/2021 5/31/2022 3/22/2023   [facility]   Date of Admission 2/23/2020 11/16/2020 11/29/2021 5/30/2022 3/17/2023   Date of Discharge 2/25/2020 11/21/2020 12/3/2021 5/31/2022 3/22/2023   Discharge Disposition Home or Self Care Home or Self Care Home or Self Care Home or Self Care Home or Self Care       Risk for Readmission (LACE) Score: 13 (3/22/2023  6:01 AM)      History of Present Illness  Patient is here today for hospital follow-up she was admitted with exertional shortness of breath.  She was diagnosed with fluid/volume overload and diuresed.  She did get a diagnosis of diastolic dysfunction with preserved ejection fraction.  Normal stress testing and echocardiogram.  Her symptoms improved after metoprolol was decreased from 75 mg down to 25 mg.  Today her blood pressure remains well controlled.  Her weight is to her optimal dry weight.  She is feeling much much better with no more shortness of breath.  She does have follow-up with cardiology in April.  Refill of metoprolol is needed.  She will discuss optimal treatments for congestive heart failure with the cardiologist at her next visit.       Course During Hospital Stay The following information was reviewed by: Ken Andujar MD on 03/27/2023:  "  Hospital Course:      Patient was admitted to the hospital because of increasing shortness of air.  Patient was found to have some bradycardia and her metoprolol dose was decreased to 50 mg.  She continues to have some bradycardia so we we will further lower the dose to 25.  Patient was seen in consultation by cardiology.  She was given diuretics during hospital stay.  She had a bump in her creatinine.  Her creatinine is is improved.  Creatinine at baseline is 2.0.  Creatinine is now 2.8.  Peak creatinine during hospitalization was 3.3.  Patient is feeling well today anxious for discharge.  She is not on any diuretic coming into the hospital we will hold on further diuretics.  Patient was seen by nephrology during hospital stay  .     The patient was seen and examined on the day of discharge.   The following portions of the patient's history were reviewed and updated as appropriate: allergies, current medications, past family history, past medical history, past social history, past surgical history and problem list.     Vitals:    03/27/23 1405   BP: 122/64   Pulse: 57   Resp: 18   Temp: 97.4 °F (36.3 °C)   SpO2: 98%   Weight: 59.8 kg (131 lb 12.8 oz)   Height: 157.5 cm (62.01\")             Objective   Physical Exam  Vitals reviewed.   Constitutional:       General: She is not in acute distress.  Eyes:      General: Lids are normal.      Conjunctiva/sclera: Conjunctivae normal.   Neck:      Vascular: No carotid bruit.      Trachea: No tracheal deviation.   Cardiovascular:      Rate and Rhythm: Normal rate and regular rhythm.      Heart sounds: Normal heart sounds. No murmur heard.  Pulmonary:      Effort: Pulmonary effort is normal.      Breath sounds: Normal breath sounds.   Skin:     General: Skin is warm and dry.   Neurological:      Mental Status: She is alert. She is not disoriented.   Psychiatric:         Speech: Speech normal.         Behavior: Behavior normal. Behavior is cooperative.         DATA " REVIEWED:    The following data was reviewed by: Ken Andujar MD on 03/27/2023:  ED to Hosp-Admission (Discharged) with Richard Slater MD; Toby Dias MD (03/17/2023)    Adult Transthoracic Echo Complete W/ Cont if Necessary Per Protocol    Addendum Date: 3/18/2023    •  Left ventricular systolic function is normal. Left ventricular ejection fraction appears to be 56 - 60%. •  The following left ventricular wall segments are hypokinetic: apical septal and mid anteroseptal. •  Left ventricular diastolic function was normal. •  The aortic valve appears trileaflet. Mild aortic valve regurgitation is present. No hemodynamically significant aortic valve stenosis is present. •  Mitral annular calcification is present. Mild mitral valve regurgitation is present. No significant mitral valve stenosis is present.     XR Chest 2 View    Result Date: 3/17/2023  Impression: FINDINGS AND IMPRESSION: No new pulmonary consolidation, pleural effusion or pneumothorax is seen. Sequela of prior to and almost disease is present. Cardiac silhouette is within normal limits for size. Pulmonary opacification overlying the anterior aspect of the lungs on the lateral radiograph was also seen on 6/15/2022 and 11/09/2020.  This report was finalized on 3/17/2023 5:10 PM by Dr. Boubacar Aguilar M.D.        Assessment & Plan     Diagnoses and all orders for this visit:    1. Diastolic CHF, acute (HCC) (Primary)  Assessment & Plan:  Congestive heart failure due to volume overload..  Heart failure is newly identified.  NYHA Class III.  Continue current treatment regimen.  Dietary sodium restriction.  Encouraged daily monitoring of the patient's weight.  Regular aerobic exercise.  Continue current medications.  keep appt with cardiology. Information shared in AVS.   Heart failure will be reassessed in 3 months.    Orders:  -     metoprolol succinate XL (TOPROL-XL) 25 MG 24 hr tablet; Take 1 tablet by mouth Daily for 180 days.  Dispense: 90  tablet; Refill: 1      Follow Up     Return in 15 weeks (on 7/10/2023) for next scheduled follow up.

## 2023-03-29 ENCOUNTER — INFUSION (OUTPATIENT)
Dept: ONCOLOGY | Facility: HOSPITAL | Age: 88
End: 2023-03-29
Payer: MEDICARE

## 2023-03-29 VITALS — BODY MASS INDEX: 24.11 KG/M2 | HEIGHT: 62 IN | TEMPERATURE: 97.4 F | RESPIRATION RATE: 18 BRPM

## 2023-03-29 DIAGNOSIS — M81.0 SENILE OSTEOPOROSIS: Primary | ICD-10-CM

## 2023-03-29 PROCEDURE — 96372 THER/PROPH/DIAG INJ SC/IM: CPT

## 2023-03-29 PROCEDURE — 25010000002 DENOSUMAB 60 MG/ML SOLUTION PREFILLED SYRINGE: Performed by: OBSTETRICS & GYNECOLOGY

## 2023-03-29 RX ADMIN — DENOSUMAB 60 MG: 60 INJECTION SUBCUTANEOUS at 14:04

## 2023-04-04 RX ORDER — APIXABAN 2.5 MG/1
TABLET, FILM COATED ORAL
Qty: 180 TABLET | Refills: 0 | Status: SHIPPED | OUTPATIENT
Start: 2023-04-04

## 2023-04-18 LAB
MAXIMAL PREDICTED HEART RATE: 135 BPM
STRESS TARGET HR: 115 BPM

## 2023-04-21 ENCOUNTER — OFFICE VISIT (OUTPATIENT)
Dept: CARDIOLOGY | Facility: CLINIC | Age: 88
End: 2023-04-21
Payer: MEDICARE

## 2023-04-21 VITALS
SYSTOLIC BLOOD PRESSURE: 124 MMHG | HEIGHT: 62 IN | WEIGHT: 131 LBS | BODY MASS INDEX: 24.11 KG/M2 | OXYGEN SATURATION: 99 % | HEART RATE: 51 BPM | DIASTOLIC BLOOD PRESSURE: 72 MMHG | RESPIRATION RATE: 16 BRPM

## 2023-04-21 DIAGNOSIS — I10 ESSENTIAL HYPERTENSION: ICD-10-CM

## 2023-04-21 DIAGNOSIS — I48.21 PERMANENT ATRIAL FIBRILLATION: Primary | ICD-10-CM

## 2023-04-21 DIAGNOSIS — I25.10 CHRONIC CORONARY ARTERY DISEASE: ICD-10-CM

## 2023-04-21 DIAGNOSIS — E78.00 HYPERCHOLESTEROLEMIA: ICD-10-CM

## 2023-04-21 PROBLEM — I50.31 DIASTOLIC CHF, ACUTE: Status: RESOLVED | Noted: 2023-03-18 | Resolved: 2023-04-21

## 2023-04-21 PROCEDURE — 99213 OFFICE O/P EST LOW 20 MIN: CPT | Performed by: INTERNAL MEDICINE

## 2023-04-21 PROCEDURE — 1160F RVW MEDS BY RX/DR IN RCRD: CPT | Performed by: INTERNAL MEDICINE

## 2023-04-21 PROCEDURE — 1159F MED LIST DOCD IN RCRD: CPT | Performed by: INTERNAL MEDICINE

## 2023-04-21 PROCEDURE — 3074F SYST BP LT 130 MM HG: CPT | Performed by: INTERNAL MEDICINE

## 2023-04-21 PROCEDURE — 93000 ELECTROCARDIOGRAM COMPLETE: CPT | Performed by: INTERNAL MEDICINE

## 2023-04-21 PROCEDURE — 3078F DIAST BP <80 MM HG: CPT | Performed by: INTERNAL MEDICINE

## 2023-04-21 RX ORDER — FUROSEMIDE 20 MG/1
20 TABLET ORAL DAILY PRN
Qty: 30 TABLET | Refills: 11 | Status: SHIPPED | OUTPATIENT
Start: 2023-04-21

## 2023-04-21 RX ORDER — SODIUM BICARBONATE 650 MG/1
650 TABLET ORAL
COMMUNITY
End: 2023-04-21 | Stop reason: ALTCHOICE

## 2023-04-21 NOTE — PROGRESS NOTES
CARDIOLOGY    Sybil Parmar MD    ENCOUNTER DATE:  04/21/2023    Marleni Hummel / 87 y.o. / female        CHIEF COMPLAINT / REASON FOR OFFICE VISIT     Heart Problem (4 week follow up/CAD without angina )      HISTORY OF PRESENT ILLNESS       HPI    Marleni Hummel is a 87 y.o. female     This is a patient who previously followed with Dr. Tai. She has a history of hypertension, hyperlipidemia, atrial fibrillation, stress induced cardiomyopathy, obstructive sleep apnea, left renal carcinoma status post nephrectomy.      She had atrial fibrillation with rapid ventricular response in the setting of abnormal thyroid studies and unremarkable echo. Nuclear stress test was also unremarkable. In March 2011 she went back into atrial fibrillation after cardioversion and was started on flecainide and had a repeat cardioversion. In October 2015 she had a non-ST elevation myocardial infarction and heart catheterization which showed a cardiomyopathy with an ejection fraction of 20%. She did have a lesion of her circumflex and was stented with a drug eluting stent. Flecainide was discontinued and switched to amiodarone. Her QT interval increased and so amiodarone was discontinued.     In 2018 she had an echocardiogram showing ejection fraction of 65% with borderline left ventricular hypertrophy and mild to moderate left atrial enlargement. She had no significant valve disease. In November 2019 she had shortness of breath and was started on Lasix but had an episode of near syncope and so that was discontinued. In 2019 she had shortness of breath and had a nuclear stress test which was negative for ischemia. Echo showed normal left ventricular function and was otherwise unchanged.      She was hospitalized in March 2023 with bradycardia.  Metoprolol was decreased.  Echocardiogram in March 17, 2023 showed ejection fraction of 55 to 60% with some hypokinesis of the apical septum and mid anteroseptum.  Diastolic  "function was normal.  Mild aortic and mitral regurgitation.  Nuclear stress test on March 19 showed no evidence of ischemia.  Zio patch on discharge showed atrial fibrillation with heart rate ranging between 40 and 110 with an average of 63 bpm.    She has been feeling well.  Her breathing is improving.  She has not noticed any edema.    REVIEW OF SYSTEMS     Review of Systems   Constitutional: Negative for chills, fever, weight gain and weight loss.   Cardiovascular: Negative for leg swelling.   Respiratory: Negative for cough, snoring and wheezing.    Hematologic/Lymphatic: Negative for bleeding problem. Does not bruise/bleed easily.   Skin: Negative for color change.   Musculoskeletal: Negative for falls, joint pain and myalgias.   Gastrointestinal: Negative for melena.   Genitourinary: Negative for hematuria.   Neurological: Negative for excessive daytime sleepiness.   Psychiatric/Behavioral: Negative for depression. The patient is not nervous/anxious.          VITAL SIGNS     Visit Vitals  /72 (BP Location: Left arm, Patient Position: Sitting, Cuff Size: Adult)   Pulse 51   Resp 16   Ht 157.5 cm (62\")   Wt 59.4 kg (131 lb)   SpO2 99%   BMI 23.96 kg/m²         Wt Readings from Last 3 Encounters:   04/21/23 59.4 kg (131 lb)   03/27/23 59.8 kg (131 lb 12.8 oz)   03/22/23 58.7 kg (129 lb 8 oz)     Body mass index is 23.96 kg/m².      PHYSICAL EXAMINATION     Constitutional:       General: Not in acute distress.  Neck:      Vascular: No carotid bruit or JVD.   Pulmonary:      Effort: Pulmonary effort is normal.      Breath sounds: Normal breath sounds.   Cardiovascular:      Normal rate. Irregularly irregular rhythm.      Murmurs: There is no murmur.   Psychiatric:         Mood and Affect: Mood and affect normal.           REVIEWED DATA       ECG 12 Lead    Date/Time: 4/21/2023 12:47 PM  Performed by: Sybil Parmar MD  Authorized by: Sybil Parmar MD   Comparison: compared with previous ECG from " 3/20/2023  Similar to previous ECG  Rhythm: atrial fibrillation  BPM: 51  Conduction: conduction normal  ST Segments: ST segments normal  T Waves: T waves normal    Clinical impression: abnormal EKG              Lipid Panel        9/22/2022    11:12 1/4/2023    13:25   Lipid Panel   Total Cholesterol 138   132     Triglycerides 140   118     HDL Cholesterol 52   56     VLDL Cholesterol 24   21     LDL Cholesterol  62   55         Lab Results   Component Value Date    GLUCOSE 96 03/22/2023    BUN 63 (H) 03/22/2023    CREATININE 2.81 (H) 03/22/2023    EGFRRESULT 25.0 (L) 01/04/2023    EGFR 16.0 (L) 03/22/2023    BCR 22.4 03/22/2023    K 4.5 03/22/2023    CO2 24.7 03/22/2023    CALCIUM 8.9 03/22/2023    PROTENTOTREF 6.1 01/04/2023    ALBUMIN 3.8 03/22/2023    BILITOT 0.6 03/17/2023    AST 19 03/17/2023    ALT 14 03/17/2023       ASSESSMENT & PLAN      Diagnosis Plan   1. Permanent atrial fibrillation        2. Chronic coronary artery disease        3. Hypercholesterolemia        4. Essential hypertension              1.  Permanent atrial fibrillation.  Rate controlled.  On Eliquis.  No bleeding issues.  2.  Coronary artery disease status post drug-eluting stent to the circumflex in October 2015.  Normal nuclear stress test March 2023.  No anginal symptoms.  Continue medical therapy.  3.  Hypertension.  Blood pressure is stable.  4.  Hyperlipidemia.  On atorvastatin and Zetia.  Lipids followed by Dr. Andujar  5.  History of renal insufficiency with a history of nephrectomy due to cancer.  6.  History of prolonged QT interval on amiodarone.  8.  History of obstructive sleep apnea not currently on therapy.  9.  Chest pain.  This happened while she was hospitalized in March 2023 and was atypical with flat troponins.  She had a normal nuclear stress test in March 2023.  10.  Bradycardia.  This was noted while she was hospitalized in March 2023 and her metoprolol was decreased.  11.  Congestive heart failure.  She has normal  LV function.  This was likely brought on by bradycardia she is euvolemic.  She would like to use Lasix as needed and I think that is reasonable.    Keep already scheduled follow-up with [facility] in November unless her symptoms change sooner.    Orders Placed This Encounter   Procedures   • ECG 12 Lead     This order was created via procedure documentation     Order Specific Question:   Release to patient     Answer:   Routine Release           MEDICATIONS         Discharge Medications          Accurate as of April 21, 2023 12:50 PM. If you have any questions, ask your nurse or doctor.            New Medications      Instructions Start Date   furosemide 20 MG tablet  Commonly known as: LASIX  Started by: Sybil Parmar MD   20 mg, Oral, Daily PRN         Continue These Medications      Instructions Start Date   atorvastatin 20 MG tablet  Commonly known as: LIPITOR   20 mg, Oral, Nightly      Eliquis 2.5 MG tablet tablet  Generic drug: apixaban   TAKE 1 TABLET EVERY 12 HOURS (TWICE A DAY)      escitalopram 20 MG tablet  Commonly known as: LEXAPRO   20 mg, Oral, Daily      metoprolol succinate XL 25 MG 24 hr tablet  Commonly known as: TOPROL-XL   25 mg, Oral, Daily      Mirabegron ER 50 MG tablet sustained-release 24 hour 24 hr tablet  Commonly known as: MYRBETRIQ   50 mg, Oral, Every Evening               Sybil Parmar MD  04/21/23  12:50 EDT    Part of this note may be an electronic transcription/translation of spoken language to printed text using the Dragon dictation system.

## 2023-04-24 ENCOUNTER — TELEPHONE (OUTPATIENT)
Dept: CARDIOLOGY | Facility: CLINIC | Age: 88
End: 2023-04-24
Payer: MEDICARE

## 2023-04-24 NOTE — TELEPHONE ENCOUNTER
Called and spoke with patient about holter results and to stay on current regimen.  She verbalizes understanding.    From your standpoint is it ok if she takes pepcid?    Carmen Chang RN  [facility] Triage  04/24/23 09:49 EDT

## 2023-04-24 NOTE — TELEPHONE ENCOUNTER
Called and spoke to patient about pepcid she verbalizes understanding.    Carmen Chang RN  [facility] Triage  04/24/23 12:36 EDT

## 2023-04-24 NOTE — TELEPHONE ENCOUNTER
Monitor shows that she is in rate controlled atrial fibrillation 100% of the time.  This is not new.  Continue current medications.  No changes.

## 2023-04-27 DIAGNOSIS — F41.0 PANIC DISORDER: ICD-10-CM

## 2023-04-27 RX ORDER — ESCITALOPRAM OXALATE 20 MG/1
20 TABLET ORAL DAILY
Qty: 90 TABLET | Refills: 0 | Status: SHIPPED | OUTPATIENT
Start: 2023-04-27 | End: 2023-07-26

## 2023-04-27 NOTE — TELEPHONE ENCOUNTER
Rx Refill Note  Requested Prescriptions     Pending Prescriptions Disp Refills   • escitalopram (LEXAPRO) 20 MG tablet [Pharmacy Med Name: ESCITALOPRAM TABS 20MG] 90 tablet 3     Sig: TAKE 1 TABLET DAILY      Last office visit with prescribing clinician: 3/27/2023   Next office visit with prescribing clinician: 7/10/2023

## 2023-05-08 ENCOUNTER — APPOINTMENT (OUTPATIENT)
Dept: WOMENS IMAGING | Facility: HOSPITAL | Age: 88
End: 2023-05-08
Payer: MEDICARE

## 2023-05-08 PROCEDURE — 77067 SCR MAMMO BI INCL CAD: CPT | Performed by: RADIOLOGY

## 2023-05-08 PROCEDURE — 77063 BREAST TOMOSYNTHESIS BI: CPT | Performed by: RADIOLOGY

## 2023-07-10 PROBLEM — I50.32 CHRONIC DIASTOLIC CONGESTIVE HEART FAILURE: Status: ACTIVE | Noted: 2023-03-18

## 2023-09-13 ENCOUNTER — TELEPHONE (OUTPATIENT)
Dept: FAMILY MEDICINE CLINIC | Facility: CLINIC | Age: 88
End: 2023-09-13

## 2023-09-13 NOTE — TELEPHONE ENCOUNTER
Caller: NEAL HERNANDEZ    Relationship: Emergency Contact    Best call back number: [phone]     What is the best time to reach you: ANY    Who are you requesting to speak with (clinical staff, provider,  specific staff member): CLINICAL STAFF    Do you know the name of the person who called: NEAL HUFF     What was the call regarding: PATIENT DAUGHTER NEAL CALLED STATED PATIENT HAS BEEN EXTREMELY REALLY TIRED, SLEEP A LOT.  PATIENT TAKING ALL HER MEDICATIONS AND WATER PILL WHICH SHE IS NOT GAINING WEIGHT OR SWELLED.  SHE HAS CONGESTIVE HEART FAILURE WAS IN THE HOSPITAL COUPLE MONTHS AGO.  JUST VERY CONCERNED AND WOULD LIKE SPEAK WITH DR KHAN ABOUT HER.  JUST FOUND OUT HER ONE KIDNEY IS ONLY WORKING  20%.      Is it okay if the provider responds through MyChart:

## 2023-09-27 ENCOUNTER — TELEPHONE (OUTPATIENT)
Dept: FAMILY MEDICINE CLINIC | Facility: CLINIC | Age: 88
End: 2023-09-27
Payer: MEDICARE

## 2023-09-27 DIAGNOSIS — I10 ESSENTIAL HYPERTENSION: ICD-10-CM

## 2023-09-27 DIAGNOSIS — E78.00 HYPERCHOLESTEROLEMIA: ICD-10-CM

## 2023-09-27 DIAGNOSIS — M81.6 LOCALIZED OSTEOPOROSIS WITHOUT CURRENT PATHOLOGICAL FRACTURE: Primary | ICD-10-CM

## 2023-09-27 NOTE — TELEPHONE ENCOUNTER
Bone density schedule for pt on 01/05/2024 at 2:00pm in [facility] [address] [place] [address] . I try to call pt no one answer I L VM

## 2023-09-27 NOTE — TELEPHONE ENCOUNTER
----- Message from Ken Andujar MD sent at 9/27/2023 12:50 PM EDT -----  Regarding: FW: DOS 10/9/2023- PROLIA   Call patient and schedule her for a bone density exam. Thanks, Dr. Andujar    ----- Message -----  From: Jessy De La Rosa  Sent: 9/27/2023  12:03 PM EDT  To: Ken Andujar MD  Subject: DOS 10/9/2023- PROLIA                       Dr. Andujar,     Per medicare guidelines a Dexa scan must be performed every 24 months prior to receiving Prolia.    Ms. Hummel Dexa scan was overdue on  7/29/2021.    If patient received Dexa at another facility outside of [facility], [place], please have your staff fax the current Dexa scan to [phone]; Attn: Jessy De La Rosa.    Date of service 10/9/2023 prolia injection has been approved as a courtesy.     Please enter an order for your patient to be scheduled for a Dexa scan upon the rendering facilities earliest convenience to prevent further delay of future prolia appointments.    Thanks!        Jessy De La Rosa  [facility]  [facility]  [phone] Office  [phone] Fax    [email or web address]

## 2023-09-28 ENCOUNTER — TELEPHONE (OUTPATIENT)
Dept: FAMILY MEDICINE CLINIC | Facility: CLINIC | Age: 88
End: 2023-09-28

## 2023-09-28 LAB
ALBUMIN SERPL-MCNC: 4.8 G/DL (ref 3.5–5.2)
ALBUMIN/GLOB SERPL: 2.2 G/DL
ALP SERPL-CCNC: 40 U/L (ref 39–117)
ALT SERPL-CCNC: 8 U/L (ref 1–33)
AST SERPL-CCNC: 17 U/L (ref 1–32)
BASOPHILS # BLD AUTO: 0.03 10*3/MM3 (ref 0–0.2)
BASOPHILS NFR BLD AUTO: 0.4 % (ref 0–1.5)
BILIRUB SERPL-MCNC: 0.7 MG/DL (ref 0–1.2)
BUN SERPL-MCNC: 24 MG/DL (ref 8–23)
BUN/CREAT SERPL: 11.7 (ref 7–25)
CALCIUM SERPL-MCNC: 9.9 MG/DL (ref 8.6–10.5)
CHLORIDE SERPL-SCNC: 104 MMOL/L (ref 98–107)
CHOLEST SERPL-MCNC: 180 MG/DL (ref 0–200)
CK SERPL-CCNC: 52 U/L (ref 20–180)
CO2 SERPL-SCNC: 25.9 MMOL/L (ref 22–29)
CREAT SERPL-MCNC: 2.05 MG/DL (ref 0.57–1)
EGFRCR SERPLBLD CKD-EPI 2021: 23.4 ML/MIN/1.73
EOSINOPHIL # BLD AUTO: 0.24 10*3/MM3 (ref 0–0.4)
EOSINOPHIL NFR BLD AUTO: 3.6 % (ref 0.3–6.2)
ERYTHROCYTE [DISTWIDTH] IN BLOOD BY AUTOMATED COUNT: 13 % (ref 12.3–15.4)
GLOBULIN SER CALC-MCNC: 2.2 GM/DL
GLUCOSE SERPL-MCNC: 99 MG/DL (ref 65–99)
HCT VFR BLD AUTO: 32.1 % (ref 34–46.6)
HDLC SERPL-MCNC: 59 MG/DL (ref 40–60)
HGB BLD-MCNC: 10.6 G/DL (ref 12–15.9)
IMM GRANULOCYTES # BLD AUTO: 0.03 10*3/MM3 (ref 0–0.05)
IMM GRANULOCYTES NFR BLD AUTO: 0.4 % (ref 0–0.5)
LDLC SERPL CALC-MCNC: 98 MG/DL (ref 0–100)
LYMPHOCYTES # BLD AUTO: 1.23 10*3/MM3 (ref 0.7–3.1)
LYMPHOCYTES NFR BLD AUTO: 18.4 % (ref 19.6–45.3)
MCH RBC QN AUTO: 30.3 PG (ref 26.6–33)
MCHC RBC AUTO-ENTMCNC: 33 G/DL (ref 31.5–35.7)
MCV RBC AUTO: 91.7 FL (ref 79–97)
MONOCYTES # BLD AUTO: 0.49 10*3/MM3 (ref 0.1–0.9)
MONOCYTES NFR BLD AUTO: 7.3 % (ref 5–12)
NEUTROPHILS # BLD AUTO: 4.65 10*3/MM3 (ref 1.7–7)
NEUTROPHILS NFR BLD AUTO: 69.9 % (ref 42.7–76)
NRBC BLD AUTO-RTO: 0 /100 WBC (ref 0–0.2)
PLATELET # BLD AUTO: 177 10*3/MM3 (ref 140–450)
POTASSIUM SERPL-SCNC: 5.1 MMOL/L (ref 3.5–5.2)
PROT SERPL-MCNC: 7 G/DL (ref 6–8.5)
RBC # BLD AUTO: 3.5 10*6/MM3 (ref 3.77–5.28)
SODIUM SERPL-SCNC: 141 MMOL/L (ref 136–145)
TRIGL SERPL-MCNC: 134 MG/DL (ref 0–150)
TSH SERPL DL<=0.005 MIU/L-ACNC: 2.05 UIU/ML (ref 0.27–4.2)
VLDLC SERPL CALC-MCNC: 23 MG/DL (ref 5–40)
WBC # BLD AUTO: 6.67 10*3/MM3 (ref 3.4–10.8)

## 2023-09-28 NOTE — PROGRESS NOTES
Please give the patient the following message:  Marleni, your labs show ongoing abnormalities of anemia and kidney dysfunction.  Please share these lab results with your kidney specialist.  Plan on keeping our next appointment in January as scheduled.  No changes to your current medical regimen are necessary at this time.  Sincerely Dr. Andujar

## 2023-09-28 NOTE — TELEPHONE ENCOUNTER
Caller: NEAL HERNANDEZ    Relationship: Emergency Contact    Best call back number: [phone]     Who are you requesting to speak with (clinical staff, provider,  specific staff member): MD BHARATI KHAN     What was the call regarding: PATIENTS DAUGHTER STATES THAT SHE WAS SEEN YESTERDAY BY HER NEUROLOGISTS AND HAD FOUND OUT THAT SHE IS ANEMIC. HER NUMBERS WERE REALLY LOW AND IF THEY GET ANY LOWER, SHE WOULD POSSIBLY NEED A BLOOD TRANSFUSION. JUST NEEDING TO KNOW WHAT NEEDS TO BE DONE FROM HERE

## 2023-09-29 ENCOUNTER — TELEPHONE (OUTPATIENT)
Dept: FAMILY MEDICINE CLINIC | Facility: CLINIC | Age: 88
End: 2023-09-29

## 2023-09-29 NOTE — TELEPHONE ENCOUNTER
I talked with pt daughter and she ask to fax her mom lab result to [phone] (nephrologist) so I fax it to them

## 2023-09-29 NOTE — TELEPHONE ENCOUNTER
PATIENT DAUGHTER CALLED TO REQUEST THAT THE PATIENT'S RECENT BLOOD WORK BE SENT TO DR. KENNEY SCHULZ, NEPHROLOGIST.    DR. SCHULZ  [address] [place] [address]  PHONE: [phone]  UNABLE TO FIND FAX NUMBER    SEND FAX WITH ATTN: NESTOR HUIZAR    PLEASE ADVISE WHEN COMPLETED  [phone]

## 2023-10-04 DIAGNOSIS — M81.0 SENILE OSTEOPOROSIS: Primary | ICD-10-CM

## 2023-10-04 NOTE — TELEPHONE ENCOUNTER
Lab results faxed to    PATIENT DAUGHTER CALLED TO REQUEST THAT THE PATIENT'S RECENT BLOOD WORK BE SENT TO DR. KENNEY SCHULZ, NEPHROLOGIST.     DR. SCHULZ  [address] [address], [place] [address]  PHONE: [phone]  UNABLE TO FIND FAX NUMBER     SEND FAX WITH ATTN: NESTOR HUIZAR   Pt told / message also sent through my chart-

## 2023-10-06 ENCOUNTER — DOCUMENTATION (OUTPATIENT)
Dept: ONCOLOGY | Facility: HOSPITAL | Age: 88
End: 2023-10-06
Payer: MEDICARE

## 2023-10-09 ENCOUNTER — INFUSION (OUTPATIENT)
Dept: ONCOLOGY | Facility: HOSPITAL | Age: 88
End: 2023-10-09
Payer: MEDICARE

## 2023-10-09 VITALS — TEMPERATURE: 97.8 F | HEIGHT: 62 IN | RESPIRATION RATE: 15 BRPM | BODY MASS INDEX: 24.33 KG/M2

## 2023-10-09 DIAGNOSIS — M81.0 SENILE OSTEOPOROSIS: Primary | ICD-10-CM

## 2023-10-09 PROCEDURE — 96372 THER/PROPH/DIAG INJ SC/IM: CPT

## 2023-10-09 PROCEDURE — 25010000002 DENOSUMAB 60 MG/ML SOLUTION PREFILLED SYRINGE: Performed by: FAMILY MEDICINE

## 2023-10-09 RX ADMIN — DENOSUMAB 60 MG: 60 INJECTION SUBCUTANEOUS at 09:28

## 2023-10-09 NOTE — NURSING NOTE
Arrived for prolia injection. Indication and side effects reviewed. Denies recent dental work. Labs and medications verified. Prolia administered in right arm without incidence. Instructed to call prescribing MD for any concerns or questions and instructed on how to schedule future appts.  Pt verbalized and discharged in stable condition.

## 2023-10-24 ENCOUNTER — APPOINTMENT (OUTPATIENT)
Dept: GENERAL RADIOLOGY | Facility: HOSPITAL | Age: 88
End: 2023-10-24
Payer: MEDICARE

## 2023-10-24 ENCOUNTER — HOSPITAL ENCOUNTER (OUTPATIENT)
Facility: HOSPITAL | Age: 88
Setting detail: OBSERVATION
Discharge: HOME | End: 2023-10-25
Attending: EMERGENCY MEDICINE | Admitting: EMERGENCY MEDICINE
Payer: MEDICARE

## 2023-10-24 ENCOUNTER — APPOINTMENT (OUTPATIENT)
Dept: CT IMAGING | Facility: HOSPITAL | Age: 88
End: 2023-10-24
Payer: MEDICARE

## 2023-10-24 ENCOUNTER — APPOINTMENT (OUTPATIENT)
Dept: MRI IMAGING | Facility: HOSPITAL | Age: 88
End: 2023-10-24
Payer: MEDICARE

## 2023-10-24 DIAGNOSIS — G45.9 TIA (TRANSIENT ISCHEMIC ATTACK): Primary | ICD-10-CM

## 2023-10-24 DIAGNOSIS — G47.39 OTHER SLEEP APNEA: ICD-10-CM

## 2023-10-24 LAB
ABO GROUP BLD: NORMAL
ALBUMIN SERPL-MCNC: 4.2 G/DL (ref 3.5–5.2)
ALBUMIN/GLOB SERPL: 2 G/DL
ALP SERPL-CCNC: 35 U/L (ref 39–117)
ALT SERPL W P-5'-P-CCNC: 7 U/L (ref 1–33)
ANION GAP SERPL CALCULATED.3IONS-SCNC: 11 MMOL/L (ref 5–15)
APTT PPP: 25.5 SECONDS (ref 22.7–35.4)
AST SERPL-CCNC: 19 U/L (ref 1–32)
BASOPHILS # BLD AUTO: 0.03 10*3/MM3 (ref 0–0.2)
BASOPHILS NFR BLD AUTO: 0.5 % (ref 0–1.5)
BILIRUB SERPL-MCNC: 0.9 MG/DL (ref 0–1.2)
BLD GP AB SCN SERPL QL: NEGATIVE
BUN SERPL-MCNC: 42 MG/DL (ref 8–23)
BUN/CREAT SERPL: 20.9 (ref 7–25)
CALCIUM SPEC-SCNC: 9.6 MG/DL (ref 8.6–10.5)
CHLORIDE SERPL-SCNC: 104 MMOL/L (ref 98–107)
CHOLEST SERPL-MCNC: 172 MG/DL (ref 0–200)
CO2 SERPL-SCNC: 22 MMOL/L (ref 22–29)
CREAT SERPL-MCNC: 2.01 MG/DL (ref 0.57–1)
DEPRECATED RDW RBC AUTO: 43.2 FL (ref 37–54)
EGFRCR SERPLBLD CKD-EPI 2021: 23.9 ML/MIN/1.73
EOSINOPHIL # BLD AUTO: 0.14 10*3/MM3 (ref 0–0.4)
EOSINOPHIL NFR BLD AUTO: 2.2 % (ref 0.3–6.2)
ERYTHROCYTE [DISTWIDTH] IN BLOOD BY AUTOMATED COUNT: 13 % (ref 12.3–15.4)
GLOBULIN UR ELPH-MCNC: 2.1 GM/DL
GLUCOSE BLDC GLUCOMTR-MCNC: 253 MG/DL (ref 70–130)
GLUCOSE BLDC GLUCOMTR-MCNC: 90 MG/DL (ref 70–130)
GLUCOSE BLDC GLUCOMTR-MCNC: 98 MG/DL (ref 70–130)
GLUCOSE SERPL-MCNC: 82 MG/DL (ref 65–99)
HBA1C MFR BLD: 5.5 % (ref 4.8–5.6)
HCT VFR BLD AUTO: 32.3 % (ref 34–46.6)
HDLC SERPL-MCNC: 50 MG/DL (ref 40–60)
HGB BLD-MCNC: 10.7 G/DL (ref 12–15.9)
HOLD SPECIMEN: NORMAL
HOLD SPECIMEN: NORMAL
IMM GRANULOCYTES # BLD AUTO: 0.02 10*3/MM3 (ref 0–0.05)
IMM GRANULOCYTES NFR BLD AUTO: 0.3 % (ref 0–0.5)
INR PPP: 1.08 (ref 0.9–1.1)
LDLC SERPL CALC-MCNC: 96 MG/DL (ref 0–100)
LDLC/HDLC SERPL: 1.84 {RATIO}
LYMPHOCYTES # BLD AUTO: 1.41 10*3/MM3 (ref 0.7–3.1)
LYMPHOCYTES NFR BLD AUTO: 21.7 % (ref 19.6–45.3)
MCH RBC QN AUTO: 30.2 PG (ref 26.6–33)
MCHC RBC AUTO-ENTMCNC: 33.1 G/DL (ref 31.5–35.7)
MCV RBC AUTO: 91.2 FL (ref 79–97)
MONOCYTES # BLD AUTO: 0.41 10*3/MM3 (ref 0.1–0.9)
MONOCYTES NFR BLD AUTO: 6.3 % (ref 5–12)
NEUTROPHILS NFR BLD AUTO: 4.5 10*3/MM3 (ref 1.7–7)
NEUTROPHILS NFR BLD AUTO: 69 % (ref 42.7–76)
NRBC BLD AUTO-RTO: 0 /100 WBC (ref 0–0.2)
PLATELET # BLD AUTO: 191 10*3/MM3 (ref 140–450)
PMV BLD AUTO: 10 FL (ref 6–12)
POTASSIUM SERPL-SCNC: 5.8 MMOL/L (ref 3.5–5.2)
POTASSIUM SERPL-SCNC: 5.9 MMOL/L (ref 3.5–5.2)
PROT SERPL-MCNC: 6.3 G/DL (ref 6–8.5)
PROTHROMBIN TIME: 14.1 SECONDS (ref 11.7–14.2)
QT INTERVAL: 422 MS
QTC INTERVAL: 462 MS
RBC # BLD AUTO: 3.54 10*6/MM3 (ref 3.77–5.28)
RH BLD: POSITIVE
SARS-COV-2 RNA NPH QL NAA+NON-PROBE: NOT DETECTED
SODIUM SERPL-SCNC: 137 MMOL/L (ref 136–145)
T&S EXPIRATION DATE: NORMAL
TRIGL SERPL-MCNC: 149 MG/DL (ref 0–150)
TROPONIN T SERPL HS-MCNC: 33 NG/L
VLDLC SERPL-MCNC: 26 MG/DL (ref 5–40)
WBC NRBC COR # BLD: 6.51 10*3/MM3 (ref 3.4–10.8)
WHOLE BLOOD HOLD COAG: NORMAL
WHOLE BLOOD HOLD SPECIMEN: NORMAL

## 2023-10-24 PROCEDURE — 70450 CT HEAD/BRAIN W/O DYE: CPT

## 2023-10-24 PROCEDURE — 80061 LIPID PANEL: CPT | Performed by: NURSE PRACTITIONER

## 2023-10-24 PROCEDURE — 83036 HEMOGLOBIN GLYCOSYLATED A1C: CPT | Performed by: NURSE PRACTITIONER

## 2023-10-24 PROCEDURE — 99285 EMERGENCY DEPT VISIT HI MDM: CPT

## 2023-10-24 PROCEDURE — 84484 ASSAY OF TROPONIN QUANT: CPT | Performed by: EMERGENCY MEDICINE

## 2023-10-24 PROCEDURE — 70547 MR ANGIOGRAPHY NECK W/O DYE: CPT

## 2023-10-24 PROCEDURE — 85610 PROTHROMBIN TIME: CPT | Performed by: EMERGENCY MEDICINE

## 2023-10-24 PROCEDURE — 70551 MRI BRAIN STEM W/O DYE: CPT

## 2023-10-24 PROCEDURE — 70544 MR ANGIOGRAPHY HEAD W/O DYE: CPT

## 2023-10-24 PROCEDURE — 99291 CRITICAL CARE FIRST HOUR: CPT | Performed by: PSYCHIATRY & NEUROLOGY

## 2023-10-24 PROCEDURE — 94640 AIRWAY INHALATION TREATMENT: CPT

## 2023-10-24 PROCEDURE — 63710000001 INSULIN REGULAR HUMAN PER 5 UNITS: Performed by: NURSE PRACTITIONER

## 2023-10-24 PROCEDURE — 86850 RBC ANTIBODY SCREEN: CPT | Performed by: EMERGENCY MEDICINE

## 2023-10-24 PROCEDURE — 87635 SARS-COV-2 COVID-19 AMP PRB: CPT

## 2023-10-24 PROCEDURE — 82948 REAGENT STRIP/BLOOD GLUCOSE: CPT

## 2023-10-24 PROCEDURE — 86900 BLOOD TYPING SEROLOGIC ABO: CPT | Performed by: EMERGENCY MEDICINE

## 2023-10-24 PROCEDURE — 93005 ELECTROCARDIOGRAM TRACING: CPT | Performed by: EMERGENCY MEDICINE

## 2023-10-24 PROCEDURE — 94664 DEMO&/EVAL PT USE INHALER: CPT

## 2023-10-24 PROCEDURE — G0378 HOSPITAL OBSERVATION PER HR: HCPCS

## 2023-10-24 PROCEDURE — 93010 ELECTROCARDIOGRAM REPORT: CPT | Performed by: INTERNAL MEDICINE

## 2023-10-24 PROCEDURE — 80053 COMPREHEN METABOLIC PANEL: CPT | Performed by: EMERGENCY MEDICINE

## 2023-10-24 PROCEDURE — 86901 BLOOD TYPING SEROLOGIC RH(D): CPT | Performed by: EMERGENCY MEDICINE

## 2023-10-24 PROCEDURE — 85025 COMPLETE CBC W/AUTO DIFF WBC: CPT | Performed by: EMERGENCY MEDICINE

## 2023-10-24 PROCEDURE — 96374 THER/PROPH/DIAG INJ IV PUSH: CPT

## 2023-10-24 PROCEDURE — 85730 THROMBOPLASTIN TIME PARTIAL: CPT | Performed by: EMERGENCY MEDICINE

## 2023-10-24 PROCEDURE — 82565 ASSAY OF CREATININE: CPT

## 2023-10-24 PROCEDURE — 84132 ASSAY OF SERUM POTASSIUM: CPT | Performed by: EMERGENCY MEDICINE

## 2023-10-24 PROCEDURE — 71045 X-RAY EXAM CHEST 1 VIEW: CPT

## 2023-10-24 PROCEDURE — 94799 UNLISTED PULMONARY SVC/PX: CPT

## 2023-10-24 PROCEDURE — 25010000002 CALCIUM GLUCONATE-NACL 1-0.675 GM/50ML-% SOLUTION: Performed by: NURSE PRACTITIONER

## 2023-10-24 RX ORDER — SODIUM CHLORIDE 0.9 % (FLUSH) 0.9 %
10 SYRINGE (ML) INJECTION AS NEEDED
Status: DISCONTINUED | OUTPATIENT
Start: 2023-10-24 | End: 2023-10-25 | Stop reason: HOSPADM

## 2023-10-24 RX ORDER — ONDANSETRON 2 MG/ML
4 INJECTION INTRAMUSCULAR; INTRAVENOUS EVERY 6 HOURS PRN
Status: DISCONTINUED | OUTPATIENT
Start: 2023-10-24 | End: 2023-10-25 | Stop reason: HOSPADM

## 2023-10-24 RX ORDER — SODIUM CHLORIDE 9 MG/ML
40 INJECTION, SOLUTION INTRAVENOUS AS NEEDED
Status: DISCONTINUED | OUTPATIENT
Start: 2023-10-24 | End: 2023-10-25 | Stop reason: HOSPADM

## 2023-10-24 RX ORDER — ASPIRIN 325 MG
325 TABLET ORAL DAILY
Status: DISCONTINUED | OUTPATIENT
Start: 2023-10-24 | End: 2023-10-25

## 2023-10-24 RX ORDER — METOPROLOL SUCCINATE 25 MG/1
25 TABLET, EXTENDED RELEASE ORAL DAILY
Status: DISCONTINUED | OUTPATIENT
Start: 2023-10-24 | End: 2023-10-25 | Stop reason: HOSPADM

## 2023-10-24 RX ORDER — SODIUM CHLORIDE 0.9 % (FLUSH) 0.9 %
10 SYRINGE (ML) INJECTION EVERY 12 HOURS SCHEDULED
Status: DISCONTINUED | OUTPATIENT
Start: 2023-10-24 | End: 2023-10-25 | Stop reason: HOSPADM

## 2023-10-24 RX ORDER — ATORVASTATIN CALCIUM 20 MG/1
40 TABLET, FILM COATED ORAL NIGHTLY
Status: DISCONTINUED | OUTPATIENT
Start: 2023-10-24 | End: 2023-10-25 | Stop reason: HOSPADM

## 2023-10-24 RX ORDER — ACETAMINOPHEN 500 MG
500 TABLET ORAL EVERY 4 HOURS PRN
Status: DISCONTINUED | OUTPATIENT
Start: 2023-10-24 | End: 2023-10-25 | Stop reason: HOSPADM

## 2023-10-24 RX ORDER — DEXTROSE MONOHYDRATE 25 G/50ML
25 INJECTION, SOLUTION INTRAVENOUS ONCE
Status: COMPLETED | OUTPATIENT
Start: 2023-10-24 | End: 2023-10-24

## 2023-10-24 RX ORDER — ACETAMINOPHEN 500 MG
500 TABLET ORAL EVERY 4 HOURS PRN
COMMUNITY

## 2023-10-24 RX ORDER — OXYBUTYNIN CHLORIDE 10 MG/1
10 TABLET, EXTENDED RELEASE ORAL DAILY
Status: DISCONTINUED | OUTPATIENT
Start: 2023-10-24 | End: 2023-10-25 | Stop reason: HOSPADM

## 2023-10-24 RX ORDER — ASPIRIN 300 MG/1
300 SUPPOSITORY RECTAL DAILY
Status: DISCONTINUED | OUTPATIENT
Start: 2023-10-24 | End: 2023-10-25

## 2023-10-24 RX ORDER — CALCIUM GLUCONATE 20 MG/ML
1000 INJECTION, SOLUTION INTRAVENOUS ONCE
Status: COMPLETED | OUTPATIENT
Start: 2023-10-24 | End: 2023-10-24

## 2023-10-24 RX ORDER — FUROSEMIDE 20 MG/1
20 TABLET ORAL DAILY PRN
Status: DISCONTINUED | OUTPATIENT
Start: 2023-10-24 | End: 2023-10-25 | Stop reason: HOSPADM

## 2023-10-24 RX ORDER — ESCITALOPRAM OXALATE 20 MG/1
20 TABLET ORAL DAILY
Status: DISCONTINUED | OUTPATIENT
Start: 2023-10-24 | End: 2023-10-25 | Stop reason: HOSPADM

## 2023-10-24 RX ADMIN — SODIUM ZIRCONIUM CYCLOSILICATE 10 G: 10 POWDER, FOR SUSPENSION ORAL at 19:22

## 2023-10-24 RX ADMIN — DEXTROSE MONOHYDRATE 25 G: 25 INJECTION, SOLUTION INTRAVENOUS at 20:01

## 2023-10-24 RX ADMIN — OXYBUTYNIN CHLORIDE 10 MG: 10 TABLET, EXTENDED RELEASE ORAL at 19:22

## 2023-10-24 RX ADMIN — Medication 10 ML: at 20:04

## 2023-10-24 RX ADMIN — APIXABAN 2.5 MG: 2.5 TABLET, FILM COATED ORAL at 20:06

## 2023-10-24 RX ADMIN — ALBUTEROL SULFATE 10 MG: 2.5 SOLUTION RESPIRATORY (INHALATION) at 19:03

## 2023-10-24 RX ADMIN — ATORVASTATIN CALCIUM 40 MG: 20 TABLET, FILM COATED ORAL at 20:06

## 2023-10-24 RX ADMIN — ESCITALOPRAM 20 MG: 20 TABLET, FILM COATED ORAL at 19:22

## 2023-10-24 RX ADMIN — CALCIUM GLUCONATE 1000 MG: 20 INJECTION, SOLUTION INTRAVENOUS at 19:32

## 2023-10-24 RX ADMIN — METOPROLOL SUCCINATE 25 MG: 25 TABLET, EXTENDED RELEASE ORAL at 18:25

## 2023-10-24 RX ADMIN — ASPIRIN 325 MG: 325 TABLET ORAL at 16:54

## 2023-10-24 RX ADMIN — ACETAMINOPHEN 500 MG: 500 TABLET, FILM COATED ORAL at 23:55

## 2023-10-24 RX ADMIN — INSULIN HUMAN 5 UNITS: 100 INJECTION, SOLUTION PARENTERAL at 20:13

## 2023-10-24 NOTE — ED PROVIDER NOTES
EMERGENCY DEPARTMENT ENCOUNTER    Room Number:  23/23  PCP: Ken Andujar MD      HPI:  Chief Complaint: Strokelike symptoms  A complete HPI/ROS/PMH/PSH/SH/FH are unobtainable due to: None  Context: Marleni Hummel is a 87 y.o. female who presents to the ED c/o strokelike symptoms.  Last seen well at 11 AM.  She is on Eliquis.  She developed right-sided facial droop, right-sided weakness and slurred speech.  This was noticed by family as well by EMS on arrival.  She has improved during EMS transport.  Glucose 106.          PAST MEDICAL HISTORY  Active Ambulatory Problems     Diagnosis Date Noted    Arthritis involving multiple sites     Essential hypertension     Permanent atrial fibrillation     History of Bell's palsy     CKD (chronic kidney disease) stage 4, GFR 15-29 ml/min     Gastroesophageal reflux disease without esophagitis     Hypercholesterolemia     Insomnia     Localized osteoporosis without current pathological fracture     Panic disorder     Chronic nonseasonal allergic rhinitis due to pollen     Other sleep apnea     History of MI (myocardial infarction) 12/21/2015    Chronic coronary artery disease 01/25/2016    Anemia of chronic disease 09/12/2016    Weakness of right leg 03/13/2017    Cardiac murmur 05/04/2017    Senile osteoporosis 06/30/2017    Hyperuricemia 09/07/2017    Grief reaction 09/30/2019    Peripheral vertigo 02/25/2020    Renal cell carcinoma of left kidney 11/22/2020    Renal oncocytoma of left kidney 12/28/2020    History of left nephrectomy 04/19/2021    Cerebrovascular accident (CVA) due to stenosis of small artery 11/29/2021    History of renal cell carcinoma 11/30/2021    TIA (transient ischemic attack) 11/30/2021    Spinal stenosis, lumbar region with neurogenic claudication 12/02/2021    Malignant neoplasm of left kidney, except renal pelvis 06/07/2022    Diverticulosis 12/14/2022    Irritable bowel syndrome with constipation 12/14/2022    Chronic diastolic congestive heart  failure 03/18/2023     Resolved Ambulatory Problems     Diagnosis Date Noted    Fatigue     Hip arthritis     IFG (impaired fasting glucose)     Rectal bleeding     Vitamin D deficiency     Urinary incontinence     History of right hip replacement 12/21/2015    Closed fracture of neck of right femur with routine healing 12/21/2015    History of right knee joint replacement 12/21/2015    History of left knee replacement 12/21/2015    Stress-induced cardiomyopathy 01/25/2016    Elevated serum free T4 level 03/21/2016    Urinary tract infection 10/05/2016    Acute pyelonephritis 10/06/2016    Acute cystitis 12/03/2016    Hematuria 12/12/2016    Sinusitis 01/22/2017    Frequent falls 03/13/2017    Atrial fibrillation with RVR 06/19/2017    ANGY (acute kidney injury) 06/20/2017    Viral gastroenteritis 06/20/2017    Dehydration 06/20/2017    Nausea & vomiting 06/20/2017    Diarrhea 06/20/2017    Closed displaced fracture of left femoral neck 08/30/2017    Bacteriuria 08/30/2017    New daily persistent headache 12/17/2018    Muscle tension headache 04/03/2019    Caregiver stress syndrome 04/17/2019    Acute vulvitis 08/21/2019    Dizziness 02/23/2020    Left facial numbness 02/23/2020    Stroke-like symptoms 02/23/2020    Left kidney mass 08/17/2020    Left renal mass 11/16/2020    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    Acute kidney injury 05/30/2022    Acute bronchitis due to Rhinovirus 05/31/2022    Hyperkalemia 05/31/2022    Diverticulitis 12/14/2022    CARROLL (dyspnea on exertion) 03/17/2023     Past Medical History:   Diagnosis Date    Allergic     Allergic rhinitis     Arthropathy of knee     Atrial fibrillation     Back pain     Bell's palsy     Chronic kidney disease (CKD), stage III (moderate)     Edema     Encounter for eye exam 09/2020    GERD (gastroesophageal reflux disease)     H/O Heart murmur     Heart attack 10/05/2015    Herpes zoster without complication     History of bone density study 02/28/2008    History of pelvic  fracture     History of pneumonia     History of transfusion     Limited joint range of motion     Mixed hyperlipidemia     Osteoarthritis     Osteoporosis     PONV (postoperative nausea and vomiting)     Sleep apnea     Stress          PAST SURGICAL HISTORY  Past Surgical History:   Procedure Laterality Date    CATARACT EXTRACTION Left 2013    Dr. Clarke    CATARACT EXTRACTION Right 2013    Dr. Clarke    COLONOSCOPY  2014    Dr. Elizabeth; no polyps    EPIDURAL BLOCK      HIP PERCUTANEOUS PINNING Left 2017    Procedure: LT HIP PERCUTANEOUS PINNING;  Surgeon: Sean Canales MD;  Location: [facility] OR;  Service:     MAMMO BILATERAL  2013    NEPHRECTOMY Left 2020    Procedure: LAPAROSCOPIC NEPHRECTOMY;  Surgeon: Chuckie Mclaughlin MD;  Location: [facility] OR;  Service: Urology;  Laterality: Left;    PAP SMEAR  2011    TIBIA FRACTURE SURGERY Right     REMOVED PLATE LATER IN     TONSILLECTOMY  194    TOTAL HIP ARTHROPLASTY Right 2015    TOTAL HIP ARTHROPLASTY Right 2016    TOTAL KNEE ARTHROPLASTY Bilateral 2004         FAMILY HISTORY  Family History   Problem Relation Age of Onset    Hypertension Other     Hypertension Mother     Stroke Mother     Heart disease Mother     Hypertension Maternal Grandmother     Malig Hyperthermia Neg Hx          SOCIAL HISTORY  Social History     Socioeconomic History    Marital status:     Years of education: High school   Tobacco Use    Smoking status: Former     Packs/day: 1.00     Years: 3.00     Additional pack years: 0.00     Total pack years: 3.00     Types: Cigarettes     Quit date: 1956     Years since quittin.7    Smokeless tobacco: Never    Tobacco comments:     caffeine - 1/2 cup coffee daily    Vaping Use    Vaping Use: Never used   Substance and Sexual Activity    Alcohol use: Not Currently     Alcohol/week: 3.0 standard drinks of alcohol     Types: 3 Glasses of wine per week     Comment:  couple times a week    Drug use: Never    Sexual activity: Defer         ALLERGIES  Morphine, Oxycodone, Ace inhibitors, Bactrim [sulfamethoxazole-trimethoprim], Levofloxacin, and Torsemide        REVIEW OF SYSTEMS  Review of Systems     All systems reviewed and negative except for those discussed in HPI.       PHYSICAL EXAM  ED Triage Vitals   Temp Pulse Resp BP SpO2   -- -- -- -- --      Temp src Heart Rate Source Patient Position BP Location FiO2 (%)   -- -- -- -- --       Physical Exam      GENERAL: no acute distress  HENT: nares patent  EYES: no scleral icterus  CV: regular rhythm, normal rate  RESPIRATORY: normal effort  ABDOMEN: soft  MUSCULOSKELETAL: no deformity  NEURO:   Patient has subtle right-sided facial droop, subtle word finding difficulty and mild dysarthria at this time.  No weakness in her extremities.  NIH Stroke Scale      Interval: Baseline  Time: 14:13 EDT  Person Administering Scale: Eddie Jacob II, MD    Administer stroke scale items in the order listed. Record performance in each category after each subscale exam. Do not go back and change scores. Follow directions provided for each exam technique. Scores should reflect what the patient does, not what the clinician thinks the patient can do. The clinician should record answers while administering the exam and work quickly. Except where indicated, the patient should not be coached (i.e., repeated requests to patient to make a special effort).      1a  Level of consciousness: 0=alert; keenly responsive   1b. LOC questions:  0=Performs both tasks correctly   1c. LOC commands: 0=Answers both questions correctly   2.  Best Gaze: 0=normal   3.  Visual: 0=No visual loss   4. Facial Palsy: 1=Minor paralysis (flattened nasolabial fold, asymmetric on smiling)   5a.  Motor left arm: 0=No drift, limb holds 90 (or 45) degrees for full 10 seconds   5b.  Motor right arm: 0=No drift, limb holds 90 (or 45) degrees for full 10 seconds   6a. motor left  le=No drift, limb holds 90 (or 45) degrees for full 10 seconds   6b  Motor right le=No drift, limb holds 90 (or 45) degrees for full 10 seconds   7. Limb Ataxia: 0=Absent   8.  Sensory: 0=Normal; no sensory loss   9. Best Language:  1=Mild to moderate aphasia; some obvious loss of fluency or facility of comprehension without significant limitation on ideas expressed or form of expression.   10. Dysarthria: 1=Mild to moderate, patient slurs at least some words and at worst, can be understood with some difficulty   11. Extinction and Inattention: 0=No abnormality   12. Distal motor function: 0=Normal    Total:   3     PSYCH:  calm, cooperative  SKIN: warm, dry    Vital signs and nursing notes reviewed.          LAB RESULTS  No results found for this or any previous visit (from the past 24 hour(s)).    Ordered the above labs and reviewed the results.        RADIOLOGY  No Radiology Exams Resulted Within Past 24 Hours    Ordered the above noted radiological studies. Reviewed by me in PACS.          PROCEDURES  Critical Care    Performed by: Eddie Jacob II, MD  Authorized by: Eddie Jacob II, MD    Critical care provider statement:     Critical care time (minutes):  35    Critical care was necessary to treat or prevent imminent or life-threatening deterioration of the following conditions:  CNS failure or compromise    Critical care was time spent personally by me on the following activities:  Ordering and performing treatments and interventions, ordering and review of laboratory studies, ordering and review of radiographic studies, pulse oximetry, re-evaluation of patient's condition, discussions with consultants, evaluation of patient's response to treatment, examination of patient, development of treatment plan with patient or surrogate and obtaining history from patient or surrogate          MEDICATIONS GIVEN IN ER  Medications   sodium chloride 0.9 % flush 10 mL (has no administration in  time range)         MEDICAL DECISION MAKING, PROGRESS, and CONSULTS    Discussion below represents my analysis of pertinent findings related to patient's condition, differential diagnosis, treatment plan and final disposition.      Orders placed during this visit:  Orders Placed This Encounter   Procedures    CT Head Without Contrast Stroke Protocol    CT Angiogram Head w AI Analysis of LVO    CT Angiogram Neck    CT CEREBRAL PERFUSION WITH & WITHOUT CONTRAST    XR Chest 1 View    [place] Draw    Comprehensive Metabolic Panel    Protime-INR    aPTT    Single High Sensitivity Troponin T    CBC Auto Differential    NPO Diet NPO Type: Strict NPO    Initiate Department's Acute Stroke Process (Team D, Code 19, etc.)    Perform NIH Stroke Scale    Measure Actual Weight    Notify MD for SBP < 80 or > 200    Notify Provider for SBP greater than 140 if hemorrhagic stroke    Head of Bed 30 Degrees or Less    Undress and Gown    Continuous Pulse Oximetry    Vital Signs    Neuro Checks    No Hypotonic Fluids    Nursing Dysphagia Screening (Complete Prior to Giving anything PO)    RN to Place Order SLP Consult (IF swallow screen failed) - Eval & Treat Choosing Reason of RN Dysphagia Screen Failed    Inpatient Neurology Consult Stroke    Inpatient Neurology Consult Stroke    Oxygen Therapy- Nasal Cannula; Titrate 1-6 LPM Per SpO2; 90 - 95%    POC Glucose Once    ECG 12 Lead Stroke Evaluation    Type & Screen    Insert Large-Bore Peripheral IV - RIGHT AC Preferred    CBC & Differential    Green Top (Gel)    Lavender Top    Gold Top - SST    Light Blue Top               Differential diagnosis:    Stroke, TIA      Independent interpretation of labs, radiology studies, and discussions with consultants:  ED Course as of 10/24/23 1628   Tue Oct 24, 2023   1417 I discussed the case with Dr. Nowak, stroke neurology.  We will obtain team D imaging. [TD]   1430 Patient is not a candidate for TNK due to being on Eliquis. [TD]   1431 WBC: 6.51  [TD]   1431 Hemoglobin(!): 10.7 [TD]   1523 EKG independently interpreted by myself.  Time 2:59 PM.  Atrial flutter.  Heart rate 72.  LAFB.  Prolonged R wave progression.  No acute ST abnormality. [TD]   1610 Potassium(!): 5.9 [TD]   1610 Creatinine(!): 2.01 [TD]   1618 I discussed the case with ENRRIQUE Ceron with observation medicine.  She will admit.    Patient is back to her baseline per family. [TD]      ED Course User Index  [TD] Eddie Jacob II, MD           DIAGNOSIS  Final diagnoses:   TIA (transient ischemic attack)         DISPOSITION  Admit      Latest Documented Vital Signs:  As of 14:12 EDT  BP-   HR-   Temp-   O2 sat-        --    Please note that portions of this were completed with a voice recognition program.       Note Disclaimer: At [facility], we believe that sharing information builds trust and better relationships. You are receiving this note because you are receiving care at [facility] or recently visited. It is possible you will see health information before a provider has talked with you about it. This kind of information can be easy to misunderstand. To help you fully understand what it means for your health, we urge you to discuss this note with your provider.         Eddie Jacob II, MD  10/24/23 [id number]

## 2023-10-24 NOTE — CONSULTS
NEUROLOGY CONSULT - STROKE TEAM    DOS: 10/24/2023  NAME: Marleni Hummel   : 1935  PCP: Ken Andujar MD  CC: Stroke  Referring MD: No ref. provider found  Patient being seen at request of Dr. Jacob for advice and opinion on slurred speech.    Neurological Problem and Interval History:  87 y.o. female presents with chief complaint of: difficulty speaking. Patient had breakfast yesterday morning but only a teaspoon of soup for lunch (she didn't care for it) and a piece of toast at night. She slept for 11 hours and has had untreated sleep apnea for 10 years. She awoke and got dressed and met with her daughter and they were taking tags off of some clothes they had bought when she kept dropping the scissors. When she tried to come up with the word for scissors, she couldn't get it and she was slurring her words. Daughter called 911. Last November she had a very similar episode while raking leaves. MRI at that time did not show an acute stroke but did show a number of old lacunar infarcts in the internal capsule and basal ganglia bilaterally.       Past Medical/Surgical Hx:  Past Medical History:   Diagnosis Date    Acute bronchitis due to Rhinovirus 2022    Acute vulvitis 2019    Allergic     Allergic rhinitis     Anemia of chronic disease     Arthropathy of knee     right    Atrial fibrillation     Back pain     Bell's palsy     Caregiver stress syndrome 2019    Chronic coronary artery disease     Chronic kidney disease (CKD), stage III (moderate)     Diverticulitis 2022    CARROLL (dyspnea on exertion) 3/17/2023    Edema     Elevated serum free T4 level 2016    Encounter for eye exam 2020    Essential hypertension     Fatigue     GERD (gastroesophageal reflux disease)     H/O Heart murmur     Heart attack 10/05/2015    Hematuria 2016    Herpes zoster without complication     Hip arthritis     left    History of bone density study 2008    History of pelvic fracture  2015    History of pneumonia     History of transfusion     2015    Hypercholesterolemia     IFG (impaired fasting glucose)     Insomnia     Left kidney mass 07/2020    Limited joint range of motion     RIGHT KNEE    Mixed hyperlipidemia     Muscle tension headache 04/03/2019    New daily persistent headache 12/17/2018    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 Clarke    CATARACT EXTRACTION Right 04/16/2013    Dr. Clarke    COLONOSCOPY  04/18/2014    Dr. Elizabeth; no polyps    EPIDURAL BLOCK      HIP PERCUTANEOUS PINNING Left 8/31/2017    Procedure: LT HIP PERCUTANEOUS PINNING;  Surgeon: Sean Canales MD;  Location: [facility];  Service:     MAMMO BILATERAL  11/2013    NEPHRECTOMY Left 11/16/2020    Procedure: LAPAROSCOPIC NEPHRECTOMY;  Surgeon: Chuckie Mclaughlin MD;  Location: [facility];  Service: Urology;  Laterality: Left;    PAP SMEAR  02/2011    TIBIA FRACTURE SURGERY Right 2015    REMOVED PLATE LATER IN 2015    TONSILLECTOMY  1947    TOTAL HIP ARTHROPLASTY Right 08/2015    TOTAL HIP ARTHROPLASTY Right 09/2016    TOTAL KNEE ARTHROPLASTY Bilateral 2004       Review of Systems:        No fever, sweats or chills,  No neck pain, back pain or joint pain  No loss of hearing or tinnitus  No blurry or double vision  No rashes or edema  No chest pain or palpitations  No shortness of breath or wheezing  No diarrhea or constipation  No urinary incontinence or dysuria  No seizures or syncope  No depression or anxiety    Medications On  Admission  (Not in a hospital admission)      Allergies:  Allergies   Allergen Reactions    Morphine Anaphylaxis    Oxycodone Anaphylaxis    Ace Inhibitors Cough and Unknown (See Comments)    Bactrim [Sulfamethoxazole-Trimethoprim] Rash    Levofloxacin Itching and Rash     insomnia  insomnia  insomnia    Torsemide Dizziness       Social Hx:  Social History     Socioeconomic History    Marital status:     Years of education: High school   Tobacco Use    Smoking status: Former     Packs/day: 1.00     Years: 3.00     Additional pack years: 0.00     Total pack years: 3.00     Types: Cigarettes     Quit date: 1956     Years since quittin.7    Smokeless tobacco: Never    Tobacco comments:     caffeine - 1/2 cup coffee daily    Vaping Use    Vaping Use: Never used   Substance and Sexual Activity    Alcohol use: Not Currently     Alcohol/week: 3.0 standard drinks of alcohol     Types: 3 Glasses of wine per week     Comment: couple times a week    Drug use: Never    Sexual activity: Defer       Family Hx:  Family History   Problem Relation Age of Onset    Hypertension Other     Hypertension Mother     Stroke Mother     Heart disease Mother     Hypertension Maternal Grandmother     Malig Hyperthermia Neg Hx        Review of Imaging (Interpretation of images not reports):      Laboratory Results:   Lab Results   Component Value Date    GLUCOSE 82 10/24/2023    CALCIUM 9.6 10/24/2023     10/24/2023    K 5.9 (H) 10/24/2023    CO2 22.0 10/24/2023     10/24/2023    BUN 42 (H) 10/24/2023    CREATININE 2.01 (H) 10/24/2023    EGFRIFAFRI 25 (L) 2022    EGFRIFNONA 21 (L) 2022    BCR 20.9 10/24/2023    ANIONGAP 11.0 10/24/2023     Lab Results   Component Value Date    WBC 6.51 10/24/2023    HGB 10.7 (L) 10/24/2023    HCT 32.3 (L) 10/24/2023    MCV 91.2 10/24/2023     10/24/2023     Lab Results   Component Value Date    CHOL 172 10/24/2023    CHOL 142 2021    CHOL 115 2020  "    Lab Results   Component Value Date    HDL 50 10/24/2023    HDL 59 09/27/2023    HDL 56 01/04/2023     Lab Results   Component Value Date    LDL 96 10/24/2023    LDL 98 09/27/2023    LDL 55 01/04/2023     Lab Results   Component Value Date    TRIG 149 10/24/2023    TRIG 134 09/27/2023    TRIG 118 01/04/2023     Lab Results   Component Value Date    HGBA1C 5.50 11/30/2021     Lab Results   Component Value Date    INR 1.08 10/24/2023    INR 1.26 (H) 01/29/2022    INR 1.12 (H) 11/30/2021    PROTIME 14.1 10/24/2023    PROTIME 15.6 (H) 01/29/2022    PROTIME 14.3 (H) 11/30/2021         Physical Examination:   BP (!) 185/75   Pulse 71   Temp 98.8 °F (37.1 °C) (Tympanic)   Resp 18   Ht 157.5 cm (62\")   Wt 61.2 kg (134 lb 14.4 oz)   SpO2 98%   BMI 24.67 kg/m²   General Appearance:   Well developed, well nourished, well groomed, alert, and cooperative.  HEENT: Normocephalic. Atraumatic. No JVD, no tracheal deviation.  Neck and Spine: Normal range of motion.  Normal alignment. No mass or tenderness. No bruits.  Cardiac: Regular rate and rhythm. No murmurs.  Pulmonary: No shortness of breath, clear anteriorly to auscultation  Abdomen:  Soft, nontender, nondistended   Peripheral Vasculature: Radial pulses are equal and symmetric. No signs of distal embolization.  Extremities:    No edema or deformities.   Skin:    No rashes or discoloration    Neurological examination:  Higher Integrative  Function: Oriented to time, place and person. Normal registration, recall, attention span and concentration. Normal language including comprehension, spontaneous speech, repetition, naming and vocabulary. No neglect. Normal fund of knowledge and higher integrative function.  CN II: Pupils are equal, round, and reactive to light. Blinks to visual threat and counts fingers in all fields  CN III IV VI: Extraocular movements are full without nystagmus.   CN V: Normal facial sensation   CN VII: Slight facial asymmetry. No labial " dysarthria  CN VIII:   Auditory acuity is normal.  CN IX & X:   No palatal or pharnygeal dysarthria.  CN XI: Turns head without abnormality.   CN XII:   The tongue is midline.  No lingual dysarthria.   Motor: Normal muscle strength, bulk and tone in upper and lower extremities.  No fasciculations, rigidity, spasticity, or abnormal movements.  Reflexes: 1+ in the upper and lower extremities. Plantar responses are flexor.  Sensation: Normal to light touch, temperature, and proprioception in arms and legs.   Station and Gait: Deferred for bed rest    Coordination: Finger-to-nose test shows no dysmetria.  Rapid alternating movements are normal.  Heel-to-shin normal.    NIHSS:     Diagnoses / Discussion:  87 y.o. who presents with Sx of recrudescence of prior symptoms. Unlikely to be a stroke given the stereotyped events and patient did not have much to eat for over 24 hours and has not been using her CPAP machine for an extended period of time. Agree with MRI and MRA but if no change would address metabolic issues. I have counseled that them that each event is unlikely to be acute ischemia. She has a number of old lesions that may act up when she is under stress.    Plan:  MRI, MRA    I have discussed the above with the patient and family.  Time spent with patient: 60min    MDM  Reviewed: previous chart, nursing note and vitals  Reviewed previous: labs, MRI and CT scan  Interpretation: CT scan, MRI and labs  Total time providing critical care: 30-74 minutes. This excludes time spent performing separately reportable procedures and services.         Edmundo Nowak MD  Neurology

## 2023-10-24 NOTE — H&P
[facility]   HISTORY AND PHYSICAL    Patient Name: Marleni Hummel  : 1935  MRN: [id number]  Primary Care Physician:  Ken Andujar MD  Date of admission: 10/24/2023    Subjective   Subjective     Chief Complaint:   Chief Complaint   Patient presents with    Neurologic Problem         HPI:    Marleni Hummel is a pleasant afebrile ambulatory 87 y.o.  female with a past medical history of hypertension, coronary artery disease, stage III CKD, permanent atrial fibrillation on eliquis, and renal cell carcinoma.     She presents to the emergency department at [facility] today with complaint of right-sided weakness.  She has been admitted to the ED observation unit for further testing and evaluation.    Patient states that she did not take her blood pressure medications this morning.  She had not eaten yet.  She states around 11 AM that she developed right-sided facial droop, right arm and leg weakness as well as slurred speech.  She states she called her nephrologist office but did not receive a call back so presented to the ED.    She was admitted to the hospital 2021 to [facility] for difficulty speaking and left-sided weakness.  She was seen and evaluated by Dr. Gamboa with the neurology service at that time.  Patient tells me she did not have any residual deficits following this until today.  She is right-hand dominant.  States she had difficulty holding scissors, she reports that she kept dropping them.  She denies any vision changes but states she had trouble getting her words out.  This is remained resolved.      Review of Systems   All systems were reviewed and negative except for: What is mentioned above    Personal History     Past Medical History:   Diagnosis Date    Acute bronchitis due to Rhinovirus 2022    Acute vulvitis 2019    Allergic     Allergic rhinitis     Anemia of chronic disease     Arthropathy of knee     right    Atrial fibrillation     Back  pain     Bell's palsy     Caregiver stress syndrome 04/17/2019    Chronic coronary artery disease     Chronic kidney disease (CKD), stage III (moderate)     Diverticulitis 12/14/2022    CARROLL (dyspnea on exertion) 3/17/2023    Edema     Elevated serum free T4 level 03/21/2016    Encounter for eye exam 09/2020    Essential hypertension     Fatigue     GERD (gastroesophageal reflux disease)     H/O Heart murmur     Heart attack 10/05/2015    Hematuria 12/12/2016    Herpes zoster without complication     Hip arthritis     left    History of bone density study 02/28/2008    History of pelvic fracture 2015    History of pneumonia     History of transfusion     2015    Hypercholesterolemia     IFG (impaired fasting glucose)     Insomnia     Left kidney mass 07/2020    Limited joint range of motion     RIGHT KNEE    Mixed hyperlipidemia     Muscle tension headache 04/03/2019    New daily persistent headache 12/17/2018    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 Clarke    CATARACT EXTRACTION Right 04/16/2013    Dr. Clarke    COLONOSCOPY  04/18/2014    Dr. Elizabeth; no polyps    EPIDURAL BLOCK      HIP PERCUTANEOUS PINNING Left 8/31/2017    Procedure: LT HIP PERCUTANEOUS PINNING;  Surgeon: Sean Canales MD;  Location: [facility] OR;  Service:     MAMMO BILATERAL  11/2013    NEPHRECTOMY Left 11/16/2020    Procedure: LAPAROSCOPIC NEPHRECTOMY;  Surgeon: Chuckie Mclaughlin MD;  Location: [facility] OR;  Service: Urology;  Laterality: Left;    PAP SMEAR  02/2011     TIBIA FRACTURE SURGERY Right 2015    REMOVED PLATE LATER IN 2015    TONSILLECTOMY  1947    TOTAL HIP ARTHROPLASTY Right 08/2015    TOTAL HIP ARTHROPLASTY Right 09/2016    TOTAL KNEE ARTHROPLASTY Bilateral 2004       Family History: family history includes Heart disease in her mother; Hypertension in her maternal grandmother, mother, and another family member; Stroke in her mother. Otherwise pertinent FHx was reviewed and not pertinent to current issue.    Social History:  reports that she quit smoking about 67 years ago. Her smoking use included cigarettes. She has a 3.00 pack-year smoking history. She has never used smokeless tobacco. She reports that she does not currently use alcohol after a past usage of about 3.0 standard drinks of alcohol per week. She reports that she does not use drugs.    Home Medications:  Mirabegron ER, acetaminophen, apixaban, atorvastatin, desonide, escitalopram, furosemide, and metoprolol succinate XL    Allergies:  Allergies   Allergen Reactions    Morphine Anaphylaxis    Oxycodone Anaphylaxis    Ace Inhibitors Cough and Unknown (See Comments)    Bactrim [Sulfamethoxazole-Trimethoprim] Rash    Levofloxacin Itching and Rash     insomnia  insomnia  insomnia    Torsemide Dizziness       Objective   Objective     Vitals:   Temp:  [98.1 °F (36.7 °C)-98.8 °F (37.1 °C)] 98.1 °F (36.7 °C)  Heart Rate:  [70-90] 71  Resp:  [18] 18  BP: (157-201)/(74-97) 189/80  Physical Exam    Constitutional: Awake, alert, appears well for stated age   Eyes: PERRLA, sclerae anicteric, no conjunctival injection   HENT: NCAT, mucous membranes moist   Neck: Supple, no thyromegaly, no lymphadenopathy, trachea midline   Respiratory: Clear to auscultation bilaterally, nonlabored respirations    Cardiovascular: Irregularly irregular rhythm, no murmurs, rubs, or gallops, palpable pedal pulses bilaterally   Gastrointestinal: Positive bowel sounds, soft, nontender, nondistended   Musculoskeletal: No bilateral ankle  edema, no clubbing or cyanosis to extremities   Psychiatric: Appropriate affect, cooperative   Neurologic: Oriented x 3, strength symmetric in all extremities, Cranial Nerves grossly intact to confrontation, speech clear   Skin: No rashes     Result Review    Result Review:  I have personally reviewed the results from the time of this admission to 10/24/2023 17:57 EDT and agree with these findings:  [x]  Laboratory list / accordion  []  Microbiology  [x]  Radiology  [x]  EKG/Telemetry   []  Cardiology/Vascular   []  Pathology  []  Old records  []  Other:  Most notable findings include: Potassium 5.9, creatinine 2.01, high-sensitivity troponin 33, lipid panel unremarkable, chest x-ray shows borderline cardiomegaly      Assessment & Plan   Assessment / Plan     Brief Patient Summary:  Marleni Hummel is a 87 y.o. female who is being evaluated for R sided weakness.     Active Hospital Problems:  Active Hospital Problems    Diagnosis     **TIA (transient ischemic attack)      Plan:     TIA  Right-sided weakness-resolved  MRI/MRA pending  Consult to neurology  Aspirin/statin  Lipid panel/A1c  Consult to physical therapy    Atrial fibrillation  Continue eliquis    Hypertension  Continue home medications   Vitals every 4 hours    Chronic kidney disease with hyperkalemia  Consult to nephrology  [facility], insulin, dextrose, calcium gluconate and albuterol ordered  Telemetry  Creatinine 2.01, at baseline  Serum potassium 5.9, repeat 5.8    DVT prophylaxis:  Mechanical DVT prophylaxis orders are present.    CODE STATUS:    Medical Intervention Limits: NO intubation (DNI)  Level Of Support Discussed With: Patient  Code Status (Patient has no pulse and is not breathing): No CPR (Do Not Attempt to Resuscitate)  Medical Interventions (Patient has pulse or is breathing): Limited Support    Admission Status:  I believe this patient meets observation status.    Electronically signed by ENRRIQUE Ceron, 10/24/23, 5:57 PM  EDT.        75 minutes has been spent by [facility] providers in the care of this patient while under observation status      I have worn appropriate PPE during this patient encounter, sanitized my hands both with entering and exiting patient's room.    I have discussed plan of care with patient including advance care plan and/or surrogate decision maker.  Patient advises that their daughter Silvia will be their primary surrogate decision maker

## 2023-10-24 NOTE — PLAN OF CARE
Goal Outcome Evaluation:      Pt admitted for TIA. All symptoms resolved per family. NIH=0. Pt passed swallow study. VSS. Pt is alert and oriented x4. Family at bedside. Mri form faxed. Mri and mra pending. Neuro consulted. Pt uses walker at baseline. Pt has hx of sleep apnea and kidney disease

## 2023-10-25 ENCOUNTER — APPOINTMENT (OUTPATIENT)
Dept: CARDIOLOGY | Facility: HOSPITAL | Age: 88
End: 2023-10-25
Payer: MEDICARE

## 2023-10-25 ENCOUNTER — READMISSION MANAGEMENT (OUTPATIENT)
Dept: CALL CENTER | Facility: HOSPITAL | Age: 88
End: 2023-10-25
Payer: MEDICARE

## 2023-10-25 VITALS
HEIGHT: 62 IN | TEMPERATURE: 98.2 F | SYSTOLIC BLOOD PRESSURE: 145 MMHG | HEART RATE: 67 BPM | OXYGEN SATURATION: 99 % | WEIGHT: 134.9 LBS | BODY MASS INDEX: 24.82 KG/M2 | DIASTOLIC BLOOD PRESSURE: 72 MMHG | RESPIRATION RATE: 18 BRPM

## 2023-10-25 LAB
ALBUMIN SERPL-MCNC: 3.9 G/DL (ref 3.5–5.2)
ALBUMIN/GLOB SERPL: 1.8 G/DL
ALP SERPL-CCNC: 33 U/L (ref 39–117)
ALT SERPL W P-5'-P-CCNC: 7 U/L (ref 1–33)
ANION GAP SERPL CALCULATED.3IONS-SCNC: 9 MMOL/L (ref 5–15)
AORTIC ARCH: 2.2 CM
AORTIC DIMENSIONLESS INDEX: 0.4 (DI)
ASCENDING AORTA: 2.9 CM
AST SERPL-CCNC: 15 U/L (ref 1–32)
BACTERIA UR QL AUTO: ABNORMAL /HPF
BH CV ECHO MEAS - ACS: 1.53 CM
BH CV ECHO MEAS - AI P1/2T: 823.9 MSEC
BH CV ECHO MEAS - AO MAX PG: 11.4 MMHG
BH CV ECHO MEAS - AO MEAN PG: 6 MMHG
BH CV ECHO MEAS - AO ROOT DIAM: 2.47 CM
BH CV ECHO MEAS - AO V2 MAX: 169 CM/SEC
BH CV ECHO MEAS - AO V2 VTI: 37.6 CM
BH CV ECHO MEAS - AVA(I,D): 0.91 CM2
BH CV ECHO MEAS - EDV(CUBED): 110.6 ML
BH CV ECHO MEAS - EDV(MOD-SP2): 86 ML
BH CV ECHO MEAS - EDV(MOD-SP4): 94 ML
BH CV ECHO MEAS - EF(MOD-BP): 62 %
BH CV ECHO MEAS - EF(MOD-SP2): 61.6 %
BH CV ECHO MEAS - EF(MOD-SP4): 63.8 %
BH CV ECHO MEAS - ESV(CUBED): 33.6 ML
BH CV ECHO MEAS - ESV(MOD-SP2): 33 ML
BH CV ECHO MEAS - ESV(MOD-SP4): 34 ML
BH CV ECHO MEAS - FS: 32.7 %
BH CV ECHO MEAS - IVS/LVPW: 1 CM
BH CV ECHO MEAS - IVSD: 0.9 CM
BH CV ECHO MEAS - LAT PEAK E' VEL: 10.1 CM/SEC
BH CV ECHO MEAS - LV DIASTOLIC VOL/BSA (35-75): 58.3 CM2
BH CV ECHO MEAS - LV MASS(C)D: 147.8 GRAMS
BH CV ECHO MEAS - LV MAX PG: 2.33 MMHG
BH CV ECHO MEAS - LV MEAN PG: 1 MMHG
BH CV ECHO MEAS - LV SYSTOLIC VOL/BSA (12-30): 21.1 CM2
BH CV ECHO MEAS - LV V1 MAX: 76.3 CM/SEC
BH CV ECHO MEAS - LV V1 VTI: 16.9 CM
BH CV ECHO MEAS - LVIDD: 4.8 CM
BH CV ECHO MEAS - LVIDS: 3.2 CM
BH CV ECHO MEAS - LVOT AREA: 2.04 CM2
BH CV ECHO MEAS - LVOT DIAM: 1.61 CM
BH CV ECHO MEAS - LVPWD: 0.9 CM
BH CV ECHO MEAS - MED PEAK E' VEL: 8.4 CM/SEC
BH CV ECHO MEAS - MR MAX PG: 51.4 MMHG
BH CV ECHO MEAS - MR MAX VEL: 358.3 CM/SEC
BH CV ECHO MEAS - MV A DUR: 0.15 SEC
BH CV ECHO MEAS - MV A MAX VEL: 60 CM/SEC
BH CV ECHO MEAS - MV DEC SLOPE: 460.5 CM/SEC2
BH CV ECHO MEAS - MV DEC TIME: 143 SEC
BH CV ECHO MEAS - MV E MAX VEL: 117.4 CM/SEC
BH CV ECHO MEAS - MV E/A: 1.96
BH CV ECHO MEAS - MV MAX PG: 5.8 MMHG
BH CV ECHO MEAS - MV MEAN PG: 2.6 MMHG
BH CV ECHO MEAS - MV P1/2T: 76.8 MSEC
BH CV ECHO MEAS - MV V2 VTI: 28.6 CM
BH CV ECHO MEAS - MVA(P1/2T): 2.9 CM2
BH CV ECHO MEAS - MVA(VTI): 1.2 CM2
BH CV ECHO MEAS - PA ACC TIME: 0.05 SEC
BH CV ECHO MEAS - PA V2 MAX: 90.9 CM/SEC
BH CV ECHO MEAS - QP/QS: 1.19
BH CV ECHO MEAS - RAP SYSTOLE: 3 MMHG
BH CV ECHO MEAS - RV MAX PG: 1.44 MMHG
BH CV ECHO MEAS - RV V1 MAX: 60 CM/SEC
BH CV ECHO MEAS - RV V1 VTI: 12.5 CM
BH CV ECHO MEAS - RVOT DIAM: 2.04 CM
BH CV ECHO MEAS - RVSP: 18.7 MMHG
BH CV ECHO MEAS - SI(MOD-SP2): 32.9 ML/M2
BH CV ECHO MEAS - SI(MOD-SP4): 37.2 ML/M2
BH CV ECHO MEAS - SUP REN AO DIAM: 1.5 CM
BH CV ECHO MEAS - SV(LVOT): 34.4 ML
BH CV ECHO MEAS - SV(MOD-SP2): 53 ML
BH CV ECHO MEAS - SV(MOD-SP4): 60 ML
BH CV ECHO MEAS - SV(RVOT): 41 ML
BH CV ECHO MEAS - TAPSE (>1.6): 1.3 CM
BH CV ECHO MEAS - TR MAX PG: 15.7 MMHG
BH CV ECHO MEAS - TR MAX VEL: 198.2 CM/SEC
BH CV ECHO MEASUREMENTS AVERAGE E/E' RATIO: 12.69
BH CV ECHO SHUNT ASSESSMENT PERFORMED (HIDDEN SCRIPTING): 1
BH CV XLRA - RV BASE: 3.7 CM
BH CV XLRA - RV LENGTH: 6.8 CM
BH CV XLRA - RV MID: 2.7 CM
BH CV XLRA - TDI S': 7.5 CM/SEC
BILIRUB SERPL-MCNC: 0.7 MG/DL (ref 0–1.2)
BILIRUB UR QL STRIP: NEGATIVE
BUN SERPL-MCNC: 41 MG/DL (ref 8–23)
BUN/CREAT SERPL: 19.7 (ref 7–25)
CALCIUM SPEC-SCNC: 9.4 MG/DL (ref 8.6–10.5)
CHLORIDE SERPL-SCNC: 105 MMOL/L (ref 98–107)
CLARITY UR: ABNORMAL
CO2 SERPL-SCNC: 24 MMOL/L (ref 22–29)
COLOR UR: YELLOW
CREAT SERPL-MCNC: 2.08 MG/DL (ref 0.57–1)
DEPRECATED RDW RBC AUTO: 43.3 FL (ref 37–54)
EGFRCR SERPLBLD CKD-EPI 2021: 23 ML/MIN/1.73
ERYTHROCYTE [DISTWIDTH] IN BLOOD BY AUTOMATED COUNT: 12.8 % (ref 12.3–15.4)
GLOBULIN UR ELPH-MCNC: 2.2 GM/DL
GLUCOSE SERPL-MCNC: 92 MG/DL (ref 65–99)
GLUCOSE UR STRIP-MCNC: NEGATIVE MG/DL
HCT VFR BLD AUTO: 29.7 % (ref 34–46.6)
HGB BLD-MCNC: 9.8 G/DL (ref 12–15.9)
HGB UR QL STRIP.AUTO: ABNORMAL
HYALINE CASTS UR QL AUTO: ABNORMAL /LPF
KETONES UR QL STRIP: NEGATIVE
LEFT ATRIUM VOLUME INDEX: 23.1 ML/M2
LEUKOCYTE ESTERASE UR QL STRIP.AUTO: ABNORMAL
MCH RBC QN AUTO: 30.5 PG (ref 26.6–33)
MCHC RBC AUTO-ENTMCNC: 33 G/DL (ref 31.5–35.7)
MCV RBC AUTO: 92.5 FL (ref 79–97)
NITRITE UR QL STRIP: POSITIVE
PH UR STRIP.AUTO: 6 [PH] (ref 5–8)
PLATELET # BLD AUTO: 153 10*3/MM3 (ref 140–450)
PMV BLD AUTO: 9.8 FL (ref 6–12)
POTASSIUM SERPL-SCNC: 5.3 MMOL/L (ref 3.5–5.2)
PROT SERPL-MCNC: 6.1 G/DL (ref 6–8.5)
PROT UR QL STRIP: ABNORMAL
RBC # BLD AUTO: 3.21 10*6/MM3 (ref 3.77–5.28)
RBC # UR STRIP: ABNORMAL /HPF
REF LAB TEST METHOD: ABNORMAL
SINUS: 2.8 CM
SODIUM SERPL-SCNC: 138 MMOL/L (ref 136–145)
SP GR UR STRIP: 1.01 (ref 1–1.03)
SQUAMOUS #/AREA URNS HPF: ABNORMAL /HPF
STJ: 2.35 CM
UROBILINOGEN UR QL STRIP: ABNORMAL
WBC # UR STRIP: ABNORMAL /HPF
WBC NRBC COR # BLD: 5.35 10*3/MM3 (ref 3.4–10.8)

## 2023-10-25 PROCEDURE — 93306 TTE W/DOPPLER COMPLETE: CPT | Performed by: INTERNAL MEDICINE

## 2023-10-25 PROCEDURE — 25010000002 DROPERIDOL PER 5 MG: Performed by: NURSE PRACTITIONER

## 2023-10-25 PROCEDURE — 80053 COMPREHEN METABOLIC PANEL: CPT | Performed by: NURSE PRACTITIONER

## 2023-10-25 PROCEDURE — G0378 HOSPITAL OBSERVATION PER HR: HCPCS

## 2023-10-25 PROCEDURE — 96375 TX/PRO/DX INJ NEW DRUG ADDON: CPT

## 2023-10-25 PROCEDURE — 81001 URINALYSIS AUTO W/SCOPE: CPT | Performed by: PHYSICIAN ASSISTANT

## 2023-10-25 PROCEDURE — 99214 OFFICE O/P EST MOD 30 MIN: CPT | Performed by: NURSE PRACTITIONER

## 2023-10-25 PROCEDURE — 25510000001 PERFLUTREN (DEFINITY) 8.476 MG IN SODIUM CHLORIDE (PF) 0.9 % 10 ML INJECTION: Performed by: NURSE PRACTITIONER

## 2023-10-25 PROCEDURE — 85027 COMPLETE CBC AUTOMATED: CPT | Performed by: NURSE PRACTITIONER

## 2023-10-25 PROCEDURE — 97161 PT EVAL LOW COMPLEX 20 MIN: CPT

## 2023-10-25 PROCEDURE — 93306 TTE W/DOPPLER COMPLETE: CPT

## 2023-10-25 RX ORDER — HYDROCHLOROTHIAZIDE 12.5 MG/1
12.5 TABLET ORAL DAILY
Qty: 30 TABLET | Refills: 0 | Status: SHIPPED | OUTPATIENT
Start: 2023-10-25

## 2023-10-25 RX ORDER — HYDROCHLOROTHIAZIDE 12.5 MG/1
12.5 TABLET ORAL DAILY
Qty: 30 TABLET | Refills: 0 | Status: SHIPPED | OUTPATIENT
Start: 2023-10-25 | End: 2023-10-25 | Stop reason: SDUPTHER

## 2023-10-25 RX ORDER — DROPERIDOL 2.5 MG/ML
2.5 INJECTION, SOLUTION INTRAMUSCULAR; INTRAVENOUS ONCE
Status: COMPLETED | OUTPATIENT
Start: 2023-10-25 | End: 2023-10-25

## 2023-10-25 RX ORDER — HYDROCHLOROTHIAZIDE 12.5 MG/1
12.5 TABLET ORAL DAILY
Status: DISCONTINUED | OUTPATIENT
Start: 2023-10-25 | End: 2023-10-25 | Stop reason: HOSPADM

## 2023-10-25 RX ADMIN — ESCITALOPRAM 20 MG: 20 TABLET, FILM COATED ORAL at 09:35

## 2023-10-25 RX ADMIN — ASPIRIN 325 MG: 325 TABLET ORAL at 09:35

## 2023-10-25 RX ADMIN — Medication 10 ML: at 09:35

## 2023-10-25 RX ADMIN — PERFLUTREN 2 ML: 6.52 INJECTION, SUSPENSION INTRAVENOUS at 09:03

## 2023-10-25 RX ADMIN — OXYBUTYNIN CHLORIDE 10 MG: 10 TABLET, EXTENDED RELEASE ORAL at 09:35

## 2023-10-25 RX ADMIN — DROPERIDOL 2.5 MG: 2.5 INJECTION, SOLUTION INTRAMUSCULAR; INTRAVENOUS at 00:49

## 2023-10-25 RX ADMIN — METOPROLOL SUCCINATE 25 MG: 25 TABLET, EXTENDED RELEASE ORAL at 09:35

## 2023-10-25 RX ADMIN — APIXABAN 2.5 MG: 2.5 TABLET, FILM COATED ORAL at 09:35

## 2023-10-25 NOTE — DISCHARGE INSTRUCTIONS
Medication as prescribed, Lokelma to keep potassium within good range and hydrochlorothiazide for blood pressure.  Follow-up with your primary care provider.  Follow-up with Dr. Leonard.  Return to the emergency department with worsening symptoms, uncontrolled pain, inability to tolerate oral liquids, fever greater than 101°F not controlled by Tylenol or as needed with emergent concerns.

## 2023-10-25 NOTE — PROGRESS NOTES
DOS: 10/25/2023  NAME: Marleni Hummel   : 1935  PCP: Ken Andujar MD    Chief Complaint   Patient presents with    Neurologic Problem      Stroke    Subjective: Pt seen in follow up, however the problem is new to me.  Patient lying in bed resting comfortably.  States she feels much better today.  No recurrence of symptoms.  Denies any new weakness, numbness, speech or visual disturbances, or headaches.  No family at bedside.    Objective:  Vital signs:      Vitals:    10/25/23 0343 10/25/23 0718 10/25/23 0936 10/25/23 1126   BP: 139/69 110/54 (!) 187/65 145/72   BP Location: Right arm Right arm  Right arm   Patient Position: Lying Lying  Lying   Pulse: 58 58  67   Resp: 18 18  18   Temp: 98.2 °F (36.8 °C) 97.2 °F (36.2 °C)  98.2 °F (36.8 °C)   TempSrc: Oral Oral  Oral   SpO2: 97% 99%     Weight:       Height:           Current Facility-Administered Medications:     acetaminophen (TYLENOL) tablet 500 mg, 500 mg, Oral, Q4H PRN, Zoran, Odalis, APRN, 500 mg at 10/24/23 2355    apixaban (ELIQUIS) tablet 2.5 mg, 2.5 mg, Oral, Q12H, Herth, Odalis, APRN, 2.5 mg at 10/25/23 0935    atorvastatin (LIPITOR) tablet 40 mg, 40 mg, Oral, Nightly, Herth, Odalis, APRN, 40 mg at 10/24/23 2006    escitalopram (LEXAPRO) tablet 20 mg, 20 mg, Oral, Daily, Herth, Odalis, APRN, 20 mg at 10/25/23 0935    furosemide (LASIX) tablet 20 mg, 20 mg, Oral, Daily PRN, Zoran, Odalis, APRN    metoprolol succinate XL (TOPROL-XL) 24 hr tablet 25 mg, 25 mg, Oral, Daily, Herth, Odalis, APRN, 25 mg at 10/25/23 0935    ondansetron (ZOFRAN) injection 4 mg, 4 mg, Intravenous, Q6H PRN, Zoran, Odalis, APRN    oxybutynin XL (DITROPAN-XL) 24 hr tablet 10 mg, 10 mg, Oral, Daily, Odalis Meehan APRN, 10 mg at 10/25/23 0935    sodium chloride 0.9 % flush 10 mL, 10 mL, Intravenous, PRN, Eddie Jacob II, MD    sodium chloride 0.9 % flush 10 mL, 10 mL, Intravenous, Q12H, Odalis Meehan APRN, 10 mL at 10/25/23 0935    sodium chloride 0.9 % flush 10 mL, 10  mL, Intravenous, PRN, Herth, Odalis, APRN    sodium chloride 0.9 % infusion 40 mL, 40 mL, Intravenous, PRN, Herth, Odalis, APRN    PRN meds    acetaminophen    furosemide    ondansetron    sodium chloride    sodium chloride    sodium chloride    No current facility-administered medications on file prior to encounter.     Current Outpatient Medications on File Prior to Encounter   Medication Sig    acetaminophen (TYLENOL) 500 MG tablet Take 1 tablet by mouth Every 4 (Four) Hours As Needed for Mild Pain.    atorvastatin (LIPITOR) 20 MG tablet Take 1 tablet by mouth Every Night for 270 days.    desonide (DESOWEN) 0.05 % cream Apply to affected area(s) of ears up to twice daily as needed for itching/ flaking for 30 days    Eliquis 2.5 MG tablet tablet TAKE 1 TABLET EVERY 12 HOURS (TWICE A DAY)    escitalopram (LEXAPRO) 20 MG tablet Take 1 tablet by mouth Daily for 270 days.    metoprolol succinate XL (TOPROL-XL) 25 MG 24 hr tablet Take 1 tablet by mouth Daily for 270 days.    Mirabegron ER (MYRBETRIQ) 50 MG tablet sustained-release 24 hour 24 hr tablet Take 50 mg by mouth Every Evening.    furosemide (LASIX) 20 MG tablet Take 1 tablet by mouth Daily As Needed (if weight goes up 3lbs in 3 days).       General appearance: Elderly female, NAD, alert and cooperative  HEENT: Normocephalic, atraumatic  Resp: Even and unlabored  Skin: warm, dry    Neurological:   MS: oriented x3, normal attention/concentration, language intact, no neglect, normal fund of knowledge  CN: visual fields full, EOMI, facial sensation equal, no facial droop, shoulder shrug equal, tongue midline  Motor: 5/5 in all 4 ext.  Sensory: light touch and cold sensation intact in all 4 ext.  Coordination: Normal finger to nose test  Gait and station: Deferred  Rapid alternating movements: normal finger to thumb tap    Laboratory results:  Lab Results   Component Value Date    TSH 2.050 09/27/2023     Lab Results   Component Value Date    HGBA1C 5.50 10/24/2023      Lab Results   Component Value Date    AJUREIET88 614 04/10/2017     Lab Results   Component Value Date    CHOL 172 10/24/2023    CHOL 142 11/30/2021    CHOL 115 02/24/2020    CHLPL 180 09/27/2023    CHLPL 132 01/04/2023    CHLPL 138 09/22/2022     Lab Results   Component Value Date    TRIG 149 10/24/2023    TRIG 134 09/27/2023    TRIG 118 01/04/2023     Lab Results   Component Value Date    HDL 50 10/24/2023    HDL 59 09/27/2023    HDL 56 01/04/2023     Lab Results   Component Value Date    LDL 96 10/24/2023    LDL 98 09/27/2023    LDL 55 01/04/2023     Lab Results   Component Value Date    WBC 5.35 10/25/2023    HGB 9.8 (L) 10/25/2023    HCT 29.7 (L) 10/25/2023    MCV 92.5 10/25/2023     10/25/2023     Lab Results   Component Value Date    GLUCOSE 92 10/25/2023    BUN 41 (H) 10/25/2023    CREATININE 2.08 (H) 10/25/2023    EGFRIFNONA 21 (L) 02/23/2022    EGFRIFAFRI 25 (L) 02/23/2022    BCR 19.7 10/25/2023    K 5.3 (H) 10/25/2023    CO2 24.0 10/25/2023    CALCIUM 9.4 10/25/2023    PROTENTOTREF 7.0 09/27/2023    ALBUMIN 3.9 10/25/2023    LABIL2 2.2 09/27/2023    AST 15 10/25/2023    ALT 7 10/25/2023     Lab Results   Component Value Date    PTT 25.5 10/24/2023     Lab Results   Component Value Date    INR 1.08 10/24/2023    INR 1.26 (H) 01/29/2022    INR 1.12 (H) 11/30/2021    PROTIME 14.1 10/24/2023    PROTIME 15.6 (H) 01/29/2022    PROTIME 14.3 (H) 11/30/2021     Brief Urine Lab Results  (Last result in the past 365 days)        Color   Clarity   Blood   Leuk Est   Nitrite   Protein   CREAT   Urine HCG        10/25/23 1251 Yellow   Turbid   Moderate (2+)   Large (3+)   Positive   30 mg/dL (1+)                   Review and interpretation of imaging:  MRI Angiogram Head Without Contrast    Result Date: 10/25/2023  1. Normal MRA of the head.      MRI Angiogram Neck Without Contrast    Result Date: 10/24/2023  Electronically signed by Catracho Calles MD on 10-24-23 at 2219    MRI Brain Without  Contrast    Result Date: 10/24/2023  Electronically signed by Catracho Calles MD on 10-24-23 at 2215    XR Chest 1 View    Result Date: 10/24/2023  1. Borderline cardiomegaly.   This report was finalized on 10/24/2023 3:25 PM by Dr. Satish Michelle M.D on Workstation: [id number]      CT Head Without Contrast Stroke Protocol    Result Date: 10/24/2023   No acute intracranial hemorrhage or hydrocephalus. If there is further clinical concern, MRI could be considered for further evaluation.  Discussed by telephone with Dr. Howard at 1425, and with Dr. Jacob at 1430, 10/24/2023  This report was finalized on 10/24/2023 2:33 PM by Dr. Jaspreet Whitt M.D on Workstation: [id number]     Results for orders placed during the hospital encounter of 10/24/23    Adult Transthoracic Echo Complete W/ Cont if Necessary Per Protocol (With Agitated Saline)    Interpretation Summary    Left ventricular diastolic function was indeterminate.    Saline test results are negative.    There is mild calcification of the aortic valve.    Estimated right ventricular systolic pressure from tricuspid regurgitation is normal (<35 mmHg). Calculated right ventricular systolic pressure from tricuspid regurgitation is 19 mmHg.      Impression/Assessment:  This is a 87-year-old female with past medical history of atrial fibrillation on Eliquis PTA, hypertension, DEBORAH noncompliant with CPAP who presented to the hospital on 10/20/2023 with complaints of difficulty speaking and right hand clumsiness.  Reportedly the patient continue to drop the scissors she was holding while cutting off tags on some close and when she tried to come up with the word for scissors she could not get it out.  Patient had reported poor intake that day and sleeping for a prolonged period (11 hours).    Her BP on arrival was 201/97, HR 90.  EKG with NSR, APC, LAFB.  Temp 98.8.  BS 90.  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    Diagnosis:  Word finding difficulty  Hypertensive urgency  Obstructive sleep apnea, noncompliant with CPAP  History of stroke    Additional work up to date:  2D Echo: Normal LA cavity size, saline test negative, all LV wall segments contract normally, no AV stenosis, mild MVR present.    Plan:  Her MRI/MRA did not reveal any evidence of new stroke but did show chronic infarcts with severe white matter disease.  She also presented hypertensive and was complaining of a headache.  Could have had recrudescence of prior symptoms.  Recommend she continue her Eliquis.  She needs to become compliant with her CPAP.  Normalize BP, /80 or less  No need for aspirin in addition to A/C from our standpoint.  We will sign off, will see again per request.    Case discussed with patient and Dr. Nowak, and he agrees with plan above.     ENRRIQUE Mccormick

## 2023-10-25 NOTE — PLAN OF CARE
Goal Outcome Evaluation:  Plan of Care Reviewed With: patient           Outcome Evaluation: Pt symptoms have resolved and she is independent with a rolling walker, pt able to walk 200 ft this morning with no loss of balance, she is at her baseline and can return home when medically ready, daughter lives nearby and assists her as needed      Anticipated Discharge Disposition (PT): home

## 2023-10-25 NOTE — THERAPY EVALUATION
Patient Name: Marleni Hummel  : 1935    MRN: [id number]                              Today's Date: 10/25/2023       Admit Date: 10/24/2023    Visit Dx:     ICD-10-CM ICD-9-CM   1. TIA (transient ischemic attack)  G45.9 435.9     Patient Active Problem List   Diagnosis    Arthritis involving multiple sites    Essential hypertension    Permanent atrial fibrillation    History of Bell's palsy    CKD (chronic kidney disease) stage 4, GFR 15-29 ml/min    Gastroesophageal reflux disease without esophagitis    Hypercholesterolemia    Insomnia    Localized osteoporosis without current pathological fracture    Panic disorder    Chronic nonseasonal allergic rhinitis due to pollen    Other sleep apnea    History of MI (myocardial infarction)    Chronic coronary artery disease    Anemia of chronic disease    Weakness of right leg    Cardiac murmur    Senile osteoporosis    Hyperuricemia    Grief reaction    Peripheral vertigo    Renal cell carcinoma of left kidney    Renal oncocytoma of left kidney    History of left nephrectomy    Cerebrovascular accident (CVA) due to stenosis of small artery    History of renal cell carcinoma    TIA (transient ischemic attack)    Spinal stenosis, lumbar region with neurogenic claudication    Malignant neoplasm of left kidney, except renal pelvis    Diverticulosis    Irritable bowel syndrome with constipation    Chronic diastolic congestive heart failure     Past Medical History:   Diagnosis Date    Acute bronchitis due to Rhinovirus 2022    Acute vulvitis 2019    Allergic     Allergic rhinitis     Anemia of chronic disease     Arthropathy of knee     right    Atrial fibrillation     Back pain     Bell's palsy     Caregiver stress syndrome 2019    Chronic coronary artery disease     Chronic kidney disease (CKD), stage III (moderate)     Diverticulitis 2022    CARROLL (dyspnea on exertion) 3/17/2023    Edema     Elevated serum free T4 level 2016    Encounter  for eye exam 09/2020    Essential hypertension     Fatigue     GERD (gastroesophageal reflux disease)     H/O Heart murmur     Heart attack 10/05/2015    Hematuria 12/12/2016    Herpes zoster without complication     Hip arthritis     left    History of bone density study 02/28/2008    History of pelvic fracture 2015    History of pneumonia     History of transfusion     2015    Hypercholesterolemia     IFG (impaired fasting glucose)     Insomnia     Left kidney mass 07/2020    Limited joint range of motion     RIGHT KNEE    Mixed hyperlipidemia     Muscle tension headache 04/03/2019    New daily persistent headache 12/17/2018    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 Clarke    CATARACT EXTRACTION Right 04/16/2013    Dr. Clarke    COLONOSCOPY  04/18/2014    Dr. Elizabeth; no polyps    EPIDURAL BLOCK      HIP PERCUTANEOUS PINNING Left 8/31/2017    Procedure: LT HIP PERCUTANEOUS PINNING;  Surgeon: Sean Canales MD;  Location: [facility] OR;  Service:     MAMMO BILATERAL  11/2013    NEPHRECTOMY Left 11/16/2020    Procedure: LAPAROSCOPIC NEPHRECTOMY;  Surgeon: Chuckie Mclaughlin MD;  Location: [facility] OR;  Service: Urology;  Laterality: Left;    PAP SMEAR  02/2011    TIBIA FRACTURE SURGERY Right 2015    REMOVED PLATE LATER IN 2015    TONSILLECTOMY  1947    TOTAL HIP ARTHROPLASTY Right 08/2015    TOTAL HIP ARTHROPLASTY Right 09/2016    TOTAL KNEE ARTHROPLASTY Bilateral 2004      General Information       Row Name 10/25/23 1100          Physical Therapy  Time and Intention    Document Type evaluation  -PC     Mode of Treatment physical therapy  -PC       Row Name 10/25/23 1100          General Information    Patient Profile Reviewed yes  -PC     Prior Level of Function independent:  -PC       Row Name 10/25/23 1100          Living Environment    People in Home alone  -PC       Row Name 10/25/23 1100          Home Main Entrance    Number of Stairs, Main Entrance none  -PC       Row Name 10/25/23 1100          Stairs Within Home, Primary    Number of Stairs, Within Home, Primary none  -PC       Row Name 10/25/23 1100          Cognition    Orientation Status (Cognition) oriented x 4  -PC               User Key  (r) = Recorded By, (t) = Taken By, (c) = Cosigned By      Initials Name Provider Type    PC Amelia Reid, PT Physical Therapist                   Mobility       Row Name 10/25/23 1101          Bed Mobility    Bed Mobility supine-sit;sit-supine  -PC     Supine-Sit [place] (Bed Mobility) independent  -PC     Sit-Supine [place] (Bed Mobility) independent  -PC       Row Name 10/25/23 1101          Sit-Stand Transfer    Sit-Stand [place] (Transfers) independent  -PC       Row Name 10/25/23 1101          Gait/Stairs (Locomotion)    [place] Level (Gait) independent  -PC     Assistive Device (Gait) walker, front-wheeled  -PC     Distance in Feet (Gait) 200 ft  -PC               User Key  (r) = Recorded By, (t) = Taken By, (c) = Cosigned By      Initials Name Provider Type    PC Amelia Reid, PT Physical Therapist                   Obj/Interventions       Row Name 10/25/23 1101          Range of Motion Comprehensive    Comment, General Range of Motion chronic RLE problems from multiple surgeries with dec R knee flexion, otherwise WNL  -PC       Row Name 10/25/23 1101          Strength Comprehensive (MMT)    General Manual Muscle Testing (MMT) Assessment no strength deficits identified  -PC       Row Name 10/25/23 1101          Balance     Comment, Balance WNL  -PC       Row Name 10/25/23 1101          Sensory Assessment (Somatosensory)    Sensory Assessment (Somatosensory) sensation intact  -PC               User Key  (r) = Recorded By, (t) = Taken By, (c) = Cosigned By      Initials Name Provider Type    PC Amelia Reid, PT Physical Therapist                   Goals/Plan    No documentation.                  Clinical Impression       Row Name 10/25/23 1103          Pain    Pretreatment Pain Rating 0/10 - no pain  -PC       Row Name 10/25/23 1103          Plan of Care Review    Plan of Care Reviewed With patient  -PC     Outcome Evaluation Pt symptoms have resolved and she is independent with a rolling walker, pt able to walk 200 ft this morning with no loss of balance, she is at her baseline and can return home when medically ready, daughter lives nearby and assists her as needed  -PC       Row Name 10/25/23 1103          Therapy Assessment/Plan (PT)    Criteria for Skilled Interventions Met (PT) no problems identified which require skilled intervention  -PC     Therapy Frequency (PT) evaluation only  -PC       Row Name 10/25/23 1103          Positioning and Restraints    Pre-Treatment Position in bed  -PC     Post Treatment Position bed  -PC     In Bed supine;call light within reach;encouraged to call for assist;exit alarm on  -PC               User Key  (r) = Recorded By, (t) = Taken By, (c) = Cosigned By      Initials Name Provider Type    Amelia Awan, PT Physical Therapist                   Outcome Measures       Row Name 10/25/23 1105          How much help from another person do you currently need...    Turning from your back to your side while in flat bed without using bedrails? 4  -PC     Moving from lying on back to sitting on the side of a flat bed without bedrails? 4  -PC     Moving to and from a bed to a chair (including a wheelchair)? 4  -PC     Standing up from a chair using your arms (e.g., wheelchair, bedside chair)? 4  -PC      Climbing 3-5 steps with a railing? 3  -PC     To walk in hospital room? 4  -PC     AM-PAC 6 Clicks Score (PT) 23  -PC     Highest level of mobility 7 --> Walked 25 feet or more  -PC               User Key  (r) = Recorded By, (t) = Taken By, (c) = Cosigned By      Initials Name Provider Type    PC Amelia Reid PT Physical Therapist                                 Physical Therapy Education       Title: PT OT SLP Therapies (In Progress)       Topic: Physical Therapy (In Progress)       Point: Mobility training (Done)       Learning Progress Summary             Patient Acceptance, E,D, DU by PC at 10/25/2023 1106                         Point: Home exercise program (Not Started)       Learner Progress:  Not documented in this visit.              Point: Body mechanics (Not Started)       Learner Progress:  Not documented in this visit.              Point: Precautions (Not Started)       Learner Progress:  Not documented in this visit.                              User Key       Initials Effective Dates Name Provider Type Discipline     06/16/21 -  Amelia Reid PT Physical Therapist PT                  PT Recommendation and Plan     Plan of Care Reviewed With: patient  Outcome Evaluation: Pt symptoms have resolved and she is independent with a rolling walker, pt able to walk 200 ft this morning with no loss of balance, she is at her baseline and can return home when medically ready, daughter lives nearby and assists her as needed     Time Calculation:         PT Charges       Row Name 10/25/23 1107             Time Calculation    Start Time 1032  -PC      Stop Time 1045  -PC      Time Calculation (min) 13 min  -PC      PT Received On 10/25/23  -PC                User Key  (r) = Recorded By, (t) = Taken By, (c) = Cosigned By      Initials Name Provider Type    PC Amelia Reid PT Physical Therapist                  Therapy Charges for Today       Code Description Service Date Service Provider Modifiers  Qty    72682112577 HC PT EVAL LOW COMPLEXITY 2 10/25/2023 Amelia Reid, PT GP 1            PT G-Codes  AM-PAC 6 Clicks Score (PT): 23  PT Discharge Summary  Anticipated Discharge Disposition (PT): home    Amelia Reid, PT  10/25/2023

## 2023-10-25 NOTE — PROGRESS NOTES
MD Attestation Note    I supervised care provided by the midlevel provider.    The ERIC and I have discussed this patient's history, physical exam, and treatment plan. I have reviewed the documentation and personally had a face to face interaction with the patient  I affirm the documentation and agree with the treatment and plan.     I provided a substantive portion of the care of the patient.  I personally performed the physical exam in its entirety, and below are my findings.        Subjective  Pt is a 87 y.o. female admitted from Emergency Department for evaluation and treatment of transient episode of some slurred speech and possible right facial droop.  Symptoms resolved upon ED arrival    Physical Exam  GENERAL: Alert and in NAD, Vitals reviewed  HENT: nares patent  EYES: no scleral icterus  CV: regular rhythm, regular rate-no murmur  RESPIRATORY: normal effort, clear to auscultation bilaterally  ABDOMEN: soft  MUSCULOSKELETAL: no deformity  NEURO: Strength sensation and coordination are grossly intact.  Speech and mentation are unremarkable  SKIN: warm, dry    Assessment/Plan  I discussed tx and evaluation of this patient with RADHA Swann.  Appreciate neurology consultation by Dr. Howard.  MRI negative for acute stroke.  Labs show evidence of chronic renal failure with BUN/creatinine of 41 and 2.1 and improving potassium which is dropped from 5.9 yesterday to 5.3 today.

## 2023-10-25 NOTE — CONSULTS
[facility] Consult Note      Patient Name: Marleni Hummel  : 1935  MRN: [id number]  Primary Care Physician:  Ken Andujar MD  Referring Physician: No ref. provider found  Date of admission: 10/24/2023    Subjective     Reason for Consult:  Hyperkalemia     HPI:   Marleni Hummel is a 87 y.o. female known to have history of chronic kidney disease stage IV with baseline creatinine around 2 mg/dL followed by Dr. Leonard from our group, history of hypertension, history of renal cell carcinoma status post left nephrectomy in , history of atrial fibrillation on Eliquis and dyslipidemia who presented to the hospital on 10/24/2023 for right facial droop and slurred speech.  The patient was called our office and she was started to come to the ER for evaluation.  In ER she was noted to be hypertensive with systolic blood of 201.  Pressure initial evaluation including MRA and MRI showed no acute stroke but signs of chronic infarcts.  -The patient was noted to have a creatinine of 2.01, potassium 5.9 so nephrology team was consulted for management.  The patient was given 1 dose of Lokelma and she was shifted yesterday  -Currently doing better.  Blood pressure better controlled.  Noncompliance with blood pressure medication.  Denies any nausea or vomiting.  No fever no chills.  No chest pain or shortness of breath.    Review of Systems:   14 point review of systems is otherwise negative except for mentioned above on HPI    Personal History     Past Medical History:   Diagnosis Date    Acute bronchitis due to Rhinovirus 2022    Acute vulvitis 2019    Allergic     Allergic rhinitis     Anemia of chronic disease     Arthropathy of knee     right    Atrial fibrillation     Back pain     Bell's palsy     Caregiver stress syndrome 2019    Chronic coronary artery disease     Chronic kidney disease (CKD), stage III (moderate)     Diverticulitis 2022    CARROLL (dyspnea on  exertion) 3/17/2023    Edema     Elevated serum free T4 level 03/21/2016    Encounter for eye exam 09/2020    Essential hypertension     Fatigue     GERD (gastroesophageal reflux disease)     H/O Heart murmur     Heart attack 10/05/2015    Hematuria 12/12/2016    Herpes zoster without complication     Hip arthritis     left    History of bone density study 02/28/2008    History of pelvic fracture 2015    History of pneumonia     History of transfusion     2015    Hypercholesterolemia     IFG (impaired fasting glucose)     Insomnia     Left kidney mass 07/2020    Limited joint range of motion     RIGHT KNEE    Mixed hyperlipidemia     Muscle tension headache 04/03/2019    New daily persistent headache 12/17/2018    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 Clarke    CATARACT EXTRACTION Right 04/16/2013    Dr. Clarke    COLONOSCOPY  04/18/2014    Dr. Elizabeth; no polyps    EPIDURAL BLOCK      HIP PERCUTANEOUS PINNING Left 8/31/2017    Procedure: LT HIP PERCUTANEOUS PINNING;  Surgeon: Sean Canales MD;  Location: [facility] OR;  Service:     MAMMO BILATERAL  11/2013    NEPHRECTOMY Left 11/16/2020    Procedure: LAPAROSCOPIC NEPHRECTOMY;  Surgeon: Chuckie Mclaughlin MD;  Location: [facility] MAIN OR;  Service: Urology;  Laterality: Left;    PAP SMEAR  02/2011    TIBIA FRACTURE SURGERY Right 2015    REMOVED PLATE LATER IN 2015    TONSILLECTOMY  1947    TOTAL HIP ARTHROPLASTY Right 08/2015    TOTAL HIP ARTHROPLASTY Right 09/2016    TOTAL KNEE ARTHROPLASTY  Bilateral 2004       Family History: family history includes Heart disease in her mother; Hypertension in her maternal grandmother, mother, and another family member; Stroke in her mother.    Social History:  reports that she quit smoking about 67 years ago. Her smoking use included cigarettes. She has a 3.00 pack-year smoking history. She has never used smokeless tobacco. She reports that she does not currently use alcohol after a past usage of about 3.0 standard drinks of alcohol per week. She reports that she does not use drugs.    Home Medications:  Prior to Admission medications    Medication Sig Start Date End Date Taking? Authorizing Provider   acetaminophen (TYLENOL) 500 MG tablet Take 1 tablet by mouth Every 4 (Four) Hours As Needed for Mild Pain.   Yes Zach Hidalgo MD   atorvastatin (LIPITOR) 20 MG tablet Take 1 tablet by mouth Every Night for 270 days. 7/10/23 4/5/24 Yes Ken Andujar MD   desonide (DESOWEN) 0.05 % cream Apply to affected area(s) of ears up to twice daily as needed for itching/ flaking for 30 days 5/31/23  Yes Zach Hidalgo MD   Eliquis 2.5 MG tablet tablet TAKE 1 TABLET EVERY 12 HOURS (TWICE A DAY) 7/3/23  Yes Oralia Lutz APRN   escitalopram (LEXAPRO) 20 MG tablet Take 1 tablet by mouth Daily for 270 days. 7/10/23 4/5/24 Yes Ken Andujar MD   metoprolol succinate XL (TOPROL-XL) 25 MG 24 hr tablet Take 1 tablet by mouth Daily for 270 days. 7/10/23 4/5/24 Yes Ken Andujar MD   Mirabegron ER (MYRBETRIQ) 50 MG tablet sustained-release 24 hour 24 hr tablet Take 50 mg by mouth Every Evening.   Yes Zach Hidalgo MD   furosemide (LASIX) 20 MG tablet Take 1 tablet by mouth Daily As Needed (if weight goes up 3lbs in 3 days). 4/21/23   Sybil Parmar MD       Allergies:  Allergies   Allergen Reactions    Morphine Anaphylaxis    Oxycodone Anaphylaxis    Ace Inhibitors Cough and Unknown (See Comments)    Bactrim [Sulfamethoxazole-Trimethoprim] Rash    Levofloxacin  Itching and Rash     insomnia  insomnia  insomnia    Torsemide Dizziness       Objective     Vitals:   Temp:  [97.2 °F (36.2 °C)-98.8 °F (37.1 °C)] 98.2 °F (36.8 °C)  Heart Rate:  [58-90] 67  Resp:  [16-18] 18  BP: (110-201)/(54-97) 145/72    Intake/Output Summary (Last 24 hours) at 10/25/2023 1344  Last data filed at 10/25/2023 0343  Gross per 24 hour   Intake --   Output 250 ml   Net -250 ml       Physical Exam:   Constitutional: Awake, alert, no acute distress.  HEENT: Sclera anicteric, no conjunctival injection  Neck: Supple, no thyromegaly, no lymphadenopathy, trachea at midline, no JVD  Respiratory: Clear to auscultation bilaterally, nonlabored respiration  Cardiovascular: RRR, no murmurs, no rubs or gallops, no carotid bruit  Gastrointestinal: Positive bowel sounds, abdomen is soft, nontender and nondistended  : No palpable bladder  Musculoskeletal: No edema, no clubbing or cyanosis  Psychiatric: Appropriate affect, cooperative  Neurologic: Oriented x3, moving all extremities, normal speech and mental status  Skin: Warm and dry       Scheduled Meds:     apixaban, 2.5 mg, Oral, Q12H  atorvastatin, 40 mg, Oral, Nightly  escitalopram, 20 mg, Oral, Daily  metoprolol succinate XL, 25 mg, Oral, Daily  oxybutynin XL, 10 mg, Oral, Daily  sodium chloride, 10 mL, Intravenous, Q12H      IV Meds:        Results Reviewed:   I have personally reviewed the results from the time of this admission to 10/25/2023 13:44 EDT     Lab Results   Component Value Date    GLUCOSE 92 10/25/2023    CALCIUM 9.4 10/25/2023     10/25/2023    K 5.3 (H) 10/25/2023    CO2 24.0 10/25/2023     10/25/2023    BUN 41 (H) 10/25/2023    CREATININE 2.08 (H) 10/25/2023    EGFRIFAFRI 25 (L) 02/23/2022    EGFRIFNONA 21 (L) 02/23/2022    BCR 19.7 10/25/2023    ANIONGAP 9.0 10/25/2023      Lab Results   Component Value Date    MG 2.4 03/20/2023    PHOS 4.8 (H) 03/22/2023    ALBUMIN 3.9 10/25/2023           Assessment / Plan      ASSESSMENT:  Chronic kidney disease stage IV with baseline creatinine around 2 mg/dL.  Creatinine currently at baseline.  Etiology of chronic kidney disease is likely secondary to hypertensive nephrosclerosis in addition to cleaning mass loss secondary to nephrectomy.  She does follow with Dr. Leonard.  Hypertension with CKD blood pressure was elevated on admission improved now.  Hyperkalemia a chronic issue for the patient was managed with low potassium diet.  Hypertensive emergency  TIA.  MRI MRA showed no acute ischemic event but presence of multiple old infarct followed by neurology        PLAN:  The patient kidney function is at baseline and volume status is acceptable.    The patient blood pressure is better controlled now.  We will start Lokelma 10 g daily for hyperkalemia.  We will add hydrochlorothiazide for her blood pressure medication regiment  Low potassium diet      Thank you for involving us in the care of Marleni SCOUT Shaheed.  Please feel free to call with any questions.    Teddy Wolfe MD  10/25/23  13:44 EDT    [facility]\""  [phone]    Parts of this note may be an electronic transcription/translation of spoken language to printed text using the Dragon dictation system.

## 2023-10-25 NOTE — PROGRESS NOTES
SIOMARA YOUNG Attestation Note    I supervised care provided by the midlevel provider.    The ERIC and I have discussed this patient's history, physical exam, and treatment plan. I have reviewed the documentation and personally had a face to face interaction with the patient  I affirm the documentation and agree with the treatment and plan. I provided a substantive portion of the care of this patient.  I personally performed the physical exam, in its entirety.  My personal findings are documented in below:    History:  87-year-old female had transient episode of right-sided weakness and word finding difficulty prior to arrival in the ED today.  Patient reports similar episode last year though she did not have the speech difficulty then.  All symptoms currently resolved.  She does have a new left-sided headache and reports rare headaches.    Physical Exam:  General: No acute distress.  Speech fluent and easily intelligible.  HENT: NCAT, PERRL, Nares patent.  Eyes: no scleral icterus.  EOMI  Neck: trachea midline, no ROM limitations.  CV: Pink warm and well-perfused throughout  Respiratory: No distress or increased work of breathing  Abdomen: soft, no focal tenderness or rigidity  Musculoskeletal: no deformity.  Neuro: alert, moves all extremities, follows commands.  Moves all extremities equally.  Skin: warm, dry.    Assessment and Plan:  Agree with planned MRI MRA head and neck.  Monitor patient for any neurologic changes.  While TIA is of concern, patient with new onset headache which she rarely has may be suffering from atypical migraine.  If MRI/MRA unremarkable, consider migraine cocktail.

## 2023-10-25 NOTE — DISCHARGE SUMMARY
.ED OBSERVATION PROGRESS/DISCHARGE SUMMARY    Date of Admission: 10/24/2023   LOS: 0 days   PCP: Ken Andujar MD      Subjective   Overnight patient had an episode of expressive aphasia that lasted less than 5 minutes and resolved on its own.  Throughout day today patient has been neurologically stable, no complaints at discharge.    Hospital Outcome:   87-year-old female who was seen and examined at bedside following admission to the observation unit due to right-sided facial droop, slurred speech and right arm and leg weakness.  Laboratory evaluation in the ED shows high-sensitivity troponin 33, potassium 5.9 and creatinine 2.01. MRI brain and MRI angiogram neck without shows no evidence of acute infarct or stenosis.      Patient was seen by neurology, discussed with ENRRIQUE Oliva.  Patient is cleared for discharge home from neurological standpoint.  Recommend checking blood pressure at home.    Patient also with history of CKD, creatinine 2.08 this morning, potassium has been elevated at 5.9, 5.8, 5.3.  Nephrology consulted, Dr. Wolfe.  Recommend starting Lokelma daily and hydrochlorothiazide for better blood pressure control.  Patient has follow-up with her usual nephrologist Dr. Leonard next month.  Usual return to ER precautions advised, patient and daughter expressed understanding and are in agreement with plan.    ROS:  General: no fevers, chills  Respiratory: no cough, dyspnea  Cardiovascular: no chest pain, palpitations  Abdomen: No abdominal pain, nausea, vomiting, or diarrhea  Neurologic: No focal weakness    Objective   Physical Exam:  I have reviewed the vital signs.  Temp:  [98.1 °F (36.7 °C)-98.8 °F (37.1 °C)] 98.6 °F (37 °C)  Heart Rate:  [65-90] 65  Resp:  [16-18] 18  BP: (124-201)/(58-97) 124/58  General Appearance:    Alert, cooperative, no distress  Head:    Normocephalic, atraumatic  Eyes:    Sclerae anicteric  Neck:   Supple, no mass  Lungs: Clear to auscultation bilaterally,  respirations unlabored  Heart: Regular rate and rhythm, S1 and S2 normal, no murmur, rub or gallop  Abdomen:  Soft, non-tender, bowel sounds active, nondistended  Extremities: No clubbing, cyanosis, or edema to lower extremities  Pulses:  2+ and symmetric in distal lower extremities  Skin: No rashes   Neurologic: Oriented x3, Normal strength to extremities    Results Review:    I have reviewed the labs, radiology results and diagnostic studies.    Results from last 7 days   Lab Units 10/24/23  1415   WBC 10*3/mm3 6.51   HEMOGLOBIN g/dL 10.7*   HEMATOCRIT % 32.3*   PLATELETS 10*3/mm3 191     Results from last 7 days   Lab Units 10/24/23  1626 10/24/23  1535   SODIUM mmol/L  --  137   POTASSIUM mmol/L 5.8* 5.9*   CHLORIDE mmol/L  --  104   CO2 mmol/L  --  22.0   BUN mg/dL  --  42*   CREATININE mg/dL  --  2.01*   CALCIUM mg/dL  --  9.6   BILIRUBIN mg/dL  --  0.9   ALK PHOS U/L  --  35*   ALT (SGPT) U/L  --  7   AST (SGOT) U/L  --  19   GLUCOSE mg/dL  --  82     Imaging Results (Last 24 Hours)       Procedure Component Value Units Date/Time    MRI Angiogram Neck Without Contrast [[id number]] Collected: 10/24/23 2220     Updated: 10/24/23 2220    Narrative:        Patient: MARIA R GARDNER  Time Out: 22:19  Exam(s): MRA NECK Without Contrast     EXAM:    MR Angiography Neck Without Intravenous Contrast    CLINICAL HISTORY:     Reason for exam: TIA.    TECHNIQUE:    Magnetic resonance angiography images of the neck without intravenous   contrast.    COMPARISON:    None.    FINDINGS:    Right common carotid artery:  Unremarkable.  No significant stenosis.    No dissection or occlusion.    Right internal carotid artery:  See below.      Right external carotid artery:  Unremarkable.  No occlusion.    Right vertebral artery:  Unremarkable.  No significant stenosis.  No   dissection or occlusion.      Left common carotid artery:  Unremarkable.  No significant stenosis.    No dissection or occlusion.    Left internal carotid  artery:  Unremarkable.  Extracranial segment is   patent with no significant stenosis.  No dissection or occlusion.    Left external carotid artery:  Unremarkable.  No occlusion.    Left vertebral artery:  Unremarkable.  No significant stenosis.  No   dissection or occlusion.      Other vasculature:  Two-vessel aortic arch.  The origins of the great   vessels are not well evaluated secondary to patient motion.    Atherosclerosis of the right carotid bifurcation.    Soft tissues:  Unremarkable as visualized.    Retropharyngeal space:  Retropharyngeal course of the right internal   carotid arteries.    Other findings:  Exam is degraded by patient motion.     CAROTID STENOSIS REFERENCE USING NASCET CRITERIA:    % ICA stenosis = (1 - narrowest ICA diameter diameter of distal   cervical ICA) x 100.    Mild - <50% stenosis.    Moderate - 50-69% stenosis.    Severe - 70-94% stenosis.    Near occlusion - 95-99% stenosis.    Occluded - 100% stenosis.    IMPRESSION:         No hemodynamically significant stenosis of the carotid or vertebral   arteries.      Impression:          Electronically signed by Catracho Calles MD on 10-24-23 at 2219    MRI Brain Without Contrast [[id number]] Collected: 10/24/23 2215     Updated: 10/24/23 2215    Narrative:        Patient: MARIA R GARDNER  Time Out: 22:15  Exam(s): MRI HEAD Without Contrast     EXAM:    MR Head Without Intravenous Contrast    CLINICAL HISTORY:     Reason for exam: TIA.    TECHNIQUE:    Magnetic resonance images of the head brain without intravenous   contrast in multiple planes.    COMPARISON:    None.    FINDINGS:    Brain:  Global parenchymal volume loss with extensive chronic   microvascular ischemic changes.  No hemorrhage.    Ventricles:  No ventriculomegaly.    Bones joints:  Unremarkable.    Sinuses:  Mild mucosal thickening in the paranasal sinuses.      Mastoid air cells:  Bilateral mastoid effusions.    Orbits:  Bilateral lens replacement.    Other findings:   Right temporomandibular joint effusion.    IMPRESSION:       1.  No evidence of acute infarction.  2.  Global parenchymal volume loss with extensive chronic microvascular   ischemic changes.      Impression:          Electronically signed by Catracho Calles MD on 10-24-23 at 2215    MRI Angiogram Head Without Contrast [[id number]] Resulted: 10/24/23 2103     Updated: 10/24/23 2147    XR Chest 1 View [[id number]] Collected: 10/24/23 1524     Updated: 10/24/23 1528    Narrative:      XR CHEST 1 VW-10/24/2023     HISTORY: Stroke protocol.     Heart size is at the upper limits of normal. Lungs appear clear. There  are minor degenerative changes in the spine. There is some aortic  calcification.       Impression:      1. Borderline cardiomegaly.        This report was finalized on 10/24/2023 3:25 PM by Dr. Satish Michelle M.D on Workstation: [id number]       CT Head Without Contrast Stroke Protocol [[id number]] Collected: 10/24/23 1425     Updated: 10/24/23 1436    Narrative:      CT HEAD WO CONTRAST STROKE PROTOCOL-     INDICATIONS: Right-sided weakness     TECHNIQUE: Radiation dose reduction techniques were utilized, including  automated exposure control and exposure modulation based on body size.  Noncontrast head CT     COMPARISON: 12/1/2021     FINDINGS:           No acute intracranial hemorrhage, midline shift or mass effect. No acute  territorial infarct is identified.     Mild periventricular hypodensities suggest chronic small vessel ischemic  change in a patient this age.     Arterial calcifications are seen at the base of the brain.     Ventricles, cisterns, cerebral sulci are unremarkable for patient age.     The visualized paranasal sinuses, orbits, mastoid air cells are  unremarkable.          Impression:         No acute intracranial hemorrhage or hydrocephalus. If there is further  clinical concern, MRI could be considered for further evaluation.     Discussed by telephone with Dr. Howard at [phone], and with   Cecil at 1430,  10/24/2023     This report was finalized on 10/24/2023 2:33 PM by Dr. Jaspreet Whitt M.D on Workstation: [id number]               I have reviewed the medications.  ---------------------------------------------------------------------------------------------  Assessment & Plan   Assessment/Problem List    TIA (transient ischemic attack)      Plan:    TIA  Right-sided weakness-resolved  -MRI/MRA without negative acute  -Consult to neurology, Dr. 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 Leonard    Disposition: Home    Follow-up after Discharge: PCP, nephrology    This note will serve as a discharge summary    RADHA Beltran 10/25/23 17:07 EDT    I have worn appropriate PPE during this patient encounter, sanitized my hands both with entering and exiting patient's room.      39 minutes has been spent by [facility] providers in the care of this patient while under observation status

## 2023-10-25 NOTE — OUTREACH NOTE
Prep Survey      Flowsheet Row Responses   [facility] patient discharged from? [place]   Is LACE score < 7 ? No   Eligibility [facility]   Date of Admission 10/24/23   Date of Discharge 10/25/23   Discharge Disposition Home or Self Care   Discharge diagnosis *TIA (transient ischemic attack)   Does the patient have one of the following disease processes/diagnoses(primary or secondary)? Stroke   Does the patient have Home health ordered? No   Is there a DME ordered? No   Prep survey completed? Yes            MARLINE BRUNO - Registered Nurse

## 2023-10-25 NOTE — PLAN OF CARE
Problem: Adult Inpatient Plan of Care  Goal: Plan of Care Review  Outcome: Ongoing, Progressing  Flowsheets (Taken 10/25/2023 0652)  Progress: improving  Plan of Care Reviewed With: patient  Outcome Evaluation: NIH is 0. VSS. Potassium is 5.3 this morning. MRI was performed. Patient c/o headache administered Tylenol 500 mg. Patient then could not find her words and the headache was getting worse. Placed ice pack on pt's head and administered Inapsine as prescribed. Patient's speech impairment and headache resolved. Pt has been NPO since midnight. Patient has been alert and oriented, room air and ambulates to the bathroom with assistance and a walker. Nephrology and PT is consulted. Continuing plan of care.  Goal: Patient-Specific Goal (Individualized)  Outcome: Ongoing, Progressing  Goal: Absence of Hospital-Acquired Illness or Injury  Outcome: Ongoing, Progressing  Intervention: Identify and Manage Fall Risk  Recent Flowsheet Documentation  Taken 10/25/2023 0600 by Mary Angulo, RN  Safety Promotion/Fall Prevention:   assistive device/personal items within reach   clutter free environment maintained   fall prevention program maintained   lighting adjusted   nonskid shoes/slippers when out of bed   safety round/check completed   room organization consistent  Taken 10/25/2023 0200 by Mary Angulo, RN  Safety Promotion/Fall Prevention:   assistive device/personal items within reach   clutter free environment maintained   fall prevention program maintained   lighting adjusted   nonskid shoes/slippers when out of bed   room organization consistent   safety round/check completed  Taken 10/25/2023 0000 by Mray Angulo, RN  Safety Promotion/Fall Prevention:   assistive device/personal items within reach   clutter free environment maintained   fall prevention program maintained   lighting adjusted   nonskid shoes/slippers when out of bed   room organization consistent   safety round/check completed  Taken 10/24/2023  2000 by Darkwa, Mary, RN  Safety Promotion/Fall Prevention:   assistive device/personal items within reach   clutter free environment maintained   fall prevention program maintained   lighting adjusted   nonskid shoes/slippers when out of bed   room organization consistent   safety round/check completed  Intervention: Prevent Skin Injury  Recent Flowsheet Documentation  Taken 10/25/2023 0600 by Mary Angulo RN  Body Position: position changed independently  Taken 10/25/2023 0400 by Mary Angulo RN  Body Position: position changed independently  Taken 10/25/2023 0200 by Mary Angulo RN  Body Position: position changed independently  Taken 10/25/2023 0000 by Mary Angulo RN  Body Position: position changed independently  Taken 10/24/2023 2200 by Mary Angulo RN  Body Position: position changed independently  Taken 10/24/2023 2000 by Mary Angulo RN  Body Position: position changed independently  Intervention: Prevent and Manage VTE (Venous Thromboembolism) Risk  Recent Flowsheet Documentation  Taken 10/25/2023 0600 by Mary Angulo RN  Activity Management: activity minimized  Taken 10/25/2023 0400 by Mary Angulo RN  Activity Management: activity minimized  Taken 10/25/2023 0200 by Mary Angulo RN  Activity Management: activity minimized  Taken 10/25/2023 0000 by Mary Angulo RN  Activity Management: activity minimized  Taken 10/24/2023 2200 by Mary Angulo RN  Activity Management: activity minimized  Taken 10/24/2023 2000 by Mary Angulo RN  Activity Management: activity minimized  Range of Motion: ROM (range of motion) performed  Intervention: Prevent Infection  Recent Flowsheet Documentation  Taken 10/25/2023 0600 by Mary Angulo RN  Infection Prevention:   hand hygiene promoted   rest/sleep promoted   single patient room provided  Taken 10/25/2023 0400 by Mary Angulo RN  Infection Prevention:   hand hygiene promoted   rest/sleep promoted   single patient room provided  Taken 10/25/2023  0200 by Mary Angulo RN  Infection Prevention:   hand hygiene promoted   rest/sleep promoted   single patient room provided  Taken 10/25/2023 0000 by Mary Angulo RN  Infection Prevention:   hand hygiene promoted   rest/sleep promoted   single patient room provided  Taken 10/24/2023 2200 by Mary Angulo RN  Infection Prevention:   hand hygiene promoted   rest/sleep promoted   single patient room provided  Taken 10/24/2023 2000 by Mary Angulo RN  Infection Prevention:   hand hygiene promoted   rest/sleep promoted   single patient room provided  Goal: Optimal Comfort and Wellbeing  Outcome: Ongoing, Progressing  Intervention: Provide Person-Centered Care  Recent Flowsheet Documentation  Taken 10/24/2023 2000 by Mary Angulo RN  Trust Relationship/Rapport:   care explained   choices provided   questions encouraged   questions answered  Goal: Readiness for Transition of Care  Outcome: Ongoing, Progressing     Problem: Fall Injury Risk  Goal: Absence of Fall and Fall-Related Injury  Outcome: Ongoing, Progressing  Intervention: Identify and Manage Contributors  Recent Flowsheet Documentation  Taken 10/24/2023 2000 by Mary Angulo RN  Medication Review/Management: medications reviewed  Intervention: Promote Injury-Free Environment  Recent Flowsheet Documentation  Taken 10/25/2023 0600 by Mary Angulo RN  Safety Promotion/Fall Prevention:   assistive device/personal items within reach   clutter free environment maintained   fall prevention program maintained   lighting adjusted   nonskid shoes/slippers when out of bed   safety round/check completed   room organization consistent  Taken 10/25/2023 0200 by Mary Angulo RN  Safety Promotion/Fall Prevention:   assistive device/personal items within reach   clutter free environment maintained   fall prevention program maintained   lighting adjusted   nonskid shoes/slippers when out of bed   room organization consistent   safety round/check completed  Taken  10/25/2023 0000 by Mary Angulo RN  Safety Promotion/Fall Prevention:   assistive device/personal items within reach   clutter free environment maintained   fall prevention program maintained   lighting adjusted   nonskid shoes/slippers when out of bed   room organization consistent   safety round/check completed  Taken 10/24/2023 2000 by Mary Angulo RN  Safety Promotion/Fall Prevention:   assistive device/personal items within reach   clutter free environment maintained   fall prevention program maintained   lighting adjusted   nonskid shoes/slippers when out of bed   room organization consistent   safety round/check completed   Goal Outcome Evaluation:  Plan of Care Reviewed With: patient        Progress: improving  Outcome Evaluation: NIH is 0. VSS. Potassium is 5.3 this morning. MRI was performed. Patient c/o headache administered Tylenol 500 mg. Patient then could not find her words and the headache was getting worse. Placed ice pack on pt's head and administered Inapsine as prescribed. Patient's speech impairment and headache resolved. Pt has been NPO since midnight. Patient has been alert and oriented, room air and ambulates to the bathroom with assistance and a walker. Nephrology and PT is consulted. Continuing plan of care.

## 2023-10-26 ENCOUNTER — TRANSITIONAL CARE MANAGEMENT TELEPHONE ENCOUNTER (OUTPATIENT)
Dept: CALL CENTER | Facility: HOSPITAL | Age: 88
End: 2023-10-26
Payer: MEDICARE

## 2023-10-26 NOTE — OUTREACH NOTE
Call Center TCM Note      Flowsheet Row Responses   [facility] patient discharged from? [place]   Does the patient have one of the following disease processes/diagnoses(primary or secondary)? Stroke   TCM attempt successful? No   Unsuccessful attempts Attempt 1            Samantha Plascencia MA    10/26/2023, 11:27 EDT

## 2023-10-26 NOTE — OUTREACH NOTE
Call Center TCM Note      Flowsheet Row Responses   [facility] patient discharged from? [place]   Does the patient have one of the following disease processes/diagnoses(primary or secondary)? Stroke   TCM attempt successful? No   Unsuccessful attempts Attempt 2            Samantha Plascencia MA    10/26/2023, 16:04 EDT         [de-identified] : Wound with gauze that has blue hue\par Osotmy with health appearing mucosa\par Appliance on properly

## 2023-10-27 ENCOUNTER — TRANSITIONAL CARE MANAGEMENT TELEPHONE ENCOUNTER (OUTPATIENT)
Dept: CALL CENTER | Facility: HOSPITAL | Age: 88
End: 2023-10-27
Payer: MEDICARE

## 2023-10-27 NOTE — OUTREACH NOTE
Call Center TCM Note      Flowsheet Row Responses   [facility] facility patient discharged from? [place]   Does the patient have one of the following disease processes/diagnoses(primary or secondary)? Stroke   TCM attempt successful? No   Unsuccessful attempts Attempt 3            Dia Zuniga LPN    10/27/2023, 15:31 EDT

## 2023-10-29 NOTE — CASE MANAGEMENT/SOCIAL WORK
Case Management Discharge Note      Final Note: dc home         Selected Continued Care - Discharged on 10/25/2023 Admission date: 10/24/2023 - Discharge disposition: Home or Self Care      Destination    No services have been selected for the patient.                Durable Medical Equipment    No services have been selected for the patient.                Dialysis/Infusion    No services have been selected for the patient.                Home Medical Care    No services have been selected for the patient.                Therapy    No services have been selected for the patient.                Community Resources    No services have been selected for the patient.                Community & DME    No services have been selected for the patient.                         Final Discharge Disposition Code: 01 - home or self-care

## 2023-11-03 LAB — CREAT BLDA-MCNC: 2.3 MG/DL (ref 0.6–1.3)

## 2023-11-10 ENCOUNTER — OFFICE VISIT (OUTPATIENT)
Dept: CARDIOLOGY | Facility: CLINIC | Age: 88
End: 2023-11-10
Payer: MEDICARE

## 2023-11-10 VITALS
HEART RATE: 48 BPM | WEIGHT: 136 LBS | HEIGHT: 62 IN | DIASTOLIC BLOOD PRESSURE: 70 MMHG | BODY MASS INDEX: 25.03 KG/M2 | SYSTOLIC BLOOD PRESSURE: 132 MMHG

## 2023-11-10 DIAGNOSIS — I48.21 PERMANENT ATRIAL FIBRILLATION: Primary | ICD-10-CM

## 2023-11-10 DIAGNOSIS — I48.0 PAF (PAROXYSMAL ATRIAL FIBRILLATION): ICD-10-CM

## 2023-11-10 DIAGNOSIS — I10 ESSENTIAL HYPERTENSION: ICD-10-CM

## 2023-11-10 DIAGNOSIS — I25.10 CHRONIC CORONARY ARTERY DISEASE: ICD-10-CM

## 2023-11-10 PROCEDURE — 3075F SYST BP GE 130 - 139MM HG: CPT | Performed by: NURSE PRACTITIONER

## 2023-11-10 PROCEDURE — 3078F DIAST BP <80 MM HG: CPT | Performed by: NURSE PRACTITIONER

## 2023-11-10 PROCEDURE — 93000 ELECTROCARDIOGRAM COMPLETE: CPT | Performed by: NURSE PRACTITIONER

## 2023-11-10 PROCEDURE — 1160F RVW MEDS BY RX/DR IN RCRD: CPT | Performed by: NURSE PRACTITIONER

## 2023-11-10 PROCEDURE — 99214 OFFICE O/P EST MOD 30 MIN: CPT | Performed by: NURSE PRACTITIONER

## 2023-11-10 PROCEDURE — 1159F MED LIST DOCD IN RCRD: CPT | Performed by: NURSE PRACTITIONER

## 2023-11-10 NOTE — PROGRESS NOTES
Date of Office Visit: 11/10/23  Encounter Provider: ENRRIQUE Curtis  Place of Service: [facility] CARDIOLOGY  Patient Name: Marleni Hummel  :1935    Chief Complaint   Patient presents with    Atrial Fibrillation    Follow-up    Coronary Artery Disease   :     HPI: Marleni Hummel is a 87 y.o. female  with hypertension, hyperlipidemia, atrial fibrillation, ischemic cardiomyopathy, obstructive sleep apnea, left renal carcinoma status post nephrectomy and dyspnea.  She was previously followed by Dr. Tai and is now followed by Dr. Parmar.  I will follow up with her today.         She has history of atrial fibrillation with rapid ventricular response.  She also had cardioversion but went back into atrial fibrillation.  She was placed on flecainide and had repeat cardioversion.  In , she was found to be bradycardic after having some nausea and vomiting which was felt to be vagal response.  Her troponin was borderline elevated medications were adjusted.  She ultimately had ST elevation infarct taken to the cardiac catheterization laboratory which is felt to have cardiomyopathy with an ejection fraction of 20%.  She did have a circumflex lesion where she had drug-eluting stent placed.  Obviously, we had to stop flecainide and she was switched to amiodarone.  She had elevated QT interval so that was stopped.  She later was started on Lasix.     In 2019, she c/o CARROLL and had echo and perfusion stress test.  Echocardiogram revealed normal left ventricular ejection fraction and EF of 64%, borderline concentric hypertrophy, moderate thickening of the aortic valve with trace aortic valve regurgitation no aortic stenosis, mild mitral annular calcification was present with mild mitral regurgitation.  RVSP was normal.  Perfusion stress test was negative for ischemia.        She had expressive aphasia and mild weakness in 2021.  MRI did not show acute stroke.  It was felt  that she had TIA but she also had hyperkalemia that improved with IV fluid.  No medications were changed at discharge.  There was a note that patient was holding Eliquis for procedure however patient states she had not started to hold Eliquis at that time.  After that admission she was treated with Macrobid PCP.  She had side effect from macrobid. She ultimately was started on amlodipine outpatient by her PCP    In March 2023 she was hospitalized with bradycardia.  Metoprolol was decreased.  Echocardiogram showed normal left ventricular systolic function, hypokinetic apical septal and mid anteroseptal segment.  There was mild aortic valve regurgitation, mitral annular calcification with mild mitral valve regurgitation.  She complained of chest pain and had flat troponin.  Perfusion stress test showed no evidence of ischemia was low risk.  Follow-up 2-week mobile telemetry showed continuous atrial fibrillation average heart rate 63 with a range of .  In follow-up, she was prescribed Lasix as needed 04/2023.,    On October 24 she presented after having an episode of expressive aphasia.  This occurred overnight and lasted less than 5 minutes and resolved.  MRI/a of the head and neck showed no acute infarct or stenosis.  She is evaluated by neurology and home blood pressure monitoring was recommended.  Nephrology evaluated and adjusted medications for better blood pressure control.  Her potassium was 5.3 and she was started on Lokelma per nephrology.     She presents today in follow-up accompanied by her daughter.  She has no chest pain.  She has occasional shortness of breath with exertion.  She has chronic fatigue.  She takes Lasix as needed for weight gain.  She has had no unusual swelling.  She reports balance issues but no falls.  She is ambulates with a walker.  She has no blood in the urine or stool.  She has follow-up blood work next month with nephrology.  Allergies   Allergen Reactions    Morphine  "Anaphylaxis    Oxycodone Anaphylaxis    Ace Inhibitors Cough and Unknown (See Comments)    Bactrim [Sulfamethoxazole-Trimethoprim] Rash    Levofloxacin Itching and Rash     insomnia  insomnia  insomnia    Torsemide Dizziness           Family and social history reviewed.     ROS  All other systems were reviewed and are negative          Objective:     Vitals:    11/10/23 1005   BP: 132/70   BP Location: Right arm   Patient Position: Sitting   Cuff Size: Small Adult   Pulse: (!) 48   Weight: 61.7 kg (136 lb)   Height: 157.5 cm (62\")     Body mass index is 24.87 kg/m².    PHYSICAL EXAM:  Pulmonary:      Effort: Pulmonary effort is normal.      Breath sounds: Examination of the right-lower field reveals decreased breath sounds. Examination of the left-lower field reveals decreased breath sounds. Decreased breath sounds present.   Cardiovascular:      Normal rate. Regular rhythm.           ECG 12 Lead    Date/Time: 11/10/2023 10:24 AM  Performed by: Oralia Lutz APRN    Authorized by: Oralia Lutz APRN  Comparison: compared with previous ECG   Similar to previous ECG  Rhythm: sinus bradycardia  Rate: normal  Q waves: V1 and V2    T inversion: V1  T flattening: V2, V3 and aVL  QRS axis: left    Clinical impression: abnormal EKG  Comments: No significant change             Current Outpatient Medications   Medication Sig Dispense Refill    acetaminophen (TYLENOL) 500 MG tablet Take 1 tablet by mouth Every 4 (Four) Hours As Needed for Mild Pain.      atorvastatin (LIPITOR) 20 MG tablet Take 1 tablet by mouth Every Night for 270 days. 90 tablet 2    desonide (DESOWEN) 0.05 % cream Apply to affected area(s) of ears up to twice daily as needed for itching/ flaking for 30 days      Eliquis 2.5 MG tablet tablet TAKE 1 TABLET EVERY 12 HOURS (TWICE A DAY) 180 tablet 3    escitalopram (LEXAPRO) 20 MG tablet Take 1 tablet by mouth Daily for 270 days. 90 tablet 2    furosemide (LASIX) 20 MG tablet Take 1 tablet by mouth Daily As " Needed (if weight goes up 3lbs in 3 days). 30 tablet 11    hydroCHLOROthiazide (HYDRODIURIL) 12.5 MG tablet Take 1 tablet by mouth Daily. 30 tablet 0    metoprolol succinate XL (TOPROL-XL) 25 MG 24 hr tablet Take 1 tablet by mouth Daily for 270 days. 90 tablet 2    Mirabegron ER (MYRBETRIQ) 50 MG tablet sustained-release 24 hour 24 hr tablet Take 50 mg by mouth Every Evening.      sodium zirconium cyclosilicate (LOKELMA) 10 g pack Take 10 g by mouth Daily. 30 each 0     No current facility-administered medications for this visit.     Assessment:       Diagnosis Plan   1. Permanent atrial fibrillation        2. PAF (paroxysmal atrial fibrillation)  ECG 12 Lead      3. Chronic coronary artery disease        4. Essential hypertension             Orders Placed This Encounter   Procedures    ECG 12 Lead     This order was created via procedure documentation     Order Specific Question:   Release to patient     Answer:   Routine Release [[id number]]         Plan:       1. 86 year-old female with chronic atrial fibrillation . CHADS2-VASc score is 6.  Anticoagulated with Eliquis 2.5 twice daily.    2. Coronary artery disease status post drug-eluting stent placed to the left circumflex in October 2015.  Negative perfusion stress test March 2023  3. Hypertension blood pressure today is stable  4.  Hyperlipidemia on atorvastatin 20 mg   5.  Chronic kidney disease  followed by Dr. Leonard.  Reviewed creatinine from just last month at 2.08  6.  History of prolonged QT on amiodarone  7.   Mild valvular regurgitation involving the aortic valve and mitral valve.  She also has mild calcification on last echo  8.  History of obstructive sleep apnea-she stopped treating that 5-6 years ago  9.   Left renal carcinoma status post total nephrectomy   10.  TIA 10/2023.   11.  Bradycardia.  Metoprolol was decreased March 2023.  Follow-up 2-week mobile telemetry showed no significant bradycardia with an average heart rate in the  60s  12.Chronic back pain she is following with Dr. Hook..  She recently had symptom improvement with epidural.  She did well at this time holding Eliquis just 3 days   13.  History of ischemic cardiomyopathy with an ejection fraction of 20% October 2015 which returned to normal  14.  Hyperkalemia-managed by nephrology.  Now on Lokelma.  He has follow-up next month and repeat renal function      Follow-up in 6 months        It has been a pleasure to participate in this patient's care.      Thank you,  ENRRIQUE Curtis      **I used Dragon to dictate this note:**

## 2023-11-14 ENCOUNTER — TELEPHONE (OUTPATIENT)
Dept: CARDIOLOGY | Facility: CLINIC | Age: 88
End: 2023-11-14
Payer: MEDICARE

## 2023-11-14 NOTE — TELEPHONE ENCOUNTER
Patient daughter Silvia is calling stating she is very concerned about her Mom. She stated that her Mom has extreme Fatigue that is more so than usual and that her heart rate is ranging in the 40's.  She is asking if there is something she needs to do, or if her Mom needs to go to the ER.     Patient is currently taking Metoprolol Succinate 25 MG BID.       She can be reached at [phone]    Thanks  JOSE De La Cruz   11/14/2023

## 2023-11-15 NOTE — TELEPHONE ENCOUNTER
I spoke with Marleni Hummel's daughter, Silvia, and updated her on recommendations from provider.  She verbalized understanding and has no further questions at this time.    Silvia says that the pt is still resting this morning.  She says that pt was feeling a little dizzy when her HR was low.  Silvia says that her BP yesterday was 134 SBP, when her HR was around 48.  She's going to stop the metoprolol and will continue to monitor HR/BP at home.    Thank you,    Suellen PINA RN  Triage [facility] [place]  11/15/23 09:11 EST

## 2023-12-07 ENCOUNTER — TELEPHONE (OUTPATIENT)
Dept: FAMILY MEDICINE CLINIC | Facility: CLINIC | Age: 88
End: 2023-12-07

## 2023-12-07 NOTE — TELEPHONE ENCOUNTER
Caller: NEAL HERNANDEZ    Relationship: Emergency Contact    Best call back number: [phone]     What is the best time to reach you: ANYTIME    Who are you requesting to speak with (clinical staff, provider,  specific staff member): CLINICAL    What was the call regarding: PATIENTS DAUGHTER STATED THE PATIENT HAS BEEN VERY SAD, DEPRESSED, TIRED, AND DOES NOT WANT TO DO ANYTHING.    PATIENTS DAUGHTER IS REQUESTING TO KNOW IF THE PATIENT MAY BE PRESCRIBED A DIFFERENT ANTIDEPRESSANT, OR OF THERE IS SOMETHING THAT MAY BE DONE TO HELP THE PATIENT.    PLEASE CALL TO DISCUSS AND ADVISE.

## 2023-12-15 ENCOUNTER — HOSPITAL ENCOUNTER (EMERGENCY)
Facility: HOSPITAL | Age: 88
Discharge: LEFT BEFORE DISCHARGE | End: 2023-12-15
Payer: OTHER GOVERNMENT

## 2023-12-15 VITALS
SYSTOLIC BLOOD PRESSURE: 173 MMHG | OXYGEN SATURATION: 95 % | DIASTOLIC BLOOD PRESSURE: 66 MMHG | BODY MASS INDEX: 22.08 KG/M2 | RESPIRATION RATE: 18 BRPM | HEART RATE: 62 BPM | TEMPERATURE: 97.8 F | HEIGHT: 62 IN | WEIGHT: 120 LBS

## 2023-12-15 PROCEDURE — 99211 OFF/OP EST MAY X REQ PHY/QHP: CPT

## 2023-12-15 NOTE — ED TRIAGE NOTES
Pt was brought in by ems from home for difficulty urinating. Pt denies urge or pressure to urinate. Pt last urination 1800

## 2023-12-15 NOTE — ED NOTES
Daughter to triage desk stating that her mother (patient) was able to urinate, is comfortable and has no complaints at this time other than she wants to go home. This RN explained the risks of leaving without being seen. Daughter and pt verbalized understanding of benefits of staying as well as possible risks of leaving. Daughter and patient signed LWBS form.     Pt ambulated out of er steady gait, aaox4 & patent airway with family to drive home.

## 2024-01-04 DIAGNOSIS — N18.4 CKD (CHRONIC KIDNEY DISEASE) STAGE 4, GFR 15-29 ML/MIN: Primary | ICD-10-CM

## 2024-01-17 ENCOUNTER — OFFICE VISIT (OUTPATIENT)
Dept: FAMILY MEDICINE CLINIC | Facility: CLINIC | Age: 89
End: 2024-01-17
Payer: OTHER GOVERNMENT

## 2024-01-17 VITALS
DIASTOLIC BLOOD PRESSURE: 84 MMHG | HEIGHT: 62 IN | SYSTOLIC BLOOD PRESSURE: 138 MMHG | OXYGEN SATURATION: 97 % | TEMPERATURE: 96.7 F | HEART RATE: 78 BPM | WEIGHT: 130 LBS | RESPIRATION RATE: 16 BRPM | BODY MASS INDEX: 23.92 KG/M2

## 2024-01-17 DIAGNOSIS — R44.1 HALLUCINATION, VISUAL: ICD-10-CM

## 2024-01-17 DIAGNOSIS — E78.00 HYPERCHOLESTEROLEMIA: ICD-10-CM

## 2024-01-17 DIAGNOSIS — F41.0 PANIC DISORDER: ICD-10-CM

## 2024-01-17 DIAGNOSIS — N39.41 URGE INCONTINENCE OF URINE: ICD-10-CM

## 2024-01-17 DIAGNOSIS — Z00.00 MEDICARE ANNUAL WELLNESS VISIT, SUBSEQUENT: Primary | ICD-10-CM

## 2024-01-17 DIAGNOSIS — M81.0 SENILE OSTEOPOROSIS: ICD-10-CM

## 2024-01-17 DIAGNOSIS — Z91.81 AT MODERATE RISK FOR FALL: ICD-10-CM

## 2024-01-17 DIAGNOSIS — I50.32 CHRONIC DIASTOLIC CONGESTIVE HEART FAILURE: ICD-10-CM

## 2024-01-17 RX ORDER — ESCITALOPRAM OXALATE 20 MG/1
20 TABLET ORAL DAILY
Qty: 90 TABLET | Refills: 2 | Status: SHIPPED | OUTPATIENT
Start: 2024-01-17 | End: 2024-10-13

## 2024-01-17 RX ORDER — VIBEGRON 75 MG/1
1 TABLET, FILM COATED ORAL EVERY MORNING
Qty: 42 TABLET | Refills: 0 | COMMUNITY
Start: 2024-01-17 | End: 2024-02-28

## 2024-01-17 RX ORDER — METOPROLOL SUCCINATE 25 MG/1
25 TABLET, EXTENDED RELEASE ORAL DAILY
Qty: 90 TABLET | Refills: 2 | Status: SHIPPED | OUTPATIENT
Start: 2024-01-17 | End: 2024-10-13

## 2024-01-17 RX ORDER — CHOLECALCIFEROL (VITAMIN D3) 125 MCG
5 CAPSULE ORAL NIGHTLY PRN
COMMUNITY

## 2024-01-17 RX ORDER — ATORVASTATIN CALCIUM 20 MG/1
20 TABLET, FILM COATED ORAL NIGHTLY
Qty: 90 TABLET | Refills: 2 | Status: SHIPPED | OUTPATIENT
Start: 2024-01-17 | End: 2024-10-13

## 2024-01-17 RX ORDER — VIBEGRON 75 MG/1
1 TABLET, FILM COATED ORAL DAILY
COMMUNITY

## 2024-01-17 NOTE — PATIENT INSTRUCTIONS
Fall Prevention in the Home, Adult  Falls can cause injuries and affect people of all ages. There are many simple things that you can do to make your home safe and to help prevent falls.  If you need it, ask for help making these changes.  What actions can I take to prevent falls?  General information  Use good lighting in all rooms. Make sure to:  Replace any light bulbs that burn out.  Turn on lights if it is dark and use night-lights.  Keep items that you use often in easy-to-reach places. Lower the shelves around your home if needed.  Move furniture so that there are clear paths around it.  Do not keep throw rugs or other things on the floor that can make you trip.  If any of your floors are uneven, fix them.  Add color or contrast paint or tape to clearly shara and help you see:  Grab bars or handrails.  First and last steps of staircases.  Where the edge of each step is.  If you use a ladder or stepladder:  Make sure that it is fully opened. Do not climb a closed ladder.  Make sure the sides of the ladder are locked in place.  Have someone hold the ladder while you use it.  Know where your pets are as you move through your home.  What can I do in the bathroom?         Keep the floor dry. Clean up any water that is on the floor right away.  Remove soap buildup in the bathtub or shower. Buildup makes bathtubs and showers slippery.  Use non-skid mats or decals on the floor of the bathtub or shower.  Attach bath mats securely with double-sided, non-slip rug tape.  If you need to sit down while you are in the shower, use a non-slip stool.  Install grab bars by the toilet and in the bathtub and shower. Do not use towel bars as grab bars.  What can I do in the bedroom?  Make sure that you have a light by your bed that is easy to reach.  Do not use any sheets or blankets on your bed that hang to the floor.  Have a firm bench or chair with side arms that you can use for support when you get dressed.  What can I do in  the kitchen?  Clean up any spills right away.  If you need to reach something above you, use a sturdy step stool that has a grab bar.  Keep electrical cables out of the way.  Do not use floor polish or wax that makes floors slippery.  What can I do with my stairs?  Do not leave anything on the stairs.  Make sure that you have a light switch at the top and the bottom of the stairs. Have them installed if you do not have them.  Make sure that there are handrails on both sides of the stairs. Fix handrails that are broken or loose. Make sure that handrails are as long as the staircases.  Install non-slip stair treads on all stairs in your home if they do not have carpet.  Avoid having throw rugs at the top or bottom of stairs, or secure the rugs with carpet tape to prevent them from moving.  Choose a carpet design that does not hide the edge of steps on the stairs. Make sure that carpet is firmly attached to the stairs. Fix any carpet that is loose or worn.  What can I do on the outside of my home?  Use bright outdoor lighting.  Repair the edges of walkways and driveways and fix any cracks. Clear paths of anything that can make you trip, such as tools or rocks.  Add color or contrast paint or tape to clearly shara and help you see high doorway thresholds.  Trim any bushes or trees on the main path into your home.  Check that handrails are securely fastened and in good repair. Both sides of all steps should have handrails.  Install guardrails along the edges of any raised decks or porches.  Have leaves, snow, and ice cleared regularly. Use sand, salt, or ice melt on walkways during winter months if you live where there is ice and snow.  In the garage, clean up any spills right away, including grease or oil spills.  What other actions can I take?  Review your medicines with your health care provider. Some medicines can make you confused or feel dizzy. This can increase your chance of falling.  Wear closed-toe shoes that  fit well and support your feet. Wear shoes that have rubber soles and low heels.  Use a cane, walker, scooter, or crutches that help you move around if needed.  Talk with your provider about other ways that you can decrease your risk of falls. This may include seeing a physical therapist to learn to do exercises to improve movement and strength.  Where to find more information  Centers for Disease Control and Prevention, [place]ST[place]: cdc.gov  National Terry on Aging: ramiro.nih.gov  National Terry on Aging: ramiro.nih.gov  Contact a health care provider if:  You are afraid of falling at home.  You feel weak, drowsy, or dizzy at home.  You fall at home.  Get help right away if you:  Lose consciousness or have trouble moving after a fall.  Have a fall that causes a head injury.  These symptoms may be an emergency. Get help right away. Call 911.  Do not wait to see if the symptoms will go away.  Do not drive yourself to the hospital.  This information is not intended to replace advice given to you by your health care provider. Make sure you discuss any questions you have with your health care provider.  Document Revised: 08/21/2023 Document Reviewed: 08/21/2023  [employer] Patient Education © 2023 [employer]    Sit-to-Stand Exercise    The sit-to-stand exercise (also known as the chair stand or chair rise exercise) strengthens your lower body and helps you maintain or improve your mobility and independence. The end goal is to do the sit-to-stand exercise without using your hands. This will be easier as you become stronger. You should always talk with your health care provider before starting any exercise program, especially if you have had recent surgery.  Do the exercise exactly as told by your health care provider and adjust it as directed. It is normal to feel mild stretching, pulling, tightness, or discomfort as you do this exercise, but you should stop right away if you feel sudden pain or your pain gets worse. Do  not begin doing this exercise until told by your health care provider.  What the sit-to-stand exercise does  The sit-to-stand exercise helps to strengthen the muscles in your thighs and the muscles in the center of your body that give you stability (core muscles). This exercise is especially helpful if:  You have had knee or hip surgery.  You have trouble getting up from a chair, out of a car, or off the toilet due to muscle weakness.  How to do the sit-to-stand exercise  Sit toward the front edge of a sturdy chair without armrests. Your knees should be bent and your feet should be flat on the floor and shoulder-width apart and underneath your hips.  Place your hands lightly on each side of the seat. Keep your back and neck as straight as possible, with your chest slightly forward.  Breathe in slowly. Lean forward and slightly shift your weight to the front of your feet.  Breathe out as you slowly stand up. Try not to support any weight with your hands.  Stand and pause for a full breath in and out.  Breathe in as you sit down slowly. Tighten your core and abdominal muscles to control your lowering as much as possible. You should lower yourself back to the chair slowly, not just drop back into the seat.  Breathe out slowly.  Do this exercise 10-15 times. If needed, do it fewer times until you build up strength.  Rest for 1 minute, then do another set of 10-15 repetitions.  To change the difficulty of the sit-to-stand exercise  If the exercise is too difficult, use a chair with sturdy armrests, and push off the armrests to help you come to the standing position. You can also use the armrests to help slowly lower yourself back to sitting. As this gets easier, try to use your arms less. You can also place a firm cushion or pillow on the chair to make the surface higher.  If this exercise is too easy, do not use your arms to help raise or lower yourself. You can also wear a weighted vest, use hand weights, increase your  repetitions, or try a lower chair.  General tips  You may feel tired when starting an exercise routine. This is normal.  You may have muscle soreness that lasts a few days. This is normal. As you get stronger, you may not feel muscle soreness.  Use smooth, steady movements.  Do not  hold your breath during strength exercises. This can cause unsafe changes in your blood pressure.  Breathe in slowly through your nose, and breathe out slowly through your mouth.  Summary  Strengthening your lower body is an important step to help you move safely and independently.  The sit-to-stand exercise helps strengthen the muscles in your thighs and core.  You should always talk with your health care provider before starting any exercise program, especially if you have had recent surgery.  This information is not intended to replace advice given to you by your health care provider. Make sure you discuss any questions you have with your health care provider.  Document Revised: 04/10/2022 Document Reviewed: 04/10/2022  [employer] Patient Education © 2023 [employer]        You are due for Shingrix vaccination series to prevent Shingles. Please get the immunization at your local pharmacy. It is more effective than the old Zostavax vaccine and is recommended even if you have had the Zostavax vaccine in the past. For more information, please look at the website below:  https://www.cdc.gov/vaccines/vpd/shingles/public/shingrix/index.html    Shingrix Vaccine Injection  What is this medication?  ZOSTER VACCINE (ZOS ter vak SEEN) reduces the risk of herpes zoster (shingles). It does not treat shingles. It is still possible to get shingles after receiving the vaccine, but the symptoms may be less severe or not last as long. It works by helping your immune system learn how to fight off a future infection.  This medicine may be used for other purposes; ask your health care provider or pharmacist if you have questions.  COMMON BRAND NAME(S):  SHINGRIX  What should I tell my care team before I take this medication?  They need to know if you have any of these conditions:  Cancer  Immune system problems  An unusual or allergic reaction to Zoster vaccine, other medications, foods, dyes, or preservatives  Pregnant or trying to get pregnant  Breastfeeding  How should I use this medication?  This vaccine is injected into a muscle. It is given by your care team.  This vaccine requires 2 doses to get the full benefit. Set a reminder for when your next dose is due.  A copy of Vaccine Information Statements will be given before each vaccination. Be sure to read this information carefully each time. This sheet may change often.  Talk to your care team about the use of this vaccine in children. This vaccine is not approved for use in children.  Overdosage: If you think you have taken too much of this medicine contact a poison control center or emergency room at once.  NOTE: This medicine is only for you. Do not share this medicine with others.  What if I miss a dose?  Keep appointments for follow-up (booster) doses. It is important not to miss your dose. Call your care team if you are unable to keep an appointment.  What may interact with this medication?  Medications that suppress your immune system  Medications to treat cancer  Steroid medications, such as prednisone or cortisone  This list may not describe all possible interactions. Give your health care provider a list of all the medicines, herbs, non-prescription drugs, or dietary supplements you use. Also tell them if you smoke, drink alcohol, or use illegal drugs. Some items may interact with your medicine.  What should I watch for while using this medication?  Visit your care team regularly.  This vaccine, like all vaccines, may not fully protect everyone.  What side effects may I notice from receiving this medication?  Side effects that you should report to your care team as soon as possible:  Allergic  reactions--skin rash, itching, hives, swelling of the face, lips, tongue, or throat  Side effects that usually do not require medical attention (report these to your care team if they continue or are bothersome):  Chills  Fatigue  Feeling faint or lightheaded  Fever  Headache  Muscle pain  Pain, redness, or irritation at injection site  This list may not describe all possible side effects. Call your doctor for medical advice about side effects. You may report side effects to FDA at 2-653-SSL-6037.  Where should I keep my medication?  This vaccine is only given by your care team. It will not be stored at home.  NOTE: This sheet is a summary. It may not cover all possible information. If you have questions about this medicine, talk to your doctor, pharmacist, or health care provider.  © 2023 [employer]/Gold Standard (2023-06-08 00:00:00)     Annual flu shot has been recommended to patient. If you are age 65 or greater, then get the high dose influenza vaccination. Optimal timing of this vaccination is in mid October of each year.    Influenza (Flu) Vaccine (Inactivated or Recombinant): What You Need to Know  1. Why get vaccinated?  Influenza vaccine can prevent influenza (flu).  Flu is a contagious disease that spreads around the [place] every year, usually between October and May. Anyone can get the flu, but it is more dangerous for some people. Infants and young children, people 65 years and older, pregnant people, and people with certain health conditions or a weakened immune system are at greatest risk of flu complications.  Pneumonia, bronchitis, sinus infections, and ear infections are examples of flu-related complications. If you have a medical condition, such as heart disease, cancer, or diabetes, flu can make it worse.  Flu can cause fever and chills, sore throat, muscle aches, fatigue, cough, headache, and runny or stuffy nose. Some people may have vomiting and diarrhea, though this is more common in  "children than adults.  In an average year, thousands of people in the [place] die from flu, and many more are hospitalized. Flu vaccine prevents millions of illnesses and flu-related visits to the doctor each year.  2. Influenza vaccines  CDC recommends everyone 6 months and older get vaccinated every flu season. Children 6 months through 8 years of age may need 2 doses during a single flu season. Everyone else needs only 1 dose each flu season.  It takes about 2 weeks for protection to develop after vaccination.  There are many flu viruses, and they are always changing. Each year a new flu vaccine is made to protect against the influenza viruses believed to be likely to cause disease in the upcoming flu season. Even when the vaccine doesn't exactly match these viruses, it may still provide some protection.  Influenza vaccine does not cause flu.  Influenza vaccine may be given at the same time as other vaccines.  3. Talk with your health care provider  Tell your vaccination provider if the person getting the vaccine:  Has had an allergic reaction after a previous dose of influenza vaccine, or has any severe, life-threatening allergies  Has ever had Guillain-Barré Syndrome (also called \"GBS\")  In some cases, your health care provider may decide to postpone influenza vaccination until a future visit.  Influenza vaccine can be administered at any time during pregnancy. People who are or will be pregnant during influenza season should receive inactivated influenza vaccine.  People with minor illnesses, such as a cold, may be vaccinated. People who are moderately or severely ill should usually wait until they recover before getting influenza vaccine.  Your health care provider can give you more information.  4. Risks of a vaccine reaction  Soreness, redness, and swelling where the shot is given, fever, muscle aches, and headache can happen after influenza vaccination.  There may be a very small increased risk of " Guillain-Barré Syndrome (GBS) after inactivated influenza vaccine (the flu shot).  Young children who get the flu shot along with pneumococcal vaccine (PCV13) and/or DTaP vaccine at the same time might be slightly more likely to have a seizure caused by fever. Tell your health care provider if a child who is getting flu vaccine has ever had a seizure.  People sometimes faint after medical procedures, including vaccination. Tell your provider if you feel dizzy or have vision changes or ringing in the ears.  As with any medicine, there is a very remote chance of a vaccine causing a severe allergic reaction, other serious injury, or death.  5. What if there is a serious problem?  An allergic reaction could occur after the vaccinated person leaves the clinic. If you see signs of a severe allergic reaction (hives, swelling of the face and throat, difficulty breathing, a fast heartbeat, dizziness, or weakness), call 9-1-1 and get the person to the nearest hospital.  For other signs that concern you, call your health care provider.  Adverse reactions should be reported to the Vaccine Adverse Event Reporting System (VAERS). Your health care provider will usually file this report, or you can do it yourself. Visit the VAERS website at www.vaers.hhs.gov or call 1-253.553.1657. VAERS is only for reporting reactions, and VAERS staff members do not give medical advice.  6. The National Vaccine Injury Compensation Program  The National Vaccine Injury Compensation Program (VICP) is a federal program that was created to compensate people who may have been injured by certain vaccines. Claims regarding alleged injury or death due to vaccination have a time limit for filing, which may be as short as two years. Visit the VICP website at www.hrsa.gov/vaccinecompensation or call 1-813.942.8197 to learn about the program and about filing a claim.  7. How can I learn more?  Ask your health care provider.  Call your local or [facility]  department.  Visit the website of the Food and Drug Administration (FDA) for vaccine package inserts and additional information at www.fda.gov/vaccines-blood-biologics/vaccines.  Contact the Centers for Disease Control and Prevention (CDC):  Call 1-576.241.3442 (6-784-OPN-INFO) or  Visit CDC's website at www.cdc.gov/flu.  Source: CDC Vaccine Information Statement Inactivated Influenza Vaccine (8/6/2021)  This same material is available at www.cdc.gov for no charge.  This information is not intended to replace advice given to you by your health care provider. Make sure you discuss any questions you have with your health care provider.  Document Revised: 11/15/2022 Document Reviewed: 09/08/2022  [employer] Patient Education © 2023 [employer]     RSV vaccine is recommended to prevent Respiratory syncytial virus infection. It is recommended for adults over age 60. Please get this vaccination at your local pharmacy.  I have included some information about this infection for your consideration.     Respiratory Syncytial Virus Infection, Adult  Respiratory syncytial virus (RSV) infection is an infection caused by RSV, a common virus. This virus is similar to viruses that cause the common cold and the flu. RSV infection can affect the nose, throat, windpipe, and lungs (respiratory system). When the infection is severe, it can cause:  Bronchiolitis. This condition causes inflammation of the air passages in the lungs (bronchioles).  Pneumonia. This condition causes inflammation of the air sacs in the lungs.  RSV infection spreads from person to person (is contagious) through droplets from coughs and sneezes (respiratory secretions). This condition is rarely serious when it occurs in adults.  What are the causes?  This condition is caused by contact with RSV. This can happen by:  Breathing respiratory secretions from someone who has the infection.  Touching something that has been exposed to the virus (is contaminated) and  then touching your mouth, nose, or eyes.  Coming in close contact with someone who has this infection. This may happen if you:  Hug or kiss.  Shake or hold hands.  Eat or drink using the same dishes or utensils.  What increases the risk?  The following factors may make you more likely to develop this condition:  Being 65 years of age or older.  Having certain health conditions, including:  A long-term (chronic) lung condition, such as chronic obstructive pulmonary disease (COPD).  An immune system that is weak. This is your body's defense system.  Down syndrome.  Heart disease.  Working in a hospital or other health care facility.  Living in a long-term health care facility.  RSV infections are most common from the months of November to April, but they can happen any time of year.  What are the signs or symptoms?  Symptoms of this condition include:  Having a runny nose.  Coughing. You may have a cough that brings up mucus (productive cough).  Sneezing.  Having a fever.  Wanting to eat less than usual.  Breathing loudly (wheezing).  Having shortness of breath.  Having fluid build up in the lungs (respiratory distress).  How is this diagnosed?  This condition may be diagnosed based on:  Your symptoms.  Your medical history.  A physical exam.  A chest X-ray to rule out pneumonia.  Blood tests or tests of mucus from your lungs (sputum). These tests may be done for older adults.  A test of a sample of your respiratory secretions.  How is this treated?  In most cases, the RSV infection will go away after 1-2 weeks of caring for yourself at home.   Sometimes, RSV infection is severe and can cause bronchiolitis or pneumonia. If you develop one or both of these conditions, you may need to be treated in the hospital. You may be given:  Oxygen therapy.  Antiviral medicine.  Medicines to open your bronchioles (bronchodilators).  Follow these instructions at home:  Medicines  Take over-the-counter and prescription medicines  only as told by your health care provider.  If you were prescribed an antiviral medicine, take it as told by your health care provider. Do not stop using the antiviral even if you start to feel better.  Lifestyle    Eat a healthy diet.  Do not drink alcohol.  Do not use any products that contain nicotine or tobacco, such as cigarettes, e-cigarettes, and chewing tobacco. If you need help quitting, ask your health care provider.  Rest at home until your symptoms go away.  Return to your normal activities as told by your health care provider. Ask your health care provider what activities are safe for you.  General instructions    Drink enough fluid to keep your urine pale yellow.  Gargle with a salt-water mixture 3-4 times a day or as needed. To make a salt-water mixture, completely dissolve ½-1 tsp (3-6 g) of salt in 1 cup (237 mL) of warm water.  Keep all follow-up visits as told by your health care provider. This is important.  How is this prevented?  To prevent catching and spreading RSV:  Wash your hands often with soap and water for at least 20 seconds. If soap and water are not available, use hand . Do not touch your face without first cleaning your hands.  Stay home if you have symptoms of the common cold or the flu.  Cover your nose and mouth when you cough or sneeze.  Avoid large groups of people.  Keep a safe distance of about 6 feet (1.8 m) from people who are coughing or sneezing.  Where to find more information  Centers for Disease Control and Prevention: www.cdc.gov  Contact a health care provider if:  Your symptoms get worse or have not changed after 2 weeks.  You have:  A fever.  Hot flashes, sweating, or chills that keep happening.  A cough that brings up much more mucus than usual.  A cough that brings up blood.  You feel:  Very tired (lethargic).  Confused.  Get help right away if:  You have increased or severe trouble breathing.  You lose consciousness.  These symptoms may represent a  serious problem that is an emergency. Do not wait to see if the symptoms will go away. Get medical help right away. Call your local emergency services (911 in the [place] Do not drive yourself to the hospital.  Summary  Respiratory syncytial virus (RSV) infection is an infection caused by RSV, a common virus. RSV infection can affect the nose, throat, windpipe, and lungs (respiratory system).  When the infection is severe, it can cause bronchiolitis or pneumonia.  Take over-the-counter and prescription medicines only as told by your health care provider.  Contact a health care provider if your symptoms get worse or have not changed after 2 weeks.  This information is not intended to replace advice given to you by your health care provider. Make sure you discuss any questions you have with your health care provider.  Document Revised: 09/30/2020 Document Reviewed: 10/07/2020  [employer] Patient Education © 2022 [employer]     You are due for Covid booster. Please get this vaccine at your local pharmacy. COVID-19 vaccine may be administered without regards to timing of other vaccines.  If administering on the same day as another vaccine, administer in a different site (arm).  COVID-19 VACCINE reduces the risk of COVID-19. It does not treat COVID-19. It is still possible to get COVID-19 after receiving this vaccine, but the symptoms may be less severe or not last as long. It works by helping your immune system learn how to fight off a future infection.     What side effects may I notice from receiving this medication?  Side effects that you should report to your care team as soon as possible:  Allergic reactions--skin rash, itching, hives, swelling of the face, lips, tongue, or throat  Heart muscle inflammation--unusual weakness or fatigue, shortness of breath, chest pain, fast or irregular heartbeat, dizziness, swelling of the ankles, feet, or hands  Side effects that usually do not require medical attention (report  these to your care team if they continue or are bothersome):  Chills  Fatigue  Fever  Headache  Joint pain  Muscle pain  Nausea  Pain, redness, or irritation at injection site  Swollen lymph nodes  Vomiting  This list may not describe all possible side effects. Call your doctor for medical advice about side effects. You may report side effects to FDA at 9-969-YVJ-9612.       Medicare Wellness  Personal Prevention Plan of Service     Date of Office Visit:    Encounter Provider:  Ken Andujar MD  Place of Service:  [facility]  Patient Name: Marleni Hummel  :  1935    As part of the Medicare Wellness portion of your visit today, we are providing you with this personalized preventive plan of services (PPPS). This plan is based upon recommendations of the [place] Preventive Services Task Force (USPSTF) and the Advisory Committee on Immunization Practices (ACIP).    This lists the preventive care services that should be considered, and provides dates of when you are due. Items listed as completed are up-to-date and do not require any further intervention.    Health Maintenance   Topic Date Due    DXA SCAN  2023    ZOSTER VACCINE (1 of 2) 2024 (Originally 12/10/1987)    COVID-19 Vaccine (2023- season) 2024 (Originally 2023)    INFLUENZA VACCINE  2024 (Originally 2023)    LIPID PANEL  10/24/2024    ANNUAL WELLNESS VISIT  2025    TDAP/TD VACCINES (2 - Td or Tdap) 10/27/2028    Pneumococcal Vaccine 65+  Completed       Orders Placed This Encounter   Procedures    DEXA Bone Density, Axial (Hospital)     Standing Status:   Future     Standing Expiration Date:   2025     Scheduling Instructions:      Schedule at [facility] or at [place] site(pt preferred)     Order Specific Question:   Is patient taking or have taken long term Glucocorticoid (steroids)?     Answer:   No     Order Specific Question:   Does the  patient have rheumatoid arthritis?     Answer:   No     Order Specific Question:   Does the patient have secondary osteoporosis?     Answer:   No     Order Specific Question:   Reason for Exam:     Answer:   osteoporosis surveillance     Order Specific Question:   Release to patient     Answer:   Routine Release [[id number]]    Ambulatory Referral to Psychiatry     Referral Priority:   Routine     Referral Type:   Behavorial Health/Psych     Referral Reason:   Specialty Services Required     Requested Specialty:   Psychiatry     Number of Visits Requested:   1       No follow-ups on file.

## 2024-01-17 NOTE — PROGRESS NOTES
The ABCs of the Annual Wellness Visit  Subsequent Medicare Wellness Visit    Subjective    Marleni Hummel is a 88 y.o. female who presents for a Subsequent Medicare Wellness Visit.    The following portions of the patient's history were reviewed and   updated as appropriate: allergies, current medications, past family history, past medical history, past social history, past surgical history, and problem list.    Compared to one year ago, the patient feels her physical   health is better.    Compared to one year ago, the patient feels her mental   health is worse. Due to poor sleep, and living alone, and hallucinations    Recent Hospitalizations:  This patient has had a [facility] admission record on file within the last 365 days.    Current Medical Providers:  Patient Care Team:  Ken Andujar MD as PCP - General  Chuckie Mclaughlin MD as Consulting Physician (Urology)  Geoffrey Mills MD as Consulting Physician (Nephrology)  Anayeli Alanis MD as Consulting Physician (Obstetrics and Gynecology)  BROOK Clarke MD as Consulting Physician (Ophthalmology)  Jose Alfredo Krishnamurthy MD as Consulting Physician (Hematology and Oncology)  Sean Canales MD as Consulting Physician (Orthopedic Surgery)  Esteban Moe MD as Consulting Physician (Orthopedic Surgery)  Feliciano Elizabeth MD as Consulting Physician (Gastroenterology)  Wallace Hook MD as Consulting Physician (Pain Medicine)  Sarah Beth Marcus APRN as Nurse Practitioner (Nephrology)  Brandon Miller MD as Consulting Physician (Gastroenterology)  Sybil Parmar MD as Consulting Physician (Cardiology)  Joseph Leonard MD as Consulting Physician (Nephrology)    Outpatient Medications Prior to Visit   Medication Sig Dispense Refill    acetaminophen (TYLENOL) 500 MG tablet Take 1 tablet by mouth Every 4 (Four) Hours As Needed for Mild Pain.      desonide (DESOWEN) 0.05 % cream Apply to affected area(s) of ears up to twice  daily as needed for itching/ flaking for 30 days      Eliquis 2.5 MG tablet tablet TAKE 1 TABLET EVERY 12 HOURS (TWICE A DAY) 180 tablet 3    furosemide (LASIX) 20 MG tablet Take 1 tablet by mouth Daily As Needed (if weight goes up 3lbs in 3 days). 30 tablet 11    hydroCHLOROthiazide (HYDRODIURIL) 12.5 MG tablet Take 1 tablet by mouth Daily. 30 tablet 0    melatonin 5 MG tablet tablet Take 1 tablet by mouth At Night As Needed.      sodium zirconium cyclosilicate (LOKELMA) 10 g pack Take 10 g by mouth Daily. (Patient taking differently: Take 10 g by mouth 2 (Two) Times a Day.) 30 each 0    Vibegron (Gemtesa) 75 MG tablet Take 1 tablet by mouth Daily. Indications: Frequent Urination      atorvastatin (LIPITOR) 20 MG tablet Take 1 tablet by mouth Every Night for 270 days. 90 tablet 2    escitalopram (LEXAPRO) 20 MG tablet Take 1 tablet by mouth Daily for 270 days. 90 tablet 2    metoprolol succinate XL (TOPROL-XL) 25 MG 24 hr tablet Take 1 tablet by mouth Daily for 270 days. 90 tablet 2    Mirabegron ER (MYRBETRIQ) 50 MG tablet sustained-release 24 hour 24 hr tablet Take 50 mg by mouth Every Evening. (Patient not taking: Reported on 1/17/2024)       No facility-administered medications prior to visit.       No opioid medication identified on active medication list. I have reviewed chart for other potential  high risk medication/s and harmful drug interactions in the elderly.        Aspirin is not on active medication list.  Aspirin use is contraindicated for this patient due to: current use of Eliquis.  .    Patient Active Problem List   Diagnosis    Arthritis involving multiple sites    Essential hypertension    Permanent atrial fibrillation    History of Bell's palsy    CKD (chronic kidney disease) stage 4, GFR 15-29 ml/min    Gastroesophageal reflux disease without esophagitis    Hypercholesterolemia    Insomnia    Localized osteoporosis without current pathological fracture    Panic disorder    Chronic nonseasonal  "allergic rhinitis due to pollen    Other sleep apnea    History of MI (myocardial infarction)    Chronic coronary artery disease    Anemia of chronic disease    Weakness of right leg    Cardiac murmur    Senile osteoporosis    Hyperuricemia    Grief reaction    Peripheral vertigo    Renal cell carcinoma of left kidney    Renal oncocytoma of left kidney    History of left nephrectomy    Cerebrovascular accident (CVA) due to stenosis of small artery    History of renal cell carcinoma    TIA (transient ischemic attack)    Spinal stenosis, lumbar region with neurogenic claudication    Malignant neoplasm of left kidney, except renal pelvis    Diverticulosis    Irritable bowel syndrome with constipation    Chronic diastolic congestive heart failure    Hallucination, visual     Advance Care Planning   Advance Care Planning     Advance Directive is on file.  ACP discussion was held with the patient during this visit. Patient has an advance directive in EMR which is still valid.      Objective    Vitals:    01/17/24 1304   BP: 138/84   Pulse: 78   Resp: 16   Temp: 96.7 °F (35.9 °C)   TempSrc: Temporal   SpO2: 97%   Weight: 59 kg (130 lb)   Height: 157.5 cm (62\")   PainSc: 0-No pain     Estimated body mass index is 23.78 kg/m² as calculated from the following:    Height as of this encounter: 157.5 cm (62\").    Weight as of this encounter: 59 kg (130 lb).    BMI is within normal parameters. No other follow-up for BMI required.      Does the patient have evidence of cognitive impairment? Yes spells world correctly, can't spell it backword. Can't do serial 3's, or serial 7's. Pt states she is terrible at spelling and math. Some trouble with word finding. Remembers 3 words at 5 minutes.     Lab Results   Component Value Date    TRIG 149 10/24/2023    HDL 50 10/24/2023    LDL 96 10/24/2023    VLDL 26 10/24/2023    HGBA1C 5.50 10/24/2023        HEALTH RISK ASSESSMENT    Smoking Status:  Social History     Tobacco Use   Smoking " Status Former    Packs/day: 1.00    Years: 3.00    Additional pack years: 0.00    Total pack years: 3.00    Types: Cigarettes    Quit date: 1956    Years since quittin.9   Smokeless Tobacco Never   Tobacco Comments    caffeine - 1/2 cup coffee daily      Alcohol Consumption:  Social History     Substance and Sexual Activity   Alcohol Use Not Currently    Alcohol/week: 3.0 standard drinks of alcohol    Types: 3 Glasses of wine per week    Comment: couple times a week     Fall Risk Screen:    TRISTAN Fall Risk Assessment was completed, and patient is at MODERATE risk for falls. Assessment completed on:2024    Depression Screenin/17/2024     1:06 PM   PHQ-2/PHQ-9 Depression Screening   Little Interest or Pleasure in Doing Things 0-->not at all   Feeling Down, Depressed or Hopeless 0-->not at all   PHQ-9: Brief Depression Severity Measure Score 0       Health Habits and Functional and Cognitive Screenin/17/2024     1:06 PM   Functional & Cognitive Status   Do you have difficulty preparing food and eating? No   Do you have difficulty bathing yourself, getting dressed or grooming yourself? No   Do you have difficulty using the toilet? No   Do you have difficulty moving around from place to place? No   Do you have trouble with steps or getting out of a bed or a chair? No   Current Diet Well Balanced Diet   Dental Exam Up to date   Eye Exam Up to date   Exercise (times per week) 1 times per week   Current Exercises Include Walking   Do you need help using the phone?  No   Are you deaf or do you have serious difficulty hearing?  Yes   Do you need help to go to places out of walking distance? Yes   Do you need help shopping? Yes   Do you need help preparing meals?  No   Do you need help with housework?  Yes   Do you need help with laundry? No   Do you need help taking your medications? No   Do you need help managing money? No   Do you ever drive or ride in a car without wearing a seat belt? No    Have you felt unusual stress, anger or loneliness in the last month? No   Who do you live with? Alone   If you need help, do you have trouble finding someone available to you? No   Have you been bothered in the last four weeks by sexual problems? No   Do you have difficulty concentrating, remembering or making decisions? No       Age-appropriate Screening Schedule:  Refer to the list below for future screening recommendations based on patient's age, sex and/or medical conditions. Orders for these recommended tests are listed in the plan section. The patient has been provided with a written plan.    Health Maintenance   Topic Date Due    DXA SCAN  07/29/2023    ZOSTER VACCINE (1 of 2) 01/17/2024 (Originally 12[birth year]10[birth year]1987)    COVID-19 Vaccine (9 - 2023-24 season) 01/19/2024 (Originally 9/1/2023)    INFLUENZA VACCINE  03/31/2024 (Originally 8/1/2023)    LIPID PANEL  10/24/2024    ANNUAL WELLNESS VISIT  01/17/2025    TDAP/TD VACCINES (2 - Td or Tdap) 10/27/2028    Pneumococcal Vaccine 65+  Completed                  CMS Preventative Services Quick Reference  Risk Factors Identified During Encounter  Immunizations Discussed/Encouraged: Influenza, COVID19, and RSV (Respiratory Syncytial Virus)  Inadequate Social Support, Isolation, Loneliness, Lack of Transportation, Financial Difficulties, or Caregiver Stress: lonliness, recommended socialization twice a week  Inactivity/Sedentary: Patient was advised to exercise at least 150 minutes a week per CDC recommendations.  The above risks/problems have been discussed with the patient.  Pertinent information has been shared with the patient in the After Visit Summary.  An After Visit Summary and PPPS were made available to the patient.    Follow Up:   Next Medicare Wellness visit to be scheduled in 1 year.       Additional E&M Note during same encounter follows:  Patient has multiple medical problems which are significant and separately identifiable that require additional work  "above and beyond the Medicare Wellness Visit.      Chief Complaint  Medicare Wellness-subsequent (Discuss flonase allergy spray ), Hypertension (Recent cva 10/23), Congestive Heart Failure, Cerebrovascular Accident, Chronic Kidney Disease, and Insomnia (Discuss sleeping meds )    Subjective        HPI  Marleni Hummel is also being seen today for medication refills. Overall she feels well. Good medication compliance is reported. No medication side effects are reported. Some issues with visual hallucinations. Pt here with daughter who confirms problem. Pt lives alone. Has some depression symptoms.  Patient has recently been switched from Myrbetriq to Gemtesa and is awaiting approval by her insurance company.  Samples have been given to the patient from our office today.  She is taking this medication for urge incontinence.  She gets up through the night several times due to this problem.         Objective   Vital Signs:  /84   Pulse 78   Temp 96.7 °F (35.9 °C) (Temporal)   Resp 16   Ht 157.5 cm (62\")   Wt 59 kg (130 lb)   SpO2 97%   BMI 23.78 kg/m²     Physical Exam  Vitals reviewed.   Constitutional:       General: She is not in acute distress.  Eyes:      General: Lids are normal.      Conjunctiva/sclera: Conjunctivae normal.   Neck:      Vascular: No carotid bruit.      Trachea: No tracheal deviation.   Cardiovascular:      Rate and Rhythm: Normal rate and regular rhythm.      Heart sounds: Normal heart sounds. No murmur heard.  Pulmonary:      Effort: Pulmonary effort is normal.      Breath sounds: Normal breath sounds.   Skin:     General: Skin is warm and dry.   Neurological:      Mental Status: She is alert. She is not disoriented.   Psychiatric:         Speech: Speech normal.         Behavior: Behavior normal. Behavior is cooperative.                         Assessment and Plan   Assessment & Plan  Medicare annual wellness visit, subsequent  Immunizations recommended.  Advised no more than 2 " hours/day of screen time.  Advised moving bedtime to 10:00 at night.  Advised regular physical activity as tolerated.  Encouraged socialization twice a week.  Urge incontinence of urine  Gemtesa samples given for 42 days  Hypercholesterolemia  The current medical regimen is effective;  continue present plan and medications.    Panic disorder  Mood stable but some visual hallucinations. Referral to psychiatry for additional medication management  Chronic diastolic congestive heart failure  The current medical regimen is effective;  continue present plan and medications.    Hallucination, visual  Referral to psychiatry  Senile osteoporosis  Continue Prolia every 6 months.  Check DEXA scan  At moderate risk for fall  Information shared in AVS    Orders Placed This Encounter   Procedures    DEXA Bone Density, Axial (Hospital)    Ambulatory Referral to Psychiatry     New Medications Ordered This Visit   Medications    Gemtesa 75 MG tablet     Sig: Take 1 tablet by mouth Every Morning for 42 days.     Dispense:  42 tablet     Refill:  0    atorvastatin (LIPITOR) 20 MG tablet     Sig: Take 1 tablet by mouth Every Night for 270 days.     Dispense:  90 tablet     Refill:  2    escitalopram (LEXAPRO) 20 MG tablet     Sig: Take 1 tablet by mouth Daily for 270 days.     Dispense:  90 tablet     Refill:  2    metoprolol succinate XL (TOPROL-XL) 25 MG 24 hr tablet     Sig: Take 1 tablet by mouth Daily for 270 days.     Dispense:  90 tablet     Refill:  2               Follow Up   Return in about 6 months (around 7/17/2024) for next scheduled follow up.  Patient was given instructions and counseling regarding her condition or for health maintenance advice. Please see specific information pulled into the AVS if appropriate.

## 2024-01-17 NOTE — ASSESSMENT & PLAN NOTE
Mood stable but some visual hallucinations. Referral to psychiatry for additional medication management

## 2024-01-30 ENCOUNTER — HOSPITAL ENCOUNTER (OUTPATIENT)
Dept: BONE DENSITY | Facility: HOSPITAL | Age: 89
Discharge: HOME | End: 2024-01-30
Admitting: FAMILY MEDICINE
Payer: MEDICARE

## 2024-01-30 DIAGNOSIS — M81.0 SENILE OSTEOPOROSIS: ICD-10-CM

## 2024-01-30 PROCEDURE — 77080 DXA BONE DENSITY AXIAL: CPT

## 2024-01-30 NOTE — PROGRESS NOTES
Please give the patient the following message:  Your test results have some abnormalities that we need to discuss.  Please schedule an appointment to discuss further.  This can be a video visit.

## 2024-02-08 ENCOUNTER — TELEPHONE (OUTPATIENT)
Dept: FAMILY MEDICINE CLINIC | Facility: CLINIC | Age: 89
End: 2024-02-08

## 2024-02-08 NOTE — TELEPHONE ENCOUNTER
Caller: NEAL HERNANDEZ    Relationship: Emergency Contact    Best call back number: [phone]     What was the call regarding: PATIENT IS ON VIDEO VISIT. HUB HAS ATTEMPTED TO WT, PATIENT STATED SHE HAS BEEN ON THE SCREEN SINCE 145    PLEASE ADVISE

## 2024-03-06 ENCOUNTER — TRANSCRIBE ORDERS (OUTPATIENT)
Dept: ADMINISTRATIVE | Facility: HOSPITAL | Age: 89
End: 2024-03-06
Payer: MEDICARE

## 2024-03-06 ENCOUNTER — HOSPITAL ENCOUNTER (OUTPATIENT)
Facility: HOSPITAL | Age: 89
Discharge: HOME | End: 2024-03-06
Admitting: NURSE PRACTITIONER
Payer: MEDICARE

## 2024-03-06 DIAGNOSIS — R10.9 FLANK PAIN: ICD-10-CM

## 2024-03-06 DIAGNOSIS — R10.9 FLANK PAIN: Primary | ICD-10-CM

## 2024-03-06 PROCEDURE — 76775 US EXAM ABDO BACK WALL LIM: CPT

## 2024-03-09 ENCOUNTER — APPOINTMENT (OUTPATIENT)
Facility: HOSPITAL | Age: 89
End: 2024-03-09
Payer: OTHER GOVERNMENT

## 2024-03-09 PROCEDURE — 72110 X-RAY EXAM L-2 SPINE 4/>VWS: CPT

## 2024-03-09 PROCEDURE — 73502 X-RAY EXAM HIP UNI 2-3 VIEWS: CPT

## 2024-03-10 ENCOUNTER — TELEPHONE (OUTPATIENT)
Age: 89
End: 2024-03-10
Payer: MEDICARE

## 2024-04-10 DIAGNOSIS — M81.0 SENILE OSTEOPOROSIS: ICD-10-CM

## 2024-04-10 LAB
ALBUMIN SERPL-MCNC: 4.6 G/DL (ref 3.5–5.2)
ALBUMIN/GLOB SERPL: 2.3 {RATIO}
ALP SERPL-CCNC: 41 U/L (ref 39–117)
ALT SERPL-CCNC: 11 U/L (ref 1–33)
AMBIG ABBREV CMP14 DEFAULT: NORMAL
AST SERPL-CCNC: 16 U/L (ref 1–32)
BILIRUB SERPL-MCNC: 0.6 MG/DL (ref 0–1.2)
BUN SERPL-MCNC: 44 MG/DL (ref 8–23)
BUN/CREAT SERPL: 19 (ref 7–25)
CALCIUM SERPL-MCNC: 9.8 MG/DL (ref 8.6–10.5)
CHLORIDE SERPL-SCNC: 102 MMOL/L (ref 98–107)
CO2 SERPL-SCNC: 27 MMOL/L (ref 22–29)
CREAT SERPL-MCNC: 2.32 MG/DL (ref 0.57–1)
EGFRCR SERPLBLD CKD-EPI 2021: 20 ML/MIN/1.73
GLOBULIN SER CALC-MCNC: 2 GM/DL
GLUCOSE SERPL-MCNC: 94 MG/DL (ref 65–99)
MAGNESIUM SERPL-MCNC: 1.5 MG/DL (ref 1.6–2.4)
PHOSPHATE SERPL-MCNC: 4.4 MG/DL (ref 2.5–4.5)
POTASSIUM SERPL-SCNC: 5 MMOL/L (ref 3.5–5.2)
PROT SERPL-MCNC: 6.6 G/DL (ref 6–8.5)
SODIUM SERPL-SCNC: 140 MMOL/L (ref 136–145)

## 2024-04-16 ENCOUNTER — TELEPHONE (OUTPATIENT)
Dept: FAMILY MEDICINE CLINIC | Facility: CLINIC | Age: 89
End: 2024-04-16
Payer: MEDICARE

## 2024-04-16 ENCOUNTER — DOCUMENTATION (OUTPATIENT)
Dept: ONCOLOGY | Facility: HOSPITAL | Age: 89
End: 2024-04-16
Payer: MEDICARE

## 2024-04-16 ENCOUNTER — TELEPHONE (OUTPATIENT)
Dept: FAMILY MEDICINE CLINIC | Facility: CLINIC | Age: 89
End: 2024-04-16

## 2024-04-16 NOTE — TELEPHONE ENCOUNTER
Hub staff attempted to follow warm transfer process and was unsuccessful     Caller: NEAL HERNANDEZ    Relationship to patient: Emergency Contact    Best call back number: [phone]     Patient is needing: PATIENTS DAUGHTER WAS RETURNING MISSED PHONE CALL TO THE OFFICE

## 2024-04-16 NOTE — TELEPHONE ENCOUNTER
Called pts daughter, let her know results/instruction, she wanted to know if her mother could still get prolia shot, per dr vila yes she can, but still needs appt after. Office number provided for callback.

## 2024-04-17 ENCOUNTER — INFUSION (OUTPATIENT)
Dept: ONCOLOGY | Facility: HOSPITAL | Age: 89
End: 2024-04-17
Payer: MEDICARE

## 2024-04-17 DIAGNOSIS — M81.0 SENILE OSTEOPOROSIS: Primary | ICD-10-CM

## 2024-04-17 PROCEDURE — 96372 THER/PROPH/DIAG INJ SC/IM: CPT

## 2024-04-17 PROCEDURE — 25010000002 DENOSUMAB 60 MG/ML SOLUTION PREFILLED SYRINGE: Performed by: FAMILY MEDICINE

## 2024-04-17 RX ADMIN — DENOSUMAB 60 MG: 60 INJECTION SUBCUTANEOUS at 13:59

## 2024-04-17 NOTE — NURSING NOTE
Arrived  for prolia injection. Indication and side effects reviewed. Denies recent dental work. Labs and medications verified. Prolia administered in right arm without incidence. Instructed to call prescribing MD for any concerns or questions and instructed on how to schedule future appts.  Pt vu and discharged in stable condition via assist of rolling walker. Per Instruction she and daughter aware of magnesium level and per instruction of Dr. Andujar this nurse informed them that patient is to take 400mg otc magnesium daily. In addition   a printed handout of foods high in magnesium given

## 2024-05-01 RX ORDER — ATORVASTATIN CALCIUM 20 MG/1
20 TABLET, FILM COATED ORAL NIGHTLY
Qty: 90 TABLET | Refills: 2 | Status: CANCELLED | OUTPATIENT
Start: 2024-05-01 | End: 2025-01-26

## 2024-05-01 RX ORDER — METOPROLOL SUCCINATE 25 MG/1
25 TABLET, EXTENDED RELEASE ORAL DAILY
Qty: 90 TABLET | Refills: 2 | Status: CANCELLED | OUTPATIENT
Start: 2024-05-01 | End: 2025-01-26

## 2024-05-01 RX ORDER — ESCITALOPRAM OXALATE 20 MG/1
20 TABLET ORAL DAILY
Qty: 90 TABLET | Refills: 2 | Status: CANCELLED | OUTPATIENT
Start: 2024-05-01 | End: 2025-01-26

## 2024-05-01 RX ORDER — VIBEGRON 75 MG/1
1 TABLET, FILM COATED ORAL EVERY MORNING
Qty: 42 TABLET | Refills: 0 | Status: CANCELLED | COMMUNITY
Start: 2024-05-01 | End: 2024-06-12

## 2024-05-02 ENCOUNTER — TELEMEDICINE (OUTPATIENT)
Dept: FAMILY MEDICINE CLINIC | Facility: CLINIC | Age: 89
End: 2024-05-02
Payer: MEDICARE

## 2024-05-02 VITALS — BODY MASS INDEX: 23.55 KG/M2 | WEIGHT: 128 LBS | HEIGHT: 62 IN | HEART RATE: 47 BPM

## 2024-05-02 DIAGNOSIS — E83.42 HYPOMAGNESEMIA: Primary | ICD-10-CM

## 2024-05-02 DIAGNOSIS — G47.00 INSOMNIA, UNSPECIFIED TYPE: ICD-10-CM

## 2024-05-02 RX ORDER — MAGNESIUM OXIDE 400 MG/1
400 TABLET ORAL DAILY
COMMUNITY

## 2024-05-02 NOTE — LETTER
May 2, 2024    Joseph Leonard MD  [address]  [address]  [place] [id number]    Patient: Malreni Hummel   YOB: 1935   Date of Visit: 5/2/2024       Dear Dr. Aisha MD:  Today, I am seeing our mutual patient for a virtual visit. Upon reviewing labs, she has a slightly low magnesium level and mild worsening of her creatinine to 2.32. She is feeling ok. She recently started magnesium after her prolia shot labs on April 24, 2024. Currently taking magnesium 400 daily. She is due to see you in June. This is for your information.   Magnesium (04/10/2024 10:18)  Phosphorus (04/10/2024 10:18)  Comprehensive Metabolic Panel (04/10/2024 10:18)  Corina Neal [id number] Default (04/10/2024 10:18)  Magnesium (04/10/2024 10:18)  Phosphorus (04/10/2024 10:18)  Comprehensive Metabolic Panel (04/10/2024 10:18)      Sincerely,      Ken Andujar MD        CC: No Recipients

## 2024-05-02 NOTE — PROGRESS NOTES
"Mode of Visit: Video  Location of patient: home  Location of provider: home  You have chosen to receive care through a telehealth visit.  The patient has signed the video visit consent form.  The visit included audio and video interaction. No technical issues occurred during this visit  Subjective    Chief Complaint   Patient presents with    Hypertension    Hyperlipidemia        HPI   History of Present Illness  The patient is an 88-year-old female who presents via virtual visit to review her blood work. She is accompanied by her daughter.     The patient has been self-administering over-the-counter magnesium 400 mg, which she initiated last week. She is under the care of a nephrologist, Dr. Leonard, with whom her next appointment is scheduled for 06/27/2024. The patient did not take magnesium before her recent lab work. The nephrologist is not aware of her low magnesium levels. The patient reports intermittent good and bad days.    The patient's daughter reports that the patient is experiencing difficulty sleeping. The patient occasionally takes 10 mg of melatonin. However, the patient is unable to take sleeping pills due to her cardiac condition. Her sleep is disrupted, causing her to stay in bed for half a day.   Supplemental Information  She is taking Gemtesa for her bladder. She is not taking Lokelma. She is trying to watch her diet and stay off of potassium and sodium. She is off clindamycin.                     Objective    PE  Vitals:    05/02/24 1409   Pulse: (!) 47   Weight: 58.1 kg (128 lb)   Height: 157.5 cm (62\")     Virtual Visit Physical Exam   Physical Exam  The patient appears normal, showing no signs of acute distress.  Pupils are equal and round. Sclerae are clear.  There is no respiratory distress or audible wheezes in the lungs.  The patient's neurologic exam is grossly intact. Speech is clear.  The patient displays normal affect and normal hygiene.    Result Review      Results  Laboratory " Studies  Glucose was normal at 94. BUN was 44. Creatinine was 2.32. Sodium 140, potassium 5.0, potassium chloride 102, calcium 9.8. ALT was 11, AST 16. Alkaline phosphatase normal at 41. Magnesium was 1.5.     Assessment     Assessment/Plan  Assessment & Plan  1. Hypomagnesemia.  The patient is advised to persist with the daily intake of over-the-counter magnesium 400 mcg. A letter has been forwarded to the nephrologist to update the current events.    2. Primary insomnia.  Given the reversal of her day and nights, it is recommended that the patient awakens at 6:00 AM and stays awake until 2:00 AM before resuming sleep. And then walking back sleep time until it is normal bedtime~10-11pm    Follow-up  The patient is scheduled for a follow-up visit on 07/17/2024.     No orders of the defined types were placed in this encounter.        Patient or patient representative verbalized consent for the use of Ambient Listening during the visit with  Ken Andujar MD for chart documentation. 5/2/2024  15:13 EDT    Patient was given instructions and counseling regarding his condition or for health maintenance advice. Please see specific information pulled into the AVS if appropriate.

## 2024-05-15 ENCOUNTER — OFFICE VISIT (OUTPATIENT)
Dept: CARDIOLOGY | Facility: CLINIC | Age: 89
End: 2024-05-15
Payer: MEDICARE

## 2024-05-15 VITALS
WEIGHT: 134.6 LBS | BODY MASS INDEX: 24.77 KG/M2 | DIASTOLIC BLOOD PRESSURE: 60 MMHG | SYSTOLIC BLOOD PRESSURE: 140 MMHG | HEART RATE: 46 BPM | HEIGHT: 62 IN | OXYGEN SATURATION: 97 %

## 2024-05-15 DIAGNOSIS — I48.21 PERMANENT ATRIAL FIBRILLATION: Primary | ICD-10-CM

## 2024-05-15 DIAGNOSIS — R00.1 BRADYCARDIA, DRUG INDUCED: ICD-10-CM

## 2024-05-15 DIAGNOSIS — I10 ESSENTIAL HYPERTENSION: ICD-10-CM

## 2024-05-15 DIAGNOSIS — T50.905A BRADYCARDIA, DRUG INDUCED: ICD-10-CM

## 2024-05-15 DIAGNOSIS — I48.0 PAF (PAROXYSMAL ATRIAL FIBRILLATION): ICD-10-CM

## 2024-05-15 DIAGNOSIS — I25.10 CHRONIC CORONARY ARTERY DISEASE: ICD-10-CM

## 2024-05-15 RX ORDER — FEXOFENADINE HCL 60 MG/1
60 TABLET, FILM COATED ORAL DAILY
COMMUNITY

## 2024-05-15 NOTE — PROGRESS NOTES
"      CARDIOLOGY    Sybil Parmar MD    ENCOUNTER DATE:  05/15/2024    Marleni Hummel / 88 y.o. / female        CHIEF COMPLAINT / REASON FOR OFFICE VISIT     Dizziness      HISTORY OF PRESENT ILLNESS       HPI    Marleni Hummel is a 88 y.o. female     This is a patient who previously followed with Dr. Tai. She has a history of hypertension, hyperlipidemia, atrial fibrillation, stress induced cardiomyopathy, obstructive sleep apnea, left renal carcinoma status post nephrectomy.      She had atrial fibrillation with rapid ventricular response in the setting of abnormal thyroid studies and unremarkable echo. Nuclear stress test was also unremarkable. In March 2011 she went back into atrial fibrillation after cardioversion and was started on flecainide and had a repeat cardioversion. In October 2015 she had a non-ST elevation myocardial infarction and heart catheterization which showed a cardiomyopathy with an ejection fraction of 20%. She did have a lesion of her circumflex and was stented with a drug eluting stent. Flecainide was discontinued and switched to amiodarone. Her QT interval increased and so amiodarone was discontinued.     In 2018 she had an echocardiogram showing ejection fraction of 65% with borderline left ventricular hypertrophy and mild to moderate left atrial enlargement. She had no significant valve disease. In November 2019 she had shortness of breath and was started on Lasix but had an episode of near syncope and so that was discontinued. In 2019 she had shortness of breath and had a nuclear stress test which was negative for ischemia. Echo showed normal left ventricular function and was otherwise unchanged.     REVIEW OF SYSTEMS     ROS      VITAL SIGNS     Visit Vitals  /60   Pulse (!) 46   Ht 157.5 cm (62\")   Wt 61.1 kg (134 lb 9.6 oz)   SpO2 97%   BMI 24.62 kg/m²         Wt Readings from Last 3 Encounters:   05/15/24 61.1 kg (134 lb 9.6 oz)   05/02/24 58.1 kg (128 lb) "   03/09/24 54.4 kg (120 lb)     Body mass index is 24.62 kg/m².      PHYSICAL EXAMINATION     Constitutional:       General: Not in acute distress.  Neck:      Vascular: No carotid bruit or JVD.   Pulmonary:      Effort: Pulmonary effort is normal.      Breath sounds: Normal breath sounds.   Cardiovascular:      Normal rate. Regular rhythm.      Murmurs: There is no murmur.   Psychiatric:         Mood and Affect: Mood and affect normal.           REVIEWED DATA       ECG 12 Lead    Date/Time: 5/15/2024 11:59 AM  Performed by: Sybil Parmar MD    Authorized by: Sybil Parmar MD  Comparison: compared with previous ECG from 11/10/2023  Comparison to previous ECG: It appears that she is currently in atrial flutter or coarse atrial fibrillation.  Rhythm: atrial flutter and atrial fibrillation  BPM: 46    Clinical impression: abnormal EKG            Lipid Panel          9/27/2023    11:09 10/24/2023    15:35   Lipid Panel   Total Cholesterol  172    Total Cholesterol 180     Triglycerides 134  149    HDL Cholesterol 59  50    VLDL Cholesterol 23  26    LDL Cholesterol  98  96    LDL/HDL Ratio  1.84        Lab Results   Component Value Date    GLUCOSE 94 04/10/2024    GLUCOSE 94 04/10/2024    BUN 44 (H) 04/10/2024    BUN 44 (H) 04/10/2024    CREATININE 2.32 (H) 04/10/2024    CREATININE 2.32 (H) 04/10/2024    EGFRRESULT 20.0 (L) 04/10/2024    EGFR 20.0 (L) 04/10/2024    BCR 19.0 04/10/2024    BCR 19.0 04/10/2024    K 5.0 04/10/2024    K 5.0 04/10/2024    CO2 27.0 04/10/2024    CO2 27.0 04/10/2024    CALCIUM 9.8 04/10/2024    CALCIUM 9.8 04/10/2024    PROTENTOTREF 6.6 04/10/2024    ALBUMIN 4.6 04/10/2024    ALBUMIN 4.6 04/10/2024    BILITOT 0.6 04/10/2024    BILITOT 0.6 04/10/2024    AST 16 04/10/2024    AST 16 04/10/2024    ALT 11 04/10/2024    ALT 11 04/10/2024       ASSESSMENT & PLAN      Diagnosis Plan   1. Permanent atrial fibrillation  ECG 12 Lead      2. PAF (paroxysmal atrial fibrillation)  ECG 12 Lead      3.  Chronic coronary artery disease  ECG 12 Lead      4. Essential hypertension  ECG 12 Lead      5. Bradycardia, drug induced  ECG 12 Lead                  Orders Placed This Encounter   Procedures    ECG 12 Lead     This order was created via procedure documentation     Order Specific Question:   Release to patient     Answer:   Routine Release [[id number]]           MEDICATIONS         Discharge Medications            Accurate as of May 15, 2024 12:01 PM. If you have any questions, ask your nurse or doctor.                Continue These Medications        Instructions Start Date   acetaminophen 500 MG tablet  Commonly known as: TYLENOL   500 mg, Oral, Every 4 Hours PRN      atorvastatin 20 MG tablet  Commonly known as: LIPITOR   20 mg, Oral, Nightly      desonide 0.05 % cream  Commonly known as: DESOWEN   Apply to affected area(s) of ears up to twice daily as needed for itching/ flaking for 30 days      Eliquis 2.5 MG tablet tablet  Generic drug: apixaban   TAKE 1 TABLET EVERY 12 HOURS (TWICE A DAY)      escitalopram 20 MG tablet  Commonly known as: LEXAPRO   20 mg, Oral, Daily      fexofenadine 60 MG tablet  Commonly known as: ALLEGRA   60 mg, Oral, Daily      furosemide 20 MG tablet  Commonly known as: LASIX   20 mg, Oral, Daily PRN      Gemtesa 75 MG tablet  Generic drug: Vibegron   75 mg, Oral, Every Morning      hydroCHLOROthiazide 12.5 MG tablet   12.5 mg, Oral, Daily      magnesium oxide 400 MG tablet  Commonly known as: MAG-OX   400 mg, Oral, Daily      melatonin 5 MG tablet tablet   5 mg, Oral, Nightly PRN      metoprolol succinate XL 25 MG 24 hr tablet  Commonly known as: TOPROL-XL   25 mg, Oral, Daily      TiZANidine 2 MG capsule  Commonly known as: Zanaflex   2 mg, Oral, 2 Times Daily PRN             1.  Permanent atrial fibrillation.  Rate controlled.  On Eliquis.  No bleeding issues.  Heart rate is slow.  I told her to cut the Toprol-XL 25 mg in half and see if this helps with her dizziness and fatigue.   We might be able to get her off it completely.  2.  Coronary artery disease status post drug-eluting stent to the circumflex in October 2015.  Normal nuclear stress test March 2023.  No anginal symptoms.  Continue medical therapy.  3.  Hypertension.  Monitor blood pressure at home.  4.  Hyperlipidemia.  Lipid panel reviewed as above.  5.  History of renal insufficiency with a history of nephrectomy due to cancer.  6.  History of prolonged QT interval on amiodarone.  8.  History of obstructive sleep apnea not currently on therapy.  9.  Chest pain.  This happened while she was hospitalized in March 2023 and was atypical with flat troponins.  She had a normal nuclear stress test in March 2023.  10.  Bradycardia.  This was noted while she was hospitalized in March 2023 and her metoprolol was decreased.  I am going to have her take Toprol-XL 12.5 mg a day and may take her off it completely if her heart rate remains low.  11.  Congestive heart failure.  She has normal LV function.  This was likely brought on by bradycardia she is euvolemic.  She would like to use Lasix as needed and I think that is reasonable.  12.  Insomnia.  She says she tries melatonin and has tried Benadryl and does not feel like it helps.  I told her to discuss this with Dr. Andujar.  I am okay with her using trazodone or other sleep medicine from a heart standpoint but I do not prescribe those.      Follow-up in about 2 weeks to see how her heart rate and blood pressure are doing with the decrease in Toprol-XL and we may be able to come off the Toprol XL completely.    Sybil Parmar MD  05/15/24  12:01 EDT    Part of this note may be an electronic transcription/translation of spoken language to printed text using the Dragon dictation system.

## 2024-05-27 ENCOUNTER — HOSPITAL ENCOUNTER (OUTPATIENT)
Facility: HOSPITAL | Age: 89
Setting detail: OBSERVATION
Discharge: HOME | End: 2024-05-29
Attending: EMERGENCY MEDICINE | Admitting: EMERGENCY MEDICINE
Payer: MEDICARE

## 2024-05-27 ENCOUNTER — APPOINTMENT (OUTPATIENT)
Dept: GENERAL RADIOLOGY | Facility: HOSPITAL | Age: 89
End: 2024-05-27
Payer: MEDICARE

## 2024-05-27 ENCOUNTER — APPOINTMENT (OUTPATIENT)
Dept: CT IMAGING | Facility: HOSPITAL | Age: 89
End: 2024-05-27
Payer: MEDICARE

## 2024-05-27 DIAGNOSIS — N18.9 CHRONIC KIDNEY DISEASE, UNSPECIFIED CKD STAGE: ICD-10-CM

## 2024-05-27 DIAGNOSIS — R00.1 BRADYCARDIA: Primary | ICD-10-CM

## 2024-05-27 DIAGNOSIS — R53.1 WEAKNESS: ICD-10-CM

## 2024-05-27 DIAGNOSIS — I48.20 CHRONIC A-FIB: ICD-10-CM

## 2024-05-27 LAB
ALBUMIN SERPL-MCNC: 4.6 G/DL (ref 3.5–5.2)
ALBUMIN/GLOB SERPL: 1.6 G/DL
ALP SERPL-CCNC: 58 U/L (ref 39–117)
ALT SERPL W P-5'-P-CCNC: 10 U/L (ref 1–33)
ANION GAP SERPL CALCULATED.3IONS-SCNC: 13 MMOL/L (ref 5–15)
AST SERPL-CCNC: 16 U/L (ref 1–32)
BACTERIA UR QL AUTO: ABNORMAL /HPF
BASOPHILS # BLD AUTO: 0.02 10*3/MM3 (ref 0–0.2)
BASOPHILS NFR BLD AUTO: 0.3 % (ref 0–1.5)
BILIRUB SERPL-MCNC: 0.8 MG/DL (ref 0–1.2)
BILIRUB UR QL STRIP: NEGATIVE
BUN SERPL-MCNC: 53 MG/DL (ref 8–23)
BUN/CREAT SERPL: 20.2 (ref 7–25)
CALCIUM SPEC-SCNC: 9.2 MG/DL (ref 8.6–10.5)
CHLORIDE SERPL-SCNC: 99 MMOL/L (ref 98–107)
CHOLEST SERPL-MCNC: 197 MG/DL (ref 0–200)
CLARITY UR: CLEAR
CO2 SERPL-SCNC: 23 MMOL/L (ref 22–29)
COLOR UR: YELLOW
CREAT SERPL-MCNC: 2.63 MG/DL (ref 0.57–1)
DEPRECATED RDW RBC AUTO: 47.3 FL (ref 37–54)
EGFRCR SERPLBLD CKD-EPI 2021: 17.2 ML/MIN/1.73
EOSINOPHIL # BLD AUTO: 0.17 10*3/MM3 (ref 0–0.4)
EOSINOPHIL NFR BLD AUTO: 2.5 % (ref 0.3–6.2)
ERYTHROCYTE [DISTWIDTH] IN BLOOD BY AUTOMATED COUNT: 13.4 % (ref 12.3–15.4)
GLOBULIN UR ELPH-MCNC: 2.8 GM/DL
GLUCOSE SERPL-MCNC: 93 MG/DL (ref 65–99)
GLUCOSE UR STRIP-MCNC: NEGATIVE MG/DL
HCT VFR BLD AUTO: 34.1 % (ref 34–46.6)
HDLC SERPL-MCNC: 60 MG/DL (ref 40–60)
HGB BLD-MCNC: 11 G/DL (ref 12–15.9)
HGB UR QL STRIP.AUTO: NEGATIVE
HYALINE CASTS UR QL AUTO: ABNORMAL /LPF
IMM GRANULOCYTES # BLD AUTO: 0.02 10*3/MM3 (ref 0–0.05)
IMM GRANULOCYTES NFR BLD AUTO: 0.3 % (ref 0–0.5)
INR PPP: 1.27 (ref 0.9–1.1)
KETONES UR QL STRIP: NEGATIVE
LDLC SERPL CALC-MCNC: 100 MG/DL (ref 0–100)
LDLC/HDLC SERPL: 1.55 {RATIO}
LEUKOCYTE ESTERASE UR QL STRIP.AUTO: ABNORMAL
LYMPHOCYTES # BLD AUTO: 1.33 10*3/MM3 (ref 0.7–3.1)
LYMPHOCYTES NFR BLD AUTO: 19.2 % (ref 19.6–45.3)
MAGNESIUM SERPL-MCNC: 3 MG/DL (ref 1.6–2.4)
MCH RBC QN AUTO: 30.7 PG (ref 26.6–33)
MCHC RBC AUTO-ENTMCNC: 32.3 G/DL (ref 31.5–35.7)
MCV RBC AUTO: 95.3 FL (ref 79–97)
MONOCYTES # BLD AUTO: 0.42 10*3/MM3 (ref 0.1–0.9)
MONOCYTES NFR BLD AUTO: 6.1 % (ref 5–12)
NEUTROPHILS NFR BLD AUTO: 4.96 10*3/MM3 (ref 1.7–7)
NEUTROPHILS NFR BLD AUTO: 71.6 % (ref 42.7–76)
NITRITE UR QL STRIP: NEGATIVE
NRBC BLD AUTO-RTO: 0 /100 WBC (ref 0–0.2)
PH UR STRIP.AUTO: 8.5 [PH] (ref 5–8)
PLATELET # BLD AUTO: 179 10*3/MM3 (ref 140–450)
PMV BLD AUTO: 10 FL (ref 6–12)
POTASSIUM SERPL-SCNC: 5.1 MMOL/L (ref 3.5–5.2)
PROT SERPL-MCNC: 7.4 G/DL (ref 6–8.5)
PROT UR QL STRIP: NEGATIVE
PROTHROMBIN TIME: 16.1 SECONDS (ref 11.7–14.2)
RBC # BLD AUTO: 3.58 10*6/MM3 (ref 3.77–5.28)
RBC # UR STRIP: ABNORMAL /HPF
REF LAB TEST METHOD: ABNORMAL
SODIUM SERPL-SCNC: 135 MMOL/L (ref 136–145)
SP GR UR STRIP: 1.01 (ref 1–1.03)
SQUAMOUS #/AREA URNS HPF: ABNORMAL /HPF
T4 FREE SERPL-MCNC: 1.22 NG/DL (ref 0.93–1.7)
TRIGL SERPL-MCNC: 220 MG/DL (ref 0–150)
TROPONIN T SERPL HS-MCNC: 31 NG/L
TROPONIN T SERPL HS-MCNC: 35 NG/L
TSH SERPL DL<=0.05 MIU/L-ACNC: 2.9 UIU/ML (ref 0.27–4.2)
UROBILINOGEN UR QL STRIP: ABNORMAL
VLDLC SERPL-MCNC: 37 MG/DL (ref 5–40)
WBC # UR STRIP: ABNORMAL /HPF
WBC NRBC COR # BLD AUTO: 6.92 10*3/MM3 (ref 3.4–10.8)

## 2024-05-27 PROCEDURE — 84484 ASSAY OF TROPONIN QUANT: CPT | Performed by: EMERGENCY MEDICINE

## 2024-05-27 PROCEDURE — 84443 ASSAY THYROID STIM HORMONE: CPT | Performed by: EMERGENCY MEDICINE

## 2024-05-27 PROCEDURE — 93010 ELECTROCARDIOGRAM REPORT: CPT | Performed by: INTERNAL MEDICINE

## 2024-05-27 PROCEDURE — 80053 COMPREHEN METABOLIC PANEL: CPT | Performed by: EMERGENCY MEDICINE

## 2024-05-27 PROCEDURE — 93005 ELECTROCARDIOGRAM TRACING: CPT | Performed by: NURSE PRACTITIONER

## 2024-05-27 PROCEDURE — G0378 HOSPITAL OBSERVATION PER HR: HCPCS

## 2024-05-27 PROCEDURE — 83735 ASSAY OF MAGNESIUM: CPT | Performed by: EMERGENCY MEDICINE

## 2024-05-27 PROCEDURE — 85025 COMPLETE CBC W/AUTO DIFF WBC: CPT | Performed by: EMERGENCY MEDICINE

## 2024-05-27 PROCEDURE — 81001 URINALYSIS AUTO W/SCOPE: CPT | Performed by: EMERGENCY MEDICINE

## 2024-05-27 PROCEDURE — 99285 EMERGENCY DEPT VISIT HI MDM: CPT

## 2024-05-27 PROCEDURE — 36415 COLL VENOUS BLD VENIPUNCTURE: CPT

## 2024-05-27 PROCEDURE — 84484 ASSAY OF TROPONIN QUANT: CPT | Performed by: NURSE PRACTITIONER

## 2024-05-27 PROCEDURE — 80061 LIPID PANEL: CPT | Performed by: NURSE PRACTITIONER

## 2024-05-27 PROCEDURE — 93005 ELECTROCARDIOGRAM TRACING: CPT | Performed by: EMERGENCY MEDICINE

## 2024-05-27 PROCEDURE — 74176 CT ABD & PELVIS W/O CONTRAST: CPT

## 2024-05-27 PROCEDURE — 96361 HYDRATE IV INFUSION ADD-ON: CPT

## 2024-05-27 PROCEDURE — 71045 X-RAY EXAM CHEST 1 VIEW: CPT

## 2024-05-27 PROCEDURE — 85610 PROTHROMBIN TIME: CPT | Performed by: EMERGENCY MEDICINE

## 2024-05-27 PROCEDURE — 84439 ASSAY OF FREE THYROXINE: CPT | Performed by: EMERGENCY MEDICINE

## 2024-05-27 PROCEDURE — 25810000003 SODIUM CHLORIDE 0.9 % SOLUTION: Performed by: NURSE PRACTITIONER

## 2024-05-27 RX ORDER — ATORVASTATIN CALCIUM 20 MG/1
20 TABLET, FILM COATED ORAL NIGHTLY
Status: DISCONTINUED | OUTPATIENT
Start: 2024-05-27 | End: 2024-05-29 | Stop reason: HOSPADM

## 2024-05-27 RX ORDER — NITROGLYCERIN 0.4 MG/1
0.4 TABLET SUBLINGUAL
Status: DISCONTINUED | OUTPATIENT
Start: 2024-05-27 | End: 2024-05-29 | Stop reason: HOSPADM

## 2024-05-27 RX ORDER — SODIUM CHLORIDE 0.9 % (FLUSH) 0.9 %
10 SYRINGE (ML) INJECTION EVERY 12 HOURS SCHEDULED
Status: DISCONTINUED | OUTPATIENT
Start: 2024-05-27 | End: 2024-05-29 | Stop reason: HOSPADM

## 2024-05-27 RX ORDER — ASPIRIN 81 MG/1
81 TABLET ORAL DAILY
Status: DISCONTINUED | OUTPATIENT
Start: 2024-05-28 | End: 2024-05-28

## 2024-05-27 RX ORDER — ASPIRIN 81 MG/1
324 TABLET, CHEWABLE ORAL ONCE
Status: COMPLETED | OUTPATIENT
Start: 2024-05-27 | End: 2024-05-27

## 2024-05-27 RX ORDER — UREA 10 %
5 LOTION (ML) TOPICAL NIGHTLY PRN
Status: DISCONTINUED | OUTPATIENT
Start: 2024-05-27 | End: 2024-05-29 | Stop reason: HOSPADM

## 2024-05-27 RX ORDER — METOPROLOL SUCCINATE 25 MG/1
12.5 TABLET, EXTENDED RELEASE ORAL DAILY
Status: DISCONTINUED | OUTPATIENT
Start: 2024-05-28 | End: 2024-05-28

## 2024-05-27 RX ORDER — ESCITALOPRAM OXALATE 20 MG/1
20 TABLET ORAL DAILY
Status: DISCONTINUED | OUTPATIENT
Start: 2024-05-28 | End: 2024-05-29 | Stop reason: HOSPADM

## 2024-05-27 RX ORDER — SODIUM CHLORIDE 9 MG/ML
40 INJECTION, SOLUTION INTRAVENOUS AS NEEDED
Status: DISCONTINUED | OUTPATIENT
Start: 2024-05-27 | End: 2024-05-29 | Stop reason: HOSPADM

## 2024-05-27 RX ORDER — SODIUM CHLORIDE 0.9 % (FLUSH) 0.9 %
10 SYRINGE (ML) INJECTION AS NEEDED
Status: DISCONTINUED | OUTPATIENT
Start: 2024-05-27 | End: 2024-05-29 | Stop reason: HOSPADM

## 2024-05-27 RX ORDER — HYDROCHLOROTHIAZIDE 12.5 MG/1
12.5 TABLET ORAL DAILY
Status: DISCONTINUED | OUTPATIENT
Start: 2024-05-28 | End: 2024-05-29 | Stop reason: HOSPADM

## 2024-05-27 RX ADMIN — ASPIRIN 324 MG: 81 TABLET, CHEWABLE ORAL at 16:46

## 2024-05-27 RX ADMIN — ATORVASTATIN CALCIUM 20 MG: 20 TABLET, FILM COATED ORAL at 21:01

## 2024-05-27 RX ADMIN — SODIUM CHLORIDE 250 ML: 9 INJECTION, SOLUTION INTRAVENOUS at 18:04

## 2024-05-27 RX ADMIN — APIXABAN 2.5 MG: 2.5 TABLET, FILM COATED ORAL at 21:01

## 2024-05-27 RX ADMIN — Medication 10 ML: at 21:03

## 2024-05-27 NOTE — ED PROVIDER NOTES
EMERGENCY DEPARTMENT ENCOUNTER    Room Number:  25/25  Date seen:  5/27/2024  PCP: Ken Andujar MD  Historian: Patient and EMS      HPI:  Chief Complaint: Fatigue  Context: Marleni Hummel is a 88 y.o. female who presents to the ED via EMS from home for to fatigue for 2 weeks that has become progressively worse.  The patient states she lives at home alone but her daughter checks on her daily.  The patient states she saw her cardiologist about a week ago who had her decrease her Toprol from 25 mg to 12.5 mg to see if this would help her fatigue.  She states this is not helped.  She states she also has had left-sided flank pain when she rolls onto her left side.  She states when she is lying still it does not hurt but only with movement.  She denies rash.  She states she has had a left nephrectomy for prior cancer and is followed by Dr. Elliott from nephrology.  She states she recently had a UTI and has finished her antibiotics.  The patient denies fevers, chills, chest pain, cough or shortness of breath.      PAST MEDICAL HISTORY  Active Ambulatory Problems     Diagnosis Date Noted    Arthritis involving multiple sites     Essential hypertension     Permanent atrial fibrillation     History of Bell's palsy     CKD (chronic kidney disease) stage 4, GFR 15-29 ml/min     Gastroesophageal reflux disease without esophagitis     Hypercholesterolemia     Insomnia     Localized osteoporosis without current pathological fracture     Panic disorder     Chronic nonseasonal allergic rhinitis due to pollen     Other sleep apnea     History of MI (myocardial infarction) 12/21/2015    Chronic coronary artery disease 01/25/2016    Anemia of chronic disease 09/12/2016    Weakness of right leg 03/13/2017    Cardiac murmur 05/04/2017    Senile osteoporosis 06/30/2017    Hyperuricemia 09/07/2017    Grief reaction 09/30/2019    Peripheral vertigo 02/25/2020    Renal cell carcinoma of left kidney 11/22/2020    Renal oncocytoma of left  kidney 12/28/2020    History of left nephrectomy 04/19/2021    Cerebrovascular accident (CVA) due to stenosis of small artery 11/29/2021    History of renal cell carcinoma 11/30/2021    TIA (transient ischemic attack) 11/30/2021    Spinal stenosis, lumbar region with neurogenic claudication 12/02/2021    Malignant neoplasm of left kidney, except renal pelvis 06/07/2022    Diverticulosis 12/14/2022    Irritable bowel syndrome with constipation 12/14/2022    Chronic diastolic congestive heart failure 03/18/2023    Hallucination, visual 01/17/2024    Hypomagnesemia 05/02/2024     Resolved Ambulatory Problems     Diagnosis Date Noted    Fatigue     Hip arthritis     IFG (impaired fasting glucose)     Rectal bleeding     Vitamin D deficiency     Urinary incontinence     History of right hip replacement 12/21/2015    Closed fracture of neck of right femur with routine healing 12/21/2015    History of right knee joint replacement 12/21/2015    History of left knee replacement 12/21/2015    Stress-induced cardiomyopathy 01/25/2016    Elevated serum free T4 level 03/21/2016    Urinary tract infection 10/05/2016    Acute pyelonephritis 10/06/2016    Acute cystitis 12/03/2016    Hematuria 12/12/2016    Sinusitis 01/22/2017    Frequent falls 03/13/2017    Atrial fibrillation with RVR 06/19/2017    ANGY (acute kidney injury) 06/20/2017    Viral gastroenteritis 06/20/2017    Dehydration 06/20/2017    Nausea & vomiting 06/20/2017    Diarrhea 06/20/2017    Closed displaced fracture of left femoral neck 08/30/2017    Bacteriuria 08/30/2017    New daily persistent headache 12/17/2018    Muscle tension headache 04/03/2019    Caregiver stress syndrome 04/17/2019    Acute vulvitis 08/21/2019    Dizziness 02/23/2020    Left facial numbness 02/23/2020    Stroke-like symptoms 02/23/2020    Left kidney mass 08/17/2020    Left renal mass 11/16/2020    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    Acute kidney injury 05/30/2022    Acute bronchitis due to  Rhinovirus 05/31/2022    Hyperkalemia 05/31/2022    Diverticulitis 12/14/2022    CARROLL (dyspnea on exertion) 03/17/2023     Past Medical History:   Diagnosis Date    Allergic     Allergic rhinitis     Arthropathy of knee     Atrial fibrillation     Back pain     Bell's palsy     Chronic kidney disease (CKD), stage III (moderate)     Edema     Encounter for eye exam 09/2020    GERD (gastroesophageal reflux disease)     H/O Heart murmur     Heart attack 10/05/2015    Herpes zoster without complication     History of bone density study 02/28/2008    History of pelvic fracture 2015    History of pneumonia     History of transfusion     Limited joint range of motion     Mixed hyperlipidemia     Osteoarthritis     Osteoporosis     PONV (postoperative nausea and vomiting)     Sleep apnea     Stress          REVIEW OF SYSTEMS  All systems reviewed and negative except for those discussed in HPI.       PAST SURGICAL HISTORY  Past Surgical History:   Procedure Laterality Date    CATARACT EXTRACTION Left 04/01/2013    Dr. Clarke    CATARACT EXTRACTION Right 04/16/2013    Dr. Clarke    COLONOSCOPY  04/18/2014    Dr. Elizabeth; no polyps    EPIDURAL BLOCK      HIP PERCUTANEOUS PINNING Left 8/31/2017    Procedure: LT HIP PERCUTANEOUS PINNING;  Surgeon: Sean Canales MD;  Location: [facility] OR;  Service:     MAMMO BILATERAL  11/2013    NEPHRECTOMY Left 11/16/2020    Procedure: LAPAROSCOPIC NEPHRECTOMY;  Surgeon: Chuckie Mclaughlin MD;  Location: [facility] OR;  Service: Urology;  Laterality: Left;    PAP SMEAR  02/2011    TIBIA FRACTURE SURGERY Right 2015    REMOVED PLATE LATER IN 2015    TONSILLECTOMY  1947    TOTAL HIP ARTHROPLASTY Right 08/2015    TOTAL HIP ARTHROPLASTY Right 09/2016    TOTAL KNEE ARTHROPLASTY Bilateral 2004         FAMILY HISTORY  Family History   Problem Relation Age of Onset    Hypertension Other     Hypertension Mother     Stroke Mother     Heart disease Mother     Hypertension Maternal  Grandmother     Ashley Hyperthermia Neg Hx          SOCIAL HISTORY  Social History     Socioeconomic History    Marital status:     Years of education: High school   Tobacco Use    Smoking status: Former     Current packs/day: 0.00     Average packs/day: 1 pack/day for 3.0 years (3.0 ttl pk-yrs)     Types: Cigarettes     Start date: 1953     Quit date: 1956     Years since quittin.3     Passive exposure: Past    Smokeless tobacco: Never    Tobacco comments:     caffeine - 1/2 cup coffee daily    Vaping Use    Vaping status: Never Used   Substance and Sexual Activity    Alcohol use: Not Currently     Alcohol/week: 3.0 standard drinks of alcohol     Types: 3 Glasses of wine per week     Comment: couple times a week    Drug use: Never    Sexual activity: Defer         ALLERGIES  Morphine, Oxycodone, Ace inhibitors, Bactrim [sulfamethoxazole-trimethoprim], Levofloxacin, and Torsemide      PHYSICAL EXAM  ED Triage Vitals [24 1343]   Temp Heart Rate Resp BP SpO2   98 °F (36.7 °C) 50 18 165/95 99 %      Temp src Heart Rate Source Patient Position BP Location FiO2 (%)   -- Monitor -- -- --       Physical Exam      GENERAL: 88-year-old female in no acute distress  HENT: NCAT: nares patent: Neck supple  EYES: no scleral icterus  CV: Irregularly irregular in the 40s  RESPIRATORY: normal effort  ABDOMEN: soft, NTND: Bowel sounds positive: Reproducible left flank tenderness but no bruising or rash  MUSCULOSKELETAL: no deformity  NEURO: alert with nonfocal neuro exam  PSYCH:  calm, cooperative  SKIN: warm, dry    Vital signs and nursing notes reviewed.      LAB RESULTS  Recent Results (from the past 24 hour(s))   ECG 12 Lead Bradycardia    Collection Time: 24  2:28 PM   Result Value Ref Range    QT Interval 500 ms    QTC Interval 433 ms   Comprehensive Metabolic Panel    Collection Time: 24  2:37 PM    Specimen: Blood   Result Value Ref Range    Glucose 93 65 - 99 mg/dL    BUN 53 (H) 8 - 23  mg/dL    Creatinine 2.63 (H) 0.57 - 1.00 mg/dL    Sodium 135 (L) 136 - 145 mmol/L    Potassium 5.1 3.5 - 5.2 mmol/L    Chloride 99 98 - 107 mmol/L    CO2 23.0 22.0 - 29.0 mmol/L    Calcium 9.2 8.6 - 10.5 mg/dL    Total Protein 7.4 6.0 - 8.5 g/dL    Albumin 4.6 3.5 - 5.2 g/dL    ALT (SGPT) 10 1 - 33 U/L    AST (SGOT) 16 1 - 32 U/L    Alkaline Phosphatase 58 39 - 117 U/L    Total Bilirubin 0.8 0.0 - 1.2 mg/dL    Globulin 2.8 gm/dL    A/G Ratio 1.6 g/dL    BUN/Creatinine Ratio 20.2 7.0 - 25.0    Anion Gap 13.0 5.0 - 15.0 mmol/L    eGFR 17.2 (L) >60.0 mL/min/1.73   Protime-INR    Collection Time: 05/27/24  2:37 PM    Specimen: Blood   Result Value Ref Range    Protime 16.1 (H) 11.7 - 14.2 Seconds    INR 1.27 (H) 0.90 - 1.10   Single High Sensitivity Troponin T    Collection Time: 05/27/24  2:37 PM    Specimen: Blood   Result Value Ref Range    HS Troponin T 35 (H) <14 ng/L   Magnesium    Collection Time: 05/27/24  2:37 PM    Specimen: Blood   Result Value Ref Range    Magnesium 3.0 (H) 1.6 - 2.4 mg/dL   TSH    Collection Time: 05/27/24  2:37 PM    Specimen: Blood   Result Value Ref Range    TSH 2.900 0.270 - 4.200 uIU/mL   T4, Free    Collection Time: 05/27/24  2:37 PM    Specimen: Blood   Result Value Ref Range    Free T4 1.22 0.93 - 1.70 ng/dL   CBC Auto Differential    Collection Time: 05/27/24  2:37 PM    Specimen: Blood   Result Value Ref Range    WBC 6.92 3.40 - 10.80 10*3/mm3    RBC 3.58 (L) 3.77 - 5.28 10*6/mm3    Hemoglobin 11.0 (L) 12.0 - 15.9 g/dL    Hematocrit 34.1 34.0 - 46.6 %    MCV 95.3 79.0 - 97.0 fL    MCH 30.7 26.6 - 33.0 pg    MCHC 32.3 31.5 - 35.7 g/dL    RDW 13.4 12.3 - 15.4 %    RDW-SD 47.3 37.0 - 54.0 fl    MPV 10.0 6.0 - 12.0 fL    Platelets 179 140 - 450 10*3/mm3    Neutrophil % 71.6 42.7 - 76.0 %    Lymphocyte % 19.2 (L) 19.6 - 45.3 %    Monocyte % 6.1 5.0 - 12.0 %    Eosinophil % 2.5 0.3 - 6.2 %    Basophil % 0.3 0.0 - 1.5 %    Immature Grans % 0.3 0.0 - 0.5 %    Neutrophils, Absolute 4.96  1.70 - 7.00 10*3/mm3    Lymphocytes, Absolute 1.33 0.70 - 3.10 10*3/mm3    Monocytes, Absolute 0.42 0.10 - 0.90 10*3/mm3    Eosinophils, Absolute 0.17 0.00 - 0.40 10*3/mm3    Basophils, Absolute 0.02 0.00 - 0.20 10*3/mm3    Immature Grans, Absolute 0.02 0.00 - 0.05 10*3/mm3    nRBC 0.0 0.0 - 0.2 /100 WBC   Lipid Panel    Collection Time: 05/27/24  2:37 PM    Specimen: Blood   Result Value Ref Range    Total Cholesterol 197 0 - 200 mg/dL    Triglycerides 220 (H) 0 - 150 mg/dL    HDL Cholesterol 60 40 - 60 mg/dL    LDL Cholesterol  100 0 - 100 mg/dL    VLDL Cholesterol 37 5 - 40 mg/dL    LDL/HDL Ratio 1.55    Urinalysis With Microscopic If Indicated (No Culture) - Urine, Clean Catch    Collection Time: 05/27/24  2:57 PM    Specimen: Urine, Clean Catch   Result Value Ref Range    Color, UA Yellow Yellow, Straw    Appearance, UA Clear Clear    pH, UA 8.5 (H) 5.0 - 8.0    Specific Gravity, UA 1.011 1.005 - 1.030    Glucose, UA Negative Negative    Ketones, UA Negative Negative    Bilirubin, UA Negative Negative    Blood, UA Negative Negative    Protein, UA Negative Negative    Leuk Esterase, UA Small (1+) (A) Negative    Nitrite, UA Negative Negative    Urobilinogen, UA 1.0 E.U./dL 0.2 - 1.0 E.U./dL   Urinalysis, Microscopic Only - Urine, Clean Catch    Collection Time: 05/27/24  2:57 PM    Specimen: Urine, Clean Catch   Result Value Ref Range    RBC, UA 0-2 None Seen, 0-2 /HPF    WBC, UA 3-5 (A) None Seen, 0-2 /HPF    Bacteria, UA None Seen None Seen /HPF    Squamous Epithelial Cells, UA 0-2 None Seen, 0-2 /HPF    Hyaline Casts, UA None Seen None Seen /LPF    Methodology Automated Microscopy        Ordered the above labs and reviewed the results.        RADIOLOGY  XR Chest 1 View    Result Date: 5/27/2024  XR CHEST 1 VW-  Clinical: Shortness of breath  COMPARISON examination 10/24/2023  FINDINGS: No effusion, edema or acute airspace disease has developed. The cardiomediastinal silhouette is stable. Small calcified  benign granuloma within the left lung again noted.  CONCLUSION: No acute cardiovascular nor pulmonary process is demonstrated.  This report was finalized on 5/27/2024 3:09 PM by Dr. Jose Armando Conte M.D on Workstation: [id number]       Ordered the above noted radiological studies. Reviewed by me in PACS.            PROCEDURES  Procedures          MEDICATIONS GIVEN IN ER  Medications   sodium chloride 0.9 % flush 10 mL (has no administration in time range)   nitroglycerin (NITROSTAT) SL tablet 0.4 mg (has no administration in time range)   sodium chloride 0.9 % flush 10 mL (has no administration in time range)   sodium chloride 0.9 % flush 10 mL (has no administration in time range)   sodium chloride 0.9 % infusion 40 mL (has no administration in time range)   aspirin chewable tablet 324 mg (has no administration in time range)     And   aspirin EC tablet 81 mg (has no administration in time range)   sodium chloride 0.9 % bolus 250 mL (has no administration in time range)             MEDICAL DECISION MAKING, PROGRESS, and CONSULTS    All labs have been independently reviewed by me.  All radiology studies have been reviewed by me and I have also reviewed the radiology report.   EKG's independently viewed and interpreted by me.  Discussion below represents my analysis of pertinent findings related to patient's condition, differential diagnosis, treatment plan and final disposition.      Additional sources:  - Discussed/ obtained information from independent historians: The patient's son is now here who states that she has been having difficulty getting around at home for the past week or so due to her fatigue    - External (non-ED) record review: I reviewed the patient's urgent care note from March of this year where she was seen for her low back pain and left hip pain.  The patient sees Dr. Elliott for chronic kidney disease.    - Chronic or social conditions impacting care: Patient lives at home alone    - Shared  decision making: After shared decision-making discussion to myself, the patient and her son we agree she would be best served by admission to the hospital for further evaluation and care      Orders placed during this visit:  Orders Placed This Encounter   Procedures    XR Chest 1 View    CT Abdomen Pelvis Without Contrast    Comprehensive Metabolic Panel    Protime-INR    Urinalysis With Microscopic If Indicated (No Culture) - Urine, Clean Catch    Single High Sensitivity Troponin T    Magnesium    TSH    T4, Free    CBC Auto Differential    Urinalysis, Microscopic Only - Urine, Clean Catch    Basic Metabolic Panel    CBC (No Diff)    High Sensitivity Troponin T    Lipid Panel    Magnesium    High Sensitivity Troponin T    Diet: Regular/House; Fluid Consistency: Thin (IDDSI 0)    NPO Diet NPO Type: Strict NPO    Monitor Blood Pressure    Continuous Pulse Oximetry    Vital Signs Every 15 Minutes Until Stable, Then Every 4 Hours    Telemetry - Place Orders & Notify Provider of Results When Patient Experiences Acute Chest Pain, Dysrhythmia or Respiratory Distress    May Be Off Telemetry for Tests    Notify Physician For Unrelieved Chest Pain    Intake & Output    Weigh Patient    Nurse to Order Troponin As Needed For Unrelieved or New Chest Pain    Saline Lock & Maintain IV Access    Code Status and Medical Interventions:    Inpatient Cardiology Consult    Inpatient Nephrology Consult    PT Consult: Eval & Treat Functional Mobility Below Baseline    ECG 12 Lead Bradycardia    ECG 12 Lead Bradycardia    ECG 12 Lead Chest Pain    ECG 12 Lead Bradycardia    Insert Peripheral IV    Insert Peripheral IV    Initiate ED Observation Status    CBC & Differential         Differential diagnosis:  My differential diagnosis includes but is not limited to UTI, pyelonephritis, sepsis, acute coronary syndrome, bradycardia, medication reaction or electrolyte abnormality      Independent interpretation of labs, radiology studies, and  discussions with consultants:  ED Course as of 05/27/24 1630   Mon May 27, 2024   1426 I will obtain EKG, chest x-ray, labs and urinalysis for further evaluation will place the patient on the monitor. [GP]   1435 EKG    EKG time: 1428  Rhythm/Rate: Atrial fibrillation with a rate of 46  No Acute Ischemia  Non-Specific ST-T changes    Similar compared to prior on 10/24/2023 except now the rate is slower    Interpreted Contemporaneously by me.  Independently viewed by me     [GP]   1522 The patient's creatinine has mildly increased from 2.3-2.63.  Her urinalysis has no bacteria. [GP]   1522 Her chronic anemia is stable.  Her initial troponin is 35 and on last check was 33.  Likely elevated due to her chronic kidney disease. [GP]   1524 My independent interpretation the patient's chest x-ray is no CHF or pneumonia [GP]   1543 On repeat examination the patient is still in A-fib with a rate in the 40s.  With her weakness and bradycardia and mild ANGY on chronic kidney disease I believe should be best served by admission to the observation unit overnight for further evaluation and care.  The patient and her son understand and agree with the plan. [GP]   1546 I discussed the case with Odalis Meehan from the observation unit.  She is aware of the patient's fatigue, bradycardia and weakness.  She is aware the patient has chronic A-fib and recently had her metoprolol decreased along with recent UTI and antibiotic treatment.  She will admit her to the observation unit for further evaluation and care. [GP]      ED Course User Index  [GP] Wong Rosario MD               DIAGNOSIS  Final diagnoses:   Bradycardia   Weakness   Chronic a-fib   Chronic kidney disease, unspecified CKD stage         DISPOSITION  ADMISSION    Discussed treatment plan and reason for admission with pt/family and admitting physician.  Pt/family voiced understanding of the plan for admission for further testing/treatment as needed.            Latest  Documented Vital Signs:  As of 16:30 EDT  BP- 148/64 HR- 52 Temp- 98 °F (36.7 °C) O2 sat- 98%--      --------------------  Please note that portions of this were completed with a voice recognition program.       Note Disclaimer: At [facility], we believe that sharing information builds trust and better relationships. You are receiving this note because you are receiving care at [facility] or recently visited. It is possible you will see health information before a provider has talked with you about it. This kind of information can be easy to misunderstand. To help you fully understand what it means for your health, we urge you to discuss this note with your provider.             Wong Rosario MD  05/27/24 [id number]

## 2024-05-27 NOTE — H&P
[facility]   HISTORY AND PHYSICAL    Patient Name: Marleni Hummel  : 1935  MRN: [id number]  Primary Care Physician:  Ken Andujar MD  Date of admission: 2024    Subjective   Subjective     Chief Complaint:   Chief Complaint   Patient presents with    Fatigue    Flank Pain         HPI:    Marleni Hummel is a pleasant afebrile 88 y.o.  female with a past medical history including but not limited to atrial fibrillation on Eliquis therapy, left renal mass status post nephrectomy, stage III CKD, and hypertension.    She presents to the emergency department [facility] today with complaint of fatigue.  She has been admitted to the ED observation unit for further testing and evaluation.    Patient describes a sensation of feeling fatigued over the last 2 weeks.  She states she is feeling dyspneic on exertion and is of all the energy has been taken from her body.  She also endorses some left flank pain worse with movement.  She denies any recent nausea, vomiting or diarrhea.  No recent fevers or chills, respiratory symptoms or genitourinary complaint.    Patient states that she has been maintained on metoprolol for quite some time for med management of her atrial fibrillation and hypertension.  Patient states she was seen in the office on 5/15/2024 and her metoprolol dosage was decreased from 25 mg to 12.5 mg.  She took this this morning.  She denies any recent NSAIDs.  No recent increase in peripheral edema.  She has not taken her diuretic in quite some time, states she only takes the furosemide if she gains over 4 pounds in a day.    Review of Systems   All systems were reviewed and negative except for: What is mentioned above    Personal History     Past Medical History:   Diagnosis Date    Acute bronchitis due to Rhinovirus 2022    Acute vulvitis 2019    Allergic     Allergic rhinitis     Anemia of chronic disease     Arthropathy of knee     right    Atrial  fibrillation     Back pain     Bell's palsy     Caregiver stress syndrome 04/17/2019    Chronic coronary artery disease     Chronic kidney disease (CKD), stage III (moderate)     Diverticulitis 12/14/2022    CARROLL (dyspnea on exertion) 3/17/2023    Edema     Elevated serum free T4 level 03/21/2016    Encounter for eye exam 09/2020    Essential hypertension     Fatigue     GERD (gastroesophageal reflux disease)     H/O Heart murmur     Heart attack 10/05/2015    Hematuria 12/12/2016    Herpes zoster without complication     Hip arthritis     left    History of bone density study 02/28/2008    History of pelvic fracture 2015    History of pneumonia     History of transfusion     2015    Hypercholesterolemia     IFG (impaired fasting glucose)     Insomnia     Left kidney mass 07/2020    Limited joint range of motion     RIGHT KNEE    Mixed hyperlipidemia     Muscle tension headache 04/03/2019    New daily persistent headache 12/17/2018    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 Clarke    CATARACT EXTRACTION Right 04/16/2013    Dr. Clarke    COLONOSCOPY  04/18/2014    Dr. Elizabeth; no polyps    EPIDURAL BLOCK      HIP PERCUTANEOUS PINNING Left 8/31/2017    Procedure: LT HIP PERCUTANEOUS PINNING;  Surgeon: Sean Canales MD;  Location: [facility];  Service:     MAMMO BILATERAL  11/2013    NEPHRECTOMY Left 11/16/2020    Procedure: LAPAROSCOPIC NEPHRECTOMY;  Surgeon: Chuckie Mclaughlin MD;  Location: [facility];  Service: Urology;  Laterality: Left;     PAP SMEAR  02/2011    TIBIA FRACTURE SURGERY Right 2015    REMOVED PLATE LATER IN 2015    TONSILLECTOMY  1947    TOTAL HIP ARTHROPLASTY Right 08/2015    TOTAL HIP ARTHROPLASTY Right 09/2016    TOTAL KNEE ARTHROPLASTY Bilateral 2004       Family History: family history includes Heart disease in her mother; Hypertension in her maternal grandmother, mother, and another family member; Stroke in her mother. Otherwise pertinent FHx was reviewed and not pertinent to current issue.    Social History:  reports that she quit smoking about 68 years ago. Her smoking use included cigarettes. She started smoking about 71 years ago. She has a 3 pack-year smoking history. She has been exposed to tobacco smoke. She has never used smokeless tobacco. She reports that she does not currently use alcohol after a past usage of about 3.0 standard drinks of alcohol per week. She reports that she does not use drugs.    Home Medications:  TiZANidine, Vibegron, acetaminophen, apixaban, atorvastatin, desonide, escitalopram, fexofenadine, furosemide, hydroCHLOROthiazide, magnesium oxide, melatonin, and metoprolol succinate XL    Allergies:  Allergies   Allergen Reactions    Morphine Anaphylaxis    Oxycodone Anaphylaxis    Ace Inhibitors Cough and Unknown (See Comments)    Bactrim [Sulfamethoxazole-Trimethoprim] Rash    Levofloxacin Itching and Rash     insomnia  insomnia  insomnia    Torsemide Dizziness       Objective   Objective     Vitals:   Temp:  [98 °F (36.7 °C)] 98 °F (36.7 °C)  Heart Rate:  [46-62] 52  Resp:  [18] 18  BP: (148-180)/(64-95) 148/64  Physical Exam    Constitutional: Awake, alert elderly/frail   Eyes: PERRLA, sclerae anicteric, no conjunctival injection   HENT: NCAT, mucous membranes moist   Neck: Supple, no thyromegaly, no lymphadenopathy, trachea midline   Respiratory: Clear to auscultation bilaterally, nonlabored respirations    Cardiovascular: Regular rhythm, bradycardic rate, no murmurs, rubs, or gallops, palpable pedal  pulses bilaterally trace nonpitting bilateral lower extremity edema   Gastrointestinal: Positive bowel sounds, soft, nontender, nondistended   Musculoskeletal: No bilateral ankle edema, no clubbing or cyanosis to extremities   Psychiatric: Appropriate affect, cooperative   Neurologic: Oriented x 3, patient's memory is sharp, strength symmetric in all extremities, Cranial Nerves grossly intact to confrontation, speech clear   Skin: No rashes, no breakdown, mild generalized pallor    Result Review    Result Review:  I have personally reviewed the results from the time of this admission to 5/27/2024 16:29 EDT and agree with these findings:  [x]  Laboratory list / accordion  []  Microbiology  [x]  Radiology  [x]  EKG/Telemetry   []  Cardiology/Vascular   []  Pathology  []  Old records  []  Other:  Most notable findings include: Hemoglobin 11.0, triglycerides 220, TSH 2.9, urinalysis has small leukocytes with 3-5 PVCs, creatinine 2.63      Assessment & Plan   Assessment / Plan     Brief Patient Summary:  Marleni Hummel is a 88 y.o. female who is being evaluated for fatigue with a history of atrial fibrillation for which she is maintained on Eliquis and metoprolol.  Seen by cardiologist recently and had dosage of metoprolol decreased and she still has bradycardia with heart rate in the 40s.  She is not appearing to have any infectious symptoms.  She denies any chest pain or palpitations.  No blood in her stools.  No recent GI loss.  Plan to give her a small bolus today and have PT, nephrology and cardiology evaluate her in the morning for their recommendations.    Active Hospital Problems:  Active Hospital Problems    Diagnosis     **Bradycardia      Plan:     Fatigue  Bradycardia  Atrial fibrillation  High-sensitivity troponin 35, trend  Telemetry  Chest x-ray clear  Hold metoprolol  Continue Eliquis  Consult to cardiology  Consult physical therapy      ANGY on CKD  Left flank pain  Consult to nephrology  250 mL  bolus  Creatinine 2.63, baseline 2.3, repeat with a.m. labs  CT abdomen pelvis pending    Hypertension  Vitals every 4 hours  Continue home medications    DVT prophylaxis:  No DVT prophylaxis order currently exists.        CODE STATUS:    Medical Intervention Limits: NO intubation (DNI); NO dialysis  Level Of Support Discussed With: Patient  Code Status (Patient has no pulse and is not breathing): No CPR (Do Not Attempt to Resuscitate)  Medical Interventions (Patient has pulse or is breathing): Limited Support    Admission Status:  I believe this patient meets observation status.    Electronically signed by ENRRIQUE Ceron, 05/27/24, 4:06 PM EDT.      85 minutes has been spent by [facility] providers in the care of this patient while under observation status      I have worn appropriate PPE during this patient encounter, sanitized my hands both with entering and exiting patient's room.    I have discussed plan of care with patient including advance care plan and/or surrogate decision maker.  Patient advises that their son Sharan will be their primary surrogate decision maker

## 2024-05-27 NOTE — PLAN OF CARE
Goal Outcome Evaluation:   Pt reports L flank pain 0/10 at rest and 5/10 w/ movement.  CT abdomen/pelvis performed, results pending.  Pt notes ongoing urinary symptoms, UA showed WBC and leukocytes otherwise unremarkable.  Cr and BUN elevated.  Nephrology consulted.  AFIB on the monitor w/ bradycardia, rate: 40s-50s.  Denies any cardiac symptoms.  EKGs ordered Q6.  Cardiology consulted.  AO x 4 with NAD.

## 2024-05-27 NOTE — ED TRIAGE NOTES
Pt was brought in by ems from home for fatigue and left flank pain with movement that started 2wks ago

## 2024-05-28 LAB
ANION GAP SERPL CALCULATED.3IONS-SCNC: 11 MMOL/L (ref 5–15)
BUN SERPL-MCNC: 52 MG/DL (ref 8–23)
BUN/CREAT SERPL: 20 (ref 7–25)
CALCIUM SPEC-SCNC: 8.5 MG/DL (ref 8.6–10.5)
CHLORIDE SERPL-SCNC: 103 MMOL/L (ref 98–107)
CO2 SERPL-SCNC: 22 MMOL/L (ref 22–29)
CREAT SERPL-MCNC: 2.6 MG/DL (ref 0.57–1)
CREAT UR-MCNC: 59.7 MG/DL
DEPRECATED RDW RBC AUTO: 46.2 FL (ref 37–54)
EGFRCR SERPLBLD CKD-EPI 2021: 17.5 ML/MIN/1.73
ERYTHROCYTE [DISTWIDTH] IN BLOOD BY AUTOMATED COUNT: 13.5 % (ref 12.3–15.4)
FERRITIN SERPL-MCNC: 166 NG/ML (ref 13–150)
GLUCOSE SERPL-MCNC: 94 MG/DL (ref 65–99)
HCT VFR BLD AUTO: 30.4 % (ref 34–46.6)
HGB BLD-MCNC: 9.9 G/DL (ref 12–15.9)
MAGNESIUM SERPL-MCNC: 3.3 MG/DL (ref 1.6–2.4)
MCH RBC QN AUTO: 30.7 PG (ref 26.6–33)
MCHC RBC AUTO-ENTMCNC: 32.6 G/DL (ref 31.5–35.7)
MCV RBC AUTO: 94.4 FL (ref 79–97)
PLATELET # BLD AUTO: 151 10*3/MM3 (ref 140–450)
PMV BLD AUTO: 10.1 FL (ref 6–12)
POTASSIUM SERPL-SCNC: 5.2 MMOL/L (ref 3.5–5.2)
QT INTERVAL: 239 MS
QT INTERVAL: 500 MS
QT INTERVAL: 541 MS
QTC INTERVAL: 211 MS
QTC INTERVAL: 433 MS
QTC INTERVAL: 474 MS
RBC # BLD AUTO: 3.22 10*6/MM3 (ref 3.77–5.28)
SODIUM SERPL-SCNC: 136 MMOL/L (ref 136–145)
SODIUM UR-SCNC: 52 MMOL/L
TROPONIN T SERPL HS-MCNC: 34 NG/L
WBC NRBC COR # BLD AUTO: 5.57 10*3/MM3 (ref 3.4–10.8)

## 2024-05-28 PROCEDURE — G0378 HOSPITAL OBSERVATION PER HR: HCPCS

## 2024-05-28 PROCEDURE — 82570 ASSAY OF URINE CREATININE: CPT

## 2024-05-28 PROCEDURE — 99214 OFFICE O/P EST MOD 30 MIN: CPT | Performed by: NURSE PRACTITIONER

## 2024-05-28 PROCEDURE — 83735 ASSAY OF MAGNESIUM: CPT | Performed by: NURSE PRACTITIONER

## 2024-05-28 PROCEDURE — 25010000002 PROCHLORPERAZINE 10 MG/2ML SOLUTION: Performed by: EMERGENCY MEDICINE

## 2024-05-28 PROCEDURE — 85027 COMPLETE CBC AUTOMATED: CPT | Performed by: NURSE PRACTITIONER

## 2024-05-28 PROCEDURE — 80048 BASIC METABOLIC PNL TOTAL CA: CPT | Performed by: NURSE PRACTITIONER

## 2024-05-28 PROCEDURE — 97162 PT EVAL MOD COMPLEX 30 MIN: CPT

## 2024-05-28 PROCEDURE — 84300 ASSAY OF URINE SODIUM: CPT

## 2024-05-28 PROCEDURE — 93005 ELECTROCARDIOGRAM TRACING: CPT | Performed by: NURSE PRACTITIONER

## 2024-05-28 PROCEDURE — 84484 ASSAY OF TROPONIN QUANT: CPT | Performed by: NURSE PRACTITIONER

## 2024-05-28 PROCEDURE — 96374 THER/PROPH/DIAG INJ IV PUSH: CPT

## 2024-05-28 PROCEDURE — 99214 OFFICE O/P EST MOD 30 MIN: CPT | Performed by: PHYSICIAN ASSISTANT

## 2024-05-28 PROCEDURE — 93010 ELECTROCARDIOGRAM REPORT: CPT | Performed by: INTERNAL MEDICINE

## 2024-05-28 PROCEDURE — 82728 ASSAY OF FERRITIN: CPT | Performed by: PHYSICIAN ASSISTANT

## 2024-05-28 RX ORDER — PROCHLORPERAZINE EDISYLATE 5 MG/ML
2.5 INJECTION INTRAMUSCULAR; INTRAVENOUS EVERY 6 HOURS PRN
Status: DISCONTINUED | OUTPATIENT
Start: 2024-05-28 | End: 2024-05-29 | Stop reason: HOSPADM

## 2024-05-28 RX ORDER — HYDROCORTISONE ACETATE 25 MG/1
25 SUPPOSITORY RECTAL 2 TIMES DAILY
Status: DISCONTINUED | OUTPATIENT
Start: 2024-05-28 | End: 2024-05-29 | Stop reason: HOSPADM

## 2024-05-28 RX ORDER — SODIUM CHLORIDE 9 MG/ML
75 INJECTION, SOLUTION INTRAVENOUS CONTINUOUS
Status: DISCONTINUED | OUTPATIENT
Start: 2024-05-28 | End: 2024-05-28

## 2024-05-28 RX ADMIN — Medication 10 ML: at 20:50

## 2024-05-28 RX ADMIN — PROCHLORPERAZINE EDISYLATE 2.5 MG: 5 INJECTION INTRAMUSCULAR; INTRAVENOUS at 10:37

## 2024-05-28 RX ADMIN — APIXABAN 2.5 MG: 2.5 TABLET, FILM COATED ORAL at 10:13

## 2024-05-28 RX ADMIN — APIXABAN 2.5 MG: 2.5 TABLET, FILM COATED ORAL at 20:50

## 2024-05-28 RX ADMIN — Medication 10 ML: at 10:13

## 2024-05-28 RX ADMIN — HYDROCORTISONE ACETATE 25 MG: 25 SUPPOSITORY RECTAL at 20:50

## 2024-05-28 RX ADMIN — ESCITALOPRAM 20 MG: 20 TABLET, FILM COATED ORAL at 10:12

## 2024-05-28 RX ADMIN — ASPIRIN 81 MG: 81 TABLET, COATED ORAL at 10:13

## 2024-05-28 RX ADMIN — ATORVASTATIN CALCIUM 20 MG: 20 TABLET, FILM COATED ORAL at 20:50

## 2024-05-28 NOTE — DISCHARGE PLACEMENT REQUEST
"Maria R Hummel (88 y.o. Female)       Date of Birth   1935    Social Security Number       Address   [address] [place] [address]    Home Phone   [phone]    MRN   [id number]       Anabaptism   Pentecostalism    Marital Status                               Admission Date   5/27/24    Admission Type   Emergency    Admitting Provider   Johanna Recio MD    Attending Provider   Kevyn Chavez MD    Department, Room/Bed   [facility] OBSERVATION, 101/1       Discharge Date       Discharge Disposition       Discharge Destination                                 Attending Provider: Kevyn Chavez MD    Allergies: Morphine, Oxycodone, Ace Inhibitors, Bactrim [Sulfamethoxazole-trimethoprim], Levofloxacin, Torsemide    Isolation: None   Infection: None   Code Status: No CPR    Ht: 157.5 cm (62\")   Wt: 60.8 kg (134 lb)    Admission Cmt: None   Principal Problem: Bradycardia [R00.1]                   Active Insurance as of 5/27/2024       Primary Coverage       Payor Plan Insurance Group Employer/Plan Group    MEDICARE MEDICARE A & B        Payor Plan Address Payor Plan Phone Number Payor Plan Fax Number Effective Dates    [address] [phone]  12/1/2002 - None Entered    [place] [address]         Subscriber Name Subscriber Birth Date Member ID       MARIA R HUMMEL 1935 [id number]                     Emergency Contacts        (Rel.) Home Phone Work Phone Mobile Phone    Sharan Hollingsworth (Son) [phone] -- --    NEAL HERNANDEZ (Daughter) [phone] -- [phone]                "

## 2024-05-28 NOTE — PLAN OF CARE
Goal Outcome Evaluation:  Plan of Care Reviewed With: patient           Outcome Evaluation: Pt is an 89 yo female who presents from home with weakness, bradycardia, ANGY on CKD. Prior to admission, pt was living at home alone and independent with mobility using rollator. Upon exam, pt demos generalized weakness, impaired balance, and decreased endurance limiting mobility. Pt performed bed mobility with min A and stood at EOB with min A. Pt c/o dizziness and nausea when upright which limited activity tolerance. Orthostatics obtained. Supine: /64, HR 47 bpm; 1 min Standing: /69, HR 53 bpm. Pt is at increased risk of falls and will continue to benefit from PT to address impairments and increase independence with mobility. Rec home with HH vs SNF pending progress with mobility. Pt currently unsafe to DC home alone.      Anticipated Discharge Disposition (PT): home with home health, skilled nursing facility

## 2024-05-28 NOTE — PLAN OF CARE
Goal Outcome Evaluation:patient was admitted to obs unitfor eval/tx of fatigue and flank pain. Patient does have history of left renal mass with nephrectomy. Troponin levels were 35,31,34. Mag level elevated this am at 3.4, am dose held per nursing judgement.she has a gi consult , cardiology and nephrology following also. Patient is to have a 24 hour holter monitor placed upon discharge and will also be followed w home health care when discharged.

## 2024-05-28 NOTE — CONSULTS
[facility]  Initial Inpatient Consult Note    Referring Provider: GRANT Goetz    Reason for Consultation: GI bleed, anemia    Subjective     History of present illness:    88 y.o. female w/ PMHx of atrial fib, on eliquis, GERD, hx of L renal carcinoma s/p nephrectomy, CKD, HTN, presented to the ED last night with generalized fatigue x 2 weeks. Associated shortness of breath. Found to have bradycardia. Cardiology on board who has discontinued her beta blocker.     GI consulted for anemia w/ hematochezia. Pt reports blood when wiping for last 2 weeks. Denies any blood mixed with stool. She denies any other changes of her bowel habits. No abd pain, nausea/vomiting, melena, unintentional weight loss, family hx of colon cancer. Recalls last colonoscopy 10-15 years ago, and it was negative. She denies significant straining/pushing. Denies rectal pain.     Past Medical History:  Past Medical History:   Diagnosis Date    Acute bronchitis due to Rhinovirus 5/31/2022    Acute vulvitis 08/21/2019    Allergic     Allergic rhinitis     Anemia of chronic disease     Arthropathy of knee     right    Atrial fibrillation     Back pain     Bell's palsy     Caregiver stress syndrome 04/17/2019    Chronic coronary artery disease     Chronic kidney disease (CKD), stage III (moderate)     Diverticulitis 12/14/2022    CARROLL (dyspnea on exertion) 3/17/2023    Edema     Elevated serum free T4 level 03/21/2016    Encounter for eye exam 09/2020    Essential hypertension     Fatigue     GERD (gastroesophageal reflux disease)     H/O Heart murmur     Heart attack 10/05/2015    Hematuria 12/12/2016    Herpes zoster without complication     Hip arthritis     left    History of bone density study 02/28/2008    History of pelvic fracture 2015    History of pneumonia     History of transfusion     2015    Hypercholesterolemia     IFG (impaired fasting glucose)     Insomnia     Left kidney mass 07/2020    Limited joint range of  motion     RIGHT KNEE    Mixed hyperlipidemia     Muscle tension headache 2019    New daily persistent headache 2018    Oncocytoma 2020    Osteoarthritis     Right hip    Osteoporosis     Panic disorder     Peripheral vertigo     PONV (postoperative nausea and vomiting)     Rectal bleeding     HISTORY OF    Sleep apnea     DOES NOT USE C-PAP    Spinal stenosis, lumbar region with neurogenic claudication 2021    Stress     Stress-induced cardiomyopathy     Urinary incontinence     Vitamin D deficiency      Past Surgical History:  Past Surgical History:   Procedure Laterality Date    CATARACT EXTRACTION Left 2013    Dr. Clarke    CATARACT EXTRACTION Right 2013    Dr. Clarke    COLONOSCOPY  2014    Dr. Elizabeth; no polyps    EPIDURAL BLOCK      HIP PERCUTANEOUS PINNING Left 2017    Procedure: LT HIP PERCUTANEOUS PINNING;  Surgeon: Sean Canales MD;  Location: [facility] MAIN OR;  Service:     MAMMO BILATERAL  2013    NEPHRECTOMY Left 2020    Procedure: LAPAROSCOPIC NEPHRECTOMY;  Surgeon: Chuckie Mclaughlin MD;  Location: [facility] MAIN OR;  Service: Urology;  Laterality: Left;    PAP SMEAR  2011    TIBIA FRACTURE SURGERY Right     REMOVED PLATE LATER IN     TONSILLECTOMY  194    TOTAL HIP ARTHROPLASTY Right 2015    TOTAL HIP ARTHROPLASTY Right 2016    TOTAL KNEE ARTHROPLASTY Bilateral       Social History:   Social History     Tobacco Use    Smoking status: Former     Current packs/day: 0.00     Average packs/day: 1 pack/day for 3.0 years (3.0 ttl pk-yrs)     Types: Cigarettes     Start date: 1953     Quit date: 1956     Years since quittin.3     Passive exposure: Past    Smokeless tobacco: Never    Tobacco comments:     caffeine - 1/2 cup coffee daily    Substance Use Topics    Alcohol use: Not Currently     Alcohol/week: 3.0 standard drinks of alcohol     Types: 3 Glasses of wine per week     Comment: couple times a  week      Family History:  Family History   Problem Relation Age of Onset    Hypertension Other     Hypertension Mother     Stroke Mother     Heart disease Mother     Hypertension Maternal Grandmother     Malig Hyperthermia Neg Hx        Home Meds:  Medications Prior to Admission   Medication Sig Dispense Refill Last Dose    acetaminophen (TYLENOL) 500 MG tablet Take 1 tablet by mouth Every 4 (Four) Hours As Needed for Mild Pain.   Past Week    atorvastatin (LIPITOR) 20 MG tablet Take 1 tablet by mouth Every Night for 270 days. 90 tablet 2 5/26/2024    Eliquis 2.5 MG tablet tablet TAKE 1 TABLET EVERY 12 HOURS (TWICE A DAY) 180 tablet 3 5/27/2024    escitalopram (LEXAPRO) 20 MG tablet Take 1 tablet by mouth Daily for 270 days. 90 tablet 2 5/26/2024    fexofenadine (ALLEGRA) 60 MG tablet Take 1 tablet by mouth Daily.   5/27/2024    hydroCHLOROthiazide (HYDRODIURIL) 12.5 MG tablet Take 1 tablet by mouth Daily. 30 tablet 0 5/27/2024    magnesium oxide (MAG-OX) 400 MG tablet Take 1 tablet by mouth Daily.   5/27/2024    metoprolol succinate XL (TOPROL-XL) 25 MG 24 hr tablet Take 1 tablet by mouth Daily for 270 days. (Patient taking differently: Take 0.5 tablets by mouth Daily.) 90 tablet 2 Patient Taking Differently    desonide (DESOWEN) 0.05 % cream Apply to affected area(s) of ears up to twice daily as needed for itching/ flaking for 30 days   More than a month    furosemide (LASIX) 20 MG tablet Take 1 tablet by mouth Daily As Needed (if weight goes up 3lbs in 3 days). 30 tablet 11 More than a month    Gemtesa 75 MG tablet Take 1 tablet by mouth Every Morning for 42 days. 42 tablet 0     melatonin 5 MG tablet tablet Take 1 tablet by mouth At Night As Needed.   More than a month    TiZANidine (Zanaflex) 2 MG capsule Take 1 capsule by mouth 2 (Two) Times a Day As Needed (pain). 60 capsule 0 Unknown     Current Meds:   apixaban, 2.5 mg, Oral, Q12H  atorvastatin, 20 mg, Oral, Nightly  escitalopram, 20 mg, Oral, Daily  [Held  by provider] hydroCHLOROthiazide, 12.5 mg, Oral, Daily  magnesium oxide, 400 mg, Oral, Daily  sodium chloride, 10 mL, Intravenous, Q12H      Allergies:  Allergies   Allergen Reactions    Morphine Anaphylaxis    Oxycodone Anaphylaxis    Ace Inhibitors Cough and Unknown (See Comments)    Bactrim [Sulfamethoxazole-Trimethoprim] Rash    Levofloxacin Itching and Rash     insomnia  insomnia  insomnia    Torsemide Dizziness       Objective     Vital Signs  Temp:  [97.5 °F (36.4 °C)-98 °F (36.7 °C)] 97.6 °F (36.4 °C)  Heart Rate:  [45-62] 47  Resp:  [17-18] 18  BP: (115-180)/(45-95) 134/55  Physical Exam:  General Appearance:     Alert, cooperative, in no acute distress   Abdomen:     Normal bowel sounds, no masses, no organomegaly, soft     nontender, nondistended, no guarding, no rebound                 tenderness   Rectal:     No anal fissure seen. No pain w/ BREANA. Small internal hemorrhoids palpated. No obvious blood. Stool green.        Results Review:   I reviewed the patient's new clinical results.    Results from last 7 days   Lab Units 05/28/24  0404 05/27/24  1437   WBC 10*3/mm3 5.57 6.92   HEMOGLOBIN g/dL 9.9* 11.0*   HEMATOCRIT % 30.4* 34.1   PLATELETS 10*3/mm3 151 179     Results from last 7 days   Lab Units 05/28/24  0404 05/27/24  1437   SODIUM mmol/L 136 135*   POTASSIUM mmol/L 5.2 5.1   CHLORIDE mmol/L 103 99   CO2 mmol/L 22.0 23.0   BUN mg/dL 52* 53*   CREATININE mg/dL 2.60* 2.63*   CALCIUM mg/dL 8.5* 9.2   BILIRUBIN mg/dL  --  0.8   ALK PHOS U/L  --  58   ALT (SGPT) U/L  --  10   AST (SGOT) U/L  --  16   GLUCOSE mg/dL 94 93     Results from last 7 days   Lab Units 05/27/24  1437   INR  1.27*     Lab Results   Lab Value Date/Time    LIPASE 38 01/29/2022 1335    LIPASE 35 01/10/2016 1020       Radiology:  CT Abdomen Pelvis Without Contrast   Final Result      XR Chest 1 View   Final Result            Assessment:   GI bleed- likely due to hemorrhoids.   Normocytic anemia- around her baseline   Bradycardia  Hx  of atrial fib- on eliquis     Plan:   Check iron studies, B12, folate  Trial of anusol suppositories x 10 days  Continue to trend H/H, monitor for GI bleed. If bleeding persists w/ drop in H/H, would recommend stopping Eliquis and we would plan inpatient colonoscopy. Otherwise, we will reevaluate her in outpatient setting and can plan colonoscopy pending her clinical status.   Diet as tolerated from GI perspective.     I discussed the patients findings and my recommendations with patient.         Tyree Augustine PA-C  [facility]  [address] [place] [address]  Office: [phone]

## 2024-05-28 NOTE — CONSULTS
[facility]        Patient Name: Marleni Hummel  Age/Sex: 88 y.o. female  : 1935  MRN: [id number]    Date of Admission: 2024  Date of Encounter Visit: 24  Encounter Provider: ENRRIQUE Clemens  Referring Provider: Johanna Recio MD  Place of Service: [facility] CARDIOLOGY  Patient Care Team:  Ken Andujar MD as PCP - General  Chuckie Mclaughlin MD as Consulting Physician (Urology)  Anayeli Alanis MD as Consulting Physician (Obstetrics and Gynecology)  BROOK Clarke MD as Consulting Physician (Ophthalmology)  Jose Alfredo Krishnamurthy MD as Consulting Physician (Hematology and Oncology)  Sean Canales MD as Consulting Physician (Orthopedic Surgery)  Esteban Moe MD as Consulting Physician (Orthopedic Surgery)  Feliciano Elizabeth MD as Consulting Physician (Gastroenterology)  Wallace Hook MD as Consulting Physician (Pain Medicine)  Sarah Beth Marcus APRN as Nurse Practitioner (Nephrology)  Brandon Miller MD as Consulting Physician (Gastroenterology)  Sybil Parmar MD as Consulting Physician (Cardiology)  Joseph Leonard MD as Consulting Physician (Nephrology)  Joseph Leonard MD as Consulting Physician (Nephrology)    Subjective:     Chief Complaint: Generalized fatigue    Reason for consult: Bradycardia    History of Present Illness:  Marleni Hummel is a 88 y.o. female who follows Dr. Parmar in our office.  Patient was admitted with generalized fatigue.  We have been asked to be seen for bradycardia.    Patient with history of hypertension, hyperlipidemia, atrial fibrillation, stress-induced cardiomyopathy, DEBORAH, left renal carcinoma status post nephrectomy.    Patient has longstanding history of atrial fibrillation at times rapid.  In 2011 she had cardioversion and was started on flecainide and had a repeat cardioversion.  In 2015 patient suffered an NSTEMI and cardiac catheterization  revealed cardiomyopathy with LVEF 20%.  Patient did have a lesion in her circumflex and was treated with a HAYLEY.  Flecainide was discontinued and patient was transitioned to amiodarone.  Unfortunately, patient's QT interval prolonged after amiodarone was initiated and this is subsequently been stopped.    In 2018 patient had echocardiogram revealing LVEF of 65% with borderline LVH with mild to moderate left atrial enlargement.  No significant valvular disease.  In 2019 she developed dyspnea and had nuclear stress test which was negative for ischemia.  Echocardiogram was stable at that time.    Patient presents to [facility] emergency department 5/27/2024 with complaints of fatigue over the past 2 weeks with increased dyspnea with exertion and decreased energy.  Patient was seen in clinic 5/15/2024 by ENRRIQUE Osorio on metoprolol was decreased from 25 mg/day to 12.5 mg/day.  Dose was decreased secondary to fatigue and low heart rates in the 50s.  On ED admission patient found to have atrial fibrillation with bradycardia with heart rate in the 40s.  Laboratory findings include hemoglobin 11.0, TSH 2.9, creatinine 2.63.  Patient also complained of left flank pain, CT of the abdomen pelvis is negative for acute findings.  Nephrology has also been consulted.    Prior Cardiac Testing:  Cardiac catheterization 10/4/2015.  Takotsubo cardiomyopathy with severely elevated LV filling pressures.  Critical circumflex stenosis successfully treated with HAYLEY.  Echocardiogram 12/16/2019.  LVEF 64%.  Diastolic function indeterminate.  Aortic valve is abnormal in structure with no significant regurgitation or stenosis.  Calculated RVSP 31 mmHg.  Myocardial perfusion stress test 12/16/2019.  LVEF 65%.  Impressions consistent with a low risk study.  Echocardiogram 9/4/2020.  LVEF 69%.  All LV wall segments contract normally.  Mild calcification of aortic valve with trace aortic valve regurgitation and no stenosis.   Moderate MAC.  Calculated RVSP 38 mmHg.  Myocardial perfusion PET stress test 9/4/2020.  LVEF greater than 70%.  Normal myocardial perfusion study without evidence of ischemia.  Impressions consistent with a low risk study.  Echocardiogram 3/18/2020.  LVEF 56-60%.  Hypokinesis of LV wall segments.  Mild aortic valve regurgitation.  No significant stenosis.  Mild mitral valve regurgitation.  Myocardial perfusion stress test 3/19/2023.  Normal myocardial perfusion study without evidence of ischemia.  Impressions consistent with a low risk study.  Zio monitor 4/18/2023.  100% atrial fibrillation burden with heart ranging between 40-1 10 with average heart rate of 63.  Echocardiogram 10/25/2023.  Diastolic function was indeterminate.  Saline test results are negative.  Calculated RVSP 19 mmHg.      Past Medical History:  Past Medical History:   Diagnosis Date    Acute bronchitis due to Rhinovirus 5/31/2022    Acute vulvitis 08/21/2019    Allergic     Allergic rhinitis     Anemia of chronic disease     Arthropathy of knee     right    Atrial fibrillation     Back pain     Bell's palsy     Caregiver stress syndrome 04/17/2019    Chronic coronary artery disease     Chronic kidney disease (CKD), stage III (moderate)     Diverticulitis 12/14/2022    CARROLL (dyspnea on exertion) 3/17/2023    Edema     Elevated serum free T4 level 03/21/2016    Encounter for eye exam 09/2020    Essential hypertension     Fatigue     GERD (gastroesophageal reflux disease)     H/O Heart murmur     Heart attack 10/05/2015    Hematuria 12/12/2016    Herpes zoster without complication     Hip arthritis     left    History of bone density study 02/28/2008    History of pelvic fracture 2015    History of pneumonia     History of transfusion     2015    Hypercholesterolemia     IFG (impaired fasting glucose)     Insomnia     Left kidney mass 07/2020    Limited joint range of motion     RIGHT KNEE    Mixed hyperlipidemia     Muscle tension headache  04/03/2019    New daily persistent headache 12/17/2018    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 Clarke    CATARACT EXTRACTION Right 04/16/2013    Dr. Clarke    COLONOSCOPY  04/18/2014    Dr. Elizabeth; no polyps    EPIDURAL BLOCK      HIP PERCUTANEOUS PINNING Left 8/31/2017    Procedure: LT HIP PERCUTANEOUS PINNING;  Surgeon: Sean Canales MD;  Location: [facility] MAIN OR;  Service:     MAMMO BILATERAL  11/2013    NEPHRECTOMY Left 11/16/2020    Procedure: LAPAROSCOPIC NEPHRECTOMY;  Surgeon: Chuckie Mclaughlin MD;  Location: [facility] OR;  Service: Urology;  Laterality: Left;    PAP SMEAR  02/2011    TIBIA FRACTURE SURGERY Right 2015    REMOVED PLATE LATER IN 2015    TONSILLECTOMY  1947    TOTAL HIP ARTHROPLASTY Right 08/2015    TOTAL HIP ARTHROPLASTY Right 09/2016    TOTAL KNEE ARTHROPLASTY Bilateral 2004       Home Medications:   Medications Prior to Admission   Medication Sig Dispense Refill Last Dose    acetaminophen (TYLENOL) 500 MG tablet Take 1 tablet by mouth Every 4 (Four) Hours As Needed for Mild Pain.   Past Week    atorvastatin (LIPITOR) 20 MG tablet Take 1 tablet by mouth Every Night for 270 days. 90 tablet 2 5/26/2024    Eliquis 2.5 MG tablet tablet TAKE 1 TABLET EVERY 12 HOURS (TWICE A DAY) 180 tablet 3 5/27/2024    escitalopram (LEXAPRO) 20 MG tablet Take 1 tablet by mouth Daily for 270 days. 90 tablet 2 5/26/2024    fexofenadine (ALLEGRA) 60 MG tablet Take 1 tablet by mouth Daily.   5/27/2024    hydroCHLOROthiazide (HYDRODIURIL) 12.5  MG tablet Take 1 tablet by mouth Daily. 30 tablet 0 2024    magnesium oxide (MAG-OX) 400 MG tablet Take 1 tablet by mouth Daily.   2024    metoprolol succinate XL (TOPROL-XL) 25 MG 24 hr tablet Take 1 tablet by mouth Daily for 270 days. (Patient taking differently: Take 0.5 tablets by mouth Daily.) 90 tablet 2 Patient Taking Differently    desonide (DESOWEN) 0.05 % cream Apply to affected area(s) of ears up to twice daily as needed for itching/ flaking for 30 days   More than a month    furosemide (LASIX) 20 MG tablet Take 1 tablet by mouth Daily As Needed (if weight goes up 3lbs in 3 days). 30 tablet 11 More than a month    Gemtesa 75 MG tablet Take 1 tablet by mouth Every Morning for 42 days. 42 tablet 0     melatonin 5 MG tablet tablet Take 1 tablet by mouth At Night As Needed.   More than a month    TiZANidine (Zanaflex) 2 MG capsule Take 1 capsule by mouth 2 (Two) Times a Day As Needed (pain). 60 capsule 0 Unknown       Allergies:  Allergies   Allergen Reactions    Morphine Anaphylaxis    Oxycodone Anaphylaxis    Ace Inhibitors Cough and Unknown (See Comments)    Bactrim [Sulfamethoxazole-Trimethoprim] Rash    Levofloxacin Itching and Rash     insomnia  insomnia  insomnia    Torsemide Dizziness       Past Social History:  Social History     Socioeconomic History    Marital status:     Years of education: High school   Tobacco Use    Smoking status: Former     Current packs/day: 0.00     Average packs/day: 1 pack/day for 3.0 years (3.0 ttl pk-yrs)     Types: Cigarettes     Start date: 1953     Quit date: 1956     Years since quittin.3     Passive exposure: Past    Smokeless tobacco: Never    Tobacco comments:     caffeine - 1/2 cup coffee daily    Vaping Use    Vaping status: Never Used   Substance and Sexual Activity    Alcohol use: Not Currently     Alcohol/week: 3.0 standard drinks of alcohol     Types: 3 Glasses of wine per week     Comment: couple times a week    Drug use:  Never    Sexual activity: Defer       Past Family History:  Family History   Problem Relation Age of Onset    Hypertension Other     Hypertension Mother     Stroke Mother     Heart disease Mother     Hypertension Maternal Grandmother     Malig Hyperthermia Neg Hx        Review of Systems   Constitutional: Positive for malaise/fatigue.   Cardiovascular:  Negative for chest pain and leg swelling.   Respiratory:  Negative for shortness of breath.    Gastrointestinal:  Positive for melena. Negative for hematochezia.   Neurological:  Negative for light-headedness.   All other systems reviewed and are negative.        Objective:   Temp:  [97.5 °F (36.4 °C)-98 °F (36.7 °C)] 97.7 °F (36.5 °C)  Heart Rate:  [45-62] 62  Resp:  [17-18] 18  BP: (115-180)/(45-95) 178/70   No intake or output data in the 24 hours ending 05/28/24 0953  Body mass index is 24.51 kg/m².      05/27/24  1343 05/27/24  1547   Weight: 60.8 kg (134 lb) 60.8 kg (134 lb)     Weight change:     Vitals and nursing note reviewed.   Constitutional:       Appearance: Normal appearance. Well-developed.   Eyes:      Conjunctiva/sclera: Conjunctivae normal.   Neck:      Vascular: No carotid bruit.   Pulmonary:      Breath sounds: Normal breath sounds.   Cardiovascular:      Bradycardia present. Regular rhythm. Normal S1 with normal intensity. Normal S2 with normal intensity.       Murmurs: There is no murmur.      No gallop.  No click. No rub.   Edema:     Peripheral edema absent.   Musculoskeletal: Normal range of motion. Skin:     General: Skin is warm and dry.   Neurological:      Mental Status: Alert and oriented to person, place, and time.      GCS: GCS eye subscore is 4. GCS verbal subscore is 5. GCS motor subscore is 6.   Psychiatric:         Speech: Speech normal.         Behavior: Behavior normal.         Thought Content: Thought content normal.         Judgment: Judgment normal.           Lab Review:   Results from last 7 days   Lab Units 05/28/24  4088  05/27/24  1437   SODIUM mmol/L 136 135*   POTASSIUM mmol/L 5.2 5.1   CHLORIDE mmol/L 103 99   CO2 mmol/L 22.0 23.0   BUN mg/dL 52* 53*   CREATININE mg/dL 2.60* 2.63*   GLUCOSE mg/dL 94 93   CALCIUM mg/dL 8.5* 9.2   AST (SGOT) U/L  --  16   ALT (SGPT) U/L  --  10     Results from last 7 days   Lab Units 05/28/24  0404 05/27/24  1646 05/27/24  1437   HSTROP T ng/L 34* 31* 35*     Results from last 7 days   Lab Units 05/28/24  0404 05/27/24  1437   WBC 10*3/mm3 5.57 6.92   HEMOGLOBIN g/dL 9.9* 11.0*   HEMATOCRIT % 30.4* 34.1   PLATELETS 10*3/mm3 151 179     Results from last 7 days   Lab Units 05/27/24  1437   INR  1.27*     Results from last 7 days   Lab Units 05/28/24  0404 05/27/24  1437   MAGNESIUM mg/dL 3.3* 3.0*     Results from last 7 days   Lab Units 05/27/24  1437   CHOLESTEROL mg/dL 197   TRIGLYCERIDES mg/dL 220*   HDL CHOL mg/dL 60         Results from last 7 days   Lab Units 05/27/24  1437   TSH uIU/mL 2.900       Echo EF Estimated  Results for orders placed during the hospital encounter of 10/24/23    Adult Transthoracic Echo Complete W/ Cont if Necessary Per Protocol (With Agitated Saline)    Interpretation Summary    Left ventricular diastolic function was indeterminate.    Saline test results are negative.    There is mild calcification of the aortic valve.    Estimated right ventricular systolic pressure from tricuspid regurgitation is normal (<35 mmHg). Calculated right ventricular systolic pressure from tricuspid regurgitation is 19 mmHg.      EKG:   I personally viewed and interpreted the patient's EKG            Imaging:  Imaging Results (Most Recent)       Procedure Component Value Units Date/Time    CT Abdomen Pelvis Without Contrast [[id number]] Collected: 05/27/24 1824     Updated: 05/27/24 1835    Narrative:      CT ABDOMEN PELVIS WO CONTRAST-     Radiation dose reduction techniques were utilized, including automated  exposure control and exposure modulation based on body size.     Clinical:  Left flank pain, bradycardia     COMPARISON examination 1/29/2022     FINDINGS:  1. The left nephrectomy site is satisfactory in appearance. 10 mm  nodular density along the right renal cortex seen on image #49, too  small to accurately characterize. Advise conservative sonographic or CT  surveillance. No right-sided calculus or obstructive uropathy.     2. Artifact arising from the right hip prosthesis and the gamma nails  within the left femur obscures portions of the pelvic floor. Visualized  urinary bladder within normal limits. Uterus does not appear enlarged  and the ovaries are symmetric and satisfactory in appearance. There is  diverticulosis of the sigmoid colon, no finding to suggest acute  diverticulitis. The remainder of the colon is satisfactory in  appearance.     3. The stomach, duodenum, jejunum and ileum have a satisfactory  appearance. The appendix is within normal limits.     4. The liver, gallbladder, pancreas, spleen and adrenal glands have a  satisfactory appearance. There is atherosclerotic calcification of a  normal diameter aorta. No free air nor free intraperitoneal fluid seen.     CONCLUSION: No acute intra-abdominal abnormality to explain the left  flank pain. See above findings.        This report was finalized on 5/27/2024 6:32 PM by Dr. Jose Armando Conte M.D  on Workstation: [id number]       XR Chest 1 View [[id number]] Collected: 05/27/24 1508     Updated: 05/27/24 1512    Narrative:      XR CHEST 1 VW-     Clinical: Shortness of breath     COMPARISON examination 10/24/2023     FINDINGS: No effusion, edema or acute airspace disease has developed.  The cardiomediastinal silhouette is stable. Small calcified benign  granuloma within the left lung again noted.     CONCLUSION: No acute cardiovascular nor pulmonary process is  demonstrated.     This report was finalized on 5/27/2024 3:09 PM by Dr. Jose Armando Conte M.D  on Workstation: [id number]                 Assessment:     Active  "Hospital Problems    Diagnosis  POA    **Bradycardia [R00.1]  Yes      Resolved Hospital Problems   No resolved problems to display.          Plan:     Generalized fatigue  Could be secondary to bradycardia, stop beta-blocker  Noted hemoglobin dropped from 11.0-9.9 in 1 day, patient admits noting blood in her stool.  Also noted to have ANGY on CKD  Persistent atrial fibrillation  Patient now with noted bradycardia, discontinue beta-blocker.  Home with 2-week monitor to assess overall heart rate and to watch closely for tachybradycardia syndrome  Anticoagulated with apixaban, continue for now but low threshold to discontinue if blood counts continue to drop  Bradycardia  Patient's heart rate have been in the upper 40s in the past and she has not been symptomatic.  Discontinue beta-blocker  Anemia  Hemoglobin has dropped from 11.0-9.9 in 1 day.  Patient admits blood in the stool  Would favor GI consult, patient has not had a colonoscopy since \"I was in my 70s\".  This could potentially be done in the outpatient setting  Continue apixaban for #2 above for now.  Low threshold to discontinue if blood counts continue to drop  Discontinue aspirin as patient does not have known history for CAD or CVA.  ANGY on CKD  Appreciate nephrology input    Thank you for allowing me to participate in the care of Marleni SCOUT Hummel. Feel free to contact me directly with any further questions or concerns.         ENRRIQUE Clemens  [facility]   [facility]  [address] [place] [address]  Office: [phone]    05/28/24  09:53 EDT      EMR Dragon/Transcription disclaimer:   Parts of this note may be an electronic transcription/translation of spoken language to printed text using the Dragon dictation system  "

## 2024-05-28 NOTE — PROGRESS NOTES
ED OBSERVATION PROGRESS/DISCHARGE SUMMARY    Date of Admission: 5/27/2024   LOS: 0 days   PCP: Ken Andujar MD      Subjective: Bradycardia    Hospital Outcome:   88-year-old female admitted to the observation unit for further evaluation of worsening fatigue, generalized weakness and complaint of left flank pain.  Patient's metoprolol dose was recently halved to 12.5 mg twice daily last week by Dr. Parmar, her cardiologist, without improvement of her symptoms.  In the ER patient was noted with bradycardia lowest in the 40s.  High-sensitivity troponins flat.  Chest x-ray shows no acute findings.  CT of the abdomen pelvis without revealed no acute intra-abdominal abnormality.  Urinalysis clean.  Patient was noted with a mildly elevated creatinine at 2.63 up from her baseline of around 2.2-2.3.  Patient was given small IV fluid bolus.    5/28/2024: On exam, patient reports that she overall feels well, but that she does have some lightheadedness with changing position. Cardiology and nephrology consulted.  Cardiology has seen and evaluated the patient and has stopped the patient's beta-blocker indefinitely; however they do not suspect that her symptoms are secondary to the bradycardia since this has been an intermittent chronic issue for the patient.  Patient did report to them that she has been having some bloody bowel movements and patient is on anticoagulation, cardiology recommending GI recommendations and possible outpatient scope if indicated.  PT consulted and recommends SNF placement; case management consulted for discharge planning assistance.  Nephrology consulted and evaluated the patient and recommend to continue holding HCTZ and beta blocker, check orthostatics, bladder scan, and FENa, IV fluids, and will repeat labs in the morning.  GI consulted and recommends checking iron studies, B12, folate, trial of Anusol suppositories for 10 days, continue to trend H&H and monitor for GI bleed if hemoglobin  continues to drop recommend hold Eliquis and plan for inpatient colonoscopy.  Will plan to reevaluate in the morning.    ROS:  General: no fevers, chills  Respiratory: no cough, dyspnea  Cardiovascular: no chest pain, palpitations  Abdomen: No abdominal pain, nausea, vomiting, or diarrhea  Neurologic: No focal weakness    Objective   Physical Exam:  I have reviewed the vital signs.  Temp:  [97.5 °F (36.4 °C)-98 °F (36.7 °C)] 97.7 °F (36.5 °C)  Heart Rate:  [45-62] 46  Resp:  [17-18] 18  BP: (115-180)/(45-95) 124/80  General Appearance:    Alert, cooperative, no distress, chronically ill-appearing  Head:    Normocephalic, atraumatic, hard of hearing, moist mucous membranes  Eyes:    Sclerae anicteric, EOMI  Neck:   Supple, nontender  Lungs: Clear to auscultation bilaterally, respirations unlabored  Heart: Regular rate and rhythm, S1 and S2 normal, no murmur  Abdomen:  Soft, non-tender, bowel sounds active, nondistended  Extremities: No clubbing, cyanosis, or edema to lower extremities  Pulses:  2+ and symmetric in distal lower extremities  Skin: No rashes   Neurologic: Oriented x3, Normal strength to extremities    Results Review:    I have reviewed the labs, radiology results and diagnostic studies.    Results from last 7 days   Lab Units 05/27/24  1437   WBC 10*3/mm3 6.92   HEMOGLOBIN g/dL 11.0*   HEMATOCRIT % 34.1   PLATELETS 10*3/mm3 179     Results from last 7 days   Lab Units 05/27/24  1437   SODIUM mmol/L 135*   POTASSIUM mmol/L 5.1   CHLORIDE mmol/L 99   CO2 mmol/L 23.0   BUN mg/dL 53*   CREATININE mg/dL 2.63*   CALCIUM mg/dL 9.2   BILIRUBIN mg/dL 0.8   ALK PHOS U/L 58   ALT (SGPT) U/L 10   AST (SGOT) U/L 16   GLUCOSE mg/dL 93     Imaging Results (Last 24 Hours)       Procedure Component Value Units Date/Time    CT Abdomen Pelvis Without Contrast [[id number]] Collected: 05/27/24 1824     Updated: 05/27/24 1835    Narrative:      CT ABDOMEN PELVIS WO CONTRAST-     Radiation dose reduction techniques were  utilized, including automated  exposure control and exposure modulation based on body size.     Clinical: Left flank pain, bradycardia     COMPARISON examination 1/29/2022     FINDINGS:  1. The left nephrectomy site is satisfactory in appearance. 10 mm  nodular density along the right renal cortex seen on image #49, too  small to accurately characterize. Advise conservative sonographic or CT  surveillance. No right-sided calculus or obstructive uropathy.     2. Artifact arising from the right hip prosthesis and the gamma nails  within the left femur obscures portions of the pelvic floor. Visualized  urinary bladder within normal limits. Uterus does not appear enlarged  and the ovaries are symmetric and satisfactory in appearance. There is  diverticulosis of the sigmoid colon, no finding to suggest acute  diverticulitis. The remainder of the colon is satisfactory in  appearance.     3. The stomach, duodenum, jejunum and ileum have a satisfactory  appearance. The appendix is within normal limits.     4. The liver, gallbladder, pancreas, spleen and adrenal glands have a  satisfactory appearance. There is atherosclerotic calcification of a  normal diameter aorta. No free air nor free intraperitoneal fluid seen.     CONCLUSION: No acute intra-abdominal abnormality to explain the left  flank pain. See above findings.        This report was finalized on 5/27/2024 6:32 PM by Dr. Jose Armando Conte M.D  on Workstation: [id number]       XR Chest 1 View [[id number]] Collected: 05/27/24 1508     Updated: 05/27/24 1512    Narrative:      XR CHEST 1 VW-     Clinical: Shortness of breath     COMPARISON examination 10/24/2023     FINDINGS: No effusion, edema or acute airspace disease has developed.  The cardiomediastinal silhouette is stable. Small calcified benign  granuloma within the left lung again noted.     CONCLUSION: No acute cardiovascular nor pulmonary process is  demonstrated.     This report was finalized on 5/27/2024 3:09  PM by Dr. Jose Armando Conte M.D  on Workstation: [id number]               I have reviewed the medications.  ---------------------------------------------------------------------------------------------  Assessment & Plan   Assessment/Problem List    Bradycardia      Plan:      Fatigue  Bradycardia  Atrial fibrillation  -High-sensitivity troponin 35, 31, 34; remained flat  -Continuous cardiac monitor  -Chest x-ray clear  -Metoprolol discontinued per cardiology  -Continue Eliquis; stopped aspirin per cardiology  -Consult to cardiology; stopped the patient's beta-blocker indefinitely; however they do not suspect that her symptoms are secondary to the bradycardia since this has been an intermittent chronic issue for the patient.  -Consult physical therapy; they recommended SNF placement but patient has declined  -Orthostatics pending        ANGY on CKD  -Creatinine 2.63, baseline 2.3, repeat with a.m. labs  -CT abdomen pelvis negative acute  -Nephrology evaluated the patient and recommends holding HCTZ and beta blocker, check orthostatics, bladder scan, and FENa, IV fluids, and will repeat labs in the morning.     Hypertension  -Vitals every 4 hours  -Continue home medications    Rectal bleeding:  -No acute signs of bleeding since admission  -GI consulted per cardiology recommendation  - Checking iron studies/B12/folate, trial of Anusol suppositories for 10 days, continue to trend H&H and monitor for GI bleed if hemoglobin continues to drop recommend hold Eliquis and plan for inpatient colonoscopy.        Disposition: Likely discharge in 1 to 2 days    Follow-up after Discharge: Pending on hospital course    This note will serve as progress note      52 minutes have been spent by [facility] providers in the care of this patient while under observation status.    Appropriate PPE worn during patient encounter.  Hand hygeine performed before and after seeing the patient.      Alyssa Goetz,  TYESHA 05/28/24 04:09 EDT

## 2024-05-28 NOTE — THERAPY EVALUATION
Patient Name: Marleni Hummel  : 1935    MRN: [id number]                              Today's Date: 2024       Admit Date: 2024    Visit Dx:     ICD-10-CM ICD-9-CM   1. Bradycardia  R00.1 427.89   2. Weakness  R53.1 780.79   3. Chronic a-fib  I48.20 427.31   4. Chronic kidney disease, unspecified CKD stage  N18.9 585.9     Patient Active Problem List   Diagnosis    Arthritis involving multiple sites    Essential hypertension    Permanent atrial fibrillation    History of Bell's palsy    CKD (chronic kidney disease) stage 4, GFR 15-29 ml/min    Gastroesophageal reflux disease without esophagitis    Hypercholesterolemia    Insomnia    Localized osteoporosis without current pathological fracture    Panic disorder    Chronic nonseasonal allergic rhinitis due to pollen    Other sleep apnea    History of MI (myocardial infarction)    Chronic coronary artery disease    Anemia of chronic disease    Weakness of right leg    Cardiac murmur    Senile osteoporosis    Hyperuricemia    Grief reaction    Peripheral vertigo    Renal cell carcinoma of left kidney    Renal oncocytoma of left kidney    History of left nephrectomy    Cerebrovascular accident (CVA) due to stenosis of small artery    History of renal cell carcinoma    TIA (transient ischemic attack)    Spinal stenosis, lumbar region with neurogenic claudication    Malignant neoplasm of left kidney, except renal pelvis    Diverticulosis    Irritable bowel syndrome with constipation    Chronic diastolic congestive heart failure    Hallucination, visual    Hypomagnesemia    Bradycardia     Past Medical History:   Diagnosis Date    Acute bronchitis due to Rhinovirus 2022    Acute vulvitis 2019    Allergic     Allergic rhinitis     Anemia of chronic disease     Arthropathy of knee     right    Atrial fibrillation     Back pain     Bell's palsy     Caregiver stress syndrome 2019    Chronic coronary artery disease     Chronic kidney disease  (CKD), stage III (moderate)     Diverticulitis 12/14/2022    CARROLL (dyspnea on exertion) 3/17/2023    Edema     Elevated serum free T4 level 03/21/2016    Encounter for eye exam 09/2020    Essential hypertension     Fatigue     GERD (gastroesophageal reflux disease)     H/O Heart murmur     Heart attack 10/05/2015    Hematuria 12/12/2016    Herpes zoster without complication     Hip arthritis     left    History of bone density study 02/28/2008    History of pelvic fracture 2015    History of pneumonia     History of transfusion     2015    Hypercholesterolemia     IFG (impaired fasting glucose)     Insomnia     Left kidney mass 07/2020    Limited joint range of motion     RIGHT KNEE    Mixed hyperlipidemia     Muscle tension headache 04/03/2019    New daily persistent headache 12/17/2018    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 Clarke    CATARACT EXTRACTION Right 04/16/2013    Dr. Clarke    COLONOSCOPY  04/18/2014    Dr. Elizabeth; no polyps    EPIDURAL BLOCK      HIP PERCUTANEOUS PINNING Left 8/31/2017    Procedure: LT HIP PERCUTANEOUS PINNING;  Surgeon: Sean Canales MD;  Location: [facility] MAIN OR;  Service:     MAMMO BILATERAL  11/2013    NEPHRECTOMY Left 11/16/2020    Procedure: LAPAROSCOPIC NEPHRECTOMY;  Surgeon: Chuckie Mclaughlin MD;  Location: [facility] MAIN OR;  Service: Urology;  Laterality: Left;    PAP SMEAR  02/2011    TIBIA FRACTURE SURGERY Right 2015    REMOVED PLATE LATER IN 2015    TONSILLECTOMY  1947    TOTAL HIP ARTHROPLASTY Right  08/2015    TOTAL HIP ARTHROPLASTY Right 09/2016    TOTAL KNEE ARTHROPLASTY Bilateral 2004      General Information       Row Name 05/28/24 0957          Physical Therapy Time and Intention    Document Type evaluation  -EE     Mode of Treatment individual therapy;physical therapy  -EE       Row Name 05/28/24 0957          General Information    Patient Profile Reviewed yes  -EE     Prior Level of Function independent:;all household mobility;community mobility  rollator  -EE     Existing Precautions/Restrictions fall  -EE     Barriers to Rehab none identified  -EE       Row Name 05/28/24 0957          Living Environment    People in Home alone  -EE       Row Name 05/28/24 0957          Home Main Entrance    Number of Stairs, Main Entrance none  -EE       Row Name 05/28/24 0957          Cognition    Orientation Status (Cognition) oriented x 4  -EE       Row Name 05/28/24 0957          Safety Issues, Functional Mobility    Impairments Affecting Function (Mobility) balance;endurance/activity tolerance;strength  -EE               User Key  (r) = Recorded By, (t) = Taken By, (c) = Cosigned By      Initials Name Provider Type    EE Dorothy Jasmine, DAILY Physical Therapist                   Mobility       Row Name 05/28/24 0957          Bed Mobility    Bed Mobility supine-sit;sit-supine  -EE     Supine-Sit [place] (Bed Mobility) standby assist;verbal cues  -EE     Sit-Supine [place] (Bed Mobility) minimum assist (75% patient effort);verbal cues  -EE     Assistive Device (Bed Mobility) head of bed elevated;bed rails  -EE     Comment, (Bed Mobility) increased time required to perform due to weakness  -EE       Row Name 05/28/24 0957          Sit-Stand Transfer    Sit-Stand [place] (Transfers) contact guard;minimum assist (75% patient effort);verbal cues  -EE     Assistive Device (Sit-Stand Transfers) walker, front-wheeled  -EE     Comment, (Sit-Stand Transfer) STS x2 from EOB, cues for hand placement  -EE       Row  Name 05/28/24 0957          Gait/Stairs (Locomotion)    [place] Level (Gait) unable to assess  pt became dizzy/nauseated while standing; unable to attempt ambulation  -EE               User Key  (r) = Recorded By, (t) = Taken By, (c) = Cosigned By      Initials Name Provider Type    EE Dorothy Jasmine PT Physical Therapist                   Obj/Interventions       Row Name 05/28/24 0958          Range of Motion Comprehensive    General Range of Motion bilateral lower extremity ROM WFL  -EE       Row Name 05/28/24 0958          Strength Comprehensive (MMT)    General Manual Muscle Testing (MMT) Assessment lower extremity strength deficits identified  -EE     Comment, General Manual Muscle Testing (MMT) Assessment B LEs grossly 3+/5  -EE       Row Name 05/28/24 0958          Balance    Balance Assessment sitting static balance;standing static balance  -EE     Static Sitting Balance supervision  -EE     Position, Sitting Balance unsupported;sitting edge of bed  -EE     Dynamic Standing Balance contact guard  -EE     Position/Device Used, Standing Balance supported;walker, front-wheeled  -EE     Comment, Balance Static standing x2 min while obtaining BP; pt became progressively more dizzy and weak and requested to sit back down  -EE       Row Name 05/28/24 0958          Sensory Assessment (Somatosensory)    Sensory Assessment (Somatosensory) LE sensation intact  -EE               User Key  (r) = Recorded By, (t) = Taken By, (c) = Cosigned By      Initials Name Provider Type    EE Dorothy Jasmine PT Physical Therapist                   Goals/Plan       Row Name 05/28/24 1003          Bed Mobility Goal 1 (PT)    Activity/Assistive Device (Bed Mobility Goal 1, PT) sit to supine/supine to sit  -EE     [place] Level/Cues Needed (Bed Mobility Goal 1, PT) independent  -EE     Time Frame (Bed Mobility Goal 1, PT) 1 week;long term goal (LTG)  -EE     Progress/Outcomes (Bed Mobility Goal 1, PT) goal ongoing  -EE       Row  Name 05/28/24 1003          Transfer Goal 1 (PT)    Activity/Assistive Device (Transfer Goal 1, PT) sit-to-stand/stand-to-sit;bed-to-chair/chair-to-bed;walker, rolling  -EE     [place] Level/Cues Needed (Transfer Goal 1, PT) modified independence  -EE     Time Frame (Transfer Goal 1, PT) 1 week;long term goal (LTG)  -EE     Progress/Outcome (Transfer Goal 1, PT) goal ongoing  -EE       Row Name 05/28/24 1003          Gait Training Goal 1 (PT)    Activity/Assistive Device (Gait Training Goal 1, PT) gait (walking locomotion);walker, rolling  -EE     [place] Level (Gait Training Goal 1, PT) modified independence  -EE     Distance (Gait Training Goal 1, PT) 100'  -EE     Time Frame (Gait Training Goal 1, PT) 1 week;long term goal (LTG)  -EE     Progress/Outcome (Gait Training Goal 1, PT) goal ongoing  -EE       Row Name 05/28/24 1003          Therapy Assessment/Plan (PT)    Planned Therapy Interventions (PT) balance training;bed mobility training;gait training;home exercise program;patient/family education;strengthening;transfer training;postural re-education;ROM (range of motion)  -EE               User Key  (r) = Recorded By, (t) = Taken By, (c) = Cosigned By      Initials Name Provider Type    Dorothy Fowler PT Physical Therapist                   Clinical Impression       Row Name 05/28/24 0952          Pain    Pretreatment Pain Rating 0/10 - no pain  -EE     Posttreatment Pain Rating 0/10 - no pain  -EE       Row Name 05/28/24 0970          Plan of Care Review    Plan of Care Reviewed With patient  -EE     Outcome Evaluation Pt is an 87 yo female who presents from home with weakness, bradycardia, ANGY on CKD. Prior to admission, pt was living at home alone and independent with mobility using rollator. Upon exam, pt demos generalized weakness, impaired balance, and decreased endurance limiting mobility. Pt performed bed mobility with min A and stood at EOB with min A. Pt c/o dizziness and nausea when  upright which limited activity tolerance. Orthostatics obtained. Supine: /64, HR 47 bpm; 1 min Standing: /69, HR 53 bpm. Pt is at increased risk of falls and will continue to benefit from PT to address impairments and increase independence with mobility. Rec home with HH vs SNF pending progress with mobility. Pt currently unsafe to DC home alone.  -EE       Row Name 05/28/24 0959          Therapy Assessment/Plan (PT)    Rehab Potential (PT) good, to achieve stated therapy goals  -EE     Criteria for Skilled Interventions Met (PT) yes;meets criteria;skilled treatment is necessary  -EE     Therapy Frequency (PT) 6 times/wk  -EE       Row Name 05/28/24 0959          Vital Signs    Pre Systolic BP Rehab 131  -EE     Pre Treatment Diastolic BP 64  -EE     Intra Systolic BP Rehab 122  -EE     Intra Treatment Diastolic BP 69  -EE     Pretreatment Heart Rate (beats/min) 47  -EE     Intratreatment Heart Rate (beats/min) 53  -EE     Pre Patient Position Supine  -EE     Intra Patient Position Standing  -EE     Post Patient Position Supine  -EE       Row Name 05/28/24 0959          Positioning and Restraints    Pre-Treatment Position in bed  -EE     Post Treatment Position bed  -EE     In Bed fowlers;call light within reach;encouraged to call for assist;exit alarm on;notified nsg  -EE               User Key  (r) = Recorded By, (t) = Taken By, (c) = Cosigned By      Initials Name Provider Type    Dorothy Fowler, PT Physical Therapist                   Outcome Measures       Row Name 05/28/24 1004          How much help from another person do you currently need...    Turning from your back to your side while in flat bed without using bedrails? 3  -EE     Moving from lying on back to sitting on the side of a flat bed without bedrails? 3  -EE     Moving to and from a bed to a chair (including a wheelchair)? 3  -EE     Standing up from a chair using your arms (e.g., wheelchair, bedside chair)? 3  -EE     Climbing 3-5  steps with a railing? 2  -EE     To walk in hospital room? 2  -EE     AM-PAC 6 Clicks Score (PT) 16  -EE     Highest Level of Mobility Goal 5 --> Static standing  -EE               User Key  (r) = Recorded By, (t) = Taken By, (c) = Cosigned By      Initials Name Provider Type    EE Dorotyh Jasmine PT Physical Therapist                                 Physical Therapy Education       Title: PT OT SLP Therapies (In Progress)       Topic: Physical Therapy (In Progress)       Point: Mobility training (Done)       Learning Progress Summary             Patient Acceptance, E, VU,NR by EE at 5/28/2024 1004                         Point: Home exercise program (Not Started)       Learner Progress:  Not documented in this visit.              Point: Body mechanics (Not Started)       Learner Progress:  Not documented in this visit.              Point: Precautions (Not Started)       Learner Progress:  Not documented in this visit.                              User Key       Initials Effective Dates Name Provider Type Discipline    EE 06/16/21 -  Dorothy Jasmine PT Physical Therapist PT                  PT Recommendation and Plan  Planned Therapy Interventions (PT): balance training, bed mobility training, gait training, home exercise program, patient/family education, strengthening, transfer training, postural re-education, ROM (range of motion)  Plan of Care Reviewed With: patient  Outcome Evaluation: Pt is an 89 yo female who presents from home with weakness, bradycardia, ANGY on CKD. Prior to admission, pt was living at home alone and independent with mobility using rollator. Upon exam, pt demos generalized weakness, impaired balance, and decreased endurance limiting mobility. Pt performed bed mobility with min A and stood at EOB with min A. Pt c/o dizziness and nausea when upright which limited activity tolerance. Orthostatics obtained. Supine: /64, HR 47 bpm; 1 min Standing: /69, HR 53 bpm. Pt is at increased risk  of falls and will continue to benefit from PT to address impairments and increase independence with mobility. Rec home with HH vs SNF pending progress with mobility. Pt currently unsafe to DC home alone.     Time Calculation:         PT Charges       Row Name 05/28/24 1005             Time Calculation    Start Time 0855  -EE      Stop Time 0908  -EE      Time Calculation (min) 13 min  -EE      PT Received On 05/28/24  -EE      PT - Next Appointment 05/29/24  -EE      PT Goal Re-Cert Due Date 06/04/24  -EE                User Key  (r) = Recorded By, (t) = Taken By, (c) = Cosigned By      Initials Name Provider Type    EE Dorothy Jasmine, PT Physical Therapist                  Therapy Charges for Today       Code Description Service Date Service Provider Modifiers Qty    25869822947 HC PT EVAL MOD COMPLEXITY 2 5/28/2024 Dorothy Jasmine, PT GP 1            PT G-Codes  AM-PAC 6 Clicks Score (PT): 16  PT Discharge Summary  Anticipated Discharge Disposition (PT): home with home health, skilled nursing facility    Dorothy Jasmine PT  5/28/2024

## 2024-05-28 NOTE — PLAN OF CARE
Goal Outcome Evaluation:              Outcome Evaluation: Patient is alert and oriented, instructed to NPO post midnight, HR goes down to 40-50's provider informed with pending cardiology consulation and nephrology consulted for elevated BUN and crea.

## 2024-05-28 NOTE — CASE MANAGEMENT/SOCIAL WORK
Discharge Planning Assessment  [facility]     Patient Name: Marleni Hummel  MRN: [id number]  Today's Date: 5/28/2024    Admit Date: 5/27/2024    Plan: Home; refusing SNF/ considering HH   Discharge Needs Assessment       Row Name 05/28/24 1058       Living Environment    People in Home alone    Current Living Arrangements home    Potentially Unsafe Housing Conditions none    Primary Care Provided by self    Provides Primary Care For no one    Family Caregiver if Needed child(amy), adult    Quality of Family Relationships involved;helpful;supportive       Resource/Environmental Concerns    Resource/Environmental Concerns none       Transition Planning    Patient/Family Anticipates Transition to home    Patient/Family Anticipated Services at Transition none    Transportation Anticipated family or friend will provide       Discharge Needs Assessment    Readmission Within the Last 30 Days no previous admission in last 30 days    Current Outpatient/Agency/Support Group homecare agency    Equipment Currently Used at Home walker, rolling;grab bar    Concerns to be Addressed discharge planning    Equipment Needed After Discharge none    Outpatient/Agency/Support Group Needs homecare agency    Discharge Facility/Level of Care Needs home with home health                   Discharge Plan       Row Name 05/28/24 1059       Plan    Plan Home; refusing SNF/ considering HH    Plan Comments CCP met with patient at bedside. CCP role explained and discharge planning discussed. Face sheet verified and IQBAL noted. Patient's PCP is Dr. Andujar. Patient lives alone, with no steps to the entrance of the home. Patient's bedroom and bathroom are on the main level of the home. Patient has walk in shower with grab bars. Patient uses a walker for mobility and has a raised commode. Patient has used [facility] in the past and has been to several SNFs in the past. CCP discussed PT eval and discussed SNF. CCP discussed patient is currently in  Observation status and at this time Medicare would not cover SNF. CCP discussed private pay options for SNF or referral to [facility]. Patient declined these options and is adamant she does not want SNF at discharge. CCP discussed home health is covered by insurance and discussed receiving PT at home. Patient states she would like to think about it before CCP arranges home health. Patient feels she will be safe at home once her medications are adjusted and she is not dizzy. Patient repots her daughter comes into the home daily to assist her and her son visits her once a week. CCP discussed with RN to mobilize patient throughout the day. CCP will follow up with patient regarding home health and assist as needed. Jacqui NAIDU                  Continued Care and Services - Admitted Since 5/27/2024    No active coordination exists for this encounter.          Demographic Summary       Row Name 05/28/24 1058       General Information    Admission Type observation    Arrived From emergency department    Required Notices Provided Observation Status Notice    Referral Source admission list    Reason for Consult discharge planning    Preferred Language English                   Functional Status       Row Name 05/28/24 1058       Functional Status    Usual Activity Tolerance good    Current Activity Tolerance moderate       Functional Status, IADL    Medications assistive equipment    Meal Preparation assistive equipment    Housekeeping assistive equipment    Laundry assistive equipment    Shopping assistive equipment       Mental Status    General Appearance WDL WDL       Mental Status Summary    Recent Changes in Mental Status/Cognitive Functioning no changes                   Psychosocial    No documentation.                  Abuse/Neglect    No documentation.                  Legal    No documentation.                  Substance Abuse    No documentation.                  Patient Forms    No documentation.                      BRENNAN LevineW

## 2024-05-28 NOTE — CONSULTS
[facility] Consult Note      Patient Name: Marleni Hummel  : 1935  MRN: [id number]  Primary Care Physician:  Ken Andujar MD  Referring Physician: Johanna Recio MD  Date of admission: 2024    Subjective     Reason for Consult: ANGY on CKD stage IV    HPI:   Marleni Hummel is a 88 y.o. female with CKD stage IV (baseline creatinine 2.0-2.3; followed by Dr. 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      Found to have slow atrial fibrillation with HR 40 and modest hypotension on arrival.  Received IVF.  SCR 2.6 and potassium 5.1, with SCR unchanged today.  CXR negative for acute process, and abdominal CT scan without IV contrast also negative for acute findings.    Other pertinent history includes recent UTI treated with antibiotics for 2 weeks that just concluded prior to arrival; she cannot recall the name of the antibiotic.  She also reports that on 5/15, metoprolol was decreased from 25 mg daily to 12.5 mg daily, although no change of fatigue or weaknesses.  Other PMH includes:  CHF, TIA, hyperkalemia, recent diverticulitis, hypertension, and vitamin D deficiency.      Denies recent weight gain, has not been using PRN Lasix  Had diarrhea 3 weeks ago, lasted 2 weeks due to diverticulitis, currently resolved  Chronic CARROLL, since 2023, worsened past month  + dizziness/lightheadedness, worsens when getting up in the morning  Reports good appetite, denies recent N/V, although + nausea currently  Longstanding urinary incontinence    Review of Systems:   14 point review of systems is otherwise negative except for mentioned above on HPI    Personal History     Past Medical History:   Diagnosis Date    Acute  bronchitis due to Rhinovirus 5/31/2022    Acute vulvitis 08/21/2019    Allergic     Allergic rhinitis     Anemia of chronic disease     Arthropathy of knee     right    Atrial fibrillation     Back pain     Bell's palsy     Caregiver stress syndrome 04/17/2019    Chronic coronary artery disease     Chronic kidney disease (CKD), stage III (moderate)     Diverticulitis 12/14/2022    CARROLL (dyspnea on exertion) 3/17/2023    Edema     Elevated serum free T4 level 03/21/2016    Encounter for eye exam 09/2020    Essential hypertension     Fatigue     GERD (gastroesophageal reflux disease)     H/O Heart murmur     Heart attack 10/05/2015    Hematuria 12/12/2016    Herpes zoster without complication     Hip arthritis     left    History of bone density study 02/28/2008    History of pelvic fracture 2015    History of pneumonia     History of transfusion     2015    Hypercholesterolemia     IFG (impaired fasting glucose)     Insomnia     Left kidney mass 07/2020    Limited joint range of motion     RIGHT KNEE    Mixed hyperlipidemia     Muscle tension headache 04/03/2019    New daily persistent headache 12/17/2018    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 Clarke    CATARACT EXTRACTION Right 04/16/2013    Dr. Clarke    COLONOSCOPY  04/18/2014    Dr. Elizabeth; no polyps    EPIDURAL BLOCK      HIP PERCUTANEOUS PINNING Left 8/31/2017    Procedure: LT HIP PERCUTANEOUS PINNING;  Surgeon: Sean Canales MD;  Location: [facility] OR;  Service:     MAMMO BILATERAL  11/2013     NEPHRECTOMY Left 11/16/2020    Procedure: LAPAROSCOPIC NEPHRECTOMY;  Surgeon: Chuckie Mclaughlin MD;  Location: [facility] OR;  Service: Urology;  Laterality: Left;    PAP SMEAR  02/2011    TIBIA FRACTURE SURGERY Right 2015    REMOVED PLATE LATER IN 2015    TONSILLECTOMY  1947    TOTAL HIP ARTHROPLASTY Right 08/2015    TOTAL HIP ARTHROPLASTY Right 09/2016    TOTAL KNEE ARTHROPLASTY Bilateral 2004       Family History: family history includes Heart disease in her mother; Hypertension in her maternal grandmother, mother, and another family member; Stroke in her mother.    Social History:  reports that she quit smoking about 68 years ago. Her smoking use included cigarettes. She started smoking about 71 years ago. She has a 3 pack-year smoking history. She has been exposed to tobacco smoke. She has never used smokeless tobacco. She reports that she does not currently use alcohol after a past usage of about 3.0 standard drinks of alcohol per week. She reports that she does not use drugs.    Home Medications:  Prior to Admission medications    Medication Sig Start Date End Date Taking? Authorizing Provider   acetaminophen (TYLENOL) 500 MG tablet Take 1 tablet by mouth Every 4 (Four) Hours As Needed for Mild Pain.   Yes Zach Hidalgo MD   atorvastatin (LIPITOR) 20 MG tablet Take 1 tablet by mouth Every Night for 270 days. 1/17/24 10/13/24 Yes Ken Andujar MD   Eliquis 2.5 MG tablet tablet TAKE 1 TABLET EVERY 12 HOURS (TWICE A DAY) 7/3/23  Yes Oralia Lutz APRN   escitalopram (LEXAPRO) 20 MG tablet Take 1 tablet by mouth Daily for 270 days. 1/17/24 10/13/24 Yes Ken Andujar MD   fexofenadine (ALLEGRA) 60 MG tablet Take 1 tablet by mouth Daily.   Yes Zach Hidalgo MD   hydroCHLOROthiazide (HYDRODIURIL) 12.5 MG tablet Take 1 tablet by mouth Daily. 10/25/23  Yes Micaela Swann PA   magnesium oxide (MAG-OX) 400 MG tablet Take 1 tablet by mouth Daily.   Yes Zach Hidalgo MD    metoprolol succinate XL (TOPROL-XL) 25 MG 24 hr tablet Take 1 tablet by mouth Daily for 270 days.  Patient taking differently: Take 0.5 tablets by mouth Daily. 1/17/24 10/13/24 Yes Ken Andujar MD   desonide (DESOWEN) 0.05 % cream Apply to affected area(s) of ears up to twice daily as needed for itching/ flaking for 30 days 5/31/23   Provider, MD Zach   furosemide (LASIX) 20 MG tablet Take 1 tablet by mouth Daily As Needed (if weight goes up 3lbs in 3 days). 4/21/23   Sybil Parmar MD   Gemtesa 75 MG tablet Take 1 tablet by mouth Every Morning for 42 days. 1/17/24 2/28/24  Ken Andujar MD   melatonin 5 MG tablet tablet Take 1 tablet by mouth At Night As Needed.    Provider, MD Zach   TiZANidine (Zanaflex) 2 MG capsule Take 1 capsule by mouth 2 (Two) Times a Day As Needed (pain). 3/9/24   Isabella Hoffman MD       Allergies:  Allergies   Allergen Reactions    Morphine Anaphylaxis    Oxycodone Anaphylaxis    Ace Inhibitors Cough and Unknown (See Comments)    Bactrim [Sulfamethoxazole-Trimethoprim] Rash    Levofloxacin Itching and Rash     insomnia  insomnia  insomnia    Torsemide Dizziness       Objective     Vitals:   Temp:  [97.5 °F (36.4 °C)-98 °F (36.7 °C)] 97.6 °F (36.4 °C)  Heart Rate:  [45-62] 47  Resp:  [17-18] 18  BP: (115-180)/(45-95) 134/55  No intake or output data in the 24 hours ending 05/28/24 1118    Physical Exam:   Constitutional: Awake, alert, NAD chr ill  HEENT: Sclera anicteric, no conjunctival injection  Neck: Supple, no JVD, no carotid bruit  Respiratory: Clear to auscultation bilaterally, nonlabored on RA  Cardiovascular: Bradycardic, irregularly irregular, no rub or murmur Gastrointestinal: BS +, soft, nontender and nondistended  : No palpable bladder  Musculoskeletal: No edema, + clubbing   Psychiatric: Appropriate affect, cooperative, oriented  Neurologic: No asterixis, moving all extremities, normal speech Skin: Warm and dry       Scheduled Meds:     apixaban, 2.5  mg, Oral, Q12H  aspirin, 81 mg, Oral, Daily  atorvastatin, 20 mg, Oral, Nightly  escitalopram, 20 mg, Oral, Daily  [Held by provider] hydroCHLOROthiazide, 12.5 mg, Oral, Daily  magnesium oxide, 400 mg, Oral, Daily  sodium chloride, 10 mL, Intravenous, Q12H      IV Meds:        Results Reviewed:   I have personally reviewed the results from the time of this admission to 5/28/2024 11:18 EDT     Lab Results   Component Value Date    GLUCOSE 94 05/28/2024    CALCIUM 8.5 (L) 05/28/2024     05/28/2024    K 5.2 05/28/2024    CO2 22.0 05/28/2024     05/28/2024    BUN 52 (H) 05/28/2024    CREATININE 2.60 (H) 05/28/2024    EGFRIFAFRI 25 (L) 02/23/2022    EGFRIFNONA 21 (L) 02/23/2022    BCR 20.0 05/28/2024    ANIONGAP 11.0 05/28/2024      Lab Results   Component Value Date    MG 3.3 (H) 05/28/2024    PHOS 4.4 04/10/2024    ALBUMIN 4.6 05/27/2024           Assessment / Plan       Bradycardia      ASSESSMENT:  ANGY on CKD4.  UOP not quantified. Baseline creatinine 2.0-2.3, up to 2.6 on admission, again 2.6 today.  Multifactorial:  prerenal due to recent diarrhea (2 wks) with continued use of HCTZ, decreased renal perfusion in the setting of bradycardia (HR 40s); and recent UTI with unknown antibiotic for the past 2 weeks.  Electrolytes acceptable with high-normal potassium (chronic) and hypermagnesemia.  No fluid excess by exam.  Urinalysis fairly bland  Left flank pain.  Sharp, occurs with activities.  Had same symptoms in March, renal ultrasound (on 3/6) showed a 9-mm simple renal cortical cyst; and a 9 mm lower pole cortical nodule (stable from 2015). On this admission, CT abd negative.    Bradycardia/A-fib.  Heart rate 40s.  Cardiology evaluation pending  Hypertension complicated by CKD, controlled.  HCTZ on hold currently  History of TIA (10/2023).      PLAN:  Continue holding HCTZ and beta-blocker  Check orthostatics, bladder scan, and FENa   Since she is currently n.p.o., will add IVF:  NS at 75  mL/h  Surveillance labs    Thank you for involving us in the care of Marleni Hummel.  Please feel free to call with any questions.    Mark Young MD  05/28/24  11:18 EDT    [facility]  [phone]      Please note that portions of this note were completed with a voice recognition program.

## 2024-05-28 NOTE — PROGRESS NOTES
SIOMARA YOUNG Attestation Note    I supervised care provided by the midlevel provider.    The ERIC and I have discussed this patient's history, physical exam, and treatment plan. I have reviewed the documentation and personally had a face to face interaction with the patient  I affirm the documentation and agree with the treatment and plan. I provided a substantive portion of the care of this patient.  I personally performed the physical exam, in its entirety.  My personal findings are documented in below:    History:  Patient presented to ER for evaluation of generalized weakness and fatigue for several days.  Found to be bradycardic and placed in the observation unit for further evaluation.  Follows with Dr. Parmar in the cardiology office.  This morning she denies any chest pain or shortness of breath.  Denies any dizziness or near syncopal symptoms.    Physical Exam:  General: No acute distress.  HENT: NCAT, PERRL, Nares patent.  Eyes: no scleral icterus.  Normal conjunctiva  Neck: trachea midline, no ROM limitations.  CV: Pink warm and well-perfused throughout.  Heart rate remains in the low 50s range  Respiratory: No distress or increased work of breathing  Abdomen: soft, no focal tenderness or rigidity  Musculoskeletal: no deformity.  Neuro: alert, moves all extremities, follows commands.  Skin: warm, dry.    Assessment and Plan:  Fatigue/weakness and bradycardia: Cardiology consult.  Holding metoprolol.  Physical therapy consult    ANYG on CKD: History of nephrectomy.  Nephrology consult.  Creatinine essentially unchanged from yesterday

## 2024-05-29 ENCOUNTER — TRANSCRIBE ORDERS (OUTPATIENT)
Dept: HOME HEALTH SERVICES | Facility: HOME HEALTHCARE | Age: 89
End: 2024-05-29
Payer: MEDICARE

## 2024-05-29 ENCOUNTER — READMISSION MANAGEMENT (OUTPATIENT)
Dept: CALL CENTER | Facility: HOSPITAL | Age: 89
End: 2024-05-29
Payer: MEDICARE

## 2024-05-29 ENCOUNTER — TELEPHONE (OUTPATIENT)
Dept: FAMILY MEDICINE CLINIC | Facility: CLINIC | Age: 89
End: 2024-05-29

## 2024-05-29 ENCOUNTER — APPOINTMENT (OUTPATIENT)
Dept: CARDIOLOGY | Facility: HOSPITAL | Age: 89
End: 2024-05-29
Payer: MEDICARE

## 2024-05-29 ENCOUNTER — HOME HEALTH ADMISSION (OUTPATIENT)
Dept: HOME HEALTH SERVICES | Facility: HOME HEALTHCARE | Age: 89
End: 2024-05-29
Payer: MEDICARE

## 2024-05-29 ENCOUNTER — DOCUMENTATION (OUTPATIENT)
Dept: HOME HEALTH SERVICES | Facility: HOME HEALTHCARE | Age: 89
End: 2024-05-29
Payer: MEDICARE

## 2024-05-29 VITALS
HEART RATE: 50 BPM | BODY MASS INDEX: 24.2 KG/M2 | OXYGEN SATURATION: 98 % | RESPIRATION RATE: 15 BRPM | WEIGHT: 131.5 LBS | DIASTOLIC BLOOD PRESSURE: 67 MMHG | TEMPERATURE: 97.9 F | SYSTOLIC BLOOD PRESSURE: 168 MMHG | HEIGHT: 62 IN

## 2024-05-29 DIAGNOSIS — N17.9 AKI (ACUTE KIDNEY INJURY): ICD-10-CM

## 2024-05-29 DIAGNOSIS — R00.1 BRADYCARDIA: ICD-10-CM

## 2024-05-29 DIAGNOSIS — K92.2 GASTROINTESTINAL HEMORRHAGE, UNSPECIFIED GASTROINTESTINAL HEMORRHAGE TYPE: ICD-10-CM

## 2024-05-29 DIAGNOSIS — I48.91 ATRIAL FIBRILLATION, UNSPECIFIED TYPE: ICD-10-CM

## 2024-05-29 DIAGNOSIS — D64.9 NORMOCYTIC ANEMIA: Primary | ICD-10-CM

## 2024-05-29 LAB
ALBUMIN SERPL-MCNC: 4.2 G/DL (ref 3.5–5.2)
ANION GAP SERPL CALCULATED.3IONS-SCNC: 10 MMOL/L (ref 5–15)
BASOPHILS # BLD AUTO: 0.02 10*3/MM3 (ref 0–0.2)
BASOPHILS NFR BLD AUTO: 0.3 % (ref 0–1.5)
BUN SERPL-MCNC: 47 MG/DL (ref 8–23)
BUN/CREAT SERPL: 18.9 (ref 7–25)
CALCIUM SPEC-SCNC: 8.6 MG/DL (ref 8.6–10.5)
CHLORIDE SERPL-SCNC: 101 MMOL/L (ref 98–107)
CO2 SERPL-SCNC: 23 MMOL/L (ref 22–29)
CREAT SERPL-MCNC: 2.49 MG/DL (ref 0.57–1)
DEPRECATED RDW RBC AUTO: 45.6 FL (ref 37–54)
EGFRCR SERPLBLD CKD-EPI 2021: 18.4 ML/MIN/1.73
EOSINOPHIL # BLD AUTO: 0.27 10*3/MM3 (ref 0–0.4)
EOSINOPHIL NFR BLD AUTO: 4.3 % (ref 0.3–6.2)
ERYTHROCYTE [DISTWIDTH] IN BLOOD BY AUTOMATED COUNT: 13.3 % (ref 12.3–15.4)
FOLATE SERPL-MCNC: 10.6 NG/ML (ref 4.78–24.2)
GLUCOSE SERPL-MCNC: 98 MG/DL (ref 65–99)
HCT VFR BLD AUTO: 31 % (ref 34–46.6)
HGB BLD-MCNC: 10.1 G/DL (ref 12–15.9)
IMM GRANULOCYTES # BLD AUTO: 0.02 10*3/MM3 (ref 0–0.05)
IMM GRANULOCYTES NFR BLD AUTO: 0.3 % (ref 0–0.5)
IRON 24H UR-MRATE: 68 MCG/DL (ref 37–145)
IRON SATN MFR SERPL: 23 % (ref 20–50)
LYMPHOCYTES # BLD AUTO: 1.43 10*3/MM3 (ref 0.7–3.1)
LYMPHOCYTES NFR BLD AUTO: 22.6 % (ref 19.6–45.3)
MAGNESIUM SERPL-MCNC: 3.1 MG/DL (ref 1.6–2.4)
MCH RBC QN AUTO: 30.5 PG (ref 26.6–33)
MCHC RBC AUTO-ENTMCNC: 32.6 G/DL (ref 31.5–35.7)
MCV RBC AUTO: 93.7 FL (ref 79–97)
MONOCYTES # BLD AUTO: 0.43 10*3/MM3 (ref 0.1–0.9)
MONOCYTES NFR BLD AUTO: 6.8 % (ref 5–12)
NEUTROPHILS NFR BLD AUTO: 4.17 10*3/MM3 (ref 1.7–7)
NEUTROPHILS NFR BLD AUTO: 65.7 % (ref 42.7–76)
NRBC BLD AUTO-RTO: 0 /100 WBC (ref 0–0.2)
PHOSPHATE SERPL-MCNC: 3.9 MG/DL (ref 2.5–4.5)
PLATELET # BLD AUTO: 157 10*3/MM3 (ref 140–450)
PMV BLD AUTO: 10.2 FL (ref 6–12)
POTASSIUM SERPL-SCNC: 4.9 MMOL/L (ref 3.5–5.2)
QT INTERVAL: 530 MS
QTC INTERVAL: 534 MS
RBC # BLD AUTO: 3.31 10*6/MM3 (ref 3.77–5.28)
SODIUM SERPL-SCNC: 134 MMOL/L (ref 136–145)
TIBC SERPL-MCNC: 301 MCG/DL (ref 298–536)
TRANSFERRIN SERPL-MCNC: 202 MG/DL (ref 200–360)
VIT B12 BLD-MCNC: 512 PG/ML (ref 211–946)
WBC NRBC COR # BLD AUTO: 6.34 10*3/MM3 (ref 3.4–10.8)

## 2024-05-29 PROCEDURE — 83735 ASSAY OF MAGNESIUM: CPT

## 2024-05-29 PROCEDURE — 99214 OFFICE O/P EST MOD 30 MIN: CPT | Performed by: INTERNAL MEDICINE

## 2024-05-29 PROCEDURE — 84466 ASSAY OF TRANSFERRIN: CPT | Performed by: PHYSICIAN ASSISTANT

## 2024-05-29 PROCEDURE — 82607 VITAMIN B-12: CPT

## 2024-05-29 PROCEDURE — 82746 ASSAY OF FOLIC ACID SERUM: CPT | Performed by: PHYSICIAN ASSISTANT

## 2024-05-29 PROCEDURE — 93246 EXT ECG>7D<15D RECORDING: CPT

## 2024-05-29 PROCEDURE — 83540 ASSAY OF IRON: CPT | Performed by: PHYSICIAN ASSISTANT

## 2024-05-29 PROCEDURE — 80069 RENAL FUNCTION PANEL: CPT

## 2024-05-29 PROCEDURE — 85025 COMPLETE CBC W/AUTO DIFF WBC: CPT

## 2024-05-29 PROCEDURE — 99214 OFFICE O/P EST MOD 30 MIN: CPT | Performed by: NURSE PRACTITIONER

## 2024-05-29 PROCEDURE — 96375 TX/PRO/DX INJ NEW DRUG ADDON: CPT

## 2024-05-29 PROCEDURE — 97110 THERAPEUTIC EXERCISES: CPT

## 2024-05-29 PROCEDURE — 51798 US URINE CAPACITY MEASURE: CPT

## 2024-05-29 PROCEDURE — G0378 HOSPITAL OBSERVATION PER HR: HCPCS

## 2024-05-29 PROCEDURE — 97530 THERAPEUTIC ACTIVITIES: CPT

## 2024-05-29 RX ORDER — PANTOPRAZOLE SODIUM 40 MG/10ML
40 INJECTION, POWDER, LYOPHILIZED, FOR SOLUTION INTRAVENOUS
Status: DISCONTINUED | OUTPATIENT
Start: 2024-05-29 | End: 2024-05-29 | Stop reason: HOSPADM

## 2024-05-29 RX ADMIN — PANTOPRAZOLE SODIUM 40 MG: 40 INJECTION, POWDER, FOR SOLUTION INTRAVENOUS at 06:26

## 2024-05-29 RX ADMIN — APIXABAN 2.5 MG: 2.5 TABLET, FILM COATED ORAL at 08:30

## 2024-05-29 RX ADMIN — Medication 10 ML: at 08:31

## 2024-05-29 RX ADMIN — ESCITALOPRAM 20 MG: 20 TABLET, FILM COATED ORAL at 08:31

## 2024-05-29 NOTE — PLAN OF CARE
Goal Outcome Evaluation:   Patient has been released off unit to private vehicle. IV was removed and belongings returned to patient. Follow up and discharge info was shared and an understanding was relayed from the patient.

## 2024-05-29 NOTE — CASE MANAGEMENT/SOCIAL WORK
Continued Stay Note  [facility]     Patient Name: Marleni Hummel  MRN: [id number]  Today's Date: 5/29/2024    Admit Date: 5/27/2024    Plan: Home with [facility] (accpeted)   Discharge Plan       Row Name 05/29/24 1016       Plan    Plan Home with [facility] (accpeted)    Plan Comments Patient to discharge home alone with [facility] (accpeted). Family to transport. CCP to follow. CD, CSW.                   Discharge Codes    No documentation.

## 2024-05-29 NOTE — PLAN OF CARE
Goal Outcome Evaluation:  Plan of Care Reviewed With: patient        Progress: improving  Outcome Evaluation: Patient is alert and oriented, room air and assist x 1 with a walker. Holter monitor ordered for patient due to Afib. Patient had no events through the shift.

## 2024-05-29 NOTE — TELEPHONE ENCOUNTER
Caller: DAVE [facility]    Relationship: Home Health    Best call back number: [phone] (MAY LEAVE VOICE MAIL)    What is the best time to reach you: ANY     Who are you requesting to speak with (clinical staff, provider,  specific staff member): CLINICAL    Do you know the name of the person who called: LAWRENCE    What was the call regarding: PATIENT BEING DISCHARGED AND NEEDS VERBAL ORDER FOR HOME HEALTH

## 2024-05-29 NOTE — PROGRESS NOTES
SIOMARA YOUNG Attestation Note    I supervised care provided by the midlevel provider.    The ERIC and I have discussed this patient's history, physical exam, and treatment plan. I have reviewed the documentation and personally had a face to face interaction with the patient  I affirm the documentation and agree with the treatment and plan. I provided a substantive portion of the care of this patient.  I personally performed the physical exam, in its entirety.  My personal findings are documented in below:    History:  Patient presented to ER for evaluation of generalized weakness and fatigue for several days.  Found to be bradycardic and placed in the observation unit for further evaluation.  Follows with Dr. Parmar in the cardiology office.  This morning she denies any chest pain or shortness of breath.  Denies any dizziness or near syncopal symptoms.  Patient says she feels well this morning.  She has been up and ambulatory with her walker.  She says she would like to return home.     Physical Exam:  General: No acute distress.  HENT: NCAT, PERRL, Nares patent.  Eyes: no scleral icterus.  Normal conjunctiva  Neck: trachea midline, no ROM limitations.  CV: Pink warm and well-perfused throughout.  Heart rate remains in the low 50s range  Respiratory: No distress or increased work of breathing  Abdomen: soft, no focal tenderness or rigidity  Musculoskeletal: no deformity.  Neuro: alert, moves all extremities, follows commands.  Skin: warm, dr    Assessment and Plan:  Fatigue/weakness and bradycardia: Symptoms improved.  Heart rate improved.  Possible discharge home today with plan for home health care    ANGY on CKD: Nephrology consulted.  Creatinine slightly improved this morning

## 2024-05-29 NOTE — OUTREACH NOTE
Prep Survey      Flowsheet Row Responses   [facility] patient discharged from? [facility]   Is LACE score < 7 ? No   Eligibility [facility]   Date of Admission 05/27/24   Date of Discharge 05/29/24   Discharge Disposition Home-Health Care Svc   Discharge diagnosis Bradycardia   Does the patient have one of the following disease processes/diagnoses(primary or secondary)? Other   Does the patient have Home health ordered? Yes   What is the Home health agency?  [facility]   Is there a DME ordered? No   Prep survey completed? Yes            Betsey POST - Registered Nurse

## 2024-05-29 NOTE — PLAN OF CARE
Goal Outcome Evaluation:  Plan of Care Reviewed With: patient        Progress: improving  Outcome Evaluation: Patient tolerated increased gait distance today 100 feet mod I using a walker, asymptomatic denies pain.  Patient has rollator for home use recommend return home with home health as needed at discharge.      Anticipated Discharge Disposition (PT): home with home health, skilled nursing facility

## 2024-05-29 NOTE — PROGRESS NOTES
ED OBSERVATION PROGRESS/DISCHARGE SUMMARY    Date of Admission: 5/27/2024   LOS: 0 days   PCP: Ken Andujar MD    Final Diagnosis weakness, bradycardia      Subjective     Hospital Outcome:   88-year-old female admitted to the observation unit for further evaluation of worsening fatigue, generalized weakness and complaint of left flank pain.  Patient's metoprolol dose was recently halved to 12.5 mg twice daily last week by Dr. Parmar, her cardiologist, without improvement of her symptoms.  In the ER patient was noted with bradycardia lowest in the 40s.  High-sensitivity troponins flat.  Chest x-ray shows no acute findings.  CT of the abdomen pelvis without revealed no acute intra-abdominal abnormality.  Urinalysis clean.  Patient was noted with a mildly elevated creatinine at 2.63 up from her baseline of around 2.2-2.3.  Patient was given small IV fluid bolus.     5/28/2024: On exam, patient reports that she overall feels well, but that she does have some lightheadedness with changing position. Cardiology and nephrology consulted.  Cardiology has seen and evaluated the patient and has stopped the patient's beta-blocker indefinitely; however they do not suspect that her symptoms are secondary to the bradycardia since this has been an intermittent chronic issue for the patient.  Patient did report to them that she has been having some bloody bowel movements and patient is on anticoagulation, cardiology recommending GI recommendations and possible outpatient scope if indicated.  PT consulted and recommends SNF placement; case management consulted for discharge planning assistance.  Nephrology consulted and evaluated the patient and recommend to continue holding HCTZ and beta blocker, check orthostatics, bladder scan, and FENa, IV fluids, and will repeat labs in the morning.  GI consulted and recommends checking iron studies, B12, folate, trial of Anusol suppositories for 10 days, continue to trend H&H and monitor for  GI bleed if hemoglobin continues to drop recommend hold Eliquis and plan for inpatient colonoscopy.  Will plan to reevaluate in the morning.    5/29/2024 - continues to report fatigue, there are multiple factors contribute to weakness and fatigue: Bradycardia vs. GI bleed vs. Azotemia.  Heart rate stable around 50 bpm, /43.  Hemoglobin dropped from 11.0-9.9, thought to be due to hemodilution, repeat hemoglobin this morning is 10.1.  Physical therapy recommended skilled nursing placement however CCP advised Ms. Hummel does not qualify for SNF.  Patient seen and evaluated by PT this morning who again recommends home with [facility].  Ambulatory referral to home health placed.  Patient is anxious for discharge home today.  Discussed with CCP who advises that [facility] has accepted her.    ROS:  Review of Systems   Constitutional:  No weight changes, fever, or chills. No night sweats. + Fatigue.   ENT/Mouth:  No hearing changes, no ear pain, no nasal congestion, no sinus pain, no hoarseness, no sore throat, no rhinorrhea, no dysphagia.   Eyes:  No eye pain, swelling, or redness. No foreign body, discharge. No vision changes.    Cardiovascular:  No chest pain, no shortness of air, no dyspnea on exertion, no orthopnea, no palpitations, no edema.      Respiratory:  No cough, no smoke exposure, no dyspnea.    Gastrointestinal:  No nausea, vomiting, or diarrhea. No constipation, or GI discomfort. No reflux pain, no anorexia, no dysphagia. No hematochezia or melena.    Genitourinary:  No dyspareunia, no dysuria, no urinary frequency. No hematuria, no urinary incontinence. No flank pain or urinary flow changes.   Musculoskeletal:  No arthralgias, no myalgias, no joint swelling or stiffness. No back or neck pain, no recent injuries.    Skin:  No skin lesions, no pruritus, no hair changes.    Neuro:  No weakness, no numbness, no paresthesias, no loss of consciousness, no syncope, no dizziness, no headache.    Psych:  No anxiety/panic, no depression, no insomnia, no personality changes, no delusions.    Heme/Lymph:  No bruising, or bleeding. No transfusions history, no lymphadenopathy.   Endocrine:  No polyuria, polydipsia, no temperature intolerances.     Objective   Physical Exam:   Constitutional: Sleepy. Well developed for age. Non-toxic appearing.  Elderly/chronically ill-appearing  Eyes: PERRL x2, sclerae anicteric, no conjunctival injection. No EOM noted.   HENT: NCAT, mucous membranes moist,   Neck: Supple, no thyromegaly, no lymphadenopathy, trachea midline  Respiratory: Clear to auscultation bilaterally, nonlabored respirations   Cardiovascular: RRR, no murmurs, rubs, or gallops, palpable pedal pulses bilaterally. No appreciable edema.   Gastrointestinal: Positive bowel sounds, soft, nontender, not distended.   Musculoskeletal: No bilateral ankle edema, no clubbing or cyanosis to extremities. No obvious deformities.   Psychiatric: Appropriate affect, cooperative. Converses appropriately for age.   Neurologic: Oriented x 3, strength symmetric in all extremities, Cranial Nerves grossly intact to confrontation, speech clear  Skin: No rashes, skin intact.     Results Review:    I have reviewed the labs, radiology results and diagnostic studies.    Results from last 7 days   Lab Units 05/28/24  0404   WBC 10*3/mm3 5.57   HEMOGLOBIN g/dL 9.9*   HEMATOCRIT % 30.4*   PLATELETS 10*3/mm3 151     Results from last 7 days   Lab Units 05/28/24  0404 05/27/24  1437   SODIUM mmol/L 136 135*   POTASSIUM mmol/L 5.2 5.1   CHLORIDE mmol/L 103 99   CO2 mmol/L 22.0 23.0   BUN mg/dL 52* 53*   CREATININE mg/dL 2.60* 2.63*   CALCIUM mg/dL 8.5* 9.2   BILIRUBIN mg/dL  --  0.8   ALK PHOS U/L  --  58   ALT (SGPT) U/L  --  10   AST (SGOT) U/L  --  16   GLUCOSE mg/dL 94 93     Imaging Results (Last 24 Hours)       ** No results found for the last 24 hours. **            I have reviewed the  medications.  ---------------------------------------------------------------------------------------------  Assessment & Plan   Assessment/Problem List    Bradycardia      Plan:    Fatigue  Bradycardia  Atrial fibrillation  -High-sensitivity troponin 35, 31, 34; remained flat  -Continuous cardiac monitor  -Chest x-ray clear  -DC beta-blocker per cardiology.  However it is unlikely to be the cause of fatigue since she is chronically bradycardic.  -Continue Eliquis  -PT recommend SNF, CCP advises insurance will not approve.  Home health referral placed, patient accepted by [facility]  -Holter monitor on discharge     ANGY on CKD  -Creatinine 2.63, baseline 2.3, repeat with a.m. labs  -CT abdomen pelvis negative acute  -Gentle hydration per nephrology, hold HCTZ.     Hypertension  -Vitals every 4 hours  -Controlled at this time     Rectal bleeding:  -No acute signs of bleeding since admission  -GI consulted per cardiology recommendation, see note  -Hemoglobin 10.1 this morning, improved from 9.9 yesterday, no evidence of GI blood loss overnight       Disposition: Home    Follow-up after Discharge: PCP, nephrology, cardiology, gastroenterology    This note will serve as a discharge summary    Fariba Leo, ENRRIQUE 05/29/24 04:03 EDT    I have worn appropriate PPE during this patient encounter, sanitized my hands both with entering and exiting patient's room.      49 minutes has been spent by [facility] providers in the care of this patient while under observation status

## 2024-05-29 NOTE — PROGRESS NOTES
LOS: 0 days   Patient Care Team:  Ken Andujar MD as PCP - General  Chuckie Mclaughlin MD as Consulting Physician (Urology)  Anayeli Alanis MD as Consulting Physician (Obstetrics and Gynecology)  BROOK Clarke MD as Consulting Physician (Ophthalmology)  Jose Alfredo Krishnamurthy MD as Consulting Physician (Hematology and Oncology)  Sean Canales MD as Consulting Physician (Orthopedic Surgery)  Esteban Moe MD as Consulting Physician (Orthopedic Surgery)  Feliciano Elizabeth MD as Consulting Physician (Gastroenterology)  Wallace Hook MD as Consulting Physician (Pain Medicine)  Sarah Beth Marcus APRN as Nurse Practitioner (Nephrology)  Brandon Miller MD as Consulting Physician (Gastroenterology)  Sybil Parmar MD as Consulting Physician (Cardiology)  Joseph Leonard MD as Consulting Physician (Nephrology)  Joseph Leonard MD as Consulting Physician (Nephrology)    Chief Complaint: Follow-up persistent atrial fibrillation, bradycardia.    Interval History: She is feeling much better today.  Her heart rate is still in the 50s at rest, although increases appropriately with exertion.  She feels less weak.  No chest pain or shortness of breath.    Vital Signs:  Temp:  [97.9 °F (36.6 °C)] 97.9 °F (36.6 °C)  Heart Rate:  [47-50] 50  Resp:  [15-19] 15  BP: (115-171)/(43-91) 168/67    Intake/Output Summary (Last 24 hours) at 5/29/2024 1835  Last data filed at 5/29/2024 0800  Gross per 24 hour   Intake 120 ml   Output --   Net 120 ml       Physical Exam:   General Appearance:    No acute distress, alert and oriented x4   Lungs:     Clear to auscultation bilaterally     Heart:    Irregularly irregular rhythm with a mildly bradycardic rate.   Abdomen:     Soft, nontender, nondistended.    Extremities:   No clubbing, cyanosis, or edema.     Results Review:    Results from last 7 days   Lab Units 05/29/24  0358   SODIUM mmol/L 134*   POTASSIUM mmol/L 4.9   CHLORIDE mmol/L 101   CO2  mmol/L 23.0   BUN mg/dL 47*   CREATININE mg/dL 2.49*   GLUCOSE mg/dL 98   CALCIUM mg/dL 8.6     Results from last 7 days   Lab Units 05/28/24  0404 05/27/24  1646 05/27/24  1437   HSTROP T ng/L 34* 31* 35*     Results from last 7 days   Lab Units 05/29/24  0358   WBC 10*3/mm3 6.34   HEMOGLOBIN g/dL 10.1*   HEMATOCRIT % 31.0*   PLATELETS 10*3/mm3 157     Results from last 7 days   Lab Units 05/27/24  1437   INR  1.27*     Results from last 7 days   Lab Units 05/27/24  1437   CHOLESTEROL mg/dL 197     Results from last 7 days   Lab Units 05/29/24  0358   MAGNESIUM mg/dL 3.1*     Results from last 7 days   Lab Units 05/27/24  1437   CHOLESTEROL mg/dL 197   TRIGLYCERIDES mg/dL 220*   HDL CHOL mg/dL 60   LDL CHOL mg/dL 100       I reviewed the patient's new clinical results.        Assessment:  1.  Generalized weakness, likely multifactorial  2.  Persistent atrial fibrillation with bradycardia  3.  Acute kidney injury with chronic kidney disease  4.  Fiber blood per rectum, likely hemorrhoidal  5.  Chronic normocytic anemia  6.  Renal cell carcinoma, status post left nephrectomy in 2020    Plan:  -She seems to be doing much better since stopping the metoprolol.  I would recommend continuing to keep her off of the metoprolol indefinitely at this point.  Her heart rate is still on the slower side at rest, although increases appropriately without tachycardia with exertion.  She also feels better.    -Hemoglobin is stable and GI feels this is hemorrhoidal in nature.  Recommend continuing Eliquis.    -She is stable for discharge from a cardiac standpoint.  She already has follow-up in the next several weeks.    Justin Arevalo MD  05/29/24  18:35 EDT

## 2024-05-29 NOTE — THERAPY TREATMENT NOTE
Acute Care - Physical Therapy Treatment Note  [facility]     Patient Name: Marleni Hummel  : 1935  MRN: [id number]  Today's Date: 2024      Visit Dx:     ICD-10-CM ICD-9-CM   1. Bradycardia  R00.1 427.89   2. Weakness  R53.1 780.79   3. Chronic a-fib  I48.20 427.31   4. Chronic kidney disease, unspecified CKD stage  N18.9 585.9     Patient Active Problem List   Diagnosis    Arthritis involving multiple sites    Essential hypertension    Permanent atrial fibrillation    History of Bell's palsy    CKD (chronic kidney disease) stage 4, GFR 15-29 ml/min    Gastroesophageal reflux disease without esophagitis    Hypercholesterolemia    Insomnia    Localized osteoporosis without current pathological fracture    Panic disorder    Chronic nonseasonal allergic rhinitis due to pollen    Other sleep apnea    History of MI (myocardial infarction)    Chronic coronary artery disease    Anemia of chronic disease    Weakness of right leg    Cardiac murmur    Senile osteoporosis    Hyperuricemia    Grief reaction    Peripheral vertigo    Renal cell carcinoma of left kidney    Renal oncocytoma of left kidney    History of left nephrectomy    Cerebrovascular accident (CVA) due to stenosis of small artery    History of renal cell carcinoma    TIA (transient ischemic attack)    Spinal stenosis, lumbar region with neurogenic claudication    Malignant neoplasm of left kidney, except renal pelvis    Diverticulosis    Irritable bowel syndrome with constipation    Chronic diastolic congestive heart failure    Hallucination, visual    Hypomagnesemia    Bradycardia     Past Medical History:   Diagnosis Date    Acute bronchitis due to Rhinovirus 2022    Acute vulvitis 2019    Allergic     Allergic rhinitis     Anemia of chronic disease     Arthropathy of knee     right    Atrial fibrillation     Back pain     Bell's palsy     Caregiver stress syndrome 2019    Chronic coronary artery disease     Chronic kidney  disease (CKD), stage III (moderate)     Diverticulitis 12/14/2022    CARROLL (dyspnea on exertion) 3/17/2023    Edema     Elevated serum free T4 level 03/21/2016    Encounter for eye exam 09/2020    Essential hypertension     Fatigue     GERD (gastroesophageal reflux disease)     H/O Heart murmur     Heart attack 10/05/2015    Hematuria 12/12/2016    Herpes zoster without complication     Hip arthritis     left    History of bone density study 02/28/2008    History of pelvic fracture 2015    History of pneumonia     History of transfusion     2015    Hypercholesterolemia     IFG (impaired fasting glucose)     Insomnia     Left kidney mass 07/2020    Limited joint range of motion     RIGHT KNEE    Mixed hyperlipidemia     Muscle tension headache 04/03/2019    New daily persistent headache 12/17/2018    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 Clarke    CATARACT EXTRACTION Right 04/16/2013    Dr. Clarke    COLONOSCOPY  04/18/2014    Dr. Elizabeth; no polyps    EPIDURAL BLOCK      HIP PERCUTANEOUS PINNING Left 8/31/2017    Procedure: LT HIP PERCUTANEOUS PINNING;  Surgeon: Sean Canales MD;  Location: [facility] MAIN OR;  Service:     MAMMO BILATERAL  11/2013    NEPHRECTOMY Left 11/16/2020    Procedure: LAPAROSCOPIC NEPHRECTOMY;  Surgeon: Chuckie Mclaughlin MD;  Location: [facility] MAIN OR;  Service: Urology;  Laterality: Left;    PAP SMEAR  02/2011    TIBIA FRACTURE SURGERY Right 2015    REMOVED PLATE LATER IN 2015    TONSILLECTOMY  1947    TOTAL HIP ARTHROPLASTY  Right 08/2015    TOTAL HIP ARTHROPLASTY Right 09/2016    TOTAL KNEE ARTHROPLASTY Bilateral 2004     PT Assessment (Last 12 Hours)       PT Evaluation and Treatment       Row Name 05/29/24 0900          Physical Therapy Time and Intention    Subjective Information no complaints  -     Document Type therapy note (daily note)  -     Mode of Treatment individual therapy;physical therapy  -     Patient Effort good  -     Symptoms Noted During/After Treatment none  -       Row Name 05/29/24 0900          General Information    Patient Profile Reviewed yes  -     Existing Precautions/Restrictions fall  -     Barriers to Rehab none identified  -       Row Name 05/29/24 0900          Cognition    Orientation Status (Cognition) oriented x 4  -       Row Name 05/29/24 0900          Bed Mobility    Supine-Sit [place] (Bed Mobility) modified independence  -     Assistive Device (Bed Mobility) bed rails  -       Row Name 05/29/24 0900          Sit-Stand Transfer    Sit-Stand [place] (Transfers) modified independence  -     Assistive Device (Sit-Stand Transfers) walker, front-wheeled  -       Row Name 05/29/24 0900          Gait/Stairs (Locomotion)    [place] Level (Gait) modified independence  -     Assistive Device (Gait) walker, front-wheeled  -     Distance in Feet (Gait) 100  -     Pattern (Gait) step-through  -     Deviations/Abnormal Patterns (Gait) david decreased  -     Bilateral Gait Deviations forward flexed posture  -       Row Name 05/29/24 0900          Balance    Static Sitting Balance independent  -     Position, Sitting Balance unsupported;sitting edge of bed  -     Dynamic Standing Balance modified independence  -     Position/Device Used, Standing Balance supported;walker, front-wheeled  -       Row Name 05/29/24 0900          Motor Skills    Therapeutic Exercise --  APs x 10  -       Row Name             [REMOVED] Wound 11/16/20 0826 Left abdomen  Incision    Wound - Properties Group Placement Date: 11/16/20  -SR Placement Time: 0826  -SR Present on Original Admission: N  -SR Side: Left  -SR Location: abdomen  -SR Primary Wound Type: Incision  -SR Removal Date: 05/29/24  -ED Removal Time: 0720  -ED    Retired Wound - Properties Group Placement Date: 11/16/20  -SR Placement Time: 0826  -SR Present on Original Admission: N  -SR Side: Left  -SR Location: abdomen  -SR Primary Wound Type: Incision  -SR Removal Date: 05/29/24  -ED Removal Time: 0720  -ED    Retired Wound - Properties Group Date first assessed: 11/16/20  -SR Time first assessed: 0826  -SR Present on Original Admission: N  -SR Side: Left  -SR Location: abdomen  -SR Primary Wound Type: Incision  -SR Resolution Date: 05/29/24  -ED Resolution Time: 0720  -ED      Row Name             [REMOVED] Wound 11/16/20 0829 abdomen Incision    Wound - Properties Group Placement Date: 11/16/20  -SR Placement Time: 0829  -SR Present on Original Admission: N  -SR Location: abdomen  -SR Primary Wound Type: Incision  -SR Removal Date: 05/29/24  -ED Removal Time: 0720  -ED    Retired Wound - Properties Group Placement Date: 11/16/20  -SR Placement Time: 0829  -SR Present on Original Admission: N  -SR Location: abdomen  -SR Primary Wound Type: Incision  -SR Removal Date: 05/29/24  -ED Removal Time: 0720  -ED    Retired Wound - Properties Group Date first assessed: 11/16/20  -SR Time first assessed: 0829  -SR Present on Original Admission: N  -SR Location: abdomen  -SR Primary Wound Type: Incision  -SR Resolution Date: 05/29/24  -ED Resolution Time: 0720  -ED      Row Name 05/29/24 0900          Plan of Care Review    Plan of Care Reviewed With patient  -     Progress improving  -     Outcome Evaluation Patient tolerated increased gait distance today 100 feet mod I using a walker, asymptomatic denies pain.  Patient has rollator for home use recommend return home with home health as needed at discharge.  -       Vito  Name 05/29/24 0900          Positioning and Restraints    Pre-Treatment Position in bed  -     Post Treatment Position chair  -     In Chair reclined;call light within reach;encouraged to call for assist;exit alarm on  -               User Key  (r) = Recorded By, (t) = Taken By, (c) = Cosigned By      Initials Name Provider Type     Faye Rojas, PT Physical Therapist    Magnolia Adames RN Registered Nurse    Mary España RN Registered Nurse                    Physical Therapy Education       Title: PT OT SLP Therapies (In Progress)       Topic: Physical Therapy (In Progress)       Point: Mobility training (Done)       Learning Progress Summary             Patient Acceptance, E, NR,DU,VU by  at 5/29/2024 0952    Acceptance, E, VU,NR by  at 5/28/2024 1004                         Point: Home exercise program (Done)       Learning Progress Summary             Patient Acceptance, E, NR,DU,VU by  at 5/29/2024 0952                         Point: Body mechanics (Not Started)       Learner Progress:  Not documented in this visit.              Point: Precautions (Not Started)       Learner Progress:  Not documented in this visit.                              User Key       Initials Effective Dates Name Provider Type Discipline     06/16/21 -  Faye Rojas, PT Physical Therapist PT     06/16/21 -  Dorothy Jasmine, PT Physical Therapist PT                  PT Recommendation and Plan  Anticipated Discharge Disposition (PT): home with home health, skilled nursing facility  Plan of Care Reviewed With: patient  Progress: improving  Outcome Evaluation: Patient tolerated increased gait distance today 100 feet mod I using a walker, asymptomatic denies pain.  Patient has rollator for home use recommend return home with home health as needed at discharge.   Outcome Measures       Row Name 05/29/24 0900             How much help from another person do you currently need...    Turning from your back to your side  while in flat bed without using bedrails? 4  -LH      Moving from lying on back to sitting on the side of a flat bed without bedrails? 4  -LH      Moving to and from a bed to a chair (including a wheelchair)? 4  -LH      Standing up from a chair using your arms (e.g., wheelchair, bedside chair)? 4  -LH      Climbing 3-5 steps with a railing? 4  -LH      To walk in hospital room? 4  -      AM-PAC 6 Clicks Score (PT) 24  -      Highest Level of Mobility Goal 8 --> Walked 250 feet or more  -         Functional Assessment    Outcome Measure Options AM-PAC 6 Clicks Basic Mobility (PT)  -                User Key  (r) = Recorded By, (t) = Taken By, (c) = Cosigned By      Initials Name Provider Type     Faye Rojas PT Physical Therapist                     Time Calculation:    PT Charges       Row Name 05/29/24 0952             Time Calculation    Start Time 0838  -      Stop Time 0901  -      Time Calculation (min) 23 min  -      PT Received On 05/29/24  -      PT - Next Appointment 05/30/24  -                User Key  (r) = Recorded By, (t) = Taken By, (c) = Cosigned By      Initials Name Provider Type     Faye Rojas PT Physical Therapist                  Therapy Charges for Today       Code Description Service Date Service Provider Modifiers Qty    89636594575 HC PT THER PROC EA 15 MIN 5/29/2024 Faye Rojas, PT GP 1    74706591901 HC PT THERAPEUTIC ACT EA 15 MIN 5/29/2024 Faye Rojas, PT GP 1            PT G-Codes  Outcome Measure Options: AM-PAC 6 Clicks Basic Mobility (PT)  AM-PAC 6 Clicks Score (PT): 24    Faye Rojas PT  5/29/2024

## 2024-05-29 NOTE — PROGRESS NOTES
[facility] to follow for home health needs.  Spoke with Dtr and patient both, agreeable for our services.  No current HH involved.  Call Dtr please at [phone] to schedule all visits as patient is very [place] and Dtr would like to be present.  D/Cing today.

## 2024-05-29 NOTE — PROGRESS NOTES
Gastroenterology   Inpatient Progress Note    Reason for Follow Up:  rectal bleeding, anemia    Subjective  Interval History:   No episodes of rectal bleeding overnight.  Patient feels well, tolerating oral intake.  Suppository given last night without difficulty.    Review of previous blood work December 13, 2023 with Dr. Leonard Hemoglobin of 10.9, iron 93, iron saturation 35, ferritin 185      Current Facility-Administered Medications:     apixaban (ELIQUIS) tablet 2.5 mg, 2.5 mg, Oral, Q12H, Kalina Cooperlin R, PA-C, 2.5 mg at 05/29/24 0830    atorvastatin (LIPITOR) tablet 20 mg, 20 mg, Oral, Nightly, Michel Cooperitlin R, PA-C, 20 mg at 05/28/24 2050    escitalopram (LEXAPRO) tablet 20 mg, 20 mg, Oral, Daily, Michel Cooperitlin R, PA-C, 20 mg at 05/29/24 0831    [Held by provider] hydroCHLOROthiazide tablet 12.5 mg, 12.5 mg, Oral, Daily, Kalina Cooperlin R, PA-C    hydrocortisone (ANUSOL-HC) suppository 25 mg, 25 mg, Rectal, BID, AugustineTyree leo, PA-C, 25 mg at 05/28/24 2050    melatonin tablet 5 mg, 5 mg, Oral, Nightly PRN, Luisa Cooper R, PA-C    nitroglycerin (NITROSTAT) SL tablet 0.4 mg, 0.4 mg, Sublingual, Q5 Min PRN, Odalis Meehan APRN    pantoprazole (PROTONIX) injection 40 mg, 40 mg, Intravenous, Q AM, Fariba Leo, ENRRIQUE, 40 mg at 05/29/24 0626    prochlorperazine (COMPAZINE) injection 2.5 mg, 2.5 mg, Intravenous, Q6H PRN, Kevyn Chavez MD, 2.5 mg at 05/28/24 1037    [COMPLETED] Insert Peripheral IV, , , Once **AND** sodium chloride 0.9 % flush 10 mL, 10 mL, Intravenous, PRN, Wong Rosario MD    sodium chloride 0.9 % flush 10 mL, 10 mL, Intravenous, Q12H, Odalis Meehan APRN, 10 mL at 05/29/24 0831    sodium chloride 0.9 % flush 10 mL, 10 mL, Intravenous, PRN, Odalis Meehan APRN    sodium chloride 0.9 % infusion 40 mL, 40 mL, Intravenous, PRN, Odalis Meehan APRN  Review of Systems:               The following systems were reviewed and negative;  gastrointestinal    Objective     Vital  Signs  Temp:  [97.6 °F (36.4 °C)-97.9 °F (36.6 °C)] 97.9 °F (36.6 °C)  Heart Rate:  [46-50] 50  Resp:  [15-19] 15  BP: (115-171)/(43-91) 168/67  Body mass index is 24.05 kg/m².                  Physical Exam:              General: patient awake, alert and cooperative              Eyes: no scleral icterus              Skin: warm and dry, not jaundiced              Psychiatric: Appropriate affect and behavior                Results Review:                I reviewed the patient's new clinical results.    Results from last 7 days   Lab Units 05/29/24  0358 05/28/24  0404 05/27/24  1437   WBC 10*3/mm3 6.34 5.57 6.92   HEMOGLOBIN g/dL 10.1* 9.9* 11.0*   HEMATOCRIT % 31.0* 30.4* 34.1   PLATELETS 10*3/mm3 157 151 179     Results from last 7 days   Lab Units 05/29/24  0358 05/28/24  0404 05/27/24  1437   SODIUM mmol/L 134* 136 135*   POTASSIUM mmol/L 4.9 5.2 5.1   CHLORIDE mmol/L 101 103 99   CO2 mmol/L 23.0 22.0 23.0   BUN mg/dL 47* 52* 53*   CREATININE mg/dL 2.49* 2.60* 2.63*   CALCIUM mg/dL 8.6 8.5* 9.2   BILIRUBIN mg/dL  --   --  0.8   ALK PHOS U/L  --   --  58   ALT (SGPT) U/L  --   --  10   AST (SGOT) U/L  --   --  16   GLUCOSE mg/dL 98 94 93     Results from last 7 days   Lab Units 05/27/24  1437   INR  1.27*     Lab Results   Lab Value Date/Time    LIPASE 38 01/29/2022 1335    LIPASE 35 01/10/2016 1020             Assessment & Plan     Active Hospital Problems    Diagnosis     **Bradycardia        Assessment:  Bright red blood per rectum, hemorrhoids mild internal on rectal exam  Chronic normocytic anemia, stable  Bradycardia  History of atrial fibrillation, on Eliquis  Chronic kidney disease, followed by Dr Leonard  Renal cell carcinoma status post left nephrectomy November 2020  Last colonoscopy April 2014 with Dr. Elizabeth, no colon polyps  History of diverticulitis 3 weeks ago          Plan:  Hemoglobin stable, normal iron profile, normal folate and B12 level  Continue hydrocortisone suppositories for a total of 10  days.  Recommend starting a daily fiber supplement such as Citrucel, Metamucil, FiberCon or Benefiber.   These can be purchased over-the-counter, generic brand is fine.  Fiber supplements can take 12 to 72 hours to start working.  Start fiber supplement at a low dose and gradually increase based on your response.    Drink 6 to 12 ounces of water or noncarbonated beverage with fiber supplement.      Okay for discharge from GI standpoint   Outpatient gastroenterology follow-up if rectal bleeding persists to consider colonoscopy    I discussed the patients findings and my recommendations with patient, nursing staff, and primary care team.           Emilie OSUNA  [facility]  [address] [place] [address]

## 2024-05-30 ENCOUNTER — HOME CARE VISIT (OUTPATIENT)
Dept: HOME HEALTH SERVICES | Facility: HOME HEALTHCARE | Age: 89
End: 2024-05-30
Payer: MEDICARE

## 2024-05-30 ENCOUNTER — TRANSITIONAL CARE MANAGEMENT TELEPHONE ENCOUNTER (OUTPATIENT)
Dept: CALL CENTER | Facility: HOSPITAL | Age: 89
End: 2024-05-30
Payer: MEDICARE

## 2024-05-30 PROCEDURE — G0299 HHS/HOSPICE OF RN EA 15 MIN: HCPCS

## 2024-05-30 NOTE — Clinical Note
"SOC Note:    Home Health ordered for: disciplines SN, PT    Reason for Hosp/Primary Dx/Co-morbidities: Pt admitted to [facility] from 5/27/2024 - 5/29/2024. Pt initially went in to the ER for generalized weakness/fatigue. Pt found to have bradycardia in the 40's. Pt also thought to have GI bleed due to blood in her stool and anemia. Per GI consult they believe bleeding is due to hemmorhoids and no further action is needed at this time. Pt is discharged home with ZIO patch that she is to wear until June 12th. Pt has PMH of hypertension, hyperlipidemia, atrial fibrillation, stress-induced cardiomyopathy, DEBORAH, left renal carcinoma status post nephrectomy.    Focus of Care: Monitoring and education of bradycardia and afib.     Patient's goal(s): \"To stay out of the hospital.\"    Current Functional status/mobility/DME: Pt ambulates throughout the home and performs ADLs with the use of assistive devices and assistance x1     HB status/Living Arrangements: Pt lives in [facility] home by herself with intermittent family care    Skin Integrity/wound status: no issues noted    Code Status: DNR    Fall Risk/Safety concerns: Falls risk of 5     Educated on Emergency Plan, steps to take prior to going to the ER and when to Call [facility]     Medication issues/Concerns: Eliquis 2.5 MG tablet. Pt recently d/c Metoprolol     Additional Problems/Concerns: none    SDOH Barriers (i.e. caregiver concerns, social isolation, transportation, food insecurity, environment, income etc.)/Need for MSW:    Plan for next visit: CP and general assessment, vital signs, goal based teaching per careplan."

## 2024-05-30 NOTE — OUTREACH NOTE
Call Center TCM Note      Flowsheet Row Responses   [facility] patient discharged from? [facility]   Does the patient have one of the following disease processes/diagnoses(primary or secondary)? Other   TCM attempt successful? Yes   Call start time 0930   Call end time 0932   Discharge diagnosis Bradycardia   Meds reviewed with patient/caregiver? Yes   Is the patient having any side effects they believe may be caused by any medication additions or changes? No   Is the patient taking all medications as directed (includes completed medication regime)? Yes   Does the patient have an appointment with their PCP within 7-14 days of discharge? Yes   What is the Home health agency?  [facility]   Has home health visited the patient within 72 hours of discharge? Call prior to 72 hours   Psychosocial issues? No   Did the patient receive a copy of their discharge instructions? Yes   Nursing interventions Reviewed instructions with patient   What is the patient's perception of their health status since discharge? Improving   Is the patient/caregiver able to teach back signs and symptoms related to disease process for when to call PCP? Yes   Is the patient/caregiver able to teach back signs and symptoms related to disease process for when to call 911? Yes   Is the patient/caregiver able to teach back the hierarchy of who to call/visit for symptoms/problems? PCP, Specialist, Home health nurse, Urgent Care, ED, 911 Yes   TCM call completed? Yes   Call end time 0932   Would this patient benefit from a Referral to Amb Social Work? No   Is the patient interested in additional calls from an ambulatory ? No            Dia Zuniga LPN    5/30/2024, 09:34 EDT

## 2024-05-31 VITALS
BODY MASS INDEX: 24.2 KG/M2 | DIASTOLIC BLOOD PRESSURE: 72 MMHG | HEIGHT: 62 IN | OXYGEN SATURATION: 99 % | WEIGHT: 131.5 LBS | HEART RATE: 85 BPM | SYSTOLIC BLOOD PRESSURE: 142 MMHG

## 2024-05-31 NOTE — HOME HEALTH
"SOC Note:    Home Health ordered for: disciplines SN, PT    Reason for Hosp/Primary Dx/Co-morbidities: Pt admitted to [facility] from 5/27/2024 - 5/29/2024. Pt initially went in to the ER for generalized weakness/fatigue. Pt found to have bradycardia in the 40's. Pt also thought to have GI bleed due to blood in her stool and anemia. Per GI consult they believe bleeding is due to hemmorhoids and no further action is needed at this time. Pt is discharged home with ZIO patch that she is to wear until June 12th. Pt has PMH of hypertension, hyperlipidemia, atrial fibrillation, stress-induced cardiomyopathy, DEBORAH, left renal carcinoma status post nephrectomy.    Focus of Care: Monitoring and education of afib.     Patient's goal(s): \"To stay out of the hospital.\"    Current Functional status/mobility/DME: Pt ambulates throughout the home and performs ADLs with the use of assistive devices and assistance x1     HB status/Living Arrangements: Pt lives in [facility] home by herself with intermittent family care    Skin Integrity/wound status: no issues noted    Code Status: DNR    Fall Risk/Safety concerns: Falls risk of 5     Educated on Emergency Plan, steps to take prior to going to the ER and when to Call [facility]     Medication issues/Concerns: Eliquis 2.5 MG tablet. Pt recently d/c Metoprolol     Additional Problems/Concerns: none    SDOH Barriers (i.e. caregiver concerns, social isolation, transportation, food insecurity, environment, income etc.)/Need for MSW:    Plan for next visit: CP and general assessment, vital signs, goal based teaching per careplan."

## 2024-06-03 ENCOUNTER — HOME CARE VISIT (OUTPATIENT)
Dept: HOME HEALTH SERVICES | Facility: HOME HEALTHCARE | Age: 89
End: 2024-06-03
Payer: MEDICARE

## 2024-06-03 VITALS
TEMPERATURE: 98.2 F | DIASTOLIC BLOOD PRESSURE: 68 MMHG | HEART RATE: 69 BPM | OXYGEN SATURATION: 99 % | SYSTOLIC BLOOD PRESSURE: 138 MMHG

## 2024-06-03 PROCEDURE — G0151 HHCP-SERV OF PT,EA 15 MIN: HCPCS

## 2024-06-03 NOTE — HOME HEALTH
Eval Note  Patient admitted to [facility] 5/27 to 5/29/24 with bradycardia, weakness  PMHx HTN, Afib, stress induced cardiomyopathy, sleep apnea, left renal carcinoma with nephrectomy, [place]  SHx Patient resides alone, daughter assist with care as needed,  has ramp to enter home. No steps inside the home.   Prior level of function  Patient ambulates with rollator walker, independent with ADLs, daughter does laundry and cooking, children take patient to appointments     Patient's goal return to prior level of function     Services required to achieve goals: SN, PT evaluation only     Potential Issues for goal attainment: -none     Problems identified:none     Describe the Functional status and safety: Patient is able to perform sit to stand from chair independently with walker, in and out of bed with bedrail independently, on and off the commode with grab bars and BSC, in and out of the shower with grab bars independently. Patient is ambulating with rollator walker independently with slower david, flexed posture, no LOB. Patient reports she is getting her strength back. Instructed patient to continue to ambulate every 1-2 hours. Patient voiced understanding. Therapist, patient and daughter in agreement no further PT is indicated at this time.     Describe any environmental issues: none     Any equipment needs none     Physical therapy evaluation only

## 2024-06-04 ENCOUNTER — HOME CARE VISIT (OUTPATIENT)
Dept: HOME HEALTH SERVICES | Facility: HOME HEALTHCARE | Age: 89
End: 2024-06-04
Payer: MEDICARE

## 2024-06-06 ENCOUNTER — HOSPITAL ENCOUNTER (OUTPATIENT)
Facility: HOSPITAL | Age: 89
Setting detail: OBSERVATION
Discharge: HOME | End: 2024-06-07
Attending: EMERGENCY MEDICINE | Admitting: EMERGENCY MEDICINE
Payer: MEDICARE

## 2024-06-06 ENCOUNTER — APPOINTMENT (OUTPATIENT)
Dept: CT IMAGING | Facility: HOSPITAL | Age: 89
End: 2024-06-06
Payer: MEDICARE

## 2024-06-06 ENCOUNTER — HOME CARE VISIT (OUTPATIENT)
Dept: HOME HEALTH SERVICES | Facility: HOME HEALTHCARE | Age: 89
End: 2024-06-06
Payer: MEDICARE

## 2024-06-06 ENCOUNTER — APPOINTMENT (OUTPATIENT)
Dept: MRI IMAGING | Facility: HOSPITAL | Age: 89
End: 2024-06-06
Payer: MEDICARE

## 2024-06-06 DIAGNOSIS — M54.41 ACUTE BILATERAL LOW BACK PAIN WITH BILATERAL SCIATICA: Primary | ICD-10-CM

## 2024-06-06 DIAGNOSIS — M54.42 ACUTE BILATERAL LOW BACK PAIN WITH BILATERAL SCIATICA: Primary | ICD-10-CM

## 2024-06-06 DIAGNOSIS — M54.9 INTRACTABLE BACK PAIN: ICD-10-CM

## 2024-06-06 DIAGNOSIS — M48.061 FORAMINAL STENOSIS OF LUMBAR REGION: ICD-10-CM

## 2024-06-06 PROBLEM — M54.16 LUMBAR BACK PAIN WITH RADICULOPATHY AFFECTING RIGHT LOWER EXTREMITY: Status: ACTIVE | Noted: 2024-06-06

## 2024-06-06 LAB
ALBUMIN SERPL-MCNC: 4.3 G/DL (ref 3.5–5.2)
ALBUMIN/GLOB SERPL: 1.7 G/DL
ALP SERPL-CCNC: 57 U/L (ref 39–117)
ALT SERPL W P-5'-P-CCNC: 11 U/L (ref 1–33)
ANION GAP SERPL CALCULATED.3IONS-SCNC: 11 MMOL/L (ref 5–15)
AST SERPL-CCNC: 18 U/L (ref 1–32)
BASOPHILS # BLD AUTO: 0.01 10*3/MM3 (ref 0–0.2)
BASOPHILS NFR BLD AUTO: 0.2 % (ref 0–1.5)
BILIRUB SERPL-MCNC: 0.6 MG/DL (ref 0–1.2)
BUN SERPL-MCNC: 46 MG/DL (ref 8–23)
BUN/CREAT SERPL: 18.5 (ref 7–25)
CALCIUM SPEC-SCNC: 9.7 MG/DL (ref 8.6–10.5)
CHLORIDE SERPL-SCNC: 94 MMOL/L (ref 98–107)
CO2 SERPL-SCNC: 26 MMOL/L (ref 22–29)
CREAT SERPL-MCNC: 2.49 MG/DL (ref 0.57–1)
DEPRECATED RDW RBC AUTO: 44.6 FL (ref 37–54)
EGFRCR SERPLBLD CKD-EPI 2021: 18.4 ML/MIN/1.73
EOSINOPHIL # BLD AUTO: 0.05 10*3/MM3 (ref 0–0.4)
EOSINOPHIL NFR BLD AUTO: 1.1 % (ref 0.3–6.2)
ERYTHROCYTE [DISTWIDTH] IN BLOOD BY AUTOMATED COUNT: 13.1 % (ref 12.3–15.4)
GLOBULIN UR ELPH-MCNC: 2.5 GM/DL
GLUCOSE SERPL-MCNC: 116 MG/DL (ref 65–99)
HCT VFR BLD AUTO: 30.3 % (ref 34–46.6)
HGB BLD-MCNC: 9.9 G/DL (ref 12–15.9)
IMM GRANULOCYTES # BLD AUTO: 0.01 10*3/MM3 (ref 0–0.05)
IMM GRANULOCYTES NFR BLD AUTO: 0.2 % (ref 0–0.5)
LYMPHOCYTES # BLD AUTO: 0.59 10*3/MM3 (ref 0.7–3.1)
LYMPHOCYTES NFR BLD AUTO: 13.2 % (ref 19.6–45.3)
MCH RBC QN AUTO: 30.5 PG (ref 26.6–33)
MCHC RBC AUTO-ENTMCNC: 32.7 G/DL (ref 31.5–35.7)
MCV RBC AUTO: 93.2 FL (ref 79–97)
MONOCYTES # BLD AUTO: 0.11 10*3/MM3 (ref 0.1–0.9)
MONOCYTES NFR BLD AUTO: 2.5 % (ref 5–12)
NEUTROPHILS NFR BLD AUTO: 3.69 10*3/MM3 (ref 1.7–7)
NEUTROPHILS NFR BLD AUTO: 82.8 % (ref 42.7–76)
NRBC BLD AUTO-RTO: 0 /100 WBC (ref 0–0.2)
PLATELET # BLD AUTO: 178 10*3/MM3 (ref 140–450)
PMV BLD AUTO: 9.7 FL (ref 6–12)
POTASSIUM SERPL-SCNC: 4.7 MMOL/L (ref 3.5–5.2)
PROT SERPL-MCNC: 6.8 G/DL (ref 6–8.5)
RBC # BLD AUTO: 3.25 10*6/MM3 (ref 3.77–5.28)
SODIUM SERPL-SCNC: 131 MMOL/L (ref 136–145)
WBC NRBC COR # BLD AUTO: 4.46 10*3/MM3 (ref 3.4–10.8)

## 2024-06-06 PROCEDURE — 72131 CT LUMBAR SPINE W/O DYE: CPT

## 2024-06-06 PROCEDURE — 85025 COMPLETE CBC W/AUTO DIFF WBC: CPT | Performed by: NURSE PRACTITIONER

## 2024-06-06 PROCEDURE — G0378 HOSPITAL OBSERVATION PER HR: HCPCS

## 2024-06-06 PROCEDURE — 72148 MRI LUMBAR SPINE W/O DYE: CPT

## 2024-06-06 PROCEDURE — 80053 COMPREHEN METABOLIC PANEL: CPT | Performed by: NURSE PRACTITIONER

## 2024-06-06 PROCEDURE — 25010000002 DEXAMETHASONE PER 1 MG: Performed by: PHYSICIAN ASSISTANT

## 2024-06-06 PROCEDURE — 99285 EMERGENCY DEPT VISIT HI MDM: CPT

## 2024-06-06 PROCEDURE — 63710000001 PREDNISONE PER 1 MG: Performed by: NURSE PRACTITIONER

## 2024-06-06 PROCEDURE — 96374 THER/PROPH/DIAG INJ IV PUSH: CPT

## 2024-06-06 RX ORDER — ACETAMINOPHEN 325 MG/1
650 TABLET ORAL EVERY 4 HOURS PRN
Status: DISCONTINUED | OUTPATIENT
Start: 2024-06-06 | End: 2024-06-07 | Stop reason: HOSPADM

## 2024-06-06 RX ORDER — ESCITALOPRAM OXALATE 20 MG/1
20 TABLET ORAL DAILY
Status: DISCONTINUED | OUTPATIENT
Start: 2024-06-07 | End: 2024-06-07 | Stop reason: HOSPADM

## 2024-06-06 RX ORDER — ONDANSETRON 4 MG/1
4 TABLET, ORALLY DISINTEGRATING ORAL EVERY 6 HOURS PRN
Status: DISCONTINUED | OUTPATIENT
Start: 2024-06-06 | End: 2024-06-07 | Stop reason: HOSPADM

## 2024-06-06 RX ORDER — ATORVASTATIN CALCIUM 20 MG/1
20 TABLET, FILM COATED ORAL NIGHTLY
Status: DISCONTINUED | OUTPATIENT
Start: 2024-06-07 | End: 2024-06-07 | Stop reason: HOSPADM

## 2024-06-06 RX ORDER — METHOCARBAMOL 750 MG/1
750 TABLET, FILM COATED ORAL EVERY 6 HOURS SCHEDULED
Status: DISCONTINUED | OUTPATIENT
Start: 2024-06-06 | End: 2024-06-07 | Stop reason: HOSPADM

## 2024-06-06 RX ORDER — LIDOCAINE 4 G/G
1 PATCH TOPICAL
Status: DISCONTINUED | OUTPATIENT
Start: 2024-06-07 | End: 2024-06-07 | Stop reason: HOSPADM

## 2024-06-06 RX ORDER — SODIUM CHLORIDE 9 MG/ML
40 INJECTION, SOLUTION INTRAVENOUS AS NEEDED
Status: DISCONTINUED | OUTPATIENT
Start: 2024-06-06 | End: 2024-06-07 | Stop reason: HOSPADM

## 2024-06-06 RX ORDER — ONDANSETRON 2 MG/ML
4 INJECTION INTRAMUSCULAR; INTRAVENOUS EVERY 6 HOURS PRN
Status: DISCONTINUED | OUTPATIENT
Start: 2024-06-06 | End: 2024-06-07 | Stop reason: HOSPADM

## 2024-06-06 RX ORDER — DEXAMETHASONE SODIUM PHOSPHATE 10 MG/ML
6 INJECTION INTRAMUSCULAR; INTRAVENOUS ONCE
Status: COMPLETED | OUTPATIENT
Start: 2024-06-06 | End: 2024-06-06

## 2024-06-06 RX ORDER — SODIUM BICARBONATE 650 MG/1
650 TABLET ORAL 2 TIMES DAILY
Status: DISCONTINUED | OUTPATIENT
Start: 2024-06-07 | End: 2024-06-07 | Stop reason: HOSPADM

## 2024-06-06 RX ORDER — HYDROCHLOROTHIAZIDE 12.5 MG/1
12.5 TABLET ORAL DAILY
Status: DISCONTINUED | OUTPATIENT
Start: 2024-06-07 | End: 2024-06-07 | Stop reason: HOSPADM

## 2024-06-06 RX ORDER — FUROSEMIDE 20 MG/1
20 TABLET ORAL DAILY PRN
Status: DISCONTINUED | OUTPATIENT
Start: 2024-06-06 | End: 2024-06-07 | Stop reason: HOSPADM

## 2024-06-06 RX ORDER — NITROGLYCERIN 0.4 MG/1
0.4 TABLET SUBLINGUAL
Status: DISCONTINUED | OUTPATIENT
Start: 2024-06-06 | End: 2024-06-07 | Stop reason: HOSPADM

## 2024-06-06 RX ORDER — SODIUM CHLORIDE 0.9 % (FLUSH) 0.9 %
10 SYRINGE (ML) INJECTION EVERY 12 HOURS SCHEDULED
Status: DISCONTINUED | OUTPATIENT
Start: 2024-06-06 | End: 2024-06-07 | Stop reason: HOSPADM

## 2024-06-06 RX ORDER — LIDOCAINE 4 G/G
1 PATCH TOPICAL ONCE
Status: COMPLETED | OUTPATIENT
Start: 2024-06-06 | End: 2024-06-07

## 2024-06-06 RX ORDER — SODIUM CHLORIDE 0.9 % (FLUSH) 0.9 %
10 SYRINGE (ML) INJECTION AS NEEDED
Status: DISCONTINUED | OUTPATIENT
Start: 2024-06-06 | End: 2024-06-07 | Stop reason: HOSPADM

## 2024-06-06 RX ORDER — METHOCARBAMOL 750 MG/1
750 TABLET, FILM COATED ORAL ONCE
Status: COMPLETED | OUTPATIENT
Start: 2024-06-06 | End: 2024-06-06

## 2024-06-06 RX ORDER — ACETAMINOPHEN 325 MG/1
650 TABLET ORAL ONCE
Status: COMPLETED | OUTPATIENT
Start: 2024-06-06 | End: 2024-06-06

## 2024-06-06 RX ORDER — PREDNISONE 20 MG/1
60 TABLET ORAL ONCE
Status: COMPLETED | OUTPATIENT
Start: 2024-06-06 | End: 2024-06-06

## 2024-06-06 RX ADMIN — LIDOCAINE 1 PATCH: 4 PATCH TOPICAL at 12:47

## 2024-06-06 RX ADMIN — DEXAMETHASONE SODIUM PHOSPHATE 6 MG: 10 INJECTION INTRAMUSCULAR; INTRAVENOUS at 17:46

## 2024-06-06 RX ADMIN — PREDNISONE 60 MG: 20 TABLET ORAL at 12:47

## 2024-06-06 RX ADMIN — METHOCARBAMOL TABLETS 750 MG: 750 TABLET, COATED ORAL at 23:08

## 2024-06-06 RX ADMIN — ACETAMINOPHEN 325MG 650 MG: 325 TABLET ORAL at 17:45

## 2024-06-06 RX ADMIN — METHOCARBAMOL TABLETS 750 MG: 750 TABLET, COATED ORAL at 12:47

## 2024-06-06 RX ADMIN — METHOCARBAMOL TABLETS 750 MG: 750 TABLET, COATED ORAL at 17:46

## 2024-06-06 RX ADMIN — Medication 10 ML: at 20:26

## 2024-06-06 NOTE — H&P
[facility]   HISTORY AND PHYSICAL    Patient Name: Marleni Hummel  : 1935  MRN: [id number]  Primary Care Physician:  Ken Andujar MD  Date of admission: 2024    Subjective   Subjective     Chief Complaint:   Chief Complaint   Patient presents with    Back Pain         HPI:    Marleni Hummel is a 88 y.o. female with history of atrial fibrillation on Eliquis, left renal mass status post nephrectomy, stage III CKD, hyperlipidemia, and anxiety and depression, who presented to the emergency department today complaining of low back pain for 2 weeks.  Patient reports back pain started on left side approximately 2 weeks ago.  She reports pain is now more localized to the right low back.  She also endorses burning sensation down right lateral thigh.  She states she is also having some right knee pain but this knee has been replaced and she does have chronic pain.  Patient reports that she had a mechanical fall about 3 weeks ago, she does not remember any specific injury.    ED evaluation reviewed, CT lumbar spine shows multilevel degenerative disease including multilevel loss of disc height, vacuum disc effect, spinal stenosis, lateral recess narrowing and foraminal stenosis.  Spinal stenosis most prominent at L4-5, severe foraminal stenosis to the right at L4-5.  Laboratory evaluation reviewed, creatinine 2.49 which is baseline for patient, sodium 131 (134 on 2024), hemoglobin 9.9 (10.1 on 2024) patient is being admitted observation unit for further evaluation and treatment, neurosurgery consultation.    Review of Systems   All systems were reviewed and negative except for: What is discussed in the HPI    Personal History     Past Medical History:   Diagnosis Date    Acute bronchitis due to Rhinovirus 2022    Acute vulvitis 2019    Allergic     Allergic rhinitis     Anemia of chronic disease     Arthropathy of knee     right    Atrial fibrillation     Back pain     Bell's palsy      Caregiver stress syndrome 04/17/2019    Chronic coronary artery disease     Chronic kidney disease (CKD), stage III (moderate)     Diverticulitis 12/14/2022    CARROLL (dyspnea on exertion) 3/17/2023    Edema     Elevated serum free T4 level 03/21/2016    Encounter for eye exam 09/2020    Essential hypertension     Fatigue     GERD (gastroesophageal reflux disease)     H/O Heart murmur     Heart attack 10/05/2015    Hematuria 12/12/2016    Herpes zoster without complication     Hip arthritis     left    History of bone density study 02/28/2008    History of pelvic fracture 2015    History of pneumonia     History of transfusion     2015    Hypercholesterolemia     IFG (impaired fasting glucose)     Insomnia     Left kidney mass 07/2020    Limited joint range of motion     RIGHT KNEE    Mixed hyperlipidemia     Muscle tension headache 04/03/2019    New daily persistent headache 12/17/2018    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 Clarke    CATARACT EXTRACTION Right 04/16/2013    Dr. Clarke    COLONOSCOPY  04/18/2014    Dr. Elizabeth; no polyps    EPIDURAL BLOCK      HIP PERCUTANEOUS PINNING Left 8/31/2017    Procedure: LT HIP PERCUTANEOUS PINNING;  Surgeon: Sean Canales MD;  Location: [facility] OR;  Service:     MAMMO BILATERAL  11/2013    NEPHRECTOMY Left 11/16/2020    Procedure: LAPAROSCOPIC NEPHRECTOMY;  Surgeon: Chuckie Mclaughlin MD;  [facility] MAIN OR;  Service: Urology;  Laterality: Left;    PAP SMEAR  02/2011    TIBIA FRACTURE SURGERY  Right 2015    REMOVED PLATE LATER IN 2015    TONSILLECTOMY  1947    TOTAL HIP ARTHROPLASTY Right 08/2015    TOTAL HIP ARTHROPLASTY Right 09/2016    TOTAL KNEE ARTHROPLASTY Bilateral 2004       Family History: family history includes Heart disease in her mother; Hypertension in her maternal grandmother, mother, and another family member; Stroke in her mother. Otherwise pertinent FHx was reviewed and not pertinent to current issue.    Social History:  reports that she quit smoking about 68 years ago. Her smoking use included cigarettes. She started smoking about 71 years ago. She has a 3 pack-year smoking history. She has been exposed to tobacco smoke. She has never used smokeless tobacco. She reports that she does not currently use alcohol after a past usage of about 3.0 standard drinks of alcohol per week. She reports that she does not use drugs.    Home Medications:  TiZANidine, Vibegron, acetaminophen, apixaban, atorvastatin, desonide, escitalopram, fexofenadine, furosemide, hydroCHLOROthiazide, magnesium oxide, melatonin, and sodium bicarbonate    Allergies:  Allergies   Allergen Reactions    Morphine Anaphylaxis    Oxycodone Anaphylaxis    Ace Inhibitors Cough and Unknown (See Comments)    Bactrim [Sulfamethoxazole-Trimethoprim] Rash    Levofloxacin Itching and Rash     insomnia  insomnia  insomnia    Torsemide Dizziness       Objective   Objective     Vitals:   Temp:  [97.7 °F (36.5 °C)-98.4 °F (36.9 °C)] 97.7 °F (36.5 °C)  Heart Rate:  [61-68] 62  Resp:  [16-18] 18  BP: (136-152)/(49-85) 136/85  Physical Exam     GENERAL: 88 y.o. YO female a/o x 4, NAD  SKIN: Warm pink and dry   HEENT:  PERRL, EOM intact, conjunctivae normal, sclerae clear  NECK: supple, no JVD  LUNGS: Clear to auscultation bilaterally without wheezes, rales or rhonchi.  No accessory muscle use and no nasal flaring.  CARDIAC:  Regular rate and rhythm, S1-S2.  No murmurs, rubs or gallops.  No peripheral edema.  Equal pulses  bilaterally.  ABDOMEN: Soft, nontender, nondistended.  No guarding or rebound tenderness.  Normal bowel sounds.  MUSCULOSKELETAL: Moves all extremities well.  No deformity.  NEURO: Cranial nerves II through XII grossly intact.  No gross focal deficits.  Alert.  Normal speech and motor.  Dorsiflexion and plantarflexion 5 out of 5 bilaterally.  PSYCH: Normal mood and affect      Result Review    Result Review:  I have personally reviewed the results from the time of this admission to 6/6/2024 17:53 EDT and agree with these findings:  [x]  Laboratory list / accordion  []  Microbiology  [x]  Radiology  []  EKG/Telemetry   []  Cardiology/Vascular   []  Pathology  []  Old records  []  Other:  Most notable findings include: creatinine 2.49 which is baseline for patient, sodium 131 (134 on 5/29/2024), hemoglobin 9.9 (10.1 on 5/29/2024)     CT Lumbar Spine Without Contrast    Result Date: 6/6/2024  There is no evidence of fracture. Multilevel degenerative disease of the lumbar spine is noted as described in detail above including multilevel loss of disc height, vacuum disc effect, spinal stenosis, lateral recess narrowing and foraminal stenosis. Spinal stenosis is most prominent at L4-L5. There is also severe foraminal stenosis to the right at L4-L5 which appears similar to slightly more prominent as compared to 08/29/2017. See above. Clinical correlation is recommended. Further evaluation could be performed with MRI examination of the lumbar spine.    Radiation dose reduction techniques were utilized, including automated exposure control and exposure modulation based on body size.             Assessment & Plan   Assessment / Plan     Brief Patient Summary:  Marleni Hummel is a 88 y.o. female who is being admitted observation unit for further evaluation of low back pain with right leg radiculopathy    Active Hospital Problems:  Active Hospital Problems    Diagnosis     **Lumbar back pain with radiculopathy affecting right  lower extremity      Plan:     Low back pain with right leg radiculopathy  -CT lumbar spine shows multilevel degenerative disease including multilevel loss of disc height, vacuum disc effect, spinal stenosis, lateral recess narrowing and foraminal stenosis.  Spinal stenosis most prominent at L4-5, severe foraminal stenosis to the right at L4-5  -MRI lumbar spine  -Multimodal analgesia with muscle relaxers, Lidoderm, IV steroids, and narcotics as needed  -Consult neurosurgery  -Physical therapy to evaluate and treat    Atrial fibrillation  -Continue Eliquis and metoprolol    Left renal mass status post nephrectomy  Stage III CKD  -Creatinine 2.49, baseline for patient  -Trend with a.m. labs    Hyponatremia  -Sodium 131, was 134 on 5/29/2024  -Hold hydrochlorothiazide  -Trend with a.m. labs    Chronic anemia  -Hemoglobin 9.9, was 10.1 on 5/29/2024  -Chronic, trend with a.m. labs      DVT prophylaxis:  Mechanical DVT prophylaxis orders are present.        CODE STATUS:    Level Of Support Discussed With: Patient  Code Status (Patient has no pulse and is not breathing): CPR (Attempt to Resuscitate)  Medical Interventions (Patient has pulse or is breathing): Full Support    Admission Status:  I believe this patient meets observation status.     77 minutes has been spent by [facility] providers in the care of this patient while under observation status    I wore an appropriate PPE during this patient encounter.  Hand hygeine performed before and after seeing the patient.    Electronically signed by RADHA Beltran, 06/06/24, 5:53 PM EDT.

## 2024-06-06 NOTE — ED PROVIDER NOTES
EMERGENCY DEPARTMENT ENCOUNTER    Room Number:  117/1  Date seen:  6/6/2024  PCP: Ken Andujar MD  Historian/Independent historian: daughter  Chronic or social conditions impacting care: age      HPI:  Chief Complaint: back pain  A complete HPI/ROS/PMH/PSH/SH/FH are unobtainable due to: n/a  Context: Marleni Hummel is a 88 y.o. female who presents to the ED c/o bilateral low back pain that has been worsening since a fall 2 weeks ago. She states it does radiate down both sides, but right is worse than left. Per daughter she was seen and had negative imaging initially. No new falls. No weakness, numbness, tingling. No loss of bowel or bladder control (chronic bladder incontinence). No saddle anesthesia. No other complaints or concerns at this time.     External Medical record review:   5/27/24: admission   The patient is admitted to the observation unit for further evaluation of worsening fatigue, generalized weakness noted over the past month.  Patient noticed to have bradycardia down into the 40s, blood pressure stable.  Patient with recent decrease of metoprolol 25 twice daily to 12.5 twice daily on 5/15/2024 by her cardiologist Dr. Jacqui Parmar without improvement of her fatigue since that time.  Patient denies passing out, chest pain, shortness of air, palpitations.  Patient reports she has had left low back discomfort only with movement and palpation over the past week.  Patient reports a longstanding history of urinary incontinence unchanged from previous.  Patient denies radiation to lower extremities      PAST MEDICAL HISTORY  Active Ambulatory Problems     Diagnosis Date Noted    Arthritis involving multiple sites     Essential hypertension     Permanent atrial fibrillation     History of Bell's palsy     CKD (chronic kidney disease) stage 4, GFR 15-29 ml/min     Gastroesophageal reflux disease without esophagitis     Hypercholesterolemia     Insomnia     Localized osteoporosis without current  pathological fracture     Panic disorder     Chronic nonseasonal allergic rhinitis due to pollen     Other sleep apnea     History of MI (myocardial infarction) 12/21/2015    Chronic coronary artery disease 01/25/2016    Anemia of chronic disease 09/12/2016    Weakness of right leg 03/13/2017    Cardiac murmur 05/04/2017    Senile osteoporosis 06/30/2017    Hyperuricemia 09/07/2017    Grief reaction 09/30/2019    Peripheral vertigo 02/25/2020    Renal cell carcinoma of left kidney 11/22/2020    Renal oncocytoma of left kidney 12/28/2020    History of left nephrectomy 04/19/2021    Cerebrovascular accident (CVA) due to stenosis of small artery 11/29/2021    History of renal cell carcinoma 11/30/2021    TIA (transient ischemic attack) 11/30/2021    Spinal stenosis, lumbar region with neurogenic claudication 12/02/2021    Malignant neoplasm of left kidney, except renal pelvis 06/07/2022    Diverticulosis 12/14/2022    Irritable bowel syndrome with constipation 12/14/2022    Chronic diastolic congestive heart failure 03/18/2023    Hallucination, visual 01/17/2024    Hypomagnesemia 05/02/2024    Bradycardia 05/27/2024     Resolved Ambulatory Problems     Diagnosis Date Noted    Fatigue     Hip arthritis     IFG (impaired fasting glucose)     Rectal bleeding     Vitamin D deficiency     Urinary incontinence     History of right hip replacement 12/21/2015    Closed fracture of neck of right femur with routine healing 12/21/2015    History of right knee joint replacement 12/21/2015    History of left knee replacement 12/21/2015    Stress-induced cardiomyopathy 01/25/2016    Elevated serum free T4 level 03/21/2016    Urinary tract infection 10/05/2016    Acute pyelonephritis 10/06/2016    Acute cystitis 12/03/2016    Hematuria 12/12/2016    Sinusitis 01/22/2017    Frequent falls 03/13/2017    Atrial fibrillation with RVR 06/19/2017    ANGY (acute kidney injury) 06/20/2017    Viral gastroenteritis 06/20/2017    Dehydration  06/20/2017    Nausea & vomiting 06/20/2017    Diarrhea 06/20/2017    Closed displaced fracture of left femoral neck 08/30/2017    Bacteriuria 08/30/2017    New daily persistent headache 12/17/2018    Muscle tension headache 04/03/2019    Caregiver stress syndrome 04/17/2019    Acute vulvitis 08/21/2019    Dizziness 02/23/2020    Left facial numbness 02/23/2020    Stroke-like symptoms 02/23/2020    Left kidney mass 08/17/2020    Left renal mass 11/16/2020    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    Acute kidney injury 05/30/2022    Acute bronchitis due to Rhinovirus 05/31/2022    Hyperkalemia 05/31/2022    Diverticulitis 12/14/2022    CARROLL (dyspnea on exertion) 03/17/2023     Past Medical History:   Diagnosis Date    Allergic     Allergic rhinitis     Arthropathy of knee     Atrial fibrillation     Back pain     Bell's palsy     Chronic kidney disease (CKD), stage III (moderate)     Edema     Encounter for eye exam 09/2020    GERD (gastroesophageal reflux disease)     H/O Heart murmur     Heart attack 10/05/2015    Herpes zoster without complication     History of bone density study 02/28/2008    History of pelvic fracture 2015    History of pneumonia     History of transfusion     Limited joint range of motion     Mixed hyperlipidemia     Osteoarthritis     Osteoporosis     PONV (postoperative nausea and vomiting)     Sleep apnea     Stress          PAST SURGICAL HISTORY  Past Surgical History:   Procedure Laterality Date    CATARACT EXTRACTION Left 04/01/2013    Dr. Clarke    CATARACT EXTRACTION Right 04/16/2013    Dr. Clarke    COLONOSCOPY  04/18/2014    Dr. Elizabeth; no polyps    EPIDURAL BLOCK      HIP PERCUTANEOUS PINNING Left 8/31/2017    Procedure: LT HIP PERCUTANEOUS PINNING;  Surgeon: Sean Canales MD;  Location: [facility] OR;  Service:     MAMMO BILATERAL  11/2013    NEPHRECTOMY Left 11/16/2020    Procedure: LAPAROSCOPIC NEPHRECTOMY;  Surgeon: Chuckie Mclaughlin MD;  Location: [facility] OR;  Service:  Urology;  Laterality: Left;    PAP SMEAR  2011    TIBIA FRACTURE SURGERY Right 2015    REMOVED PLATE LATER IN     TONSILLECTOMY  1947    TOTAL HIP ARTHROPLASTY Right 2015    TOTAL HIP ARTHROPLASTY Right 2016    TOTAL KNEE ARTHROPLASTY Bilateral          FAMILY HISTORY  Family History   Problem Relation Age of Onset    Hypertension Other     Hypertension Mother     Stroke Mother     Heart disease Mother     Hypertension Maternal Grandmother     Malig Hyperthermia Neg Hx          SOCIAL HISTORY  Social History     Socioeconomic History    Marital status:     Years of education: High school   Tobacco Use    Smoking status: Former     Current packs/day: 0.00     Average packs/day: 1 pack/day for 3.0 years (3.0 ttl pk-yrs)     Types: Cigarettes     Start date: 1953     Quit date: 1956     Years since quittin.3     Passive exposure: Past    Smokeless tobacco: Never    Tobacco comments:     caffeine - 1/2 cup coffee daily    Vaping Use    Vaping status: Never Used   Substance and Sexual Activity    Alcohol use: Not Currently     Alcohol/week: 3.0 standard drinks of alcohol     Types: 3 Glasses of wine per week     Comment: couple times a week    Drug use: Never    Sexual activity: Defer         ALLERGIES  Morphine, Oxycodone, Ace inhibitors, Bactrim [sulfamethoxazole-trimethoprim], Levofloxacin, and Torsemide        REVIEW OF SYSTEMS  Per HPI, otherwise negative.       PHYSICAL EXAM  ED Triage Vitals   Temp Heart Rate Resp BP SpO2   24 1044 24 1044 24 1044 24 1138 24 1044   98.4 °F (36.9 °C) 61 18 138/49 98 %      Temp src Heart Rate Source Patient Position BP Location FiO2 (%)   24 1044 24 1044 -- -- --   Tympanic Monitor          Physical Exam  Vitals and nursing note reviewed.   Constitutional:       Appearance: Normal appearance.   HENT:      Head: Normocephalic and atraumatic.      Mouth/Throat:      Mouth: Mucous membranes are moist.    Eyes:      Conjunctiva/sclera: Conjunctivae normal.   Cardiovascular:      Rate and Rhythm: Normal rate and regular rhythm.      Pulses: Normal pulses.      Heart sounds: Normal heart sounds.   Pulmonary:      Effort: Pulmonary effort is normal.      Breath sounds: Normal breath sounds. No wheezing.   Abdominal:      General: Bowel sounds are normal.      Palpations: Abdomen is soft.   Musculoskeletal:      Comments: No midline cervical spine tenderness. No obvious step-offs or deformities. Strength 5/5 equal to BUE. Sensation intact to light touch. FROM.    No midline thoracic spine tenderness. No obvious step-off or deformities.     Mild, diffuse lumbar spine tenderness. Right-sided straight-leg exam. Strength 5/5 and equal to BLE. Sensation intact to light touch. FROM.     Skin:     General: Skin is warm.      Capillary Refill: Capillary refill takes less than 2 seconds.   Neurological:      Mental Status: She is alert and oriented to person, place, and time. Mental status is at baseline.   Psychiatric:         Mood and Affect: Mood normal.               LAB RESULTS  Recent Results (from the past 24 hour(s))   Comprehensive Metabolic Panel    Collection Time: 06/06/24  4:32 PM    Specimen: Blood   Result Value Ref Range    Glucose 116 (H) 65 - 99 mg/dL    BUN 46 (H) 8 - 23 mg/dL    Creatinine 2.49 (H) 0.57 - 1.00 mg/dL    Sodium 131 (L) 136 - 145 mmol/L    Potassium 4.7 3.5 - 5.2 mmol/L    Chloride 94 (L) 98 - 107 mmol/L    CO2 26.0 22.0 - 29.0 mmol/L    Calcium 9.7 8.6 - 10.5 mg/dL    Total Protein 6.8 6.0 - 8.5 g/dL    Albumin 4.3 3.5 - 5.2 g/dL    ALT (SGPT) 11 1 - 33 U/L    AST (SGOT) 18 1 - 32 U/L    Alkaline Phosphatase 57 39 - 117 U/L    Total Bilirubin 0.6 0.0 - 1.2 mg/dL    Globulin 2.5 gm/dL    A/G Ratio 1.7 g/dL    BUN/Creatinine Ratio 18.5 7.0 - 25.0    Anion Gap 11.0 5.0 - 15.0 mmol/L    eGFR 18.4 (L) >60.0 mL/min/1.73   CBC Auto Differential    Collection Time: 06/06/24  4:32 PM    Specimen: Blood    Result Value Ref Range    WBC 4.46 3.40 - 10.80 10*3/mm3    RBC 3.25 (L) 3.77 - 5.28 10*6/mm3    Hemoglobin 9.9 (L) 12.0 - 15.9 g/dL    Hematocrit 30.3 (L) 34.0 - 46.6 %    MCV 93.2 79.0 - 97.0 fL    MCH 30.5 26.6 - 33.0 pg    MCHC 32.7 31.5 - 35.7 g/dL    RDW 13.1 12.3 - 15.4 %    RDW-SD 44.6 37.0 - 54.0 fl    MPV 9.7 6.0 - 12.0 fL    Platelets 178 140 - 450 10*3/mm3    Neutrophil % 82.8 (H) 42.7 - 76.0 %    Lymphocyte % 13.2 (L) 19.6 - 45.3 %    Monocyte % 2.5 (L) 5.0 - 12.0 %    Eosinophil % 1.1 0.3 - 6.2 %    Basophil % 0.2 0.0 - 1.5 %    Immature Grans % 0.2 0.0 - 0.5 %    Neutrophils, Absolute 3.69 1.70 - 7.00 10*3/mm3    Lymphocytes, Absolute 0.59 (L) 0.70 - 3.10 10*3/mm3    Monocytes, Absolute 0.11 0.10 - 0.90 10*3/mm3    Eosinophils, Absolute 0.05 0.00 - 0.40 10*3/mm3    Basophils, Absolute 0.01 0.00 - 0.20 10*3/mm3    Immature Grans, Absolute 0.01 0.00 - 0.05 10*3/mm3    nRBC 0.0 0.0 - 0.2 /100 WBC       Ordered the above labs and reviewed the results.        RADIOLOGY  MRI Lumbar Spine Without Contrast    Result Date: 6/6/2024  MRI LUMBAR SPINE WITHOUT CONTRAST  HISTORY: Low back pain, right radiculopathy.  COMPARISON: CT lumbar spine 06/06/2024.  FINDINGS: There is a grade 1 anterolisthesis of L4 upon L5 and grade 1 retrolisthesis of L5 on S1 each estimated to be approximately 2 mm. The conus is at L1 and the caudal aspect of the spinal cord appears unremarkable.  T10/T11: A far lateral disc protrusion or herniation is appreciated which abuts the anterolateral aspect of the cord to the right. This area was not included in the field-of-view on the MRI examination of 08/29/2017. There is no evidence of cord compression.  T11/T12: Mild facet degenerative disease is present bilaterally. A mild broad-based disc osteophyte complex is present resulting in mild flattening of the ventral surface of the thecal sac.  T12/L1: A broad-based disc osteophyte complex is present which results in flattening of the  anterolateral aspect of the thecal sac bilaterally, more prominent as compared to prior examination. There also is a superimposed disc protrusion or broad-based herniation just to the left of midline extending cephalad slightly. This abuts and mildly flattens the anterolateral aspect of the cord to the left. Mild to moderate foraminal stenosis is present bilaterally secondary to loss of disc height and extension of the disc osteophyte complex into the neural foramen.  L1-L2: A mild broad-based disc osteophyte complex is present resulting in mild flattening of the ventral surface of the thecal sac. Endplate degenerative changes are noted anteriorly, most prominent involving L2 and new versus 2017. Mild foraminal stenosis is present bilaterally secondary to extension of a small disc osteophyte complex into the neural foramen.  L2-L3: A broad-based disc osteophyte complex is present which results in mild canal stenosis. It is slightly more prominent laterally to the left where there is mild to moderate lateral recess narrowing. Mild foraminal stenosis is present bilaterally secondary to extension of the disc osteophyte complex into the neural foramen. The disc osteophyte complex and left lateral disc protrusion are more prominent as compared to 2017.  L3-L4: A broad-based disc osteophyte complex is present which results in mild canal stenosis. Lateral recess narrowing is present bilaterally secondary to post, element degenerative disease and extension of the disc osteophyte complex into the neural foramen. Mild to moderate facet degenerative disease is present bilaterally.  L4-L5: Moderate facet and ligamentum flavum hypertrophy is appreciated which, when combined with a broad-based disc osteophyte complex and the anterolisthesis of L4 upon L5 results in moderate to severe central canal stenosis and severe lateral recess narrowing bilaterally. This appears similar to the prior study. Severe foraminal stenosis is  present on the right which appears similar to the MRI examination of 08/29/2017 secondary to loss of disc height and extension of disc-osteophyte complex into the neural foramen. Mild to moderate foraminal stenosis is present on the left, unchanged.  L5-S1: Moderate facet degenerative disease is present bilaterally. A broad-based disc osteophyte complex is present which results in mild flattening of the ventral surface of the thecal sac. Moderate foraminal stenosis is present bilaterally, more prominent on the left secondary to loss of disc height and extension disc-osteophyte complex into the neural foramen, further accentuated by the retrolisthesis of L5 upon S1, similar in appearance as compared to the prior study.       CT Lumbar Spine Without Contrast    Result Date: 6/6/2024  CT LUMBAR SPINE WITHOUT CONTRAST  HISTORY: Low back pain. Right radiculopathy.  COMPARISON: MRI lumbar spine 08/29/2017.  FINDINGS: There is a grade 1 anterolisthesis of L4 upon L5 estimated to be 2 mm. The vacuum disc effect is appreciated from T12 to S1.  T12-L1: A mild broad-based disc osteophyte complex is present resulting in mild flattening of the ventral surface of the thecal sac. Moderate foraminal stenosis is present bilaterally secondary to extension of a disc osteophyte complex into the neural foramen. There is mild to moderate lateral recess narrowing bilaterally.  L1-L2: A mild broad-based disc osteophyte complex is present resulting mild flattening of the ventral surface of the thecal sac. Mild foraminal stenosis is present bilaterally secondary to extension of a disc osteophyte complex into the neural foramen.  L2-L3: A broad-based disc osteophyte complex is present resulting in mild flattening of the ventral surface of the thecal sac. Mild bilateral recess narrowing is present bilaterally. Mild foraminal stenosis is present bilaterally secondary to extension of a disc osteophyte complex into the neural foramen.  L3-L4: A  mild central disc osteophyte complex is present resulting mild flattening of the ventral surface of the thecal sac. There is mild and mild to moderate foraminal stenosis on the right and left respectively. Moderate lateral recess narrowing is present bilaterally.  L4-L5: Moderate facet and ligamentum flavum hypertrophy is appreciated. There is moderate to severe central canal stenosis and severe lateral recess narrowing bilaterally secondary to the posterior element degenerative disease and broad-based disc osteophyte complex. There is severe foraminal stenosis on the right secondary to loss of disc height, facet hypertrophy and extension of a disc osteophyte complex into the neural foramen. Foraminal stenosis appears similar to slightly more prominent as compared to 08/29/2017.  L5-S1: Moderate facet degenerative disease is present bilaterally. A broad-based disc osteophyte complex is present resulting in mild flattening of the ventral surface of the thecal sac. Moderate foraminal stenosis is present bilaterally secondary to loss of disc height, grade 1 retrolisthesis of L5 upon S1 and extension of the disc osteophyte complex into the neural foramen.      There is no evidence of fracture. Multilevel degenerative disease of the lumbar spine is noted as described in detail above including multilevel loss of disc height, vacuum disc effect, spinal stenosis, lateral recess narrowing and foraminal stenosis. Spinal stenosis is most prominent at L4-L5. There is also severe foraminal stenosis to the right at L4-L5 which appears similar to slightly more prominent as compared to 08/29/2017. See above. Clinical correlation is recommended. Further evaluation could be performed with MRI examination of the lumbar spine.    Radiation dose reduction techniques were utilized, including automated exposure control and exposure modulation based on body size.    This report was finalized on 6/6/2024 6:08 PM by Dr. Richard Martin M.D on  Workstation: [id number]       Ordered the above noted radiological studies. Reviewed by me in PACS.        MEDICATIONS GIVEN IN ER  Medications   sodium chloride 0.9 % flush 10 mL (has no administration in time range)   Lidocaine 4 % 1 patch (1 patch Transdermal Medication Applied 6/6/24 1247)   sodium chloride 0.9 % flush 10 mL (has no administration in time range)   sodium chloride 0.9 % flush 10 mL (10 mL Intravenous Given 6/6/24 2026)   sodium chloride 0.9 % flush 10 mL (has no administration in time range)   sodium chloride 0.9 % infusion 40 mL (has no administration in time range)   ondansetron ODT (ZOFRAN-ODT) disintegrating tablet 4 mg (has no administration in time range)     Or   ondansetron (ZOFRAN) injection 4 mg (has no administration in time range)   nitroglycerin (NITROSTAT) SL tablet 0.4 mg (has no administration in time range)   acetaminophen (TYLENOL) tablet 650 mg (has no administration in time range)   Lidocaine 4 % 1 patch (has no administration in time range)   methocarbamol (ROBAXIN) tablet 750 mg (750 mg Oral Given 6/6/24 1746)   methocarbamol (ROBAXIN) tablet 750 mg (750 mg Oral Given 6/6/24 1247)   predniSONE (DELTASONE) tablet 60 mg (60 mg Oral Given 6/6/24 1247)   acetaminophen (TYLENOL) tablet 650 mg (650 mg Oral Given 6/6/24 1745)   dexAMETHasone (DECADRON) injection 6 mg (6 mg Intravenous Given 6/6/24 1746)           MEDICAL DECISION MAKING, PROGRESS, and CONSULTS    All labs have been independently reviewed by me.  All radiology studies have been reviewed by me and I have also reviewed the radiology report.   EKG's independently viewed and interpreted by me.  Discussion below represents my analysis of pertinent findings related to patient's condition, differential diagnosis, treatment plan and final disposition.    89 y/o female presents with severe low back pain. Ct negative for acute findings, plan to admit to obs unit for MRI and pain control.           Shared decision  making/consideration for admission:  admitted for MRI      Orders placed during this visit:  Orders Placed This Encounter   Procedures    CT Lumbar Spine Without Contrast    MRI Lumbar Spine Without Contrast    Comprehensive Metabolic Panel    CBC Auto Differential    CBC (No Diff)    Basic Metabolic Panel    Diet: Regular/House; Fluid Consistency: Thin (IDDSI 0)    NPO Diet NPO Type: Sips with Meds    Intake & Output    Weigh Patient    Place Sequential Compression Device    Maintain Sequential Compression Device    Maintain IV Access    Telemetry - Place Orders & Notify Provider of Results When Patient Experiences Acute Chest Pain, Dysrhythmia or Respiratory Distress    May Be Off Telemetry for Tests    Vital Signs    Code Status and Medical Interventions:    Inpatient Neurosurgery Consult    PT Consult: Eval & Treat Functional Mobility Below Baseline    Telemetry Scan    Insert Peripheral IV    Insert Peripheral IV    Initiate ED Observation Status    CBC & Differential         Differential diagnosis include, but not limited to: compression fracture, sciatica, foraminal stenosis, cauda equina       Additional orders considered but not ordered: MRI lumbar spine    Independent interpretation of labs, radiology studies, and discussions with consultants:    Discussed with Dr. Bartlett, who agrees with plan.     ED Course as of 06/06/24 2040   Th Jun 06, 2024   1356 CT Lumbar Spine Without Contrast  My independent interpretation of the imaging study is no gross fracture [TR]   1541 I updated the patient on current ED work up, including labs and imaging (if performed) with indication for admission. All questions and concerns addressed and ready for admit at this time.    [JG]   3657 Discussed with ERIC Hinojosa who agrees to admission to OBS unit. No further recommendations.  [JG]      ED Course User Index  [JG] Lily Benitez APRN  [TR] Iam Bartlett MD             DIAGNOSIS  Final diagnoses:   Acute  bilateral low back pain with bilateral sciatica   Intractable back pain   Foraminal stenosis of lumbar region         DISPOSITION  admit      Latest Documented Vital Signs:  As of 20:40 EDT  BP- 102/79 HR- 70 Temp- 98.2 °F (36.8 °C) (Oral) O2 sat- 100%              --    Please note that portions of this were completed with a voice recognition program.       Note Disclaimer: At [facility], we believe that sharing information builds trust and better relationships. You are receiving this note because you are receiving care at [facility] or recently visited. It is possible you will see health information before a provider has talked with you about it. This kind of information can be easy to misunderstand. To help you fully understand what it means for your health, we urge you to discuss this note with your provider.             Lily Benitez, APRN  06/11/24 3656

## 2024-06-06 NOTE — ED NOTES
"Nursing report ED to floor  Marleni Hummel  88 y.o.  female    HPI :  HPI (Adult)  Stated Reason for Visit: Low back pain  History Obtained From: patient, family    Chief Complaint  Chief Complaint   Patient presents with    Back Pain       Admitting doctor:   Esteban Bermudez MD    Admitting diagnosis:   The primary encounter diagnosis was Acute bilateral low back pain with bilateral sciatica. Diagnoses of Intractable back pain and Foraminal stenosis of lumbar region were also pertinent to this visit.    Code status:   Current Code Status       Date Active Code Status Order ID Comments User Context       6/6/2024 1600 CPR (Attempt to Resuscitate) [id number]  Micaela Swann PA ED        Question Answer    Code Status (Patient has no pulse and is not breathing) CPR (Attempt to Resuscitate)    Medical Interventions (Patient has pulse or is breathing) Full Support    Level Of Support Discussed With Patient                    Allergies:   Morphine, Oxycodone, Ace inhibitors, Bactrim [sulfamethoxazole-trimethoprim], Levofloxacin, and Torsemide    Isolation:   No active isolations    Intake and Output  No intake or output data in the 24 hours ending 06/06/24 1625    Weight:       06/06/24  1138   Weight: 59.4 kg (131 lb)       Most recent vitals:   Vitals:    06/06/24 1044 06/06/24 1138 06/06/24 1301   BP:  138/49    Pulse: 61  63   Resp: 18     Temp: 98.4 °F (36.9 °C)     TempSrc: Tympanic     SpO2: 98%  97%   Weight:  59.4 kg (131 lb)    Height:  157.5 cm (62\")        Active LDAs/IV Access:   Lines, Drains & Airways       Active LDAs       None                    Labs (abnormal labs have a star):   Labs Reviewed   COMPREHENSIVE METABOLIC PANEL   CBC WITH AUTO DIFFERENTIAL   CBC AND DIFFERENTIAL    Narrative:     The following orders were created for panel order CBC & Differential.  Procedure                               Abnormality         Status                     ---------                               " -----------         ------                     CBC Auto Differential[[id number]]                                                         Please view results for these tests on the individual orders.       EKG:   No orders to display       Meds given in ED:   Medications   sodium chloride 0.9 % flush 10 mL (has no administration in time range)   Lidocaine 4 % 1 patch (1 patch Transdermal Medication Applied 24 1247)   acetaminophen (TYLENOL) tablet 650 mg (has no administration in time range)   sodium chloride 0.9 % flush 10 mL (has no administration in time range)   methocarbamol (ROBAXIN) tablet 750 mg (750 mg Oral Given 24 124)   predniSONE (DELTASONE) tablet 60 mg (60 mg Oral Given 24)       Imaging results:  CT Lumbar Spine Without Contrast    Result Date: 2024  There is no evidence of fracture. Multilevel degenerative disease of the lumbar spine is noted as described in detail above including multilevel loss of disc height, vacuum disc effect, spinal stenosis, lateral recess narrowing and foraminal stenosis. Spinal stenosis is most prominent at L4-L5. There is also severe foraminal stenosis to the right at L4-L5 which appears similar to slightly more prominent as compared to 2017. See above. Clinical correlation is recommended. Further evaluation could be performed with MRI examination of the lumbar spine.    Radiation dose reduction techniques were utilized, including automated exposure control and exposure modulation based on body size.         Ambulatory status:   - stand by    Social issues:   Social History     Socioeconomic History    Marital status:     Years of education: High school   Tobacco Use    Smoking status: Former     Current packs/day: 0.00     Average packs/day: 1 pack/day for 3.0 years (3.0 ttl pk-yrs)     Types: Cigarettes     Start date: 1953     Quit date: 1956     Years since quittin.3     Passive exposure: Past    Smokeless tobacco: Never     Tobacco comments:     caffeine - 1/2 cup coffee daily    Vaping Use    Vaping status: Never Used   Substance and Sexual Activity    Alcohol use: Not Currently     Alcohol/week: 3.0 standard drinks of alcohol     Types: 3 Glasses of wine per week     Comment: couple times a week    Drug use: Never    Sexual activity: Defer       Peripheral Neurovascular  Peripheral Neurovascular (Adult)  Peripheral Neurovascular WDL: WDL    Neuro Cognitive  Neuro Cognitive (Adult)  Cognitive/Neuro/Behavioral WDL: WDL    Learning       Respiratory  Respiratory WDL  Respiratory WDL: WDL    Abdominal Pain       Pain Assessments  Pain (Adult)  (0-10) Pain Rating: Rest: 6    NIH Stroke Scale       Janeen Carter RN  06/06/24 16:25 EDT

## 2024-06-06 NOTE — ED PROVIDER NOTES
EMERGENCY DEPARTMENT MD ATTESTATION NOTE    Room Number:  117/1  PCP: Ken Andujar MD  Independent Historians: Patient and Family    HPI:  A complete HPI/ROS/PMH/PSH/SH/FH are unobtainable due to: None    Chronic or social conditions impacting patient care (Social Determinants of Health): None      Context: Marleni Hummel is a 88 y.o. female with a medical history of atrial fibrillation, back pain who presents to the ED c/o acute low back pain.  The patient reports that she has had right low back pain that radiates down her right leg for the last 2 weeks.  She denies any bowel or bladder incontinence.  She denies any weakness or numbness.  She reports that she had a fall about 3 weeks ago.  She does not remember any specific injury.  Pain radiates down her right leg.  She states she was recently in the hospital.  She denies fever.        Review of prior external notes (non-ED) -and- Review of prior external test results outside of this encounter:  Laboratory evaluation 5/29/2024 shows CMP with an elevated creatinine of 2.4 CBC on the same date showed a hemoglobin was 10.1.    Prescription drug monitoring program review:           PHYSICAL EXAM    I have reviewed the triage vital signs and nursing notes.    ED Triage Vitals   Temp Heart Rate Resp BP SpO2   06/06/24 1044 06/06/24 1044 06/06/24 1044 06/06/24 1138 06/06/24 1044   98.4 °F (36.9 °C) 61 18 138/49 98 %      Temp src Heart Rate Source Patient Position BP Location FiO2 (%)   06/06/24 1044 06/06/24 1044 -- -- --   Tympanic Monitor          Physical Exam  GENERAL: Awake, alert, no acute distress  SKIN: Warm, dry  HENT: Normocephalic, atraumatic  EYES: no scleral icterus  CV: regular rhythm, regular rate  RESPIRATORY: normal effort, lungs clear  ABDOMEN: soft, nontender, nondistended  MUSCULOSKELETAL: no deformity.  Equal pedal pulses.  She has some tenderness in her right low back.  NEURO: alert, moves all extremities, follows  commands            MEDICATIONS GIVEN IN ER  Medications   methocarbamol (ROBAXIN) tablet 750 mg (750 mg Oral Given 6/6/24 1247)   predniSONE (DELTASONE) tablet 60 mg (60 mg Oral Given 6/6/24 1247)   Lidocaine 4 % 1 patch (1 patch Transdermal Medication Removed 6/7/24 1800)   acetaminophen (TYLENOL) tablet 650 mg (650 mg Oral Given 6/6/24 1745)   dexAMETHasone (DECADRON) injection 6 mg (6 mg Intravenous Given 6/6/24 1746)         ORDERS PLACED DURING THIS VISIT:  Orders Placed This Encounter   Procedures    CT Lumbar Spine Without Contrast    MRI Lumbar Spine Without Contrast    Comprehensive Metabolic Panel    CBC Auto Differential    CBC (No Diff)    Basic Metabolic Panel    Place Sequential Compression Device    PT Consult: Eval & Treat Functional Mobility Below Baseline    PT Plan of Care Cert / Re-Cert    Telemetry Scan    Telemetry Scan    Initiate ED Observation Status    Discharge patient    CBC & Differential         PROCEDURES  Procedures            PROGRESS, DATA ANALYSIS, CONSULTS, AND MEDICAL DECISION MAKING  All labs have been independently interpreted by me.  All radiology studies have been reviewed by me. All EKG's have been independently viewed and interpreted by me.  Discussion below represents my analysis of pertinent findings related to patient's condition, differential diagnosis, treatment plan and final disposition.    Differential diagnosis includes but is not limited to lumbar fracture, compression fracture, sciatica, chronic back pain.    Clinical Scores:                   ED Course as of 06/07/24 2240   Thu Jun 06, 2024   1356 CT Lumbar Spine Without Contrast  My independent interpretation of the imaging study is no gross fracture [TR]   1541 I updated the patient on current ED work up, including labs and imaging (if performed) with indication for admission. All questions and concerns addressed and ready for admit at this time.    [JG]   1552 Discussed with ERIC Hinojosa who agrees to  admission to OBS unit. No further recommendations.  [JG]      ED Course User Index  [JG] Lily Benitez APRN  [TR] Iam Bartlett MD       MDM: The patient is neurovascularly intact.  She has right-sided radicular lumbar pain.  Plan CT imaging given her prior fall.  Will give medication for pain.      COMPLEXITY OF CARE      Please note that portions of this document were completed with a voice recognition program.    Note Disclaimer: At [facility], we believe that sharing information builds trust and better relationships. You are receiving this note because you recently visited [facility]. It is possible you will see health information before a provider has talked with you about it. This kind of information can be easy to misunderstand. To help you fully understand what it means for your health, we urge you to discuss this note with your provider.         Iam Bartlett MD  06/06/24 8632       Iam Bartlett MD  06/07/24 2669

## 2024-06-06 NOTE — ED TRIAGE NOTES
Pt c/o right lower back pain that radiates down right leg that started 2wks ago. Pt was seen here last week for same and states that it is worse

## 2024-06-06 NOTE — ED NOTES
Patient c/o low back pain. She reports recently being admitted and reports the pain never got better after discharge.

## 2024-06-06 NOTE — PROGRESS NOTES
Clinical Pharmacy Services: Medication History    Marleni Hummel is a 88 y.o. female presenting to [facility] for   Chief Complaint   Patient presents with    Back Pain       She  has a past medical history of Acute bronchitis due to Rhinovirus (5/31/2022), Acute vulvitis (08/21/2019), Allergic, Allergic rhinitis, Anemia of chronic disease, Arthropathy of knee, Atrial fibrillation, Back pain, Bell's palsy, Caregiver stress syndrome (04/17/2019), Chronic coronary artery disease, Chronic kidney disease (CKD), stage III (moderate), Diverticulitis (12/14/2022), CARROLL (dyspnea on exertion) (3/17/2023), Edema, Elevated serum free T4 level (03/21/2016), Encounter for eye exam (09/2020), Essential hypertension, Fatigue, GERD (gastroesophageal reflux disease), H/O Heart murmur, Heart attack (10/05/2015), Hematuria (12/12/2016), Herpes zoster without complication, Hip arthritis, History of bone density study (02/28/2008), History of pelvic fracture (2015), History of pneumonia, History of transfusion, Hypercholesterolemia, IFG (impaired fasting glucose), Insomnia, Left kidney mass (07/2020), Limited joint range of motion, Mixed hyperlipidemia, Muscle tension headache (04/03/2019), New daily persistent headache (12/17/2018), 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    Allergies as of 06/06/2024 - Reviewed 06/06/2024   Allergen Reaction Noted    Morphine Anaphylaxis 05/29/2019    Oxycodone Anaphylaxis 12/18/2015    Ace inhibitors Cough and Unknown (See Comments) 01/21/2016    Bactrim [sulfamethoxazole-trimethoprim] Rash 07/14/2021    Levofloxacin Itching and Rash 12/18/2015    Torsemide Dizziness 11/07/2022       Medication information  was obtained from: Patient   Pharmacy and Phone Number:     Prior to Admission Medications       Prescriptions Last Dose Informant Patient Reported? Taking?    acetaminophen (TYLENOL) 500 MG tablet  Self Yes Yes    Take 1 tablet by mouth Every 4 (Four) Hours As Needed for Mild Pain.    atorvastatin (LIPITOR) 20 MG tablet  Self No Yes    Take 1 tablet by mouth Every Night for 270 days.    desonide (DESOWEN) 0.05 % cream  Self Yes Yes    Apply 1 Application topically to the appropriate area as directed 2 (Two) Times a Day As Needed for Irritation.    Eliquis 2.5 MG tablet tablet  Self No Yes    TAKE 1 TABLET EVERY 12 HOURS (TWICE A DAY)    Patient taking differently:  Take 1 tablet by mouth 2 (Two) Times a Day.    escitalopram (LEXAPRO) 20 MG tablet  Self No Yes    Take 1 tablet by mouth Daily for 270 days.    fexofenadine (ALLEGRA) 60 MG tablet  Self Yes Yes    Take 1 tablet by mouth Daily.    furosemide (LASIX) 20 MG tablet  Self No Yes    Take 1 tablet by mouth Daily As Needed (if weight goes up 3lbs in 3 days).    Gemtesa 75 MG tablet  Self No Yes    Take 1 tablet by mouth Every Morning for 42 days.    hydroCHLOROthiazide (HYDRODIURIL) 12.5 MG tablet  Self No Yes    Take 1 tablet by mouth Daily.    magnesium oxide (MAG-OX) 400 MG tablet  Self Yes Yes    Take 1 tablet by mouth Daily.    melatonin 5 MG tablet tablet  Self Yes Yes    Take 1 tablet by mouth At Night As Needed.    sodium bicarbonate 650 MG tablet  Self Yes Yes    Take 1 tablet by mouth 2 (Two) Times a Day.    TiZANidine (Zanaflex) 2 MG capsule   No No    Take 1 capsule by mouth 2 (Two) Times a Day As Needed (pain).    Vibegron (Gemtesa) 75 MG tablet   Yes No    Take 1 tablet by mouth Daily.              Medication notes: Patient stated she was taking off metoprolol last week due to HR dropping.     This medication list is complete to the best of my knowledge as of 6/6/2024    Please call if questions.    Eugenie Wilson  Medication History Technician    [phone]    6/6/2024 16:04 EDT

## 2024-06-07 ENCOUNTER — READMISSION MANAGEMENT (OUTPATIENT)
Dept: CALL CENTER | Facility: HOSPITAL | Age: 89
End: 2024-06-07
Payer: MEDICARE

## 2024-06-07 ENCOUNTER — DOCUMENTATION (OUTPATIENT)
Dept: HOME HEALTH SERVICES | Facility: HOME HEALTHCARE | Age: 89
End: 2024-06-07
Payer: MEDICARE

## 2024-06-07 VITALS
TEMPERATURE: 98.4 F | HEIGHT: 62 IN | WEIGHT: 131.2 LBS | OXYGEN SATURATION: 99 % | HEART RATE: 49 BPM | BODY MASS INDEX: 24.14 KG/M2 | RESPIRATION RATE: 18 BRPM | DIASTOLIC BLOOD PRESSURE: 51 MMHG | SYSTOLIC BLOOD PRESSURE: 138 MMHG

## 2024-06-07 DIAGNOSIS — M51.16 LUMBAR DISC DISEASE WITH RADICULOPATHY: Primary | ICD-10-CM

## 2024-06-07 DIAGNOSIS — M48.061 SPINAL STENOSIS, LUMBAR REGION, WITHOUT NEUROGENIC CLAUDICATION: ICD-10-CM

## 2024-06-07 DIAGNOSIS — M54.50 LUMBAR BACK PAIN: ICD-10-CM

## 2024-06-07 LAB
ANION GAP SERPL CALCULATED.3IONS-SCNC: 11 MMOL/L (ref 5–15)
BUN SERPL-MCNC: 48 MG/DL (ref 8–23)
BUN/CREAT SERPL: 19.9 (ref 7–25)
CALCIUM SPEC-SCNC: 9.5 MG/DL (ref 8.6–10.5)
CHLORIDE SERPL-SCNC: 95 MMOL/L (ref 98–107)
CO2 SERPL-SCNC: 23 MMOL/L (ref 22–29)
CREAT SERPL-MCNC: 2.41 MG/DL (ref 0.57–1)
DEPRECATED RDW RBC AUTO: 45.4 FL (ref 37–54)
EGFRCR SERPLBLD CKD-EPI 2021: 19.1 ML/MIN/1.73
ERYTHROCYTE [DISTWIDTH] IN BLOOD BY AUTOMATED COUNT: 13.2 % (ref 12.3–15.4)
GLUCOSE SERPL-MCNC: 171 MG/DL (ref 65–99)
HCT VFR BLD AUTO: 30.4 % (ref 34–46.6)
HGB BLD-MCNC: 9.7 G/DL (ref 12–15.9)
MCH RBC QN AUTO: 29.9 PG (ref 26.6–33)
MCHC RBC AUTO-ENTMCNC: 31.9 G/DL (ref 31.5–35.7)
MCV RBC AUTO: 93.8 FL (ref 79–97)
PLATELET # BLD AUTO: 167 10*3/MM3 (ref 140–450)
PMV BLD AUTO: 10.2 FL (ref 6–12)
POTASSIUM SERPL-SCNC: 4.7 MMOL/L (ref 3.5–5.2)
RBC # BLD AUTO: 3.24 10*6/MM3 (ref 3.77–5.28)
SODIUM SERPL-SCNC: 129 MMOL/L (ref 136–145)
WBC NRBC COR # BLD AUTO: 4.07 10*3/MM3 (ref 3.4–10.8)

## 2024-06-07 PROCEDURE — 99222 1ST HOSP IP/OBS MODERATE 55: CPT | Performed by: NURSE PRACTITIONER

## 2024-06-07 PROCEDURE — 85027 COMPLETE CBC AUTOMATED: CPT | Performed by: PHYSICIAN ASSISTANT

## 2024-06-07 PROCEDURE — G0378 HOSPITAL OBSERVATION PER HR: HCPCS

## 2024-06-07 PROCEDURE — 97161 PT EVAL LOW COMPLEX 20 MIN: CPT

## 2024-06-07 PROCEDURE — 80048 BASIC METABOLIC PNL TOTAL CA: CPT | Performed by: PHYSICIAN ASSISTANT

## 2024-06-07 RX ORDER — METHOCARBAMOL 500 MG/1
500 TABLET, FILM COATED ORAL 3 TIMES DAILY PRN
Qty: 21 TABLET | Refills: 0 | Status: SHIPPED | OUTPATIENT
Start: 2024-06-07

## 2024-06-07 RX ORDER — UREA 10 %
5 LOTION (ML) TOPICAL NIGHTLY PRN
Status: DISCONTINUED | OUTPATIENT
Start: 2024-06-07 | End: 2024-06-07 | Stop reason: HOSPADM

## 2024-06-07 RX ORDER — METHYLPREDNISOLONE 4 MG/1
TABLET ORAL
Qty: 21 TABLET | Refills: 0 | Status: SHIPPED | OUTPATIENT
Start: 2024-06-07

## 2024-06-07 RX ADMIN — METHOCARBAMOL TABLETS 750 MG: 750 TABLET, COATED ORAL at 11:22

## 2024-06-07 RX ADMIN — ATORVASTATIN CALCIUM 20 MG: 20 TABLET, FILM COATED ORAL at 00:28

## 2024-06-07 RX ADMIN — SODIUM BICARBONATE 650 MG: 650 TABLET ORAL at 00:54

## 2024-06-07 RX ADMIN — APIXABAN 2.5 MG: 2.5 TABLET, FILM COATED ORAL at 00:28

## 2024-06-07 RX ADMIN — ESCITALOPRAM 20 MG: 20 TABLET, FILM COATED ORAL at 11:22

## 2024-06-07 RX ADMIN — Medication 10 ML: at 10:38

## 2024-06-07 RX ADMIN — SODIUM BICARBONATE 650 MG: 650 TABLET ORAL at 11:22

## 2024-06-07 RX ADMIN — ACETAMINOPHEN 325MG 650 MG: 325 TABLET ORAL at 10:38

## 2024-06-07 RX ADMIN — LIDOCAINE 1 PATCH: 4 PATCH TOPICAL at 10:38

## 2024-06-07 RX ADMIN — Medication 5 MG: at 00:34

## 2024-06-07 RX ADMIN — HYDROCHLOROTHIAZIDE 12.5 MG: 12.5 TABLET ORAL at 11:22

## 2024-06-07 RX ADMIN — METHOCARBAMOL TABLETS 750 MG: 750 TABLET, COATED ORAL at 06:30

## 2024-06-07 NOTE — PROGRESS NOTES
SIOMARA YOUNG Attestation Note     I supervised care provided by the midlevel provider. We have discussed this patient's history, physical exam, and treatment plan. I have reviewed the midlevel provider's note and I agree with the midlevel provider's findings and plan of care.   SHARED VISIT: This visit was performed by BOTH a physician and an APC. The substantive portion of the medical decision making was performed by this attesting physician who made or approved the management plan and takes responsibility for patient management. All studies in the APC note (if performed) were independently interpreted by me.   I have personally had a face to face encounter with the patient.   My personal findings are documented below:      Subjective  Pt is a 88 y.o. female admitted from Emergency Department for evaluation and treatment of low back pain rating to the right lower extremity.  Patient doing well this morning and has been up walking without problems.    Physical Exam  GENERAL: Alert and in NAD, Vitals reviewed  HENT: nares patent  EYES: no scleral icterus  CV: regular rhythm, regular rate  RESPIRATORY: normal effort, clear to auscultation bilaterally  ABDOMEN: soft  MUSCULOSKELETAL: no deformity  NEURO: Strength sensation and coordination are grossly intact.  Speech and mentation are unremarkable  SKIN: warm, dry    Assessment/Plan  I discussed tx and evaluation of this patient with RADHA Alexandra.  MRI with noted lumbar arthropathy.  I do not think patient is a great surgical candidate but awaiting neurosurgery evaluation recommendations.  Anticipate discharge later today.

## 2024-06-07 NOTE — THERAPY DISCHARGE NOTE
Patient Name: Marleni Hummel  : 1935    MRN: [id number]                              Today's Date: 2024       Admit Date: 2024    Visit Dx:     ICD-10-CM ICD-9-CM   1. Acute bilateral low back pain with bilateral sciatica  M54.42 724.2    M54.41 724.3   2. Intractable back pain  M54.9 724.5   3. Foraminal stenosis of lumbar region  M48.061 724.02     Patient Active Problem List   Diagnosis    Arthritis involving multiple sites    Essential hypertension    Permanent atrial fibrillation    History of Bell's palsy    CKD (chronic kidney disease) stage 4, GFR 15-29 ml/min    Gastroesophageal reflux disease without esophagitis    Hypercholesterolemia    Insomnia    Localized osteoporosis without current pathological fracture    Panic disorder    Chronic nonseasonal allergic rhinitis due to pollen    Other sleep apnea    History of MI (myocardial infarction)    Chronic coronary artery disease    Anemia of chronic disease    Weakness of right leg    Cardiac murmur    Senile osteoporosis    Hyperuricemia    Grief reaction    Peripheral vertigo    Renal cell carcinoma of left kidney    Renal oncocytoma of left kidney    History of left nephrectomy    Cerebrovascular accident (CVA) due to stenosis of small artery    History of renal cell carcinoma    TIA (transient ischemic attack)    Spinal stenosis, lumbar region, without neurogenic claudication    Malignant neoplasm of left kidney, except renal pelvis    Diverticulosis    Irritable bowel syndrome with constipation    Chronic diastolic congestive heart failure    Hallucination, visual    Hypomagnesemia    Bradycardia    Pain of lumbar spine    Lumbar disc disease with radiculopathy     Past Medical History:   Diagnosis Date    Acute bronchitis due to Rhinovirus 2022    Acute vulvitis 2019    Allergic     Allergic rhinitis     Anemia of chronic disease     Arthropathy of knee     right    Atrial fibrillation     Back pain     Bell's palsy      Caregiver stress syndrome 04/17/2019    Chronic coronary artery disease     Chronic kidney disease (CKD), stage III (moderate)     Diverticulitis 12/14/2022    CARROLL (dyspnea on exertion) 3/17/2023    Edema     Elevated serum free T4 level 03/21/2016    Encounter for eye exam 09/2020    Essential hypertension     Fatigue     GERD (gastroesophageal reflux disease)     H/O Heart murmur     Heart attack 10/05/2015    Hematuria 12/12/2016    Herpes zoster without complication     Hip arthritis     left    History of bone density study 02/28/2008    History of pelvic fracture 2015    History of pneumonia     History of transfusion     2015    Hypercholesterolemia     IFG (impaired fasting glucose)     Insomnia     Left kidney mass 07/2020    Limited joint range of motion     RIGHT KNEE    Mixed hyperlipidemia     Muscle tension headache 04/03/2019    New daily persistent headache 12/17/2018    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 Clarke    CATARACT EXTRACTION Right 04/16/2013    Dr. Clarke    COLONOSCOPY  04/18/2014    Dr. Elizabeth; no polyps    EPIDURAL BLOCK      HIP PERCUTANEOUS PINNING Left 8/31/2017    Procedure: LT HIP PERCUTANEOUS PINNING;  Surgeon: Sean Canaels MD;  Location: [facility] OR;  Service:     MAMMO BILATERAL  11/2013    NEPHRECTOMY Left 11/16/2020    Procedure: LAPAROSCOPIC NEPHRECTOMY;  Surgeon: Chuckie Mclaughlin MD;  Location: [facility] MAIN OR;  Service: Urology;  Laterality: Left;    PAP SMEAR  02/2011    TIBIA FRACTURE SURGERY  Right 2015    REMOVED PLATE LATER IN 2015    TONSILLECTOMY  1947    TOTAL HIP ARTHROPLASTY Right 08/2015    TOTAL HIP ARTHROPLASTY Right 09/2016    TOTAL KNEE ARTHROPLASTY Bilateral 2004      General Information       Row Name 06/07/24 1253          Physical Therapy Time and Intention    Document Type discharge evaluation/summary  -EF     Mode of Treatment individual therapy;physical therapy  -EF       Row Name 06/07/24 1253          General Information    Patient Profile Reviewed yes  -EF     Prior Level of Function independent:;all household mobility;community mobility  rollator  -EF     Existing Precautions/Restrictions no known precautions/restrictions  -EF     Barriers to Rehab none identified  -EF       Row Name 06/07/24 1253          Living Environment    People in Home alone  states daughter checks in  -EF       Row Name 06/07/24 1253          Home Main Entrance    Number of Stairs, Main Entrance none  -EF       Row Name 06/07/24 1253          Cognition    Orientation Status (Cognition) oriented x 4  -EF               User Key  (r) = Recorded By, (t) = Taken By, (c) = Cosigned By      Initials Name Provider Type    EF Dorothy Cowan PT Physical Therapist                   Mobility       Row Name 06/07/24 1255          Bed Mobility    Bed Mobility supine-sit;sit-supine  -EF     Supine-Sit [place] (Bed Mobility) modified independence  -EF     Sit-Supine [place] (Bed Mobility) modified independence  -EF     Assistive Device (Bed Mobility) head of bed elevated  -EF       Row Name 06/07/24 1255          Sit-Stand Transfer    Sit-Stand [place] (Transfers) supervision  -EF       Row Name 06/07/24 1255          Gait/Stairs (Locomotion)    [place] Level (Gait) standby assist  -EF     Assistive Device (Gait) --  HHA to simulate AD use  -EF     Distance in Feet (Gait) 85  -EF     Deviations/Abnormal Patterns (Gait) david decreased  -EF     Comment, (Gait/Stairs) no LOB or safety concerns with  mobility  -EF               User Key  (r) = Recorded By, (t) = Taken By, (c) = Cosigned By      Initials Name Provider Type    EF Dorothy Cowan PT Physical Therapist                   Obj/Interventions       Row Name 06/07/24 1302          Range of Motion Comprehensive    General Range of Motion bilateral lower extremity ROM WNL  -EF       Row Name 06/07/24 1302          Strength Comprehensive (MMT)    General Manual Muscle Testing (MMT) Assessment no strength deficits identified  -EF     Comment, General Manual Muscle Testing (MMT) Assessment B LEs grossly WFL for age  -EF       Row Name 06/07/24 1302          Balance    Balance Assessment sitting static balance;standing static balance;standing dynamic balance  -EF     Static Sitting Balance independent  -EF     Position, Sitting Balance unsupported;sitting edge of bed  -EF     Static Standing Balance independent  -EF     Dynamic Standing Balance standby assist  -EF     Position/Device Used, Standing Balance supported  -EF       Row Name 06/07/24 1302          Sensory Assessment (Somatosensory)    Sensory Assessment (Somatosensory) not tested  -EF               User Key  (r) = Recorded By, (t) = Taken By, (c) = Cosigned By      Initials Name Provider Type    Dorothy Bonner PT Physical Therapist                   Goals/Plan    No documentation.                  Clinical Impression       Row Name 06/07/24 1302          Pain    Pretreatment Pain Rating 0/10 - no pain  -EF     Posttreatment Pain Rating 0/10 - no pain  -EF     Pre/Posttreatment Pain Comment pt denies pain; states she was given pain medicine earlier  -EF       Row Name 06/07/24 1302          Plan of Care Review    Plan of Care Reviewed With patient  -EF     Outcome Evaluation Pt is an 87 yo female who presents from home with low back pain and R LE radicular pain. Prior to admission, pt was living at home alone and independent with mobility using rollator. Pt was just recently discharged  from  PT. Upon exam, pt reports her pain has resolved. Pt's strength, gait, and balance all WFL. Pt performed bed mobility independently and stood at EOB with supervision. Pt ambulated 85' with HHA and SBA. Pt demos no LOB or safety concerns with mobility and appears to be at baseline mobility. Pt planning to DC home today and follow up as OP for possible epidural injection. No further acute PT concerns. Pt may benefit from OP PT to address back pain and LE pain.  -EF       Row Name 06/07/24 1302          Therapy Assessment/Plan (PT)    Criteria for Skilled Interventions Met (PT) no;does not meet criteria for skilled intervention  -EF     Therapy Frequency (PT) evaluation only  -EF       Row Name 06/07/24 1302          Positioning and Restraints    Pre-Treatment Position in bed  -EF     Post Treatment Position bed  -EF     In Bed fowlers;call light within reach;encouraged to call for assist;notified nsg  -EF               User Key  (r) = Recorded By, (t) = Taken By, (c) = Cosigned By      Initials Name Provider Type    Dorothy Bonner, PT Physical Therapist                   Outcome Measures       Row Name 06/07/24 1308 06/07/24 0846       How much help from another person do you currently need...    Turning from your back to your side while in flat bed without using bedrails? 4  -EF 4  -LL    Moving from lying on back to sitting on the side of a flat bed without bedrails? 4  -EF 3  -LL    Moving to and from a bed to a chair (including a wheelchair)? 3  -EF 3  -LL    Standing up from a chair using your arms (e.g., wheelchair, bedside chair)? 3  -EF 3  -LL    Climbing 3-5 steps with a railing? 3  -EF 3  -LL    To walk in hospital room? 3  -EF 3  -LL    AM-PAC 6 Clicks Score (PT) 20  -EF 19  -LL    Highest Level of Mobility Goal 6 --> Walk 10 steps or more  -EF 6 --> Walk 10 steps or more  -LL              User Key  (r) = Recorded By, (t) = Taken By, (c) = Cosigned By      Initials Name Provider Type    EF  Dorothy Cowan, DAILY Physical Therapist    Betito Loza RN Registered Nurse                  Physical Therapy Education       Title: PT OT SLP Therapies (Resolved)       Topic: Physical Therapy (Resolved)       Point: Mobility training (Resolved)       Learning Progress Summary             Patient Acceptance, JALEEL HA DU by  at 6/7/2024 1309                         Point: Home exercise program (Resolved)       Learner Progress:  Not documented in this visit.              Point: Body mechanics (Resolved)       Learner Progress:  Not documented in this visit.              Point: Precautions (Resolved)       Learner Progress:  Not documented in this visit.                              User Key       Initials Effective Dates Name Provider Type Discipline     05/31/24 -  Dorothy Cowan PT Physical Therapist PT                  PT Recommendation and Plan     Plan of Care Reviewed With: patient  Outcome Evaluation: Pt is an 89 yo female who presents from home with low back pain and R LE radicular pain. Prior to admission, pt was living at home alone and independent with mobility using rollator. Pt was just recently discharged from  PT. Upon exam, pt reports her pain has resolved. Pt's strength, gait, and balance all WFL. Pt performed bed mobility independently and stood at EOB with supervision. Pt ambulated 85' with HHA and SBA. Pt demos no LOB or safety concerns with mobility and appears to be at baseline mobility. Pt planning to DC home today and follow up as OP for possible epidural injection. No further acute PT concerns. Pt may benefit from OP PT to address back pain and LE pain.     Time Calculation:         PT Charges       Row Name 06/07/24 1309             Time Calculation    Start Time 1200  -EF      Stop Time 1210  -EF      Time Calculation (min) 10 min  -EF      PT Received On 06/07/24  -EF                User Key  (r) = Recorded By, (t) = Taken By, (c) = Cosigned By      Initials Name Provider  Type    EF Dorothy Cowan, PT Physical Therapist                  Therapy Charges for Today       Code Description Service Date Service Provider Modifiers Qty    [id number] HC PT EVAL LOW COMPLEXITY 2 6/7/2024 Dorothy Cowan, PT GP 1            PT G-Codes  AM-PAC 6 Clicks Score (PT): 20    PT Discharge Summary  Anticipated Discharge Disposition (PT): home, home with outpatient therapy services    Dorothy Cowan, PT  6/7/2024

## 2024-06-07 NOTE — CONSULTS
[facility] NEUROSURGERY CONSULT NOTE    Patient name: Marleni Hummel    Referring Provider: Micaela Swann PA-C    Reason for Consultation: History of fall with low back pain and right lumbar radiculopathy     Patient Care Team:  Ken Andujar MD as PCP - General  Chuckie Mclaughlin MD as Consulting Physician (Urology)  Anayeli Alanis MD as Consulting Physician (Obstetrics and Gynecology)  BROOK Clarke MD as Consulting Physician (Ophthalmology)  Jose Alfredo Krishnamurthy MD as Consulting Physician (Hematology and Oncology)  Sean Canales MD as Consulting Physician (Orthopedic Surgery)  Esteban Moe MD as Consulting Physician (Orthopedic Surgery)  Feliciano Elizabeth MD as Consulting Physician (Gastroenterology)  Wallace Hook MD as Consulting Physician (Pain Medicine)  Sarah Beth Marcus APRN as Nurse Practitioner (Nephrology)  Brandon Miller MD as Consulting Physician (Gastroenterology)  Sybil Parmar MD as Consulting Physician (Cardiology)  Joseph Leonard MD as Consulting Physician (Nephrology)  Joseph Leonard MD as Consulting Physician (Nephrology)    Chief complaint: Low back and right leg pain    Subjective .     History of present illness:    Patient is a 88 y.o. right handed female with a history of multiple medical problems including atrial fibrillation for which she is on Eliquis, stage IV chronic kidney disease, renal cell cancer, osteoporosis, chronic congestive heart failure, prior MI. She has had multiple orthopedic surgeries involving the lower extremities R more than L.  These include bilateral knee replacements, right total hip arthroplasty anterior approach followed by 2015 followed by right total hip revision, ORIF of right proximal femur fracture 09/2015; removal of right distal femur hardware secondary to infection 11/2015; open reduction, internal fixation of right periprosthetic femur fracture January 2016, and left hip percutaneous pinning for  nondisplaced femoral neck fracture  2017.  She reports she has some chronic weakness in her lower extremities as a result and uses a walker at home to get around.  She lives alone however her daughter looks in on her frequently. She reports having a fall 3 weeks while bending forward to pick something up. She began noticing right-sided low back, gluteal, right lateral hip, right lateral thigh pain 2 weeks ago which has progressively worsened.  She has no history of prior lumbar surgery however she states that she had epidural steroid injections 3 years ago for this same type of pain and had significant improvement in symptoms until now.  She denies any bowel or bladder incontinence.  She denies any significant, worsening weakness although she states that  CT lumbar spine performed in the emergency department revealed multilevel degenerative changes with facet arthropathy L4-5 and L5-S1.  There is also severe right-sided foraminal stenosis at L4-5 and severe central canal stenosis, lateral recess narrowing bilaterally.  Emergency room ordered an MRI of the lumbar spine.  She has had 1 dose of IV Decadron 6 mg.  Home dose Eliquis was held as of today.  Neurosurgery has been asked to evaluate the patient in the observation unit and give further recommendations regarding the low back and right leg pain.    Review of Systems  Review of Systems   Constitutional:  Positive for activity change. Negative for chills and fever.   HENT: Negative.     Eyes: Negative.    Respiratory: Negative.  Negative for chest tightness and shortness of breath.    Cardiovascular: Negative.  Negative for chest pain and leg swelling.   Gastrointestinal:  Negative for constipation, nausea and vomiting.        Denies any history of bowel incontinence.   Endocrine: Negative.    Genitourinary: Negative.  Negative for difficulty urinating, frequency and urgency.        Denies bladder incontinence.   Musculoskeletal:  Positive for back pain and joint  swelling.        History of multiple orthopedic surgeries particularly on the right side.  Patient has chronic joint pain related to these above surgeries as well as chronic low back pain.   Skin: Negative.    Allergic/Immunologic: Negative.    Neurological:  Negative for numbness.        Ambulates independently with rolling walker at home.  Mobility is limited due to previous hip surgery and multiple revisions on the right.  Also history of bilateral knee replacements limits mobility however patient states she is independently ambulatory with a rolling walker.  She is having worsening pain in the lower lumbar region with radiation into the right gluteal region, right lateral hip, and right lateral thigh.  Denies any numbness or tingling in the lower extremities or any new weakness or change in function of the lower extremities.   Hematological: Negative.    Psychiatric/Behavioral: Negative.     All other systems reviewed and are negative.      History  PAST MEDICAL HISTORY  Past Medical History:   Diagnosis Date    Acute bronchitis due to Rhinovirus 5/31/2022    Acute vulvitis 08/21/2019    Allergic     Allergic rhinitis     Anemia of chronic disease     Arthropathy of knee     right    Atrial fibrillation     Back pain     Bell's palsy     Caregiver stress syndrome 04/17/2019    Chronic coronary artery disease     Chronic kidney disease (CKD), stage III (moderate)     Diverticulitis 12/14/2022    CARROLL (dyspnea on exertion) 3/17/2023    Edema     Elevated serum free T4 level 03/21/2016    Encounter for eye exam 09/2020    Essential hypertension     Fatigue     GERD (gastroesophageal reflux disease)     H/O Heart murmur     Heart attack 10/05/2015    Hematuria 12/12/2016    Herpes zoster without complication     Hip arthritis     left    History of bone density study 02/28/2008    History of pelvic fracture 2015    History of pneumonia     History of transfusion     2015    Hypercholesterolemia     IFG (impaired  fasting glucose)     Insomnia     Left kidney mass 07/2020    Limited joint range of motion     RIGHT KNEE    Mixed hyperlipidemia     Muscle tension headache 04/03/2019    New daily persistent headache 12/17/2018    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 Clarke    CATARACT EXTRACTION Right 04/16/2013    Dr. Clarke    COLONOSCOPY  04/18/2014    Dr. Elizabeth; no polyps    EPIDURAL BLOCK      HIP PERCUTANEOUS PINNING Left 8/31/2017    Procedure: LT HIP PERCUTANEOUS PINNING;  Surgeon: Sean Canales MD;  Location: [facility] MAIN OR;  Service:     MAMMO BILATERAL  11/2013    NEPHRECTOMY Left 11/16/2020    Procedure: LAPAROSCOPIC NEPHRECTOMY;  Surgeon: Chuckie Mclaughlin MD;  Location: [facility] MAIN OR;  Service: Urology;  Laterality: Left;    PAP SMEAR  02/2011    TIBIA FRACTURE SURGERY Right 2015    REMOVED PLATE LATER IN 2015    TONSILLECTOMY  1947    TOTAL HIP ARTHROPLASTY Right 08/2015    TOTAL HIP ARTHROPLASTY Right 09/2016    TOTAL KNEE ARTHROPLASTY Bilateral 2004       FAMILY HISTORY  Family History   Problem Relation Age of Onset    Hypertension Other     Hypertension Mother     Stroke Mother     Heart disease Mother     Hypertension Maternal Grandmother     Malig Hyperthermia Neg Hx        SOCIAL HISTORY  Social History     Tobacco Use    Smoking status: Former     Current packs/day: 0.00     Average packs/day: 1 pack/day for 3.0 years (3.0 ttl pk-yrs)     Types: Cigarettes     Start date: 2/22/1953     Quit date: 2/22/1956      Years since quittin.3     Passive exposure: Past    Smokeless tobacco: Never    Tobacco comments:     caffeine - 1/2 cup coffee daily    Vaping Use    Vaping status: Never Used   Substance Use Topics    Alcohol use: Not Currently     Alcohol/week: 3.0 standard drinks of alcohol     Types: 3 Glasses of wine per week     Comment: couple times a week    Drug use: Never      ; retired    Allergies:  Morphine, Oxycodone, Ace inhibitors, Bactrim [sulfamethoxazole-trimethoprim], Levofloxacin, and Torsemide    MEDICATIONS:  Medications Prior to Admission   Medication Sig Dispense Refill Last Dose    acetaminophen (TYLENOL) 500 MG tablet Take 1 tablet by mouth Every 4 (Four) Hours As Needed for Mild Pain.       atorvastatin (LIPITOR) 20 MG tablet Take 1 tablet by mouth Every Night for 270 days. 90 tablet 2     desonide (DESOWEN) 0.05 % cream Apply 1 Application topically to the appropriate area as directed 2 (Two) Times a Day As Needed for Irritation.       Eliquis 2.5 MG tablet tablet TAKE 1 TABLET EVERY 12 HOURS (TWICE A DAY) (Patient taking differently: Take 1 tablet by mouth 2 (Two) Times a Day.) 180 tablet 3     escitalopram (LEXAPRO) 20 MG tablet Take 1 tablet by mouth Daily for 270 days. 90 tablet 2     fexofenadine (ALLEGRA) 60 MG tablet Take 1 tablet by mouth Daily.       furosemide (LASIX) 20 MG tablet Take 1 tablet by mouth Daily As Needed (if weight goes up 3lbs in 3 days). 30 tablet 11     Gemtesa 75 MG tablet Take 1 tablet by mouth Every Morning for 42 days. 42 tablet 0     hydroCHLOROthiazide (HYDRODIURIL) 12.5 MG tablet Take 1 tablet by mouth Daily. 30 tablet 0     magnesium oxide (MAG-OX) 400 MG tablet Take 1 tablet by mouth Daily.       melatonin 5 MG tablet tablet Take 1 tablet by mouth At Night As Needed.       sodium bicarbonate 650 MG tablet Take 1 tablet by mouth 2 (Two) Times a Day.       TiZANidine (Zanaflex) 2 MG capsule Take 1 capsule by mouth 2 (Two) Times a Day As Needed (pain). 60  capsule 0     Vibegron (Gemtesa) 75 MG tablet Take 1 tablet by mouth Daily.          Objective     Results Review:  LABS:    Admission on 06/06/2024   Component Date Value Ref Range Status    Glucose 06/06/2024 116 (H)  65 - 99 mg/dL Final    BUN 06/06/2024 46 (H)  8 - 23 mg/dL Final    Creatinine 06/06/2024 2.49 (H)  0.57 - 1.00 mg/dL Final    Sodium 06/06/2024 131 (L)  136 - 145 mmol/L Final    Potassium 06/06/2024 4.7  3.5 - 5.2 mmol/L Final    Chloride 06/06/2024 94 (L)  98 - 107 mmol/L Final    CO2 06/06/2024 26.0  22.0 - 29.0 mmol/L Final    Calcium 06/06/2024 9.7  8.6 - 10.5 mg/dL Final    Total Protein 06/06/2024 6.8  6.0 - 8.5 g/dL Final    Albumin 06/06/2024 4.3  3.5 - 5.2 g/dL Final    ALT (SGPT) 06/06/2024 11  1 - 33 U/L Final    AST (SGOT) 06/06/2024 18  1 - 32 U/L Final    Alkaline Phosphatase 06/06/2024 57  39 - 117 U/L Final    Total Bilirubin 06/06/2024 0.6  0.0 - 1.2 mg/dL Final    Globulin 06/06/2024 2.5  gm/dL Final    A/G Ratio 06/06/2024 1.7  g/dL Final    BUN/Creatinine Ratio 06/06/2024 18.5  7.0 - 25.0 Final    Anion Gap 06/06/2024 11.0  5.0 - 15.0 mmol/L Final    eGFR 06/06/2024 18.4 (L)  >60.0 mL/min/1.73 Final    WBC 06/06/2024 4.46  3.40 - 10.80 10*3/mm3 Final    RBC 06/06/2024 3.25 (L)  3.77 - 5.28 10*6/mm3 Final    Hemoglobin 06/06/2024 9.9 (L)  12.0 - 15.9 g/dL Final    Hematocrit 06/06/2024 30.3 (L)  34.0 - 46.6 % Final    MCV 06/06/2024 93.2  79.0 - 97.0 fL Final    MCH 06/06/2024 30.5  26.6 - 33.0 pg Final    MCHC 06/06/2024 32.7  31.5 - 35.7 g/dL Final    RDW 06/06/2024 13.1  12.3 - 15.4 % Final    RDW-SD 06/06/2024 44.6  37.0 - 54.0 fl Final    MPV 06/06/2024 9.7  6.0 - 12.0 fL Final    Platelets 06/06/2024 178  140 - 450 10*3/mm3 Final    Neutrophil % 06/06/2024 82.8 (H)  42.7 - 76.0 % Final    Lymphocyte % 06/06/2024 13.2 (L)  19.6 - 45.3 % Final    Monocyte % 06/06/2024 2.5 (L)  5.0 - 12.0 % Final    Eosinophil % 06/06/2024 1.1  0.3 - 6.2 % Final    Basophil % 06/06/2024 0.2   0.0 - 1.5 % Final    Immature Grans % 06/06/2024 0.2  0.0 - 0.5 % Final    Neutrophils, Absolute 06/06/2024 3.69  1.70 - 7.00 10*3/mm3 Final    Lymphocytes, Absolute 06/06/2024 0.59 (L)  0.70 - 3.10 10*3/mm3 Final    Monocytes, Absolute 06/06/2024 0.11  0.10 - 0.90 10*3/mm3 Final    Eosinophils, Absolute 06/06/2024 0.05  0.00 - 0.40 10*3/mm3 Final    Basophils, Absolute 06/06/2024 0.01  0.00 - 0.20 10*3/mm3 Final    Immature Grans, Absolute 06/06/2024 0.01  0.00 - 0.05 10*3/mm3 Final    nRBC 06/06/2024 0.0  0.0 - 0.2 /100 WBC Final    WBC 06/07/2024 4.07  3.40 - 10.80 10*3/mm3 Final    RBC 06/07/2024 3.24 (L)  3.77 - 5.28 10*6/mm3 Final    Hemoglobin 06/07/2024 9.7 (L)  12.0 - 15.9 g/dL Final    Hematocrit 06/07/2024 30.4 (L)  34.0 - 46.6 % Final    MCV 06/07/2024 93.8  79.0 - 97.0 fL Final    MCH 06/07/2024 29.9  26.6 - 33.0 pg Final    MCHC 06/07/2024 31.9  31.5 - 35.7 g/dL Final    RDW 06/07/2024 13.2  12.3 - 15.4 % Final    RDW-SD 06/07/2024 45.4  37.0 - 54.0 fl Final    MPV 06/07/2024 10.2  6.0 - 12.0 fL Final    Platelets 06/07/2024 167  140 - 450 10*3/mm3 Final    Glucose 06/07/2024 171 (H)  65 - 99 mg/dL Final    BUN 06/07/2024 48 (H)  8 - 23 mg/dL Final    Creatinine 06/07/2024 2.41 (H)  0.57 - 1.00 mg/dL Final    Sodium 06/07/2024 129 (L)  136 - 145 mmol/L Final    Potassium 06/07/2024 4.7  3.5 - 5.2 mmol/L Final    Chloride 06/07/2024 95 (L)  98 - 107 mmol/L Final    CO2 06/07/2024 23.0  22.0 - 29.0 mmol/L Final    Calcium 06/07/2024 9.5  8.6 - 10.5 mg/dL Final    BUN/Creatinine Ratio 06/07/2024 19.9  7.0 - 25.0 Final    Anion Gap 06/07/2024 11.0  5.0 - 15.0 mmol/L Final    eGFR 06/07/2024 19.1 (L)  >60.0 mL/min/1.73 Final       DIAGNOSTICS:    MRI LUMBAR SPINE WITHOUT CONTRAST 8/06/2024    R lateral disc protrusion abuts the right aspect of the cord at T10-11.  Conus terminates at L1 with no evidence of compression of the cauda equina.  Degenerative changes and osteophyte complexes noted throughout the  lower thoracic and entire lumbar spine.  Grade 1 anterior listhesis of L4 on L5.  Severe central L4-5 stenosis and severe bilateral lateral recess narrowing which is unchanged from prior MRI from August 2017.  Moderate facet degenerative changes at L4-5 and L5-S1.  Moderate left greater than right foraminal stenosis also noted at L5-S1.      Results Review:   I reviewed the patient's new clinical results.  I personally viewed and interpreted the patient's lumbar MRI imaging with Dr. Montano.     Vital Signs   Temp:  [97.5 °F (36.4 °C)-98.7 °F (37.1 °C)] 97.9 °F (36.6 °C)  Heart Rate:  [57-72] 57  Resp:  [16-18] 18  BP: (102-166)/(58-85) 166/71    Physical Exam:  Physical Exam  Vitals reviewed.   Constitutional:       General: She is not in acute distress.     Appearance: She is well-developed. She is not ill-appearing, toxic-appearing or diaphoretic.   HENT:      Head: Normocephalic and atraumatic.      Nose: Nose normal.      Mouth/Throat:      Mouth: Mucous membranes are moist.   Eyes:      General:         Right eye: No discharge.         Left eye: No discharge.      Conjunctiva/sclera: Conjunctivae normal.   Neck:      Trachea: No tracheal deviation.   Cardiovascular:      Rate and Rhythm: Normal rate.   Pulmonary:      Effort: Pulmonary effort is normal. No respiratory distress.   Abdominal:      General: There is no distension.      Palpations: Abdomen is soft.      Tenderness: There is no abdominal tenderness.   Musculoskeletal:         General: No tenderness. Normal range of motion.      Cervical back: Normal range of motion and neck supple.      Right lower leg: No edema.      Left lower leg: No edema.      Comments: No tenderness to palpation of the thoracic or lumbar spine.   Skin:     General: Skin is warm and dry.      Findings: No erythema.   Neurological:      Mental Status: She is alert and oriented to person, place, and time.      GCS: GCS eye subscore is 4. GCS verbal subscore is 5. GCS motor subscore  is 6.      Sensory: No sensory deficit.      Motor: No abnormal muscle tone.      Coordination: Coordination normal.      Deep Tendon Reflexes: Reflexes are normal and symmetric. Reflexes normal.      Comments: AA&O x 3.  Speech is normal.  Converses appropriately.  Face symmetric. Tongue midline without fasiculations or atrophy. Negative pronator drift.  No dysmetria.  No motor or sensory deficits except for limited mobility in the right greater than left hip and right greater than left knee from prior orthopedic surgeries. DTR's 1+ throughout excluding patellar reflexes which cannot be tested. Negative Clarke's; negative clonus. SLR negative bilaterally.  Gait not tested due to safety concern.   Psychiatric:         Behavior: Behavior is cooperative.         Thought Content: Thought content normal.       Neurologic Exam     Mental Status   Oriented to person, place, and time.       Assessment & Plan       Pain of lumbar spine    Spinal stenosis, lumbar region, without neurogenic claudication    Lumbar disc disease with radiculopathy      Problem List Items Addressed This Visit    None  Visit Diagnoses       Acute bilateral low back pain with bilateral sciatica    -  Primary    Intractable back pain        Foraminal stenosis of lumbar region                 COMORBID CONDITIONS:    Anemia, chronic kidney disease including stage, and Hypertension      PLAN:     The patient has had good luck in the past with lumbar epidural steroid injections.  She would like to repeat the lumbar injections.  I informed the patient that due to the Eliquis she will have to be off of it for a total of 75 hours before she can undergo the epidural injection due to bleeding risk.  The last dose of Eliquis was yesterday, 6/06/2024. The patient reports relief in symptoms with the one-time dose of IV Decadron, 6 mg yesterday and the oral muscle relaxers. The patient feels like she can go home.    I discussed the above history, physical exam  "findings, and recommendations with Dr. Montano who is in agreement with the below.     Physical therapy evaluation to assess stability/safety for discharge home  Oral Medrol Dosepak and oral muscle relaxers to be prescribed by observation unit provider at discharge.   Order placed for outpatient lumbar LOREN once patient has been off Eliquis for 75 hours. The LOREN is scheduled for Monday, June 10th at 8 am. Patient to remain off Eliquis until that time.       Patient is to follow-up in neurosurgery office if she receives no benefit with the epidural injections.  Neurosurgery will keep it open-ended from here.    I also discussed the above recommendations with Observation Unit provider, Bernardo Alexandra PA-C who is in agreement with the above plan.       I discussed the patient's findings and my recommendations with patient and consulting provider    During patient visit, I utilized appropriate personal protective equipment including gloves and mask.  Mask used was standard procedure mask. Appropriate PPE was worn during the entire visit.  Hand hygiene was completed before and after.     Faye Tamez, ENRRIQUE  06/07/24  12:02 EDT    \"Dictated utilizing Dragon dictation\".    "

## 2024-06-07 NOTE — PROGRESS NOTES
ED OBSERVATION PROGRESS/DISCHARGE SUMMARY    Date of Admission: 6/6/2024   LOS: 0 days   PCP: Ken Andujar MD    Final Diagnosis lower back pain/DDD/DJD lumbar spine      Subjective     Hospital Outcome:   88-year-old female admitted to the observation unit with a complaint of low back pain x 2 weeks.  Initial workup in the emergency department shows sodium 130, chloride 94, BUN 46, creatinine 2.49, GFR 18.4, glucose 116, hemoglobin 9.9, hematocrit 30.3.  CT lumbar spine without contrast shows there is no evidence of fracture.  Multilevel degenerative disease and lumbar spine is noted and is described including multilevel loss of disc height, vacuum disc effect, spinal stenosis, lateral recess narrowing and foraminal stenosis.  Spinal stenosis is the most prominent at L4-L5.  There is also severe foraminal stenosis to the right at L4-L5 which appears similar to slightly more prominent as compared to 8/29/2017.  MRI lumbar spine without contrast shows there is a grade 1 anterolisthesis of L4 upon L5 and grade 1 retrolisthesis of L5 on S1 each estimated to be approximately 2 mm. The conus is at L1 and the caudal aspect of the spinal cord appears unremarkable.  Neurosurgery has been consulted to see the patient this a.m.    ROS:  General: no fevers, chills  Respiratory: no cough, dyspnea  Cardiovascular: no chest pain, palpitations  Abdomen: No abdominal pain, nausea, vomiting, or diarrhea  Neurologic: No focal weakness    6/7/2024    12:42 PM.  Patient to D/C Eliquis until after epidural.  Epidural has been scheduled in 3 days pain management 8 AM.  She can resume her Eliquis postprocedure.  She is to follow-up with neurosurgery as discussed.  She is to return to the ER with any worsening symptoms or any further concerns.  Patient to go home on a mild dose muscle relaxer and Medrol.    Objective   Physical Exam:  I have reviewed the vital signs.  Temp:  [97.5 °F (36.4 °C)-98.7 °F (37.1 °C)] 98.4 °F (36.9 °C)  Heart  Rate:  [49-72] 49  Resp:  [16-18] 18  BP: (102-166)/(51-85) 138/51  General Appearance:    Alert, cooperative, no distress  Head:    Normocephalic, atraumatic  Eyes:    Sclerae anicteric  Neck:   Supple, no mass  Lungs: Clear to auscultation bilaterally, respirations unlabored  Heart: Regular rate and rhythm, S1 and S2 normal, no murmur, rub or gallop  Abdomen:  Soft, non-tender, bowel sounds active, nondistended  Extremities: No clubbing, cyanosis, or edema to lower extremities  Pulses:  2+ and symmetric in distal lower extremities  Skin: No rashes   Neurologic: Oriented x3, Normal strength to extremities    Results Review:    I have reviewed the labs, radiology results and diagnostic studies.    Results from last 7 days   Lab Units 06/07/24  0513   WBC 10*3/mm3 4.07   HEMOGLOBIN g/dL 9.7*   HEMATOCRIT % 30.4*   PLATELETS 10*3/mm3 167     Results from last 7 days   Lab Units 06/07/24  0513 06/06/24  1632   SODIUM mmol/L 129* 131*   POTASSIUM mmol/L 4.7 4.7   CHLORIDE mmol/L 95* 94*   CO2 mmol/L 23.0 26.0   BUN mg/dL 48* 46*   CREATININE mg/dL 2.41* 2.49*   CALCIUM mg/dL 9.5 9.7   BILIRUBIN mg/dL  --  0.6   ALK PHOS U/L  --  57   ALT (SGPT) U/L  --  11   AST (SGOT) U/L  --  18   GLUCOSE mg/dL 171* 116*     Imaging Results (Last 24 Hours)       Procedure Component Value Units Date/Time    MRI Lumbar Spine Without Contrast [[id number]] Collected: 06/06/24 1915     Updated: 06/07/24 0710    Narrative:      MRI LUMBAR SPINE WITHOUT CONTRAST     HISTORY: Low back pain, right radiculopathy.     COMPARISON: CT lumbar spine 06/06/2024.     FINDINGS: There is a grade 1 anterolisthesis of L4 upon L5 and grade 1  retrolisthesis of L5 on S1 each estimated to be approximately 2 mm. The  conus is at L1 and the caudal aspect of the spinal cord appears  unremarkable.     T10/T11: A far lateral disc protrusion or herniation is appreciated  which abuts the anterolateral aspect of the cord to the right. This area  was not included in  the field-of-view on the MRI examination of  08/29/2017. There is no evidence of cord compression.     T11/T12: Mild facet degenerative disease is present bilaterally. A mild  broad-based disc osteophyte complex is present resulting in mild  flattening of the ventral surface of the thecal sac.     T12/L1: A broad-based disc osteophyte complex is present which results  in flattening of the anterolateral aspect of the thecal sac bilaterally,  more prominent as compared to prior examination. There also is a  superimposed disc protrusion or broad-based herniation just to the left  of midline extending cephalad slightly. This abuts and mildly flattens  the anterolateral aspect of the cord to the left. Mild to moderate  foraminal stenosis is present bilaterally secondary to loss of disc  height and extension of the disc osteophyte complex into the neural  foramen.     L1-L2: A mild broad-based disc osteophyte complex is present resulting  in mild flattening of the ventral surface of the thecal sac. Endplate  degenerative changes are noted anteriorly, most prominent involving L2  and new versus 2017. Mild foraminal stenosis is present bilaterally  secondary to extension of a small disc osteophyte complex into the  neural foramen.     L2-L3: A broad-based disc osteophyte complex is present which results in  mild canal stenosis. It is slightly more prominent laterally to the left  where there is mild to moderate lateral recess narrowing. Mild foraminal  stenosis is present bilaterally secondary to extension of the disc  osteophyte complex into the neural foramen. The disc osteophyte complex  and left lateral disc protrusion are more prominent as compared to 2017.     L3-L4: A broad-based disc osteophyte complex is present which results in  mild canal stenosis. Lateral recess narrowing is present bilaterally  secondary to post, element degenerative disease and extension of the  disc osteophyte complex into the neural foramen.  Mild to moderate facet  degenerative disease is present bilaterally.     L4-L5: Moderate facet and ligamentum flavum hypertrophy is appreciated  which, when combined with a broad-based disc osteophyte complex and the  anterolisthesis of L4 upon L5 results in moderate to severe central  canal stenosis and severe lateral recess narrowing bilaterally. This  appears similar to the prior study. Severe foraminal stenosis is present  on the right which appears similar to the MRI examination of 08/29/2017  secondary to loss of disc height and extension of disc-osteophyte  complex into the neural foramen. Mild to moderate foraminal stenosis is  present on the left, unchanged.     L5-S1: Moderate facet degenerative disease is present bilaterally. A  broad-based disc osteophyte complex is present which results in mild  flattening of the ventral surface of the thecal sac. Moderate foraminal  stenosis is present bilaterally, more prominent on the left secondary to  loss of disc height and extension disc-osteophyte complex into the  neural foramen, further accentuated by the retrolisthesis of L5 upon S1,  similar in appearance as compared to the prior study.        This report was finalized on 6/7/2024 7:07 AM by Dr. Richard Martin M.D  on Workstation: [id number]       CT Lumbar Spine Without Contrast [[id number]] Collected: 06/06/24 1505     Updated: 06/06/24 1811    Narrative:      CT LUMBAR SPINE WITHOUT CONTRAST     HISTORY: Low back pain. Right radiculopathy.     COMPARISON: MRI lumbar spine 08/29/2017.     FINDINGS: There is a grade 1 anterolisthesis of L4 upon L5 estimated to  be 2 mm. The vacuum disc effect is appreciated from T12 to S1.     T12-L1: A mild broad-based disc osteophyte complex is present resulting  in mild flattening of the ventral surface of the thecal sac. Moderate  foraminal stenosis is present bilaterally secondary to extension of a  disc osteophyte complex into the neural foramen. There is mild  to  moderate lateral recess narrowing bilaterally.     L1-L2: A mild broad-based disc osteophyte complex is present resulting  mild flattening of the ventral surface of the thecal sac. Mild foraminal  stenosis is present bilaterally secondary to extension of a disc  osteophyte complex into the neural foramen.     L2-L3: A broad-based disc osteophyte complex is present resulting in  mild flattening of the ventral surface of the thecal sac. Mild bilateral  recess narrowing is present bilaterally. Mild foraminal stenosis is  present bilaterally secondary to extension of a disc osteophyte complex  into the neural foramen.     L3-L4: A mild central disc osteophyte complex is present resulting mild  flattening of the ventral surface of the thecal sac. There is mild and  mild to moderate foraminal stenosis on the right and left respectively.  Moderate lateral recess narrowing is present bilaterally.     L4-L5: Moderate facet and ligamentum flavum hypertrophy is appreciated.  There is moderate to severe central canal stenosis and severe lateral  recess narrowing bilaterally secondary to the posterior element  degenerative disease and broad-based disc osteophyte complex. There is  severe foraminal stenosis on the right secondary to loss of disc height,  facet hypertrophy and extension of a disc osteophyte complex into the  neural foramen. Foraminal stenosis appears similar to slightly more  prominent as compared to 08/29/2017.     L5-S1: Moderate facet degenerative disease is present bilaterally. A  broad-based disc osteophyte complex is present resulting in mild  flattening of the ventral surface of the thecal sac. Moderate foraminal  stenosis is present bilaterally secondary to loss of disc height, grade  1 retrolisthesis of L5 upon S1 and extension of the disc osteophyte  complex into the neural foramen.       Impression:      There is no evidence of fracture. Multilevel degenerative  disease of the lumbar spine is noted  as described in detail above  including multilevel loss of disc height, vacuum disc effect, spinal  stenosis, lateral recess narrowing and foraminal stenosis. Spinal  stenosis is most prominent at L4-L5. There is also severe foraminal  stenosis to the right at L4-L5 which appears similar to slightly more  prominent as compared to 08/29/2017. See above. Clinical correlation is  recommended. Further evaluation could be performed with MRI examination  of the lumbar spine.           Radiation dose reduction techniques were utilized, including automated  exposure control and exposure modulation based on body size.           This report was finalized on 6/6/2024 6:08 PM by Dr. Richard Martin M.D  on Workstation: [id number]               I have reviewed the medications.  ---------------------------------------------------------------------------------------------  Assessment & Plan   Assessment/Problem List    Pain of lumbar spine    Spinal stenosis, lumbar region, without neurogenic claudication    Lumbar disc disease with radiculopathy      Plan:  Low back pain with right leg radiculopathy  -CT lumbar spine shows multilevel degenerative disease including multilevel loss of disc height, vacuum disc effect, spinal stenosis, lateral recess narrowing and foraminal stenosis.  Spinal stenosis most prominent at L4-5, severe foraminal stenosis to the right at L4-5  -MRI lumbar spine  -Multimodal analgesia with muscle relaxers, Lidoderm, IV steroids, and narcotics as needed  -Consult neurosurgery  -Physical therapy has cleared patient for home.  -Patient to D/C Eliquis until after epidural.  Epidural has been scheduled in 3 days/Monday.  She can resume her Eliquis postprocedure.  She is to follow-up with neurosurgery as discussed.  She is to return to the ER with any worsening symptoms or any further concerns.  Patient to go home on a mild dose muscle relaxer and Medrol.     Atrial fibrillation  -Continue Eliquis and  metoprolol     Left renal mass status post nephrectomy  Stage III CKD  -Creatinine 2.49, baseline for patient  -Trend with a.m. labs     Hyponatremia  -Sodium 131, was 134 on 5/29/2024  -Hold hydrochlorothiazide  -Trend with a.m. labs     Chronic anemia  -Hemoglobin 9.9, was 10.1 on 5/29/2024  -Chronic, trend with a.m. labs    Disposition: Discharge    Follow-up after Discharge: Pain management on Monday at 8 AM.    This note will serve as a progress note    Stalin Alexandra III, PA 06/07/24 12:44 EDT    I have worn appropriate PPE during this patient encounter, sanitized my hands both with entering and exiting patient's room.      31 minutes has been spent by [facility] providers in the care of this patient while under observation status

## 2024-06-07 NOTE — PLAN OF CARE
Goal Outcome Evaluation:  Plan of Care Reviewed With: patient     Patient admitted for lumbar and right leg pain.  MRI complete.  Patient had trouble sleeping and was given 5 mg melatonin.  Neuro consult today.

## 2024-06-07 NOTE — OUTREACH NOTE
Medical Week 2 Survey      Flowsheet Row Responses   [facility] patient discharged from? [place]   Does the patient have one of the following disease processes/diagnoses(primary or secondary)? Other   Week 2 attempt successful? No   Unsuccessful attempts Attempt 1   Revoke Readmitted            Isabel KIM - Registered Nurse

## 2024-06-07 NOTE — OUTREACH NOTE
Prep Survey      Flowsheet Row Responses   [facility] patient discharged from? [place]   Is LACE score < 7 ? Yes   Eligibility [facility]   Date of Admission 06/06/24   Date of Discharge 06/07/24   Discharge Disposition Home or Self Care   Discharge diagnosis Pain of lumbar spine   Does the patient have one of the following disease processes/diagnoses(primary or secondary)? Other   Prep survey completed? Yes            Lily GIPSON - Registered Nurse

## 2024-06-07 NOTE — PLAN OF CARE
Goal Outcome Evaluation:  Plan of Care Reviewed With: patient           Outcome Evaluation: Pt is an 87 yo female who presents from home with low back pain and R LE radicular pain. Prior to admission, pt was living at home alone and independent with mobility using rollator. Pt was just recently discharged from  PT. Upon exam, pt reports her pain has resolved. Pt's strength, gait, and balance all WFL. Pt performed bed mobility independently and stood at EOB with supervision. Pt ambulated 85' with HHA and SBA. Pt demos no LOB or safety concerns with mobility and appears to be at baseline mobility. Pt planning to DC home today and follow up as OP for possible epidural injection. No further acute PT concerns. Pt may benefit from OP PT to address back pain and LE pain.      Anticipated Discharge Disposition (PT): home, home with outpatient therapy services

## 2024-06-07 NOTE — PLAN OF CARE
Goal Outcome Evaluation:  Plan of Care Reviewed With: patient, daughter        Progress: improving  Outcome Evaluation: pt DC home with solumedrol pack and robaxin. schedule lumbar epidural next week due to Eliquis. Pt cleared by physical therapy for home.         Problem: Adult Inpatient Plan of Care  Goal: Plan of Care Review  Outcome: Met  Flowsheets (Taken 6/7/2024 1404)  Progress: improving  Plan of Care Reviewed With:   patient   daughter  Outcome Evaluation: pt DC home with solumedrol pack and robaxin. schedule lumbar epidural next week due to Eliquis. Pt cleared by physical therapy for home.  Goal: Patient-Specific Goal (Individualized)  Outcome: Met  Goal: Absence of Hospital-Acquired Illness or Injury  Outcome: Met  Intervention: Identify and Manage Fall Risk  Recent Flowsheet Documentation  Taken 6/7/2024 1231 by Betito Guzman RN  Safety Promotion/Fall Prevention:   safety round/check completed   room organization consistent   lighting adjusted   nonskid shoes/slippers when out of bed   activity supervised   clutter free environment maintained  Taken 6/7/2024 1038 by Betito Guzman RN  Safety Promotion/Fall Prevention:   safety round/check completed   room organization consistent   lighting adjusted   nonskid shoes/slippers when out of bed   activity supervised   clutter free environment maintained  Taken 6/7/2024 0846 by Betito Guzman RN  Safety Promotion/Fall Prevention:   safety round/check completed   room organization consistent   lighting adjusted   nonskid shoes/slippers when out of bed   activity supervised   clutter free environment maintained  Intervention: Prevent Skin Injury  Recent Flowsheet Documentation  Taken 6/7/2024 1231 by Betito Guzman RN  Body Position: position changed independently  Taken 6/7/2024 1038 by Betito Guzman RN  Body Position: position changed independently  Taken 6/7/2024 0846 by Betito Guzman RN  Body Position: position changed independently  Skin Protection: adhesive  use limited  Intervention: Prevent and Manage VTE (Venous Thromboembolism) Risk  Recent Flowsheet Documentation  Taken 6/7/2024 1231 by Betito Guzman RN  Activity Management:   up ad rosaura   activity encouraged  Taken 6/7/2024 1038 by Betito Guzman RN  Activity Management: up to bedside commode  Taken 6/7/2024 0846 by Betito Guzman RN  Activity Management: up to bedside commode  Range of Motion: active ROM (range of motion) encouraged  Intervention: Prevent Infection  Recent Flowsheet Documentation  Taken 6/7/2024 1231 by Betito Guzman RN  Infection Prevention:   rest/sleep promoted   single patient room provided  Taken 6/7/2024 1038 by Betito Gzuman RN  Infection Prevention:   rest/sleep promoted   single patient room provided  Taken 6/7/2024 0846 by Betito Guzman RN  Infection Prevention:   rest/sleep promoted   single patient room provided  Goal: Optimal Comfort and Wellbeing  Outcome: Met  Intervention: Provide Person-Centered Care  Recent Flowsheet Documentation  Taken 6/7/2024 0846 by Betito Guzman RN  Trust Relationship/Rapport:   care explained   questions answered   thoughts/feelings acknowledged   reassurance provided  Goal: Readiness for Transition of Care  Outcome: Met     Problem: Pain Acute  Goal: Acceptable Pain Control and Functional Ability  Outcome: Met  Intervention: Prevent or Manage Pain  Recent Flowsheet Documentation  Taken 6/7/2024 1231 by Betito Guzman RN  Medication Review/Management: medications reviewed  Taken 6/7/2024 1038 by Betito Guzman RN  Medication Review/Management: medications reviewed  Taken 6/7/2024 0846 by Betito Guzman RN  Sleep/Rest Enhancement:   awakenings minimized   regular sleep/rest pattern promoted   room darkened  Medication Review/Management: medications reviewed  Intervention: Optimize Psychosocial Wellbeing  Recent Flowsheet Documentation  Taken 6/7/2024 0846 by Betito Guzman RN  Supportive Measures: active listening utilized  Diversional Activities: television

## 2024-06-07 NOTE — DISCHARGE INSTRUCTIONS
Hold Eliquis until after epidural.  You are scheduled to be at pain management 8 AM in the morning on Monday for your epidural.  I would show up 20 or 30 minutes early to be prepared.  I would discuss it with pain management but it should be okay for you to start your Eliquis post epidural later that evening, unless they direct you otherwise.  Take the Medrol pack prescribed for 5 days.  Take the muscle relaxer prescribed as needed.  Return to the ER with worsening symptoms or should you have any further concerns.    Follow-up with neurosurgery if it was discussed with Dr. Tamez.  Follow-up with your primary care physician for all other issues.

## 2024-06-07 NOTE — PROGRESS NOTES
SIOMARA YOUNG Attestation Note     I supervised care provided by the midlevel provider. We have discussed this patient's history, physical exam, and treatment plan. I have reviewed the midlevel provider's note and I agree with the midlevel provider's findings and plan of care.   SHARED VISIT: This visit was performed by BOTH a physician and an APC. The substantive portion of the medical decision making was performed by this attesting physician who made or approved the management plan and takes responsibility for patient management. All studies in the APC note (if performed) were independently interpreted by me.   I have personally had a face to face encounter with the patient.   My personal findings are documented below:      Subjective  Pt is a 88 y.o. female admitted from Emergency Department for evaluation and treatment of lower back pain which radiates towards the right leg.  She reports the pain is significantly improved after medications received here in the hospital.    Physical Exam  GENERAL: Alert and in NAD, Vitals reviewed-afebrile.  Blood pressure 102/79.  Pulse 70.  HENT: nares patent  EYES: no scleral icterus  CV: regular rhythm, regular rate-no murmur  RESPIRATORY: normal effort  ABDOMEN: soft, nontender to palpation  MUSCULOSKELETAL: Spine-mild diffuse lumbar tenderness to palpation without noted bony  Upper extremities-atraumatic  Lower extremities-atraumatic  NEURO: Strength-mild generalized weakness bilateral lower extremities sensation and coordination are grossly intact.  Speech and mentation are unremarkable  SKIN: warm, dry    Assessment/Plan  I discussed tx and evaluation of this patient with RADHA Swann.  MRI of L-spine shows disc disease at both L5/4 and L5/S1.  Given patient's multiple comorbidities including A-fib on anticoagulation, age 88 I doubt that she will be a surgical candidate.  Neurosurgery evaluation pending in AM.

## 2024-06-07 NOTE — CASE COMMUNICATION
Patient missed a SNV visit from [facility] on 6.6.24.     Reason: Patient in hospital . Liason aware      For your records only.   Per CMS Guidance, MD must be notified of missed/cancelled visits; therefore the prescribed frequency was not met.  3

## 2024-06-10 ENCOUNTER — TELEPHONE (OUTPATIENT)
Dept: CARDIOLOGY | Facility: CLINIC | Age: 89
End: 2024-06-10
Payer: MEDICARE

## 2024-06-10 ENCOUNTER — HOSPITAL ENCOUNTER (OUTPATIENT)
Dept: PAIN MEDICINE | Facility: HOSPITAL | Age: 89
Discharge: HOME | End: 2024-06-10
Payer: MEDICARE

## 2024-06-10 ENCOUNTER — TRANSITIONAL CARE MANAGEMENT TELEPHONE ENCOUNTER (OUTPATIENT)
Dept: CALL CENTER | Facility: HOSPITAL | Age: 89
End: 2024-06-10
Payer: MEDICARE

## 2024-06-10 ENCOUNTER — ANESTHESIA (OUTPATIENT)
Dept: PAIN MEDICINE | Facility: HOSPITAL | Age: 89
End: 2024-06-10
Payer: MEDICARE

## 2024-06-10 ENCOUNTER — ANESTHESIA EVENT (OUTPATIENT)
Dept: PAIN MEDICINE | Facility: HOSPITAL | Age: 89
End: 2024-06-10
Payer: MEDICARE

## 2024-06-10 ENCOUNTER — HOSPITAL ENCOUNTER (OUTPATIENT)
Dept: GENERAL RADIOLOGY | Facility: HOSPITAL | Age: 89
Discharge: HOME | End: 2024-06-10
Payer: MEDICARE

## 2024-06-10 VITALS
DIASTOLIC BLOOD PRESSURE: 110 MMHG | BODY MASS INDEX: 24.11 KG/M2 | HEIGHT: 62 IN | TEMPERATURE: 97.1 F | OXYGEN SATURATION: 99 % | RESPIRATION RATE: 16 BRPM | SYSTOLIC BLOOD PRESSURE: 210 MMHG | HEART RATE: 63 BPM | WEIGHT: 131 LBS

## 2024-06-10 DIAGNOSIS — M54.16 LUMBAR RADICULOPATHY: Primary | ICD-10-CM

## 2024-06-10 DIAGNOSIS — M54.50 LUMBAR BACK PAIN: ICD-10-CM

## 2024-06-10 DIAGNOSIS — M51.16 LUMBAR DISC DISEASE WITH RADICULOPATHY: ICD-10-CM

## 2024-06-10 DIAGNOSIS — R52 PAIN: ICD-10-CM

## 2024-06-10 DIAGNOSIS — M48.061 SPINAL STENOSIS, LUMBAR REGION, WITHOUT NEUROGENIC CLAUDICATION: ICD-10-CM

## 2024-06-10 PROCEDURE — 77003 FLUOROGUIDE FOR SPINE INJECT: CPT

## 2024-06-10 PROCEDURE — 25010000002 METHYLPREDNISOLONE PER 80 MG: Performed by: STUDENT IN AN ORGANIZED HEALTH CARE EDUCATION/TRAINING PROGRAM

## 2024-06-10 PROCEDURE — 25010000002 FENTANYL CITRATE (PF) 50 MCG/ML SOLUTION: Performed by: STUDENT IN AN ORGANIZED HEALTH CARE EDUCATION/TRAINING PROGRAM

## 2024-06-10 RX ORDER — SODIUM CHLORIDE 0.9 % (FLUSH) 0.9 %
10 SYRINGE (ML) INJECTION EVERY 12 HOURS SCHEDULED
Status: DISCONTINUED | OUTPATIENT
Start: 2024-06-10 | End: 2024-06-11 | Stop reason: HOSPADM

## 2024-06-10 RX ORDER — IOPAMIDOL 408 MG/ML
10 INJECTION, SOLUTION INTRATHECAL
Status: DISCONTINUED | OUTPATIENT
Start: 2024-06-10 | End: 2024-06-11 | Stop reason: HOSPADM

## 2024-06-10 RX ORDER — SODIUM CHLORIDE 0.9 % (FLUSH) 0.9 %
10 SYRINGE (ML) INJECTION AS NEEDED
Status: DISCONTINUED | OUTPATIENT
Start: 2024-06-10 | End: 2024-06-11 | Stop reason: HOSPADM

## 2024-06-10 RX ORDER — FENTANYL CITRATE 50 UG/ML
25 INJECTION, SOLUTION INTRAMUSCULAR; INTRAVENOUS ONCE
Status: COMPLETED | OUTPATIENT
Start: 2024-06-10 | End: 2024-06-10

## 2024-06-10 RX ORDER — LIDOCAINE HYDROCHLORIDE 10 MG/ML
1 INJECTION, SOLUTION INFILTRATION; PERINEURAL ONCE
Status: DISCONTINUED | OUTPATIENT
Start: 2024-06-10 | End: 2024-06-11 | Stop reason: HOSPADM

## 2024-06-10 RX ORDER — SODIUM CHLORIDE 9 MG/ML
40 INJECTION, SOLUTION INTRAVENOUS AS NEEDED
Status: DISCONTINUED | OUTPATIENT
Start: 2024-06-10 | End: 2024-06-11 | Stop reason: HOSPADM

## 2024-06-10 RX ORDER — METHYLPREDNISOLONE ACETATE 80 MG/ML
80 INJECTION, SUSPENSION INTRA-ARTICULAR; INTRALESIONAL; INTRAMUSCULAR; SOFT TISSUE ONCE
Status: COMPLETED | OUTPATIENT
Start: 2024-06-10 | End: 2024-06-10

## 2024-06-10 RX ORDER — FENTANYL CITRATE 50 UG/ML
50 INJECTION, SOLUTION INTRAMUSCULAR; INTRAVENOUS ONCE
Status: COMPLETED | OUTPATIENT
Start: 2024-06-10 | End: 2024-06-10

## 2024-06-10 RX ORDER — LIDOCAINE HYDROCHLORIDE 10 MG/ML
0.5 INJECTION, SOLUTION INFILTRATION; PERINEURAL ONCE AS NEEDED
Status: DISCONTINUED | OUTPATIENT
Start: 2024-06-10 | End: 2024-06-11 | Stop reason: HOSPADM

## 2024-06-10 RX ADMIN — FENTANYL CITRATE 25 MCG: 50 INJECTION, SOLUTION INTRAMUSCULAR; INTRAVENOUS at 08:31

## 2024-06-10 RX ADMIN — METHYLPREDNISOLONE ACETATE 80 MG: 80 INJECTION, SUSPENSION INTRA-ARTICULAR; INTRALESIONAL; INTRAMUSCULAR; SOFT TISSUE at 08:25

## 2024-06-10 RX ADMIN — FENTANYL CITRATE 25 MCG: 50 INJECTION, SOLUTION INTRAMUSCULAR; INTRAVENOUS at 08:24

## 2024-06-10 NOTE — H&P
CHIEF COMPLAINT:   Acute on chronic low back pain    HISTORY OF PRESENT ILLNESS:    Ms. Hummel is an 88-year-old female with a prior history of chronic low back pain who presents with acute on chronic low back pain following a fall from standing several weeks ago.  She has a history of treatment with epidural steroid injection years ago with successful treatment of her symptoms.      She was evaluated in the hospital by neurosurgery.  An MRI was performed that showed multiple levels of degenerative disease, foraminal stenosis on the right and central canal stenosis that appears to be the worst at L4-5.  There is anterolisthesis at L4-L5 as well.    Today she presents with low back pain radiating to the right buttock and lateral thigh. The right side is greater than left.  She has chronic lower extremity weakness and ambulates with assistance of walker.    Their pain is a 0 out of 10 at rest but up to a 6 out of 10 with activity.  The symptom is described as intense sharp ache.  The pain is worsening in severity.   Their symptoms are exacerbated by walking, movement, bending and alleviated by rest, medications.    The conservative measures the patient has attempted recently without significant improvement include: Rest, ice, heat, OTC medications, muscle relaxer, steroids, Tylenol.         PAST MEDICAL HISTORY:  Current Outpatient Medications on File Prior to Encounter   Medication Sig Dispense Refill    acetaminophen (TYLENOL) 500 MG tablet Take 1 tablet by mouth Every 4 (Four) Hours As Needed for Mild Pain.      atorvastatin (LIPITOR) 20 MG tablet Take 1 tablet by mouth Every Night for 270 days. 90 tablet 2    desonide (DESOWEN) 0.05 % cream Apply 1 Application topically to the appropriate area as directed 2 (Two) Times a Day As Needed for Irritation.      Eliquis 2.5 MG tablet tablet TAKE 1 TABLET EVERY 12 HOURS (TWICE A DAY) (Patient taking differently: Take 1 tablet by mouth 2 (Two) Times a Day.) 180 tablet 3     escitalopram (LEXAPRO) 20 MG tablet Take 1 tablet by mouth Daily for 270 days. 90 tablet 2    fexofenadine (ALLEGRA) 60 MG tablet Take 1 tablet by mouth Daily.      furosemide (LASIX) 20 MG tablet Take 1 tablet by mouth Daily As Needed (if weight goes up 3lbs in 3 days). 30 tablet 11    Gemtesa 75 MG tablet Take 1 tablet by mouth Every Morning for 42 days. 42 tablet 0    hydroCHLOROthiazide (HYDRODIURIL) 12.5 MG tablet Take 1 tablet by mouth Daily. 30 tablet 0    magnesium oxide (MAG-OX) 400 MG tablet Take 1 tablet by mouth Daily.      melatonin 5 MG tablet tablet Take 1 tablet by mouth At Night As Needed.      methocarbamol (ROBAXIN) 500 MG tablet Take 1 tablet by mouth 3 (Three) Times a Day As Needed for Muscle Spasms. 21 tablet 0    methylPREDNISolone (MEDROL) 4 MG dose pack Take as directed on package instructions. 21 tablet 0    sodium bicarbonate 650 MG tablet Take 1 tablet by mouth 2 (Two) Times a Day.      TiZANidine (Zanaflex) 2 MG capsule Take 1 capsule by mouth 2 (Two) Times a Day As Needed (pain). 60 capsule 0    Vibegron (Gemtesa) 75 MG tablet Take 1 tablet by mouth Daily.       No current facility-administered medications on file prior to encounter.       Past Medical History:   Diagnosis Date    Acute bronchitis due to Rhinovirus 5/31/2022    Acute vulvitis 08/21/2019    Allergic     Allergic rhinitis     Anemia of chronic disease     Arthropathy of knee     right    Atrial fibrillation     Back pain     Bell's palsy     Caregiver stress syndrome 04/17/2019    Chronic coronary artery disease     Chronic kidney disease (CKD), stage III (moderate)     Diverticulitis 12/14/2022    CARROLL (dyspnea on exertion) 3/17/2023    Edema     Elevated serum free T4 level 03/21/2016    Encounter for eye exam 09/2020    Essential hypertension     Fatigue     GERD (gastroesophageal reflux disease)     H/O Heart murmur     Heart attack 10/05/2015    Hematuria 12/12/2016    Herpes zoster without complication     Hip  arthritis     left    History of bone density study 02/28/2008    History of pelvic fracture 2015    History of pneumonia     History of transfusion     2015    Hypercholesterolemia     IFG (impaired fasting glucose)     Insomnia     Left kidney mass 07/2020    Limited joint range of motion     RIGHT KNEE    Mixed hyperlipidemia     Muscle tension headache 04/03/2019    New daily persistent headache 12/17/2018    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    Osteoarthritis     Right hip    Osteoporosis     Panic disorder     Peripheral vertigo     PONV (postoperative nausea and vomiting)     Rectal bleeding     HISTORY OF    Sleep apnea     DOES NOT USE C-PAP    Spinal stenosis, lumbar region with neurogenic claudication 12/02/2021    Stress     Stress-induced cardiomyopathy     Urinary incontinence     Vitamin D deficiency          SOCIAL HISTORY:  No current tobacco product use    REVIEW OF SYSTEMS:  No hematologic infectious or constitutional symptoms  Other review of systems non-contributory    Positive DEBORAH screen/DX:  2 or more mitigating factors include non-supine recovery position, none or reduced opiate use        PHYSICAL EXAM:  There were no vitals taken for this visit.  Well-developed, well-nourished, no acute distress  Alert and oriented ×3  Extra ocular movements intact  Airway: Mallampati 2  Unlabored respirations  Extremities warm and well-perfused  Deep tendon reflexes diminished in the bilateral patella (multiple orthopedic surgeries)  Positive right straight leg raise  Diminished hip flexor strength on the right, otherwise generalized weakness in lower extremities.  Lumbar spine without obvious deformities or ecchymoses  Lumbar spine nontender to palpation      DIAGNOSIS:  Post-Op Diagnosis Codes:     * Lumbar radiculopathy [M54.16]    PLAN:  1.  Lumbar 4-5 epidural steroid injections, up to 3. If pain control is acceptable after 1 or 2 injections, it was discussed with the patient that they may return for the  subsequent injections if and when their pain returns.  The risks were discussed with the patient including failure of relief, worsening pain, Headache (post dural puncture headache), bleeding (epidural hematoma) and infection (epidural abscess or skin infection).  2.  Physical therapy exercises at home as prescribed by physical therapy or from the pain clinic handout.  Continuation of these exercises every day, or multiple times per week, even when the patient has good pain relief, was stressed to the patient as a preventative measure to decrease the frequency and severity of future pain episodes.  3.  Continue pain medicines as already prescribed.  If patient not currently taking any, it is recommended to begin Acetaminophen 1000 mg po q 8 hours.  If other medicines containing Acetaminophen are currently prescribed, maintain daily dose at 3000 mg.    4.  If they can tolerate NSAIDS, it is recommended to take Ibuprofen 600 mg po q 6 hours for 7 days during pain exacerbations.  Alternatively, they may substitute an NSAID of their choice (e.g. Aleve).  This may be taken at the same time as Acetaminophen.  5.  Heat and ice to the affected area as tolerated for pain control.    6.  Daily low impact exercise such as walking or water exercise was recommended to maintain overall health and aid in weight control.   7.  Follow up as needed for subsequent injections.

## 2024-06-10 NOTE — TELEPHONE ENCOUNTER
Not sure what to tell her about the steroids.  She will need to speak with whoever prescribed those for her.    For future reference, she should only hold her blood thinner prior to the procedure and not her blood pressure medications.

## 2024-06-10 NOTE — ANESTHESIA PROCEDURE NOTES
PAIN Epidural block      Patient reassessed immediately prior to procedure    Patient location during procedure: pain clinic  Indication:procedure for pain  Performed By  Anesthesiologist: Verona Ojeda MD  Preanesthetic Checklist  Completed: patient identified, risks and benefits discussed, surgical consent, monitors and equipment checked, pre-op evaluation and timeout performed  Additional Notes  Needle placement guided by fluoroscopy and confirmed with JANELLE and contrast injection.     Diagnosis:  Post-Op Diagnosis Codes:     * Lumbar radiculopathy [M54.16]    Sedation: Fentanyl, 50 mcg  Sedation time: 8:24 PM to 8:35 AM  Prep:  Pt Position:prone  Sterile Tech:cap, gloves, sterile barrier and mask  Prep:chlorhexidine gluconate and isopropyl alcohol  Monitoring:EKG, continuous pulse oximetry and blood pressure monitoring  Procedure:Sedation: yes     Approach:midline  Guidance: fluoroscopy  Location:lumbar  Level:4-5 (Lumbar Intralaminar)  Needle Type:Tuohy  Needle Gauge:20 G  Aspiration:negative  Medications:  Preservative Free Saline:3mL  Isovue:0mL  Comments:Contrast held due to renal functionDepomedrol:80mg  Post Assessment:  Post-procedure: Bandaid.  Pt Tolerance:patient tolerated the procedure well with no apparent complications  Complications:no

## 2024-06-10 NOTE — TELEPHONE ENCOUNTER
Pt's daughter Silvia NAIDU saying that Mom has been having high BP with numbers ranging from 200-235.  She also said that Mom has been off Eliquis since last Thursday due to Epidural  Procedure and will not resume taking med until tomorrow.

## 2024-06-10 NOTE — DISCHARGE INSTRUCTIONS

## 2024-06-10 NOTE — NURSING NOTE
NOTE PTS HTN NOTED AND DISCUSSED AT LENGTH. NO HEADACHE OR BLURRED VISION. DR JARVIS AT  STATES OK TO GO HOME WITH CURRENT BP AND TAKE MED AT HOME. DGT VOICES UNDERSTANDING.

## 2024-06-10 NOTE — TELEPHONE ENCOUNTER
I spoke with Silvia and gave them message from the provider.  They verbalized understanding & have no further questions at this time.    Thank you,    Suellen PINA , RN  Triage [facility]  06/10/24 16:30 EDT

## 2024-06-10 NOTE — OUTREACH NOTE
Call Center TCM Note      Flowsheet Row Responses   [facility] patient discharged from? [facility]   Does the patient have one of the following disease processes/diagnoses(primary or secondary)? Other   TCM attempt successful? Yes  [VR for Sharan, son and gwendolyn Vegar]   Call start time 1423   Call end time 1432   Discharge diagnosis Pain of lumbar spine   Person spoke with today (if not patient) and relationship Silvia, dtr   Medication alerts for this patient ELIQUIS---to be restarted on 6/11/24   Meds reviewed with patient/caregiver? Yes   Is the patient having any side effects they believe may be caused by any medication additions or changes? Yes   Side effects comments  Some concerns that steroids may be causing issues with BP--dtr has a call into cardiology, pt has a few days left of titrating dose   Does the patient have all medications ordered at discharge? Yes   Is the patient taking all medications as directed (includes completed medication regime)? Yes   Comments Hospital f/u on 6/12/24, Cardio on 6/13/24   Does the patient have an appointment with their PCP within 7-14 days of discharge? Yes   What is the Home health agency?  [facility]   Has home health visited the patient within 72 hours of discharge? Yes   Psychosocial issues? No   Did the patient receive a copy of their discharge instructions? Yes   Nursing interventions Reviewed instructions with patient   What is the patient's perception of their health status since discharge? New symptoms unrelated to diagnosis  [Pt returned for epidural this am and had issues with HTN with systolic about 230,  pt had not taken her BP meds this am and also on steroids.  Staff had advised her to call cardio to discuss steroids.  Pt BP is now 148/68 with pulse 55. Dtr awaiting call.]   Is the patient/caregiver able to teach back signs and symptoms related to disease process for when to call PCP? Yes   Is the patient/caregiver able to teach back signs and symptoms  related to disease process for when to call 911? Yes   Additional teach back comments Reviewed AVS to with dtr post epidural and what to expect post procedure.  Using ice as AVS instructed and aware to avoid heat.  Pt is resting at time of call.   TCM call completed? Yes   Call end time 1432            Doretha Gonzalez RN    6/10/2024, 14:38 EDT

## 2024-06-10 NOTE — TELEPHONE ENCOUNTER
"I called Silvia back.  Pt had epidural block earlier, and was instructed to hold all of her AM medicines due to this.   Around procedure timing, this is when her SBP was in the 200s.  Pt denied HA or vision changes.  By 11:30, pt was home and had already taken her medication for the day- last BP was 148/68, HR 55.  Silvia says pt is resting at this time.  She has been in a \"a lot of pain in her legs & back\".  Also, pt is [facility] and thought she was told by the nurse there to take her old medicine, metoprolol succinate 25 mg today, so pt took this.  Silvia later discovered that the nurse didn't tell pt to take this.  Pt did take her prescribed 12.5 mg HCTZ QD as prescribed.    Additionally, Silvia wanted to find out if pt should continue her steroids.  She's on the 3rd day of her medrol dose pack.  NOV is in 3 days.    Do you have any recommendations for this patient?    Thank you,    Suellen PINA RN  [facility] Triage  06/10/24  15:39 EDT    "

## 2024-06-11 ENCOUNTER — HOME CARE VISIT (OUTPATIENT)
Dept: HOME HEALTH SERVICES | Facility: HOME HEALTHCARE | Age: 89
End: 2024-06-11
Payer: MEDICARE

## 2024-06-11 VITALS
HEART RATE: 64 BPM | DIASTOLIC BLOOD PRESSURE: 64 MMHG | OXYGEN SATURATION: 99 % | RESPIRATION RATE: 16 BRPM | TEMPERATURE: 96 F | SYSTOLIC BLOOD PRESSURE: 144 MMHG

## 2024-06-11 PROCEDURE — G0299 HHS/HOSPICE OF RN EA 15 MIN: HCPCS

## 2024-06-11 PROCEDURE — G0495 RN CARE TRAIN/EDU IN HH: HCPCS

## 2024-06-11 RX ORDER — SODIUM BICARBONATE 650 MG/1
650 TABLET ORAL 2 TIMES DAILY
Status: CANCELLED | OUTPATIENT
Start: 2024-06-11

## 2024-06-11 RX ORDER — ATORVASTATIN CALCIUM 20 MG/1
20 TABLET, FILM COATED ORAL NIGHTLY
Qty: 90 TABLET | Refills: 2 | Status: CANCELLED | OUTPATIENT
Start: 2024-06-11 | End: 2025-03-08

## 2024-06-11 RX ORDER — ESCITALOPRAM OXALATE 20 MG/1
20 TABLET ORAL DAILY
Qty: 90 TABLET | Refills: 2 | Status: CANCELLED | OUTPATIENT
Start: 2024-06-11 | End: 2025-03-08

## 2024-06-11 RX ORDER — VIBEGRON 75 MG/1
75 TABLET, FILM COATED ORAL DAILY
Qty: 30 TABLET | Status: CANCELLED | OUTPATIENT
Start: 2024-06-11

## 2024-06-11 NOTE — HOME HEALTH
Routine Visit Note:    Skill/education provided:  C/P ASSESS TEACHING PAIN MGNT    Patient/caregiver response:    Plan for next visit: C/P ASSESS TEACHING PER POT    Other pertinent info:

## 2024-06-12 ENCOUNTER — OFFICE VISIT (OUTPATIENT)
Dept: FAMILY MEDICINE CLINIC | Facility: CLINIC | Age: 89
End: 2024-06-12
Payer: MEDICARE

## 2024-06-12 VITALS
HEIGHT: 62 IN | SYSTOLIC BLOOD PRESSURE: 156 MMHG | DIASTOLIC BLOOD PRESSURE: 78 MMHG | WEIGHT: 133 LBS | HEART RATE: 62 BPM | BODY MASS INDEX: 24.48 KG/M2 | OXYGEN SATURATION: 100 %

## 2024-06-12 DIAGNOSIS — F41.0 PANIC DISORDER: ICD-10-CM

## 2024-06-12 DIAGNOSIS — M51.16 LUMBAR DISC DISEASE WITH RADICULOPATHY: ICD-10-CM

## 2024-06-12 DIAGNOSIS — E78.00 HYPERCHOLESTEROLEMIA: ICD-10-CM

## 2024-06-12 DIAGNOSIS — M54.50 PAIN OF LUMBAR SPINE: ICD-10-CM

## 2024-06-12 DIAGNOSIS — D63.8 ANEMIA OF CHRONIC DISEASE: ICD-10-CM

## 2024-06-12 DIAGNOSIS — Z09 HOSPITAL DISCHARGE FOLLOW-UP: Primary | ICD-10-CM

## 2024-06-12 DIAGNOSIS — R73.09 ELEVATED GLUCOSE: ICD-10-CM

## 2024-06-12 DIAGNOSIS — I10 ESSENTIAL HYPERTENSION: ICD-10-CM

## 2024-06-12 PROBLEM — R00.1 BRADYCARDIA: Status: RESOLVED | Noted: 2024-05-27 | Resolved: 2024-06-12

## 2024-06-12 PROCEDURE — 1159F MED LIST DOCD IN RCRD: CPT | Performed by: FAMILY MEDICINE

## 2024-06-12 PROCEDURE — 99495 TRANSJ CARE MGMT MOD F2F 14D: CPT | Performed by: FAMILY MEDICINE

## 2024-06-12 PROCEDURE — 1126F AMNT PAIN NOTED NONE PRSNT: CPT | Performed by: FAMILY MEDICINE

## 2024-06-12 PROCEDURE — 1111F DSCHRG MED/CURRENT MED MERGE: CPT | Performed by: FAMILY MEDICINE

## 2024-06-12 PROCEDURE — 1160F RVW MEDS BY RX/DR IN RCRD: CPT | Performed by: FAMILY MEDICINE

## 2024-06-12 NOTE — PROGRESS NOTES
Transitional Care Follow Up Visit  Subjective     CC: Transitional Care Management Visit        Within 48 business hours after discharge our office contacted her via telephone to coordinate her care and needs.      I reviewed and discussed the details of that call along with the discharge summary, hospital problems, inpatient lab results, inpatient diagnostic studies, and consultation reports with Marleni.     Current outpatient and discharge medications have been reconciled for the patient.  Reviewed by: Ken nAdujar MD          11/21/2020     6:28 PM 12/3/2021     8:31 PM 5/31/2022    10:08 PM 3/22/2023     5:41 PM 10/25/2023     7:20 PM 5/29/2024     7:28 PM 6/7/2024     5:52 PM   Date of TCM Phone Call   [facility]   Date of Admission 11/16/2020 11/29/2021 5/30/2022 3/17/2023 10/24/2023 5/27/2024 6/6/2024   Date of Discharge 11/21/2020 12/3/2021 5/31/2022 3/22/2023 10/25/2023 5/29/2024 6/7/2024   Discharge Disposition Home or Self Care Home or Self Care Home or Self Care Home or Self Care Home or Self Care [facility] [place] [facility] [place]       Risk for Readmission (LACE) Score: 8 (6/7/2024  6:00 AM)      HPI    History of Present Illness  The patient is an 88-year-old female who presents for evaluation of multiple medical concerns. She is accompanied by her daughter.    The patient has been hospitalized for extremely low heart rate and also back pain.. She received an epidural injection in her lower back 2 days ago, which provided some relief. However, she experienced spasms down her legs, which were managed with lidocaine patches and a muscle relaxer. She experienced severe pain yesterday, which prevented her from walking. She has been on steroid therapy, with the final dose tomorrow.  This has caused her blood sugar to be elevated out of  "control.  Her back pain has improved, but she continues to experience mild leg pain. She has a history of spinal stenosis, arthritis, and bulging disks. She also fell a few weeks prior to her hospitalization. She lives with her daughter daily. Pt. manages her activities of daily living, including dressing herself, going to the bathroom, and showering. She expresses a desire to drive, but refrains from doing so. She uses a walker for ambulation. She underwent x-rays in 04/2023.    The patient was diagnosed with bleeding hemorrhoids.  He has known anemia that is multifactorial.  She has not undergone a colonoscopy.  Patient understands the potential risks of not getting colonoscopy but currently refuses to get a colonoscopy.    Supplemental Information   Her hemoglobin was 9.7 five days ago. Her blood sugar was 171 five days ago. She is not monitoring her blood sugar. She might be eating too much sweets. She started taking Eliquis since yesterday.  It was temporarily withheld due to recent back injection.  She is still taking atorvastatin, escitalopram, and hydrochlorothiazide. She is taking Gemtesa for her bladder and high blood pressure. She is taking Lexapro 20 mg. She has not had hallucinations in a long time. She has osteoporosis.  Course During Hospital Stay The following information was reviewed by: Ken Andujar MD on 06/12/2024:   ED to Hosp-Admission (Discharged) with Kevyn Chavez MD; Wong Rosario MD; Johanna Recio MD (05/27/2024)  ED to Hosp-Admission (Discharged) with Esteban Bermudez MD (06/06/2024)   The following portions of the patient's history were reviewed and updated as appropriate: allergies, current medications, past family history, past medical history, past social history, past surgical history, and problem list.     Vitals:    06/12/24 1327   BP: 156/78   Pulse: 62   SpO2: 100%   Weight: 60.3 kg (133 lb)   Height: 157.5 cm (62\")             Objective   Physical Exam  Vitals reviewed. "   Constitutional:       General: She is not in acute distress.  Cardiovascular:      Rate and Rhythm: Normal rate and regular rhythm.      Heart sounds: Normal heart sounds.   Pulmonary:      Effort: Pulmonary effort is normal.      Breath sounds: Normal breath sounds.   Musculoskeletal:      Comments: Rollated seater walker   Neurological:      General: No focal deficit present.      Mental Status: She is alert.   Psychiatric:         Attention and Perception: Attention normal.         Mood and Affect: Mood normal.         Behavior: Behavior normal.        Physical Exam  Heart sounds are normal.       DATA REVIEWED:    The following data was reviewed by: Ken Andujar MD on 06/12/2024:  CBC (No Diff) (06/07/2024 05:13)  Basic Metabolic Panel (06/07/2024 05:13)  MRI Lumbar Spine Without Contrast (06/06/2024 18:50)   Results  Laboratory Studies  Hemoglobin was 9.7. Blood sugar was 171.         Assessment & Plan     Assessment & Plan  1. Post-hospital discharge follow-up.  The patient initially presented for bradycardia and severe back pain, which led to two hospital admissions. Despite the improvement of her back pain, she continues to experience minimal sciatica. She has a known diagnosis of spinal stenosis and is under the care of a specialist. Additionally, she was found to be anemic, likely multifactorial, potentially due to dietary or kidney disease. However, there is evidence of GI blood loss. Currently, the patient declines a colonoscopy follow-up, a comprehensive discussion was held. A follow-up visit is scheduled for 07/2024 for a routine regular visit. Regular follow-ups with cardiology are also scheduled. Her blood pressure is mildly elevated, but her symptomatic bradycardia has resolved since discontinuing metoprolol. Ongoing issues with panic disorder and mood have been noted by the daughter, but escitalopram has been effective in the past. A discussion was held regarding general needs and living at home  versus elsewhere, and she will continue to monitor that situation along with her family. She will discuss the ongoing use of Gemtesa with her nephrologist and adhere to the advice plan.  Gemtesa may be contraindicated based on her kidney function.  Follow-up labs will be scheduled prior to the 07/2024 visit to recheck both her anemia and kidney function, and blood sugar, which have shown elevated levels since she has been on steroid therapy.    Follow-up  The patient is scheduled for a follow-up visit in 07/2024.       Follow Up     Return in 5 weeks (on 7/17/2024) for next scheduled follow up.        Patient or patient representative verbalized consent for the use of Ambient Listening during the visit with  Ken Andujar MD for chart documentation. 6/12/2024  14:12 EDT

## 2024-06-13 ENCOUNTER — OFFICE VISIT (OUTPATIENT)
Dept: CARDIOLOGY | Facility: CLINIC | Age: 89
End: 2024-06-13
Payer: MEDICARE

## 2024-06-13 VITALS
SYSTOLIC BLOOD PRESSURE: 140 MMHG | HEIGHT: 62 IN | WEIGHT: 133 LBS | DIASTOLIC BLOOD PRESSURE: 80 MMHG | BODY MASS INDEX: 24.48 KG/M2 | OXYGEN SATURATION: 98 % | HEART RATE: 56 BPM

## 2024-06-13 DIAGNOSIS — I25.10 CHRONIC CORONARY ARTERY DISEASE: ICD-10-CM

## 2024-06-13 DIAGNOSIS — I48.21 PERMANENT ATRIAL FIBRILLATION: Primary | ICD-10-CM

## 2024-06-13 DIAGNOSIS — R00.1 BRADYCARDIA, DRUG INDUCED: ICD-10-CM

## 2024-06-13 DIAGNOSIS — I10 ESSENTIAL HYPERTENSION: ICD-10-CM

## 2024-06-13 DIAGNOSIS — T50.905A BRADYCARDIA, DRUG INDUCED: ICD-10-CM

## 2024-06-13 NOTE — PROGRESS NOTES
CARDIOLOGY        Patient Name: Marleni Hummel  :1935  Age: 88 y.o.  Primary Cardiologist: Sybil Parmar MD  Encounter Provider:  ENRRIQUE Clemens    Date of Service: 24    CHIEF COMPLAINT / REASON FOR OFFICE VISIT     Follow-Up and Atrial Fibrillation      HISTORY OF PRESENT ILLNESS       HPI  Marleni Hummel is a 88 y.o. female who presents today for hospital follow up.     Patient with history of hypertension, hyperlipidemia, atrial fibrillation, stress-induced cardiomyopathy, DEBORAH, left renal carcinoma status post nephrectomy.     Patient has longstanding history of atrial fibrillation at times rapid.  In 2011 she had cardioversion and was started on flecainide and had a repeat cardioversion.  In 2015 patient suffered an NSTEMI and cardiac catheterization revealed cardiomyopathy with LVEF 20%.  Patient did have a lesion in her circumflex and was treated with a HAYLEY.  Flecainide was discontinued and patient was transitioned to amiodarone.  Unfortunately, patient's QT interval prolonged after amiodarone was initiated and this is subsequently been stopped.     In  patient had echocardiogram revealing LVEF of 65% with borderline LVH with mild to moderate left atrial enlargement.  No significant valvular disease.  In  she developed dyspnea and had nuclear stress test which was negative for ischemia.  Echocardiogram was stable at that time.    Patient was evaluated by me during hospitalization 2024.  She reported generalized fatigue.  Patient was known to have atrial fibrillation with heart rate in the 40s, her beta-blocker was discontinued.  She was sent home with a ZIO monitor which is pending.    Patient has stopped metoprolol.  She has not monitored her HR and BP since discharge, she was evaluated by PMD yesterday and HR was noted to be controlled.  She still endorses generalized fatigue, but is also on muscle relaxer secondary to back pain.  She has not  "experienced any near syncope.  She has some baseline dyspnea which is stable.  She denies chest pain.     The following portions of the patient's history were reviewed and updated as appropriate: allergies, current medications, past family history, past medical history, past social history, past surgical history and problem list.      VITAL SIGNS     Visit Vitals  /80 (BP Location: Right arm, Patient Position: Sitting)   Pulse 56   Ht 157.5 cm (62\")   Wt 60.3 kg (133 lb)   SpO2 98%   BMI 24.33 kg/m²         Wt Readings from Last 3 Encounters:   06/13/24 60.3 kg (133 lb)   06/12/24 60.3 kg (133 lb)   06/10/24 59.4 kg (131 lb)     Body mass index is 24.33 kg/m².      REVIEW OF SYSTEMS     ROS        PHYSICAL EXAMINATION     Physical Exam      REVIEWED DATA     Procedures    Prior Cardiac Testing:  Cardiac catheterization 10/4/2015.  Takotsubo cardiomyopathy with severely elevated LV filling pressures.  Critical circumflex stenosis successfully treated with HAYLEY.  Echocardiogram 12/16/2019.  LVEF 64%.  Diastolic function indeterminate.  Aortic valve is abnormal in structure with no significant regurgitation or stenosis.  Calculated RVSP 31 mmHg.  Myocardial perfusion stress test 12/16/2019.  LVEF 65%.  Impressions consistent with a low risk study.  Echocardiogram 9/4/2020.  LVEF 69%.  All LV wall segments contract normally.  Mild calcification of aortic valve with trace aortic valve regurgitation and no stenosis.  Moderate MAC.  Calculated RVSP 38 mmHg.  Myocardial perfusion PET stress test 9/4/2020.  LVEF greater than 70%.  Normal myocardial perfusion study without evidence of ischemia.  Impressions consistent with a low risk study.  Echocardiogram 3/18/2020.  LVEF 56-60%.  Hypokinesis of LV wall segments.  Mild aortic valve regurgitation.  No significant stenosis.  Mild mitral valve regurgitation.  Myocardial perfusion stress test 3/19/2023.  Normal myocardial perfusion study without evidence of ischemia.  " Impressions consistent with a low risk study.  Zio monitor 4/18/2023.  100% atrial fibrillation burden with heart ranging between 40-1 10 with average heart rate of 63.  Echocardiogram 10/25/2023.  Diastolic function was indeterminate.  Saline test results are negative.  Calculated RVSP 19 mmHg.       Lipid Panel          9/27/2023    11:09 10/24/2023    15:35 5/27/2024    14:37   Lipid Panel   Total Cholesterol  172  197    Total Cholesterol 180      Triglycerides 134  149  220    HDL Cholesterol 59  50  60    VLDL Cholesterol 23  26  37    LDL Cholesterol  98  96  100    LDL/HDL Ratio  1.84  1.55        Lab Results   Component Value Date     (L) 06/07/2024     (L) 06/06/2024    K 4.7 06/07/2024    K 4.7 06/06/2024    CL 95 (L) 06/07/2024    CL 94 (L) 06/06/2024    CO2 23.0 06/07/2024    CO2 26.0 06/06/2024    BUN 48 (H) 06/07/2024    BUN 46 (H) 06/06/2024    CREATININE 2.41 (H) 06/07/2024    CREATININE 2.49 (H) 06/06/2024    EGFRIFNONA 21 (L) 02/23/2022    EGFRIFNONA 26 (L) 01/29/2022    EGFRIFAFRI 25 (L) 02/23/2022    EGFRIFAFRI 33 (L) 08/31/2021    GLUCOSE 171 (H) 06/07/2024    GLUCOSE 116 (H) 06/06/2024    CALCIUM 9.5 06/07/2024    CALCIUM 9.7 06/06/2024    PROTENTOTREF 6.6 04/10/2024    PROTENTOTREF 7.0 09/27/2023    ALBUMIN 4.3 06/06/2024    ALBUMIN 4.2 05/29/2024    BILITOT 0.6 06/06/2024    BILITOT 0.8 05/27/2024    AST 18 06/06/2024    AST 16 05/27/2024    ALT 11 06/06/2024    ALT 10 05/27/2024     Lab Results   Component Value Date    WBC 4.07 06/07/2024    WBC 4.46 06/06/2024    HGB 9.7 (L) 06/07/2024    HGB 9.9 (L) 06/06/2024    HCT 30.4 (L) 06/07/2024    HCT 30.3 (L) 06/06/2024    MCV 93.8 06/07/2024    MCV 93.2 06/06/2024     06/07/2024     06/06/2024     Lab Results   Component Value Date    PROBNP 6,227.0 (H) 03/17/2023    PROBNP 1,208.0 05/30/2022     Lab Results   Component Value Date    CKTOTAL 52 09/27/2023    TROPONINT 34 (H) 05/28/2024     Lab Results   Component  Value Date    TSH 2.900 05/27/2024    TSH 2.050 09/27/2023             ASSESSMENT & PLAN     Diagnoses and all orders for this visit:    1. Permanent atrial fibrillation (Primary)  Overview:  CHADS-VASc Risk Assessment               5 Total Score    1 CHF    1 Hypertension    2 Age >/= 75    1 Sex: Female    Criteria that do not apply:    DM    PRIOR STROKE/TIA/THROMBO    Vascular Disease    Age 65-74              Assessment & Plan:  Heart rate controlled in the 50s-60s  No further episodes of fatigue, lightheadedness since discontinuation of beta-blocker, avoid AV node blockade agents in the future  Anticoagulated with apixaban and denies bleeding complications      2. Chronic coronary artery disease  Overview:  Cardiac catheterization 10/4/2015.  Takotsubo cardiomyopathy with severely elevated LV filling pressures.  Critical circumflex stenosis successfully treated with HAYLEY.  Myocardial perfusion stress test 3/19/2023.  Normal myocardial perfusion study without evidence of ischemia.  Impressions consistent with a low risk study.    Assessment & Plan:  Denies angina or dyspnea  Continue atorvastatin, no aspirin secondary to use of apixaban      3. Essential hypertension  Assessment & Plan:  BP controlled in clinic today at 140/80  Continue the following regimen:  Furosemide 20 mg/day as needed  HCTZ 12.5 mg/day      4. Bradycardia, drug induced  Assessment & Plan:  Secondary to beta-blockers, now discontinued          Return in about 6 months (around 12/13/2024) for Dr. Parmar[facility] follow up.    Future Appointments         Provider Department Center    6/14/2024 Kylie Lombardo RN [facility] HOME [facility]ARE [facility]     6/20/2024 Kylie Lombardo RN [facility]     6/27/2024 Kylie Lombardo RN [facility] HOME [facility]     7/3/2024 Kylie Lmobardo RN [facility] HOME [facility]     7/10/2024 10:00 AM [facility] PRIMARY [facility]    7/11/2024 Kylie Lombardo RN [facility] HOME [facility]ARE [facility]     7/17/2024 2:00 PM Con  MD Ken [facility] PRIMARY CARE [facility]    10/18/2024 1:30 PM REFERRED INJECTION CHAIR EP [facility] INFUSION [facility]              MEDICATIONS         Discharge Medications            Accurate as of June 13, 2024  1:26 PM. If you have any questions, ask your nurse or doctor.                Changes to Medications        Instructions Start Date   Eliquis 2.5 MG tablet tablet  Generic drug: apixaban  What changed: See the new instructions.   TAKE 1 TABLET EVERY 12 HOURS (TWICE A DAY)             Continue These Medications        Instructions Start Date   acetaminophen 500 MG tablet  Commonly known as: TYLENOL   500 mg, Oral, Every 4 Hours PRN      atorvastatin 20 MG tablet  Commonly known as: LIPITOR   20 mg, Oral, Nightly      escitalopram 20 MG tablet  Commonly known as: LEXAPRO   20 mg, Oral, Daily      fexofenadine 60 MG tablet  Commonly known as: ALLEGRA   60 mg, Oral, Daily      furosemide 20 MG tablet  Commonly known as: LASIX   20 mg, Oral, Daily PRN      Gemtesa 75 MG tablet  Generic drug: Vibegron   75 mg, Oral, Daily      hydroCHLOROthiazide 12.5 MG tablet   12.5 mg, Oral, Daily      magnesium oxide 400 MG tablet  Commonly known as: MAG-OX   400 mg, Oral, Daily      melatonin 5 MG tablet tablet   5 mg, Oral, Nightly PRN      methocarbamol 500 MG tablet  Commonly known as: ROBAXIN   500 mg, Oral, 3 Times Daily PRN      methylPREDNISolone 4 MG dose pack  Commonly known as: MEDROL   Take as directed on package instructions.      sodium bicarbonate 650 MG tablet   650 mg, Oral, 2 Times Daily                   **Dragon Disclaimer:   Much of this encounter note is an electronic transcription/translation of spoken language to printed text. The electronic translation of spoken language may permit erroneous, or at times, nonsensical words or phrases to be inadvertently transcribed. Although I have reviewed the note for such errors, some may still exist.

## 2024-06-13 NOTE — ASSESSMENT & PLAN NOTE
Heart rate controlled in the 50s-60s  No further episodes of fatigue, lightheadedness since discontinuation of beta-blocker, avoid AV node blockade agents in the future  Anticoagulated with apixaban and denies bleeding complications

## 2024-06-13 NOTE — ASSESSMENT & PLAN NOTE
BP controlled in clinic today at 140/80  Continue the following regimen:  Furosemide 20 mg/day as needed  HCTZ 12.5 mg/day

## 2024-06-14 ENCOUNTER — HOME CARE VISIT (OUTPATIENT)
Dept: HOME HEALTH SERVICES | Facility: HOME HEALTHCARE | Age: 89
End: 2024-06-14
Payer: MEDICARE

## 2024-06-14 PROCEDURE — G0493 RN CARE EA 15 MIN HH/HOSPICE: HCPCS

## 2024-06-18 ENCOUNTER — TELEPHONE (OUTPATIENT)
Dept: CARDIOLOGY | Facility: CLINIC | Age: 89
End: 2024-06-18
Payer: MEDICARE

## 2024-06-18 NOTE — TELEPHONE ENCOUNTER
----- Message from Brook Nieto sent at 6/18/2024  9:13 AM EDT -----  Please let patient know that her heart monitor reveals that she is persistently in atrial flutter however her heart rate is controlled.  She is asymptomatic.  No further medication changes or testing is warranted at this time.

## 2024-06-18 NOTE — TELEPHONE ENCOUNTER
Called and left VM, will continue to try to reach pt.    HUB- please put patient straight through to triage    Waleska Sharif, RN  Triage RN  06/18/24 09:52 EDT

## 2024-06-19 ENCOUNTER — HOME CARE VISIT (OUTPATIENT)
Dept: HOME HEALTH SERVICES | Facility: HOME HEALTHCARE | Age: 89
End: 2024-06-19
Payer: MEDICARE

## 2024-06-19 VITALS
OXYGEN SATURATION: 98 % | BODY MASS INDEX: 23.98 KG/M2 | TEMPERATURE: 98.3 F | HEART RATE: 100 BPM | DIASTOLIC BLOOD PRESSURE: 68 MMHG | WEIGHT: 131.13 LBS | SYSTOLIC BLOOD PRESSURE: 130 MMHG | RESPIRATION RATE: 20 BRPM

## 2024-06-19 PROCEDURE — G0299 HHS/HOSPICE OF RN EA 15 MIN: HCPCS

## 2024-06-19 PROCEDURE — G0495 RN CARE TRAIN/EDU IN HH: HCPCS

## 2024-06-19 NOTE — TELEPHONE ENCOUNTER
Results and recommendations called to pt's daughter Silvia.  Instructed to call with any further questions or concerns.  Verbalized understanding.    Waleska Sharif RN  Triage Nurse, [facility] [place] [facility]  06/19/24 09:30 EDT

## 2024-06-28 ENCOUNTER — HOME CARE VISIT (OUTPATIENT)
Dept: HOME HEALTH SERVICES | Facility: HOME HEALTHCARE | Age: 89
End: 2024-06-28
Payer: MEDICARE

## 2024-06-28 VITALS
WEIGHT: 132 LBS | OXYGEN SATURATION: 99 % | BODY MASS INDEX: 24.14 KG/M2 | TEMPERATURE: 98.8 F | DIASTOLIC BLOOD PRESSURE: 62 MMHG | RESPIRATION RATE: 20 BRPM | HEART RATE: 64 BPM | SYSTOLIC BLOOD PRESSURE: 130 MMHG

## 2024-06-28 PROCEDURE — G0162 HHC RN E&M PLAN SVS, 15 MIN: HCPCS

## 2024-07-03 ENCOUNTER — HOME CARE VISIT (OUTPATIENT)
Dept: HOME HEALTH SERVICES | Facility: HOME HEALTHCARE | Age: 89
End: 2024-07-03
Payer: MEDICARE

## 2024-07-03 ENCOUNTER — TELEPHONE (OUTPATIENT)
Dept: FAMILY MEDICINE CLINIC | Facility: CLINIC | Age: 89
End: 2024-07-03
Payer: MEDICARE

## 2024-07-03 VITALS
BODY MASS INDEX: 23.96 KG/M2 | HEART RATE: 84 BPM | DIASTOLIC BLOOD PRESSURE: 74 MMHG | WEIGHT: 131 LBS | SYSTOLIC BLOOD PRESSURE: 142 MMHG | TEMPERATURE: 97.9 F | OXYGEN SATURATION: 99 % | RESPIRATION RATE: 20 BRPM

## 2024-07-03 DIAGNOSIS — R30.0 DYSURIA: Primary | ICD-10-CM

## 2024-07-03 PROCEDURE — G0493 RN CARE EA 15 MIN HH/HOSPICE: HCPCS

## 2024-07-03 NOTE — TELEPHONE ENCOUNTER
Caller: [facility]    Relationship:     Best call back number: [phone]     What orders are you requesting (i.e. lab or imaging): URINALYSIS    Where will you receive your lab/imaging services: [facility] COLLECTS AND DROPS AT LAB    Additional notes: PATIENT IS HAVING BURNING AND DIFFICULTY TO URINATE.    GURU FROM [facility] WANTED TO SEE IF YOU CAN PLEASE PUT AN ORDER IN TODAY SO THEY CAN GO AHEAD.

## 2024-07-04 ENCOUNTER — LAB REQUISITION (OUTPATIENT)
Dept: LAB | Facility: HOSPITAL | Age: 89
End: 2024-07-04
Payer: MEDICARE

## 2024-07-04 ENCOUNTER — HOME CARE VISIT (OUTPATIENT)
Dept: HOME HEALTH SERVICES | Facility: HOME HEALTHCARE | Age: 89
End: 2024-07-04
Payer: MEDICARE

## 2024-07-04 VITALS
TEMPERATURE: 98.8 F | WEIGHT: 131 LBS | OXYGEN SATURATION: 98 % | BODY MASS INDEX: 23.96 KG/M2 | SYSTOLIC BLOOD PRESSURE: 140 MMHG | RESPIRATION RATE: 20 BRPM | HEART RATE: 68 BPM | DIASTOLIC BLOOD PRESSURE: 72 MMHG

## 2024-07-04 DIAGNOSIS — N39.0 URINARY TRACT INFECTION, SITE NOT SPECIFIED: ICD-10-CM

## 2024-07-04 LAB
BACTERIA UR QL AUTO: ABNORMAL /HPF
BILIRUB UR QL STRIP: NEGATIVE
CLARITY UR: CLEAR
COLOR UR: YELLOW
GLUCOSE UR STRIP-MCNC: NEGATIVE MG/DL
HGB UR QL STRIP.AUTO: NEGATIVE
HYALINE CASTS UR QL AUTO: ABNORMAL /LPF
KETONES UR QL STRIP: NEGATIVE
LEUKOCYTE ESTERASE UR QL STRIP.AUTO: ABNORMAL
NITRITE UR QL STRIP: NEGATIVE
PH UR STRIP.AUTO: 8 [PH] (ref 5–8)
PROT UR QL STRIP: ABNORMAL
RBC # UR STRIP: ABNORMAL /HPF
REF LAB TEST METHOD: ABNORMAL
SP GR UR STRIP: 1.02 (ref 1–1.03)
SQUAMOUS #/AREA URNS HPF: ABNORMAL /HPF
UROBILINOGEN UR QL STRIP: ABNORMAL
WBC # UR STRIP: ABNORMAL /HPF

## 2024-07-04 PROCEDURE — 87086 URINE CULTURE/COLONY COUNT: CPT | Performed by: FAMILY MEDICINE

## 2024-07-04 PROCEDURE — 81001 URINALYSIS AUTO W/SCOPE: CPT | Performed by: FAMILY MEDICINE

## 2024-07-04 PROCEDURE — G0299 HHS/HOSPICE OF RN EA 15 MIN: HCPCS

## 2024-07-06 LAB — BACTERIA SPEC AEROBE CULT: NORMAL

## 2024-07-08 RX ORDER — AMOXICILLIN 250 MG/1
250 CAPSULE ORAL 2 TIMES DAILY
Qty: 14 CAPSULE | Refills: 0 | Status: ON HOLD | OUTPATIENT
Start: 2024-07-08 | End: 2024-07-11

## 2024-07-10 ENCOUNTER — APPOINTMENT (OUTPATIENT)
Dept: GENERAL RADIOLOGY | Facility: HOSPITAL | Age: 89
End: 2024-07-10
Payer: MEDICARE

## 2024-07-10 ENCOUNTER — DOCUMENTATION (OUTPATIENT)
Dept: FAMILY MEDICINE CLINIC | Facility: CLINIC | Age: 89
End: 2024-07-10
Payer: MEDICARE

## 2024-07-10 ENCOUNTER — HOSPITAL ENCOUNTER (OUTPATIENT)
Facility: HOSPITAL | Age: 89
Setting detail: OBSERVATION
Discharge: HOME HEALTH | End: 2024-07-12
Attending: EMERGENCY MEDICINE | Admitting: STUDENT IN AN ORGANIZED HEALTH CARE EDUCATION/TRAINING PROGRAM
Payer: MEDICARE

## 2024-07-10 DIAGNOSIS — N18.9 CHRONIC KIDNEY DISEASE, UNSPECIFIED CKD STAGE: ICD-10-CM

## 2024-07-10 DIAGNOSIS — E87.1 HYPONATREMIA: Primary | ICD-10-CM

## 2024-07-10 DIAGNOSIS — R06.09 DYSPNEA ON EXERTION: ICD-10-CM

## 2024-07-10 DIAGNOSIS — Z86.79 HISTORY OF ATRIAL FIBRILLATION: ICD-10-CM

## 2024-07-10 DIAGNOSIS — R11.0 NAUSEA: ICD-10-CM

## 2024-07-10 DIAGNOSIS — R54 AGE-RELATED PHYSICAL DEBILITY: ICD-10-CM

## 2024-07-10 DIAGNOSIS — R79.89 ELEVATED TROPONIN: ICD-10-CM

## 2024-07-10 DIAGNOSIS — R53.1 GENERALIZED WEAKNESS: ICD-10-CM

## 2024-07-10 PROBLEM — R06.02 SHORTNESS OF BREATH: Status: ACTIVE | Noted: 2024-07-10

## 2024-07-10 LAB
ABO GROUP BLD: NORMAL
ALBUMIN SERPL-MCNC: 4.2 G/DL (ref 3.5–5.2)
ALBUMIN/GLOB SERPL: 1.6 G/DL
ALP SERPL-CCNC: 80 U/L (ref 39–117)
ALT SERPL W P-5'-P-CCNC: 22 U/L (ref 1–33)
ANION GAP SERPL CALCULATED.3IONS-SCNC: 12.7 MMOL/L (ref 5–15)
APTT PPP: 26.2 SECONDS (ref 22.7–35.4)
AST SERPL-CCNC: 22 U/L (ref 1–32)
B PARAPERT DNA SPEC QL NAA+PROBE: NOT DETECTED
B PERT DNA SPEC QL NAA+PROBE: NOT DETECTED
BACTERIA UR QL AUTO: NORMAL /HPF
BASOPHILS # BLD AUTO: 0.02 10*3/MM3 (ref 0–0.2)
BASOPHILS NFR BLD AUTO: 0.3 % (ref 0–1.5)
BILIRUB SERPL-MCNC: 0.5 MG/DL (ref 0–1.2)
BILIRUB UR QL STRIP: NEGATIVE
BLD GP AB SCN SERPL QL: NEGATIVE
BUN SERPL-MCNC: 49 MG/DL (ref 8–23)
BUN/CREAT SERPL: 20.5 (ref 7–25)
C PNEUM DNA NPH QL NAA+NON-PROBE: NOT DETECTED
CALCIUM SPEC-SCNC: 9.3 MG/DL (ref 8.6–10.5)
CHLORIDE SERPL-SCNC: 95 MMOL/L (ref 98–107)
CK SERPL-CCNC: 37 U/L (ref 20–180)
CLARITY UR: CLEAR
CO2 SERPL-SCNC: 23.3 MMOL/L (ref 22–29)
COLOR UR: YELLOW
CREAT SERPL-MCNC: 2.39 MG/DL (ref 0.57–1)
D-LACTATE SERPL-SCNC: 1.2 MMOL/L (ref 0.5–2)
DEPRECATED RDW RBC AUTO: 46.4 FL (ref 37–54)
EGFRCR SERPLBLD CKD-EPI 2021: 19.3 ML/MIN/1.73
EOSINOPHIL # BLD AUTO: 0.06 10*3/MM3 (ref 0–0.4)
EOSINOPHIL NFR BLD AUTO: 0.8 % (ref 0.3–6.2)
ERYTHROCYTE [DISTWIDTH] IN BLOOD BY AUTOMATED COUNT: 13.9 % (ref 12.3–15.4)
FLUAV SUBTYP SPEC NAA+PROBE: NOT DETECTED
FLUBV RNA ISLT QL NAA+PROBE: NOT DETECTED
GEN 5 2HR TROPONIN T REFLEX: 43 NG/L
GLOBULIN UR ELPH-MCNC: 2.6 GM/DL
GLUCOSE SERPL-MCNC: 95 MG/DL (ref 65–99)
GLUCOSE UR STRIP-MCNC: NEGATIVE MG/DL
HADV DNA SPEC NAA+PROBE: NOT DETECTED
HCOV 229E RNA SPEC QL NAA+PROBE: NOT DETECTED
HCOV HKU1 RNA SPEC QL NAA+PROBE: NOT DETECTED
HCOV NL63 RNA SPEC QL NAA+PROBE: NOT DETECTED
HCOV OC43 RNA SPEC QL NAA+PROBE: NOT DETECTED
HCT VFR BLD AUTO: 33.2 % (ref 34–46.6)
HGB BLD-MCNC: 11 G/DL (ref 12–15.9)
HGB UR QL STRIP.AUTO: NEGATIVE
HMPV RNA NPH QL NAA+NON-PROBE: NOT DETECTED
HPIV1 RNA ISLT QL NAA+PROBE: NOT DETECTED
HPIV2 RNA SPEC QL NAA+PROBE: NOT DETECTED
HPIV3 RNA NPH QL NAA+PROBE: NOT DETECTED
HPIV4 P GENE NPH QL NAA+PROBE: NOT DETECTED
HYALINE CASTS UR QL AUTO: NORMAL /LPF
IMM GRANULOCYTES # BLD AUTO: 0.08 10*3/MM3 (ref 0–0.05)
IMM GRANULOCYTES NFR BLD AUTO: 1.1 % (ref 0–0.5)
INR PPP: 1.16 (ref 0.9–1.1)
KETONES UR QL STRIP: NEGATIVE
LEUKOCYTE ESTERASE UR QL STRIP.AUTO: ABNORMAL
LIPASE SERPL-CCNC: 58 U/L (ref 13–60)
LYMPHOCYTES # BLD AUTO: 1.13 10*3/MM3 (ref 0.7–3.1)
LYMPHOCYTES NFR BLD AUTO: 14.9 % (ref 19.6–45.3)
M PNEUMO IGG SER IA-ACNC: NOT DETECTED
MAGNESIUM SERPL-MCNC: 3.2 MG/DL (ref 1.6–2.4)
MCH RBC QN AUTO: 30.4 PG (ref 26.6–33)
MCHC RBC AUTO-ENTMCNC: 33.1 G/DL (ref 31.5–35.7)
MCV RBC AUTO: 91.7 FL (ref 79–97)
MONOCYTES # BLD AUTO: 0.7 10*3/MM3 (ref 0.1–0.9)
MONOCYTES NFR BLD AUTO: 9.2 % (ref 5–12)
NEUTROPHILS NFR BLD AUTO: 5.59 10*3/MM3 (ref 1.7–7)
NEUTROPHILS NFR BLD AUTO: 73.7 % (ref 42.7–76)
NITRITE UR QL STRIP: NEGATIVE
NRBC BLD AUTO-RTO: 0 /100 WBC (ref 0–0.2)
NT-PROBNP SERPL-MCNC: 2035 PG/ML (ref 0–1800)
PH UR STRIP.AUTO: 7 [PH] (ref 5–8)
PLATELET # BLD AUTO: 229 10*3/MM3 (ref 140–450)
PMV BLD AUTO: 9.1 FL (ref 6–12)
POTASSIUM SERPL-SCNC: 5.1 MMOL/L (ref 3.5–5.2)
PROCALCITONIN SERPL-MCNC: 0.09 NG/ML (ref 0–0.25)
PROT SERPL-MCNC: 6.8 G/DL (ref 6–8.5)
PROT UR QL STRIP: ABNORMAL
PROTHROMBIN TIME: 15 SECONDS (ref 11.7–14.2)
RBC # BLD AUTO: 3.62 10*6/MM3 (ref 3.77–5.28)
RBC # UR STRIP: NORMAL /HPF
REF LAB TEST METHOD: NORMAL
RH BLD: POSITIVE
RHINOVIRUS RNA SPEC NAA+PROBE: NOT DETECTED
RSV RNA NPH QL NAA+NON-PROBE: NOT DETECTED
SARS-COV-2 RNA NPH QL NAA+NON-PROBE: NOT DETECTED
SODIUM SERPL-SCNC: 131 MMOL/L (ref 136–145)
SP GR UR STRIP: 1.01 (ref 1–1.03)
SQUAMOUS #/AREA URNS HPF: NORMAL /HPF
T&S EXPIRATION DATE: NORMAL
TROPONIN T DELTA: -11 NG/L
TROPONIN T SERPL HS-MCNC: 54 NG/L
TSH SERPL DL<=0.05 MIU/L-ACNC: 1.77 UIU/ML (ref 0.27–4.2)
UROBILINOGEN UR QL STRIP: ABNORMAL
WBC # UR STRIP: NORMAL /HPF
WBC NRBC COR # BLD AUTO: 7.58 10*3/MM3 (ref 3.4–10.8)

## 2024-07-10 PROCEDURE — 84145 PROCALCITONIN (PCT): CPT | Performed by: EMERGENCY MEDICINE

## 2024-07-10 PROCEDURE — 96374 THER/PROPH/DIAG INJ IV PUSH: CPT

## 2024-07-10 PROCEDURE — 93010 ELECTROCARDIOGRAM REPORT: CPT | Performed by: INTERNAL MEDICINE

## 2024-07-10 PROCEDURE — 85025 COMPLETE CBC W/AUTO DIFF WBC: CPT | Performed by: EMERGENCY MEDICINE

## 2024-07-10 PROCEDURE — 84443 ASSAY THYROID STIM HORMONE: CPT | Performed by: EMERGENCY MEDICINE

## 2024-07-10 PROCEDURE — G0378 HOSPITAL OBSERVATION PER HR: HCPCS

## 2024-07-10 PROCEDURE — 83605 ASSAY OF LACTIC ACID: CPT | Performed by: EMERGENCY MEDICINE

## 2024-07-10 PROCEDURE — 25010000002 ONDANSETRON PER 1 MG: Performed by: EMERGENCY MEDICINE

## 2024-07-10 PROCEDURE — 86900 BLOOD TYPING SEROLOGIC ABO: CPT | Performed by: EMERGENCY MEDICINE

## 2024-07-10 PROCEDURE — 36415 COLL VENOUS BLD VENIPUNCTURE: CPT

## 2024-07-10 PROCEDURE — 86901 BLOOD TYPING SEROLOGIC RH(D): CPT | Performed by: EMERGENCY MEDICINE

## 2024-07-10 PROCEDURE — 81001 URINALYSIS AUTO W/SCOPE: CPT | Performed by: EMERGENCY MEDICINE

## 2024-07-10 PROCEDURE — 83735 ASSAY OF MAGNESIUM: CPT | Performed by: EMERGENCY MEDICINE

## 2024-07-10 PROCEDURE — 25810000003 SODIUM CHLORIDE 0.9 % SOLUTION: Performed by: EMERGENCY MEDICINE

## 2024-07-10 PROCEDURE — 80053 COMPREHEN METABOLIC PANEL: CPT | Performed by: EMERGENCY MEDICINE

## 2024-07-10 PROCEDURE — 83880 ASSAY OF NATRIURETIC PEPTIDE: CPT | Performed by: EMERGENCY MEDICINE

## 2024-07-10 PROCEDURE — 84550 ASSAY OF BLOOD/URIC ACID: CPT

## 2024-07-10 PROCEDURE — 85610 PROTHROMBIN TIME: CPT | Performed by: EMERGENCY MEDICINE

## 2024-07-10 PROCEDURE — 0202U NFCT DS 22 TRGT SARS-COV-2: CPT | Performed by: EMERGENCY MEDICINE

## 2024-07-10 PROCEDURE — 99285 EMERGENCY DEPT VISIT HI MDM: CPT

## 2024-07-10 PROCEDURE — 83690 ASSAY OF LIPASE: CPT | Performed by: EMERGENCY MEDICINE

## 2024-07-10 PROCEDURE — 71045 X-RAY EXAM CHEST 1 VIEW: CPT

## 2024-07-10 PROCEDURE — 85730 THROMBOPLASTIN TIME PARTIAL: CPT | Performed by: EMERGENCY MEDICINE

## 2024-07-10 PROCEDURE — 82550 ASSAY OF CK (CPK): CPT | Performed by: EMERGENCY MEDICINE

## 2024-07-10 PROCEDURE — 93005 ELECTROCARDIOGRAM TRACING: CPT | Performed by: EMERGENCY MEDICINE

## 2024-07-10 PROCEDURE — 86850 RBC ANTIBODY SCREEN: CPT | Performed by: EMERGENCY MEDICINE

## 2024-07-10 PROCEDURE — 84484 ASSAY OF TROPONIN QUANT: CPT | Performed by: EMERGENCY MEDICINE

## 2024-07-10 RX ORDER — BISACODYL 5 MG/1
5 TABLET, DELAYED RELEASE ORAL DAILY PRN
Status: DISCONTINUED | OUTPATIENT
Start: 2024-07-10 | End: 2024-07-12 | Stop reason: HOSPADM

## 2024-07-10 RX ORDER — ACETAMINOPHEN 325 MG/1
650 TABLET ORAL EVERY 4 HOURS PRN
Status: DISCONTINUED | OUTPATIENT
Start: 2024-07-10 | End: 2024-07-12 | Stop reason: HOSPADM

## 2024-07-10 RX ORDER — SODIUM CHLORIDE 0.9 % (FLUSH) 0.9 %
10 SYRINGE (ML) INJECTION AS NEEDED
Status: DISCONTINUED | OUTPATIENT
Start: 2024-07-10 | End: 2024-07-12 | Stop reason: HOSPADM

## 2024-07-10 RX ORDER — AMOXICILLIN 250 MG
2 CAPSULE ORAL 2 TIMES DAILY PRN
Status: DISCONTINUED | OUTPATIENT
Start: 2024-07-10 | End: 2024-07-12 | Stop reason: HOSPADM

## 2024-07-10 RX ORDER — ONDANSETRON 2 MG/ML
4 INJECTION INTRAMUSCULAR; INTRAVENOUS ONCE
Status: COMPLETED | OUTPATIENT
Start: 2024-07-10 | End: 2024-07-10

## 2024-07-10 RX ORDER — POLYETHYLENE GLYCOL 3350 17 G/17G
17 POWDER, FOR SOLUTION ORAL DAILY PRN
Status: DISCONTINUED | OUTPATIENT
Start: 2024-07-10 | End: 2024-07-12 | Stop reason: HOSPADM

## 2024-07-10 RX ORDER — ONDANSETRON 2 MG/ML
4 INJECTION INTRAMUSCULAR; INTRAVENOUS EVERY 6 HOURS PRN
Status: DISCONTINUED | OUTPATIENT
Start: 2024-07-10 | End: 2024-07-12 | Stop reason: HOSPADM

## 2024-07-10 RX ORDER — ACETAMINOPHEN 160 MG/5ML
650 SOLUTION ORAL EVERY 4 HOURS PRN
Status: DISCONTINUED | OUTPATIENT
Start: 2024-07-10 | End: 2024-07-12 | Stop reason: HOSPADM

## 2024-07-10 RX ORDER — ACETAMINOPHEN 650 MG/1
650 SUPPOSITORY RECTAL EVERY 4 HOURS PRN
Status: DISCONTINUED | OUTPATIENT
Start: 2024-07-10 | End: 2024-07-12 | Stop reason: HOSPADM

## 2024-07-10 RX ORDER — NITROGLYCERIN 0.4 MG/1
0.4 TABLET SUBLINGUAL
Status: DISCONTINUED | OUTPATIENT
Start: 2024-07-10 | End: 2024-07-12 | Stop reason: HOSPADM

## 2024-07-10 RX ORDER — BISACODYL 10 MG
10 SUPPOSITORY, RECTAL RECTAL DAILY PRN
Status: DISCONTINUED | OUTPATIENT
Start: 2024-07-10 | End: 2024-07-12 | Stop reason: HOSPADM

## 2024-07-10 RX ORDER — ONDANSETRON 4 MG/1
4 TABLET, ORALLY DISINTEGRATING ORAL EVERY 6 HOURS PRN
Status: DISCONTINUED | OUTPATIENT
Start: 2024-07-10 | End: 2024-07-12 | Stop reason: HOSPADM

## 2024-07-10 RX ADMIN — ONDANSETRON 4 MG: 2 INJECTION INTRAMUSCULAR; INTRAVENOUS at 19:32

## 2024-07-10 RX ADMIN — SODIUM CHLORIDE 500 ML: 9 INJECTION, SOLUTION INTRAVENOUS at 19:32

## 2024-07-10 NOTE — ED PROVIDER NOTES
EMERGENCY DEPARTMENT ENCOUNTER    Room Number:  N333/1  PCP: Ken Andujar MD  Independent Historians: Patient    HPI:  Chief Complaint: had concerns including Shortness of Breath and Weakness - Generalized.      A complete HPI/ROS/PMH/PSH/SH/FH are unobtainable due to: None    Chronic or social conditions impacting patient care (Social Determinants of Health): None      Context: Marleni Hummel is a 88 y.o. female with a medical history of A-fib on Eliquis, left nephrectomy due to renal cell carcinoma, CKD, hypertension, hyperlipidemia, anemia, and TIA who presents to the ED c/o acute nausea with dry heaves, shortness of breath, and generalized weakness.  Patient says she has not felt herself since Memorial Day.  She is gradually gotten more weak and fatigued and says she has no energy to do anything.  Patient is on day 3 of 4 of amoxicillin for a urinary tract infection.  She said her doctor called her after submitting a specimen and told her she might have a UTI.  Patient does endorse having dysuria but says it is a little bit improved after starting the antibiotic.  Patient denies any fever or chills.  Patient denies any diarrhea, abdominal pain, back pain.  Patient denies any headaches or falls.  Patient denies any chest pain or palpitations.        Review of prior external notes (non-ED) -and- Review of prior external test results outside of this encounter: I reviewed last office note June 13 with Dr. Parmar with cardiology.  Patient had a heart cath in October 2015 showing Takotsubo cardiomyopathy.  Patient's most recent echocardiogram showed an EF that had recovered but indeterminate diastolic function    Last echocardiogram from October 2023.    Prescription drug monitoring program review:     N/A    PAST MEDICAL HISTORY  Active Ambulatory Problems     Diagnosis Date Noted    Arthritis involving multiple sites     Essential hypertension     Permanent atrial fibrillation     History of Bell's palsy     CKD  (chronic kidney disease) stage 4, GFR 15-29 ml/min     Gastroesophageal reflux disease without esophagitis     Hypercholesterolemia     Insomnia     Localized osteoporosis without current pathological fracture     Panic disorder     Chronic nonseasonal allergic rhinitis due to pollen     Other sleep apnea     History of MI (myocardial infarction) 12/21/2015    Chronic coronary artery disease 01/25/2016    Anemia of chronic disease 09/12/2016    Weakness of right leg 03/13/2017    Cardiac murmur 05/04/2017    Senile osteoporosis 06/30/2017    Hyperuricemia 09/07/2017    Grief reaction 09/30/2019    Peripheral vertigo 02/25/2020    Renal cell carcinoma of left kidney 11/22/2020    Renal oncocytoma of left kidney 12/28/2020    History of left nephrectomy 04/19/2021    Cerebrovascular accident (CVA) due to stenosis of small artery 11/29/2021    History of renal cell carcinoma 11/30/2021    TIA (transient ischemic attack) 11/30/2021    Malignant neoplasm of left kidney, except renal pelvis 06/07/2022    Diverticulosis 12/14/2022    Irritable bowel syndrome with constipation 12/14/2022    Chronic diastolic congestive heart failure 03/18/2023    Hallucination, visual 01/17/2024    Hypomagnesemia 05/02/2024    Bradycardia, drug induced 05/27/2024    Lumbar disc disease with radiculopathy 12/02/2021     Resolved Ambulatory Problems     Diagnosis Date Noted    Fatigue     Hip arthritis     IFG (impaired fasting glucose)     Rectal bleeding     Vitamin D deficiency     Urinary incontinence     History of right hip replacement 12/21/2015    Closed fracture of neck of right femur with routine healing 12/21/2015    History of right knee joint replacement 12/21/2015    History of left knee replacement 12/21/2015    Stress-induced cardiomyopathy 01/25/2016    Elevated serum free T4 level 03/21/2016    Urinary tract infection 10/05/2016    Acute pyelonephritis 10/06/2016    Acute cystitis 12/03/2016    Hematuria 12/12/2016     Sinusitis 01/22/2017    Frequent falls 03/13/2017    Atrial fibrillation with RVR 06/19/2017    ANGY (acute kidney injury) 06/20/2017    Viral gastroenteritis 06/20/2017    Dehydration 06/20/2017    Nausea & vomiting 06/20/2017    Diarrhea 06/20/2017    Closed displaced fracture of left femoral neck 08/30/2017    Bacteriuria 08/30/2017    New daily persistent headache 12/17/2018    Muscle tension headache 04/03/2019    Caregiver stress syndrome 04/17/2019    Acute vulvitis 08/21/2019    Dizziness 02/23/2020    Left facial numbness 02/23/2020    Stroke-like symptoms 02/23/2020    Left kidney mass 08/17/2020    Left renal mass 11/16/2020    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    Acute kidney injury 05/30/2022    Acute bronchitis due to Rhinovirus 05/31/2022    Hyperkalemia 05/31/2022    Diverticulitis 12/14/2022    CARROLL (dyspnea on exertion) 03/17/2023     Past Medical History:   Diagnosis Date    Allergic     Allergic rhinitis     Arthropathy of knee     Atrial fibrillation     Back pain     Bell's palsy     Bradycardia 05/27/2024    Chronic kidney disease (CKD), stage III (moderate)     Edema     Encounter for eye exam 09/2020    GERD (gastroesophageal reflux disease)     H/O Heart murmur     Heart attack 10/05/2015    Herpes zoster without complication     History of bone density study 02/28/2008    History of pelvic fracture 2015    History of pneumonia     History of transfusion     Limited joint range of motion     Mixed hyperlipidemia     Osteoarthritis     Osteoporosis     PONV (postoperative nausea and vomiting)     Sleep apnea     Spinal stenosis, lumbar region with neurogenic claudication 12/02/2021    Stress          PAST SURGICAL HISTORY  Past Surgical History:   Procedure Laterality Date    CATARACT EXTRACTION Left 04/01/2013    Dr. Clarke    CATARACT EXTRACTION Right 04/16/2013    Dr. Clarke    COLONOSCOPY  04/18/2014    Dr. Elizabeth; no polyps    EPIDURAL BLOCK      HIP PERCUTANEOUS PINNING Left 8/31/2017     Procedure: LT HIP PERCUTANEOUS PINNING;  Surgeon: Sean Canales MD;  Location: [facility] OR;  Service:     MAMMO BILATERAL  2013    NEPHRECTOMY Left 2020    Procedure: LAPAROSCOPIC NEPHRECTOMY;  Surgeon: Chuckie Mclaughlin MD;  Location: [facility] OR;  Service: Urology;  Laterality: Left;    PAP SMEAR  2011    TIBIA FRACTURE SURGERY Right     REMOVED PLATE LATER IN     TONSILLECTOMY  1947    TOTAL HIP ARTHROPLASTY Right 2015    TOTAL HIP ARTHROPLASTY Right 2016    TOTAL KNEE ARTHROPLASTY Bilateral 2004         FAMILY HISTORY  Family History   Problem Relation Age of Onset    Hypertension Other     Hypertension Mother     Stroke Mother     Heart disease Mother     Hypertension Maternal Grandmother     Malig Hyperthermia Neg Hx          SOCIAL HISTORY  Social History     Socioeconomic History    Marital status:     Years of education: High school   Tobacco Use    Smoking status: Former     Current packs/day: 0.00     Average packs/day: 1 pack/day for 3.0 years (3.0 ttl pk-yrs)     Types: Cigarettes     Start date: 1953     Quit date: 1956     Years since quittin.4     Passive exposure: Past    Smokeless tobacco: Never    Tobacco comments:     caffeine - 1/2 cup coffee daily    Vaping Use    Vaping status: Never Used   Substance and Sexual Activity    Alcohol use: Not Currently     Alcohol/week: 3.0 standard drinks of alcohol     Types: 3 Glasses of wine per week     Comment: couple times a week    Drug use: Never    Sexual activity: Not Currently         ALLERGIES  Morphine, Oxycodone, Ace inhibitors, Bactrim [sulfamethoxazole-trimethoprim], Levofloxacin, and Torsemide        REVIEW OF SYSTEMS  Review of Systems  Included in HPI  All systems reviewed and negative except for those discussed in HPI.      PHYSICAL EXAM    I have reviewed the triage vital signs and nursing notes.    ED Triage Vitals [07/10/24 1806]   Temp Heart Rate Resp BP SpO2   98.9 °F  (37.2 °C) 100 22 -- 99 %      Temp src Heart Rate Source Patient Position BP Location FiO2 (%)   Tympanic Monitor -- -- --       Physical Exam  GENERAL: Pleasant cooperative female conversant, alert, no acute distress  SKIN: Warm, dry, generalized pallor  HENT: Normocephalic, atraumatic  EYES: no scleral icterus  CV: irregular rhythm, regular rate  RESPIRATORY: normal effort, manage at the bases with no wheezing and no rhonchi  ABDOMEN: soft, nontender, nondistended  MUSCULOSKELETAL: no deformity  NEURO: alert, moves all extremities, follows commands                                                                LAB RESULTS  Recent Results (from the past 24 hour(s))   Respiratory Panel PCR w/COVID-19(SARS-CoV-2) HUGH/HANNAH/KELLI/PAD/COR/KERRY In-House, NP Swab in UTM/VTM, 2 HR TAT - Swab, Nasopharynx    Collection Time: 07/10/24  6:49 PM    Specimen: Nasopharynx; Swab   Result Value Ref Range    ADENOVIRUS, PCR Not Detected Not Detected    Coronavirus 229E Not Detected Not Detected    Coronavirus HKU1 Not Detected Not Detected    Coronavirus NL63 Not Detected Not Detected    Coronavirus OC43 Not Detected Not Detected    COVID19 Not Detected Not Detected - Ref. Range    Human Metapneumovirus Not Detected Not Detected    Human Rhinovirus/Enterovirus Not Detected Not Detected    Influenza A PCR Not Detected Not Detected    Influenza B PCR Not Detected Not Detected    Parainfluenza Virus 1 Not Detected Not Detected    Parainfluenza Virus 2 Not Detected Not Detected    Parainfluenza Virus 3 Not Detected Not Detected    Parainfluenza Virus 4 Not Detected Not Detected    RSV, PCR Not Detected Not Detected    Bordetella pertussis pcr Not Detected Not Detected    Bordetella parapertussis PCR Not Detected Not Detected    Chlamydophila pneumoniae PCR Not Detected Not Detected    Mycoplasma pneumo by PCR Not Detected Not Detected   ECG 12 Lead Dyspnea    Collection Time: 07/10/24  6:49 PM   Result Value Ref Range    QT Interval 388 ms     QTC Interval 416 ms   Comprehensive Metabolic Panel    Collection Time: 07/10/24  7:07 PM    Specimen: Arm, Right; Blood   Result Value Ref Range    Glucose 95 65 - 99 mg/dL    BUN 49 (H) 8 - 23 mg/dL    Creatinine 2.39 (H) 0.57 - 1.00 mg/dL    Sodium 131 (L) 136 - 145 mmol/L    Potassium 5.1 3.5 - 5.2 mmol/L    Chloride 95 (L) 98 - 107 mmol/L    CO2 23.3 22.0 - 29.0 mmol/L    Calcium 9.3 8.6 - 10.5 mg/dL    Total Protein 6.8 6.0 - 8.5 g/dL    Albumin 4.2 3.5 - 5.2 g/dL    ALT (SGPT) 22 1 - 33 U/L    AST (SGOT) 22 1 - 32 U/L    Alkaline Phosphatase 80 39 - 117 U/L    Total Bilirubin 0.5 0.0 - 1.2 mg/dL    Globulin 2.6 gm/dL    A/G Ratio 1.6 g/dL    BUN/Creatinine Ratio 20.5 7.0 - 25.0    Anion Gap 12.7 5.0 - 15.0 mmol/L    eGFR 19.3 (L) >60.0 mL/min/1.73   Protime-INR    Collection Time: 07/10/24  7:07 PM    Specimen: Arm, Right; Blood   Result Value Ref Range    Protime 15.0 (H) 11.7 - 14.2 Seconds    INR 1.16 (H) 0.90 - 1.10   aPTT    Collection Time: 07/10/24  7:07 PM    Specimen: Arm, Right; Blood   Result Value Ref Range    PTT 26.2 22.7 - 35.4 seconds   Lipase    Collection Time: 07/10/24  7:07 PM    Specimen: Arm, Right; Blood   Result Value Ref Range    Lipase 58 13 - 60 U/L   BNP    Collection Time: 07/10/24  7:07 PM    Specimen: Arm, Right; Blood   Result Value Ref Range    proBNP 2,035.0 (H) 0.0 - 1,800.0 pg/mL   High Sensitivity Troponin T    Collection Time: 07/10/24  7:07 PM    Specimen: Arm, Right; Blood   Result Value Ref Range    HS Troponin T 54 (C) <14 ng/L   Lactic Acid, Plasma    Collection Time: 07/10/24  7:07 PM    Specimen: Arm, Right; Blood   Result Value Ref Range    Lactate 1.2 0.5 - 2.0 mmol/L   Procalcitonin    Collection Time: 07/10/24  7:07 PM    Specimen: Arm, Right; Blood   Result Value Ref Range    Procalcitonin 0.09 0.00 - 0.25 ng/mL   Type & Screen    Collection Time: 07/10/24  7:07 PM    Specimen: Arm, Right; Blood   Result Value Ref Range    ABO Type A     RH type Positive      Antibody Screen Negative     T&S Expiration Date 7/13/2024 11:59:59 PM    CK    Collection Time: 07/10/24  7:07 PM    Specimen: Arm, Right; Blood   Result Value Ref Range    Creatine Kinase 37 20 - 180 U/L   Magnesium    Collection Time: 07/10/24  7:07 PM    Specimen: Arm, Right; Blood   Result Value Ref Range    Magnesium 3.2 (H) 1.6 - 2.4 mg/dL   TSH    Collection Time: 07/10/24  7:07 PM    Specimen: Arm, Right; Blood   Result Value Ref Range    TSH 1.770 0.270 - 4.200 uIU/mL   CBC Auto Differential    Collection Time: 07/10/24  7:07 PM    Specimen: Arm, Right; Blood   Result Value Ref Range    WBC 7.58 3.40 - 10.80 10*3/mm3    RBC 3.62 (L) 3.77 - 5.28 10*6/mm3    Hemoglobin 11.0 (L) 12.0 - 15.9 g/dL    Hematocrit 33.2 (L) 34.0 - 46.6 %    MCV 91.7 79.0 - 97.0 fL    MCH 30.4 26.6 - 33.0 pg    MCHC 33.1 31.5 - 35.7 g/dL    RDW 13.9 12.3 - 15.4 %    RDW-SD 46.4 37.0 - 54.0 fl    MPV 9.1 6.0 - 12.0 fL    Platelets 229 140 - 450 10*3/mm3    Neutrophil % 73.7 42.7 - 76.0 %    Lymphocyte % 14.9 (L) 19.6 - 45.3 %    Monocyte % 9.2 5.0 - 12.0 %    Eosinophil % 0.8 0.3 - 6.2 %    Basophil % 0.3 0.0 - 1.5 %    Immature Grans % 1.1 (H) 0.0 - 0.5 %    Neutrophils, Absolute 5.59 1.70 - 7.00 10*3/mm3    Lymphocytes, Absolute 1.13 0.70 - 3.10 10*3/mm3    Monocytes, Absolute 0.70 0.10 - 0.90 10*3/mm3    Eosinophils, Absolute 0.06 0.00 - 0.40 10*3/mm3    Basophils, Absolute 0.02 0.00 - 0.20 10*3/mm3    Immature Grans, Absolute 0.08 (H) 0.00 - 0.05 10*3/mm3    nRBC 0.0 0.0 - 0.2 /100 WBC   Urinalysis With Microscopic If Indicated (No Culture) - Urine, Clean Catch    Collection Time: 07/10/24  7:59 PM    Specimen: Urine, Clean Catch   Result Value Ref Range    Color, UA Yellow Yellow, Straw    Appearance, UA Clear Clear    pH, UA 7.0 5.0 - 8.0    Specific Gravity, UA 1.011 1.005 - 1.030    Glucose, UA Negative Negative    Ketones, UA Negative Negative    Bilirubin, UA Negative Negative    Blood, UA Negative Negative    Protein,  UA Trace (A) Negative    Leuk Esterase, UA Trace (A) Negative    Nitrite, UA Negative Negative    Urobilinogen, UA 0.2 E.U./dL 0.2 - 1.0 E.U./dL   Urinalysis, Microscopic Only - Urine, Clean Catch    Collection Time: 07/10/24  7:59 PM    Specimen: Urine, Clean Catch   Result Value Ref Range    RBC, UA 0-2 None Seen, 0-2 /HPF    WBC, UA 0-2 None Seen, 0-2 /HPF    Bacteria, UA None Seen None Seen /HPF    Squamous Epithelial Cells, UA 0-2 None Seen, 0-2 /HPF    Hyaline Casts, UA None Seen None Seen /LPF    Methodology Automated Microscopy    High Sensitivity Troponin T 2Hr    Collection Time: 07/10/24  9:06 PM    Specimen: Blood   Result Value Ref Range    HS Troponin T 43 (H) <14 ng/L    Troponin T Delta -11 (L) >=-4 - <+4 ng/L         RADIOLOGY  XR Chest 1 View    Result Date: 7/10/2024  XR CHEST 1 VW-7/10/2024  HISTORY: Shortness of breath.  Heart size is at the upper limits of normal. Lungs appear free of acute infiltrates. There is some aortic calcification. Tiny calcified granuloma seen in the left lower lung.      1. Borderline cardiomegaly   This report was finalized on 7/10/2024 6:40 PM by Dr. Satish Michelle M.D on Workstation: [id number]         MEDICATIONS GIVEN IN ER  Medications   sodium chloride 0.9 % flush 10 mL (has no administration in time range)   nitroglycerin (NITROSTAT) SL tablet 0.4 mg (has no administration in time range)   sennosides-docusate (PERICOLACE) 8.6-50 MG per tablet 2 tablet (has no administration in time range)     And   polyethylene glycol (MIRALAX) packet 17 g (has no administration in time range)     And   bisacodyl (DULCOLAX) EC tablet 5 mg (has no administration in time range)     And   bisacodyl (DULCOLAX) suppository 10 mg (has no administration in time range)   ondansetron ODT (ZOFRAN-ODT) disintegrating tablet 4 mg (has no administration in time range)     Or   ondansetron (ZOFRAN) injection 4 mg (has no administration in time range)   acetaminophen (TYLENOL) tablet 650 mg  (has no administration in time range)     Or   acetaminophen (TYLENOL) 160 MG/5ML oral solution 650 mg (has no administration in time range)     Or   acetaminophen (TYLENOL) suppository 650 mg (has no administration in time range)   sodium chloride 0.9 % bolus 500 mL (0 mL Intravenous Stopped 7/10/24 2100)   ondansetron (ZOFRAN) injection 4 mg (4 mg Intravenous Given 7/10/24 1932)         ORDERS PLACED DURING THIS VISIT:  Orders Placed This Encounter   Procedures    Respiratory Panel PCR w/COVID-19(SARS-CoV-2) HUGH/HANNAH/KELLI/PAD/COR/KERRY In-House, NP Swab in UTM/VTM, 2 HR TAT - Swab, Nasopharynx    XR Chest 1 View    Comprehensive Metabolic Panel    Protime-INR    aPTT    Lipase    Urinalysis With Microscopic If Indicated (No Culture) - Urine, Clean Catch    BNP    High Sensitivity Troponin T    Lactic Acid, Plasma    Procalcitonin    CK    Magnesium    TSH    CBC Auto Differential    High Sensitivity Troponin T 2Hr    Urinalysis, Microscopic Only - Urine, Clean Catch    Basic Metabolic Panel    CBC (No Diff)    Sodium, Urine, Random - Urine, Clean Catch    Osmolality, Urine - Urine, Clean Catch    Osmolality, Serum    Uric Acid    Diet: Liquid; Clear Liquid; Fluid Consistency: Thin (IDDSI 0)    Orthostatic Vitals    Vital Signs    Maintain IV Access    Telemetry - Place Orders & Notify Provider of Results When Patient Experiences Acute Chest Pain, Dysrhythmia or Respiratory Distress    May Be Off Telemetry for Tests    Daily Weights    Oral Care    Place Sequential Compression Device    Maintain Sequential Compression Device    Up with assistance    Strict Intake & Output    Code Status and Medical Interventions:    [facility] (on-call MD unless specified) Details    Inpatient Case Management  Consult    Inpatient Nephrology Consult    Inpatient Cardiology Consult    Oxygen Therapy- Nasal Cannula; Titrate 1-6 LPM Per SpO2; 90 - 95%    ECG 12 Lead Dyspnea    Type & Screen    Insert Peripheral IV    Initiate  Observation Status    CBC & Differential         OUTPATIENT MEDICATION MANAGEMENT:  Current Facility-Administered Medications Ordered in Epic   Medication Dose Route Frequency Provider Last Rate Last Admin    acetaminophen (TYLENOL) tablet 650 mg  650 mg Oral Q4H PRN Loni Gonzalez APRN        Or    acetaminophen (TYLENOL) 160 MG/5ML oral solution 650 mg  650 mg Oral Q4H PRN Ignacio Gonzaleza LEONELA, APRN        Or    acetaminophen (TYLENOL) suppository 650 mg  650 mg Rectal Q4H PRN Loni Gonzalez, APRN        sennosides-docusate (PERICOLACE) 8.6-50 MG per tablet 2 tablet  2 tablet Oral BID PRN Loni Gonzalez, APRN        And    polyethylene glycol (MIRALAX) packet 17 g  17 g Oral Daily PRN Loni Gonzalez, APRN        And    bisacodyl (DULCOLAX) EC tablet 5 mg  5 mg Oral Daily PRN Loni Gonzalez, APRN        And    bisacodyl (DULCOLAX) suppository 10 mg  10 mg Rectal Daily PRN Loni Gonzalez, APRN        nitroglycerin (NITROSTAT) SL tablet 0.4 mg  0.4 mg Sublingual Q5 Min PRN Loni Gonzalez, APRN        ondansetron ODT (ZOFRAN-ODT) disintegrating tablet 4 mg  4 mg Oral Q6H PRN Loni Gonzalez, APRN        Or    ondansetron (ZOFRAN) injection 4 mg  4 mg Intravenous Q6H PRN Loni Gonzalez, APRN        sodium chloride 0.9 % flush 10 mL  10 mL Intravenous PRN Vilma Yu MD         No current [facility]-ordered outpatient medications on file.         PROCEDURES  Procedures            PROGRESS, DATA ANALYSIS, CONSULTS, AND MEDICAL DECISION MAKING  All labs have been independently interpreted by me.  All radiology studies have been reviewed by me. All EKG's have been independently viewed and interpreted by me.  Discussion below represents my analysis of pertinent findings related to patient's condition, differential diagnosis, treatment plan and final disposition.    Differential diagnosis includes but is not limited to   This patient presents with dyspnea, nausea, generalized weakness,  and fatigue.  The differential diagnosis list includes but is not limited to:   - acute cardiac etiologies like ACS, CHF, pericardial effusion or even tamponade  - acute respiratory etiologies like acute PE (less likely as patient on Eliquis), pneumothorax , asthma, COPD exacerbation, allergic etiologies, or infectious etiologies such as PNA   - non-cardiopulmonary causes like toxidromes, metabolic etiologies such as acidemia or electrolyte derangements or even DKA, sepsis, neurologic causes including GBS and myasthenia gravis  Plan EKG, CXR, Labs incl bnp and trop and +/- viral swab.          ED Course as of 07/11/24 0000   Wed Jul 10, 2024   1841 I viewed patient's chest x-ray and on my interpretation patient has a normal heart size and no pulmonary infiltrates [AR]   1852 EKG ER MD interpretation   Time: 18: 49  Rhythm and rate: A flutter at a rate of 69  Axis: Normal  P waves: Normal  QRS complexes: Normal except for left anterior fascicular block  ST segments: no elevation nor depressions  T waves: no flattening or inversions  Comparison EKG is from May 28, 2024 with patient was in A-fib with a rate of 61 [AR]   2007 Patient up to bedside probably with significant desaturation 83% on room air.  Once at rest and back in bed patient did rebound to normal oxygen saturations. [AR]      ED Course User Index  [AR] Vilma Yu MD             AS OF 00:00 EDT VITALS:    BP - 149/79  HR - 75  TEMP - 98.4 °F (36.9 °C) (Oral)  O2 SATS - 97%      COMPLEXITY OF CARE  The patient requires admission.    DIAGNOSIS  Final diagnoses:   Hyponatremia   Generalized weakness   Nausea   Dyspnea on exertion   Chronic kidney disease, unspecified CKD stage   Elevated troponin   History of atrial fibrillation         DISPOSITION  ED Disposition       ED Disposition   Decision to Admit    Condition   --    Comment   Level of Care: Telemetry [5]   Diagnosis: Shortness of breath [786.05.ICD-9-CM]   Admitting Physician: ROBERT TRIPLETT  ROLF [[id number]]   Attending Physician: ROBERT TRIPLETT [[id number]]   Bed Request Comments: Not [address]                  Please note that portions of this document were completed with a voice recognition program.    Note Disclaimer: At [facility], we believe that sharing information builds trust and better relationships. You are receiving this note because you recently visited [facility]. It is possible you will see health information before a provider has talked with you about it. This kind of information can be easy to misunderstand. To help you fully understand what it means for your health, we urge you to discuss this note with your provider.         Vilma Yu MD  07/11/24 0000

## 2024-07-10 NOTE — PROGRESS NOTES
Pt presented for bloodwork today in office with her daughter, jordana Poole the [facility] phlebotomist, pt was dry heaving and throwing up. Was not going to get blood work done, but they did end up drawing her blood. They may take her to hospital if symptoms do not improve.

## 2024-07-10 NOTE — ED NOTES
Patient has multiple complaints since Memorial Day per daughter. Patient c/o SOA/fatigue at time of triage.

## 2024-07-11 ENCOUNTER — APPOINTMENT (OUTPATIENT)
Dept: CARDIOLOGY | Facility: HOSPITAL | Age: 89
End: 2024-07-11
Payer: MEDICARE

## 2024-07-11 ENCOUNTER — HOME CARE VISIT (OUTPATIENT)
Dept: HOME HEALTH SERVICES | Facility: HOME HEALTHCARE | Age: 89
End: 2024-07-11
Payer: MEDICARE

## 2024-07-11 PROBLEM — E87.1 HYPONATREMIA: Status: ACTIVE | Noted: 2024-07-11

## 2024-07-11 LAB
ANION GAP SERPL CALCULATED.3IONS-SCNC: 10.2 MMOL/L (ref 5–15)
AORTIC ARCH: 2.2 CM
AORTIC DIMENSIONLESS INDEX: 0.7 (DI)
ASCENDING AORTA: 2.7 CM
BH CV ECHO MEAS - ACS: 1.99 CM
BH CV ECHO MEAS - AI P1/2T: 1322 MSEC
BH CV ECHO MEAS - AO MAX PG: 9.6 MMHG
BH CV ECHO MEAS - AO MEAN PG: 3.8 MMHG
BH CV ECHO MEAS - AO ROOT DIAM: 3 CM
BH CV ECHO MEAS - AO V2 MAX: 155.3 CM/SEC
BH CV ECHO MEAS - AO V2 VTI: 22.9 CM
BH CV ECHO MEAS - AVA(I,D): 1.97 CM2
BH CV ECHO MEAS - EDV(CUBED): 113.3 ML
BH CV ECHO MEAS - EDV(MOD-SP2): 59 ML
BH CV ECHO MEAS - EDV(MOD-SP4): 79 ML
BH CV ECHO MEAS - EF(MOD-BP): 67.2 %
BH CV ECHO MEAS - EF(MOD-SP2): 57.6 %
BH CV ECHO MEAS - EF(MOD-SP4): 72.2 %
BH CV ECHO MEAS - ESV(CUBED): 22.9 ML
BH CV ECHO MEAS - ESV(MOD-SP2): 25 ML
BH CV ECHO MEAS - ESV(MOD-SP4): 22 ML
BH CV ECHO MEAS - FS: 41.3 %
BH CV ECHO MEAS - IVS/LVPW: 1.11 CM
BH CV ECHO MEAS - IVSD: 1.14 CM
BH CV ECHO MEAS - LAT PEAK E' VEL: 11.3 CM/SEC
BH CV ECHO MEAS - LV MASS(C)D: 191.4 GRAMS
BH CV ECHO MEAS - LV MAX PG: 3.2 MMHG
BH CV ECHO MEAS - LV MEAN PG: 1.56 MMHG
BH CV ECHO MEAS - LV V1 MAX: 89.1 CM/SEC
BH CV ECHO MEAS - LV V1 VTI: 16.5 CM
BH CV ECHO MEAS - LVIDD: 4.8 CM
BH CV ECHO MEAS - LVIDS: 2.8 CM
BH CV ECHO MEAS - LVOT AREA: 2.7 CM2
BH CV ECHO MEAS - LVOT DIAM: 1.86 CM
BH CV ECHO MEAS - LVPWD: 1.02 CM
BH CV ECHO MEAS - MED PEAK E' VEL: 7.3 CM/SEC
BH CV ECHO MEAS - MV A DUR: 0.11 SEC
BH CV ECHO MEAS - MV A MAX VEL: 49.7 CM/SEC
BH CV ECHO MEAS - MV DEC SLOPE: 444.1 CM/SEC2
BH CV ECHO MEAS - MV DEC TIME: 0.15 SEC
BH CV ECHO MEAS - MV E MAX VEL: 97.7 CM/SEC
BH CV ECHO MEAS - MV E/A: 1.97
BH CV ECHO MEAS - MV MAX PG: 4.5 MMHG
BH CV ECHO MEAS - MV MEAN PG: 1.61 MMHG
BH CV ECHO MEAS - MV P1/2T: 78.1 MSEC
BH CV ECHO MEAS - MV V2 VTI: 27 CM
BH CV ECHO MEAS - MVA(P1/2T): 2.8 CM2
BH CV ECHO MEAS - MVA(VTI): 1.67 CM2
BH CV ECHO MEAS - PA V2 MAX: 106.4 CM/SEC
BH CV ECHO MEAS - PULM A REVS DUR: 0.15 SEC
BH CV ECHO MEAS - PULM A REVS VEL: 25.3 CM/SEC
BH CV ECHO MEAS - PULM DIAS VEL: 37.3 CM/SEC
BH CV ECHO MEAS - PULM S/D: 1.02
BH CV ECHO MEAS - PULM SYS VEL: 38.1 CM/SEC
BH CV ECHO MEAS - QP/QS: 1.32
BH CV ECHO MEAS - RV MAX PG: 1.83 MMHG
BH CV ECHO MEAS - RV V1 MAX: 67.7 CM/SEC
BH CV ECHO MEAS - RV V1 VTI: 14.7 CM
BH CV ECHO MEAS - RVOT DIAM: 2.26 CM
BH CV ECHO MEAS - SV(LVOT): 45 ML
BH CV ECHO MEAS - SV(MOD-SP2): 34 ML
BH CV ECHO MEAS - SV(MOD-SP4): 57 ML
BH CV ECHO MEAS - SV(RVOT): 59.3 ML
BH CV ECHO MEAS - TR MAX PG: 19.8 MMHG
BH CV ECHO MEAS - TR MAX VEL: 222.6 CM/SEC
BH CV ECHO MEASUREMENTS AVERAGE E/E' RATIO: 10.51
BH CV XLRA - TDI S': 10.9 CM/SEC
BUN SERPL-MCNC: 45 MG/DL (ref 8–23)
BUN/CREAT SERPL: 20.8 (ref 7–25)
CALCIUM SPEC-SCNC: 8.8 MG/DL (ref 8.6–10.5)
CHLORIDE SERPL-SCNC: 100 MMOL/L (ref 98–107)
CO2 SERPL-SCNC: 21.8 MMOL/L (ref 22–29)
CREAT SERPL-MCNC: 2.16 MG/DL (ref 0.57–1)
D DIMER PPP FEU-MCNC: <0.27 MCGFEU/ML (ref 0–0.86)
DEPRECATED RDW RBC AUTO: 47.9 FL (ref 37–54)
EGFRCR SERPLBLD CKD-EPI 2021: 21.8 ML/MIN/1.73
ERYTHROCYTE [DISTWIDTH] IN BLOOD BY AUTOMATED COUNT: 14.1 % (ref 12.3–15.4)
GLUCOSE SERPL-MCNC: 95 MG/DL (ref 65–99)
HCT VFR BLD AUTO: 30.1 % (ref 34–46.6)
HGB BLD-MCNC: 9.9 G/DL (ref 12–15.9)
LEFT ATRIUM VOLUME INDEX: 42.1 ML/M2
MCH RBC QN AUTO: 30.8 PG (ref 26.6–33)
MCHC RBC AUTO-ENTMCNC: 32.9 G/DL (ref 31.5–35.7)
MCV RBC AUTO: 93.8 FL (ref 79–97)
OSMOLALITY SERPL: 294 MOSM/KG (ref 280–301)
OSMOLALITY UR: 321 MOSM/KG
PLATELET # BLD AUTO: 185 10*3/MM3 (ref 140–450)
PMV BLD AUTO: 9.4 FL (ref 6–12)
POTASSIUM SERPL-SCNC: 5.2 MMOL/L (ref 3.5–5.2)
QT INTERVAL: 388 MS
QTC INTERVAL: 416 MS
RBC # BLD AUTO: 3.21 10*6/MM3 (ref 3.77–5.28)
SINUS: 2.8 CM
SODIUM SERPL-SCNC: 132 MMOL/L (ref 136–145)
SODIUM UR-SCNC: 80 MMOL/L
URATE SERPL-MCNC: 5.9 MG/DL (ref 2.4–5.7)
WBC NRBC COR # BLD AUTO: 7.4 10*3/MM3 (ref 3.4–10.8)

## 2024-07-11 PROCEDURE — 25510000001 PERFLUTREN (DEFINITY) 8.476 MG IN SODIUM CHLORIDE (PF) 0.9 % 10 ML INJECTION

## 2024-07-11 PROCEDURE — 83935 ASSAY OF URINE OSMOLALITY: CPT

## 2024-07-11 PROCEDURE — 84300 ASSAY OF URINE SODIUM: CPT

## 2024-07-11 PROCEDURE — 83930 ASSAY OF BLOOD OSMOLALITY: CPT

## 2024-07-11 PROCEDURE — 99214 OFFICE O/P EST MOD 30 MIN: CPT

## 2024-07-11 PROCEDURE — 85027 COMPLETE CBC AUTOMATED: CPT

## 2024-07-11 PROCEDURE — G0378 HOSPITAL OBSERVATION PER HR: HCPCS

## 2024-07-11 PROCEDURE — 80048 BASIC METABOLIC PNL TOTAL CA: CPT

## 2024-07-11 PROCEDURE — 85379 FIBRIN DEGRADATION QUANT: CPT

## 2024-07-11 PROCEDURE — 93306 TTE W/DOPPLER COMPLETE: CPT

## 2024-07-11 PROCEDURE — 93306 TTE W/DOPPLER COMPLETE: CPT | Performed by: INTERNAL MEDICINE

## 2024-07-11 RX ORDER — MAGNESIUM OXIDE 400 MG/1
400 TABLET ORAL DAILY
Status: DISCONTINUED | OUTPATIENT
Start: 2024-07-11 | End: 2024-07-12 | Stop reason: HOSPADM

## 2024-07-11 RX ORDER — UREA 10 %
2.5 LOTION (ML) TOPICAL NIGHTLY PRN
Status: DISCONTINUED | OUTPATIENT
Start: 2024-07-11 | End: 2024-07-12 | Stop reason: HOSPADM

## 2024-07-11 RX ORDER — ESCITALOPRAM OXALATE 20 MG/1
20 TABLET ORAL DAILY
Status: DISCONTINUED | OUTPATIENT
Start: 2024-07-11 | End: 2024-07-12 | Stop reason: HOSPADM

## 2024-07-11 RX ORDER — ATORVASTATIN CALCIUM 20 MG/1
20 TABLET, FILM COATED ORAL NIGHTLY
Status: DISCONTINUED | OUTPATIENT
Start: 2024-07-11 | End: 2024-07-12 | Stop reason: HOSPADM

## 2024-07-11 RX ADMIN — APIXABAN 2.5 MG: 2.5 TABLET, FILM COATED ORAL at 00:14

## 2024-07-11 RX ADMIN — ESCITALOPRAM 20 MG: 20 TABLET, FILM COATED ORAL at 09:05

## 2024-07-11 RX ADMIN — MAGNESIUM OXIDE 400 MG (241.3 MG MAGNESIUM) TABLET 400 MG: TABLET at 09:05

## 2024-07-11 RX ADMIN — PERFLUTREN 2 ML: 6.52 INJECTION, SUSPENSION INTRAVENOUS at 14:01

## 2024-07-11 RX ADMIN — APIXABAN 2.5 MG: 2.5 TABLET, FILM COATED ORAL at 20:46

## 2024-07-11 RX ADMIN — ATORVASTATIN CALCIUM 20 MG: 20 TABLET, FILM COATED ORAL at 20:46

## 2024-07-11 NOTE — CONSULTS
"  [facility] Consult Note      Patient Name: Marleni Hummel  : 1935  MRN: [id number]  Primary Care Physician:  Ken Andujar MD  Referring Physician: No ref. provider found  Date of admission: 7/10/2024    Subjective     Reason for Consult: CKD4    HPI:   Marleni Hummel is a 88 y.o. female with CKD4 (baseline SCr low-2 range) followed by Dr. Joseph Leonard of our group, admitted yesterday for further evaluation of fatigue, N/V, and dyspnea with any exertion.  In atrial flutter on arrival with ; was too dizzy to undergo orthostatic BP check.  Full PMH outlined below; notable is left nephrectomy  for malignancy; atrial fibrillation; hypertension on thiazide diuretics; and CAD.  SCR 2.4 on arrival, with value 2.2 today; serum sodium 132 today.  Hemoglobin today 9.9.  CXR with cardiomegaly but no overt CHF.    Stable weight for the past several months despite mediocre appetite; developed N/V yesterday  No orthopnea or leg swelling; has dyspnea, though, with any exertion  No chest pain or palpitations  No urinary complaints other than occasional dysuria and frequency; states that she has had \"a bladder infection\" for the past 1 month.  Notably, urinalysis yesterday evening bland  Bowel movements fine  Apparently he is very sedentary; daughter, at bedside, states that patient sits for most of the day     Review of Systems:   14 point review of systems is otherwise negative except for mentioned above on HPI    Personal History     Past Medical History:   Diagnosis Date    Acute bronchitis due to Rhinovirus 2022    Acute vulvitis 2019    Allergic     Allergic rhinitis     Anemia of chronic disease     Arthropathy of knee     right    Atrial fibrillation     Back pain     Bell's palsy     Bradycardia 2024    Caregiver stress syndrome 2019    Chronic coronary artery disease     Chronic kidney disease (CKD), stage III (moderate)     Diverticulitis " 12/14/2022    CARROLL (dyspnea on exertion) 03/17/2023    Edema     Elevated serum free T4 level 03/21/2016    Encounter for eye exam 09/2020    Essential hypertension     Fatigue     GERD (gastroesophageal reflux disease)     H/O Heart murmur     Heart attack 10/05/2015    Hematuria 12/12/2016    Herpes zoster without complication     Hip arthritis     left    History of bone density study 02/28/2008    History of pelvic fracture 2015    History of pneumonia     History of transfusion     2015    Hypercholesterolemia     IFG (impaired fasting glucose)     Insomnia     Left kidney mass 07/2020    Limited joint range of motion     RIGHT KNEE    Mixed hyperlipidemia     Muscle tension headache 04/03/2019    New daily persistent headache 12/17/2018    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 Clarke    CATARACT EXTRACTION Right 04/16/2013    Dr. Clarke    COLONOSCOPY  04/18/2014    Dr. Elizabeth; no polyps    EPIDURAL BLOCK      HIP PERCUTANEOUS PINNING Left 8/31/2017    Procedure: LT HIP PERCUTANEOUS PINNING;  Surgeon: Sean Canales MD;  Location: [facility] MAIN OR;  Service:     MAMMO BILATERAL  11/2013    NEPHRECTOMY Left 11/16/2020    Procedure: LAPAROSCOPIC NEPHRECTOMY;  Surgeon: Chuckie Mclaughlin MD;  Location: [facility] MAIN OR;  Service: Urology;  Laterality: Left;    PAP SMEAR  02/2011    TIBIA FRACTURE SURGERY Right 2015    REMOVED PLATE LATER IN 2015    TONSILLECTOMY  1947    TOTAL HIP ARTHROPLASTY Right 08/2015    TOTAL HIP ARTHROPLASTY Right 09/2016     TOTAL KNEE ARTHROPLASTY Bilateral 2004       Family History: family history includes Heart disease in her mother; Hypertension in her maternal grandmother, mother, and another family member; Stroke in her mother.    Social History:  reports that she quit smoking about 68 years ago. Her smoking use included cigarettes. She started smoking about 71 years ago. She has a 3 pack-year smoking history. She has been exposed to tobacco smoke. She has never used smokeless tobacco. She reports that she does not currently use alcohol after a past usage of about 3.0 standard drinks of alcohol per week. She reports that she does not use drugs.    Home Medications:  Prior to Admission medications    Medication Sig Start Date End Date Taking? Authorizing Provider   acetaminophen (TYLENOL) 500 MG tablet Take 1 tablet by mouth Every 4 (Four) Hours As Needed for Mild Pain. Indications: Pain   Yes Zach Hidalgo MD   amoxicillin (AMOXIL) 250 MG capsule Take 1 capsule by mouth 2 (Two) Times a Day. 7/8/24  Yes Claudia Jacob PA-C   atorvastatin (LIPITOR) 20 MG tablet Take 1 tablet by mouth Every Night for 270 days. 1/17/24 10/13/24 Yes Ken Andujar MD   Eliquis 2.5 MG tablet tablet TAKE 1 TABLET EVERY 12 HOURS (TWICE A DAY)  Patient taking differently: Take 1 tablet by mouth 2 (Two) Times a Day. 7/3/23  Yes Oralia Lutz APRN   escitalopram (LEXAPRO) 20 MG tablet Take 1 tablet by mouth Daily for 270 days. 1/17/24 10/13/24 Yes Ken Andujar MD   fexofenadine (ALLEGRA) 60 MG tablet Take 1 tablet by mouth Daily As Needed. Indications: Hayfever   Yes Zach Hidalgo MD   furosemide (LASIX) 20 MG tablet Take 1 tablet by mouth Daily As Needed (if weight goes up 3lbs in 3 days). 4/21/23  Yes Sybil Parmar MD   hydroCHLOROthiazide (HYDRODIURIL) 12.5 MG tablet Take 1 tablet by mouth Daily. 10/25/23  Yes Micaela Swann PA   magnesium oxide (MAG-OX) 400 MG tablet Take 1 tablet by mouth Daily. Indications: Acid Indigestion    Yes ProviderZach MD   melatonin 5 MG tablet tablet Take 1 tablet by mouth At Night As Needed.   Yes Zach Hidalgo MD   sodium bicarbonate 650 MG tablet Take 1 tablet by mouth 2 (Two) Times a Day. Indications: Heartburn   Yes Ken Andujar MD   Vibegron (Gemtesa) 75 MG tablet Take 1 tablet by mouth Daily. Indications: Overactive Bladder, Urinary Incontinence 5/30/24  Yes Ken Andujar MD   methocarbamol (ROBAXIN) 500 MG tablet Take 1 tablet by mouth 3 (Three) Times a Day As Needed for Muscle Spasms. 6/7/24   Stalin Alexandra III, PA   methylPREDNISolone (MEDROL) 4 MG dose pack Take as directed on package instructions. 6/7/24   Stalin Alexandra III, PA       Allergies:  Allergies   Allergen Reactions    Morphine Anaphylaxis    Oxycodone Anaphylaxis    Ace Inhibitors Cough and Unknown (See Comments)    Bactrim [Sulfamethoxazole-Trimethoprim] Rash    Levofloxacin Itching and Rash     insomnia  insomnia  insomnia    Torsemide Dizziness       Objective     Vitals:   Temp:  [97.6 °F (36.4 °C)-98.9 °F (37.2 °C)] 97.7 °F (36.5 °C)  Heart Rate:  [] 98  Resp:  [14-25] 14  BP: (122-162)/(57-81) 139/69    Intake/Output Summary (Last 24 hours) at 7/11/2024 0935  Last data filed at 7/11/2024 0914  Gross per 24 hour   Intake --   Output 650 ml   Net -650 ml       Physical Exam:   Constitutional: Awake, alert, NAD, overweight, hard of hearing  HEENT: Sclera anicteric, no conjunctival injection  Neck: Supple, no carotid bruit, trachea at midline, no JVD  Respiratory: Coarse, a few crackles in bases, no wheezing, nonlabored   Cardiovascular: RR, tachycardic, no rub.   Gastrointestinal: BS +, soft, nontender and nondistended  : No palpable bladder  Musculoskeletal: No edema, no clubbing or cyanosis  Psychiatric: Appropriate affect, cooperative, oriented  Neurologic:  moving all extremities, normal speech and mental status  Skin: Warm and dry       Scheduled Meds:     apixaban, 2.5 mg, Oral,  BID  atorvastatin, 20 mg, Oral, Nightly  escitalopram, 20 mg, Oral, Daily  magnesium oxide, 400 mg, Oral, Daily      IV Meds:        Results Reviewed:   I have personally reviewed the results from the time of this admission to 7/11/2024 09:35 EDT     Lab Results   Component Value Date    GLUCOSE 95 07/11/2024    CALCIUM 8.8 07/11/2024     (L) 07/11/2024    K 5.2 07/11/2024    CO2 21.8 (L) 07/11/2024     07/11/2024    BUN 45 (H) 07/11/2024    CREATININE 2.16 (H) 07/11/2024    EGFRIFAFRI 25 (L) 02/23/2022    EGFRIFNONA 21 (L) 02/23/2022    BCR 20.8 07/11/2024    ANIONGAP 10.2 07/11/2024      Lab Results   Component Value Date    MG 3.2 (H) 07/10/2024    PHOS 3.9 05/29/2024    ALBUMIN 4.2 07/10/2024           Assessment / Plan       Shortness of breath    Essential hypertension    Permanent atrial fibrillation    CKD (chronic kidney disease) stage 4, GFR 15-29 ml/min    Chronic diastolic congestive heart failure    Hyponatremia      ASSESSMENT:  1.  CKD4, with stable but poor function.  Not grossly volume overloaded by exam or imaging.  Mild hyponatremia probably on the basis of thiazide diuretic, tho also on SSRI.  Potassium and anion gap are normal.  High magnesium level on magnesium oxide.  Urinalysis bland.  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  2.  Dyspnea with exertion: Likely an element of deconditioning  3.  N/V: unlikely to be driven solely by hyponatremia given its mild nature  4.  Atrial flutter with rapid rate  5.  Anemia, playing a role in her dyspnea  6.  Hypertension, controlled    PLAN:  1.  Would avoid thiazide diuretics going forward; ok to continue SSRI for now  2.  Check iron stores  3.  Surveillance labs    Thank you for involving us in the care of Marleni SCOUT Hummel.  Please feel free to call with any questions.    Mark Young MD  07/11/24  09:35 EDT    [facility]  [phone]      Please note that portions of this note  were completed with a voice recognition program.

## 2024-07-11 NOTE — CASE MANAGEMENT/SOCIAL WORK
Discharge Planning Assessment  [facility]     Patient Name: Marleni Hummel  MRN: [id number]  Today's Date: 7/11/2024    Admit Date: 7/10/2024    Plan: PT michael is pending recomendations. Patient is current with [facility] home care   Discharge Needs Assessment       Row Name 07/11/24 1459       Living Environment    People in Home alone    Current Living Arrangements home    Potentially Unsafe Housing Conditions none    In the past 12 months has the electric, gas, oil, or water company threatened to shut off services in your home? No    Primary Care Provided by child(amy)    Provides Primary Care For no one, unable/limited ability to care for self    Family Caregiver if Needed child(amy), adult    Quality of Family Relationships helpful;supportive    Able to Return to Prior Arrangements yes       Resource/Environmental Concerns    Resource/Environmental Concerns none    Transportation Concerns none       Transportation Needs    In the past 12 months, has lack of transportation kept you from medical appointments or from getting medications? no    In the past 12 months, has lack of transportation kept you from meetings, work, or from getting things needed for daily living? No       Food Insecurity    Within the past 12 months, you worried that your food would run out before you got the money to buy more. Never true    Within the past 12 months, the food you bought just didn't last and you didn't have money to get more. Never true       Transition Planning    Patient/Family Anticipates Transition to home    Patient/Family Anticipated Services at Transition none    Transportation Anticipated family or friend will provide       Discharge Needs Assessment    Current Outpatient/Agency/Support Group other (see comments)  [facility] home [facility]    Equipment Currently Used at Home walker, rolling;shower chair;commode;wheelchair    Concerns to be Addressed discharge planning    Anticipated Changes Related to Illness inability to care for  self    Equipment Needed After Discharge other (see comments)  TBD    Discharge Facility/Level of Care Needs other (see comments)  TBD    Provided Post Acute Provider List? N/A    N/A Provider List Comment PT eval pending. Current with [facility] home care    Current Discharge Risk lives alone;chronically ill                   Discharge Plan       Row Name 07/11/24 1505       Plan    Plan PT eval is pending recomendations. Patient is current with [facility] home care    Roadmap to Recovery Yes    Patient/Family in Agreement with Plan yes    Plan Comments Spoke with patient and duaghter at bedside. facesheet, PCP and phamracy confirmed. Patient lives alone, uses a walker and has a BSC and shower chair in her home. Daughter lives close by and helps patient with grogery shopiing meals, and transportation. PT is pending evals and recommendation at this time.                  Continued Care and Services - Admitted Since 7/10/2024       Home Medical Care       Service Provider Request Status Selected Services Address Phone Fax Patient Preferred     [facility] Pending - Request Sent N/A [address] [address] [phone] --                  Selected Continued Care - Prior Encounters Includes continued care and service providers with selected services from prior encounters from 4/11/2024 to 7/11/2024      Discharged on 5/29/2024 Admission date: 5/27/2024 - Discharge disposition: Home or Self Care      Home Medical Care       Service Provider Selected Services Address Phone Fax Patient Preferred     [facility] [address] [facility] [address] [address] [phone] --                          Expected Discharge Date and Time       Expected Discharge Date Expected Discharge Time    Jul 12, 2024            Demographic Summary       Row Name 07/11/24 9953       General Information    Admission Type observation    Arrived From emergency  department    Required Notices Provided Observation Status Notice    Referral Source admission list    Reason for Consult discharge planning    Preferred Language English                   Functional Status       Row Name 07/11/24 1458       Functional Status    Usual Activity Tolerance moderate    Current Activity Tolerance moderate       Functional Status, IADL    Medications independent    Meal Preparation assistive person    Housekeeping assistive person    Laundry assistive person    Shopping assistive person    IADL Comments daughter assists as needed       Mental Status    General Appearance WDL WDL       Mental Status Summary    Recent Changes in Mental Status/Cognitive Functioning no changes                               Merle Mistry RN

## 2024-07-11 NOTE — H&P
Patient Name:  Marleni Hummel  YOB: 1935  MRN:  [id number]  Admit Date:  7/10/2024  Patient Care Team:  Ken Andujar MD as PCP - General  Chuckie Mclaughlin MD as Consulting Physician (Urology)  Anayeli Alanis MD as Consulting Physician (Obstetrics and Gynecology)  BROOK Clarke MD as Consulting Physician (Ophthalmology)  Jose Alfredo Krishnamurthy MD as Consulting Physician (Hematology and Oncology)  Sean Canales MD as Consulting Physician (Orthopedic Surgery)  Esteban Moe MD as Consulting Physician (Orthopedic Surgery)  Feliciano Elizabeth MD as Consulting Physician (Gastroenterology)  Wallace Hook MD as Consulting Physician (Pain Medicine)  Sarah Beth Marcus APRN as Nurse Practitioner (Nephrology)  Brandon Miller MD as Consulting Physician (Gastroenterology)  Sybil Parmar MD as Consulting Physician (Cardiology)  Joseph Leonard MD as Consulting Physician (Nephrology)  Joseph Leonard MD as Consulting Physician (Nephrology)      Subjective   History Present Illness     Chief Complaint   Patient presents with    Shortness of Breath    Weakness - Generalized       Ms. Hummel is a 88-year-old pleasant female with a past medical history of chronic kidney disease, nephrectomy due to renal cell carcinoma, coronary artery disease, hypertension, and atrial fib presents to [facility] ED today with complaints of nausea and dry heaves, shortness of breath, and generalized weakness since Memorial Day.  Reported that she was hospitalized Memorial Day weekend with back pain, received an epidural injection, and has had generalized weakness, shortness of breath with exertion, and muscle spasms to her legs since that time.  She reports that she is very tired and has no energy.  She denies fever, chest pain, vomiting or diarrhea. She reports that she does have some dry heaves and nausea in the mornings.  She reports that she does have dizziness when  she first gets up in the morning, has to wait a few minutes so the dizziness can pass, before she can get up.  She lives at home alone but her daughter checks on her daily.    Review of Systems   Constitutional:  Positive for fatigue. Negative for fever.   Respiratory:  Positive for shortness of breath.    Cardiovascular:  Negative for chest pain.   Gastrointestinal:  Positive for nausea. Negative for diarrhea and vomiting.   Genitourinary:  Positive for dysuria. Negative for difficulty urinating.   Neurological:  Positive for dizziness and weakness.        Personal History     Past Medical History:   Diagnosis Date    Acute bronchitis due to Rhinovirus 05/31/2022    Acute vulvitis 08/21/2019    Allergic     Allergic rhinitis     Anemia of chronic disease     Arthropathy of knee     right    Atrial fibrillation     Back pain     Bell's palsy     Bradycardia 05/27/2024    Caregiver stress syndrome 04/17/2019    Chronic coronary artery disease     Chronic kidney disease (CKD), stage III (moderate)     Diverticulitis 12/14/2022    CARROLL (dyspnea on exertion) 03/17/2023    Edema     Elevated serum free T4 level 03/21/2016    Encounter for eye exam 09/2020    Essential hypertension     Fatigue     GERD (gastroesophageal reflux disease)     H/O Heart murmur     Heart attack 10/05/2015    Hematuria 12/12/2016    Herpes zoster without complication     Hip arthritis     left    History of bone density study 02/28/2008    History of pelvic fracture 2015    History of pneumonia     History of transfusion     2015    Hypercholesterolemia     IFG (impaired fasting glucose)     Insomnia     Left kidney mass 07/2020    Limited joint range of motion     RIGHT KNEE    Mixed hyperlipidemia     Muscle tension headache 04/03/2019    New daily persistent headache 12/17/2018    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 Clarke    CATARACT EXTRACTION Right 2013    Dr. Clarke    COLONOSCOPY  2014    Dr. Elizabeth; no polyps    EPIDURAL BLOCK      HIP PERCUTANEOUS PINNING Left 2017    Procedure: LT HIP PERCUTANEOUS PINNING;  Surgeon: Sean Canales MD;  Location: [facility] MAIN OR;  Service:     MAMMO BILATERAL  2013    NEPHRECTOMY Left 2020    Procedure: LAPAROSCOPIC NEPHRECTOMY;  Surgeon: Chuckie Mclaughlin MD;  Location: [facility] MAIN OR;  Service: Urology;  Laterality: Left;    PAP SMEAR  2011    TIBIA FRACTURE SURGERY Right     REMOVED PLATE LATER IN     TONSILLECTOMY  194    TOTAL HIP ARTHROPLASTY Right 2015    TOTAL HIP ARTHROPLASTY Right 2016    TOTAL KNEE ARTHROPLASTY Bilateral      Family History   Problem Relation Age of Onset    Hypertension Other     Hypertension Mother     Stroke Mother     Heart disease Mother     Hypertension Maternal Grandmother     Malig Hyperthermia Neg Hx      Social History     Tobacco Use    Smoking status: Former     Current packs/day: 0.00     Average packs/day: 1 pack/day for 3.0 years (3.0 ttl pk-yrs)     Types: Cigarettes     Start date: 1953     Quit date: 1956     Years since quittin.4     Passive exposure: Past    Smokeless tobacco: Never    Tobacco comments:     caffeine - 1/2 cup coffee daily    Vaping Use    Vaping status: Never Used   Substance Use Topics    Alcohol use: Not Currently     Alcohol/week: 3.0 standard drinks of alcohol     Types: 3 Glasses of wine per week     Comment: couple times a week    Drug use: Never     No current facility-administered medications on file prior  to encounter.     Current Outpatient Medications on File Prior to Encounter   Medication Sig Dispense Refill    acetaminophen (TYLENOL) 500 MG tablet Take 1 tablet by mouth Every 4 (Four) Hours As Needed for Mild Pain. Indications: Pain      amoxicillin (AMOXIL) 250 MG capsule Take 1 capsule by mouth 2 (Two) Times a Day. 14 capsule 0    atorvastatin (LIPITOR) 20 MG tablet Take 1 tablet by mouth Every Night for 270 days. 90 tablet 2    Eliquis 2.5 MG tablet tablet TAKE 1 TABLET EVERY 12 HOURS (TWICE A DAY) (Patient taking differently: Take 1 tablet by mouth 2 (Two) Times a Day.) 180 tablet 3    escitalopram (LEXAPRO) 20 MG tablet Take 1 tablet by mouth Daily for 270 days. 90 tablet 2    fexofenadine (ALLEGRA) 60 MG tablet Take 1 tablet by mouth Daily As Needed. Indications: Hayfever      furosemide (LASIX) 20 MG tablet Take 1 tablet by mouth Daily As Needed (if weight goes up 3lbs in 3 days). 30 tablet 11    hydroCHLOROthiazide (HYDRODIURIL) 12.5 MG tablet Take 1 tablet by mouth Daily. 30 tablet 0    magnesium oxide (MAG-OX) 400 MG tablet Take 1 tablet by mouth Daily. Indications: Acid Indigestion      melatonin 5 MG tablet tablet Take 1 tablet by mouth At Night As Needed.      sodium bicarbonate 650 MG tablet Take 1 tablet by mouth 2 (Two) Times a Day. Indications: Heartburn      Vibegron (Gemtesa) 75 MG tablet Take 1 tablet by mouth Daily. Indications: Overactive Bladder, Urinary Incontinence      methocarbamol (ROBAXIN) 500 MG tablet Take 1 tablet by mouth 3 (Three) Times a Day As Needed for Muscle Spasms. 21 tablet 0    methylPREDNISolone (MEDROL) 4 MG dose pack Take as directed on package instructions. 21 tablet 0     Allergies   Allergen Reactions    Morphine Anaphylaxis    Oxycodone Anaphylaxis    Ace Inhibitors Cough and Unknown (See Comments)    Bactrim [Sulfamethoxazole-Trimethoprim] Rash    Levofloxacin Itching and Rash     insomnia  insomnia  insomnia    Torsemide Dizziness       Objective    Objective      Vital Signs  Temp:  [98.4 °F (36.9 °C)-98.9 °F (37.2 °C)] 98.4 °F (36.9 °C)  Heart Rate:  [] 75  Resp:  [20-22] 20  BP: (128-162)/(63-81) 149/79  SpO2:  [96 %-99 %] 97 %  on   ;   Device (Oxygen Therapy): room air  Body mass index is 23.96 kg/m².    Physical Exam  Vitals and nursing note reviewed.   Constitutional:       General: She is not in acute distress.  Cardiovascular:      Rate and Rhythm: Normal rate. Rhythm irregular.      Pulses: Normal pulses.      Heart sounds: Normal heart sounds.   Pulmonary:      Effort: Pulmonary effort is normal. No respiratory distress.      Breath sounds: Normal breath sounds.   Abdominal:      General: Bowel sounds are normal. There is no distension.      Palpations: Abdomen is soft.      Tenderness: There is no abdominal tenderness.   Musculoskeletal:      Right lower leg: No edema.      Left lower leg: No edema.   Skin:     General: Skin is warm and dry.   Neurological:      Mental Status: She is alert and oriented to person, place, and time.         Results Review:  I reviewed the patient's new clinical results.  I reviewed the patient's new imaging results and agree with the interpretation.  I reviewed the patient's other test results and agree with the interpretation  I personally viewed and interpreted the patient's EKG/Telemetry data  Discussed with ED provider.    Lab Results (last 24 hours)       Procedure Component Value Units Date/Time    Respiratory Panel PCR w/COVID-19(SARS-CoV-2) HUGH/HANNAH/KELLI/PAD/COR/KERRY In-House, NP Swab in UTM/VTM, 2 HR TAT - Swab, Nasopharynx [[id number]]  (Normal) Collected: 07/10/24 1849    Specimen: Swab from Nasopharynx Updated: 07/10/24 1951     ADENOVIRUS, PCR Not Detected     Coronavirus 229E Not Detected     Coronavirus HKU1 Not Detected     Coronavirus NL63 Not Detected     Coronavirus OC43 Not Detected     COVID19 Not Detected     Human Metapneumovirus Not Detected     Human Rhinovirus/Enterovirus Not Detected     Influenza A  PCR Not Detected     Influenza B PCR Not Detected     Parainfluenza Virus 1 Not Detected     Parainfluenza Virus 2 Not Detected     Parainfluenza Virus 3 Not Detected     Parainfluenza Virus 4 Not Detected     RSV, PCR Not Detected     Bordetella pertussis pcr Not Detected     Bordetella parapertussis PCR Not Detected     Chlamydophila pneumoniae PCR Not Detected     Mycoplasma pneumo by PCR Not Detected    Narrative:      In the setting of a positive respiratory panel with a viral infection PLUS a negative procalcitonin without other underlying concern for bacterial infection, consider observing off antibiotics or discontinuation of antibiotics and continue supportive care. If the respiratory panel is positive for atypical bacterial infection (Bordetella pertussis, Chlamydophila pneumoniae, or Mycoplasma pneumoniae), consider antibiotic de-escalation to target atypical bacterial infection.    CBC & Differential [[id number]]  (Abnormal) Collected: 07/10/24 1907    Specimen: Blood from Arm, Right Updated: 07/10/24 1923    Narrative:      The following orders were created for panel order CBC & Differential.  Procedure                               Abnormality         Status                     ---------                               -----------         ------                     CBC Auto Differential[[id number]]        Abnormal            Final result                 Please view results for these tests on the individual orders.    Comprehensive Metabolic Panel [[id number]]  (Abnormal) Collected: 07/10/24 1907    Specimen: Blood from Arm, Right Updated: 07/10/24 1953     Glucose 95 mg/dL      BUN 49 mg/dL      Creatinine 2.39 mg/dL      Sodium 131 mmol/L      Potassium 5.1 mmol/L      Chloride 95 mmol/L      CO2 23.3 mmol/L      Calcium 9.3 mg/dL      Total Protein 6.8 g/dL      Albumin 4.2 g/dL      ALT (SGPT) 22 U/L      AST (SGOT) 22 U/L      Alkaline Phosphatase 80 U/L      Total Bilirubin 0.5 mg/dL      Globulin 2.6  gm/dL      A/G Ratio 1.6 g/dL      BUN/Creatinine Ratio 20.5     Anion Gap 12.7 mmol/L      eGFR 19.3 mL/min/1.73     Narrative:      GFR Normal >60  Chronic Kidney Disease <60  Kidney Failure <15    The GFR formula is only valid for adults with stable renal function between ages 18 and 70.    Protime-INR [[id number]]  (Abnormal) Collected: 07/10/24 1907    Specimen: Blood from Arm, Right Updated: 07/10/24 1936     Protime 15.0 Seconds      INR 1.16    aPTT [[id number]]  (Normal) Collected: 07/10/24 1907    Specimen: Blood from Arm, Right Updated: 07/10/24 1936     PTT 26.2 seconds     Lipase [[id number]]  (Normal) Collected: 07/10/24 1907    Specimen: Blood from Arm, Right Updated: 07/10/24 1953     Lipase 58 U/L     BNP [[id number]]  (Abnormal) Collected: 07/10/24 1907    Specimen: Blood from Arm, Right Updated: 07/10/24 1953     proBNP 2,035.0 pg/mL     Narrative:      This assay is used as an aid in the diagnosis of individuals suspected of having heart failure. It can be used as an aid in the diagnosis of acute decompensated heart failure (ADHF) in patients presenting with signs and symptoms of ADHF to the emergency department (ED). In addition, NT-proBNP of <300 pg/mL indicates ADHF is not likely.    Age Range Result Interpretation  NT-proBNP Concentration (pg/mL:      <50             Positive            >450                   Gray                 300-450                    Negative             <300    50-75           Positive            >900                  Gray                300-900                  Negative            <300      >75             Positive            >1800                  Gray                300-1800                  Negative            <300    High Sensitivity Troponin T [[id number]]  (Abnormal) Collected: 07/10/24 1907    Specimen: Blood from Arm, Right Updated: 07/10/24 1956     HS Troponin T 54 ng/L     Narrative:      High Sensitive Troponin T Reference Range:  <14.0 ng/L- Negative Female  "for AMI  <22.0 ng/L- Negative Male for AMI  >=14 - Abnormal Female indicating possible myocardial injury.  >=22 - Abnormal Male indicating possible myocardial injury.   Clinicians would have to utilize clinical acumen, EKG, Troponin, and serial changes to determine if it is an Acute Myocardial Infarction or myocardial injury due to an underlying chronic condition.         Lactic Acid, Plasma [[id number]]  (Normal) Collected: 07/10/24 1907    Specimen: Blood from Arm, Right Updated: 07/10/24 1953     Lactate 1.2 mmol/L     Procalcitonin [[id number]]  (Normal) Collected: 07/10/24 1907    Specimen: Blood from Arm, Right Updated: 07/10/24 1950     Procalcitonin 0.09 ng/mL     Narrative:      As a Marker for Sepsis (Non-Neonates):    1. <0.5 ng/mL represents a low risk of severe sepsis and/or septic shock.  2. >2 ng/mL represents a high risk of severe sepsis and/or septic shock.    As a Marker for Lower Respiratory Tract Infections that require antibiotic therapy:    PCT on Admission    Antibiotic Therapy       6-12 Hrs later    >0.5                Strongly Recommended  >0.25 - <0.5        Recommended   0.1 - 0.25          Discouraged              Remeasure/reassess PCT  <0.1                Strongly Discouraged     Remeasure/reassess PCT    As 28 day mortality risk marker: \"Change in Procalcitonin Result\" (>80% or <=80%) if Day 0 (or Day 1) and Day 4 values are available. Refer to http://www.[employer]-pct-calculator.com    Change in PCT <=80%  A decrease of PCT levels below or equal to 80% defines a positive change in PCT test result representing a higher risk for 28-day all-cause mortality of patients diagnosed with severe sepsis for septic shock.    Change in PCT >80%  A decrease of PCT levels of more than 80% defines a negative change in PCT result representing a lower risk for 28-day all-cause mortality of patients diagnosed with severe sepsis or septic shock.       CK [[id number]]  (Normal) Collected: 07/10/24 1907    " Specimen: Blood from Arm, Right Updated: 07/10/24 1953     Creatine Kinase 37 U/L     Magnesium [[id number]]  (Abnormal) Collected: 07/10/24 1907    Specimen: Blood from Arm, Right Updated: 07/10/24 1953     Magnesium 3.2 mg/dL     TSH [[id number]]  (Normal) Collected: 07/10/24 1907    Specimen: Blood from Arm, Right Updated: 07/10/24 1953     TSH 1.770 uIU/mL     CBC Auto Differential [[id number]]  (Abnormal) Collected: 07/10/24 1907    Specimen: Blood from Arm, Right Updated: 07/10/24 1923     WBC 7.58 10*3/mm3      RBC 3.62 10*6/mm3      Hemoglobin 11.0 g/dL      Hematocrit 33.2 %      MCV 91.7 fL      MCH 30.4 pg      MCHC 33.1 g/dL      RDW 13.9 %      RDW-SD 46.4 fl      MPV 9.1 fL      Platelets 229 10*3/mm3      Neutrophil % 73.7 %      Lymphocyte % 14.9 %      Monocyte % 9.2 %      Eosinophil % 0.8 %      Basophil % 0.3 %      Immature Grans % 1.1 %      Neutrophils, Absolute 5.59 10*3/mm3      Lymphocytes, Absolute 1.13 10*3/mm3      Monocytes, Absolute 0.70 10*3/mm3      Eosinophils, Absolute 0.06 10*3/mm3      Basophils, Absolute 0.02 10*3/mm3      Immature Grans, Absolute 0.08 10*3/mm3      nRBC 0.0 /100 WBC     Urinalysis With Microscopic If Indicated (No Culture) - Urine, Clean Catch [[id number]]  (Abnormal) Collected: 07/10/24 1959    Specimen: Urine, Clean Catch Updated: 07/10/24 2020     Color, UA Yellow     Appearance, UA Clear     pH, UA 7.0     Specific Gravity, UA 1.011     Glucose, UA Negative     Ketones, UA Negative     Bilirubin, UA Negative     Blood, UA Negative     Protein, UA Trace     Leuk Esterase, UA Trace     Nitrite, UA Negative     Urobilinogen, UA 0.2 E.U./dL    Urinalysis, Microscopic Only - Urine, Clean Catch [[id number]] Collected: 07/10/24 1959    Specimen: Urine, Clean Catch Updated: 07/10/24 2020     RBC, UA 0-2 /HPF      WBC, UA 0-2 /HPF      Bacteria, UA None Seen /HPF      Squamous Epithelial Cells, UA 0-2 /HPF      Hyaline Casts, UA None Seen /LPF      Methodology Automated  Microscopy    High Sensitivity Troponin T 2Hr [[id number]]  (Abnormal) Collected: 07/10/24 2106    Specimen: Blood Updated: 07/10/24 2139     HS Troponin T 43 ng/L      Troponin T Delta -11 ng/L     Narrative:      High Sensitive Troponin T Reference Range:  <14.0 ng/L- Negative Female for AMI  <22.0 ng/L- Negative Male for AMI  >=14 - Abnormal Female indicating possible myocardial injury.  >=22 - Abnormal Male indicating possible myocardial injury.   Clinicians would have to utilize clinical acumen, EKG, Troponin, and serial changes to determine if it is an Acute Myocardial Infarction or myocardial injury due to an underlying chronic condition.         Uric Acid [[id number]]  (Abnormal) Collected: 07/10/24 2106    Specimen: Blood Updated: 07/11/24 0010     Uric Acid 5.9 mg/dL             Imaging Results (Last 24 Hours)       Procedure Component Value Units Date/Time    XR Chest 1 View [[id number]] Collected: 07/10/24 1839     Updated: 07/10/24 1843    Narrative:      XR CHEST 1 VW-7/10/2024     HISTORY: Shortness of breath.     Heart size is at the upper limits of normal. Lungs appear free of acute  infiltrates. There is some aortic calcification. Tiny calcified  granuloma seen in the left lower lung.       Impression:      1. Borderline cardiomegaly        This report was finalized on 7/10/2024 6:40 PM by Dr. Satish Michelle M.D on Workstation: [id number]               Results for orders placed during the hospital encounter of 10/24/23    Adult Transthoracic Echo Complete W/ Cont if Necessary Per Protocol (With Agitated Saline)    Interpretation Summary    Left ventricular diastolic function was indeterminate.    Saline test results are negative.    There is mild calcification of the aortic valve.    Estimated right ventricular systolic pressure from tricuspid regurgitation is normal (<35 mmHg). Calculated right ventricular systolic pressure from tricuspid regurgitation is 19 mmHg.    ECG 12 Lead Dyspnea    Preliminary Result   HEART RATE=69  bpm   RR Bnwjkpsv=486  ms   PA Interval=  ms   P Horizontal Axis=  deg   P Front Axis=  deg   QRSD Interval=96  ms   QT Nlxynjcb=312  ms   IKbO=270  ms   QRS Axis=-57  deg   T Wave Axis=43  deg   - ABNORMAL ECG -   Atrial flutter with predominant 3:1 AV block   Left anterior fascicular block   Low voltage, precordial leads   Probable anteroseptal infarct, old   Date and Time of Study:2024-07-10 18:49:13        Assessment/Plan   Assessment & Plan   Active Hospital Problems    Diagnosis  POA    **Shortness of breath [R06.02]  Yes    Hyponatremia [E87.1]  Yes    Chronic diastolic congestive heart failure [I50.32]  Yes    Essential hypertension [I10]  Yes    CKD (chronic kidney disease) stage 4, GFR 15-29 ml/min [N18.4]  Yes    Permanent atrial fibrillation [I48.21]  Yes      Resolved Hospital Problems   No resolved problems to display.       88 y.o. female admitted with Shortness of breath.    Shortness of breath  Acute on chronic CHF  - Most recent 2D Echo prior to admission: October 2023  - Chest x-ray-borderline cardiomegaly  - ProBNP elevated at 2035.0.   - Consult Cardiology.   -Strict I&O  -Daily weight  -Repeat CBC and BMP in the morning  -Check D-dimer  -Telemetry monitoring  -Continuous pulse ox  -Supplemental oxygen as needed that is between 90% and 95%  -Check orthostatics    Hyponatremia  Chronic kidney disease stage 4  -Creatinine 2.39, baseline  -Sodium 131  -Will check urine sodium, urine osmolality, serum osmolality, uric acid  -Consult nephrology  -Strict I&O  -Daily weight  -Avoid nephrotoxic meds    Hypertension (controlled vs uncontrolled)  - /79,  appears stable  -Will hold hydrochlorothiazide setting of hyponatremia  - Will continue to monitor BP     Atrial fibrillation on long term anticoagulation  -EKG-a flutter  -Rate currently controlled  -Continue home dose of Eliquis  -Telemetry      SCDs for DVT prophylaxis.  Full code.  Discussed with  patient.      ENRRIQUE Simmons  [facility]  07/11/24  01:02 EDT

## 2024-07-11 NOTE — PLAN OF CARE
Problem: Adult Inpatient Plan of Care  Goal: Plan of Care Review  Outcome: Ongoing, Progressing  Flowsheets (Taken 7/11/2024 0502)  Progress: no change  Plan of Care Reviewed With: patient  Outcome Evaluation: A&Ox4, patient very hard of hearing, aflutter on monitor, stable on room air, no cough or sputum, no complaints of pain, nephro and cardiology to be consulted, plan of care continues.          Problem: Adult Inpatient Plan of Care  Goal: Absence of Hospital-Acquired Illness or Injury  Intervention: Prevent and Manage VTE (Venous Thromboembolism) Risk  Recent Flowsheet Documentation  Taken 7/11/2024 0400 by Jazmin Dumont, RN  Activity Management: activity encouraged  Taken 7/11/2024 0200 by Jazmin Dumont, RN  Activity Management: activity encouraged  VTE Prevention/Management:   bilateral   sequential compression devices on  Taken 7/11/2024 0000 by Jazmin Dumont, RN  Activity Management: bedrest  VTE Prevention/Management:   bilateral   sequential compression devices on  Taken 7/10/2024 2305 by Jazmin Dumont, RN  Activity Management: bedrest

## 2024-07-11 NOTE — DISCHARGE PLACEMENT REQUEST
"Maria R Hummel (88 y.o. Female)       Date of Birth   1935    Social Security Number       Address   [address] [facility] [address]    Home Phone   [phone]    MRN   [id number]       Moravian   Yazidism    Marital Status                               Admission Date   7/10/24    Admission Type   Emergency    Admitting Provider   Ulises Phillips MD    Attending Provider   Ulises Phillips MD    Department, Room/Bed   [facility], N333/1       Discharge Date       Discharge Disposition       Discharge Destination                                 Attending Provider: Ulises Phillips MD    Allergies: Morphine, Oxycodone, Ace Inhibitors, Bactrim [Sulfamethoxazole-trimethoprim], Levofloxacin, Torsemide    Isolation: None   Infection: None   Code Status: CPR    Ht: 157.5 cm (62\")   Wt: 62.6 kg (138 lb)    Admission Cmt: None   Principal Problem: Shortness of breath [R06.02]                   Active Insurance as of 7/10/2024       Primary Coverage       Payor Plan Insurance Group Employer/Plan Group    MEDICARE MEDICARE A & B        Payor Plan Address Payor Plan Phone Number Payor Plan Fax Number Effective Dates    [address] [phone]  12/1/2002 - None Entered    [facility] [id number]         Subscriber Name Subscriber Birth Date Member ID       MARIA R HUMMEL 1935 [id number]               Secondary Coverage       Payor Plan Insurance Group Employer/Plan Group     FOR LIFE  FOR LIFE  SUP         Payor Plan Address Payor Plan Phone Number Payor Plan Fax Number Effective Dates    [address] [phone]  1/1/2024 - None Entered    [facility] [address]         Subscriber Name Subscriber Birth Date Member ID       MARIA R HUMMEL 1935 [id number]                     Emergency Contacts        (Rel.) Home Phone Work Phone Mobile Phone    Sharan Hollingsworth (Son) [phone] -- --    NEAL HERNANDEZ (Daughter) [phone] " -- [phone]

## 2024-07-11 NOTE — CONSULTS
Date of Consultation: 24    Referral Provider: Vilma Yu MD     Reason for Consultation: CHF    Encounter Provider: ENRRIQUE Dick    Group of Service: [facility]     Patient Name: Marleni Hummel    :1935    Chief complaint: Nausea     History of Present Illness: Marleni Hummel is an 88-year-old who follows with Dr. Parmar.  She has a past medical history that is significant for hypertension, hyperlipidemia, atrial fibrillation, stress-induced cardiomyopathy, DEBORAH, and left renal carcinoma status post nephrectomy.    In 2015 she suffered an NSTEMI and cardiac catheterization revealed cardiomyopathy with an LVEF of 20%.  She was also noted to have an lesion in her circumflex and was treated with a HAYLEY.  In  her echocardiogram revealed an improvement in her LVEF to 65%.  She is also noted to have borderline LVH with mild to moderate left atrial enlargement.    She was hospitalized over Memorial Day weekend with reports of generalized fatigue.  She was in atrial fibrillation and noted to have a heart rate in the 40s, her beta-blocker was discontinued.    She presented to the ED on 7/10/2024 with complaints of progressive weakness and fatigue. She was recently diagnosed with a UTI and treated with amoxicillin.  She denies any fever chills, diarrhea, abdominal pain, back pain, headache, or falls.  She denies chest pain or discomfort, shortness of breath, or palpitations.    Workup in the ED included: HS troponin 54->43, proBNP , sodium 131, creatinine 2.39, TSH 1.77, respiratory viral panel negative, chest xray showed cardiomegaly, EKG showed atrial flutter with a rate of 69.     Today, she was resting in bed with her daughter at bedside. She denies chest pain or discomfort, palpitations, or shortness of breath. She reports that she is feeling better today.     Echocardiogram 10/25/23    Left ventricular diastolic function was indeterminate.    Saline test  results are negative.    There is mild calcification of the aortic valve.    Estimated right ventricular systolic pressure from tricuspid regurgitation is normal (<35 mmHg). Calculated right ventricular systolic pressure from tricuspid regurgitation is 19 mmHg.    Stress Test with Myocardial Perfusion 3/19/23    Left ventricular ejection fraction is normal (Calculated EF = 68%).    Myocardial perfusion imaging indicates a normal myocardial perfusion study with no evidence of ischemia.    Impressions are consistent with a low risk study.    Findings consistent with a normal ECG stress test.    Compared to the prior study from 12/16/2019 the current study reveals no changes.    Cardiac Catheterization 10/4/15  FINDINGS:  1.  HEMODYNAMICS:  /32, /71/85.   2.  Left ventriculography:  EF 20%, severe anterolateral and inferoapical  dyskinesis consistent with Takotsubo cardiomyopathy.   3.  Coronary angiography:  Right dominant system. Two-vessel coronary disease.  Left main is normal. The proximal LAD has 30% diffuse stenosis. The mid LAD has  40% to 50% stenosis. The circumflex has a 90% proximal stenosis. The RCA is  large and without significant disease.   4.  Successful stenting of the proximal circumflex was performed as described  above.      SUMMARY:  Takotsubo cardiomyopathy with severely elevated left-sided filling  pressures. Critical circumflex stenosis successfully treated with stenting.      RECOMMENDATIONS:  Aggressive management for Takotsubo cardiomyopathy and dual  antiplatelet therapy for at least 1 year.    Cardiac Monitor 5/29/24  Monitor placed on patient by PC on 5/29/2024  at 11:33 EDT. Instructions were provided to patient on use of holter monitor and duration of monitoring.  The monitor was scanned on 6/17/2024. The patient was monitored for 8 days 7 hours. Indications for this exam include Unspecified Atrial Fibrillation. Average HR:  66. Min HR:  37. Max HR:  119.     Past Medical  History:   Diagnosis Date    Acute bronchitis due to Rhinovirus 05/31/2022    Acute vulvitis 08/21/2019    Allergic     Allergic rhinitis     Anemia of chronic disease     Arthropathy of knee     right    Atrial fibrillation     Back pain     Bell's palsy     Bradycardia 05/27/2024    Caregiver stress syndrome 04/17/2019    Chronic coronary artery disease     Chronic kidney disease (CKD), stage III (moderate)     Diverticulitis 12/14/2022    CARROLL (dyspnea on exertion) 03/17/2023    Edema     Elevated serum free T4 level 03/21/2016    Encounter for eye exam 09/2020    Essential hypertension     Fatigue     GERD (gastroesophageal reflux disease)     H/O Heart murmur     Heart attack 10/05/2015    Hematuria 12/12/2016    Herpes zoster without complication     Hip arthritis     left    History of bone density study 02/28/2008    History of pelvic fracture 2015    History of pneumonia     History of transfusion     2015    Hypercholesterolemia     IFG (impaired fasting glucose)     Insomnia     Left kidney mass 07/2020    Limited joint range of motion     RIGHT KNEE    Mixed hyperlipidemia     Muscle tension headache 04/03/2019    New daily persistent headache 12/17/2018    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 Clarke    CATARACT EXTRACTION Right 04/16/2013    Dr. Clarke    COLONOSCOPY  04/18/2014    Dr. Elizabeth; no polyps    EPIDURAL BLOCK      HIP PERCUTANEOUS PINNING Left 8/31/2017    Procedure: LT HIP PERCUTANEOUS PINNING;  Surgeon: Sean Canales MD;   Location: [facility] MAIN OR;  Service:     MAMMO BILATERAL  11/2013    NEPHRECTOMY Left 11/16/2020    Procedure: LAPAROSCOPIC NEPHRECTOMY;  Surgeon: Chuckie Mclaughlin MD;  Location: [facility] MAIN OR;  Service: Urology;  Laterality: Left;    PAP SMEAR  02/2011    TIBIA FRACTURE SURGERY Right 2015    REMOVED PLATE LATER IN 2015    TONSILLECTOMY  1947    TOTAL HIP ARTHROPLASTY Right 08/2015    TOTAL HIP ARTHROPLASTY Right 09/2016    TOTAL KNEE ARTHROPLASTY Bilateral 2004         Allergies   Allergen Reactions    Morphine Anaphylaxis    Oxycodone Anaphylaxis    Ace Inhibitors Cough and Unknown (See Comments)    Bactrim [Sulfamethoxazole-Trimethoprim] Rash    Levofloxacin Itching and Rash     insomnia  insomnia  insomnia    Torsemide Dizziness         No current facility-administered medications on file prior to encounter.     Current Outpatient Medications on File Prior to Encounter   Medication Sig Dispense Refill    acetaminophen (TYLENOL) 500 MG tablet Take 1 tablet by mouth Every 4 (Four) Hours As Needed for Mild Pain. Indications: Pain      atorvastatin (LIPITOR) 20 MG tablet Take 1 tablet by mouth Every Night for 270 days. 90 tablet 2    Eliquis 2.5 MG tablet tablet TAKE 1 TABLET EVERY 12 HOURS (TWICE A DAY) (Patient taking differently: Take 1 tablet by mouth 2 (Two) Times a Day.) 180 tablet 3    escitalopram (LEXAPRO) 20 MG tablet Take 1 tablet by mouth Daily for 270 days. 90 tablet 2    fexofenadine (ALLEGRA) 60 MG tablet Take 1 tablet by mouth Daily As Needed. Indications: Hayfever      furosemide (LASIX) 20 MG tablet Take 1 tablet by mouth Daily As Needed (if weight goes up 3lbs in 3 days). 30 tablet 11    hydroCHLOROthiazide (HYDRODIURIL) 12.5 MG tablet Take 1 tablet by mouth Daily. 30 tablet 0    magnesium oxide (MAG-OX) 400 MG tablet Take 1 tablet by mouth Daily. Indications: Acid Indigestion      melatonin 5 MG tablet tablet Take 1 tablet by mouth At Night As Needed.      sodium bicarbonate 650 MG  tablet Take 1 tablet by mouth 2 (Two) Times a Day. Indications: Heartburn      Vibegron (Gemtesa) 75 MG tablet Take 1 tablet by mouth Daily. Indications: Overactive Bladder, Urinary Incontinence      [DISCONTINUED] amoxicillin (AMOXIL) 250 MG capsule Take 1 capsule by mouth 2 (Two) Times a Day. 14 capsule 0    methocarbamol (ROBAXIN) 500 MG tablet Take 1 tablet by mouth 3 (Three) Times a Day As Needed for Muscle Spasms. 21 tablet 0    methylPREDNISolone (MEDROL) 4 MG dose pack Take as directed on package instructions. 21 tablet 0         Social History     Socioeconomic History    Marital status:     Years of education: High school   Tobacco Use    Smoking status: Former     Current packs/day: 0.00     Average packs/day: 1 pack/day for 3.0 years (3.0 ttl pk-yrs)     Types: Cigarettes     Start date: 1953     Quit date: 1956     Years since quittin.4     Passive exposure: Past    Smokeless tobacco: Never    Tobacco comments:     caffeine - 1/2 cup coffee daily    Vaping Use    Vaping status: Never Used   Substance and Sexual Activity    Alcohol use: Not Currently     Alcohol/week: 3.0 standard drinks of alcohol     Types: 3 Glasses of wine per week     Comment: couple times a week    Drug use: Never    Sexual activity: Not Currently         Family History   Problem Relation Age of Onset    Hypertension Other     Hypertension Mother     Stroke Mother     Heart disease Mother     Hypertension Maternal Grandmother     Malig Hyperthermia Neg Hx        REVIEW OF SYSTEMS:   12 point ROS was performed and is negative except as outlined in HPI       Objective:     Vitals:    24 0259 24 0500 24 0716 24 1248   BP: 124/57  139/69 139/69   BP Location: Right arm  Right arm    Patient Position: Standing  Lying    Pulse: 98   98   Resp: 25  14    Temp: 97.6 °F (36.4 °C)  97.7 °F (36.5 °C)    TempSrc: Oral  Oral    SpO2:       Weight:  62.8 kg (138 lb 7.2 oz)  62.6 kg (138 lb)   Height:  "   157.5 cm (62\")     Body mass index is 25.24 kg/m².  Flowsheet Rows      Flowsheet Row First Filed Value   Admission Height 157.5 cm (62\") Documented at 07/10/2024 1903   Admission Weight 59.4 kg (131 lb) Documented at 07/10/2024 1903          Physical Exam  Vitals reviewed.   Constitutional:       General: She is not in acute distress.  HENT:      Head: Normocephalic.      Nose: Nose normal.   Eyes:      Extraocular Movements: Extraocular movements intact.      Pupils: Pupils are equal, round, and reactive to light.   Cardiovascular:      Rate and Rhythm: Normal rate. Rhythm irregularly irregular.      Pulses: Normal pulses.      Heart sounds: Normal heart sounds, S1 normal and S2 normal. Heart sounds not distant. No murmur heard.     No friction rub. No gallop. No S3 or S4 sounds.   Pulmonary:      Effort: Pulmonary effort is normal.      Breath sounds: Normal breath sounds.   Abdominal:      General: Abdomen is flat. Bowel sounds are normal.      Palpations: Abdomen is soft.      Tenderness: There is no abdominal tenderness.   Skin:     General: Skin is warm and dry.   Neurological:      General: No focal deficit present.      Mental Status: She is alert and oriented to person, place, and time. Mental status is at baseline.   Psychiatric:         Mood and Affect: Mood normal.         Behavior: Behavior normal.       Lab Review:                Results from last 7 days   Lab Units 07/11/24 0223   SODIUM mmol/L 132*   POTASSIUM mmol/L 5.2   CHLORIDE mmol/L 100   CO2 mmol/L 21.8*   BUN mg/dL 45*   CREATININE mg/dL 2.16*   GLUCOSE mg/dL 95   CALCIUM mg/dL 8.8     Results from last 7 days   Lab Units 07/10/24  2106 07/10/24  1907   CK TOTAL U/L  --  37   HSTROP T ng/L 43* 54*     Results from last 7 days   Lab Units 07/11/24 0223   WBC 10*3/mm3 7.40   HEMOGLOBIN g/dL 9.9*   HEMATOCRIT % 30.1*   PLATELETS 10*3/mm3 185     Results from last 7 days   Lab Units 07/10/24  1907   INR  1.16*   APTT seconds 26.2       "   Results from last 7 days   Lab Units 07/10/24  1907   MAGNESIUM mg/dL 3.2*           EKG (reviewed by me personally):        Assessment/Plan:   Weakness/fatigue   She reports that this has been ongoing since her hospitalization in May 2024   Dyspnea with exertion   She reports CARROLL with exertion but what she describes actually sounds more like fatigue, her daughter at bedside reports she feels she appears short of breath at rest at times.   Chronic diastolic heart failure  She does not appear to be having a heart failure exacerbation. She is not requiring supplemental oxygen, her lungs are clear on exam.   Echocardiogram pending.   She weighs herself daily and notes no change in her weight   She denies orthopnea  She sleeps on several pillows at night to reduce GERD symptoms  Atrial fibrillation/flutter  Heart rate well controlled today but was rapid on presentation.   Continue apixaban 2.5 mg twice daily     Hyponatremia  Nephrology is following, she is on HCTZ and an SSRI   Chronic kidney disease stage IV  Coronary artery disease   Status post HAYLEY in 2015   Troponin is elevated 54 -> 43, EKG is nonischemic, she denies chest pain, elevation in the setting of chronic kidney disease   Hypertension   Urinary tract infection  Currently being treated with amoxicillin     She doesn't appear to be volume overloaded on exam. Will obtain an echocardiogram for further evaluation.     Thank you for this consult, cardiology will follow.     Cassidy Andersen, ENRRIQUE   07/11/24

## 2024-07-11 NOTE — ED NOTES
"Nursing report ED to floor  Marleni Hummel  88 y.o.  female    HPI :  HPI (Adult)  Stated Reason for Visit: SOA  History Obtained From: patient  Duration (Weeks): 4    Chief Complaint  Chief Complaint   Patient presents with    Shortness of Breath    Weakness - Generalized       Admitting doctor:   Arden Sidhu MD    Admitting diagnosis:   The primary encounter diagnosis was Hyponatremia. Diagnoses of Generalized weakness, Nausea, Dyspnea on exertion, Chronic kidney disease, unspecified CKD stage, Elevated troponin, and History of atrial fibrillation were also pertinent to this visit.    Code status:   Current Code Status       Date Active Code Status Order ID Comments User Context       Prior            Allergies:   Morphine, Oxycodone, Ace inhibitors, Bactrim [sulfamethoxazole-trimethoprim], Levofloxacin, and Torsemide    Isolation:   No active isolations    Intake and Output    Intake/Output Summary (Last 24 hours) at 7/10/2024 2226  Last data filed at 7/10/2024 1959  Gross per 24 hour   Intake --   Output 50 ml   Net -50 ml       Weight:       07/10/24  1903   Weight: 59.4 kg (131 lb)       Most recent vitals:   Vitals:    07/10/24 1903 07/10/24 2016 07/10/24 2107 07/10/24 2146   BP: 143/81 162/74 133/81 128/63   BP Location: Left arm      Patient Position: Lying  Lying    Pulse:  59 57 56   Resp:       Temp:       TempSrc:       SpO2:  97%  96%   Weight: 59.4 kg (131 lb)      Height: 157.5 cm (62\")          Active LDAs/IV Access:   Lines, Drains & Airways       Active LDAs       Name Placement date Placement time Site Days    Peripheral IV 07/10/24 1907 Right Antecubital 07/10/24  1907  Antecubital  less than 1                    Labs (abnormal labs have a star):   Labs Reviewed   COMPREHENSIVE METABOLIC PANEL - Abnormal; Notable for the following components:       Result Value    BUN 49 (*)     Creatinine 2.39 (*)     Sodium 131 (*)     Chloride 95 (*)     eGFR 19.3 (*)     All other components within " normal limits    Narrative:     GFR Normal >60  Chronic Kidney Disease <60  Kidney Failure <15    The GFR formula is only valid for adults with stable renal function between ages 18 and 70.   PROTIME-INR - Abnormal; Notable for the following components:    Protime 15.0 (*)     INR 1.16 (*)     All other components within normal limits   URINALYSIS W/ MICROSCOPIC IF INDICATED (NO CULTURE) - Abnormal; Notable for the following components:    Protein, UA Trace (*)     Leuk Esterase, UA Trace (*)     All other components within normal limits   BNP (IN-HOUSE) - Abnormal; Notable for the following components:    proBNP 2,035.0 (*)     All other components within normal limits    Narrative:     This assay is used as an aid in the diagnosis of individuals suspected of having heart failure. It can be used as an aid in the diagnosis of acute decompensated heart failure (ADHF) in patients presenting with signs and symptoms of ADHF to the emergency department (ED). In addition, NT-proBNP of <300 pg/mL indicates ADHF is not likely.    Age Range Result Interpretation  NT-proBNP Concentration (pg/mL:      <50             Positive            >450                   Gray                 300-450                    Negative             <300    50-75           Positive            >900                  Gray                300-900                  Negative            <300      >75             Positive            >1800                  Gray                300-1800                  Negative            <300   TROPONIN - Abnormal; Notable for the following components:    HS Troponin T 54 (*)     All other components within normal limits    Narrative:     High Sensitive Troponin T Reference Range:  <14.0 ng/L- Negative Female for AMI  <22.0 ng/L- Negative Male for AMI  >=14 - Abnormal Female indicating possible myocardial injury.  >=22 - Abnormal Male indicating possible myocardial injury.   Clinicians would have to utilize clinical acumen,  EKG, Troponin, and serial changes to determine if it is an Acute Myocardial Infarction or myocardial injury due to an underlying chronic condition.        MAGNESIUM - Abnormal; Notable for the following components:    Magnesium 3.2 (*)     All other components within normal limits   CBC WITH AUTO DIFFERENTIAL - Abnormal; Notable for the following components:    RBC 3.62 (*)     Hemoglobin 11.0 (*)     Hematocrit 33.2 (*)     Lymphocyte % 14.9 (*)     Immature Grans % 1.1 (*)     Immature Grans, Absolute 0.08 (*)     All other components within normal limits   HIGH SENSITIVITIY TROPONIN T 2HR - Abnormal; Notable for the following components:    HS Troponin T 43 (*)     Troponin T Delta -11 (*)     All other components within normal limits    Narrative:     High Sensitive Troponin T Reference Range:  <14.0 ng/L- Negative Female for AMI  <22.0 ng/L- Negative Male for AMI  >=14 - Abnormal Female indicating possible myocardial injury.  >=22 - Abnormal Male indicating possible myocardial injury.   Clinicians would have to utilize clinical acumen, EKG, Troponin, and serial changes to determine if it is an Acute Myocardial Infarction or myocardial injury due to an underlying chronic condition.        RESPIRATORY PANEL PCR W/ COVID-19 (SARS-COV-2), NP SWAB IN UTM/VTP, 2 HR TAT - Normal    Narrative:     In the setting of a positive respiratory panel with a viral infection PLUS a negative procalcitonin without other underlying concern for bacterial infection, consider observing off antibiotics or discontinuation of antibiotics and continue supportive care. If the respiratory panel is positive for atypical bacterial infection (Bordetella pertussis, Chlamydophila pneumoniae, or Mycoplasma pneumoniae), consider antibiotic de-escalation to target atypical bacterial infection.   APTT - Normal   LIPASE - Normal   LACTIC ACID, PLASMA - Normal   PROCALCITONIN - Normal    Narrative:     As a Marker for Sepsis (Non-Neonates):    1.  "<0.5 ng/mL represents a low risk of severe sepsis and/or septic shock.  2. >2 ng/mL represents a high risk of severe sepsis and/or septic shock.    As a Marker for Lower Respiratory Tract Infections that require antibiotic therapy:    PCT on Admission    Antibiotic Therapy       6-12 Hrs later    >0.5                Strongly Recommended  >0.25 - <0.5        Recommended   0.1 - 0.25          Discouraged              Remeasure/reassess PCT  <0.1                Strongly Discouraged     Remeasure/reassess PCT    As 28 day mortality risk marker: \"Change in Procalcitonin Result\" (>80% or <=80%) if Day 0 (or Day 1) and Day 4 values are available. Refer to http://www.[employer] Keys[employer]-calculator.com    Change in PCT <=80%  A decrease of PCT levels below or equal to 80% defines a positive change in PCT test result representing a higher risk for 28-day all-cause mortality of patients diagnosed with severe sepsis for septic shock.    Change in PCT >80%  A decrease of PCT levels of more than 80% defines a negative change in PCT result representing a lower risk for 28-day all-cause mortality of patients diagnosed with severe sepsis or septic shock.      CK - Normal   TSH - Normal   URINALYSIS, MICROSCOPIC ONLY   TYPE AND SCREEN   CBC AND DIFFERENTIAL    Narrative:     The following orders were created for panel order CBC & Differential.  Procedure                               Abnormality         Status                     ---------                               -----------         ------                     CBC Auto Differential[[id number]]        Abnormal            Final result                 Please view results for these tests on the individual orders.       EKG:   ECG 12 Lead Dyspnea   Preliminary Result   HEART RATE=69  bpm   RR Heixzhzz=410  ms   CT Interval=  ms   P Horizontal Axis=  deg   P Front Axis=  deg   QRSD Interval=96  ms   QT Ufkrhfgx=940  ms   UXtD=354  ms   QRS Axis=-57  deg   T Wave Axis=43  deg   - ABNORMAL ECG - "   Atrial flutter with predominant 3:1 AV block   Left anterior fascicular block   Low voltage, precordial leads   Probable anteroseptal infarct, old   Date and Time of Study:2024-07-10 18:49:13          Meds given in ED:   Medications   sodium chloride 0.9 % flush 10 mL (has no administration in time range)   sodium chloride 0.9 % bolus 500 mL (0 mL Intravenous Stopped 7/10/24 2100)   ondansetron (ZOFRAN) injection 4 mg (4 mg Intravenous Given 7/10/24 1932)       Imaging results:  XR Chest 1 View    Result Date: 7/10/2024  1. Borderline cardiomegaly   This report was finalized on 7/10/2024 6:40 PM by Dr. Satish Michelle M.D on Workstation: [id number]       Ambulatory status:   - up with one    Social issues:   Social History     Socioeconomic History    Marital status:     Years of education: High school   Tobacco Use    Smoking status: Former     Current packs/day: 0.00     Average packs/day: 1 pack/day for 3.0 years (3.0 ttl pk-yrs)     Types: Cigarettes     Start date: 1953     Quit date: 1956     Years since quittin.4     Passive exposure: Past    Smokeless tobacco: Never    Tobacco comments:     caffeine - 1/2 cup coffee daily    Vaping Use    Vaping status: Never Used   Substance and Sexual Activity    Alcohol use: Not Currently     Alcohol/week: 3.0 standard drinks of alcohol     Types: 3 Glasses of wine per week     Comment: couple times a week    Drug use: Never    Sexual activity: Not Currently       Peripheral Neurovascular  Peripheral Neurovascular (Adult)  Peripheral Neurovascular WDL: WDL    Neuro Cognitive  Neuro Cognitive (Adult)  Cognitive/Neuro/Behavioral WDL: orientation  Orientation: oriented x 4    Learning  Learning Assessment (Adult)  Learning Readiness and Ability: no barriers identified    Respiratory  Respiratory WDL  Respiratory WDL: .WDL except  Rhythm/Pattern, Respiratory: shortness of breath  Cough Frequency: no cough  Breath Sounds  Breath Sounds: All  Fields    Abdominal Pain       Pain Assessments  Pain (Adult)  (0-10) Pain Rating: Rest: 0    NIH Stroke Scale       Kassandra Khan RN  07/10/24 22:26 EDT

## 2024-07-11 NOTE — PLAN OF CARE
Problem: Adult Inpatient Plan of Care  Goal: Plan of Care Review  Outcome: Ongoing, Progressing  Flowsheets (Taken 7/11/2024 1700)  Progress: improving  Plan of Care Reviewed With:   patient   daughter   Goal Outcome Evaluation:  Plan of Care Reviewed With: patient, daughter        Progress: improving

## 2024-07-11 NOTE — ED NOTES
Attempted to complete orthostatic vitals on pt. Pt c/o pressure to chest when lying flat and worsening dizziness when sitting on edge of bed. RN unable to complete orthostatic vital set. Provider Gigi notified

## 2024-07-12 ENCOUNTER — READMISSION MANAGEMENT (OUTPATIENT)
Dept: CALL CENTER | Facility: HOSPITAL | Age: 89
End: 2024-07-12
Payer: MEDICARE

## 2024-07-12 ENCOUNTER — DOCUMENTATION (OUTPATIENT)
Dept: HOME HEALTH SERVICES | Facility: HOME HEALTHCARE | Age: 89
End: 2024-07-12
Payer: MEDICARE

## 2024-07-12 VITALS
BODY MASS INDEX: 25.4 KG/M2 | OXYGEN SATURATION: 100 % | WEIGHT: 138 LBS | DIASTOLIC BLOOD PRESSURE: 86 MMHG | RESPIRATION RATE: 16 BRPM | SYSTOLIC BLOOD PRESSURE: 130 MMHG | HEART RATE: 60 BPM | TEMPERATURE: 98 F | HEIGHT: 62 IN

## 2024-07-12 PROBLEM — R06.02 SHORTNESS OF BREATH: Status: RESOLVED | Noted: 2024-07-10 | Resolved: 2024-07-12

## 2024-07-12 PROBLEM — E87.1 HYPONATREMIA: Status: RESOLVED | Noted: 2024-07-11 | Resolved: 2024-07-12

## 2024-07-12 LAB
ALBUMIN SERPL-MCNC: 3.7 G/DL (ref 3.5–5.2)
ANION GAP SERPL CALCULATED.3IONS-SCNC: 8 MMOL/L (ref 5–15)
BUN SERPL-MCNC: 44 MG/DL (ref 8–23)
BUN/CREAT SERPL: 19.3 (ref 7–25)
CALCIUM SPEC-SCNC: 8.6 MG/DL (ref 8.6–10.5)
CHLORIDE SERPL-SCNC: 100 MMOL/L (ref 98–107)
CO2 SERPL-SCNC: 25 MMOL/L (ref 22–29)
CREAT SERPL-MCNC: 2.28 MG/DL (ref 0.57–1)
DEPRECATED RDW RBC AUTO: 48 FL (ref 37–54)
EGFRCR SERPLBLD CKD-EPI 2021: 20.4 ML/MIN/1.73
ERYTHROCYTE [DISTWIDTH] IN BLOOD BY AUTOMATED COUNT: 14 % (ref 12.3–15.4)
FERRITIN SERPL-MCNC: 332 NG/ML (ref 13–150)
GLUCOSE SERPL-MCNC: 97 MG/DL (ref 65–99)
HCT VFR BLD AUTO: 30.5 % (ref 34–46.6)
HGB BLD-MCNC: 9.8 G/DL (ref 12–15.9)
IRON 24H UR-MRATE: 74 MCG/DL (ref 37–145)
IRON SATN MFR SERPL: 24 % (ref 20–50)
MCH RBC QN AUTO: 30.2 PG (ref 26.6–33)
MCHC RBC AUTO-ENTMCNC: 32.1 G/DL (ref 31.5–35.7)
MCV RBC AUTO: 93.8 FL (ref 79–97)
PHOSPHATE SERPL-MCNC: 4.2 MG/DL (ref 2.5–4.5)
PLATELET # BLD AUTO: 164 10*3/MM3 (ref 140–450)
PMV BLD AUTO: 9.3 FL (ref 6–12)
POTASSIUM SERPL-SCNC: 5 MMOL/L (ref 3.5–5.2)
RBC # BLD AUTO: 3.25 10*6/MM3 (ref 3.77–5.28)
SODIUM SERPL-SCNC: 133 MMOL/L (ref 136–145)
TIBC SERPL-MCNC: 305 MCG/DL (ref 298–536)
TRANSFERRIN SERPL-MCNC: 205 MG/DL (ref 200–360)
WBC NRBC COR # BLD AUTO: 5.95 10*3/MM3 (ref 3.4–10.8)

## 2024-07-12 PROCEDURE — 84466 ASSAY OF TRANSFERRIN: CPT | Performed by: INTERNAL MEDICINE

## 2024-07-12 PROCEDURE — 82728 ASSAY OF FERRITIN: CPT | Performed by: INTERNAL MEDICINE

## 2024-07-12 PROCEDURE — 97535 SELF CARE MNGMENT TRAINING: CPT

## 2024-07-12 PROCEDURE — 99214 OFFICE O/P EST MOD 30 MIN: CPT | Performed by: INTERNAL MEDICINE

## 2024-07-12 PROCEDURE — 85027 COMPLETE CBC AUTOMATED: CPT | Performed by: STUDENT IN AN ORGANIZED HEALTH CARE EDUCATION/TRAINING PROGRAM

## 2024-07-12 PROCEDURE — G0378 HOSPITAL OBSERVATION PER HR: HCPCS

## 2024-07-12 PROCEDURE — 97165 OT EVAL LOW COMPLEX 30 MIN: CPT

## 2024-07-12 PROCEDURE — 83540 ASSAY OF IRON: CPT | Performed by: INTERNAL MEDICINE

## 2024-07-12 PROCEDURE — 80069 RENAL FUNCTION PANEL: CPT | Performed by: INTERNAL MEDICINE

## 2024-07-12 RX ADMIN — APIXABAN 2.5 MG: 2.5 TABLET, FILM COATED ORAL at 08:52

## 2024-07-12 RX ADMIN — MAGNESIUM OXIDE 400 MG (241.3 MG MAGNESIUM) TABLET 400 MG: TABLET at 08:52

## 2024-07-12 RX ADMIN — ESCITALOPRAM 20 MG: 20 TABLET, FILM COATED ORAL at 08:52

## 2024-07-12 NOTE — CONSULTS
Visited patient in room sitting up in chair. Patient's daughter also present in room.     Patient welcomed  and expressed she is feeling much better and hoping to be discharged soon. This  celebrated progress and promised to hold hope for future ahead outside of hospital.     No follow-up expected, though pastoral care remains available.

## 2024-07-12 NOTE — OUTREACH NOTE
Prep Survey      Flowsheet Row Responses   [facility] patient discharged from? [place]   Is LACE score < 7 ? No   Eligibility [facility]   Date of Admission 07/10/24   Date of Discharge 07/12/24   Discharge Disposition Home-Health Care [facility] [place] [facility]   Discharge diagnosis Shortness of breath   Does the patient have one of the following disease processes/diagnoses(primary or secondary)? Other   Does the patient have Home health ordered? Yes   What is the Home health agency?  continue [facility]   Is there a DME ordered? No   Prep survey completed? Yes            Betsey POST - Registered Nurse

## 2024-07-12 NOTE — CASE MANAGEMENT/SOCIAL WORK
Case Management Discharge Note      Final Note: Patient discharged home with continued [facility]. family to transport. Declines rehab. Bebe ARDON CCP    Provided Post Acute Provider List?: N/A  N/A Provider List Comment: PT rodolfoal pending. Current with [facility] home care    Selected Continued Care - Admitted Since 7/10/2024       Destination    No services have been selected for the patient.                Durable Medical Equipment    No services have been selected for the patient.                Dialysis/Infusion    No services have been selected for the patient.                Home Medical Care Coordination complete.      Service Provider Selected Services Address Phone Fax Patient Preferred    [facility] [address] [address] [phone] --              Therapy    No services have been selected for the patient.                Community Resources    No services have been selected for the patient.                Community & DME    No services have been selected for the patient.                    Selected Continued Care - Prior Encounters Includes continued care and service providers with selected services from prior encounters from 4/11/2024 to 7/12/2024      Discharged on 5/29/2024 Admission date: 5/27/2024 - Discharge disposition: Home or Self Care      Home Medical Care       Service Provider Selected Services Address Phone Fax Patient Preferred    [facility] [address] [address] [phone] --                          Transportation Services  Private: Car    Final Discharge Disposition Code: 06 - home with home health care

## 2024-07-12 NOTE — PLAN OF CARE
Goal Outcome Evaluation:  Plan of Care Reviewed With: patient, daughter           Outcome Evaluation: Pt admit from MD office with emesis, SOA, weakness.   Pt feeling poorly since Memorial Day. Pt lives alone, uses a walker.  Pt seen by OT, is SBA/Mod I with walker to move OOB, walk to BR, complete toileting and then grooming at sink.  Pt ambulates in room and then sits up in chair.  Dght arrives during session and pt/dght deny concerns with ADL.   No overt  unsteadiness noted with walking.  Pt has been feeling tired for weeks and has already set up for HH therapy and agree with HH at d/c to work on IADL tasks and endurance.  No further skilled OT in acute care at this time and noted with d/c orders home today.      Anticipated Discharge Disposition (OT): home, home with home health

## 2024-07-12 NOTE — THERAPY DISCHARGE NOTE
Acute Care - Occupational Therapy Discharge  [facility]    Patient Name: Marleni Hummel  : 1935    MRN: [id number]                              Today's Date: 2024       Admit Date: 7/10/2024    Visit Dx:     ICD-10-CM ICD-9-CM   1. Hyponatremia  E87.1 276.1   2. Generalized weakness  R53.1 780.79   3. Nausea  R11.0 787.02   4. Dyspnea on exertion  R06.09 786.09   5. Chronic kidney disease, unspecified CKD stage  N18.9 585.9   6. Elevated troponin  R79.89 790.6   7. History of atrial fibrillation  Z86.79 V12.59   8. Age-related physical debility  R54 797     Patient Active Problem List   Diagnosis    Arthritis involving multiple sites    Essential hypertension    Permanent atrial fibrillation    History of Bell's palsy    CKD (chronic kidney disease) stage 4, GFR 15-29 ml/min    Gastroesophageal reflux disease without esophagitis    Hypercholesterolemia    Insomnia    Localized osteoporosis without current pathological fracture    Panic disorder    Chronic nonseasonal allergic rhinitis due to pollen    Other sleep apnea    History of MI (myocardial infarction)    Chronic coronary artery disease    Anemia of chronic disease    Weakness of right leg    Cardiac murmur    Senile osteoporosis    Hyperuricemia    Grief reaction    Peripheral vertigo    Renal cell carcinoma of left kidney    Renal oncocytoma of left kidney    History of left nephrectomy    Cerebrovascular accident (CVA) due to stenosis of small artery    History of renal cell carcinoma    TIA (transient ischemic attack)    Malignant neoplasm of left kidney, except renal pelvis    Diverticulosis    Irritable bowel syndrome with constipation    Chronic diastolic congestive heart failure    Hallucination, visual    Hypomagnesemia    Bradycardia, drug induced    Lumbar disc disease with radiculopathy     Past Medical History:   Diagnosis Date    Acute bronchitis due to Rhinovirus 2022    Acute vulvitis 2019    Allergic      Allergic rhinitis     Anemia of chronic disease     Arthropathy of knee     right    Atrial fibrillation     Back pain     Bell's palsy     Bradycardia 05/27/2024    Caregiver stress syndrome 04/17/2019    Chronic coronary artery disease     Chronic kidney disease (CKD), stage III (moderate)     Diverticulitis 12/14/2022    CARROLL (dyspnea on exertion) 03/17/2023    Edema     Elevated serum free T4 level 03/21/2016    Encounter for eye exam 09/2020    Essential hypertension     Fatigue     GERD (gastroesophageal reflux disease)     H/O Heart murmur     Heart attack 10/05/2015    Hematuria 12/12/2016    Herpes zoster without complication     Hip arthritis     left    History of bone density study 02/28/2008    History of pelvic fracture 2015    History of pneumonia     History of transfusion     2015    Hypercholesterolemia     IFG (impaired fasting glucose)     Insomnia     Left kidney mass 07/2020    Limited joint range of motion     RIGHT KNEE    Mixed hyperlipidemia     Muscle tension headache 04/03/2019    New daily persistent headache 12/17/2018    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 Clarke    CATARACT EXTRACTION Right 04/16/2013    Dr. Clarke    COLONOSCOPY  04/18/2014    Dr. Elizabeth; no polyps    EPIDURAL BLOCK      HIP PERCUTANEOUS PINNING Left 8/31/2017    Procedure: LT HIP PERCUTANEOUS PINNING;  Surgeon: Sean Canales MD;  Location: [facility];  Service:     MAMMO BILATERAL  11/2013    NEPHRECTOMY Left 11/16/2020    Procedure: LAPAROSCOPIC  NEPHRECTOMY;  Surgeon: Chuckie Mclaughlin MD;  Location: [facility] OR;  Service: Urology;  Laterality: Left;    PAP SMEAR  02/2011    TIBIA FRACTURE SURGERY Right 2015    REMOVED PLATE LATER IN 2015    TONSILLECTOMY  1947    TOTAL HIP ARTHROPLASTY Right 08/2015    TOTAL HIP ARTHROPLASTY Right 09/2016    TOTAL KNEE ARTHROPLASTY Bilateral 2004      General Information       Row Name 07/12/24 1324          OT Time and Intention    Document Type evaluation;therapy note (daily note);discharge evaluation/summary  -LE     Mode of Treatment individual therapy;occupational therapy  -       Row Name 07/12/24 1324          General Information    Patient Profile Reviewed yes  -LE     Prior Level of Function independent:;transfer;ADL's  dght SBA for shower and assist with meals/groceries.  -LE     Existing Precautions/Restrictions fall  -[facility] Name 07/12/24 1324          Living Environment    People in Home alone  -[facility] Name 07/12/24 1324          Cognition    Orientation Status (Cognition) oriented x 4  -[facility] Name 07/12/24 1324          Safety Issues, Functional Mobility    Impairments Affecting Function (Mobility) endurance/activity tolerance  -LE     Comment, Safety Issues/Impairments (Mobility) non skid socks and gait belt worn.  -LE               User Key  (r) = Recorded By, (t) = Taken By, (c) = Cosigned By      Initials Name Provider Type    Faye Marin OTR Occupational Therapist                   Mobility/ADL's       Row Name 07/12/24 1325          Bed Mobility    Bed Mobility supine-sit;sit-supine;scooting/bridging  -LE     Supine-Sit [place] (Bed Mobility) independent  -       Row Name 07/12/24 1325          Transfers    Transfers sit-stand transfer;stand-sit transfer;bed-chair transfer;toilet transfer  -       Row Name 07/12/24 1325          Bed-Chair Transfer    Bed-Chair [place] (Transfers) standby assist  -LE     Assistive Device (Bed-Chair Transfers) walker,  front-wheeled  -SANDRA       Row Name 07/12/24 1325          Sit-Stand Transfer    Sit-Stand [place] (Transfers) modified independence  -LE     Assistive Device (Sit-Stand Transfers) walker, front-wheeled  -SANDRA       Row Name 07/12/24 1325          Stand-Sit Transfer    Stand-Sit [place] (Transfers) modified independence  -LE     Assistive Device (Stand-Sit Transfers) walker, front-wheeled  NAYELY       Row Name 07/12/24 1325          Toilet Transfer    Type (Toilet Transfer) stand pivot/stand step  -LE     [place] Level (Toilet Transfer) standby assist;modified independence  -LE     Assistive Device (Toilet Transfer) walker, front-wheeled  -SANDRA       Row Name 07/12/24 1325          Functional Mobility    Functional Mobility- Ind. Level conditional independence;standby assist  -LE     Functional Mobility- Device walker, front-wheeled  -SANDRA     Functional Mobility- Comment bed to bathroom to sink to chair.  -       Row Name 07/12/24 1325          Activities of Daily Living    BADL Assessment/Intervention toileting;feeding;grooming;lower body dressing;upper body dressing;bathing  -       Row Name 07/12/24 1325          Lower Body Dressing Assessment/Training    [place] Level (Lower Body Dressing) don;socks;set up;standby assist  -LE     Position (Lower Body Dressing) edge of bed sitting  -       Row Name 07/12/24 1325          Self-Feeding Assessment/Training    Comment, (Feeding) denies difficulty.  -       Row Name 07/12/24 1325          Toileting Assessment/Training    [place] Level (Toileting) toileting skills;modified independence  -LE     Position (Toileting) supported standing  -       Row Name 07/12/24 1325          Grooming Assessment/Training    [place] Level (Grooming) modified independence  -LE     Position (Grooming) supported standing;sink side  -LE               User Key  (r) = Recorded By, (t) = Taken By, (c) = Cosigned By      Initials Name Provider Type    SANDRA Zuniga  IZAIAH Mckinney Occupational Therapist                   Obj/Interventions       Row Name 07/12/24 1327          Range of Motion Comprehensive    General Range of Motion bilateral upper extremity ROM WFL  -LE       [facility] Name 07/12/24 1327          Strength Comprehensive (MMT)    Comment, General Manual Muscle Testing (MMT) Assessment B UE 4/5.  -LE       Row Name 07/12/24 1327          Balance    Balance Assessment sitting static balance;standing static balance;standing dynamic balance  -LE     Static Sitting Balance independent  -LE     Static Standing Balance independent  -LE     Dynamic Standing Balance standby assist  -LE     Position/Device Used, Standing Balance walker, front-wheeled  -LE               User Key  (r) = Recorded By, (t) = Taken By, (c) = Cosigned By      Initials Name Provider Type    Faye Marin OTR Occupational Therapist                   Goals/Plan       [facility] Name 07/12/24 1328          Toileting Goal 1 (OT)    Activity/Device (Toileting Goal 1, OT) toileting skills, all  -LE     [place] Level/Cues Needed (Toileting Goal 1, OT) modified independence  -LE     Time Frame (Toileting Goal 1, OT) 1 day  -LE     Progress/Outcome (Toileting Goal 1, OT) goal met  -LE       [facility] Name 07/12/24 1328          Problem Specific Goal 1 (OT)    Problem Specific Goal 1 (OT) Pt to verbalize benefit of activity and request to call for assist to get up while in hospital due to lines/monitors.  -LE     Time Frame (Problem Specific Goal 1, OT) 1 day  -LE     Progress/Outcome (Problem Specific Goal 1, OT) goal met  -LE               User Key  (r) = Recorded By, (t) = Taken By, (c) = Cosigned By      Initials Name Provider Type    Faye Marin OTR Occupational Therapist                   Clinical Impression       Row Name 07/12/24 1327          Pain Assessment    Pretreatment Pain Rating 0/10 - no pain  -LE       Row Name 07/12/24 1327          Plan of Care Review    Plan of Care Reviewed With patient;daughter   -LE     Outcome Evaluation Pt admit from MD office with emesis, SOA, weakness.   Pt feeling poorly since Memorial Day. Pt lives alone, uses a walker.  Pt seen by OT, is SBA/Mod I with walker to move OOB, walk to BR, complete toileting and then grooming at sink.  Pt ambulates in room and then sits up in chair.  Dght arrives during session and pt/dght deny concerns with ADL.   No overt  unsteadiness noted with walking.  Pt has been feeling tired for weeks and has already set up for HH therapy and agree with HH at d/c to work on IADL tasks and endurance.  No further skilled OT in acute care at this time and noted with d/c orders home today.  -SANDRA       Row Name 07/12/24 1324          Therapy Assessment/Plan (OT)    Patient/Family Therapy Goal Statement (OT) -  -LE     Rehab Potential (OT) --  -LE     Criteria for Skilled Therapeutic Interventions Met (OT) --  -LE     Therapy Frequency (OT) evaluation only  -SANDRA       Row Name 07/12/24 1327          Therapy Plan Review/Discharge Plan (OT)    Equipment Needs Upon Discharge (OT) --  owns shower chair, rollator, 3in1, sock aid, reacher  -LE     Anticipated Discharge Disposition (OT) home;home with home health  -SANDRA       Row Name 07/12/24 1323          Vital Signs    O2 Delivery Pre Treatment room air  -LE     O2 Delivery Intra Treatment room air  -LE     O2 Delivery Post Treatment room air  -LE     Pre Patient Position Supine  -LE     Intra Patient Position Standing  -LE     Post Patient Position Sitting  -SANDRA       Row Name 07/12/24 1327          Positioning and Restraints    Pre-Treatment Position in bed  -LE     Post Treatment Position chair  -LE     In Chair notified nsg;sitting;call light within reach;encouraged to call for assist;exit alarm on;with family/caregiver  RN present at times during session.  -LE               User Key  (r) = Recorded By, (t) = Taken By, (c) = Cosigned By      Initials Name Provider Type    Faye Marin OTR Occupational Therapist                    Outcome Measures       Row Name 07/12/24 1329          How much help from another is currently needed...    Putting on and taking off regular lower body clothing? 3  -LE     Bathing (including washing, rinsing, and drying) 3  -LE     Toileting (which includes using toilet bed pan or urinal) 4  -LE     Putting on and taking off regular upper body clothing 3  -LE     Taking care of personal grooming (such as brushing teeth) 4  -LE     Eating meals 4  -LE     AM-PAC 6 Clicks Score (OT) 21  -LE       Row Name 07/12/24 0747          How much help from another person do you currently need...    Turning from your back to your side while in flat bed without using bedrails? 4  -WS     Moving from lying on back to sitting on the side of a flat bed without bedrails? 3  -WS     Moving to and from a bed to a chair (including a wheelchair)? 3  -WS     Standing up from a chair using your arms (e.g., wheelchair, bedside chair)? 3  -WS     Climbing 3-5 steps with a railing? 3  -WS     To walk in hospital room? 3  -WS     AM-PAC 6 Clicks Score (PT) 19  -WS     Highest Level of Mobility Goal 6 --> Walk 10 steps or more  -WS       Row Name 07/12/24 1329          Functional Assessment    Outcome Measure Options AM-PAC 6 Clicks Daily Activity (OT)  -LE               User Key  (r) = Recorded By, (t) = Taken By, (c) = Cosigned By      Initials Name Provider Type    Faye Marin OTR Occupational Therapist    Salbador Merida, RN Registered Nurse                  Occupational Therapy Education       Title: PT OT SLP Therapies (Resolved)       Topic: Occupational Therapy (Resolved)       Point: ADL training (Resolved)       Description:   Instruct learner(s) on proper safety adaptation and remediation techniques during self care or transfers.   Instruct in proper use of assistive devices.                  Learning Progress Summary             Patient Acceptance, E, Bed IU by SANDRA at 7/12/2024 1329    Comment: role of michael CORREIA  results, benefit of activity   Family Acceptance, E, Bed IU by SANDRA at 7/12/2024 1329    Comment: role of OT, eval results, benefit of activity                         Point: Precautions (Resolved)       Description:   Instruct learner(s) on prescribed precautions during self-care and functional transfers.                  Learning Progress Summary             Patient Acceptance, E, Bed IU by SANDRA at 7/12/2024 1329    Comment: role of OT, eval results, benefit of activity   Family Acceptance, E, Bed IU by SANDRA at 7/12/2024 1329    Comment: role of OT, eval results, benefit of activity                                         User Key       Initials Effective Dates Name Provider Type Discipline    SANDRA 06/16/21 -  Efrain Faye, OTR Occupational Therapist OT                  OT Recommendation and Plan  Therapy Frequency (OT): evaluation only  Plan of Care Review  Plan of Care Reviewed With: patient, daughter  Outcome Evaluation: Pt admit from MD office with emesis, SOA, weakness.   Pt feeling poorly since Memorial Day. Pt lives alone, uses a walker.  Pt seen by OT, is SBA/Mod I with walker to move OOB, walk to BR, complete toileting and then grooming at sink.  Pt ambulates in room and then sits up in chair.  Dght arrives during session and pt/dght deny concerns with ADL.   No overt  unsteadiness noted with walking.  Pt has been feeling tired for weeks and has already set up for HH therapy and agree with HH at d/c to work on IADL tasks and endurance.  No further skilled OT in acute care at this time and noted with d/c orders home today.  Plan of Care Reviewed With: patient, daughter  Outcome Evaluation: Pt admit from MD office with emesis, SOA, weakness.   Pt feeling poorly since Memorial Day. Pt lives alone, uses a walker.  Pt seen by OT, is SBA/Mod I with walker to move OOB, walk to BR, complete toileting and then grooming at sink.  Pt ambulates in room and then sits up in chair.  Dght arrives during session and pt/dght deny  concerns with ADL.   No overt  unsteadiness noted with walking.  Pt has been feeling tired for weeks and has already set up for HH therapy and agree with HH at d/c to work on IADL tasks and endurance.  No further skilled OT in acute care at this time and noted with d/c orders home today.     Time Calculation:   Evaluation Complexity (OT)  Review Occupational Profile/Medical/Therapy History Complexity: brief/low complexity  Assessment, Occupational Performance/Identification of Deficit Complexity: 1-3 performance deficits  Clinical Decision Making Complexity (OT): problem focused assessment/low complexity  Overall Complexity of Evaluation (OT): low complexity     Time Calculation- OT       Row Name 07/12/24 1331             Time Calculation- OT    OT Start Time 0829  -LE      OT Stop Time 0852  -LE      OT Time Calculation (min) 23 min  -LE      Total Timed Code Minutes- OT 8 minute(s)  -LE      OT Received On 07/12/24  -LE         Timed Charges    54876 - OT Self Care/Mgmt Minutes 8  -LE         Total Minutes    Timed Charges Total Minutes 8  -LE       Total Minutes 8  -LE                User Key  (r) = Recorded By, (t) = Taken By, (c) = Cosigned By      Initials Name Provider Type    LE Faye Zuniga OTR Occupational Therapist                  Therapy Charges for Today       Code Description Service Date Service Provider Modifiers Qty    [id number]  OT SELF CARE/MGMT/TRAIN EA 15 MIN 7/12/2024 Faye Zuniga OTR GO 1    [id number]  OT EVAL LOW COMPLEXITY 2 7/12/2024 Faye Zuniga OTR GO 1               OT Discharge Summary  Anticipated Discharge Disposition (OT): home, home with home health  Reason for Discharge: Discharge from facility, At baseline function  Discharge Destination: Home, Home with home health    IZAIAH Moya  7/12/2024

## 2024-07-12 NOTE — DISCHARGE SUMMARY
Patient Name: Marleni Hummel  : 1935  MRN: [id number]    Date of Admission: 7/10/2024  Date of Discharge:  2024  Primary Care Physician: Ken Andujar MD      Chief Complaint:   Shortness of Breath and Weakness - Generalized      Discharge Diagnoses     Active Hospital Problems    Diagnosis  POA    Chronic diastolic congestive heart failure [I50.32]  Yes    Essential hypertension [I10]  Yes    CKD (chronic kidney disease) stage 4, GFR 15-29 ml/min [N18.4]  Yes    Permanent atrial fibrillation [I48.21]  Yes      Resolved Hospital Problems    Diagnosis Date Resolved POA    **Shortness of breath [R06.02] 2024 Yes    Hyponatremia [E87.1] 2024 Yes        Hospital Course     Ms. Hummel is a 88 y.o. female with a history of essential hypertension, permanent A-fib, coronary artery disease status post HAYLEY in , history of Takotsubo cardiomyopathy with recovered EF, history of renal cell carcinoma status post left nephrectomy with resultant CKD 4 who was recently treated as an outpatient for urinary tract infection who presented to [facility] initially complaining of nausea, shortness of breath, and generalized weakness that have been progressively worsening since May.  Please see the admitting history and physical for further details.  She was found to have some mild hyponatremia, and a chronic troponin elevation and was admitted to the hospital for further evaluation and treatment.      She was seen in consultation by cardiology and nephrology.  Nephrology recommended stopping her thiazide diuretic, and her sodium improved some.  Cardiology recommended an echocardiogram which was essentially not explanatory for her dyspnea/fatigue.  Repeat urinalysis showed no sign of urinary tract infection, and her symptoms are resolved.  Workup was essentially unremarkable for a cause for her fatigue, and I suspect that it is predominantly due to physical debility.  The patient is  already established with home health, and refuses subacute rehab, so I will discharge her home today if okay with cardiology and nephrology.      Day of Discharge     Subjective:  No events overnight. Her echocardiogram was unremarkable. She is anxious to go home. She is not interested in subacute rehab.    Physical Exam:  Temp:  [97 °F (36.1 °C)-98.6 °F (37 °C)] 98 °F (36.7 °C)  Heart Rate:  [57-98] 61  Resp:  [16] 16  BP: (120-158)/(55-81) 158/79  Body mass index is 25.24 kg/m².  Physical Exam  Constitutional:       General: She is not in acute distress.     Appearance: She is not toxic-appearing.   Cardiovascular:      Rate and Rhythm: Normal rate and regular rhythm.      Heart sounds: Normal heart sounds.   Pulmonary:      Effort: Pulmonary effort is normal.      Breath sounds: Normal breath sounds.   Abdominal:      General: Bowel sounds are normal.      Palpations: Abdomen is soft.   Musculoskeletal:         General: No tenderness.      Right lower leg: No edema.      Left lower leg: No edema.   Neurological:      Mental Status: She is alert.   Psychiatric:         Mood and Affect: Mood normal.         Behavior: Behavior normal.         Consultants     Consult Orders (all) (From admission, onward)       Start     Ordered    07/11/24 0702  Inpatient Nephrology Consult  IN AM        Specialty:  Nephrology  Provider:  Mark Young MD    07/10/24 2337    07/11/24 0702  Inpatient Cardiology Consult  IN AM        Specialty:  Cardiology  Provider:  Justin Arevalo MD    07/10/24 2337    07/10/24 2327  Inpatient Case Management  Consult  Once        Provider:  (Not yet assigned)    07/10/24 2326    07/10/24 2141  [facility] (on-call MD unless specified) Details  Once        Specialty:  Hospitalist  Provider:  (Not yet assigned)    07/10/24 2140                  Procedures     Imaging Results (All)       Procedure Component Value Units Date/Time    XR Chest 1 View [[id number]] Collected: 07/10/24  "1839     Updated: 07/10/24 1843    Narrative:      XR CHEST 1 VW-7/10/2024     HISTORY: Shortness of breath.     Heart size is at the upper limits of normal. Lungs appear free of acute  infiltrates. There is some aortic calcification. Tiny calcified  granuloma seen in the left lower lung.       Impression:      1. Borderline cardiomegaly        This report was finalized on 7/10/2024 6:40 PM by Dr. Satish Michelle M.D on Workstation: [id number]               Pertinent Labs     Results from last 7 days   Lab Units 07/12/24  0545 07/11/24  0223 07/10/24  1907   WBC 10*3/mm3 5.95 7.40 7.58   HEMOGLOBIN g/dL 9.8* 9.9* 11.0*   PLATELETS 10*3/mm3 164 185 229     Results from last 7 days   Lab Units 07/12/24  0545 07/11/24  0223 07/10/24  1907   SODIUM mmol/L 133* 132* 131*   POTASSIUM mmol/L 5.0 5.2 5.1   CHLORIDE mmol/L 100 100 95*   CO2 mmol/L 25.0 21.8* 23.3   BUN mg/dL 44* 45* 49*   CREATININE mg/dL 2.28* 2.16* 2.39*   GLUCOSE mg/dL 97 95 95   Estimated Creatinine Clearance: 15.4 mL/min (A) (by C-G formula based on SCr of 2.28 mg/dL (H)).  Results from last 7 days   Lab Units 07/12/24  0545 07/10/24  1907   ALBUMIN g/dL 3.7 4.2   BILIRUBIN mg/dL  --  0.5   ALK PHOS U/L  --  80   AST (SGOT) U/L  --  22   ALT (SGPT) U/L  --  22     Results from last 7 days   Lab Units 07/12/24  0545 07/11/24  0223 07/10/24  1907   CALCIUM mg/dL 8.6 8.8 9.3   ALBUMIN g/dL 3.7  --  4.2   MAGNESIUM mg/dL  --   --  3.2*   PHOSPHORUS mg/dL 4.2  --   --      Results from last 7 days   Lab Units 07/10/24  1907   LIPASE U/L 58     Results from last 7 days   Lab Units 07/11/24  0223 07/10/24  2106 07/10/24  1907   CK TOTAL U/L  --   --  37   HSTROP T ng/L  --  43* 54*   PROBNP pg/mL  --   --  2,035.0*   D DIMER QUANT MCGFEU/mL <0.27  --   --      Results from last 7 days   Lab Units 07/11/24  0119 07/10/24  2106   SODIUM UR mmol/L 80  --    OSMOLALITY UR mOsm/kg 321  --    URIC ACID mg/dL  --  5.9*         Invalid input(s): \"LDLCALC\"    "     Test Results Pending at Discharge       Discharge Details        Discharge Medications        Changes to Medications        Instructions Start Date   Eliquis 2.5 MG tablet tablet  Generic drug: apixaban  What changed: See the new instructions.   TAKE 1 TABLET EVERY 12 HOURS (TWICE A DAY)             Continue These Medications        Instructions Start Date   acetaminophen 500 MG tablet  Commonly known as: TYLENOL   1 tablet, Oral, Every 4 Hours PRN      atorvastatin 20 MG tablet  Commonly known as: LIPITOR   20 mg, Oral, Nightly      escitalopram 20 MG tablet  Commonly known as: LEXAPRO   20 mg, Oral, Daily      fexofenadine 60 MG tablet  Commonly known as: ALLEGRA   1 tablet, Oral, Daily PRN      furosemide 20 MG tablet  Commonly known as: LASIX   20 mg, Oral, Daily PRN      Gemtesa 75 MG tablet  Generic drug: Vibegron   1 tablet, Oral, Daily      magnesium oxide 400 MG tablet  Commonly known as: MAG-OX   1 tablet, Oral, Daily      melatonin 5 MG tablet tablet   5 mg, Oral, Nightly PRN      sodium bicarbonate 650 MG tablet   1 tablet, Oral, 2 Times Daily             Stop These Medications      hydroCHLOROthiazide 12.5 MG tablet     methocarbamol 500 MG tablet  Commonly known as: ROBAXIN     methylPREDNISolone 4 MG dose pack  Commonly known as: MEDROL              Allergies   Allergen Reactions    Morphine Anaphylaxis    Oxycodone Anaphylaxis    Ace Inhibitors Cough and Unknown (See Comments)    Bactrim [Sulfamethoxazole-Trimethoprim] Rash    Levofloxacin Itching and Rash     insomnia  insomnia  insomnia    Torsemide Dizziness         Discharge Disposition:  Home-Health Care [facility]    Discharge Diet:  Diet Order   Procedures    Diet: Regular/House; Fluid Consistency: Thin (IDDSI 0)       Discharge Activity:   Activity Instructions       Activity as Tolerated              CODE STATUS:    Code Status and Medical Interventions:   Ordered at: 07/10/24 0395     Code Status (Patient has no pulse and is not breathing):     CPR (Attempt to Resuscitate)     Medical Interventions (Patient has pulse or is breathing):    Full       Future Appointments   Date Time Provider Department Center   7/17/2024  2:00 PM Bharati Andujar MD [id number]   10/18/2024  1:30 PM REFERRED INJECTION CHAIR [facility]FUS [facility]   12/18/2024 12:20 PM Sybil Parmar MD [facility]     Additional Instructions for the Follow-ups that You Need to Schedule       Ambulatory Referral to Home Health   As directed      Face to Face Visit Date: 7/12/2024   Follow-up provider for Plan of Care?: I treated the patient in an acute care facility and will not continue treatment after discharge.   Follow-up provider: BHARATI ANDUJAR [[id number]]   Reason/Clinical Findings: physical debility/hospitalization   Describe mobility limitations that make leaving home difficult: physical debility/hospitalization   Nursing/Therapeutic Services Requested: Skilled Nursing Physical Therapy   Skilled nursing orders: Medication education Cardiopulmonary assessments   PT orders: Transfer training Home safety assessment Therapeutic exercise Strengthening   Frequency: 1 Week 1        Call MD With Problems / Concerns   As directed      Instructions: return to the hospital if you experience chest pain, shortness of breath, abdominal pain, nausea, vomiting, fevers, sweats, chills, or worsening of your symptoms    Order Comments: Instructions: return to the hospital if you experience chest pain, shortness of breath, abdominal pain, nausea, vomiting, fevers, sweats, chills, or worsening of your symptoms         Discharge Follow-up with Specialty: nephrology 2-4 weeks   As directed      Specialty: nephrology 2-4 weeks        Discharge Follow-up with Specified Provider: cardiology 2-4 weeks   As directed      To: cardiology 2-4 weeks               Follow-up Information       Bharati Andujar MD .    Specialty: Family Medicine  Contact information:  [address] [address]  [address]  [phone]                             Additional Instructions for the Follow-ups that You Need to Schedule       Ambulatory Referral to Home Health   As directed      Face to Face Visit Date: 7/12/2024   Follow-up provider for Plan of Care?: I treated the patient in an acute care facility and will not continue treatment after discharge.   Follow-up provider: BHARATI KHAN [[id number]]   Reason/Clinical Findings: physical debility/hospitalization   Describe mobility limitations that make leaving home difficult: physical debility/hospitalization   Nursing/Therapeutic Services Requested: Skilled Nursing Physical Therapy   Skilled nursing orders: Medication education Cardiopulmonary assessments   PT orders: Transfer training Home safety assessment Therapeutic exercise Strengthening   Frequency: 1 Week 1        Call MD With Problems / Concerns   As directed      Instructions: return to the hospital if you experience chest pain, shortness of breath, abdominal pain, nausea, vomiting, fevers, sweats, chills, or worsening of your symptoms    Order Comments: Instructions: return to the hospital if you experience chest pain, shortness of breath, abdominal pain, nausea, vomiting, fevers, sweats, chills, or worsening of your symptoms         Discharge Follow-up with Specialty: nephrology 2-4 weeks   As directed      Specialty: nephrology 2-4 weeks        Discharge Follow-up with Specified Provider: cardiology 2-4 weeks   As directed      To: cardiology 2-4 weeks            Time Spent on Discharge:  Greater than 30 minutes      Ulises Phillips MD  [facility]  07/12/24  10:52 EDT

## 2024-07-12 NOTE — NURSING NOTE
This RN spoke with maki YOUNG and Nephgisselle YOUNG who are sánchez with DC. LUNA YOUNG notified, okay to discharge now per ELOY YOUNG. Will discharge patient now.

## 2024-07-12 NOTE — PLAN OF CARE
Goal Outcome Evaluation:         HR in 40s at times. No c/o SOA.  No nausea or vomiting this shift.

## 2024-07-12 NOTE — PROGRESS NOTES
[facility] to resume home care.  Spoke with Silvia Maier and she states patient is at baseline ambulating with assistive device.  Home Health will continue SN services at home.  Noted D/Cing today.

## 2024-07-12 NOTE — PLAN OF CARE
Goal Outcome Evaluation:     Care Plans complete for this admission.

## 2024-07-12 NOTE — PROGRESS NOTES
"[facility]    Patient Name: Marleni Hummel  :1935  88 y.o.  LOS: 0  Encounter Provider: Arden Sampson Jr, MD      Patient Care Team:  Ken Andujar MD as PCP - General  Chuckie Mclaughlin MD as Consulting Physician (Urology)  Anayeli Alanis MD as Consulting Physician (Obstetrics and Gynecology)  BROOK Clarke MD as Consulting Physician (Ophthalmology)  Jose Alfredo Krishnamurthy MD as Consulting Physician (Hematology and Oncology)  Sean Canales MD as Consulting Physician (Orthopedic Surgery)  Esteban Moe MD as Consulting Physician (Orthopedic Surgery)  Feliciano Elizabeth MD as Consulting Physician (Gastroenterology)  Wallace Hook MD as Consulting Physician (Pain Medicine)  Sarah Beth Marcus APRN as Nurse Practitioner (Nephrology)  Brandon Miller MD as Consulting Physician (Gastroenterology)  Sybil Parmar MD as Consulting Physician (Cardiology)  Joseph Leonard MD as Consulting Physician (Nephrology)  Joseph Leonard MD as Consulting Physician (Nephrology)    Chief Complaint: Follow-up chronic diastolic heart failure, atrial fibrillation, UTI    Interval History: No acute issues overnight.       Objective   Vital Signs  Temp:  [97 °F (36.1 °C)-98.6 °F (37 °C)] 98 °F (36.7 °C)  Heart Rate:  [57-95] 60  Resp:  [16] 16  BP: (120-158)/(55-86) 130/86    Intake/Output Summary (Last 24 hours) at 2024 1407  Last data filed at 2024 0900  Gross per 24 hour   Intake 90 ml   Output 1200 ml   Net -1110 ml     Flowsheet Rows      Flowsheet Row First Filed Value   Admission Height 157.5 cm (62\") Documented at 07/10/2024 1903   Admission Weight 59.4 kg (131 lb) Documented at 07/10/2024 1903              Vitals reviewed.   Constitutional:       Appearance: Not in distress. Chronically ill-appearing.   Neck:      Vascular: No JVR. JVD normal.   Pulmonary:      Effort: Pulmonary effort is normal.      Breath sounds: No wheezing. No rhonchi. No " "rales.   Chest:      Chest wall: Not tender to palpatation.   Cardiovascular:      PMI at left midclavicular line. Normal rate. Irregularly irregular rhythm. Normal S1. Normal S2.       Murmurs: There is no murmur.      No gallop.  No click. No rub.   Pulses:     Intact distal pulses.   Edema:     Peripheral edema absent.   Abdominal:      General: Bowel sounds are normal.      Palpations: Abdomen is soft.      Tenderness: There is no abdominal tenderness.   Musculoskeletal: Normal range of motion.         General: No tenderness. Skin:     General: Skin is warm and dry.   Neurological:      General: No focal deficit present.      Mental Status: Alert and oriented to person, place and time.           Pertinent Test Results:  Results from last 7 days   Lab Units 07/12/24  0545 07/11/24  0223 07/10/24  1907   SODIUM mmol/L 133* 132* 131*   POTASSIUM mmol/L 5.0 5.2 5.1   CHLORIDE mmol/L 100 100 95*   CO2 mmol/L 25.0 21.8* 23.3   BUN mg/dL 44* 45* 49*   CREATININE mg/dL 2.28* 2.16* 2.39*   GLUCOSE mg/dL 97 95 95   CALCIUM mg/dL 8.6 8.8 9.3   AST (SGOT) U/L  --   --  22   ALT (SGPT) U/L  --   --  22     Results from last 7 days   Lab Units 07/10/24  2106 07/10/24  1907   CK TOTAL U/L  --  37   HSTROP T ng/L 43* 54*     Results from last 7 days   Lab Units 07/12/24  0545 07/11/24  0223 07/10/24  1907   WBC 10*3/mm3 5.95 7.40 7.58   HEMOGLOBIN g/dL 9.8* 9.9* 11.0*   HEMATOCRIT % 30.5* 30.1* 33.2*   PLATELETS 10*3/mm3 164 185 229     Results from last 7 days   Lab Units 07/10/24  1907   INR  1.16*   APTT seconds 26.2     Results from last 7 days   Lab Units 07/10/24  1907   MAGNESIUM mg/dL 3.2*           Invalid input(s): \"LDLCALC\"  Results from last 7 days   Lab Units 07/10/24  1907   PROBNP pg/mL 2,035.0*     Results from last 7 days   Lab Units 07/10/24  1907   TSH uIU/mL 1.770           Medication Review:   apixaban, 2.5 mg, Oral, BID  atorvastatin, 20 mg, Oral, Nightly  escitalopram, 20 mg, Oral, Daily  magnesium oxide, " 400 mg, Oral, Daily              Assessment & Plan     Active Hospital Problems    Diagnosis  POA    Chronic diastolic congestive heart failure [I50.32]  Yes    Essential hypertension [I10]  Yes    CKD (chronic kidney disease) stage 4, GFR 15-29 ml/min [N18.4]  Yes    Permanent atrial fibrillation [I48.21]  Yes      Resolved Hospital Problems    Diagnosis Date Resolved POA    **Shortness of breath [R06.02] 07/12/2024 Yes    Hyponatremia [E87.1] 07/12/2024 Yes        Chronic diastolic heart failure -euvolemic on exam.  Blood pressure is slightly higher as she has not been on hydrochlorothiazide during this admission secondary to UTI.  Defer course of antibiotics to internal medicine.  No further testing or change in medications required at this time.  Recommend restarting hydrochlorothiazide as outpatient.  She is to keep a twice daily blood pressure log to share with either Dr. Parmar or her PCP.  Weakness/fatigue -no clear cardiac cause at this time.  She has been off of beta-blocker with no pauses or bradycardia.  Heart rate is well-controlled.  Exertional dyspnea -chronic stable issue.  Oxygen saturation 99% on room air.  Chronic atrial fibrillation -off negative chronotropic therapy.  Continue low-dose apixaban.  Chronic stable anemia with no evidence for overt bleeding.  CKD   Essential hypertension  UTI    Okay for discharge from cardiovascular standpoint.         Arden Sampson Jr, MD  [facility]   [facility]  [address] [place] [address] - [address]  [place] [address]  Office: [phone]    07/12/24  14:07 EDT

## 2024-07-12 NOTE — PROGRESS NOTES
[facility] Progress Note      Patient Name: Marleni Hummel  : 1935  MRN: [id number]  Primary Care Physician:  Ken Andujar MD  Date of admission: 7/10/2024    Subjective     Interval History:   Eager to go home  No shortness of breath or chest pain  Already has at home furosemide 20 mg that she takes when wt rises 3# above baseline wt    Review of Systems:   As noted above    Objective     Vitals:   Temp:  [97 °F (36.1 °C)-98.6 °F (37 °C)] 98 °F (36.7 °C)  Heart Rate:  [57-95] 60  Resp:  [16] 16  BP: (126-158)/(61-86) 130/86    Intake/Output Summary (Last 24 hours) at 2024 1458  Last data filed at 2024 0900  Gross per 24 hour   Intake 90 ml   Output 1200 ml   Net -1110 ml       Physical Exam:    Constitutional: Awake, alert, NAD, overweight, hard of hearing  HEENT: Sclera anicteric, no conjunctival injection  Neck: Supple, no carotid bruit, trachea at midline, no JVD  Respiratory: Coarse, a few crackles in bases, no wheezing, nonlabored   Cardiovascular: Irregularly irregular, not tachycardic  Gastrointestinal: BS +, soft, nontender and nondistended  : No palpable bladder  Musculoskeletal: No edema, no clubbing or cyanosis  Psychiatric: Appropriate affect, cooperative, oriented  Neurologic:  moving all extremities, normal speech and mental status  Skin: Warm and dry     Scheduled Meds:     apixaban, 2.5 mg, Oral, BID  atorvastatin, 20 mg, Oral, Nightly  escitalopram, 20 mg, Oral, Daily  magnesium oxide, 400 mg, Oral, Daily      IV Meds:        Results Reviewed:   I have personally reviewed the results from the time of this admission to 2024 14:58 EDT     Results from last 7 days   Lab Units 24  0545 24  0223 07/10/24  1907   SODIUM mmol/L 133* 132* 131*   POTASSIUM mmol/L 5.0 5.2 5.1   CHLORIDE mmol/L 100 100 95*   CO2 mmol/L 25.0 21.8* 23.3   BUN mg/dL 44* 45* 49*   CREATININE mg/dL 2.28* 2.16* 2.39*   CALCIUM mg/dL 8.6 8.8 9.3   BILIRUBIN  mg/dL  --   --  0.5   ALK PHOS U/L  --   --  80   ALT (SGPT) U/L  --   --  22   AST (SGOT) U/L  --   --  22   GLUCOSE mg/dL 97 95 95       Estimated Creatinine Clearance: 15.4 mL/min (A) (by C-G formula based on SCr of 2.28 mg/dL (H)).    Results from last 7 days   Lab Units 07/12/24  0545 07/10/24  1907   MAGNESIUM mg/dL  --  3.2*   PHOSPHORUS mg/dL 4.2  --        Results from last 7 days   Lab Units 07/10/24  2106   URIC ACID mg/dL 5.9*       Results from last 7 days   Lab Units 07/12/24  0545 07/11/24  0223 07/10/24  1907   WBC 10*3/mm3 5.95 7.40 7.58   HEMOGLOBIN g/dL 9.8* 9.9* 11.0*   PLATELETS 10*3/mm3 164 185 229       Results from last 7 days   Lab Units 07/10/24  1907   INR  1.16*       Assessment / Plan     ASSESSMENT:  1.  CKD4, with stable but poor function.  Not grossly volume overloaded by exam or imaging.  Mild hyponatremia probably on the basis of thiazide diuretic; SSRI might be contributing, too.  Potassium and anion gap are normal.  High magnesium level on magnesium oxide.  Urinalysis bland.  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  2.  Dyspnea with exertion: Likely an element of deconditioning  3.  N/V: unlikely to be driven solely by hyponatremia given its mild nature  4.  Atrial flutter with rapid rate  5.  Anemia, playing a role in her dyspnea  6.  Hypertension, controlled    PLAN:  1.  Leave hydrochlorothiazide off indefinitely given hyponatremia  2.  She will continue furosemide 20 mg as needed for weight 3 pounds above her baseline weight of 131-133 pounds  3.  Discussed with Dr. Phillips  4.  Discussed with patient's daughter at bedside    Thank you for involving us in the care of Marleni Hummel.  Please feel free to call with any questions.    Mark Young MD  07/12/24  14:58 EDT    [facility]  [phone]    Please note that portions of this note were completed with a voice recognition program.

## 2024-07-15 ENCOUNTER — TRANSITIONAL CARE MANAGEMENT TELEPHONE ENCOUNTER (OUTPATIENT)
Dept: CALL CENTER | Facility: HOSPITAL | Age: 89
End: 2024-07-15
Payer: MEDICARE

## 2024-07-15 NOTE — OUTREACH NOTE
Call Center TCM Note      Flowsheet Row Responses   [facility] [place] patient discharged from? [place]   Does the patient have one of the following disease processes/diagnoses(primary or secondary)? Other   TCM attempt successful? Yes   Call start time 0917   Call end time 0921   Discharge diagnosis Shortness of breath   Person spoke with today (if not patient) and relationship dtr Silvia   Meds reviewed with patient/caregiver? Yes   Does the patient have all medications ordered at discharge? N/A   Prescription comments No new medications at this hospital discharge. HCTZ, Methocarbamol, MDP discontinued.   Is the patient taking all medications as directed (includes completed medication regime)? Yes   Does the patient have an appointment with their PCP within 7-14 days of discharge? Yes   What is the Home health agency?  continue [facility]   Has home health visited the patient within 72 hours of discharge? Yes   Home health comments [facility] has been in touch and will resume care.   Psychosocial issues? No   TCM call completed? Yes   Wrap up additional comments D/C DX: SOB,  weakness - Per daughter Silvia pt is doing a little better. No questions at this time. Pt will keep scheduled 6 mth ov appt with PCP Dr Ken Andujar on 07/17/2024.   Call end time 0921            Samantha Plascencia MA    7/15/2024, 09:24 EDT

## 2024-07-16 ENCOUNTER — HOME CARE VISIT (OUTPATIENT)
Dept: HOME HEALTH SERVICES | Facility: HOME HEALTHCARE | Age: 89
End: 2024-07-16
Payer: MEDICARE

## 2024-07-16 ENCOUNTER — TELEPHONE (OUTPATIENT)
Dept: FAMILY MEDICINE CLINIC | Facility: CLINIC | Age: 89
End: 2024-07-16
Payer: MEDICARE

## 2024-07-16 VITALS
TEMPERATURE: 98.4 F | OXYGEN SATURATION: 98 % | DIASTOLIC BLOOD PRESSURE: 66 MMHG | RESPIRATION RATE: 20 BRPM | HEART RATE: 88 BPM | WEIGHT: 130 LBS | BODY MASS INDEX: 23.78 KG/M2 | SYSTOLIC BLOOD PRESSURE: 132 MMHG

## 2024-07-16 VITALS
TEMPERATURE: 97.7 F | SYSTOLIC BLOOD PRESSURE: 124 MMHG | HEART RATE: 68 BPM | DIASTOLIC BLOOD PRESSURE: 66 MMHG | OXYGEN SATURATION: 98 %

## 2024-07-16 PROCEDURE — G0162 HHC RN E&M PLAN SVS, 15 MIN: HCPCS

## 2024-07-16 PROCEDURE — G0151 HHCP-SERV OF PT,EA 15 MIN: HCPCS

## 2024-07-16 NOTE — TELEPHONE ENCOUNTER
Caller: GURU    Relationship: Home Health    Best call back number: [phone]     What orders are you requesting (i.e. lab or imaging): VERBAL ORDERS    Additional notes: NURSING 1 X WEEKLY

## 2024-07-16 NOTE — HOME HEALTH
Eval Note  Patient was admitted to [facility] 7/10 to 7/12/24 with SOB, nausea, hyponatremia  PMHx CHF, HTN, CKD, A fib, CAD, left knee surgery, renal cell CA with left nephrecomy, UTI  SHx [facility], no steps to enter home, has upstairs that she does not use.   Prior level of function Patient ambulates with rollator walker independent, independent with ADLs, independent with light meal preparation and laundry     Patient's goal return to prior level of function     Services required to achieve goals: SN, PT evaluation only     Potential Issues for goal attainment:  none     Problems identified:none     Describe the Functional status and safety: Patient is transferring independently with walker, ambulating with rollator walker with slower david independently in the home x 150 feet with fatique and SOB. Instructed patient to ambulate every 2 hours. Reviewed HEP with patient .     Describe any environmental issues:na    Any equipment needs none    Plan for next visit  evaluation only

## 2024-07-17 ENCOUNTER — OFFICE VISIT (OUTPATIENT)
Dept: FAMILY MEDICINE CLINIC | Facility: CLINIC | Age: 89
End: 2024-07-17
Payer: OTHER GOVERNMENT

## 2024-07-17 VITALS
HEIGHT: 62 IN | SYSTOLIC BLOOD PRESSURE: 144 MMHG | BODY MASS INDEX: 25.58 KG/M2 | HEART RATE: 69 BPM | DIASTOLIC BLOOD PRESSURE: 68 MMHG | WEIGHT: 139 LBS | OXYGEN SATURATION: 100 %

## 2024-07-17 DIAGNOSIS — E78.00 HYPERCHOLESTEROLEMIA: ICD-10-CM

## 2024-07-17 DIAGNOSIS — F41.0 PANIC DISORDER: ICD-10-CM

## 2024-07-17 DIAGNOSIS — Z09 HOSPITAL DISCHARGE FOLLOW-UP: Primary | ICD-10-CM

## 2024-07-17 PROCEDURE — 99496 TRANSJ CARE MGMT HIGH F2F 7D: CPT | Performed by: FAMILY MEDICINE

## 2024-07-17 RX ORDER — SODIUM BICARBONATE 650 MG/1
650 TABLET ORAL 2 TIMES DAILY
Status: CANCELLED | OUTPATIENT
Start: 2024-07-17

## 2024-07-17 RX ORDER — ESCITALOPRAM OXALATE 20 MG/1
20 TABLET ORAL DAILY
Qty: 90 TABLET | Refills: 2 | Status: CANCELLED | OUTPATIENT
Start: 2024-07-17 | End: 2025-04-13

## 2024-07-17 RX ORDER — ATORVASTATIN CALCIUM 20 MG/1
20 TABLET, FILM COATED ORAL NIGHTLY
Qty: 90 TABLET | Refills: 2 | Status: SHIPPED | OUTPATIENT
Start: 2024-07-17 | End: 2025-04-13

## 2024-07-17 NOTE — PROGRESS NOTES
Transitional Care Follow Up Visit  Subjective     CC: Transitional Care Management Visit        Within 48 business hours after discharge our office contacted her via telephone to coordinate her care and needs.      I reviewed and discussed the details of that call along with the discharge summary, hospital problems, inpatient lab results, inpatient diagnostic studies, and consultation reports with Marleni.     Current outpatient and discharge medications have been reconciled for the patient.  Reviewed by: Ken Andujar MD          12/3/2021     8:31 PM 5/31/2022    10:08 PM 3/22/2023     5:41 PM 10/25/2023     7:20 PM 5/29/2024     7:28 PM 6/7/2024     5:52 PM 7/12/2024     6:19 PM   Date of TCM Phone Call   [facility] [place]   Date of Admission 11/29/2021 5/30/2022 3/17/2023 10/24/2023 5/27/2024 6/6/2024 7/10/2024   Date of Discharge 12/3/2021 5/31/2022 3/22/2023 10/25/2023 5/29/2024 6/7/2024 7/12/2024   Discharge Disposition Home or Self Care Home or Self Care Home or Self Care Home or Self Care Home-Health Care Svc Home or Self Care Home-Health Care [facility]       Risk for Readmission (LACE) Score: 10 (7/12/2024  6:00 AM)      Follow-up  Conditions present:  Hyperlipidemia    Hyperlipidemia         The patient presents for transitional care visit.    The patient is currently on Prolia, administered bi-monthly. She reports experiencing nervousness and intermittent depression, which she attributes to her hospital admissions thrice. She denies experiencing any suicidal ideation or self-harm. Home Health has been providing weekly nursing assistance. Her antihypertensive medication was discontinued by Dr. Lima due to fatigue over the past 4 weeks. Her home blood pressure readings range from 111/111 to 139 systolic and 51 to 92 diastolic. She has a history of  Takotsubo cardiomyopathy and persistent atrial fibrillation. Her current medication regimen includes Eliquis 2.5 mg twice daily, atorvastatin, Lasix as needed, and Gemtesa for bladder issues. Hydrochlorothiazide was discontinued, and she is no longer on Medrol Dosepak or Robaxin.     Course During Hospital Stay The following information was reviewed by: Ken Andujar MD on 07/17/2024:   Hospital Course      Ms. Hummel is a 88 y.o. female with a history of essential hypertension, permanent A-fib, coronary artery disease status post HAYLEY in 2015, history of Takotsubo cardiomyopathy with recovered EF, history of renal cell carcinoma status post left nephrectomy with resultant CKD 4 who was recently treated as an outpatient for urinary tract infection who presented to [facility] initially complaining of nausea, shortness of breath, and generalized weakness that have been progressively worsening since May.  Please see the admitting history and physical for further details.  She was found to have some mild hyponatremia, and a chronic troponin elevation and was admitted to the hospital for further evaluation and treatment.       She was seen in consultation by cardiology and nephrology.  Nephrology recommended stopping her thiazide diuretic, and her sodium improved some.  Cardiology recommended an echocardiogram which was essentially not explanatory for her dyspnea/fatigue.  Repeat urinalysis showed no sign of urinary tract infection, and her symptoms are resolved.  Workup was essentially unremarkable for a cause for her fatigue, and I suspect that it is predominantly due to physical debility.  The patient is already established with home health, and refuses subacute rehab, so I will discharge her home today if okay with cardiology and nephrology.      The following portions of the patient's history were reviewed and updated as appropriate: allergies, current medications, past family history, past medical  "history, past social history, past surgical history, and problem list.     Vitals:    07/17/24 1351   BP: 144/68   Pulse: 69   SpO2: 100%   Weight: 63 kg (139 lb)   Height: 157.5 cm (62\")             Objective   Physical Exam  Vitals reviewed.   Constitutional:       General: She is not in acute distress.  Eyes:      General: Lids are normal.      Conjunctiva/sclera: Conjunctivae normal.   Neck:      Vascular: No carotid bruit.      Trachea: No tracheal deviation.   Cardiovascular:      Rate and Rhythm: Normal rate and regular rhythm.      Heart sounds: Normal heart sounds. No murmur heard.  Pulmonary:      Effort: Pulmonary effort is normal.      Breath sounds: Normal breath sounds.   Musculoskeletal:         General: No swelling or tenderness.   Skin:     General: Skin is warm and dry.   Neurological:      Mental Status: She is alert. She is not disoriented.   Psychiatric:         Speech: Speech normal.         Behavior: Behavior normal. Behavior is cooperative.                  DATA REVIEWED:    The following data was reviewed by: Ken Andujar MD on 07/17/2024:  CBC (No Diff) (07/12/2024 05:45)  Renal Function Panel (07/12/2024 05:45)  Iron Profile (07/12/2024 05:45)  Ferritin (07/12/2024 05:45)  D-dimer, Quantitative (07/11/2024 02:23)  Results  Laboratory Studies  Hemoglobin was 9.8 on July 12.         Assessment & Plan        1. Hospital discharge follow-up.  The patient was previously hospitalized for congestive heart failure, which has since stabilized with treatment.  Recommend continuing with home nursing care.  Urinary frequency  Despite being on Gemtesa, she continues to experience urinary frequency. Anemia is most likely attributed to her kidney dysfunction, with follow-up labs to be obtained.   Panic disorder   Not fully managed with escitalopram. Given her cardiac and renal conditions, the current SSRI will be changed from her current SSRI to Trintellix.   Her medication list has been updated, and " labs will be deferred until her follow-ups are conducted, supplemented with labs as needed.    Follow-up  A follow-up appointment is scheduled for 6 weeks from now.       Follow Up     Return in about 6 weeks (around 8/28/2024) for recheck/refill medication.    I spent 45 minutes caring for Marleni on this date of service. This time includes time spent by me in the following activities:reviewing tests, obtaining and/or reviewing a separately obtained history, performing a medically appropriate examination and/or evaluation , counseling and educating the patient/family/caregiver, and documenting information in the medical record    Patient or patient representative verbalized consent for the use of Ambient Listening during the visit with  Ken Andjuar MD for chart documentation. 7/17/2024  14:38 EDT

## 2024-07-22 RX ORDER — APIXABAN 2.5 MG/1
TABLET, FILM COATED ORAL
Qty: 180 TABLET | Refills: 3 | Status: SHIPPED | OUTPATIENT
Start: 2024-07-22

## 2024-07-24 ENCOUNTER — TELEPHONE (OUTPATIENT)
Dept: FAMILY MEDICINE CLINIC | Facility: CLINIC | Age: 89
End: 2024-07-24
Payer: MEDICARE

## 2024-07-24 ENCOUNTER — HOME CARE VISIT (OUTPATIENT)
Dept: HOME HEALTH SERVICES | Facility: HOME HEALTHCARE | Age: 89
End: 2024-07-24
Payer: MEDICARE

## 2024-07-24 ENCOUNTER — TELEPHONE (OUTPATIENT)
Dept: CARDIOLOGY | Facility: CLINIC | Age: 89
End: 2024-07-24
Payer: MEDICARE

## 2024-07-24 VITALS
WEIGHT: 130 LBS | SYSTOLIC BLOOD PRESSURE: 138 MMHG | TEMPERATURE: 97.7 F | RESPIRATION RATE: 16 BRPM | HEART RATE: 104 BPM | OXYGEN SATURATION: 99 % | DIASTOLIC BLOOD PRESSURE: 68 MMHG | BODY MASS INDEX: 23.78 KG/M2

## 2024-07-24 PROCEDURE — G0495 RN CARE TRAIN/EDU IN HH: HCPCS

## 2024-07-24 NOTE — TELEPHONE ENCOUNTER
Kylie with [facility] notified of Johanna Nichols's instructions and verbalized understanding./ JIGNESH

## 2024-07-24 NOTE — TELEPHONE ENCOUNTER
Caller: [facility]    Relationship: Other    Best call back number: [phone]    What was the call regarding: PATIENT IS DUE FOR RECERTITIFCATION, PLEASE ADVISE IF THIS CAN BE DONE.

## 2024-07-24 NOTE — Clinical Note
60 Day Summary    Home Health need continues for: SN    Primary diagnoses/co-morbidities/recent procedures in past 60 days that impact current episode:   CHF, AFIB, CKD STAGE 4 AND ANEMIA     Current level of functional ability: AMB WITH WALKER BUT GETS VERY SOA AND FATIQUED AMB SHORT DISTANCES    Homebound status and living arrangements:  DECREASE ENDURANCE,AMB WITH WALKER/ROLLATOR, SOA MINIMAL EXERTION REQUIRES ASSIST OF 1 TO LEAV EHOME      Goals accomplished and/or measurable progress toward unmet goals in past 60 days: NO EXACERBATION OF CHF    Skilled need:  SN    Focus of care for next 60 days for each discipline ordered:  SN FOR CHF, CKD STAGE 4  INSTRUCT ON DISEASE PROCESS AND S/S EXACERBATION, MED INSTRUCT,  AIN MGNT     Skin integrity/wound status: WNL    Code status FULL    Most recent fall risk: HIGH    Estimated date when home care services will end  1 MONTH

## 2024-07-24 NOTE — TELEPHONE ENCOUNTER
Please let home health nurse know that vitals are stable.  Continue current medications and she can follow-up as scheduled.  Thank you

## 2024-07-24 NOTE — TELEPHONE ENCOUNTER
"AL out of office this week.    Kylie with [facility] called to report patient has an appt with ENRRIQUE Curtis on 7/30/24.  Patient is always in aflutter.  When patient ambulates less than 15 feet she starts \"huffing and puffing\"  BP and HR at rest is 126/56 , HR 72.  With walking /66,  .  SATS  99. / JIGNESH     Kylie with [facility] can be reached on her phone [phone]  "

## 2024-07-26 ENCOUNTER — TRANSCRIBE ORDERS (OUTPATIENT)
Dept: ADMINISTRATIVE | Facility: HOSPITAL | Age: 89
End: 2024-07-26
Payer: MEDICARE

## 2024-07-26 ENCOUNTER — PRIOR AUTHORIZATION (OUTPATIENT)
Dept: FAMILY MEDICINE CLINIC | Facility: CLINIC | Age: 89
End: 2024-07-26
Payer: MEDICARE

## 2024-07-26 DIAGNOSIS — D63.1 ANEMIA IN CHRONIC KIDNEY DISEASE (CODE): Primary | ICD-10-CM

## 2024-07-26 DIAGNOSIS — N18.4 CHRONIC RENAL DISEASE, STAGE IV: ICD-10-CM

## 2024-07-26 NOTE — TELEPHONE ENCOUNTER
Vortioxetine HBr (TRINTELLIX) 20 MG tablet     PA faxed to plan today with confirmation  [phone]

## 2024-07-30 ENCOUNTER — OFFICE VISIT (OUTPATIENT)
Dept: CARDIOLOGY | Facility: CLINIC | Age: 89
End: 2024-07-30
Payer: OTHER GOVERNMENT

## 2024-07-30 VITALS
BODY MASS INDEX: 23.19 KG/M2 | OXYGEN SATURATION: 100 % | DIASTOLIC BLOOD PRESSURE: 62 MMHG | WEIGHT: 126 LBS | HEIGHT: 62 IN | SYSTOLIC BLOOD PRESSURE: 142 MMHG | HEART RATE: 67 BPM

## 2024-07-30 DIAGNOSIS — I48.0 PAF (PAROXYSMAL ATRIAL FIBRILLATION): ICD-10-CM

## 2024-07-30 DIAGNOSIS — I10 ESSENTIAL HYPERTENSION: ICD-10-CM

## 2024-07-30 DIAGNOSIS — I48.21 PERMANENT ATRIAL FIBRILLATION: Primary | ICD-10-CM

## 2024-07-30 PROCEDURE — 99214 OFFICE O/P EST MOD 30 MIN: CPT | Performed by: NURSE PRACTITIONER

## 2024-07-30 RX ORDER — ESCITALOPRAM OXALATE 20 MG/1
20 TABLET ORAL DAILY
COMMUNITY

## 2024-07-30 NOTE — PROGRESS NOTES
Date of Office Visit: 24  Encounter Provider: ENRRIQUE Curtis  Place of Service: [facility]  Patient Name: Marleni Hummel  :1935    Chief Complaint   Patient presents with    Coronary artery disease involving native coronary artery of    Permanent atrial fibrillation    Follow-up   :     HPI: Marleni Hummel is a 88 y.o. female  with hypertension, hyperlipidemia, atrial fibrillation, anemia, cardiomyopathy, obstructive sleep apnea, left renal carcinoma status post nephrectomy and dyspnea.  She was previously followed by Dr. Tai and is now followed by Dr. Parmar.  I will follow up with her today.         She has history of atrial fibrillation with rapid ventricular response.  She also had cardioversion but went back into atrial fibrillation.  She was placed on flecainide and had repeat cardioversion.  In , she was found to be bradycardic after having some nausea and vomiting which was felt to be vagal response.  Her troponin was borderline elevated medications were adjusted.  She ultimately had ST elevation infarct taken to the cardiac catheterization laboratory which is felt to have cardiomyopathy with an ejection fraction of 20%.  She did have a circumflex lesion where she had drug-eluting stent placed.  Obviously, we had to stop flecainide and she was switched to amiodarone.  She had elevated QT interval so that was stopped.  She later was started on Lasix.     In 2019, she c/o CARROLL and had echo and perfusion stress test.  Echocardiogram revealed normal left ventricular ejection fraction and EF of 64%, borderline concentric hypertrophy, moderate thickening of the aortic valve with trace aortic valve regurgitation no aortic stenosis, mild mitral annular calcification was present with mild mitral regurgitation.  RVSP was normal.  Perfusion stress test was negative for ischemia.        She had expressive aphasia and mild weakness in 2021.   MRI did not show acute stroke.  It was felt that she had TIA but she also had hyperkalemia that improved with IV fluid.  No medications were changed at discharge.  There was a note that patient was holding Eliquis for procedure however patient states she had not started to hold Eliquis at that time.  After that admission she was treated with Macrobid PCP.  She had side effect from macrobid. She ultimately was started on amlodipine outpatient by her PCP     In March 2023 she was hospitalized with bradycardia.  Metoprolol was decreased.  Echocardiogram showed normal left ventricular systolic function, hypokinetic apical septal and mid anteroseptal segment.  There was mild aortic valve regurgitation, mitral annular calcification with mild mitral valve regurgitation.  She complained of chest pain and had flat troponin.  Perfusion stress test showed no evidence of ischemia was low risk.  Follow-up 2-week mobile telemetry showed continuous atrial fibrillation average heart rate 63 with a range of .  In follow-up, she was prescribed Lasix as needed 04/2023.,     On October 24 she presented after having an episode of expressive aphasia.  This occurred overnight and lasted less than 5 minutes and resolved.  MRI/a of the head and neck showed no acute infarct or stenosis.  She is evaluated by neurology and home blood pressure monitoring was recommended.  Nephrology evaluated and adjusted medications for better blood pressure control.  Her potassium was 5.3 and she was started on Lokelma per nephrology.      She was hospitalized a couple weeks ago with weakness and fatigue, UTI and hyponatremia.  She complained of shortness of breath and had no acute process on chest x-ray.  Echocardiogram showed normal left ventricular systolic function, mild concentric hypertrophy, mildly dilated right ventricle with normal RV function, mildly dilated left atrial cavity, thickening of the aortic valve with mild insufficiency but no  "significant stenosis.  RVSP was normal.  She was treated for UTI and hydrochlorothiazide was ultimately stopped.  Her sodium improved.      She presents today for reassessment and continues to complain of fatigue and feeling anxious.  She is short of breath with minimal exertion and although this is not completely new and a little more exaggerated.  She denies chest pain or edema or dizziness or palpitation.  She remains off hydrochlorothiazide.  Her family member brought a list of her recent blood pressure readings showing her blood pressure ranging 108-150 with most readings 120-130 systolic.  She will start iron injections later this week.          Allergies   Allergen Reactions    Medrol [Methylprednisolone] Other (See Comments)     Irritability    Morphine Anaphylaxis    Oxycodone Anaphylaxis and Unknown - Low Severity    Ace Inhibitors Cough and Unknown (See Comments)    Bactrim [Sulfamethoxazole-Trimethoprim] Rash    Levofloxacin Itching and Rash     insomnia  insomnia  insomnia    Torsemide Dizziness           Family and social history reviewed.     ROS  All other systems were reviewed and are negative          Objective:     Vitals:    07/30/24 1423   BP: 142/62   BP Location: Left arm   Patient Position: Sitting   Pulse: 67   SpO2: 100%   Weight: 57.2 kg (126 lb)   Height: 157.5 cm (62\")     Body mass index is 23.05 kg/m².    PHYSICAL EXAM:  Pulmonary:      Effort: Pulmonary effort is normal.      Breath sounds: Decreased breath sounds present.   Cardiovascular:      Irregularly irregular rhythm.      Murmurs: There is a systolic murmur.         Procedures      Current Outpatient Medications   Medication Sig Dispense Refill    acetaminophen (TYLENOL) 500 MG tablet Take 1 tablet by mouth Every 4 (Four) Hours As Needed for Mild Pain. Indications: Pain      atorvastatin (LIPITOR) 20 MG tablet Take 1 tablet by mouth Every Night for 270 days. Indications: High Amount of Fats in the Blood 90 tablet 2    Eliquis " 2.5 MG tablet tablet TAKE 1 TABLET EVERY 12 HOURS (TWICE A DAY) 180 tablet 3    escitalopram (LEXAPRO) 20 MG tablet Take 1 tablet by mouth Daily.      fexofenadine (ALLEGRA) 60 MG tablet Take 1 tablet by mouth Daily As Needed. Indications: Hayfever      furosemide (LASIX) 20 MG tablet Take 1 tablet by mouth Daily As Needed (if weight goes up 3lbs in 3 days). 30 tablet 11    magnesium oxide (MAG-OX) 400 MG tablet Take 1 tablet by mouth Daily. Indications: Acid Indigestion      melatonin 5 MG tablet tablet Take 1 tablet by mouth At Night As Needed. Indications: Trouble Sleeping      Vibegron (Gemtesa) 75 MG tablet Take 1 tablet by mouth Daily. Indications: Overactive Bladder, Urinary Incontinence       No current facility-administered medications for this visit.     Assessment:       Diagnosis Plan   1. Permanent atrial fibrillation        2. Essential hypertension        3. PAF (paroxysmal atrial fibrillation)             No orders of the defined types were placed in this encounter.        Plan:           1. 86 year-old female with chronic atrial fibrillation . CHADS2-VASc score is 6.  Anticoagulated with Eliquis 2.5 twice daily.    2. Coronary artery disease status post drug-eluting stent placed to the left circumflex in October 2015.  Negative perfusion stress test March 2023  -No angina  3. Hypertension blood pressure today is stable.  At this time I have recommended that she remain off hydrochlorothiazide 12.5 mg.  Her systolic blood pressures ranging 108-150 at home with most readings 120-130.  I have advised that they call if she has most readings sustaining greater than 150/90.    -This is considered complex management of a chronic medical condition.    4.  Hyperlipidemia on atorvastatin 20 mg   5.  Chronic kidney disease, stage IV followed by Dr. Leonard.    6.  History of prolonged QT on amiodarone  7.  Cardiac murmur with aortic valve sclerosis, mild insufficiency and no significant stenosis on recent echo  July 2024.  8.  History of obstructive sleep apnea-she stopped treating that 5-6 years ago  9.   Left renal carcinoma status post total nephrectomy   10.  TIA 10/2023.   11.  Bradycardia.  Metoprolol was decreased March 2023.  She will continue off AV kanika blocker  12.Chronic back pain she is following with Dr. Hook..  She recently had symptom improvement with epidural.  She did well at this time holding Eliquis just 3 days   13.  History of ischemic cardiomyopathy with an ejection fraction of 20% October 2015 which returned to normal and was last normal earlier this month.  She only uses Lasix as needed based on weight gain.  She has not used Lasix at all since discharge.  14.  Hyponatremia-improved off hydrochlorothiazide.  15.  Anemia of chronic disease-reportedly will start iron injections later this week per nephrology          It has been a pleasure to participate in this patient's care.      Thank you,  ENRRIQUE Curtis      **I used Dragon to dictate this note:**

## 2024-07-31 NOTE — TELEPHONE ENCOUNTER
Left [facility] a voicemail to give the verbal okay per Dr. Andujar for RECERTITIFCATION. Instructed to contact our office for further questions     HUB TO RELAY

## 2024-08-01 ENCOUNTER — HOME CARE VISIT (OUTPATIENT)
Dept: HOME HEALTH SERVICES | Facility: HOME HEALTHCARE | Age: 89
End: 2024-08-01
Payer: MEDICARE

## 2024-08-01 VITALS
DIASTOLIC BLOOD PRESSURE: 66 MMHG | BODY MASS INDEX: 23.78 KG/M2 | OXYGEN SATURATION: 99 % | TEMPERATURE: 97.5 F | WEIGHT: 130 LBS | HEART RATE: 68 BPM | SYSTOLIC BLOOD PRESSURE: 134 MMHG | RESPIRATION RATE: 20 BRPM

## 2024-08-01 PROCEDURE — G0495 RN CARE TRAIN/EDU IN HH: HCPCS

## 2024-08-02 ENCOUNTER — HOSPITAL ENCOUNTER (OUTPATIENT)
Dept: INFUSION THERAPY | Facility: HOSPITAL | Age: 89
Discharge: HOME | End: 2024-08-02
Payer: MEDICARE

## 2024-08-02 VITALS
TEMPERATURE: 96.9 F | HEART RATE: 64 BPM | SYSTOLIC BLOOD PRESSURE: 129 MMHG | RESPIRATION RATE: 18 BRPM | OXYGEN SATURATION: 98 % | DIASTOLIC BLOOD PRESSURE: 59 MMHG

## 2024-08-02 DIAGNOSIS — N18.4 CKD (CHRONIC KIDNEY DISEASE) STAGE 4, GFR 15-29 ML/MIN: Primary | ICD-10-CM

## 2024-08-02 DIAGNOSIS — M81.0 SENILE OSTEOPOROSIS: ICD-10-CM

## 2024-08-02 DIAGNOSIS — D63.8 ANEMIA OF CHRONIC DISEASE: ICD-10-CM

## 2024-08-02 LAB
25(OH)D3 SERPL-MCNC: 37 NG/ML (ref 30–100)
ALBUMIN SERPL-MCNC: 4.2 G/DL (ref 3.5–5.2)
ALBUMIN/GLOB SERPL: 1.6 G/DL
ALP SERPL-CCNC: 57 U/L (ref 39–117)
ALT SERPL W P-5'-P-CCNC: 7 U/L (ref 1–33)
ANION GAP SERPL CALCULATED.3IONS-SCNC: 14.3 MMOL/L (ref 5–15)
AST SERPL-CCNC: 16 U/L (ref 1–32)
BASOPHILS # BLD AUTO: 0.02 10*3/MM3 (ref 0–0.2)
BASOPHILS NFR BLD AUTO: 0.3 % (ref 0–1.5)
BILIRUB SERPL-MCNC: 0.6 MG/DL (ref 0–1.2)
BUN SERPL-MCNC: 31 MG/DL (ref 8–23)
BUN/CREAT SERPL: 17.4 (ref 7–25)
CALCIUM SPEC-SCNC: 9.1 MG/DL (ref 8.6–10.5)
CALCIUM SPEC-SCNC: 9.4 MG/DL (ref 8.6–10.5)
CHLORIDE SERPL-SCNC: 102 MMOL/L (ref 98–107)
CO2 SERPL-SCNC: 19.7 MMOL/L (ref 22–29)
CREAT SERPL-MCNC: 1.78 MG/DL (ref 0.57–1)
DEPRECATED RDW RBC AUTO: 52.7 FL (ref 37–54)
EGFRCR SERPLBLD CKD-EPI 2021: 27.5 ML/MIN/1.73
EOSINOPHIL # BLD AUTO: 0.13 10*3/MM3 (ref 0–0.4)
EOSINOPHIL NFR BLD AUTO: 2.2 % (ref 0.3–6.2)
ERYTHROCYTE [DISTWIDTH] IN BLOOD BY AUTOMATED COUNT: 15 % (ref 12.3–15.4)
FERRITIN SERPL-MCNC: 265 NG/ML (ref 13–150)
FOLATE SERPL-MCNC: 16.1 NG/ML (ref 4.78–24.2)
GLOBULIN UR ELPH-MCNC: 2.6 GM/DL
GLUCOSE SERPL-MCNC: 113 MG/DL (ref 65–99)
HCT VFR BLD AUTO: 30.8 % (ref 34–46.6)
HGB BLD-MCNC: 10 G/DL (ref 12–15.9)
IMM GRANULOCYTES # BLD AUTO: 0.04 10*3/MM3 (ref 0–0.05)
IMM GRANULOCYTES NFR BLD AUTO: 0.7 % (ref 0–0.5)
IRON 24H UR-MRATE: 66 MCG/DL (ref 37–145)
IRON SATN MFR SERPL: 22 % (ref 20–50)
LYMPHOCYTES # BLD AUTO: 0.72 10*3/MM3 (ref 0.7–3.1)
LYMPHOCYTES NFR BLD AUTO: 12.1 % (ref 19.6–45.3)
MCH RBC QN AUTO: 31 PG (ref 26.6–33)
MCHC RBC AUTO-ENTMCNC: 32.5 G/DL (ref 31.5–35.7)
MCV RBC AUTO: 95.4 FL (ref 79–97)
MONOCYTES # BLD AUTO: 0.47 10*3/MM3 (ref 0.1–0.9)
MONOCYTES NFR BLD AUTO: 7.9 % (ref 5–12)
NEUTROPHILS NFR BLD AUTO: 4.55 10*3/MM3 (ref 1.7–7)
NEUTROPHILS NFR BLD AUTO: 76.8 % (ref 42.7–76)
NRBC BLD AUTO-RTO: 0 /100 WBC (ref 0–0.2)
PHOSPHATE SERPL-MCNC: 2.5 MG/DL (ref 2.5–4.5)
PLATELET # BLD AUTO: 215 10*3/MM3 (ref 140–450)
PMV BLD AUTO: 9.3 FL (ref 6–12)
POTASSIUM SERPL-SCNC: 5.3 MMOL/L (ref 3.5–5.2)
PROT SERPL-MCNC: 6.8 G/DL (ref 6–8.5)
PTH-INTACT SERPL-MCNC: 163 PG/ML (ref 15–65)
RBC # BLD AUTO: 3.23 10*6/MM3 (ref 3.77–5.28)
SODIUM SERPL-SCNC: 136 MMOL/L (ref 136–145)
TIBC SERPL-MCNC: 297 MCG/DL (ref 298–536)
TRANSFERRIN SERPL-MCNC: 199 MG/DL (ref 200–360)
URATE SERPL-MCNC: 6.9 MG/DL (ref 2.4–5.7)
VIT B12 BLD-MCNC: 449 PG/ML (ref 211–946)
WBC NRBC COR # BLD AUTO: 5.93 10*3/MM3 (ref 3.4–10.8)

## 2024-08-02 PROCEDURE — 85025 COMPLETE CBC W/AUTO DIFF WBC: CPT | Performed by: NURSE PRACTITIONER

## 2024-08-02 PROCEDURE — 83970 ASSAY OF PARATHORMONE: CPT | Performed by: NURSE PRACTITIONER

## 2024-08-02 PROCEDURE — 36415 COLL VENOUS BLD VENIPUNCTURE: CPT

## 2024-08-02 PROCEDURE — 83540 ASSAY OF IRON: CPT | Performed by: NURSE PRACTITIONER

## 2024-08-02 PROCEDURE — 84100 ASSAY OF PHOSPHORUS: CPT | Performed by: NURSE PRACTITIONER

## 2024-08-02 PROCEDURE — 82728 ASSAY OF FERRITIN: CPT | Performed by: NURSE PRACTITIONER

## 2024-08-02 PROCEDURE — 82607 VITAMIN B-12: CPT | Performed by: NURSE PRACTITIONER

## 2024-08-02 PROCEDURE — 80053 COMPREHEN METABOLIC PANEL: CPT | Performed by: NURSE PRACTITIONER

## 2024-08-02 PROCEDURE — 82746 ASSAY OF FOLIC ACID SERUM: CPT | Performed by: NURSE PRACTITIONER

## 2024-08-02 PROCEDURE — 96372 THER/PROPH/DIAG INJ SC/IM: CPT

## 2024-08-02 PROCEDURE — 84466 ASSAY OF TRANSFERRIN: CPT | Performed by: NURSE PRACTITIONER

## 2024-08-02 PROCEDURE — 25010000002 EPOETIN ALFA PER 1000 UNITS: Performed by: NURSE PRACTITIONER

## 2024-08-02 PROCEDURE — 84550 ASSAY OF BLOOD/URIC ACID: CPT | Performed by: NURSE PRACTITIONER

## 2024-08-02 PROCEDURE — 82306 VITAMIN D 25 HYDROXY: CPT | Performed by: FAMILY MEDICINE

## 2024-08-02 RX ADMIN — ERYTHROPOIETIN 10000 UNITS: 10000 INJECTION, SOLUTION INTRAVENOUS; SUBCUTANEOUS at 12:35

## 2024-08-08 ENCOUNTER — HOME CARE VISIT (OUTPATIENT)
Dept: HOME HEALTH SERVICES | Facility: HOME HEALTHCARE | Age: 89
End: 2024-08-08
Payer: MEDICARE

## 2024-08-08 VITALS
RESPIRATION RATE: 20 BRPM | HEART RATE: 100 BPM | DIASTOLIC BLOOD PRESSURE: 76 MMHG | TEMPERATURE: 97.7 F | SYSTOLIC BLOOD PRESSURE: 136 MMHG | OXYGEN SATURATION: 98 %

## 2024-08-08 PROCEDURE — G0495 RN CARE TRAIN/EDU IN HH: HCPCS

## 2024-08-08 PROCEDURE — G0299 HHS/HOSPICE OF RN EA 15 MIN: HCPCS

## 2024-08-15 ENCOUNTER — HOME CARE VISIT (OUTPATIENT)
Dept: HOME HEALTH SERVICES | Facility: HOME HEALTHCARE | Age: 89
End: 2024-08-15
Payer: MEDICARE

## 2024-08-15 VITALS
SYSTOLIC BLOOD PRESSURE: 132 MMHG | DIASTOLIC BLOOD PRESSURE: 78 MMHG | BODY MASS INDEX: 23.78 KG/M2 | RESPIRATION RATE: 20 BRPM | HEART RATE: 108 BPM | TEMPERATURE: 97.6 F | WEIGHT: 130 LBS | OXYGEN SATURATION: 99 %

## 2024-08-15 PROCEDURE — G0495 RN CARE TRAIN/EDU IN HH: HCPCS

## 2024-08-16 ENCOUNTER — HOSPITAL ENCOUNTER (OUTPATIENT)
Dept: INFUSION THERAPY | Facility: HOSPITAL | Age: 89
Discharge: HOME | End: 2024-08-16
Payer: MEDICARE

## 2024-08-16 VITALS
HEART RATE: 78 BPM | SYSTOLIC BLOOD PRESSURE: 112 MMHG | RESPIRATION RATE: 18 BRPM | OXYGEN SATURATION: 99 % | DIASTOLIC BLOOD PRESSURE: 66 MMHG | TEMPERATURE: 97.1 F

## 2024-08-16 DIAGNOSIS — D63.8 ANEMIA OF CHRONIC DISEASE: Primary | ICD-10-CM

## 2024-08-16 LAB
HCT VFR BLD AUTO: 29.9 % (ref 34–46.6)
HGB BLD-MCNC: 9.5 G/DL (ref 12–15.9)

## 2024-08-16 PROCEDURE — 36415 COLL VENOUS BLD VENIPUNCTURE: CPT

## 2024-08-16 PROCEDURE — 85018 HEMOGLOBIN: CPT | Performed by: NURSE PRACTITIONER

## 2024-08-16 PROCEDURE — 25010000002 EPOETIN ALFA PER 1000 UNITS: Performed by: NURSE PRACTITIONER

## 2024-08-16 PROCEDURE — 96372 THER/PROPH/DIAG INJ SC/IM: CPT

## 2024-08-16 PROCEDURE — 85014 HEMATOCRIT: CPT | Performed by: NURSE PRACTITIONER

## 2024-08-16 RX ADMIN — ERYTHROPOIETIN 10000 UNITS: 10000 INJECTION, SOLUTION INTRAVENOUS; SUBCUTANEOUS at 12:34

## 2024-08-22 ENCOUNTER — HOME CARE VISIT (OUTPATIENT)
Dept: HOME HEALTH SERVICES | Facility: HOME HEALTHCARE | Age: 89
End: 2024-08-22
Payer: MEDICARE

## 2024-08-22 VITALS
WEIGHT: 130 LBS | DIASTOLIC BLOOD PRESSURE: 64 MMHG | SYSTOLIC BLOOD PRESSURE: 138 MMHG | RESPIRATION RATE: 20 BRPM | HEART RATE: 76 BPM | BODY MASS INDEX: 23.78 KG/M2 | TEMPERATURE: 97.9 F

## 2024-08-22 PROCEDURE — G0495 RN CARE TRAIN/EDU IN HH: HCPCS

## 2024-08-26 ENCOUNTER — TELEPHONE (OUTPATIENT)
Dept: FAMILY MEDICINE CLINIC | Facility: CLINIC | Age: 89
End: 2024-08-26
Payer: MEDICARE

## 2024-08-26 DIAGNOSIS — F41.0 PANIC DISORDER: ICD-10-CM

## 2024-08-26 DIAGNOSIS — E78.00 HYPERCHOLESTEROLEMIA: Primary | ICD-10-CM

## 2024-08-26 RX ORDER — VORTIOXETINE 20 MG/1
20 TABLET, FILM COATED ORAL
Qty: 90 TABLET | Refills: 0 | Status: SHIPPED | OUTPATIENT
Start: 2024-08-26 | End: 2024-11-24

## 2024-08-26 NOTE — TELEPHONE ENCOUNTER
Caller: NEAL HERNANDEZ    Relationship: Emergency Contact    Best call back number: [phone]     What is the best time to reach you: ANY     Who are you requesting to speak with (clinical staff, provider,  specific staff member): CLINICAL STAFF     Do you know the name of the person who called: NEAL    What was the call regarding: PATIENT DAUGHTER IS REQUESTING A CALL BACK ABOUT PATIENT SCHEDULED APPOINTMENT ON 08/28. PLEASE ALE TO DISCUSS.     Is it okay if the provider responds through MyChart:

## 2024-08-27 NOTE — TELEPHONE ENCOUNTER
Left Ms. Vega a voicemail informing her per Dr. Andujar.  Per our last note, she was supposed to be   switched to Trintellix 20 mg.  This was to   replace escitalopram which was not   working that well.  Tell her that I   have sent a prescription of Trintellix to her   local pharmacy (it may be less expensive   to send this to her mail away pharmacy).    Have her keep her appointment as   scheduled towards the end of September.   Instructed patient to contact our office for further question or concerns.    HUB TO RELAY

## 2024-08-30 ENCOUNTER — HOSPITAL ENCOUNTER (OUTPATIENT)
Dept: INFUSION THERAPY | Facility: HOSPITAL | Age: 89
Discharge: HOME | End: 2024-08-30
Payer: MEDICARE

## 2024-08-30 ENCOUNTER — APPOINTMENT (OUTPATIENT)
Dept: GENERAL RADIOLOGY | Facility: HOSPITAL | Age: 89
End: 2024-08-30
Payer: MEDICARE

## 2024-08-30 ENCOUNTER — HOSPITAL ENCOUNTER (EMERGENCY)
Facility: HOSPITAL | Age: 89
Discharge: HOME | End: 2024-08-30
Attending: EMERGENCY MEDICINE
Payer: MEDICARE

## 2024-08-30 VITALS
DIASTOLIC BLOOD PRESSURE: 74 MMHG | TEMPERATURE: 97.1 F | HEART RATE: 78 BPM | RESPIRATION RATE: 16 BRPM | SYSTOLIC BLOOD PRESSURE: 129 MMHG | OXYGEN SATURATION: 100 %

## 2024-08-30 VITALS
SYSTOLIC BLOOD PRESSURE: 150 MMHG | HEART RATE: 73 BPM | DIASTOLIC BLOOD PRESSURE: 91 MMHG | TEMPERATURE: 97.9 F | BODY MASS INDEX: 23.94 KG/M2 | WEIGHT: 130.07 LBS | HEIGHT: 62 IN | OXYGEN SATURATION: 99 % | RESPIRATION RATE: 18 BRPM

## 2024-08-30 DIAGNOSIS — R06.00 DYSPNEA, UNSPECIFIED TYPE: ICD-10-CM

## 2024-08-30 DIAGNOSIS — N18.4 CKD (CHRONIC KIDNEY DISEASE) STAGE 4, GFR 15-29 ML/MIN: ICD-10-CM

## 2024-08-30 DIAGNOSIS — D63.8 ANEMIA OF CHRONIC DISEASE: ICD-10-CM

## 2024-08-30 DIAGNOSIS — D63.8 ANEMIA OF CHRONIC DISEASE: Primary | ICD-10-CM

## 2024-08-30 DIAGNOSIS — R53.83 FATIGUE, UNSPECIFIED TYPE: Primary | ICD-10-CM

## 2024-08-30 LAB
ALBUMIN SERPL-MCNC: 4.2 G/DL (ref 3.5–5.2)
ALBUMIN/GLOB SERPL: 1.5 G/DL
ALP SERPL-CCNC: 61 U/L (ref 39–117)
ALT SERPL W P-5'-P-CCNC: 11 U/L (ref 1–33)
ANION GAP SERPL CALCULATED.3IONS-SCNC: 11.5 MMOL/L (ref 5–15)
AST SERPL-CCNC: 19 U/L (ref 1–32)
B PARAPERT DNA SPEC QL NAA+PROBE: NOT DETECTED
B PERT DNA SPEC QL NAA+PROBE: NOT DETECTED
BASOPHILS # BLD AUTO: 0.03 10*3/MM3 (ref 0–0.2)
BASOPHILS NFR BLD AUTO: 0.4 % (ref 0–1.5)
BILIRUB SERPL-MCNC: 0.5 MG/DL (ref 0–1.2)
BUN SERPL-MCNC: 25 MG/DL (ref 8–23)
BUN/CREAT SERPL: 13.8 (ref 7–25)
C PNEUM DNA NPH QL NAA+NON-PROBE: NOT DETECTED
CALCIUM SPEC-SCNC: 8.7 MG/DL (ref 8.6–10.5)
CHLORIDE SERPL-SCNC: 99 MMOL/L (ref 98–107)
CO2 SERPL-SCNC: 22.5 MMOL/L (ref 22–29)
CREAT SERPL-MCNC: 1.81 MG/DL (ref 0.57–1)
DEPRECATED RDW RBC AUTO: 50.5 FL (ref 37–54)
EGFRCR SERPLBLD CKD-EPI 2021: 27 ML/MIN/1.73
EOSINOPHIL # BLD AUTO: 0.16 10*3/MM3 (ref 0–0.4)
EOSINOPHIL NFR BLD AUTO: 2.2 % (ref 0.3–6.2)
ERYTHROCYTE [DISTWIDTH] IN BLOOD BY AUTOMATED COUNT: 14.7 % (ref 12.3–15.4)
FLUAV SUBTYP SPEC NAA+PROBE: NOT DETECTED
FLUBV RNA ISLT QL NAA+PROBE: NOT DETECTED
GLOBULIN UR ELPH-MCNC: 2.8 GM/DL
GLUCOSE SERPL-MCNC: 123 MG/DL (ref 65–99)
HADV DNA SPEC NAA+PROBE: NOT DETECTED
HCOV 229E RNA SPEC QL NAA+PROBE: NOT DETECTED
HCOV HKU1 RNA SPEC QL NAA+PROBE: NOT DETECTED
HCOV NL63 RNA SPEC QL NAA+PROBE: NOT DETECTED
HCOV OC43 RNA SPEC QL NAA+PROBE: NOT DETECTED
HCT VFR BLD AUTO: 31.9 % (ref 34–46.6)
HCT VFR BLD AUTO: 32.3 % (ref 34–46.6)
HGB BLD-MCNC: 10.1 G/DL (ref 12–15.9)
HGB BLD-MCNC: 10.1 G/DL (ref 12–15.9)
HMPV RNA NPH QL NAA+NON-PROBE: NOT DETECTED
HOLD SPECIMEN: NORMAL
HOLD SPECIMEN: NORMAL
HPIV1 RNA ISLT QL NAA+PROBE: NOT DETECTED
HPIV2 RNA SPEC QL NAA+PROBE: NOT DETECTED
HPIV3 RNA NPH QL NAA+PROBE: NOT DETECTED
HPIV4 P GENE NPH QL NAA+PROBE: NOT DETECTED
IMM GRANULOCYTES # BLD AUTO: 0.04 10*3/MM3 (ref 0–0.05)
IMM GRANULOCYTES NFR BLD AUTO: 0.5 % (ref 0–0.5)
LYMPHOCYTES # BLD AUTO: 0.88 10*3/MM3 (ref 0.7–3.1)
LYMPHOCYTES NFR BLD AUTO: 11.8 % (ref 19.6–45.3)
M PNEUMO IGG SER IA-ACNC: NOT DETECTED
MCH RBC QN AUTO: 30.2 PG (ref 26.6–33)
MCHC RBC AUTO-ENTMCNC: 31.7 G/DL (ref 31.5–35.7)
MCV RBC AUTO: 95.5 FL (ref 79–97)
MONOCYTES # BLD AUTO: 0.5 10*3/MM3 (ref 0.1–0.9)
MONOCYTES NFR BLD AUTO: 6.7 % (ref 5–12)
NEUTROPHILS NFR BLD AUTO: 5.83 10*3/MM3 (ref 1.7–7)
NEUTROPHILS NFR BLD AUTO: 78.4 % (ref 42.7–76)
NRBC BLD AUTO-RTO: 0 /100 WBC (ref 0–0.2)
NT-PROBNP SERPL-MCNC: 2616 PG/ML (ref 0–1800)
PLATELET # BLD AUTO: 252 10*3/MM3 (ref 140–450)
PMV BLD AUTO: 9.5 FL (ref 6–12)
POTASSIUM SERPL-SCNC: 5.1 MMOL/L (ref 3.5–5.2)
PROT SERPL-MCNC: 7 G/DL (ref 6–8.5)
QT INTERVAL: 403 MS
QTC INTERVAL: 485 MS
RBC # BLD AUTO: 3.34 10*6/MM3 (ref 3.77–5.28)
RHINOVIRUS RNA SPEC NAA+PROBE: NOT DETECTED
RSV RNA NPH QL NAA+NON-PROBE: NOT DETECTED
SARS-COV-2 RNA NPH QL NAA+NON-PROBE: NOT DETECTED
SODIUM SERPL-SCNC: 133 MMOL/L (ref 136–145)
TROPONIN T SERPL HS-MCNC: 43 NG/L
TROPONIN T SERPL HS-MCNC: 44 NG/L
WBC NRBC COR # BLD AUTO: 7.44 10*3/MM3 (ref 3.4–10.8)
WHOLE BLOOD HOLD COAG: NORMAL
WHOLE BLOOD HOLD SPECIMEN: NORMAL

## 2024-08-30 PROCEDURE — 84484 ASSAY OF TROPONIN QUANT: CPT | Performed by: PHYSICIAN ASSISTANT

## 2024-08-30 PROCEDURE — 71045 X-RAY EXAM CHEST 1 VIEW: CPT

## 2024-08-30 PROCEDURE — 85025 COMPLETE CBC W/AUTO DIFF WBC: CPT | Performed by: EMERGENCY MEDICINE

## 2024-08-30 PROCEDURE — 85014 HEMATOCRIT: CPT | Performed by: NURSE PRACTITIONER

## 2024-08-30 PROCEDURE — 99284 EMERGENCY DEPT VISIT MOD MDM: CPT

## 2024-08-30 PROCEDURE — 36415 COLL VENOUS BLD VENIPUNCTURE: CPT

## 2024-08-30 PROCEDURE — 96372 THER/PROPH/DIAG INJ SC/IM: CPT

## 2024-08-30 PROCEDURE — 25010000002 EPOETIN ALFA PER 1000 UNITS: Performed by: NURSE PRACTITIONER

## 2024-08-30 PROCEDURE — 83880 ASSAY OF NATRIURETIC PEPTIDE: CPT | Performed by: EMERGENCY MEDICINE

## 2024-08-30 PROCEDURE — 93010 ELECTROCARDIOGRAM REPORT: CPT | Performed by: INTERNAL MEDICINE

## 2024-08-30 PROCEDURE — 0202U NFCT DS 22 TRGT SARS-COV-2: CPT | Performed by: PHYSICIAN ASSISTANT

## 2024-08-30 PROCEDURE — 93005 ELECTROCARDIOGRAM TRACING: CPT | Performed by: EMERGENCY MEDICINE

## 2024-08-30 PROCEDURE — 85018 HEMOGLOBIN: CPT | Performed by: NURSE PRACTITIONER

## 2024-08-30 PROCEDURE — 84484 ASSAY OF TROPONIN QUANT: CPT | Performed by: EMERGENCY MEDICINE

## 2024-08-30 PROCEDURE — 80053 COMPREHEN METABOLIC PANEL: CPT | Performed by: EMERGENCY MEDICINE

## 2024-08-30 RX ORDER — ESCITALOPRAM OXALATE 20 MG/1
20 TABLET ORAL DAILY
COMMUNITY

## 2024-08-30 RX ORDER — SODIUM CHLORIDE 0.9 % (FLUSH) 0.9 %
10 SYRINGE (ML) INJECTION AS NEEDED
Status: DISCONTINUED | OUTPATIENT
Start: 2024-08-30 | End: 2024-08-30 | Stop reason: HOSPADM

## 2024-08-30 RX ADMIN — ERYTHROPOIETIN 10000 UNITS: 10000 INJECTION, SOLUTION INTRAVENOUS; SUBCUTANEOUS at 12:16

## 2024-08-30 NOTE — ED PROVIDER NOTES
MD ATTESTATION NOTE    The ERIC and I have discussed this patient's history, physical exam, MDM, and treatment plan.  I have reviewed the documentation and personally had a face to face interaction with the patient. I affirm the documentation and agree with the treatment and plan.  The attached note describes my personal findings.      I provided a substantive portion of the medical decision making.        Brief HPI: Patient presents with complaint of shortness of breath for the last week.  She states she has had no chest pain, no fever, cough, leg swelling.  She reports compliance with her home medications including her Eliquis.  She does have Lasix prescribed to her to take only as needed for weight gain or leg swelling so she has not taken it recently.  She weighs herself regularly and has had no recent weight gain.  Daughter states that patient is very sedentary.  She sits around most of the day.  Patient states she just does not have energy to do much.  After getting up and getting her coffee and breakfast she is exhausted.    PHYSICAL EXAM  ED Triage Vitals   Temp Heart Rate Resp BP SpO2   08/30/24 1229 08/30/24 1229 08/30/24 1229 08/30/24 1234 08/30/24 1229   97.9 °F (36.6 °C) 78 18 163/84 99 %      Temp src Heart Rate Source Patient Position BP Location FiO2 (%)   08/30/24 1229 08/30/24 1229 -- -- --   Tympanic Monitor            GENERAL: Awake and alert, well-appearing, no acute distress.  Appears younger than stated age.  HENT: nares patent  EYES: no scleral icterus  CV: regular rhythm, normal rate  RESPIRATORY: normal effort  ABDOMEN: soft  MUSCULOSKELETAL: no deformity, no calf tenderness bilaterally, no lower extremity edema  NEURO: alert, moves all extremities, follows commands, cranial nerves II through XII intact  PSYCH:  calm, cooperative  SKIN: warm, dry    Vital signs and nursing notes reviewed.        Medical Decision Making:  ED Course as of 08/30/24 1600   Fri Aug 30, 2024   1330 EKG           EKG time: 12:34 PM  Rhythm/Rate: Atrial flutter, 87  P waves and VA: N/A  QRS, axis: Borderline left axis  ST and T waves: No acute ischemic changes    Interpreted Contemporaneously by me, independently viewed  Similar compared to prior 7/10/2024       [JR]   1438 WBC: 7.44 [KA]   1438 Hemoglobin(!): 10.1  Chronic stable anemia [KA]   1438 proBNP(!): 2,616.0 [KA]   1438 COVID19: Not Detected [KA]   1438 Glucose(!): 123 [KA]   1438 Creatinine(!): 1.81  Chronic stable CKD [KA]   1439 My independent interpretation of the chest x-ray is no cardiomegaly or acute infiltrate [KA]      ED Course User Index  [JR] Khoa Serna MD  [KA] Jesenia Harper PA-C       Patient is well-appearing.  She does not appear to have decompensated CHF.  Her chest x-ray is clear.  She has no peripheral edema.  She is chronically anticoagulated therefore low risk for pulmonary embolism.  O2 saturation is 99% on room air.  She is chronically anemic which is stable today.  I do not see indication for admission at this time.  She was advised to follow-up closely with her PCP and cardiologist return precautions were discussed.         Khoa Serna MD  08/30/24 1095

## 2024-08-30 NOTE — PROGRESS NOTES
"1223 - Patient and daughter state they are going to the ER to be evaluated at discharge as patient c/o fatigue, productive cough and shortness of air since Monday. Patient reports history of CHF and would like \"full work-up\". Directions to ER given to patient's daughter. Patient in home wheelchair.   "

## 2024-08-30 NOTE — ED PROVIDER NOTES
EMERGENCY DEPARTMENT ENCOUNTER  Room Number:  36/36  PCP: Ken Andujar MD  Independent Historians: Patient and Family      HPI:  Chief Complaint: had concerns including Shortness of Breath.     A complete HPI/ROS/PMH/PSH/SH/FH are unobtainable due to: None    Chronic or social conditions impacting patient care (Social Determinants of Health): None      Context: The patient is a 88 y.o. female with a medical history of hypertension, atrial fibrillation, CKD, CAD, CVA, CHF who presents to the ED c/o acute shortness of breath.  Daughter is at the bedside and contributes to the history as well.  Patient has had increasing exertional dyspnea for the last 1 week.  She does not really feel short of breath otherwise, no orthopnea.  She also has chronic fatigue which daughter states seems at its baseline, she has had that for years.  She denies any chest pain cough congestion fevers lower extremity edema.  Does feel that her abdomen feels a little bloated or swollen.      Review of prior external notes (non-ED) -and- Review of prior external test results outside of this encounter:  Echo performed 7/11/2024 due to history of heart failure dyspnea cardiomyopathy.  LVEF 67.2%, she had mild concentric hypertrophy of the left ventricle, right ventricle mildly dilated with normal systolic function, calcific fixation of the aortic valve with thickening and mild aortic valve regurgitation without hemodynamically significant stenosis    Patient was admitted 7/10/2024 to 7/12/2024 after presenting with nausea shortness of breath and generalized weakness for 2 months.  She had mild hyponatremia.  Thiazide diuretic was discontinued and sodium improved.  Echo was not explanatory of dyspnea fatigue, that was felt to mostly be related to debility.  Subacute rehab refused, already established with home health.    PAST MEDICAL HISTORY  Active Ambulatory Problems     Diagnosis Date Noted    Arthritis involving multiple sites     Essential  hypertension     Permanent atrial fibrillation     History of Bell's palsy     CKD (chronic kidney disease) stage 4, GFR 15-29 ml/min     Gastroesophageal reflux disease without esophagitis     Hypercholesterolemia     Insomnia     Localized osteoporosis without current pathological fracture     Panic disorder     Chronic nonseasonal allergic rhinitis due to pollen     Other sleep apnea     History of MI (myocardial infarction) 12/21/2015    Chronic coronary artery disease 01/25/2016    Anemia of chronic disease 09/12/2016    Weakness of right leg 03/13/2017    Cardiac murmur 05/04/2017    Senile osteoporosis 06/30/2017    Hyperuricemia 09/07/2017    Grief reaction 09/30/2019    Peripheral vertigo 02/25/2020    Renal cell carcinoma of left kidney 11/22/2020    Renal oncocytoma of left kidney 12/28/2020    History of left nephrectomy 04/19/2021    Cerebrovascular accident (CVA) due to stenosis of small artery 11/29/2021    History of renal cell carcinoma 11/30/2021    TIA (transient ischemic attack) 11/30/2021    Malignant neoplasm of left kidney, except renal pelvis 06/07/2022    Diverticulosis 12/14/2022    Irritable bowel syndrome with constipation 12/14/2022    Chronic diastolic congestive heart failure 03/18/2023    Hallucination, visual 01/17/2024    Hypomagnesemia 05/02/2024    Bradycardia, drug induced 05/27/2024    Lumbar disc disease with radiculopathy 12/02/2021     Resolved Ambulatory Problems     Diagnosis Date Noted    Fatigue     Hip arthritis     IFG (impaired fasting glucose)     Rectal bleeding     Vitamin D deficiency     Urinary incontinence     History of right hip replacement 12/21/2015    Closed fracture of neck of right femur with routine healing 12/21/2015    History of right knee joint replacement 12/21/2015    History of left knee replacement 12/21/2015    Stress-induced cardiomyopathy 01/25/2016    Elevated serum free T4 level 03/21/2016    Urinary tract infection 10/05/2016    Acute  pyelonephritis 10/06/2016    Acute cystitis 12/03/2016    Hematuria 12/12/2016    Sinusitis 01/22/2017    Frequent falls 03/13/2017    Atrial fibrillation with RVR 06/19/2017    ANGY (acute kidney injury) 06/20/2017    Viral gastroenteritis 06/20/2017    Dehydration 06/20/2017    Nausea & vomiting 06/20/2017    Diarrhea 06/20/2017    Closed displaced fracture of left femoral neck 08/30/2017    Bacteriuria 08/30/2017    New daily persistent headache 12/17/2018    Muscle tension headache 04/03/2019    Caregiver stress syndrome 04/17/2019    Acute vulvitis 08/21/2019    Dizziness 02/23/2020    Left facial numbness 02/23/2020    Stroke-like symptoms 02/23/2020    Left kidney mass 08/17/2020    Left renal mass 11/16/2020    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    Acute kidney injury 05/30/2022    Acute bronchitis due to Rhinovirus 05/31/2022    Hyperkalemia 05/31/2022    Diverticulitis 12/14/2022    CARROLL (dyspnea on exertion) 03/17/2023    Shortness of breath 07/10/2024    Hyponatremia 07/11/2024     Past Medical History:   Diagnosis Date    Allergic     Allergic rhinitis     Arthropathy of knee     Atrial fibrillation     Back pain     Bell's palsy     Bradycardia 05/27/2024    Chronic kidney disease (CKD), stage III (moderate)     Edema     Encounter for eye exam 09/2020    GERD (gastroesophageal reflux disease)     H/O Heart murmur     Heart attack 10/05/2015    Herpes zoster without complication     History of anemia due to chronic kidney disease     History of bone density study 02/28/2008    History of pelvic fracture 2015    History of pneumonia     History of transfusion     Limited joint range of motion     Mixed hyperlipidemia     Osteoarthritis     Osteoporosis     PONV (postoperative nausea and vomiting)     Sleep apnea     Spinal stenosis, lumbar region with neurogenic claudication 12/02/2021    Stress          PAST SURGICAL HISTORY  Past Surgical History:   Procedure Laterality Date    CATARACT EXTRACTION Left 04/01/2013     Dr. Clarke    CATARACT EXTRACTION Right 2013    Dr. Clarke    COLONOSCOPY  2014    Dr. Elizabeth; no polyps    EPIDURAL BLOCK      HIP PERCUTANEOUS PINNING Left 2017    Procedure: LT HIP PERCUTANEOUS PINNING;  Surgeon: Sean Canales MD;  Location: [facility] MAIN OR;  Service:     MAMMO BILATERAL  2013    NEPHRECTOMY Left 2020    Procedure: LAPAROSCOPIC NEPHRECTOMY;  Surgeon: Chuckie Mclaughlin MD;  Location: [facility] MAIN OR;  Service: Urology;  Laterality: Left;    PAP SMEAR  2011    TIBIA FRACTURE SURGERY Right     REMOVED PLATE LATER IN     TONSILLECTOMY  194    TOTAL HIP ARTHROPLASTY Right 2015    TOTAL HIP ARTHROPLASTY Right 2016    TOTAL KNEE ARTHROPLASTY Bilateral          FAMILY HISTORY  Family History   Problem Relation Age of Onset    Hypertension Other     Hypertension Mother     Stroke Mother     Heart disease Mother     Hypertension Maternal Grandmother     Malig Hyperthermia Neg Hx          SOCIAL HISTORY  Social History     Socioeconomic History    Marital status:     Years of education: High school   Tobacco Use    Smoking status: Former     Current packs/day: 0.00     Average packs/day: 1 pack/day for 3.0 years (3.0 ttl pk-yrs)     Types: Cigarettes     Start date: 1953     Quit date: 1956     Years since quittin.5     Passive exposure: Past    Smokeless tobacco: Never    Tobacco comments:     caffeine - 1/2 cup coffee daily    Vaping Use    Vaping status: Never Used   Substance and Sexual Activity    Alcohol use: Not Currently     Alcohol/week: 3.0 standard drinks of alcohol     Types: 3 Glasses of wine per week     Comment: couple times a week    Drug use: Never    Sexual activity: Not Currently         ALLERGIES  Medrol [methylprednisolone], Morphine, Oxycodone, Ace inhibitors, Bactrim [sulfamethoxazole-trimethoprim], Levofloxacin, and Torsemide      REVIEW OF SYSTEMS  Review of Systems  Included in HPI  All systems  reviewed and negative except for those discussed in HPI.      PHYSICAL EXAM    I have reviewed the triage vital signs and nursing notes.    ED Triage Vitals   Temp Heart Rate Resp BP SpO2   08/30/24 1229 08/30/24 1229 08/30/24 1229 08/30/24 1235 08/30/24 1229   97.9 °F (36.6 °C) 78 18 163/84 99 %      Temp src Heart Rate Source Patient Position BP Location FiO2 (%)   08/30/24 1229 08/30/24 1229 -- -- --   Tympanic Monitor          Physical Exam  GENERAL: alert, no acute distress  SKIN: Warm, dry  HENT: Normocephalic, atraumatic  EYES: no scleral icterus  CV: regular rhythm, regular rate, no lower extremity edema  RESPIRATORY: normal effort, lungs clear  ABDOMEN: nondistended soft nontender normal bowel sounds no guarding or rigidity  MUSCULOSKELETAL: no deformity  NEURO: alert, moves all extremities, follows commands            LAB RESULTS  Recent Results (from the past 24 hour(s))   Hemoglobin & Hematocrit, Blood    Collection Time: 08/30/24 11:51 AM    Specimen: Hand, Right; Blood   Result Value Ref Range    Hemoglobin 10.1 (L) 12.0 - 15.9 g/dL    Hematocrit 32.3 (L) 34.0 - 46.6 %   CBC Auto Differential    Collection Time: 08/30/24 11:51 AM    Specimen: Hand, Right; Blood   Result Value Ref Range    WBC 7.44 3.40 - 10.80 10*3/mm3    RBC 3.34 (L) 3.77 - 5.28 10*6/mm3    Hemoglobin 10.1 (L) 12.0 - 15.9 g/dL    Hematocrit 31.9 (L) 34.0 - 46.6 %    MCV 95.5 79.0 - 97.0 fL    MCH 30.2 26.6 - 33.0 pg    MCHC 31.7 31.5 - 35.7 g/dL    RDW 14.7 12.3 - 15.4 %    RDW-SD 50.5 37.0 - 54.0 fl    MPV 9.5 6.0 - 12.0 fL    Platelets 252 140 - 450 10*3/mm3    Neutrophil % 78.4 (H) 42.7 - 76.0 %    Lymphocyte % 11.8 (L) 19.6 - 45.3 %    Monocyte % 6.7 5.0 - 12.0 %    Eosinophil % 2.2 0.3 - 6.2 %    Basophil % 0.4 0.0 - 1.5 %    Immature Grans % 0.5 0.0 - 0.5 %    Neutrophils, Absolute 5.83 1.70 - 7.00 10*3/mm3    Lymphocytes, Absolute 0.88 0.70 - 3.10 10*3/mm3    Monocytes, Absolute 0.50 0.10 - 0.90 10*3/mm3    Eosinophils,  Absolute 0.16 0.00 - 0.40 10*3/mm3    Basophils, Absolute 0.03 0.00 - 0.20 10*3/mm3    Immature Grans, Absolute 0.04 0.00 - 0.05 10*3/mm3    nRBC 0.0 0.0 - 0.2 /100 WBC   ECG 12 Lead ED Triage Standing Order; SOA    Collection Time: 08/30/24 12:34 PM   Result Value Ref Range    QT Interval 403 ms    QTC Interval 485 ms   Comprehensive Metabolic Panel    Collection Time: 08/30/24 12:45 PM    Specimen: Blood   Result Value Ref Range    Glucose 123 (H) 65 - 99 mg/dL    BUN 25 (H) 8 - 23 mg/dL    Creatinine 1.81 (H) 0.57 - 1.00 mg/dL    Sodium 133 (L) 136 - 145 mmol/L    Potassium 5.1 3.5 - 5.2 mmol/L    Chloride 99 98 - 107 mmol/L    CO2 22.5 22.0 - 29.0 mmol/L    Calcium 8.7 8.6 - 10.5 mg/dL    Total Protein 7.0 6.0 - 8.5 g/dL    Albumin 4.2 3.5 - 5.2 g/dL    ALT (SGPT) 11 1 - 33 U/L    AST (SGOT) 19 1 - 32 U/L    Alkaline Phosphatase 61 39 - 117 U/L    Total Bilirubin 0.5 0.0 - 1.2 mg/dL    Globulin 2.8 gm/dL    A/G Ratio 1.5 g/dL    BUN/Creatinine Ratio 13.8 7.0 - 25.0    Anion Gap 11.5 5.0 - 15.0 mmol/L    eGFR 27.0 (L) >60.0 mL/min/1.73   BNP    Collection Time: 08/30/24 12:45 PM    Specimen: Blood   Result Value Ref Range    proBNP 2,616.0 (H) 0.0 - 1,800.0 pg/mL   Single High Sensitivity Troponin T    Collection Time: 08/30/24 12:45 PM    Specimen: Blood   Result Value Ref Range    HS Troponin T 44 (H) <14 ng/L   Green Top (Gel)    Collection Time: 08/30/24 12:45 PM   Result Value Ref Range    Extra Tube Hold for add-ons.    Lavender Top    Collection Time: 08/30/24 12:45 PM   Result Value Ref Range    Extra Tube hold for add-on    Gold Top - SST    Collection Time: 08/30/24 12:45 PM   Result Value Ref Range    Extra Tube Hold for add-ons.    Light Blue Top    Collection Time: 08/30/24 12:45 PM   Result Value Ref Range    Extra Tube Hold for add-ons.    Respiratory Panel PCR w/COVID-19(SARS-CoV-2) HUGH/HANNAH/KELLI/PAD/COR/KERRY In-House, NP Swab in UTM/VTM, 2 HR TAT - Swab, Nasopharynx    Collection Time: 08/30/24   1:14 PM    Specimen: Nasopharynx; Swab   Result Value Ref Range    ADENOVIRUS, PCR Not Detected Not Detected    Coronavirus 229E Not Detected Not Detected    Coronavirus HKU1 Not Detected Not Detected    Coronavirus NL63 Not Detected Not Detected    Coronavirus OC43 Not Detected Not Detected    COVID19 Not Detected Not Detected - Ref. Range    Human Metapneumovirus Not Detected Not Detected    Human Rhinovirus/Enterovirus Not Detected Not Detected    Influenza A PCR Not Detected Not Detected    Influenza B PCR Not Detected Not Detected    Parainfluenza Virus 1 Not Detected Not Detected    Parainfluenza Virus 2 Not Detected Not Detected    Parainfluenza Virus 3 Not Detected Not Detected    Parainfluenza Virus 4 Not Detected Not Detected    RSV, PCR Not Detected Not Detected    Bordetella pertussis pcr Not Detected Not Detected    Bordetella parapertussis PCR Not Detected Not Detected    Chlamydophila pneumoniae PCR Not Detected Not Detected    Mycoplasma pneumo by PCR Not Detected Not Detected   Single High Sensitivity Troponin T    Collection Time: 08/30/24  2:58 PM    Specimen: Blood   Result Value Ref Range    HS Troponin T 43 (H) <14 ng/L         RADIOLOGY  XR Chest 1 View    Result Date: 8/30/2024  XR CHEST 1 VW-  INDICATION: Shortness of breath  COMPARISON: Chest radiographs dating back to 8/7/2015      No focal consolidation. No pleural effusion or pneumothorax.  Normal size cardiomediastinal silhouette.  No focal osseous abnormality.   This report was finalized on 8/30/2024 12:55 PM by Dr. Edgardo Chang M.D on Workstation: [id number]         MEDICATIONS GIVEN IN ER  Medications - No data to display        ORDERS PLACED DURING THIS VISIT:  Orders Placed This Encounter   Procedures    Respiratory Panel PCR w/COVID-19(SARS-CoV-2) HUGH/HANNAH/KELLI/PAD/COR/KERRY In-House, NP Swab in UTM/VTM, 2 HR TAT - Swab, Nasopharynx    XR Chest 1 View    [place] Draw    Comprehensive Metabolic Panel    BNP    Single High  Sensitivity Troponin T    CBC Auto Differential    Single High Sensitivity Troponin T    Undress & Gown    Continuous Pulse Oximetry    Vital Signs    ECG 12 Lead ED Triage Standing Order; SOA    CBC & Differential    Green Top (Gel)    Lavender Top    Gold Top - SST    Light Blue Top         OUTPATIENT MEDICATION MANAGEMENT:  No current Epic-ordered facility-administered medications on file.     Current Outpatient Medications Ordered in Epic   Medication Sig Dispense Refill    acetaminophen (TYLENOL) 500 MG tablet Take 1 tablet by mouth Every 4 (Four) Hours As Needed for Mild Pain. Indications: Pain      atorvastatin (LIPITOR) 20 MG tablet Take 1 tablet by mouth Every Night for 270 days. Indications: High Amount of Fats in the Blood 90 tablet 2    Eliquis 2.5 MG tablet tablet TAKE 1 TABLET EVERY 12 HOURS (TWICE A DAY) (Patient taking differently: TAKE 1 TABLET ORALLY EVERY 12 HOURS (TWICE A DAY)) 180 tablet 3    escitalopram (Lexapro) 20 MG tablet Take 1 tablet by mouth Daily.      fexofenadine (ALLEGRA) 60 MG tablet Take 1 tablet by mouth Daily As Needed. Indications: Hayfever      furosemide (LASIX) 20 MG tablet Take 1 tablet by mouth Daily As Needed (if weight goes up 3lbs in 3 days). 30 tablet 11    magnesium oxide (MAG-OX) 400 MG tablet Take 1 tablet by mouth Daily. Indications: Acid Indigestion      melatonin 5 MG tablet tablet Take 1 tablet by mouth At Night As Needed. Indications: Trouble Sleeping      Vibegron (Gemtesa) 75 MG tablet Take 1 tablet by mouth Daily. Indications: Overactive Bladder, Urinary Incontinence      Vortioxetine HBr (Trintellix) 20 MG tablet Take 1 tablet by mouth Daily With Breakfast for 90 days. (Patient not taking: Reported on 8/30/2024) 90 tablet 0         PROCEDURES  Procedures            PROGRESS, DATA ANALYSIS, CONSULTS, AND MEDICAL DECISION MAKING  All labs have been independently interpreted by me.  All radiology studies have been reviewed by me. All EKG's have been  independently viewed and interpreted by me.  Discussion below represents my analysis of pertinent findings related to patient's condition, differential diagnosis, treatment plan and final disposition.    DIFFERENTIAL    Differential diagnosis includes but is not limited to CHF, acute coronary syndrome, pulmonary embolism, pneumothorax, pneumonia, asthma/COPD, deconditioning, anemia, anxiety.       Clinical Scores:                  ED Course as of 08/30/24 2022   Fri Aug 30, 2024   1330 EKG          EKG time: 12:34 PM  Rhythm/Rate: Atrial flutter, 87  P waves and LA: N/A  QRS, axis: Borderline left axis  ST and T waves: No acute ischemic changes    Interpreted Contemporaneously by me, independently viewed  Similar compared to prior 7/10/2024       [JR]   1438 WBC: 7.44 [KA]   1438 Hemoglobin(!): 10.1  Chronic stable anemia [KA]   1438 proBNP(!): 2,616.0 [KA]   1438 COVID19: Not Detected [KA]   1438 Glucose(!): 123 [KA]   1438 Creatinine(!): 1.81  Chronic stable CKD [KA]   1439 My independent interpretation of the chest x-ray is no cardiomegaly or acute infiltrate [KA]      ED Course User Index  [JR] Khoa Serna MD  [KA] Jesenia Harper PA-C             AS OF 20:22 EDT VITALS:    BP - 150/91  HR - 73  TEMP - 97.9 °F (36.6 °C) (Tympanic)  O2 SATS - 99%    COMPLEXITY OF CARE  Admission was considered but after careful review of the patient's presentation, physical examination, diagnostic results, and response to treatment the patient may be safely discharged with outpatient follow-up.      DIAGNOSIS  Final diagnoses:   Fatigue, unspecified type   Dyspnea, unspecified type   CKD (chronic kidney disease) stage 4, GFR 15-29 ml/min   Anemia of chronic disease         DISPOSITION  ED Disposition       ED Disposition   Discharge    Condition   Stable    Comment   --                  FOLLOW UP  Ken Andujar MD  [id number] [id number] [phone]    Schedule an appointment as soon as  possible for a visit           Prescribed Medications     Medication List        Changed      Eliquis 2.5 MG tablet tablet  Generic drug: apixaban  TAKE 1 TABLET EVERY 12 HOURS (TWICE A DAY)  What changed: See the new instructions.                        Please note that portions of this document were completed with a voice recognition program.    Note Disclaimer: At [facility], we believe that sharing information builds trust and better relationships. You are receiving this note because you recently visited [facility]. It is possible you will see health information before a provider has talked with you about it. This kind of information can be easy to misunderstand. To help you fully understand what it means for your health, we urge you to discuss this note with your provider.         Jesenia Harper PA-C  08/30/24 2022

## 2024-09-11 ENCOUNTER — OFFICE VISIT (OUTPATIENT)
Dept: CARDIOLOGY | Facility: CLINIC | Age: 89
End: 2024-09-11
Payer: MEDICARE

## 2024-09-11 VITALS
OXYGEN SATURATION: 98 % | BODY MASS INDEX: 23.92 KG/M2 | DIASTOLIC BLOOD PRESSURE: 60 MMHG | HEIGHT: 62 IN | HEART RATE: 79 BPM | WEIGHT: 130 LBS | SYSTOLIC BLOOD PRESSURE: 122 MMHG

## 2024-09-11 DIAGNOSIS — I48.21 PERMANENT ATRIAL FIBRILLATION: Primary | ICD-10-CM

## 2024-09-11 DIAGNOSIS — I10 ESSENTIAL HYPERTENSION: ICD-10-CM

## 2024-09-11 PROCEDURE — 1159F MED LIST DOCD IN RCRD: CPT | Performed by: NURSE PRACTITIONER

## 2024-09-11 PROCEDURE — 1160F RVW MEDS BY RX/DR IN RCRD: CPT | Performed by: NURSE PRACTITIONER

## 2024-09-11 PROCEDURE — 99214 OFFICE O/P EST MOD 30 MIN: CPT | Performed by: NURSE PRACTITIONER

## 2024-09-11 NOTE — PROGRESS NOTES
Date of Office Visit: 24  Encounter Provider: ENRRIQUE Curtis  Place of Service: [facility] CARDIOLOGY  Patient Name: Marleni Hummel  :1935    Chief Complaint   Patient presents with    Coronary artery disease involving native coronary artery of    Permanent atrial fibrillation    Follow-up   :     HPI: Marleni Hummel is a 88 y.o. female  with hypertension, hyperlipidemia, atrial fibrillation, anemia, cardiomyopathy, obstructive sleep apnea, left renal carcinoma status post nephrectomy and dyspnea.  She was previously followed by Dr. Tai and is now followed by Dr. Parmar.  I will follow up with her today.         She has history of atrial fibrillation with rapid ventricular response.  She also had cardioversion but went back into atrial fibrillation.  She was placed on flecainide and had repeat cardioversion.  In , she was found to be bradycardic after having some nausea and vomiting which was felt to be vagal response.  Her troponin was borderline elevated medications were adjusted.  She ultimately had ST elevation infarct taken to the cardiac catheterization laboratory which is felt to have cardiomyopathy with an ejection fraction of 20%.  She did have a circumflex lesion where she had drug-eluting stent placed.  Obviously, we had to stop flecainide and she was switched to amiodarone.  She had elevated QT interval so that was stopped.  She later was started on Lasix.     In 2019, she c/o CARROLL and had echo and perfusion stress test.  Echocardiogram revealed normal left ventricular ejection fraction and EF of 64%, borderline concentric hypertrophy, moderate thickening of the aortic valve with trace aortic valve regurgitation no aortic stenosis, mild mitral annular calcification was present with mild mitral regurgitation.  RVSP was normal.  Perfusion stress test was negative for ischemia.        She had expressive aphasia and mild weakness in 2021.   MRI did not show acute stroke.  It was felt that she had TIA but she also had hyperkalemia that improved with IV fluid.  No medications were changed at discharge.  There was a note that patient was holding Eliquis for procedure however patient states she had not started to hold Eliquis at that time.  After that admission she was treated with Macrobid PCP.  She had side effect from macrobid. She ultimately was started on amlodipine outpatient by her PCP     In March 2023 she was hospitalized with bradycardia.  Metoprolol was decreased.  Echocardiogram showed normal left ventricular systolic function, hypokinetic apical septal and mid anteroseptal segment.  There was mild aortic valve regurgitation, mitral annular calcification with mild mitral valve regurgitation.  She complained of chest pain and had flat troponin.  Perfusion stress test showed no evidence of ischemia was low risk.  Follow-up 2-week mobile telemetry showed continuous atrial fibrillation average heart rate 63 with a range of .  In follow-up, she was prescribed Lasix as needed 04/2023.,     On October 24 she presented after having an episode of expressive aphasia.  This occurred overnight and lasted less than 5 minutes and resolved.  MRI/a of the head and neck showed no acute infarct or stenosis.  She is evaluated by neurology and home blood pressure monitoring was recommended.  Nephrology evaluated and adjusted medications for better blood pressure control.  Her potassium was 5.3 and she was started on Lokelma per nephrology.      She was hospitalized a couple weeks ago with weakness and fatigue, UTI and hyponatremia.  She complained of shortness of breath and had no acute process on chest x-ray.  Echocardiogram showed normal left ventricular systolic function, mild concentric hypertrophy, mildly dilated right ventricle with normal RV function, mildly dilated left atrial cavity, thickening of the aortic valve with mild insufficiency but no  "significant stenosis.  RVSP was normal.  She was treated for UTI and hydrochlorothiazide was ultimately stopped.  Her sodium improved.      Her she then had an ER visit on 8/30/2024 complaining of shortness of breath and fatigue.  Hemoglobin was stable at 10.1, proBNP was 2616.  COVID test was negative.  Chest x-ray showed no pleural effusion or consolidation.  Patient was discharged in stable condition    She presents today for reassessment.  She checks her weights at home which is always 130.  She only takes Lasix for 3 pound weight gain.  She has not taken Lasix in several months.  She has shortness of breath with exertion but no real change.  She has dizziness at times.  Her main concern today is feeling nauseous and having headache.  She also has dry heaves.  She believes this is from Trintellix which is a new medication.  She has some soreness on the left side of her chest when she touches it.  No palpitations or swelling.      Allergies   Allergen Reactions    Medrol [Methylprednisolone] Other (See Comments)     Irritability    Morphine Anaphylaxis    Oxycodone Anaphylaxis and Unknown - Low Severity    Ace Inhibitors Cough and Unknown (See Comments)    Bactrim [Sulfamethoxazole-Trimethoprim] Rash    Levofloxacin Itching and Rash     insomnia  insomnia  insomnia    Torsemide Dizziness           Family and social history reviewed.     ROS  All other systems were reviewed and are negative          Objective:     Vitals:    09/11/24 1306   BP: 122/60   BP Location: Left arm   Patient Position: Lying   Pulse: 79   SpO2: 98%   Weight: 59 kg (130 lb)   Height: 157.5 cm (62\")     Body mass index is 23.78 kg/m².    PHYSICAL EXAM:  Pulmonary:      Effort: Pulmonary effort is normal.      Breath sounds: Examination of the right-lower field reveals decreased breath sounds. Examination of the left-lower field reveals decreased breath sounds. Decreased breath sounds present.   Cardiovascular:      Normal rate. Irregularly " irregular rhythm.      Murmurs: There is a systolic murmur.         Procedures      Current Outpatient Medications   Medication Sig Dispense Refill    acetaminophen (TYLENOL) 500 MG tablet Take 1 tablet by mouth Every 4 (Four) Hours As Needed for Mild Pain. Indications: Pain      atorvastatin (LIPITOR) 20 MG tablet Take 1 tablet by mouth Every Night for 270 days. Indications: High Amount of Fats in the Blood 90 tablet 2    Eliquis 2.5 MG tablet tablet TAKE 1 TABLET EVERY 12 HOURS (TWICE A DAY) (Patient taking differently: TAKE 1 TABLET ORALLY EVERY 12 HOURS (TWICE A DAY)) 180 tablet 3    fexofenadine (ALLEGRA) 60 MG tablet Take 1 tablet by mouth Daily As Needed. Indications: Hayfever      furosemide (LASIX) 20 MG tablet Take 1 tablet by mouth Daily As Needed (if weight goes up 3lbs in 3 days). 30 tablet 11    magnesium oxide (MAG-OX) 400 MG tablet Take 1 tablet by mouth Daily. Indications: Acid Indigestion      melatonin 5 MG tablet tablet Take 1 tablet by mouth At Night As Needed. Indications: Trouble Sleeping      Vibegron (Gemtesa) 75 MG tablet Take 1 tablet by mouth Daily. Indications: Overactive Bladder, Urinary Incontinence      Vortioxetine HBr (Trintellix) 20 MG tablet Take 1 tablet by mouth Daily With Breakfast for 90 days. 90 tablet 0     No current facility-administered medications for this visit.     Assessment:       Diagnosis Plan   1. Permanent atrial fibrillation        2. Essential hypertension             No orders of the defined types were placed in this encounter.        Plan:       1. 88 year-old female with chronic atrial fibrillation . CHADS2-VASc score is 6.  Anticoagulated with Eliquis 2.5 twice daily.  Her rate is adequately controlled without rate suppressant medication at this time..    2. Coronary artery disease status post drug-eluting stent placed to the left circumflex in October 2015.  Negative perfusion stress test March 2023  -No angina  -She has some musculoskeletal left chest  discomfort aggravated with deep palpation.  Follow clinically at this time and I agree with continued Tylenol for pain relief  3. Hypertension blood pressure is stable.  Reviewing home log which shows some fluctuation up to the mid 140s but nothing sustained.  -This is considered complex management of a chronic medical condition.  4.  Hyperlipidemia on atorvastatin 20 mg   5.  Chronic kidney disease, stage IV followed by Dr. Leonard.    6.  History of prolonged QT on amiodarone  7.  Cardiac murmur with aortic valve sclerosis, mild insufficiency and no significant stenosis on recent echo July 2024.  8.  History of obstructive sleep apnea-she stopped treating that 5-6 years ago  9.   Left renal carcinoma status post total nephrectomy   10.  Obstructive sleep apnea reports compliance with CPAP.  11.  Bradycardia.  Metoprolol was decreased March 2023.  She will continue off AV kanika blocker  12.Chronic back pain she is following with Dr. Hook..  She has responded well in the past with epidural and typically held Eliquis just 3 days prior for that   13.  History of ischemic cardiomyopathy with an ejection fraction of 20% October 2015 which returned to normal and was last normal earlier this month.  She only uses Lasix as needed based on weight gain.    14.  Hyponatremia-improved off hydrochlorothiazide.  15.  Anemia of chronic disease-now on iron infusion  16. essential tremor  17. TIA 10/2023.   18.  Anxiety/depression-now on Trintellix but concerned she is having some nausea and dry heaves and headache related to this.  I have asked her to discuss with her PCP and sent a message to Dr. Andujar on her behalf  19.  Complaint of foul urine-I have asked her to discuss with her PCP.  She did put a call out to her nephrologist today regarding the same matter.        I did not make any medication changes today.  Patient is stable from cardiovascular standpoint.  She will follow-up in December as scheduled          It has  been a pleasure to participate in this patient's care.      Thank you,  ENRRIQUE Curtis      **I used Dragon to dictate this note:**

## 2024-09-13 ENCOUNTER — HOSPITAL ENCOUNTER (OUTPATIENT)
Dept: INFUSION THERAPY | Facility: HOSPITAL | Age: 89
Discharge: HOME | End: 2024-09-13
Payer: MEDICARE

## 2024-09-13 VITALS
RESPIRATION RATE: 18 BRPM | OXYGEN SATURATION: 100 % | DIASTOLIC BLOOD PRESSURE: 71 MMHG | TEMPERATURE: 97.3 F | SYSTOLIC BLOOD PRESSURE: 142 MMHG | HEART RATE: 77 BPM

## 2024-09-13 DIAGNOSIS — D63.1 ANEMIA IN CHRONIC KIDNEY DISEASE (CODE): Primary | ICD-10-CM

## 2024-09-13 DIAGNOSIS — D63.8 ANEMIA OF CHRONIC DISEASE: ICD-10-CM

## 2024-09-13 DIAGNOSIS — N18.4 CHRONIC RENAL DISEASE, STAGE IV: ICD-10-CM

## 2024-09-13 LAB
HCT VFR BLD AUTO: 33.2 % (ref 34–46.6)
HGB BLD-MCNC: 10.8 G/DL (ref 12–15.9)

## 2024-09-13 PROCEDURE — 85018 HEMOGLOBIN: CPT | Performed by: NURSE PRACTITIONER

## 2024-09-13 PROCEDURE — 85014 HEMATOCRIT: CPT | Performed by: NURSE PRACTITIONER

## 2024-09-13 PROCEDURE — 25010000002 EPOETIN ALFA PER 1000 UNITS: Performed by: NURSE PRACTITIONER

## 2024-09-13 PROCEDURE — 36415 COLL VENOUS BLD VENIPUNCTURE: CPT

## 2024-09-13 PROCEDURE — 96372 THER/PROPH/DIAG INJ SC/IM: CPT

## 2024-09-13 RX ADMIN — ERYTHROPOIETIN 10000 UNITS: 10000 INJECTION, SOLUTION INTRAVENOUS; SUBCUTANEOUS at 12:22

## 2024-09-19 ENCOUNTER — OFFICE VISIT (OUTPATIENT)
Dept: FAMILY MEDICINE CLINIC | Facility: CLINIC | Age: 89
End: 2024-09-19
Payer: OTHER GOVERNMENT

## 2024-09-19 VITALS
SYSTOLIC BLOOD PRESSURE: 142 MMHG | WEIGHT: 131 LBS | OXYGEN SATURATION: 98 % | BODY MASS INDEX: 24.11 KG/M2 | DIASTOLIC BLOOD PRESSURE: 80 MMHG | HEART RATE: 77 BPM | HEIGHT: 62 IN

## 2024-09-19 DIAGNOSIS — N39.41 URGE INCONTINENCE OF URINE: ICD-10-CM

## 2024-09-19 DIAGNOSIS — F41.0 PANIC DISORDER: Primary | ICD-10-CM

## 2024-09-19 DIAGNOSIS — R44.1 HALLUCINATION, VISUAL: ICD-10-CM

## 2024-09-19 PROCEDURE — 99214 OFFICE O/P EST MOD 30 MIN: CPT | Performed by: FAMILY MEDICINE

## 2024-09-19 RX ORDER — CEFDINIR 300 MG/1
300 CAPSULE ORAL 2 TIMES DAILY
COMMUNITY
Start: 2024-09-17 | End: 2024-09-24

## 2024-09-19 RX ORDER — VIBEGRON 75 MG/1
1 TABLET, FILM COATED ORAL DAILY
Qty: 90 TABLET | Refills: 2 | Status: SHIPPED | OUTPATIENT
Start: 2024-09-19 | End: 2025-06-16

## 2024-09-19 RX ORDER — VIBEGRON 75 MG/1
1 TABLET, FILM COATED ORAL DAILY
Qty: 90 TABLET | Refills: 2 | Status: SHIPPED | OUTPATIENT
Start: 2024-09-19 | End: 2024-09-19 | Stop reason: SDUPTHER

## 2024-09-27 ENCOUNTER — TELEPHONE (OUTPATIENT)
Dept: FAMILY MEDICINE CLINIC | Facility: CLINIC | Age: 89
End: 2024-09-27
Payer: MEDICARE

## 2024-09-27 ENCOUNTER — HOSPITAL ENCOUNTER (OUTPATIENT)
Dept: INFUSION THERAPY | Facility: HOSPITAL | Age: 89
Discharge: HOME | End: 2024-09-27
Payer: MEDICARE

## 2024-09-27 VITALS
HEART RATE: 75 BPM | TEMPERATURE: 96.8 F | SYSTOLIC BLOOD PRESSURE: 155 MMHG | DIASTOLIC BLOOD PRESSURE: 61 MMHG | RESPIRATION RATE: 18 BRPM | OXYGEN SATURATION: 99 %

## 2024-09-27 DIAGNOSIS — D63.8 ANEMIA OF CHRONIC DISEASE: Primary | ICD-10-CM

## 2024-09-27 DIAGNOSIS — I10 ESSENTIAL HYPERTENSION: ICD-10-CM

## 2024-09-27 LAB
HCT VFR BLD AUTO: 35.4 % (ref 34–46.6)
HGB BLD-MCNC: 10.8 G/DL (ref 12–15.9)

## 2024-09-27 PROCEDURE — 85014 HEMATOCRIT: CPT | Performed by: NURSE PRACTITIONER

## 2024-09-27 PROCEDURE — 25010000002 EPOETIN ALFA PER 1000 UNITS: Performed by: NURSE PRACTITIONER

## 2024-09-27 PROCEDURE — 96372 THER/PROPH/DIAG INJ SC/IM: CPT

## 2024-09-27 PROCEDURE — 85018 HEMOGLOBIN: CPT | Performed by: NURSE PRACTITIONER

## 2024-09-27 PROCEDURE — 36415 COLL VENOUS BLD VENIPUNCTURE: CPT

## 2024-09-27 RX ADMIN — ERYTHROPOIETIN 10000 UNITS: 10000 INJECTION, SOLUTION INTRAVENOUS; SUBCUTANEOUS at 13:11

## 2024-09-27 NOTE — TELEPHONE ENCOUNTER
PT daughter asking for labs for Prolia shot? I advised her that I didn't see any active lab orders in order to schedule a lab appointment. PT daughter stating mother gets this all of the time and that provider told her the labs would automatically be placed after her 9/19 visit.

## 2024-10-01 ENCOUNTER — TELEPHONE (OUTPATIENT)
Dept: FAMILY MEDICINE CLINIC | Facility: CLINIC | Age: 89
End: 2024-10-01
Payer: MEDICARE

## 2024-10-01 NOTE — TELEPHONE ENCOUNTER
Joan from [facility] transferred over PT daughter Silvia to schedule her lab appointment. Scheduled for 10/10/24 at 11:15 am.

## 2024-10-09 ENCOUNTER — HOSPITAL ENCOUNTER (OUTPATIENT)
Facility: HOSPITAL | Age: 89
Setting detail: OBSERVATION
Discharge: HOME | End: 2024-10-11
Attending: EMERGENCY MEDICINE | Admitting: EMERGENCY MEDICINE
Payer: MEDICARE

## 2024-10-09 ENCOUNTER — APPOINTMENT (OUTPATIENT)
Dept: CT IMAGING | Facility: HOSPITAL | Age: 89
End: 2024-10-09
Payer: OTHER GOVERNMENT

## 2024-10-09 ENCOUNTER — APPOINTMENT (OUTPATIENT)
Dept: CT IMAGING | Facility: HOSPITAL | Age: 89
End: 2024-10-09
Payer: MEDICARE

## 2024-10-09 ENCOUNTER — APPOINTMENT (OUTPATIENT)
Dept: MRI IMAGING | Facility: HOSPITAL | Age: 89
End: 2024-10-09
Payer: OTHER GOVERNMENT

## 2024-10-09 ENCOUNTER — APPOINTMENT (OUTPATIENT)
Dept: MRI IMAGING | Facility: HOSPITAL | Age: 89
End: 2024-10-09
Payer: MEDICARE

## 2024-10-09 DIAGNOSIS — Z86.73 HISTORY OF CVA (CEREBROVASCULAR ACCIDENT): ICD-10-CM

## 2024-10-09 DIAGNOSIS — I63.9: ICD-10-CM

## 2024-10-09 DIAGNOSIS — Z79.01 CHRONIC ANTICOAGULATION: ICD-10-CM

## 2024-10-09 DIAGNOSIS — E78.00 HYPERCHOLESTEROLEMIA: ICD-10-CM

## 2024-10-09 DIAGNOSIS — R47.9 TRANSIENT SPEECH DISTURBANCE: ICD-10-CM

## 2024-10-09 DIAGNOSIS — Z86.79 HISTORY OF CARDIOMYOPATHY: ICD-10-CM

## 2024-10-09 DIAGNOSIS — G45.9 TIA (TRANSIENT ISCHEMIC ATTACK): Primary | ICD-10-CM

## 2024-10-09 DIAGNOSIS — N18.9 CHRONIC RENAL FAILURE, UNSPECIFIED CKD STAGE: ICD-10-CM

## 2024-10-09 LAB
ANION GAP SERPL CALCULATED.3IONS-SCNC: 9 MMOL/L (ref 5–15)
APTT PPP: 30.9 SECONDS (ref 22.7–35.4)
BACTERIA UR QL AUTO: ABNORMAL /HPF
BASOPHILS # BLD AUTO: 0.03 10*3/MM3 (ref 0–0.2)
BASOPHILS NFR BLD AUTO: 0.5 % (ref 0–1.5)
BILIRUB UR QL STRIP: NEGATIVE
BUN SERPL-MCNC: 27 MG/DL (ref 8–23)
BUN/CREAT SERPL: 13.8 (ref 7–25)
CALCIUM SPEC-SCNC: 10 MG/DL (ref 8.6–10.5)
CHLORIDE SERPL-SCNC: 105 MMOL/L (ref 98–107)
CHOLEST SERPL-MCNC: 168 MG/DL (ref 0–200)
CLARITY UR: CLEAR
CO2 SERPL-SCNC: 25 MMOL/L (ref 22–29)
COLOR UR: YELLOW
CREAT SERPL-MCNC: 1.96 MG/DL (ref 0.57–1)
DEPRECATED RDW RBC AUTO: 46.9 FL (ref 37–54)
EGFRCR SERPLBLD CKD-EPI 2021: 24.5 ML/MIN/1.73
EOSINOPHIL # BLD AUTO: 0.12 10*3/MM3 (ref 0–0.4)
EOSINOPHIL NFR BLD AUTO: 2 % (ref 0.3–6.2)
ERYTHROCYTE [DISTWIDTH] IN BLOOD BY AUTOMATED COUNT: 13.2 % (ref 12.3–15.4)
GLUCOSE BLDC GLUCOMTR-MCNC: 100 MG/DL (ref 70–130)
GLUCOSE SERPL-MCNC: 109 MG/DL (ref 65–99)
GLUCOSE UR STRIP-MCNC: NEGATIVE MG/DL
HBA1C MFR BLD: 5 % (ref 4.8–5.6)
HCT VFR BLD AUTO: 37.1 % (ref 34–46.6)
HDLC SERPL-MCNC: 57 MG/DL (ref 40–60)
HGB BLD-MCNC: 11.6 G/DL (ref 12–15.9)
HGB UR QL STRIP.AUTO: NEGATIVE
HYALINE CASTS UR QL AUTO: ABNORMAL /LPF
IMM GRANULOCYTES # BLD AUTO: 0.02 10*3/MM3 (ref 0–0.05)
IMM GRANULOCYTES NFR BLD AUTO: 0.3 % (ref 0–0.5)
INR PPP: 1.37 (ref 0.9–1.1)
KETONES UR QL STRIP: NEGATIVE
LDLC SERPL CALC-MCNC: 73 MG/DL (ref 0–100)
LDLC/HDLC SERPL: 1.14 {RATIO}
LEUKOCYTE ESTERASE UR QL STRIP.AUTO: ABNORMAL
LYMPHOCYTES # BLD AUTO: 1.28 10*3/MM3 (ref 0.7–3.1)
LYMPHOCYTES NFR BLD AUTO: 20.8 % (ref 19.6–45.3)
MAGNESIUM SERPL-MCNC: 2.7 MG/DL (ref 1.6–2.4)
MCH RBC QN AUTO: 30.1 PG (ref 26.6–33)
MCHC RBC AUTO-ENTMCNC: 31.3 G/DL (ref 31.5–35.7)
MCV RBC AUTO: 96.4 FL (ref 79–97)
MONOCYTES # BLD AUTO: 0.45 10*3/MM3 (ref 0.1–0.9)
MONOCYTES NFR BLD AUTO: 7.3 % (ref 5–12)
NEUTROPHILS NFR BLD AUTO: 4.25 10*3/MM3 (ref 1.7–7)
NEUTROPHILS NFR BLD AUTO: 69.1 % (ref 42.7–76)
NITRITE UR QL STRIP: NEGATIVE
NRBC BLD AUTO-RTO: 0 /100 WBC (ref 0–0.2)
PH UR STRIP.AUTO: 7 [PH] (ref 5–8)
PLATELET # BLD AUTO: 211 10*3/MM3 (ref 140–450)
PMV BLD AUTO: 9.4 FL (ref 6–12)
POTASSIUM SERPL-SCNC: 5.2 MMOL/L (ref 3.5–5.2)
PROT UR QL STRIP: ABNORMAL
PROTHROMBIN TIME: 17.1 SECONDS (ref 11.7–14.2)
QT INTERVAL: 400 MS
QTC INTERVAL: 432 MS
RBC # BLD AUTO: 3.85 10*6/MM3 (ref 3.77–5.28)
RBC # UR STRIP: ABNORMAL /HPF
REF LAB TEST METHOD: ABNORMAL
SODIUM SERPL-SCNC: 139 MMOL/L (ref 136–145)
SP GR UR STRIP: 1.02 (ref 1–1.03)
SQUAMOUS #/AREA URNS HPF: ABNORMAL /HPF
TRIGL SERPL-MCNC: 231 MG/DL (ref 0–150)
UROBILINOGEN UR QL STRIP: ABNORMAL
VLDLC SERPL-MCNC: 38 MG/DL (ref 5–40)
WBC # UR STRIP: ABNORMAL /HPF
WBC NRBC COR # BLD AUTO: 6.15 10*3/MM3 (ref 3.4–10.8)

## 2024-10-09 PROCEDURE — 85610 PROTHROMBIN TIME: CPT | Performed by: PHYSICIAN ASSISTANT

## 2024-10-09 PROCEDURE — G0378 HOSPITAL OBSERVATION PER HR: HCPCS

## 2024-10-09 PROCEDURE — 82948 REAGENT STRIP/BLOOD GLUCOSE: CPT

## 2024-10-09 PROCEDURE — 70551 MRI BRAIN STEM W/O DYE: CPT

## 2024-10-09 PROCEDURE — 81001 URINALYSIS AUTO W/SCOPE: CPT | Performed by: EMERGENCY MEDICINE

## 2024-10-09 PROCEDURE — 85730 THROMBOPLASTIN TIME PARTIAL: CPT | Performed by: PHYSICIAN ASSISTANT

## 2024-10-09 PROCEDURE — 85025 COMPLETE CBC W/AUTO DIFF WBC: CPT | Performed by: EMERGENCY MEDICINE

## 2024-10-09 PROCEDURE — 83735 ASSAY OF MAGNESIUM: CPT | Performed by: EMERGENCY MEDICINE

## 2024-10-09 PROCEDURE — 70544 MR ANGIOGRAPHY HEAD W/O DYE: CPT

## 2024-10-09 PROCEDURE — 36415 COLL VENOUS BLD VENIPUNCTURE: CPT | Performed by: PHYSICIAN ASSISTANT

## 2024-10-09 PROCEDURE — P9612 CATHETERIZE FOR URINE SPEC: HCPCS

## 2024-10-09 PROCEDURE — 99285 EMERGENCY DEPT VISIT HI MDM: CPT

## 2024-10-09 PROCEDURE — 93005 ELECTROCARDIOGRAM TRACING: CPT | Performed by: EMERGENCY MEDICINE

## 2024-10-09 PROCEDURE — 93010 ELECTROCARDIOGRAM REPORT: CPT | Performed by: INTERNAL MEDICINE

## 2024-10-09 PROCEDURE — 80048 BASIC METABOLIC PNL TOTAL CA: CPT | Performed by: EMERGENCY MEDICINE

## 2024-10-09 PROCEDURE — 80061 LIPID PANEL: CPT | Performed by: PHYSICIAN ASSISTANT

## 2024-10-09 PROCEDURE — 99214 OFFICE O/P EST MOD 30 MIN: CPT

## 2024-10-09 PROCEDURE — 70450 CT HEAD/BRAIN W/O DYE: CPT

## 2024-10-09 PROCEDURE — 83036 HEMOGLOBIN GLYCOSYLATED A1C: CPT | Performed by: PHYSICIAN ASSISTANT

## 2024-10-09 PROCEDURE — 36415 COLL VENOUS BLD VENIPUNCTURE: CPT

## 2024-10-09 RX ORDER — NITROFURANTOIN 25; 75 MG/1; MG/1
100 CAPSULE ORAL 2 TIMES DAILY
COMMUNITY
End: 2024-10-21

## 2024-10-09 RX ORDER — SODIUM CHLORIDE 9 MG/ML
40 INJECTION, SOLUTION INTRAVENOUS AS NEEDED
Status: DISCONTINUED | OUTPATIENT
Start: 2024-10-09 | End: 2024-10-11 | Stop reason: HOSPADM

## 2024-10-09 RX ORDER — ONDANSETRON 2 MG/ML
4 INJECTION INTRAMUSCULAR; INTRAVENOUS EVERY 6 HOURS PRN
Status: DISCONTINUED | OUTPATIENT
Start: 2024-10-09 | End: 2024-10-11 | Stop reason: HOSPADM

## 2024-10-09 RX ORDER — ATORVASTATIN CALCIUM 20 MG/1
40 TABLET, FILM COATED ORAL NIGHTLY
Status: DISCONTINUED | OUTPATIENT
Start: 2024-10-09 | End: 2024-10-11 | Stop reason: HOSPADM

## 2024-10-09 RX ORDER — SODIUM CHLORIDE 0.9 % (FLUSH) 0.9 %
10 SYRINGE (ML) INJECTION EVERY 12 HOURS SCHEDULED
Status: DISCONTINUED | OUTPATIENT
Start: 2024-10-09 | End: 2024-10-11 | Stop reason: HOSPADM

## 2024-10-09 RX ORDER — SODIUM CHLORIDE 0.9 % (FLUSH) 0.9 %
10 SYRINGE (ML) INJECTION AS NEEDED
Status: DISCONTINUED | OUTPATIENT
Start: 2024-10-09 | End: 2024-10-11 | Stop reason: HOSPADM

## 2024-10-09 RX ORDER — OXYBUTYNIN CHLORIDE 10 MG/1
10 TABLET, EXTENDED RELEASE ORAL DAILY
Status: DISCONTINUED | OUTPATIENT
Start: 2024-10-10 | End: 2024-10-11 | Stop reason: HOSPADM

## 2024-10-09 RX ORDER — NITROFURANTOIN 25; 75 MG/1; MG/1
100 CAPSULE ORAL 2 TIMES DAILY
Status: DISCONTINUED | OUTPATIENT
Start: 2024-10-09 | End: 2024-10-11 | Stop reason: HOSPADM

## 2024-10-09 RX ADMIN — ATORVASTATIN CALCIUM 40 MG: 20 TABLET, FILM COATED ORAL at 22:32

## 2024-10-09 RX ADMIN — APIXABAN 2.5 MG: 2.5 TABLET, FILM COATED ORAL at 22:32

## 2024-10-09 RX ADMIN — Medication 10 ML: at 22:34

## 2024-10-09 RX ADMIN — NITROFURANTOIN MONOHYDRATE/MACROCRYSTALLINE 100 MG: 25; 75 CAPSULE ORAL at 22:32

## 2024-10-09 NOTE — H&P
[facility]   HISTORY AND PHYSICAL    Patient Name: Marleni Hummel  : 1935  MRN: [id number]  Primary Care Physician:  Ken Andujar MD  Date of admission: 10/9/2024    Subjective   Subjective     Chief Complaint:   Chief Complaint   Patient presents with    Altered Mental Status    Headache         HPI:    Marleni Hummel is a 88 y.o. female, with a past medical history including, but not limited to, anemia, CVA, congestive heart failure, hypertension, renal cell carcinoma with removal of the left kidney, stage IV chronic kidney disease, atrial fibrillation, vertigo, and osteoporosis, presented to the emergency department after having a 20-minute episode of aphasia and difficulty with speech.  She states she laid down to take a nap and when she woke up she was trying to talk to her daughter but was unable to form the words.  She states that her vision was blurry during that time.  She denies any numbness, tingling, paresthesias of her face, or extremities.  By the time EMS arrived her difficulty with speech had resolved.  She states that she feels at baseline at this time.    She states that overnight she had approximately 3 hours of nausea and vomiting.  She denies any diarrhea.  When she woke up this morning the nausea was gone.  Neurology has been consulted to see the patient in the AM.    Review of Systems   All systems were reviewed and negative except for: What was mentioned above in the HPI.    Personal History     Past Medical History:   Diagnosis Date    Acute bronchitis due to Rhinovirus 2022    Acute vulvitis 2019    Allergic     Allergic rhinitis     Anemia of chronic disease     Arthropathy of knee     right    Atrial fibrillation     Back pain     Bell's palsy     Bradycardia 2024    Caregiver stress syndrome 2019    Chronic coronary artery disease     Chronic kidney disease (CKD), stage III (moderate)     CKD (chronic kidney disease) stage 4, GFR 15-29 ml/min      Diverticulitis 12/14/2022    CARROLL (dyspnea on exertion) 03/17/2023    Edema     Elevated serum free T4 level 03/21/2016    Encounter for eye exam 09/2020    Essential hypertension     Fatigue     GERD (gastroesophageal reflux disease)     H/O Heart murmur     Heart attack 10/05/2015    Hematuria 12/12/2016    Herpes zoster without complication     Hip arthritis     left    History of anemia due to chronic kidney disease     History of bone density study 02/28/2008    History of pelvic fracture 2015    History of pneumonia     History of transfusion     2015    Hypercholesterolemia     IFG (impaired fasting glucose)     Insomnia     Left kidney mass 07/2020    Limited joint range of motion     RIGHT KNEE    Mixed hyperlipidemia     Muscle tension headache 04/03/2019    New daily persistent headache 12/17/2018    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 Clarke    CATARACT EXTRACTION Right 04/16/2013    Dr. Clarke    COLONOSCOPY  04/18/2014    Dr. Elizabeth; no polyps    EPIDURAL BLOCK      HIP PERCUTANEOUS PINNING Left 8/31/2017    Procedure: LT HIP PERCUTANEOUS PINNING;  Surgeon: Sean Canales MD;  Location: [facility] MAIN OR;  Service:     MAMMO BILATERAL  11/2013    NEPHRECTOMY Left 11/16/2020    Procedure: LAPAROSCOPIC NEPHRECTOMY;  Surgeon: Chuckie Mclaughlin MD;  Location: [facility] MAIN OR;  Service: Urology;  Laterality: Left;    PAP SMEAR  02/2011    TIBIA FRACTURE SURGERY Right 2015    REMOVED PLATE LATER IN 2015    TONSILLECTOMY  1947    TOTAL HIP  ARTHROPLASTY Right 08/2015    TOTAL HIP ARTHROPLASTY Right 09/2016    TOTAL KNEE ARTHROPLASTY Bilateral 2004       Family History: family history includes Heart disease in her mother; Hypertension in her maternal grandmother, mother, and another family member; Stroke in her mother. Otherwise pertinent FHx was reviewed and not pertinent to current issue.    Social History:  reports that she quit smoking about 68 years ago. Her smoking use included cigarettes. She started smoking about 71 years ago. She has a 3 pack-year smoking history. She has been exposed to tobacco smoke. She has never used smokeless tobacco. She reports that she does not currently use alcohol after a past usage of about 3.0 standard drinks of alcohol per week. She reports that she does not use drugs.    Home Medications:  Vibegron, Vortioxetine HBr, acetaminophen, apixaban, atorvastatin, fexofenadine, furosemide, magnesium oxide, melatonin, and nitrofurantoin (macrocrystal-monohydrate)    Allergies:  Allergies   Allergen Reactions    Medrol [Methylprednisolone] Other (See Comments)     Irritability    Morphine Anaphylaxis    Oxycodone Anaphylaxis and Unknown - Low Severity    Ace Inhibitors Cough and Unknown (See Comments)    Bactrim [Sulfamethoxazole-Trimethoprim] Rash    Levofloxacin Itching and Rash     insomnia  insomnia  insomnia    Torsemide Dizziness       Objective   Objective     Vitals:   Temp:  [97.9 °F (36.6 °C)-98.8 °F (37.1 °C)] 97.9 °F (36.6 °C)  Heart Rate:  [68-83] 83  Resp:  [18] 18  BP: (171-181)/(85-96) 177/96  Physical Exam   Constitutional: Awake, alert   Eyes: PERRLA   HENT: NCAT, mucous membranes moist   Neck: Supple   Respiratory: Clear to auscultation bilaterally, nonlabored respirations    Cardiovascular: regular rate, palpable pedal pulses bilaterally   Gastrointestinal: Positive bowel sounds, soft, nontender, nondistended   Musculoskeletal: No bilateral ankle edema   Psychiatric: Appropriate affect, cooperative    Neurologic: Oriented x 3, speech clear   Skin: No rashes     NIH Stroke Scale      Interval: Baseline  Time: 20:04 EDT  Person Administering Scale: ENRRIQUE Pinon    Administer stroke scale items in the order listed. Record performance in each category after each subscale exam. Do not go back and change scores. Follow directions provided for each exam technique. Scores should reflect what the patient does, not what the clinician thinks the patient can do. The clinician should record answers while administering the exam and work quickly. Except where indicated, the patient should not be coached (i.e., repeated requests to patient to make a special effort).      1a  Level of consciousness: 0=alert; keenly responsive   1b. LOC questions:  0=Performs both tasks correctly   1c. LOC commands: 0=Answers both questions correctly   2.  Best Gaze: 0=normal   3.  Visual: 0=No visual loss   4. Facial Palsy: 0=Normal symmetric movement   5a.  Motor left arm: 0=No drift, limb holds 90 (or 45) degrees for full 10 seconds   5b.  Motor right arm: 0=No drift, limb holds 90 (or 45) degrees for full 10 seconds   6a. motor left le=No drift, limb holds 90 (or 45) degrees for full 10 seconds   6b  Motor right le=No drift, limb holds 90 (or 45) degrees for full 10 seconds   7. Limb Ataxia: 0=Absent   8.  Sensory: 0=Normal; no sensory loss   9. Best Language:  0=No aphasia, normal   10. Dysarthria: 0=Normal   11. Extinction and Inattention: 0=No abnormality   12. Distal motor function: 0=Normal    Total:   0       Result Review    Result Review:  I have personally reviewed the results from the time of this admission to 10/9/2024 19:19 EDT and agree with these findings:  [x]  Laboratory list / accordion  []  Microbiology  [x]  Radiology  [x]  EKG/Telemetry   []  Cardiology/Vascular   []  Pathology  [x]  Old records  []  Other:    Initial workup in the emergency department shows a BUN of 27, creatinine 1.96, GFR of 24.5,  glucose 109, magnesium 2.7, and hemoglobin of 11.6.  All other lab work is at baseline for the patient.  Urinalysis shows no signs of infection.  CT head without contrast shows that the brain and ventricles are symmetrical.  Moderate vascular calcification and small mild vessel ischemic disease noted.  There is no evidence of hemorrhage, hydrocephalus, or of acute infarction.  Partial empty sella is noted which is of doubtful clinical significance.    Assessment & Plan   Assessment / Plan     Brief Patient Summary:  Marleni Hummel is a 88 y.o. female who was admitted to the observation for further evaluation and treatment of her strokelike symptoms.    Active Hospital Problems:  Active Hospital Problems    Diagnosis     **TIA (transient ischemic attack)      Plan:     TIA  -Neurochecks every 4 hours   -Vital signs every 4 hours   -Cardiac monitoring   -MRI-brain without contrast-pending  -CT Head-no evidence of hemorrhage, hydrocephalus, or acute infarction.  -PT to eval and treat  -Neuro consult     Hypertension  -Monitor blood pressure  -Continue home blood pressure agents    Atrial fibrillation  -Continue home dose Eliquis    Stage IV chronic kidney disease  -Creatinine 1.96, creatinine on 8/30/2024 was 1.81  -Avoid nephrotoxic agents if possible      VTE Prophylaxis:  Mechanical VTE prophylaxis orders are present.        CODE STATUS:    Level Of Support Discussed With: Patient  Code Status (Patient has no pulse and is not breathing): CPR (Attempt to Resuscitate)  Medical Interventions (Patient has pulse or is breathing): Full Support    Admission Status:  I believe this patient meets observation status.    78 minutes have been spent by [facility] providers in the care of this patient while under observation status.      Appropriate PPE worn during patient encounter.  Hand hygeine performed before and after seeing the patient.      Electronically signed by ENRRIQUE Pinon,  10/09/24, 7:19 PM EDT.

## 2024-10-09 NOTE — ED NOTES
"Nursing report ED to floor  Marleni Hummel  88 y.o.  female    HPI :  HPI (Adult)  Stated Reason for Visit: ams, trouble speaking  History Obtained From: patient, EMS    Chief Complaint  Chief Complaint   Patient presents with    Altered Mental Status    Headache       Admitting doctor:   Johanna Recio MD    Admitting diagnosis:   The primary encounter diagnosis was TIA (transient ischemic attack). Diagnoses of Transient speech disturbance, History of CVA (cerebrovascular accident), Chronic anticoagulation, and Chronic renal failure, unspecified CKD stage were also pertinent to this visit.    Code status:   Current Code Status       Date Active Code Status Order ID Comments User Context       10/9/2024 1721 CPR (Attempt to Resuscitate) [id number]  Stalin Alexandra III, [place] ED        Question Answer    Code Status (Patient has no pulse and is not breathing) CPR (Attempt to Resuscitate)    Medical Interventions (Patient has pulse or is breathing) Full Support    Level Of Support Discussed With Patient                    Allergies:   Medrol [methylprednisolone], Morphine, Oxycodone, Ace inhibitors, Bactrim [sulfamethoxazole-trimethoprim], Levofloxacin, and Torsemide    Isolation:   No active isolations    Intake and Output  No intake or output data in the 24 hours ending 10/09/24 1746    Weight:       10/09/24  1614   Weight: 59 kg (130 lb)       Most recent vitals:   Vitals:    10/09/24 1614 10/09/24 1644 10/09/24 1719   BP: (!) 181/85 171/90    Pulse: 73 72 68   Resp: 18  18   Temp: 98.8 °F (37.1 °C)     SpO2: 97% 99% 97%   Weight: 59 kg (130 lb)     Height: 157.5 cm (62\")         Active LDAs/IV Access:   Lines, Drains & Airways       Active LDAs       Name Placement date Placement time Site Days    Peripheral IV 10/09/24 1618 Left;Distal Forearm 10/09/24  1618  Forearm  less than 1                    Labs (abnormal labs have a star):   Labs Reviewed   BASIC METABOLIC PANEL - Abnormal; Notable for the following " components:       Result Value    Glucose 109 (*)     BUN 27 (*)     Creatinine 1.96 (*)     eGFR 24.5 (*)     All other components within normal limits    Narrative:     GFR Normal >60  Chronic Kidney Disease <60  Kidney Failure <15    The GFR formula is only valid for adults with stable renal function between ages 18 and 70.   URINALYSIS W/ MICROSCOPIC IF INDICATED (NO CULTURE) - Abnormal; Notable for the following components:    Protein,  mg/dL (2+) (*)     Leuk Esterase, UA Trace (*)     All other components within normal limits   MAGNESIUM - Abnormal; Notable for the following components:    Magnesium 2.7 (*)     All other components within normal limits   CBC WITH AUTO DIFFERENTIAL - Abnormal; Notable for the following components:    Hemoglobin 11.6 (*)     MCHC 31.3 (*)     All other components within normal limits   URINALYSIS, MICROSCOPIC ONLY - Abnormal; Notable for the following components:    WBC, UA 11-20 (*)     All other components within normal limits   HEMOGLOBIN A1C - Normal    Narrative:     Hemoglobin A1C Ranges:    Increased Risk for Diabetes  5.7% to 6.4%  Diabetes                     >= 6.5%  Diabetic Goal                < 7.0%   LIPID PANEL   APTT   PROTIME-INR   POCT GLUCOSE FINGERSTICK   POCT GLUCOSE FINGERSTICK   POCT GLUCOSE FINGERSTICK   POCT GLUCOSE FINGERSTICK   CBC AND DIFFERENTIAL    Narrative:     The following orders were created for panel order CBC & Differential.  Procedure                               Abnormality         Status                     ---------                               -----------         ------                     CBC Auto Differential[[id number]]        Abnormal            Final result                 Please view results for these tests on the individual orders.       EKG:   ECG 12 Lead Stroke Evaluation   Final Result   HEART RATE=70  bpm   RR Krncvtkq=003  ms   NH Interval=  ms   P Horizontal Axis=  deg   P Front Axis=  deg   QRSD Interval=96  ms   QT  Iznffaxy=148  ms   BJwL=940  ms   QRS Axis=-63  deg   T Wave Axis=-29  deg   - ABNORMAL ECG -   Atrial fibrillation   Inferior  infarct, age indeterminate   Anterior  infarct, old   Lateral  leads are also involved   No change from previous tracing   Electronically Signed By: Salbador Julian) ([facility]) 2024-10-09 17:10:53   Date and Time of Study:2024-10-09 16:41:52          Meds given in ED:   Medications   sodium chloride 0.9 % flush 10 mL (has no administration in time range)   sodium chloride 0.9 % flush 10 mL (has no administration in time range)   sodium chloride 0.9 % infusion 40 mL (has no administration in time range)   atorvastatin (LIPITOR) tablet 40 mg (has no administration in time range)   ondansetron (ZOFRAN) injection 4 mg (has no administration in time range)       Imaging results:  No radiology results for the last day    Ambulatory status:   - ad rosaura.    Social issues:   Social History     Socioeconomic History    Marital status:     Years of education: High school   Tobacco Use    Smoking status: Former     Current packs/day: 0.00     Average packs/day: 1 pack/day for 3.0 years (3.0 ttl pk-yrs)     Types: Cigarettes     Start date: 1953     Quit date: 1956     Years since quittin.6     Passive exposure: Past    Smokeless tobacco: Never    Tobacco comments:     caffeine - 1/2 cup coffee daily    Vaping Use    Vaping status: Never Used   Substance and Sexual Activity    Alcohol use: Not Currently     Alcohol/week: 3.0 standard drinks of alcohol     Types: 3 Glasses of wine per week     Comment: couple times a week    Drug use: Never    Sexual activity: Not Currently       Peripheral Neurovascular  Peripheral Neurovascular (Adult)  Peripheral Neurovascular WDL: WDL    Neuro Cognitive  Neuro Cognitive (Adult)  Cognitive/Neuro/Behavioral WDL: WDL  Sonja Coma Scale  Best Eye Response: 4-->(E4) spontaneous  Best Motor Response: 6-->(M6) obeys commands  Best Verbal Response:  5-->(V5) oriented  Casper Coma Scale Score: 15  NIH Stroke Scale  Interval: baseline  1a. Level of Consciousness: 0-->Alert, keenly responsive  1b. LOC Questions: 0-->Answers both questions correctly  1c. LOC Commands: 0-->Performs both tasks correctly  2. Best Gaze: 0-->Normal  3. Visual: 0-->No visual loss  4. Facial Palsy: 0-->Normal symmetrical movements  5a. Motor Arm, Left: 0-->No drift, limb holds 90 (or 45) degrees for full 10 secs  5b. Motor Arm, Right: 0-->No drift, limb holds 90 (or 45) degrees for full 10 secs  6a. Motor Leg, Left: 0-->No drift, leg holds 30 degree position for full 5 secs  6b. Motor Leg, Right: 0-->No drift, leg holds 30 degree position for full 5 secs  7. Limb Ataxia: 0-->Absent  8. Sensory: 0-->Normal, no sensory loss  9. Best Language: 0-->No aphasia, normal  10. Dysarthria: 0-->Normal  11. Extinction and Inattention (formerly Neglect): 0-->No abnormality  Total (NIH Stroke Scale): 0    Learning  Learning Assessment (Adult)  Learning Readiness and Ability: no barriers identified    Respiratory  Respiratory WDL  Respiratory WDL: WDL    Abdominal Pain  Safety Interventions  Safety Precautions/Falls Reduction: assistive device/personal items within reach, fall reduction program maintained, nonskid shoes/slippers when out of bed    Pain Assessments  Pain (Adult)  (0-10) Pain Rating: Rest: 3  Pain Location: head    NIH Stroke Scale  NIH Stroke Scale  Interval: baseline  1a. Level of Consciousness: 0-->Alert, keenly responsive  1b. LOC Questions: 0-->Answers both questions correctly  1c. LOC Commands: 0-->Performs both tasks correctly  2. Best Gaze: 0-->Normal  3. Visual: 0-->No visual loss  4. Facial Palsy: 0-->Normal symmetrical movements  5a. Motor Arm, Left: 0-->No drift, limb holds 90 (or 45) degrees for full 10 secs  5b. Motor Arm, Right: 0-->No drift, limb holds 90 (or 45) degrees for full 10 secs  6a. Motor Leg, Left: 0-->No drift, leg holds 30 degree position for full 5 secs  6b.  Motor Leg, Right: 0-->No drift, leg holds 30 degree position for full 5 secs  7. Limb Ataxia: 0-->Absent  8. Sensory: 0-->Normal, no sensory loss  9. Best Language: 0-->No aphasia, normal  10. Dysarthria: 0-->Normal  11. Extinction and Inattention (formerly Neglect): 0-->No abnormality  Total (NIH Stroke Scale): 0    Jesenia Montes RN  10/09/24 17:46 EDT

## 2024-10-09 NOTE — ED PROVIDER NOTES
EMERGENCY DEPARTMENT ENCOUNTER    Room Number:  33/33  PCP: Ken Andujar MD  Historian: Patient      HPI:  Chief Complaint: Speech difficulty  A complete HPI/ROS/PMH/PSH/SH/FH are unobtainable due to: None    Context: Marleni Hummel is a 88 y.o. female who presents to the ED via [facility] from home c/o acute speech difficulties when she woke up from a nap around 1 PM.  Laid down for nap at noon.  States that she knew she went to sleep had trouble getting it out.  Is currently back to normal baseline other than a mild headache.  Denies any numbness or weakness, dizziness, facial droop, visual changes.  Has had prior stroke in the past, takes daily Eliquis with last dose this morning.  Did have a little bit of vomiting and diarrhea early this morning around 1 AM.      MEDICAL RECORD REVIEW    External (non-ED) record review: MRI brain without contrast October 24, 2023 showed no evidence of stroke, MRA of the head and neck at that time also was unremarkable              PAST MEDICAL HISTORY  Active Ambulatory Problems     Diagnosis Date Noted    Arthritis involving multiple sites     Essential hypertension     Permanent atrial fibrillation     History of Bell's palsy     CKD (chronic kidney disease) stage 4, GFR 15-29 ml/min     Gastroesophageal reflux disease without esophagitis     Hypercholesterolemia     Insomnia     Localized osteoporosis without current pathological fracture     Panic disorder     Chronic nonseasonal allergic rhinitis due to pollen     Other sleep apnea     History of MI (myocardial infarction) 12/21/2015    Chronic coronary artery disease 01/25/2016    Anemia of chronic disease 09/12/2016    Weakness of right leg 03/13/2017    Cardiac murmur 05/04/2017    Senile osteoporosis 06/30/2017    Hyperuricemia 09/07/2017    Grief reaction 09/30/2019    Peripheral vertigo 02/25/2020    Renal cell carcinoma of left kidney 11/22/2020    Renal oncocytoma of left kidney 12/28/2020    History  of left nephrectomy 04/19/2021    Cerebrovascular accident (CVA) due to stenosis of small artery 11/29/2021    History of renal cell carcinoma 11/30/2021    TIA (transient ischemic attack) 11/30/2021    Malignant neoplasm of left kidney, except renal pelvis 06/07/2022    Diverticulosis 12/14/2022    Irritable bowel syndrome with constipation 12/14/2022    Chronic diastolic congestive heart failure 03/18/2023    Hallucination, visual 01/17/2024    Hypomagnesemia 05/02/2024    Bradycardia, drug induced 05/27/2024    Lumbar disc disease with radiculopathy 12/02/2021     Resolved Ambulatory Problems     Diagnosis Date Noted    Fatigue     Hip arthritis     IFG (impaired fasting glucose)     Rectal bleeding     Vitamin D deficiency     Urinary incontinence     History of right hip replacement 12/21/2015    Closed fracture of neck of right femur with routine healing 12/21/2015    History of right knee joint replacement 12/21/2015    History of left knee replacement 12/21/2015    Stress-induced cardiomyopathy 01/25/2016    Elevated serum free T4 level 03/21/2016    Urinary tract infection 10/05/2016    Acute pyelonephritis 10/06/2016    Acute cystitis 12/03/2016    Hematuria 12/12/2016    Sinusitis 01/22/2017    Frequent falls 03/13/2017    Atrial fibrillation with RVR 06/19/2017    ANGY (acute kidney injury) 06/20/2017    Viral gastroenteritis 06/20/2017    Dehydration 06/20/2017    Nausea & vomiting 06/20/2017    Diarrhea 06/20/2017    Closed displaced fracture of left femoral neck 08/30/2017    Bacteriuria 08/30/2017    New daily persistent headache 12/17/2018    Muscle tension headache 04/03/2019    Caregiver stress syndrome 04/17/2019    Acute vulvitis 08/21/2019    Dizziness 02/23/2020    Left facial numbness 02/23/2020    Stroke-like symptoms 02/23/2020    Left kidney mass 08/17/2020    Left renal mass 11/16/2020    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    Acute kidney injury 05/30/2022    Acute bronchitis due to Rhinovirus  05/31/2022    Hyperkalemia 05/31/2022    Diverticulitis 12/14/2022    CARROLL (dyspnea on exertion) 03/17/2023    Shortness of breath 07/10/2024    Hyponatremia 07/11/2024     Past Medical History:   Diagnosis Date    Allergic     Allergic rhinitis     Arthropathy of knee     Atrial fibrillation     Back pain     Bell's palsy     Bradycardia 05/27/2024    Chronic kidney disease (CKD), stage III (moderate)     Edema     Encounter for eye exam 09/2020    GERD (gastroesophageal reflux disease)     H/O Heart murmur     Heart attack 10/05/2015    Herpes zoster without complication     History of anemia due to chronic kidney disease     History of bone density study 02/28/2008    History of pelvic fracture 2015    History of pneumonia     History of transfusion     Limited joint range of motion     Mixed hyperlipidemia     Osteoarthritis     Osteoporosis     PONV (postoperative nausea and vomiting)     Sleep apnea     Spinal stenosis, lumbar region with neurogenic claudication 12/02/2021    Stress          PAST SURGICAL HISTORY  Past Surgical History:   Procedure Laterality Date    CATARACT EXTRACTION Left 04/01/2013    Dr. Clarke    CATARACT EXTRACTION Right 04/16/2013    Dr. Clarke    COLONOSCOPY  04/18/2014    Dr. Elizabeth; no polyps    EPIDURAL BLOCK      HIP PERCUTANEOUS PINNING Left 8/31/2017    Procedure: LT HIP PERCUTANEOUS PINNING;  Surgeon: Sean Canales MD;  Location: [facility] OR;  Service:     MAMMO BILATERAL  11/2013    NEPHRECTOMY Left 11/16/2020    Procedure: LAPAROSCOPIC NEPHRECTOMY;  Surgeon: Chuckie Mclaughlin MD;  Location: [facility] OR;  Service: Urology;  Laterality: Left;    PAP SMEAR  02/2011    TIBIA FRACTURE SURGERY Right 2015    REMOVED PLATE LATER IN 2015    TONSILLECTOMY  1947    TOTAL HIP ARTHROPLASTY Right 08/2015    TOTAL HIP ARTHROPLASTY Right 09/2016    TOTAL KNEE ARTHROPLASTY Bilateral 2004         FAMILY HISTORY  Family History   Problem Relation Age of Onset     Hypertension Other     Hypertension Mother     Stroke Mother     Heart disease Mother     Hypertension Maternal Grandmother     Malig Hyperthermia Neg Hx          SOCIAL HISTORY  Social History     Socioeconomic History    Marital status:     Years of education: High school   Tobacco Use    Smoking status: Former     Current packs/day: 0.00     Average packs/day: 1 pack/day for 3.0 years (3.0 ttl pk-yrs)     Types: Cigarettes     Start date: 1953     Quit date: 1956     Years since quittin.6     Passive exposure: Past    Smokeless tobacco: Never    Tobacco comments:     caffeine - 1/2 cup coffee daily    Vaping Use    Vaping status: Never Used   Substance and Sexual Activity    Alcohol use: Not Currently     Alcohol/week: 3.0 standard drinks of alcohol     Types: 3 Glasses of wine per week     Comment: couple times a week    Drug use: Never    Sexual activity: Not Currently         ALLERGIES  Medrol [methylprednisolone], Morphine, Oxycodone, Ace inhibitors, Bactrim [sulfamethoxazole-trimethoprim], Levofloxacin, and Torsemide        REVIEW OF SYSTEMS  Review of Systems     All systems reviewed and negative except for those discussed in HPI.       PHYSICAL EXAM    I have reviewed the triage vital signs and nursing notes.    ED Triage Vitals [10/09/24 1614]   Temp Heart Rate Resp BP SpO2   98.8 °F (37.1 °C) 73 18 (!) 181/85 97 %      Temp src Heart Rate Source Patient Position BP Location FiO2 (%)   -- -- -- -- --       Physical Exam  General: No acute distress, nontoxic  HEENT: Mucous membranes moist, atraumatic, EOMI  Neck: Full ROM  Pulm: Symmetric chest rise, nonlabored, lungs CTAB  Cardiovascular: Regular rate and rhythm, intact distal pulses  GI: Soft, nontender, nondistended, no rebound, no guarding, bowel sounds present  MSK: Full ROM, no deformity  Skin: Warm, dry  Neuro: Awake, alert, oriented x 4, GCS 15, no facial droop, no dysarthria or aphasia, no pronator drift, 5 out of 5 strength  in sensation bilateral upper and lower extremities, moving all extremities, no focal deficits  Psych: Calm, cooperative    NIH 0      LAB RESULTS  Recent Results (from the past 24 hour(s))   Basic Metabolic Panel    Collection Time: 10/09/24  4:36 PM    Specimen: Blood   Result Value Ref Range    Glucose 109 (H) 65 - 99 mg/dL    BUN 27 (H) 8 - 23 mg/dL    Creatinine 1.96 (H) 0.57 - 1.00 mg/dL    Sodium 139 136 - 145 mmol/L    Potassium 5.2 3.5 - 5.2 mmol/L    Chloride 105 98 - 107 mmol/L    CO2 25.0 22.0 - 29.0 mmol/L    Calcium 10.0 8.6 - 10.5 mg/dL    BUN/Creatinine Ratio 13.8 7.0 - 25.0    Anion Gap 9.0 5.0 - 15.0 mmol/L    eGFR 24.5 (L) >60.0 mL/min/1.73   Magnesium    Collection Time: 10/09/24  4:36 PM    Specimen: Blood   Result Value Ref Range    Magnesium 2.7 (H) 1.6 - 2.4 mg/dL   CBC Auto Differential    Collection Time: 10/09/24  4:36 PM    Specimen: Blood   Result Value Ref Range    WBC 6.15 3.40 - 10.80 10*3/mm3    RBC 3.85 3.77 - 5.28 10*6/mm3    Hemoglobin 11.6 (L) 12.0 - 15.9 g/dL    Hematocrit 37.1 34.0 - 46.6 %    MCV 96.4 79.0 - 97.0 fL    MCH 30.1 26.6 - 33.0 pg    MCHC 31.3 (L) 31.5 - 35.7 g/dL    RDW 13.2 12.3 - 15.4 %    RDW-SD 46.9 37.0 - 54.0 fl    MPV 9.4 6.0 - 12.0 fL    Platelets 211 140 - 450 10*3/mm3    Neutrophil % 69.1 42.7 - 76.0 %    Lymphocyte % 20.8 19.6 - 45.3 %    Monocyte % 7.3 5.0 - 12.0 %    Eosinophil % 2.0 0.3 - 6.2 %    Basophil % 0.5 0.0 - 1.5 %    Immature Grans % 0.3 0.0 - 0.5 %    Neutrophils, Absolute 4.25 1.70 - 7.00 10*3/mm3    Lymphocytes, Absolute 1.28 0.70 - 3.10 10*3/mm3    Monocytes, Absolute 0.45 0.10 - 0.90 10*3/mm3    Eosinophils, Absolute 0.12 0.00 - 0.40 10*3/mm3    Basophils, Absolute 0.03 0.00 - 0.20 10*3/mm3    Immature Grans, Absolute 0.02 0.00 - 0.05 10*3/mm3    nRBC 0.0 0.0 - 0.2 /100 WBC   Urinalysis With Microscopic If Indicated (No Culture) - Straight Cath    Collection Time: 10/09/24  4:37 PM    Specimen: Straight Cath; Urine   Result Value Ref  Range    Color, UA Yellow Yellow, Straw    Appearance, UA Clear Clear    pH, UA 7.0 5.0 - 8.0    Specific Gravity, UA 1.020 1.005 - 1.030    Glucose, UA Negative Negative    Ketones, UA Negative Negative    Bilirubin, UA Negative Negative    Blood, UA Negative Negative    Protein,  mg/dL (2+) (A) Negative    Leuk Esterase, UA Trace (A) Negative    Nitrite, UA Negative Negative    Urobilinogen, UA 1.0 E.U./dL 0.2 - 1.0 E.U./dL   Urinalysis, Microscopic Only - Straight Cath    Collection Time: 10/09/24  4:37 PM    Specimen: Straight Cath; Urine   Result Value Ref Range    RBC, UA 0-2 None Seen, 0-2 /HPF    WBC, UA 11-20 (A) None Seen, 0-2 /HPF    Bacteria, UA None Seen None Seen /HPF    Squamous Epithelial Cells, UA 0-2 None Seen, 0-2 /HPF    Hyaline Casts, UA None Seen None Seen /LPF    Methodology Automated Microscopy    ECG 12 Lead Stroke Evaluation    Collection Time: 10/09/24  4:41 PM   Result Value Ref Range    QT Interval 400 ms    QTC Interval 432 ms       Ordered the above labs and independently interpreted results. My findings will be discussed in the medical decision making section below        RADIOLOGY  CT Head Without Contrast    Result Date: 10/9/2024  CT HEAD WITHOUT CONTRAST  HISTORY: TIA.  COMPARISON: MRI brain 10/24/2023.  FINDINGS: The brain and ventricles are symmetrical. Moderate vascular calcification and mild small vessel ischemic disease is noted. There is no evidence of hemorrhage, hydrocephalus or of acute infarction. A partially empty sella is noted which is of doubtful clinical significance. Further evaluation could be performed with a MRI examination of the brain.  This report was finalized on 10/9/2024 5:09 PM by Dr. Richard Martin M.D on Workstation: [id number]       Ordered the above noted radiological studies.  Independently interpreted by me and my independent review of findings can be found in the ED Course.  See dictation for official radiology  interpretation.      PROCEDURES    Procedures      EKG - Per my independent interpretation at 1647:    EKG Time: 1641  Rhythm/Rate: A-fib with rate of 70  Normal axis  Normal intervals  Diffuse T wave inversions which are new as compared to August 30, 2024  No STEMI             MEDICATIONS GIVEN IN ER    Medications - No data to display      PROGRESS, DATA ANALYSIS, CONSULTS, AND MEDICAL DECISION MAKING    Please note that this section constitutes my independent interpretation of clinical data including lab results, radiology, EKG's.  This constitutes my independent professional opinion regarding differential diagnosis and management of this patient.  It may include any factors such as history from outside sources, review of external records, social determinants of health, management of medications, response to those treatments, and discussions with other providers.    Differential Diagnosis and Plan: Initial concern for TIA, arrhythmia, electrolyte abnormalities, renal failure, intracranial hemorrhage, among others.  Plan for labs, CT scan, EKG, and likely hospitalization for further monitoring and ongoing stroke workup.    Additional sources:  - Discussed/ obtained information from independent historians: [facility] reports no focal neurodeficits on their arrival     - (Social Determinants of Health): None     - Shared decision making:  Patient fully updated on and in agreement with the course and plan moving forward    ED Course as of 10/09/24 1718   Wed Oct 09, 2024   1704 WBC: 6.15 [DC]   1704 Hemoglobin(!): 11.6 [DC]   1704 Platelets: 211 [DC]   1704 Nitrite, UA: Negative [DC]   1704 Leukocytes, UA(!): Trace [DC]   1704 Ketones, UA: Negative [DC]   1704 CT Head Without Contrast  Per my independent interpretation of the CT head, no overtly evident intracranial hemorrhage although subtle finding could be missed and will defer to final radiology report [DC]   1711 Glucose(!): 109 [DC]   1711 BUN(!): 27 [DC]   1711  Creatinine(!): 1.96  1.8 one month ago [DC]   1711 Sodium: 139 [DC]   1711 Potassium: 5.2 [DC]   1712 Bacteria, UA: None Seen [DC]   1712 WBC, UA(!): 11-20 [DC]   1713 RBC, UA: 0-2 [DC]   1715 Discussed with RADHA Hall, observation unit, discussed patient's clinical course and findings today, treatment modalities, and need for observation stay. [DC]   1716 At this time suspect possible TIA, no signs of any refractory or worsening UTI.  Has a stable chronic renal issues.  Patient will be hospitalized to the observation unit for further TIA/CVA workup. [DC]      ED Course User Index  [DC] Edmundo Rivers MD       Hospitalization Considered?: yes    Orders Placed During This Visit:  Orders Placed This Encounter   Procedures    CT Head Without Contrast    Basic Metabolic Panel    Urinalysis With Microscopic If Indicated (No Culture) - Urine, Catheter    Magnesium    CBC Auto Differential    Urinalysis, Microscopic Only - Urine, Clean Catch    ECG 12 Lead Stroke Evaluation    Initiate ED Observation Status    CBC & Differential       Additional orders considered but not placed:      Independent interpretation of labs, radiology studies, and discussions with consultants: See ED Course        AS OF 17:18 EDT VITALS:    BP - (!) 181/85  HR - 73  TEMP - 98.8 °F (37.1 °C)  02 SATS - 97%          DIAGNOSIS  Final diagnoses:   TIA (transient ischemic attack)   Transient speech disturbance   History of CVA (cerebrovascular accident)   Chronic anticoagulation   Chronic renal failure, unspecified CKD stage         DISPOSITION  ED Disposition       ED Disposition   Decision to Admit    Condition   --    Comment   --                Please note that portions of this document were completed with a voice recognition program.    Note Disclaimer: At [facility], we believe that sharing information builds trust and better relationships. You are receiving this note because you recently visited [facility]. It is possible you  will see health information before a provider has talked with you about it. This kind of information can be easy to misunderstand. To help you fully understand what it means for your health, we urge you to discuss this note with your provider.                       Edmundo Rivers MD  10/09/24 [id number]

## 2024-10-09 NOTE — ED NOTES
Pt to ED from home via [facility]. EMS called for possible stroke. Pt laid down for a nap around 1200. When pt woke family reports pt was having trouble speaking and getting her words out. Pt has hx TIA. Pt recently diagnosed with UTI, currently on medication. On EMS arrival pt was asymptomatic.

## 2024-10-10 ENCOUNTER — APPOINTMENT (OUTPATIENT)
Dept: CARDIOLOGY | Facility: HOSPITAL | Age: 89
End: 2024-10-10
Payer: MEDICARE

## 2024-10-10 ENCOUNTER — TELEPHONE (OUTPATIENT)
Dept: FAMILY MEDICINE CLINIC | Facility: CLINIC | Age: 89
End: 2024-10-10
Payer: MEDICARE

## 2024-10-10 ENCOUNTER — APPOINTMENT (OUTPATIENT)
Dept: CARDIOLOGY | Facility: HOSPITAL | Age: 89
End: 2024-10-10
Payer: OTHER GOVERNMENT

## 2024-10-10 LAB
ALBUMIN SERPL-MCNC: 3.7 G/DL (ref 3.5–5.2)
ALBUMIN/GLOB SERPL: 1.6 G/DL
ALP SERPL-CCNC: 51 U/L (ref 39–117)
ALT SERPL W P-5'-P-CCNC: 15 U/L (ref 1–33)
ANION GAP SERPL CALCULATED.3IONS-SCNC: 9 MMOL/L (ref 5–15)
ASCENDING AORTA: 2.9 CM
AST SERPL-CCNC: 23 U/L (ref 1–32)
BH CV ECHO MEAS - ACS: 1.84 CM
BH CV ECHO MEAS - AI P1/2T: 769.2 MSEC
BH CV ECHO MEAS - AO MAX PG: 12.7 MMHG
BH CV ECHO MEAS - AO MEAN PG: 5.7 MMHG
BH CV ECHO MEAS - AO ROOT DIAM: 3.1 CM
BH CV ECHO MEAS - AO V2 MAX: 178.1 CM/SEC
BH CV ECHO MEAS - AO V2 VTI: 28.8 CM
BH CV ECHO MEAS - AVA(I,D): 1.74 CM2
BH CV ECHO MEAS - EDV(CUBED): 70.6 ML
BH CV ECHO MEAS - EDV(MOD-SP2): 88 ML
BH CV ECHO MEAS - EDV(MOD-SP4): 91 ML
BH CV ECHO MEAS - EF(MOD-BP): 53 %
BH CV ECHO MEAS - EF(MOD-SP2): 59.1 %
BH CV ECHO MEAS - EF(MOD-SP4): 44 %
BH CV ECHO MEAS - ESV(CUBED): 42.5 ML
BH CV ECHO MEAS - ESV(MOD-SP2): 36 ML
BH CV ECHO MEAS - ESV(MOD-SP4): 51 ML
BH CV ECHO MEAS - FS: 15.5 %
BH CV ECHO MEAS - IVS/LVPW: 1.08 CM
BH CV ECHO MEAS - IVSD: 1.05 CM
BH CV ECHO MEAS - LAT PEAK E' VEL: 8.5 CM/SEC
BH CV ECHO MEAS - LV DIASTOLIC VOL/BSA (35-75): 58.5 CM2
BH CV ECHO MEAS - LV MASS(C)D: 135.1 GRAMS
BH CV ECHO MEAS - LV MAX PG: 4.6 MMHG
BH CV ECHO MEAS - LV MEAN PG: 2.17 MMHG
BH CV ECHO MEAS - LV SYSTOLIC VOL/BSA (12-30): 32.8 CM2
BH CV ECHO MEAS - LV V1 MAX: 107.7 CM/SEC
BH CV ECHO MEAS - LV V1 VTI: 20.8 CM
BH CV ECHO MEAS - LVIDD: 4.1 CM
BH CV ECHO MEAS - LVIDS: 3.5 CM
BH CV ECHO MEAS - LVOT AREA: 2.41 CM2
BH CV ECHO MEAS - LVOT DIAM: 1.75 CM
BH CV ECHO MEAS - LVPWD: 0.97 CM
BH CV ECHO MEAS - MED PEAK E' VEL: 4.9 CM/SEC
BH CV ECHO MEAS - MV DEC SLOPE: 516.6 CM/SEC2
BH CV ECHO MEAS - MV E MAX VEL: 110 CM/SEC
BH CV ECHO MEAS - MV MAX PG: 6 MMHG
BH CV ECHO MEAS - MV MEAN PG: 1.65 MMHG
BH CV ECHO MEAS - MV P1/2T: 66.5 MSEC
BH CV ECHO MEAS - MV V2 VTI: 25.9 CM
BH CV ECHO MEAS - MVA(P1/2T): 3.3 CM2
BH CV ECHO MEAS - MVA(VTI): 1.93 CM2
BH CV ECHO MEAS - PA ACC TIME: 0.13 SEC
BH CV ECHO MEAS - PA V2 MAX: 90.2 CM/SEC
BH CV ECHO MEAS - RAP SYSTOLE: 3 MMHG
BH CV ECHO MEAS - RV MAX PG: 1.63 MMHG
BH CV ECHO MEAS - RV V1 MAX: 63.8 CM/SEC
BH CV ECHO MEAS - RV V1 VTI: 9.7 CM
BH CV ECHO MEAS - RVSP: 23 MMHG
BH CV ECHO MEAS - SV(LVOT): 50.1 ML
BH CV ECHO MEAS - SV(MOD-SP2): 52 ML
BH CV ECHO MEAS - SV(MOD-SP4): 40 ML
BH CV ECHO MEAS - SVI(LVOT): 32.2 ML/M2
BH CV ECHO MEAS - SVI(MOD-SP2): 33.5 ML/M2
BH CV ECHO MEAS - SVI(MOD-SP4): 25.7 ML/M2
BH CV ECHO MEAS - TAPSE (>1.6): 1.26 CM
BH CV ECHO MEAS - TR MAX PG: 20.8 MMHG
BH CV ECHO MEAS - TR MAX VEL: 228 CM/SEC
BH CV ECHO MEASUREMENTS AVERAGE E/E' RATIO: 16.42
BH CV ECHO SHUNT ASSESSMENT PERFORMED (HIDDEN SCRIPTING): 1
BH CV XLRA - RV BASE: 3.4 CM
BH CV XLRA - RV LENGTH: 6 CM
BH CV XLRA - RV MID: 3 CM
BILIRUB SERPL-MCNC: 0.6 MG/DL (ref 0–1.2)
BUN SERPL-MCNC: 23 MG/DL (ref 8–23)
BUN/CREAT SERPL: 12.2 (ref 7–25)
CALCIUM SPEC-SCNC: 9.3 MG/DL (ref 8.6–10.5)
CHLORIDE SERPL-SCNC: 105 MMOL/L (ref 98–107)
CO2 SERPL-SCNC: 22 MMOL/L (ref 22–29)
CREAT SERPL-MCNC: 1.88 MG/DL (ref 0.57–1)
DEPRECATED RDW RBC AUTO: 47.3 FL (ref 37–54)
EGFRCR SERPLBLD CKD-EPI 2021: 25.8 ML/MIN/1.73
ERYTHROCYTE [DISTWIDTH] IN BLOOD BY AUTOMATED COUNT: 13.2 % (ref 12.3–15.4)
GLOBULIN UR ELPH-MCNC: 2.3 GM/DL
GLUCOSE SERPL-MCNC: 98 MG/DL (ref 65–99)
HCT VFR BLD AUTO: 33.2 % (ref 34–46.6)
HGB BLD-MCNC: 10.6 G/DL (ref 12–15.9)
LEFT ATRIUM VOLUME INDEX: 35.2 ML/M2
MCH RBC QN AUTO: 30.5 PG (ref 26.6–33)
MCHC RBC AUTO-ENTMCNC: 31.9 G/DL (ref 31.5–35.7)
MCV RBC AUTO: 95.7 FL (ref 79–97)
PLATELET # BLD AUTO: 183 10*3/MM3 (ref 140–450)
PMV BLD AUTO: 9.6 FL (ref 6–12)
POTASSIUM SERPL-SCNC: 4.7 MMOL/L (ref 3.5–5.2)
PROT SERPL-MCNC: 6 G/DL (ref 6–8.5)
RBC # BLD AUTO: 3.47 10*6/MM3 (ref 3.77–5.28)
SINUS: 2.8 CM
SODIUM SERPL-SCNC: 136 MMOL/L (ref 136–145)
STJ: 2.39 CM
WBC NRBC COR # BLD AUTO: 5.6 10*3/MM3 (ref 3.4–10.8)

## 2024-10-10 PROCEDURE — 25810000003 SODIUM CHLORIDE 0.9 % SOLUTION 250 ML FLEX CONT: Performed by: PHYSICIAN ASSISTANT

## 2024-10-10 PROCEDURE — 93306 TTE W/DOPPLER COMPLETE: CPT | Performed by: INTERNAL MEDICINE

## 2024-10-10 PROCEDURE — 93880 EXTRACRANIAL BILAT STUDY: CPT | Performed by: SURGERY

## 2024-10-10 PROCEDURE — 25010000002 ONDANSETRON PER 1 MG: Performed by: PHYSICIAN ASSISTANT

## 2024-10-10 PROCEDURE — 96365 THER/PROPH/DIAG IV INF INIT: CPT

## 2024-10-10 PROCEDURE — 96366 THER/PROPH/DIAG IV INF ADDON: CPT

## 2024-10-10 PROCEDURE — 96375 TX/PRO/DX INJ NEW DRUG ADDON: CPT

## 2024-10-10 PROCEDURE — 93880 EXTRACRANIAL BILAT STUDY: CPT

## 2024-10-10 PROCEDURE — G0378 HOSPITAL OBSERVATION PER HR: HCPCS

## 2024-10-10 PROCEDURE — 80053 COMPREHEN METABOLIC PANEL: CPT | Performed by: PHYSICIAN ASSISTANT

## 2024-10-10 PROCEDURE — 25510000001 PERFLUTREN 6.52 MG/ML SUSPENSION 2 ML VIAL

## 2024-10-10 PROCEDURE — 85027 COMPLETE CBC AUTOMATED: CPT | Performed by: PHYSICIAN ASSISTANT

## 2024-10-10 PROCEDURE — 25010000002 MAGNESIUM SULFATE 2 GM/50ML SOLUTION: Performed by: PHYSICIAN ASSISTANT

## 2024-10-10 PROCEDURE — 93306 TTE W/DOPPLER COMPLETE: CPT

## 2024-10-10 PROCEDURE — 25010000002 PROCHLORPERAZINE 10 MG/2ML SOLUTION: Performed by: PHYSICIAN ASSISTANT

## 2024-10-10 PROCEDURE — 25010000002 DIPHENHYDRAMINE PER 50 MG: Performed by: PHYSICIAN ASSISTANT

## 2024-10-10 RX ORDER — MAGNESIUM SULFATE HEPTAHYDRATE 40 MG/ML
2 INJECTION, SOLUTION INTRAVENOUS ONCE
Status: COMPLETED | OUTPATIENT
Start: 2024-10-10 | End: 2024-10-10

## 2024-10-10 RX ORDER — RIBOFLAVIN (VITAMIN B2) 100 MG
400 TABLET ORAL DAILY
Status: DISCONTINUED | OUTPATIENT
Start: 2024-10-10 | End: 2024-10-11 | Stop reason: HOSPADM

## 2024-10-10 RX ORDER — DIPHENHYDRAMINE HYDROCHLORIDE 50 MG/ML
25 INJECTION INTRAMUSCULAR; INTRAVENOUS ONCE
Status: COMPLETED | OUTPATIENT
Start: 2024-10-10 | End: 2024-10-10

## 2024-10-10 RX ORDER — BUTALBITAL, ACETAMINOPHEN AND CAFFEINE 50; 325; 40 MG/1; MG/1; MG/1
2 TABLET ORAL ONCE
Status: DISCONTINUED | OUTPATIENT
Start: 2024-10-10 | End: 2024-10-10

## 2024-10-10 RX ORDER — PROCHLORPERAZINE EDISYLATE 5 MG/ML
5 INJECTION INTRAMUSCULAR; INTRAVENOUS ONCE
Status: COMPLETED | OUTPATIENT
Start: 2024-10-10 | End: 2024-10-10

## 2024-10-10 RX ADMIN — SODIUM CHLORIDE 40 ML: 9 INJECTION, SOLUTION INTRAVENOUS at 12:15

## 2024-10-10 RX ADMIN — APIXABAN 2.5 MG: 2.5 TABLET, FILM COATED ORAL at 21:46

## 2024-10-10 RX ADMIN — ATORVASTATIN CALCIUM 40 MG: 20 TABLET, FILM COATED ORAL at 21:46

## 2024-10-10 RX ADMIN — MAGNESIUM OXIDE 400 MG (241.3 MG MAGNESIUM) TABLET 400 MG: TABLET at 13:42

## 2024-10-10 RX ADMIN — NITROFURANTOIN MONOHYDRATE/MACROCRYSTALLINE 100 MG: 25; 75 CAPSULE ORAL at 10:19

## 2024-10-10 RX ADMIN — APIXABAN 2.5 MG: 2.5 TABLET, FILM COATED ORAL at 10:18

## 2024-10-10 RX ADMIN — Medication 10 ML: at 10:21

## 2024-10-10 RX ADMIN — Medication 10 ML: at 21:48

## 2024-10-10 RX ADMIN — NITROFURANTOIN MONOHYDRATE/MACROCRYSTALLINE 100 MG: 25; 75 CAPSULE ORAL at 21:46

## 2024-10-10 RX ADMIN — DIPHENHYDRAMINE HYDROCHLORIDE 25 MG: 50 INJECTION, SOLUTION INTRAMUSCULAR; INTRAVENOUS at 12:13

## 2024-10-10 RX ADMIN — OXYBUTYNIN CHLORIDE 10 MG: 10 TABLET, EXTENDED RELEASE ORAL at 10:19

## 2024-10-10 RX ADMIN — MAGNESIUM SULFATE HEPTAHYDRATE 2 G: 40 INJECTION, SOLUTION INTRAVENOUS at 12:12

## 2024-10-10 RX ADMIN — PERFLUTREN 2 ML: 6.52 INJECTION, SUSPENSION INTRAVENOUS at 14:54

## 2024-10-10 RX ADMIN — ONDANSETRON 4 MG: 2 INJECTION, SOLUTION INTRAMUSCULAR; INTRAVENOUS at 09:01

## 2024-10-10 RX ADMIN — Medication 400 MG: at 13:42

## 2024-10-10 RX ADMIN — PROCHLORPERAZINE EDISYLATE 5 MG: 5 INJECTION INTRAMUSCULAR; INTRAVENOUS at 12:13

## 2024-10-10 NOTE — CONSULTS
Neurology Consult Note    Consult Date: 10/9/2024    Referring MD: Johanna Recio MD    Reason for Consult I have been asked to see the patient in neurological consultation to render advice and opinion regarding concern for TIA.     Marleni Hummel is a 88 y.o. female the past medical history of atrial fibrillation on Eliquis 2.5 mg twice daily, Bell's palsy, CAD, CKD stage IV, essential hypertension, sleep apnea not wearing CPAP, depression, panic disorder, hypertension, left renal cell carcinoma status post nephrectomy presents to the ED with acute aphasia and was 20 minutes.  Patient went down for a nap around 12:30 PM then woke up at 3 PM and was having difficulty speaking per daughter.  Patient states that her vision was blurry at the time.  She denied any weakness, numbness, double vision, dizziness.  Patient had had intermittent headache that day 1 that woke her up at night with associated nausea and vomiting.  She said at most the headache is lasted 2 hours and gets better with Tylenol.  She denies associated light or sound sensitivity.  She does not currently have a headache.  He does not have a history of migraines per daughter.  She frequently will get headaches with elevated blood pressure.  Blood pressure arrival was 181/85 and pulse was 73 bpm.  Temperature was 98.8 °F.    Patient has a good memory at baseline per daughter.  She sometimes have trouble with concentration and we will just talk over you or ignore what you said.  However, she is very hard of hearing so hard to know if she just did not hear what was said to her.  She attends to all the ADLs on her own.  She does not have any issues with cooking or cleaning.  She pays her own bills and takes her medications.  Denies any recent missed doses of Eliquis.  Has been sleeping a lot per daughter she wakes up normally at noon and goes to bed by 9 infrequently complains that she is fatigued secondary to health issues and depression.  Patient  ambulates independently with a walker.  She fell about a week ago due to not locking the brakes and her walker but did not had hit her head or have significant injury from this fall per daughter.  She does have some balance troubles chronically.    He was last seen by neurology in October 2023 with complaints of difficulty speaking and right hand clumsiness.  Difficulty speaking was similar to this time with trouble getting words out.  Her blood pressure on arrival was 121/97 mmHg, heart rate 90.  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  Pinches symptoms were thought potentially due to recurrence of her stroke versus hypertensive encephalopathy.  She was recommended to continue her Eliquis and be compliant with CPAP.      Past Medical/Surgical Hx:  Past Medical History:   Diagnosis Date    Acute bronchitis due to Rhinovirus 05/31/2022    Acute vulvitis 08/21/2019    Allergic     Allergic rhinitis     Anemia of chronic disease     Arthropathy of knee     right    Atrial fibrillation     Back pain     Bell's palsy     Bradycardia 05/27/2024    Caregiver stress syndrome 04/17/2019    Chronic coronary artery disease     Chronic kidney disease (CKD), stage III (moderate)     CKD (chronic kidney disease) stage 4, GFR 15-29 ml/min     Diverticulitis 12/14/2022    CARROLL (dyspnea on exertion) 03/17/2023    Edema     Elevated serum free T4 level 03/21/2016    Encounter for eye exam 09/2020    Essential hypertension     Fatigue     GERD (gastroesophageal reflux disease)     H/O Heart murmur     Heart attack 10/05/2015    Hematuria 12/12/2016    Herpes zoster without complication     Hip arthritis     left    History of anemia due to chronic kidney disease     History of bone density study 02/28/2008    History of pelvic fracture 2015    History of pneumonia     History of transfusion     2015    Hypercholesterolemia      IFG (impaired fasting glucose)     Insomnia     Left kidney mass 07/2020    Limited joint range of motion     RIGHT KNEE    Mixed hyperlipidemia     Muscle tension headache 04/03/2019    New daily persistent headache 12/17/2018    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 Clarke    CATARACT EXTRACTION Right 04/16/2013    Dr. Clarke    COLONOSCOPY  04/18/2014    Dr. Elizabeth; no polyps    EPIDURAL BLOCK      HIP PERCUTANEOUS PINNING Left 8/31/2017    Procedure: LT HIP PERCUTANEOUS PINNING;  Surgeon: Sean Canales MD;  Location: [facility] OR;  Service:     MAMMO BILATERAL  11/2013    NEPHRECTOMY Left 11/16/2020    Procedure: LAPAROSCOPIC NEPHRECTOMY;  Surgeon: Chuckie Mclaughlin MD;  Location: [facility] OR;  Service: Urology;  Laterality: Left;    PAP SMEAR  02/2011    TIBIA FRACTURE SURGERY Right 2015    REMOVED PLATE LATER IN 2015    TONSILLECTOMY  1947    TOTAL HIP ARTHROPLASTY Right 08/2015    TOTAL HIP ARTHROPLASTY Right 09/2016    TOTAL KNEE ARTHROPLASTY Bilateral 2004       Medications On Admission  Medications Prior to Admission   Medication Sig Dispense Refill Last Dose    acetaminophen (TYLENOL) 500 MG tablet Take 1 tablet by mouth Every 4 (Four) Hours As Needed for Mild Pain. Indications: Pain   10/9/2024    atorvastatin (LIPITOR) 20 MG tablet Take 1 tablet by mouth Every Night for 270 days. Indications: High Amount of Fats in the Blood 90 tablet 2 10/8/2024    Eliquis 2.5 MG tablet tablet TAKE 1 TABLET EVERY 12 HOURS (TWICE A DAY)  (Patient taking differently: TAKE 1 TABLET ORALLY EVERY 12 HOURS (TWICE A DAY)) 180 tablet 3 10/9/2024    fexofenadine (ALLEGRA) 60 MG tablet Take 1 tablet by mouth Daily As Needed. Indications: Hayfever   10/9/2024    magnesium oxide (MAG-OX) 400 MG tablet Take 1 tablet by mouth Daily. Indications: Acid Indigestion   10/9/2024    melatonin 5 MG tablet tablet Take 1 tablet by mouth At Night As Needed. Indications: Trouble Sleeping   10/8/2024    nitrofurantoin, macrocrystal-monohydrate, (MACROBID) 100 MG capsule Take 1 capsule by mouth 2 (Two) Times a Day. Pt on day 3 of 7   10/9/2024    Vibegron (Gemtesa) 75 MG tablet Take 1 tablet by mouth Daily for 270 days. Indications: Overactive Bladder, Urinary Incontinence 90 tablet 2 10/9/2024    Vortioxetine HBr (TRINTELLIX) 10 MG tablet tablet Take 1 tablet by mouth Daily With Breakfast. 30 tablet 2 10/9/2024    furosemide (LASIX) 20 MG tablet Take 1 tablet by mouth Daily As Needed (if weight goes up 3lbs in 3 days). 30 tablet 11 More than a month       Allergies:  Allergies   Allergen Reactions    Medrol [Methylprednisolone] Other (See Comments)     Irritability    Morphine Anaphylaxis    Oxycodone Anaphylaxis and Unknown - Low Severity    Ace Inhibitors Cough and Unknown (See Comments)    Bactrim [Sulfamethoxazole-Trimethoprim] Rash    Levofloxacin Itching and Rash     insomnia  insomnia  insomnia    Torsemide Dizziness       Social Hx:  Social History     Socioeconomic History    Marital status:     Years of education: High school   Tobacco Use    Smoking status: Former     Current packs/day: 0.00     Average packs/day: 1 pack/day for 3.0 years (3.0 ttl pk-yrs)     Types: Cigarettes     Start date: 1953     Quit date: 1956     Years since quittin.6     Passive exposure: Past    Smokeless tobacco: Never    Tobacco comments:     caffeine - 1/2 cup coffee daily    Vaping Use    Vaping status: Never Used   Substance and Sexual Activity    Alcohol use:  "Not Currently     Alcohol/week: 3.0 standard drinks of alcohol     Types: 3 Glasses of wine per week     Comment: couple times a week    Drug use: Never    Sexual activity: Not Currently       Family Hx:  Family History   Problem Relation Age of Onset    Hypertension Other     Hypertension Mother     Stroke Mother     Heart disease Mother     Hypertension Maternal Grandmother     Malig Hyperthermia Neg Hx        Exam    /96 (BP Location: Right arm, Patient Position: Lying)   Pulse 83   Temp 97.9 °F (36.6 °C) (Oral)   Resp 18   Ht 152.4 cm (60\")   Wt 59 kg (130 lb)   SpO2 100%   BMI 25.39 kg/m²   gen: NAD, vitals reviewed  MS: oriented x3, recent/remote memory intact, normal attention/concentration, language intact, no neglect, normal fund of knowledge  CN: visual acuity grossly normal, visual fields full, PERRL, EOMI, facial sensation equal, no facial droop, hearing symmetric, palate elevates symmetrically, shoulder shrug equal, tongue midline  Motor: 5/5 throughout upper and lower extremities, normal tone  Sensation: intact to cold temperature throughout, mildly decreased vibration sensation in bilateral ankles and feet.  Coordination: no dysmetria with finger to nose bilaterally    DATA:    Lab Results   Component Value Date    GLUCOSE 109 (H) 10/09/2024    CALCIUM 10.0 10/09/2024     10/09/2024    K 5.2 10/09/2024    CO2 25.0 10/09/2024     10/09/2024    BUN 27 (H) 10/09/2024    CREATININE 1.96 (H) 10/09/2024    EGFRIFAFRI 25 (L) 02/23/2022    EGFRIFNONA 21 (L) 02/23/2022    BCR 13.8 10/09/2024    ANIONGAP 9.0 10/09/2024     Lab Results   Component Value Date    WBC 6.15 10/09/2024    HGB 11.6 (L) 10/09/2024    HCT 37.1 10/09/2024    MCV 96.4 10/09/2024     10/09/2024     Lab Results   Component Value Date    LDL 73 10/09/2024     (H) 07/10/2024     05/27/2024     Lab Results   Component Value Date    HGBA1C 5.00 10/09/2024     Lab Results   Component Value Date    " INR 1.37 (H) 10/09/2024    INR 1.16 (H) 07/10/2024    INR 1.27 (H) 05/27/2024    PROTIME 17.1 (H) 10/09/2024    PROTIME 15.0 (H) 07/10/2024    PROTIME 16.1 (H) 05/27/2024       Lab review:   Blood glucose 109  BUN 27  Creatinine 1.96    WBC 6.15  Hemoglobin 11.6    LDL 73  Hemoglobin A1c 5.0    UA leukocytes trace, protein 2+, WBC 11-20    Vitamin B12 8/2/2024: 449  Folate 8/2/2024: 16.1  TSH 7/10/2024: 1.770    Imaging review:   CT head without contrast:  FINDINGS: The brain and ventricles are symmetrical. Moderate vascular  calcification and mild small vessel ischemic disease is noted. There is  no evidence of hemorrhage, hydrocephalus or of acute infarction. A  partially empty sella is noted which is of doubtful clinical  significance. Further evaluation could be performed with a MRI  examination of the brain.    Diagnoses:  Transient aphasia  Permanent atrial fibrillation on Eliquis 2.5 mg daily  CKD stage IV  Hypertension  Left renal cell carcinoma status post nephrectomy  Sleep apnea noncompliant with CPAP  Depression  Panic disorder    Comment: Patient's constellation of symptoms are concerning for potential TIA versus stroke versus hypertensive encephalopathy versus recrudence of old stroke.  Patient blood pressure on arrival was 181/85 mmHg and pulse was 73.  States she frequently gets headaches of elevated blood pressure.  She has similar episodes to what happened today normally with hand clumsiness.  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  There is nothing acute at the time thought to be due to recurrence of old strokes versus hypertensive encephalopathy which may be with going on now.  Will get a MRI brain with and without contrast to further evaluate.  Will also pursue 2D echo, MRA head and carotid ultrasound.  Patient should continue on Eliquis.      PLAN:   MRI brain with and without contrast  -MRA head without contrast and carotid  ultrasound  -2D echo  -Pt was not a candidate for tnk given due to the resolution of symptoms  -Admit to observation unit  -Continue Eliquis 2.5 mg twice daily  -Maintain permissive HTN for 24-48hrs, do not treat unless BP > 220/120 if no contraindication  -Telemetry to monitor for arrhythmia  -VTE prophylaxis  -Neuro checks, if change in exam call neuro oncall  -Patient and family wanting potential cognitive and neuropathy workup done outpatient neurology.    Recommendations discussed with Dr. Gamboa who is in agreement with plan.

## 2024-10-10 NOTE — NURSING NOTE
Called from ECHO, patient was vomiting and nauseous. Patient given Zofran upon arrival back to unit.

## 2024-10-10 NOTE — PLAN OF CARE
Goal Outcome Evaluation:   Patient has received medications and treatments as intended by the provider throughout the day. Patient went down for ECHO and has ambulated approx. 100 feet in hallway using walker and standby assist. Patient continues to have reg diet.                                            (+) 3 cm vertical lac mid occipital region

## 2024-10-10 NOTE — PLAN OF CARE
Goal Outcome Evaluation:      Pt admitted to observation for TIA. Pts MRI of brain and Angiogram pending. Pt to have a Neurology consult in the morning. Pt A&O x4 and stand by assist to bedside commode. Pt on a regular diet.                                          Incubation Time: 01:30:00

## 2024-10-10 NOTE — TELEPHONE ENCOUNTER
Hub staff attempted to follow warm transfer process and was unsuccessful     Caller: NEAL HERNANDEZ    Relationship to patient: Emergency Contact    Patient is needing: PATIENT'S DAUGHTER IS CALLING TO CANCEL PATIENT'S LAB VISIT FOR TODAY STATING PATIENT IS IN THE HOSPITAL.

## 2024-10-10 NOTE — PROGRESS NOTES
ED OBSERVATION PROGRESS/DISCHARGE SUMMARY    Date of Admission: 10/9/2024   LOS: 0 days   PCP: Ken Andujar MD    Final Diagnosis pending      Subjective     Hospital Outcome: Pending    Marleni Hummel is a 88 y.o. female, with a past medical history including, but not limited to, anemia, CVA, congestive heart failure, hypertension, renal cell carcinoma with removal of the left kidney, stage IV chronic kidney disease, atrial fibrillation, vertigo, and osteoporosis, presented to the emergency department after having a 20-minute episode of aphasia and difficulty with speech.  She states she laid down to take a nap and when she woke up she was trying to talk to her daughter but was unable to form the words.  She states that her vision was blurry during that time.  She denies any numbness, tingling, paresthesias of her face, or extremities.  By the time EMS arrived her difficulty with speech had resolved.  She states that she feels at baseline at this time.    She states that overnight she had approximately 3 hours of nausea and vomiting.  She denies any diarrhea.  When she woke up this morning the nausea was gone.  Neurology has been consulted to see the patient in the AM.     Review of Systems              All systems were reviewed and negative except for: What was mentioned above in the HPI.       ROS:  General: no fevers, chills  Respiratory: no cough, dyspnea  Cardiovascular: no chest pain, palpitations  Abdomen: No abdominal pain, nausea, vomiting, or diarrhea  Neurologic: Aphasia and speech trouble    10/10/2024.    LV EF was 41 to 45%.    6:05 PM.  Patient has been cleared by neurology.  Patient is to continue Eliquis 2.5 mg twice daily renally adjusted dose.  Patient with DEBORAH and noncompliant with her CPAP machine.  This was stressed again at discharge.  It was explained that this will significantly decrease her risk of future stroke.  No indication to add aspirin to the Eliquis from a stroke standpoint.   Blood pressure goal of 130/80 or less.  Headache patient reported was treated with migraine cocktail with complete resolved.  To place patient on riboflavin 40 mg daily mag oxide 4 mg daily for migraine prophylaxis.  Patient is to follow-up with Dr. Beckwith in 6 to 8 weeks time.    7:16 PM.  LVEF appears to be 41 to 45%.  On 7/11/2024 this was 67.2%.  Plan to hold the patient overnight and have cardiology weigh in on this change in the morning.    Objective   Physical Exam:  I have reviewed the vital signs.  Temp:  [97.5 °F (36.4 °C)-99 °F (37.2 °C)] 98.2 °F (36.8 °C)  Heart Rate:  [61-69] 65  Resp:  [16-18] 18  BP: (124-144)/(72-75) 144/74  General Appearance:    Alert, cooperative, no distress  Head:    Normocephalic, atraumatic  Eyes:    Sclerae anicteric  Neck:   Supple, no mass  Lungs: Clear to auscultation bilaterally, respirations unlabored  Heart: Regular rate and rhythm, S1 and S2 normal, no murmur, rub or gallop  Abdomen:  Soft, nontender, bowel sounds active, nondistended  Extremities: No clubbing, cyanosis, or edema to lower extremities  Pulses:  2+ and symmetric in distal lower extremities  Skin: No rashes   Neurologic: Oriented x3, Normal strength to extremities    Results Review:    I have reviewed the labs, radiology results and diagnostic studies.    Results from last 7 days   Lab Units 10/10/24  0324   WBC 10*3/mm3 5.60   HEMOGLOBIN g/dL 10.6*   HEMATOCRIT % 33.2*   PLATELETS 10*3/mm3 183     Results from last 7 days   Lab Units 10/10/24  0324 10/09/24  1636   SODIUM mmol/L 136 139   POTASSIUM mmol/L 4.7 5.2   CHLORIDE mmol/L 105 105   CO2 mmol/L 22.0 25.0   BUN mg/dL 23 27*   CREATININE mg/dL 1.88* 1.96*   CALCIUM mg/dL 9.3 10.0   BILIRUBIN mg/dL 0.6  --    ALK PHOS U/L 51  --    ALT (SGPT) U/L 15  --    AST (SGOT) U/L 23  --    GLUCOSE mg/dL 98 109*     Imaging Results (Last 24 Hours)       Procedure Component Value Units Date/Time    MRI Brain Without Contrast [[id number]] Collected: 10/09/24  2219     Updated: 10/09/24 2227    Narrative:      MRI BRAIN WO CONTRAST-, MRI ANGIOGRAM HEAD WO CONTRAST-     HISTORY:  Transient ischemic attack (TIA)     COMPARISON: CT head 10/9/2024, MRI brain 10/24/2023 and MRI of the head  10/24/2023     MRI EXAMINATION OF THE BRAIN WITHOUT CONTRAST: The study is hampered by  patient motion. There is an area of diffusion hyperintensity involving  the right temporal lobe posteriorly measuring approximately 1.5 x 0.4 cm  in the transverse plane consistent with an area of acute infarction.  This is decreased in signal intensity on the ADC map. There is expected  flow void in the basilar artery and in the distal aspect of the  intracranial arteries bilaterally on the axial T2 sequence.  Age-appropriate atrophy and moderate small vessel ischemic disease is  noted. Moderate to severe degenerative disease involving the right  temporomandibular joint is noted.       Impression:      There is an area of acute infarction involving the right  temporal lobe inferiorly and posteriorly measuring 1.5 x 0.4 cm in size.     MRA OF THE HEAD WITHOUT CONTRAST: There is signal present within the  distal aspect of the vertebral arteries bilaterally. The basilar artery  and the proximal aspects of the posterior cerebral arteries appear  unremarkable. The distal aspects of the internal carotid arteries are of  normal caliber. The left A1 segment is markedly hypoplastic or atretic.  Otherwise, the proximal aspects of the anterior and middle cerebral  arteries appear unremarkable.     IMPRESSION: There is no evidence of a proximal intracranial arterial  high-grade stenosis or occlusion.     This report was finalized on 10/9/2024 10:24 PM by Dr. Richard Martin M.D on Workstation: [id number]       MRI Angiogram Head Without Contrast [[id number]] Collected: 10/09/24 2219     Updated: 10/09/24 2227    Narrative:      MRI BRAIN WO CONTRAST-, MRI ANGIOGRAM HEAD WO CONTRAST-     HISTORY:  Transient  ischemic attack (TIA)     COMPARISON: CT head 10/9/2024, MRI brain 10/24/2023 and MRI of the head  10/24/2023     MRI EXAMINATION OF THE BRAIN WITHOUT CONTRAST: The study is hampered by  patient motion. There is an area of diffusion hyperintensity involving  the right temporal lobe posteriorly measuring approximately 1.5 x 0.4 cm  in the transverse plane consistent with an area of acute infarction.  This is decreased in signal intensity on the ADC map. There is expected  flow void in the basilar artery and in the distal aspect of the  intracranial arteries bilaterally on the axial T2 sequence.  Age-appropriate atrophy and moderate small vessel ischemic disease is  noted. Moderate to severe degenerative disease involving the right  temporomandibular joint is noted.       Impression:      There is an area of acute infarction involving the right  temporal lobe inferiorly and posteriorly measuring 1.5 x 0.4 cm in size.     MRA OF THE HEAD WITHOUT CONTRAST: There is signal present within the  distal aspect of the vertebral arteries bilaterally. The basilar artery  and the proximal aspects of the posterior cerebral arteries appear  unremarkable. The distal aspects of the internal carotid arteries are of  normal caliber. The left A1 segment is markedly hypoplastic or atretic.  Otherwise, the proximal aspects of the anterior and middle cerebral  arteries appear unremarkable.     IMPRESSION: There is no evidence of a proximal intracranial arterial  high-grade stenosis or occlusion.     This report was finalized on 10/9/2024 10:24 PM by Dr. Richard Martin M.D on Workstation: [id number]               I have reviewed the medications.  ---------------------------------------------------------------------------------------------  Assessment & Plan   Assessment/Problem List    TIA (transient ischemic attack)      Plan:    TIA  -Neurochecks every 4 hours   -Vital signs every 4 hours   -Cardiac monitoring   -MRI-brain without  contrast-pending  -CT Head-no evidence of hemorrhage, hydrocephalus, or acute infarction.  -PT to eval and treat  -Neuro consult      Hypertension  -Monitor blood pressure  -Continue home blood pressure agents     Atrial fibrillation  -Continue home dose Eliquis     Stage IV chronic kidney disease  -Creatinine 1.96, creatinine on 8/30/2024 was 1.81  -Avoid nephrotoxic agents if possible        VTE Prophylaxis:  Mechanical VTE prophylaxis orders are present.    Disposition: Pending    Follow-up after Discharge: Pending    This note will serve as a progress note    Stalin Alexandra III, PA 10/10/24 19:23 EDT    I have worn appropriate PPE during this patient encounter, sanitized my hands both with entering and exiting patient's room.      51 minutes has been spent by [facility] providers in the care of this patient while under observation status

## 2024-10-10 NOTE — PROGRESS NOTES
SIOMARA YOUNG Attestation Note    I supervised care provided by the midlevel provider.    The ERIC and I have discussed this patient's history, physical exam, and treatment plan. I have reviewed the documentation and personally had a face to face interaction with the patient  I affirm the documentation and agree with the treatment and plan. I provided a substantive portion of the care of this patient.  I personally performed the physical exam, in its entirety.  My personal findings are documented in below:    History:  88-year-old female complaining of diminished appetite and headache for 3 weeks.    Physical Exam:  General: No acute distress.  But quite miserable appearing  HENT: NCAT,   Neck: trachea midline, no ROM limitations.  CV: Pink warm and well-perfused throughout  Respiratory: No distress or increased work of breathing  Musculoskeletal: no deformity.  Neuro: alert, speech intelligible.  Hard of hearing  Skin: warm, dry.    Assessment and Plan:  Aphasia: Positive MRI for CVA  Headache, nausea: Treat symptomatically

## 2024-10-11 ENCOUNTER — DOCUMENTATION (OUTPATIENT)
Dept: HOME HEALTH SERVICES | Facility: HOME HEALTHCARE | Age: 89
End: 2024-10-11
Payer: MEDICARE

## 2024-10-11 ENCOUNTER — READMISSION MANAGEMENT (OUTPATIENT)
Dept: CALL CENTER | Facility: HOSPITAL | Age: 89
End: 2024-10-11
Payer: MEDICARE

## 2024-10-11 VITALS
DIASTOLIC BLOOD PRESSURE: 71 MMHG | HEIGHT: 60 IN | SYSTOLIC BLOOD PRESSURE: 148 MMHG | WEIGHT: 130 LBS | TEMPERATURE: 97.9 F | HEART RATE: 70 BPM | OXYGEN SATURATION: 97 % | RESPIRATION RATE: 18 BRPM | BODY MASS INDEX: 25.52 KG/M2

## 2024-10-11 PROBLEM — Z86.79 HISTORY OF CARDIOMYOPATHY: Status: ACTIVE | Noted: 2024-10-11

## 2024-10-11 PROBLEM — I63.9 ACUTE THROMBOTIC STROKE: Status: ACTIVE | Noted: 2024-10-11

## 2024-10-11 LAB
BH CV XLRA MEAS LEFT DIST CCA EDV: 8.3 CM/SEC
BH CV XLRA MEAS LEFT DIST CCA PSV: 62.5 CM/SEC
BH CV XLRA MEAS LEFT DIST ICA EDV: -16.5 CM/SEC
BH CV XLRA MEAS LEFT DIST ICA PSV: -70.9 CM/SEC
BH CV XLRA MEAS LEFT ICA/CCA RATIO: 1.15
BH CV XLRA MEAS LEFT MID ICA EDV: -17.3 CM/SEC
BH CV XLRA MEAS LEFT MID ICA PSV: -71.1 CM/SEC
BH CV XLRA MEAS LEFT PROX CCA EDV: 5.1 CM/SEC
BH CV XLRA MEAS LEFT PROX CCA PSV: 54.6 CM/SEC
BH CV XLRA MEAS LEFT PROX ECA EDV: -5.1 CM/SEC
BH CV XLRA MEAS LEFT PROX ECA PSV: -61.3 CM/SEC
BH CV XLRA MEAS LEFT PROX ICA EDV: -9 CM/SEC
BH CV XLRA MEAS LEFT PROX ICA PSV: -49.1 CM/SEC
BH CV XLRA MEAS LEFT PROX SCLA PSV: 154 CM/SEC
BH CV XLRA MEAS LEFT VERTEBRAL A EDV: -14.5 CM/SEC
BH CV XLRA MEAS LEFT VERTEBRAL A PSV: -46.4 CM/SEC
BH CV XLRA MEAS RIGHT DIST CCA EDV: -16.5 CM/SEC
BH CV XLRA MEAS RIGHT DIST CCA PSV: -51.1 CM/SEC
BH CV XLRA MEAS RIGHT DIST ICA EDV: -19.9 CM/SEC
BH CV XLRA MEAS RIGHT DIST ICA PSV: -76.7 CM/SEC
BH CV XLRA MEAS RIGHT ICA/CCA RATIO: 1.92
BH CV XLRA MEAS RIGHT MID ICA EDV: 25.8 CM/SEC
BH CV XLRA MEAS RIGHT MID ICA PSV: 98.1 CM/SEC
BH CV XLRA MEAS RIGHT PROX CCA EDV: 5.9 CM/SEC
BH CV XLRA MEAS RIGHT PROX CCA PSV: 58.9 CM/SEC
BH CV XLRA MEAS RIGHT PROX ECA EDV: -7.5 CM/SEC
BH CV XLRA MEAS RIGHT PROX ECA PSV: -64 CM/SEC
BH CV XLRA MEAS RIGHT PROX ICA EDV: -27.5 CM/SEC
BH CV XLRA MEAS RIGHT PROX ICA PSV: -88.1 CM/SEC
BH CV XLRA MEAS RIGHT PROX SCLA PSV: 115 CM/SEC
BH CV XLRA MEAS RIGHT VERTEBRAL A EDV: -9.3 CM/SEC
BH CV XLRA MEAS RIGHT VERTEBRAL A PSV: -49.8 CM/SEC
GEN 5 2HR TROPONIN T REFLEX: 153 NG/L
GLUCOSE BLDC GLUCOMTR-MCNC: 94 MG/DL (ref 70–130)
GLUCOSE BLDC GLUCOMTR-MCNC: 99 MG/DL (ref 70–130)
LEFT ARM BP: NORMAL MMHG
RIGHT ARM BP: NORMAL MMHG
TROPONIN T DELTA: 0 NG/L
TROPONIN T SERPL HS-MCNC: 153 NG/L

## 2024-10-11 PROCEDURE — 82948 REAGENT STRIP/BLOOD GLUCOSE: CPT

## 2024-10-11 PROCEDURE — G0378 HOSPITAL OBSERVATION PER HR: HCPCS

## 2024-10-11 PROCEDURE — 97161 PT EVAL LOW COMPLEX 20 MIN: CPT

## 2024-10-11 PROCEDURE — 84484 ASSAY OF TROPONIN QUANT: CPT | Performed by: STUDENT IN AN ORGANIZED HEALTH CARE EDUCATION/TRAINING PROGRAM

## 2024-10-11 PROCEDURE — 99214 OFFICE O/P EST MOD 30 MIN: CPT | Performed by: STUDENT IN AN ORGANIZED HEALTH CARE EDUCATION/TRAINING PROGRAM

## 2024-10-11 RX ORDER — METOPROLOL SUCCINATE 25 MG/1
12.5 TABLET, EXTENDED RELEASE ORAL DAILY
Qty: 90 TABLET | Refills: 0 | Status: SHIPPED | OUTPATIENT
Start: 2024-10-12

## 2024-10-11 RX ORDER — METOPROLOL SUCCINATE 25 MG/1
12.5 TABLET, EXTENDED RELEASE ORAL DAILY
Status: DISCONTINUED | OUTPATIENT
Start: 2024-10-11 | End: 2024-10-11 | Stop reason: HOSPADM

## 2024-10-11 RX ORDER — ATORVASTATIN CALCIUM 40 MG/1
40 TABLET, FILM COATED ORAL NIGHTLY
Qty: 90 TABLET | Refills: 0 | Status: SHIPPED | OUTPATIENT
Start: 2024-10-11

## 2024-10-11 RX ADMIN — METOPROLOL SUCCINATE 12.5 MG: 25 TABLET, EXTENDED RELEASE ORAL at 09:36

## 2024-10-11 RX ADMIN — OXYBUTYNIN CHLORIDE 10 MG: 10 TABLET, EXTENDED RELEASE ORAL at 08:22

## 2024-10-11 RX ADMIN — NITROFURANTOIN MONOHYDRATE/MACROCRYSTALLINE 100 MG: 25; 75 CAPSULE ORAL at 08:22

## 2024-10-11 RX ADMIN — Medication 10 ML: at 08:23

## 2024-10-11 RX ADMIN — MAGNESIUM OXIDE 400 MG (241.3 MG MAGNESIUM) TABLET 400 MG: TABLET at 08:22

## 2024-10-11 RX ADMIN — Medication 400 MG: at 08:22

## 2024-10-11 NOTE — PROGRESS NOTES
[facility] will follow post hospital as requested. Patient / daughter agreeable to service. Contact information and PCP confirmed.

## 2024-10-11 NOTE — PLAN OF CARE
Goal Outcome Evaluation:  Plan of Care Reviewed With: patient           Outcome Evaluation: Pt is a 89 yo F wo was admitted with Aphasia. Pt's speech has returned to baseline. Pt demonstrates adequate strength and balance to perform functional mobility and gait independently. Pt reports she feels as though she is at her baseline functional mobility status. Acute care PT will sign off.      Anticipated Discharge Disposition (PT): home

## 2024-10-11 NOTE — PROGRESS NOTES
Continued Stay Note  [facility]     Patient Name: Marleni Hummel  MRN: [id number]  Today's Date: 10/11/2024    Admit Date: 10/9/2024    Plan: Home with assist from daughter and  [facility]   Discharge Plan       Row Name 10/11/24 1445       Plan    Plan Home with assist from daughter and  [facility]    Patient/Family in Agreement with Plan yes    Provided Post Acute Provider List? Yes    Post Acute Provider List Home Health    Provided Post Acute Provider Quality & Resource List? Yes    Post Acute Provider Quality and Resource List Home Health    Delivered To Support Person    Support Person daughter    Method of Delivery Telephone    Plan Comments Order placed fo rpt to [place] home with HH.  Call placed to pt's daughter who did confirm they would like [facility] since pt has used them before.  Referral placed in [facility] for [facility] and called to [facility]                   Discharge Codes    No documentation.                 Expected Discharge Date and Time       Expected Discharge Date Expected Discharge Time    Oct 11, 2024               MELQUIADES Mccarty

## 2024-10-11 NOTE — PROGRESS NOTES
MD ATTESTATION NOTE    SHARED VISIT: This visit was performed by BOTH a physician and an APC. The substantive portion of the medical decision making was performed by this attesting physician who made or approved the management plan and takes responsibility for patient management. All studies in the APC note (if performed) were independently interpreted by me.     I provided a substantive portion of the care of the patient.  I personally performed the physical exam in its entirety, and below are my findings.      Brief HPI: Patient presents with an episode of slurred speech that occurred 2 days ago.  Lasted for about 20 minutes.  Per daughter, speech is now normal.  No other associated symptoms.    PHYSICAL EXAM  ED Triage Vitals   Temp Heart Rate Resp BP SpO2   10/09/24 1614 10/09/24 1614 10/09/24 1614 10/09/24 1614 10/09/24 1614   98.8 °F (37.1 °C) 73 18 (!) 181/85 97 %      Temp src Heart Rate Source Patient Position BP Location FiO2 (%)   10/09/24 1818 10/09/24 1818 10/09/24 1818 10/09/24 1818 --   Oral Monitor Lying Right arm          GENERAL: no acute distress  HENT: nares patent  EYES: no scleral icterus  CV: regular rhythm, normal rate  RESPIRATORY: normal effort  ABDOMEN: soft  MUSCULOSKELETAL: no deformity  NEURO:   Recent and remote memory functions are normal. The patient is attentive with normal concentration. Language is fluent. Speech is clear. The speech is non-dysarthric. Fund of knowledge is normal.   Symmetric smile with no facial droop.  Eyes close shut strongly bilaterally.  Symmetric eyebrow raise bilaterally.  EOMI, PERRL  CN II-XII grossly normal otherwise.  5/5 strength to extremities.  No pronator drift.  Intact FNF.  No meningismus.  NIHSS 0  PSYCH:  calm, cooperative  SKIN: warm, dry    Vital signs and nursing notes reviewed.        Plan:   MRI is positive for stroke with acute infarct to the right temporal lobe.  Neurology has seen the patient.  They have signed off.  Patient is already  on maximal medical therapy.    Troponin is 153.  Cardiology has been consulted.  She has no chest pain.

## 2024-10-11 NOTE — DISCHARGE PLACEMENT REQUEST
"Maria R Hummel (88 y.o. Female)       Date of Birth   1935    Social Security Number       Address   [address] [facility] [address]    Home Phone   [phone]    MRN   [id number]       Bahai   None    Marital Status                               Admission Date   10/9/24    Admission Type   Emergency    Admitting Provider   Johanna Recio MD    Attending Provider   Eddie Jacob II, MD    Department, Room/Bed   [facility] OBSERVATION, 106/1       Discharge Date       Discharge Disposition   Home or Self Care    Discharge Destination                                 Attending Provider: Eddie Jacob II, MD    Allergies: Medrol [Methylprednisolone], Morphine, Oxycodone, Ace Inhibitors, Bactrim [Sulfamethoxazole-trimethoprim], Levofloxacin, Torsemide    Isolation: None   Infection: None   Code Status: CPR    Ht: 152.4 cm (60\")   Wt: 59 kg (130 lb)    Admission Cmt: None   Principal Problem: None                  Active Insurance as of 10/9/2024       Primary Coverage       Payor Plan Insurance Group Employer/Plan Group    MEDICARE MEDICARE A & B        Payor Plan Address Payor Plan Phone Number Payor Plan Fax Number Effective Dates    [address] [phone]  12/1/2002 - None Entered    [facility] [id number]         Subscriber Name Subscriber Birth Date Member ID       MARIA R HUMMEL 1935 [id number]               Secondary Coverage       Payor Plan Insurance Group Employer/Plan Group     FOR LIFE  FOR LIFE  SUP         Payor Plan Address Payor Plan Phone Number Payor Plan Fax Number Effective Dates    [address] [phone]  1/1/2024 - None Entered    [facility] [address]         Subscriber Name Subscriber Birth Date Member ID       MARIA R HUMMEL 1935 [id number]                     Emergency Contacts        (Rel.) Home Phone Work Phone Mobile Phone    Sharan Hollingsworth (Son) [phone] -- --    " NEAL HERNANDEZ (Daughter) [phone] -- [phone]

## 2024-10-11 NOTE — PROGRESS NOTES
Discharge Planning Assessment  [facility]     Patient Name: Marleni Hummel  MRN: [id number]  Today's Date: 10/11/2024    Admit Date: 10/9/2024    Plan: Home with assist from pt's daughter   Discharge Needs Assessment       Row Name 10/11/24 0942       Living Environment    People in Home alone    Current Living Arrangements condominium    Potentially Unsafe Housing Conditions none    Primary Care Provided by self    Provides Primary Care For no one    Family Caregiver if Needed child(amy), adult    Family Caregiver Names Silvia daughter    Quality of Family Relationships supportive;involved;helpful    Able to Return to Prior Arrangements yes       Resource/Environmental Concerns    Resource/Environmental Concerns none       Transition Planning    Patient/Family Anticipates Transition to home    Patient/Family Anticipated Services at Transition none    Transportation Anticipated family or friend will provide       Discharge Needs Assessment    Equipment Currently Used at Home rollator    Concerns to be Addressed denies needs/concerns at this time                   Discharge Plan       Row Name 10/11/24 0942       Plan    Plan Home with assist from pt's daughter    Plan Comments Spoke with pt and daughter at bedside to screen for DCP/needs. pt was provided a Medicare Outp Obs Notice.    Pt confirmed facesheet address as correct including PCP as Dr. Andujar. Pt reports having a ramp to enter home which is all on 1 floor.  Pt does use a walker to assist with ambulation.  Pt's daughter is very supportive and goes to pt's home daily to assist pt as needed.  Pt has used several SNF's in the past.  Pt also has used [facility] in the past.  Pt does get her home meds filled at [facility] adn denies any issues getting or affording her home meds.  CCP will be available to assist if any DC needs do arise.                  Continued Care and Services - Admitted Since 10/9/2024    No active coordination exists for this  encounter.       Selected Continued Care - Prior Encounters Includes continued care and service providers with selected services from prior encounters from 7/11/2024 to 10/11/2024      Discharged on 7/12/2024 Admission date: 7/10/2024 - Discharge disposition: Home-Health Care Svc      Home Medical Care       Service Provider Selected Services Address Phone Fax Patient Preferred    [facility] [address] [id number] [phone] --                          Expected Discharge Date and Time       Expected Discharge Date Expected Discharge Time    Oct 11, 2024            Demographic Summary       Row Name 10/11/24 0941       General Information    Admission Type observation    Arrived From home    Required Notices Provided Observation Status Notice    Referral Source admission list    Reason for Consult discharge planning    Preferred Language English                   Functional Status       Row Name 10/11/24 0941       Functional Status    Usual Activity Tolerance moderate    Current Activity Tolerance fair       Functional Status, IADL    Medications independent;assistive person    Meal Preparation independent;assistive person    Housekeeping independent;assistive person    Laundry independent;assistive person    Shopping independent;assistive person                   Psychosocial    No documentation.                  Abuse/Neglect    No documentation.                  Legal    No documentation.                  Substance Abuse    No documentation.                  Patient Forms    No documentation.                     Leola De La Garza MSW

## 2024-10-11 NOTE — PLAN OF CARE
Goal Outcome Evaluation:      Patient discharged from the unit to a private vehicle with IV removed and taking all their belongings. Discharge and follow up info was shared and a verbal understanding re;ayed back to the nurse.

## 2024-10-11 NOTE — PROGRESS NOTES
Case Management Discharge Note      Final Note: DC home with [facility]    Provided Post Acute Provider List?: Yes  Post Acute Provider List: Home Health  Provided Post Acute Provider Quality & Resource List?: Yes  Post Acute Provider Quality and Resource List: Home Health  Delivered To: Support Person  Support Person: daughter  Method of Delivery: Telephone    Selected Continued Care - Discharged on 10/11/2024 Admission date: 10/9/2024 - Discharge disposition: Home or Self Care      Destination    No services have been selected for the patient.                Durable Medical Equipment    No services have been selected for the patient.                Dialysis/Infusion    No services have been selected for the patient.                Home Medical Care    No services have been selected for the patient.                Therapy    No services have been selected for the patient.                Community Resources    No services have been selected for the patient.                Community & DME    No services have been selected for the patient.                    Selected Continued Care - Prior Encounters Includes continued care and service providers with selected services from prior encounters from 7/11/2024 to 10/11/2024      Discharged on 7/12/2024 Admission date: 7/10/2024 - Discharge disposition: Home-Health Care c      Home Medical Care       Service Provider Selected Services Address Phone Fax Patient Preferred     [facility] [address] [address] [phone] --                               Final Discharge Disposition Code: 06 - home with home health care

## 2024-10-11 NOTE — OUTREACH NOTE
Prep Survey      Flowsheet Row Responses   [facility] patient discharged from? [place]   Is LACE score < 7 ? No   Eligibility [facility]   Date of Admission 10/09/24   Date of Discharge 10/11/24   Discharge diagnosis right temporal ischemic stroke   Does the patient have one of the following disease processes/diagnoses(primary or secondary)? Stroke   Does the patient have Home health ordered? Yes   What is the Home health agency?  [facility]   Prep survey completed? Yes            Lily GIPSON - Registered Nurse

## 2024-10-11 NOTE — PLAN OF CARE
Goal Outcome Evaluation:         Pt is an 89 yo female continuing observation for TIA. Pt is A&O x4. NIH 1. 2L O2 via NC. Standby assist. Pt denies any nausea. Pt denies any pain. Pt says shortness of breath is intermittent. Pt has dizziness with standing. Bedside commode used. Brief in place for stress incontinence. Regular diet. Afib. PT and cardio consulted. Pt has no further questions or complaints at this time.

## 2024-10-11 NOTE — DISCHARGE SUMMARY
ED OBSERVATION PROGRESS/DISCHARGE SUMMARY    Date of Admission: 10/9/2024   LOS: 0 days   PCP: Ken Andujar MD    Final Diagnosis right temporal ischemic stroke, reduced ejection fraction      Subjective     Hospital Outcome:     Marleni Hummel is a 88 y.o. female, with a past medical history including, but not limited to, anemia, CVA, congestive heart failure, hypertension, renal cell carcinoma with removal of the left kidney, stage IV chronic kidney disease, atrial fibrillation, vertigo, and osteoporosis, presented to the emergency department after having a 20-minute episode of aphasia and difficulty with speech.  She states she laid down to take a nap and when she woke up she was trying to talk to her daughter but was unable to form the words.  She states that her vision was blurry during that time.  She denies any numbness, tingling, paresthesias of her face, or extremities.  By the time EMS arrived her difficulty with speech had resolved.  She states that she feels at baseline at this time.    She states that overnight she had approximately 3 hours of nausea and vomiting.  She denies any diarrhea.  When she woke up this morning the nausea was gone.  Neurology has been consulted to see the patient in the AM.     10/10/2024.     LV EF was 41 to 45%.     6:05 PM.  Patient has been cleared by neurology.  Patient is to continue Eliquis 2.5 mg twice daily renally adjusted dose.  Patient with DEBORAH and noncompliant with her CPAP machine.  This was stressed again at discharge.  It was explained that this will significantly decrease her risk of future stroke.  No indication to add aspirin to the Eliquis from a stroke standpoint.  Blood pressure goal of 130/80 or less.  Headache patient reported was treated with migraine cocktail with complete resolved.  To place patient on riboflavin 400 mg daily mag oxide 400 mg daily for migraine prophylaxis.  Patient is to follow-up with Dr. Beckwith in 6 to 8 weeks time.     7:16  PM.  LVEF appears to be 41 to 45%.  On 7/11/2024 this was 67.2%.  Plan to hold the patient overnight and have cardiology weigh in on this change in the morning.    10/11/2024:  Patient without complaints overnight to include no chest pain.  Awaiting cardiology consult this a.m.    11:15 AM.  Cardiology has evaluated for thedecreased EF on echo.  The plan is to hold the Eliquis and trended her troponin.  If flat restart Eliquis.  N.p.o. for now.  If troponins flat restart Eliquis, increase atorvastatin to 40 mg on discharge.  And low-dose beta-blocker metoprolol XL 12.5 mg daily.  Unable to use ACE/ARB/ARNI due to renal function and BP.    1:50 PM.  Patient's troponin has been trended and is flat.  We are to increase the patient's atorvastatin 40 mg daily at discharge.  We are start the patient on Toprol-XL 25 mg daily.  Cardiology is going to arrange for an expedited follow-up appointment.        ROS:  General: no fevers, chills  Respiratory: no cough, dyspnea  Cardiovascular: no chest pain, palpitations  Abdomen: No abdominal pain, nausea, vomiting, or diarrhea  Neurologic: No focal weakness    Objective   Physical Exam:  I have reviewed the vital signs.  Temp:  [97.9 °F (36.6 °C)-98.6 °F (37 °C)] 97.9 °F (36.6 °C)  Heart Rate:  [61-71] 70  Resp:  [16-18] 18  BP: (131-148)/(68-78) 148/71  General Appearance:    Alert, cooperative, no distress  Head:    Normocephalic, atraumatic  Eyes:    Sclerae anicteric  Neck:   Supple, no mass  Lungs: Clear to auscultation bilaterally, respirations unlabored  Heart: Regular rate and rhythm, S1 and S2 normal, no murmur, rub or gallop  Abdomen:  Soft, nontender, bowel sounds active, nondistended  Extremities: No clubbing, cyanosis, or edema to lower extremities  Pulses:  2+ and symmetric in distal lower extremities  Skin: No rashes   Neurologic: Oriented x3, Normal strength to extremities    Results Review:    I have reviewed the labs, radiology results and diagnostic  studies.    Results from last 7 days   Lab Units 10/10/24  0324   WBC 10*3/mm3 5.60   HEMOGLOBIN g/dL 10.6*   HEMATOCRIT % 33.2*   PLATELETS 10*3/mm3 183     Results from last 7 days   Lab Units 10/10/24  0324 10/09/24  1636   SODIUM mmol/L 136 139   POTASSIUM mmol/L 4.7 5.2   CHLORIDE mmol/L 105 105   CO2 mmol/L 22.0 25.0   BUN mg/dL 23 27*   CREATININE mg/dL 1.88* 1.96*   CALCIUM mg/dL 9.3 10.0   BILIRUBIN mg/dL 0.6  --    ALK PHOS U/L 51  --    ALT (SGPT) U/L 15  --    AST (SGOT) U/L 23  --    GLUCOSE mg/dL 98 109*     Imaging Results (Last 24 Hours)       ** No results found for the last 24 hours. **            I have reviewed the medications.  ---------------------------------------------------------------------------------------------  Assessment & Plan   Assessment/Problem List    TIA (transient ischemic attack)      Plan:    TIA  -Neurochecks every 4 hours   -Vital signs every 4 hours   -Cardiac monitoring   -MRI-brain without contrast-pending  -CT Head-no evidence of hemorrhage, hydrocephalus, or acute infarction.  -PT to eval and treat  -Neuro has cleared with the plan above     Hypertension  -Monitor blood pressure  -Continue home blood pressure agents    Decreased EF  -Echo 67% Jul -> Oct 41%-45  -Cardiology has evaluated.  Troponins flat.  -Plan as above     Atrial fibrillation  -Continue home dose Eliquis     Stage IV chronic kidney disease  -Creatinine 1.96, creatinine on 8/30/2024 was 1.81  -Avoid nephrotoxic agents if possible        VTE Prophylaxis:  Mechanical VTE prophylaxis orders are present.       Disposition: Discharge    Follow-up after Discharge: Cardiology, neurology, primary care    This note will serve as a discharge summary and progress note    Stalin Alexandra III, PA 10/11/24 13:53 EDT    I have worn appropriate PPE during this patient encounter, sanitized my hands both with entering and exiting patient's room.      30 minutes has been spent by [facility] providers in the care of this patient while under observation status

## 2024-10-11 NOTE — CONSULTS
Date of Consultation: 10/11/24    Referral Provider: Johanna Recio MD     Reason for Consultation: Reduced ejection fraction    Encounter Provider: Jagdish Aguilar MD    Group of Service: [facility]     Patient Name: Marleni Hummel    :1935    Chief complaint: Aphasia    History of Present Illness:      This is a 88-year-old female who follows with Dr. Parmar in the office.  She has a past medical history significant for hypertension, hyperlipidemia, atrial fibrillation, anemia, cardiomyopathy, obstructive sleep apnea, left renal carcinoma status post nephrectomy, and dyspnea.    In , she was found to have atrial fibrillation with RVR.  She had a cardioversion but ultimately went back into atrial fibrillation, so she was placed on flecainide and had a repeat cardioversion.  In 2015 she was having some bradycardia after nausea and vomiting and it was felt to be a vagal response.  Her EKG showed ST elevation and she was taken emergently to the Cath Lab.  She was found to have Takotsubo cardiomyopathy with severely elevated left-sided filling pressures and a critical circumflex lesion that was treated with HAYLEY.  Her flecainide was stopped and she was switched to amiodarone but subsequently had prolonged QT intervals so that was stopped.    She was hospitalized again for bradycardia in 2023.  Her metoprolol was decreased.  She had an echocardiogram that showed normal LV systolic function with an EF of 56 to 60% as well as hypokinetic apical, septal, and mid anterior septal segments.  It also showed mild aortic valve regurgitation and mitral annular calcification with mild mitral valve regurgitation.  She also had a perfusion stress test that showed no evidence of ischemia and was considered a low risk study.    In 2024, she was hospitalized with weakness, fatigue, UTI, and hyponatremia.  Her chest x-ray showed no acute process, but she did have complaints of shortness of  breath.  She had an echocardiogram done that revealed normal LV systolic function, mild LVH, mildly dilated RV with normal RV function, mildly dilated left atrial cavity, and thickening of the aortic valve with mild insufficiency but no significant stenosis.  Her hydrochlorothiazide was stopped and her sodium ultimately improved.    She last saw us in the office on 9/11/2024.  At that time she complained of shortness of breath with exertion which appears to be chronic for her.  She also complained of dizziness at times, although her main concern was nausea and headache.  He felt as though this was due to her new medication Trintellix.  She reported that she checks her weight daily at home, she always weighs 130.  She takes Lasix as needed for 3 pound weight gain but has not taken it in several months.    She presented to the emergency department on 10/9/2024 with complaints of speech difficulties that occurred after she woke up from a nap.  By the time EMS arrived, she was back at her baseline.  She is on Eliquis daily and did take her medication that morning.    Her workup was notable for a BUN of 27, creatinine 1.96, magnesium 2.7, triglycerides 231, INR 1.37, hemoglobin 11.6.  She had an MRI of the brain that revealed an area of acute infarction involving the right temporal lobe inferiorly and posteriorly measuring 1.5 x 0.4 cm.  She had an echocardiogram done that revealed mildly reduced LV systolic function with an EF of 41 to 45%, akinesis of the mid lateral wall, normal IV size, mild to moderate aortic valve regurgitation, mild mitral valve regurgitation, mild tricuspid valve regurgitation, with negative saline test.  She denies any chest pain to me currently.  She said she had a pressure-like pain before cath in 2015 and has not recurred since.    Cardiology has been asked to see this patient for reduced ejection fraction.    ECHO 10/10/2024    There is akinesis of the mid lateral wall    Left ventricular  ejection fraction appears to be 41 - 45%.    Normal right ventricular cavity size and systolic function noted.    The left atrial cavity is severely dilated.    Mild to moderate aortic valve regurgitation is present    Saline test results are negative.    Mild mitral valve regurgitation is present.    Mild tricuspid valve regurgitation is present.    Calculated right ventricular systolic pressure from tricuspid regurgitation is 23 mmHg.    There is no evidence of pericardial effusion     Stress Test 3/19/2023    Left ventricular ejection fraction is normal (Calculated EF = 68%).    Myocardial perfusion imaging indicates a normal myocardial perfusion study with no evidence of ischemia.    Impressions are consistent with a low risk study.    Findings consistent with a normal ECG stress test.    Compared to the prior study from 12/16/2019 the current study reveals no changes.    Cath 10/4/2015  SUMMARY:  Takotsubo cardiomyopathy with severely elevated left-sided filling pressures. Critical circumflex stenosis successfully treated with stenting.      RECOMMENDATIONS:  Aggressive management for Takotsubo cardiomyopathy and dual  antiplatelet therapy for at least 1 year.        Past Medical History:   Diagnosis Date    Acute bronchitis due to Rhinovirus 05/31/2022    Acute vulvitis 08/21/2019    Allergic     Allergic rhinitis     Anemia of chronic disease     Arthropathy of knee     right    Atrial fibrillation     Back pain     Bell's palsy     Bradycardia 05/27/2024    Caregiver stress syndrome 04/17/2019    Chronic coronary artery disease     Chronic kidney disease (CKD), stage III (moderate)     CKD (chronic kidney disease) stage 4, GFR 15-29 ml/min     Diverticulitis 12/14/2022    CARROLL (dyspnea on exertion) 03/17/2023    Edema     Elevated serum free T4 level 03/21/2016    Encounter for eye exam 09/2020    Essential hypertension     Fatigue     GERD (gastroesophageal reflux disease)     H/O Heart murmur     Heart  attack 10/05/2015    Hematuria 12/12/2016    Herpes zoster without complication     Hip arthritis     left    History of anemia due to chronic kidney disease     History of bone density study 02/28/2008    History of pelvic fracture 2015    History of pneumonia     History of transfusion     2015    Hypercholesterolemia     IFG (impaired fasting glucose)     Insomnia     Left kidney mass 07/2020    Limited joint range of motion     RIGHT KNEE    Mixed hyperlipidemia     Muscle tension headache 04/03/2019    New daily persistent headache 12/17/2018    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 Clarke    CATARACT EXTRACTION Right 04/16/2013    Dr. Clarke    COLONOSCOPY  04/18/2014    Dr. Elizabteh; no polyps    EPIDURAL BLOCK      HIP PERCUTANEOUS PINNING Left 8/31/2017    Procedure: LT HIP PERCUTANEOUS PINNING;  Surgeon: Sean Canales MD;  Location: [facility] OR;  Service:     MAMMO BILATERAL  11/2013    NEPHRECTOMY Left 11/16/2020    Procedure: LAPAROSCOPIC NEPHRECTOMY;  Surgeon: Chuckie Mclaughlin MD;  Location: [facility] OR;  Service: Urology;  Laterality: Left;    PAP SMEAR  02/2011    TIBIA FRACTURE SURGERY Right 2015    REMOVED PLATE LATER IN 2015    TONSILLECTOMY  1947    TOTAL HIP ARTHROPLASTY Right 08/2015    TOTAL HIP ARTHROPLASTY Right 09/2016    TOTAL KNEE ARTHROPLASTY Bilateral 2004         Allergies   Allergen Reactions    Medrol [Methylprednisolone] Other (See Comments)     Irritability    Morphine Anaphylaxis    Oxycodone Anaphylaxis  and Unknown - Low Severity    Ace Inhibitors Cough and Unknown (See Comments)    Bactrim [Sulfamethoxazole-Trimethoprim] Rash    Levofloxacin Itching and Rash     insomnia  insomnia  insomnia    Torsemide Dizziness         No current facility-administered medications on file prior to encounter.     Current Outpatient Medications on File Prior to Encounter   Medication Sig Dispense Refill    acetaminophen (TYLENOL) 500 MG tablet Take 1 tablet by mouth Every 4 (Four) Hours As Needed for Mild Pain. Indications: Pain      atorvastatin (LIPITOR) 20 MG tablet Take 1 tablet by mouth Every Night for 270 days. Indications: High Amount of Fats in the Blood 90 tablet 2    Eliquis 2.5 MG tablet tablet TAKE 1 TABLET EVERY 12 HOURS (TWICE A DAY) (Patient taking differently: TAKE 1 TABLET ORALLY EVERY 12 HOURS (TWICE A DAY)) 180 tablet 3    fexofenadine (ALLEGRA) 60 MG tablet Take 1 tablet by mouth Daily As Needed. Indications: Hayfever      magnesium oxide (MAG-OX) 400 MG tablet Take 1 tablet by mouth Daily. Indications: Acid Indigestion      melatonin 5 MG tablet tablet Take 1 tablet by mouth At Night As Needed. Indications: Trouble Sleeping      nitrofurantoin, macrocrystal-monohydrate, (MACROBID) 100 MG capsule Take 1 capsule by mouth 2 (Two) Times a Day. Pt on day 3 of 7      Vibegron (Gemtesa) 75 MG tablet Take 1 tablet by mouth Daily for 270 days. Indications: Overactive Bladder, Urinary Incontinence 90 tablet 2    Vortioxetine HBr (TRINTELLIX) 10 MG tablet tablet Take 1 tablet by mouth Daily With Breakfast. 30 tablet 2    furosemide (LASIX) 20 MG tablet Take 1 tablet by mouth Daily As Needed (if weight goes up 3lbs in 3 days). 30 tablet 11         Social History     Socioeconomic History    Marital status:     Years of education: High school   Tobacco Use    Smoking status: Former     Current packs/day: 0.00     Average packs/day: 1 pack/day for 3.0 years (3.0 ttl pk-yrs)     Types: Cigarettes     Start date:  "1953     Quit date: 1956     Years since quittin.6     Passive exposure: Past    Smokeless tobacco: Never    Tobacco comments:     caffeine - 1/2 cup coffee daily    Vaping Use    Vaping status: Never Used   Substance and Sexual Activity    Alcohol use: Not Currently     Alcohol/week: 3.0 standard drinks of alcohol     Types: 3 Glasses of wine per week     Comment: couple times a week    Drug use: Never    Sexual activity: Not Currently         Family History   Problem Relation Age of Onset    Hypertension Other     Hypertension Mother     Stroke Mother     Heart disease Mother     Hypertension Maternal Grandmother     Malig Hyperthermia Neg Hx           Objective:     Vitals:    10/10/24 1900 10/10/24 2339 10/11/24 0324 10/11/24 0806   BP: 131/69 138/78 146/68 143/78   BP Location: Right arm Left arm Right arm Right arm   Patient Position: Lying Lying Lying Lying   Pulse: 71 61 62 64   Resp: 18 18 16 18   Temp: 98 °F (36.7 °C) 98.1 °F (36.7 °C) 98.1 °F (36.7 °C) 98.6 °F (37 °C)   TempSrc: Oral Oral Oral Tympanic   SpO2: 98% 99% 98% 98%   Weight:       Height:         Body mass index is 25.39 kg/m².  Flowsheet Rows      Flowsheet Row First Filed Value   Admission Height 157.5 cm (62\") Documented at 10/09/2024 1614   Admission Weight 59 kg (130 lb) Documented at 10/09/2024 1614             General:    No acute distress, alert and oriented x4, pleasant                   Head:    Normocephalic, atraumatic.   Eyes:          Conjunctivae and sclerae normal, no icterus, PERRLA   Throat:   No oral lesions, no thrush, oral mucosa moist.    Neck:   Supple, trachea midline.   Lungs:     Clear to auscultation bilaterally     Heart:    Regular rhythm and normal rate.  No murmurs, gallops, or rubs noted.   Abdomen:     Soft, non-tender, non-distended, positive bowel sounds.    Extremities:   No clubbing, cyanosis, or edema.     Pulses:   Pulses palpable and equal bilaterally.    Skin:   No bleeding or rash. "   Neuro:   Non-focal.  Moves all extremities well.    Psychiatric:   Normal mood and affect.                 Lab Review:                Results from last 7 days   Lab Units 10/10/24  0324   SODIUM mmol/L 136   POTASSIUM mmol/L 4.7   CHLORIDE mmol/L 105   CO2 mmol/L 22.0   BUN mg/dL 23   CREATININE mg/dL 1.88*   GLUCOSE mg/dL 98   CALCIUM mg/dL 9.3     Results from last 7 days   Lab Units 10/11/24  0753   HSTROP T ng/L 153*     Results from last 7 days   Lab Units 10/10/24  0324   WBC 10*3/mm3 5.60   HEMOGLOBIN g/dL 10.6*   HEMATOCRIT % 33.2*   PLATELETS 10*3/mm3 183     Results from last 7 days   Lab Units 10/09/24  1738   INR  1.37*   APTT seconds 30.9     Results from last 7 days   Lab Units 10/09/24  1636   CHOLESTEROL mg/dL 168     Results from last 7 days   Lab Units 10/09/24  1636   MAGNESIUM mg/dL 2.7*     Results from last 7 days   Lab Units 10/09/24  1636   CHOLESTEROL mg/dL 168   TRIGLYCERIDES mg/dL 231*   HDL CHOL mg/dL 57   LDL CHOL mg/dL 73       EKG (reviewed by me personally):          Afib, rate controlled, old anterior and inferior infarct    Assessment:   This is an 88-year-old female with a history of CAD status post PCI to left circumflex artery in 2015, atrial fibrillation, Takotsubo cardiomyopathy with recovery of EF, CKD 4, hypertension, DEBORAH, left renal cell carcinoma status post nephrectomy presented to the emergency department with strokelike symptoms found to have a small acute ischemic stroke on the right upper lobe.  Neurology thought likely due to chronic A-fib.  Echocardiogram showed a mildly reduced ejection fraction with regional wall motion abnormalities in the lateral wall.  She denies any recent chest pain.  No troponin since admit, ordered this a.m. and it is flat.  EKG shows atrial fibrillation that is rate controlled on no medication and inferior and anterior Q waves that are stable from prior.  She was found to have acute ischemic stroke on MRI imaging.  With her akinesis of  the lateral wall suspect she does have some coronary disease but given she does not have chest pain, has CKD with single kidney, and acute neurologic event I would not recommend [facility] at this time. Retrial low dose BB.   Plan:    -cont eliquis 2.5mg  -Increase atorvastatin to 40 mg on discharge  -Add low-dose beta-blocker with metoprolol XL 12.5 mg.   -limited with ACE/ARB/ARNI due to renal function and BP    Ok with DC from cardiology perspective. Will arrange expedited f/u.

## 2024-10-11 NOTE — THERAPY EVALUATION
Patient Name: Marleni Hummel  : 1935    MRN: [id number]                              Today's Date: 10/11/2024       Admit Date: 10/9/2024    Visit Dx:     ICD-10-CM ICD-9-CM   1. TIA (transient ischemic attack)  G45.9 435.9   2. Transient speech disturbance  R47.9 784.59   3. History of CVA (cerebrovascular accident)  Z86.73 V12.54   4. Chronic anticoagulation  Z79.01 V58.61   5. Chronic renal failure, unspecified CKD stage  N18.9 585.9     Patient Active Problem List   Diagnosis    Arthritis involving multiple sites    Essential hypertension    Permanent atrial fibrillation    History of Bell's palsy    CKD (chronic kidney disease) stage 4, GFR 15-29 ml/min    Gastroesophageal reflux disease without esophagitis    Hypercholesterolemia    Insomnia    Localized osteoporosis without current pathological fracture    Panic disorder    Chronic nonseasonal allergic rhinitis due to pollen    Other sleep apnea    History of MI (myocardial infarction)    Chronic coronary artery disease    Anemia of chronic disease    Weakness of right leg    Cardiac murmur    Senile osteoporosis    Hyperuricemia    Grief reaction    Peripheral vertigo    Renal cell carcinoma of left kidney    Renal oncocytoma of left kidney    History of left nephrectomy    Cerebrovascular accident (CVA) due to stenosis of small artery    History of renal cell carcinoma    TIA (transient ischemic attack)    Malignant neoplasm of left kidney, except renal pelvis    Diverticulosis    Irritable bowel syndrome with constipation    Chronic diastolic congestive heart failure    Hallucination, visual    Hypomagnesemia    Bradycardia, drug induced    Lumbar disc disease with radiculopathy     Past Medical History:   Diagnosis Date    Acute bronchitis due to Rhinovirus 2022    Acute vulvitis 2019    Allergic     Allergic rhinitis     Anemia of chronic disease     Arthropathy of knee     right    Atrial fibrillation     Back pain     Bell's  palsy     Bradycardia 05/27/2024    Caregiver stress syndrome 04/17/2019    Chronic coronary artery disease     Chronic kidney disease (CKD), stage III (moderate)     CKD (chronic kidney disease) stage 4, GFR 15-29 ml/min     Diverticulitis 12/14/2022    CARROLL (dyspnea on exertion) 03/17/2023    Edema     Elevated serum free T4 level 03/21/2016    Encounter for eye exam 09/2020    Essential hypertension     Fatigue     GERD (gastroesophageal reflux disease)     H/O Heart murmur     Heart attack 10/05/2015    Hematuria 12/12/2016    Herpes zoster without complication     Hip arthritis     left    History of anemia due to chronic kidney disease     History of bone density study 02/28/2008    History of pelvic fracture 2015    History of pneumonia     History of transfusion     2015    Hypercholesterolemia     IFG (impaired fasting glucose)     Insomnia     Left kidney mass 07/2020    Limited joint range of motion     RIGHT KNEE    Mixed hyperlipidemia     Muscle tension headache 04/03/2019    New daily persistent headache 12/17/2018    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 Clarke    CATARACT EXTRACTION Right 04/16/2013    Dr. Clarke    COLONOSCOPY  04/18/2014    Dr. Elizabeth; no polyps    EPIDURAL BLOCK      HIP PERCUTANEOUS PINNING Left 8/31/2017    Procedure: LT HIP PERCUTANEOUS PINNING;  Surgeon: Sean Canales MD;  Location: [facility];  Service:     MAMMO BILATERAL  11/2013    NEPHRECTOMY Left 11/16/2020    Procedure: LAPAROSCOPIC NEPHRECTOMY;  Surgeon:  Chuckie Mclaughlin MD;  Location: [facility] OR;  Service: Urology;  Laterality: Left;    PAP SMEAR  02/2011    TIBIA FRACTURE SURGERY Right 2015    REMOVED PLATE LATER IN 2015    TONSILLECTOMY  1947    TOTAL HIP ARTHROPLASTY Right 08/2015    TOTAL HIP ARTHROPLASTY Right 09/2016    TOTAL KNEE ARTHROPLASTY Bilateral 2004      General Information       Row Name 10/11/24 1050          Physical Therapy Time and Intention    Document Type evaluation  -     Mode of Treatment individual therapy;physical therapy  -       Row Name 10/11/24 1050          General Information    Prior Level of Function independent:;gait;transfer;bed mobility  walks with walker at baseline  -     Existing Precautions/Restrictions fall  -     Barriers to Rehab medically complex  -       Row Name 10/11/24 1050          Living Environment    People in Home alone  -       Row Name 10/11/24 1050          Cognition    Orientation Status (Cognition) oriented x 4  -       Row Name 10/11/24 1050          Safety Issues, Functional Mobility    Impairments Affecting Function (Mobility) range of motion (ROM)  chronic R LE ROM impairments due to history of several orthopedic surgeries  -               User Key  (r) = Recorded By, (t) = Taken By, (c) = Cosigned By      Initials Name Provider Type     Ghazal Shepard, PT Physical Therapist                   Mobility       Row Name 10/11/24 1052          Bed Mobility    Bed Mobility supine-sit;sit-supine  -     Supine-Sit Hope (Bed Mobility) standby assist  -     Sit-Supine Hope (Bed Mobility) standby assist  -       Row Name 10/11/24 1052          Sit-Stand Transfer    Sit-Stand Hope (Transfers) standby assist  -     Assistive Device (Sit-Stand Transfers) walker, front-wheeled  -       Row Name 10/11/24 1052          Gait/Stairs (Locomotion)    Hope Level (Gait) supervision  -     Assistive Device (Gait) walker, front-wheeled  [facility]      Distance in Feet (Gait) 100  -     Deviations/Abnormal Patterns (Gait) david decreased;gait speed decreased  -     Comment, (Gait/Stairs) no LOB noted  -               User Key  (r) = Recorded By, (t) = Taken By, (c) = Cosigned By      Initials Name Provider Type     Ghazal Shepard, PT Physical Therapist                   Obj/Interventions       [facility] Name 10/11/24 1052          Range of Motion Comprehensive    Comment, General Range of Motion R LE with decreased knee flexion secondary to h/o multiple orthopedic surgeries  -[facility] Name 10/11/24 1052          Strength Comprehensive (MMT)    General Manual Muscle Testing (MMT) Assessment no strength deficits identified  -[facility] Name 10/11/24 1052          Balance    Balance Assessment standing static balance;standing dynamic balance  -     Static Standing Balance standby assist  -     Dynamic Standing Balance standby assist  -               User Key  (r) = Recorded By, (t) = Taken By, (c) = Cosigned By      Initials Name Provider Type     Ghazal Shepard, PT Physical Therapist                   Goals/Plan    No documentation.                  Clinical Impression       [facility] Name 10/11/24 1053          Pain    Pretreatment Pain Rating 0/10 - no pain  -     Posttreatment Pain Rating 0/10 - no pain  -[facility]       Row Name 10/11/24 1053          Plan of Care Review    Plan of Care Reviewed With patient  -     Outcome Evaluation Pt is a 87 yo F wo was admitted with Aphasia. Pt's speech has returned to baseline. Pt demonstrates adequate strength and balance to perform functional mobility and gait independently. Pt reports she feels as though she is at her baseline functional mobility status. Acute care PT will sign off.  -[facility] Name 10/11/24 1053          Therapy Assessment/Plan (PT)    Criteria for Skilled Interventions Met (PT) no problems identified which require skilled intervention  -     Therapy Frequency (PT) evaluation only   -       Row Name 10/11/24 1053          Positioning and Restraints    Pre-Treatment Position in bed  -     Post Treatment Position bed  -     In Bed supine;call light within reach;encouraged to call for assist;exit alarm on  -               User Key  (r) = Recorded By, (t) = Taken By, (c) = Cosigned By      Initials Name Provider Type     Ghazal Shepard, PT Physical Therapist                   Outcome Measures       Row Name 10/11/24 1057 10/11/24 0200       How much help from another person do you currently need...    Turning from your back to your side while in flat bed without using bedrails? 4  - 4  -AC    Moving from lying on back to sitting on the side of a flat bed without bedrails? 4  - 3  -AC    Moving to and from a bed to a chair (including a wheelchair)? 4  - 2  -AC    Standing up from a chair using your arms (e.g., wheelchair, bedside chair)? 4  - 3  -AC    Climbing 3-5 steps with a railing? 3  - 2  -AC    To walk in hospital room? 4  - 2  -AC    AM-PAC 6 Clicks Score (PT) 23  - 16  -AC    Highest Level of Mobility Goal 7 --> Walk 25 feet or more  - 5 --> Static standing  -AC      Row Name 10/11/24 1057          Functional Assessment    Outcome Measure Options AM-PAC 6 Clicks Basic Mobility (PT)  -               User Key  (r) = Recorded By, (t) = Taken By, (c) = Cosigned By      Initials Name Provider Type     Ghazal Shepard, PT Physical Therapist    Emily Govea, RN Registered Nurse                                 Physical Therapy Education       Title: PT OT SLP Therapies (In Progress)       Topic: Physical Therapy (In Progress)       Point: Mobility training (Done)       Learning Progress Summary             Patient Acceptance, E,TB,D, VU,NR by  at 10/11/2024 1057                         Point: Home exercise program (Not Started)       Learner Progress:  Not documented in this visit.              Point: Body mechanics (Done)       Learning Progress Summary              Patient Acceptance, E,TB,D, VU,NR by  at 10/11/2024 1057                         Point: Precautions (Done)       Learning Progress Summary             Patient Acceptance, E,TB,D, VU,NR by  at 10/11/2024 1057                                         User Key       Initials Effective Dates Name Provider Type [facility] 06/16/21 -  Ghazal Shepard PT Physical Therapist PT                  PT Recommendation and Plan     Plan of Care Reviewed With: patient  Outcome Evaluation: Pt is a 89 yo F wo was admitted with Aphasia. Pt's speech has returned to baseline. Pt demonstrates adequate strength and balance to perform functional mobility and gait independently. Pt reports she feels as though she is at her baseline functional mobility status. Acute care PT will sign off.     Time Calculation:         PT Charges       Row Name 10/11/24 1058             Time Calculation    Start Time 1038  -      Stop Time 1046  -      Time Calculation (min) 8 min  -      PT Received On 10/11/24  -                User Key  (r) = Recorded By, (t) = Taken By, (c) = Cosigned By      Initials Name Provider Type     Ghazal Shepard PT Physical Therapist                  Therapy Charges for Today       Code Description Service Date Service Provider Modifiers Qty    [id number]  PT EVAL LOW COMPLEXITY 2 10/11/2024 Ghazal Shepard PT GP 1            PT G-Codes  Outcome Measure Options: AM-PAC 6 Clicks Basic Mobility (PT)  AM-PAC 6 Clicks Score (PT): 23  PT Discharge Summary  Anticipated Discharge Disposition (PT): home    Ghazal Shepard, PT  10/11/2024

## 2024-10-11 NOTE — DISCHARGE INSTRUCTIONS
You have had a right temporal stroke.  To prevent from this in the future we are going to increase your Lipitor to 40 mg daily for better cholesterol control.  Blood pressure goal should be 130/80 or less.  Continue with your Eliquis 2.5 mg twice daily.  For your migraine headache we recommend taking magnesium oxide 400 mg daily, riboflavin 400 mg daily.  These can be taken together and use as a migraine prophylaxis.  Neurology to arrange follow-up in 6 to 8 weeks.    Your ejection fraction was noted to be reduced since July.  Cardiology wants to start you on Toprol-XL 12.5 mg daily.  Increase Lipitor to 40 mg as previously discussed.  They are going to expedite an outpatient follow-up to reevaluate to in their future.  You are to return to the ER should you develop any chest pain, significant leg swelling, shortness of breath, or have any further concerns.    I would stress being compliant with the CPAP for your sleep apnea as this will help with blood pressure, help prevent cardiac events, and help prevent strokes.    Follow-up with primary care for all other issues.

## 2024-10-12 ENCOUNTER — HOME CARE VISIT (OUTPATIENT)
Dept: HOME HEALTH SERVICES | Facility: HOME HEALTHCARE | Age: 89
End: 2024-10-12

## 2024-10-12 ENCOUNTER — HOME CARE VISIT (OUTPATIENT)
Dept: HOME HEALTH SERVICES | Facility: HOME HEALTHCARE | Age: 89
End: 2024-10-12
Payer: MEDICARE

## 2024-10-13 VITALS
TEMPERATURE: 97.6 F | HEART RATE: 63 BPM | HEIGHT: 62 IN | SYSTOLIC BLOOD PRESSURE: 132 MMHG | DIASTOLIC BLOOD PRESSURE: 80 MMHG | BODY MASS INDEX: 23.92 KG/M2 | WEIGHT: 130 LBS | RESPIRATION RATE: 16 BRPM | OXYGEN SATURATION: 98 %

## 2024-10-13 NOTE — HOME HEALTH
Patient demonstrates independent functional mobility. Currently not appropriate for physical therapy in home health setting

## 2024-10-14 ENCOUNTER — TRANSITIONAL CARE MANAGEMENT TELEPHONE ENCOUNTER (OUTPATIENT)
Dept: CALL CENTER | Facility: HOSPITAL | Age: 89
End: 2024-10-14
Payer: MEDICARE

## 2024-10-14 NOTE — OUTREACH NOTE
Call Center TCM Note      Flowsheet Row Responses   [facility] patient discharged from? [place]   Does the patient have one of the following disease processes/diagnoses(primary or secondary)? Stroke   TCM attempt successful? Yes  [Daughter and sonVR]   Call start time 1022   Call end time 1028   Discharge diagnosis right temporal ischemic stroke   Person spoke with today (if not patient) and relationship Silvia Rasmussens reviewed with patient/caregiver? Yes   Is the patient having any side effects they believe may be caused by any medication additions or changes? No   Does the patient have all medications ordered at discharge? Yes   Is the patient taking all medications as directed (includes completed medication regime)? Yes   Comments No available HOSP DC FU appts that meet TCM guidelines.   Does the patient have an appointment with their PCP within 7-14 days of discharge? No appointments available   Nursing Interventions Routed TCM call to PCP office   Does the patient require any assistance with activities of daily living such as eating, bathing, dressing, walking, etc.? No   Does the patient have any residual symptoms from stroke/TIA? Yes   Residual symptoms comments vision   Does the patient understand the diet ordered at discharge? Yes   Did the patient receive a copy of their discharge instructions? Yes   Nursing interventions Reviewed instructions with patient   What is the patient's perception of their health status since discharge? Same   Nursing interventions Nurse provided patient education   Is the patient/caregiver able to teach back the risk factors for a stroke? History of TIAs, High blood pressure-goal below 120/80, High Cholesterol, Carotid or other artery disease   Is the patient/caregiver able to teach back signs and symptoms related to disease process for when to call PCP? Yes   Is the patient/caregiver able to teach back signs and symptoms related to disease process for when to call 911?  Yes   Is the patient/caregiver able to teach back the hierarchy of who to call/visit for symptoms/problems? PCP, Specialist, Home health nurse, Urgent Care, ED, 911 Yes   Is the patient able to teach back FAST for Stroke? B alance: Watch for sudden loss of balance, E yes: Check for vision loss, F ace: Look for an uneven smile, A rm: Check if one arm is weak, S peech: Listen for slurred speech, T pierre: Call 9-1-1 right away   TCM call completed? Yes   Wrap up additional comments Daughter reports Pt is still having the HA that has been going on for weeks. She will call neuro office to ask about what Pt can take.   Call end time 1028            Zulema Rojas RN    10/14/2024, 10:29 EDT

## 2024-10-15 DIAGNOSIS — M81.0 SENILE OSTEOPOROSIS: Primary | ICD-10-CM

## 2024-10-16 ENCOUNTER — DOCUMENTATION (OUTPATIENT)
Dept: ONCOLOGY | Facility: HOSPITAL | Age: 89
End: 2024-10-16
Payer: MEDICARE

## 2024-10-21 ENCOUNTER — OFFICE VISIT (OUTPATIENT)
Dept: FAMILY MEDICINE CLINIC | Facility: CLINIC | Age: 89
End: 2024-10-21
Payer: MEDICARE

## 2024-10-21 VITALS
BODY MASS INDEX: 25.52 KG/M2 | HEART RATE: 70 BPM | OXYGEN SATURATION: 97 % | SYSTOLIC BLOOD PRESSURE: 130 MMHG | DIASTOLIC BLOOD PRESSURE: 76 MMHG | WEIGHT: 130 LBS | HEIGHT: 60 IN

## 2024-10-21 DIAGNOSIS — G47.30 SLEEP APNEA, UNSPECIFIED TYPE: ICD-10-CM

## 2024-10-21 DIAGNOSIS — Z09 HOSPITAL DISCHARGE FOLLOW-UP: Primary | ICD-10-CM

## 2024-10-21 DIAGNOSIS — I10 ESSENTIAL HYPERTENSION: ICD-10-CM

## 2024-10-21 DIAGNOSIS — E78.00 HYPERCHOLESTEROLEMIA: ICD-10-CM

## 2024-10-21 DIAGNOSIS — I63.9: ICD-10-CM

## 2024-10-21 DIAGNOSIS — E83.42 HYPOMAGNESEMIA: ICD-10-CM

## 2024-10-21 DIAGNOSIS — N18.4 CKD (CHRONIC KIDNEY DISEASE) STAGE 4, GFR 15-29 ML/MIN: ICD-10-CM

## 2024-10-21 DIAGNOSIS — Z86.79 HISTORY OF CARDIOMYOPATHY: ICD-10-CM

## 2024-10-21 PROBLEM — R00.1 BRADYCARDIA, DRUG INDUCED: Status: RESOLVED | Noted: 2024-05-27 | Resolved: 2024-10-21

## 2024-10-21 PROBLEM — T50.905A BRADYCARDIA, DRUG INDUCED: Status: RESOLVED | Noted: 2024-05-27 | Resolved: 2024-10-21

## 2024-10-21 PROCEDURE — 99495 TRANSJ CARE MGMT MOD F2F 14D: CPT | Performed by: FAMILY MEDICINE

## 2024-10-21 PROCEDURE — 1126F AMNT PAIN NOTED NONE PRSNT: CPT | Performed by: FAMILY MEDICINE

## 2024-10-21 PROCEDURE — 1111F DSCHRG MED/CURRENT MED MERGE: CPT | Performed by: FAMILY MEDICINE

## 2024-10-21 PROCEDURE — 1160F RVW MEDS BY RX/DR IN RCRD: CPT | Performed by: FAMILY MEDICINE

## 2024-10-21 PROCEDURE — 1159F MED LIST DOCD IN RCRD: CPT | Performed by: FAMILY MEDICINE

## 2024-10-21 NOTE — ASSESSMENT & PLAN NOTE
blood pressure controlled.  Continue same medication    Orders:    Comprehensive Metabolic Panel; Future    CBC & Differential; Future    TSH; Future

## 2024-10-21 NOTE — ASSESSMENT & PLAN NOTE
The history of Takotsubo's cardiomyopathy will be reassessed by cardiology in the upcoming visit.  Recent changes in echocardiogram and EKG stability have been reviewed during today's visit.  Agree with careful reinstitution of low-dose metoprolol.

## 2024-10-21 NOTE — ASSESSMENT & PLAN NOTE
Patient recovering from acute thrombotic stroke right temporal region.  She is compliant on Eliquis.  Her blood pressure remains controlled with addition of metoprolol low-dose.  Heart rate is above 70.  Keep follow-up with neurology.  Patient is still having headaches which I doubt is migraine.  There is no history of headaches prior to 5 weeks ago.  Advised that the patient should take 1000 mg of Tylenol every 8 hours as needed for headache.  If the headache gets severe she will go back to the emergency room.

## 2024-10-21 NOTE — ASSESSMENT & PLAN NOTE
chronic kidney disease has shown interval improvement.  Most recent creatinine 1.88.  Keep follow-up with nephrology

## 2024-10-21 NOTE — ASSESSMENT & PLAN NOTE
goal LDL cholesterol is less than 55.  Recheck labs prior to November visit.  For now continue with atorvastatin 40 mg bedtime dosing.    Orders:    Lipid Panel With / Chol / HDL Ratio; Future    CK; Future

## 2024-10-22 ENCOUNTER — READMISSION MANAGEMENT (OUTPATIENT)
Dept: CALL CENTER | Facility: HOSPITAL | Age: 89
End: 2024-10-22
Payer: MEDICARE

## 2024-10-22 NOTE — OUTREACH NOTE
Stroke Week 2 Survey      Flowsheet Row Responses   [facility] patient discharged from? [place]   Does the patient have one of the following disease processes/diagnoses(primary or secondary)? Stroke   Week 2 attempt successful? Yes   Call start time 1519   Call end time 1526   Discharge diagnosis right temporal ischemic stroke   Person spoke with today (if not patient) and relationship Silvia, marisa   Meds reviewed with patient/caregiver? Yes   Does the patient have all medications ordered at discharge? Yes   Is the patient taking all medications as directed (includes completed medication regime)? Yes   Comments regarding appointments Opthamology appt next wednesday   Does the patient have a primary care provider?  Yes   Does the patient have an appointment with their PCP within 7 days of discharge? Yes   Comments regarding PCP Dr. Mcdaniel yesterday per daughter   Has the patient kept scheduled appointments due by today? Yes   What is the Home health agency?  [facility]   Home health comments PT visited and states patient didnt need PT   Psychosocial issues? No   Does the patient require any assistance with activities of daily living such as eating, bathing, dressing, walking, etc.? Yes   Does the patient have any residual symptoms from stroke/TIA? Yes   Residual symptoms comments Eye issues   Does the patient understand the diet ordered at discharge? Yes   Comments not eating well. Headaches are causing vomiting started prior to stroke.   Did the patient receive a copy of their discharge instructions? Yes   Nursing interventions Reviewed instructions with patient   What is the patient's perception of their health status since discharge? Improving   Is the patient able to teach back FAST for Stroke? B alance: Watch for sudden loss of balance, E yes: Check for vision loss, F ace: Look for an uneven smile, A rm: Check if one arm is weak, S peech: Listen for slurred speech, T pierre: Call 9-1-1 right  away   Is the patient/caregiver able to teach back the risk factors for a stroke? High blood pressure-goal below 120/80   If the patient is a current smoker, are they able to teach back resources for cessation? Not a smoker   Is the patient/caregiver able to teach back the hierarchy of who to call/visit for symptoms/problems? PCP, Specialist, Home health nurse, Urgent Care, ED, 911 Yes   Week 2 call completed? Yes   Is the patient interested in additional calls from an ambulatory ? No   Would this patient benefit from a Referral to [facility]? No   Wrap up additional comments Dtr is caregiver.  She states patient is vomiting due to headache.  Provider is aware and suggested Tylenol.  Pt is having eye pain contributing to headaches.  She will see opthamology next week.  Advised dtr to call daily to see in regards to cancellations to get a sooner appt.   Call end time [id number]            JERICA CARRASCO - Registered Nurse

## 2024-10-23 ENCOUNTER — HOSPITAL ENCOUNTER (OUTPATIENT)
Facility: HOSPITAL | Age: 89
Setting detail: OBSERVATION
Discharge: HOME | End: 2024-10-24
Attending: EMERGENCY MEDICINE | Admitting: EMERGENCY MEDICINE
Payer: MEDICARE

## 2024-10-23 ENCOUNTER — APPOINTMENT (OUTPATIENT)
Dept: CT IMAGING | Facility: HOSPITAL | Age: 89
End: 2024-10-23
Payer: MEDICARE

## 2024-10-23 ENCOUNTER — READMISSION MANAGEMENT (OUTPATIENT)
Dept: CALL CENTER | Facility: HOSPITAL | Age: 89
End: 2024-10-23
Payer: MEDICARE

## 2024-10-23 DIAGNOSIS — R11.2 NAUSEA AND VOMITING, UNSPECIFIED VOMITING TYPE: Primary | ICD-10-CM

## 2024-10-23 PROBLEM — N18.9 ACUTE ON CHRONIC RENAL INSUFFICIENCY: Status: ACTIVE | Noted: 2024-10-23

## 2024-10-23 PROBLEM — N28.9 ACUTE ON CHRONIC RENAL INSUFFICIENCY: Status: ACTIVE | Noted: 2024-10-23

## 2024-10-23 LAB
ALBUMIN SERPL-MCNC: 4.1 G/DL (ref 3.5–5.2)
ALBUMIN/GLOB SERPL: 1.3 G/DL
ALP SERPL-CCNC: 67 U/L (ref 39–117)
ALT SERPL W P-5'-P-CCNC: 14 U/L (ref 1–33)
ANION GAP SERPL CALCULATED.3IONS-SCNC: 11.4 MMOL/L (ref 5–15)
AST SERPL-CCNC: 24 U/L (ref 1–32)
BACTERIA UR QL AUTO: ABNORMAL /HPF
BASOPHILS # BLD AUTO: 0.02 10*3/MM3 (ref 0–0.2)
BASOPHILS NFR BLD AUTO: 0.3 % (ref 0–1.5)
BILIRUB SERPL-MCNC: 0.7 MG/DL (ref 0–1.2)
BILIRUB UR QL STRIP: NEGATIVE
BUN SERPL-MCNC: 32 MG/DL (ref 8–23)
BUN/CREAT SERPL: 14 (ref 7–25)
CALCIUM SPEC-SCNC: 10 MG/DL (ref 8.6–10.5)
CHLORIDE SERPL-SCNC: 100 MMOL/L (ref 98–107)
CLARITY UR: CLEAR
CO2 SERPL-SCNC: 24.6 MMOL/L (ref 22–29)
COLOR UR: ABNORMAL
CREAT SERPL-MCNC: 2.29 MG/DL (ref 0.57–1)
DEPRECATED RDW RBC AUTO: 45.5 FL (ref 37–54)
EGFRCR SERPLBLD CKD-EPI 2021: 20.3 ML/MIN/1.73
EOSINOPHIL # BLD AUTO: 0.2 10*3/MM3 (ref 0–0.4)
EOSINOPHIL NFR BLD AUTO: 3 % (ref 0.3–6.2)
ERYTHROCYTE [DISTWIDTH] IN BLOOD BY AUTOMATED COUNT: 13.1 % (ref 12.3–15.4)
GLOBULIN UR ELPH-MCNC: 3.2 GM/DL
GLUCOSE SERPL-MCNC: 101 MG/DL (ref 65–99)
GLUCOSE UR STRIP-MCNC: NEGATIVE MG/DL
HCT VFR BLD AUTO: 37.2 % (ref 34–46.6)
HGB BLD-MCNC: 11.6 G/DL (ref 12–15.9)
HGB UR QL STRIP.AUTO: NEGATIVE
HYALINE CASTS UR QL AUTO: ABNORMAL /LPF
IMM GRANULOCYTES # BLD AUTO: 0.01 10*3/MM3 (ref 0–0.05)
IMM GRANULOCYTES NFR BLD AUTO: 0.1 % (ref 0–0.5)
KETONES UR QL STRIP: NEGATIVE
LEUKOCYTE ESTERASE UR QL STRIP.AUTO: ABNORMAL
LIPASE SERPL-CCNC: 22 U/L (ref 13–60)
LYMPHOCYTES # BLD AUTO: 1.27 10*3/MM3 (ref 0.7–3.1)
LYMPHOCYTES NFR BLD AUTO: 18.9 % (ref 19.6–45.3)
MCH RBC QN AUTO: 29.5 PG (ref 26.6–33)
MCHC RBC AUTO-ENTMCNC: 31.2 G/DL (ref 31.5–35.7)
MCV RBC AUTO: 94.7 FL (ref 79–97)
MONOCYTES # BLD AUTO: 0.43 10*3/MM3 (ref 0.1–0.9)
MONOCYTES NFR BLD AUTO: 6.4 % (ref 5–12)
NEUTROPHILS NFR BLD AUTO: 4.79 10*3/MM3 (ref 1.7–7)
NEUTROPHILS NFR BLD AUTO: 71.3 % (ref 42.7–76)
NITRITE UR QL STRIP: NEGATIVE
NRBC BLD AUTO-RTO: 0 /100 WBC (ref 0–0.2)
PH UR STRIP.AUTO: 7.5 [PH] (ref 5–8)
PLATELET # BLD AUTO: 211 10*3/MM3 (ref 140–450)
PMV BLD AUTO: 9.9 FL (ref 6–12)
POTASSIUM SERPL-SCNC: 5 MMOL/L (ref 3.5–5.2)
PROT SERPL-MCNC: 7.3 G/DL (ref 6–8.5)
PROT UR QL STRIP: ABNORMAL
RBC # BLD AUTO: 3.93 10*6/MM3 (ref 3.77–5.28)
RBC # UR STRIP: ABNORMAL /HPF
REF LAB TEST METHOD: ABNORMAL
SODIUM SERPL-SCNC: 136 MMOL/L (ref 136–145)
SP GR UR STRIP: 1.02 (ref 1–1.03)
SQUAMOUS #/AREA URNS HPF: ABNORMAL /HPF
UROBILINOGEN UR QL STRIP: ABNORMAL
WBC # UR STRIP: ABNORMAL /HPF
WBC NRBC COR # BLD AUTO: 6.72 10*3/MM3 (ref 3.4–10.8)

## 2024-10-23 PROCEDURE — 25010000002 MAGNESIUM SULFATE IN D5W 1G/100ML (PREMIX) 1-5 GM/100ML-% SOLUTION: Performed by: STUDENT IN AN ORGANIZED HEALTH CARE EDUCATION/TRAINING PROGRAM

## 2024-10-23 PROCEDURE — 96374 THER/PROPH/DIAG INJ IV PUSH: CPT

## 2024-10-23 PROCEDURE — 25810000003 SODIUM CHLORIDE 0.9 % SOLUTION: Performed by: EMERGENCY MEDICINE

## 2024-10-23 PROCEDURE — 96375 TX/PRO/DX INJ NEW DRUG ADDON: CPT

## 2024-10-23 PROCEDURE — 25010000002 METOCLOPRAMIDE PER 10 MG: Performed by: EMERGENCY MEDICINE

## 2024-10-23 PROCEDURE — 36415 COLL VENOUS BLD VENIPUNCTURE: CPT

## 2024-10-23 PROCEDURE — 96361 HYDRATE IV INFUSION ADD-ON: CPT

## 2024-10-23 PROCEDURE — G0378 HOSPITAL OBSERVATION PER HR: HCPCS

## 2024-10-23 PROCEDURE — 81001 URINALYSIS AUTO W/SCOPE: CPT | Performed by: EMERGENCY MEDICINE

## 2024-10-23 PROCEDURE — 74176 CT ABD & PELVIS W/O CONTRAST: CPT

## 2024-10-23 PROCEDURE — 85025 COMPLETE CBC W/AUTO DIFF WBC: CPT | Performed by: EMERGENCY MEDICINE

## 2024-10-23 PROCEDURE — 83690 ASSAY OF LIPASE: CPT | Performed by: EMERGENCY MEDICINE

## 2024-10-23 PROCEDURE — 25810000003 LACTATED RINGERS PER 1000 ML: Performed by: STUDENT IN AN ORGANIZED HEALTH CARE EDUCATION/TRAINING PROGRAM

## 2024-10-23 PROCEDURE — 80053 COMPREHEN METABOLIC PANEL: CPT | Performed by: EMERGENCY MEDICINE

## 2024-10-23 PROCEDURE — 99285 EMERGENCY DEPT VISIT HI MDM: CPT

## 2024-10-23 RX ORDER — SODIUM CHLORIDE 0.9 % (FLUSH) 0.9 %
10 SYRINGE (ML) INJECTION EVERY 12 HOURS SCHEDULED
Status: DISCONTINUED | OUTPATIENT
Start: 2024-10-23 | End: 2024-10-24 | Stop reason: HOSPADM

## 2024-10-23 RX ORDER — SODIUM CHLORIDE 0.9 % (FLUSH) 0.9 %
10 SYRINGE (ML) INJECTION AS NEEDED
Status: DISCONTINUED | OUTPATIENT
Start: 2024-10-23 | End: 2024-10-24 | Stop reason: HOSPADM

## 2024-10-23 RX ORDER — METOPROLOL SUCCINATE 25 MG/1
12.5 TABLET, EXTENDED RELEASE ORAL DAILY
Status: DISCONTINUED | OUTPATIENT
Start: 2024-10-24 | End: 2024-10-24 | Stop reason: HOSPADM

## 2024-10-23 RX ORDER — FUROSEMIDE 20 MG
20 TABLET ORAL DAILY PRN
Status: DISCONTINUED | OUTPATIENT
Start: 2024-10-23 | End: 2024-10-24 | Stop reason: HOSPADM

## 2024-10-23 RX ORDER — ONDANSETRON 4 MG/1
4 TABLET, ORALLY DISINTEGRATING ORAL EVERY 6 HOURS PRN
Status: DISCONTINUED | OUTPATIENT
Start: 2024-10-23 | End: 2024-10-24 | Stop reason: HOSPADM

## 2024-10-23 RX ORDER — METOCLOPRAMIDE HYDROCHLORIDE 5 MG/ML
10 INJECTION INTRAMUSCULAR; INTRAVENOUS ONCE
Status: COMPLETED | OUTPATIENT
Start: 2024-10-23 | End: 2024-10-23

## 2024-10-23 RX ORDER — ACETAMINOPHEN 500 MG
1000 TABLET ORAL ONCE
Status: DISCONTINUED | OUTPATIENT
Start: 2024-10-23 | End: 2024-10-24 | Stop reason: HOSPADM

## 2024-10-23 RX ORDER — NITROGLYCERIN 0.4 MG/1
0.4 TABLET SUBLINGUAL
Status: DISCONTINUED | OUTPATIENT
Start: 2024-10-23 | End: 2024-10-24 | Stop reason: HOSPADM

## 2024-10-23 RX ORDER — SODIUM CHLORIDE, SODIUM LACTATE, POTASSIUM CHLORIDE, CALCIUM CHLORIDE 600; 310; 30; 20 MG/100ML; MG/100ML; MG/100ML; MG/100ML
100 INJECTION, SOLUTION INTRAVENOUS CONTINUOUS
Status: ACTIVE | OUTPATIENT
Start: 2024-10-23 | End: 2024-10-24

## 2024-10-23 RX ORDER — FAMOTIDINE 10 MG/ML
20 INJECTION, SOLUTION INTRAVENOUS ONCE
Status: COMPLETED | OUTPATIENT
Start: 2024-10-23 | End: 2024-10-23

## 2024-10-23 RX ORDER — ATORVASTATIN CALCIUM 20 MG/1
40 TABLET, FILM COATED ORAL NIGHTLY
Status: DISCONTINUED | OUTPATIENT
Start: 2024-10-23 | End: 2024-10-24 | Stop reason: HOSPADM

## 2024-10-23 RX ORDER — ONDANSETRON 2 MG/ML
4 INJECTION INTRAMUSCULAR; INTRAVENOUS EVERY 6 HOURS PRN
Status: DISCONTINUED | OUTPATIENT
Start: 2024-10-23 | End: 2024-10-24 | Stop reason: HOSPADM

## 2024-10-23 RX ORDER — MAGNESIUM SULFATE 1 G/100ML
1 INJECTION INTRAVENOUS ONCE
Status: COMPLETED | OUTPATIENT
Start: 2024-10-23 | End: 2024-10-24

## 2024-10-23 RX ORDER — CETIRIZINE HYDROCHLORIDE 10 MG/1
10 TABLET ORAL DAILY
Status: DISCONTINUED | OUTPATIENT
Start: 2024-10-24 | End: 2024-10-24 | Stop reason: HOSPADM

## 2024-10-23 RX ADMIN — SODIUM CHLORIDE, POTASSIUM CHLORIDE, SODIUM LACTATE AND CALCIUM CHLORIDE 100 ML/HR: 600; 310; 30; 20 INJECTION, SOLUTION INTRAVENOUS at 21:54

## 2024-10-23 RX ADMIN — Medication 10 ML: at 21:54

## 2024-10-23 RX ADMIN — ATORVASTATIN CALCIUM 40 MG: 20 TABLET, FILM COATED ORAL at 22:12

## 2024-10-23 RX ADMIN — FAMOTIDINE 20 MG: 10 INJECTION INTRAVENOUS at 21:54

## 2024-10-23 RX ADMIN — MAGNESIUM SULFATE IN DEXTROSE 1 G: 10 INJECTION, SOLUTION INTRAVENOUS at 22:04

## 2024-10-23 RX ADMIN — METOCLOPRAMIDE 10 MG: 5 INJECTION, SOLUTION INTRAMUSCULAR; INTRAVENOUS at 19:01

## 2024-10-23 RX ADMIN — SODIUM CHLORIDE 1000 ML: 9 INJECTION, SOLUTION INTRAVENOUS at 18:56

## 2024-10-23 RX ADMIN — APIXABAN 2.5 MG: 2.5 TABLET, FILM COATED ORAL at 22:13

## 2024-10-23 NOTE — H&P
[facility]   HISTORY AND PHYSICAL    Patient Name: Marleni Hummel  : 1935  MRN: [id number]  Primary Care Physician:  Ken Andujar MD  Date of admission: 10/23/2024    Subjective   Subjective     Chief Complaint:   Chief Complaint   Patient presents with    Nausea         HPI:    Marleni Hummel is a 88 y.o. female stage IV chronic kidney disease, renal cell carcinoma all status post left nephrectomy, CVA, atrial fibrillation on Eliquis, CHF, hypertension who presents to [facility] ER with nausea and vomiting.  Patient reports nausea and vomiting that has been going on for over 5 to 6 weeks however is worsened in the past week.  She reports she is only able to keep a small amount of food down daily.  She admits to some associated stomach cramping but no significant stomach pain.  Reports a few episodes of loose stools starting yesterday describing as orange in color but denies any visible blood or black tarry quality to the stool.  She denies fevers and chills.  Denies chest pain and shortness of breath.  Denies lower extremity swelling.  Denies recent pain with urination or change in frequency noting she just finished 2 courses of antibiotic for UTI couple of weeks ago.  She denies any medication changes prior to the nausea and vomiting starting.  She also reports she has had a headache since her stroke the beginning of October, 10/9/2024.  She states that the headache is mild but is in the front of her head and reports soreness in her eyes.  She denies any blurred or double vision.  Denies any numbness or tingling.  Denies any difficulty with speech or swallowing.      Review of Systems   All systems were reviewed and negative except for: what is mentioned above in the HPI.     Personal History     Past Medical History:   Diagnosis Date    Acute bronchitis due to Rhinovirus 2022    Acute vulvitis 2019    Allergic     Allergic rhinitis     Anemia of chronic disease      Arthropathy of knee     right    Atrial fibrillation     Back pain     Bell's palsy     Bradycardia 05/27/2024    Bradycardia, drug induced 05/27/2024    Caregiver stress syndrome 04/17/2019    Chronic coronary artery disease     Chronic kidney disease (CKD), stage III (moderate)     CKD (chronic kidney disease) stage 4, GFR 15-29 ml/min     Diverticulitis 12/14/2022    CARROLL (dyspnea on exertion) 03/17/2023    Edema     Elevated serum free T4 level 03/21/2016    Encounter for eye exam 09/2020    Essential hypertension     Fatigue     GERD (gastroesophageal reflux disease)     H/O Heart murmur     Heart attack 10/05/2015    Hematuria 12/12/2016    Herpes zoster without complication     Hip arthritis     left    History of anemia due to chronic kidney disease     History of bone density study 02/28/2008    History of pelvic fracture 2015    History of pneumonia     History of transfusion     2015    Hypercholesterolemia     IFG (impaired fasting glucose)     Insomnia     Left kidney mass 07/2020    Limited joint range of motion     RIGHT KNEE    Mixed hyperlipidemia     Muscle tension headache 04/03/2019    New daily persistent headache 12/17/2018    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 Clarke    CATARACT EXTRACTION Right 04/16/2013    Dr. Clarke    COLONOSCOPY  04/18/2014    Dr. Elizabeth; no polyps    EPIDURAL BLOCK      HIP PERCUTANEOUS PINNING Left 8/31/2017    Procedure: LT HIP PERCUTANEOUS PINNING;  Surgeon: Sean Canales MD;  Location: [facility] OR;   Service:     MAMMO BILATERAL  11/2013    NEPHRECTOMY Left 11/16/2020    Procedure: LAPAROSCOPIC NEPHRECTOMY;  Surgeon: Chuckie Mclaughlin MD;  Location: [facility];  Service: Urology;  Laterality: Left;    PAP SMEAR  02/2011    TIBIA FRACTURE SURGERY Right 2015    REMOVED PLATE LATER IN 2015    TONSILLECTOMY  1947    TOTAL HIP ARTHROPLASTY Right 08/2015    TOTAL HIP ARTHROPLASTY Right 09/2016    TOTAL KNEE ARTHROPLASTY Bilateral 2004       Family History: family history includes Heart disease in her mother; Hypertension in her maternal grandmother, mother, and another family member; Stroke in her mother. Otherwise pertinent FHx was reviewed and not pertinent to current issue.    Social History:  reports that she quit smoking about 68 years ago. Her smoking use included cigarettes. She started smoking about 71 years ago. She has a 3 pack-year smoking history. She has been exposed to tobacco smoke. She has never used smokeless tobacco. She reports that she does not currently use alcohol after a past usage of about 3.0 standard drinks of alcohol per week. She reports that she does not use drugs.    Home Medications:  Riboflavin, Vibegron, Vortioxetine HBr, acetaminophen, apixaban, atorvastatin, fexofenadine, furosemide, magnesium oxide, melatonin, and metoprolol succinate XL    Allergies:  Allergies   Allergen Reactions    Medrol [Methylprednisolone] Other (See Comments)     Irritability    Morphine Anaphylaxis    Oxycodone Anaphylaxis and Unknown - Low Severity    Ace Inhibitors Cough and Unknown (See Comments)    Bactrim [Sulfamethoxazole-Trimethoprim] Rash    Levofloxacin Itching and Rash     insomnia  insomnia  insomnia    Torsemide Dizziness       Objective   Objective     Vitals:   Temp:  [97.9 °F (36.6 °C)-99 °F (37.2 °C)] 97.9 °F (36.6 °C)  Heart Rate:  [59-68] 68  Resp:  [16] 16  BP: (131-164)/(65-88) 164/88  Physical Exam    Constitutional: 88-year-old female in no acute distress on room  air   Eyes: PERRLA, sclerae anicteric, no conjunctival injection   HENT: NCAT, mucous membranes moist   Neck: Supple, no thyromegaly, no lymphadenopathy, trachea midline   Respiratory: Clear to auscultation bilaterally, nonlabored respirations    Cardiovascular: RRR, no murmurs, rubs, or gallops, palpable pedal pulses bilaterally   Gastrointestinal: Positive bowel sounds, soft, nontender, nondistended   Musculoskeletal: No bilateral ankle edema, no clubbing or cyanosis to extremities   Psychiatric: Appropriate affect, cooperative   Neurologic: Oriented x 3, strength symmetric in all extremities, Cranial Nerves grossly intact to confrontation, speech clear   Skin: No rashes     Result Review    Result Review:  I have personally reviewed the results from the time of this admission to 10/23/2024 22:55 EDT and agree with these findings:  [x]  Laboratory list / accordion  []  Microbiology  [x]  Radiology  []  EKG/Telemetry   []  Cardiology/Vascular   []  Pathology  [x]  Old records  []  Other:  Most notable findings include:     CT Abdomen Pelvis Without Contrast    Result Date: 10/23/2024  CT ABDOMEN PELVIS WO CONTRAST-  INDICATION: Persistent vomiting  COMPARISON: CT abdomen pelvis May 27, 2024  TECHNIQUE: Routine CT abdomen/pelvis without IV contrast. Coronal and sagittal reformats. Radiation dose reduction techniques were utilized, including automated exposure control and exposure modulation based on body size.  FINDINGS:  Lung bases: Small amount of subsegmental atelectasis or scarring at the bases. Coronary artery atherosclerotic calcifications. Small amount of pericardial fluid.  ABDOMEN: No liver mass. Distended gallbladder without gallbladder wall thickening or surrounding inflammation. No biliary ductal dilatation. Calcifications in the spleen, consistent with prior granulomatous infection. Moderate to severe pancreatic atrophy. No pancreatic ductal dilatation or mass seen. No adrenal nodules. Small fluid  attenuating right renal cysts. Left nephrectomy. No urolithiasis or hydronephrosis.  Pelvis: Streak artifact from right hip arthroplasty and left femoral neck screws limits evaluation of the pelvis. Bladder is under distended. No bladder calculus seen. Uterus is present. No adnexal mass.  Bowel: No obstruction. Colonic diverticulosis. Normal appendix.  Abdominal wall: Abdominal and pelvic wall scarring.  Retroperitoneum: No lymphadenopathy.  Vasculature: No abdominal aortic aneurysm. Mild aortoiliac atherosclerotic calcification.  Osseous structures: Total right hip arthroplasty. Left femoral neck screws. Bilateral total knee arthroplasties. Open reduction internal fixation of the right femur. Old inferior sacral fracture. Old appearing superior endplate compression deformity at L2 with 30% collapse, with small amount of retropulsion of the posterior superior corner.       1. No acute findings identified in the abdomen or pelvis. 2. Left nephrectomy. 3. Colonic diverticulosis.  This report was finalized on 10/23/2024 6:03 PM by Dr. Jason Sanders M.D on Workstation: [id number]          Assessment & Plan   Assessment / Plan     Brief Patient Summary:  Marleni Hummel is a 88 y.o. female who is being admitted to the observation unit with acute on chronic renal insufficiency due to nausea and vomiting.  Patient reports that she has been dealing with nausea and vomiting for about 5 to 6 weeks but worsened over this past week along with nonbloody diarrhea.  In the ER, CT of the abdomen pelvis shows no acute findings.  Lab work notes a creatinine at 2.29, electrolytes within normal limits.  White blood cell count normal.  Hemoglobin stable at 11.6.  Urinalysis notes trace bacteria, 3-5 white blood cells, 1+ leukocytes but patient is asymptomatic at this time and just finished 2 course of antibiotics for UTI previously.  We will plan on continuing IV fluid hydration, check stool studies and repeat lab work in the  morning.     Active Hospital Problems:  Active Hospital Problems    Diagnosis     **Acute on chronic renal insufficiency      Plan:     Acute on chronic renal insufficiency  -Creatinine 2.29, baseline appears near 1.8  -IV fluid hydration, observe volume status on exam as patient with a history of heart failure  -Hold Lasix  -Avoid nephrotoxic medications when appropriate  -Trend a.m. labs    Nausea, vomiting, and diarrhea  -CT abdomen shows no acute findings  -Patient afebrile  -Check stool studies and C. difficile tox  -Antiemetics as needed    Headache  Recent CVA  -Patient was found on MRI to have a right temporal small acute ischemic stroke etiology cardioembolic from atrial fibrillation  -Patient reported aphasia and headache at that time  -Patient was started on magnesium and riboflavin  -IV magnesium, Tylenol added to Reglan for migraine  -Monitor blood pressure vital signs every 4 hours    Atrial fibrillation  -Rate controlled  -Continue Eliquis and metoprolol        VTE Prophylaxis:  Pharmacologic VTE prophylaxis orders are present.        CODE STATUS:    Level Of Support Discussed With: Patient  Code Status (Patient has no pulse and is not breathing): No CPR (Do Not Attempt to Resuscitate)  Medical Interventions (Patient has pulse or is breathing): Full Support  Comments: Patient notes has DNR paperwork at home. Alert and oriented to person, place and time.    Admission Status:  I believe this patient meets observation status.    75 minutes have been spent by [facility] providers in the care of this patient while under observation status.      Appropriate PPE worn during patient encounter.  Hand hygeine performed before and after seeing the patient.      Electronically signed by Luisa Cooper PA-C, 10/23/24, 8:00 PM EDT.

## 2024-10-23 NOTE — ED PROVIDER NOTES
EMERGENCY DEPARTMENT ENCOUNTER    Room Number:  111/1  PCP: Ken Andujar MD    HPI:  Chief Complaint: Vomiting  A complete HPI/ROS/PMH/PSH/SH/FH are unobtainable due to: None  Context: Marleni Hummel is a 88 y.o. female who presents to the ED c/o acute vomiting.  Onset about 2 weeks ago since she was diagnosed with a stroke.  This been a persistent symptom ever since she left the hospital.  She thought it would get better which is why she stayed at home.  However after 2 weeks she decided to come to the ER for evaluation.  She reports having mild abdominal pain that occurs when she starts to throw up but has not generally have abdominal pain.  No fever, persistent cough, shortness of breath, chest pain, urinary symptoms.        PAST MEDICAL HISTORY  Active Ambulatory Problems     Diagnosis Date Noted    Arthritis involving multiple sites     Essential hypertension     Permanent atrial fibrillation     History of Bell's palsy     CKD (chronic kidney disease) stage 4, GFR 15-29 ml/min     Gastroesophageal reflux disease without esophagitis     Hypercholesterolemia     Insomnia     Localized osteoporosis without current pathological fracture     Panic disorder     Chronic nonseasonal allergic rhinitis due to pollen     Other sleep apnea     History of MI (myocardial infarction) 12/21/2015    Chronic coronary artery disease 01/25/2016    Anemia of chronic disease 09/12/2016    Weakness of right leg 03/13/2017    Cardiac murmur 05/04/2017    Senile osteoporosis 06/30/2017    Hyperuricemia 09/07/2017    Grief reaction 09/30/2019    Peripheral vertigo 02/25/2020    Renal cell carcinoma of left kidney 11/22/2020    Renal oncocytoma of left kidney 12/28/2020    History of left nephrectomy 04/19/2021    Cerebrovascular accident (CVA) due to stenosis of small artery 11/29/2021    History of renal cell carcinoma 11/30/2021    Malignant neoplasm of left kidney, except renal pelvis 06/07/2022    Diverticulosis 12/14/2022     Irritable bowel syndrome with constipation 12/14/2022    Chronic diastolic congestive heart failure 03/18/2023    Hallucination, visual 01/17/2024    Hypomagnesemia 05/02/2024    Lumbar disc disease with radiculopathy 12/02/2021    History of cardiomyopathy-Takotsubo's 10/11/2024    Acute thrombotic stroke-right temporal 10/11/2024     Resolved Ambulatory Problems     Diagnosis Date Noted    Fatigue     Hip arthritis     IFG (impaired fasting glucose)     Rectal bleeding     Vitamin D deficiency     Urinary incontinence     History of right hip replacement 12/21/2015    Closed fracture of neck of right femur with routine healing 12/21/2015    History of right knee joint replacement 12/21/2015    History of left knee replacement 12/21/2015    Stress-induced cardiomyopathy 01/25/2016    Elevated serum free T4 level 03/21/2016    Urinary tract infection 10/05/2016    Acute pyelonephritis 10/06/2016    Acute cystitis 12/03/2016    Hematuria 12/12/2016    Sinusitis 01/22/2017    Frequent falls 03/13/2017    Atrial fibrillation with RVR 06/19/2017    ANGY (acute kidney injury) 06/20/2017    Viral gastroenteritis 06/20/2017    Dehydration 06/20/2017    Nausea & vomiting 06/20/2017    Diarrhea 06/20/2017    Closed displaced fracture of left femoral neck 08/30/2017    Bacteriuria 08/30/2017    New daily persistent headache 12/17/2018    Muscle tension headache 04/03/2019    Caregiver stress syndrome 04/17/2019    Acute vulvitis 08/21/2019    Dizziness 02/23/2020    Left facial numbness 02/23/2020    Stroke-like symptoms 02/23/2020    Left kidney mass 08/17/2020    Left renal mass 11/16/2020    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    Acute kidney injury 05/30/2022    Acute bronchitis due to Rhinovirus 05/31/2022    Hyperkalemia 05/31/2022    Diverticulitis 12/14/2022    CARROLL (dyspnea on exertion) 03/17/2023    Bradycardia, drug induced 05/27/2024    Shortness of breath 07/10/2024    Hyponatremia 07/11/2024     Past Medical History:    Diagnosis Date    Allergic     Allergic rhinitis     Arthropathy of knee     Atrial fibrillation     Back pain     Bell's palsy     Bradycardia 05/27/2024    Chronic kidney disease (CKD), stage III (moderate)     Edema     Encounter for eye exam 09/2020    GERD (gastroesophageal reflux disease)     H/O Heart murmur     Heart attack 10/05/2015    Herpes zoster without complication     History of anemia due to chronic kidney disease     History of bone density study 02/28/2008    History of pelvic fracture 2015    History of pneumonia     History of transfusion     Limited joint range of motion     Mixed hyperlipidemia     Osteoarthritis     Osteoporosis     PONV (postoperative nausea and vomiting)     Sleep apnea     Spinal stenosis, lumbar region with neurogenic claudication 12/02/2021    Stress          PAST SURGICAL HISTORY  Past Surgical History:   Procedure Laterality Date    CATARACT EXTRACTION Left 04/01/2013    Dr. Clarke    CATARACT EXTRACTION Right 04/16/2013    Dr. Clarke    COLONOSCOPY  04/18/2014    Dr. Elizabeth; no polyps    EPIDURAL BLOCK      HIP PERCUTANEOUS PINNING Left 8/31/2017    Procedure: LT HIP PERCUTANEOUS PINNING;  Surgeon: Sean Canales MD;  Location: [facility] OR;  Service:     MAMMO BILATERAL  11/2013    NEPHRECTOMY Left 11/16/2020    Procedure: LAPAROSCOPIC NEPHRECTOMY;  Surgeon: Chuckie Mclaughlin MD;  Location: [facility] OR;  Service: Urology;  Laterality: Left;    PAP SMEAR  02/2011    TIBIA FRACTURE SURGERY Right 2015    REMOVED PLATE LATER IN 2015    TONSILLECTOMY  1947    TOTAL HIP ARTHROPLASTY Right 08/2015    TOTAL HIP ARTHROPLASTY Right 09/2016    TOTAL KNEE ARTHROPLASTY Bilateral 2004         FAMILY HISTORY  Family History   Problem Relation Age of Onset    Hypertension Other     Hypertension Mother     Stroke Mother     Heart disease Mother     Hypertension Maternal Grandmother     Malig Hyperthermia Neg Hx          SOCIAL HISTORY  Social History      Socioeconomic History    Marital status:     Years of education: High school   Tobacco Use    Smoking status: Former     Current packs/day: 0.00     Average packs/day: 1 pack/day for 3.0 years (3.0 ttl pk-yrs)     Types: Cigarettes     Start date: 1953     Quit date: 1956     Years since quittin.7     Passive exposure: Past    Smokeless tobacco: Never    Tobacco comments:     caffeine - 1/2 cup coffee daily    Vaping Use    Vaping status: Never Used   Substance and Sexual Activity    Alcohol use: Not Currently     Alcohol/week: 3.0 standard drinks of alcohol     Types: 3 Glasses of wine per week     Comment: couple times a week    Drug use: Never    Sexual activity: Not Currently         ALLERGIES  Medrol [methylprednisolone], Morphine, Oxycodone, Ace inhibitors, Bactrim [sulfamethoxazole-trimethoprim], Levofloxacin, and Torsemide        REVIEW OF SYSTEMS  Review of Systems     Included in HPI  All systems reviewed and negative except for those discussed in HPI.       PHYSICAL EXAM  ED Triage Vitals [10/23/24 1725]   Temp Heart Rate Resp BP SpO2   99 °F (37.2 °C) 59 16 136/82 96 %      Temp src Heart Rate Source Patient Position BP Location FiO2 (%)   -- -- -- -- --       Physical Exam      GENERAL: no acute distress  HENT: nares patent, mucous membranes are moist  EYES: no scleral icterus  CV: regular rhythm, normal rate  RESPIRATORY: normal effort, clear to auscultation bilaterally  ABDOMEN: soft, nontender  MUSCULOSKELETAL: no deformity  NEURO: alert, moves all extremities, follows commands  PSYCH:  calm, cooperative  SKIN: warm, dry    Vital signs and nursing notes reviewed.          LAB RESULTS  Recent Results (from the past 24 hours)   Comprehensive Metabolic Panel    Collection Time: 10/23/24  5:42 PM    Specimen: Arm, Left; Blood   Result Value Ref Range    Glucose 101 (H) 65 - 99 mg/dL    BUN 32 (H) 8 - 23 mg/dL    Creatinine 2.29 (H) 0.57 - 1.00 mg/dL    Sodium 136 136 - 145  mmol/L    Potassium 5.0 3.5 - 5.2 mmol/L    Chloride 100 98 - 107 mmol/L    CO2 24.6 22.0 - 29.0 mmol/L    Calcium 10.0 8.6 - 10.5 mg/dL    Total Protein 7.3 6.0 - 8.5 g/dL    Albumin 4.1 3.5 - 5.2 g/dL    ALT (SGPT) 14 1 - 33 U/L    AST (SGOT) 24 1 - 32 U/L    Alkaline Phosphatase 67 39 - 117 U/L    Total Bilirubin 0.7 0.0 - 1.2 mg/dL    Globulin 3.2 gm/dL    A/G Ratio 1.3 g/dL    BUN/Creatinine Ratio 14.0 7.0 - 25.0    Anion Gap 11.4 5.0 - 15.0 mmol/L    eGFR 20.3 (L) >60.0 mL/min/1.73   Lipase    Collection Time: 10/23/24  5:42 PM    Specimen: Arm, Left; Blood   Result Value Ref Range    Lipase 22 13 - 60 U/L   CBC Auto Differential    Collection Time: 10/23/24  5:42 PM    Specimen: Arm, Left; Blood   Result Value Ref Range    WBC 6.72 3.40 - 10.80 10*3/mm3    RBC 3.93 3.77 - 5.28 10*6/mm3    Hemoglobin 11.6 (L) 12.0 - 15.9 g/dL    Hematocrit 37.2 34.0 - 46.6 %    MCV 94.7 79.0 - 97.0 fL    MCH 29.5 26.6 - 33.0 pg    MCHC 31.2 (L) 31.5 - 35.7 g/dL    RDW 13.1 12.3 - 15.4 %    RDW-SD 45.5 37.0 - 54.0 fl    MPV 9.9 6.0 - 12.0 fL    Platelets 211 140 - 450 10*3/mm3    Neutrophil % 71.3 42.7 - 76.0 %    Lymphocyte % 18.9 (L) 19.6 - 45.3 %    Monocyte % 6.4 5.0 - 12.0 %    Eosinophil % 3.0 0.3 - 6.2 %    Basophil % 0.3 0.0 - 1.5 %    Immature Grans % 0.1 0.0 - 0.5 %    Neutrophils, Absolute 4.79 1.70 - 7.00 10*3/mm3    Lymphocytes, Absolute 1.27 0.70 - 3.10 10*3/mm3    Monocytes, Absolute 0.43 0.10 - 0.90 10*3/mm3    Eosinophils, Absolute 0.20 0.00 - 0.40 10*3/mm3    Basophils, Absolute 0.02 0.00 - 0.20 10*3/mm3    Immature Grans, Absolute 0.01 0.00 - 0.05 10*3/mm3    nRBC 0.0 0.0 - 0.2 /100 WBC   Urinalysis With Microscopic If Indicated (No Culture) - Urine, Clean Catch    Collection Time: 10/23/24  6:05 PM    Specimen: Urine, Clean Catch   Result Value Ref Range    Color, UA Dark Yellow (A) Yellow, Straw    Appearance, UA Clear Clear    pH, UA 7.5 5.0 - 8.0    Specific Gravity, UA 1.018 1.005 - 1.030    Glucose, UA  Negative Negative    Ketones, UA Negative Negative    Bilirubin, UA Negative Negative    Blood, UA Negative Negative    Protein, UA Trace (A) Negative    Leuk Esterase, UA Small (1+) (A) Negative    Nitrite, UA Negative Negative    Urobilinogen, UA 0.2 E.U./dL 0.2 - 1.0 E.U./dL   Urinalysis, Microscopic Only - Urine, Clean Catch    Collection Time: 10/23/24  6:05 PM    Specimen: Urine, Clean Catch   Result Value Ref Range    RBC, UA None Seen None Seen, 0-2 /HPF    WBC, UA 3-5 (A) None Seen, 0-2 /HPF    Bacteria, UA Trace (A) None Seen /HPF    Squamous Epithelial Cells, UA 0-2 None Seen, 0-2 /HPF    Hyaline Casts, UA None Seen None Seen /LPF    Methodology Manual Light Microscopy        Ordered the above labs and reviewed the results.        RADIOLOGY  CT Abdomen Pelvis Without Contrast    Result Date: 10/23/2024  CT ABDOMEN PELVIS WO CONTRAST-  INDICATION: Persistent vomiting  COMPARISON: CT abdomen pelvis May 27, 2024  TECHNIQUE: Routine CT abdomen/pelvis without IV contrast. Coronal and sagittal reformats. Radiation dose reduction techniques were utilized, including automated exposure control and exposure modulation based on body size.  FINDINGS:  Lung bases: Small amount of subsegmental atelectasis or scarring at the bases. Coronary artery atherosclerotic calcifications. Small amount of pericardial fluid.  ABDOMEN: No liver mass. Distended gallbladder without gallbladder wall thickening or surrounding inflammation. No biliary ductal dilatation. Calcifications in the spleen, consistent with prior granulomatous infection. Moderate to severe pancreatic atrophy. No pancreatic ductal dilatation or mass seen. No adrenal nodules. Small fluid attenuating right renal cysts. Left nephrectomy. No urolithiasis or hydronephrosis.  Pelvis: Streak artifact from right hip arthroplasty and left femoral neck screws limits evaluation of the pelvis. Bladder is under distended. No bladder calculus seen. Uterus is present. No adnexal  mass.  Bowel: No obstruction. Colonic diverticulosis. Normal appendix.  Abdominal wall: Abdominal and pelvic wall scarring.  Retroperitoneum: No lymphadenopathy.  Vasculature: No abdominal aortic aneurysm. Mild aortoiliac atherosclerotic calcification.  Osseous structures: Total right hip arthroplasty. Left femoral neck screws. Bilateral total knee arthroplasties. Open reduction internal fixation of the right femur. Old inferior sacral fracture. Old appearing superior endplate compression deformity at L2 with 30% collapse, with small amount of retropulsion of the posterior superior corner.       1. No acute findings identified in the abdomen or pelvis. 2. Left nephrectomy. 3. Colonic diverticulosis.  This report was finalized on 10/23/2024 6:03 PM by Dr. Jason Sanders M.D on Workstation: [id number]       Ordered the above noted radiological studies. Reviewed by me in PACS.        MEDICATIONS GIVEN IN ER  Medications   sodium chloride 0.9 % flush 10 mL (has no administration in time range)   sodium chloride 0.9 % flush 10 mL (10 mL Intravenous Given 10/23/24 2154)   sodium chloride 0.9 % flush 10 mL (has no administration in time range)   ondansetron ODT (ZOFRAN-ODT) disintegrating tablet 4 mg (has no administration in time range)     Or   ondansetron (ZOFRAN) injection 4 mg (has no administration in time range)   nitroglycerin (NITROSTAT) SL tablet 0.4 mg (has no administration in time range)   lactated ringers infusion (100 mL/hr Intravenous New Bag 10/23/24 2154)   acetaminophen (TYLENOL) tablet 1,000 mg (1,000 mg Oral Not Given 10/23/24 2128)   atorvastatin (LIPITOR) tablet 40 mg (40 mg Oral Given 10/23/24 2212)   apixaban (ELIQUIS) tablet 2.5 mg (2.5 mg Oral Given 10/23/24 2213)   cetirizine (zyrTEC) tablet 10 mg (has no administration in time range)   furosemide (LASIX) tablet 20 mg ( Oral Held by provider 10/23/24 2155)   melatonin tablet 5 mg (has no administration in time range)   metoprolol succinate  XL (TOPROL-XL) 24 hr tablet 12.5 mg (has no administration in time range)   sodium chloride 0.9 % bolus 1,000 mL (1,000 mL Intravenous New Bag 10/23/24 1856)   metoclopramide (REGLAN) injection 10 mg (10 mg Intravenous Given 10/23/24 1901)   famotidine (PEPCID) injection 20 mg (20 mg Intravenous Given 10/23/24 2154)   magnesium sulfate in D5W 1g/100mL (PREMIX) (1 g Intravenous New Bag 10/23/24 2204)         ORDERS PLACED DURING THIS VISIT:  Orders Placed This Encounter   Procedures    Gastrointestinal Panel, PCR - Stool, Per Rectum    Clostridioides difficile Toxin - Stool, Per Rectum    Clostridioides difficile Toxin, PCR - Stool, Per Rectum    CT Abdomen Pelvis Without Contrast    Comprehensive Metabolic Panel    Lipase    Urinalysis With Microscopic If Indicated (No Culture) - Urine, Clean Catch    CBC Auto Differential    Urinalysis, Microscopic Only - Urine, Clean Catch    CBC (No Diff)    Basic Metabolic Panel    Diet: Liquid; Clear Liquid; Fluid Consistency: Thin (IDDSI 0)    Monitor Blood Pressure    Intake & Output    Weigh Patient    Oral Care    Maintain IV Access    Telemetry - Place Orders & Notify Provider of Results When Patient Experiences Acute Chest Pain, Dysrhythmia or Respiratory Distress    May Be Off Telemetry for Tests    Vital Signs    Continuous Pulse Oximetry    Up With Assistance    Notify Provider (With Default Parameters)    Advance Diet As Tolerated -    Code Status and Medical Interventions: No CPR (Do Not Attempt to Resuscitate); Full Support; Patient notes has DNR paperwork at home. Alert and oriented to person, place and time.    Patient Isolation Contact Spore    PT Consult: Eval & Treat Functional Mobility Below Baseline    Insert Peripheral IV    Insert Peripheral IV    Initiate ED Observation Status    CBC & Differential         OUTPATIENT MEDICATION MANAGEMENT:  Current Facility-Administered Medications Ordered in Epic   Medication Dose Route Frequency Provider Last Rate Last  Admin    acetaminophen (TYLENOL) tablet 1,000 mg  1,000 mg Oral Once AllisonMichelLuisa R, PA-C        apixaban (ELIQUIS) tablet 2.5 mg  2.5 mg Oral Q12H AllisonKalinaLuisa R, PA-C   2.5 mg at 10/23/24 2213    atorvastatin (LIPITOR) tablet 40 mg  40 mg Oral Nightly Allison, Luisa R, PA-C   40 mg at 10/23/24 2212    [START ON 10/24/2024] cetirizine (zyrTEC) tablet 10 mg  10 mg Oral Daily AllisonKalinaLuisa R, PA-C        [Held by provider] furosemide (LASIX) tablet 20 mg  20 mg Oral Daily PRN Luisa Cooper PA-C        lactated ringers infusion  100 mL/hr Intravenous Continuous Kalina Cooperlin R, PA-C 100 mL/hr at 10/23/24 2154 100 mL/hr at 10/23/24 2154    melatonin tablet 5 mg  5 mg Oral Nightly PRN Kalina Cooperlin BROOK PA-C        [START ON 10/24/2024] metoprolol succinate XL (TOPROL-XL) 24 hr tablet 12.5 mg  12.5 mg Oral Daily Kalina Cooperlin BROOK PA-C        nitroglycerin (NITROSTAT) SL tablet 0.4 mg  0.4 mg Sublingual Q5 Min PRN Allison, Luisa R, PA-C        ondansetron ODT (ZOFRAN-ODT) disintegrating tablet 4 mg  4 mg Oral Q6H PRN Michel Cooperitlin R PA-C        Or    ondansetron (ZOFRAN) injection 4 mg  4 mg Intravenous Q6H PRN Allison, Luisa R, PA-C        sodium chloride 0.9 % flush 10 mL  10 mL Intravenous PRN Eddie Jacob II, MD        sodium chloride 0.9 % flush 10 mL  10 mL Intravenous Q12H Allison Luisa R, PA-C   10 mL at 10/23/24 2154    sodium chloride 0.9 % flush 10 mL  10 mL Intravenous PRN Allison Luisa R PA-C         No current [facility]-ordered outpatient medications on file.       PROCEDURES  Procedures          MEDICAL DECISION MAKING, PROGRESS, and CONSULTS    Discussion below represents my analysis of pertinent findings related to patient's condition, differential diagnosis, treatment plan and final disposition.      Additional sources:  - Discussed/obtained information from independent historians: Daughter  Additional information was obtained to  confirm the patient's history.        Differential diagnosis:    Small bowel obstruction, viral illness, gastroenteritis, intracranial mass causing vomiting gastroparesis               Independent interpretation of labs, radiology studies, and discussions with consultants:  ED Course as of 10/23/24 2305   Wed Oct 23, 2024   1841 CT of the abdomen pelvis independently interpreted by myself.  Left nephrectomy. [TD]   1841 Creatinine(!): 2.29 [TD]   1841 Anion Gap: 11.4 [TD]   1841 CO2: 24.6 [TD]   1841 Calcium: 10.0 [TD]   1853 Daughter provided additional information.  She states that the vomiting has been proceeding even the diagnosis of the stroke.  As such, I do not think a repeat CT scan of the head is clinically indicated at this time.  However, patient has had persistent vomiting despite Zofran that has been available through PCP. [TD]   1938 Discussed the case with ERIC Barber.  She will admit to the observation unit. [TD]      ED Course User Index  [TD] Eddie Jacob II, MD           DIAGNOSIS  Final diagnoses:   Nausea and vomiting, unspecified vomiting type         DISPOSITION  Admit      Latest Documented Vital Signs:  As of 23:05 EDT  BP- 164/88 HR- 68 Temp- 97.9 °F (36.6 °C) (Oral) O2 sat- 95%      --    Please note that portions of this were completed with a voice recognition program.       Note Disclaimer: At [facility], we believe that sharing information builds trust and better relationships. You are receiving this note because you are receiving care at [facility] or recently visited. It is possible you will see health information before a provider has talked with you about it. This kind of information can be easy to misunderstand. To help you fully understand what it means for your health, we urge you to discuss this note with your provider.         Eddie Jacob II, MD  10/23/24 2305

## 2024-10-23 NOTE — ED NOTES
"Nursing report ED to floor  Marleni Hummel  88 y.o.  female    HPI :  HPI  Stated Reason for Visit: n/v  History Obtained From: EMS    Chief Complaint  Chief Complaint   Patient presents with    Nausea       Admitting doctor:   Eddie MENDOZA MD    Admitting diagnosis:         Code status:   Current Code Status       Date Active Code Status Order ID Comments User Context       Prior            Allergies:   Medrol [methylprednisolone], Morphine, Oxycodone, Ace inhibitors, Bactrim [sulfamethoxazole-trimethoprim], Levofloxacin, and Torsemide    Isolation:   No active isolations    Intake and Output  No intake or output data in the 24 hours ending 10/23/24 1945    Weight:       10/23/24  1725   Weight: 59 kg (130 lb)       Most recent vitals:   Vitals:    10/23/24 1725 10/23/24 1830   BP: 136/82 131/65   Pulse: 59 60   Resp: 16    Temp: 99 °F (37.2 °C)    SpO2: 96% 97%   Weight: 59 kg (130 lb)    Height: 152.4 cm (60\")        Active LDAs/IV Access:   Lines, Drains & Airways       Active LDAs       Name Placement date Placement time Site Days    Peripheral IV 10/23/24 1727 Right Antecubital 10/23/24  1727  Antecubital  less than 1                    Labs (abnormal labs have a star):   Labs Reviewed   COMPREHENSIVE METABOLIC PANEL - Abnormal; Notable for the following components:       Result Value    Glucose 101 (*)     BUN 32 (*)     Creatinine 2.29 (*)     eGFR 20.3 (*)     All other components within normal limits    Narrative:     GFR Normal >60  Chronic Kidney Disease <60  Kidney Failure <15    The GFR formula is only valid for adults with stable renal function between ages 18 and 70.   URINALYSIS W/ MICROSCOPIC IF INDICATED (NO CULTURE) - Abnormal; Notable for the following components:    Color, UA Dark Yellow (*)     Protein, UA Trace (*)     Leuk Esterase, UA Small (1+) (*)     All other components within normal limits   CBC WITH AUTO DIFFERENTIAL - Abnormal; Notable for the following components:    " Hemoglobin 11.6 (*)     MCHC 31.2 (*)     Lymphocyte % 18.9 (*)     All other components within normal limits   URINALYSIS, MICROSCOPIC ONLY - Abnormal; Notable for the following components:    WBC, UA 3-5 (*)     Bacteria, UA Trace (*)     All other components within normal limits   LIPASE - Normal   CBC AND DIFFERENTIAL    Narrative:     The following orders were created for panel order CBC & Differential.  Procedure                               Abnormality         Status                     ---------                               -----------         ------                     CBC Auto Differential[[id number]]        Abnormal            Final result                 Please view results for these tests on the individual orders.       EKG:   No orders to display       Meds given in ED:   Medications   sodium chloride 0.9 % flush 10 mL (has no administration in time range)   sodium chloride 0.9 % bolus 1,000 mL (1,000 mL Intravenous New Bag 10/23/24 1856)   metoclopramide (REGLAN) injection 10 mg (10 mg Intravenous Given 10/23/24 190)       Imaging results:  CT Abdomen Pelvis Without Contrast    Result Date: 10/23/2024   1. No acute findings identified in the abdomen or pelvis. 2. Left nephrectomy. 3. Colonic diverticulosis.  This report was finalized on 10/23/2024 6:03 PM by Dr. Jason Sanders M.D on Workstation: [id number]       Ambulatory status:   - assist 2     Social issues:   Social History     Socioeconomic History    Marital status:     Years of education: High school   Tobacco Use    Smoking status: Former     Current packs/day: 0.00     Average packs/day: 1 pack/day for 3.0 years (3.0 ttl pk-yrs)     Types: Cigarettes     Start date: 1953     Quit date: 1956     Years since quittin.7     Passive exposure: Past    Smokeless tobacco: Never    Tobacco comments:     caffeine - 1/2 cup coffee daily    Vaping Use    Vaping status: Never Used   Substance and Sexual Activity    Alcohol  use: Not Currently     Alcohol/week: 3.0 standard drinks of alcohol     Types: 3 Glasses of wine per week     Comment: couple times a week    Drug use: Never    Sexual activity: Not Currently       Peripheral Neurovascular  Peripheral Neurovascular (Adult)  Peripheral Neurovascular WDL: neurovascular assessment lower  LLE Neurovascular Assessment  Temperature LLE: cool  Color LLE: no discoloration  Sensation LLE: numbness present  RLE Neurovascular Assessment  Temperature RLE: cool  Color RLE: no discoloration  Sensation RLE: numbness present    Neuro Cognitive  Neuro Cognitive (Adult)  Cognitive/Neuro/Behavioral WDL: WDL    Learning  Learning Assessment  Learning Readiness and Ability: no barriers identified    Respiratory  Respiratory WDL  Respiratory WDL: WDL    Abdominal Pain       Pain Assessments  Pain (Adult)  (0-10) Pain Rating: Rest: 0    NIH Stroke Scale       Shaniqua Unger RN  10/23/24 19:45 EDT

## 2024-10-24 ENCOUNTER — READMISSION MANAGEMENT (OUTPATIENT)
Dept: CALL CENTER | Facility: HOSPITAL | Age: 89
End: 2024-10-24
Payer: MEDICARE

## 2024-10-24 VITALS
DIASTOLIC BLOOD PRESSURE: 60 MMHG | RESPIRATION RATE: 16 BRPM | SYSTOLIC BLOOD PRESSURE: 158 MMHG | BODY MASS INDEX: 25.52 KG/M2 | WEIGHT: 130 LBS | HEART RATE: 59 BPM | TEMPERATURE: 97.5 F | OXYGEN SATURATION: 96 % | HEIGHT: 60 IN

## 2024-10-24 LAB
ANION GAP SERPL CALCULATED.3IONS-SCNC: 10.1 MMOL/L (ref 5–15)
BUN SERPL-MCNC: 31 MG/DL (ref 8–23)
BUN/CREAT SERPL: 16.1 (ref 7–25)
CALCIUM SPEC-SCNC: 9.4 MG/DL (ref 8.6–10.5)
CHLORIDE SERPL-SCNC: 105 MMOL/L (ref 98–107)
CO2 SERPL-SCNC: 22.9 MMOL/L (ref 22–29)
CREAT SERPL-MCNC: 1.93 MG/DL (ref 0.57–1)
DEPRECATED RDW RBC AUTO: 45.7 FL (ref 37–54)
EGFRCR SERPLBLD CKD-EPI 2021: 25 ML/MIN/1.73
ERYTHROCYTE [DISTWIDTH] IN BLOOD BY AUTOMATED COUNT: 13.1 % (ref 12.3–15.4)
GLUCOSE SERPL-MCNC: 90 MG/DL (ref 65–99)
HCT VFR BLD AUTO: 33.2 % (ref 34–46.6)
HGB BLD-MCNC: 10.5 G/DL (ref 12–15.9)
MCH RBC QN AUTO: 30 PG (ref 26.6–33)
MCHC RBC AUTO-ENTMCNC: 31.6 G/DL (ref 31.5–35.7)
MCV RBC AUTO: 94.9 FL (ref 79–97)
PLATELET # BLD AUTO: 181 10*3/MM3 (ref 140–450)
PMV BLD AUTO: 10 FL (ref 6–12)
POTASSIUM SERPL-SCNC: 4.9 MMOL/L (ref 3.5–5.2)
RBC # BLD AUTO: 3.5 10*6/MM3 (ref 3.77–5.28)
SODIUM SERPL-SCNC: 138 MMOL/L (ref 136–145)
WBC NRBC COR # BLD AUTO: 5.21 10*3/MM3 (ref 3.4–10.8)

## 2024-10-24 PROCEDURE — 80048 BASIC METABOLIC PNL TOTAL CA: CPT | Performed by: STUDENT IN AN ORGANIZED HEALTH CARE EDUCATION/TRAINING PROGRAM

## 2024-10-24 PROCEDURE — 90662 IIV NO PRSV INCREASED AG IM: CPT | Performed by: NURSE PRACTITIONER

## 2024-10-24 PROCEDURE — G0378 HOSPITAL OBSERVATION PER HR: HCPCS

## 2024-10-24 PROCEDURE — 96361 HYDRATE IV INFUSION ADD-ON: CPT

## 2024-10-24 PROCEDURE — 25010000002 INFLUENZA VAC SPLIT HIGH-DOSE 0.5 ML SUSPENSION PREFILLED SYRINGE: Performed by: NURSE PRACTITIONER

## 2024-10-24 PROCEDURE — G0008 ADMIN INFLUENZA VIRUS VAC: HCPCS | Performed by: NURSE PRACTITIONER

## 2024-10-24 PROCEDURE — 97161 PT EVAL LOW COMPLEX 20 MIN: CPT

## 2024-10-24 PROCEDURE — 85027 COMPLETE CBC AUTOMATED: CPT | Performed by: STUDENT IN AN ORGANIZED HEALTH CARE EDUCATION/TRAINING PROGRAM

## 2024-10-24 RX ADMIN — INFLUENZA A VIRUS A/VICTORIA/4897/2022 IVR-238 (H1N1) ANTIGEN (FORMALDEHYDE INACTIVATED), INFLUENZA A VIRUS A/CALIFORNIA/122/2022 SAN-022 (H3N2) ANTIGEN (FORMALDEHYDE INACTIVATED), AND INFLUENZA B VIRUS B/MICHIGAN/01/2021 ANTIGEN (FORMALDEHYDE INACTIVATED) 0.5 ML: 60; 60; 60 INJECTION, SUSPENSION INTRAMUSCULAR at 11:43

## 2024-10-24 RX ADMIN — Medication 10 ML: at 08:31

## 2024-10-24 RX ADMIN — METOPROLOL SUCCINATE 12.5 MG: 25 TABLET, EXTENDED RELEASE ORAL at 08:31

## 2024-10-24 RX ADMIN — CETIRIZINE HYDROCHLORIDE 10 MG: 10 TABLET ORAL at 08:31

## 2024-10-24 RX ADMIN — APIXABAN 2.5 MG: 2.5 TABLET, FILM COATED ORAL at 08:31

## 2024-10-24 NOTE — THERAPY EVALUATION
Patient Name: Marleni Hummel  : 1935    MRN: [id number]                              Today's Date: 10/24/2024       Admit Date: 10/23/2024    Visit Dx:     ICD-10-CM ICD-9-CM   1. Nausea and vomiting, unspecified vomiting type  R11.2 787.01     Patient Active Problem List   Diagnosis    Arthritis involving multiple sites    Essential hypertension    Permanent atrial fibrillation    History of Bell's palsy    CKD (chronic kidney disease) stage 4, GFR 15-29 ml/min    Gastroesophageal reflux disease without esophagitis    Hypercholesterolemia    Insomnia    Localized osteoporosis without current pathological fracture    Panic disorder    Chronic nonseasonal allergic rhinitis due to pollen    Other sleep apnea    History of MI (myocardial infarction)    Chronic coronary artery disease    Anemia of chronic disease    Weakness of right leg    Cardiac murmur    Senile osteoporosis    Hyperuricemia    Grief reaction    Peripheral vertigo    Renal cell carcinoma of left kidney    Renal oncocytoma of left kidney    History of left nephrectomy    Cerebrovascular accident (CVA) due to stenosis of small artery    History of renal cell carcinoma    Malignant neoplasm of left kidney, except renal pelvis    Diverticulosis    Irritable bowel syndrome with constipation    Chronic diastolic congestive heart failure    Hallucination, visual    Hypomagnesemia    Lumbar disc disease with radiculopathy    History of cardiomyopathy-Takotsubo's    Acute thrombotic stroke-right temporal    Acute on chronic renal insufficiency     Past Medical History:   Diagnosis Date    Acute bronchitis due to Rhinovirus 2022    Acute vulvitis 2019    Allergic     Allergic rhinitis     Anemia of chronic disease     Arthropathy of knee     right    Atrial fibrillation     Back pain     Bell's palsy     Bradycardia 2024    Bradycardia, drug induced 2024    Caregiver stress syndrome 2019    Chronic coronary artery  disease     Chronic kidney disease (CKD), stage III (moderate)     CKD (chronic kidney disease) stage 4, GFR 15-29 ml/min     Diverticulitis 12/14/2022    CARROLL (dyspnea on exertion) 03/17/2023    Edema     Elevated serum free T4 level 03/21/2016    Encounter for eye exam 09/2020    Essential hypertension     Fatigue     GERD (gastroesophageal reflux disease)     H/O Heart murmur     Heart attack 10/05/2015    Hematuria 12/12/2016    Herpes zoster without complication     Hip arthritis     left    History of anemia due to chronic kidney disease     History of bone density study 02/28/2008    History of pelvic fracture 2015    History of pneumonia     History of transfusion     2015    Hypercholesterolemia     IFG (impaired fasting glucose)     Insomnia     Left kidney mass 07/2020    Limited joint range of motion     RIGHT KNEE    Mixed hyperlipidemia     Muscle tension headache 04/03/2019    New daily persistent headache 12/17/2018    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 Clarke    CATARACT EXTRACTION Right 04/16/2013    Dr. Clarke    COLONOSCOPY  04/18/2014    Dr. Elizabeth; no polyps    EPIDURAL BLOCK      HIP PERCUTANEOUS PINNING Left 8/31/2017    Procedure: LT HIP PERCUTANEOUS PINNING;  Surgeon: Sean Canales MD;  Location: [facility] OR;  Service:     MAMMO BILATERAL  11/2013    NEPHRECTOMY Left 11/16/2020    Procedure: LAPAROSCOPIC NEPHRECTOMY;  Surgeon: Chuckie Mclaughlin MD;  Location: [facility] OR;  Service: Urology;  Laterality: Left;     PAP SMEAR  02/2011    TIBIA FRACTURE SURGERY Right 2015    REMOVED PLATE LATER IN 2015    TONSILLECTOMY  1947    TOTAL HIP ARTHROPLASTY Right 08/2015    TOTAL HIP ARTHROPLASTY Right 09/2016    TOTAL KNEE ARTHROPLASTY Bilateral 2004      General Information       Row Name 10/24/24 1001          Physical Therapy Time and Intention    Document Type evaluation (P)   -PB     Mode of Treatment individual therapy;physical therapy (P)   -PB       Row Name 10/24/24 1001          General Information    Patient Profile Reviewed yes (P)   -PB     Prior Level of Function independent:;gait;transfer;bed mobility (P)   Pt uses walker at baseline.  -PB     Existing Precautions/Restrictions no known precautions/restrictions (P)   -PB     Barriers to Rehab none identified (P)   -PB       Row Name 10/24/24 1001          Living Environment    People in Home child(amy), adult (P)   Pt states she lives with her daughter.  -PB       Row Name 10/24/24 1001          Home Main Entrance    Number of Stairs, Main Entrance one (P)   Pt reports having a ramp to back entrance.  -PB     Stair Railings, Main Entrance railings safe and in good condition (P)   -PB       Row Name 10/24/24 1001          Cognition    Orientation Status (Cognition) oriented x 4 (P)   -PB       Row Name 10/24/24 1001          Safety Issues/Impairments Affecting Functional Mobility    Impairments Affecting Function (Mobility) strength;endurance/activity tolerance (P)   -PB               User Key  (r) = Recorded By, (t) = Taken By, (c) = Cosigned By      Initials Name Provider Type    PB Cesar Fonseca, PT Student PT Student                   Mobility       Row Name 10/24/24 1003          Bed Mobility    Bed Mobility bed mobility (all) activities;supine-sit;sit-supine (P)   -PB     All Activities, [place] (Bed Mobility) independent (P)   -PB     Supine-Sit Bound[place] (Bed Mobility) independent (P)   -PB     Sit-Supine [place] (Bed Mobility) independent (P)   -PB      Assistive Device (Bed Mobility) head of bed elevated (P)   -PB       Row Name 10/24/24 1003          Sit-Stand Transfer    Sit-Stand [place] (Transfers) independent (P)   -PB     Assistive Device (Sit-Stand Transfers) walker, front-wheeled (P)   -PB     Comment, (Sit-Stand Transfer) Stands with right leg bent out due to past hip surgeries. (P)   -PB       Row Name 10/24/24 1003          Gait/Stairs (Locomotion)    [place] Level (Gait) independent (P)   -PB     Assistive Device (Gait) walker, front-wheeled (P)   -PB     Patient was able to Ambulate yes (P)   -PB     Distance in Feet (Gait) 100 (P)   -PB     Deviations/Abnormal Patterns (Gait) david decreased;gait speed decreased (P)   -PB               User Key  (r) = Recorded By, (t) = Taken By, (c) = Cosigned By      Initials Name Provider Type    Cesar Allen, PT Student PT Student                   Obj/Interventions       Row Name 10/24/24 1005          Range of Motion Comprehensive    General Range of Motion no range of motion deficits identified (P)   -PB       Row Name 10/24/24 1005          Strength Comprehensive (MMT)    General Manual Muscle Testing (MMT) Assessment no strength deficits identified (P)   -PB       Row Name 10/24/24 1005          Balance    Balance Assessment standing static balance;standing dynamic balance (P)   -PB     Static Standing Balance independent (P)   -PB     Dynamic Standing Balance independent (P)   -PB     Position/Device Used, Standing Balance walker, front-wheeled (P)   -PB               User Key  (r) = Recorded By, (t) = Taken By, (c) = Cosigned By      Initials Name Provider Type    Cesar Allen PT Student PT Student                   Goals/Plan    No documentation.                  Clinical Impression       Row Name 10/24/24 1006          Pain    Pretreatment Pain Rating 0/10 - no pain (P)   -PB     Posttreatment Pain Rating 0/10 - no pain (P)   -PB       Row Name 10/24/24 1006          Plan of Care  Review    Plan of Care Reviewed With patient (P)   -PB     Outcome Evaluation Pt is an 87 y/o F presenting with nausea from renal insuffciency. Pt reports no nausea at this time and was independent with BM,STS, and gait. pt was able to ambulate 100 feet with RW independently. Pt is safe to go home with her daughter upon discharge. (P)   -PB       Row Name 10/24/24 1006          Therapy Assessment/Plan (PT)    Patient/Family Therapy Goals Statement (PT) to return to PLOF. (P)   -PB     Criteria for Skilled Interventions Met (PT) no problems identified which require skilled intervention (P)   -PB     Therapy Frequency (PT) evaluation only (P)   -PB       Row Name 10/24/24 1006          Positioning and Restraints    Pre-Treatment Position in bed (P)   -PB     Post Treatment Position bed (P)   -PB     In Bed supine;call light within reach;encouraged to call for assist;exit alarm on (P)   -PB               User Key  (r) = Recorded By, (t) = Taken By, (c) = Cosigned By      Initials Name Provider Type    Cesar Allen, PT Student PT Student                   Outcome Measures       Row Name 10/24/24 1010          How much help from another person do you currently need...    Turning from your back to your side while in flat bed without using bedrails? 4 (P)   -PB     Moving from lying on back to sitting on the side of a flat bed without bedrails? 4 (P)   -PB     Moving to and from a bed to a chair (including a wheelchair)? 4 (P)   -PB     Standing up from a chair using your arms (e.g., wheelchair, bedside chair)? 4 (P)   -PB     Climbing 3-5 steps with a railing? 4 (P)   -PB     To walk in hospital room? 4 (P)   -PB     AM-PAC 6 Clicks Score (PT) 24 (P)   -PB               User Key  (r) = Recorded By, (t) = Taken By, (c) = Cosigned By      Initials Name Provider Type    Cesar Allen, PT Student PT Student                                 Physical Therapy Education       Title: PT OT SLP Therapies (In Progress)        Topic: Physical Therapy (In Progress)       Point: Mobility training (Done)       Learning Progress Summary            Patient Acceptance, D,E,TB, VU,NR by PB at 10/24/2024 1010                      Point: Home exercise program (Not Started)       Learner Progress:  Not documented in this visit.              Point: Body mechanics (Done)       Learning Progress Summary            Patient Acceptance, D,E,TB, VU,NR by PB at 10/24/2024 1010                      Point: Precautions (Done)       Learning Progress Summary            Patient Acceptance, D,E,TB, VU,NR by PB at 10/24/2024 1010                                      User Key       Initials Effective Dates Name Provider Type Discipline    PB 10/22/24 -  eCsar Fonseca, PT Student PT Student PT                  PT Recommendation and Plan     Outcome Evaluation: (P) Pt is an 89 y/o F presenting with nausea from renal insuffciency. Pt reports no nausea at this time and was independent with BM,STS, and gait. pt was able to ambulate 100 feet with RW independently. Pt is safe to go home with her daughter upon discharge.     Time Calculation:         PT Charges       Row Name 10/24/24 1011             Time Calculation    Start Time 0925 (P)   -PB      Stop Time 0940 (P)   -PB      Time Calculation (min) 15 min (P)   -PB      PT Received On 10/24/24 (P)   -PB                User Key  (r) = Recorded By, (t) = Taken By, (c) = Cosigned By      Initials Name Provider Type    PB Cesar Fonseca, PT Student PT Student                  Therapy Charges for Today       Code Description Service Date Service Provider Modifiers Qty    [id number] HC PT EVAL LOW COMPLEXITY 2 10/24/2024 Cesar Fonseca, PT Student GP 1            PT G-Codes  AM-PAC 6 Clicks Score (PT): (P) 24  PT Discharge Summary  Anticipated Discharge Disposition (PT): (P) home, home with assist    DAILY Mathur  10/24/2024

## 2024-10-24 NOTE — CASE MANAGEMENT/SOCIAL WORK
Case Management Discharge Note      Final Note: pt d/c'ed home; no needs         Selected Continued Care - Admitted Since 10/23/2024       Destination    No services have been selected for the patient.                Durable Medical Equipment    No services have been selected for the patient.                Dialysis/Infusion    No services have been selected for the patient.                Home Medical Care    No services have been selected for the patient.                Therapy    No services have been selected for the patient.                Community Resources    No services have been selected for the patient.                Community & DME    No services have been selected for the patient.                    Transportation Services  Private: Car    Final Discharge Disposition Code: 01 - home or self-care

## 2024-10-24 NOTE — OUTREACH NOTE
Prep Survey      Flowsheet Row Responses   [facility] facility patient discharged from? [facility]   Is LACE score < 7 ? No   Eligibility [facility]   Date of Admission 10/23/24   Date of Discharge 10/24/24   Discharge Disposition Home or Self Care   Discharge diagnosis a/c Renal insufficiency   Does the patient have one of the following disease processes/diagnoses(primary or secondary)? Other   Does the patient have Home health ordered? No   Is there a DME ordered? No   Prep survey completed? Yes            JERICA A - Registered Nurse

## 2024-10-24 NOTE — PLAN OF CARE
Goal Outcome Evaluation:  Plan of Care Reviewed With: (P) patient           Outcome Evaluation: (P) Pt is an 89 y/o F presenting with nausea from renal insuffciency. Pt reports no nausea at this time and was independent with BM,STS, and gait. pt was able to ambulate 100 feet with RW independently. Pt is safe to go home with her daughter upon discharge.    Anticipated Discharge Disposition (PT): (P) home, home with assist

## 2024-10-24 NOTE — PLAN OF CARE
Goal Outcome Evaluation:              Outcome Evaluation: Patient was admitted due to nausea, vomiting and diarrhea. CT showed negative for any acute finding. She is alert and oriented times 4, on room air and independent uses a walker. Magnesium IV replacement done.  IV started. Denies any pain, no other complaints and not complaining of n/v this time.

## 2024-10-24 NOTE — DISCHARGE SUMMARY
ED OBSERVATION PROGRESS/DISCHARGE SUMMARY    Date of Admission: 10/23/2024   LOS: 0 days   PCP: Ken Andujar MD    Final Diagnosis: Acute on CKD, nausea and vomiting    Hospital Outcome:     88 y.o. female admitted to the ED observation unit with acute on chronic renal insufficiency and nausea and vomiting.  Patient reports that she has been dealing with nausea and vomiting for about 5 to 6 weeks but worsened over this past week along with nonbloody diarrhea.  In the ER, CT of the abdomen pelvis shows no acute findings.  Lab work notes a creatinine at 2.29, electrolytes within normal limits.  White blood cell count normal.  Hemoglobin stable at 11.6.  Urinalysis notes trace bacteria, 3-5 white blood cells, 1+ leukocytes but patient is asymptomatic at this time and just finished 2 course of antibiotics for UTI previously.      She received IV fluids and her creatinine has improved to 1.93 today which is closer to her baseline.  She has not had any diarrhea since her arrival.  Nausea is improved.  She was offered GI consultation, however she declined.  She is not interested in endoscopic evaluation.  She will be discharged home.  She should follow-up with her primary care provider within the next week.    ROS:  General: no fevers, chills  Respiratory: no cough, dyspnea  Cardiovascular: no chest pain, palpitations  Abdomen: No abdominal pain, + nausea, vomiting and diarrhea  Neurologic: No focal weakness    Objective   Physical Exam:  I have reviewed the vital signs.  Temp:  [97.5 °F (36.4 °C)-99 °F (37.2 °C)] 97.5 °F (36.4 °C)  Heart Rate:  [54-68] 59  Resp:  [15-16] 16  BP: (128-164)/(60-88) 158/60  General Appearance:    Alert, cooperative, no distress  Head:    Normocephalic, atraumatic  Eyes:    Sclerae anicteric  Neck:   Supple, no mass  Lungs: Clear to auscultation bilaterally, respirations unlabored  Heart: Regular rate and rhythm, S1 and S2 normal, no murmur, rub or gallop  Abdomen:  Soft, nontender, bowel  sounds active, nondistended  Extremities: No clubbing, cyanosis, or edema to lower extremities  Pulses:  2+ and symmetric in distal lower extremities  Skin: No rashes   Neurologic: Oriented x3, Normal strength to extremities    Results Review:    I have reviewed the labs, radiology results and diagnostic studies.    Results from last 7 days   Lab Units 10/24/24  0358   WBC 10*3/mm3 5.21   HEMOGLOBIN g/dL 10.5*   HEMATOCRIT % 33.2*   PLATELETS 10*3/mm3 181     Results from last 7 days   Lab Units 10/24/24  0358 10/23/24  1742   SODIUM mmol/L 138 136   POTASSIUM mmol/L 4.9 5.0   CHLORIDE mmol/L 105 100   CO2 mmol/L 22.9 24.6   BUN mg/dL 31* 32*   CREATININE mg/dL 1.93* 2.29*   CALCIUM mg/dL 9.4 10.0   BILIRUBIN mg/dL  --  0.7   ALK PHOS U/L  --  67   ALT (SGPT) U/L  --  14   AST (SGOT) U/L  --  24   GLUCOSE mg/dL 90 101*     Imaging Results (Last 24 Hours)       Procedure Component Value Units Date/Time    CT Abdomen Pelvis Without Contrast [[id number]] Collected: 10/23/24 1754     Updated: 10/23/24 1806    Narrative:      CT ABDOMEN PELVIS WO CONTRAST-     INDICATION: Persistent vomiting     COMPARISON: CT abdomen pelvis May 27, 2024     TECHNIQUE:  Routine CT abdomen/pelvis without IV contrast. Coronal and sagittal  reformats. Radiation dose reduction techniques were utilized, including  automated exposure control and exposure modulation based on body size.     FINDINGS:      Lung bases: Small amount of subsegmental atelectasis or scarring at the  bases. Coronary artery atherosclerotic calcifications. Small amount of  pericardial fluid.     ABDOMEN: No liver mass. Distended gallbladder without gallbladder wall  thickening or surrounding inflammation. No biliary ductal dilatation.  Calcifications in the spleen, consistent with prior granulomatous  infection. Moderate to severe pancreatic atrophy. No pancreatic ductal  dilatation or mass seen. No adrenal nodules. Small fluid attenuating  right renal cysts. Left  nephrectomy. No urolithiasis or hydronephrosis.     Pelvis: Streak artifact from right hip arthroplasty and left femoral  neck screws limits evaluation of the pelvis. Bladder is under distended.  No bladder calculus seen. Uterus is present. No adnexal mass.     Bowel: No obstruction. Colonic diverticulosis. Normal appendix.     Abdominal wall: Abdominal and pelvic wall scarring.     Retroperitoneum: No lymphadenopathy.     Vasculature: No abdominal aortic aneurysm. Mild aortoiliac  atherosclerotic calcification.     Osseous structures: Total right hip arthroplasty. Left femoral neck  screws. Bilateral total knee arthroplasties. Open reduction internal  fixation of the right femur. Old inferior sacral fracture. Old appearing  superior endplate compression deformity at L2 with 30% collapse, with  small amount of retropulsion of the posterior superior corner.       Impression:         1. No acute findings identified in the abdomen or pelvis.  2. Left nephrectomy.  3. Colonic diverticulosis.     This report was finalized on 10/23/2024 6:03 PM by Dr. Jason Sanders M.D on Workstation: [id number]               I have reviewed the medications.  ---------------------------------------------------------------------------------------------  Assessment & Plan   Assessment/Problem List    Acute on chronic renal insufficiency    Plan:    Acute on chronic renal insufficiency  -Creatinine 2.29 on admission, improved to 1.93 today after IV hydration  -Baseline creatinine appears to be around 1.8  -Continue to follow-up with your primary nephrologist     Nausea, vomiting, and diarrhea  -CT abdomen shows no acute findings  -Patient afebrile  -Continue ondansetron as needed  -Encouraged her to follow-up with her PCP     Headache  Recent CVA  -Patient was found on MRI to have a right temporal small acute ischemic stroke etiology cardioembolic from atrial fibrillation  -Patient reported aphasia and headache at that time  -Patient  was started on magnesium and riboflavin  -Continue Eliquis     Atrial fibrillation  -Rate controlled  -Continue Eliquis and metoprolol    Disposition: Home    Follow-up after Discharge: Primary care and nephrology    This note will serve as a discharge summary    ENRRIQUE Hogue 10/24/24 11:10 EDT    I have worn appropriate PPE during this patient encounter, sanitized my hands both with entering and exiting patient's room.    32 minutes has been spent by [facility] providers in the care of this patient while under observation status

## 2024-10-24 NOTE — OUTREACH NOTE
Stroke Week 3 Survey      Flowsheet Row Responses   [facility] facility patient discharged from? [place]   Does the patient have one of the following disease processes/diagnoses(primary or secondary)? Stroke   Week 3 attempt successful? No   Unsuccessful attempts Attempt 1   Revoke Readmitted            Betsey POST - Registered Nurse

## 2024-10-25 ENCOUNTER — TRANSITIONAL CARE MANAGEMENT TELEPHONE ENCOUNTER (OUTPATIENT)
Dept: CALL CENTER | Facility: HOSPITAL | Age: 89
End: 2024-10-25
Payer: MEDICARE

## 2024-10-25 ENCOUNTER — HOSPITAL ENCOUNTER (OUTPATIENT)
Dept: INFUSION THERAPY | Facility: HOSPITAL | Age: 89
Discharge: HOME | End: 2024-10-25
Payer: MEDICARE

## 2024-10-25 VITALS
DIASTOLIC BLOOD PRESSURE: 61 MMHG | SYSTOLIC BLOOD PRESSURE: 149 MMHG | OXYGEN SATURATION: 100 % | RESPIRATION RATE: 16 BRPM | TEMPERATURE: 96.8 F | HEART RATE: 55 BPM

## 2024-10-25 DIAGNOSIS — D63.8 ANEMIA OF CHRONIC DISEASE: Primary | ICD-10-CM

## 2024-10-25 PROCEDURE — 25010000002 EPOETIN ALFA PER 1000 UNITS: Performed by: NURSE PRACTITIONER

## 2024-10-25 PROCEDURE — 96372 THER/PROPH/DIAG INJ SC/IM: CPT

## 2024-10-25 RX ADMIN — ERYTHROPOIETIN 10000 UNITS: 10000 INJECTION, SOLUTION INTRAVENOUS; SUBCUTANEOUS at 11:06

## 2024-10-25 NOTE — OUTREACH NOTE
Call Center TCM Note      Flowsheet Row Responses   [facility] patient discharged from? [place]   Does the patient have one of the following disease processes/diagnoses(primary or secondary)? Other   TCM attempt successful? Yes   Call start time 1343   Call end time 1359   Discharge diagnosis a/c Renal insufficiency   Person spoke with today (if not patient) and relationship Silvia, daughter   Meds reviewed with patient/caregiver? Yes   Is the patient having any side effects they believe may be caused by any medication additions or changes? No   Does the patient have all medications ordered at discharge? N/A   Is the patient taking all medications as directed (includes completed medication regime)? Yes   Does the patient have an appointment with their PCP within 7-14 days of discharge? No   Nursing Interventions Patient declined scheduling/rescheduling appointment at this time   What is the Home health agency?  [facility]   Has home health visited the patient within 72 hours of discharge? Yes   Psychosocial issues? No   Did the patient receive a copy of their discharge instructions? Yes   Nursing interventions Reviewed instructions with patient   What is the patient's perception of their health status since discharge? Same   Is the patient/caregiver able to teach back signs and symptoms related to disease process for when to call PCP? Yes   Is the patient/caregiver able to teach back signs and symptoms related to disease process for when to call 911? Yes   Is the patient/caregiver able to teach back the hierarchy of who to call/visit for symptoms/problems? PCP, Specialist, Home health nurse, Urgent Care, ED, 911 Yes   If the patient is a current smoker, are they able to teach back resources for cessation? Not a smoker   TCM call completed? Yes   Wrap up additional comments Dtr states pt is the same with HAs/vomiting. No medication changes. Dtr advised to call PCP office to report continued  HAs/vomiting.   Call end time [id number]   Would this patient benefit from a Referral to [facility] Social Work? No   Is the patient interested in additional calls from an ambulatory ? No            Daisha Carrera RN    10/25/2024, 14:10 EDT

## 2024-10-27 ENCOUNTER — READMISSION MANAGEMENT (OUTPATIENT)
Dept: CALL CENTER | Facility: HOSPITAL | Age: 89
End: 2024-10-27
Payer: MEDICARE

## 2024-10-27 ENCOUNTER — HOSPITAL ENCOUNTER (INPATIENT)
Facility: HOSPITAL | Age: 89
LOS: 3 days | Discharge: HOME | DRG: 057 | End: 2024-10-30
Attending: EMERGENCY MEDICINE | Admitting: INTERNAL MEDICINE
Payer: MEDICARE

## 2024-10-27 DIAGNOSIS — I69.398 VERTIGO AS LATE EFFECT OF CEREBROVASCULAR ACCIDENT (CVA): Primary | ICD-10-CM

## 2024-10-27 DIAGNOSIS — N28.9 RENAL INSUFFICIENCY: ICD-10-CM

## 2024-10-27 DIAGNOSIS — R42 VERTIGO AS LATE EFFECT OF CEREBROVASCULAR ACCIDENT (CVA): Primary | ICD-10-CM

## 2024-10-27 DIAGNOSIS — R11.2 NAUSEA AND VOMITING, UNSPECIFIED VOMITING TYPE: ICD-10-CM

## 2024-10-27 LAB
ALBUMIN SERPL-MCNC: 4.2 G/DL (ref 3.5–5.2)
ALBUMIN/GLOB SERPL: 1.4 G/DL
ALP SERPL-CCNC: 70 U/L (ref 39–117)
ALT SERPL W P-5'-P-CCNC: 12 U/L (ref 1–33)
ANION GAP SERPL CALCULATED.3IONS-SCNC: 13 MMOL/L (ref 5–15)
AST SERPL-CCNC: 21 U/L (ref 1–32)
BACTERIA UR QL AUTO: ABNORMAL /HPF
BASOPHILS # BLD AUTO: 0.03 10*3/MM3 (ref 0–0.2)
BASOPHILS NFR BLD AUTO: 0.3 % (ref 0–1.5)
BILIRUB SERPL-MCNC: 0.7 MG/DL (ref 0–1.2)
BILIRUB UR QL STRIP: NEGATIVE
BUN SERPL-MCNC: 27 MG/DL (ref 8–23)
BUN/CREAT SERPL: 13 (ref 7–25)
CALCIUM SPEC-SCNC: 10 MG/DL (ref 8.6–10.5)
CHLORIDE SERPL-SCNC: 103 MMOL/L (ref 98–107)
CLARITY UR: ABNORMAL
CO2 SERPL-SCNC: 23 MMOL/L (ref 22–29)
COLOR UR: ABNORMAL
CREAT SERPL-MCNC: 2.08 MG/DL (ref 0.57–1)
DEPRECATED RDW RBC AUTO: 46.7 FL (ref 37–54)
EGFRCR SERPLBLD CKD-EPI 2021: 22.8 ML/MIN/1.73
EOSINOPHIL # BLD AUTO: 0.21 10*3/MM3 (ref 0–0.4)
EOSINOPHIL NFR BLD AUTO: 2.4 % (ref 0.3–6.2)
ERYTHROCYTE [DISTWIDTH] IN BLOOD BY AUTOMATED COUNT: 13.4 % (ref 12.3–15.4)
GLOBULIN UR ELPH-MCNC: 3.1 GM/DL
GLUCOSE SERPL-MCNC: 92 MG/DL (ref 65–99)
GLUCOSE UR STRIP-MCNC: NEGATIVE MG/DL
HCT VFR BLD AUTO: 38.2 % (ref 34–46.6)
HGB BLD-MCNC: 12.1 G/DL (ref 12–15.9)
HGB UR QL STRIP.AUTO: ABNORMAL
HYALINE CASTS UR QL AUTO: ABNORMAL /LPF
IMM GRANULOCYTES # BLD AUTO: 0.02 10*3/MM3 (ref 0–0.05)
IMM GRANULOCYTES NFR BLD AUTO: 0.2 % (ref 0–0.5)
KETONES UR QL STRIP: NEGATIVE
LEUKOCYTE ESTERASE UR QL STRIP.AUTO: ABNORMAL
LIPASE SERPL-CCNC: 38 U/L (ref 13–60)
LYMPHOCYTES # BLD AUTO: 1.88 10*3/MM3 (ref 0.7–3.1)
LYMPHOCYTES NFR BLD AUTO: 21.5 % (ref 19.6–45.3)
MCH RBC QN AUTO: 30 PG (ref 26.6–33)
MCHC RBC AUTO-ENTMCNC: 31.7 G/DL (ref 31.5–35.7)
MCV RBC AUTO: 94.6 FL (ref 79–97)
MONOCYTES # BLD AUTO: 0.41 10*3/MM3 (ref 0.1–0.9)
MONOCYTES NFR BLD AUTO: 4.7 % (ref 5–12)
NEUTROPHILS NFR BLD AUTO: 6.18 10*3/MM3 (ref 1.7–7)
NEUTROPHILS NFR BLD AUTO: 70.9 % (ref 42.7–76)
NITRITE UR QL STRIP: POSITIVE
NRBC BLD AUTO-RTO: 0 /100 WBC (ref 0–0.2)
PH UR STRIP.AUTO: 8 [PH] (ref 5–8)
PLATELET # BLD AUTO: 187 10*3/MM3 (ref 140–450)
PMV BLD AUTO: 10 FL (ref 6–12)
POTASSIUM SERPL-SCNC: 4.8 MMOL/L (ref 3.5–5.2)
PROT SERPL-MCNC: 7.3 G/DL (ref 6–8.5)
PROT UR QL STRIP: ABNORMAL
RBC # BLD AUTO: 4.04 10*6/MM3 (ref 3.77–5.28)
RBC # UR STRIP: ABNORMAL /HPF
REF LAB TEST METHOD: ABNORMAL
SODIUM SERPL-SCNC: 139 MMOL/L (ref 136–145)
SP GR UR STRIP: 1.01 (ref 1–1.03)
SQUAMOUS #/AREA URNS HPF: ABNORMAL /HPF
UROBILINOGEN UR QL STRIP: ABNORMAL
WBC # UR STRIP: ABNORMAL /HPF
WBC NRBC COR # BLD AUTO: 8.73 10*3/MM3 (ref 3.4–10.8)

## 2024-10-27 PROCEDURE — 87086 URINE CULTURE/COLONY COUNT: CPT | Performed by: INTERNAL MEDICINE

## 2024-10-27 PROCEDURE — 25010000002 CEFTRIAXONE PER 250 MG

## 2024-10-27 PROCEDURE — 87186 SC STD MICRODIL/AGAR DIL: CPT | Performed by: INTERNAL MEDICINE

## 2024-10-27 PROCEDURE — 83690 ASSAY OF LIPASE: CPT | Performed by: EMERGENCY MEDICINE

## 2024-10-27 PROCEDURE — 87077 CULTURE AEROBIC IDENTIFY: CPT | Performed by: INTERNAL MEDICINE

## 2024-10-27 PROCEDURE — 25810000003 LACTATED RINGERS SOLUTION: Performed by: STUDENT IN AN ORGANIZED HEALTH CARE EDUCATION/TRAINING PROGRAM

## 2024-10-27 PROCEDURE — 85025 COMPLETE CBC W/AUTO DIFF WBC: CPT | Performed by: EMERGENCY MEDICINE

## 2024-10-27 PROCEDURE — 80053 COMPREHEN METABOLIC PANEL: CPT | Performed by: EMERGENCY MEDICINE

## 2024-10-27 PROCEDURE — 25010000002 ONDANSETRON PER 1 MG: Performed by: STUDENT IN AN ORGANIZED HEALTH CARE EDUCATION/TRAINING PROGRAM

## 2024-10-27 PROCEDURE — 81001 URINALYSIS AUTO W/SCOPE: CPT | Performed by: EMERGENCY MEDICINE

## 2024-10-27 PROCEDURE — 25810000003 SODIUM CHLORIDE 0.9 % SOLUTION: Performed by: INTERNAL MEDICINE

## 2024-10-27 PROCEDURE — 99285 EMERGENCY DEPT VISIT HI MDM: CPT

## 2024-10-27 RX ORDER — MECLIZINE HYDROCHLORIDE 25 MG/1
25 TABLET ORAL ONCE
Status: COMPLETED | OUTPATIENT
Start: 2024-10-27 | End: 2024-10-27

## 2024-10-27 RX ORDER — ONDANSETRON 2 MG/ML
4 INJECTION INTRAMUSCULAR; INTRAVENOUS ONCE
Status: COMPLETED | OUTPATIENT
Start: 2024-10-27 | End: 2024-10-27

## 2024-10-27 RX ORDER — ACETAMINOPHEN 650 MG/1
650 SUPPOSITORY RECTAL EVERY 4 HOURS PRN
Status: DISCONTINUED | OUTPATIENT
Start: 2024-10-27 | End: 2024-10-30 | Stop reason: HOSPADM

## 2024-10-27 RX ORDER — ACETAMINOPHEN 325 MG/1
650 TABLET ORAL EVERY 4 HOURS PRN
Status: DISCONTINUED | OUTPATIENT
Start: 2024-10-27 | End: 2024-10-30 | Stop reason: HOSPADM

## 2024-10-27 RX ORDER — POLYETHYLENE GLYCOL 3350 17 G/17G
17 POWDER, FOR SOLUTION ORAL DAILY PRN
Status: DISCONTINUED | OUTPATIENT
Start: 2024-10-27 | End: 2024-10-30 | Stop reason: HOSPADM

## 2024-10-27 RX ORDER — ONDANSETRON 4 MG/1
4 TABLET, ORALLY DISINTEGRATING ORAL EVERY 6 HOURS PRN
Status: DISCONTINUED | OUTPATIENT
Start: 2024-10-27 | End: 2024-10-30 | Stop reason: HOSPADM

## 2024-10-27 RX ORDER — ATORVASTATIN CALCIUM 20 MG/1
40 TABLET, FILM COATED ORAL NIGHTLY
Status: DISCONTINUED | OUTPATIENT
Start: 2024-10-27 | End: 2024-10-30 | Stop reason: HOSPADM

## 2024-10-27 RX ORDER — ONDANSETRON 2 MG/ML
4 INJECTION INTRAMUSCULAR; INTRAVENOUS EVERY 6 HOURS PRN
Status: DISCONTINUED | OUTPATIENT
Start: 2024-10-27 | End: 2024-10-30 | Stop reason: HOSPADM

## 2024-10-27 RX ORDER — SODIUM CHLORIDE 0.9 % (FLUSH) 0.9 %
10 SYRINGE (ML) INJECTION AS NEEDED
Status: DISCONTINUED | OUTPATIENT
Start: 2024-10-27 | End: 2024-10-30 | Stop reason: HOSPADM

## 2024-10-27 RX ORDER — MAGNESIUM OXIDE 400 MG/1
400 TABLET ORAL DAILY
Status: DISCONTINUED | OUTPATIENT
Start: 2024-10-27 | End: 2024-10-29

## 2024-10-27 RX ORDER — CETIRIZINE HYDROCHLORIDE 10 MG/1
10 TABLET ORAL DAILY
Status: DISCONTINUED | OUTPATIENT
Start: 2024-10-27 | End: 2024-10-30 | Stop reason: HOSPADM

## 2024-10-27 RX ORDER — BISACODYL 10 MG
10 SUPPOSITORY, RECTAL RECTAL DAILY PRN
Status: DISCONTINUED | OUTPATIENT
Start: 2024-10-27 | End: 2024-10-30 | Stop reason: HOSPADM

## 2024-10-27 RX ORDER — OXYBUTYNIN CHLORIDE 10 MG/1
10 TABLET, EXTENDED RELEASE ORAL DAILY
Status: DISCONTINUED | OUTPATIENT
Start: 2024-10-27 | End: 2024-10-30 | Stop reason: HOSPADM

## 2024-10-27 RX ORDER — SODIUM CHLORIDE 9 MG/ML
75 INJECTION, SOLUTION INTRAVENOUS CONTINUOUS
Status: DISCONTINUED | OUTPATIENT
Start: 2024-10-27 | End: 2024-10-28

## 2024-10-27 RX ORDER — AMOXICILLIN 250 MG
2 CAPSULE ORAL 2 TIMES DAILY PRN
Status: DISCONTINUED | OUTPATIENT
Start: 2024-10-27 | End: 2024-10-30 | Stop reason: HOSPADM

## 2024-10-27 RX ORDER — ACETAMINOPHEN 160 MG/5ML
650 SOLUTION ORAL EVERY 4 HOURS PRN
Status: DISCONTINUED | OUTPATIENT
Start: 2024-10-27 | End: 2024-10-30 | Stop reason: HOSPADM

## 2024-10-27 RX ORDER — METOPROLOL SUCCINATE 25 MG/1
12.5 TABLET, EXTENDED RELEASE ORAL DAILY
Status: DISCONTINUED | OUTPATIENT
Start: 2024-10-28 | End: 2024-10-30 | Stop reason: HOSPADM

## 2024-10-27 RX ORDER — BISACODYL 5 MG/1
5 TABLET, DELAYED RELEASE ORAL DAILY PRN
Status: DISCONTINUED | OUTPATIENT
Start: 2024-10-27 | End: 2024-10-30 | Stop reason: HOSPADM

## 2024-10-27 RX ADMIN — ATORVASTATIN CALCIUM 40 MG: 20 TABLET, FILM COATED ORAL at 20:56

## 2024-10-27 RX ADMIN — APIXABAN 2.5 MG: 2.5 TABLET, FILM COATED ORAL at 20:56

## 2024-10-27 RX ADMIN — MAGNESIUM OXIDE 400 MG (241.3 MG MAGNESIUM) TABLET 400 MG: TABLET at 20:56

## 2024-10-27 RX ADMIN — CETIRIZINE HYDROCHLORIDE 10 MG: 10 TABLET ORAL at 20:56

## 2024-10-27 RX ADMIN — MECLIZINE HYDROCHLORIDE 25 MG: 25 TABLET ORAL at 15:55

## 2024-10-27 RX ADMIN — OXYBUTYNIN CHLORIDE 10 MG: 10 TABLET, EXTENDED RELEASE ORAL at 21:52

## 2024-10-27 RX ADMIN — SODIUM CHLORIDE, POTASSIUM CHLORIDE, SODIUM LACTATE AND CALCIUM CHLORIDE 1000 ML: 600; 310; 30; 20 INJECTION, SOLUTION INTRAVENOUS at 15:33

## 2024-10-27 RX ADMIN — CEFTRIAXONE SODIUM 1000 MG: 1 INJECTION, POWDER, FOR SOLUTION INTRAMUSCULAR; INTRAVENOUS at 23:19

## 2024-10-27 RX ADMIN — ONDANSETRON 4 MG: 2 INJECTION, SOLUTION INTRAMUSCULAR; INTRAVENOUS at 15:33

## 2024-10-27 RX ADMIN — Medication 2.5 MG: at 23:26

## 2024-10-27 RX ADMIN — SODIUM CHLORIDE 75 ML/HR: 9 INJECTION, SOLUTION INTRAVENOUS at 20:51

## 2024-10-27 NOTE — ED PROVIDER NOTES
EMERGENCY DEPARTMENT ENCOUNTER  Room Number:  15/15  PCP: Ken Andujar MD  Independent Historians: Patient and Son      HPI:  Chief Complaint: had concerns including Nausea, Vomiting, and Eye Pain.     A complete HPI/ROS/PMH/PSH/SH/FH are unobtainable due to: None    Chronic or social conditions impacting patient care (Social Determinants of Health): None      Context: Marleni Hummel is a 88 y.o. female with a medical history of A-fib, stroke, CKD, CAD, who presents to the ED c/o acute nausea vomiting, inability to tolerate p.o.  She states she has had symptoms ever since her stroke 3 weeks ago.  She reports that she has vision issues that make her stomach feel funny.  She does endorse the room spinning.  She is been unable to take her home medications, drink, or eat due to her symptoms.  She states she was recently admitted for this and discharged home, however symptoms have not resolved.  She states she has some headache today from feeling dehydrated.  No fever or chills.  No abdominal pain.  No urinary symptoms.  No weakness in extremities.      Review of prior external notes (non-ED) -and- Review of prior external test results outside of this encounter:     MRI brain 10/9/2024 IMPRESSION: There is an area of acute infarction involving the right temporal lobe inferiorly and posteriorly measuring 1.5 x 0.4 cm in size.    Prescription drug monitoring program review:         PAST MEDICAL HISTORY  Active Ambulatory Problems     Diagnosis Date Noted    Arthritis involving multiple sites     Essential hypertension     Permanent atrial fibrillation     History of Bell's palsy     CKD (chronic kidney disease) stage 4, GFR 15-29 ml/min     Gastroesophageal reflux disease without esophagitis     Hypercholesterolemia     Insomnia     Localized osteoporosis without current pathological fracture     Panic disorder     Chronic nonseasonal allergic rhinitis due to pollen     Other sleep apnea     History of MI (myocardial  infarction) 12/21/2015    Chronic coronary artery disease 01/25/2016    Anemia of chronic disease 09/12/2016    Weakness of right leg 03/13/2017    Cardiac murmur 05/04/2017    Senile osteoporosis 06/30/2017    Hyperuricemia 09/07/2017    Grief reaction 09/30/2019    Peripheral vertigo 02/25/2020    Renal cell carcinoma of left kidney 11/22/2020    Renal oncocytoma of left kidney 12/28/2020    History of left nephrectomy 04/19/2021    Cerebrovascular accident (CVA) due to stenosis of small artery 11/29/2021    History of renal cell carcinoma 11/30/2021    Malignant neoplasm of left kidney, except renal pelvis 06/07/2022    Diverticulosis 12/14/2022    Irritable bowel syndrome with constipation 12/14/2022    Chronic diastolic congestive heart failure 03/18/2023    Hallucination, visual 01/17/2024    Hypomagnesemia 05/02/2024    Lumbar disc disease with radiculopathy 12/02/2021    History of cardiomyopathy-Takotsubo's 10/11/2024    Acute thrombotic stroke-right temporal 10/11/2024    Acute on chronic renal insufficiency 10/23/2024     Resolved Ambulatory Problems     Diagnosis Date Noted    Fatigue     Hip arthritis     IFG (impaired fasting glucose)     Rectal bleeding     Vitamin D deficiency     Urinary incontinence     History of right hip replacement 12/21/2015    Closed fracture of neck of right femur with routine healing 12/21/2015    History of right knee joint replacement 12/21/2015    History of left knee replacement 12/21/2015    Stress-induced cardiomyopathy 01/25/2016    Elevated serum free T4 level 03/21/2016    Urinary tract infection 10/05/2016    Acute pyelonephritis 10/06/2016    Acute cystitis 12/03/2016    Hematuria 12/12/2016    Sinusitis 01/22/2017    Frequent falls 03/13/2017    Atrial fibrillation with RVR 06/19/2017    ANGY (acute kidney injury) 06/20/2017    Viral gastroenteritis 06/20/2017    Dehydration 06/20/2017    Nausea & vomiting 06/20/2017    Diarrhea 06/20/2017    Closed displaced  fracture of left femoral neck 08/30/2017    Bacteriuria 08/30/2017    New daily persistent headache 12/17/2018    Muscle tension headache 04/03/2019    Caregiver stress syndrome 04/17/2019    Acute vulvitis 08/21/2019    Dizziness 02/23/2020    Left facial numbness 02/23/2020    Stroke-like symptoms 02/23/2020    Left kidney mass 08/17/2020    Left renal mass 11/16/2020    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    Acute kidney injury 05/30/2022    Acute bronchitis due to Rhinovirus 05/31/2022    Hyperkalemia 05/31/2022    Diverticulitis 12/14/2022    CARROLL (dyspnea on exertion) 03/17/2023    Bradycardia, drug induced 05/27/2024    Shortness of breath 07/10/2024    Hyponatremia 07/11/2024     Past Medical History:   Diagnosis Date    Allergic     Allergic rhinitis     Arthropathy of knee     Atrial fibrillation     Back pain     Bell's palsy     Bradycardia 05/27/2024    Chronic kidney disease (CKD), stage III (moderate)     Edema     Encounter for eye exam 09/2020    GERD (gastroesophageal reflux disease)     H/O Heart murmur     Heart attack 10/05/2015    Herpes zoster without complication     History of anemia due to chronic kidney disease     History of bone density study 02/28/2008    History of pelvic fracture 2015    History of pneumonia     History of transfusion     Limited joint range of motion     Mixed hyperlipidemia     Osteoarthritis     Osteoporosis     PONV (postoperative nausea and vomiting)     Sleep apnea     Spinal stenosis, lumbar region with neurogenic claudication 12/02/2021    Stress          PAST SURGICAL HISTORY  Past Surgical History:   Procedure Laterality Date    CATARACT EXTRACTION Left 04/01/2013    Dr. Clarke    CATARACT EXTRACTION Right 04/16/2013    Dr. Clarke    COLONOSCOPY  04/18/2014    Dr. Elizabeth; no polyps    EPIDURAL BLOCK      HIP PERCUTANEOUS PINNING Left 8/31/2017    Procedure: LT HIP PERCUTANEOUS PINNING;  Surgeon: Sean Canales MD;  Location: [facility] OR;  Service:     [facility]  BILATERAL  2013    NEPHRECTOMY Left 2020    Procedure: LAPAROSCOPIC NEPHRECTOMY;  Surgeon: Chuckie Mclaughlin MD;  Location: [facility] OR;  Service: Urology;  Laterality: Left;    PAP SMEAR  2011    TIBIA FRACTURE SURGERY Right     REMOVED PLATE LATER IN     TONSILLECTOMY  1947    TOTAL HIP ARTHROPLASTY Right 2015    TOTAL HIP ARTHROPLASTY Right 2016    TOTAL KNEE ARTHROPLASTY Bilateral 2004         FAMILY HISTORY  Family History   Problem Relation Age of Onset    Hypertension Other     Hypertension Mother     Stroke Mother     Heart disease Mother     Hypertension Maternal Grandmother     Malig Hyperthermia Neg Hx          SOCIAL HISTORY  Social History     Socioeconomic History    Marital status:     Years of education: High school   Tobacco Use    Smoking status: Former     Current packs/day: 0.00     Average packs/day: 1 pack/day for 3.0 years (3.0 ttl pk-yrs)     Types: Cigarettes     Start date: 1953     Quit date: 1956     Years since quittin.7     Passive exposure: Past    Smokeless tobacco: Never    Tobacco comments:     caffeine - 1/2 cup coffee daily    Vaping Use    Vaping status: Never Used   Substance and Sexual Activity    Alcohol use: Not Currently     Alcohol/week: 3.0 standard drinks of alcohol     Types: 3 Glasses of wine per week     Comment: couple times a week    Drug use: Never    Sexual activity: Not Currently         ALLERGIES  Medrol [methylprednisolone], Morphine, Oxycodone, Ace inhibitors, Bactrim [sulfamethoxazole-trimethoprim], Levofloxacin, and Torsemide      REVIEW OF SYSTEMS  Review of Systems  Included in HPI  All systems reviewed and negative except for those discussed in HPI.      PHYSICAL EXAM    I have reviewed the triage vital signs and nursing notes.    ED Triage Vitals [10/27/24 1444]   Temp Heart Rate Resp BP SpO2   97.5 °F (36.4 °C) 71 16 157/90 99 %      Temp src Heart Rate Source Patient Position BP Location FiO2  (%)   -- -- -- -- --       Physical Exam  GENERAL: alert, no acute distress, uncomfortable appearing, dry heaving  SKIN: Warm, dry  HENT: Normocephalic, atraumatic  EYES: no scleral icterus  CV: regular rhythm, regular rate  RESPIRATORY: normal effort, lungs clear  ABDOMEN: soft, nontender, nondistended  MUSCULOSKELETAL: no deformity  NEURO: alert, moves all extremities, follows commands            LAB RESULTS  Recent Results (from the past 24 hours)   Comprehensive Metabolic Panel    Collection Time: 10/27/24  3:11 PM    Specimen: Blood   Result Value Ref Range    Glucose 92 65 - 99 mg/dL    BUN 27 (H) 8 - 23 mg/dL    Creatinine 2.08 (H) 0.57 - 1.00 mg/dL    Sodium 139 136 - 145 mmol/L    Potassium 4.8 3.5 - 5.2 mmol/L    Chloride 103 98 - 107 mmol/L    CO2 23.0 22.0 - 29.0 mmol/L    Calcium 10.0 8.6 - 10.5 mg/dL    Total Protein 7.3 6.0 - 8.5 g/dL    Albumin 4.2 3.5 - 5.2 g/dL    ALT (SGPT) 12 1 - 33 U/L    AST (SGOT) 21 1 - 32 U/L    Alkaline Phosphatase 70 39 - 117 U/L    Total Bilirubin 0.7 0.0 - 1.2 mg/dL    Globulin 3.1 gm/dL    A/G Ratio 1.4 g/dL    BUN/Creatinine Ratio 13.0 7.0 - 25.0    Anion Gap 13.0 5.0 - 15.0 mmol/L    eGFR 22.8 (L) >60.0 mL/min/1.73   Lipase    Collection Time: 10/27/24  3:11 PM    Specimen: Blood   Result Value Ref Range    Lipase 38 13 - 60 U/L   CBC Auto Differential    Collection Time: 10/27/24  3:11 PM    Specimen: Blood   Result Value Ref Range    WBC 8.73 3.40 - 10.80 10*3/mm3    RBC 4.04 3.77 - 5.28 10*6/mm3    Hemoglobin 12.1 12.0 - 15.9 g/dL    Hematocrit 38.2 34.0 - 46.6 %    MCV 94.6 79.0 - 97.0 fL    MCH 30.0 26.6 - 33.0 pg    MCHC 31.7 31.5 - 35.7 g/dL    RDW 13.4 12.3 - 15.4 %    RDW-SD 46.7 37.0 - 54.0 fl    MPV 10.0 6.0 - 12.0 fL    Platelets 187 140 - 450 10*3/mm3    Neutrophil % 70.9 42.7 - 76.0 %    Lymphocyte % 21.5 19.6 - 45.3 %    Monocyte % 4.7 (L) 5.0 - 12.0 %    Eosinophil % 2.4 0.3 - 6.2 %    Basophil % 0.3 0.0 - 1.5 %    Immature Grans % 0.2 0.0 - 0.5 %     Neutrophils, Absolute 6.18 1.70 - 7.00 10*3/mm3    Lymphocytes, Absolute 1.88 0.70 - 3.10 10*3/mm3    Monocytes, Absolute 0.41 0.10 - 0.90 10*3/mm3    Eosinophils, Absolute 0.21 0.00 - 0.40 10*3/mm3    Basophils, Absolute 0.03 0.00 - 0.20 10*3/mm3    Immature Grans, Absolute 0.02 0.00 - 0.05 10*3/mm3    nRBC 0.0 0.0 - 0.2 /100 WBC         RADIOLOGY  No Radiology Exams Resulted Within Past 24 Hours      MEDICATIONS GIVEN IN ER  Medications   sodium chloride 0.9 % flush 10 mL (has no administration in time range)   lactated ringers bolus 1,000 mL (1,000 mL Intravenous New Bag 10/27/24 1533)   ondansetron (ZOFRAN) injection 4 mg (4 mg Intravenous Given 10/27/24 1533)   meclizine (ANTIVERT) tablet 25 mg (25 mg Oral Given 10/27/24 1555)         ORDERS PLACED DURING THIS VISIT:  Orders Placed This Encounter   Procedures    Comprehensive Metabolic Panel    Lipase    Urinalysis With Microscopic If Indicated (No Culture) - Urine, Clean Catch    CBC Auto Differential    Monitor Blood Pressure    Inpatient Neurology Consult General    [facility] (on-call MD unless specified) Details    Insert Peripheral IV    Inpatient Admission    CBC & Differential         OUTPATIENT MEDICATION MANAGEMENT:  Current Facility-Administered Medications Ordered in Epic   Medication Dose Route Frequency Provider Last Rate Last Admin    lactated ringers bolus 1,000 mL  1,000 mL Intravenous Once Shaquille Chris MD 1,000 mL/hr at 10/27/24 1533 1,000 mL at 10/27/24 1533    sodium chloride 0.9 % flush 10 mL  10 mL Intravenous PRN Pravin Fraga MD         Current Outpatient Medications Ordered in Epic   Medication Sig Dispense Refill    acetaminophen (TYLENOL) 500 MG tablet Take 1 tablet by mouth Every 4 (Four) Hours As Needed for Mild Pain. Indications: Pain      atorvastatin (LIPITOR) 40 MG tablet Take 1 tablet by mouth Every Night. 90 tablet 0    Eliquis 2.5 MG tablet tablet TAKE 1 TABLET EVERY 12 HOURS (TWICE A DAY) (Patient taking differently: TAKE 1  TABLET ORALLY EVERY 12 HOURS (TWICE A DAY)) 180 tablet 3    fexofenadine (ALLEGRA) 60 MG tablet Take 1 tablet by mouth Daily As Needed. Indications: Hayfever      furosemide (LASIX) 20 MG tablet Take 1 tablet by mouth Daily As Needed (if weight goes up 3lbs in 3 days). 30 tablet 11    magnesium oxide (MAG-OX) 400 MG tablet Take 1 tablet by mouth Daily. Indications: Acid Indigestion      melatonin 5 MG tablet tablet Take 1 tablet by mouth At Night As Needed. Indications: Trouble Sleeping      metoprolol succinate XL (TOPROL-XL) 25 MG 24 hr tablet Take 0.5 tablets by mouth Daily. 90 tablet 0    Riboflavin (VITAMIN B2 PO) Take  by mouth.      Vibegron (Gemtesa) 75 MG tablet Take 1 tablet by mouth Daily for 270 days. Indications: Overactive Bladder, Urinary Incontinence 90 tablet 2    Vortioxetine HBr (TRINTELLIX) 10 MG tablet tablet Take 1 tablet by mouth Daily With Breakfast. 30 tablet 2         PROCEDURES  Procedures            PROGRESS, DATA ANALYSIS, CONSULTS, AND MEDICAL DECISION MAKING  All labs have been independently interpreted by me.  All radiology studies have been reviewed by me. All EKG's have been independently viewed and interpreted by me.  Discussion below represents my analysis of pertinent findings related to patient's condition, differential diagnosis, treatment plan and final disposition.    Differential diagnosis includes but is not limited to vertigo, ANGY, CKD, tractable nausea vomiting, electrolyte normality, stroke.  No focal neurologic deficits concerning for new stroke.  Patient reports symptoms been present since previous stroke.  Suspect vertigo related to prior stroke    Clinical Scores:                   ED Course as of 10/27/24 1632   Sun Oct 27, 2024   1517 Patient reports stroke 3 weeks ago.  Since then she has had vision issues, dizziness, causing nausea vomiting and headache.  She reports she has been unable to keep anything down.  No new symptoms.  Discussed plan for basic labs,  urine, IV fluids, IV Zofran, meclizine for likely vertigo.  Likely admission.  Patient and family agreeable to plan. [DN]   1545 Creatinine(!): 2.08  Value of 1.93 at discharge x3 days ago [DN]   1550 Patient reassessed, symptoms resolved.  Will give meclizine, plan to admit to observation unit for p.o. challenge, monitoring, neurology consultation.  Patient and son are agreeable with plan [DN]   1618 I discussed patient with on-call physician for general neurology.  Agreeable plan to consult for vertigo [DN]   1626 I discussed patient with [facility] for observation unit, they will discuss patient with team and call back.    Observation unit requests a full admission to the hospital. [DN]   1631 I discussed patient with Dr. Goddard, agreeable plan to admit [DN]      ED Course User Index  [DN] Shaquille Chris MD             AS OF 16:32 EDT VITALS:    BP - 175/79  HR - 66  TEMP - 97.5 °F (36.4 °C)  O2 SATS - 99%    COMPLEXITY OF CARE  The patient requires admission.      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    Note Disclaimer: At [facility], we believe that sharing information builds trust and better relationships. You are receiving this note because you recently visited [facility]. It is possible you will see health information before a provider has talked with you about it. This kind of  information can be easy to misunderstand. To help you fully understand what it means for your health, we urge you to discuss this note with your provider.         Shaquille Chris MD  10/27/24 1627       Shaquille Chris MD  10/27/24 1635

## 2024-10-27 NOTE — H&P
HISTORY AND PHYSICAL   [facility]        Date of Admission: 10/27/2024  Patient Identification:  Name: Marleni Hummel  Age: 88 y.o.  Sex: female  :  1935  MRN: [id number]                     Primary Care Physician: Ken Andujar MD    Chief Complaint:  88 year old female presented to the emergency room with nausea, vomiting, dizziness which have been present since she had a stroke recently; she has not been able to keep anything down; she has had some vision changes and also feels like the room is spinning; she has had a headache; she has had episodes of vertigo prior to the stroke according to family; no fever or chills; no sick contacts    History of Present Illness:   As above    Past Medical History:  Past Medical History:   Diagnosis Date    Acute bronchitis due to Rhinovirus 2022    Acute vulvitis 2019    Allergic     Allergic rhinitis     Anemia of chronic disease     Arthropathy of knee     right    Atrial fibrillation     Back pain     Bell's palsy     Bradycardia 2024    Bradycardia, drug induced 2024    Caregiver stress syndrome 2019    Chronic coronary artery disease     Chronic kidney disease (CKD), stage III (moderate)     CKD (chronic kidney disease) stage 4, GFR 15-29 ml/min     Diverticulitis 2022    CARROLL (dyspnea on exertion) 2023    Edema     Elevated serum free T4 level 2016    Encounter for eye exam 2020    Essential hypertension     Fatigue     GERD (gastroesophageal reflux disease)     H/O Heart murmur     Heart attack 10/05/2015    Hematuria 2016    Herpes zoster without complication     Hip arthritis     left    History of anemia due to chronic kidney disease     History of bone density study 2008    History of pelvic fracture 2015    History of pneumonia     History of transfusion     2015    Hypercholesterolemia     IFG (impaired fasting glucose)     Insomnia     Left kidney mass 2020    Limited  joint range of motion     RIGHT KNEE    Mixed hyperlipidemia     Muscle tension headache 04/03/2019    New daily persistent headache 12/17/2018    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 Clarke    CATARACT EXTRACTION Right 04/16/2013    Dr. Clarke    COLONOSCOPY  04/18/2014    Dr. Elizabeth; no polyps    EPIDURAL BLOCK      HIP PERCUTANEOUS PINNING Left 8/31/2017    Procedure: LT HIP PERCUTANEOUS PINNING;  Surgeon: Sean Canales MD;  Location: [facility] OR;  Service:     MAMMO BILATERAL  11/2013    NEPHRECTOMY Left 11/16/2020    Procedure: LAPAROSCOPIC NEPHRECTOMY;  Surgeon: Chuckie Mclaughlin MD;  Location: [facility] OR;  Service: Urology;  Laterality: Left;    PAP SMEAR  02/2011    TIBIA FRACTURE SURGERY Right 2015    REMOVED PLATE LATER IN 2015    TONSILLECTOMY  1947    TOTAL HIP ARTHROPLASTY Right 08/2015    TOTAL HIP ARTHROPLASTY Right 09/2016    TOTAL KNEE ARTHROPLASTY Bilateral 2004      Home Meds:  Medications Prior to Admission   Medication Sig Dispense Refill Last Dose/Taking    acetaminophen (TYLENOL) 500 MG tablet Take 1 tablet by mouth Every 4 (Four) Hours As Needed for Mild Pain. Indications: Pain   10/26/2024    atorvastatin (LIPITOR) 40 MG tablet Take 1 tablet by mouth Every Night. 90 tablet 0 10/26/2024    Eliquis 2.5 MG tablet tablet TAKE 1 TABLET EVERY 12 HOURS (TWICE A DAY) (Patient taking differently: TAKE 1 TABLET ORALLY EVERY 12 HOURS (TWICE A DAY)) 180 tablet 3 10/27/2024    fexofenadine (ALLEGRA) 60  MG tablet Take 1 tablet by mouth Daily As Needed. Indications: Hayfever   10/26/2024    magnesium oxide (MAG-OX) 400 MG tablet Take 1 tablet by mouth Daily. Indications: Acid Indigestion   10/26/2024    melatonin 5 MG tablet tablet Take 1 tablet by mouth At Night As Needed. Indications: Trouble Sleeping   10/26/2024    metoprolol succinate XL (TOPROL-XL) 25 MG 24 hr tablet Take 0.5 tablets by mouth Daily. 90 tablet 0 10/27/2024    Riboflavin (VITAMIN B2 PO) Take  by mouth.   10/26/2024    Vibegron (Gemtesa) 75 MG tablet Take 1 tablet by mouth Daily for 270 days. Indications: Overactive Bladder, Urinary Incontinence 90 tablet 2 10/26/2024    Vortioxetine HBr (TRINTELLIX) 10 MG tablet tablet Take 1 tablet by mouth Daily With Breakfast. 30 tablet 2 10/26/2024    furosemide (LASIX) 20 MG tablet Take 1 tablet by mouth Daily As Needed (if weight goes up 3lbs in 3 days). 30 tablet 11 More than a month       Allergies:  Allergies   Allergen Reactions    Medrol [Methylprednisolone] Other (See Comments)     Irritability    Morphine Anaphylaxis    Oxycodone Anaphylaxis and Unknown - Low Severity    Ace Inhibitors Cough and Unknown (See Comments)    Bactrim [Sulfamethoxazole-Trimethoprim] Rash    Levofloxacin Itching and Rash     insomnia  insomnia  insomnia    Torsemide Dizziness     Immunizations:  Immunization History   Administered Date(s) Administered    COVID-19 (PFIZER) BIVALENT 12+YRS 09/29/2022    COVID-19 (PFIZER) Purple Cap Monovalent 03/01/2021, 03/01/2021, 03/01/2021, 04/07/2021, 04/07/2021, 04/07/2021, 10/09/2021    FLUAD TRI 65YR+ 09/30/2019    Flu Vaccine Quad PF >36MO 11/13/2017    Flu Vaccine Split Quad 10/15/2014    Fluad Quad 65+ 10/01/2020, 09/28/2021, 09/29/2022, 01/24/2024    Fluzone High-Dose 65+YRS 10/22/2018, 09/28/2021, 09/29/2022, 10/24/2024    Influenza TIV (IM) 10/15/2014, 10/17/2016    Influenza, Unspecified 10/01/2023    Pneumococcal Conjugate 20-Valent (PCV20) 01/10/2023    Pneumococcal  Polysaccharide (PPSV23) 02/10/2011, 02/10/2011, 2016, 2016    Tdap 10/27/2018     Social History:   Social History     Social History Narrative      2019     Social History     Socioeconomic History    Marital status:     Years of education: High school   Tobacco Use    Smoking status: Former     Current packs/day: 0.00     Average packs/day: 1 pack/day for 3.0 years (3.0 ttl pk-yrs)     Types: Cigarettes     Start date: 1953     Quit date: 1956     Years since quittin.7     Passive exposure: Past    Smokeless tobacco: Never    Tobacco comments:     caffeine - 1/2 cup coffee daily    Vaping Use    Vaping status: Never Used   Substance and Sexual Activity    Alcohol use: Not Currently     Alcohol/week: 3.0 standard drinks of alcohol     Types: 3 Glasses of wine per week     Comment: couple times a week    Drug use: Never    Sexual activity: Not Currently       Family History:  Family History   Problem Relation Age of Onset    Hypertension Other     Hypertension Mother     Stroke Mother     Heart disease Mother     Hypertension Maternal Grandmother     Malig Hyperthermia Neg Hx         Review of Systems  See history of present illness and past medical history.  Patient denies headache, dizziness, syncope, falls, trauma, change in vision, change in hearing, change in taste, changes in weight, changes in appetite, focal weakness, numbness, or paresthesia.  Patient denies chest pain, palpitations, dyspnea, orthopnea, PND, cough, sinus pressure, rhinorrhea, epistaxis, hemoptysis, nausea, vomiting,hematemesis, diarrhea, constipation or hematochezia.  Denies cold or heat intolerance, polydipsia, polyuria, polyphagia. Denies hematuria, pyuria, dysuria, hesitancy, frequency or urgency. Denies consumption of raw and under cooked meats foods or change in water source.  Denies fever, chills, sweats, night sweats.  Denies missing any routine medications. Remainder of ROS is  "negative.    Objective:  T Max 24 hrs: Temp (24hrs), Av.5 °F (36.4 °C), Min:97.5 °F (36.4 °C), Max:97.5 °F (36.4 °C)    Vitals Ranges:   Temp:  [97.5 °F (36.4 °C)] 97.5 °F (36.4 °C)  Heart Rate:  [61-71] 61  Resp:  [16-18] 18  BP: (156-175)/() 156/105      Exam:  BP (!) 156/105   Pulse 61   Temp 97.5 °F (36.4 °C)   Resp 18   Ht 152.4 cm (60\")   Wt 59 kg (130 lb 1.1 oz)   SpO2 97%   BMI 25.40 kg/m²     General Appearance:    Alert, cooperative, no distress, appears stated age   Head:    Normocephalic, without obvious abnormality, atraumatic   Eyes:    PERRL, conjunctivae/corneas clear, EOM's intact, both eyes   Ears:    Normal external ear canals, both ears   Nose:   Nares normal, septum midline, mucosa normal, no drainage    or sinus tenderness   Throat:   Lips, mucosa, and tongue normal   Neck:   Supple, symmetrical, trachea midline, no adenopathy;     thyroid:  no enlargement/tenderness/nodules; no carotid    bruit or JVD   Back:     Symmetric, no curvature, ROM normal, no CVA tenderness   Lungs:    Decreased breath sounds bilaterally, respirations unlabored   Chest Wall:    No tenderness or deformity    Heart:    Regular rate and rhythm, S1 and S2 normal, no murmur, rub   or gallop   Abdomen:     Soft, nontender, bowel sounds active all four quadrants,     no masses, no hepatomegaly, no splenomegaly   Extremities:   Extremities normal, atraumatic, no cyanosis or edema      .    Data Review:  Labs in chart were reviewed.  WBC   Date Value Ref Range Status   10/27/2024 8.73 3.40 - 10.80 10*3/mm3 Final     Hemoglobin   Date Value Ref Range Status   10/27/2024 12.1 12.0 - 15.9 g/dL Final     Hematocrit   Date Value Ref Range Status   10/27/2024 38.2 34.0 - 46.6 % Final     Platelets   Date Value Ref Range Status   10/27/2024 187 140 - 450 10*3/mm3 Final     Sodium   Date Value Ref Range Status   10/27/2024 139 136 - 145 mmol/L Final     Potassium   Date Value Ref Range Status   10/27/2024 4.8 3.5 - " 5.2 mmol/L Final     Chloride   Date Value Ref Range Status   10/27/2024 103 98 - 107 mmol/L Final     CO2   Date Value Ref Range Status   10/27/2024 23.0 22.0 - 29.0 mmol/L Final     BUN   Date Value Ref Range Status   10/27/2024 27 (H) 8 - 23 mg/dL Final     Creatinine   Date Value Ref Range Status   10/27/2024 2.08 (H) 0.57 - 1.00 mg/dL Final     Glucose   Date Value Ref Range Status   10/27/2024 92 65 - 99 mg/dL Final     Calcium   Date Value Ref Range Status   10/27/2024 10.0 8.6 - 10.5 mg/dL Final     AST (SGOT)   Date Value Ref Range Status   10/27/2024 21 1 - 32 U/L Final     ALT (SGPT)   Date Value Ref Range Status   10/27/2024 12 1 - 33 U/L Final     Alkaline Phosphatase   Date Value Ref Range Status   10/27/2024 70 39 - 117 U/L Final                Imaging Results (All)       None              Assessment:  Active Hospital Problems    Diagnosis  POA    **Vertigo as late effect of stroke [I69.398, R42]  Not Applicable      Resolved Hospital Problems   No resolved problems to display.   Nausea and vomiting  Chronic diastolic chf  Cad  Hypertension  A fib  Hyperlipidemia  Ckd3      Plan:  Will order fluids  Repeat mri  Ask neurology to see her  Pt/ot to see  Monitor on telemetry  Dw patient and ed provider    Cathi Goddard MD  10/27/2024  19:41 EDT

## 2024-10-27 NOTE — ED NOTES
Patient arrives via [facility] from home for complaints of nausea, vomiting, and bilateral eye pain from previous stroke. Patient takes Eliquis. Alert and oriented x4. No deficits.

## 2024-10-27 NOTE — ED NOTES
"Nursing report ED to floor  Marleni Hummel  88 y.o.  female    HPI :  HPI  Stated Reason for Visit: nausea, vomiting, bilateral eye pain  History Obtained From: EMS    Chief Complaint  Chief Complaint   Patient presents with    Nausea    Vomiting    Eye Pain       Admitting doctor:   Cathi Goddard MD    Admitting diagnosis:   The primary encounter diagnosis was Vertigo as late effect of cerebrovascular accident (CVA). Diagnoses of Nausea and vomiting, unspecified vomiting type and Renal insufficiency were also pertinent to this visit.    Code status:   Current Code Status       Date Active Code Status Order ID Comments User Context       10/27/2024 1633 CPR (Attempt to Resuscitate) [id number]  Cathi Goddard MD ED        Question Answer    Code Status (Patient has no pulse and is not breathing) CPR (Attempt to Resuscitate)    Medical Interventions (Patient has pulse or is breathing) Full                    Allergies:   Medrol [methylprednisolone], Morphine, Oxycodone, Ace inhibitors, Bactrim [sulfamethoxazole-trimethoprim], Levofloxacin, and Torsemide    Isolation:   No active isolations    Intake and Output    Intake/Output Summary (Last 24 hours) at 10/27/2024 1655  Last data filed at 10/27/2024 1632  Gross per 24 hour   Intake 1000 ml   Output 20 ml   Net 980 ml       Weight:       10/27/24  1444   Weight: 59 kg (130 lb 1.1 oz)       Most recent vitals:   Vitals:    10/27/24 1444 10/27/24 1601   BP: 157/90 175/79   Pulse: 71 66   Resp: 16 18   Temp: 97.5 °F (36.4 °C)    SpO2: 99% 99%   Weight: 59 kg (130 lb 1.1 oz)    Height: 152.4 cm (60\")        Active LDAs/IV Access:   Lines, Drains & Airways       Active LDAs       Name Placement date Placement time Site Days    Peripheral IV 10/27/24 1511 Left Antecubital 10/27/24  1511  Antecubital  less than 1                    Labs (abnormal labs have a star):   Labs Reviewed   COMPREHENSIVE METABOLIC PANEL - Abnormal; Notable for the following " components:       Result Value    BUN 27 (*)     Creatinine 2.08 (*)     eGFR 22.8 (*)     All other components within normal limits    Narrative:     GFR Normal >60  Chronic Kidney Disease <60  Kidney Failure <15    The GFR formula is only valid for adults with stable renal function between ages 18 and 70.   CBC WITH AUTO DIFFERENTIAL - Abnormal; Notable for the following components:    Monocyte % 4.7 (*)     All other components within normal limits   LIPASE - Normal   URINALYSIS W/ MICROSCOPIC IF INDICATED (NO CULTURE)   CBC AND DIFFERENTIAL    Narrative:     The following orders were created for panel order CBC & Differential.  Procedure                               Abnormality         Status                     ---------                               -----------         ------                     CBC Auto Differential[[id number]]        Abnormal            Final result                 Please view results for these tests on the individual orders.       EKG:   No orders to display       Meds given in ED:   Medications   sodium chloride 0.9 % flush 10 mL (has no administration in time range)   acetaminophen (TYLENOL) tablet 650 mg (has no administration in time range)     Or   acetaminophen (TYLENOL) 160 MG/5ML oral solution 650 mg (has no administration in time range)     Or   acetaminophen (TYLENOL) suppository 650 mg (has no administration in time range)   ondansetron ODT (ZOFRAN-ODT) disintegrating tablet 4 mg (has no administration in time range)     Or   ondansetron (ZOFRAN) injection 4 mg (has no administration in time range)   melatonin tablet 2.5 mg (has no administration in time range)   sennosides-docusate (PERICOLACE) 8.6-50 MG per tablet 2 tablet (has no administration in time range)     And   polyethylene glycol (MIRALAX) packet 17 g (has no administration in time range)     And   bisacodyl (DULCOLAX) EC tablet 5 mg (has no administration in time range)     And   bisacodyl (DULCOLAX) suppository 10  mg (has no administration in time range)   lactated ringers bolus 1,000 mL (0 mL Intravenous Stopped 10/27/24 1632)   ondansetron (ZOFRAN) injection 4 mg (4 mg Intravenous Given 10/27/24 1533)   meclizine (ANTIVERT) tablet 25 mg (25 mg Oral Given 10/27/24 1555)       Imaging results:  No radiology results for the last day    Ambulatory status:   - ASSIST    Social issues:   Social History     Socioeconomic History    Marital status:     Years of education: High school   Tobacco Use    Smoking status: Former     Current packs/day: 0.00     Average packs/day: 1 pack/day for 3.0 years (3.0 ttl pk-yrs)     Types: Cigarettes     Start date: 1953     Quit date: 1956     Years since quittin.7     Passive exposure: Past    Smokeless tobacco: Never    Tobacco comments:     caffeine - 1/2 cup coffee daily    Vaping Use    Vaping status: Never Used   Substance and Sexual Activity    Alcohol use: Not Currently     Alcohol/week: 3.0 standard drinks of alcohol     Types: 3 Glasses of wine per week     Comment: couple times a week    Drug use: Never    Sexual activity: Not Currently       Peripheral Neurovascular  Peripheral Neurovascular (Adult)  Peripheral Neurovascular WDL: WDL    Neuro Cognitive  Neuro Cognitive (Adult)  Cognitive/Neuro/Behavioral WDL: WDL    Learning  Learning Assessment  Learning Readiness and Ability: no barriers identified    Respiratory  Respiratory  Airway WDL: WDL  Respiratory WDL  Respiratory WDL: WDL    Abdominal Pain       Pain Assessments  Pain (Adult)  (0-10) Pain Rating: Rest: 0    NIH Stroke Scale       Sarah Rodriguez RN  10/27/24 16:55 EDT

## 2024-10-28 ENCOUNTER — APPOINTMENT (OUTPATIENT)
Dept: MRI IMAGING | Facility: HOSPITAL | Age: 89
DRG: 057 | End: 2024-10-28
Payer: MEDICARE

## 2024-10-28 LAB
ANION GAP SERPL CALCULATED.3IONS-SCNC: 7.5 MMOL/L (ref 5–15)
BUN SERPL-MCNC: 24 MG/DL (ref 8–23)
BUN/CREAT SERPL: 13.9 (ref 7–25)
CALCIUM SPEC-SCNC: 8.3 MG/DL (ref 8.6–10.5)
CHLORIDE SERPL-SCNC: 107 MMOL/L (ref 98–107)
CO2 SERPL-SCNC: 22.5 MMOL/L (ref 22–29)
CREAT SERPL-MCNC: 1.73 MG/DL (ref 0.57–1)
DEPRECATED RDW RBC AUTO: 45 FL (ref 37–54)
EGFRCR SERPLBLD CKD-EPI 2021: 28.5 ML/MIN/1.73
ERYTHROCYTE [DISTWIDTH] IN BLOOD BY AUTOMATED COUNT: 13.2 % (ref 12.3–15.4)
GLUCOSE SERPL-MCNC: 90 MG/DL (ref 65–99)
HCT VFR BLD AUTO: 29.3 % (ref 34–46.6)
HGB BLD-MCNC: 9.3 G/DL (ref 12–15.9)
MCH RBC QN AUTO: 29.8 PG (ref 26.6–33)
MCHC RBC AUTO-ENTMCNC: 31.7 G/DL (ref 31.5–35.7)
MCV RBC AUTO: 93.9 FL (ref 79–97)
PLATELET # BLD AUTO: 145 10*3/MM3 (ref 140–450)
PMV BLD AUTO: 10 FL (ref 6–12)
POTASSIUM SERPL-SCNC: 4.7 MMOL/L (ref 3.5–5.2)
RBC # BLD AUTO: 3.12 10*6/MM3 (ref 3.77–5.28)
SODIUM SERPL-SCNC: 137 MMOL/L (ref 136–145)
WBC NRBC COR # BLD AUTO: 5.76 10*3/MM3 (ref 3.4–10.8)

## 2024-10-28 PROCEDURE — 99222 1ST HOSP IP/OBS MODERATE 55: CPT | Performed by: PSYCHIATRY & NEUROLOGY

## 2024-10-28 PROCEDURE — 25010000002 CEFTRIAXONE PER 250 MG

## 2024-10-28 PROCEDURE — 85027 COMPLETE CBC AUTOMATED: CPT | Performed by: INTERNAL MEDICINE

## 2024-10-28 PROCEDURE — 70551 MRI BRAIN STEM W/O DYE: CPT

## 2024-10-28 PROCEDURE — 97166 OT EVAL MOD COMPLEX 45 MIN: CPT

## 2024-10-28 PROCEDURE — 36415 COLL VENOUS BLD VENIPUNCTURE: CPT | Performed by: INTERNAL MEDICINE

## 2024-10-28 PROCEDURE — 97162 PT EVAL MOD COMPLEX 30 MIN: CPT

## 2024-10-28 PROCEDURE — 80048 BASIC METABOLIC PNL TOTAL CA: CPT | Performed by: INTERNAL MEDICINE

## 2024-10-28 PROCEDURE — 25010000002 PROCHLORPERAZINE 10 MG/2ML SOLUTION: Performed by: INTERNAL MEDICINE

## 2024-10-28 PROCEDURE — 97530 THERAPEUTIC ACTIVITIES: CPT

## 2024-10-28 PROCEDURE — 25810000003 SODIUM CHLORIDE 0.9 % SOLUTION: Performed by: INTERNAL MEDICINE

## 2024-10-28 PROCEDURE — 25010000002 ONDANSETRON PER 1 MG: Performed by: INTERNAL MEDICINE

## 2024-10-28 RX ORDER — PANTOPRAZOLE SODIUM 40 MG/1
40 TABLET, DELAYED RELEASE ORAL
Status: DISCONTINUED | OUTPATIENT
Start: 2024-10-28 | End: 2024-10-30 | Stop reason: HOSPADM

## 2024-10-28 RX ORDER — PROCHLORPERAZINE EDISYLATE 5 MG/ML
10 INJECTION INTRAMUSCULAR; INTRAVENOUS EVERY 6 HOURS PRN
Status: DISCONTINUED | OUTPATIENT
Start: 2024-10-28 | End: 2024-10-30 | Stop reason: HOSPADM

## 2024-10-28 RX ADMIN — VORTIOXETINE 10 MG: 5 TABLET, FILM COATED ORAL at 10:08

## 2024-10-28 RX ADMIN — APIXABAN 2.5 MG: 2.5 TABLET, FILM COATED ORAL at 20:41

## 2024-10-28 RX ADMIN — APIXABAN 2.5 MG: 2.5 TABLET, FILM COATED ORAL at 09:06

## 2024-10-28 RX ADMIN — ATORVASTATIN CALCIUM 40 MG: 20 TABLET, FILM COATED ORAL at 20:41

## 2024-10-28 RX ADMIN — CETIRIZINE HYDROCHLORIDE 10 MG: 10 TABLET ORAL at 09:06

## 2024-10-28 RX ADMIN — MAGNESIUM OXIDE 400 MG (241.3 MG MAGNESIUM) TABLET 400 MG: TABLET at 09:06

## 2024-10-28 RX ADMIN — ONDANSETRON 4 MG: 2 INJECTION INTRAMUSCULAR; INTRAVENOUS at 11:52

## 2024-10-28 RX ADMIN — OXYBUTYNIN CHLORIDE 10 MG: 10 TABLET, EXTENDED RELEASE ORAL at 10:08

## 2024-10-28 RX ADMIN — SODIUM CHLORIDE 75 ML/HR: 9 INJECTION, SOLUTION INTRAVENOUS at 09:07

## 2024-10-28 RX ADMIN — PANTOPRAZOLE SODIUM 40 MG: 40 TABLET, DELAYED RELEASE ORAL at 11:52

## 2024-10-28 RX ADMIN — METOPROLOL SUCCINATE 12.5 MG: 25 TABLET, EXTENDED RELEASE ORAL at 09:06

## 2024-10-28 RX ADMIN — CEFTRIAXONE SODIUM 1000 MG: 1 INJECTION, POWDER, FOR SOLUTION INTRAMUSCULAR; INTRAVENOUS at 20:41

## 2024-10-28 RX ADMIN — PROCHLORPERAZINE EDISYLATE 10 MG: 5 INJECTION INTRAMUSCULAR; INTRAVENOUS at 14:31

## 2024-10-28 NOTE — THERAPY EVALUATION
Patient Name: Marleni Hummel  : 1935    MRN: [id number]                              Today's Date: 10/28/2024       Admit Date: 10/27/2024    Visit Dx:     ICD-10-CM ICD-9-CM   1. Vertigo as late effect of cerebrovascular accident (CVA)  I69.398 438.85    R42    2. Nausea and vomiting, unspecified vomiting type  R11.2 787.01   3. Renal insufficiency  N28.9 593.9     Patient Active Problem List   Diagnosis    Arthritis involving multiple sites    Essential hypertension    Permanent atrial fibrillation    History of Bell's palsy    CKD (chronic kidney disease) stage 4, GFR 15-29 ml/min    Gastroesophageal reflux disease without esophagitis    Hypercholesterolemia    Insomnia    Localized osteoporosis without current pathological fracture    Panic disorder    Chronic nonseasonal allergic rhinitis due to pollen    Other sleep apnea    History of MI (myocardial infarction)    Chronic coronary artery disease    Anemia of chronic disease    UTI (urinary tract infection)    Weakness of right leg    Cardiac murmur    Senile osteoporosis    Hyperuricemia    Grief reaction    Peripheral vertigo    Renal cell carcinoma of left kidney    Renal oncocytoma of left kidney    History of left nephrectomy    Cerebrovascular accident (CVA) due to stenosis of small artery    History of renal cell carcinoma    Malignant neoplasm of left kidney, except renal pelvis    Diverticulosis    Irritable bowel syndrome with constipation    Chronic diastolic congestive heart failure    Hallucination, visual    Hypomagnesemia    Lumbar disc disease with radiculopathy    History of cardiomyopathy-Takotsubo's    Acute thrombotic stroke-right temporal    Acute on chronic renal insufficiency    Vertigo as late effect of stroke     Past Medical History:   Diagnosis Date    Acute bronchitis due to Rhinovirus 2022    Acute vulvitis 2019    Allergic     Allergic rhinitis     Anemia of chronic disease     Arthropathy of knee     right     Atrial fibrillation     Back pain     Bell's palsy     Bradycardia 05/27/2024    Bradycardia, drug induced 05/27/2024    Caregiver stress syndrome 04/17/2019    Chronic coronary artery disease     Chronic kidney disease (CKD), stage III (moderate)     CKD (chronic kidney disease) stage 4, GFR 15-29 ml/min     Diverticulitis 12/14/2022    CARROLL (dyspnea on exertion) 03/17/2023    Edema     Elevated serum free T4 level 03/21/2016    Encounter for eye exam 09/2020    Essential hypertension     Fatigue     GERD (gastroesophageal reflux disease)     H/O Heart murmur     Heart attack 10/05/2015    Hematuria 12/12/2016    Herpes zoster without complication     Hip arthritis     left    History of anemia due to chronic kidney disease     History of bone density study 02/28/2008    History of pelvic fracture 2015    History of pneumonia     History of transfusion     2015    Hypercholesterolemia     IFG (impaired fasting glucose)     Insomnia     Left kidney mass 07/2020    Limited joint range of motion     RIGHT KNEE    Mixed hyperlipidemia     Muscle tension headache 04/03/2019    New daily persistent headache 12/17/2018    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 Clarke    CATARACT EXTRACTION Right 04/16/2013    Dr. Clarke    COLONOSCOPY  04/18/2014    Dr. Elizabeth; no polyps    EPIDURAL BLOCK      HIP PERCUTANEOUS PINNING Left 8/31/2017    Procedure: LT HIP PERCUTANEOUS PINNING;  Surgeon: Sean Canales MD;  Location: [facility];  Service:     MAMMO BILATERAL   11/2013    NEPHRECTOMY Left 11/16/2020    Procedure: LAPAROSCOPIC NEPHRECTOMY;  Surgeon: Chuckie Mclaughlin MD;  Location: [facility] MAIN OR;  Service: Urology;  Laterality: Left;    PAP SMEAR  02/2011    TIBIA FRACTURE SURGERY Right 2015    REMOVED PLATE LATER IN 2015    TONSILLECTOMY  1947    TOTAL HIP ARTHROPLASTY Right 08/2015    TOTAL HIP ARTHROPLASTY Right 09/2016    TOTAL KNEE ARTHROPLASTY Bilateral 2004      General Information       Row Name 10/28/24 1041          Physical Therapy Time and Intention    Document Type evaluation  -CS     Mode of Treatment physical therapy  -CS       Row Name 10/28/24 1041          General Information    Patient Profile Reviewed yes  -CS     Prior Level of Function independent:;gait;transfer;bed mobility  4WW  -CS     Existing Precautions/Restrictions fall  -CS     Barriers to Rehab visual deficit;hearing deficit  -CS       Row Name 10/28/24 1041          Living Environment    People in Home child(amy), adult  -CS       Row Name 10/28/24 1041          Home Main Entrance    Number of Stairs, Main Entrance one  -CS       Row Name 10/28/24 1041          Stairs Within Home, Primary    Number of Stairs, Within Home, Primary none  -CS       Row Name 10/28/24 1041          Cognition    Orientation Status (Cognition) oriented x 3  -CS       Row Name 10/28/24 1041          Safety Issues/Impairments Affecting Functional Mobility    Impairments Affecting Function (Mobility) balance;endurance/activity tolerance;strength  -CS               User Key  (r) = Recorded By, (t) = Taken By, (c) = Cosigned By      Initials Name Provider Type    CS Kirti Santizo, PT Physical Therapist                   Mobility       Row Name 10/28/24 1042          Bed Mobility    Comment, (Bed Mobility) NT - UIC  -CS       Row Name 10/28/24 1042          Sit-Stand Transfer    Sit-Stand [place] (Transfers) standby assist  -CS     Assistive Device (Sit-Stand Transfers) walker, front-wheeled  -CS        Row Name 10/28/24 1042          Gait/Stairs (Locomotion)    [place] Level (Gait) contact guard  -CS     Assistive Device (Gait) walker, front-wheeled  -CS     Distance in Feet (Gait) 100  -CS     Deviations/Abnormal Patterns (Gait) david decreased;stride length decreased  -CS     Bilateral Gait Deviations forward flexed posture  -CS     [place] Level (Stairs) not tested  -CS     Comment, (Gait/Stairs) slow pace; mild dizziness when initially standing; no overt LOB  -CS               User Key  (r) = Recorded By, (t) = Taken By, (c) = Cosigned By      Initials Name Provider Type    CS Kirti Santizo, PT Physical Therapist                   Obj/Interventions       Row Name 10/28/24 1043          Range of Motion Comprehensive    General Range of Motion bilateral lower extremity ROM WFL  -       Row Name 10/28/24 1043          Strength Comprehensive (MMT)    General Manual Muscle Testing (MMT) Assessment other (see comments)  -CS     Comment, General Manual Muscle Testing (MMT) Assessment B LE grossly 4-/5  -CS       Row Name 10/28/24 1043          Balance    Balance Assessment sitting static balance;sitting dynamic balance;standing static balance;standing dynamic balance  -CS     Static Sitting Balance modified independence  -CS     Dynamic Sitting Balance modified independence  -CS     Position, Sitting Balance supported;sitting in chair  -CS     Static Standing Balance standby assist  -CS     Dynamic Standing Balance contact guard  -CS     Position/Device Used, Standing Balance supported;walker, front-wheeled  -CS               User Key  (r) = Recorded By, (t) = Taken By, (c) = Cosigned By      Initials Name Provider Type    CS Kirti Santizo, PT Physical Therapist                   Goals/Plan       Row Name 10/28/24 1049          Bed Mobility Goal 1 (PT)    Activity/Assistive Device (Bed Mobility Goal 1, PT) bed mobility activities, all  -CS     [place] Level/Cues Needed (Bed Mobility Goal 1, PT)  independent  -CS     Time Frame (Bed Mobility Goal 1, PT) 1 week  -CS       Row Name 10/28/24 1049          Transfer Goal 1 (PT)    Activity/Assistive Device (Transfer Goal 1, PT) sit-to-stand/stand-to-sit;bed-to-chair/chair-to-bed  -CS     [place] Level/Cues Needed (Transfer Goal 1, PT) modified independence  -CS     Time Frame (Transfer Goal 1, PT) 1 week  -CS       Row Name 10/28/24 1049          Gait Training Goal 1 (PT)    Activity/Assistive Device (Gait Training Goal 1, PT) gait (walking locomotion);assistive device use;decrease fall risk  -CS     [place] Level (Gait Training Goal 1, PT) supervision required  -CS     Distance (Gait Training Goal 1, PT) 100'  -CS     Time Frame (Gait Training Goal 1, PT) 1 week  -CS               User Key  (r) = Recorded By, (t) = Taken By, (c) = Cosigned By      Initials Name Provider Type    CS Kirti Santizo, PT Physical Therapist                   Clinical Impression       Row Name 10/28/24 1043          Pain    Pretreatment Pain Rating 0/10 - no pain  -CS     Posttreatment Pain Rating 0/10 - no pain  -CS       Row Name 10/28/24 1043          Plan of Care Review    Plan of Care Reviewed With patient;family  -CS     Outcome Evaluation Pt is a 89 y/o F admitted to [facility] with c/o N&V and dizziness. Pt recently admitted with a right temporal small acute ischemic stroke and reports visual changes since dx. Pt lives with her daughter with 1 ELIN and uses a 4WW for mobility at baseline. Pt reports 1 fall recently that did not result in injury. Pt states she has had vertigo in the past and these symptoms are NOT the same. Pt sitting UIC and agreeable to PT eval. Pt stood and ambulated 100' c RW requiring SBA/CGA. Pt demo's a slow pace but overall steady gait. Provoking activity completed and could not reproduce symptoms. No nystagum noted with head turns. Pt reports light sensitivity and blurry vision which she thinks is causing the dizziness. Pt plans to f/u with her  opthalmologist this week. Pt appears close to her functional baseline with education provided due to fall risk. PT recommends home with assist.  -CS       Row Name 10/28/24 1043          Therapy Assessment/Plan (PT)    Rehab Potential (PT) good  -CS     Criteria for Skilled Interventions Met (PT) yes;meets criteria  -CS     Therapy Frequency (PT) 3 times/wk  -CS       Row Name 10/28/24 1043          Positioning and Restraints    Pre-Treatment Position sitting in chair/recliner  -CS     Post Treatment Position chair  -CS     In Chair sitting;call light within reach;encouraged to call for assist;with family/caregiver  no alarm upon arrival  -CS               User Key  (r) = Recorded By, (t) = Taken By, (c) = Cosigned By      Initials Name Provider Type    CS Kirti Snatizo, PT Physical Therapist                   Outcome Measures       Row Name 10/28/24 1050          How much help from another person do you currently need...    Turning from your back to your side while in flat bed without using bedrails? 4  -CS     Moving from lying on back to sitting on the side of a flat bed without bedrails? 4  -CS     Moving to and from a bed to a chair (including a wheelchair)? 4  -CS     Standing up from a chair using your arms (e.g., wheelchair, bedside chair)? 4  -CS     Climbing 3-5 steps with a railing? 3  -CS     To walk in hospital room? 3  -CS     AM-PAC 6 Clicks Score (PT) 22  -CS     Highest Level of Mobility Goal 7 --> Walk 25 feet or more  -CS       Row Name 10/28/24 1050          Functional Assessment    Outcome Measure Options AM-PAC 6 Clicks Basic Mobility (PT)  -CS               User Key  (r) = Recorded By, (t) = Taken By, (c) = Cosigned By      Initials Name Provider Type    Kirti Pizano, PT Physical Therapist                                 Physical Therapy Education       Title: PT OT SLP Therapies (In Progress)       Topic: Physical Therapy (In Progress)       Point: Mobility training (Done)        Learning Progress Summary            Patient Acceptance, E,TB, VU,DU by  at 10/28/2024 1050                      Point: Home exercise program (Not Started)       Learner Progress:  Not documented in this visit.              Point: Body mechanics (Done)       Learning Progress Summary            Patient Acceptance, E,TB, VU,DU by  at 10/28/2024 1050                      Point: Precautions (Done)       Learning Progress Summary            Patient Acceptance, E,TB, VU,DU by  at 10/28/2024 1050                                      User Key       Initials Effective Dates Name Provider Type Discipline     09/22/22 -  Kirti Santizo, PT Physical Therapist PT                  PT Recommendation and Plan     Outcome Evaluation: Pt is a 89 y/o F admitted to [facility] with c/o N&V and dizziness. Pt recently admitted with a right temporal small acute ischemic stroke and reports visual changes since dx. Pt lives with her daughter with 1 ELIN and uses a 4WW for mobility at baseline. Pt reports 1 fall recently that did not result in injury. Pt states she has had vertigo in the past and these symptoms are NOT the same. Pt sitting UIC and agreeable to PT eval. Pt stood and ambulated 100' c RW requiring SBA/CGA. Pt demo's a slow pace but overall steady gait. Provoking activity completed and could not reproduce symptoms. No nystagum noted with head turns. Pt reports light sensitivity and blurry vision which she thinks is causing the dizziness. Pt plans to f/u with her opthalmologist this week. Pt appears close to her functional baseline with education provided due to fall risk. PT recommends home with assist.     Time Calculation:         PT Charges       Row Name 10/28/24 1050             Time Calculation    Start Time 0945  -CS      Stop Time 1003  -CS      Time Calculation (min) 18 min  -CS      PT Received On 10/28/24  -      PT - Next Appointment 10/30/24  -      PT Goal Re-Cert Due Date 11/04/24  -         Time  Calculation- PT    Total Timed Code Minutes- PT 14 minute(s)  -CS         Timed Charges    36146 - PT Therapeutic Activity Minutes 14  -CS         Total Minutes    Timed Charges Total Minutes 14  -CS       Total Minutes 14  -CS                User Key  (r) = Recorded By, (t) = Taken By, (c) = Cosigned By      Initials Name Provider Type    CS Kirti Santizo, PT Physical Therapist                  Therapy Charges for Today       Code Description Service Date Service Provider Modifiers Qty    63918658840 HC PT THERAPEUTIC ACT EA 15 MIN 10/28/2024 Kirti Santizo, PT GP 1    [id number] HC PT EVAL MOD COMPLEXITY 3 10/28/2024 Kirti Santizo, PT GP 1            PT G-Codes  Outcome Measure Options: AM-PAC 6 Clicks Basic Mobility (PT)  AM-PAC 6 Clicks Score (PT): 22  PT Discharge Summary  Anticipated Discharge Disposition (PT): home with assist    Kirti Santizo PT  10/28/2024

## 2024-10-28 NOTE — THERAPY EVALUATION
Patient Name: Marleni Hummel  : 1935    MRN: [id number]                              Today's Date: 10/28/2024       Admit Date: 10/27/2024    Visit Dx:     ICD-10-CM ICD-9-CM   1. Vertigo as late effect of cerebrovascular accident (CVA)  I69.398 438.85    R42    2. Nausea and vomiting, unspecified vomiting type  R11.2 787.01   3. Renal insufficiency  N28.9 593.9     Patient Active Problem List   Diagnosis    Arthritis involving multiple sites    Essential hypertension    Permanent atrial fibrillation    History of Bell's palsy    CKD (chronic kidney disease) stage 4, GFR 15-29 ml/min    Gastroesophageal reflux disease without esophagitis    Hypercholesterolemia    Insomnia    Localized osteoporosis without current pathological fracture    Panic disorder    Chronic nonseasonal allergic rhinitis due to pollen    Other sleep apnea    History of MI (myocardial infarction)    Chronic coronary artery disease    Anemia of chronic disease    UTI (urinary tract infection)    Weakness of right leg    Cardiac murmur    Senile osteoporosis    Hyperuricemia    Grief reaction    Peripheral vertigo    Renal cell carcinoma of left kidney    Renal oncocytoma of left kidney    History of left nephrectomy    Cerebrovascular accident (CVA) due to stenosis of small artery    History of renal cell carcinoma    Malignant neoplasm of left kidney, except renal pelvis    Diverticulosis    Irritable bowel syndrome with constipation    Chronic diastolic congestive heart failure    Hallucination, visual    Hypomagnesemia    Lumbar disc disease with radiculopathy    History of cardiomyopathy-Takotsubo's    Acute thrombotic stroke-right temporal    Acute on chronic renal insufficiency    Vertigo as late effect of stroke     Past Medical History:   Diagnosis Date    Acute bronchitis due to Rhinovirus 2022    Acute vulvitis 2019    Allergic     Allergic rhinitis     Anemia of chronic disease     Arthropathy of knee     right     Atrial fibrillation     Back pain     Bell's palsy     Bradycardia 05/27/2024    Bradycardia, drug induced 05/27/2024    Caregiver stress syndrome 04/17/2019    Chronic coronary artery disease     Chronic kidney disease (CKD), stage III (moderate)     CKD (chronic kidney disease) stage 4, GFR 15-29 ml/min     Diverticulitis 12/14/2022    CARROLL (dyspnea on exertion) 03/17/2023    Edema     Elevated serum free T4 level 03/21/2016    Encounter for eye exam 09/2020    Essential hypertension     Fatigue     GERD (gastroesophageal reflux disease)     H/O Heart murmur     Heart attack 10/05/2015    Hematuria 12/12/2016    Herpes zoster without complication     Hip arthritis     left    History of anemia due to chronic kidney disease     History of bone density study 02/28/2008    History of pelvic fracture 2015    History of pneumonia     History of transfusion     2015    Hypercholesterolemia     IFG (impaired fasting glucose)     Insomnia     Left kidney mass 07/2020    Limited joint range of motion     RIGHT KNEE    Mixed hyperlipidemia     Muscle tension headache 04/03/2019    New daily persistent headache 12/17/2018    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 Clarke    CATARACT EXTRACTION Right 04/16/2013    Dr. Clarke    COLONOSCOPY  04/18/2014    Dr. Elizabeth; no polyps    EPIDURAL BLOCK      HIP PERCUTANEOUS PINNING Left 8/31/2017    Procedure: LT HIP PERCUTANEOUS PINNING;  Surgeon: Sean Canales MD;  Location: [facility];  Service:     MAMMO BILATERAL   11/2013    NEPHRECTOMY Left 11/16/2020    Procedure: LAPAROSCOPIC NEPHRECTOMY;  Surgeon: Chuckie Mclaughlin MD;  Location: [facility];  Service: Urology;  Laterality: Left;    PAP SMEAR  02/2011    TIBIA FRACTURE SURGERY Right 2015    REMOVED PLATE LATER IN 2015    TONSILLECTOMY  1947    TOTAL HIP ARTHROPLASTY Right 08/2015    TOTAL HIP ARTHROPLASTY Right 09/2016    TOTAL KNEE ARTHROPLASTY Bilateral 2004      General Information       Row Name 10/28/24 1201          OT Time and Intention    Document Type evaluation  -JR     Mode of Treatment occupational therapy  -JR       Row Name 10/28/24 1201          General Information    Patient Profile Reviewed yes  -JR     Prior Level of Function independent:;ADL's  -JR     Existing Precautions/Restrictions fall  -JR     Barriers to Rehab visual deficit  -JR       Row Name 10/28/24 1201          Living Environment    People in Home child(amy), adult  -JR       Row Name 10/28/24 1201          Home Main Entrance    Number of Stairs, Main Entrance one  -JR       Row Name 10/28/24 1201          Cognition    Orientation Status (Cognition) oriented x 3  -JR       Row Name 10/28/24 1201          Safety Issues/Impairments Affecting Functional Mobility    Impairments Affecting Function (Mobility) balance;endurance/activity tolerance;strength  -JR     Comment, Safety Issues/Impairments (Mobility) gait belt and non skid socks donned.  -JR               User Key  (r) = Recorded By, (t) = Taken By, (c) = Cosigned By      Initials Name Provider Type    JR Arden Camacho OT Occupational Therapist                     Mobility/ADL's       Row Name 10/28/24 1201          Transfers    Comment, (Transfers) [facility] upon arrival.  -JR       Row Name 10/28/24 1201          Sit-Stand Transfer    Sit-Stand [place] (Transfers) standby assist  -JR     Assistive Device (Sit-Stand Transfers) walker, front-wheeled  -JR       Row Name 10/28/24 1201          Functional Mobility    Patient was  able to Ambulate yes  -               User Key  (r) = Recorded By, (t) = Taken By, (c) = Cosigned By      Initials Name Provider Type    Arden Bright OT Occupational Therapist                   Obj/Interventions       Row Name 10/28/24 1202          Vision Assessment/Intervention    Visual Impairment/Limitations visual/perceptual impairments present  -     Vision Assessment Comment Patient reports vision is causion dizziness - not vertigo  -       Row Name 10/28/24 1202          Range of Motion Comprehensive    General Range of Motion bilateral upper extremity ROM WFL  -     Comment, General Range of Motion BUE WFL  -       Row Name 10/28/24 1202          Strength Comprehensive (MMT)    General Manual Muscle Testing (MMT) Assessment upper extremity strength deficits identified  -     Comment, General Manual Muscle Testing (MMT) Assessment BUE WFL  -       Row Name 10/28/24 1202          Balance    Balance Assessment sitting static balance;sitting dynamic balance;standing static balance;standing dynamic balance  -     Static Sitting Balance modified independence  -     Dynamic Sitting Balance modified independence  -     Position, Sitting Balance sitting in chair  -     Static Standing Balance standby assist  -     Dynamic Standing Balance contact guard  -     Position/Device Used, Standing Balance supported;walker, front-wheeled  -               User Key  (r) = Recorded By, (t) = Taken By, (c) = Cosigned By      Initials Name Provider Type    Arden Bright OT Occupational Therapist                   Goals/Plan       Row Name 10/28/24 1218          Transfer Goal 1 (OT)    Activity/Assistive Device (Transfer Goal 1, OT) transfers, all  -     [place] Level/Cues Needed (Transfer Goal 1, OT) modified independence  -     Time Frame (Transfer Goal 1, OT) 2 weeks  -     Progress/Outcome (Transfer Goal 1, OT) new goal  -       Row Name 10/28/24 1218          Bathing Goal 1 (OT)     Activity/Device (Bathing Goal 1, OT) bathing skills, all  -JR     [place] Level/Cues Needed (Bathing Goal 1, OT) modified independence  -JR     Time Frame (Bathing Goal 1, OT) 2 weeks  -JR     Progress/Outcomes (Bathing Goal 1, OT) new goal  -JR       Row Name 10/28/24 1218          Dressing Goal 1 (OT)    Activity/Device (Dressing Goal 1, OT) dressing skills, all  -JR     [place]/Cues Needed (Dressing Goal 1, OT) modified independence  -JR     Time Frame (Dressing Goal 1, OT) 2 weeks  -JR     Progress/Outcome (Dressing Goal 1, OT) new goal  -       Row Name 10/28/24 1218          Toileting Goal 1 (OT)    Activity/Device (Toileting Goal 1, OT) toileting skills, all  -JR     [place] Level/Cues Needed (Toileting Goal 1, OT) modified independence  -JR     Time Frame (Toileting Goal 1, OT) 2 weeks  -JR     Progress/Outcome (Toileting Goal 1, OT) new goal  -       Row Name 10/28/24 1218          Grooming Goal 1 (OT)    Activity/Device (Grooming Goal 1, OT) grooming skills, all  -JR     [place] (Grooming Goal 1, OT) modified independence  -JR     Time Frame (Grooming Goal 1, OT) 2 weeks  -JR     Progress/Outcome (Grooming Goal 1, OT) new [facility]  -       Row Name 10/28/24 1218          Therapy Assessment/Plan (OT)    Planned Therapy Interventions (OT) activity tolerance training;adaptive equipment training;BADL retraining;cognitive/visual perception retraining;neuromuscular control/coordination retraining;functional balance retraining;occupation/activity based interventions;passive ROM/stretching;transfer/mobility retraining;strengthening exercise;ROM/therapeutic exercise  -JR               User Key  (r) = Recorded By, (t) = Taken By, (c) = Cosigned By      Initials Name Provider Type    JR Arden Camacho, OT Occupational Therapist                   Clinical Impression       Row Name 10/28/24 1203          Pain Assessment    Pretreatment Pain Rating 0/10 - no pain  -JR     Posttreatment Pain Rating 0/10  - no pain  -       Row Name 10/28/24 1203          Plan of Care Review    Plan of Care Reviewed With patient;family  -     Progress no change  -     Outcome Evaluation 88 y.o. female admitted for vausea/vomiting, dizziness and eye pain   PMH sig for right temporal small acute ischemic stroke (3 weeks ago).  Patient reports her vision has been severely impaired following this recent stroke (bilateral eye pain, blurry vision, light sensitivity).  Patient has had vertigo in the past and feels this is diffewrent., At baseline, patient lives at home (1 ELIN) with her daughter and is (I) ADLs and mobility with rollator.  Patient is [facility] upon arrival.  Before standing, therapist tried to facilitate any vertigo symptoms with head turning with negative results.  STS from chair level with SBA/CGA.  Patient ambulated to/from hallway with CGA/SBA with no LOB and or unsteadiness noted. Patient demos slow guarded movement  with moderate fear of following.  OT will be beneficial to address underlying deficits and increase safe ADL (I).  Recommend d/c to home with HH assistance and patient to follow-up with eye doctor.  -       Row Name 10/28/24 1203          Therapy Assessment/Plan (OT)    Rehab Potential (OT) good  -     Criteria for Skilled Therapeutic Interventions Met (OT) yes  -     Therapy Frequency (OT) 3 times/wk  -       Row Name 10/28/24 1203          Therapy Plan Review/Discharge Plan (OT)    Anticipated Discharge Disposition (OT) home with home health  -       Row Name 10/28/24 1203          Vital Signs    O2 Delivery Pre Treatment room air  -JR     O2 Delivery Intra Treatment room air  -JR     O2 Delivery Post Treatment room air  -JR     Pre Patient Position Sitting  -JR     Intra Patient Position Standing  -JR     Post Patient Position Sitting  -JR       Row Name 10/28/24 1203          Positioning and Restraints    Pre-Treatment Position sitting in chair/recliner  -JR     Post Treatment Position chair   -JR     In Chair notified nsg;reclined;call light within reach;encouraged to call for assist;exit alarm on;with family/caregiver  -JR               User Key  (r) = Recorded By, (t) = Taken By, (c) = Cosigned By      Initials Name Provider Type    Arden Bright, OT Occupational Therapist                   Outcome Measures       Row Name 10/28/24 1219          How much help from another is currently needed...    Putting on and taking off regular lower body clothing? 3  -JR     Bathing (including washing, rinsing, and drying) 3  -JR     Toileting (which includes using toilet bed pan or urinal) 3  -JR     Putting on and taking off regular upper body clothing 3  -JR     Taking care of personal grooming (such as brushing teeth) 3  -JR     Eating meals 3  -JR     AM-PAC 6 Clicks Score (OT) 18  -       Row Name 10/28/24 1050          How much help from another person do you currently need...    Turning from your back to your side while in flat bed without using bedrails? 4  -CS     Moving from lying on back to sitting on the side of a flat bed without bedrails? 4  -CS     Moving to and from a bed to a chair (including a wheelchair)? 4  -CS     Standing up from a chair using your arms (e.g., wheelchair, bedside chair)? 4  -CS     Climbing 3-5 steps with a railing? 3  -CS     To walk in hospital room? 3  -CS     AM-PAC 6 Clicks Score (PT) 22  -CS     Highest Level of Mobility Goal 7 --> Walk 25 feet or more  -CS       Row Name 10/28/24 1219          Modified [place] Scale    Modified [place] Scale 3 - Moderate disability.  Requiring some help, but able to walk without assistance.  -       Row Name 10/28/24 1219 10/28/24 1050       Functional Assessment    Outcome Measure Options AM-PAC 6 Clicks Daily Activity (OT);Modified [place]  -JR AM-PAC 6 Clicks Basic Mobility (PT)  -CS              User Key  (r) = Recorded By, (t) = Taken By, (c) = Cosigned By      Initials Name Provider Type    CS Kirti Santizo, PT Physical  Therapist    Arden Bright OT Occupational Therapist                    Occupational Therapy Education       Title: PT OT SLP Therapies (In Progress)       Topic: Occupational Therapy (Done)       Point: ADL training (Done)       Description:   Instruct learner(s) on proper safety adaptation and remediation techniques during self care or transfers.   Instruct in proper use of assistive devices.                  Learning Progress Summary            Patient DILCIA Mendez JALEEL by  at 10/28/2024 1219    Comment: Role of OT                      Point: Home exercise program (Done)       Description:   Instruct learner(s) on appropriate technique for monitoring, assisting and/or progressing therapeutic exercises/activities.                  Learning Progress Summary            Davian DILCIA Mendez VU by  at 10/28/2024 1219    Comment: Role of OT                      Point: Precautions (Done)       Description:   Instruct learner(s) on prescribed precautions during self-care and functional transfers.                  Learning Progress Summary            Davian Vanessa JALEEL HA by  at 10/28/2024 1219    Comment: Role of OT                      Point: Body mechanics (Done)       Description:   Instruct learner(s) on proper positioning and spine alignment during self-care, functional mobility activities and/or exercises.                  Learning Progress Summary            Davian BusterDILCIA kangJALEEL by  at 10/28/2024 1219    Comment: Role of OT                                      User Key       Initials Effective Dates Name Provider Type Discipline     07/24/24 -  Arden Camacho OT Occupational Therapist OT                  OT Recommendation and Plan  Planned Therapy Interventions (OT): activity tolerance training, adaptive equipment training, BADL retraining, cognitive/visual perception retraining, neuromuscular control/coordination retraining, functional balance retraining, occupation/activity based interventions, passive  ROM/stretching, transfer/mobility retraining, strengthening exercise, ROM/therapeutic exercise  Therapy Frequency (OT): 3 times/wk  Plan of Care Review  Plan of Care Reviewed With: patient, family  Progress: no change  Outcome Evaluation: 88 y.o. female admitted for vausea/vomiting, dizziness and eye pain   PMH sig for right temporal small acute ischemic stroke (3 weeks ago).  Patient reports her vision has been severely impaired following this recent stroke (bilateral eye pain, blurry vision, light sensitivity).  Patient has had vertigo in the past and feels this is diffewrent., At baseline, patient lives at home (1 ELIN) with her daughter and is (I) ADLs and mobility with rollator.  Patient is [facility] upon arrival.  Before standing, therapist tried to facilitate any vertigo symptoms with head turning with negative results.  STS from chair level with SBA/CGA.  Patient ambulated to/from hallway with CGA/SBA with no LOB and or unsteadiness noted. Patient demos slow guarded movement  with moderate fear of following.  OT will be beneficial to address underlying deficits and increase safe ADL (I).  Recommend d/c to home with HH assistance and patient to follow-up with eye doctor.     Time Calculation:   Evaluation Complexity (OT)  Review Occupational Profile/Medical/Therapy History Complexity: expanded/moderate complexity  Assessment, Occupational Performance/Identification of Deficit Complexity: 3-5 performance deficits  Clinical Decision Making Complexity (OT): detailed assessment/moderate complexity  Overall Complexity of Evaluation (OT): moderate complexity     Time Calculation- OT       Row Name 10/28/24 1220             Time Calculation- OT    OT Start Time 0945  -      OT Stop Time 1003  -      OT Time Calculation (min) 18 min  -      Total Timed Code Minutes- OT 18 minute(s)  -      OT Received On 10/28/24  -      OT - Next Appointment 10/30/24  -      OT Goal Re-Cert Due Date 11/11/24  -         Timed  Charges    11861 - OT Therapeutic Activity Minutes 10  -JR         Untimed Charges    OT Eval/Re-eval Minutes 8  -JR         Total Minutes    Timed Charges Total Minutes 10  -JR      Untimed Charges Total Minutes 8  -JR       Total Minutes 18  -JR                User Key  (r) = Recorded By, (t) = Taken By, (c) = Cosigned By      Initials Name Provider Type    Arden Bright OT Occupational Therapist                  Therapy Charges for Today       Code Description Service Date Service Provider Modifiers Qty    07215425589 HC OT THERAPEUTIC ACT EA 15 MIN 10/28/2024 Arden Camacho OT GO 1    [id number] HC OT EVAL MOD COMPLEXITY 3 10/28/2024 Arden Camacho OT GO 1                 Arden Camacho OT  10/28/2024

## 2024-10-28 NOTE — PLAN OF CARE
Goal Outcome Evaluation:  Plan of Care Reviewed With: patient, family        Progress: no change  Outcome Evaluation: 88 y.o. female admitted for vausea/vomiting, dizziness and eye pain   PMH sig for right temporal small acute ischemic stroke (3 weeks ago).  Patient reports her vision has been severely impaired following this recent stroke (bilateral eye pain, blurry vision, light sensitivity).  Patient has had vertigo in the past and feels this is diffewrent., At baseline, patient lives at home (1 ELIN) with her daughter and is (I) ADLs and mobility with rollator.  Patient is [facility] upon arrival.  Before standing, therapist tried to facilitate any vertigo symptoms with head turning with negative results.  STS from chair level with SBA/CGA.  Patient ambulated to/from hallway with CGA/SBA with no LOB and or unsteadiness noted. Patient demos slow guarded movement  with moderate fear of following.  OT will be beneficial to address underlying deficits and increase safe ADL (I).  Recommend d/c to home with HH assistance and patient to follow-up with eye doctor.    Anticipated Discharge Disposition (OT): home with home health

## 2024-10-28 NOTE — PLAN OF CARE
Goal Outcome Evaluation:  Plan of Care Reviewed With: patient, family           Outcome Evaluation: Pt is a 87 y/o F admitted to [facility] with c/o N&V and dizziness. Pt recently admitted with a right temporal small acute ischemic stroke and reports visual changes since dx. Pt lives with her daughter with 1 ELIN and uses a 4WW for mobility at baseline. Pt reports 1 fall recently that did not result in injury. Pt states she has had vertigo in the past and these symptoms are NOT the same. Pt sitting [facility] and agreeable to PT eval. Pt stood and ambulated 100' c RW requiring SBA/CGA. Pt demo's a slow pace but overall steady gait. Provoking activity completed and could not reproduce symptoms. No nystagum noted with head turns. Pt reports light sensitivity and blurry vision which she thinks is causing the dizziness. Pt plans to f/u with her opthalmologist this week. Pt appears close to her functional baseline with education provided due to fall risk. PT recommends home with assist.    Anticipated Discharge Disposition (PT): home with assist

## 2024-10-28 NOTE — PROGRESS NOTES
" LOS: 1 day     Name: Marleni Hummel  Age: 88 y.o.  Sex: female  :  1935  MRN: [id number]         Primary Care Physician: Ken Andujar MD    Subjective   Subjective  Reports significant improvement of her vertigo today.  Still with some light sensitivity.  No nausea or vomiting.  Was able to tolerate breakfast.    Objective   Vital Signs  Temp:  [97.5 °F (36.4 °C)-98.4 °F (36.9 °C)] 97.9 °F (36.6 °C)  Heart Rate:  [57-71] 57  Resp:  [16-18] 16  BP: (128-175)/() 134/59  Body mass index is 25.4 kg/m².    Objective:  General Appearance:  Comfortable and in no acute distress.    Vital signs: (most recent): Blood pressure 134/59, pulse 57, temperature 97.9 °F (36.6 °C), temperature source Oral, resp. rate 16, height 152.4 cm (60\"), weight 59 kg (130 lb 1.1 oz), SpO2 96%, not currently breastfeeding.    Lungs:  Normal effort and normal respiratory rate.    Heart: Normal rate.  Regular rhythm.    Abdomen: Abdomen is soft.  Bowel sounds are normal.   There is no abdominal tenderness.     Extremities: There is no dependent edema or local swelling.    Neurological: Patient is alert and oriented to person, place and time.    Skin:  Warm and dry.                Results Review:       I reviewed the patient's new clinical results.    Results from last 7 days   Lab Units 10/28/24  0532 10/27/24  1511 10/24/24  0358 10/23/24  1742   WBC 10*3/mm3 5.76 8.73 5.21 6.72   HEMOGLOBIN g/dL 9.3* 12.1 10.5* 11.6*   PLATELETS 10*3/mm3 145 187 181 211     Results from last 7 days   Lab Units 10/28/24  0532 10/27/24  1511 10/24/24  0358 10/23/24  1742   SODIUM mmol/L 137 139 138 136   POTASSIUM mmol/L 4.7 4.8 4.9 5.0   CHLORIDE mmol/L 107 103 105 100   CO2 mmol/L 22.5 23.0 22.9 24.6   BUN mg/dL 24* 27* 31* 32*   CREATININE mg/dL 1.73* 2.08* 1.93* 2.29*   CALCIUM mg/dL 8.3* 10.0 9.4 10.0   GLUCOSE mg/dL 90 92 90 101*                 Scheduled Meds:   apixaban, 2.5 mg, Oral, Q12H  atorvastatin, 40 mg, Oral, " Nightly  cefTRIAXone, 1,000 mg, Intravenous, Q24H  cetirizine, 10 mg, Oral, Daily  magnesium oxide, 400 mg, Oral, Daily  metoprolol succinate XL, 12.5 mg, Oral, Daily  oxybutynin XL, 10 mg, Oral, Daily  Vortioxetine HBr, 10 mg, Oral, Daily With Breakfast      PRN Meds:     acetaminophen **OR** acetaminophen **OR** acetaminophen    senna-docusate sodium **AND** polyethylene glycol **AND** bisacodyl **AND** bisacodyl    melatonin    ondansetron ODT **OR** ondansetron    [COMPLETED] Insert Peripheral IV **AND** sodium chloride  Continuous Infusions:  sodium chloride, 75 mL/hr, Last Rate: 75 mL/hr (10/28/24 0907)        Assessment & Plan   Active Hospital Problems    Diagnosis  POA    **Vertigo as late effect of stroke [I69.398, R42]  Not Applicable    Chronic diastolic congestive heart failure [I50.32]  Yes    UTI (urinary tract infection) [N39.0]  Yes    Permanent atrial fibrillation [I48.21]  Yes    CKD (chronic kidney disease) stage 4, GFR 15-29 ml/min [N18.4]  Yes      Resolved Hospital Problems   No resolved problems to display.       Assessment & Plan    -She reports improvement of her vertigo and nausea/vomiting.  Neurology consulted upon admission last evening.  Repeat brain MRI was ordered  -Continue supportive measures  -Continue Rocephin.  I have asked for a urine culture to be added to the specimen collected last evening.  -Renal function appears at baseline now that her nausea and vomiting is resolved I am going to stop the IV fluids.  -A-fib is rate controlled and she remains on Eliquis  -Discussed the recurrent nausea and vomiting issue with Dr. Allen.  Morse to be less likely related to the previous strokes.  She does have history of GERD.  Abdominal imaging was negative.  Symptoms are now resolved.  I will place her on a PPI.  LFTs unremarkable.    Anticipate discharge in 1 to 2 days      Expected discharge date/ time has not been documented.     Pawan Diez MD  [facility]  10/28/24  09:34 EDT

## 2024-10-28 NOTE — CONSULTS
"Neurology Consult Note    Consult Date: 10/28/2024    Referring MD: Dr. Dize    Reason for Consult I have been asked to see the patient in neurological consultation to render advice and opinion regarding nausea    Marleni Hummel is a 88 y.o. 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  This occurred in the setting of recurrent nausea and vomiting.  She recovered from the stroke but continued to have intermittent vomiting with unclear etiology.  She was readmitted last night and started again on ceftriaxone reportedly for \"kidney infection\".  The patient complained to the emergency department that she is having blurred vision with some associated bilateral retro-orbital pain that she thinks correlates with her nausea and vomiting.  We were consulted for this reason given her recent strokes.    Past Medical History:   Diagnosis Date    Acute bronchitis due to Rhinovirus 05/31/2022    Acute vulvitis 08/21/2019    Allergic     Allergic rhinitis     Anemia of chronic disease     Arthropathy of knee     right    Atrial fibrillation     Back pain     Bell's palsy     Bradycardia 05/27/2024    Bradycardia, drug induced 05/27/2024    Caregiver stress syndrome 04/17/2019    Chronic coronary artery disease     Chronic kidney disease (CKD), stage III (moderate)     CKD (chronic kidney disease) stage 4, GFR 15-29 ml/min     Diverticulitis 12/14/2022    CARROLL (dyspnea on exertion) 03/17/2023    Edema     Elevated serum free T4 level 03/21/2016    Encounter for eye exam 09/2020    Essential hypertension     Fatigue     GERD (gastroesophageal reflux disease)     H/O Heart murmur     Heart attack 10/05/2015    Hematuria 12/12/2016    Herpes zoster without complication     Hip arthritis     left    History of anemia due to chronic kidney disease     History of bone density study 02/28/2008    History of pelvic fracture 2015    " "History of pneumonia     History of transfusion     2015    Hypercholesterolemia     IFG (impaired fasting glucose)     Insomnia     Left kidney mass 07/2020    Limited joint range of motion     RIGHT KNEE    Mixed hyperlipidemia     Muscle tension headache 04/03/2019    New daily persistent headache 12/17/2018    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    Osteoarthritis     Right hip    Osteoporosis     Panic disorder     Peripheral vertigo     PONV (postoperative nausea and vomiting)     Rectal bleeding     HISTORY OF    Sleep apnea     DOES NOT USE C-PAP    Spinal stenosis, lumbar region with neurogenic claudication 12/02/2021    Stress     Stress-induced cardiomyopathy     Urinary incontinence     Vitamin D deficiency        Exam  /59 (BP Location: Right arm, Patient Position: Lying)   Pulse 57   Temp 97.9 °F (36.6 °C) (Oral)   Resp 16   Ht 152.4 cm (60\")   Wt 59 kg (130 lb 1.1 oz)   SpO2 96%   BMI 25.40 kg/m²   Gen: NAD, vitals reviewed  MS: oriented x3, recent/remote memory intact, normal attention/concentration, language intact, no neglect.  CN: visual acuity grossly normal, visual fields full, PERRL, EOMI without nystagmus, no facial droop, no dysarthria  Motor: 5/5 throughout upper and lower extremities, normal tone, no asterixis  Coordination: No dysmetria    DATA:    Lab Results   Component Value Date    GLUCOSE 90 10/28/2024    CALCIUM 8.3 (L) 10/28/2024     10/28/2024    K 4.7 10/28/2024    CO2 22.5 10/28/2024     10/28/2024    BUN 24 (H) 10/28/2024    CREATININE 1.73 (H) 10/28/2024    EGFRIFAFRI 25 (L) 02/23/2022    EGFRIFNONA 21 (L) 02/23/2022    BCR 13.9 10/28/2024    ANIONGAP 7.5 10/28/2024     Lab Results   Component Value Date    WBC 5.76 10/28/2024    HGB 9.3 (L) 10/28/2024    HCT 29.3 (L) 10/28/2024    MCV 93.9 10/28/2024     10/28/2024       Lab review: Hemoglobin 9.3, creatinine 1.7, BUN 24    Imaging review: I personally reviewed her recent MRI, MRA bilateral carotid duplex " performed at the beginning of the month.  MRI showed acute to subacute infarcts in the right thalamus and right temporal lobe.  MRA head was normal.  Bilateral carotid duplex showed less than 50% carotid stenosis bilaterally.    Diagnoses:  Subacute right MCA/PCA strokes, embolic  Paroxysmal atrial fibrillation, anticoagulated on Eliquis  Recurrent nausea and vomiting    Pre-stroke MRS: 0  NIHSS: 0    Comment: Patient endorses retro-orbital headache, blurred vision, nausea which raises the question of migraine related to recent stroke.  Her degree of nausea and vomiting would be atypical for her age in my experience.  I would expect there to be an alternative cause.  She seems to have responded to medications given last night.  I do not appreciate any evidence on exam of recurrent stroke or otologic vertigo.    PLAN:  Agree with follow-up brain MRI  Continue anticoagulation    Will discuss with Dr. Diez

## 2024-10-28 NOTE — OUTREACH NOTE
Medical Week 2 Survey      Flowsheet Row Responses   [facility] patient discharged from? [place]   Does the patient have one of the following disease processes/diagnoses(primary or secondary)? Other   Week 2 attempt successful? No   Unsuccessful attempts Attempt 1   Revoke Readmitted            Betsey POST - Registered Nurse

## 2024-10-29 LAB
ANION GAP SERPL CALCULATED.3IONS-SCNC: 8.3 MMOL/L (ref 5–15)
BUN SERPL-MCNC: 21 MG/DL (ref 8–23)
BUN/CREAT SERPL: 9.4 (ref 7–25)
CALCIUM SPEC-SCNC: 8.8 MG/DL (ref 8.6–10.5)
CHLORIDE SERPL-SCNC: 105 MMOL/L (ref 98–107)
CO2 SERPL-SCNC: 21.7 MMOL/L (ref 22–29)
CREAT SERPL-MCNC: 2.23 MG/DL (ref 0.57–1)
DEPRECATED RDW RBC AUTO: 44.1 FL (ref 37–54)
EGFRCR SERPLBLD CKD-EPI 2021: 21 ML/MIN/1.73
ERYTHROCYTE [DISTWIDTH] IN BLOOD BY AUTOMATED COUNT: 13.1 % (ref 12.3–15.4)
GLUCOSE SERPL-MCNC: 94 MG/DL (ref 65–99)
HCT VFR BLD AUTO: 30.7 % (ref 34–46.6)
HGB BLD-MCNC: 9.6 G/DL (ref 12–15.9)
MCH RBC QN AUTO: 29.1 PG (ref 26.6–33)
MCHC RBC AUTO-ENTMCNC: 31.3 G/DL (ref 31.5–35.7)
MCV RBC AUTO: 93 FL (ref 79–97)
PLATELET # BLD AUTO: 131 10*3/MM3 (ref 140–450)
PMV BLD AUTO: 9.9 FL (ref 6–12)
POTASSIUM SERPL-SCNC: 5 MMOL/L (ref 3.5–5.2)
RBC # BLD AUTO: 3.3 10*6/MM3 (ref 3.77–5.28)
SODIUM SERPL-SCNC: 135 MMOL/L (ref 136–145)
WBC NRBC COR # BLD AUTO: 4.2 10*3/MM3 (ref 3.4–10.8)

## 2024-10-29 PROCEDURE — 85610 PROTHROMBIN TIME: CPT | Performed by: NURSE PRACTITIONER

## 2024-10-29 PROCEDURE — 85732 THROMBOPLASTIN TIME PARTIAL: CPT | Performed by: NURSE PRACTITIONER

## 2024-10-29 PROCEDURE — 85670 THROMBIN TIME PLASMA: CPT | Performed by: NURSE PRACTITIONER

## 2024-10-29 PROCEDURE — 85613 RUSSELL VIPER VENOM DILUTED: CPT | Performed by: NURSE PRACTITIONER

## 2024-10-29 PROCEDURE — 86146 BETA-2 GLYCOPROTEIN ANTIBODY: CPT | Performed by: NURSE PRACTITIONER

## 2024-10-29 PROCEDURE — 85598 HEXAGNAL PHOSPH PLTLT NEUTRL: CPT | Performed by: NURSE PRACTITIONER

## 2024-10-29 PROCEDURE — 25010000002 CEFTRIAXONE PER 250 MG

## 2024-10-29 PROCEDURE — 99233 SBSQ HOSP IP/OBS HIGH 50: CPT | Performed by: NURSE PRACTITIONER

## 2024-10-29 PROCEDURE — 85730 THROMBOPLASTIN TIME PARTIAL: CPT | Performed by: NURSE PRACTITIONER

## 2024-10-29 PROCEDURE — 80048 BASIC METABOLIC PNL TOTAL CA: CPT | Performed by: INTERNAL MEDICINE

## 2024-10-29 PROCEDURE — 86147 CARDIOLIPIN ANTIBODY EA IG: CPT | Performed by: NURSE PRACTITIONER

## 2024-10-29 PROCEDURE — 85027 COMPLETE CBC AUTOMATED: CPT | Performed by: INTERNAL MEDICINE

## 2024-10-29 RX ADMIN — METOPROLOL SUCCINATE 12.5 MG: 25 TABLET, EXTENDED RELEASE ORAL at 08:45

## 2024-10-29 RX ADMIN — APIXABAN 2.5 MG: 2.5 TABLET, FILM COATED ORAL at 08:45

## 2024-10-29 RX ADMIN — CETIRIZINE HYDROCHLORIDE 10 MG: 10 TABLET ORAL at 08:45

## 2024-10-29 RX ADMIN — Medication 2.5 MG: at 20:19

## 2024-10-29 RX ADMIN — CEFTRIAXONE SODIUM 1000 MG: 1 INJECTION, POWDER, FOR SOLUTION INTRAMUSCULAR; INTRAVENOUS at 23:46

## 2024-10-29 RX ADMIN — APIXABAN 2.5 MG: 2.5 TABLET, FILM COATED ORAL at 20:19

## 2024-10-29 RX ADMIN — ATORVASTATIN CALCIUM 40 MG: 20 TABLET, FILM COATED ORAL at 20:19

## 2024-10-29 RX ADMIN — VORTIOXETINE 10 MG: 5 TABLET, FILM COATED ORAL at 08:45

## 2024-10-29 RX ADMIN — OXYBUTYNIN CHLORIDE 10 MG: 10 TABLET, EXTENDED RELEASE ORAL at 08:46

## 2024-10-29 RX ADMIN — PANTOPRAZOLE SODIUM 40 MG: 40 TABLET, DELAYED RELEASE ORAL at 06:04

## 2024-10-29 NOTE — PROGRESS NOTES
" LOS: 2 days     Name: Marleni Hummel  Age: 88 y.o.  Sex: female  :  1935  MRN: [id number]         Primary Care Physician: Ken Andujar MD    Subjective   Subjective  Had some mild nausea yesterday.  This resolved after receiving Zofran/Compazine.  No issues overnight last night or this morning thus far.  Reports complete resolution of her vertigo, lightheadedness.  Her son at the bedside states that her gastrointestinal symptoms were present before her stroke several weeks ago and then total have been lasting about 6 weeks.    Objective   Vital Signs  Temp:  [98.1 °F (36.7 °C)-99.5 °F (37.5 °C)] 99.5 °F (37.5 °C)  Heart Rate:  [54-72] 67  Resp:  [16] 16  BP: (126-143)/(55-74) 128/62  Body mass index is 25.4 kg/m².    Objective:  General Appearance:  Comfortable and in no acute distress (Elderly and frail-appearing).    Vital signs: (most recent): Blood pressure 128/62, pulse 67, temperature 99.5 °F (37.5 °C), temperature source Oral, resp. rate 16, height 152.4 cm (60\"), weight 59 kg (130 lb 1.1 oz), SpO2 97%, not currently breastfeeding.    Lungs:  Normal effort and normal respiratory rate.  She is not in respiratory distress.  There are decreased breath sounds.    Heart: Normal rate.  Regular rhythm.    Abdomen: Abdomen is soft.  Bowel sounds are normal.   There is no abdominal tenderness.     Extremities: There is no dependent edema or local swelling.    Neurological: Patient is alert and oriented to person, place and time.    Skin:  Warm and dry.                Results Review:       I reviewed the patient's new clinical results.    Results from last 7 days   Lab Units 10/29/24  0553 10/28/24  0532 10/27/24  1511 10/24/24  0358 10/23/24  1742   WBC 10*3/mm3 4.20 5.76 8.73 5.21 6.72   HEMOGLOBIN g/dL 9.6* 9.3* 12.1 10.5* 11.6*   PLATELETS 10*3/mm3 131* 145 187 181 211     Results from last 7 days   Lab Units 10/29/24  0553 10/28/24  0532 10/27/24  1511 10/24/24  0358 10/23/24  1742   SODIUM mmol/L " 135* 137 139 138 136   POTASSIUM mmol/L 5.0 4.7 4.8 4.9 5.0   CHLORIDE mmol/L 105 107 103 105 100   CO2 mmol/L 21.7* 22.5 23.0 22.9 24.6   BUN mg/dL 21 24* 27* 31* 32*   CREATININE mg/dL 2.23* 1.73* 2.08* 1.93* 2.29*   CALCIUM mg/dL 8.8 8.3* 10.0 9.4 10.0   GLUCOSE mg/dL 94 90 92 90 101*                 Scheduled Meds:   apixaban, 2.5 mg, Oral, Q12H  atorvastatin, 40 mg, Oral, Nightly  cefTRIAXone, 1,000 mg, Intravenous, Q24H  cetirizine, 10 mg, Oral, Daily  metoprolol succinate XL, 12.5 mg, Oral, Daily  oxybutynin XL, 10 mg, Oral, Daily  pantoprazole, 40 mg, Oral, Q AM  Vortioxetine HBr, 10 mg, Oral, Daily With Breakfast      PRN Meds:     acetaminophen **OR** acetaminophen **OR** acetaminophen    senna-docusate sodium **AND** polyethylene glycol **AND** bisacodyl **AND** bisacodyl    melatonin    ondansetron ODT **OR** ondansetron    prochlorperazine    [COMPLETED] Insert Peripheral IV **AND** sodium chloride  Continuous Infusions:       Assessment & Plan   Active Hospital Problems    Diagnosis  POA    **Vertigo as late effect of stroke [I69.398, R42]  Not Applicable    Chronic diastolic congestive heart failure [I50.32]  Yes    UTI (urinary tract infection) [N39.0]  Yes    Permanent atrial fibrillation [I48.21]  Yes    CKD (chronic kidney disease) stage 4, GFR 15-29 ml/min [N18.4]  Yes      Resolved Hospital Problems   No resolved problems to display.       Assessment & Plan    -She reports resolution of her vertigo. Neurology following and brain MRI repeated.  Results noted.  Will await additional input from neurology.  -Continue supportive measures  -Continue Rocephin while awaiting pending urine culture results  -Renal function near baseline.  Continue to monitor and encouraged oral intake.  -Had some mild nausea yesterday that resolved and has not recurred.  I have added a PPI and stopped the magnesium oxide.  Will keep antibiotics to a very short course.  -A-fib is rate controlled and she remains on  Eliquis     Anticipate discharge in 1 to 2 days      Expected Discharge Date: 10/29/2024; Expected Discharge Time:      Pawan Diez MD  [facility]  10/29/24  08:45 EDT

## 2024-10-29 NOTE — PROGRESS NOTES
DOS: 10/29/2024  NAME: Marleni Hummel   : 1935  PCP: Ken Andujar MD    Chief Complaint   Patient presents with    Nausea    Vomiting    Eye Pain        Stroke    Subjective: Pt seen in follow up, however the problem is new to me.  Patient lying in bed resting comfortably with her son at bedside.  States her headache has resolved.  States she had some mild nausea this morning that was resolved with Zofran use.    Objective:  Vital signs:      Vitals:    10/29/24 0002 10/29/24 0419 10/29/24 0715 10/29/24 1115   BP: 135/55 126/74 128/62 103/81   BP Location: Right arm Right arm Right arm Right arm   Patient Position: Lying Lying Lying Lying   Pulse: 65 72 67 62   Resp: 16 16 16 16   Temp: 98.4 °F (36.9 °C) 98.1 °F (36.7 °C) 99.5 °F (37.5 °C) 99 °F (37.2 °C)   TempSrc: Oral Oral Oral Oral   SpO2: 97% 98% 97% 96%   Weight:       Height:           Current Facility-Administered Medications:     acetaminophen (TYLENOL) tablet 650 mg, 650 mg, Oral, Q4H PRN **OR** acetaminophen (TYLENOL) 160 MG/5ML oral solution 650 mg, 650 mg, Oral, Q4H PRN **OR** acetaminophen (TYLENOL) suppository 650 mg, 650 mg, Rectal, Q4H PRN, Cathi Goddard MD    apixaban (ELIQUIS) tablet 2.5 mg, 2.5 mg, Oral, Q12H, Cathi Goddard MD, 2.5 mg at 10/29/24 0845    atorvastatin (LIPITOR) tablet 40 mg, 40 mg, Oral, Nightly, Cathi Goddard MD, 40 mg at 10/28/24 204    sennosides-docusate (PERICOLACE) 8.6-50 MG per tablet 2 tablet, 2 tablet, Oral, BID PRN **AND** polyethylene glycol (MIRALAX) packet 17 g, 17 g, Oral, Daily PRN **AND** bisacodyl (DULCOLAX) EC tablet 5 mg, 5 mg, Oral, Daily PRN **AND** bisacodyl (DULCOLAX) suppository 10 mg, 10 mg, Rectal, Daily PRN, Cathi Goddard MD    cefTRIAXone (ROCEPHIN) 1,000 mg in sodium chloride 0.9 % 100 mL MBP, 1,000 mg, Intravenous, Q24H, Loni Gonzalez, APRN, Last Rate: 200 mL/hr at 10/28/24 2041, 1,000 mg at 10/28/24 2041    cetirizine (zyrTEC) tablet 10 mg, 10  mg, Oral, Daily, Cathi Goddard MD, 10 mg at 10/29/24 0845    melatonin tablet 2.5 mg, 2.5 mg, Oral, Nightly PRN, Cathi Goddard MD, 2.5 mg at 10/27/24 2326    metoprolol succinate XL (TOPROL-XL) 24 hr tablet 12.5 mg, 12.5 mg, Oral, Daily, Cathi Goddard MD, 12.5 mg at 10/29/24 0845    ondansetron ODT (ZOFRAN-ODT) disintegrating tablet 4 mg, 4 mg, Oral, Q6H PRN **OR** ondansetron (ZOFRAN) injection 4 mg, 4 mg, Intravenous, Q6H PRN, Cathi Goddard MD, 4 mg at 10/28/24 1152    oxybutynin XL (DITROPAN-XL) 24 hr tablet 10 mg, 10 mg, Oral, Daily, Cathi Goddard MD, 10 mg at 10/29/24 0846    pantoprazole (PROTONIX) EC tablet 40 mg, 40 mg, Oral, Q AM, Pawan Diez MD, 40 mg at 10/29/24 0604    prochlorperazine (COMPAZINE) injection 10 mg, 10 mg, Intravenous, Q6H PRN, Pawan Diez MD, 10 mg at 10/28/24 1431    [COMPLETED] Insert Peripheral IV, , , Once **AND** sodium chloride 0.9 % flush 10 mL, 10 mL, Intravenous, PRN, Pravin Fraga MD    Vortioxetine HBr (TRINTELLIX) tablet 10 mg, 10 mg, Oral, Daily With Breakfast, Cathi Goddard MD, 10 mg at 10/29/24 0845    PRN meds    acetaminophen **OR** acetaminophen **OR** acetaminophen    senna-docusate sodium **AND** polyethylene glycol **AND** bisacodyl **AND** bisacodyl    melatonin    ondansetron ODT **OR** ondansetron    prochlorperazine    [COMPLETED] Insert Peripheral IV **AND** sodium chloride    No current facility-administered medications on file prior to encounter.     Current Outpatient Medications on File Prior to Encounter   Medication Sig    acetaminophen (TYLENOL) 500 MG tablet Take 1 tablet by mouth Every 4 (Four) Hours As Needed for Mild Pain. Indications: Pain    atorvastatin (LIPITOR) 40 MG tablet Take 1 tablet by mouth Every Night.    Eliquis 2.5 MG tablet tablet TAKE 1 TABLET EVERY 12 HOURS (TWICE A DAY) (Patient taking differently: TAKE 1 TABLET ORALLY EVERY 12 HOURS (TWICE A DAY))     fexofenadine (ALLEGRA) 60 MG tablet Take 1 tablet by mouth Daily As Needed. Indications: Hayfever    magnesium oxide (MAG-OX) 400 MG tablet Take 1 tablet by mouth Daily. Indications: Acid Indigestion    melatonin 5 MG tablet tablet Take 1 tablet by mouth At Night As Needed. Indications: Trouble Sleeping    metoprolol succinate XL (TOPROL-XL) 25 MG 24 hr tablet Take 0.5 tablets by mouth Daily.    Riboflavin (VITAMIN B2 PO) Take  by mouth.    Vibegron (Gemtesa) 75 MG tablet Take 1 tablet by mouth Daily for 270 days. Indications: Overactive Bladder, Urinary Incontinence    Vortioxetine HBr (TRINTELLIX) 10 MG tablet tablet Take 1 tablet by mouth Daily With Breakfast.    furosemide (LASIX) 20 MG tablet Take 1 tablet by mouth Daily As Needed (if weight goes up 3lbs in 3 days).       General appearance: Elderly female, NAD, alert and cooperative  HEENT: Normocephalic, atraumatic    Neurological:   MS: oriented x3, normal attention/concentration, language intact, no neglect  CN: visual fields full, EOMI, facial sensation equal, no facial droop, shoulder shrug equal, tongue midline  Motor: 5/5 in all 4 ext.  Sensory: light touch, cold, and vibratory sensation intact in all 4 ext.  Coordination: Normal finger to nose test  Gait and station: Deferred  Rapid alternating movements: normal finger to thumb tap    Laboratory results:  Lab Results   Component Value Date    TSH 1.770 07/10/2024     Lab Results   Component Value Date    HGBA1C 5.00 10/09/2024     Lab Results   Component Value Date    EFZDHJSG03 449 08/02/2024     Lab Results   Component Value Date    CHOL 168 10/09/2024    CHOL 197 05/27/2024    CHOL 172 10/24/2023    CHLPL 224 (H) 07/10/2024    CHLPL 180 09/27/2023    CHLPL 132 01/04/2023     Lab Results   Component Value Date    TRIG 231 (H) 10/09/2024    TRIG 168 (H) 07/10/2024    TRIG 220 (H) 05/27/2024     Lab Results   Component Value Date    HDL 57 10/09/2024    HDL 75 07/10/2024    HDL 60 05/27/2024     Lab  Results   Component Value Date    LDL 73 10/09/2024     (H) 07/10/2024     05/27/2024     Lab Results   Component Value Date    WBC 4.20 10/29/2024    HGB 9.6 (L) 10/29/2024    HCT 30.7 (L) 10/29/2024    MCV 93.0 10/29/2024     (L) 10/29/2024     Lab Results   Component Value Date    GLUCOSE 94 10/29/2024    BUN 21 10/29/2024    CREATININE 2.23 (H) 10/29/2024    EGFRIFNONA 21 (L) 02/23/2022    EGFRIFAFRI 25 (L) 02/23/2022    BCR 9.4 10/29/2024    K 5.0 10/29/2024    CO2 21.7 (L) 10/29/2024    CALCIUM 8.8 10/29/2024    PROTENTOTREF 7.1 07/10/2024    ALBUMIN 4.2 10/27/2024    LABIL2 2.3 04/10/2024    AST 21 10/27/2024    ALT 12 10/27/2024     Lab Results   Component Value Date    PTT 30.9 10/09/2024     Lab Results   Component Value Date    INR 1.37 (H) 10/09/2024    INR 1.16 (H) 07/10/2024    INR 1.27 (H) 05/27/2024    PROTIME 17.1 (H) 10/09/2024    PROTIME 15.0 (H) 07/10/2024    PROTIME 16.1 (H) 05/27/2024     Brief Urine Lab Results  (Last result in the past 365 days)        Color   Clarity   Blood   Leuk Est   Nitrite   Protein   CREAT   Urine HCG        10/27/24 2150 Dark Yellow   Turbid   Trace   Large (3+)   Positive   30 mg/dL (1+)                   Review and interpretation of imaging:  MRI Brain Without Contrast    Result Date: 10/28/2024   On the prior study, there was a small acute infarct within the right temporal occipital junction that measured up to 1.6 x 0.4 cm that was within the right PCA distribution and a tiny subcentimeter focus of acute infarction was seen within the right thalamus measuring up to 3 mm in diameter that was within the distribution of a  arising from the right PCA. On the current study, there is a larger focus of acute infarction within the right PCA distribution located within the inferior medial aspect the right temporal and occipital lobes which measures up to 4.9 x 3.2 cm in greatest axial dimensions.  These findings were discussed with   Matteo on 10/28/2024 at approximately 1:45 p.m.    This report was finalized on 10/28/2024 1:48 PM by Dr. Amrit Garduno M.D on Workstation: [id number]      CT Abdomen Pelvis Without Contrast    Result Date: 10/23/2024   1. No acute findings identified in the abdomen or pelvis. 2. Left nephrectomy. 3. Colonic diverticulosis.  This report was finalized on 10/23/2024 6:03 PM by Dr. Jason Sanders M.D on Workstation: [id number]     Results for orders placed during the hospital encounter of 10/09/24    Adult Transthoracic Echo Complete W/ Cont if Necessary Per Protocol (With Agitated Saline)    Interpretation Summary    There is akinesis of the mid lateral wall    Left ventricular ejection fraction appears to be 41 - 45%.    Normal right ventricular cavity size and systolic function noted.    The left atrial cavity is severely dilated.    Mild to moderate aortic valve regurgitation is present    Saline test results are negative.    Mild mitral valve regurgitation is present.    Mild tricuspid valve regurgitation is present.    Calculated right ventricular systolic pressure from tricuspid regurgitation is 23 mmHg.    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    Her BP on arrival was 157/90.  She had a repeat MRI brain without yesterday that I reviewed with Dr. Allen and   Jareth. They feel the findings represent completion of her recent stroke given her prior vessel imaging revealed her P2 branch being nearly occluded.    Diagnosis:  Subacute right MCA/PCA strokes, embolic  Paroxysmal atrial fibrillation on chronic anticoagulation  Recurrent nausea and vomiting  Headache with associated retro-orbital pain and blurred vision  Urinary tract infection    Plan:  - H/A resolved this morning. Nausea was mild and relieved with anti-emetic given.  - Reviewed her MRI brain with Dr. Allen and Dr. Templeton. They feel this is likely the same event and findings represent completion of her recent stroke given her prior vessel imaging revealed her P2 branch being nearly occluded.  - She should continue Eliquis 2.5mg BID given age and Cr.  - Continue Lipitor 40 mg, LDL 73  - Will check APL labs although these maybe skewed given her Eliquis use.  - May have also had more pronounced symptoms given presence of UTI.  Primary treating with Rocephin.  Neurochecks per stroke protocol  Normalize BP  Stroke Education  FRANCISCO J/SCDs  PT/OT/ST  F/U already scheduled with Dr. Gamboa on 12/18.  Therapies as written. CCP for discharge planning. Call RRT for any acute neurological changes.   We will sign off and see again per request.    Case discussed with patient, son, Dr. Allen, and Dr. Templeton, and he agrees with plan above.     ENRRIQUE Mccormick

## 2024-10-29 NOTE — PLAN OF CARE
Problem: Adult Inpatient Plan of Care  Goal: Plan of Care Review  Outcome: Progressing  Flowsheets (Taken 10/29/2024 0259)  Plan of Care Reviewed With: patient  Goal: Patient-Specific Goal (Individualized)  Outcome: Progressing  Goal: Absence of Hospital-Acquired Illness or Injury  Outcome: Progressing  Intervention: Identify and Manage Fall Risk  Recent Flowsheet Documentation  Taken 10/29/2024 0152 by Mame Romo RN  Safety Promotion/Fall Prevention:   activity supervised   assistive device/personal items within reach   clutter free environment maintained   safety round/check completed  Taken 10/28/2024 2356 by Mame Romo RN  Safety Promotion/Fall Prevention:   activity supervised   assistive device/personal items within reach   clutter free environment maintained   safety round/check completed  Taken 10/28/2024 2158 by Mame Romo RN  Safety Promotion/Fall Prevention:   activity supervised   assistive device/personal items within reach   clutter free environment maintained   safety round/check completed  Taken 10/28/2024 2000 by Mame Romo RN  Safety Promotion/Fall Prevention: safety round/check completed  Intervention: Prevent Skin Injury  Recent Flowsheet Documentation  Taken 10/29/2024 0152 by Mame Romo RN  Body Position: position changed independently  Taken 10/28/2024 2356 by Mame Romo RN  Body Position: position changed independently  Taken 10/28/2024 2158 by Mame Romo RN  Body Position: position changed independently  Taken 10/28/2024 2000 by Mame Romo RN  Body Position: position changed independently  Skin Protection: protective footwear used  Intervention: Prevent and Manage VTE (Venous Thromboembolism) Risk  Recent Flowsheet Documentation  Taken 10/28/2024 2000 by Mame Romo RN  VTE Prevention/Management:   bilateral   SCDs (sequential compression devices) on  Intervention: Prevent Infection  Recent Flowsheet Documentation  Taken 10/28/2024 2000 by Demetrius  DUGLAS Vilchis  Infection Prevention: single patient room provided  Goal: Optimal Comfort and Wellbeing  Outcome: Progressing  Intervention: Provide Person-Centered Care  Recent Flowsheet Documentation  Taken 10/28/2024 2000 by Mame Romo RN  Trust Relationship/Rapport:   care explained   choices provided  Goal: Readiness for Transition of Care  Outcome: Progressing     Problem: Skin Injury Risk Increased  Goal: Skin Health and Integrity  Outcome: Progressing  Intervention: Optimize Skin Protection  Recent Flowsheet Documentation  Taken 10/29/2024 0152 by Mame Romo RN  Head of Bed (HOB) Positioning: HOB at 30 degrees  Taken 10/28/2024 2356 by Mame Romo RN  Head of Bed (HOB) Positioning: HOB at 30 degrees  Taken 10/28/2024 2158 by Mame Romo RN  Head of Bed (HOB) Positioning: HOB at 30 degrees  Taken 10/28/2024 2000 by Mame Romo RN  Pressure Reduction Techniques: frequent weight shift encouraged  Head of Bed (HOB) Positioning: HOB at 20 degrees  Pressure Reduction Devices: positioning supports utilized  Skin Protection: protective footwear used     Problem: Fall Injury Risk  Goal: Absence of Fall and Fall-Related Injury  Outcome: Progressing  Intervention: Identify and Manage Contributors  Recent Flowsheet Documentation  Taken 10/29/2024 0152 by Mame Romo RN  Medication Review/Management: medications reviewed  Taken 10/28/2024 2356 by Mame Romo RN  Medication Review/Management: medications reviewed  Taken 10/28/2024 2158 by Mame Romo RN  Medication Review/Management: medications reviewed  Taken 10/28/2024 2000 by Mame Romo RN  Medication Review/Management: medications reviewed  Intervention: Promote Injury-Free Environment  Recent Flowsheet Documentation  Taken 10/29/2024 0152 by Mame Romo RN  Safety Promotion/Fall Prevention:   activity supervised   assistive device/personal items within reach   clutter free environment maintained   safety round/check  completed  Taken 10/28/2024 2356 by Mame Romo RN  Safety Promotion/Fall Prevention:   activity supervised   assistive device/personal items within reach   clutter free environment maintained   safety round/check completed  Taken 10/28/2024 2158 by Mame Romo RN  Safety Promotion/Fall Prevention:   activity supervised   assistive device/personal items within reach   clutter free environment maintained   safety round/check completed  Taken 10/28/2024 2000 by Mame Romo RN  Safety Promotion/Fall Prevention: safety round/check completed     Problem: Stroke, Ischemic (Includes Transient Ischemic Attack)  Goal: Optimal Coping  Outcome: Progressing  Intervention: Support Psychosocial Response to Stroke  Recent Flowsheet Documentation  Taken 10/28/2024 2000 by Mame Romo RN  Supportive Measures: active listening utilized  Family/Support System Care: self-care encouraged  Goal: Effective Bowel Elimination  Outcome: Progressing  Goal: Optimal Cerebral Tissue Perfusion  Outcome: Progressing  Intervention: Protect and Optimize Cerebral Perfusion  Recent Flowsheet Documentation  Taken 10/28/2024 2000 by Mame Romo RN  Sensory Stimulation Regulation: care clustered  Cerebral Perfusion Promotion: blood pressure monitored  Goal: Optimal Cognitive Function  Outcome: Progressing  Intervention: Optimize Cognitive Function  Recent Flowsheet Documentation  Taken 10/28/2024 2000 by Mame Romo RN  Sensory Stimulation Regulation: care clustered  Reorientation Measures: clock in view  Environment Familiarity/Consistency: daily routine followed  Goal: Improved Communication Skills  Outcome: Progressing  Intervention: Optimize Communication Skills  Recent Flowsheet Documentation  Taken 10/28/2024 2000 by Mame Romo RN  Communication Enhancement Strategies: call light answered in person  Goal: Optimal Functional Ability  Outcome: Progressing  Goal: Optimal Nutrition Intake  Outcome: Progressing  Goal:  Effective Oxygenation and Ventilation  Outcome: Progressing  Intervention: Optimize Oxygenation and Ventilation  Recent Flowsheet Documentation  Taken 10/29/2024 0152 by Mame Romo RN  Head of Bed (HOB) Positioning: HOB at 30 degrees  Taken 10/28/2024 2356 by Mame Romo RN  Head of Bed ([facility]) Positioning: HOB at 30 degrees  Taken 10/28/2024 2158 by Mame Romo RN  Head of Bed ([facility]) Positioning: HOB at 30 degrees  Taken 10/28/2024 2000 by Mame Romo RN  Head of Bed ([facility]) Positioning: HOB at 20 degrees  Goal: Improved Sensorimotor Function  Outcome: Progressing  Intervention: Optimize Range of Motion, Motor Control and Function  Recent Flowsheet Documentation  Taken 10/29/2024 0152 by Mame Romo RN  Positioning/Transfer Devices:   pillows   in use  Taken 10/28/2024 2356 by Mame Romo RN  Positioning/Transfer Devices:   pillows   in use  Taken 10/28/2024 2158 by Mame Romo RN  Positioning/Transfer Devices:   pillows   in use  Taken 10/28/2024 2000 by Mame Romo RN  Positioning/Transfer Devices:   pillows   in use  Range of Motion: active ROM (range of motion) encouraged  Intervention: Optimize Sensory and Perceptual Ability  Recent Flowsheet Documentation  Taken 10/28/2024 2000 by Mame Romo RN  Pressure Reduction Techniques: frequent weight shift encouraged  Sensation Impairment Protection: cues provided for safety  Pressure Reduction Devices: positioning supports utilized  Goal: Safe and Effective Swallow  Outcome: Progressing  Goal: Effective Urinary Elimination  Outcome: Progressing   Goal Outcome Evaluation:  Plan of Care Reviewed With: patient

## 2024-10-29 NOTE — PLAN OF CARE
Goal Outcome Evaluation:  Plan of Care Reviewed With: patient        Progress: improving        Pt a/o x4, RA, VSS. No c/o nausea today. Awaiting final results from urine cx. Voiding w/o issue. WCTM.

## 2024-10-30 ENCOUNTER — READMISSION MANAGEMENT (OUTPATIENT)
Dept: CALL CENTER | Facility: HOSPITAL | Age: 89
End: 2024-10-30
Payer: MEDICARE

## 2024-10-30 VITALS
TEMPERATURE: 98.6 F | RESPIRATION RATE: 18 BRPM | SYSTOLIC BLOOD PRESSURE: 110 MMHG | OXYGEN SATURATION: 96 % | HEIGHT: 60 IN | WEIGHT: 130.07 LBS | BODY MASS INDEX: 25.54 KG/M2 | DIASTOLIC BLOOD PRESSURE: 57 MMHG | HEART RATE: 58 BPM

## 2024-10-30 LAB
ANION GAP SERPL CALCULATED.3IONS-SCNC: 8.7 MMOL/L (ref 5–15)
BACTERIA SPEC AEROBE CULT: ABNORMAL
BUN SERPL-MCNC: 24 MG/DL (ref 8–23)
BUN/CREAT SERPL: 11.1 (ref 7–25)
CALCIUM SPEC-SCNC: 8.8 MG/DL (ref 8.6–10.5)
CHLORIDE SERPL-SCNC: 106 MMOL/L (ref 98–107)
CO2 SERPL-SCNC: 22.3 MMOL/L (ref 22–29)
CREAT SERPL-MCNC: 2.16 MG/DL (ref 0.57–1)
DEPRECATED RDW RBC AUTO: 44.4 FL (ref 37–54)
EGFRCR SERPLBLD CKD-EPI 2021: 21.8 ML/MIN/1.73
ERYTHROCYTE [DISTWIDTH] IN BLOOD BY AUTOMATED COUNT: 13.4 % (ref 12.3–15.4)
GLUCOSE SERPL-MCNC: 93 MG/DL (ref 65–99)
HCT VFR BLD AUTO: 28.8 % (ref 34–46.6)
HGB BLD-MCNC: 9.1 G/DL (ref 12–15.9)
MCH RBC QN AUTO: 29.4 PG (ref 26.6–33)
MCHC RBC AUTO-ENTMCNC: 31.6 G/DL (ref 31.5–35.7)
MCV RBC AUTO: 93.2 FL (ref 79–97)
PLATELET # BLD AUTO: 134 10*3/MM3 (ref 140–450)
PMV BLD AUTO: 10.4 FL (ref 6–12)
POTASSIUM SERPL-SCNC: 4.9 MMOL/L (ref 3.5–5.2)
RBC # BLD AUTO: 3.09 10*6/MM3 (ref 3.77–5.28)
SODIUM SERPL-SCNC: 137 MMOL/L (ref 136–145)
WBC NRBC COR # BLD AUTO: 4.51 10*3/MM3 (ref 3.4–10.8)

## 2024-10-30 PROCEDURE — 80048 BASIC METABOLIC PNL TOTAL CA: CPT | Performed by: INTERNAL MEDICINE

## 2024-10-30 PROCEDURE — 85027 COMPLETE CBC AUTOMATED: CPT | Performed by: INTERNAL MEDICINE

## 2024-10-30 RX ORDER — PANTOPRAZOLE SODIUM 40 MG/1
40 TABLET, DELAYED RELEASE ORAL
Qty: 30 TABLET | Refills: 0 | Status: SHIPPED | OUTPATIENT
Start: 2024-10-31 | End: 2024-11-30

## 2024-10-30 RX ADMIN — ACETAMINOPHEN 325MG 650 MG: 325 TABLET ORAL at 05:07

## 2024-10-30 RX ADMIN — VORTIOXETINE 10 MG: 5 TABLET, FILM COATED ORAL at 09:53

## 2024-10-30 RX ADMIN — OXYBUTYNIN CHLORIDE 10 MG: 10 TABLET, EXTENDED RELEASE ORAL at 09:51

## 2024-10-30 RX ADMIN — PANTOPRAZOLE SODIUM 40 MG: 40 TABLET, DELAYED RELEASE ORAL at 05:05

## 2024-10-30 RX ADMIN — CETIRIZINE HYDROCHLORIDE 10 MG: 10 TABLET ORAL at 09:52

## 2024-10-30 RX ADMIN — APIXABAN 2.5 MG: 2.5 TABLET, FILM COATED ORAL at 09:55

## 2024-10-30 RX ADMIN — METOPROLOL SUCCINATE 12.5 MG: 25 TABLET, EXTENDED RELEASE ORAL at 09:51

## 2024-10-30 NOTE — DISCHARGE SUMMARY
Date of Admission: 10/27/2024  Date of Discharge:  10/30/2024  Primary Care Physician: Ken Andujar MD     Discharge Diagnosis:  Active Hospital Problems    Diagnosis  POA    **Vertigo as late effect of stroke [I69.398, R42]  Not Applicable    Chronic diastolic congestive heart failure [I50.32]  Yes    UTI (urinary tract infection) [N39.0]  Yes    Permanent atrial fibrillation [I48.21]  Yes    CKD (chronic kidney disease) stage 4, GFR 15-29 ml/min [N18.4]  Yes      Resolved Hospital Problems   No resolved problems to display.       DETAILS OF HOSPITAL STAY     Pertinent Test Results and Procedures Performed    Brain MRI:  On the prior study, there was a small acute infarct within the right   temporal occipital junction that measured up to 1.6 x 0.4 cm that was   within the right PCA distribution and a tiny subcentimeter focus of   acute infarction was seen within the right thalamus measuring up to 3 mm   in diameter that was within the distribution of a  arising   from the right PCA. On the current study, there is a larger focus of   acute infarction within the right PCA distribution located within the   inferior medial aspect the right temporal and occipital lobes which   measures up to 4.9 x 3.2 cm in greatest axial dimensions.     Hospital Course  This is an 88-year-old female with prior history of stroke about 3 weeks ago who presented to the emergency room with complaints of dizziness, nausea, and vomiting.  Please see H&P for full details.  She was provided supportive measures and neurology was consulted.  She underwent a repeat brain MRI as described above.  Neurology felt that this was representative of completion of her stroke and not a new event.  She was found to have a urinary tract infection.  Urine culture grew Providencia susceptible to Rocephin.  She received a 3-day course of antibiotics.  Her vertigo, nausea, and vomiting have all subsided.  She does have GERD and I started her on a  PPI.  She is tolerating a diet.  Neurology has signed off and will see her as previously scheduled in the office in early December.  She is now medically stable and will be released home.  I did inform her that should she have ongoing issues with nausea or vomiting then referral to gastroenterology would be warranted.    Physical Exam at Discharge:  General: No acute distress, AAOx3  HEENT: EOMI, PERRL  Cardiovascular: +s1 and s2, RRR  Lungs: No rhonchi or wheezing  Abdomen: soft, nontender    Consults:   Consults       Date and Time Order Name Status Description    10/27/2024  4:29 PM [facility] (on-call MD unless specified) Details      10/27/2024  4:07 PM Inpatient Neurology Consult General Completed     10/10/2024  7:22 PM Inpatient Cardiology Consult Completed     10/9/2024  5:21 PM Inpatient Neurology Consult Stroke Completed               Condition on Discharge: Stable, improved    Discharge Disposition  Home or Self Care    Discharge Medications     Discharge Medications        New Medications        Instructions Start Date   pantoprazole 40 MG EC tablet  Commonly known as: PROTONIX   40 mg, Oral, Every Early Morning   Start Date: October 31, 2024            Changes to Medications        Instructions Start Date   Eliquis 2.5 MG tablet tablet  Generic drug: apixaban  What changed: See the new instructions.   TAKE 1 TABLET EVERY 12 HOURS (TWICE A DAY)             Continue These Medications        Instructions Start Date   acetaminophen 500 MG tablet  Commonly known as: TYLENOL   1 tablet, Every 4 Hours PRN      atorvastatin 40 MG tablet  Commonly known as: LIPITOR   40 mg, Oral, Nightly      fexofenadine 60 MG tablet  Commonly known as: ALLEGRA   1 tablet, Daily PRN      furosemide 20 MG tablet  Commonly known as: LASIX   20 mg, Oral, Daily PRN      Gemtesa 75 MG tablet  Generic drug: Vibegron   75 mg, Oral, Daily      melatonin 5 MG tablet tablet   1 tablet, Nightly PRN      metoprolol succinate XL 25 MG 24 hr  tablet  Commonly known as: TOPROL-XL   12.5 mg, Oral, Daily      VITAMIN B2 PO   Take  by mouth.      Vortioxetine HBr 10 MG tablet tablet  Commonly known as: TRINTELLIX   10 mg, Oral, Daily With Breakfast             Stop These Medications      magnesium oxide 400 MG tablet  Commonly known as: MAG-OX              Discharge Diet:   Diet Instructions       Diet: Regular/House Diet, Cardiac Diets; Healthy Heart (2-3 Na+); Regular (IDDSI 7); Thin (IDDSI 0)      Discharge Diet:  Regular/House Diet  Cardiac Diets       Cardiac Diet: Healthy Heart (2-3 Na+)    Texture: Regular (IDDSI 7)    Fluid Consistency: Thin (IDDSI 0)            Activity at Discharge:   Activity Instructions       Activity as Tolerated              Follow-up Appointments  Future Appointments   Date Time Provider Department Center   10/31/2024  9:30 AM Oralia Lutz APRN [facility]   11/8/2024 11:00 AM ROOM 3 [facility]U [facility]   11/13/2024 10:00 AM [facility] 2 [facility]   11/20/2024  1:45 PM Ken Andjuar MD [facility]   11/21/2024  3:00 PM REFERRED INJECTION CHAIR EP [facility] INFUS EP [facility]   12/4/2024 11:30 AM Sam Moore MD [facility] SLP [facility] None   12/18/2024 11:30 AM Naomi Gamboa MD [facility] N [facility]GE [facility]   12/18/2024 12:20 PM Sybil Parmar MD [facility]     Additional Instructions for the Follow-ups that You Need to Schedule       Discharge Follow-up with PCP   As directed       Currently Documented PCP:    Ken Andujar MD    PCP Phone Number:    [phone]     Follow Up Details: 1 week        Discharge Follow-up with Specialty: Neurology in December as previously scheduled   As directed      Specialty: Neurology in December as previously scheduled                Test Results Pending at Discharge  Pending Labs       Order Current Status    Lupus Anticoag / Cardiolipin Ab In process            I have examined and discussed discharge planning with the patient today.    Pawan Diez,  MD  10/30/24  10:07 EDT    Time: Discharge greater than 30 min

## 2024-10-30 NOTE — OUTREACH NOTE
Prep Survey      Flowsheet Row Responses   [facility] patient discharged from? [place]   Is LACE score < 7 ? No   Eligibility [facility]   Date of Admission 10/27/24   Date of Discharge 10/30/24   Discharge diagnosis Vertigo as late effect of stroke   Does the patient have one of the following disease processes/diagnoses(primary or secondary)? Other   Prep survey completed? Yes            Lily GIPSON - Registered Nurse

## 2024-10-30 NOTE — PLAN OF CARE
Goal Outcome Evaluation: Patient alert, oriented x 4. Patient denies headache, blurry vision. She does report having some mild photophobia and eye pain still. VSS. Received IVABX for UTI.

## 2024-10-31 ENCOUNTER — TRANSITIONAL CARE MANAGEMENT TELEPHONE ENCOUNTER (OUTPATIENT)
Dept: CALL CENTER | Facility: HOSPITAL | Age: 89
End: 2024-10-31
Payer: MEDICARE

## 2024-10-31 ENCOUNTER — OFFICE VISIT (OUTPATIENT)
Dept: CARDIOLOGY | Facility: CLINIC | Age: 89
End: 2024-10-31
Payer: MEDICARE

## 2024-10-31 VITALS
OXYGEN SATURATION: 95 % | DIASTOLIC BLOOD PRESSURE: 72 MMHG | BODY MASS INDEX: 25.72 KG/M2 | SYSTOLIC BLOOD PRESSURE: 120 MMHG | HEART RATE: 60 BPM | WEIGHT: 131 LBS | HEIGHT: 60 IN

## 2024-10-31 DIAGNOSIS — I63.9: ICD-10-CM

## 2024-10-31 DIAGNOSIS — G47.39 OTHER SLEEP APNEA: ICD-10-CM

## 2024-10-31 DIAGNOSIS — I10 ESSENTIAL HYPERTENSION: ICD-10-CM

## 2024-10-31 DIAGNOSIS — I25.10 CHRONIC CORONARY ARTERY DISEASE: ICD-10-CM

## 2024-10-31 DIAGNOSIS — I48.21 PERMANENT ATRIAL FIBRILLATION: Primary | ICD-10-CM

## 2024-10-31 RX ORDER — METOPROLOL SUCCINATE 25 MG/1
12.5 TABLET, EXTENDED RELEASE ORAL DAILY
Qty: 90 TABLET | Refills: 1 | Status: SHIPPED | OUTPATIENT
Start: 2024-10-31

## 2024-10-31 RX ORDER — ATORVASTATIN CALCIUM 40 MG/1
40 TABLET, FILM COATED ORAL NIGHTLY
Qty: 90 TABLET | Refills: 0 | Status: SHIPPED | OUTPATIENT
Start: 2024-10-31

## 2024-10-31 NOTE — OUTREACH NOTE
Call Center TCM Note      Flowsheet Row Responses   [facility] patient discharged from? [place]   Does the patient have one of the following disease processes/diagnoses(primary or secondary)? Other   TCM attempt successful? Yes   Call start time 1123   Call end time 1126   Discharge diagnosis Vertigo as late effect of stroke   Is patient permission given to speak with other caregiver? Yes   List who call center can speak with dtr   Person spoke with today (if not patient) and relationship dtr   Meds reviewed with patient/caregiver? Yes   Is the patient having any side effects they believe may be caused by any medication additions or changes? No   Does the patient have all medications ordered at discharge? Yes   Is the patient taking all medications as directed (includes completed medication regime)? Yes   Comments Patient has previoulsy scheduled PCP follow up on 11/20/24 that she wishes tp keep. This appt is outside TCM time frame. Message routed to office.   Does the patient have an appointment with their PCP within 7-14 days of discharge? No   Nursing Interventions Routed TCM call to PCP office   Home health comments Dtr stated during TCM call that patient does not need HH   Psychosocial issues? No   Did the patient receive a copy of their discharge instructions? Yes   Nursing interventions Reviewed instructions with patient   What is the patient's perception of their health status since discharge? Improving   Is the patient/caregiver able to teach back signs and symptoms related to disease process for when to call PCP? Yes   Is the patient/caregiver able to teach back signs and symptoms related to disease process for when to call 911? Yes   Is the patient/caregiver able to teach back the hierarchy of who to call/visit for symptoms/problems? PCP, Specialist, Home health nurse, Urgent Care, ED, 911 Yes   If the patient is a current smoker, are they able to teach back resources for cessation? Not a smoker    TCM call completed? Yes   Wrap up additional comments Dtr reports dizziness has improved, she has followed up with Cardiology and has Nephrology appt today   Call end time 1126   Would this patient benefit from a Referral to [facility] Social Work? No   Is the patient interested in additional calls from an ambulatory ? No            Eveline Morgan RN    10/31/2024, 11:27 EDT

## 2024-10-31 NOTE — PROGRESS NOTES
Date of Office Visit: 10/31/24  Encounter Provider: ENRRIQUE Curtis  Place of Service: [facility] CARDIOLOGY  Patient Name: Marleni Hummel  :1935    Chief Complaint   Patient presents with    Hospital Follow Up Visit   :     HPI: Marleni Hummel is a 88 y.o. female  with  hypertension, hyperlipidemia, atrial fibrillation, anemia, cardiomyopathy, obstructive sleep apnea, left renal carcinoma status post nephrectomy and dyspnea.  She was previously followed by Dr. Tai and is now followed by Dr. Parmar.  I will follow up with her today.         She has history of atrial fibrillation with rapid ventricular response.  She also had cardioversion but went back into atrial fibrillation.  She was placed on flecainide and had repeat cardioversion.  In , she was found to be bradycardic after having some nausea and vomiting which was felt to be vagal response.  Her troponin was borderline elevated medications were adjusted.  She ultimately had ST elevation infarct taken to the cardiac catheterization laboratory which is felt to have cardiomyopathy with an ejection fraction of 20%.  She did have a circumflex lesion where she had drug-eluting stent placed.  Obviously, we had to stop flecainide and she was switched to amiodarone.  She had elevated QT interval so that was stopped.  She later was started on Lasix.     In 2019, she c/o CARROLL and had echo and perfusion stress test.  Echocardiogram revealed normal left ventricular ejection fraction and EF of 64%, borderline concentric hypertrophy, moderate thickening of the aortic valve with trace aortic valve regurgitation no aortic stenosis, mild mitral annular calcification was present with mild mitral regurgitation.  RVSP was normal.  Perfusion stress test was negative for ischemia.        She had expressive aphasia and mild weakness in 2021.  MRI did not show acute stroke.  It was felt that she had TIA but she also had  hyperkalemia that improved with IV fluid.  No medications were changed at discharge.  There was a note that patient was holding Eliquis for procedure however patient states she had not started to hold Eliquis at that time.  After that admission she was treated with Macrobid PCP.  She had side effect from macrobid. She ultimately was started on amlodipine outpatient by her PCP     In March 2023 she was hospitalized with bradycardia.  Metoprolol was decreased.  Echocardiogram showed normal left ventricular systolic function, hypokinetic apical septal and mid anteroseptal segment.  There was mild aortic valve regurgitation, mitral annular calcification with mild mitral valve regurgitation.  She complained of chest pain and had flat troponin.  Perfusion stress test showed no evidence of ischemia was low risk.  Follow-up 2-week mobile telemetry showed continuous atrial fibrillation average heart rate 63 with a range of .  In follow-up, she was prescribed Lasix as needed 04/2023.,     On October 24 2323  she presented after having an episode of expressive aphasia.  This occurred overnight and lasted less than 5 minutes and resolved.  MRI/a of the head and neck showed no acute infarct or stenosis.  She is evaluated by neurology and home blood pressure monitoring was recommended.  Nephrology evaluated and adjusted medications for better blood pressure control.  Her potassium was 5.3 and she was started on Lokelma per nephrology.      She was hospitalized a couple weeks ago with weakness and fatigue, UTI and hyponatremia.  She complained of shortness of breath and had no acute process on chest x-ray.  Echocardiogram showed normal left ventricular systolic function, mild concentric hypertrophy, mildly dilated right ventricle with normal RV function, mildly dilated left atrial cavity, thickening of the aortic valve with mild insufficiency but no significant stenosis.  RVSP was normal.  She was treated for UTI and  hydrochlorothiazide was ultimately stopped.  Her sodium improved.      In July 2024 she had repeat echo showing ejection fraction 67.2%, [place] hypertrophy, mildly dilated right ventricular cavity, calcified aortic valve with mild insufficiency.     Her she then had an ER visit on 8/30/2024 complaining of shortness of breath and fatigue.  Hemoglobin was stable at 10.1, proBNP was 2616.  COVID test was negative.  Chest x-ray showed no pleural effusion or consolidation.  Patient was discharged in stable condition she then was found to have abnormal urine culture with Klebsiella pneumoniae       She had a 20-minute episode of aphasia in early October and blurry vision.  MRI showed small acute infarct in the right temporal occipital junction.  Carotid duplex showed less than 50% right internal carotid artery stenoses.  Echocardiogram showed ejection fraction of 41-45% with akinesis of the mid lateral wall, mild to moderate aortic valve regurgitation, negative saline study, mild mitral valve regurgitation and mild tricuspid valve regurgitation.  Cardiology evaluated and she was continued on Eliquis 2.5 mg twice daily and advised to take this closer to 12 hours apart.  She was taking her doses too close together.  Atorvastatin was increased to 40 mg and low-dose beta-blocker with Toprol-XL 12.5 mg daily was added.    She presents today for hospital discharge follow-up.  She is now taking Eliquis closer to 12 hours apart.  She is scheduled with sleep medicine in early December.  She is progressing with physical therapy.  She continues to wear herself every morning and has not had significant weight gain.  She will only take Lasix when her weight goes up 3 pounds overnight or 5 pounds in a week.  They need to  pantoprazole from the pharmacy but currently she is tolerating higher dose Lipitor and the addition of Toprol-XL.      Allergies   Allergen Reactions    Medrol [Methylprednisolone] Other (See Comments)      "Irritability    Morphine Anaphylaxis    Oxycodone Anaphylaxis and Unknown - Low Severity    Ace Inhibitors Cough and Unknown (See Comments)    Bactrim [Sulfamethoxazole-Trimethoprim] Rash    Levofloxacin Itching and Rash     insomnia  insomnia  insomnia    Torsemide Dizziness           Family and social history reviewed.     ROS  All other systems were reviewed and are negative          Objective:     Vitals:    10/31/24 0939   BP: 120/72   Pulse: 60   SpO2: 95%   Weight: 59.4 kg (131 lb)   Height: 152.4 cm (60\")     Body mass index is 25.58 kg/m².    PHYSICAL EXAM:  Pulmonary:      Effort: Pulmonary effort is normal.      Breath sounds: Normal breath sounds.   Cardiovascular:      Normal rate. Irregularly irregular rhythm.      Murmurs: There is a grade 2/6 systolic murmur at the ULSB.         Procedures      Current Outpatient Medications   Medication Sig Dispense Refill    acetaminophen (TYLENOL) 500 MG tablet Take 1 tablet by mouth Every 4 (Four) Hours As Needed for Mild Pain. Indications: Pain      atorvastatin (LIPITOR) 40 MG tablet Take 1 tablet by mouth Every Night. 90 tablet 0    Eliquis 2.5 MG tablet tablet TAKE 1 TABLET EVERY 12 HOURS (TWICE A DAY) (Patient taking differently: TAKE 1 TABLET ORALLY EVERY 12 HOURS (TWICE A DAY)) 180 tablet 3    fexofenadine (ALLEGRA) 60 MG tablet Take 1 tablet by mouth Daily As Needed. Indications: Hayfever      furosemide (LASIX) 20 MG tablet Take 1 tablet by mouth Daily As Needed (if weight goes up 3lbs in 3 days). 30 tablet 11    melatonin 5 MG tablet tablet Take 1 tablet by mouth At Night As Needed. Indications: Trouble Sleeping      metoprolol succinate XL (TOPROL-XL) 25 MG 24 hr tablet Take 0.5 tablets by mouth Daily. 90 tablet 1    pantoprazole (PROTONIX) 40 MG EC tablet Take 1 tablet by mouth Every Morning for 30 days. 30 tablet 0    Riboflavin (VITAMIN B2 PO) Take  by mouth.      Vibegron (Gemtesa) 75 MG tablet Take 1 tablet by mouth Daily for 270 days. " Indications: Overactive Bladder, Urinary Incontinence 90 tablet 2    Vortioxetine HBr (TRINTELLIX) 10 MG tablet tablet Take 1 tablet by mouth Daily With Breakfast. 30 tablet 2     No current facility-administered medications for this visit.     Assessment:       Diagnosis Plan   1. Permanent atrial fibrillation        2. Chronic coronary artery disease        3. Essential hypertension        4. Other sleep apnea        5. Acute thrombotic stroke-right temporal             No orders of the defined types were placed in this encounter.        Plan:           1. 88 year-old female with chronic atrial fibrillation . CHADS2-VASc score is 6.  Anticoagulated with Eliquis 2.5 twice daily.  She is not taking her Eliquis dose is closer to 12 hours apart.  Her rate is adequately controlled.  Continue Toprol-XL  2. Coronary artery disease status post drug-eluting stent placed to the left circumflex in October 2015.  Negative perfusion stress test March 2023  -No angina    3. Hypertension blood pressure is stable.  -This is considered complex management of a chronic medical condition.  4.  Hyperlipidemia on atorvastatin 40 mg since her stroke earlier this month  5.  Chronic kidney disease, stage IV followed by Dr. Leonard.-She has appointment today  6.  History of prolonged QT on amiodarone  7.  Cardiac murmur with mild to moderate insufficiency on recent echo earlier this month  8.  History of obstructive sleep apnea-she stopped treating that 5-6 years ago has an appointment with sleep medicine in December.  She is more open to treating this now  9.   Left renal carcinoma status post total left nephrectomy   10.  Right temporal ischemic stroke October 2024 when she was not taking her Eliquis closer to 12 hours apart.  She is doing better with her doses of Eliquis now  11.  Bradycardia.  Metoprolol was decreased March 2023.  Currently appears to be tolerating Toprol-XL 12.5 mg daily.  12.Chronic back pain she is following with   [facility]..  She has responded well in the past with epidural and typically held Eliquis just 3 days prior for that   13.  History of ischemic cardiomyopathy with an ejection fraction of 20% October 2015 which returned to normal it was last normal July 2024.  Most recent echocardiogram 10/10/2024 showed ejection fraction 41-45%.  She is back on Toprol-XL 12.5 mg daily.  She is not on ACE/ARB/Arni or mRNA due to chronic kidney disease.  She only uses Lasix as needed based on weight gain.    14.  Hyponatremia-improved off hydrochlorothiazide.  15.  Anemia of chronic disease-now on iron infusion  16. essential tremor  17. TIA 10/2023.  Then recurrent right temporal ischemic stroke October 2024.  18.  Anxiety/depression-now on Trintellix but concerned she is having some nausea and dry heaves and headache related to this.  I have asked her to discuss with her PCP and sent a message to Dr. Andujar on her behalf  19.  Recurrent UTI.  Most recent urine culture 10/27/2024 showed Providencia rettgeri Abnormal and completed IV antibiotic inpatient.      She will follow-up in cardiology clinic in December as scheduled and call sooner with any questions or concerns          It has been a pleasure to participate in this patient's care.      Thank you,  ENRRIQUE Curtis      **I used Dragon to dictate this note:**

## 2024-11-01 NOTE — CASE MANAGEMENT/SOCIAL WORK
Case Management Discharge Note      Final Note: Home---no needs per private auto         Selected Continued Care - Discharged on 10/30/2024 Admission date: 10/27/2024 - Discharge disposition: Home or Self Care      Destination    No services have been selected for the patient.                Durable Medical Equipment    No services have been selected for the patient.                Dialysis/Infusion    No services have been selected for the patient.                Home Medical Care    No services have been selected for the patient.                Therapy    No services have been selected for the patient.                Community Resources    No services have been selected for the patient.                Community & DME    No services have been selected for the patient.                    Transportation Services  Private: Car    Final Discharge Disposition Code: 01 - home or self-care

## 2024-11-04 ENCOUNTER — TELEPHONE (OUTPATIENT)
Dept: FAMILY MEDICINE CLINIC | Facility: CLINIC | Age: 89
End: 2024-11-04

## 2024-11-04 DIAGNOSIS — N39.0 ACUTE UTI: Primary | ICD-10-CM

## 2024-11-04 DIAGNOSIS — N39.0 ACUTE UTI: ICD-10-CM

## 2024-11-04 NOTE — TELEPHONE ENCOUNTER
Caller: NEAL HERNANDEZ    Relationship: Emergency Contact    Best call back number: [phone]     What orders are you requesting (i.e. lab or imaging): LAB WORK    In what timeframe would the patient need to come in: ASAP    Where will you receive your lab/imaging services: OFFICE    Additional notes: PATIENTS DAUGHTER WOULD LIKE TO KNOW IF THE PATIENT CAN HAVE THE LAB WORK THAT WAS SUPPOSED TO BE DONE ON 11/13/24 DONE BEFORE APPOINTMENT ON 11/07/24.    PATIENTS DAUGHTER ALSO STATED THAT THE HOSPITAL SUGGESTED THE PATIENT HAVE A URINE ANALYSIS DONE BEFORE SEEING DR. KHAN FOR HER HOSPITAL FOLLOW UP    PLEASE CALL PATIENTS DAUGHTER TO ADVISE

## 2024-11-06 LAB
APPEARANCE UR: CLEAR
APTT HEX PL PPP: 11 SEC
APTT IMM NP PPP: ABNORMAL SEC
APTT PPP 1:1 SALINE: ABNORMAL SEC
APTT PPP: 24.8 SEC
B2 GLYCOPROT1 IGA SER-ACNC: <10 SAU
B2 GLYCOPROT1 IGG SER-ACNC: <10 SGU
B2 GLYCOPROT1 IGM SER-ACNC: <10 SMU
BACTERIA #/AREA URNS HPF: NORMAL /[HPF]
BILIRUB UR QL STRIP: NEGATIVE
CARDIOLIPIN IGG SER IA-ACNC: <10 GPL
CARDIOLIPIN IGM SER IA-ACNC: 12 MPL
CASTS URNS QL MICRO: NORMAL /LPF
COLOR UR: YELLOW
CONFIRM DRVVT: 59.6 SEC
DRVVT SCREEN TO CONFIRM RATIO: 1 RATIO
EPI CELLS #/AREA URNS HPF: NORMAL /HPF (ref 0–10)
GLUCOSE UR QL STRIP: NEGATIVE
HGB UR QL STRIP: NEGATIVE
INR PPP: 1.1 RATIO
KETONES UR QL STRIP: NEGATIVE
LABORATORY COMMENT REPORT: ABNORMAL
LEUKOCYTE ESTERASE UR QL STRIP: NEGATIVE
MICRO URNS: ABNORMAL
NITRITE UR QL STRIP: NEGATIVE
PH UR STRIP: 6.5 [PH] (ref 5–7.5)
PROT UR QL STRIP: ABNORMAL
PROTHROMBIN TIME: 11.9 SEC
RBC #/AREA URNS HPF: NORMAL /HPF (ref 0–2)
SCREEN DRVVT: 63 SEC
SP GR UR STRIP: 1.01 (ref 1–1.03)
THROMBIN TIME: 17.6 SEC
URINALYSIS REFLEX: ABNORMAL
UROBILINOGEN UR STRIP-MCNC: 0.2 MG/DL (ref 0.2–1)
WBC #/AREA URNS HPF: NORMAL /HPF (ref 0–5)

## 2024-11-07 ENCOUNTER — OFFICE VISIT (OUTPATIENT)
Dept: FAMILY MEDICINE CLINIC | Facility: CLINIC | Age: 89
End: 2024-11-07
Payer: MEDICARE

## 2024-11-07 VITALS
BODY MASS INDEX: 25.32 KG/M2 | OXYGEN SATURATION: 97 % | HEIGHT: 60 IN | SYSTOLIC BLOOD PRESSURE: 152 MMHG | DIASTOLIC BLOOD PRESSURE: 70 MMHG | HEART RATE: 68 BPM | WEIGHT: 129 LBS

## 2024-11-07 DIAGNOSIS — Z09 HOSPITAL DISCHARGE FOLLOW-UP: Primary | ICD-10-CM

## 2024-11-07 DIAGNOSIS — K21.9 GASTROESOPHAGEAL REFLUX DISEASE WITHOUT ESOPHAGITIS: ICD-10-CM

## 2024-11-07 DIAGNOSIS — I69.398 VERTIGO AS LATE EFFECT OF STROKE: ICD-10-CM

## 2024-11-07 DIAGNOSIS — N39.0 URINARY TRACT INFECTION WITHOUT HEMATURIA, SITE UNSPECIFIED: ICD-10-CM

## 2024-11-07 DIAGNOSIS — R42 VERTIGO AS LATE EFFECT OF STROKE: ICD-10-CM

## 2024-11-07 DIAGNOSIS — I10 ESSENTIAL HYPERTENSION: ICD-10-CM

## 2024-11-07 NOTE — PROGRESS NOTES
Transitional Care Follow Up Visit  Subjective     CC: Transitional Care Management Visit        Within 48 business hours after discharge our office contacted her via telephone to coordinate her care and needs.      I reviewed and discussed the details of that call along with the discharge summary, hospital problems, inpatient lab results, inpatient diagnostic studies, and consultation reports with Marleni.     Current outpatient and discharge medications have been reconciled for the patient.  Reviewed by: Ken Andujar MD          10/25/2023     7:20 PM 5/29/2024     7:28 PM 6/7/2024     5:52 PM 7/12/2024     6:19 PM 10/11/2024     5:58 PM 10/24/2024     7:29 PM 10/30/2024     4:40 PM   Date of TCM Phone Call   [facility]   Date of Admission 10/24/2023 5/27/2024 6/6/2024 7/10/2024 10/9/2024 10/23/2024 10/27/2024   Date of Discharge 10/25/2023 5/29/2024 6/7/2024 7/12/2024 10/11/2024 10/24/2024 10/30/2024   Discharge Disposition Home or Self Care Home-Health Care Sv Home or Self Care Home-Health Care Sv  Home or Self Care        Risk for Readmission (LACE) Score: 16 (10/30/2024  6:00 AM)      HPI     The patient presents for a hospital follow-up. She is accompanied by an adult male.    She was admitted to the hospital on 10/27/2024 and discharged on 10/30/2024. During her stay, she was diagnosed with vertigo, believed to be a late effect of a stroke. A brain MRI revealed a small acute infarct in the right temporal occipital junction, measuring up to 1.6 x 0.4 cm in the right posterior carotid artery distribution, and a tiny subcentimeter focus of acute infarct in the right thalamus, 3 mm. She has a history of strokes, with the most recent one occurring 3 weeks prior to this visit. At that time, she experienced dizziness, nausea, and vomiting.  Neurology was consulted, and a repeat MRI was performed, which showed new findings. It was believed that these findings were a continuation of the previous stroke rather than a new event.    She was prescribed pantoprazole and is currently taking Eliquis 2.5 mg twice a day. She reports pain in her teeth and feet, neuropathy in her feet, and a lack of taste. She has been experiencing hallucinations of smells, such as marinated cucumbers with vinegar, and cannot smell coffee. Her eyes do not hurt, but she has difficulty focusing. She has an appointment with an eye doctor next Wednesday.    She also had a urinary tract infection caused by Providencia bacteria, which was treated with Rocephin. She continues to experience recurrent urinary tract infections and had a urine test and blood test on Tuesday. She was taken off magnesium during her hospital stay.    She was started on a proton pump inhibitor for heartburn issues and has a follow-up appointment with neurology in the next few weeks. The physical therapy team did not believe she had vertigo and recommended an ENT consultation due to potential issues with her vestibular system. She has not reported any dizziness. She uses a walker for balance and was given Zofran for nausea in the hospital. She continues to experience nausea, particularly when riding in a car.    She is scheduled for a sleep test on 12/02/2024.     Course During Hospital Stay The following information was reviewed by: Ken Andujar MD on 11/07/2024:   Hospital Course  This is an 88-year-old female with prior history of stroke about 3 weeks ago who presented to the emergency room with complaints of dizziness, nausea, and vomiting.  Please see H&P for full details.  She was provided supportive measures and neurology was consulted.  She underwent a repeat brain MRI as described above.  Neurology felt that this was representative of completion of her stroke and not a new event.  She was found to have a  "urinary tract infection.  Urine culture grew Providencia susceptible to Rocephin.  She received a 3-day course of antibiotics.  Her vertigo, nausea, and vomiting have all subsided.  She does have GERD and I started her on a PPI.  She is tolerating a diet.  Neurology has signed off and will see her as previously scheduled in the office in early December.  She is now medically stable and will be released home.  I did inform her that should she have ongoing issues with nausea or vomiting then referral to gastroenterology would be warranted.   The following portions of the patient's history were reviewed and updated as appropriate: allergies, current medications, past family history, past medical history, past social history, past surgical history, and problem list.     Vitals:    11/07/24 1453   BP: 152/70   Pulse: 68   SpO2: 97%   Weight: 58.5 kg (129 lb)   Height: 152.4 cm (60\")             Objective   Physical Exam  Neurological:      General: No focal deficit present.      Mental Status: She is alert.      Cranial Nerves: Cranial nerves 2-12 are intact.      Motor: Weakness present.      Coordination: Romberg sign positive. Coordination abnormal. Rapid alternating movements normal.      Gait: Gait normal.          DATA REVIEWED:  The following data was reviewed by: Ken Andujar MD on 11/07/2024:  Urinalysis With Culture If Indicated - (11/05/2024 10:39)  Microscopic Examination - (11/05/2024 10:39)  Results  Laboratory Studies  Brain MRI showed small acute infarct in the right temporal occipital junction measuring up to 1.6 x 0.4 cm and a tiny subcentimeter focus of acute infarct in the right thalamus, 3 mm. Urine culture showed protein, but white blood cells were negative. LDL cholesterol 66.       Assessment & Plan  Hospital discharge follow-up  Patient is progressively improving.  She continues to have issues with vision and balance.  Follow-ups are scheduled.  We will recheck patient again in the office in " about 3 months       Urinary tract infection without hematuria, site unspecified  Resolved Providencia infection       Vertigo as late effect of stroke  Dizziness as result of the stroke.  Patient will follow-up with neurology.  Symptoms are improved but are exacerbated by automobile riding.  She will continue to use of roller walker for ambulation due to imbalance on exam today.  Stressed the importance of routine and regular use of Eliquis 12 hours apart.  Stressed the importance of not skipping a dose.  Patient does have follow-up with neurology in the near future.  Future plans also include ophthalmology follow-up visit.  She also has study scheduled       Gastroesophageal reflux disease without esophagitis  GERD symptoms improving with pantoprazole.  Complete 30-day supply.       Essential hypertension    Today blood pressure is slightly above goal.  In light of recent stroke, will continue to monitor.  We will recheck her blood pressure in 3 months.  Further recommendations on blood pressure control from neurology will be appreciated.          Follow Up     Return in about 3 months (around 2/7/2025) for recheck of current condition.        Patient or patient representative verbalized consent for the use of Ambient Listening during the visit with  Ken Andujar MD for chart documentation. 11/7/2024  15:31 EST

## 2024-11-07 NOTE — ASSESSMENT & PLAN NOTE
Dizziness as result of the stroke.  Patient will follow-up with neurology.  Symptoms are improved but are exacerbated by automobile riding.  She will continue to use of roller walker for ambulation due to imbalance on exam today.  Stressed the importance of routine and regular use of Eliquis 12 hours apart.  Stressed the importance of not skipping a dose.  Patient does have follow-up with neurology in the near future.  Future plans also include ophthalmology follow-up visit.  She also has study scheduled

## 2024-11-07 NOTE — ASSESSMENT & PLAN NOTE
Today blood pressure is slightly above goal.  In light of recent stroke, will continue to monitor.  We will recheck her blood pressure in 3 months.  Further recommendations on blood pressure control from neurology will be appreciated.

## 2024-11-08 ENCOUNTER — HOSPITAL ENCOUNTER (OUTPATIENT)
Dept: INFUSION THERAPY | Facility: HOSPITAL | Age: 89
Discharge: HOME | End: 2024-11-08
Payer: MEDICARE

## 2024-11-08 VITALS
DIASTOLIC BLOOD PRESSURE: 54 MMHG | RESPIRATION RATE: 18 BRPM | TEMPERATURE: 96.7 F | OXYGEN SATURATION: 100 % | HEART RATE: 67 BPM | SYSTOLIC BLOOD PRESSURE: 123 MMHG

## 2024-11-08 DIAGNOSIS — N18.4 CRD (CHRONIC RENAL DISEASE), STAGE IV: ICD-10-CM

## 2024-11-08 DIAGNOSIS — D63.8 ANEMIA OF CHRONIC DISEASE: Primary | ICD-10-CM

## 2024-11-08 DIAGNOSIS — N18.4 CHRONIC KIDNEY DISEASE, STAGE IV (SEVERE): ICD-10-CM

## 2024-11-08 LAB
25(OH)D3 SERPL-MCNC: 33.2 NG/ML (ref 30–100)
ALBUMIN SERPL-MCNC: 4 G/DL (ref 3.5–5.2)
ALBUMIN/GLOB SERPL: 1.1 G/DL
ALP SERPL-CCNC: 71 U/L (ref 39–117)
ALT SERPL W P-5'-P-CCNC: 19 U/L (ref 1–33)
ANION GAP SERPL CALCULATED.3IONS-SCNC: 12.4 MMOL/L (ref 5–15)
AST SERPL-CCNC: 28 U/L (ref 1–32)
BASOPHILS # BLD AUTO: 0.03 10*3/MM3 (ref 0–0.2)
BASOPHILS NFR BLD AUTO: 0.6 % (ref 0–1.5)
BILIRUB SERPL-MCNC: 0.7 MG/DL (ref 0–1.2)
BUN SERPL-MCNC: 34 MG/DL (ref 8–23)
BUN/CREAT SERPL: 15.5 (ref 7–25)
CALCIUM SPEC-SCNC: 9.8 MG/DL (ref 8.6–10.5)
CALCIUM SPEC-SCNC: 9.9 MG/DL (ref 8.6–10.5)
CHLORIDE SERPL-SCNC: 102 MMOL/L (ref 98–107)
CO2 SERPL-SCNC: 22.6 MMOL/L (ref 22–29)
CREAT SERPL-MCNC: 2.19 MG/DL (ref 0.57–1)
DEPRECATED RDW RBC AUTO: 46.5 FL (ref 37–54)
EGFRCR SERPLBLD CKD-EPI 2021: 21.5 ML/MIN/1.73
EOSINOPHIL # BLD AUTO: 0.13 10*3/MM3 (ref 0–0.4)
EOSINOPHIL NFR BLD AUTO: 2.6 % (ref 0.3–6.2)
ERYTHROCYTE [DISTWIDTH] IN BLOOD BY AUTOMATED COUNT: 13.8 % (ref 12.3–15.4)
FERRITIN SERPL-MCNC: 241 NG/ML (ref 13–150)
FOLATE SERPL-MCNC: 16.6 NG/ML (ref 4.78–24.2)
GLOBULIN UR ELPH-MCNC: 3.5 GM/DL
GLUCOSE SERPL-MCNC: 133 MG/DL (ref 65–99)
HCT VFR BLD AUTO: 34.2 % (ref 34–46.6)
HGB BLD-MCNC: 10.9 G/DL (ref 12–15.9)
IMM GRANULOCYTES # BLD AUTO: 0.02 10*3/MM3 (ref 0–0.05)
IMM GRANULOCYTES NFR BLD AUTO: 0.4 % (ref 0–0.5)
IRON 24H UR-MRATE: 70 MCG/DL (ref 37–145)
IRON SATN MFR SERPL: 21 % (ref 20–50)
LYMPHOCYTES # BLD AUTO: 1.11 10*3/MM3 (ref 0.7–3.1)
LYMPHOCYTES NFR BLD AUTO: 22.6 % (ref 19.6–45.3)
MCH RBC QN AUTO: 29.2 PG (ref 26.6–33)
MCHC RBC AUTO-ENTMCNC: 31.9 G/DL (ref 31.5–35.7)
MCV RBC AUTO: 91.7 FL (ref 79–97)
MONOCYTES # BLD AUTO: 0.33 10*3/MM3 (ref 0.1–0.9)
MONOCYTES NFR BLD AUTO: 6.7 % (ref 5–12)
NEUTROPHILS NFR BLD AUTO: 3.3 10*3/MM3 (ref 1.7–7)
NEUTROPHILS NFR BLD AUTO: 67.1 % (ref 42.7–76)
NRBC BLD AUTO-RTO: 0 /100 WBC (ref 0–0.2)
PHOSPHATE SERPL-MCNC: 4.5 MG/DL (ref 2.5–4.5)
PLATELET # BLD AUTO: 209 10*3/MM3 (ref 140–450)
PMV BLD AUTO: 9.8 FL (ref 6–12)
POTASSIUM SERPL-SCNC: 4.7 MMOL/L (ref 3.5–5.2)
PROT SERPL-MCNC: 7.5 G/DL (ref 6–8.5)
PTH-INTACT SERPL-MCNC: 73.1 PG/ML (ref 15–65)
PTH-INTACT SERPL-MCNC: 75.1 PG/ML (ref 15–65)
RBC # BLD AUTO: 3.73 10*6/MM3 (ref 3.77–5.28)
SODIUM SERPL-SCNC: 137 MMOL/L (ref 136–145)
TIBC SERPL-MCNC: 331 MCG/DL (ref 298–536)
TRANSFERRIN SERPL-MCNC: 222 MG/DL (ref 200–360)
URATE SERPL-MCNC: 8.2 MG/DL (ref 2.4–5.7)
VIT B12 BLD-MCNC: 618 PG/ML (ref 211–946)
WBC NRBC COR # BLD AUTO: 4.92 10*3/MM3 (ref 3.4–10.8)

## 2024-11-08 PROCEDURE — 84550 ASSAY OF BLOOD/URIC ACID: CPT | Performed by: NURSE PRACTITIONER

## 2024-11-08 PROCEDURE — 25010000002 EPOETIN ALFA PER 1000 UNITS: Performed by: NURSE PRACTITIONER

## 2024-11-08 PROCEDURE — 82306 VITAMIN D 25 HYDROXY: CPT | Performed by: NURSE PRACTITIONER

## 2024-11-08 PROCEDURE — 80053 COMPREHEN METABOLIC PANEL: CPT | Performed by: NURSE PRACTITIONER

## 2024-11-08 PROCEDURE — 83540 ASSAY OF IRON: CPT | Performed by: NURSE PRACTITIONER

## 2024-11-08 PROCEDURE — 82728 ASSAY OF FERRITIN: CPT | Performed by: NURSE PRACTITIONER

## 2024-11-08 PROCEDURE — 83970 ASSAY OF PARATHORMONE: CPT | Performed by: NURSE PRACTITIONER

## 2024-11-08 PROCEDURE — 84466 ASSAY OF TRANSFERRIN: CPT | Performed by: NURSE PRACTITIONER

## 2024-11-08 PROCEDURE — 82746 ASSAY OF FOLIC ACID SERUM: CPT | Performed by: NURSE PRACTITIONER

## 2024-11-08 PROCEDURE — 36415 COLL VENOUS BLD VENIPUNCTURE: CPT

## 2024-11-08 PROCEDURE — 84100 ASSAY OF PHOSPHORUS: CPT | Performed by: NURSE PRACTITIONER

## 2024-11-08 PROCEDURE — 85025 COMPLETE CBC W/AUTO DIFF WBC: CPT | Performed by: NURSE PRACTITIONER

## 2024-11-08 PROCEDURE — 96372 THER/PROPH/DIAG INJ SC/IM: CPT

## 2024-11-08 PROCEDURE — 82607 VITAMIN B-12: CPT | Performed by: NURSE PRACTITIONER

## 2024-11-08 RX ADMIN — ERYTHROPOIETIN 10000 UNITS: 10000 INJECTION, SOLUTION INTRAVENOUS; SUBCUTANEOUS at 12:12

## 2024-11-21 ENCOUNTER — INFUSION (OUTPATIENT)
Dept: ONCOLOGY | Facility: HOSPITAL | Age: 89
End: 2024-11-21
Payer: MEDICARE

## 2024-11-21 DIAGNOSIS — M81.0 SENILE OSTEOPOROSIS: Primary | ICD-10-CM

## 2024-11-21 PROCEDURE — 96372 THER/PROPH/DIAG INJ SC/IM: CPT

## 2024-11-21 PROCEDURE — 25010000002 DENOSUMAB 60 MG/ML SOLUTION PREFILLED SYRINGE: Performed by: FAMILY MEDICINE

## 2024-11-21 RX ADMIN — DENOSUMAB 60 MG: 60 INJECTION SUBCUTANEOUS at 15:12

## 2024-11-21 NOTE — NURSING NOTE
Arrived for prolia injection. Indication and side effects reviewed. Denies recent dental work. Labs and medications verified. Pt will reach out to Dr. Andujar's office to ask about taking calcium and vitamin D supplements as she is currently not on them. Prolia administered in R arm without incidence. Instructed to call prescribing MD for any concerns or questions. Pt vu. Provided phone number for central scheduling and instructed on how to schedule future appts.  Pt vu and discharged in stable condition.

## 2024-11-22 ENCOUNTER — HOSPITAL ENCOUNTER (OUTPATIENT)
Dept: INFUSION THERAPY | Facility: HOSPITAL | Age: 89
Discharge: HOME | End: 2024-11-22
Payer: MEDICARE

## 2024-11-22 VITALS
RESPIRATION RATE: 16 BRPM | DIASTOLIC BLOOD PRESSURE: 65 MMHG | HEART RATE: 55 BPM | TEMPERATURE: 96.9 F | SYSTOLIC BLOOD PRESSURE: 132 MMHG | OXYGEN SATURATION: 100 %

## 2024-11-22 DIAGNOSIS — D63.8 ANEMIA OF CHRONIC DISEASE: Primary | ICD-10-CM

## 2024-11-22 LAB
HCT VFR BLD AUTO: 33.9 % (ref 34–46.6)
HGB BLD-MCNC: 11 G/DL (ref 12–15.9)

## 2024-11-22 PROCEDURE — 25010000002 EPOETIN ALFA PER 1000 UNITS: Performed by: NURSE PRACTITIONER

## 2024-11-22 PROCEDURE — 85018 HEMOGLOBIN: CPT | Performed by: NURSE PRACTITIONER

## 2024-11-22 PROCEDURE — 96372 THER/PROPH/DIAG INJ SC/IM: CPT

## 2024-11-22 PROCEDURE — 36415 COLL VENOUS BLD VENIPUNCTURE: CPT

## 2024-11-22 PROCEDURE — 85014 HEMATOCRIT: CPT | Performed by: NURSE PRACTITIONER

## 2024-11-22 RX ADMIN — ERYTHROPOIETIN 10000 UNITS: 10000 INJECTION, SOLUTION INTRAVENOUS; SUBCUTANEOUS at 11:46

## 2024-12-04 ENCOUNTER — OFFICE VISIT (OUTPATIENT)
Dept: SLEEP MEDICINE | Facility: HOSPITAL | Age: 89
End: 2024-12-04
Payer: MEDICARE

## 2024-12-04 VITALS — BODY MASS INDEX: 25.52 KG/M2 | HEART RATE: 67 BPM | WEIGHT: 130 LBS | HEIGHT: 60 IN | OXYGEN SATURATION: 98 %

## 2024-12-04 DIAGNOSIS — G47.10 HYPERSOMNIA: ICD-10-CM

## 2024-12-04 DIAGNOSIS — Z86.79 HISTORY OF CARDIOMYOPATHY: ICD-10-CM

## 2024-12-04 DIAGNOSIS — I25.2 HISTORY OF MI (MYOCARDIAL INFARCTION): ICD-10-CM

## 2024-12-04 DIAGNOSIS — G47.30 SLEEP APNEA, UNSPECIFIED TYPE: Primary | ICD-10-CM

## 2024-12-04 DIAGNOSIS — G47.8 NON-RESTORATIVE SLEEP: ICD-10-CM

## 2024-12-04 DIAGNOSIS — I50.32 CHRONIC DIASTOLIC CONGESTIVE HEART FAILURE: ICD-10-CM

## 2024-12-04 DIAGNOSIS — I48.21 PERMANENT ATRIAL FIBRILLATION: ICD-10-CM

## 2024-12-04 DIAGNOSIS — I63.81 CEREBROVASCULAR ACCIDENT (CVA) DUE TO STENOSIS OF SMALL ARTERY: ICD-10-CM

## 2024-12-04 DIAGNOSIS — R06.83 SNORING: ICD-10-CM

## 2024-12-04 PROCEDURE — 1159F MED LIST DOCD IN RCRD: CPT | Performed by: FAMILY MEDICINE

## 2024-12-04 PROCEDURE — 1160F RVW MEDS BY RX/DR IN RCRD: CPT | Performed by: FAMILY MEDICINE

## 2024-12-04 PROCEDURE — 99204 OFFICE O/P NEW MOD 45 MIN: CPT | Performed by: FAMILY MEDICINE

## 2024-12-04 NOTE — PROGRESS NOTES
[facility] New Patient[facility]Consultation       Reason for Consultation: Sleep apnea      Patient Care Team:  Ken Andujar MD as PCP - General  Chuckie Mclaughlin MD as Consulting Physician (Urology)  Anayeli Alanis MD as Consulting Physician (Obstetrics and Gynecology)  BROOK Clarke MD as Consulting Physician (Ophthalmology)  Jose Alfredo Krishnamurthy MD as Consulting Physician (Hematology and Oncology)  Sean Canales MD as Consulting Physician (Orthopedic Surgery)  Esteban Moe MD as Consulting Physician (Orthopedic Surgery)  Feliciano Elizabeth MD (Inactive) as Consulting Physician (Gastroenterology)  Wallace Hook MD as Consulting Physician (Pain Medicine)  Sarah Beth Marcus APRN as Nurse Practitioner (Nephrology)  Brandon Miller MD as Consulting Physician (Gastroenterology)  Sybil Parmar MD as Consulting Physician (Cardiology)  Joseph Leonard MD as Consulting Physician (Nephrology)  Debbie Keene PA-C as Physician Assistant (Physician Assistant)  Ken Allen MD as Consulting Physician (Neurology)  BROOK Clarke MD as Consulting Physician (Ophthalmology)  Sam Moore MD as Consulting Physician (Sleep Medicine)      History of present illness:  Thank you for asking me to see your patient.  The patient is a 88 y.o. female Presents today with concern for sleep disorder.  Recently experienced a stroke which led to a brief hospital stay in October 2024.  History of Takotsubo cardiomyopathy and 2 previous strokes.  Reports being diagnosed with sleep apnea 10 to 15 years ago but does not currently use PAP machine.  Sleep latency and average hours slept varies 0 naps no rotating shifts.  Reports hypersomnia nonrestorative sleep weight changes over the past 5 years snoring witnessed apneas nocturia up to 3 times a night more sleepy when she increase her sleep time some nocturnal enuresis.  BMI 25.4.    Medical Conditions (PMH):   History of  "CVA  History of Takotsubo cardiomyopathy  Atrial fibrillation  Hypertension  CKD  History of MI  CAD  CHF    Social history:  Do you drive a commercial vehicle:  No   Shift work:  No   Tobacco use:  No   Alcohol use: 3 per week  Caffeinated drinks: 1 per day  Occupation: Retired    Family History (parents and siblings) (pertaining to sleep medicine):  HTN  Stroke    Allergies:  Medrol [methylprednisolone], Morphine, Oxycodone, Ace inhibitors, Bactrim [sulfamethoxazole-trimethoprim], Levofloxacin, and Torsemide       Current Outpatient Medications:     acetaminophen (TYLENOL) 500 MG tablet, Take 1 tablet by mouth Every 4 (Four) Hours As Needed for Mild Pain. Indications: Pain, Disp: , Rfl:     atorvastatin (LIPITOR) 40 MG tablet, Take 1 tablet by mouth Every Night., Disp: 90 tablet, Rfl: 0    Eliquis 2.5 MG tablet tablet, TAKE 1 TABLET EVERY 12 HOURS (TWICE A DAY) (Patient taking differently: TAKE 1 TABLET ORALLY EVERY 12 HOURS (TWICE A DAY)), Disp: 180 tablet, Rfl: 3    fexofenadine (ALLEGRA) 60 MG tablet, Take 1 tablet by mouth Daily As Needed. Indications: Hayfever, Disp: , Rfl:     furosemide (LASIX) 20 MG tablet, Take 1 tablet by mouth Daily As Needed (if weight goes up 3lbs in 3 days)., Disp: 30 tablet, Rfl: 11    melatonin 5 MG tablet tablet, Take 1 tablet by mouth At Night As Needed. Indications: Trouble Sleeping, Disp: , Rfl:     metoprolol succinate XL (TOPROL-XL) 25 MG 24 hr tablet, Take 0.5 tablets by mouth Daily., Disp: 90 tablet, Rfl: 1    Riboflavin (VITAMIN B2 PO), Take  by mouth., Disp: , Rfl:     Vibegron (Gemtesa) 75 MG tablet, Take 1 tablet by mouth Daily for 270 days. Indications: Overactive Bladder, Urinary Incontinence, Disp: 90 tablet, Rfl: 2    Vortioxetine HBr (TRINTELLIX) 10 MG tablet tablet, Take 1 tablet by mouth Daily With Breakfast., Disp: 30 tablet, Rfl: 2    Vital Signs:    Vitals:    12/04/24 1131   Pulse: 67   SpO2: 98%   Weight: 59 kg (130 lb)   Height: 152.4 cm (60\")      Body " "mass index is 25.39 kg/m².  Neck Circumference: 15.5 inches      REVIEW OF SYSTEMS:  Pertinent positive symptoms are:  Snoring  Witnessed apnea  [place] Sleepiness Scale of Total score: 2   Fatigue  Bedwetting  Irregular heartbeat  Shortness of breath  Anxiety  Postnasal drip      Physical exam:  Vitals:    12/04/24 1131   Pulse: 67   SpO2: 98%   Weight: 59 kg (130 lb)   Height: 152.4 cm (60\")    Body mass index is 25.39 kg/m². Neck Circumference: 15.5 inches  HEENT: Head is atraumatic, normocephalic  Eyes: pupils are round equal and reacting to light and accommodation, conjunctiva normal  Throat: tongue normal  NECK:Neck Circumference: 15.5 inches  RESPIRATORY SYSTEM: Regular respirations  CARDIOVASULAR SYSTEM: Regular rate  EXTREMITES: No cyanosis, clubbing  NEUROLOGICAL SYSTEM: Oriented x 3      Impression:  1. Sleep apnea, unspecified type    2. Hypersomnia    3. Non-restorative sleep    4. Snoring    5. Permanent atrial fibrillation    6. History of MI (myocardial infarction)    7. Chronic diastolic congestive heart failure    8. History of cardiomyopathy-Takotsubo's    9. Cerebrovascular accident (CVA) due to stenosis of small artery        Plan:    Office note(s) from care team reviewed. Office note(s) reviewed: 10/21/2024 PCP    Labs/ Test Results Reviewed:  TSH          5/27/2024    14:37 7/10/2024    09:40 7/10/2024    19:07 11/5/2024    11:13   TSH   TSH 2.900  2.220  1.770  0.972       Most Recent A1C          10/9/2024    16:36   HGBA1C Most Recent   Hemoglobin A1C 5.00    TSH A1c normal          ASSESSMENT AND PLAN:   Witnessed apnea: patient's symptoms and physical examination are concerning for possible sleep apnea.   I discussed the signs, symptoms, and pathophysiology of sleep apnea with this patient.  I also discussed the potential complications of untreated sleep apnea including but not limited to resistant hypertension, insulin resistance, pulmonary hypertension, atrial fibrillation, heart " attack, stroke, nonrestorative sleep with hypersomnia which can increase risk for motor vehicle accidents, etc.   Different testing methods including home-based and lab based sleep studies were discussed with this patient.   Based on patient history and physical examination, will proceed with in-lab split study.  The order for the sleep study is placed in [facility].  The test will be scheduled after prior authorization has been obtained through patient's insurance.  Treatment and management will be discussed in more detail with this patient after the test is completed.  All questions were answered to patient's satisfaction.   Snoring: snoring is the sound created by turbulent airflow vibrating upper airway soft tissue.  I have also discussed factors affecting snoring including sleep deprivation, sleeping on the back and alcohol ingestion. To minimize snoring, patient is advised to have adequate sleep, sleep on their side, and avoid alcohol and sedative medications around bedtime.   Excessive daytime sleepiness:  [place] Sleepiness Scale of Total score: 2.  There are many causes of excessive daytime sleepiness.  Rule out sleep apnea as a contributing factor, as above.  Do not drive, operate heavy machinery, or do activities that require high concentration if feeling tired/drowsy.  Overweight: Body mass index is 25.39 kg/m².. Patients who are overweight or obese are at increased risk of sleep apnea/ sleep disordered breathing. Weight reduction and healthy lifestyle are encouraged in overweight/ obese patients as part of a comprehensive approach to sleep apnea treatment.    One-to-one study; okay to take melatonin on night of study she will bring with her    I have also discussed with the patient the following  Sleep hygiene: try to maintain a regular bed time and wake time, avoid watching TV/ using electronic devices in bed (including cell phones), limit caffeinated and alcoholic beverages before bed, try to maintain a  cool and quiet sleep environment, avoid daytime naps  Adequate amount of sleep: most people need around 7 to 8 hours of sleep each night      Patient will follow-up after study, 31 to 90 days after PAP therapy initiated if applicable, or contact the office sooner for questions or concerns. Patient's questions were answered.      Thank you for allowing me to participate in your patient's care.    Sam Moore MD  Sleep Medicine  12/04/24  11:51 EST

## 2024-12-18 ENCOUNTER — OFFICE VISIT (OUTPATIENT)
Dept: NEUROLOGY | Facility: CLINIC | Age: 89
End: 2024-12-18
Payer: MEDICARE

## 2024-12-18 VITALS
WEIGHT: 128 LBS | OXYGEN SATURATION: 98 % | HEART RATE: 67 BPM | SYSTOLIC BLOOD PRESSURE: 134 MMHG | HEIGHT: 60 IN | BODY MASS INDEX: 25.13 KG/M2 | DIASTOLIC BLOOD PRESSURE: 72 MMHG

## 2024-12-18 DIAGNOSIS — I63.331 CEREBROVASCULAR ACCIDENT (CVA) DUE TO THROMBOSIS OF RIGHT POSTERIOR CEREBRAL ARTERY: Primary | ICD-10-CM

## 2024-12-18 RX ORDER — PANTOPRAZOLE SODIUM 40 MG/1
40 TABLET, DELAYED RELEASE ORAL DAILY
COMMUNITY
End: 2024-12-20

## 2024-12-18 NOTE — PROGRESS NOTES
"DOS: 2024  NAME: Marleni Hummel   : 1935  PCP: Ken Andujar MD  Chief Complaint   Patient presents with    Transient Ischemic Attack       Chief complaint: Poststroke headache, right PCA acute ischemic stroke follow-up  Subjective: Patient presented to the clinic accompanied by her daughter to follow-up on recent right PCA acute ischemic stroke that happened in .  Patient developed poststroke headache and she was started on riboflavin 400 mg daily.  Patient stated that her headache significantly improved.  At that time she also had nausea and vomiting but she was found to have UTI she stated her symptoms improved after treating her UTI.  She does have obstructive sleep apnea but she is not compliant with the CPAP machine.  Still having residual left upper quadrantanopsia from her right PCA stroke.  Daughter stated that patient developed memory issues after the stroke.    As per prior hospital note 10/2024  This is a 88-year-old female with past medical history of atrial fibrillation on Eliquis 2.5 mg twice daily PTA, CAD, CKD stage IV, hypertension, hyperlipidemia, obstructive sleep apnea noncompliant with CPAP recent right MCA/PCA embolic strokes involving the right thalamus and right temporal lobe which reportedly occurred in the setting of recurrent nausea and vomiting who presented to the hospital on 10/27/2024 with complaints of nausea, vomiting, inability to tolerate keeping fluid or solids down, and blurred vision with associated bilateral retro-orbital pain that started after her previous stroke.      Objective:  Vital signs: Ht 152.4 cm (60\")   BMI 25.39 kg/m²    Exam:  vitals reviewed  MS: oriented x3, recent/remote memory intact, normal attention/concentration, language intact, no neglect.  CN: Hard of hearing, left superior quadrantanopsia, PERRL, EOMI, no facial droop, no dysarthria  Motor: 5/5 throughout upper and lower extremities, normal tone  Sensory: intact to cold " temperature and vibration throughout  Gait: She was able to walk unassisted, at baseline she uses a rollator due to knee pain, no ataxia    Laboratory results:  Lab Results   Component Value Date    LDL 66 11/05/2024    LDL 73 10/09/2024     (H) 07/10/2024            Review of labs: LDL 66,    Review and interpretation of imaging: I personally reviewed the MRI brain dated 10/2018 which showed right PCA acute ischemic stroke.  MRI head showed distal right P2-P3 occlusion.    Workup to date:  Review and interpretation of imaging:  MRI Brain Without Contrast     Result Date: 10/28/2024   On the prior study, there was a small acute infarct within the right temporal occipital junction that measured up to 1.6 x 0.4 cm that was within the right PCA distribution and a tiny subcentimeter focus of acute infarction was seen within the right thalamus measuring up to 3 mm in diameter that was within the distribution of a  arising from the right PCA. On the current study, there is a larger focus of acute infarction within the right PCA distribution located within the inferior medial aspect the right temporal and occipital lobes which measures up to 4.9 x 3.2 cm in greatest axial dimensions.  These findings were discussed with Dr. Felipe on 10/28/2024 at approximately 1:45 p.m.    This report was finalized on 10/28/2024 1:48 PM by Dr. Amrit Garduno M.D on Workstation: [id number]       CT Abdomen Pelvis Without Contrast     Result Date: 10/23/2024   1. No acute findings identified in the abdomen or pelvis. 2. Left nephrectomy. 3. Colonic diverticulosis.  This report was finalized on 10/23/2024 6:03 PM by Dr. Jason Sanders M.D on Workstation: [id number]     Results for orders placed during the hospital encounter of 10/09/24     Adult Transthoracic Echo Complete W/ Cont if Necessary Per Protocol (With Agitated Saline)     Interpretation Summary    There is akinesis of the mid lateral wall    Left ventricular  ejection fraction appears to be 41 - 45%.    Normal right ventricular cavity size and systolic function noted.    The left atrial cavity is severely dilated.    Mild to moderate aortic valve regurgitation is present    Saline test results are negative.    Mild mitral valve regurgitation is present.    Mild tricuspid valve regurgitation is present.    Calculated right ventricular systolic pressure from tricuspid regurgitation is 23 mmHg.    There is no evidence of pericardial effusion    Diagnoses:  -Follow-up on recent right PCA acute ischemic stroke in October, 2024 etiology felt to be cardioembolic secondary to atrial fibrillation.  -Right PCA distal P2-3 occlusion  -Poststroke migraine headache improved  -Obstructive sleep apnea not compliant with the CPAP machine  -Essential hypertension  -Mixed hyperlipidemia  -CAD  -CKD  - Memory issues likely poststroke secondary to medium temporal lobe involvement which was slightly affecting the hippocampus.      Plan:  1.  Continue Eliquis renal adjusted dose 2.5 mg twice daily  2.  Continue Lipitor 40 mg daily  3.  Continue riboflavin 400 mg daily for migraine prophylaxis  4.  Counseled the patient to be compliance with the CPAP machine treated sleep apnea will increase the risk of the stroke 6 times, also contribute to headache, fatigue, risk for pulmonary hypertension, congestive heart failure.  5.  Goal pressure less than 140/90  6.  Return to the stroke clinic as needed  7.  Memory issues continue to be problematic after 6 months follow-up with general neurology for neuropsych assessment    I spent a total of 30 minutes on this clinical encounter including chart/records review, review of laboratory data, personal review of imaging studies, patient counseling, and care coordination.

## 2024-12-20 ENCOUNTER — HOSPITAL ENCOUNTER (OUTPATIENT)
Dept: INFUSION THERAPY | Facility: HOSPITAL | Age: 89
Discharge: HOME | End: 2024-12-20
Payer: MEDICARE

## 2024-12-20 VITALS
HEART RATE: 72 BPM | DIASTOLIC BLOOD PRESSURE: 88 MMHG | TEMPERATURE: 96.2 F | OXYGEN SATURATION: 100 % | RESPIRATION RATE: 18 BRPM | SYSTOLIC BLOOD PRESSURE: 168 MMHG

## 2024-12-20 DIAGNOSIS — D63.8 ANEMIA OF CHRONIC DISEASE: Primary | ICD-10-CM

## 2024-12-20 LAB
HCT VFR BLD AUTO: 31.7 % (ref 34–46.6)
HGB BLD-MCNC: 10.1 G/DL (ref 12–15.9)

## 2024-12-20 PROCEDURE — 25010000002 EPOETIN ALFA PER 1000 UNITS: Performed by: NURSE PRACTITIONER

## 2024-12-20 PROCEDURE — 96372 THER/PROPH/DIAG INJ SC/IM: CPT

## 2024-12-20 PROCEDURE — 85018 HEMOGLOBIN: CPT | Performed by: NURSE PRACTITIONER

## 2024-12-20 PROCEDURE — 85014 HEMATOCRIT: CPT | Performed by: NURSE PRACTITIONER

## 2024-12-20 PROCEDURE — 36415 COLL VENOUS BLD VENIPUNCTURE: CPT

## 2024-12-20 RX ADMIN — ERYTHROPOIETIN 10000 UNITS: 10000 INJECTION, SOLUTION INTRAVENOUS; SUBCUTANEOUS at 11:41

## 2025-01-03 ENCOUNTER — APPOINTMENT (OUTPATIENT)
Dept: CT IMAGING | Facility: HOSPITAL | Age: 89
End: 2025-01-03
Payer: MEDICARE

## 2025-01-03 ENCOUNTER — HOSPITAL ENCOUNTER (OUTPATIENT)
Dept: INFUSION THERAPY | Facility: HOSPITAL | Age: 89
Discharge: HOME | End: 2025-01-03
Payer: MEDICARE

## 2025-01-03 ENCOUNTER — APPOINTMENT (OUTPATIENT)
Dept: MRI IMAGING | Facility: HOSPITAL | Age: 89
End: 2025-01-03
Payer: MEDICARE

## 2025-01-03 ENCOUNTER — APPOINTMENT (OUTPATIENT)
Dept: GENERAL RADIOLOGY | Facility: HOSPITAL | Age: 89
End: 2025-01-03
Payer: MEDICARE

## 2025-01-03 ENCOUNTER — HOSPITAL ENCOUNTER (OUTPATIENT)
Facility: HOSPITAL | Age: 89
Setting detail: OBSERVATION
Discharge: HOME | End: 2025-01-04
Attending: EMERGENCY MEDICINE | Admitting: EMERGENCY MEDICINE
Payer: MEDICARE

## 2025-01-03 VITALS
TEMPERATURE: 97.2 F | HEART RATE: 62 BPM | SYSTOLIC BLOOD PRESSURE: 238 MMHG | DIASTOLIC BLOOD PRESSURE: 101 MMHG | OXYGEN SATURATION: 98 % | RESPIRATION RATE: 18 BRPM

## 2025-01-03 DIAGNOSIS — D63.8 ANEMIA OF CHRONIC DISEASE: Primary | ICD-10-CM

## 2025-01-03 DIAGNOSIS — I10 HYPERTENSION, UNSPECIFIED TYPE: ICD-10-CM

## 2025-01-03 DIAGNOSIS — R41.82 ALTERED MENTAL STATUS, UNSPECIFIED ALTERED MENTAL STATUS TYPE: Primary | ICD-10-CM

## 2025-01-03 DIAGNOSIS — N18.9 CHRONIC RENAL FAILURE, UNSPECIFIED CKD STAGE: ICD-10-CM

## 2025-01-03 PROBLEM — R41.0 CONFUSION: Status: ACTIVE | Noted: 2025-01-03

## 2025-01-03 LAB
25(OH)D3 SERPL-MCNC: 35.8 NG/ML (ref 30–100)
ALBUMIN SERPL-MCNC: 4.2 G/DL (ref 3.5–5.2)
ALBUMIN SERPL-MCNC: 4.4 G/DL (ref 3.5–5.2)
ALBUMIN/GLOB SERPL: 1.5 G/DL
ALBUMIN/GLOB SERPL: 1.6 G/DL
ALP SERPL-CCNC: 52 U/L (ref 39–117)
ALP SERPL-CCNC: 58 U/L (ref 39–117)
ALT SERPL W P-5'-P-CCNC: 11 U/L (ref 1–33)
ALT SERPL W P-5'-P-CCNC: 9 U/L (ref 1–33)
ANION GAP SERPL CALCULATED.3IONS-SCNC: 11.6 MMOL/L (ref 5–15)
ANION GAP SERPL CALCULATED.3IONS-SCNC: 13.4 MMOL/L (ref 5–15)
APTT PPP: 31 SECONDS (ref 22.7–35.4)
AST SERPL-CCNC: 22 U/L (ref 1–32)
AST SERPL-CCNC: 24 U/L (ref 1–32)
BASOPHILS # BLD AUTO: 0.02 10*3/MM3 (ref 0–0.2)
BASOPHILS # BLD AUTO: 0.02 10*3/MM3 (ref 0–0.2)
BASOPHILS NFR BLD AUTO: 0.3 % (ref 0–1.5)
BASOPHILS NFR BLD AUTO: 0.4 % (ref 0–1.5)
BILIRUB SERPL-MCNC: 0.7 MG/DL (ref 0–1.2)
BILIRUB SERPL-MCNC: 0.8 MG/DL (ref 0–1.2)
BILIRUB UR QL STRIP: NEGATIVE
BUN SERPL-MCNC: 36 MG/DL (ref 8–23)
BUN SERPL-MCNC: 37 MG/DL (ref 8–23)
BUN/CREAT SERPL: 14.8 (ref 7–25)
BUN/CREAT SERPL: 16.3 (ref 7–25)
CALCIUM SPEC-SCNC: 8.9 MG/DL (ref 8.6–10.5)
CALCIUM SPEC-SCNC: 9 MG/DL (ref 8.6–10.5)
CALCIUM SPEC-SCNC: 9.3 MG/DL (ref 8.6–10.5)
CHLORIDE SERPL-SCNC: 103 MMOL/L (ref 98–107)
CHLORIDE SERPL-SCNC: 103 MMOL/L (ref 98–107)
CHOLEST SERPL-MCNC: 152 MG/DL (ref 0–200)
CLARITY UR: CLEAR
CO2 SERPL-SCNC: 19.6 MMOL/L (ref 22–29)
CO2 SERPL-SCNC: 21.4 MMOL/L (ref 22–29)
COLOR UR: YELLOW
CREAT SERPL-MCNC: 2.27 MG/DL (ref 0.57–1)
CREAT SERPL-MCNC: 2.43 MG/DL (ref 0.57–1)
DEPRECATED RDW RBC AUTO: 46.7 FL (ref 37–54)
DEPRECATED RDW RBC AUTO: 48.3 FL (ref 37–54)
EGFRCR SERPLBLD CKD-EPI 2021: 18.8 ML/MIN/1.73
EGFRCR SERPLBLD CKD-EPI 2021: 20.4 ML/MIN/1.73
EOSINOPHIL # BLD AUTO: 0.15 10*3/MM3 (ref 0–0.4)
EOSINOPHIL # BLD AUTO: 0.19 10*3/MM3 (ref 0–0.4)
EOSINOPHIL NFR BLD AUTO: 2.7 % (ref 0.3–6.2)
EOSINOPHIL NFR BLD AUTO: 3 % (ref 0.3–6.2)
ERYTHROCYTE [DISTWIDTH] IN BLOOD BY AUTOMATED COUNT: 14.3 % (ref 12.3–15.4)
ERYTHROCYTE [DISTWIDTH] IN BLOOD BY AUTOMATED COUNT: 14.5 % (ref 12.3–15.4)
GEN 5 1HR TROPONIN T REFLEX: 38 NG/L
GLOBULIN UR ELPH-MCNC: 2.6 GM/DL
GLOBULIN UR ELPH-MCNC: 3 GM/DL
GLUCOSE SERPL-MCNC: 113 MG/DL (ref 65–99)
GLUCOSE SERPL-MCNC: 99 MG/DL (ref 65–99)
GLUCOSE UR STRIP-MCNC: NEGATIVE MG/DL
HBA1C MFR BLD: 5 % (ref 4.8–5.6)
HCT VFR BLD AUTO: 36.5 % (ref 34–46.6)
HCT VFR BLD AUTO: 36.6 % (ref 34–46.6)
HDLC SERPL-MCNC: 59 MG/DL (ref 40–60)
HGB BLD-MCNC: 12 G/DL (ref 12–15.9)
HGB BLD-MCNC: 12.1 G/DL (ref 12–15.9)
HGB UR QL STRIP.AUTO: NEGATIVE
HOLD SPECIMEN: NORMAL
HOLD SPECIMEN: NORMAL
IMM GRANULOCYTES # BLD AUTO: 0.01 10*3/MM3 (ref 0–0.05)
IMM GRANULOCYTES # BLD AUTO: 0.02 10*3/MM3 (ref 0–0.05)
IMM GRANULOCYTES NFR BLD AUTO: 0.2 % (ref 0–0.5)
IMM GRANULOCYTES NFR BLD AUTO: 0.3 % (ref 0–0.5)
INR PPP: 1.27 (ref 0.9–1.1)
KETONES UR QL STRIP: NEGATIVE
LDLC SERPL CALC-MCNC: 67 MG/DL (ref 0–100)
LDLC/HDLC SERPL: 1.06 {RATIO}
LEUKOCYTE ESTERASE UR QL STRIP.AUTO: NEGATIVE
LYMPHOCYTES # BLD AUTO: 1.11 10*3/MM3 (ref 0.7–3.1)
LYMPHOCYTES # BLD AUTO: 1.43 10*3/MM3 (ref 0.7–3.1)
LYMPHOCYTES NFR BLD AUTO: 20.3 % (ref 19.6–45.3)
LYMPHOCYTES NFR BLD AUTO: 23 % (ref 19.6–45.3)
MAGNESIUM SERPL-MCNC: 2.1 MG/DL (ref 1.6–2.4)
MAGNESIUM SERPL-MCNC: 2.3 MG/DL (ref 1.6–2.4)
MCH RBC QN AUTO: 29.9 PG (ref 26.6–33)
MCH RBC QN AUTO: 30 PG (ref 26.6–33)
MCHC RBC AUTO-ENTMCNC: 32.8 G/DL (ref 31.5–35.7)
MCHC RBC AUTO-ENTMCNC: 33.2 G/DL (ref 31.5–35.7)
MCV RBC AUTO: 90.6 FL (ref 79–97)
MCV RBC AUTO: 91.3 FL (ref 79–97)
MONOCYTES # BLD AUTO: 0.34 10*3/MM3 (ref 0.1–0.9)
MONOCYTES # BLD AUTO: 0.55 10*3/MM3 (ref 0.1–0.9)
MONOCYTES NFR BLD AUTO: 6.2 % (ref 5–12)
MONOCYTES NFR BLD AUTO: 8.8 % (ref 5–12)
NEUTROPHILS NFR BLD AUTO: 3.85 10*3/MM3 (ref 1.7–7)
NEUTROPHILS NFR BLD AUTO: 4.02 10*3/MM3 (ref 1.7–7)
NEUTROPHILS NFR BLD AUTO: 64.6 % (ref 42.7–76)
NEUTROPHILS NFR BLD AUTO: 70.2 % (ref 42.7–76)
NITRITE UR QL STRIP: NEGATIVE
NRBC BLD AUTO-RTO: 0 /100 WBC (ref 0–0.2)
NRBC BLD AUTO-RTO: 0 /100 WBC (ref 0–0.2)
PH UR STRIP.AUTO: 6 [PH] (ref 5–8)
PHOSPHATE SERPL-MCNC: 4 MG/DL (ref 2.5–4.5)
PLATELET # BLD AUTO: 191 10*3/MM3 (ref 140–450)
PLATELET # BLD AUTO: 209 10*3/MM3 (ref 140–450)
PMV BLD AUTO: 9.8 FL (ref 6–12)
PMV BLD AUTO: 9.9 FL (ref 6–12)
POTASSIUM SERPL-SCNC: 4.8 MMOL/L (ref 3.5–5.2)
POTASSIUM SERPL-SCNC: 4.8 MMOL/L (ref 3.5–5.2)
PROT SERPL-MCNC: 6.8 G/DL (ref 6–8.5)
PROT SERPL-MCNC: 7.4 G/DL (ref 6–8.5)
PROT UR QL STRIP: NEGATIVE
PROTHROMBIN TIME: 16.1 SECONDS (ref 11.7–14.2)
PTH-INTACT SERPL-MCNC: 179 PG/ML (ref 15–65)
QT INTERVAL: 471 MS
QTC INTERVAL: 459 MS
RBC # BLD AUTO: 4.01 10*6/MM3 (ref 3.77–5.28)
RBC # BLD AUTO: 4.03 10*6/MM3 (ref 3.77–5.28)
SODIUM SERPL-SCNC: 136 MMOL/L (ref 136–145)
SODIUM SERPL-SCNC: 136 MMOL/L (ref 136–145)
SP GR UR STRIP: 1.01 (ref 1–1.03)
TRIGL SERPL-MCNC: 152 MG/DL (ref 0–150)
TROPONIN T % DELTA: -5 %
TROPONIN T NUMERIC DELTA: -2 NG/L
TROPONIN T SERPL HS-MCNC: 40 NG/L
URATE SERPL-MCNC: 6.9 MG/DL (ref 2.4–5.7)
UROBILINOGEN UR QL STRIP: NORMAL
VLDLC SERPL-MCNC: 26 MG/DL (ref 5–40)
WBC NRBC COR # BLD AUTO: 5.48 10*3/MM3 (ref 3.4–10.8)
WBC NRBC COR # BLD AUTO: 6.23 10*3/MM3 (ref 3.4–10.8)
WHOLE BLOOD HOLD COAG: NORMAL
WHOLE BLOOD HOLD SPECIMEN: NORMAL

## 2025-01-03 PROCEDURE — 93005 ELECTROCARDIOGRAM TRACING: CPT | Performed by: EMERGENCY MEDICINE

## 2025-01-03 PROCEDURE — 36415 COLL VENOUS BLD VENIPUNCTURE: CPT | Performed by: NURSE PRACTITIONER

## 2025-01-03 PROCEDURE — 85730 THROMBOPLASTIN TIME PARTIAL: CPT | Performed by: NURSE PRACTITIONER

## 2025-01-03 PROCEDURE — 81003 URINALYSIS AUTO W/O SCOPE: CPT | Performed by: EMERGENCY MEDICINE

## 2025-01-03 PROCEDURE — 80053 COMPREHEN METABOLIC PANEL: CPT | Performed by: EMERGENCY MEDICINE

## 2025-01-03 PROCEDURE — G0378 HOSPITAL OBSERVATION PER HR: HCPCS

## 2025-01-03 PROCEDURE — 80061 LIPID PANEL: CPT | Performed by: NURSE PRACTITIONER

## 2025-01-03 PROCEDURE — 71045 X-RAY EXAM CHEST 1 VIEW: CPT

## 2025-01-03 PROCEDURE — 84100 ASSAY OF PHOSPHORUS: CPT | Performed by: INTERNAL MEDICINE

## 2025-01-03 PROCEDURE — 36415 COLL VENOUS BLD VENIPUNCTURE: CPT

## 2025-01-03 PROCEDURE — 83036 HEMOGLOBIN GLYCOSYLATED A1C: CPT | Performed by: NURSE PRACTITIONER

## 2025-01-03 PROCEDURE — 85610 PROTHROMBIN TIME: CPT | Performed by: NURSE PRACTITIONER

## 2025-01-03 PROCEDURE — 70551 MRI BRAIN STEM W/O DYE: CPT

## 2025-01-03 PROCEDURE — 80053 COMPREHEN METABOLIC PANEL: CPT | Performed by: INTERNAL MEDICINE

## 2025-01-03 PROCEDURE — 83970 ASSAY OF PARATHORMONE: CPT | Performed by: INTERNAL MEDICINE

## 2025-01-03 PROCEDURE — 84550 ASSAY OF BLOOD/URIC ACID: CPT | Performed by: INTERNAL MEDICINE

## 2025-01-03 PROCEDURE — 84484 ASSAY OF TROPONIN QUANT: CPT | Performed by: EMERGENCY MEDICINE

## 2025-01-03 PROCEDURE — G0463 HOSPITAL OUTPT CLINIC VISIT: HCPCS

## 2025-01-03 PROCEDURE — 83735 ASSAY OF MAGNESIUM: CPT | Performed by: INTERNAL MEDICINE

## 2025-01-03 PROCEDURE — P9612 CATHETERIZE FOR URINE SPEC: HCPCS

## 2025-01-03 PROCEDURE — 82306 VITAMIN D 25 HYDROXY: CPT | Performed by: INTERNAL MEDICINE

## 2025-01-03 PROCEDURE — 85025 COMPLETE CBC W/AUTO DIFF WBC: CPT | Performed by: INTERNAL MEDICINE

## 2025-01-03 PROCEDURE — 85025 COMPLETE CBC W/AUTO DIFF WBC: CPT | Performed by: EMERGENCY MEDICINE

## 2025-01-03 PROCEDURE — 83735 ASSAY OF MAGNESIUM: CPT | Performed by: EMERGENCY MEDICINE

## 2025-01-03 PROCEDURE — 99285 EMERGENCY DEPT VISIT HI MDM: CPT

## 2025-01-03 PROCEDURE — 70450 CT HEAD/BRAIN W/O DYE: CPT

## 2025-01-03 RX ORDER — ONDANSETRON 2 MG/ML
4 INJECTION INTRAMUSCULAR; INTRAVENOUS EVERY 6 HOURS PRN
Status: DISCONTINUED | OUTPATIENT
Start: 2025-01-03 | End: 2025-01-04 | Stop reason: HOSPADM

## 2025-01-03 RX ORDER — ATORVASTATIN CALCIUM 20 MG/1
40 TABLET, FILM COATED ORAL NIGHTLY
Status: DISCONTINUED | OUTPATIENT
Start: 2025-01-03 | End: 2025-01-04 | Stop reason: HOSPADM

## 2025-01-03 RX ORDER — SODIUM CHLORIDE 0.9 % (FLUSH) 0.9 %
10 SYRINGE (ML) INJECTION AS NEEDED
Status: DISCONTINUED | OUTPATIENT
Start: 2025-01-03 | End: 2025-01-04 | Stop reason: HOSPADM

## 2025-01-03 RX ORDER — ACETAMINOPHEN 500 MG
500 TABLET ORAL EVERY 4 HOURS PRN
Status: DISCONTINUED | OUTPATIENT
Start: 2025-01-03 | End: 2025-01-04 | Stop reason: HOSPADM

## 2025-01-03 RX ORDER — POLYETHYLENE GLYCOL 3350 17 G/17G
17 POWDER, FOR SOLUTION ORAL DAILY
Status: DISCONTINUED | OUTPATIENT
Start: 2025-01-03 | End: 2025-01-04 | Stop reason: HOSPADM

## 2025-01-03 RX ORDER — SODIUM CHLORIDE 9 MG/ML
40 INJECTION, SOLUTION INTRAVENOUS AS NEEDED
Status: DISCONTINUED | OUTPATIENT
Start: 2025-01-03 | End: 2025-01-04 | Stop reason: HOSPADM

## 2025-01-03 RX ORDER — ASPIRIN 300 MG/1
300 SUPPOSITORY RECTAL DAILY
Status: DISCONTINUED | OUTPATIENT
Start: 2025-01-04 | End: 2025-01-04

## 2025-01-03 RX ORDER — FUROSEMIDE 20 MG/1
20 TABLET ORAL DAILY PRN
Status: DISCONTINUED | OUTPATIENT
Start: 2025-01-03 | End: 2025-01-04 | Stop reason: HOSPADM

## 2025-01-03 RX ORDER — ATORVASTATIN CALCIUM 20 MG/1
40 TABLET, FILM COATED ORAL NIGHTLY
Status: DISCONTINUED | OUTPATIENT
Start: 2025-01-03 | End: 2025-01-03

## 2025-01-03 RX ORDER — CEFDINIR 300 MG/1
300 CAPSULE ORAL 2 TIMES DAILY
COMMUNITY
Start: 2024-12-24 | End: 2025-01-04 | Stop reason: HOSPADM

## 2025-01-03 RX ORDER — ASPIRIN 325 MG
325 TABLET ORAL DAILY
Status: DISCONTINUED | OUTPATIENT
Start: 2025-01-04 | End: 2025-01-04

## 2025-01-03 RX ORDER — SODIUM CHLORIDE 0.9 % (FLUSH) 0.9 %
10 SYRINGE (ML) INJECTION EVERY 12 HOURS SCHEDULED
Status: DISCONTINUED | OUTPATIENT
Start: 2025-01-03 | End: 2025-01-04 | Stop reason: HOSPADM

## 2025-01-03 RX ORDER — METOPROLOL SUCCINATE 25 MG/1
12.5 TABLET, EXTENDED RELEASE ORAL DAILY
Status: DISCONTINUED | OUTPATIENT
Start: 2025-01-04 | End: 2025-01-04 | Stop reason: HOSPADM

## 2025-01-03 RX ADMIN — Medication 10 ML: at 20:09

## 2025-01-03 RX ADMIN — ATORVASTATIN CALCIUM 40 MG: 20 TABLET, FILM COATED ORAL at 20:03

## 2025-01-03 RX ADMIN — APIXABAN 2.5 MG: 2.5 TABLET, FILM COATED ORAL at 20:03

## 2025-01-03 NOTE — H&P
[facility]   HISTORY AND PHYSICAL    Patient Name: Marleni Hummel  : 1935  MRN: [id number]  Primary Care Physician:  Ken Andujar MD  Date of admission: 1/3/2025    Subjective   Subjective     Chief Complaint:   Chief Complaint   Patient presents with    Hypertension     Confusion due to UTI that is being treated and hypertension at iron infusion today (pt brought from ACU)         HPI:    Marleni Hummel is a pleasant afebrile 89 y.o.  female with a past medical history o atrial fibrillation on eliquis, chronic kidney disease, CVA, and hyperlipidemia.     She presents to the emergency department at [facility] today with complaint of episodic confusion.  She has been admitted to the ED observation unit for further testing and evaluation.    Patient is accompanied by her daughter who advises that patient was started on ciprofloxacin for a urinary tract infection about a week ago, advised to take 10-day course of this medication. (Pharmacy tech advises this may have been cefdinir) Patient's daughter states her mom has had intermittent episodes of confusion over the last 2 days including when she has been walking to the refrigerator and gotten milk out, then unsure what to do with it, similar episodes with the TV remote.  Patient's daughter states that blood pressures yesterday were elevated around the 170s.  She went to the ambulatory care unit today to get an iron infusion was told that her systolic blood pressure was 220.  Patient reports she is taking her blood pressure medications at home as prescribed.    Patient endorses some recent constipation.  She denies any abdominal pain, nausea or vomiting.  Despite taking stool softener she has not had a bowel movement in about 3 days.    Review of Systems   All systems were reviewed and negative except for: What is mentioned above    Personal History     Past Medical History:   Diagnosis Date    Acute bronchitis due to  Rhinovirus 05/31/2022    Acute vulvitis 08/21/2019    Allergic     Allergic rhinitis     Anemia of chronic disease     Arthropathy of knee     right    Atrial fibrillation     Back pain     Bell's palsy     Bradycardia 05/27/2024    Bradycardia, drug induced 05/27/2024    Caregiver stress syndrome 04/17/2019    Chronic coronary artery disease     Chronic kidney disease (CKD), stage III (moderate)     CKD (chronic kidney disease) stage 4, GFR 15-29 ml/min     Diverticulitis 12/14/2022    CARROLL (dyspnea on exertion) 03/17/2023    Edema     Elevated serum free T4 level 03/21/2016    Encounter for eye exam 09/2020    Essential hypertension     Fatigue     GERD (gastroesophageal reflux disease)     H/O Heart murmur     Heart attack 10/05/2015    Hematuria 12/12/2016    Herpes zoster without complication     Hip arthritis     left    History of anemia due to chronic kidney disease     History of bone density study 02/28/2008    History of pelvic fracture 2015    History of pneumonia     History of transfusion     2015    Hypercholesterolemia     IFG (impaired fasting glucose)     Insomnia     Left kidney mass 07/2020    Limited joint range of motion     RIGHT KNEE    Mixed hyperlipidemia     Muscle tension headache 04/03/2019    New daily persistent headache 12/17/2018    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 Clarke    CATARACT EXTRACTION Right 04/16/2013    Dr. Clarke    COLONOSCOPY  04/18/2014    Dr. Elizabeth; no polyps    EPIDURAL BLOCK       HIP PERCUTANEOUS PINNING Left 8/31/2017    Procedure: LT HIP PERCUTANEOUS PINNING;  Surgeon: Sean Canales MD;  Location: [facility] MAIN OR;  Service:     MAMMO BILATERAL  11/2013    NEPHRECTOMY Left 11/16/2020    Procedure: LAPAROSCOPIC NEPHRECTOMY;  Surgeon: Chuckie Mclaughlin MD;  Location: [facility] MAIN OR;  Service: Urology;  Laterality: Left;    PAP SMEAR  02/2011    TIBIA FRACTURE SURGERY Right 2015    REMOVED PLATE LATER IN 2015    TONSILLECTOMY  1947    TOTAL HIP ARTHROPLASTY Right 08/2015    TOTAL HIP ARTHROPLASTY Right 09/2016    TOTAL KNEE ARTHROPLASTY Bilateral 2004       Family History: family history includes Heart disease in her mother; Hypertension in her maternal grandmother, mother, and another family member; Stroke in her mother. Otherwise pertinent FHx was reviewed and not pertinent to current issue.    Social History:  reports that she quit smoking about 68 years ago. Her smoking use included cigarettes. She started smoking about 71 years ago. She has a 3 pack-year smoking history. She has been exposed to tobacco smoke. She has never used smokeless tobacco. She reports that she does not currently use alcohol after a past usage of about 3.0 standard drinks of alcohol per week. She reports that she does not use drugs.    Home Medications:  Riboflavin, Vibegron, Vortioxetine HBr, acetaminophen, apixaban, atorvastatin, cefdinir, fexofenadine, furosemide, melatonin, and metoprolol succinate XL    Allergies:  Allergies   Allergen Reactions    Medrol [Methylprednisolone] Other (See Comments)     Irritability    Morphine Anaphylaxis    Oxycodone Anaphylaxis and Unknown - Low Severity    Ace Inhibitors Cough and Unknown (See Comments)    Bactrim [Sulfamethoxazole-Trimethoprim] Rash    Levofloxacin Itching and Rash     insomnia  insomnia  insomnia    Torsemide Dizziness       Objective   Objective     Vitals:   Temp:  [97.2 °F (36.2 °C)] 97.2 °F (36.2 °C)  Heart Rate:  [51-74] 51  Resp:  [16-18]  16  BP: (147-238)/() 147/69  Physical Exam    Constitutional: Awake, alert   Eyes: PERRLA, sclerae anicteric, no conjunctival injection   HENT: NCAT, mucous membranes moist   Neck: Supple, no thyromegaly, no lymphadenopathy, trachea midline   Respiratory: Clear to auscultation bilaterally, nonlabored respirations    Cardiovascular: RRR, no murmurs, rubs, or gallops, palpable pedal pulses bilaterally   Gastrointestinal: Positive bowel sounds, soft, nontender, nondistended   Musculoskeletal: No bilateral ankle edema, no clubbing or cyanosis to extremities   Psychiatric: Appropriate affect, cooperative   Neurologic: Oriented x 3, strength symmetric in all extremities, Cranial Nerves grossly intact to confrontation, speech clear   Skin: No rashes     Result Review    Result Review:  I have personally reviewed the results from the time of this admission to 1/3/2025 17:57 EST and agree with these findings:  [x]  Laboratory list / accordion  []  Microbiology  [x]  Radiology  []  EKG/Telemetry   []  Cardiology/Vascular   []  Pathology  []  Old records  []  Other:  Most notable findings include: Creatinine 2.27, high-sensitivity troponin 38, urinalysis grossly unremarkable, CT head shows old infarct but no acute intracranial findings, chest x-ray negative for acute pulmonary infiltrates      Assessment & Plan   Assessment / Plan     Brief Patient Summary:  Marleni Hummel is a 89 y.o. female who being evaluated for episodes of confusion with noted hypertension at home as well as today at ambulatory care unit.  Certainly this could be reflective of posterior reversible encephalopathy versus TIA versus medication reaction for ciprofloxacin.  Will plan to hold patient Cipro, obtain MRI of her brain, consult neurology and perform neurochecks every 4 hours.    Active Hospital Problems:  Active Hospital Problems    Diagnosis     **Confusion      Plan:     Confusion  TIA workup  CT head negative for acute intracranial  findings  Stop antibiotic for UTI as UA is clear  Neurochecks every 4 hours  Consult to neurology  MRI brain pending  Aspirin/statin  Lipid panel/A1c  Echo reviewed 10/10/2024 EF 41 to 45%, negative saline test    Hypertension  Vital signs every 4 hours  Continue home medications    Constipation  MiraLAX 17 g daily  Consider enema if no improvement    Chronic kidney disease  Creatinine 2.27, appears at baseline  Trend with a.m. labs      VTE Prophylaxis:  Pharmacologic VTE prophylaxis orders are signed & held. Mechanical VTE prophylaxis orders are present.        CODE STATUS:    Medical Intervention Limits: No intubation (DNI)  Level Of Support Discussed With: Patient  Code Status (Patient has no pulse and is not breathing): No CPR (Do Not Attempt to Resuscitate)  Medical Interventions (Patient has pulse or is breathing): Limited Support    Admission Status:  I believe this patient meets observation status.    Electronically signed by ENRRIQUE Ceron, 01/03/25, 5:04 PM EST.        75 minutes has been spent by [facility] providers in the care of this patient while under observation status      I have worn appropriate PPE during this patient encounter, sanitized my hands both with entering and exiting patient's room.    I have discussed plan of care with patient including advance care plan and/or surrogate decision maker.  Patient advises that their son Sharan will be their primary surrogate decision maker

## 2025-01-03 NOTE — PROGRESS NOTES
"Patient presents today for procrit and Hand H. Family and patient report increasing confusion and have orders from Dr. Leonard for further labs. Patient family reports that patient is finishing treatment with cefdinir for UTI. Rechecked blood pressure manually (190/84). While getting patient up for urine sample for labs, patient complaining \" I don't feel good, something isn't right\". Patient clarified that vision is blurry and having pressure in eyes and face.  "

## 2025-01-03 NOTE — ED NOTES
Nursing report ED to floor  Marleni Hummel  89 y.o.  female    HPI :   Chief Complaint   Patient presents with    Hypertension     Confusion due to UTI that is being treated and hypertension at iron infusion today (pt brought from ACU)       Admitting doctor:   Kevyn Chavez MD    Admitting diagnosis:   The primary encounter diagnosis was Altered mental status, unspecified altered mental status type. Diagnoses of Hypertension, unspecified type and Chronic renal failure, unspecified CKD stage were also pertinent to this visit.    Code status:   Current Code Status       Date Active Code Status Order ID Comments User Context       1/3/2025 1717 No CPR (Do Not Attempt to Resuscitate) [id number]  Odalis Meehan, ENR[facility] ED        Question Answer    Code Status (Patient has no pulse and is not breathing) No CPR (Do Not Attempt to Resuscitate)    Medical Interventions (Patient has pulse or is breathing) Limited Support    Medical Intervention Limits: No intubation (DNI)    Level Of Support Discussed With Patient                    Allergies:   Medrol [methylprednisolone], Morphine, Oxycodone, Ace inhibitors, Bactrim [sulfamethoxazole-trimethoprim], Levofloxacin, and Torsemide    Intake and Output  No intake or output data in the 24 hours ending 01/03/25 1733    Weight:   There were no vitals filed for this visit.    Most recent vitals:   Vitals:    01/03/25 1531 01/03/25 1601 01/03/25 1631 01/03/25 1701   BP: 152/59 172/76 163/76 147/69   BP Location:       Patient Position:       Pulse: 55 61 54 51   Resp:       Temp:       TempSrc:       SpO2: 98% 98% 96% 98%       Active LDAs/IV Access:   Lines, Drains & Airways       Active LDAs       Name Placement date Placement time Site Days    Peripheral IV 10/27/24 1511 Left Antecubital 10/27/24  1511  Antecubital  68                    Labs (abnormal labs have a star):   Labs Reviewed   TROPONIN - Abnormal; Notable for the following components:       Result Value    HS Troponin T  40 (*)     All other components within normal limits    Narrative:     High Sensitive Troponin T Reference Range:  <14.0 ng/L- Negative Female for AMI  <22.0 ng/L- Negative Male for AMI  >=14 - Abnormal Female indicating possible myocardial injury.  >=22 - Abnormal Male indicating possible myocardial injury.   Clinicians would have to utilize clinical acumen, EKG, Troponin, and serial changes to determine if it is an Acute Myocardial Infarction or myocardial injury due to an underlying chronic condition.        HIGH SENSITIVITIY TROPONIN T 1HR - Abnormal; Notable for the following components:    HS Troponin T 38 (*)     All other components within normal limits    Narrative:     High Sensitive Troponin T Reference Range:  <14.0 ng/L- Negative Female for AMI  <22.0 ng/L- Negative Male for AMI  >=14 - Abnormal Female indicating possible myocardial injury.  >=22 - Abnormal Male indicating possible myocardial injury.   Clinicians would have to utilize clinical acumen, EKG, Troponin, and serial changes to determine if it is an Acute Myocardial Infarction or myocardial injury due to an underlying chronic condition.        COMPREHENSIVE METABOLIC PANEL - Abnormal; Notable for the following components:    BUN 37 (*)     Creatinine 2.27 (*)     CO2 19.6 (*)     eGFR 20.4 (*)     All other components within normal limits    Narrative:     GFR Categories in Chronic Kidney Disease (CKD)      GFR Category          GFR (mL/min/1.73)    Interpretation  G1                     90 or greater         Normal or high (1)  G2                      60-89                Mild decrease (1)  G3a                   45-59                Mild to moderate decrease  G3b                   30-44                Moderate to severe decrease  G4                    15-29                Severe decrease  G5                    14 or less           Kidney failure          (1)In the absence of evidence of kidney disease, neither GFR category G1 or G2 fulfill  the criteria for CKD.    eGFR calculation 2021 CKD-EPI creatinine equation, which does not include race as a factor   URINALYSIS W/ CULTURE IF INDICATED - Normal    Narrative:     In absence of clinical symptoms, the presence of pyuria, bacteria, and/or nitrites on the urinalysis result does not correlate with infection.  Urine microscopic not indicated.   MAGNESIUM - Normal   CBC WITH AUTO DIFFERENTIAL - Normal   RAINBOW DRAW    Narrative:     The following orders were created for panel order [place] Draw.  Procedure                               Abnormality         Status                     ---------                               -----------         ------                     Green Top (Gel)[[id number]]                                  Final result               Lavender Top[[id number]]                                     Final result               Gray Top[[id number]]                                         Final result               Light Blue Top[[id number]]                                   Final result                 Please view results for these tests on the individual orders.   HEMOGLOBIN A1C   LIPID PANEL   APTT   PROTIME-INR   POCT GLUCOSE FINGERSTICK   POCT GLUCOSE FINGERSTICK   POCT GLUCOSE FINGERSTICK   POCT GLUCOSE FINGERSTICK   GREEN TOP   LAVENDER TOP   GRAY TOP   LIGHT BLUE TOP   CBC AND DIFFERENTIAL    Narrative:     The following orders were created for panel order CBC & Differential.  Procedure                               Abnormality         Status                     ---------                               -----------         ------                     CBC Auto Differential[[id number]]        Normal              Final result                 Please view results for these tests on the individual orders.       EKG:   ECG 12 Lead Altered Mental Status   Preliminary Result   HEART RATE=57  bpm   RR Wzcglebm=3922  ms   NC Interval=  ms   P Horizontal Axis=  deg   P Front Axis=  deg   QRSD Interval=91   ms   QT Tzgiiphn=522  ms   TSeD=273  ms   QRS Axis=-60  deg   T Wave Axis=13  deg   - ABNORMAL ECG -   Atrial fibrillation   Inferior infarct, old   Anterior infarct, old   Date and Time of Study:2025 13:55:59          Meds given in ED:   Medications   sodium chloride 0.9 % flush 10 mL (has no administration in time range)   sodium chloride 0.9 % flush 10 mL (has no administration in time range)   sodium chloride 0.9 % flush 10 mL (has no administration in time range)   sodium chloride 0.9 % infusion 40 mL (has no administration in time range)   atorvastatin (LIPITOR) tablet 40 mg (has no administration in time range)   ondansetron (ZOFRAN) injection 4 mg (has no administration in time range)   aspirin tablet 325 mg (has no administration in time range)     Or   aspirin suppository 300 mg (has no administration in time range)   polyethylene glycol (MIRALAX) packet 17 g (has no administration in time range)       Imaging results:  CT Head Without Contrast    Result Date: 1/3/2025  1. Areas of old ischemia/infarct. 2. No acute intracranial process identified.    Radiation dose reduction techniques were utilized, including automated exposure control and exposure modulation based on body size.       XR Chest 1 View    Result Date: 1/3/2025  1. No acute process   This report was finalized on 1/3/2025 2:00 PM by Dr. Satish Michelle M.D on Workstation: [id number]       Ambulatory status:   - BR    Social issues:   Social History     Socioeconomic History    Marital status:     Years of education: High school   Tobacco Use    Smoking status: Former     Current packs/day: 0.00     Average packs/day: 1 pack/day for 3.0 years (3.0 ttl pk-yrs)     Types: Cigarettes     Start date: 1953     Quit date: 1956     Years since quittin.9     Passive exposure: Past    Smokeless tobacco: Never    Tobacco comments:     caffeine - 1/2 cup coffee daily    Vaping Use    Vaping status: Never Used   Substance  and Sexual Activity    Alcohol use: Not Currently     Alcohol/week: 3.0 standard drinks of alcohol     Types: 3 Glasses of wine per week     Comment: couple times a week    Drug use: Never    Sexual activity: Not Currently       NIH Stroke Scale:        Nursing report ED to floor:

## 2025-01-03 NOTE — ED PROVIDER NOTES
" EMERGENCY DEPARTMENT ENCOUNTER    Room Number:  132/1  Date of encounter:  1/3/2025  PCP: Ken Andujar MD  Historian: Patient, son, other family members on the phone  Relevant information and history provided by sources other than the patient will be included below and in the ED Course.  Review of pertinent past medical records may also be included in record below and ED Course.    HPI:  Chief Complaint: Confusion  A complete HPI/ROS/PMH/PSH/SH/FH are unobtainable due to: Patient is elderly.  She has a hard time describing her symptoms.  She states she does not feel right and she has confusion.  Context: Marleni Hummel is a 89 y.o. female who presents to the ED c/o patient was in the ACU planning to get an iron infusion.  Patient's blood pressure was elevated and she stated that she had confusion and so they referred her to the emergency department.  According to the patient, son and another family member on the phone she has been having \"confusion\" for several days that was starting on Wednesday or New Year's Lorrie on Tuesday night.  Patient states she is confused.  She really has a hard time describing the confusion.  She states that she got wrong stuff out of the refrigerator and did not know what to do with it.  Got a creamer out and did not know what to use it.  She just does not feel right.  She believes that it is from her urinary tract infection or from the antibiotics that she was placed on for a urinary tract infection.  Son and other family member on the phone states that she has been having increased urinary frequency.  Has been not sleeping much.  States that she just not acting her normal self.  She has been able to get up and stand and walk.  There is no report of any fever.  No report of any vomiting or diarrhea.  She started ciprofloxacin on December 26 or December 27 for a urinary tract infection that was diagnosed a few days prior.  Patient denies any pain anywhere.  No headache no chest pain " no abdominal pain.  Patient has a family member that stays with her during the day but then she is home alone at night.        Previous Episodes: Uncertain  Current Symptoms: See above    MEDICAL HISTORY REVIEWED  This is a patient that has a history of congestive heart failure, history of Takotsubo syndrome in the past history of CVA history of renal carcinoma history of hypertension did review her medicine list.      PAST MEDICAL HISTORY  Active Ambulatory Problems     Diagnosis Date Noted    Arthritis involving multiple sites     Essential hypertension     Permanent atrial fibrillation     History of Bell's palsy     CKD (chronic kidney disease) stage 4, GFR 15-29 ml/min     Gastroesophageal reflux disease without esophagitis     Hypercholesterolemia     Insomnia     Localized osteoporosis without current pathological fracture     Panic disorder     Chronic nonseasonal allergic rhinitis due to pollen     Other sleep apnea     History of MI (myocardial infarction) 12/21/2015    Chronic coronary artery disease 01/25/2016    Anemia of chronic disease 09/12/2016    UTI (urinary tract infection) 10/05/2016    Weakness of right leg 03/13/2017    Cardiac murmur 05/04/2017    Senile osteoporosis 06/30/2017    Hyperuricemia 09/07/2017    Grief reaction 09/30/2019    Peripheral vertigo 02/25/2020    Renal cell carcinoma of left kidney 11/22/2020    Renal oncocytoma of left kidney 12/28/2020    History of left nephrectomy 04/19/2021    Cerebrovascular accident (CVA) due to stenosis of small artery 11/29/2021    History of renal cell carcinoma 11/30/2021    Malignant neoplasm of left kidney, except renal pelvis 06/07/2022    Diverticulosis 12/14/2022    Irritable bowel syndrome with constipation 12/14/2022    Chronic diastolic congestive heart failure 03/18/2023    Hallucination, visual 01/17/2024    Hypomagnesemia 05/02/2024    Lumbar disc disease with radiculopathy 12/02/2021    History of cardiomyopathy-Takotsubo's  10/11/2024    Acute thrombotic stroke-right temporal 10/11/2024    Acute on chronic renal insufficiency 10/23/2024    Vertigo as late effect of stroke 10/27/2024     Resolved Ambulatory Problems     Diagnosis Date Noted    Fatigue     Hip arthritis     IFG (impaired fasting glucose)     Rectal bleeding     Vitamin D deficiency     Urinary incontinence     History of right hip replacement 12/21/2015    Closed fracture of neck of right femur with routine healing 12/21/2015    History of right knee joint replacement 12/21/2015    History of left knee replacement 12/21/2015    Stress-induced cardiomyopathy 01/25/2016    Elevated serum free T4 level 03/21/2016    Acute pyelonephritis 10/06/2016    Acute cystitis 12/03/2016    Hematuria 12/12/2016    Sinusitis 01/22/2017    Frequent falls 03/13/2017    Atrial fibrillation with RVR 06/19/2017    ANGY (acute kidney injury) 06/20/2017    Viral gastroenteritis 06/20/2017    Dehydration 06/20/2017    Nausea & vomiting 06/20/2017    Diarrhea 06/20/2017    Closed displaced fracture of left femoral neck 08/30/2017    Bacteriuria 08/30/2017    New daily persistent headache 12/17/2018    Muscle tension headache 04/03/2019    Caregiver stress syndrome 04/17/2019    Acute vulvitis 08/21/2019    Dizziness 02/23/2020    Left facial numbness 02/23/2020    Stroke-like symptoms 02/23/2020    Left kidney mass 08/17/2020    Left renal mass 11/16/2020    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    Acute kidney injury 05/30/2022    Acute bronchitis due to Rhinovirus 05/31/2022    Hyperkalemia 05/31/2022    Diverticulitis 12/14/2022    CARROLL (dyspnea on exertion) 03/17/2023    Bradycardia, drug induced 05/27/2024    Shortness of breath 07/10/2024    Hyponatremia 07/11/2024     Past Medical History:   Diagnosis Date    Allergic     Allergic rhinitis     Arthropathy of knee     Atrial fibrillation     Back pain     Bell's palsy     Bradycardia 05/27/2024    Chronic kidney disease (CKD), stage III (moderate)     Edema      Encounter for eye exam 09/2020    GERD (gastroesophageal reflux disease)     H/O Heart murmur     Heart attack 10/05/2015    Herpes zoster without complication     History of anemia due to chronic kidney disease     History of bone density study 02/28/2008    History of pelvic fracture 2015    History of pneumonia     History of transfusion     Limited joint range of motion     Mixed hyperlipidemia     Osteoarthritis     Osteoporosis     PONV (postoperative nausea and vomiting)     Sleep apnea     Spinal stenosis, lumbar region with neurogenic claudication 12/02/2021    Stress     Stroke 10/10/2024         PAST SURGICAL HISTORY  Past Surgical History:   Procedure Laterality Date    CATARACT EXTRACTION Left 04/01/2013    Dr. Clarke    CATARACT EXTRACTION Right 04/16/2013    Dr. Clarke    COLONOSCOPY  04/18/2014    Dr. Elizabeth; no polyps    EPIDURAL BLOCK      HIP PERCUTANEOUS PINNING Left 8/31/2017    Procedure: LT HIP PERCUTANEOUS PINNING;  Surgeon: Sean Canales MD;  Location: [facility];  Service:     MAMMO BILATERAL  11/2013    NEPHRECTOMY Left 11/16/2020    Procedure: LAPAROSCOPIC NEPHRECTOMY;  Surgeon: Chuckie Mclaughlin MD;  Location: [facility] OR;  Service: Urology;  Laterality: Left;    PAP SMEAR  02/2011    TIBIA FRACTURE SURGERY Right 2015    REMOVED PLATE LATER IN 2015    TONSILLECTOMY  1947    TOTAL HIP ARTHROPLASTY Right 08/2015    TOTAL HIP ARTHROPLASTY Right 09/2016    TOTAL KNEE ARTHROPLASTY Bilateral 2004         FAMILY HISTORY  Family History   Problem Relation Age of Onset    Hypertension Other     Hypertension Mother     Stroke Mother     Heart disease Mother     Hypertension Maternal Grandmother     Malig Hyperthermia Neg Hx          SOCIAL HISTORY  Social History     Socioeconomic History    Marital status:     Years of education: High school   Tobacco Use    Smoking status: Former     Current packs/day: 0.00     Average packs/day: 1 pack/day for 3.0 years (3.0  ttl pk-yrs)     Types: Cigarettes     Start date: 1953     Quit date: 1956     Years since quittin.9     Passive exposure: Past    Smokeless tobacco: Never    Tobacco comments:     caffeine - 1/2 cup coffee daily    Vaping Use    Vaping status: Never Used   Substance and Sexual Activity    Alcohol use: Not Currently     Alcohol/week: 3.0 standard drinks of alcohol     Types: 3 Glasses of wine per week     Comment: couple times a week    Drug use: Never    Sexual activity: Not Currently         ALLERGIES  Medrol [methylprednisolone], Morphine, Oxycodone, Ace inhibitors, Bactrim [sulfamethoxazole-trimethoprim], Levofloxacin, and Torsemide        REVIEW OF SYSTEMS  Review of Systems     All systems reviewed and negative except for those discussed in HPI.       PHYSICAL EXAM    I have reviewed the triage vital signs and nursing notes.    ED Triage Vitals   Temp Heart Rate Resp BP SpO2   25 1152 25 1152 25 1152 25 1134 25 1152   97.2 °F (36.2 °C) 74 16 (!) 205/102 98 %      Temp src Heart Rate Source Patient Position BP Location FiO2 (%)   25 1152 25 1216 25 1134 25 1134 --   Tympanic Monitor Lying Left arm        GENERAL: This is an elderly female.  Appears little anxious.  No acute distress.Vital signs on my initial evaluation have been reviewed.  Vital on my evaluation with a blood pressure 162/76.  Heart rate is in the 60s.  She is afebrile oxygen saturations at 100%.  HENT: nares patent  Head/neck/ face are symmetric without gross deformity, signs of trauma, or swelling  EYES: no scleral icterus, no conjunctival pallor.  Pupils equal round reactive to light extraocular muscles are intact.  NECK: Supple, no meningismus  CV: regular rhythm, regular rate with intact distal pulses.  RESPIRATORY: normal effort and no respiratory distress.  To auscultation bilaterally  ABDOMEN: soft and nontender.  MUSCULOSKELETAL: no deformity.  Intact distal pulses to  upper lower extremities that are equal strong and symmetric.  NEURO: alert and appropriate, moves all extremities, follows commands.  No focal motor or sensory changes.  SKIN: warm, dry    Vital signs and nursing notes reviewed.        LAB RESULTS  Recent Results (from the past 24 hours)   Uric Acid    Collection Time: 01/03/25 11:22 AM    Specimen: Arm, Right; Blood   Result Value Ref Range    Uric Acid 6.9 (H) 2.4 - 5.7 mg/dL   Magnesium    Collection Time: 01/03/25 11:22 AM    Specimen: Arm, Right; Blood   Result Value Ref Range    Magnesium 2.1 1.6 - 2.4 mg/dL   Phosphorus    Collection Time: 01/03/25 11:22 AM    Specimen: Arm, Right; Blood   Result Value Ref Range    Phosphorus 4.0 2.5 - 4.5 mg/dL   Comprehensive Metabolic Panel    Collection Time: 01/03/25 11:22 AM    Specimen: Arm, Right; Blood   Result Value Ref Range    Glucose 113 (H) 65 - 99 mg/dL    BUN 36 (H) 8 - 23 mg/dL    Creatinine 2.43 (H) 0.57 - 1.00 mg/dL    Sodium 136 136 - 145 mmol/L    Potassium 4.8 3.5 - 5.2 mmol/L    Chloride 103 98 - 107 mmol/L    CO2 21.4 (L) 22.0 - 29.0 mmol/L    Calcium 9.0 8.6 - 10.5 mg/dL    Total Protein 7.4 6.0 - 8.5 g/dL    Albumin 4.4 3.5 - 5.2 g/dL    ALT (SGPT) 11 1 - 33 U/L    AST (SGOT) 24 1 - 32 U/L    Alkaline Phosphatase 58 39 - 117 U/L    Total Bilirubin 0.8 0.0 - 1.2 mg/dL    Globulin 3.0 gm/dL    A/G Ratio 1.5 g/dL    BUN/Creatinine Ratio 14.8 7.0 - 25.0    Anion Gap 11.6 5.0 - 15.0 mmol/L    eGFR 18.8 (L) >60.0 mL/min/1.73   PTH, Intact & Calcium    Collection Time: 01/03/25 11:22 AM    Specimen: Arm, Right; Blood   Result Value Ref Range    PTH, Intact 179.0 (H) 15.0 - 65.0 pg/mL    Calcium 9.3 8.6 - 10.5 mg/dL   Vitamin D,25-Hydroxy    Collection Time: 01/03/25 11:22 AM    Specimen: Arm, Right; Blood   Result Value Ref Range    25 Hydroxy, Vitamin D 35.8 30.0 - 100.0 ng/ml   CBC Auto Differential    Collection Time: 01/03/25 11:22 AM    Specimen: Arm, Right; Blood   Result Value Ref Range    WBC 5.48  3.40 - 10.80 10*3/mm3    RBC 4.01 3.77 - 5.28 10*6/mm3    Hemoglobin 12.0 12.0 - 15.9 g/dL    Hematocrit 36.6 34.0 - 46.6 %    MCV 91.3 79.0 - 97.0 fL    MCH 29.9 26.6 - 33.0 pg    MCHC 32.8 31.5 - 35.7 g/dL    RDW 14.5 12.3 - 15.4 %    RDW-SD 48.3 37.0 - 54.0 fl    MPV 9.8 6.0 - 12.0 fL    Platelets 191 140 - 450 10*3/mm3    Neutrophil % 70.2 42.7 - 76.0 %    Lymphocyte % 20.3 19.6 - 45.3 %    Monocyte % 6.2 5.0 - 12.0 %    Eosinophil % 2.7 0.3 - 6.2 %    Basophil % 0.4 0.0 - 1.5 %    Immature Grans % 0.2 0.0 - 0.5 %    Neutrophils, Absolute 3.85 1.70 - 7.00 10*3/mm3    Lymphocytes, Absolute 1.11 0.70 - 3.10 10*3/mm3    Monocytes, Absolute 0.34 0.10 - 0.90 10*3/mm3    Eosinophils, Absolute 0.15 0.00 - 0.40 10*3/mm3    Basophils, Absolute 0.02 0.00 - 0.20 10*3/mm3    Immature Grans, Absolute 0.01 0.00 - 0.05 10*3/mm3    nRBC 0.0 0.0 - 0.2 /100 WBC   Green Top (Gel)    Collection Time: 01/03/25 12:17 PM   Result Value Ref Range    Extra Tube Hold for add-ons.    Lavender Top    Collection Time: 01/03/25 12:17 PM   Result Value Ref Range    Extra Tube hold for add-on    Gray Top    Collection Time: 01/03/25 12:17 PM   Result Value Ref Range    Extra Tube Hold for add-ons.    Light Blue Top    Collection Time: 01/03/25 12:17 PM   Result Value Ref Range    Extra Tube Hold for add-ons.    High Sensitivity Troponin T    Collection Time: 01/03/25 12:17 PM    Specimen: Blood   Result Value Ref Range    HS Troponin T 40 (H) <14 ng/L   Magnesium    Collection Time: 01/03/25 12:17 PM    Specimen: Blood   Result Value Ref Range    Magnesium 2.3 1.6 - 2.4 mg/dL   CBC Auto Differential    Collection Time: 01/03/25 12:17 PM    Specimen: Blood   Result Value Ref Range    WBC 6.23 3.40 - 10.80 10*3/mm3    RBC 4.03 3.77 - 5.28 10*6/mm3    Hemoglobin 12.1 12.0 - 15.9 g/dL    Hematocrit 36.5 34.0 - 46.6 %    MCV 90.6 79.0 - 97.0 fL    MCH 30.0 26.6 - 33.0 pg    MCHC 33.2 31.5 - 35.7 g/dL    RDW 14.3 12.3 - 15.4 %    RDW-SD 46.7 37.0 -  54.0 fl    MPV 9.9 6.0 - 12.0 fL    Platelets 209 140 - 450 10*3/mm3    Neutrophil % 64.6 42.7 - 76.0 %    Lymphocyte % 23.0 19.6 - 45.3 %    Monocyte % 8.8 5.0 - 12.0 %    Eosinophil % 3.0 0.3 - 6.2 %    Basophil % 0.3 0.0 - 1.5 %    Immature Grans % 0.3 0.0 - 0.5 %    Neutrophils, Absolute 4.02 1.70 - 7.00 10*3/mm3    Lymphocytes, Absolute 1.43 0.70 - 3.10 10*3/mm3    Monocytes, Absolute 0.55 0.10 - 0.90 10*3/mm3    Eosinophils, Absolute 0.19 0.00 - 0.40 10*3/mm3    Basophils, Absolute 0.02 0.00 - 0.20 10*3/mm3    Immature Grans, Absolute 0.02 0.00 - 0.05 10*3/mm3    nRBC 0.0 0.0 - 0.2 /100 WBC   ECG 12 Lead Altered Mental Status    Collection Time: 01/03/25  1:55 PM   Result Value Ref Range    QT Interval 471 ms    QTC Interval 459 ms   High Sensitivity Troponin T 1Hr    Collection Time: 01/03/25  2:17 PM    Specimen: Blood   Result Value Ref Range    HS Troponin T 38 (H) <14 ng/L    Troponin T Numeric Delta -2 ng/L    Troponin T % Delta -5 Abnormal if >/= 20% %   Comprehensive Metabolic Panel    Collection Time: 01/03/25  2:17 PM    Specimen: Blood   Result Value Ref Range    Glucose 99 65 - 99 mg/dL    BUN 37 (H) 8 - 23 mg/dL    Creatinine 2.27 (H) 0.57 - 1.00 mg/dL    Sodium 136 136 - 145 mmol/L    Potassium 4.8 3.5 - 5.2 mmol/L    Chloride 103 98 - 107 mmol/L    CO2 19.6 (L) 22.0 - 29.0 mmol/L    Calcium 8.9 8.6 - 10.5 mg/dL    Total Protein 6.8 6.0 - 8.5 g/dL    Albumin 4.2 3.5 - 5.2 g/dL    ALT (SGPT) 9 1 - 33 U/L    AST (SGOT) 22 1 - 32 U/L    Alkaline Phosphatase 52 39 - 117 U/L    Total Bilirubin 0.7 0.0 - 1.2 mg/dL    Globulin 2.6 gm/dL    A/G Ratio 1.6 g/dL    BUN/Creatinine Ratio 16.3 7.0 - 25.0    Anion Gap 13.4 5.0 - 15.0 mmol/L    eGFR 20.4 (L) >60.0 mL/min/1.73   Lipid Panel    Collection Time: 01/03/25  2:17 PM    Specimen: Blood   Result Value Ref Range    Total Cholesterol 152 0 - 200 mg/dL    Triglycerides 152 (H) 0 - 150 mg/dL    HDL Cholesterol 59 40 - 60 mg/dL    LDL Cholesterol  67 0 - 100  mg/dL    VLDL Cholesterol 26 5 - 40 mg/dL    LDL/HDL Ratio 1.06    Urinalysis With Culture If Indicated - Straight Cath    Collection Time: 01/03/25  2:28 PM    Specimen: Straight Cath; Urine   Result Value Ref Range    Color, UA Yellow Yellow, Straw    Appearance, UA Clear Clear    pH, UA 6.0 5.0 - 8.0    Specific Gravity, UA 1.007 1.005 - 1.030    Glucose, UA Negative Negative    Ketones, UA Negative Negative    Bilirubin, UA Negative Negative    Blood, UA Negative Negative    Protein, UA Negative Negative    Leuk Esterase, UA Negative Negative    Nitrite, UA Negative Negative    Urobilinogen, UA 0.2 E.U./dL 0.2 - 1.0 E.U./dL       Ordered the above labs and independently reviewed the results.        RADIOLOGY  CT Head Without Contrast    Result Date: 1/3/2025  CT OF THE BRAIN WITHOUT CONTRAST 01/03/2025  HISTORY: Altered mental status.  Axial images were obtained through the brain without intravenous contrast.  There is moderate diffuse atrophy. Small old lacunar infarctions are seen in the basal ganglia. Old right occipital lobe infarct is seen. This appears relatively acute or subacute on the MRI of 10/28/2024. There is decreased attenuation of the periventricular white matter bilaterally consistent with mild small vessel white matter ischemic disease.  There is no evidence of acute infarction, hemorrhage, midline shift or mass effect.  No bony abnormalities are seen.      1. Areas of old ischemia/infarct. 2. No acute intracranial process identified.    Radiation dose reduction techniques were utilized, including automated exposure control and exposure modulation based on body size.       XR Chest 1 View    Result Date: 1/3/2025  XR CHEST 1 VW-1/3/2025  HISTORY: Altered mental status.  Heart size is at the upper limits of normal. Lungs appear free of acute infiltrates. There is some aortic calcification. Bones and soft tissues are otherwise unremarkable.      1. No acute process   This report was finalized on  1/3/2025 2:00 PM by Dr. Satish Michelle M.D on Workstation: [id number]       I ordered the above noted radiological studies. Reviewed by me and discussed with radiologist.  See dictation for official radiology interpretation.      PROCEDURES    Procedures      MEDICATIONS GIVEN IN ER    Medications   sodium chloride 0.9 % flush 10 mL (has no administration in time range)   sodium chloride 0.9 % flush 10 mL (has no administration in time range)   sodium chloride 0.9 % flush 10 mL (has no administration in time range)   sodium chloride 0.9 % infusion 40 mL (has no administration in time range)   ondansetron (ZOFRAN) injection 4 mg (has no administration in time range)   aspirin tablet 325 mg (has no administration in time range)     Or   aspirin suppository 300 mg (has no administration in time range)   polyethylene glycol (MIRALAX) packet 17 g (has no administration in time range)   apixaban (ELIQUIS) tablet 2.5 mg (has no administration in time range)   furosemide (LASIX) tablet 20 mg (has no administration in time range)   acetaminophen (TYLENOL) tablet 500 mg (has no administration in time range)   melatonin tablet 5 mg (has no administration in time range)   atorvastatin (LIPITOR) tablet 40 mg (has no administration in time range)   metoprolol succinate XL (TOPROL-XL) 24 hr tablet 12.5 mg (has no administration in time range)         All labs have been independently reviewed by me.  All radiology studies have been reviewed by me and I discussed with radiologist dictating the report when indicated below.  All EKG's independently viewed and interpreted by me.  Discussion below represents my analysis of pertinent findings related to patient's condition, differential diagnosis, treatment plan and final disposition.        PROGRESS, DATA ANALYSIS, CONSULTS, AND MEDICAL DECISION MAKING    Differential diagnosis for altered mental status includes:  - vital sign abnormalities such as HTN encephalopathy, hypotension,  hypoxemia, hypercarbia, heat stroke, or hypothermia  - toxic/metabolic pathology such as hypoglycemia, DKA, hypo/hyper-natremia, thyroid storm, myxedema coma, medication side effect (either intentional or accidental)  - infectious etiology  - intracranial pathology such as stroke, seizure, intracranial mass, intracranial hemorrhage  - psychiatric pathology  This potentially could be secondary to the ciprofloxacin.  The symptoms started a couple days after starting.  We Argun to recheck a urinalysis on her going to check a CT of her head and lab work.  Informed patient and other family member the test that we will order.  All questions answered at this time.      ED Course as of 01/03/25 1947 Fri Jan 03, 2025   1257 Patient's repeat blood pressure is 162/76 when I am seeing and evaluating this patient. [MM]   1412 My own independent or potation of the EKG that was done at 1:55 PM reveals a rate of 57 it looks as if it is atrial fibrillation narrow complex left axis deviation with Q waves in the inferior leads there is nonspecific ST and T wave changes I do not see any definitive acute injury pattern  This looks very similar to an EKG that was done on October 9, 2024. [MM]   1613 HS Troponin T(!): 40 [MM]   1613 HS Troponin T(!): 38 [MM]   1614 In the CT scan of the head report was reviewed there is no acute intracranial process we can see old area of ischemia and infarct.  Please see complete dictated report from radiologist [MM]   1643 Creatinine(!): 2.27  No significant change in renal function. [MM]   1700 I did discuss the case with neurology Dr. Apodaca and he will see the patient in the morning.  I did inform him about this confusion had been going on more than just today with the elevated blood pressure with ACU.  Daughter informed me that she did not know what to do with the TV remote.  Did not know what to do with the milk that she got out of the refrigerator.  In these periods of just blank stare as if  she did not know what to do as if things would pause briefly.  There was no seizure activity reported. [MM]   1701 I did discuss the case with Odalis who is on for the observation unit.  Informed her of the patient presenting symptoms.  She agrees to admit the patient [MM]   1701 .  All questions answered [MM]   1704 Patient Cipro could very well be a contributing factor as well.  She has no UTI at this time. [MM]      ED Course User Index  [MM] Feliciano Blount MD       AS OF 19:47 EST VITALS:    BP - 154/87  HR - 57  TEMP - 98.2 °F (36.8 °C) (Oral)  02 SATS - 98%    SOCIAL DETERMINANTS OF HEALTH THAT IMPACT OR LIMIT CARE (For example..Homelessness,safe discharge, inability to obtain care, follow up, or prescriptions):      DIAGNOSIS  Final diagnoses:   Altered mental status, unspecified altered mental status type   Hypertension, unspecified type   Chronic renal failure, unspecified CKD stage         DISPOSITION  Patient admitted to the observation it to a monitored bed.          DICTATED UTILIZING DRAGON DICTATION    Note Disclaimer: At [facility], we believe that sharing information builds trust and better relationships. You are receiving this note because you recently visited [facility]. It is possible you will see health information before a provider has talked with you about it. This kind of information can be easy to misunderstand. To help you fully understand what it means for your health, we urge you to discuss this note with your provider.       Feliciano Blount MD  01/03/25 [id number]

## 2025-01-03 NOTE — PLAN OF CARE
Goal Outcome Evaluation:      Patient is NSR on the monitor and on room air.  Alert and oriented times four.  Standby assist.  Patient BP elevated, physicians are aware.  Bed in lowest position, phone and call light in reach.

## 2025-01-03 NOTE — ED NOTES
Pt from ACU she was there for an iron infusion   Pt reported the staff said her bp was really high  She was dx with a UTI on 12/23 and started antibiotics on 12/26, she has 3 days left of antibiotics    Pt c/o high BP, and feeling confused pt denies pain, headache, SOB, or weakness.  PT is Aox4     Son is at bedside   Pt is compliant with her medications     Pt is on eliquis for a stroke 1 month ago, and has had a heart attack in 2017

## 2025-01-04 ENCOUNTER — READMISSION MANAGEMENT (OUTPATIENT)
Dept: CALL CENTER | Facility: HOSPITAL | Age: 89
End: 2025-01-04
Payer: MEDICARE

## 2025-01-04 VITALS
RESPIRATION RATE: 16 BRPM | BODY MASS INDEX: 25.13 KG/M2 | SYSTOLIC BLOOD PRESSURE: 152 MMHG | TEMPERATURE: 97.5 F | HEIGHT: 60 IN | OXYGEN SATURATION: 99 % | DIASTOLIC BLOOD PRESSURE: 75 MMHG | HEART RATE: 68 BPM | WEIGHT: 128 LBS

## 2025-01-04 LAB
ALBUMIN SERPL-MCNC: 3.5 G/DL (ref 3.5–5.2)
ALBUMIN/GLOB SERPL: 1.3 G/DL
ALP SERPL-CCNC: 46 U/L (ref 39–117)
ALT SERPL W P-5'-P-CCNC: 9 U/L (ref 1–33)
ANION GAP SERPL CALCULATED.3IONS-SCNC: 9.4 MMOL/L (ref 5–15)
AST SERPL-CCNC: 21 U/L (ref 1–32)
BILIRUB SERPL-MCNC: 0.6 MG/DL (ref 0–1.2)
BUN SERPL-MCNC: 32 MG/DL (ref 8–23)
BUN/CREAT SERPL: 14 (ref 7–25)
CALCIUM SPEC-SCNC: 8.5 MG/DL (ref 8.6–10.5)
CHLORIDE SERPL-SCNC: 106 MMOL/L (ref 98–107)
CO2 SERPL-SCNC: 18.6 MMOL/L (ref 22–29)
CREAT SERPL-MCNC: 2.29 MG/DL (ref 0.57–1)
DEPRECATED RDW RBC AUTO: 49.2 FL (ref 37–54)
EGFRCR SERPLBLD CKD-EPI 2021: 20.2 ML/MIN/1.73
ERYTHROCYTE [DISTWIDTH] IN BLOOD BY AUTOMATED COUNT: 14.5 % (ref 12.3–15.4)
GLOBULIN UR ELPH-MCNC: 2.7 GM/DL
GLUCOSE BLDC GLUCOMTR-MCNC: 83 MG/DL (ref 70–130)
GLUCOSE BLDC GLUCOMTR-MCNC: 90 MG/DL (ref 70–130)
GLUCOSE SERPL-MCNC: 140 MG/DL (ref 65–99)
HCT VFR BLD AUTO: 33.4 % (ref 34–46.6)
HGB BLD-MCNC: 10.3 G/DL (ref 12–15.9)
MCH RBC QN AUTO: 28.5 PG (ref 26.6–33)
MCHC RBC AUTO-ENTMCNC: 30.8 G/DL (ref 31.5–35.7)
MCV RBC AUTO: 92.5 FL (ref 79–97)
PLATELET # BLD AUTO: 169 10*3/MM3 (ref 140–450)
PMV BLD AUTO: 10 FL (ref 6–12)
POTASSIUM SERPL-SCNC: 4.7 MMOL/L (ref 3.5–5.2)
PROT SERPL-MCNC: 6.2 G/DL (ref 6–8.5)
RBC # BLD AUTO: 3.61 10*6/MM3 (ref 3.77–5.28)
SODIUM SERPL-SCNC: 134 MMOL/L (ref 136–145)
WBC NRBC COR # BLD AUTO: 6.06 10*3/MM3 (ref 3.4–10.8)

## 2025-01-04 PROCEDURE — 82948 REAGENT STRIP/BLOOD GLUCOSE: CPT

## 2025-01-04 PROCEDURE — G0378 HOSPITAL OBSERVATION PER HR: HCPCS

## 2025-01-04 PROCEDURE — 99214 OFFICE O/P EST MOD 30 MIN: CPT | Performed by: STUDENT IN AN ORGANIZED HEALTH CARE EDUCATION/TRAINING PROGRAM

## 2025-01-04 PROCEDURE — 85027 COMPLETE CBC AUTOMATED: CPT | Performed by: NURSE PRACTITIONER

## 2025-01-04 PROCEDURE — 80053 COMPREHEN METABOLIC PANEL: CPT | Performed by: NURSE PRACTITIONER

## 2025-01-04 PROCEDURE — 97161 PT EVAL LOW COMPLEX 20 MIN: CPT

## 2025-01-04 RX ORDER — ASPIRIN 81 MG/1
81 TABLET ORAL DAILY
Status: DISCONTINUED | OUTPATIENT
Start: 2025-01-05 | End: 2025-01-04 | Stop reason: HOSPADM

## 2025-01-04 RX ORDER — ASPIRIN 81 MG/1
81 TABLET ORAL DAILY
Qty: 30 TABLET | Refills: 0 | Status: SHIPPED | OUTPATIENT
Start: 2025-01-05

## 2025-01-04 RX ADMIN — POLYETHYLENE GLYCOL 3350 17 G: 17 POWDER, FOR SOLUTION ORAL at 08:18

## 2025-01-04 RX ADMIN — APIXABAN 2.5 MG: 2.5 TABLET, FILM COATED ORAL at 08:18

## 2025-01-04 RX ADMIN — ASPIRIN 325 MG ORAL TABLET 325 MG: 325 PILL ORAL at 08:18

## 2025-01-04 RX ADMIN — Medication 10 ML: at 08:18

## 2025-01-04 RX ADMIN — METOPROLOL SUCCINATE 12.5 MG: 25 TABLET, EXTENDED RELEASE ORAL at 08:18

## 2025-01-04 NOTE — CONSULTS
Neurology Consult Note    Consult Date: 1/4/2025    Referring MD: Kevyn Chavez MD    Reason for Consult I have been asked to see the patient in neurological consultation to render advice and opinion regarding altered mental status.    Marleni Hummel is a 89 y.o. right-handed white female with known diagnosis of stage IV CKD, atrial fibrillation on renal adjusted Eliquis, hypertension, mixed hyperlipidemia, recent right PCA acute ischemic stroke in October 2024 with poststroke headache, right P3 occlusion, obstructive sleep apnea presented to the hospital with increased confusion.  Daughter reported that patient developed urinary tract infection around [place] treated with cefdinir then for 5 days later she developed worsening confusion and pressure behind the eyes.  On this admission, she had an MRI that I personally reviewed that showed a new acute ischemic stroke on the right thalamus on the territory of her right PCA.  Patient states that she might been dehydrated with her recent urinary tract infection as she was not drinking enough water.  Currently her confusion improved on my assessment and she is back to baseline.    Past Medical History:   Diagnosis Date    Acute bronchitis due to Rhinovirus 05/31/2022    Acute vulvitis 08/21/2019    Allergic     Allergic rhinitis     Anemia of chronic disease     Arthropathy of knee     right    Atrial fibrillation     Back pain     Bell's palsy     Bradycardia 05/27/2024    Bradycardia, drug induced 05/27/2024    Caregiver stress syndrome 04/17/2019    Chronic coronary artery disease     Chronic kidney disease (CKD), stage III (moderate)     CKD (chronic kidney disease) stage 4, GFR 15-29 ml/min     Diverticulitis 12/14/2022    CARROLL (dyspnea on exertion) 03/17/2023    Edema     Elevated serum free T4 level 03/21/2016    Encounter for eye exam 09/2020    Essential hypertension     Fatigue     GERD (gastroesophageal reflux disease)     H/O Heart murmur     Heart  "attack 10/05/2015    Hematuria 12/12/2016    Herpes zoster without complication     Hip arthritis     left    History of anemia due to chronic kidney disease     History of bone density study 02/28/2008    History of pelvic fracture 2015    History of pneumonia     History of transfusion     2015    Hypercholesterolemia     IFG (impaired fasting glucose)     Insomnia     Left kidney mass 07/2020    Limited joint range of motion     RIGHT KNEE    Mixed hyperlipidemia     Muscle tension headache 04/03/2019    New daily persistent headache 12/17/2018    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    Osteoarthritis     Right hip    Osteoporosis     Panic disorder     Peripheral vertigo     PONV (postoperative nausea and vomiting)     Rectal bleeding     HISTORY OF    Sleep apnea     DOES NOT USE C-PAP    Spinal stenosis, lumbar region with neurogenic claudication 12/02/2021    Stress     Stress-induced cardiomyopathy     Stroke 10/10/2024    Urinary incontinence     Vitamin D deficiency        Exam  /70 (BP Location: Right arm, Patient Position: Lying)   Pulse 53   Temp 97.9 °F (36.6 °C) (Oral)   Resp 16   Ht 152.4 cm (60\")   Wt 58.1 kg (128 lb)   SpO2 99%   BMI 25.00 kg/m²   Gen: NAD, vitals reviewed  MS: oriented x3, recent/remote memory intact, normal attention/concentration, language intact, no neglect.  CN: Hard of hearing, visual acuity grossly normal, PERRL, EOMI, no facial droop, no dysarthria  Motor: 5/5 throughout upper and lower extremities, normal tone  Sensory exam: Normal to light touch throughout  Coordination: Normal finger-nose-finger bilaterally    NIH Stroke Scale :    (0_) 1a. Level of consciousness (LOC): 0=alert;1=arousable by minor stimulation;2=obtunded or needs strong stimulation to attend;3=unresponsive or reflex responses only  (0_) 1b. LOC Questions: 0=answers both;1=answers one;2=answers neither  (0_) 1c. LOC Commands: 0=performs both tasks;1=performs one task;2=performs neither task  (0_) 2. Best " Gaze: 0=normal;1=partial gaze palsy;2=forced deviation or total gaze paresis  (0_) 3. Visual: 0=normal;1=partial hemianopia;2=complete hemianopia;3=blind  (0_) 4. Facial palsy: 0=normal;1=minor paresis;2=partial paralysis;3=complete paralysis  FOR 5 AND 6 BELOW: 0=normal;1=drifts but maintains in air;2=unable to maintain in air;3=moves but unable to lift against gravity;4=no movement  (0_)0 (_)1 (_)2 (_)3 (_)4 (_)NA 5a. Motor arm-left  (0_)0 (_)1 (_)2 (_)3 (_)4 (_)NA 5b. Motor arm-right  (0_)0 (_)1 (_)2 (_)3 (_)4 (_)NA 6a. Motor leg-left  (0_)0 (_)1 (_)2 (_)3 (_)4 (_)NA 6ba. Motor leg-right  (0_) 7. Limb ataxia:0=absent;1=unilateral;2=bilateral; NA=unable to test  (0_) 8. Sensory: 0=normal;1=mild-moderate loss;2-severe or total loss  (0_) 9. Best language: 0=normal;1=mild-moderate aphasia, some deficits apparent but able to communicate;2=severe aphasia, fragmentary expression only, unable to communicate well;3=global aphasia, mute and no comprehension  (0_)10. Dysarthria: 0=normal;1=mild-moderate, slurs some words;2=severe, speech mostly unintelligible; NA=unable to test (e.g.,intubation)  (0_)11. Extinction/Inattention: 0=normal;1=visual,tactile,auditory or other extinction to bilateral simultaneous stimulation, but no severe neglect;2=answers neither      NIH Score: Complete  0    DATA:    Lab Results   Component Value Date    GLUCOSE 140 (H) 01/04/2025    CALCIUM 8.5 (L) 01/04/2025     (L) 01/04/2025    K 4.7 01/04/2025    CO2 18.6 (L) 01/04/2025     01/04/2025    BUN 32 (H) 01/04/2025    CREATININE 2.29 (H) 01/04/2025    EGFRIFAFRI 25 (L) 02/23/2022    EGFRIFNONA 21 (L) 02/23/2022    BCR 14.0 01/04/2025    ANIONGAP 9.4 01/04/2025     Lab Results   Component Value Date    WBC 6.06 01/04/2025    HGB 10.3 (L) 01/04/2025    HCT 33.4 (L) 01/04/2025    MCV 92.5 01/04/2025     01/04/2025       Lab review: A1c 5.0, LDL 67, B12 618, TSH 0.97    Imaging review: I personally reviewed the MRI brain which  "showed a new acute ischemic stroke on the right thalamus.    Diagnoses:  -Right thalamic acute ischemic stroke etiology large vessel atheroembolic aggravated by dehydration  -Right PCA P3 occlusion  -Essential hypertension  -Mixed hyperlipidemia  -Atrial fibrillation on Eliquis  -Stage IV CKD    Pre-stroke MRS:   NIHSS: 0      PLAN:   -Continue Eliquis 2.5 mg twice daily  -Continue Lipitor 40 mg daily  -Add aspirin 81 mg daily  -Monitor blood pressure twice daily for the next 10 to 14 days and discuss with PCP to adjust blood pressure medications as needed goal blood pressure less than 140/90.  -Drink plenty amount of water as tolerated and avoid dehydration  -Follow-up with the stroke clinic in 2 to 3 months.  -Discussed with observation unit provider, patient and daughter  -Okay to discharge from stroke standpoint    Medical Decision Making for this neurology consultation consists of the following:  Review of previous chart, including H/P, provider and nursing notes as applicable.  Review of medications and vital signs.  Review of previous labs, neuroimaging, and additional relevant diagnostics.   Interpretation of laboratory, imaging, and other diagnostic results  Discussion with other providers and family   Total face-to-face/floor time: 30-61 minutes.    \"Dictated utilizing Dragon dictation\".     "

## 2025-01-04 NOTE — PROGRESS NOTES
SIOMARA YOUNG Attestation Note    I supervised care provided by the midlevel provider.    The ERIC and I have discussed this patient's history, physical exam, and treatment plan. I have reviewed the documentation and personally had a face to face interaction with the patient  I affirm the documentation and agree with the treatment and plan. I provided a substantive portion of the care of this patient.  I personally performed the physical exam, in its entirety.  My personal findings are documented in below:    History:  Patient with past medical history of atrial fibrillation, CKD, CVA and hyperlipidemia is admitted to observation unit for further management of episodic confusion.  She had a witnessed episode of confusion by ambulatory care staff today and therefore she was transferred to the emergency department for further workup.  Blood pressure was noted to be significantly elevated in the 220s range at that time.  Patient says that she had recently finished a course of antibiotics for UTI.  Had been having a lot of urinary frequency over the holidays.  At this time she is complaining primarily of persistent constipation.  She tells me she has not had a normal bowel movement for several days despite trying some laxatives at home.    Physical Exam:  General: No acute distress.  HENT: NCAT, moist mucous membranes  Eyes: no scleral icterus.  EOMI  Neck: Painless range of motion  CV: Pink warm and well-perfused throughout  Respiratory: No distress or increased work of breathing  Abdomen: soft, no focal tenderness or rigidity, no masses  Musculoskeletal: no deformity.  Neuro: Alert, speech fluent and easily intelligible, no facial droop, no focal motor or sensory deficits observed.  Skin: warm, dry.    Assessment and Plan:  Confusion /TIA: Noncontrast head CT shows areas of old ischemia but no acute findings.  MRI brain is ordered.  Continue neurochecks.  Neurology consulted.    Hypertension: Blood pressure significantly  elevated earlier.  Continuing home medications and monitoring vital signs carefully.    Constipation: MiraLAX daily.  Will consider enema if not improving

## 2025-01-04 NOTE — PLAN OF CARE
Goal Outcome Evaluation:  Plan of Care Reviewed With: patient           Outcome Evaluation: Patient is an 89 y.o female who presented to [facility] with confusion. Patient AOx4 supine in bed upon arrival. Patient lives at home with her daugher, no ELIN, indpendent at baseline with a rwx. Today patient completed all bed mobility mod(I). Family reports patient ambulates household distances at baseline. Patient ambulated 60ft with rwx and SBA today. Gait slow but steady with no overt LOB noted. Patient appears to be mobilizing at her baseline. No further skilled acute PT needs identified. PT will sign off.    Anticipated Discharge Disposition (PT): home with assist

## 2025-01-04 NOTE — DISCHARGE SUMMARY
ED OBSERVATION PROGRESS/DISCHARGE SUMMARY    Date of Admission: 1/3/2025   LOS: 0 days   PCP: Ken Andujar MD    Final Diagnosis CVA      Subjective     Hospital Outcome:     Marleni Hummel is a pleasant afebrile 89 y.o.  female with a past medical history o atrial fibrillation on eliquis, chronic kidney disease, CVA, and hyperlipidemia.      She presents to the emergency department at [facility] today with complaint of episodic confusion.  She has been admitted to the ED observation unit for further testing and evaluation.     Patient is accompanied by her daughter who advises that patient was started on ciprofloxacin for a urinary tract infection about a week ago, advised to take 10-day course of this medication. (Pharmacy tech advises this may have been cefdinir) Patient's daughter states her mom has had intermittent episodes of confusion over the last 2 days including when she has been walking to the refrigerator and gotten milk out, then unsure what to do with it, similar episodes with the TV remote.  Patient's daughter states that blood pressures yesterday were elevated around the 170s.  She went to the ambulatory care unit today to get an iron infusion was told that her systolic blood pressure was 220.  Patient reports she is taking her blood pressure medications at home as prescribed.     Patient endorses some recent constipation.  She denies any abdominal pain, nausea or vomiting.  Despite taking stool softener she has not had a bowel movement in about 3 days.    1/4/2025  Patient without any acute complaints currently.  Patient resting comfortably overnight.  MRI brain reading for acute thalamic ischemic stroke.  Patient does have known right PCA P3 occlusion.  Neurology saw and evaluated the patient and recommends continuing the home Eliquis 2.5 mg twice daily, Lipitor 40 mg daily and is adding aspirin 81 mg daily.  They recommended monitoring blood pressure twice daily for the  next 1 to 2 weeks and further following with PCP for blood pressure management with goal being less than 140/90 but greater than systolic of 100.  Patient encouraged to drink plenty of water and avoid dehydration, patient will need to follow-up with stroke clinic in 2 to 3 months.  No further inpatient testing recommended at this time and patient is okay for discharge home.  All labs and imaging findings discussed with patient and family as well as specialist recommendations and patient is agreeable for discharge home.    ROS:  General: no fevers, chills  Respiratory: no cough, dyspnea  Cardiovascular: no chest pain, palpitations  Abdomen: No abdominal pain, nausea, vomiting, or diarrhea  Neurologic: No focal weakness    Objective   Physical Exam:  I have reviewed the vital signs.  Temp:  [97.2 °F (36.2 °C)-98.2 °F (36.8 °C)] 98.1 °F (36.7 °C)  Heart Rate:  [51-74] 59  Resp:  [16-18] 16  BP: (147-238)/() 170/64  General Appearance:    Alert, cooperative, no distress  Head:    Normocephalic, atraumatic, hard of hearing  Eyes:    Sclerae anicteric, EOMI  Neck:   Supple, nontender  Lungs: Clear to auscultation bilaterally, respirations unlabored on room air  Heart: Regular rate and rhythm, S1 and S2 normal, no murmur  Abdomen:  Soft, nontender, bowel sounds active, nondistended  Extremities: No clubbing, cyanosis, or edema to lower extremities  Pulses:  2+ and symmetric in distal lower extremities  Skin: No rashes   Neurologic: Oriented x3, Normal strength to extremities    Results Review:    I have reviewed the labs, radiology results and diagnostic studies.    Results from last 7 days   Lab Units 01/03/25  1217   WBC 10*3/mm3 6.23   HEMOGLOBIN g/dL 12.1   HEMATOCRIT % 36.5   PLATELETS 10*3/mm3 209     Results from last 7 days   Lab Units 01/03/25  1417 01/03/25  1122   SODIUM mmol/L 136 136   POTASSIUM mmol/L 4.8 4.8   CHLORIDE mmol/L 103 103   CO2 mmol/L 19.6* 21.4*   BUN mg/dL 37* 36*   CREATININE mg/dL 2.27*  2.43*   CALCIUM mg/dL 8.9 9.3  9.0   BILIRUBIN mg/dL 0.7 0.8   ALK PHOS U/L 52 58   ALT (SGPT) U/L 9 11   AST (SGOT) U/L 22 24   GLUCOSE mg/dL 99 113*     Imaging Results (Last 24 Hours)       Procedure Component Value Units Date/Time    MRI Brain Without Contrast [[id number]] Collected: 01/03/25 2240     Updated: 01/03/25 2248    Narrative:      BRAIN MRI WITHOUT CONTRAST     HISTORY: Confusion     COMPARISON: October 28, 2024.     FINDINGS:  Multiplanar images of the head were obtained without  gadolinium. On prior examination, the patient was noted to have an area  of infarction within the right posterior cerebral artery territory.  There is now some encephalomalacia within that area. There does appear  to be some acute infarction involving the posterior right thalamus. This  area measures up to 1 cm. No additional areas of restricted diffusion  are seen. There is atrophy. There is periventricular and deep white  matter microangiopathic change. There is no midline shift. Intracranial  flow voids appear intact. No areas of susceptibility artifact are seen.  There is mucosal thickening noted within the ethmoid sinuses. Mucous  retention cysts noted within the left maxillary sinus. There is some  trace fluid within the mastoid air cells bilaterally.       Impression:      1. Small focus of restricted diffusion identified within the posterior  right thalamus. Encephalomalacia noted within the right PCA territory.        This report was finalized on 1/3/2025 10:45 PM by Dr. Beata Osuna M.D on Workstation: [id number]       CT Head Without Contrast [[id number]] Collected: 01/03/25 1605     Updated: 01/03/25 1605    Narrative:      CT OF THE BRAIN WITHOUT CONTRAST 01/03/2025     HISTORY: Altered mental status.     Axial images were obtained through the brain without intravenous  contrast.     There is moderate diffuse atrophy. Small old lacunar infarctions are  seen in the basal ganglia. Old right occipital lobe  infarct is seen.  This appears relatively acute or subacute on the MRI of 10/28/2024.  There is decreased attenuation of the periventricular white matter  bilaterally consistent with mild small vessel white matter ischemic  disease.     There is no evidence of acute infarction, hemorrhage, midline shift or  mass effect.     No bony abnormalities are seen.       Impression:      1. Areas of old ischemia/infarct.  2. No acute intracranial process identified.           Radiation dose reduction techniques were utilized, including automated  exposure control and exposure modulation based on body size.          XR Chest 1 View [[id number]] Collected: 01/03/25 1359     Updated: 01/03/25 1403    Narrative:      XR CHEST 1 VW-1/3/2025     HISTORY: Altered mental status.     Heart size is at the upper limits of normal. Lungs appear free of acute  infiltrates. There is some aortic calcification. Bones and soft tissues  are otherwise unremarkable.       Impression:      1. No acute process        This report was finalized on 1/3/2025 2:00 PM by Dr. Satish Michelle M.D on Workstation: [id number]               I have reviewed the medications.     Discharge Medications        ASK your doctor about these medications        Instructions Start Date   acetaminophen 500 MG tablet  Commonly known as: TYLENOL   1 tablet, Every 4 Hours PRN      atorvastatin 40 MG tablet  Commonly known as: LIPITOR   40 mg, Oral, Nightly      cefdinir 300 MG capsule  Commonly known as: OMNICEF  Ask about: Should I take this medication?   300 mg, 2 Times Daily      Eliquis 2.5 MG tablet tablet  Generic drug: apixaban   TAKE 1 TABLET EVERY 12 HOURS (TWICE A DAY)      fexofenadine 60 MG tablet  Commonly known as: ALLEGRA   1 tablet, Daily PRN      furosemide 20 MG tablet  Commonly known as: LASIX   20 mg, Oral, Daily PRN      Gemtesa 75 MG tablet  Generic drug: Vibegron   75 mg, Oral, Daily      melatonin 5 MG tablet tablet   1 tablet, Nightly PRN       metoprolol succinate XL 25 MG 24 hr tablet  Commonly known as: TOPROL-XL   12.5 mg, Oral, Daily      VITAMIN B2 PO   Take  by mouth.      Vortioxetine HBr 10 MG tablet tablet  Commonly known as: TRINTELLIX   10 mg, Oral, Daily With Breakfast              ---------------------------------------------------------------------------------------------  Assessment & Plan   Assessment/Problem List    Confusion      Plan:      Confusion  TIA workup  CT head negative for acute intracranial findings  -MRI brain shows small focus of restricted diffusion identified within the posterior right thalamus.  Encephalomalacia noted within the right PCA territory.  -Echo reviewed 10/10/2024 EF 41 to 45%, negative saline test  - Neurology saw and evaluated the patient and recommends continuing the home Eliquis 2.5 mg twice daily, Lipitor 40 mg daily and is adding aspirin 81 mg daily.   -They recommended monitoring blood pressure twice daily for the next 1 to 2 weeks and further following with PCP for blood pressure management with goal being less than 140/90 but greater than systolic of 100.    -Patient encouraged to drink plenty of water and avoid dehydration, patient will need to follow-up with stroke clinic in 2 to 3 months.   - No further inpatient testing recommended at this time and patient is okay for discharge home.     Hypertension  Continue home medications  -Discussed taking blood pressure twice daily and keeping a log to take into primary care for further titration of blood pressure medications     Constipation  MiraLAX 17 g daily  Consider enema if no improvement     Chronic kidney disease  Creatinine 2.27, appears at baseline  Trend with a.m. labs        VTE Prophylaxis:  Pharmacologic VTE prophylaxis orders are signed & held. Mechanical VTE prophylaxis orders are present.    Disposition: Discharge to home    Follow-up after Discharge: PCP in 1 to 2 weeks, neurology in 2 to 3 months    This note will serve as a discharge  summary    Alyssa Law PA-C 01/04/25 07:58 EST    I have worn appropriate PPE during this patient encounter, sanitized my hands both with entering and exiting patient's room.      37 minutes has been spent by [facility] providers in the care of this patient while under observation status

## 2025-01-04 NOTE — THERAPY EVALUATION
Patient Name: Marleni Hummel  : 1935    MRN: [id number]                              Today's Date: 2025       Admit Date: 1/3/2025    Visit Dx:     ICD-10-CM ICD-9-CM   1. Altered mental status, unspecified altered mental status type  R41.82 780.97   2. Hypertension, unspecified type  I10 401.9   3. Chronic renal failure, unspecified CKD stage  N18.9 585.9     Patient Active Problem List   Diagnosis    Arthritis involving multiple sites    Essential hypertension    Permanent atrial fibrillation    History of Bell's palsy    CKD (chronic kidney disease) stage 4, GFR 15-29 ml/min    Gastroesophageal reflux disease without esophagitis    Hypercholesterolemia    Insomnia    Localized osteoporosis without current pathological fracture    Panic disorder    Chronic nonseasonal allergic rhinitis due to pollen    Other sleep apnea    History of MI (myocardial infarction)    Chronic coronary artery disease    Anemia of chronic disease    UTI (urinary tract infection)    Weakness of right leg    Cardiac murmur    Senile osteoporosis    Hyperuricemia    Grief reaction    Peripheral vertigo    Renal cell carcinoma of left kidney    Renal oncocytoma of left kidney    History of left nephrectomy    Cerebrovascular accident (CVA) due to stenosis of small artery    History of renal cell carcinoma    Malignant neoplasm of left kidney, except renal pelvis    Diverticulosis    Irritable bowel syndrome with constipation    Chronic diastolic congestive heart failure    Hallucination, visual    Hypomagnesemia    Lumbar disc disease with radiculopathy    History of cardiomyopathy-Takotsubo's    Acute thrombotic stroke-right temporal    Acute on chronic renal insufficiency    Vertigo as late effect of stroke    Confusion     Past Medical History:   Diagnosis Date    Acute bronchitis due to Rhinovirus 2022    Acute vulvitis 2019    Allergic     Allergic rhinitis     Anemia of chronic disease     Arthropathy of knee      right    Atrial fibrillation     Back pain     Bell's palsy     Bradycardia 05/27/2024    Bradycardia, drug induced 05/27/2024    Caregiver stress syndrome 04/17/2019    Chronic coronary artery disease     Chronic kidney disease (CKD), stage III (moderate)     CKD (chronic kidney disease) stage 4, GFR 15-29 ml/min     Diverticulitis 12/14/2022    CARROLL (dyspnea on exertion) 03/17/2023    Edema     Elevated serum free T4 level 03/21/2016    Encounter for eye exam 09/2020    Essential hypertension     Fatigue     GERD (gastroesophageal reflux disease)     H/O Heart murmur     Heart attack 10/05/2015    Hematuria 12/12/2016    Herpes zoster without complication     Hip arthritis     left    History of anemia due to chronic kidney disease     History of bone density study 02/28/2008    History of pelvic fracture 2015    History of pneumonia     History of transfusion     2015    Hypercholesterolemia     IFG (impaired fasting glucose)     Insomnia     Left kidney mass 07/2020    Limited joint range of motion     RIGHT KNEE    Mixed hyperlipidemia     Muscle tension headache 04/03/2019    New daily persistent headache 12/17/2018    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    Osteoarthritis     Right hip    Osteoporosis     Panic disorder     Peripheral vertigo     PONV (postoperative nausea and vomiting)     Rectal bleeding     HISTORY OF    Sleep apnea     DOES NOT USE C-PAP    Spinal stenosis, lumbar region with neurogenic claudication 12/02/2021    Stress     Stress-induced cardiomyopathy     Stroke 10/10/2024    Urinary incontinence     Vitamin D deficiency      Past Surgical History:   Procedure Laterality Date    CATARACT EXTRACTION Left 04/01/2013    Dr. Clarke    CATARACT EXTRACTION Right 04/16/2013    Dr. Clarke    COLONOSCOPY  04/18/2014    Dr. Elizabeth; no polyps    EPIDURAL BLOCK      HIP PERCUTANEOUS PINNING Left 8/31/2017    Procedure: LT HIP PERCUTANEOUS PINNING;  Surgeon: Sean Canales MD;  Location: [facility] OR;   Service:     MAMMO BILATERAL  11/2013    NEPHRECTOMY Left 11/16/2020    Procedure: LAPAROSCOPIC NEPHRECTOMY;  Surgeon: Chuckie Mclaughlin MD;  Location: [facility];  Service: Urology;  Laterality: Left;    PAP SMEAR  02/2011    TIBIA FRACTURE SURGERY Right 2015    REMOVED PLATE LATER IN 2015    TONSILLECTOMY  1947    TOTAL HIP ARTHROPLASTY Right 08/2015    TOTAL HIP ARTHROPLASTY Right 09/2016    TOTAL KNEE ARTHROPLASTY Bilateral 2004      General Information       Row Name 01/04/25 1553          Physical Therapy Time and Intention    Document Type evaluation  -     Mode of Treatment individual therapy;physical therapy  -       Row Name 01/04/25 1553          General Information    Patient Profile Reviewed yes  -SM     Prior Level of Function independent:  -     Existing Precautions/Restrictions fall  -       Row Name 01/04/25 1553          Living Environment    People in Home child(amy), adult  -       Row Name 01/04/25 1553          Cognition    Orientation Status (Cognition) oriented x 4  -               User Key  (r) = Recorded By, (t) = Taken By, (c) = Cosigned By      Initials Name Provider Type     Lillie Agrawal PT Physical Therapist                   Mobility       Row Name 01/04/25 1554          Bed Mobility    Bed Mobility supine-sit;sit-supine  -     Supine-Sit [place] (Bed Mobility) modified independence  -     Sit-Supine [place] (Bed Mobility) modified independence  -       Row Name 01/04/25 1554          Sit-Stand Transfer    Sit-Stand [place] (Transfers) standby assist  -       Row Name 01/04/25 1554          Gait/Stairs (Locomotion)    [place] Level (Gait) standby assist  -     Assistive Device (Gait) walker, front-wheeled  -     Distance in Feet (Gait) 60  -SM     Deviations/Abnormal Patterns (Gait) gait speed decreased  -     Comment, (Gait/Stairs) Gait slow but steady with no overt LOB noted.  -               User Key  (r) =  Recorded By, (t) = Taken By, (c) = Cosigned By      Initials Name Provider Type     Lillie Agrawal PT Physical Therapist                   Obj/Interventions       Row Name 01/04/25 1554          Range of Motion Comprehensive    General Range of Motion bilateral lower extremity ROM WFL  -       Row Name 01/04/25 1554          Strength Comprehensive (MMT)    General Manual Muscle Testing (MMT) Assessment lower extremity strength deficits identified  -     Comment, General Manual Muscle Testing (MMT) Assessment B LEs WFL  -       Row Name 01/04/25 1555          Balance    Balance Assessment sitting static balance;sitting dynamic balance;standing static balance;standing dynamic balance  -     Static Sitting Balance independent  -     Dynamic Sitting Balance modified independence  -     Position, Sitting Balance sitting edge of bed  -     Static Standing Balance standby assist  -     Dynamic Standing Balance standby assist  -     Position/Device Used, Standing Balance supported;walker, front-wheeled  -     Balance Interventions sitting;standing;sit to stand;supported;static;dynamic  -               User Key  (r) = Recorded By, (t) = Taken By, (c) = Cosigned By      Initials Name Provider Type     Lillie Agrawal PT Physical Therapist                   Goals/Plan    No documentation.                  Clinical Impression       Row Name 01/04/25 1556          Pain    Pretreatment Pain Rating 0/10 - no pain  -SM     Posttreatment Pain Rating 0/10 - no pain  -SM       Row Name 01/04/25 155          Plan of Care Review    Plan of Care Reviewed With patient  -     Outcome Evaluation Patient is an 89 y.o female who presented to [facility] with confusion. Patient AOx4 supine in bed upon arrival. Patient lives at home with her daugher, no ELIN, indpendent at baseline with a rwx. Today patient completed all bed mobility mod(I). Family reports patient ambulates household distances at baseline. Patient  ambulated 60ft with rwx and SBA today. Gait slow but steady with no overt LOB noted. Patient appears to be mobilizing at her baseline. No further skilled acute PT needs identified. PT will sign off.  -[facility] Name 01/04/25 5275          Therapy Assessment/Plan (PT)    Criteria for Skilled Interventions Met (PT) no;no problems identified which require skilled intervention  -     Therapy Frequency (PT) evaluation only  -SM       Row Name 01/04/25 7165          Vital Signs    Pre Patient Position Supine  -     Intra Patient Position Standing  -     Post Patient Position Supine  -SM       Row Name 01/04/25 4325          Positioning and Restraints    Pre-Treatment Position in bed  -SM     Post Treatment Position bed  -SM     In Bed notified nsg;call light within reach;encouraged to call for assist;exit alarm on;fowlers  -               User Key  (r) = Recorded By, (t) = Taken By, (c) = Cosigned By      Initials Name Provider Type    Lillie Castro PT Physical Therapist                   Outcome Measures       [facility] Name 01/04/25 3137          How much help from another person do you currently need...    Turning from your back to your side while in flat bed without using bedrails? 4  -SM     Moving from lying on back to sitting on the side of a flat bed without bedrails? 4  -SM     Moving to and from a bed to a chair (including a wheelchair)? 4  -SM     Standing up from a chair using your arms (e.g., wheelchair, bedside chair)? 4  -SM     Climbing 3-5 steps with a railing? 3  -SM     To walk in hospital room? 3  -     AM-PAC 6 Clicks Score (PT) 22  -     Highest Level of Mobility Goal 7 --> Walk 25 feet or more  -[facility] Name 01/04/25 7537          Functional Assessment    Outcome Measure Options AM-PAC 6 Clicks Basic Mobility (PT)  -               User Key  (r) = Recorded By, (t) = Taken By, (c) = Cosigned By      Initials Name Provider Type    Lillie Castro PT Physical Therapist                                  Physical Therapy Education       Title: PT OT SLP Therapies (Done)       Topic: Physical Therapy (Done)       Point: Mobility training (Done)       Learning Progress Summary            Patient Acceptance, E, VU by  at 1/4/2025 1558    Acceptance, E, VU by NM at 1/4/2025 0411    Acceptance, E,TB, VU by BT at 1/3/2025 1853                      Point: Home exercise program (Done)       Learning Progress Summary            Patient Acceptance, E, VU by  at 1/4/2025 1558    Acceptance, E, VU by NM at 1/4/2025 0411    Acceptance, E,TB, VU by BT at 1/3/2025 1853                      Point: Body mechanics (Done)       Learning Progress Summary            Patient Acceptance, E, VU by  at 1/4/2025 1558    Acceptance, E, VU by NM at 1/4/2025 0411    Acceptance, E,TB, VU by BT at 1/3/2025 1853                      Point: Precautions (Done)       Learning Progress Summary            Patient Acceptance, E, VU by  at 1/4/2025 1558    Acceptance, E, VU by NM at 1/4/2025 0411    Acceptance, E,TB, VU by BT at 1/3/2025 1853                                      User Key       Initials Effective Dates Name Provider Type Discipline     12/30/20 -  Cammie Madrigal, RN Registered Nurse Nurse     05/02/22 -  Lillie Agrawal PT Physical Therapist PT    NM 07/09/24 -  Miguel Angel Romero, RN Registered Nurse Nurse                  PT Recommendation and Plan     Outcome Evaluation: Patient is an 89 y.o female who presented to [facility] with confusion. Patient AOx4 supine in bed upon arrival. Patient lives at home with her daugher, no ELIN, indpendent at baseline with a rwx. Today patient completed all bed mobility mod(I). Family reports patient ambulates household distances at baseline. Patient ambulated 60ft with rwx and SBA today. Gait slow but steady with no overt LOB noted. Patient appears to be mobilizing at her baseline. No further skilled acute PT needs identified. PT will sign off.     Time Calculation:          PT Charges       Row Name 01/04/25 1558             Time Calculation    Start Time 1433  -      Stop Time 1441  -      Time Calculation (min) 8 min  -      PT Received On 01/04/25  -                User Key  (r) = Recorded By, (t) = Taken By, (c) = Cosigned By      Initials Name Provider Type     Lillie Agrawal PT Physical Therapist                  Therapy Charges for Today       Code Description Service Date Service Provider Modifiers Qty    18634853234 HC PT EVAL LOW COMPLEXITY 3 1/4/2025 Lillie Agrawal, DAILY GP 1            PT G-Codes  Outcome Measure Options: AM-PAC 6 Clicks Basic Mobility (PT)  AM-PAC 6 Clicks Score (PT): 22  PT Discharge Summary  Anticipated Discharge Disposition (PT): home with assist    Lillie Agrawal PT  1/4/2025

## 2025-01-04 NOTE — OUTREACH NOTE
Prep Survey      Flowsheet Row Responses   [facility] patient discharged from? [place]   Is LACE score < 7 ? No   Eligibility [facility]   Date of Admission 01/03/25   Date of Discharge 01/04/25   Discharge Disposition Home or Self Care   Discharge diagnosis Confusion  TIA workup   Does the patient have one of the following disease processes/diagnoses(primary or secondary)? Other   Does the patient have Home health ordered? No   Is there a DME ordered? No   Prep survey completed? Yes            MARLINE BRUNO - Registered Nurse

## 2025-01-04 NOTE — PLAN OF CARE
Goal Outcome Evaluation:            Pt admitted to further assess an episode of acute confusion. History of stroke. Patient has been A+Ox4 throughout the night, speech has been clear and logical. Patient has difficulty hearing. NIH of 1 for partial hemiopia. A+Ox4, VSS, A-fib, RA, stand by assist. Neurology consult in the morning.

## 2025-01-06 ENCOUNTER — TRANSITIONAL CARE MANAGEMENT TELEPHONE ENCOUNTER (OUTPATIENT)
Dept: CALL CENTER | Facility: HOSPITAL | Age: 89
End: 2025-01-06
Payer: MEDICARE

## 2025-01-06 NOTE — OUTREACH NOTE
Call Center TCM Note      Flowsheet Row Responses   [facility] patient discharged from? [place]   Does the patient have one of the following disease processes/diagnoses(primary or secondary)? Other   TCM attempt successful? Yes   Call start time 0859   Call end time 0905   Discharge diagnosis Confusion  TIA workup   Is patient permission given to speak with other caregiver? Yes   Person spoke with today (if not patient) and relationship dtr Silvia   Meds reviewed with patient/caregiver? Yes   Is the patient having any side effects they believe may be caused by any medication additions or changes? No   Does the patient have all medications ordered at discharge? Yes   Prescription comments New rx for asa in place   Is the patient taking all medications as directed (includes completed medication regime)? Yes   Comments TCM APPT WITH PCP DR BHARATI KHAN IS 01/15/2025   Does the patient have an appointment with their PCP within 7-14 days of discharge? Yes   Has home health visited the patient within 72 hours of discharge? N/A   Psychosocial issues? No   Did the patient receive a copy of their discharge instructions? Yes   Nursing interventions Reviewed instructions with patient   What is the patient's perception of their health status since discharge? Improving   Is the patient/caregiver able to teach back signs and symptoms related to disease process for when to call PCP? Yes   Is the patient/caregiver able to teach back signs and symptoms related to disease process for when to call 911? Yes   Is the patient/caregiver able to teach back the hierarchy of who to call/visit for symptoms/problems? PCP, Specialist, Home health nurse, Urgent Care, ED, 911 Yes   If the patient is a current smoker, are they able to teach back resources for cessation? Not a smoker   TCM call completed? Yes   Wrap up additional comments D/C DX: Confusion,  hypertensive encephalopathy,  TIA work up - Pt doing ok, very weak and tired, BP  still elevated but better than at admit. No changes made to BP meds at discharge. Daughter is monitoring BP for pt. TCM APPT with PCP Dr Ken Andujar is 01/15/2025. Daughter may reach out for sooner appt if weather clears. Pt on a walker and she is concerned about getting her out.   Call end time 0905            Samantha Plascencia MA    1/6/2025, 09:09 EST

## 2025-01-15 ENCOUNTER — OFFICE VISIT (OUTPATIENT)
Dept: FAMILY MEDICINE CLINIC | Facility: CLINIC | Age: 89
End: 2025-01-15
Payer: MEDICARE

## 2025-01-15 ENCOUNTER — READMISSION MANAGEMENT (OUTPATIENT)
Dept: CALL CENTER | Facility: HOSPITAL | Age: 89
End: 2025-01-15
Payer: MEDICARE

## 2025-01-15 VITALS
OXYGEN SATURATION: 99 % | WEIGHT: 133 LBS | SYSTOLIC BLOOD PRESSURE: 166 MMHG | BODY MASS INDEX: 26.11 KG/M2 | HEART RATE: 60 BPM | HEIGHT: 60 IN | DIASTOLIC BLOOD PRESSURE: 82 MMHG

## 2025-01-15 DIAGNOSIS — Z09 HOSPITAL DISCHARGE FOLLOW-UP: Primary | ICD-10-CM

## 2025-01-15 DIAGNOSIS — R41.0 CONFUSION: ICD-10-CM

## 2025-01-15 DIAGNOSIS — R42 VERTIGO AS LATE EFFECT OF STROKE: ICD-10-CM

## 2025-01-15 DIAGNOSIS — I69.398 VERTIGO AS LATE EFFECT OF STROKE: ICD-10-CM

## 2025-01-15 PROCEDURE — 1160F RVW MEDS BY RX/DR IN RCRD: CPT | Performed by: FAMILY MEDICINE

## 2025-01-15 PROCEDURE — 1159F MED LIST DOCD IN RCRD: CPT | Performed by: FAMILY MEDICINE

## 2025-01-15 PROCEDURE — 99495 TRANSJ CARE MGMT MOD F2F 14D: CPT | Performed by: FAMILY MEDICINE

## 2025-01-15 PROCEDURE — 1126F AMNT PAIN NOTED NONE PRSNT: CPT | Performed by: FAMILY MEDICINE

## 2025-01-15 PROCEDURE — 1111F DSCHRG MED/CURRENT MED MERGE: CPT | Performed by: FAMILY MEDICINE

## 2025-01-15 RX ORDER — FAMOTIDINE 20 MG/1
20 TABLET, FILM COATED ORAL 2 TIMES DAILY
COMMUNITY

## 2025-01-15 NOTE — OUTREACH NOTE
Medical Week 2 Survey      Flowsheet Row Responses   [facility] patient discharged from? [place]   Does the patient have one of the following disease processes/diagnoses(primary or secondary)? Other   Week 2 attempt successful? Yes   Call start time 1455   Discharge diagnosis Confusion  TIA workup   Call end time 1456   Person spoke with today (if not patient) and relationship dtr Silvia   Did the patient receive a copy of their discharge instructions? Yes   Nursing interventions Reviewed instructions with patient   What is the patient's perception of their health status since discharge? Improving   Is the patient/caregiver able to teach back signs and symptoms related to disease process for when to call PCP? Yes   Is the patient/caregiver able to teach back signs and symptoms related to disease process for when to call 911? Yes   Is the patient/caregiver able to teach back the hierarchy of who to call/visit for symptoms/problems? PCP, Specialist, Home health nurse, Urgent Care, ED, 911 Yes   Week 2 Call Completed? Yes   Graduated Yes   Wrap up additional comments Daughter reports patient is doing very well. Just leaving pcp office. No concerns or questions noted.   Call end time 1456            Lily GIPSON - Registered Nurse

## 2025-01-15 NOTE — PROGRESS NOTES
Transitional Care Follow Up Visit  Subjective     CC: Transitional Care Management Visit        Within 48 business hours after discharge our office contacted her via telephone to coordinate her care and needs.      I reviewed and discussed the details of that call along with the discharge summary, hospital problems, inpatient lab results, inpatient diagnostic studies, and consultation reports with Marleni.     Current outpatient and discharge medications have been reconciled for the patient.  Reviewed by: Ken Andujar MD          5/29/2024     7:28 PM 6/7/2024     5:52 PM 7/12/2024     6:19 PM 10/11/2024     5:58 PM 10/24/2024     7:29 PM 10/30/2024     4:40 PM 1/4/2025     6:53 PM   Date of TCM Phone Call   [facility]   Date of Admission 5/27/2024 6/6/2024 7/10/2024 10/9/2024 10/23/2024 10/27/2024 1/3/2025   Date of Discharge 5/29/2024 6/7/2024 7/12/2024 10/11/2024 10/24/2024 10/30/2024 1/4/2025   Discharge Disposition Home-Health Care Svc Home or Self Care Home-Health Care Svc  Home or Self Care  Home or Self Care       Risk for Readmission (LACE) Score: 11 (1/4/2025  6:00 AM)      HPI     The patient is an 89-year-old female who presents for evaluation of stroke, hypertension, sleep disturbance, vomiting and dry heaves, and suspected parasitic infection. She is accompanied by her daughter.    She experienced a stroke on 01/03/2024, which she attributes to elevated blood pressure during a medical visit. She was hospitalized for 30 hours due to persistent hypertension. During this episode, she experienced confusion but did not exhibit any weakness in her left arm. She also reported an unusual sensation and difficulty with speech. She has a history of 4 strokes. She has a scheduled appointment with a neurologist at the stroke clinic in 2 to 3 months.  "Her mobility is not compromised, and she reports no pain. She has been monitoring her blood pressure twice daily, which has been within the target range of 140/90. Her daughter notes that she spends most of her day resting. She has been managing well at home since the stroke, with her daughter providing care. Her current medication regimen includes Eliquis, aspirin, and Lipitor 20 mg.    She has been experiencing sleep disturbances, often waking up after taking 2 melatonin tablets and unable to return to sleep. She has a scheduled sleep study on 02/07/2024. She takes 3 melatonin tablets (5 mg each) to aid sleep.    She has been experiencing episodes of vomiting and dry heaves, even when she has not consumed food or drink. Her appetite has improved since December 2023. She takes Pepcid for gastrointestinal relief.    She believes she has a parasitic infection causing her head to itch and brain to be eaten, which she thinks is due to her history of cancer and stroke.    Supplemental Information  She had a bout with constipation and did not want to eat, so they helped her with that in the hospital. She has been advised to take calcium or vitamin D supplements.    FAMILY HISTORY  - Mother had a stroke and pneumonia and lived to be 88 years old    MEDICATIONS  - Current medications:    - Eliquis    - Aspirin    - Lipitor    - Melatonin    - Pepcid     Course During Hospital Stay The following information was reviewed by: Ken Andujar MD on 01/15/2025:      The following portions of the patient's history were reviewed and updated as appropriate: allergies, current medications, past family history, past medical history, past social history, past surgical history, and problem list.     Vitals:    01/15/25 1353   BP: 166/82   Pulse: 60   SpO2: 99%   Weight: 60.3 kg (133 lb)   Height: 152.4 cm (60\")             Objective   Physical Exam   Physical Exam  General Appearance: No Acute Distress, normal appearance, well " developed, well nourished.  Vital signs: Blood pressure is 166/82.  Respiratory: Normal respiratory effort. Lungs are clear. No wheezes, rubs, rhonchi, or rales.  Cardiovascular: Heart has a regular rate and rhythm. No murmurs, rubs, or gallops.  Extremities: No pretibial edema, bilaterally. Moves all extremities. Speech back to baseline  Skin: Warm and dry, no rash.  Psychiatric: patient cooperative, normal affect.      DATA REVIEWED:  The following data was reviewed by: Ken Andujar MD on 01/15/2025:  Comprehensive Metabolic Panel (01/04/2025 10:33)  CBC (No Diff) (01/04/2025 10:33)  Results  - Laboratory Studies:    - Creatinine: 2.27       Assessment & Plan  Hospital discharge follow-up  BP controlled       Confusion    Improving. Reassurance that patient does not have parasitic infection       Vertigo as late effect of stroke  Improving. Some nausea. Treatment with pepcid bid recommended.           Follow Up     Return in about 3 months (around 4/15/2025) for next scheduled follow up.        Patient or patient representative verbalized consent for the use of Ambient Listening during the visit with  Ken Andujar MD for chart documentation. 1/15/2025  14:40 EST

## 2025-01-16 RX ORDER — ATORVASTATIN CALCIUM 40 MG/1
40 TABLET, FILM COATED ORAL NIGHTLY
Qty: 90 TABLET | Refills: 0 | Status: SHIPPED | OUTPATIENT
Start: 2025-01-16

## 2025-01-16 RX ORDER — METOPROLOL SUCCINATE 25 MG/1
12.5 TABLET, EXTENDED RELEASE ORAL DAILY
Qty: 90 TABLET | Refills: 0 | Status: SHIPPED | OUTPATIENT
Start: 2025-01-16

## 2025-01-17 ENCOUNTER — HOSPITAL ENCOUNTER (OUTPATIENT)
Dept: INFUSION THERAPY | Facility: HOSPITAL | Age: 89
Discharge: HOME | End: 2025-01-17
Payer: MEDICARE

## 2025-01-17 VITALS
OXYGEN SATURATION: 100 % | TEMPERATURE: 97.7 F | DIASTOLIC BLOOD PRESSURE: 72 MMHG | RESPIRATION RATE: 16 BRPM | HEART RATE: 65 BPM | SYSTOLIC BLOOD PRESSURE: 171 MMHG

## 2025-01-17 DIAGNOSIS — D63.8 ANEMIA OF CHRONIC DISEASE: Primary | ICD-10-CM

## 2025-01-17 LAB
HCT VFR BLD AUTO: 34.2 % (ref 34–46.6)
HGB BLD-MCNC: 11.2 G/DL (ref 12–15.9)

## 2025-01-17 PROCEDURE — 85014 HEMATOCRIT: CPT | Performed by: NURSE PRACTITIONER

## 2025-01-17 PROCEDURE — 85018 HEMOGLOBIN: CPT | Performed by: NURSE PRACTITIONER

## 2025-01-17 PROCEDURE — G0463 HOSPITAL OUTPT CLINIC VISIT: HCPCS

## 2025-01-17 PROCEDURE — 36415 COLL VENOUS BLD VENIPUNCTURE: CPT

## 2025-01-31 ENCOUNTER — HOSPITAL ENCOUNTER (OUTPATIENT)
Dept: INFUSION THERAPY | Facility: HOSPITAL | Age: 89
Discharge: HOME | End: 2025-01-31
Payer: MEDICARE

## 2025-01-31 VITALS
RESPIRATION RATE: 16 BRPM | OXYGEN SATURATION: 100 % | DIASTOLIC BLOOD PRESSURE: 77 MMHG | HEART RATE: 63 BPM | SYSTOLIC BLOOD PRESSURE: 178 MMHG | TEMPERATURE: 97.2 F

## 2025-01-31 DIAGNOSIS — N18.9 ACUTE ON CHRONIC RENAL INSUFFICIENCY: Primary | ICD-10-CM

## 2025-01-31 DIAGNOSIS — D63.8 ANEMIA OF CHRONIC DISEASE: ICD-10-CM

## 2025-01-31 DIAGNOSIS — N28.9 ACUTE ON CHRONIC RENAL INSUFFICIENCY: Primary | ICD-10-CM

## 2025-01-31 LAB
HCT VFR BLD AUTO: 33.6 % (ref 34–46.6)
HGB BLD-MCNC: 10.7 G/DL (ref 12–15.9)

## 2025-01-31 PROCEDURE — 25010000002 EPOETIN ALFA PER 1000 UNITS: Performed by: NURSE PRACTITIONER

## 2025-01-31 PROCEDURE — 85018 HEMOGLOBIN: CPT | Performed by: NURSE PRACTITIONER

## 2025-01-31 PROCEDURE — 85014 HEMATOCRIT: CPT | Performed by: NURSE PRACTITIONER

## 2025-01-31 PROCEDURE — 36415 COLL VENOUS BLD VENIPUNCTURE: CPT

## 2025-01-31 PROCEDURE — 96372 THER/PROPH/DIAG INJ SC/IM: CPT

## 2025-01-31 RX ADMIN — ERYTHROPOIETIN 10000 UNITS: 10000 INJECTION, SOLUTION INTRAVENOUS; SUBCUTANEOUS at 11:31

## 2025-02-05 DIAGNOSIS — F41.0 PANIC DISORDER: ICD-10-CM

## 2025-02-05 NOTE — TELEPHONE ENCOUNTER
Caller: NEAL HERNANDEZ    Relationship: Emergency Contact    Best call back number: [phone]      Requested Prescriptions:   Requested Prescriptions     Pending Prescriptions Disp Refills    Vortioxetine HBr (TRINTELLIX) 10 MG tablet tablet 30 tablet 2     Sig: Take 1 tablet by mouth Daily With Breakfast.        Pharmacy where request should be sent: [facility] [address] [phone] [facility] [phone]      Last office visit with prescribing clinician: 1/15/2025   Last telemedicine visit with prescribing clinician: Visit date not found   Next office visit with prescribing clinician: 4/16/2025     Additional details provided by patient: A WEEK LEFT    Does the patient have less than a 3 day supply:  [] Yes  [x] No    Would you like a call back once the refill request has been completed: [x] Yes [] No    If the office needs to give you a call back, can they leave a voicemail: [x] Yes [] No    Willa Luong Rep   02/05/25 12:54 EST

## 2025-02-07 ENCOUNTER — HOSPITAL ENCOUNTER (OUTPATIENT)
Dept: SLEEP MEDICINE | Facility: HOSPITAL | Age: 89
End: 2025-02-07
Payer: MEDICARE

## 2025-02-07 VITALS — BODY MASS INDEX: 26.1 KG/M2 | WEIGHT: 132.94 LBS | HEIGHT: 60 IN

## 2025-02-07 DIAGNOSIS — I50.32 CHRONIC DIASTOLIC CONGESTIVE HEART FAILURE: ICD-10-CM

## 2025-02-07 DIAGNOSIS — I48.21 PERMANENT ATRIAL FIBRILLATION: ICD-10-CM

## 2025-02-07 DIAGNOSIS — G47.8 NON-RESTORATIVE SLEEP: ICD-10-CM

## 2025-02-07 DIAGNOSIS — G47.10 HYPERSOMNIA: ICD-10-CM

## 2025-02-07 DIAGNOSIS — I63.81 CEREBROVASCULAR ACCIDENT (CVA) DUE TO STENOSIS OF SMALL ARTERY: ICD-10-CM

## 2025-02-07 DIAGNOSIS — R06.83 SNORING: ICD-10-CM

## 2025-02-07 DIAGNOSIS — Z86.79 HISTORY OF CARDIOMYOPATHY: ICD-10-CM

## 2025-02-07 DIAGNOSIS — G47.30 SLEEP APNEA, UNSPECIFIED TYPE: ICD-10-CM

## 2025-02-07 DIAGNOSIS — I25.2 HISTORY OF MI (MYOCARDIAL INFARCTION): ICD-10-CM

## 2025-02-07 PROCEDURE — 95810 POLYSOM 6/> YRS 4/> PARAM: CPT

## 2025-02-12 ENCOUNTER — TELEPHONE (OUTPATIENT)
Dept: SLEEP MEDICINE | Facility: HOSPITAL | Age: 89
End: 2025-02-12
Payer: MEDICARE

## 2025-02-14 ENCOUNTER — HOSPITAL ENCOUNTER (OUTPATIENT)
Dept: INFUSION THERAPY | Facility: HOSPITAL | Age: 89
Discharge: HOME | End: 2025-02-14
Payer: MEDICARE

## 2025-02-14 VITALS
SYSTOLIC BLOOD PRESSURE: 170 MMHG | DIASTOLIC BLOOD PRESSURE: 65 MMHG | TEMPERATURE: 96.4 F | RESPIRATION RATE: 16 BRPM | HEART RATE: 63 BPM | OXYGEN SATURATION: 100 %

## 2025-02-14 DIAGNOSIS — D63.8 ANEMIA OF CHRONIC DISEASE: ICD-10-CM

## 2025-02-14 DIAGNOSIS — N18.4 CKD (CHRONIC KIDNEY DISEASE) STAGE 4, GFR 15-29 ML/MIN: Primary | ICD-10-CM

## 2025-02-14 LAB
25(OH)D3 SERPL-MCNC: 35.6 NG/ML (ref 30–100)
ALBUMIN SERPL-MCNC: 4.1 G/DL (ref 3.5–5.2)
ALBUMIN/GLOB SERPL: 1.6 G/DL
ALP SERPL-CCNC: 45 U/L (ref 39–117)
ALT SERPL W P-5'-P-CCNC: 7 U/L (ref 1–33)
ANION GAP SERPL CALCULATED.3IONS-SCNC: 11.4 MMOL/L (ref 5–15)
AST SERPL-CCNC: 20 U/L (ref 1–32)
BASOPHILS # BLD AUTO: 0.02 10*3/MM3 (ref 0–0.2)
BASOPHILS NFR BLD AUTO: 0.4 % (ref 0–1.5)
BILIRUB SERPL-MCNC: 0.7 MG/DL (ref 0–1.2)
BUN SERPL-MCNC: 39 MG/DL (ref 8–23)
BUN/CREAT SERPL: 17.3 (ref 7–25)
CALCIUM SPEC-SCNC: 9.1 MG/DL (ref 8.6–10.5)
CALCIUM SPEC-SCNC: 9.2 MG/DL (ref 8.6–10.5)
CHLORIDE SERPL-SCNC: 104 MMOL/L (ref 98–107)
CO2 SERPL-SCNC: 20.6 MMOL/L (ref 22–29)
CREAT SERPL-MCNC: 2.26 MG/DL (ref 0.57–1)
DEPRECATED RDW RBC AUTO: 52.8 FL (ref 37–54)
EGFRCR SERPLBLD CKD-EPI 2021: 20.5 ML/MIN/1.73
EOSINOPHIL # BLD AUTO: 0.21 10*3/MM3 (ref 0–0.4)
EOSINOPHIL NFR BLD AUTO: 4.4 % (ref 0.3–6.2)
ERYTHROCYTE [DISTWIDTH] IN BLOOD BY AUTOMATED COUNT: 15.1 % (ref 12.3–15.4)
FERRITIN SERPL-MCNC: 72.2 NG/ML (ref 13–150)
FOLATE SERPL-MCNC: 14 NG/ML (ref 4.78–24.2)
GLOBULIN UR ELPH-MCNC: 2.5 GM/DL
GLUCOSE SERPL-MCNC: 111 MG/DL (ref 65–99)
HCT VFR BLD AUTO: 34.1 % (ref 34–46.6)
HGB BLD-MCNC: 10.5 G/DL (ref 12–15.9)
IMM GRANULOCYTES # BLD AUTO: 0.02 10*3/MM3 (ref 0–0.05)
IMM GRANULOCYTES NFR BLD AUTO: 0.4 % (ref 0–0.5)
IRON 24H UR-MRATE: 81 MCG/DL (ref 37–145)
IRON SATN MFR SERPL: 23 % (ref 20–50)
LYMPHOCYTES # BLD AUTO: 1.08 10*3/MM3 (ref 0.7–3.1)
LYMPHOCYTES NFR BLD AUTO: 22.5 % (ref 19.6–45.3)
MCH RBC QN AUTO: 29.2 PG (ref 26.6–33)
MCHC RBC AUTO-ENTMCNC: 30.8 G/DL (ref 31.5–35.7)
MCV RBC AUTO: 95 FL (ref 79–97)
MONOCYTES # BLD AUTO: 0.34 10*3/MM3 (ref 0.1–0.9)
MONOCYTES NFR BLD AUTO: 7.1 % (ref 5–12)
NEUTROPHILS NFR BLD AUTO: 3.14 10*3/MM3 (ref 1.7–7)
NEUTROPHILS NFR BLD AUTO: 65.2 % (ref 42.7–76)
NRBC BLD AUTO-RTO: 0 /100 WBC (ref 0–0.2)
PHOSPHATE SERPL-MCNC: 4.1 MG/DL (ref 2.5–4.5)
PLATELET # BLD AUTO: 184 10*3/MM3 (ref 140–450)
PMV BLD AUTO: 9.9 FL (ref 6–12)
POTASSIUM SERPL-SCNC: 4.5 MMOL/L (ref 3.5–5.2)
PROT SERPL-MCNC: 6.6 G/DL (ref 6–8.5)
PTH-INTACT SERPL-MCNC: 102 PG/ML (ref 15–65)
RBC # BLD AUTO: 3.59 10*6/MM3 (ref 3.77–5.28)
SODIUM SERPL-SCNC: 136 MMOL/L (ref 136–145)
TIBC SERPL-MCNC: 347 MCG/DL (ref 298–536)
TRANSFERRIN SERPL-MCNC: 233 MG/DL (ref 200–360)
URATE SERPL-MCNC: 7.5 MG/DL (ref 2.4–5.7)
VIT B12 BLD-MCNC: 474 PG/ML (ref 211–946)
WBC NRBC COR # BLD AUTO: 4.81 10*3/MM3 (ref 3.4–10.8)

## 2025-02-14 PROCEDURE — 83540 ASSAY OF IRON: CPT | Performed by: NURSE PRACTITIONER

## 2025-02-14 PROCEDURE — 83970 ASSAY OF PARATHORMONE: CPT | Performed by: NURSE PRACTITIONER

## 2025-02-14 PROCEDURE — 82607 VITAMIN B-12: CPT | Performed by: NURSE PRACTITIONER

## 2025-02-14 PROCEDURE — 25010000002 EPOETIN ALFA PER 1000 UNITS: Performed by: NURSE PRACTITIONER

## 2025-02-14 PROCEDURE — 82728 ASSAY OF FERRITIN: CPT | Performed by: NURSE PRACTITIONER

## 2025-02-14 PROCEDURE — 36415 COLL VENOUS BLD VENIPUNCTURE: CPT

## 2025-02-14 PROCEDURE — 82746 ASSAY OF FOLIC ACID SERUM: CPT | Performed by: NURSE PRACTITIONER

## 2025-02-14 PROCEDURE — 85025 COMPLETE CBC W/AUTO DIFF WBC: CPT | Performed by: NURSE PRACTITIONER

## 2025-02-14 PROCEDURE — 84100 ASSAY OF PHOSPHORUS: CPT | Performed by: NURSE PRACTITIONER

## 2025-02-14 PROCEDURE — 82306 VITAMIN D 25 HYDROXY: CPT | Performed by: NURSE PRACTITIONER

## 2025-02-14 PROCEDURE — 80053 COMPREHEN METABOLIC PANEL: CPT | Performed by: NURSE PRACTITIONER

## 2025-02-14 PROCEDURE — 96372 THER/PROPH/DIAG INJ SC/IM: CPT

## 2025-02-14 PROCEDURE — 84466 ASSAY OF TRANSFERRIN: CPT | Performed by: NURSE PRACTITIONER

## 2025-02-14 PROCEDURE — 84550 ASSAY OF BLOOD/URIC ACID: CPT | Performed by: NURSE PRACTITIONER

## 2025-02-14 RX ADMIN — ERYTHROPOIETIN 10000 UNITS: 10000 INJECTION, SOLUTION INTRAVENOUS; SUBCUTANEOUS at 11:29

## 2025-02-26 RX ORDER — METOPROLOL SUCCINATE 25 MG/1
12.5 TABLET, EXTENDED RELEASE ORAL DAILY
Qty: 90 TABLET | Refills: 0 | OUTPATIENT
Start: 2025-02-26

## 2025-02-26 NOTE — TELEPHONE ENCOUNTER
Caller: NEAL HERNANDEZ    Relationship: Emergency Contact    Best call back number: [phone]     Requested Prescriptions:   Requested Prescriptions     Pending Prescriptions Disp Refills    metoprolol succinate XL (TOPROL-XL) 25 MG 24 hr tablet 90 tablet 0     Sig: Take 0.5 tablets by mouth Daily.        Pharmacy where request should be sent: [facility] [address] [phone] [facility] [phone]      Last office visit with prescribing clinician: 1/15/2025   Last telemedicine visit with prescribing clinician: Visit date not found   Next office visit with prescribing clinician: 4/16/2025     Additional details provided by patient: 8-9 PILLS LEFT     Does the patient have less than a 3 day supply:  [] Yes  [x] No    Would you like a call back once the refill request has been completed: [] Yes [x] No    If the office needs to give you a call back, can they leave a voicemail: [] Yes [x] No    Willa Silvestre Rep   02/26/25 13:48 EST

## 2025-03-03 ENCOUNTER — TELEPHONE (OUTPATIENT)
Dept: CARDIOLOGY | Facility: CLINIC | Age: 89
End: 2025-03-03
Payer: MEDICARE

## 2025-03-03 RX ORDER — METOPROLOL SUCCINATE 25 MG/1
12.5 TABLET, EXTENDED RELEASE ORAL DAILY
Qty: 90 TABLET | Refills: 1 | Status: SHIPPED | OUTPATIENT
Start: 2025-03-03 | End: 2025-03-04 | Stop reason: SDUPTHER

## 2025-03-03 NOTE — TELEPHONE ENCOUNTER
Patient's daughter called stating that patient took the whole dose rather than half the tablet. Now she only has four left. And they want a prescription to go to [facility]. Are we able to help her with this request?

## 2025-03-04 RX ORDER — METOPROLOL SUCCINATE 25 MG/1
12.5 TABLET, EXTENDED RELEASE ORAL DAILY
Qty: 15 TABLET | Refills: 0 | Status: SHIPPED | OUTPATIENT
Start: 2025-03-04

## 2025-03-04 NOTE — TELEPHONE ENCOUNTER
Patient's daughter Silvia notified 15 tablets were sent to local pharmacy per her request./ JIGNESH

## 2025-03-04 NOTE — TELEPHONE ENCOUNTER
Patient's daughter Silvia called to relay  there was an issue with [facility] refilling Metoprolol.  I called [facility] and they received the refill request as eprescribed to them, however, It is a refill too soon and won't be available from them until 3/24/25.  Silvia is asking if you will send in Rx to [facility] on [address] and she will pay out of pocket for 30 tablets.   Please advise. / JIGNESH Brandon - call Silvia at [phone] to relay if Rx is sent in.

## 2025-03-05 ENCOUNTER — OFFICE VISIT (OUTPATIENT)
Dept: NEUROLOGY | Facility: CLINIC | Age: 89
End: 2025-03-05
Payer: MEDICARE

## 2025-03-05 VITALS
DIASTOLIC BLOOD PRESSURE: 48 MMHG | SYSTOLIC BLOOD PRESSURE: 142 MMHG | HEART RATE: 70 BPM | BODY MASS INDEX: 24.94 KG/M2 | OXYGEN SATURATION: 99 % | WEIGHT: 127 LBS | HEIGHT: 60 IN

## 2025-03-05 DIAGNOSIS — I63.331 CEREBROVASCULAR ACCIDENT (CVA) DUE TO THROMBOSIS OF RIGHT POSTERIOR CEREBRAL ARTERY: Primary | ICD-10-CM

## 2025-03-05 NOTE — PROGRESS NOTES
"DOS: 3/5/2025  NAME: Marleni Hummel   : 1935  PCP: Ken Andujar MD  Chief Complaint   Patient presents with    Stroke       Chief complaint: Stroke  Subjective: Patient presented to the clinic accompanied by her daughter to follow-up on right thalamic acute ischemic stroke on 2025.  Stroke etiology felt to be related to large vessel disease from right PCA occlusion and dehydration.  No residual symptoms from her thalamic stroke.  She is still having left superior quadrantanopsia from her prior right PCA stroke.  Patient states that she feels tired, sleeps longer hours, daughter mentioned about snoring, she denied early morning headache or dry mouth.  She does have history of congestive heart failure last 2D echo in October showed ejection fraction was 44 1 to 45%.  Her labs from January A1c 5.0, LDL 67, B12 618, TSH 0.97.  Daughter stated that the patient is still not well-hydrated and does not drink enough water    As per HPI 2025  Marleni Hummel is a 89 y.o. right-handed white female with known diagnosis of stage IV CKD, atrial fibrillation on renal adjusted Eliquis, hypertension, mixed hyperlipidemia, recent right PCA acute ischemic stroke in 2024 with poststroke headache, right P3 occlusion, obstructive sleep apnea presented to the hospital with increased confusion.  Daughter reported that patient developed urinary tract infection around [place] treated with cefdinir then for 5 days later she developed worsening confusion and pressure behind the eyes.  On this admission, she had an MRI that I personally reviewed that showed a new acute ischemic stroke on the right thalamus on the territory of her right PCA.  Patient states that she might been dehydrated with her recent urinary tract infection as she was not drinking enough water.  Currently her confusion improved on my assessment and she is back to baseline.     Objective:  Vital signs: Ht 152.4 cm (60\")   Wt 57.6 kg (127 lb)   " BMI 24.80 kg/m²    Exam:  vitals reviewed  Gen: NAD, vitals reviewed  MS: oriented x3, follows commands appropriately, normal attention/concentration, language intact, no neglect.  CN: Hard of hearing, visual acuity grossly normal, PERRL, EOMI, no facial droop, no dysarthria  Motor: 5/5 throughout upper and lower extremities, normal tone  Sensory exam: Normal to light touch throughout  Coordination: Normal finger-nose-finger bilaterally  Gait: Abnormal, patient uses a walker at baseline due to generalized weakness, right knee and low back pain    Laboratory results:  Lab Results   Component Value Date    LDL 67 01/03/2025    LDL 66 11/05/2024    LDL 73 10/09/2024       Review and interpretation of imaging: No new imaging to review    Her labs from January A1c 5.0, LDL 67, B12 618, TSH 0.97.    Diagnoses:  -Follow-up right thalamic acute ischemic stroke 1/5/2025 etiology large vessel disease aggravated by dehydration  -Chronic right PCA stroke with residual left superior quadrantanopsia  -Right PCA P3 occlusion  -Essential hypertension  -Mixed hyperlipidemia  -Atrial fibrillation on Eliquis  -Stage IV CKD  -Dehydration  -Tiredness and fatigue     Pre-stroke MRS:   NIHSS: 0        PLAN:   -Continue Eliquis 2.5 mg twice daily  -Continue Lipitor 40 mg daily  -Continue aspirin 81 mg daily  -Goal blood pressure less than 140/90  -Counseled the patient to keep well-hydrated by drinking water, avoid coffee or alcohol and to avoid dehydration.   -Patient has a follow-up with sleep medicine scheduled to evaluate for obstructive sleep apnea, not completed yet.  -Tiredness and fatigue could be related to untreated obstructive sleep apnea, if DEBORAH negative will repeat another 2D echo to rule out worsening heart failure.  -Follow-up with me in 3 months.    I spent a total of 30 minutes on this clinical encounter including chart/records review, review of laboratory data, personal review of imaging studies, patient counseling, and  care coordination.

## 2025-03-06 ENCOUNTER — TELEPHONE (OUTPATIENT)
Dept: FAMILY MEDICINE CLINIC | Facility: CLINIC | Age: 89
End: 2025-03-06
Payer: MEDICARE

## 2025-03-06 NOTE — TELEPHONE ENCOUNTER
Provider: DR KHAN    Caller: NEAL HERNANDEZ    Relationship to Patient: Emergency Contact        Phone Number: [phone]    Reason for Call: PATIENT'S DAUGHTER STATES PATIENT HAS URINARY BURNING AND URINE ODOR.  SHE IS WANTING TO KNOW IF DR KHAN WOULD ALLOW PATIENT TO COME TO THE LAB TO LEAVE A SPECIMEN.    PLEASE ADVISE.

## 2025-03-12 ENCOUNTER — OFFICE VISIT (OUTPATIENT)
Dept: SLEEP MEDICINE | Facility: HOSPITAL | Age: 89
End: 2025-03-12
Payer: MEDICARE

## 2025-03-12 VITALS — HEIGHT: 60 IN | OXYGEN SATURATION: 98 % | WEIGHT: 127 LBS | BODY MASS INDEX: 24.94 KG/M2 | HEART RATE: 70 BPM

## 2025-03-12 DIAGNOSIS — G47.30 SLEEP APNEA, UNSPECIFIED TYPE: Primary | ICD-10-CM

## 2025-03-12 DIAGNOSIS — G47.10 HYPERSOMNIA: ICD-10-CM

## 2025-03-12 DIAGNOSIS — G47.61 PERIODIC LIMB MOVEMENT DISORDER: ICD-10-CM

## 2025-03-12 DIAGNOSIS — R06.83 SNORING: ICD-10-CM

## 2025-03-12 DIAGNOSIS — G47.8 NON-RESTORATIVE SLEEP: ICD-10-CM

## 2025-03-12 PROCEDURE — G0463 HOSPITAL OUTPT CLINIC VISIT: HCPCS

## 2025-03-12 PROCEDURE — 99213 OFFICE O/P EST LOW 20 MIN: CPT | Performed by: FAMILY MEDICINE

## 2025-03-12 PROCEDURE — 1160F RVW MEDS BY RX/DR IN RCRD: CPT | Performed by: FAMILY MEDICINE

## 2025-03-12 PROCEDURE — 1159F MED LIST DOCD IN RCRD: CPT | Performed by: FAMILY MEDICINE

## 2025-03-12 NOTE — PROGRESS NOTES
Follow Up [facility] Note     Chief Complaint: Periodic limb movements of sleep    Primary Care Physician: Ken Andujar MD    Interval History:   The patient is a 89 y.o. female  who was last seen in the sleep lab: 2025 for split study which showed overall AHI 1.8 lowest SpO2 91% average SpO2 96%.  Poor sleep efficiency at night of study.  PLM with arousal index of 28.2 events per hour.  Presents today to discuss results and consider treatment for PLM's of sleep.  Discussed that at her age and with her current medical conditions she is already at very high risk for falls; she is very unsteady on her feet and uses a walker with difficulty.  Concerned that with poor sleep efficiency on night of split study we may have missed diagnosis of DEBORAH.    Review of Systems:    A complete review of systems was done and all were negative with the exception of see scanned media    Social History:    Social History     Socioeconomic History    Marital status:     Years of education: High school   Tobacco Use    Smoking status: Former     Current packs/day: 0.00     Average packs/day: 1 pack/day for 3.0 years (3.0 ttl pk-yrs)     Types: Cigarettes     Start date: 1953     Quit date: 1956     Years since quittin.0     Passive exposure: Past    Smokeless tobacco: Never    Tobacco comments:     caffeine - 1/2 cup coffee daily    Vaping Use    Vaping status: Never Used   Substance and Sexual Activity    Alcohol use: Not Currently     Alcohol/week: 3.0 standard drinks of alcohol     Types: 3 Glasses of wine per week     Comment: couple times a week    Drug use: Never    Sexual activity: Not Currently       Allergies:  Medrol [methylprednisolone], Morphine, Oxycodone, Ace inhibitors, Bactrim [sulfamethoxazole-trimethoprim], Levofloxacin, and Torsemide     Medication Review:  Reviewed.      Vital Signs:    Vitals:    25 1109   Pulse: 70   SpO2: 98%   Weight: 57.6 kg (127 lb)   Height: 152.4 cm  "(60\")     Body mass index is 24.8 kg/m².    Physical Exam:    Constitutional:  Well developed 89 y.o. female that appears in no apparent distress.  Awake & oriented times 3.  Normal mood with normal recent and remote memory and normal judgement.  Eyes:  Conjunctivae normal.  Oropharynx: Previously, moist mucous membranes.    Self-administered [place] Sleepiness Scale test results: See scanned media  0-5 Lower normal daytime sleepiness  6-10 Higher normal daytime sleepiness  11-12 Mild, 13-15 Moderate, & 16-24 Severe excessive daytime sleepiness    Impression:   1. Sleep apnea, unspecified type    2. Hypersomnia    3. Non-restorative sleep    4. Snoring    5. Periodic limb movement disorder        Follow-up HST; daughter will help with set up.  We will hold off on medication for limb movements during sleep because she is already at very high risk for falls.  Patient and daughter agree with this.  If HST is negative continue care per cardiology and neurology and other specialist.  If HST is positive return to clinic to discuss CPAP.    Plan:  Good sleep hygiene measures should be maintained.  Normal body weight by Body mass index is 24.8 kg/m²..      I answered all of the patient's questions.      Sam Moore MD  Sleep Medicine  03/12/25  11:27 EDT    "

## 2025-03-14 ENCOUNTER — HOSPITAL ENCOUNTER (OUTPATIENT)
Dept: INFUSION THERAPY | Facility: HOSPITAL | Age: 89
Discharge: HOME | End: 2025-03-14
Payer: MEDICARE

## 2025-03-14 VITALS
OXYGEN SATURATION: 99 % | HEART RATE: 81 BPM | SYSTOLIC BLOOD PRESSURE: 180 MMHG | DIASTOLIC BLOOD PRESSURE: 61 MMHG | TEMPERATURE: 96.9 F | RESPIRATION RATE: 16 BRPM

## 2025-03-14 DIAGNOSIS — D63.8 ANEMIA OF CHRONIC DISEASE: Primary | ICD-10-CM

## 2025-03-14 LAB
HCT VFR BLD AUTO: 34.6 % (ref 34–46.6)
HGB BLD-MCNC: 11.2 G/DL (ref 12–15.9)

## 2025-03-14 PROCEDURE — G0463 HOSPITAL OUTPT CLINIC VISIT: HCPCS

## 2025-03-14 PROCEDURE — 85018 HEMOGLOBIN: CPT | Performed by: NURSE PRACTITIONER

## 2025-03-14 PROCEDURE — 36415 COLL VENOUS BLD VENIPUNCTURE: CPT

## 2025-03-14 PROCEDURE — 85014 HEMATOCRIT: CPT | Performed by: NURSE PRACTITIONER

## 2025-03-28 ENCOUNTER — HOSPITAL ENCOUNTER (OUTPATIENT)
Dept: INFUSION THERAPY | Facility: HOSPITAL | Age: 89
Discharge: HOME | End: 2025-03-28
Payer: MEDICARE

## 2025-03-28 VITALS
RESPIRATION RATE: 16 BRPM | DIASTOLIC BLOOD PRESSURE: 73 MMHG | SYSTOLIC BLOOD PRESSURE: 167 MMHG | OXYGEN SATURATION: 100 % | TEMPERATURE: 96.8 F | HEART RATE: 66 BPM

## 2025-03-28 DIAGNOSIS — D63.8 ANEMIA OF CHRONIC DISEASE: Primary | ICD-10-CM

## 2025-03-28 LAB
HCT VFR BLD AUTO: 31.7 % (ref 34–46.6)
HGB BLD-MCNC: 10.2 G/DL (ref 12–15.9)

## 2025-03-28 PROCEDURE — 36415 COLL VENOUS BLD VENIPUNCTURE: CPT

## 2025-03-28 PROCEDURE — 85014 HEMATOCRIT: CPT | Performed by: NURSE PRACTITIONER

## 2025-03-28 PROCEDURE — 96372 THER/PROPH/DIAG INJ SC/IM: CPT

## 2025-03-28 PROCEDURE — 25010000002 EPOETIN ALFA PER 1000 UNITS: Performed by: NURSE PRACTITIONER

## 2025-03-28 PROCEDURE — 85018 HEMOGLOBIN: CPT | Performed by: NURSE PRACTITIONER

## 2025-03-28 RX ADMIN — ERYTHROPOIETIN 10000 UNITS: 10000 INJECTION, SOLUTION INTRAVENOUS; SUBCUTANEOUS at 11:51

## 2025-04-11 ENCOUNTER — HOSPITAL ENCOUNTER (OUTPATIENT)
Dept: INFUSION THERAPY | Facility: HOSPITAL | Age: 89
Discharge: HOME | End: 2025-04-11
Payer: MEDICARE

## 2025-04-11 VITALS
RESPIRATION RATE: 16 BRPM | HEART RATE: 62 BPM | SYSTOLIC BLOOD PRESSURE: 176 MMHG | OXYGEN SATURATION: 100 % | DIASTOLIC BLOOD PRESSURE: 55 MMHG | TEMPERATURE: 97.2 F

## 2025-04-11 DIAGNOSIS — D63.8 ANEMIA OF CHRONIC DISEASE: Primary | ICD-10-CM

## 2025-04-11 LAB
HCT VFR BLD AUTO: 33.6 % (ref 34–46.6)
HGB BLD-MCNC: 10.7 G/DL (ref 12–15.9)

## 2025-04-11 PROCEDURE — 96372 THER/PROPH/DIAG INJ SC/IM: CPT

## 2025-04-11 PROCEDURE — 25010000002 EPOETIN ALFA PER 1000 UNITS: Performed by: NURSE PRACTITIONER

## 2025-04-11 PROCEDURE — 85018 HEMOGLOBIN: CPT | Performed by: NURSE PRACTITIONER

## 2025-04-11 PROCEDURE — 85014 HEMATOCRIT: CPT | Performed by: NURSE PRACTITIONER

## 2025-04-11 PROCEDURE — 36415 COLL VENOUS BLD VENIPUNCTURE: CPT

## 2025-04-11 RX ADMIN — ERYTHROPOIETIN 10000 UNITS: 10000 INJECTION, SOLUTION INTRAVENOUS; SUBCUTANEOUS at 11:22

## 2025-04-16 ENCOUNTER — OFFICE VISIT (OUTPATIENT)
Dept: FAMILY MEDICINE CLINIC | Facility: CLINIC | Age: 89
End: 2025-04-16
Payer: MEDICARE

## 2025-04-16 VITALS
SYSTOLIC BLOOD PRESSURE: 136 MMHG | DIASTOLIC BLOOD PRESSURE: 66 MMHG | BODY MASS INDEX: 24.94 KG/M2 | OXYGEN SATURATION: 99 % | WEIGHT: 127 LBS | HEIGHT: 60 IN | HEART RATE: 65 BPM

## 2025-04-16 DIAGNOSIS — E78.00 HYPERCHOLESTEROLEMIA: ICD-10-CM

## 2025-04-16 DIAGNOSIS — R41.0 CONFUSION: ICD-10-CM

## 2025-04-16 DIAGNOSIS — R42 VERTIGO AS LATE EFFECT OF STROKE: ICD-10-CM

## 2025-04-16 DIAGNOSIS — F41.0 PANIC DISORDER: ICD-10-CM

## 2025-04-16 DIAGNOSIS — Z00.00 MEDICARE ANNUAL WELLNESS VISIT, SUBSEQUENT: Primary | ICD-10-CM

## 2025-04-16 DIAGNOSIS — I50.32 CHRONIC DIASTOLIC CONGESTIVE HEART FAILURE: ICD-10-CM

## 2025-04-16 DIAGNOSIS — M81.0 SENILE OSTEOPOROSIS: ICD-10-CM

## 2025-04-16 DIAGNOSIS — I69.398 VERTIGO AS LATE EFFECT OF STROKE: ICD-10-CM

## 2025-04-16 DIAGNOSIS — N18.4 CKD (CHRONIC KIDNEY DISEASE) STAGE 4, GFR 15-29 ML/MIN: ICD-10-CM

## 2025-04-16 DIAGNOSIS — F32.89 OTHER DEPRESSION: ICD-10-CM

## 2025-04-16 DIAGNOSIS — N39.41 URGE INCONTINENCE OF URINE: ICD-10-CM

## 2025-04-16 DIAGNOSIS — I10 ESSENTIAL HYPERTENSION: ICD-10-CM

## 2025-04-16 DIAGNOSIS — R43.9 SMELL DISTURBANCE: ICD-10-CM

## 2025-04-16 DIAGNOSIS — I63.9: ICD-10-CM

## 2025-04-16 DIAGNOSIS — Z86.79 HISTORY OF CARDIOMYOPATHY: ICD-10-CM

## 2025-04-16 RX ORDER — VIBEGRON 75 MG/1
1 TABLET, FILM COATED ORAL DAILY
Qty: 90 TABLET | Refills: 2 | Status: SHIPPED | OUTPATIENT
Start: 2025-04-16 | End: 2026-01-11

## 2025-04-16 RX ORDER — CLINDAMYCIN HYDROCHLORIDE 150 MG/1
CAPSULE ORAL
COMMUNITY
Start: 2025-04-09

## 2025-04-16 RX ORDER — MIRTAZAPINE 7.5 MG/1
7.5 TABLET, FILM COATED ORAL NIGHTLY
Qty: 90 TABLET | Refills: 2 | Status: SHIPPED | OUTPATIENT
Start: 2025-04-16 | End: 2026-01-11

## 2025-04-16 NOTE — PROGRESS NOTES
Subjective   The ABCs of the Annual Wellness Visit  Medicare Wellness Visit      Marleni Hummel is a 89 y.o. patient who presents for a Medicare Wellness Visit.    The following portions of the patient's history were reviewed and   updated as appropriate: allergies, current medications, past family history, past medical history, past social history, past surgical history, and problem list.    Compared to one year ago, the patient's physical   health is worse.  Compared to one year ago, the patient's mental   health is worse.    Recent Hospitalizations:  This patient has had a [facility] admission record on file within the last 365 days.  Current Medical Providers:  Patient Care Team:  Ken Andujar MD as PCP - General  Chuckie Mclaughlin MD as Consulting Physician (Urology)  Anayeli Alanis MD as Consulting Physician (Obstetrics and Gynecology)  BROOK Clarke MD as Consulting Physician (Ophthalmology)  Jose Alfredo Krishnamurthy MD as Consulting Physician (Hematology and Oncology)  Sean Canales MD as Consulting Physician (Orthopedic Surgery)  Esteban Moe MD as Consulting Physician (Orthopedic Surgery)  Feliciano Elizabeth MD (Inactive) as Consulting Physician (Gastroenterology)  Wallace Hook MD as Consulting Physician (Pain Medicine)  Sarah Beth Marcus APRN as Nurse Practitioner (Nephrology)  Brandon Miller MD as Consulting Physician (Gastroenterology)  Sybil Parmar MD as Consulting Physician (Cardiology)  Joseph Leonard MD as Consulting Physician (Nephrology)  Debbie Keene PA-C as Physician Assistant (Physician Assistant)  Ken Allen MD as Consulting Physician (Neurology)  BROOK Clarke MD as Consulting Physician (Ophthalmology)    Outpatient Medications Prior to Visit   Medication Sig Dispense Refill    acetaminophen (TYLENOL) 500 MG tablet Take 1 tablet by mouth Every 4 (Four) Hours As Needed for Mild Pain. Indications: Pain      aspirin 81  MG EC tablet Take 1 tablet by mouth Daily. 30 tablet 0    atorvastatin (LIPITOR) 40 MG tablet Take 1 tablet by mouth Every Night. 90 tablet 0    clindamycin (CLEOCIN) 150 MG capsule TAKE 1 CAPSULE BY MOUTH 3 TIMES A DAY after meals UNTIL GONE      Eliquis 2.5 MG tablet tablet TAKE 1 TABLET EVERY 12 HOURS (TWICE A DAY) (Patient taking differently: Take 1 tablet by mouth Every 12 (Twelve) Hours.) 180 tablet 3    famotidine (PEPCID) 20 MG tablet Take 1 tablet by mouth 2 (Two) Times a Day.      fexofenadine (ALLEGRA) 60 MG tablet Take 1 tablet by mouth Daily As Needed. Indications: Hayfever      furosemide (LASIX) 20 MG tablet Take 1 tablet by mouth Daily As Needed (if weight goes up 3lbs in 3 days). 30 tablet 11    melatonin 5 MG tablet tablet Take 1 tablet by mouth At Night As Needed. Indications: Trouble Sleeping      metoprolol succinate XL (TOPROL-XL) 25 MG 24 hr tablet Take 0.5 tablets by mouth Daily. 15 tablet 0    Riboflavin (VITAMIN B2 PO) Take  by mouth. (Patient taking differently: Take  by mouth Daily.)      Vibegron (Gemtesa) 75 MG tablet Take 1 tablet by mouth Daily for 270 days. Indications: Overactive Bladder, Urinary Incontinence 90 tablet 2    Vortioxetine HBr (TRINTELLIX) 10 MG tablet tablet Take 1 tablet by mouth Daily With Breakfast. 30 tablet 2     No facility-administered medications prior to visit.     No opioid medication identified on active medication list. I have reviewed chart for other potential  high risk medication/s and harmful drug interactions in the elderly.      Aspirin is on active medication list. Aspirin use is indicated based on review of current medical condition/s. Pros and cons of this therapy have been discussed today. Benefits of this medication outweigh potential harm.  Patient has been encouraged to continue taking this medication.  .      Patient Active Problem List   Diagnosis    Arthritis involving multiple sites    Essential hypertension    Permanent atrial  "fibrillation    History of Bell's palsy    CKD (chronic kidney disease) stage 4, GFR 15-29 ml/min    Gastroesophageal reflux disease without esophagitis    Hypercholesterolemia    Insomnia    Localized osteoporosis without current pathological fracture    Panic disorder    Chronic nonseasonal allergic rhinitis due to pollen    Other sleep apnea    History of MI (myocardial infarction)    Chronic coronary artery disease    Anemia of chronic disease    Weakness of right leg    Cardiac murmur    Senile osteoporosis    Hyperuricemia    Grief reaction    Peripheral vertigo    Renal cell carcinoma of left kidney    Renal oncocytoma of left kidney    History of left nephrectomy    Cerebrovascular accident (CVA) due to stenosis of small artery    History of renal cell carcinoma    Malignant neoplasm of left kidney, except renal pelvis    Diverticulosis    Irritable bowel syndrome with constipation    Chronic diastolic congestive heart failure    Hallucination, visual    Hypomagnesemia    Lumbar disc disease with radiculopathy    History of cardiomyopathy-Takotsubo's    Acute thrombotic stroke-right temporal    Acute on chronic renal insufficiency    Vertigo as late effect of stroke    Confusion     Advance Care Planning Advance Directive is on file.  ACP discussion was held with the patient during this visit. Patient has an advance directive in EMR which is still valid.             Objective   Vitals:    04/16/25 1346   BP: 136/66   Pulse: 65   SpO2: 99%   Weight: 57.6 kg (127 lb)   Height: 152.4 cm (60\")   PainSc: 0-No pain       Estimated body mass index is 24.8 kg/m² as calculated from the following:    Height as of this encounter: 152.4 cm (60\").    Weight as of this encounter: 57.6 kg (127 lb).    BMI is within normal parameters. No other follow-up for BMI required.           Does the patient have evidence of cognitive impairment? Yes                                                                                       "          Health  Risk Assessment    Smoking Status:  Social History     Tobacco Use   Smoking Status Former    Current packs/day: 0.00    Average packs/day: 1 pack/day for 3.0 years (3.0 ttl pk-yrs)    Types: Cigarettes    Start date: 1953    Quit date: 1956    Years since quittin.1    Passive exposure: Past   Smokeless Tobacco Never   Tobacco Comments    caffeine - 1/2 cup coffee daily      Alcohol Consumption:  Social History     Substance and Sexual Activity   Alcohol Use Not Currently    Alcohol/week: 3.0 standard drinks of alcohol    Types: 3 Glasses of wine per week    Comment: couple times a week       Fall Risk Screen  STEADI Fall Risk Assessment was completed, and patient is at LOW risk for falls.Assessment completed on:2025    Depression Screening   Little interest or pleasure in doing things? Not at all   Feeling down, depressed, or hopeless? Several days   PHQ-2 Total Score 1      Health Habits and Functional and Cognitive Screenin/16/2025     1:54 PM   Functional & Cognitive Status   Do you have difficulty preparing food and eating? No   Do you have difficulty bathing yourself, getting dressed or grooming yourself? No   Do you have difficulty using the toilet? No   Do you have difficulty moving around from place to place? Yes   Do you have trouble with steps or getting out of a bed or a chair? Yes   Current Diet Well Balanced Diet   Dental Exam Up to date   Eye Exam Up to date   Exercise (times per week) 0 times per week   Current Exercises Include No Regular Exercise   Do you need help using the phone?  No   Are you deaf or do you have serious difficulty hearing?  Yes   Do you need help to go to places out of walking distance? Yes   Do you need help shopping? Yes   Do you need help preparing meals?  Yes   Do you need help with housework?  No   Do you need help with laundry? No   Do you need help taking your medications? No   Do you need help managing money? No   Do you ever  drive or ride in a car without wearing a seat belt? No   Have you felt unusual stress, anger or loneliness in the last month? Yes   Who do you live with? Alone   If you need help, do you have trouble finding someone available to you? No   Have you been bothered in the last four weeks by sexual problems? No   Do you have difficulty concentrating, remembering or making decisions? No           Age-appropriate Screening Schedule:  Refer to the list below for future screening recommendations based on patient's age, sex and/or medical conditions. Orders for these recommended tests are listed in the plan section. The patient has been provided with a written plan.    Health Maintenance List  Health Maintenance   Topic Date Due    ZOSTER VACCINE (1 of 2) Never done    RSV Vaccine - Adults (1 - 1-dose 75+ series) Never done    COVID-19 Vaccine (9 - 2024-25 season) 09/01/2024    ANNUAL WELLNESS VISIT  01/17/2025    INFLUENZA VACCINE  07/01/2025    LIPID PANEL  01/03/2026    DXA SCAN  01/30/2026    TDAP/TD VACCINES (2 - Td or Tdap) 10/27/2028    Pneumococcal Vaccine 50+  Completed    MAMMOGRAM  Discontinued                                                                                                                                                CMS Preventative Services Quick Reference  Risk Factors Identified During Encounter  Immunizations Discussed/Encouraged: Shingrix, COVID19, and RSV (Respiratory Syncytial Virus)    The above risks/problems have been discussed with the patient.  Pertinent information has been shared with the patient in the After Visit Summary.  An After Visit Summary and PPPS were made available to the patient.    Follow Up:   Next Medicare Wellness visit to be scheduled in 1 year.         Additional E&M Note during same encounter follows:  Patient has additional, significant, and separately identifiable condition(s)/problem(s) that require work above and beyond the Medicare Wellness Visit     Chief  "Complaint  Medicare Wellness-subsequent    Subjective    HPI  Marleni is also being seen today for additional medical problem/s.       The patient is an 89-year-old female who presents for evaluation of depression, urge incontinence, confusion and memory issues, vertigo, atrial fibrillation, cardiomyopathy, and kidney function. She is accompanied by her primary caregiver.    Fatigue is reported around 3:00 PM daily, which is not attributed to a large lunch intake as she has a lack of appetite and finds all food tastes the same. Despite this, she continues to eat. No feelings of depression are endorsed, although there was a crying episode last week when her son visited. She is currently on Trintellix for depression.    Urinary incontinence persists despite being on Gemtesa for bladder issues. A bladder test was advised but declined. The primary caregiver reports frequent complaints of stomach pain, difficulty in decision-making, and a likelihood of not eating if left alone. Dressing is managed independently except for socks. Further testing, including a sleep study and kidney tests, has been declined, and there is reluctance to start new medications. She lives independently at home, with daily visits from her daughter and her son providing care on weekends. Mobility is maintained using a rollator walker both inside and outside the house, with no recent falls.    Occasional dizziness is experienced, but vertigo is not present. No recent hallucinations are reported, although there is mention of an unusual smell that others do not perceive. She is not seeing people.    Current medications include aspirin and Eliquis, as prescribed by her specialist. Care is also provided by a nephrologist, urologist, and neurologist. A Prolia injection is due in 05/2025, and an upcoming appointment with her cardiologist is scheduled.          Objective   Vital Signs:  /66   Pulse 65   Ht 152.4 cm (60\")   Wt 57.6 kg (127 lb)   " SpO2 99%   BMI 24.80 kg/m²   Physical Exam      General Appearance: Normal appearance, No acute distress.  Cardiovascular: Irregular but controlled rate, atrial fibrillation.  Respiratory: No respiratory distress, lungs clear to auscultation with no wheezes or rubs.  Psychiatric: normal affect, pleasant, cooperative with exam.  Other observations: no carotid bruits auscultated.    The following data was reviewed by: Ken Andujar MD on 04/16/2025:        Results  - Labs:    - Creatinine: 2 (2021)           Assessment and Plan        Medicare annual wellness visit, subsequent  Immunization needs have been discussed.  Recommend activity as tolerated as long as safe to do and avoid falls       Urge incontinence of urine  Urge incontinence not fully controlled.  However patient not interested in surgical intervention.  Continue same medication  Orders:    Vibegron (Gemtesa) 75 MG tablet; Take 1 tablet by mouth Daily for 270 days. Indications: Overactive Bladder, Urinary Incontinence    Ambulatory Referral to Chronic Care Management Disease Education, Medication Adherence, Care Coordination, Preventative Care, Barriers to Care, Caregiving/Support    Panic disorder    No change, some smell disturbances, which may be hallucinogenic. Add mirtazapine. Recheck in 3 months  Orders:    Vortioxetine HBr (TRINTELLIX) 10 MG tablet tablet; Take 1 tablet by mouth Daily With Breakfast for 270 days.    mirtazapine (REMERON) 7.5 MG tablet; Take 1 tablet by mouth Every Night for 270 days.    Ambulatory Referral to Chronic Care Management Disease Education, Medication Adherence, Care Coordination, Preventative Care, Barriers to Care, Caregiving/Support    Confusion    Intermittent episodes of confusion and memory issues may be related to depression.  Direct discussion below  Orders:    Ambulatory Referral to Chronic Care Management Disease Education, Medication Adherence, Care Coordination, Preventative Care, Barriers to Care,  Caregiving/Support    Other depression    Symptoms of depression, worse since stroke.  We will add mirtazapine to current Trintellix and after short-term follow-up in 3 months.  Orders:    Vortioxetine HBr (TRINTELLIX) 10 MG tablet tablet; Take 1 tablet by mouth Daily With Breakfast for 270 days.    mirtazapine (REMERON) 7.5 MG tablet; Take 1 tablet by mouth Every Night for 270 days.    Ambulatory Referral to Chronic Care Management Disease Education, Medication Adherence, Care Coordination, Preventative Care, Barriers to Care, Caregiving/Support    Vertigo as late effect of stroke  Continue with same therapy, caregiver stress from family members who are staying with patient most days of the week. Referral for chronic care management.  Continue to use related seated walker for ambulation since she is still having vertigo as late effect of stroke  Orders:    Ambulatory Referral to Chronic Care Management Disease Education, Medication Adherence, Care Coordination, Preventative Care, Barriers to Care, Caregiving/Support    Essential hypertension    The current medical regimen is effective;  continue present plan and medications.    Orders:    Ambulatory Referral to Chronic Care Management Disease Education, Medication Adherence, Care Coordination, Preventative Care, Barriers to Care, Caregiving/Support    Comprehensive Metabolic Panel; Future    CBC & Differential; Future    TSH; Future    Hypercholesterolemia     The current medical regimen is effective;  continue present plan and medications.  Check lipids with next set of labs    Orders:    Ambulatory Referral to Chronic Care Management Disease Education, Medication Adherence, Care Coordination, Preventative Care, Barriers to Care, Caregiving/Support    Lipid Panel With / Chol / HDL Ratio; Future    CK; Future    Acute thrombotic stroke    Orders:    Ambulatory Referral to Chronic Care Management Disease Education, Medication Adherence, Care Coordination,  Preventative Care, Barriers to Care, Caregiving/Support    History of cardiomyopathy  Keep follow-up with cardiology due to history of atrial fibrillation and cardiomyopathy  Orders:    Ambulatory Referral to Chronic Care Management Disease Education, Medication Adherence, Care Coordination, Preventative Care, Barriers to Care, Caregiving/Support    CKD (chronic kidney disease) stage 4, GFR 15-29 ml/min    Keep nephrology visit follow up.   Orders:    Ambulatory Referral to Chronic Care Management Disease Education, Medication Adherence, Care Coordination, Preventative Care, Barriers to Care, Caregiving/Support    Senile osteoporosis  Continue prolia  Orders:    Ambulatory Referral to Chronic Care Management Disease Education, Medication Adherence, Care Coordination, Preventative Care, Barriers to Care, Caregiving/Support    Comprehensive Metabolic Panel; Future    CBC & Differential; Future    Chronic diastolic congestive heart failure    Keep follow up with cardiolgy      Orders:    Ambulatory Referral to Chronic Care Management Disease Education, Medication Adherence, Care Coordination, Preventative Care, Barriers to Care, Caregiving/Support    Smell disturbance  Suspect hallucination. Add mirtazapine   Orders:    Ambulatory Referral to Chronic Care Management Disease Education, Medication Adherence, Care Coordination, Preventative Care, Barriers to Care, Caregiving/Support              Follow Up   Return in about 3 months (around 7/16/2025) for recheck/refill medication.  Patient was given instructions and counseling regarding her condition or for health maintenance advice. Please see specific information pulled into the AVS if appropriate.  Patient or patient representative verbalized consent for the use of Ambient Listening during the visit with  Ken Andujar MD for chart documentation. 4/16/2025  14:42 EDT

## 2025-04-16 NOTE — ASSESSMENT & PLAN NOTE
The current medical regimen is effective;  continue present plan and medications.    Orders:    Ambulatory Referral to Chronic Care Management Disease Education, Medication Adherence, Care Coordination, Preventative Care, Barriers to Care, Caregiving/Support    Comprehensive Metabolic Panel; Future    CBC & Differential; Future    TSH; Future

## 2025-04-16 NOTE — ASSESSMENT & PLAN NOTE
Continue prolia  Orders:    Ambulatory Referral to Chronic Care Management Disease Education, Medication Adherence, Care Coordination, Preventative Care, Barriers to Care, Caregiving/Support    Comprehensive Metabolic Panel; Future    CBC & Differential; Future

## 2025-04-16 NOTE — ASSESSMENT & PLAN NOTE
Intermittent episodes of confusion and memory issues may be related to depression.  Direct discussion below  Orders:    Ambulatory Referral to Chronic Care Management Disease Education, Medication Adherence, Care Coordination, Preventative Care, Barriers to Care, Caregiving/Support

## 2025-04-16 NOTE — ASSESSMENT & PLAN NOTE
Keep follow-up with cardiology due to history of atrial fibrillation and cardiomyopathy  Orders:    Ambulatory Referral to Chronic Care Management Disease Education, Medication Adherence, Care Coordination, Preventative Care, Barriers to Care, Caregiving/Support

## 2025-04-16 NOTE — PATIENT INSTRUCTIONS
You are due for Shingrix vaccination series ( the newest shingles vaccine).  It is a two shot series spaced 2-6 months apart. Please get this vaccine series started at your earliest convenience at your local pharmacy to help avoid shingles outbreak. It is more effective than the old Zostavax vaccine and is recommended even if you have had the Zostavax vaccine in the past.  Once the Shingrix series is completed, it does not need to be repeated.   For more information, please look at the website below:  https://www.cdc.gov/vaccines/vpd/shingles/public/shingrix/index.html    You are due for RSV vaccination. (provides protection against Respiratory Syncytial Virus Infection) Please  get the immunization at your local pharmacy at your earliest convenience. This immunization is currently a one time vaccine only. Please click on the link for more information about this vaccine.   https://www.cdc.gov/rsv/vaccines/older-adults.html    You are due for a Covid 19 vaccination. (provides protection against Covid 19 Viral Infection) Please  talk to your pharmacist and get the immunization at your local pharmacy at your earliest convenience. Please click on the link for more information about this vaccine.   https://www.cdc.gov/coronavirus/2019-ncov/vaccines/stay-up-to-date.html        Medicare Wellness  Personal Prevention Plan of Service     Date of Office Visit:    Encounter Provider:  Ken Andujar MD  Place of Service:  [facility]  Patient Name: Marleni Hummel  :  1935    As part of the Medicare Wellness portion of your visit today, we are providing you with this personalized preventive plan of services (PPPS). This plan is based upon recommendations of the [place] Preventive Services Task Force (USPSTF) and the Advisory Committee on Immunization Practices (ACIP).    This lists the preventive care services that should be considered, and provides dates of when you are due. Items  listed as completed are up-to-date and do not require any further intervention.    Health Maintenance   Topic Date Due   • ZOSTER VACCINE (1 of 2) Never done   • RSV Vaccine - Adults (1 - 1-dose 75+ series) Never done   • COVID-19 Vaccine (9 - 2024-25 season) 09/01/2024   • ANNUAL WELLNESS VISIT  01/17/2025   • INFLUENZA VACCINE  07/01/2025   • LIPID PANEL  01/03/2026   • DXA SCAN  01/30/2026   • TDAP/TD VACCINES (2 - Td or Tdap) 10/27/2028   • Pneumococcal Vaccine 50+  Completed   • MAMMOGRAM  Discontinued       Orders Placed This Encounter   Procedures   • Comprehensive Metabolic Panel     Standing Status:   Future     Expected Date:   4/30/2025     Expiration Date:   4/16/2026     Release to patient:   Routine Release [[id number]]   • Lipid Panel With / Chol / HDL Ratio     Standing Status:   Future     Expected Date:   4/30/2025     Expiration Date:   4/16/2026     Release to patient:   Routine Release [[id number]]   • CK     Standing Status:   Future     Expected Date:   4/30/2025     Expiration Date:   1/11/2026     Release to patient:   Routine Release [[id number]]   • TSH     Standing Status:   Future     Expected Date:   4/30/2025     Expiration Date:   1/11/2026     Release to patient:   Routine Release [[id number]]   • Ambulatory Referral to Chronic Care Management Disease Education, Medication Adherence, Care Coordination, Preventative Care, Barriers to Care, Caregiving/Support     Referral Priority:   Routine     Referral Type:   Referral to Case Management     Referral Reason:   Specialty Services Required     Requested Specialty:   Population Health     Number of Visits Requested:   1   • CBC & Differential     Standing Status:   Future     Expected Date:   4/30/2025     Expiration Date:   4/16/2026     Manual Differential:   No     Release to patient:   Routine Release [[id number]]       No follow-ups on file.

## 2025-04-16 NOTE — ASSESSMENT & PLAN NOTE
Keep follow up with cardiolgy      Orders:    Ambulatory Referral to Chronic Care Management Disease Education, Medication Adherence, Care Coordination, Preventative Care, Barriers to Care, Caregiving/Support

## 2025-04-16 NOTE — ASSESSMENT & PLAN NOTE
No change, some smell disturbances, which may be hallucinogenic. Add mirtazapine. Recheck in 3 months  Orders:    Vortioxetine HBr (TRINTELLIX) 10 MG tablet tablet; Take 1 tablet by mouth Daily With Breakfast for 270 days.    mirtazapine (REMERON) 7.5 MG tablet; Take 1 tablet by mouth Every Night for 270 days.    Ambulatory Referral to Chronic Care Management Disease Education, Medication Adherence, Care Coordination, Preventative Care, Barriers to Care, Caregiving/Support

## 2025-04-16 NOTE — ASSESSMENT & PLAN NOTE
Continue with same therapy, caregiver stress from family members who are staying with patient most days of the week. Referral for chronic care management.  Continue to use related seated walker for ambulation since she is still having vertigo as late effect of stroke  Orders:    Ambulatory Referral to Chronic Care Management Disease Education, Medication Adherence, Care Coordination, Preventative Care, Barriers to Care, Caregiving/Support

## 2025-04-16 NOTE — ASSESSMENT & PLAN NOTE
The current medical regimen is effective;  continue present plan and medications.  Check lipids with next set of labs    Orders:    Ambulatory Referral to Chronic Care Management Disease Education, Medication Adherence, Care Coordination, Preventative Care, Barriers to Care, Caregiving/Support    Lipid Panel With / Chol / HDL Ratio; Future    CK; Future

## 2025-04-16 NOTE — ASSESSMENT & PLAN NOTE
Keep nephrology visit follow up.   Orders:    Ambulatory Referral to Chronic Care Management Disease Education, Medication Adherence, Care Coordination, Preventative Care, Barriers to Care, Caregiving/Support

## 2025-04-16 NOTE — ASSESSMENT & PLAN NOTE
Orders:    Ambulatory Referral to Chronic Care Management Disease Education, Medication Adherence, Care Coordination, Preventative Care, Barriers to Care, Caregiving/Support

## 2025-04-18 ENCOUNTER — REFERRAL TRIAGE (OUTPATIENT)
Dept: CASE MANAGEMENT | Facility: OTHER | Age: 89
End: 2025-04-18
Payer: MEDICARE

## 2025-04-21 ENCOUNTER — TELEPHONE (OUTPATIENT)
Dept: FAMILY MEDICINE CLINIC | Facility: CLINIC | Age: 89
End: 2025-04-21
Payer: MEDICARE

## 2025-04-21 ENCOUNTER — TELEPHONE (OUTPATIENT)
Dept: CASE MANAGEMENT | Facility: OTHER | Age: 89
End: 2025-04-21
Payer: MEDICARE

## 2025-04-21 NOTE — TELEPHONE ENCOUNTER
Caller: NEAL HERNANDEZ    Relationship: Emergency Contact    Best call back number: [phone]    Who are you requesting to speak with (clinical staff, provider,  specific staff member): CLINICAL STAFF    What was the call regarding: STATED THAT THE PATIENT IS HAVING SOME CONFUSION AND THEY WANT TO MAKE DR. KHAN AWARE OF THIS. STATED THAT THE PHARMACIST TOLD THEM THAT THEY SHOULDN'T BE TAKING THE 2 MEDICATIONS TOGETHER SO THEY WOULD LIKE TO KNOW WHAT THEY SHOULD DO. STATED THAT THEY TAKE THE Vortioxetine HBr (TRINTELLIX) 10 MG tablet tablet IN THE MORNING AND THE mirtazapine (REMERON) 7.5 MG tablet AT NIGHT. PLEASE CALL AND ADVISE

## 2025-04-21 NOTE — TELEPHONE ENCOUNTER
Called and spoke with patients daughter. Let her know that Dr. Andujar wants her to hold the new medication, mirtazapine and to make an appt.   She will call back to schedule the appt. As she doesn't have her calendar in front of her at the moment.

## 2025-04-22 ENCOUNTER — TELEPHONE (OUTPATIENT)
Dept: CASE MANAGEMENT | Facility: OTHER | Age: 89
End: 2025-04-22
Payer: MEDICARE

## 2025-04-24 ENCOUNTER — APPOINTMENT (OUTPATIENT)
Dept: CT IMAGING | Facility: HOSPITAL | Age: 89
End: 2025-04-24
Payer: MEDICARE

## 2025-04-24 ENCOUNTER — HOSPITAL ENCOUNTER (OUTPATIENT)
Facility: HOSPITAL | Age: 89
Setting detail: OBSERVATION
Discharge: HOME | End: 2025-04-25
Attending: EMERGENCY MEDICINE | Admitting: HOSPITALIST
Payer: MEDICARE

## 2025-04-24 ENCOUNTER — APPOINTMENT (OUTPATIENT)
Dept: GENERAL RADIOLOGY | Facility: HOSPITAL | Age: 89
End: 2025-04-24
Payer: MEDICARE

## 2025-04-24 ENCOUNTER — TELEPHONE (OUTPATIENT)
Dept: CASE MANAGEMENT | Facility: OTHER | Age: 89
End: 2025-04-24
Payer: MEDICARE

## 2025-04-24 ENCOUNTER — PATIENT OUTREACH (OUTPATIENT)
Dept: CASE MANAGEMENT | Facility: OTHER | Age: 89
End: 2025-04-24
Payer: MEDICARE

## 2025-04-24 DIAGNOSIS — R41.82 ALTERED MENTAL STATUS, UNSPECIFIED ALTERED MENTAL STATUS TYPE: Primary | ICD-10-CM

## 2025-04-24 DIAGNOSIS — D63.8 ANEMIA OF CHRONIC DISEASE: ICD-10-CM

## 2025-04-24 DIAGNOSIS — I48.21 PERMANENT ATRIAL FIBRILLATION: ICD-10-CM

## 2025-04-24 DIAGNOSIS — N18.4 CKD (CHRONIC KIDNEY DISEASE) STAGE 4, GFR 15-29 ML/MIN: ICD-10-CM

## 2025-04-24 DIAGNOSIS — F41.0 PANIC DISORDER: ICD-10-CM

## 2025-04-24 DIAGNOSIS — R41.0 CONFUSION: Primary | ICD-10-CM

## 2025-04-24 DIAGNOSIS — I10 ESSENTIAL HYPERTENSION: ICD-10-CM

## 2025-04-24 PROBLEM — E87.20 METABOLIC ACIDOSIS: Status: ACTIVE | Noted: 2025-04-24

## 2025-04-24 LAB
ALBUMIN SERPL-MCNC: 4.1 G/DL (ref 3.5–5.2)
ALBUMIN/GLOB SERPL: 1.6 G/DL
ALP SERPL-CCNC: 48 U/L (ref 39–117)
ALT SERPL W P-5'-P-CCNC: 14 U/L (ref 1–33)
AMPHET+METHAMPHET UR QL: NEGATIVE
AMPHETAMINES UR QL: NEGATIVE
ANION GAP SERPL CALCULATED.3IONS-SCNC: 13 MMOL/L (ref 5–15)
APTT PPP: 28.2 SECONDS (ref 22.7–35.4)
AST SERPL-CCNC: 31 U/L (ref 1–32)
BARBITURATES UR QL SCN: NEGATIVE
BASOPHILS # BLD AUTO: 0.03 10*3/MM3 (ref 0–0.2)
BASOPHILS NFR BLD AUTO: 0.4 % (ref 0–1.5)
BENZODIAZ UR QL SCN: NEGATIVE
BILIRUB SERPL-MCNC: 0.6 MG/DL (ref 0–1.2)
BILIRUB UR QL STRIP: NEGATIVE
BUN SERPL-MCNC: 32 MG/DL (ref 8–23)
BUN/CREAT SERPL: 16.5 (ref 7–25)
BUPRENORPHINE SERPL-MCNC: NEGATIVE NG/ML
CALCIUM SPEC-SCNC: 8.5 MG/DL (ref 8.6–10.5)
CANNABINOIDS SERPL QL: NEGATIVE
CHLORIDE SERPL-SCNC: 105 MMOL/L (ref 98–107)
CLARITY UR: CLEAR
CO2 SERPL-SCNC: 17 MMOL/L (ref 22–29)
COCAINE UR QL: NEGATIVE
COLOR UR: ABNORMAL
CREAT SERPL-MCNC: 1.94 MG/DL (ref 0.57–1)
DEPRECATED RDW RBC AUTO: 46.6 FL (ref 37–54)
EGFRCR SERPLBLD CKD-EPI 2021: 24.7 ML/MIN/1.73
EOSINOPHIL # BLD AUTO: 0.11 10*3/MM3 (ref 0–0.4)
EOSINOPHIL NFR BLD AUTO: 1.4 % (ref 0.3–6.2)
ERYTHROCYTE [DISTWIDTH] IN BLOOD BY AUTOMATED COUNT: 13.9 % (ref 12.3–15.4)
ETHANOL BLD-MCNC: <10 MG/DL (ref 0–10)
ETHANOL UR QL: <0.01 %
FENTANYL UR-MCNC: NEGATIVE NG/ML
GLOBULIN UR ELPH-MCNC: 2.5 GM/DL
GLUCOSE SERPL-MCNC: 106 MG/DL (ref 65–99)
GLUCOSE UR STRIP-MCNC: NEGATIVE MG/DL
HCT VFR BLD AUTO: 32.6 % (ref 34–46.6)
HGB BLD-MCNC: 10.4 G/DL (ref 12–15.9)
HGB UR QL STRIP.AUTO: NEGATIVE
IMM GRANULOCYTES # BLD AUTO: 0.02 10*3/MM3 (ref 0–0.05)
IMM GRANULOCYTES NFR BLD AUTO: 0.3 % (ref 0–0.5)
KETONES UR QL STRIP: NEGATIVE
LEUKOCYTE ESTERASE UR QL STRIP.AUTO: NEGATIVE
LYMPHOCYTES # BLD AUTO: 1.07 10*3/MM3 (ref 0.7–3.1)
LYMPHOCYTES NFR BLD AUTO: 13.9 % (ref 19.6–45.3)
MAGNESIUM SERPL-MCNC: 2.1 MG/DL (ref 1.6–2.4)
MCH RBC QN AUTO: 29.5 PG (ref 26.6–33)
MCHC RBC AUTO-ENTMCNC: 31.9 G/DL (ref 31.5–35.7)
MCV RBC AUTO: 92.4 FL (ref 79–97)
METHADONE UR QL SCN: NEGATIVE
MONOCYTES # BLD AUTO: 0.65 10*3/MM3 (ref 0.1–0.9)
MONOCYTES NFR BLD AUTO: 8.4 % (ref 5–12)
NEUTROPHILS NFR BLD AUTO: 5.82 10*3/MM3 (ref 1.7–7)
NEUTROPHILS NFR BLD AUTO: 75.6 % (ref 42.7–76)
NITRITE UR QL STRIP: NEGATIVE
NRBC BLD AUTO-RTO: 0 /100 WBC (ref 0–0.2)
OPIATES UR QL: NEGATIVE
OXYCODONE UR QL SCN: NEGATIVE
PCP UR QL SCN: NEGATIVE
PH UR STRIP.AUTO: 6 [PH] (ref 5–8)
PLATELET # BLD AUTO: 170 10*3/MM3 (ref 140–450)
PMV BLD AUTO: 10.5 FL (ref 6–12)
POTASSIUM SERPL-SCNC: 4.8 MMOL/L (ref 3.5–5.2)
PROCALCITONIN SERPL-MCNC: 0.06 NG/ML (ref 0–0.25)
PROT SERPL-MCNC: 6.6 G/DL (ref 6–8.5)
PROT UR QL STRIP: ABNORMAL
RBC # BLD AUTO: 3.53 10*6/MM3 (ref 3.77–5.28)
SODIUM SERPL-SCNC: 135 MMOL/L (ref 136–145)
SP GR UR STRIP: 1.01 (ref 1–1.03)
TRICYCLICS UR QL SCN: NEGATIVE
UROBILINOGEN UR QL STRIP: ABNORMAL
WBC NRBC COR # BLD AUTO: 7.7 10*3/MM3 (ref 3.4–10.8)

## 2025-04-24 PROCEDURE — G0378 HOSPITAL OBSERVATION PER HR: HCPCS

## 2025-04-24 PROCEDURE — 84145 PROCALCITONIN (PCT): CPT | Performed by: EMERGENCY MEDICINE

## 2025-04-24 PROCEDURE — 71045 X-RAY EXAM CHEST 1 VIEW: CPT

## 2025-04-24 PROCEDURE — 80053 COMPREHEN METABOLIC PANEL: CPT | Performed by: EMERGENCY MEDICINE

## 2025-04-24 PROCEDURE — 93010 ELECTROCARDIOGRAM REPORT: CPT | Performed by: INTERNAL MEDICINE

## 2025-04-24 PROCEDURE — 80307 DRUG TEST PRSMV CHEM ANLYZR: CPT | Performed by: EMERGENCY MEDICINE

## 2025-04-24 PROCEDURE — 36415 COLL VENOUS BLD VENIPUNCTURE: CPT

## 2025-04-24 PROCEDURE — 81003 URINALYSIS AUTO W/O SCOPE: CPT | Performed by: EMERGENCY MEDICINE

## 2025-04-24 PROCEDURE — 99285 EMERGENCY DEPT VISIT HI MDM: CPT

## 2025-04-24 PROCEDURE — 82077 ASSAY SPEC XCP UR&BREATH IA: CPT | Performed by: EMERGENCY MEDICINE

## 2025-04-24 PROCEDURE — 93005 ELECTROCARDIOGRAM TRACING: CPT | Performed by: STUDENT IN AN ORGANIZED HEALTH CARE EDUCATION/TRAINING PROGRAM

## 2025-04-24 PROCEDURE — 85025 COMPLETE CBC W/AUTO DIFF WBC: CPT | Performed by: EMERGENCY MEDICINE

## 2025-04-24 PROCEDURE — 85730 THROMBOPLASTIN TIME PARTIAL: CPT | Performed by: EMERGENCY MEDICINE

## 2025-04-24 PROCEDURE — 83735 ASSAY OF MAGNESIUM: CPT | Performed by: STUDENT IN AN ORGANIZED HEALTH CARE EDUCATION/TRAINING PROGRAM

## 2025-04-24 PROCEDURE — 70450 CT HEAD/BRAIN W/O DYE: CPT

## 2025-04-24 RX ORDER — SODIUM BICARBONATE 650 MG/1
650 TABLET ORAL 3 TIMES DAILY
Status: DISCONTINUED | OUTPATIENT
Start: 2025-04-24 | End: 2025-04-25 | Stop reason: HOSPADM

## 2025-04-24 RX ORDER — AMOXICILLIN 250 MG
2 CAPSULE ORAL 2 TIMES DAILY PRN
Status: DISCONTINUED | OUTPATIENT
Start: 2025-04-24 | End: 2025-04-25 | Stop reason: HOSPADM

## 2025-04-24 RX ORDER — ATORVASTATIN CALCIUM 20 MG/1
40 TABLET, FILM COATED ORAL NIGHTLY
Status: DISCONTINUED | OUTPATIENT
Start: 2025-04-24 | End: 2025-04-25 | Stop reason: HOSPADM

## 2025-04-24 RX ORDER — ASPIRIN 81 MG/1
81 TABLET ORAL DAILY
Status: DISCONTINUED | OUTPATIENT
Start: 2025-04-25 | End: 2025-04-25 | Stop reason: HOSPADM

## 2025-04-24 RX ORDER — BISACODYL 10 MG
10 SUPPOSITORY, RECTAL RECTAL DAILY PRN
Status: DISCONTINUED | OUTPATIENT
Start: 2025-04-24 | End: 2025-04-25 | Stop reason: HOSPADM

## 2025-04-24 RX ORDER — FUROSEMIDE 20 MG/1
20 TABLET ORAL DAILY PRN
Status: DISCONTINUED | OUTPATIENT
Start: 2025-04-24 | End: 2025-04-24

## 2025-04-24 RX ORDER — NITROGLYCERIN 0.4 MG/1
0.4 TABLET SUBLINGUAL
Status: DISCONTINUED | OUTPATIENT
Start: 2025-04-24 | End: 2025-04-25 | Stop reason: HOSPADM

## 2025-04-24 RX ORDER — ONDANSETRON 4 MG/1
4 TABLET, ORALLY DISINTEGRATING ORAL EVERY 6 HOURS PRN
Status: DISCONTINUED | OUTPATIENT
Start: 2025-04-24 | End: 2025-04-25 | Stop reason: HOSPADM

## 2025-04-24 RX ORDER — BISACODYL 5 MG/1
5 TABLET, DELAYED RELEASE ORAL DAILY PRN
Status: DISCONTINUED | OUTPATIENT
Start: 2025-04-24 | End: 2025-04-25 | Stop reason: HOSPADM

## 2025-04-24 RX ORDER — ONDANSETRON 2 MG/ML
4 INJECTION INTRAMUSCULAR; INTRAVENOUS EVERY 6 HOURS PRN
Status: DISCONTINUED | OUTPATIENT
Start: 2025-04-24 | End: 2025-04-25 | Stop reason: HOSPADM

## 2025-04-24 RX ORDER — METOPROLOL SUCCINATE 25 MG/1
12.5 TABLET, EXTENDED RELEASE ORAL DAILY
Status: DISCONTINUED | OUTPATIENT
Start: 2025-04-25 | End: 2025-04-25 | Stop reason: HOSPADM

## 2025-04-24 RX ORDER — ACETAMINOPHEN 325 MG/1
650 TABLET ORAL EVERY 4 HOURS PRN
Status: DISCONTINUED | OUTPATIENT
Start: 2025-04-24 | End: 2025-04-25 | Stop reason: HOSPADM

## 2025-04-24 RX ORDER — POLYETHYLENE GLYCOL 3350 17 G/17G
17 POWDER, FOR SOLUTION ORAL DAILY PRN
Status: DISCONTINUED | OUTPATIENT
Start: 2025-04-24 | End: 2025-04-25 | Stop reason: HOSPADM

## 2025-04-24 RX ORDER — ESTRADIOL 0.1 MG/G
1 CREAM VAGINAL 3 TIMES WEEKLY
COMMUNITY

## 2025-04-24 RX ADMIN — ATORVASTATIN CALCIUM 40 MG: 20 TABLET, FILM COATED ORAL at 20:22

## 2025-04-24 RX ADMIN — APIXABAN 2.5 MG: 2.5 TABLET, FILM COATED ORAL at 20:22

## 2025-04-24 NOTE — PROGRESS NOTES
Clinical Pharmacy Services: Medication History    Marleni Hummel is a 89 y.o. female presenting to [facility] for   Chief Complaint   Patient presents with    Altered Mental Status       She  has a past medical history of Acute bronchitis due to Rhinovirus (05/31/2022), Acute vulvitis (08/21/2019), Allergic, Allergic rhinitis, Anemia of chronic disease, Arthropathy of knee, Atrial fibrillation, Back pain, Bell's palsy, Bradycardia (05/27/2024), Bradycardia, drug induced (05/27/2024), Caregiver stress syndrome (04/17/2019), Chronic coronary artery disease, Chronic kidney disease (CKD), stage III (moderate), CKD (chronic kidney disease) stage 4, GFR 15-29 ml/min, Diverticulitis (12/14/2022), CARROLL (dyspnea on exertion) (03/17/2023), Edema, Elevated serum free T4 level (03/21/2016), Encounter for eye exam (09/2020), Essential hypertension, Fatigue, GERD (gastroesophageal reflux disease), H/O Heart murmur, Heart attack (10/05/2015), Hematuria (12/12/2016), Herpes zoster without complication, Hip arthritis, History of anemia due to chronic kidney disease, History of bone density study (02/28/2008), History of pelvic fracture (2015), History of pneumonia, History of transfusion, Hypercholesterolemia, IFG (impaired fasting glucose), Insomnia, Left kidney mass (07/2020), Limited joint range of motion, Mixed hyperlipidemia, Muscle tension headache (04/03/2019), New daily persistent headache (12/17/2018), 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    Allergies as of 04/24/2025 - Reviewed 04/24/2025   Allergen Reaction Noted    Medrol [methylprednisolone] Other (See Comments) 07/17/2024    Morphine Anaphylaxis  06/20/2016    Oxycodone Anaphylaxis and Unknown - Low Severity 12/18/2015    Ace inhibitors Cough and Unknown (See Comments) 01/21/2016    Bactrim [sulfamethoxazole-trimethoprim] Rash 07/14/2021    Levofloxacin Itching and Rash 12/18/2015    Torsemide Dizziness 11/07/2022       Medication information was obtained from: Daughter   Pharmacy and Phone Number:     Prior to Admission Medications       Prescriptions Last Dose Informant Patient Reported? Taking?    acetaminophen (TYLENOL) 500 MG tablet  Child Yes Yes    Take 1 tablet by mouth Every 4 (Four) Hours As Needed for Mild Pain. Indications: Pain    aspirin 81 MG EC tablet  Child No Yes    Take 1 tablet by mouth Daily.    atorvastatin (LIPITOR) 40 MG tablet  Child No Yes    Take 1 tablet by mouth Every Night.    Eliquis 2.5 MG tablet tablet  Child No Yes    TAKE 1 TABLET EVERY 12 HOURS (TWICE A DAY)    Patient taking differently:  Take 1 tablet by mouth Every 12 (Twelve) Hours.    estradiol (ESTRACE) 0.1 MG/GM vaginal cream  Child Yes Yes    Insert 1 g into the vagina 3 (Three) Times a Week.    fexofenadine (ALLEGRA) 60 MG tablet  Child Yes Yes    Take 1 tablet by mouth Daily As Needed. Indications: Hayfever    furosemide (LASIX) 20 MG tablet  Child No Yes    Take 1 tablet by mouth Daily As Needed (if weight goes up 3lbs in 3 days).    melatonin 5 MG tablet tablet  Child Yes Yes    Take 1 tablet by mouth At Night As Needed. Indications: Trouble Sleeping    metoprolol succinate XL (TOPROL-XL) 25 MG 24 hr tablet  Child No Yes    Take 0.5 tablets by mouth Daily.    Riboflavin (VITAMIN B2 PO)  Child Yes Yes    Take  by mouth.    Patient taking differently:  Take 1 tablet by mouth Daily.    Vibegron (Gemtesa) 75 MG tablet  Child No Yes    Take 1 tablet by mouth Daily for 270 days. Indications: Overactive Bladder, Urinary Incontinence    Vortioxetine HBr (TRINTELLIX) 10 MG tablet tablet  Child No Yes    Take 1 tablet by mouth Daily With Breakfast for 270 days.     mirtazapine (REMERON) 7.5 MG tablet 4/20/2025 Child No No    Take 1 tablet by mouth Every Night for 270 days.              Medication notes: Daughter stated pt stopped taking meloxicam last dose was Sunday night 4/20.     This medication list is complete to the best of my knowledge as of 4/24/2025    Please call if questions.    Eugenie Wilson  Medication History Technician   [phone]    4/24/2025 18:28 EDT

## 2025-04-24 NOTE — TELEPHONE ENCOUNTER
Spoke with patient daughter this morning. States that patient was in the house screaming for help. Daughter expressing concern that patient may have a UTI. No appointments available with PCP today. Daughter is requesting a urine lab collection to rule out a UTI.

## 2025-04-24 NOTE — OUTREACH NOTE
AMBULATORY CASE MANAGEMENT NOTE    Names and Relationships of Patient/Support Persons: Contact: NEAL HERNANDEZ; Relationship: POA/Surrogate/Guardian -     Adult Patient Profile  Questions/Answers      Flowsheet Row Most Recent Value   Symptoms/Conditions Managed at Home cardiovascular, neurological, hematologic   Barriers to Managing Health understanding health advice, stress of chronic illness   Cardiovascular Symptoms/Conditions high blood cholesterol, hypertension   Cardiovascular Management Strategies routine screening, diet modification, medication therapy   Cardiovascular Self-Management Outcome unsure   Cardiovascular Comment States that blood pressures can run 130/80's and can get as high as 170-180's and did have a stroke during an Iron infusion, and A-fib. We will further discuss cholesterol diet at a later time. focus will remain on emergent needs.   Hematologic Symptoms/Conditions anemia   Hematologic Management Strategies routine screening, medication therapy   Hematologic Self-Management Outcome unsure   Hematologic Comment Next iron infusion in tomorrow through [facility] 4/25/25   Neurological Symptoms/Conditions stroke, ischemic, other (see comments)  [auditory/ visual/ Olfacotry hallucinations]   Neurological Management Strategies routine screening   Neurological Self-Management Outcome unsure   Neurological Comment history of 4 strokes with most recent. Last stroke affected patient sight, does see neurologist for strokes., Next visit in May.        CCM Interim Update    Spoke with patient daughter/ POA this morning. Verified from verbal release.    ACM introducing self and purpose for outreach. Explained CCM and benefits of ACM assistance.    Confirms Remeron was discontinued 4/23/25 d/t worsening confusion. States that patient has had a history of auditory and visual hallucinations which has worsened. This remains current emergent need at this time.    Monitored by Cardiology, Neurology and Nephrology  routinely. Thorough chart review was completed.     Patient is not on a fluid restriction with CDK stage 4. Patient does ambulate to the bathroom independently 3-4 times during the day with leaking in between. Patient is not on dialysis.    Care Coordination    ACM was able to provide active listening, disease education, and recommendations. Encouragement, reassurance and empathy were also provided during telephonic assessment.    ACM will provide information regarding how to speak to a patient during a hallucination. M collaborating with medical staff regarding office scheduling for UA- per daughter request to rule out UTI. Medical staffing verbalizing that they will reach out to patient daughter.    No further needs at this time. Next outreach has been scheduled.              Education Documentation  No documentation found.        Yoselin KIM  Ambulatory Case Management    4/24/2025, 11:10 EDT

## 2025-04-24 NOTE — TELEPHONE ENCOUNTER
Patient daughter calling to request a urine collection, to check for possible UTI. States that patient hallucinations have increased and have become worse over night.

## 2025-04-24 NOTE — ED PROVIDER NOTES
EMERGENCY DEPARTMENT ENCOUNTER  Room Number:  14/14  PCP: Ken Andujar MD  Independent Historians: Patient, Family, and EMS      HPI:  Chief Complaint: had concerns including Altered Mental Status.       Context: Marleni Hummel is a 89 y.o. female with a medical history of HTN, A-fib, CKD, CAD who presents to the ED c/o acute alteration in mental status.  Primary history is provided by daughter who is present at the bedside.  She began noticing 3 days ago that patient was having some increased confusion.  She noted it dramatically increased today.  Patient denies any pain, fevers, chills, dysuria or increased urinary frequency.  Patient states she knows because she is confused.  Patient relays a story of multiple individuals coming to visit her in her home which the daughter relays as not being real.      Review of prior external notes (non-ED) -and- Review of prior external test results outside of this encounter: Office visit with neurology reviewed from 3/5/2025 and notable for follow-up secondary to a stroke.  Stroke occurred on 1 5/25.  Plan at that time was to continue Eliquis, Lipitor and aspirin and maintain blood pressures less than 140/90.    Prescription drug monitoring program review:         PAST MEDICAL HISTORY  Active Ambulatory Problems     Diagnosis Date Noted    Arthritis involving multiple sites     Essential hypertension     Permanent atrial fibrillation     History of Bell's palsy     CKD (chronic kidney disease) stage 4, GFR 15-29 ml/min     Gastroesophageal reflux disease without esophagitis     Hypercholesterolemia     Insomnia     Localized osteoporosis without current pathological fracture     Panic disorder     Chronic nonseasonal allergic rhinitis due to pollen     Other sleep apnea     History of MI (myocardial infarction) 12/21/2015    Chronic coronary artery disease 01/25/2016    Anemia of chronic disease 09/12/2016    Weakness of right leg 03/13/2017    Cardiac murmur 05/04/2017     Senile osteoporosis 06/30/2017    Hyperuricemia 09/07/2017    Grief reaction 09/30/2019    Peripheral vertigo 02/25/2020    Renal cell carcinoma of left kidney 11/22/2020    Renal oncocytoma of left kidney 12/28/2020    History of left nephrectomy 04/19/2021    Cerebrovascular accident (CVA) due to stenosis of small artery 11/29/2021    History of renal cell carcinoma 11/30/2021    Malignant neoplasm of left kidney, except renal pelvis 06/07/2022    Diverticulosis 12/14/2022    Irritable bowel syndrome with constipation 12/14/2022    Chronic diastolic congestive heart failure 03/18/2023    Hallucination, visual 01/17/2024    Hypomagnesemia 05/02/2024    Lumbar disc disease with radiculopathy 12/02/2021    History of cardiomyopathy-Takotsubo's 10/11/2024    Acute thrombotic stroke-right temporal 10/11/2024    Acute on chronic renal insufficiency 10/23/2024    Vertigo as late effect of stroke 10/27/2024    Confusion 01/03/2025     Resolved Ambulatory Problems     Diagnosis Date Noted    Fatigue     Hip arthritis     IFG (impaired fasting glucose)     Rectal bleeding     Vitamin D deficiency     Urinary incontinence     History of right hip replacement 12/21/2015    Closed fracture of neck of right femur with routine healing 12/21/2015    History of right knee joint replacement 12/21/2015    History of left knee replacement 12/21/2015    Stress-induced cardiomyopathy 01/25/2016    Elevated serum free T4 level 03/21/2016    Acute pyelonephritis 10/06/2016    Acute cystitis 12/03/2016    Hematuria 12/12/2016    Sinusitis 01/22/2017    Frequent falls 03/13/2017    Atrial fibrillation with RVR 06/19/2017    ANGY (acute kidney injury) 06/20/2017    Viral gastroenteritis 06/20/2017    Dehydration 06/20/2017    Nausea & vomiting 06/20/2017    Diarrhea 06/20/2017    Closed displaced fracture of left femoral neck 08/30/2017    Bacteriuria 08/30/2017    New daily persistent headache 12/17/2018    Muscle tension headache  04/03/2019    Caregiver stress syndrome 04/17/2019    Acute vulvitis 08/21/2019    Dizziness 02/23/2020    Left facial numbness 02/23/2020    Stroke-like symptoms 02/23/2020    Left kidney mass 08/17/2020    Left renal mass 11/16/2020    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    Acute kidney injury 05/30/2022    Acute bronchitis due to Rhinovirus 05/31/2022    Hyperkalemia 05/31/2022    Diverticulitis 12/14/2022    CARROLL (dyspnea on exertion) 03/17/2023    Bradycardia, drug induced 05/27/2024    Shortness of breath 07/10/2024    Hyponatremia 07/11/2024     Past Medical History:   Diagnosis Date    Allergic     Allergic rhinitis     Arthropathy of knee     Atrial fibrillation     Back pain     Bell's palsy     Bradycardia 05/27/2024    Chronic kidney disease (CKD), stage III (moderate)     Edema     Encounter for eye exam 09/2020    GERD (gastroesophageal reflux disease)     H/O Heart murmur     Heart attack 10/05/2015    Herpes zoster without complication     History of anemia due to chronic kidney disease     History of bone density study 02/28/2008    History of pelvic fracture 2015    History of pneumonia     History of transfusion     Limited joint range of motion     Mixed hyperlipidemia     Osteoarthritis     Osteoporosis     PONV (postoperative nausea and vomiting)     Sleep apnea     Spinal stenosis, lumbar region with neurogenic claudication 12/02/2021    Stress     Stroke 10/10/2024         PAST SURGICAL HISTORY  Past Surgical History:   Procedure Laterality Date    CATARACT EXTRACTION Left 04/01/2013    Dr. Clarke    CATARACT EXTRACTION Right 04/16/2013    Dr. Clarke    COLONOSCOPY  04/18/2014    Dr. Elizabeth; no polyps    EPIDURAL BLOCK      HIP PERCUTANEOUS PINNING Left 8/31/2017    Procedure: LT HIP PERCUTANEOUS PINNING;  Surgeon: Sean Canales MD;  Location: [facility] OR;  Service:     MAMMO BILATERAL  11/2013    NEPHRECTOMY Left 11/16/2020    Procedure: LAPAROSCOPIC NEPHRECTOMY;  Surgeon: Chuckie Mclaughlin  MD LEEANN;  Location: [facility] MAIN OR;  Service: Urology;  Laterality: Left;    PAP SMEAR  2011    TIBIA FRACTURE SURGERY Right     REMOVED PLATE LATER IN     TONSILLECTOMY  1947    TOTAL HIP ARTHROPLASTY Right 2015    TOTAL HIP ARTHROPLASTY Right 2016    TOTAL KNEE ARTHROPLASTY Bilateral 2004         FAMILY HISTORY  Family History   Problem Relation Age of Onset    Hypertension Other     Hypertension Mother     Stroke Mother     Heart disease Mother     Hypertension Maternal Grandmother     Malig Hyperthermia Neg Hx          SOCIAL HISTORY  Social History     Socioeconomic History    Marital status:     Years of education: High school   Tobacco Use    Smoking status: Former     Current packs/day: 0.00     Average packs/day: 1 pack/day for 3.0 years (3.0 ttl pk-yrs)     Types: Cigarettes     Start date: 1953     Quit date: 1956     Years since quittin.2     Passive exposure: Past    Smokeless tobacco: Never    Tobacco comments:     caffeine - 1/2 cup coffee daily    Vaping Use    Vaping status: Never Used   Substance and Sexual Activity    Alcohol use: Not Currently     Alcohol/week: 3.0 standard drinks of alcohol     Types: 3 Glasses of wine per week     Comment: couple times a week    Drug use: Never    Sexual activity: Not Currently         ALLERGIES  Medrol [methylprednisolone], Morphine, Oxycodone, Ace inhibitors, Bactrim [sulfamethoxazole-trimethoprim], Levofloxacin, and Torsemide      REVIEW OF SYSTEMS  Review of Systems  Included in HPI  All systems reviewed and negative except for those discussed in HPI.      PHYSICAL EXAM    I have reviewed the triage vital signs and nursing notes.    ED Triage Vitals [25 1328]   Temp Heart Rate Resp BP SpO2   99.8 °F (37.7 °C) 62 18 (!) 183/75 97 %      Temp src Heart Rate Source Patient Position BP Location FiO2 (%)   -- -- -- -- --       Physical Exam  GENERAL: alert, no acute distress, confused  SKIN: Warm, dry  HENT:  Normocephalic, atraumatic  EYES: no scleral icterus  CV: regular rhythm, regular rate  RESPIRATORY: normal effort, lungs clear  ABDOMEN: soft, nontender, nondistended  MUSCULOSKELETAL: no deformity  NEURO: alert, moves all extremities, follows commands.  No focal neurological deficits, specifically no motor weakness, no sensory changes, no ataxia or aphasia            LAB RESULTS  Recent Results (from the past 24 hours)   Comprehensive Metabolic Panel    Collection Time: 04/24/25  2:32 PM    Specimen: Blood   Result Value Ref Range    Glucose 106 (H) 65 - 99 mg/dL    BUN 32 (H) 8 - 23 mg/dL    Creatinine 1.94 (H) 0.57 - 1.00 mg/dL    Sodium 135 (L) 136 - 145 mmol/L    Potassium 4.8 3.5 - 5.2 mmol/L    Chloride 105 98 - 107 mmol/L    CO2 17.0 (L) 22.0 - 29.0 mmol/L    Calcium 8.5 (L) 8.6 - 10.5 mg/dL    Total Protein 6.6 6.0 - 8.5 g/dL    Albumin 4.1 3.5 - 5.2 g/dL    ALT (SGPT) 14 1 - 33 U/L    AST (SGOT) 31 1 - 32 U/L    Alkaline Phosphatase 48 39 - 117 U/L    Total Bilirubin 0.6 0.0 - 1.2 mg/dL    Globulin 2.5 gm/dL    A/G Ratio 1.6 g/dL    BUN/Creatinine Ratio 16.5 7.0 - 25.0    Anion Gap 13.0 5.0 - 15.0 mmol/L    eGFR 24.7 (L) >60.0 mL/min/1.73   aPTT    Collection Time: 04/24/25  2:32 PM    Specimen: Blood   Result Value Ref Range    PTT 28.2 22.7 - 35.4 seconds   Ethanol    Collection Time: 04/24/25  2:32 PM    Specimen: Blood   Result Value Ref Range    Ethanol <10 0 - 10 mg/dL    Ethanol % <0.010 %   Procalcitonin    Collection Time: 04/24/25  2:32 PM    Specimen: Blood   Result Value Ref Range    Procalcitonin 0.06 0.00 - 0.25 ng/mL   CBC Auto Differential    Collection Time: 04/24/25  2:32 PM    Specimen: Blood   Result Value Ref Range    WBC 7.70 3.40 - 10.80 10*3/mm3    RBC 3.53 (L) 3.77 - 5.28 10*6/mm3    Hemoglobin 10.4 (L) 12.0 - 15.9 g/dL    Hematocrit 32.6 (L) 34.0 - 46.6 %    MCV 92.4 79.0 - 97.0 fL    MCH 29.5 26.6 - 33.0 pg    MCHC 31.9 31.5 - 35.7 g/dL    RDW 13.9 12.3 - 15.4 %    RDW-SD 46.6  37.0 - 54.0 fl    MPV 10.5 6.0 - 12.0 fL    Platelets 170 140 - 450 10*3/mm3    Neutrophil % 75.6 42.7 - 76.0 %    Lymphocyte % 13.9 (L) 19.6 - 45.3 %    Monocyte % 8.4 5.0 - 12.0 %    Eosinophil % 1.4 0.3 - 6.2 %    Basophil % 0.4 0.0 - 1.5 %    Immature Grans % 0.3 0.0 - 0.5 %    Neutrophils, Absolute 5.82 1.70 - 7.00 10*3/mm3    Lymphocytes, Absolute 1.07 0.70 - 3.10 10*3/mm3    Monocytes, Absolute 0.65 0.10 - 0.90 10*3/mm3    Eosinophils, Absolute 0.11 0.00 - 0.40 10*3/mm3    Basophils, Absolute 0.03 0.00 - 0.20 10*3/mm3    Immature Grans, Absolute 0.02 0.00 - 0.05 10*3/mm3    nRBC 0.0 0.0 - 0.2 /100 WBC   Magnesium    Collection Time: 04/24/25  2:32 PM    Specimen: Blood   Result Value Ref Range    Magnesium 2.1 1.6 - 2.4 mg/dL   Urinalysis With Culture If Indicated - Urine, Clean Catch    Collection Time: 04/24/25  4:17 PM    Specimen: Urine, Clean Catch   Result Value Ref Range    Color, UA Dark Yellow (A) Yellow, Straw    Appearance, UA Clear Clear    pH, UA 6.0 5.0 - 8.0    Specific Gravity, UA 1.008 1.005 - 1.030    Glucose, UA Negative Negative    Ketones, UA Negative Negative    Bilirubin, UA Negative Negative    Blood, UA Negative Negative    Protein, UA Trace (A) Negative    Leuk Esterase, UA Negative Negative    Nitrite, UA Negative Negative    Urobilinogen, UA 0.2 E.U./dL 0.2 - 1.0 E.U./dL   Urine Drug Screen - Urine, Clean Catch    Collection Time: 04/24/25  4:17 PM    Specimen: Urine, Clean Catch   Result Value Ref Range    THC, Screen, Urine Negative Negative    Phencyclidine (PCP), Urine Negative Negative    Cocaine Screen, Urine Negative Negative    Methamphetamine, Ur Negative Negative    Opiate Screen Negative Negative    Amphetamine Screen, Urine Negative Negative    Benzodiazepine Screen, Urine Negative Negative    Tricyclic Antidepressants Screen Negative Negative    Methadone Screen, Urine Negative Negative    Barbiturates Screen, Urine Negative Negative    Oxycodone Screen, Urine Negative  Negative    Buprenorphine, Screen, Urine Negative Negative   Fentanyl, Urine - Urine, Clean Catch    Collection Time: 04/24/25  4:17 PM    Specimen: Urine, Clean Catch   Result Value Ref Range    Fentanyl, Urine Negative Negative   ECG 12 Lead Altered Mental Status    Collection Time: 04/24/25  4:22 PM   Result Value Ref Range    QT Interval 425 ms    QTC Interval 450 ms         RADIOLOGY  CT Head Without Contrast  Result Date: 4/24/2025  EMERGENCY CT SCAN OF THE HEAD WITHOUT CONTRAST ON 04/24/2025  CLINICAL HISTORY: This is an 89-year-old female patient with altered mental status and increased confusion for 3 days and some hallucinations.  TECHNIQUE: Spiral CT images were obtained from the base of the skull through the vertex without intravenous contrast. The images were reformatted and are submitted in 3 mm thick axial, ,sagittal and coronal CT sections with brain algorithm.  COMPARISON: This is correlated to a prior head CT and MRI of the brain both performed on 01/03/2025.  FINDINGS: There is a moderate size area of encephalomalacia extending from the posterior medial right temporal lobe throughout the mid and inferior medial right occipital lobe compatible with an old medial right temporal occipital infarct in the right PCA territory that measures up to 5.5 x 3 x 2.5 cm in anterior, posterior, mediolateral and craniocaudal dimension. There is some patchy low-density in the periventricular white matter consistent with mild-moderate small vessel disease. The remainder of the brain parenchyma is normal in attenuation. There is mild cerebral atrophy. The ventricles are normal in size. I see no mass effect. There is no midline shift. No extra-axial fluid collections are identified. There is no evidence of acute intracranial hemorrhage. The calvarium and the skull base are normal in appearance. Paranasal sinuses, the mastoid air cells and middle ear cavities are clear. There are lens implants in the globes from  previous bilateral cataract surgery otherwise, the orbits are unremarkable.      1. No acute intracranial abnormalities identified with no significant change when compared to a prior head CT from [facility] on 01/03/2025.  2. There is mild-moderate small vessel disease in the cerebral white matter. 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 There are lens implants in the globes from previous bilateral cataract surgery. The remainder of the head CT is within normal limits. The etiology of the patient's confusion and hallucinations is not established on this exam . Radiation dose reduction techniques were utilized, including automated exposure control and exposure modulation based on body size.       XR Chest 1 View  Result Date: 4/24/2025  XR CHEST 1 VW-  HISTORY: Female who is 89 years-old, altered mental status  TECHNIQUE: Frontal view of the chest  COMPARISON: 1/3/2025  FINDINGS: The heart size is normal. Aorta is tortuous, calcified. Pulmonary vasculature is unremarkable. No focal pulmonary consolidation, pleural effusion, or pneumothorax. Old granulomatous disease is apparent. No acute osseous process.      No focal pulmonary consolidation. Tortuous aorta. Follow-up as clinically indicated.  This report was finalized on 4/24/2025 3:23 PM by Dr. Jaspreet Whitt M.D on Workstation: [id number]          MEDICATIONS GIVEN IN ER  Medications - No data to display      ORDERS PLACED DURING THIS VISIT:  Orders Placed This Encounter   Procedures    XR Chest 1 View    CT Head Without Contrast    Comprehensive Metabolic Panel    aPTT    Urinalysis With Culture If Indicated - Urine, Clean Catch    Ethanol    Urine Drug Screen - Urine, Clean Catch    Procalcitonin    CBC Auto Differential    Magnesium    Fentanyl, Urine - Urine, Clean Catch    [facility] (on-call MD unless specified) Details    ECG 12 Lead Altered Mental  Status    Initiate Observation Status    CBC & Differential         OUTPATIENT MEDICATION MANAGEMENT:  No current Epic-ordered facility-administered medications on file.     Current Outpatient Medications Ordered in Epic   Medication Sig Dispense Refill    acetaminophen (TYLENOL) 500 MG tablet Take 1 tablet by mouth Every 4 (Four) Hours As Needed for Mild Pain. Indications: Pain      aspirin 81 MG EC tablet Take 1 tablet by mouth Daily. 30 tablet 0    atorvastatin (LIPITOR) 40 MG tablet Take 1 tablet by mouth Every Night. 90 tablet 0    clindamycin (CLEOCIN) 150 MG capsule TAKE 1 CAPSULE BY MOUTH 3 TIMES A DAY after meals UNTIL GONE      Eliquis 2.5 MG tablet tablet TAKE 1 TABLET EVERY 12 HOURS (TWICE A DAY) (Patient taking differently: Take 1 tablet by mouth Every 12 (Twelve) Hours.) 180 tablet 3    famotidine (PEPCID) 20 MG tablet Take 1 tablet by mouth 2 (Two) Times a Day.      fexofenadine (ALLEGRA) 60 MG tablet Take 1 tablet by mouth Daily As Needed. Indications: Hayfever      furosemide (LASIX) 20 MG tablet Take 1 tablet by mouth Daily As Needed (if weight goes up 3lbs in 3 days). 30 tablet 11    melatonin 5 MG tablet tablet Take 1 tablet by mouth At Night As Needed. Indications: Trouble Sleeping      metoprolol succinate XL (TOPROL-XL) 25 MG 24 hr tablet Take 0.5 tablets by mouth Daily. 15 tablet 0    mirtazapine (REMERON) 7.5 MG tablet Take 1 tablet by mouth Every Night for 270 days. 90 tablet 2    Riboflavin (VITAMIN B2 PO) Take  by mouth. (Patient taking differently: Take  by mouth Daily.)      Vibegron (Gemtesa) 75 MG tablet Take 1 tablet by mouth Daily for 270 days. Indications: Overactive Bladder, Urinary Incontinence 90 tablet 2    Vortioxetine HBr (TRINTELLIX) 10 MG tablet tablet Take 1 tablet by mouth Daily With Breakfast for 270 days. 90 tablet 2         PROCEDURES  Procedures            PROGRESS, DATA ANALYSIS, CONSULTS, AND MEDICAL DECISION MAKING  All labs have been independently interpreted by  me.  All radiology studies have been reviewed by me. All EKG's have been independently viewed and interpreted by me.  Discussion below represents my analysis of pertinent findings related to patient's condition, differential diagnosis, treatment plan and final disposition.    Differential diagnosis includes but is not limited to metabolic encephalopathy, CVA, pharmacy, electrolyte abnormality.    Clinical Scores:                                       ED Course as of 04/24/25 1743   Thu Apr 24, 2025   1427 First look prior to oncoming provider:    Patient here with altered mental status.  Apparently had admission for similar several months ago was found to have some small thalamic infarct.  Patient is anticoagulated on aspirin and Eliquis. [DB]   1511 Chest x-ray interpreted by me and demonstrates no evidence of dense consolidation [MW]   1535 Laboratory evaluation is overall unremarkable.  Patient is noted to have elevated creatinine which is consistent with baseline.  Patient does have a metabolic acidosis of unclear etiology.  There is clear confusion and auditory and visual hallucinations.  Vital signs are stable with no systemic symptoms of infection or fever.  Will plan on admission for further evaluation and management. [MW]   1625 EKG interpreted by me demonstrates atrial fibrillation, rate of 67, no QT prolongation, no ST elevation [MW]   1740 Discussed with Dr. Kyle who agrees to admit [MW]      ED Course User Index  [DB] Esteban Bermudez MD  [MW] Edgardo Aguirre MD             AS OF 17:43 EDT VITALS:    BP - (!) 183/75  HR - 62  TEMP - 99.8 °F (37.7 °C)  O2 SATS - 97%    COMPLEXITY OF CARE  The patient requires admission.      DIAGNOSIS  Final diagnoses:   Altered mental status, unspecified altered mental status type         DISPOSITION  ED Disposition       ED Disposition   Decision to Admit    Condition   --    Comment   Level of Care: Telemetry [5]   Diagnosis: AMS (altered mental status) [5523610]    Admitting Physician: DEMETRIS JURADO [[id number]]   Attending Physician: DEMETRIS JURADO [[id number]]   Is patient appropriate for Inpatient Observation Unit?: Yes [1]                  Please note that portions of this document were completed with a voice recognition program.    Note Disclaimer: At [facility], we believe that sharing information builds trust and better relationships. You are receiving this note because you recently visited [facility]. It is possible you will see health information before a provider has talked with you about it. This kind of information can be easy to misunderstand. To help you fully understand what it means for your health, we urge you to discuss this note with your provider.         Edgardo Aguirre MD  04/24/25 7503

## 2025-04-24 NOTE — ED NOTES
Nursing report ED to floor  Marleni Hummel  89 y.o.  female    HPI :  HPI  Stated Reason for Visit: Altered mental status    Chief Complaint  Chief Complaint   Patient presents with    Altered Mental Status       Admitting doctor:   Alvaro Kyle MD    Admitting diagnosis:   The encounter diagnosis was Altered mental status, unspecified altered mental status type.    Code status:   Current Code Status       Date Active Code Status Order ID Comments User Context       Prior            Allergies:   Medrol [methylprednisolone], Morphine, Oxycodone, Ace inhibitors, Bactrim [sulfamethoxazole-trimethoprim], Levofloxacin, and Torsemide    Isolation:   No active isolations    Intake and Output    Intake/Output Summary (Last 24 hours) at 4/24/2025 1745  Last data filed at 4/24/2025 1329  Gross per 24 hour   Intake 150 ml   Output --   Net 150 ml       Weight:   There were no vitals filed for this visit.    Most recent vitals:   Vitals:    04/24/25 1328   BP: (!) 183/75   Pulse: 62   Resp: 18   Temp: 99.8 °F (37.7 °C)   SpO2: 97%       Active LDAs/IV Access:   Lines, Drains & Airways       Active LDAs       Name Placement date Placement time Site Days    Peripheral IV 04/24/25 1329 20 G Anterior;Left Forearm 04/24/25  1329  Forearm  less than 1                    Labs (abnormal labs have a star):   Labs Reviewed   COMPREHENSIVE METABOLIC PANEL - Abnormal; Notable for the following components:       Result Value    Glucose 106 (*)     BUN 32 (*)     Creatinine 1.94 (*)     Sodium 135 (*)     CO2 17.0 (*)     Calcium 8.5 (*)     eGFR 24.7 (*)     All other components within normal limits    Narrative:     GFR Categories in Chronic Kidney Disease (CKD)      GFR Category          GFR (mL/min/1.73)    Interpretation  G1                     90 or greater         Normal or high (1)  G2                      60-89                Mild decrease (1)  G3a                   45-59                Mild to moderate decrease  G3b                    30-44                Moderate to severe decrease  G4                    15-29                Severe decrease  G5                    14 or less           Kidney failure          (1)In the absence of evidence of kidney disease, neither GFR category G1 or G2 fulfill the criteria for CKD.    eGFR calculation 2021 CKD-EPI creatinine equation, which does not include race as a factor   URINALYSIS W/ CULTURE IF INDICATED - Abnormal; Notable for the following components:    Color, UA Dark Yellow (*)     Protein, UA Trace (*)     All other components within normal limits    Narrative:     In absence of clinical symptoms, the presence of pyuria, bacteria, and/or nitrites on the urinalysis result does not correlate with infection.  Urine microscopic not indicated.   CBC WITH AUTO DIFFERENTIAL - Abnormal; Notable for the following components:    RBC 3.53 (*)     Hemoglobin 10.4 (*)     Hematocrit 32.6 (*)     Lymphocyte % 13.9 (*)     All other components within normal limits   APTT - Normal   URINE DRUG SCREEN - Normal    Narrative:     Cutoff For Drugs Screened:    Amphetamines               500 ng/ml  Barbiturates               200 ng/ml  Benzodiazepines            150 ng/ml  Cocaine                    150 ng/ml  Methadone                  200 ng/ml  Opiates                    100 ng/ml  Phencyclidine               25 ng/ml  THC                         50 ng/ml  Methamphetamine            500 ng/ml  Tricyclic Antidepressants  300 ng/ml  Oxycodone                  100 ng/ml  Buprenorphine               10 ng/ml    The normal value for all drugs tested is negative. This report includes unconfirmed screening results, with the cutoff values listed, to be used for medical treatment purposes only.  Unconfirmed results must not be used for non-medical purposes such as employment or legal testing.  Clinical consideration should be applied to any drug of abuse test, particularly when unconfirmed results are used.    "  PROCALCITONIN - Normal    Narrative:     As a Marker for Sepsis (Non-Neonates):    1. <0.5 ng/mL represents a low risk of severe sepsis and/or septic shock.  2. >2 ng/mL represents a high risk of severe sepsis and/or septic shock.    As a Marker for Lower Respiratory Tract Infections that require antibiotic therapy:    PCT on Admission    Antibiotic Therapy       6-12 Hrs later    >0.5                Strongly Recommended  >0.25 - <0.5        Recommended   0.1 - 0.25          Discouraged              Remeasure/reassess PCT  <0.1                Strongly Discouraged     Remeasure/reassess PCT    As 28 day mortality risk marker: \"Change in Procalcitonin Result\" (>80% or <=80%) if Day 0 (or Day 1) and Day 4 values are available. Refer to http://www.[employer] [email or web address][place][email or web address]    Change in PCT <=80%  A decrease of PCT levels below or equal to 80% defines a positive change in PCT test result representing a higher risk for 28-day all-cause mortality of patients diagnosed with severe sepsis for septic shock.    Change in PCT >80%  A decrease of PCT levels of more than 80% defines a negative change in PCT result representing a lower risk for 28-day all-cause mortality of patients diagnosed with severe sepsis or septic shock.      MAGNESIUM - Normal   FENTANYL, URINE - Normal    Narrative:     Negative Threshold:      Fentanyl 5 ng/mL     The normal value for the drug tested is negative. This report includes final unconfirmed screening results to be used for medical treatment purposes only. Unconfirmed results must not be used for non-medical purposes such as employment or legal testing. Clinical consideration should be applied to any drug of abuse test, particularly when unconfirmed results are used.          ETHANOL   CBC AND DIFFERENTIAL    Narrative:     The following orders were created for panel order CBC & Differential.  Procedure                               Abnormality         Status                     ---------     "                           -----------         ------                     CBC Auto Differential[[id number]]        Abnormal            Final result                 Please view results for these tests on the individual orders.       EKG:   ECG 12 Lead Altered Mental Status   Preliminary Result   HEART RATE=67  bpm   RR Lhuineqb=817  ms   IN Interval=  ms   P Horizontal Axis=  deg   P Front Axis=  deg   QRSD Interval=95  ms   QT Mnmeijey=568  ms   MAqG=993  ms   QRS Axis=-60  deg   T Wave Axis=45  deg   - ABNORMAL ECG -   Atrial fibrillation   Abnormal R-wave progression, late transition   Inferior infarct, old   Date and Time of Study:2025-04-24 16:22:17          Meds given in ED:   Medications - No data to display    Imaging results:  CT Head Without Contrast  Result Date: 4/24/2025  1. No acute intracranial abnormalities identified with no significant change when compared to a prior head CT from [facility] on 01/03/2025.  2. There is mild-moderate small vessel disease in the cerebral white matter. 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 There are lens implants in the globes from previous bilateral cataract surgery. The remainder of the head CT is within normal limits. The etiology of the patient's confusion and hallucinations is not established on this exam . Radiation dose reduction techniques were utilized, including automated exposure control and exposure modulation based on body size.       XR Chest 1 View  Result Date: 4/24/2025  No focal pulmonary consolidation. Tortuous aorta. Follow-up as clinically indicated.  This report was finalized on 4/24/2025 3:23 PM by Dr. Jaspreet Whitt M.D on Workstation: [id number]        Ambulatory status:   - assist    Social issues:   Social History     Socioeconomic History    Marital status:     Years of education: High school   Tobacco Use    Smoking  status: Former     Current packs/day: 0.00     Average packs/day: 1 pack/day for 3.0 years (3.0 ttl pk-yrs)     Types: Cigarettes     Start date: 1953     Quit date: 1956     Years since quittin.2     Passive exposure: Past    Smokeless tobacco: Never    Tobacco comments:     caffeine - 1/2 cup coffee daily    Vaping Use    Vaping status: Never Used   Substance and Sexual Activity    Alcohol use: Not Currently     Alcohol/week: 3.0 standard drinks of alcohol     Types: 3 Glasses of wine per week     Comment: couple times a week    Drug use: Never    Sexual activity: Not Currently       Peripheral Neurovascular  Peripheral Neurovascular (Adult)  Peripheral Neurovascular WDL: WDL    Neuro Cognitive  Neuro Cognitive (Adult)  Cognitive/Neuro/Behavioral WDL: WDL    Learning  Learning Assessment  Learning Readiness and Ability: no barriers identified    Respiratory  Respiratory  Airway WDL: WDL  Respiratory WDL  Respiratory WDL: WDL    Abdominal Pain       Pain Assessments  Pain (Adult)  (0-10) Pain Rating: Rest: 0  (0-10) Pain Rating: Activity: 0    NIH Stroke Scale       Sarah Rodriguez RN  25 17:45 EDT

## 2025-04-24 NOTE — ED TRIAGE NOTES
Patient to ED via EMS from home. Daughter called EMS for altered mental status. Daughter went out to run errands, came back and the patient was talking to someone who wasn't there.

## 2025-04-24 NOTE — PLAN OF CARE
Problem: Wellness  Goal: Enhance My Mental Skills  Outcome: Not Progressing  Goal: Eat Healthy  Outcome: Not Progressing  Goal: Manage My Cholesterol  Outcome: Not Progressing  Goal: Learn More About My Health  Outcome: Not Progressing  Goal: Keep Myself Safe  Outcome: Not Progressing  Goal: Manage My Medicine  Outcome: Not Progressing  Goal: Keep My Home Safe  Outcome: Not Progressing  Goal: Optimal Wellbeing  Outcome: Not Progressing

## 2025-04-24 NOTE — H&P
Patient Name:  Marleni Hummel  YOB: 1935  MRN:  [id number]  Admit Date:  4/24/2025  Patient Care Team:  Ken Andujar MD as PCP - General  Chuckie Mclaughlin MD as Consulting Physician (Urology)  Anayeli Alanis MD as Consulting Physician (Obstetrics and Gynecology)  BROOK Clarke MD as Consulting Physician (Ophthalmology)  Jose Alfredo Krishnamurthy MD as Consulting Physician (Hematology and Oncology)  Sean Canales MD as Consulting Physician (Orthopedic Surgery)  Esteban Moe MD as Consulting Physician (Orthopedic Surgery)  Feliciano Elizabeth MD (Inactive) as Consulting Physician (Gastroenterology)  Wallace Hook MD as Consulting Physician (Pain Medicine)  Sarah Beth Marcus APRN as Nurse Practitioner (Nephrology)  Brandon Miller MD as Consulting Physician (Gastroenterology)  Sybil Parmar MD as Consulting Physician (Cardiology)  Joseph Leonard MD as Consulting Physician (Nephrology)  Debbie Keene PA-C as Physician Assistant (Physician Assistant)  Ken Allen MD as Consulting Physician (Neurology)  BROOK Clarke MD as Consulting Physician (Ophthalmology)  Yoselin Gomez, DUGLAS as Ambulatory  ([facility])      Subjective   History Present Illness     Chief Complaint   Patient presents with    Altered Mental Status       Ms. Hummel is a 89 y.o. female with a history of permanent atrial fibrillation, HTN, CKD 4, CHF, etc. that presents to [facility] complaining of alteration in mental status. She has been hallucinating off and on throughout the day seeing people that are not there.  But no reported fever or chills.  No nausea, vomiting or diarrhea.  Decreased oral intake decreased appetite.  She was on mirtazapine for a few days last week but has not taking that in several days.  She took Zanaflex for a couple of days last dose Tuesday morning.  She took this for some discomfort in her low back that is not  new.  No change in urinary habits.  Per daughter at bedside no other change in medications and she does not take anything over-the-counter except melatonin.  No Benadryl.      Review of Systems     Personal History     Past Medical History:   Diagnosis Date    Acute bronchitis due to Rhinovirus 05/31/2022    Acute vulvitis 08/21/2019    Allergic     Allergic rhinitis     Anemia of chronic disease     Arthropathy of knee     right    Atrial fibrillation     Back pain     Bell's palsy     Bradycardia 05/27/2024    Bradycardia, drug induced 05/27/2024    Caregiver stress syndrome 04/17/2019    Chronic coronary artery disease     Chronic kidney disease (CKD), stage III (moderate)     CKD (chronic kidney disease) stage 4, GFR 15-29 ml/min     Diverticulitis 12/14/2022    CARROLL (dyspnea on exertion) 03/17/2023    Edema     Elevated serum free T4 level 03/21/2016    Encounter for eye exam 09/2020    Essential hypertension     Fatigue     GERD (gastroesophageal reflux disease)     H/O Heart murmur     Heart attack 10/05/2015    Hematuria 12/12/2016    Herpes zoster without complication     Hip arthritis     left    History of anemia due to chronic kidney disease     History of bone density study 02/28/2008    History of pelvic fracture 2015    History of pneumonia     History of transfusion     2015    Hypercholesterolemia     IFG (impaired fasting glucose)     Insomnia     Left kidney mass 07/2020    Limited joint range of motion     RIGHT KNEE    Mixed hyperlipidemia     Muscle tension headache 04/03/2019    New daily persistent headache 12/17/2018    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 Clarke    CATARACT EXTRACTION Right 2013    Dr. Clarke    COLONOSCOPY  2014    Dr. Elizabeth; no polyps    EPIDURAL BLOCK      HIP PERCUTANEOUS PINNING Left 2017    Procedure: LT HIP PERCUTANEOUS PINNING;  Surgeon: Sean Canales MD;  Location: [facility] OR;  Service:     MAMMO BILATERAL  2013    NEPHRECTOMY Left 2020    Procedure: LAPAROSCOPIC NEPHRECTOMY;  Surgeon: Chuckie Mclaughlin MD;  Location: [facility] OR;  Service: Urology;  Laterality: Left;    PAP SMEAR  2011    TIBIA FRACTURE SURGERY Right     REMOVED PLATE LATER IN     TONSILLECTOMY  194    TOTAL HIP ARTHROPLASTY Right 2015    TOTAL HIP ARTHROPLASTY Right 2016    TOTAL KNEE ARTHROPLASTY Bilateral      Family History   Problem Relation Age of Onset    Hypertension Other     Hypertension Mother     Stroke Mother     Heart disease Mother     Hypertension Maternal Grandmother     Malig Hyperthermia Neg Hx      Social History     Tobacco Use    Smoking status: Former     Current packs/day: 0.00     Average packs/day: 1 pack/day for 3.0 years (3.0 ttl pk-yrs)     Types: Cigarettes     Start date: 1953     Quit date: 1956     Years since quittin.2     Passive exposure: Past    Smokeless tobacco: Never    Tobacco comments:     caffeine - 1/2 cup coffee daily    Vaping Use    Vaping status: Never Used   Substance Use Topics    Alcohol use: Not Currently     Alcohol/week: 3.0 standard drinks of alcohol     Types: 3 Glasses of wine per week     Comment: couple times a week    Drug use: Never     No current facility-administered medications on file prior to encounter.     Current Outpatient Medications on File Prior to Encounter   Medication Sig Dispense Refill    acetaminophen (TYLENOL) 500 MG tablet Take 1 tablet by mouth Every 4 (Four)  Hours As Needed for Mild Pain. Indications: Pain      aspirin 81 MG EC tablet Take 1 tablet by mouth Daily. 30 tablet 0    atorvastatin (LIPITOR) 40 MG tablet Take 1 tablet by mouth Every Night. 90 tablet 0    Eliquis 2.5 MG tablet tablet TAKE 1 TABLET EVERY 12 HOURS (TWICE A DAY) (Patient taking differently: Take 1 tablet by mouth Every 12 (Twelve) Hours.) 180 tablet 3    estradiol (ESTRACE) 0.1 MG/GM vaginal cream Insert 1 g into the vagina 3 (Three) Times a Week.      fexofenadine (ALLEGRA) 60 MG tablet Take 1 tablet by mouth Daily As Needed. Indications: Hayfever      furosemide (LASIX) 20 MG tablet Take 1 tablet by mouth Daily As Needed (if weight goes up 3lbs in 3 days). 30 tablet 11    melatonin 5 MG tablet tablet Take 1 tablet by mouth At Night As Needed. Indications: Trouble Sleeping      metoprolol succinate XL (TOPROL-XL) 25 MG 24 hr tablet Take 0.5 tablets by mouth Daily. 15 tablet 0    Riboflavin (VITAMIN B2 PO) Take  by mouth. (Patient taking differently: Take 1 tablet by mouth Daily.)      Vibegron (Gemtesa) 75 MG tablet Take 1 tablet by mouth Daily for 270 days. Indications: Overactive Bladder, Urinary Incontinence 90 tablet 2    Vortioxetine HBr (TRINTELLIX) 10 MG tablet tablet Take 1 tablet by mouth Daily With Breakfast for 270 days. 90 tablet 2    mirtazapine (REMERON) 7.5 MG tablet Take 1 tablet by mouth Every Night for 270 days. 90 tablet 2    [DISCONTINUED] clindamycin (CLEOCIN) 150 MG capsule TAKE 1 CAPSULE BY MOUTH 3 TIMES A DAY after meals UNTIL GONE      [DISCONTINUED] famotidine (PEPCID) 20 MG tablet Take 1 tablet by mouth 2 (Two) Times a Day.       Allergies   Allergen Reactions    Medrol [Methylprednisolone] Other (See Comments)     Irritability    Morphine Anaphylaxis     morphine    morphine sulfate    Oxycodone Anaphylaxis and Unknown - Low Severity     Oxycontin    Ace Inhibitors Cough and Unknown (See Comments)    Bactrim [Sulfamethoxazole-Trimethoprim] Rash    Levofloxacin Itching  and Rash     insomnia  insomnia  insomnia    Torsemide Dizziness       Objective    Objective     Vital Signs  Temp:  [99 °F (37.2 °C)-99.8 °F (37.7 °C)] 99 °F (37.2 °C)  Heart Rate:  [62-74] 74  Resp:  [18] 18  BP: (166-203)/(69-78) 172/74  SpO2:  [96 %-97 %] 96 %  on   ;   Device (Oxygen Therapy): room air  There is no height or weight on file to calculate BMI.    Physical Exam  Vitals and nursing note reviewed.   Constitutional:       General: She is not in acute distress.  HENT:      Head: Normocephalic and atraumatic.   Cardiovascular:      Rate and Rhythm: Normal rate. Rhythm irregular.   Pulmonary:      Effort: Pulmonary effort is normal.      Breath sounds: Normal breath sounds.   Abdominal:      General: Bowel sounds are normal. There is no distension.      Palpations: Abdomen is soft.      Tenderness: There is no abdominal tenderness.   Musculoskeletal:         General: Normal range of motion.   Skin:     General: Skin is warm and dry.   Neurological:      Mental Status: She is disoriented.   Psychiatric:         Behavior: Behavior normal.         Results Review:  I reviewed the patient's new clinical results.  I reviewed the patient's new imaging results and agree with the interpretation.  I reviewed the patient's other test results and agree with the interpretation  I personally viewed and interpreted the patient's EKG/Telemetry data  Discussed with ED provider.    Lab Results (last 24 hours)       Procedure Component Value Units Date/Time    CBC & Differential [[id number]]  (Abnormal) Collected: 04/24/25 1432    Specimen: Blood Updated: 04/24/25 1446    Narrative:      The following orders were created for panel order CBC & Differential.  Procedure                               Abnormality         Status                     ---------                               -----------         ------                     CBC Auto Differential[[id number]]        Abnormal            Final result                 Please  view results for these tests on the individual orders.    Comprehensive Metabolic Panel [[id number]]  (Abnormal) Collected: 04/24/25 1432    Specimen: Blood Updated: 04/24/25 1509     Glucose 106 mg/dL      BUN 32 mg/dL      Creatinine 1.94 mg/dL      Sodium 135 mmol/L      Potassium 4.8 mmol/L      Comment: Slight hemolysis detected by analyzer. Result may be falsely elevated.        Chloride 105 mmol/L      CO2 17.0 mmol/L      Calcium 8.5 mg/dL      Total Protein 6.6 g/dL      Albumin 4.1 g/dL      ALT (SGPT) 14 U/L      AST (SGOT) 31 U/L      Alkaline Phosphatase 48 U/L      Total Bilirubin 0.6 mg/dL      Globulin 2.5 gm/dL      A/G Ratio 1.6 g/dL      BUN/Creatinine Ratio 16.5     Anion Gap 13.0 mmol/L      eGFR 24.7 mL/min/1.73     Narrative:      GFR Categories in Chronic Kidney Disease (CKD)      GFR Category          GFR (mL/min/1.73)    Interpretation  G1                     90 or greater         Normal or high (1)  G2                      60-89                Mild decrease (1)  G3a                   45-59                Mild to moderate decrease  G3b                   30-44                Moderate to severe decrease  G4                    15-29                Severe decrease  G5                    14 or less           Kidney failure          (1)In the absence of evidence of kidney disease, neither GFR category G1 or G2 fulfill the criteria for CKD.    eGFR calculation 2021 CKD-EPI creatinine equation, which does not include race as a factor    aPTT [[id number]]  (Normal) Collected: 04/24/25 1432    Specimen: Blood Updated: 04/24/25 1454     PTT 28.2 seconds     Ethanol [[id number]] Collected: 04/24/25 1432    Specimen: Blood Updated: 04/24/25 1509     Ethanol <10 mg/dL      Ethanol % <0.010 %     Procalcitonin [[id number]]  (Normal) Collected: 04/24/25 1432    Specimen: Blood Updated: 04/24/25 1516     Procalcitonin 0.06 ng/mL     Narrative:      As a Marker for Sepsis (Non-Neonates):    1. <0.5 ng/mL  "represents a low risk of severe sepsis and/or septic shock.  2. >2 ng/mL represents a high risk of severe sepsis and/or septic shock.    As a Marker for Lower Respiratory Tract Infections that require antibiotic therapy:    PCT on Admission    Antibiotic Therapy       6-12 Hrs later    >0.5                Strongly Recommended  >0.25 - <0.5        Recommended   0.1 - 0.25          Discouraged              Remeasure/reassess PCT  <0.1                Strongly Discouraged     Remeasure/reassess PCT    As 28 day mortality risk marker: \"Change in Procalcitonin Result\" (>80% or <=80%) if Day 0 (or Day 1) and Day 4 values are available. Refer to http://www.[facility]-pct-calculator.com    Change in PCT <=80%  A decrease of PCT levels below or equal to 80% defines a positive change in PCT test result representing a higher risk for 28-day all-cause mortality of patients diagnosed with severe sepsis for septic shock.    Change in PCT >80%  A decrease of PCT levels of more than 80% defines a negative change in PCT result representing a lower risk for 28-day all-cause mortality of patients diagnosed with severe sepsis or septic shock.       CBC Auto Differential [[id number]]  (Abnormal) Collected: 04/24/25 1432    Specimen: Blood Updated: 04/24/25 1446     WBC 7.70 10*3/mm3      RBC 3.53 10*6/mm3      Hemoglobin 10.4 g/dL      Hematocrit 32.6 %      MCV 92.4 fL      MCH 29.5 pg      MCHC 31.9 g/dL      RDW 13.9 %      RDW-SD 46.6 fl      MPV 10.5 fL      Platelets 170 10*3/mm3      Neutrophil % 75.6 %      Lymphocyte % 13.9 %      Monocyte % 8.4 %      Eosinophil % 1.4 %      Basophil % 0.4 %      Immature Grans % 0.3 %      Neutrophils, Absolute 5.82 10*3/mm3      Lymphocytes, Absolute 1.07 10*3/mm3      Monocytes, Absolute 0.65 10*3/mm3      Eosinophils, Absolute 0.11 10*3/mm3      Basophils, Absolute 0.03 10*3/mm3      Immature Grans, Absolute 0.02 10*3/mm3      nRBC 0.0 /100 WBC     Magnesium [[id number]]  (Normal) Collected: " 04/24/25 1432    Specimen: Blood Updated: 04/24/25 1527     Magnesium 2.1 mg/dL     Urinalysis With Culture If Indicated - Urine, Clean Catch [[id number]]  (Abnormal) Collected: 04/24/25 1617    Specimen: Urine, Clean Catch Updated: 04/24/25 1640     Color, UA Dark Yellow     Appearance, UA Clear     pH, UA 6.0     Specific Gravity, UA 1.008     Glucose, UA Negative     Ketones, UA Negative     Bilirubin, UA Negative     Blood, UA Negative     Protein, UA Trace     Leuk Esterase, UA Negative     Nitrite, UA Negative     Urobilinogen, UA 0.2 E.U./dL    Narrative:      In absence of clinical symptoms, the presence of pyuria, bacteria, and/or nitrites on the urinalysis result does not correlate with infection.  Urine microscopic not indicated.    Urine Drug Screen - Urine, Clean Catch [[id number]]  (Normal) Collected: 04/24/25 1617    Specimen: Urine, Clean Catch Updated: 04/24/25 1649     THC, Screen, Urine Negative     Phencyclidine (PCP), Urine Negative     Cocaine Screen, Urine Negative     Methamphetamine, Ur Negative     Opiate Screen Negative     Amphetamine Screen, Urine Negative     Benzodiazepine Screen, Urine Negative     Tricyclic Antidepressants Screen Negative     Methadone Screen, Urine Negative     Barbiturates Screen, Urine Negative     Oxycodone Screen, Urine Negative     Buprenorphine, Screen, Urine Negative    Narrative:      Cutoff For Drugs Screened:    Amphetamines               500 ng/ml  Barbiturates               200 ng/ml  Benzodiazepines            150 ng/ml  Cocaine                    150 ng/ml  Methadone                  200 ng/ml  Opiates                    100 ng/ml  Phencyclidine               25 ng/ml  THC                         50 ng/ml  Methamphetamine            500 ng/ml  Tricyclic Antidepressants  300 ng/ml  Oxycodone                  100 ng/ml  Buprenorphine               10 ng/ml    The normal value for all drugs tested is negative. This report includes unconfirmed screening  results, with the cutoff values listed, to be used for medical treatment purposes only.  Unconfirmed results must not be used for non-medical purposes such as employment or legal testing.  Clinical consideration should be applied to any drug of abuse test, particularly when unconfirmed results are used.      Fentanyl, Urine - Urine, Clean Catch [[id number]]  (Normal) Collected: 04/24/25 1617    Specimen: Urine, Clean Catch Updated: 04/24/25 1658     Fentanyl, Urine Negative    Narrative:      Negative Threshold:      Fentanyl 5 ng/mL     The normal value for the drug tested is negative. This report includes final unconfirmed screening results to be used for medical treatment purposes only. Unconfirmed results must not be used for non-medical purposes such as employment or legal testing. Clinical consideration should be applied to any drug of abuse test, particularly when unconfirmed results are used.                   Imaging Results (Last 24 Hours)       Procedure Component Value Units Date/Time    CT Head Without Contrast [[id number]] Collected: 04/24/25 1634     Updated: 04/24/25 1634    Narrative:      EMERGENCY CT SCAN OF THE HEAD WITHOUT CONTRAST ON 04/24/2025     CLINICAL HISTORY: This is an 89-year-old female patient with altered  mental status and increased confusion for 3 days and some  hallucinations.     TECHNIQUE: Spiral CT images were obtained from the base of the skull  through the vertex without intravenous contrast. The images were  reformatted and are submitted in 3 mm thick axial, ,sagittal and coronal  CT sections with brain algorithm.     COMPARISON: This is correlated to a prior head CT and MRI of the brain  both performed on 01/03/2025.     FINDINGS: There is a moderate size area of encephalomalacia extending  from the posterior medial right temporal lobe throughout the mid and  inferior medial right occipital lobe compatible with an old medial right  temporal occipital infarct in the right PCA  territory that measures up  to 5.5 x 3 x 2.5 cm in anterior, posterior, mediolateral and  craniocaudal dimension. There is some patchy low-density in the  periventricular white matter consistent with mild-moderate small vessel  disease. The remainder of the brain parenchyma is normal in attenuation.  There is mild cerebral atrophy. The ventricles are normal in size. I see  no mass effect. There is no midline shift. No extra-axial fluid  collections are identified. There is no evidence of acute intracranial  hemorrhage. The calvarium and the skull base are normal in appearance.  Paranasal sinuses, the mastoid air cells and middle ear cavities are  clear. There are lens implants in the globes from previous bilateral  cataract surgery otherwise, the orbits are unremarkable.       Impression:      1. No acute intracranial abnormalities identified with no significant  change when compared to a prior head CT from [facility] on 01/03/2025.     2. There is mild-moderate small vessel disease in the cerebral white  matter. There is a 5.5 x 3 x 2.5 cm old medial right temporal occipital  infarct in the right PCA territory and there is mild cerebral atrophy.  There are lens implants in the globes from previous bilateral cataract  surgery. The remainder of the head CT is within normal limits. The  etiology of the patient's confusion and hallucinations is not  established on this exam  .  Radiation dose reduction techniques were utilized, including automated  exposure control and exposure modulation based on body size.          XR Chest 1 View [[id number]] Collected: 04/24/25 1518     Updated: 04/24/25 1526    Narrative:      XR CHEST 1 VW-     HISTORY: Female who is 89 years-old, altered mental status     TECHNIQUE: Frontal view of the chest     COMPARISON: 1/3/2025     FINDINGS: The heart size is normal. Aorta is tortuous, calcified.  Pulmonary vasculature is unremarkable. No focal pulmonary  consolidation,  pleural effusion, or pneumothorax. Old granulomatous disease is  apparent. No acute osseous process.       Impression:      No focal pulmonary consolidation. Tortuous aorta. Follow-up  as clinically indicated.     This report was finalized on 4/24/2025 3:23 PM by Dr. Jaspreet Whitt M.D on Workstation: [id number]               Results for orders placed during the hospital encounter of 10/09/24    Adult Transthoracic Echo Complete W/ Cont if Necessary Per Protocol (With Agitated Saline)    Interpretation Summary    There is akinesis of the mid lateral wall    Left ventricular ejection fraction appears to be 41 - 45%.    Normal right ventricular cavity size and systolic function noted.    The left atrial cavity is severely dilated.    Mild to moderate aortic valve regurgitation is present    Saline test results are negative.    Mild mitral valve regurgitation is present.    Mild tricuspid valve regurgitation is present.    Calculated right ventricular systolic pressure from tricuspid regurgitation is 23 mmHg.    There is no evidence of pericardial effusion    ECG 12 Lead Altered Mental Status   Preliminary Result   HEART RATE=67  bpm   RR Gyfdzhhk=411  ms   SC Interval=  ms   P Horizontal Axis=  deg   P Front Axis=  deg   QRSD Interval=95  ms   QT Jokcajrm=399  ms   OGzY=593  ms   QRS Axis=-60  deg   T Wave Axis=45  deg   - ABNORMAL ECG -   Atrial fibrillation   Abnormal R-wave progression, late transition   Inferior infarct, old   Date and Time of Study:2025-04-24 16:22:17        Assessment/Plan   Assessment & Plan   Active Hospital Problems    Diagnosis  POA    **AMS (altered mental status) [R41.82]  Yes    Metabolic acidosis [E87.20]  Yes    Chronic diastolic congestive heart failure [I50.32]  Yes    CKD (chronic kidney disease) stage 4, GFR 15-29 ml/min [N18.4]  Yes    Essential hypertension [I10]  Yes    Permanent atrial fibrillation [I48.21]  Yes    Other sleep apnea [G47.39]  Yes      Resolved  Hospital Problems   No resolved problems to display.       89 y.o. female admitted with AMS (altered mental status).    Admitted due to confusion.  CT scan nonacute though there is evidence of prior stroke.  History does not sound worrisome for seizure.  No obvious signs or symptoms of infection.  She does not seem encephalopathic just confused and having hallucinations both auditory and visual.  It certainly could be related to the Zanaflex that she took a few days ago given presence of stage IV chronic kidney disease.  Will ask neurology to see her.  Reassess labs in AM.  Overall she seems stable at present.    Will ask her nephrologist to see given CKD 4 with metabolic acidosis.  For now we will start her on oral sodium bicarb.      Eliquis (home med) for DVT prophylaxis.  Full code.  Discussed with patient and ED provider.      Alvaro Kyle MD  [facility]  04/24/25  19:00 EDT

## 2025-04-25 ENCOUNTER — HOSPITAL ENCOUNTER (OUTPATIENT)
Dept: INFUSION THERAPY | Facility: HOSPITAL | Age: 89
Discharge: HOME | End: 2025-04-25
Payer: MEDICARE

## 2025-04-25 ENCOUNTER — READMISSION MANAGEMENT (OUTPATIENT)
Dept: CALL CENTER | Facility: HOSPITAL | Age: 89
End: 2025-04-25
Payer: MEDICARE

## 2025-04-25 VITALS
OXYGEN SATURATION: 99 % | DIASTOLIC BLOOD PRESSURE: 61 MMHG | HEART RATE: 63 BPM | TEMPERATURE: 98.4 F | SYSTOLIC BLOOD PRESSURE: 149 MMHG | RESPIRATION RATE: 18 BRPM

## 2025-04-25 LAB
ANION GAP SERPL CALCULATED.3IONS-SCNC: 10 MMOL/L (ref 5–15)
BUN SERPL-MCNC: 28 MG/DL (ref 8–23)
BUN/CREAT SERPL: 15.6 (ref 7–25)
CALCIUM SPEC-SCNC: 8.5 MG/DL (ref 8.6–10.5)
CHLORIDE SERPL-SCNC: 108 MMOL/L (ref 98–107)
CO2 SERPL-SCNC: 20 MMOL/L (ref 22–29)
CREAT SERPL-MCNC: 1.79 MG/DL (ref 0.57–1)
DEPRECATED RDW RBC AUTO: 48 FL (ref 37–54)
EGFRCR SERPLBLD CKD-EPI 2021: 27.2 ML/MIN/1.73
ERYTHROCYTE [DISTWIDTH] IN BLOOD BY AUTOMATED COUNT: 14.2 % (ref 12.3–15.4)
GLUCOSE SERPL-MCNC: 88 MG/DL (ref 65–99)
HCT VFR BLD AUTO: 32.6 % (ref 34–46.6)
HGB BLD-MCNC: 10.4 G/DL (ref 12–15.9)
MCH RBC QN AUTO: 29.3 PG (ref 26.6–33)
MCHC RBC AUTO-ENTMCNC: 31.9 G/DL (ref 31.5–35.7)
MCV RBC AUTO: 91.8 FL (ref 79–97)
PLATELET # BLD AUTO: 167 10*3/MM3 (ref 140–450)
PMV BLD AUTO: 10.5 FL (ref 6–12)
POTASSIUM SERPL-SCNC: 4.9 MMOL/L (ref 3.5–5.2)
QT INTERVAL: 425 MS
QTC INTERVAL: 450 MS
RBC # BLD AUTO: 3.55 10*6/MM3 (ref 3.77–5.28)
SODIUM SERPL-SCNC: 138 MMOL/L (ref 136–145)
WBC NRBC COR # BLD AUTO: 5.48 10*3/MM3 (ref 3.4–10.8)

## 2025-04-25 PROCEDURE — 99214 OFFICE O/P EST MOD 30 MIN: CPT | Performed by: PSYCHIATRY & NEUROLOGY

## 2025-04-25 PROCEDURE — G0378 HOSPITAL OBSERVATION PER HR: HCPCS

## 2025-04-25 PROCEDURE — 85027 COMPLETE CBC AUTOMATED: CPT | Performed by: HOSPITALIST

## 2025-04-25 PROCEDURE — 80048 BASIC METABOLIC PNL TOTAL CA: CPT | Performed by: HOSPITALIST

## 2025-04-25 PROCEDURE — 97161 PT EVAL LOW COMPLEX 20 MIN: CPT

## 2025-04-25 RX ORDER — METOPROLOL SUCCINATE 25 MG/1
12.5 TABLET, EXTENDED RELEASE ORAL DAILY
OUTPATIENT
Start: 2025-04-26

## 2025-04-25 RX ORDER — SODIUM BICARBONATE 650 MG/1
650 TABLET ORAL 3 TIMES DAILY
OUTPATIENT
Start: 2025-04-25

## 2025-04-25 RX ORDER — SODIUM BICARBONATE 650 MG/1
650 TABLET ORAL 3 TIMES DAILY
Qty: 90 TABLET | Refills: 0 | Status: SHIPPED | OUTPATIENT
Start: 2025-04-25

## 2025-04-25 RX ADMIN — APIXABAN 2.5 MG: 2.5 TABLET, FILM COATED ORAL at 09:02

## 2025-04-25 RX ADMIN — ASPIRIN 81 MG: 81 TABLET, COATED ORAL at 09:02

## 2025-04-25 RX ADMIN — METOPROLOL SUCCINATE 12.5 MG: 25 TABLET, EXTENDED RELEASE ORAL at 09:02

## 2025-04-25 RX ADMIN — SODIUM BICARBONATE 650 MG: 650 TABLET ORAL at 09:03

## 2025-04-25 NOTE — THERAPY DISCHARGE NOTE
Acute Care - Physical Therapy Initial Evaluation/Discharge  [facility]     Patient Name: Marleni Hummel  : 1935  MRN: [id number]  Today's Date: 2025                Admit Date: 2025    Visit Dx:    ICD-10-CM ICD-9-CM   1. Altered mental status, unspecified altered mental status type  R41.82 780.97     Patient Active Problem List   Diagnosis    Arthritis involving multiple sites    Essential hypertension    Permanent atrial fibrillation    History of Bell's palsy    CKD (chronic kidney disease) stage 4, GFR 15-29 ml/min    Gastroesophageal reflux disease without esophagitis    Hypercholesterolemia    Insomnia    Localized osteoporosis without current pathological fracture    Panic disorder    Chronic nonseasonal allergic rhinitis due to pollen    Other sleep apnea    History of MI (myocardial infarction)    Chronic coronary artery disease    Anemia of chronic disease    Weakness of right leg    Cardiac murmur    Senile osteoporosis    Hyperuricemia    Grief reaction    Peripheral vertigo    Renal cell carcinoma of left kidney    Renal oncocytoma of left kidney    History of left nephrectomy    Cerebrovascular accident (CVA) due to stenosis of small artery    History of renal cell carcinoma    Malignant neoplasm of left kidney, except renal pelvis    Diverticulosis    Irritable bowel syndrome with constipation    Chronic diastolic congestive heart failure    Hallucination, visual    Hypomagnesemia    Lumbar disc disease with radiculopathy    History of cardiomyopathy-Takotsubo's    Acute thrombotic stroke-right temporal    Acute on chronic renal insufficiency    Vertigo as late effect of stroke    Confusion    AMS (altered mental status)    Metabolic acidosis     Past Medical History:   Diagnosis Date    Acute bronchitis due to Rhinovirus 2022    Acute vulvitis 2019    Allergic     Allergic rhinitis     Anemia of chronic disease     Arthropathy of knee     right    Atrial  fibrillation     Back pain     Bell's palsy     Bradycardia 05/27/2024    Bradycardia, drug induced 05/27/2024    Caregiver stress syndrome 04/17/2019    Chronic coronary artery disease     Chronic kidney disease (CKD), stage III (moderate)     CKD (chronic kidney disease) stage 4, GFR 15-29 ml/min     Diverticulitis 12/14/2022    CARROLL (dyspnea on exertion) 03/17/2023    Edema     Elevated serum free T4 level 03/21/2016    Encounter for eye exam 09/2020    Essential hypertension     Fatigue     GERD (gastroesophageal reflux disease)     H/O Heart murmur     Heart attack 10/05/2015    Hematuria 12/12/2016    Herpes zoster without complication     Hip arthritis     left    History of anemia due to chronic kidney disease     History of bone density study 02/28/2008    History of pelvic fracture 2015    History of pneumonia     History of transfusion     2015    Hypercholesterolemia     IFG (impaired fasting glucose)     Insomnia     Left kidney mass 07/2020    Limited joint range of motion     RIGHT KNEE    Mixed hyperlipidemia     Muscle tension headache 04/03/2019    New daily persistent headache 12/17/2018    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    Osteoarthritis     Right hip    Osteoporosis     Panic disorder     Peripheral vertigo     PONV (postoperative nausea and vomiting)     Rectal bleeding     HISTORY OF    Sleep apnea     DOES NOT USE C-PAP    Spinal stenosis, lumbar region with neurogenic claudication 12/02/2021    Stress     Stress-induced cardiomyopathy     Stroke 10/10/2024    Urinary incontinence     Vitamin D deficiency      Past Surgical History:   Procedure Laterality Date    CATARACT EXTRACTION Left 04/01/2013    Dr. Clarke    CATARACT EXTRACTION Right 04/16/2013    Dr. Clarke    COLONOSCOPY  04/18/2014    Dr. Elizabeth; no polyps    EPIDURAL BLOCK      HIP PERCUTANEOUS PINNING Left 8/31/2017    Procedure: LT HIP PERCUTANEOUS PINNING;  Surgeon: Sean Canales MD;  Location: [facility] OR;  Service:     MAMMO  BILATERAL  11/2013    NEPHRECTOMY Left 11/16/2020    Procedure: LAPAROSCOPIC NEPHRECTOMY;  Surgeon: Chuckie Mclaughlin MD;  Location: [facility] OR;  Service: Urology;  Laterality: Left;    PAP SMEAR  02/2011    TIBIA FRACTURE SURGERY Right 2015    REMOVED PLATE LATER IN 2015    TONSILLECTOMY  1947    TOTAL HIP ARTHROPLASTY Right 08/2015    TOTAL HIP ARTHROPLASTY Right 09/2016    TOTAL KNEE ARTHROPLASTY Bilateral 2004       PT Assessment (Last 12 Hours)       PT Evaluation and Treatment       Row Name 04/25/25 1500          Physical Therapy Time and Intention    Subjective Information no complaints  -     Document Type discharge evaluation/summary  -     Mode of Treatment individual therapy;physical therapy  -     Patient Effort good  -     Symptoms Noted During/After Treatment none  -       Row Name 04/25/25 1500          General Information    Patient Profile Reviewed yes  -     Patient Observations alert;agree to therapy  -     Patient/Family/Caregiver Comments/Observations pt supine in bed no acute distress, daughter bedside  -     Equipment Currently Used at Home rollator  -     Pertinent History of Current Functional Problem AMS, hallucinations  -     Existing Precautions/Restrictions fall  -     Limitations/Impairments safety/cognitive  -     Benefits Reviewed patient and family:  -     Barriers to Rehab cognitive status;hearing deficit  -       Row Name 04/25/25 1500          Living Environment    Current Living Arrangements condominium  -       Row Name 04/25/25 1500          Pain    Pretreatment Pain Rating 0/10 - no pain  -     Posttreatment Pain Rating 0/10 - no pain  -       Row Name 04/25/25 1500          Cognition    Orientation Status (Cognition) oriented to;person;situation  -     Follows Commands (Cognition) follows one-step commands;75-90% accuracy;initiation impaired  -       Row Name 04/25/25 1500          Range of Motion Comprehensive    General  Range of Motion bilateral lower extremity ROM WFL  -[facility] Name 04/25/25 1500          Strength (Manual Muscle Testing)    Strength (Manual Muscle Testing) strength is WFL;bilateral lower extremities  -[facility] Name 04/25/25 1500          Bed Mobility    Bed Mobility sit-supine;supine-sit  -[facility] Name 04/25/25 1500          Transfers    Transfers stand-sit transfer;sit-stand transfer  -[facility] Name 04/25/25 1500          Balance    Balance Assessment sitting static balance;standing static balance  -[facility] Name 04/25/25 1500          Plan of Care Review    Plan of Care Reviewed With patient  -     Outcome Evaluation Pt 89 yo female presented to ED w [facility] PMH A-fib on Eliquis, previous stroke with residual left-sided hemianopia, chronic bilateral hearing loss, arthritis. Pt reports baseline she lives alone, has supportve sister, uses rollator for ambulation. On PT exam pt ambulated 100ft supervision rwx. Pt appears at baseline w functional mobility, Recommend 24 hr assist at DC due to baseline cogntiive/hearing impairements. noted plans for DC home, will sign off acutely. encouraged cont'ed mobilization acutely as tolerated.  -[facility] Name 04/25/25 1500          Positioning and Restraints    Pre-Treatment Position in bed  -     Post Treatment Position bed  -     In Bed supine;call light within reach;encouraged to call for assist;exit alarm on  -[facility] Name 04/25/25 1500          Therapy Assessment/Plan (PT)    Criteria for Skilled Interventions Met (PT) no problems identified which require skilled intervention  -     Therapy Frequency (PT) evaluation only  -[facility] Name 04/25/25 1500          PT Evaluation Complexity    History, PT Evaluation Complexity 1-2 personal factors and/or comorbidities  -     Examination of Body Systems (PT Eval Complexity) 1-2 elements  -     Clinical Presentation (PT Evaluation Complexity) stable  -     Clinical Decision Making (PT  Evaluation Complexity) low complexity  -     Overall Complexity (PT Evaluation Complexity) low complexity  -               User Key  (r) = Recorded By, (t) = Taken By, (c) = Cosigned By      Initials Name Provider Type     Faye Rojas, PT Physical Therapist                      Physical Therapy Education       Title: PT OT SLP Therapies (In Progress)       Topic: Physical Therapy (In Progress)       Point: Mobility training (In Progress)       Learning Progress Summary            Patient Acceptance, E, NR by  at 4/25/2025 1541                      Point: Home exercise program (In Progress)       Learning Progress Summary            Patient Acceptance, E, NR by  at 4/25/2025 1541                      Point: Body mechanics (Not Started)       Learner Progress:  Not documented in this visit.              Point: Precautions (Not Started)       Learner Progress:  Not documented in this visit.                              User Key       Initials Effective Dates Name Provider Type [facility] 06/16/21 -  Faye Rojas PT Physical Therapist PT                    PT Recommendation and Plan  Anticipated Discharge Disposition (PT): home with 24/7 care, home with home health  Therapy Frequency (PT): evaluation only  Plan of Care Reviewed With: patient  Outcome Evaluation: Pt 89 yo female presented to ED w [facility] PMH A-fib on Eliquis, previous stroke with residual left-sided hemianopia, chronic bilateral hearing loss, arthritis. Pt reports baseline she lives alone, has supportve sister, uses rollator for ambulation. On PT exam pt ambulated 100ft supervision rwx. Pt appears at baseline w functional mobility, Recommend 24 hr assist at DC due to baseline cogntiive/hearing impairements. noted plans for DC home, will sign off acutely. encouraged cont'ed mobilization acutely as tolerated.     Outcome Measures       Row Name 04/25/25 1500             How much help from another person do you currently need...    Turning  from your back to your side while in flat bed without using bedrails? 4  -LH      Moving from lying on back to sitting on the side of a flat bed without bedrails? 4  -LH      Moving to and from a bed to a chair (including a wheelchair)? 4  -LH      Standing up from a chair using your arms (e.g., wheelchair, bedside chair)? 4  -LH      Climbing 3-5 steps with a railing? 3  -LH      To walk in hospital room? 3  -      AM-PAC 6 Clicks Score (PT) 22  -         Functional Assessment    Outcome Measure Options AM-PAC 6 Clicks Basic Mobility (PT)  -                User Key  (r) = Recorded By, (t) = Taken By, (c) = Cosigned By      Initials Name Provider Type     Faye Rojas, PT Physical Therapist                     Time Calculation:    PT Charges       Row Name 04/25/25 1542             Time Calculation    Start Time 1415  -      Stop Time 1425  -      Time Calculation (min) 10 min  -      PT Received On 04/25/25  -                User Key  (r) = Recorded By, (t) = Taken By, (c) = Cosigned By      Initials Name Provider Type     Faye Rojas, PT Physical Therapist                  Therapy Charges for Today       Code Description Service Date Service Provider Modifiers Qty    [id number] HC PT EVAL LOW COMPLEXITY 3 4/25/2025 Faye Rojas, PT GP 1            PT G-Codes  Outcome Measure Options: AM-PAC 6 Clicks Basic Mobility (PT)  AM-PAC 6 Clicks Score (PT): 22    PT Discharge Summary  Anticipated Discharge Disposition (PT): home with 24/7 care, home with home health    Faye Rojas, PT  4/25/2025

## 2025-04-25 NOTE — CASE MANAGEMENT/SOCIAL WORK
Discharge Planning Assessment  [facility]     Patient Name: Marleni Hummel  MRN: [id number]  Today's Date: 4/25/2025    Admit Date: 4/24/2025    Plan: Home   Discharge Needs Assessment       Row Name 04/25/25 1250       Living Environment    People in Home alone    Current Living Arrangements condominium    Potentially Unsafe Housing Conditions none    Primary Care Provided by self;child(amy)    Provides Primary Care For no one, unable/limited ability to care for self    Family Caregiver if Needed child(amy), adult    Quality of Family Relationships helpful;involved;supportive    Able to Return to Prior Arrangements yes       Resource/Environmental Concerns    Resource/Environmental Concerns none    Transportation Concerns none       Transportation Needs    In the past 12 months, has lack of transportation kept you from medical appointments or from getting medications? no    In the past 12 months, has lack of transportation kept you from meetings, work, or from getting things needed for daily living? No       Food Insecurity    Within the past 12 months, you worried that your food would run out before you got the money to buy more. Never true    Within the past 12 months, the food you bought just didn't last and you didn't have money to get more. Never true       Transition Planning    Patient/Family Anticipates Transition to home    Patient/Family Anticipated Services at Transition none    Transportation Anticipated family or friend will provide       Discharge Needs Assessment    Readmission Within the Last 30 Days no previous admission in last 30 days    Equipment Currently Used at Home walker, rolling;commode;wheelchair;shower chair    Concerns to be Addressed denies needs/concerns at this time    Do you want help finding or keeping work or a job? I do not need or want help    Do you want help with school or training? For example, starting or completing job training or getting a high school diploma, GED or  "equivalent No    Anticipated Changes Related to Illness none    Equipment Needed After Discharge none    Provided Post Acute Provider List? N/A    Provided Post Acute Provider Quality & Resource List? N/A    Current Discharge Risk lives alone                   Discharge Plan       Row Name 04/25/25 1251       Plan    Plan Home    Plan Comments S/W pt at bedside.  Facesheet info confirmed.  Pt lives alone in a 2 story condo - she stays on the 1st floor.  Pt states her dtr Silvia lives close and \"takes care of\" her - Silvia assists w/ grocery shopping, managing her meds, meals and transport.  Pt does not drive. Pt states she does her own laundry.  Pt has used [facility] [place] in the past and has been to rehab at [facility] and a couple of other facilities.  Pt states she plans to return home upon DC and denies any DC needs at this time.  CCP will continue to follow and assist if needed.                  Selected Continued Care - Episodes Includes continued care and service providers with selected services from the active episodes listed below             Demographic Summary       Row Name 04/25/25 1208       General Information    Admission Type observation    Arrived From home    Required Notices Provided Observation Status Notice    Referral Source admission list    Reason for Consult discharge planning    Preferred Language English                   Functional Status       Row Name 04/25/25 1248       Functional Status    Usual Activity Tolerance moderate       Functional Status, IADL    Medications assistive person    Meal Preparation assistive person    Housekeeping assistive person    Laundry assistive person;independent    Shopping assistive person       Mental Status    General Appearance WDL WDL       Mental Status Summary    Recent Changes in Mental Status/Cognitive Functioning no changes       Employment/    Employment Status retired                          Magnolia Joaquin RN    "

## 2025-04-25 NOTE — DISCHARGE SUMMARY
Patient Name: Marleni Hummel  : 1935  MRN: [id number]    Date of Admission: 2025  Date of Discharge:  2025  Primary Care Physician: Ken Andujar MD      Chief Complaint:   Altered Mental Status      Discharge Diagnoses     Active Hospital Problems    Diagnosis  POA    **AMS (altered mental status) [R41.82]  Yes    Metabolic acidosis [E87.20]  Yes    Chronic diastolic congestive heart failure [I50.32]  Yes    CKD (chronic kidney disease) stage 4, GFR 15-29 ml/min [N18.4]  Yes    Essential hypertension [I10]  Yes    Permanent atrial fibrillation [I48.21]  Yes    Other sleep apnea [G47.39]  Yes      Resolved Hospital Problems   No resolved problems to display.        Hospital Course     Ms. Hummel is a 89 y.o. female with a history of CHF, CKD, HTN, PAF, on chronic anticoagulation with Eliquis who presented to [facility] initially complaining of altered mental status with auditory and visual hallucinations.  Please see the admitting history and physical for further details.  She was found to have metabolic acidosis and was admitted to the hospital for further evaluation and treatment.  Family reported worsening mental status that started on Monday of this week progressively worsening with auditory and visual hallucinations.  States the changes started after she was placed on Trintellix, Remeron, and Zanaflex by her PCP.  She was started on oral sodium bicarb, and encouraged oral intake.  Nephrology was consulted for further evaluation and treatment of metabolic acidosis, who have cleared her for discharge home today with continued oral sodium bicarb and follow-up with them in 1 week.  Neurology was also consulted for altered mental status/confusion, initial head CT was unremarkable with exception of prior stroke. It is suspected  her symptoms are a mix of vascular and Alzheimer's dementia, and her hallucinations are somewhat expected considering her chronic hearing and vision  deficits.  The exacerbation of associated delusions suggest underlying dementia likely provoked by the most recent medication changes, some toxicity from Zanaflex.  Neurology has cleared her for discharge with close follow-up in outpatient setting.  They recommend continuing Trintellix, possibly adding Namenda if she tolerates well.  She and her daughter have been advised to follow-up with her PCP in 1 week for posthospitalization follow-up, with recommended CBC, BMP at that time.  She is also been advised to follow-up with nephrology in 1 week, as well as neurology at her previously scheduled appointment.   She has been advised to take all of her medications as they have been prescribed to her, and to attend all follow-up appointments.  She has been advised to avoid Zanaflex.  She has been told to return to the emergency room if she was to have new or worsening symptoms.      Day of Discharge     Subjective:  She continues to have some auditory, hallucinations, but states visual hallucinations have subsided.   Family at bedside discussed plans for future transitions within the next year and to memory care/skilled nursing/assisted living.  She offers no new complaints at this time.     Physical Exam:  Temp:  [97.5 °F (36.4 °C)-99 °F (37.2 °C)] 98.4 °F (36.9 °C)  Heart Rate:  [54-74] 63  Resp:  [14-18] 18  BP: (132-203)/(54-78) 149/61  There is no height or weight on file to calculate BMI.  Physical Exam  Vitals and nursing note reviewed.   Constitutional:       General: She is awake.      Appearance: Normal appearance.   HENT:      Head: Atraumatic.      Comments: Bilateral hearing loss     Mouth/Throat:      Mouth: Mucous membranes are dry.   Eyes:      Conjunctiva/sclera: Conjunctivae normal.   Cardiovascular:      Rate and Rhythm: Normal rate and regular rhythm.      Pulses: Normal pulses.   Pulmonary:      Breath sounds: Normal breath sounds.   Abdominal:      General: Bowel sounds are normal.      Palpations:  Abdomen is soft.   Skin:     Capillary Refill: Capillary refill takes 2 to 3 seconds.   Neurological:      Mental Status: She is disoriented.   Psychiatric:         Cognition and Memory: Cognition is impaired. Memory is impaired.         Consultants     Consult Orders (all) (From admission, onward)       Start     Ordered    04/24/25 1943  Inpatient Nephrology Consult  Once        Specialty:  Nephrology  Provider:  Joseph Leonard MD    04/24/25 1942 04/24/25 1942  Inpatient Neurology Consult General  Once        Specialty:  Neurology  Provider:  Ken Allen MD    04/24/25 1942 04/24/25 1652  [facility] (on-call MD unless specified) Details  Once,   Status:  Canceled        Specialty:  Hospitalist  Provider:  Alvaro Kyle MD    04/24/25 1651                  Procedures     * Surgery not found *    Imaging Results (All)       Procedure Component Value Units Date/Time    CT Head Without Contrast [[id number]] Collected: 04/24/25 1634     Updated: 04/25/25 0612    Narrative:      EMERGENCY CT SCAN OF THE HEAD WITHOUT CONTRAST ON 04/24/2025     CLINICAL HISTORY: This is an 89-year-old female patient with altered  mental status and increased confusion for 3 days and some  hallucinations.     TECHNIQUE: Spiral CT images were obtained from the base of the skull  through the vertex without intravenous contrast. The images were  reformatted and are submitted in 3 mm thick axial, ,sagittal and coronal  CT sections with brain algorithm.     COMPARISON: This is correlated to a prior head CT and MRI of the brain  both performed on 01/03/2025.     FINDINGS: There is a moderate size area of encephalomalacia extending  from the posterior medial right temporal lobe throughout the mid and  inferior medial right occipital lobe compatible with an old medial right  temporal occipital infarct in the right PCA territory that measures up  to 5.5 x 3 x 2.5 cm in anterior, posterior, mediolateral and  craniocaudal  dimension. There is some patchy low-density in the  periventricular white matter consistent with mild-moderate small vessel  disease. The remainder of the brain parenchyma is normal in attenuation.  There is mild cerebral atrophy. The ventricles are normal in size. I see  no mass effect. There is no midline shift. No extra-axial fluid  collections are identified. There is no evidence of acute intracranial  hemorrhage. The calvarium and the skull base are normal in appearance.  Paranasal sinuses, the mastoid air cells and middle ear cavities are  clear. There are lens implants in the globes from previous bilateral  cataract surgery otherwise, the orbits are unremarkable.       Impression:      1. No acute intracranial abnormality is identified with no significant  change when compared to a prior head CT from [facility] on 01/03/2025.     2. There is mild-moderate small vessel disease in the cerebral white  matter. There is a 5.5 x 3 x 2.5 cm old medial right temporal occipital  infarct in the right PCA territory and there is mild cerebral atrophy.  There are lens implants in the globes from previous bilateral cataract  surgery. The remainder of the head CT is within normal limits. The  etiology of the patient's confusion and hallucinations is not  established on this exam.  .  Radiation dose reduction techniques were utilized, including automated  exposure control and exposure modulation based on body size.        This report was finalized on 4/25/2025 6:09 AM by Dr. Pawan Kate M.D  on Workstation: [id number]       XR Chest 1 View [[id number]] Collected: 04/24/25 1518     Updated: 04/24/25 1526    Narrative:      XR CHEST 1 VW-     HISTORY: Female who is 89 years-old, altered mental status     TECHNIQUE: Frontal view of the chest     COMPARISON: 1/3/2025     FINDINGS: The heart size is normal. Aorta is tortuous, calcified.  Pulmonary vasculature is unremarkable. No focal pulmonary  consolidation,  pleural effusion, or pneumothorax. Old granulomatous disease is  apparent. No acute osseous process.       Impression:      No focal pulmonary consolidation. Tortuous aorta. Follow-up  as clinically indicated.     This report was finalized on 4/24/2025 3:23 PM by Dr. Jaspreet Whitt M.D on Workstation: [id number]             Results for orders placed during the hospital encounter of 10/09/24    Duplex Carotid Ultrasound CAR    Interpretation Summary    Right internal carotid artery demonstrates a less than 50% stenosis.    Antegrade right vertebral flow.    Left internal carotid artery demonstrates normal flow without evidence of hemodynamically significant stenosis.    Antegrade left vertebral flow.    Results for orders placed during the hospital encounter of 10/09/24    Adult Transthoracic Echo Complete W/ Cont if Necessary Per Protocol (With Agitated Saline)    Interpretation Summary    There is akinesis of the mid lateral wall    Left ventricular ejection fraction appears to be 41 - 45%.    Normal right ventricular cavity size and systolic function noted.    The left atrial cavity is severely dilated.    Mild to moderate aortic valve regurgitation is present    Saline test results are negative.    Mild mitral valve regurgitation is present.    Mild tricuspid valve regurgitation is present.    Calculated right ventricular systolic pressure from tricuspid regurgitation is 23 mmHg.    There is no evidence of pericardial effusion    Pertinent Labs     Results from last 7 days   Lab Units 04/25/25  0430 04/24/25  1432   WBC 10*3/mm3 5.48 7.70   HEMOGLOBIN g/dL 10.4* 10.4*   PLATELETS 10*3/mm3 167 170     Results from last 7 days   Lab Units 04/25/25  0430 04/24/25  1432   SODIUM mmol/L 138 135*   POTASSIUM mmol/L 4.9 4.8   CHLORIDE mmol/L 108* 105   CO2 mmol/L 20.0* 17.0*   BUN mg/dL 28* 32*   CREATININE mg/dL 1.79* 1.94*   GLUCOSE mg/dL 88 106*   EGFR mL/min/1.73 27.2* 24.7*     Results from last 7  "days   Lab Units 04/24/25  1432   ALBUMIN g/dL 4.1   BILIRUBIN mg/dL 0.6   ALK PHOS U/L 48   AST (SGOT) U/L 31   ALT (SGPT) U/L 14     Results from last 7 days   Lab Units 04/25/25  0430 04/24/25  1432   CALCIUM mg/dL 8.5* 8.5*   ALBUMIN g/dL  --  4.1   MAGNESIUM mg/dL  --  2.1               Invalid input(s): \"LDLCALC\"          Test Results Pending at Discharge     Pending Results       None              Discharge Details        Discharge Medications        New Medications        Instructions Start Date   sodium bicarbonate 650 MG tablet   650 mg, Oral, 3 Times Daily             Changes to Medications        Instructions Start Date   Eliquis 2.5 MG tablet tablet  Generic drug: apixaban  What changed: See the new instructions.   TAKE 1 TABLET EVERY 12 HOURS (TWICE A DAY)      VITAMIN B2 PO  What changed:   how much to take  when to take this   Take  by mouth.             Continue These Medications        Instructions Start Date   acetaminophen 500 MG tablet  Commonly known as: TYLENOL   1 tablet, Every 4 Hours PRN      aspirin 81 MG EC tablet   81 mg, Oral, Daily      atorvastatin 40 MG tablet  Commonly known as: LIPITOR   40 mg, Oral, Nightly      estradiol 0.1 MG/GM vaginal cream  Commonly known as: ESTRACE   1 g, 3 Times Weekly      fexofenadine 60 MG tablet  Commonly known as: ALLEGRA   1 tablet, Daily PRN      furosemide 20 MG tablet  Commonly known as: LASIX   20 mg, Oral, Daily PRN      Gemtesa 75 MG tablet  Generic drug: Vibegron   75 mg, Oral, Daily      melatonin 5 MG tablet tablet   1 tablet, Nightly PRN      metoprolol succinate XL 25 MG 24 hr tablet  Commonly known as: TOPROL-XL   12.5 mg, Oral, Daily      mirtazapine 7.5 MG tablet  Commonly known as: REMERON   7.5 mg, Oral, Nightly      Vortioxetine HBr 10 MG tablet tablet  Commonly known as: TRINTELLIX   10 mg, Oral, Daily With Breakfast               Allergies   Allergen Reactions    Medrol [Methylprednisolone] Other (See Comments)     Irritability    " Morphine Anaphylaxis     morphine    morphine sulfate    Oxycodone Anaphylaxis and Unknown - Low Severity     Oxycontin    Ace Inhibitors Cough and Unknown (See Comments)    Bactrim [Sulfamethoxazole-Trimethoprim] Rash    Levofloxacin Itching and Rash     insomnia  insomnia  insomnia    Torsemide Dizziness       Discharge Disposition:  Home or Self Care      Discharge Diet:  Diet Order   Procedures    Diet: Cardiac; Healthy Heart (2-3 Na+); Fluid Consistency: Thin (IDDSI 0)       Discharge Activity: As tolerated       CODE STATUS:    Code Status and Medical Interventions: CPR (Attempt to Resuscitate); Full   Ordered at: 04/24/25 1943     Code Status (Patient has no pulse and is not breathing):    CPR (Attempt to Resuscitate)     Medical Interventions (Patient has pulse or is breathing):    Full     Level Of Support Discussed With:    Patient       Future Appointments   Date Time Provider Department Center   4/29/2025 10:00 AM Milli Melara APRN [facility] [id number] [facility]   5/9/2025 11:00 AM ROOM 1 [facility]U [facility]   5/23/2025 11:00 AM ROOM 2 [facility]U [facility]   5/30/2025 11:00 AM REFERRED INJECTION CHAIR EP [facility] INFUS EP [facility]   6/18/2025  1:20 PM Naomi Gamboa MD [facility]   6/25/2025 11:00 AM Sybil Parmar MD [facility] [id number] [facility]   7/16/2025  1:45 PM Ken Andujar MD [facility]   12/3/2025  1:30 PM REFERRED INJECTION CHAIR EP BH INFUS EP [facility]      Follow-up Information       Ken Andujar MD Follow up on 4/29/2025.    Specialty: Family Medicine  Why: Appointment will be Tuesday, April 29, 2024 at 10am  Contact information:  [address]  [address]  [place] [id number] [phone]               Mark Young MD. Schedule an appointment as soon as possible for a visit in 1 week(s).    Specialty: Nephrology  Contact information:  [address]  [address]  [place] [id number] [phone]               Naomi Gamboa MD. Go to.    Specialty: Neurology  Why:  Previously scheduled appointment  Contact information:  [address] [place] [id number] [phone]                             Time Spent on Discharge:  Greater than 30 minutes      ENRRIQUE Smith  [facility]  04/25/25  15:14 EDT

## 2025-04-25 NOTE — PLAN OF CARE
Goal Outcome Evaluation:  Plan of Care Reviewed With: patient           Outcome Evaluation: Pt 89 yo female presented to ED w AMS PMH A-fib on Eliquis, previous stroke with residual left-sided hemianopia, chronic bilateral hearing loss, arthritis. Pt reports baseline she lives alone, has supportve sister, uses rollator for ambulation. On PT exam pt ambulated 100ft supervision rwx. Pt appears at baseline w functional mobility, Recommend 24 hr assist at DC due to baseline cogntiive/hearing impairements. noted plans for DC home, will sign off acutely. encouraged cont'ed mobilization acutely as tolerated.    Anticipated Discharge Disposition (PT): home with 24/7 care, home with home health

## 2025-04-25 NOTE — CONSULTS
Neurology Consult Note    Consult Date: 4/25/2025    Referring MD: Alvaro Kyle MD    Reason for Consult I have been asked to see the patient in neurological consultation to render advice and opinion regarding altered mental status, hallucinations    Marleni Hummel is a 89 y.o. female with A-fib on Eliquis, previous PCA stroke with residual left-sided hemianopia, chronic bilateral hearing loss, arthritis who presented to the hospital with altered mental status.  Her daughter reports that she took a single dose of Zanaflex and was started on Trintellix for depression by her PCP.  In the setting of that she developed worsening delusions and hallucinations.  Patient endorses auditory musical hallucinations frequently.  She also endorses occasional subtle visual hallucinations but sometimes sees more formed images of children running around.  She seems to think that these are more common in the left visual field.  Her daughter feels that her memory has overall been stable over time but the delusions and hallucinations seem to be more recent.  This has been a bit better since admission overnight but she is still not quite back to her baseline today.    Past Medical History:   Diagnosis Date    Acute bronchitis due to Rhinovirus 05/31/2022    Acute vulvitis 08/21/2019    Allergic     Allergic rhinitis     Anemia of chronic disease     Arthropathy of knee     right    Atrial fibrillation     Back pain     Bell's palsy     Bradycardia 05/27/2024    Bradycardia, drug induced 05/27/2024    Caregiver stress syndrome 04/17/2019    Chronic coronary artery disease     Chronic kidney disease (CKD), stage III (moderate)     CKD (chronic kidney disease) stage 4, GFR 15-29 ml/min     Diverticulitis 12/14/2022    CARROLL (dyspnea on exertion) 03/17/2023    Edema     Elevated serum free T4 level 03/21/2016    Encounter for eye exam 09/2020    Essential hypertension     Fatigue     GERD (gastroesophageal reflux disease)     H/O Heart  murmur     Heart attack 10/05/2015    Hematuria 12/12/2016    Herpes zoster without complication     Hip arthritis     left    History of anemia due to chronic kidney disease     History of bone density study 02/28/2008    History of pelvic fracture 2015    History of pneumonia     History of transfusion     2015    Hypercholesterolemia     IFG (impaired fasting glucose)     Insomnia     Left kidney mass 07/2020    Limited joint range of motion     RIGHT KNEE    Mixed hyperlipidemia     Muscle tension headache 04/03/2019    New daily persistent headache 12/17/2018    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    Osteoarthritis     Right hip    Osteoporosis     Panic disorder     Peripheral vertigo     PONV (postoperative nausea and vomiting)     Rectal bleeding     HISTORY OF    Sleep apnea     DOES NOT USE C-PAP    Spinal stenosis, lumbar region with neurogenic claudication 12/02/2021    Stress     Stress-induced cardiomyopathy     Stroke 10/10/2024    Urinary incontinence     Vitamin D deficiency        Exam  /59 (BP Location: Left arm, Patient Position: Lying)   Pulse 66   Temp 98.2 °F (36.8 °C) (Oral)   Resp 16   SpO2 98%   Gen: NAD, vitals reviewed  MS: oriented x3, recent/remote memory limited, normal attention/concentration, 3 out of 3 delayed recall, slightly limited verbal and semantic fluency, language intact, no neglect.  Poor insight  CN: Partial left homonymous hemianopia, PERRL, EOMI, no facial droop, no dysarthria  Motor: 5/5 throughout upper and lower extremities, normal tone    DATA:    Lab Results   Component Value Date    GLUCOSE 88 04/25/2025    CALCIUM 8.5 (L) 04/25/2025     04/25/2025    K 4.9 04/25/2025    CO2 20.0 (L) 04/25/2025     (H) 04/25/2025    BUN 28 (H) 04/25/2025    CREATININE 1.79 (H) 04/25/2025    EGFRIFAFRI 25 (L) 02/23/2022    EGFRIFNONA 21 (L) 02/23/2022    BCR 15.6 04/25/2025    ANIONGAP 10.0 04/25/2025     Lab Results   Component Value Date    WBC 5.48 04/25/2025    HGB 10.4  (L) 04/25/2025    HCT 32.6 (L) 04/25/2025    MCV 91.8 04/25/2025     04/25/2025       Lab review: CBC, BMP reviewed    Imaging review: I personally reviewed her CT head performed on admission which shows a chronic right PCA stroke and fairly advanced medial temporal and bilateral parietal atrophy.  Radiology report reviewed.    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  Her exacerbation recently with associated delusions is suggestive of underlying dementia likely being provoked by medication changes, possibly some toxicity from Zanaflex or possibly other unknown factors.  I discussed with her daughter that although she has been compensating well for these issues we would expect to see some gradual progression over time of cognitive impairment leading to inability to continue living independently.  I recommended they look into other options like hiring caregivers or transitioning to an assisted living facility with memory care onsite and plan to make that change by the end of this calendar year.  Fortunately the patient does have upcoming follow-up in our clinic with Dr. Gamboa    PLAN:  Follow-up in our clinic for further assessment of memory  Could consider adding Namenda in the outpatient setting if she tolerates Trintellix  Need for increasing assistance over time discussed with her daughter    No further inpatient neurologic workup needed.  From a neurology standpoint she can be discharged.  Discussed with Dr. Madrigal.

## 2025-04-25 NOTE — CASE MANAGEMENT/SOCIAL WORK
Case Management Discharge Note      Final Note: DC home    Provided Post Acute Provider List?: N/A  Provided Post Acute Provider Quality & Resource List?: N/A    Selected Continued Care - Admitted Since 4/24/2025       Destination    No services have been selected for the patient.                Durable Medical Equipment    No services have been selected for the patient.                Dialysis/Infusion    No services have been selected for the patient.                Home Medical Care    No services have been selected for the patient.                Therapy    No services have been selected for the patient.                Community Resources    No services have been selected for the patient.                Community & DME    No services have been selected for the patient.                    Selected Continued Care - Episodes Includes continued care and service providers with selected services from the active episodes listed below          Transportation Services  Private: Car    Final Discharge Disposition Code: 01 - home or self-care

## 2025-04-25 NOTE — CONSULTS
[facility] Consult Note      Patient Name: Marleni Hummel  : 1935  MRN: [id number]  Primary Care Physician:  Ken Andujar MD  Referring Physician: Alvaro Klye MD  Date of admission: 2025    Subjective     Reason for Consult:  CKD4    HPI:   Marleni Hummel is a 89 y.o. female with CKD4 (baseline SCr 1.8-2.0), Dr. Leonard of our group, admitted yesterday for further evaluation of confusion and hallucinations, both visual and auditory.  SCR on arrival 1.9, with value 1.8 today.  Head CT negative for acute process.  Full PMH outlined below; notable is left nephrectomy  for malignancy; atrial fibrillation on AC; PVD with prior stroke; CAD; and hypertension.  Recent addition of several new medications, including Zanaflex, to address stiffness in her back.  Developed confusion and hallucinations, both auditory and visual.  Though she is no longer having visual hallucinations, continues to hear music, which is starting to subside today versus yesterday.    No urinary complaints; no fever or chills  No diarrhea; usually has constipation  Breathing is comfortable on room air; no orthopnea  No chest pain  Appetite fair, but no N/V    Review of Systems:   14 point review of systems is otherwise negative except for mentioned above on HPI    Personal History     Past Medical History:   Diagnosis Date    Acute bronchitis due to Rhinovirus 2022    Acute vulvitis 2019    Allergic     Allergic rhinitis     Anemia of chronic disease     Arthropathy of knee     right    Atrial fibrillation     Back pain     Bell's palsy     Bradycardia 2024    Bradycardia, drug induced 2024    Caregiver stress syndrome 2019    Chronic coronary artery disease     Chronic kidney disease (CKD), stage III (moderate)     CKD (chronic kidney disease) stage 4, GFR 15-29 ml/min     Diverticulitis 2022    CARROLL (dyspnea on exertion) 2023    Edema     Elevated serum free  T4 level 03/21/2016    Encounter for eye exam 09/2020    Essential hypertension     Fatigue     GERD (gastroesophageal reflux disease)     H/O Heart murmur     Heart attack 10/05/2015    Hematuria 12/12/2016    Herpes zoster without complication     Hip arthritis     left    History of anemia due to chronic kidney disease     History of bone density study 02/28/2008    History of pelvic fracture 2015    History of pneumonia     History of transfusion     2015    Hypercholesterolemia     IFG (impaired fasting glucose)     Insomnia     Left kidney mass 07/2020    Limited joint range of motion     RIGHT KNEE    Mixed hyperlipidemia     Muscle tension headache 04/03/2019    New daily persistent headache 12/17/2018    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 Clarke    CATARACT EXTRACTION Right 04/16/2013    Dr. Clarke    COLONOSCOPY  04/18/2014    Dr. Elizabeth; no polyps    EPIDURAL BLOCK      HIP PERCUTANEOUS PINNING Left 8/31/2017    Procedure: LT HIP PERCUTANEOUS PINNING;  Surgeon: Sean Canales MD;  Location: [facility] OR;  Service:     MAMMO BILATERAL  11/2013    NEPHRECTOMY Left 11/16/2020    Procedure: LAPAROSCOPIC NEPHRECTOMY;  Surgeon: Chuckie Mclaughlin MD;  Location: [facility] OR;  Service: Urology;  Laterality: Left;    PAP SMEAR  02/2011    TIBIA FRACTURE SURGERY Right 2015    REMOVED PLATE LATER IN 2015    TONSILLECTOMY  1947    TOTAL HIP ARTHROPLASTY Right 08/2015    TOTAL HIP ARTHROPLASTY Right 09/2016    TOTAL KNEE  ARTHROPLASTY Bilateral 2004       Family History: family history includes Heart disease in her mother; Hypertension in her maternal grandmother, mother, and another family member; Stroke in her mother.    Social History:  reports that she quit smoking about 69 years ago. Her smoking use included cigarettes. She started smoking about 72 years ago. She has a 3 pack-year smoking history. She has been exposed to tobacco smoke. She has never used smokeless tobacco. She reports that she does not currently use alcohol after a past usage of about 3.0 standard drinks of alcohol per week. She reports that she does not use drugs.    Home Medications:  Prior to Admission medications    Medication Sig Start Date End Date Taking? Authorizing Provider   acetaminophen (TYLENOL) 500 MG tablet Take 1 tablet by mouth Every 4 (Four) Hours As Needed for Mild Pain. Indications: Pain   Yes Zach Hidalgo MD   aspirin 81 MG EC tablet Take 1 tablet by mouth Daily. 1/5/25  Yes Harpal Law MD   atorvastatin (LIPITOR) 40 MG tablet Take 1 tablet by mouth Every Night. 1/16/25  Yes Oralia Lutz APRN   Eliquis 2.5 MG tablet tablet TAKE 1 TABLET EVERY 12 HOURS (TWICE A DAY)  Patient taking differently: Take 1 tablet by mouth Every 12 (Twelve) Hours. 7/22/24  Yes Oralia Lutz APRN   estradiol (ESTRACE) 0.1 MG/GM vaginal cream Insert 1 g into the vagina 3 (Three) Times a Week.   Yes Zach Hidalgo MD   fexofenadine (ALLEGRA) 60 MG tablet Take 1 tablet by mouth Daily As Needed. Indications: Hayfever   Yes Zach Hidalgo MD   furosemide (LASIX) 20 MG tablet Take 1 tablet by mouth Daily As Needed (if weight goes up 3lbs in 3 days). 4/21/23  Yes Sybil Parmar MD   melatonin 5 MG tablet tablet Take 1 tablet by mouth At Night As Needed. Indications: Trouble Sleeping   Yes Zach Hidalgo MD   metoprolol succinate XL (TOPROL-XL) 25 MG 24 hr tablet Take 0.5 tablets by mouth Daily. 3/4/25  Yes Oralia Lutz APRN    Riboflavin (VITAMIN B2 PO) Take  by mouth.  Patient taking differently: Take 1 tablet by mouth Daily.   Yes Provider, MD Zach   Vibegron (Gemtesa) 75 MG tablet Take 1 tablet by mouth Daily for 270 days. Indications: Overactive Bladder, Urinary Incontinence 4/16/25 1/11/26 Yes Ken Andujar MD   Vortioxetine HBr (TRINTELLIX) 10 MG tablet tablet Take 1 tablet by mouth Daily With Breakfast for 270 days. 4/16/25 1/11/26 Yes Ken Andujar MD   mirtazapine (REMERON) 7.5 MG tablet Take 1 tablet by mouth Every Night for 270 days. 4/16/25 1/11/26  Ken Andujar MD       Allergies:  Allergies   Allergen Reactions    Medrol [Methylprednisolone] Other (See Comments)     Irritability    Morphine Anaphylaxis     morphine    morphine sulfate    Oxycodone Anaphylaxis and Unknown - Low Severity     Oxycontin    Ace Inhibitors Cough and Unknown (See Comments)    Bactrim [Sulfamethoxazole-Trimethoprim] Rash    Levofloxacin Itching and Rash     insomnia  insomnia  insomnia    Torsemide Dizziness       Objective     Vitals:   Temp:  [97.5 °F (36.4 °C)-99.8 °F (37.7 °C)] 97.5 °F (36.4 °C)  Heart Rate:  [54-74] 54  Resp:  [14-18] 14  BP: (132-203)/(54-78) 132/61    Intake/Output Summary (Last 24 hours) at 4/25/2025 0754  Last data filed at 4/25/2025 0734  Gross per 24 hour   Intake 1390 ml   Output --   Net 1390 ml       Physical Exam:   Constitutional: Awake, alert, NAD, fully oriented; continues to hear music  HEENT: Sclera anicteric, no conjunctival injection  Neck: Supple, no carotid bruit, trachea at midline, no JVD  Respiratory: Clear to auscultation bilaterally, nonlabored respiration  Cardiovascular: Irregularly irregular, bradycardic, no rub  Gastrointestinal: BS +, soft, nontender and nondistended  : No palpable bladder  Musculoskeletal: No edema, no clubbing or cyanosis  Psychiatric: Appropriate affect, cooperative, fully oriented  Neurologic: No asterixis, moving all extremities, normal speech and mental  status  Skin: Warm and dry       Scheduled Meds:     apixaban, 2.5 mg, Oral, Q12H  aspirin, 81 mg, Oral, Daily  atorvastatin, 40 mg, Oral, Nightly  metoprolol succinate XL, 12.5 mg, Oral, Daily  sodium bicarbonate, 650 mg, Oral, TID      IV Meds:        Results Reviewed:   I have personally reviewed the results from the time of this admission to 4/25/2025 07:54 EDT     Lab Results   Component Value Date    GLUCOSE 88 04/25/2025    CALCIUM 8.5 (L) 04/25/2025     04/25/2025    K 4.9 04/25/2025    CO2 20.0 (L) 04/25/2025     (H) 04/25/2025    BUN 28 (H) 04/25/2025    CREATININE 1.79 (H) 04/25/2025    EGFRIFAFRI 25 (L) 02/23/2022    EGFRIFNONA 21 (L) 02/23/2022    BCR 15.6 04/25/2025    ANIONGAP 10.0 04/25/2025      Lab Results   Component Value Date    MG 2.1 04/24/2025    PHOS 4.1 02/14/2025    ALBUMIN 4.1 04/24/2025           Assessment / Plan       AMS (altered mental status)    Essential hypertension    Permanent atrial fibrillation    CKD (chronic kidney disease) stage 4, GFR 15-29 ml/min    Other sleep apnea    Chronic diastolic congestive heart failure    Metabolic acidosis      ASSESSMENT:  1.  CKD4, with renal function at baseline.  Looks euvolemic.  Likely NAGMA.  Normal potassium.  Urinalysis bland.  Prior left nephrectomy for malignancy in 2020  2.  Confusion, improving, with visual and auditory hallucinations, with only the latter persisting  3.  PVD with prior stroke: Initial head CT negative  4.  Atrial fibrillation, rate-controlled  5.  Hypertension of chronic kidney disease, stable  6.  Anemia of CKD, with hemoglobin at goal    PLAN:  1.  Encouraged her to eat and drink  2.  Agree with oral sodium bicarbonate  3.  Neuro evaluation pending  4.  Surveillance labs    Thank you for involving us in the care of Marleni Hummel.  Please feel free to call with any questions.    Mark Young MD  04/25/25  07:54 EDT    [facility]  [phone]      Please note  that portions of this note were completed with a voice recognition program.

## 2025-04-25 NOTE — OUTREACH NOTE
Prep Survey      Flowsheet Row Responses   [facility] facility patient discharged from? [place]   Is LACE score < 7 ? No   Eligibility [facility]   Date of Admission 04/24/25   Date of Discharge 04/25/25   Discharge Disposition Home or Self Care   Discharge diagnosis AMS   Does the patient have one of the following disease processes/diagnoses(primary or secondary)? Other   Does the patient have Home health ordered? No   Is there a DME ordered? No   Prep survey completed? Yes            JERICA CARRASCO - Registered Nurse

## 2025-04-25 NOTE — PLAN OF CARE
Problem: Adult Inpatient Plan of Care  Goal: Plan of Care Review  Outcome: Adequate for Care Transition  Goal: Patient-Specific Goal (Individualized)  Outcome: Adequate for Care Transition  Goal: Absence of Hospital-Acquired Illness or Injury  Outcome: Adequate for Care Transition  Intervention: Identify and Manage Fall Risk  Recent Flowsheet Documentation  Taken 4/25/2025 1400 by Agnes Solis RN  Safety Promotion/Fall Prevention:   activity supervised   assistive device/personal items within reach   clutter free environment maintained   fall prevention program maintained   nonskid shoes/slippers when out of bed   room organization consistent   safety round/check completed  Taken 4/25/2025 1200 by Agnes Solis RN  Safety Promotion/Fall Prevention:   activity supervised   assistive device/personal items within reach   clutter free environment maintained   fall prevention program maintained   nonskid shoes/slippers when out of bed   room organization consistent   safety round/check completed  Taken 4/25/2025 1000 by Agnes Solis RN  Safety Promotion/Fall Prevention:   activity supervised   assistive device/personal items within reach   clutter free environment maintained   fall prevention program maintained   nonskid shoes/slippers when out of bed   room organization consistent   safety round/check completed  Taken 4/25/2025 0800 by Agnes Solis RN  Safety Promotion/Fall Prevention:   activity supervised   assistive device/personal items within reach   clutter free environment maintained   fall prevention program maintained   nonskid shoes/slippers when out of bed   room organization consistent   safety round/check completed  Intervention: Prevent Skin Injury  Recent Flowsheet Documentation  Taken 4/25/2025 1400 by Agnes Solis RN  Body Position: position changed independently  Taken 4/25/2025 1200 by Agnes Solis RN  Body Position: position changed independently  Taken 4/25/2025 1000 by Agnes Solis  RN  Body Position: position changed independently  Taken 4/25/2025 0800 by Agnes Solis RN  Body Position: position changed independently  Intervention: Prevent Infection  Recent Flowsheet Documentation  Taken 4/25/2025 1400 by Agnes Solis RN  Infection Prevention:   hand hygiene promoted   personal protective equipment utilized   rest/sleep promoted  Taken 4/25/2025 1200 by Agnes Solis RN  Infection Prevention:   hand hygiene promoted   personal protective equipment utilized   rest/sleep promoted  Taken 4/25/2025 1000 by Agnes Solis RN  Infection Prevention:   hand hygiene promoted   personal protective equipment utilized   rest/sleep promoted  Taken 4/25/2025 0800 by Agnes oSlis RN  Infection Prevention:   hand hygiene promoted   personal protective equipment utilized   rest/sleep promoted  Goal: Optimal Comfort and Wellbeing  Outcome: Adequate for Care Transition  Intervention: Provide Person-Centered Care  Recent Flowsheet Documentation  Taken 4/25/2025 1545 by Agnes Solis RN  Trust Relationship/Rapport:   care explained   choices provided  Taken 4/25/2025 0902 by Agnes Solis RN  Trust Relationship/Rapport:   care explained   choices provided  Goal: Readiness for Transition of Care  Outcome: Adequate for Care Transition   Goal Outcome Evaluation:

## 2025-04-28 ENCOUNTER — TRANSITIONAL CARE MANAGEMENT TELEPHONE ENCOUNTER (OUTPATIENT)
Dept: CALL CENTER | Facility: HOSPITAL | Age: 89
End: 2025-04-28
Payer: MEDICARE

## 2025-04-28 ENCOUNTER — TELEPHONE (OUTPATIENT)
Dept: FAMILY MEDICINE CLINIC | Facility: CLINIC | Age: 89
End: 2025-04-28

## 2025-04-28 NOTE — TELEPHONE ENCOUNTER
Caller: NEAL HERNANDEZ    Relationship: Emergency Contact    Best call back number: [phone]     What is the best time to reach you: AFTERNOON    Who are you requesting to speak with (clinical staff, provider,  specific staff member): CLINICAL STAFF    Do you know the name of the person who called:  NEAL     What was the call regarding: patient'S DAUGHTER IS REQUESTING ORDERS FOR A POLIO SHOT BE FAXED TO  [phone] AT AM[facility]UL[facility] CARE AT [facility].    Is it okay if the provider responds through MyChart:

## 2025-04-28 NOTE — OUTREACH NOTE
Call Center TCM Note      Flowsheet Row Responses   [facility] patient discharged from? [place]   Does the patient have one of the following disease processes/diagnoses(primary or secondary)? Other   TCM attempt successful? No   Unsuccessful attempts Attempt 1            JAVIER MONTERROSO - Medical Assistant    4/28/2025, 08:46 EDT

## 2025-04-28 NOTE — OUTREACH NOTE
Call Center TCM Note      Flowsheet Row Responses   [facility] patient discharged from? [place]   Does the patient have one of the following disease processes/diagnoses(primary or secondary)? Other   TCM attempt successful? Yes   Call start time 1543   Call end time 1545   Discharge diagnosis AMS   Is patient permission given to speak with other caregiver? Yes   Person spoke with today (if not patient) and relationship dtr Silvia   Meds reviewed with patient/caregiver? Yes   Is the patient having any side effects they believe may be caused by any medication additions or changes? No   Does the patient have all medications ordered at discharge? Yes   Prescription comments New rx Sodium bicarbonate in place   Is the patient taking all medications as directed (includes completed medication regime)? Yes   Does the patient have an appointment with their PCP within 7-14 days of discharge? Yes   Has home health visited the patient within 72 hours of discharge? N/A   Psychosocial issues? No   Did the patient receive a copy of their discharge instructions? Yes   Nursing interventions Reviewed instructions with patient   What is the patient's perception of their health status since discharge? Improving   Is the patient/caregiver able to teach back signs and symptoms related to disease process for when to call PCP? Yes   Is the patient/caregiver able to teach back signs and symptoms related to disease process for when to call 911? Yes   Is the patient/caregiver able to teach back the hierarchy of who to call/visit for symptoms/problems? PCP, Specialist, Home health nurse, Urgent Care, ED, 911 Yes   If the patient is a current smoker, are they able to teach back resources for cessation? Not a smoker   TCM call completed? Yes   Wrap up additional comments D/C DX: AMS possible due to medictions - Per dtr Silvia pt still having some confusion, but less acute. No questions at this time. TCM APPT with PCP office is  05/08/2025.   Call end time [id number]            JAVIER MONTERROSO - Medical Assistant    4/28/2025, 15:47 EDT

## 2025-04-30 ENCOUNTER — PATIENT OUTREACH (OUTPATIENT)
Dept: CASE MANAGEMENT | Facility: OTHER | Age: 89
End: 2025-04-30
Payer: MEDICARE

## 2025-04-30 DIAGNOSIS — D63.8 ANEMIA OF CHRONIC DISEASE: ICD-10-CM

## 2025-04-30 DIAGNOSIS — I48.21 PERMANENT ATRIAL FIBRILLATION: ICD-10-CM

## 2025-04-30 DIAGNOSIS — I10 ESSENTIAL HYPERTENSION: ICD-10-CM

## 2025-04-30 DIAGNOSIS — R41.0 CONFUSION: Primary | ICD-10-CM

## 2025-04-30 DIAGNOSIS — F41.0 PANIC DISORDER: ICD-10-CM

## 2025-04-30 DIAGNOSIS — N18.4 CKD (CHRONIC KIDNEY DISEASE) STAGE 4, GFR 15-29 ML/MIN: ICD-10-CM

## 2025-05-01 NOTE — OUTREACH NOTE
[facility] End of Month Documentation    This Chronic Medical Management Care Plan for Marleni Hummel, 89 y.o. female, has been monitored and managed; reviewed and a new plan of care implemented for the month of April.  A cumulative time of 27  minutes was spent on this patient record this month, including chart review; electronic communication with primary care provider; phone call with care giver, hallucinations/ stroke history/ iron infusions/ worsening confusion/ request for Urine check.    Regarding the patient's problems: has Arthritis involving multiple sites; Essential hypertension; Permanent atrial fibrillation; History of Bell's palsy; CKD (chronic kidney disease) stage 4, GFR 15-29 ml/min; Gastroesophageal reflux disease without esophagitis; Hypercholesterolemia; Insomnia; Localized osteoporosis without current pathological fracture; Panic disorder; Chronic nonseasonal allergic rhinitis due to pollen; Other sleep apnea; History of MI (myocardial infarction); Chronic coronary artery disease; Anemia of chronic disease; Weakness of right leg; Cardiac murmur; Senile osteoporosis; Hyperuricemia; Grief reaction; Peripheral vertigo; Renal cell carcinoma of left kidney; Renal oncocytoma of left kidney; History of left nephrectomy; Cerebrovascular accident (CVA) due to stenosis of small artery; History of renal cell carcinoma; Malignant neoplasm of left kidney, except renal pelvis; Diverticulosis; Irritable bowel syndrome with constipation; Chronic diastolic congestive heart failure; Hallucination, visual; Hypomagnesemia; Lumbar disc disease with radiculopathy; History of cardiomyopathy-Takotsubo's; Acute thrombotic stroke-right temporal; Acute on chronic renal insufficiency; Vertigo as late effect of stroke; Confusion; AMS (altered mental status); and Metabolic acidosis on their problem list., the following items were addressed: medical records; medications and any changes can be found within the plan section of the  note.  A detailed listing of time spent for chronic care management is tracked within each outreach encounter.  Current medications include:  has a current medication list which includes the following prescription(s): acetaminophen, aspirin, atorvastatin, eliquis, estradiol, fexofenadine, furosemide, melatonin, metoprolol succinate xl, mirtazapine, riboflavin, sodium bicarbonate, gemtesa, and vortioxetine hbr. and the patient is reported to be caregiver will take responsibility for med compliance; patient is noncompliant with medication protocol, stopped taking remeron 4/23/25 d/t worsening confusion,  Medications are reported to be effective.  Regarding these diagnoses, referrals were made to the following provider(s):  none.  All notes on chart for PCP to review.    The patient was monitored remotely for medications; mood & behavior; blood pressure, stroke history/ HTN/HLD diet.    The patient's physical needs include:  physical healthcare.     The patient's mental support needs include:  coordination of community providers; increased support; mental health education    The patient's cognitive support needs include:  needs assistance with ADLs; mental health education; continued support, will assess need for neuro cognititve evaluation    The patient's psychosocial support needs include:  need for increased support, lives near family    The patient's functional needs include: physical healthcare    The patient's environmental needs include:  not applicable; an unsafe living environment, na    Care Plan overall comments:  No data recorded    Refer to previous outreach notes for more information on the areas listed above.    Monthly Billing Diagnoses  (R41.0) Confusion    (D63.8) Anemia of chronic disease    (I48.21) Permanent atrial fibrillation    (I10) Essential hypertension    (F41.0) Panic disorder    (N18.4) CKD (chronic kidney disease) stage 4, GFR 15-29 ml/min    Medications   Medications have been  reconciled    Care Plan progress this month:      Recently Modified Care Plans Updates made since 3/31/2025 12:00 AM      No recently modified care plans.             Wellness       Enhance My Mental Skills (Not Progressing)       Start:  04/24/25    Expected End:  07/24/25         My Mental Skills To Do List       Start:  04/24/25       Why is this important?As we age, or sometimes because we have an illness, it feels like our memory and ability to figure things out is not very good.There are things you can do to keep your memory and your thinking as strong as possible.            Eat Healthy (Not Progressing)       Start:  04/24/25    Expected End:  07/24/25         My Healthy Eating To Do List       Start:  04/24/25       Why is this important?When you are ready to manage your nutrition or weight, having a plan and setting goals will help.Taking small steps to change how you eat and exercise is a good place to start.            Manage My Cholesterol (Not Progressing)       Start:  04/24/25    Expected End:  07/24/25         My Cholesterol Management To Do List       Start:  04/24/25       Why is this important?Changing cholesterol starts with eating heart-healthy foods.Other steps may be to increase your activity and to quit if you smoke.            Learn More About My Health (Not Progressing)       Start:  04/24/25    Expected End:  07/24/25         My Health Knowledge To Do List       Start:  04/24/25       Why is this important?The best way to learn about your health and care is by talking to the doctor and nurse.They will answer your questions and give you information in the way that you like best.            Keep Myself Safe (Not Progressing)       Start:  04/24/25    Expected End:  07/24/25         My Personal Safety To Do List       Start:  04/24/25       Why is this important?Being hurt by someone close to you is scary.Having a plan to keep you safe is important.            Manage My Medicine (Not  Progressing)       Start:  04/24/25    Expected End:  07/24/25         My Medicine Management To Do List       Start:  04/24/25       Why is this important?These steps will help you keep on track with your medicines.            Keep My Home Safe (Not Progressing)       Start:  04/24/25    Expected End:  07/24/25         My Home Safety To Do List       Start:  04/24/25       Why is this important?Accidents in and near your home can cause serious injury.Take steps to avoid them.            Optimal Wellbeing (Not Progressing)       Start:  04/24/25    Expected End:  07/24/25         Identify and Promote Change to Alcohol Misuse       Start:  04/24/25            Identify and Optimize Mental Processes       Start:  04/24/25            Identify and Promote Change to Drug Misuse       Start:  04/24/25            Alleviate Barriers to Increasing Activity Level       Start:  04/24/25            Alleviate Barriers to Healthy Eating       Start:  04/24/25            Identify Deficit and Optimize Health Literacy       Start:  04/24/25            Facilitate Optimal Oral Health       Start:  04/24/25            Identify Risk and Plan for Personal Safety (Intimate Partner Violence)       Start:  04/24/25            Promote Sexual Health       Start:  04/24/25            Optimize Medication Use       Start:  04/24/25            Promote Smoking Cessation       Start:  04/24/25            Identify and Reduce Risk to Safety       Start:  04/24/25              Instructions   Patient was provided an electronic copy of care plan  CCM services were explained and offered and patient has accepted these services.  Patient has given their written consent to receive CCM services and understands that this includes the authorization of electronic communication of medical information with the other treating providers.  Patient understands that they may stop CCM services at any time and these changes will be effective at the end of the calendar  month and will effectively revocate the agreement of CCM services.  Patient understands that only one practitioner can furnish and be paid for CCM services during one calendar month.  Patient also understands that there may be co-payment or deductible fees in association with CCM services.  Patient will continue with at least monthly follow-up calls with the Ambulatory .    Yoselin KIM  Ambulatory Case Management    5/1/2025, 08:28 EDT

## 2025-05-02 RX ORDER — ATORVASTATIN CALCIUM 40 MG/1
40 TABLET, FILM COATED ORAL NIGHTLY
Qty: 90 TABLET | Refills: 0 | Status: SHIPPED | OUTPATIENT
Start: 2025-05-02

## 2025-05-05 ENCOUNTER — READMISSION MANAGEMENT (OUTPATIENT)
Dept: CALL CENTER | Facility: HOSPITAL | Age: 89
End: 2025-05-05
Payer: MEDICARE

## 2025-05-05 NOTE — OUTREACH NOTE
Medical Week 2 Survey      Flowsheet Row Responses   [facility] [place] by [facility] patient discharged from? [facility]   Does the patient have one of the following disease processes/diagnoses(primary or secondary)? Other   Week 2 attempt successful? No   Unsuccessful attempts Attempt 1   Revoke Other transitional program  [Pt followed by Ambulatory Case Managment]            Yazmin DOLL - Licensed Nurse

## 2025-05-08 ENCOUNTER — PATIENT OUTREACH (OUTPATIENT)
Dept: CASE MANAGEMENT | Facility: OTHER | Age: 89
End: 2025-05-08
Payer: MEDICARE

## 2025-05-08 ENCOUNTER — OFFICE VISIT (OUTPATIENT)
Dept: FAMILY MEDICINE CLINIC | Facility: CLINIC | Age: 89
End: 2025-05-08
Payer: MEDICARE

## 2025-05-08 VITALS
WEIGHT: 127.8 LBS | HEART RATE: 71 BPM | OXYGEN SATURATION: 99 % | TEMPERATURE: 96.3 F | BODY MASS INDEX: 25.09 KG/M2 | HEIGHT: 60 IN | SYSTOLIC BLOOD PRESSURE: 160 MMHG | DIASTOLIC BLOOD PRESSURE: 70 MMHG

## 2025-05-08 DIAGNOSIS — R41.0 CONFUSION: Primary | ICD-10-CM

## 2025-05-08 DIAGNOSIS — N18.4 CKD (CHRONIC KIDNEY DISEASE) STAGE 4, GFR 15-29 ML/MIN: ICD-10-CM

## 2025-05-08 DIAGNOSIS — I50.32 CHRONIC DIASTOLIC CONGESTIVE HEART FAILURE: ICD-10-CM

## 2025-05-08 DIAGNOSIS — E87.20 METABOLIC ACIDOSIS: ICD-10-CM

## 2025-05-08 DIAGNOSIS — R41.82 ALTERED MENTAL STATUS, UNSPECIFIED ALTERED MENTAL STATUS TYPE: ICD-10-CM

## 2025-05-08 DIAGNOSIS — I10 ESSENTIAL HYPERTENSION: ICD-10-CM

## 2025-05-08 DIAGNOSIS — R40.4 TRANSIENT ALTERATION OF AWARENESS: ICD-10-CM

## 2025-05-08 DIAGNOSIS — Z09 HOSPITAL DISCHARGE FOLLOW-UP: Primary | ICD-10-CM

## 2025-05-08 DIAGNOSIS — I48.21 PERMANENT ATRIAL FIBRILLATION: ICD-10-CM

## 2025-05-08 DIAGNOSIS — G47.39 OTHER SLEEP APNEA: ICD-10-CM

## 2025-05-08 NOTE — PROGRESS NOTES
Transitional Care Follow Up Visit  Subjective     Marleni is a 89 y.o. female who presents for a transitional care management visit.    Within 48 business hours after discharge our office contacted her via telephone to coordinate her care and needs.      I reviewed and discussed the details of that call along with the discharge summary, hospital problems, inpatient lab results, inpatient diagnostic studies, and consultation reports with Marleni.     Current outpatient and discharge medications have been reconciled for the patient.  Reviewed by: ENRRIQUE Burton          6/7/2024     5:52 PM 7/12/2024     6:19 PM 10/11/2024     5:58 PM 10/24/2024     7:29 PM 10/30/2024     4:40 PM 1/4/2025     6:53 PM 4/25/2025     7:49 PM   Date of TCM Phone Call   Hospital [facility]   Date of Admission 6/6/2024 7/10/2024 10/9/2024 10/23/2024 10/27/2024 1/3/2025 4/24/2025   Date of Discharge 6/7/2024 7/12/2024 10/11/2024 10/24/2024 10/30/2024 1/4/2025 4/25/2025   Discharge Disposition Home or Self Care Home-Health Care [facility]  Home or Self Care  Home or Self Care Home or Self Care       Risk for Readmission (LACE) Score: 9 (4/25/2025  6:01 AM)      History of Present Illness as an 89 year old female for follow up after being seen at [facility] 04/24/2025-04/25/2025  Overall she has been doing okay since discharge    has continued to have some intermittent confusion  Does stay with family    They are avoiding muscle relaxers.  Feel that that did set off his allelic way of events  No longer having any back pain  States that they have all medication  She is taking Adderall as directed    She does have follow-up appointment scheduled with PCP, cardiology, nephrology  They do plan to do labs tomorrow with nephrology  No headaches no blurred vision no dizziness no flushing no  chest pain or pressure    She is working with chronic care manager  Family does feel they have everything they need to take care of her at this time      They have no other new acute complaints today    The following portions of the patient's history were reviewed and updated as appropriate: allergies, current medications, past family history, past medical history, past social history, past surgical history, and problem list      Course During Hospital Stay The following information was reviewed by: ENRRIQUE Burton on 05/08/2025:     Date of Admission: 4/24/2025  Date of Discharge:  4/25/2025  Primary Care Physician: Ken Andujar MD        Chief Complaint:   Altered Mental Status        Discharge Diagnoses            Active Hospital Problems     Diagnosis   POA    **AMS (altered mental status) [R41.82]   Yes    Metabolic acidosis [E87.20]   Yes    Chronic diastolic congestive heart failure [I50.32]   Yes    CKD (chronic kidney disease) stage 4, GFR 15-29 ml/min [N18.4]   Yes    Essential hypertension [I10]   Yes    Permanent atrial fibrillation [I48.21]   Yes    Other sleep apnea [G47.39]   Yes       Resolved Hospital Problems   No resolved problems to display.         Hospital Course      Ms. Hummel is a 89 y.o. female with a history of CHF, CKD, HTN, PAF, on chronic anticoagulation with Eliquis who presented to [facility] initially complaining of altered mental status with auditory and visual hallucinations.  Please see the admitting history and physical for further details.  She was found to have metabolic acidosis and was admitted to the hospital for further evaluation and treatment.  Family reported worsening mental status that started on Monday of this week progressively worsening with auditory and visual hallucinations.  States the changes started after she was placed on Trintellix, Remeron, and Zanaflex by her PCP.  She was started on oral sodium bicarb, and encouraged oral intake.   Nephrology was consulted for further evaluation and treatment of metabolic acidosis, who have cleared her for discharge home today with continued oral sodium bicarb and follow-up with them in 1 week.  Neurology was also consulted for altered mental status/confusion, initial head CT was unremarkable with exception of prior stroke. 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  The exacerbation of associated delusions suggest underlying dementia likely provoked by the most recent medication changes, some toxicity from Zanaflex.  Neurology has cleared her for discharge with close follow-up in outpatient setting.  They recommend continuing Trintellix, possibly adding Namenda if she tolerates well.  She and her daughter have been advised to follow-up with her PCP in 1 week for posthospitalization follow-up, with recommended CBC, BMP at that time.  She is also been advised to follow-up with nephrology in 1 week, as well as neurology at her previously scheduled appointment.   She has been advised to take all of her medications as they have been prescribed to her, and to attend all follow-up appointments.  She has been advised to avoid Zanaflex.  She has been told to return to the emergency room if she was to have new or worsening symptoms.        Day of Discharge      Subjective:  She continues to have some auditory, hallucinations, but states visual hallucinations have subsided.   Family at bedside discussed plans for future transitions within the next year and to memory care/skilled nursing/assisted living.  She offers no new complaints at this time.            The following portions of the patient's history were reviewed and updated as appropriate: allergies, current medications, past family history, past medical history, past social history, past surgical history, and problem list.  "    Vitals:    05/08/25 1016   BP: 160/70   Pulse: 71   Temp: 96.3 °F (35.7 °C)   SpO2: 99%   Weight: 58 kg (127 lb 12.8 oz)   Height: 152.4 cm (60\")   PainSc: 0-No pain       Review of Systems   Constitutional:  Negative for chills, fatigue and fever.   Eyes:  Negative for visual disturbance.   Respiratory:  Negative for cough, shortness of breath, wheezing and stridor.    Cardiovascular:  Negative for chest pain, palpitations and leg swelling.   Gastrointestinal:  Negative for abdominal pain.   Endocrine: Negative for polydipsia, polyphagia and polyuria.   Skin:  Negative for rash.   Neurological:  Positive for confusion. Negative for dizziness.   Psychiatric/Behavioral:  Negative for self-injury, sleep disturbance and suicidal ideas. The patient is not nervous/anxious.         Objective   Physical Exam  Vitals reviewed.   Constitutional:       General: She is not in acute distress.  Eyes:      Conjunctiva/sclera: Conjunctivae normal.   Neck:      Thyroid: No thyromegaly.      Vascular: No carotid bruit.   Cardiovascular:      Rate and Rhythm: Normal rate and regular rhythm.      Heart sounds: Normal heart sounds.   Pulmonary:      Effort: Pulmonary effort is normal. No respiratory distress.      Breath sounds: Normal breath sounds. No stridor. No wheezing, rhonchi or rales.   Chest:      Chest wall: No tenderness.   Lymphadenopathy:      Cervical: No cervical adenopathy.   Neurological:      Mental Status: She is alert. Mental status is at baseline.   Psychiatric:         Attention and Perception: Attention normal.         Mood and Affect: Mood normal.         DATA REVIEWED:    The following data was reviewed by: ENRRIQUE Burton on 05/08/2025:    Pertinent Labs            Results from last 7 days   Lab Units 04/25/25  0430 04/24/25  1432   WBC 10*3/mm3 5.48 7.70   HEMOGLOBIN g/dL 10.4* 10.4*   PLATELETS 10*3/mm3 167 170            Results from last 7 days   Lab Units 04/25/25  0430 04/24/25  1432   SODIUM " mmol/L 138 135*   POTASSIUM mmol/L 4.9 4.8   CHLORIDE mmol/L 108* 105   CO2 mmol/L 20.0* 17.0*   BUN mg/dL 28* 32*   CREATININE mg/dL 1.79* 1.94*   GLUCOSE mg/dL 88 106*   EGFR mL/min/1.73 27.2* 24.7*           Results from last 7 days   Lab Units 04/24/25  1432   ALBUMIN g/dL 4.1   BILIRUBIN mg/dL 0.6   ALK PHOS U/L 48   AST (SGOT) U/L 31   ALT (SGPT) U/L 14            Results from last 7 days   Lab Units 04/25/25  0430 04/24/25  1432   CALCIUM mg/dL 8.5* 8.5*   ALBUMIN g/dL  --  4.1   MAGNESIUM mg/dL  --  2.1           CT Head Without Contrast  Result Date: 4/25/2025  Impression: 1. No acute intracranial abnormality is identified with no significant change when compared to a prior head CT from [facility] on 01/03/2025.  2. There is mild-moderate small vessel disease in the cerebral white matter. 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 There are lens implants in the globes from previous bilateral cataract surgery. The remainder of the head CT is within normal limits. The etiology of the patient's confusion and hallucinations is not established on this exam. . Radiation dose reduction techniques were utilized, including automated exposure control and exposure modulation based on body size.   This report was finalized on 4/25/2025 6:09 AM by Dr. Pawan Kate M.D on Workstation: [id number]      XR Chest 1 View  Result Date: 4/24/2025  Impression: No focal pulmonary consolidation. Tortuous aorta. Follow-up as clinically indicated.  This report was finalized on 4/24/2025 3:23 PM by Dr. Jaspreet Whitt M.D on Workstation: [id number]        Assessment & Plan     Diagnoses and all orders for this visit:    1. Hospital discharge follow-up (Primary)  Comments:  [facility] 4/24/2025 to 4/25/2025  Altered mental status    Ongoing    2. Essential hypertension  Comments:  Continue current  management keep follow-up with cardiology    3. Transient alteration of awareness  Comments:  Ongoing-staying with family patient/family working w chronic care- long-term plan  Patient/family report improvement since d/c  muscle relaxer-avoid in future    4. Metabolic acidosis  Comments:  Keep follow-up with nephrology    5. CKD (chronic kidney disease) stage 4, GFR 15-29 ml/min  Comments:  Please follow-up with nephrology    6. Chronic diastolic congestive heart failure  Comments:  Keep follow-up with cardiology    7. Permanent atrial fibrillation  Comments:  Keep follow-up with cardiology continue current management    8. Other sleep apnea  Comments:  Stable continue current management           Follow Up     No follow-ups on file.             Future Appointments   Date Time Provider Department Center   4/29/2025 10:00 AM Milli Melara APRN [facility] [id number] [facility]   5/9/2025 11:00 AM ROOM 1 [facility] ACU BH [facility] ACU [facility]   5/23/2025 11:00 AM ROOM 2 [facility] ACU BH [facility] ACU [facility]   5/30/2025 11:00 AM REFERRED INJECTION CHAIR EP BH INFUS EP [facility]   6/18/2025  1:20 PM Naomi Gamboa MD [facility] KRESGE [facility]   6/25/2025 11:00 AM Sybil Parmar MD [facility] CD [id number] [facility]   7/16/2025  1:45 PM Ken Andujar MD [facility] JTWN2 [facility]   12/3/2025  1:30 PM REFERRED INJECTION CHAIR EP BH INFUS EP LAG        Mark Young MD. Schedule an appointment as soon as possible for a visit in 1 week(s).    Specialty: Nephrology  Contact information:  [address]  [facility] [address]  [facility] [id number] [phone]                    Naomi Gamboa MD. Go to.    Specialty: Neurology  Why: Previously scheduled appointment  Contact information:  [address]  [address]  [facility] [id number] [phone]

## 2025-05-08 NOTE — OUTREACH NOTE
AMBULATORY CASE MANAGEMENT NOTE    Names and Relationships of Patient/Support Persons: Contact: Marleni Hummel; Relationship: Self -     CCM Interim Update    Spoke with patient daughter today, verified by name.    Family planning has been advised and started.     ACM completed thorough chart review prior to outreach.  ACM and Provider collaboration completed.  ACM discussed office visit with patient daughter along with recent neurology visit.     ACM offering further assistance through dementia/ [employer] when needed. - Daughter was agreeable to continued monitoring and outreach monthly.     Daughter expressing gratitude for ACM outreach and continued monitoring.      Next outreach has been scheduled. No further needs at this time.      Education Documentation  No documentation found.        Yoselin KIM  Ambulatory Case Management    5/8/2025, 12:50 EDT

## 2025-05-09 ENCOUNTER — HOSPITAL ENCOUNTER (OUTPATIENT)
Dept: INFUSION THERAPY | Facility: HOSPITAL | Age: 89
Discharge: HOME | End: 2025-05-09
Payer: MEDICARE

## 2025-05-09 VITALS
OXYGEN SATURATION: 96 % | DIASTOLIC BLOOD PRESSURE: 67 MMHG | TEMPERATURE: 96.6 F | SYSTOLIC BLOOD PRESSURE: 172 MMHG | HEART RATE: 53 BPM | RESPIRATION RATE: 16 BRPM

## 2025-05-09 DIAGNOSIS — D63.8 ANEMIA OF CHRONIC DISEASE: Primary | ICD-10-CM

## 2025-05-09 LAB
25(OH)D3 SERPL-MCNC: 33.5 NG/ML (ref 30–100)
ALBUMIN SERPL-MCNC: 4.2 G/DL (ref 3.5–5.2)
ALBUMIN/GLOB SERPL: 1.7 G/DL
ALP SERPL-CCNC: 54 U/L (ref 39–117)
ALT SERPL W P-5'-P-CCNC: 6 U/L (ref 1–33)
ANION GAP SERPL CALCULATED.3IONS-SCNC: 10.8 MMOL/L (ref 5–15)
AST SERPL-CCNC: 19 U/L (ref 1–32)
BASOPHILS # BLD AUTO: 0.02 10*3/MM3 (ref 0–0.2)
BASOPHILS NFR BLD AUTO: 0.3 % (ref 0–1.5)
BILIRUB SERPL-MCNC: 0.8 MG/DL (ref 0–1.2)
BUN SERPL-MCNC: 31 MG/DL (ref 8–23)
BUN/CREAT SERPL: 15.3 (ref 7–25)
CALCIUM SPEC-SCNC: 9.6 MG/DL (ref 8.6–10.5)
CALCIUM SPEC-SCNC: 9.7 MG/DL (ref 8.6–10.5)
CHLORIDE SERPL-SCNC: 98 MMOL/L (ref 98–107)
CO2 SERPL-SCNC: 25.2 MMOL/L (ref 22–29)
CREAT SERPL-MCNC: 2.03 MG/DL (ref 0.57–1)
DEPRECATED RDW RBC AUTO: 45.9 FL (ref 37–54)
EGFRCR SERPLBLD CKD-EPI 2021: 23.4 ML/MIN/1.73
EOSINOPHIL # BLD AUTO: 0.25 10*3/MM3 (ref 0–0.4)
EOSINOPHIL NFR BLD AUTO: 3.8 % (ref 0.3–6.2)
ERYTHROCYTE [DISTWIDTH] IN BLOOD BY AUTOMATED COUNT: 13.8 % (ref 12.3–15.4)
FERRITIN SERPL-MCNC: 103 NG/ML (ref 13–150)
FOLATE SERPL-MCNC: 18.5 NG/ML (ref 4.78–24.2)
GLOBULIN UR ELPH-MCNC: 2.5 GM/DL
GLUCOSE SERPL-MCNC: 111 MG/DL (ref 65–99)
HCT VFR BLD AUTO: 33.5 % (ref 34–46.6)
HGB BLD-MCNC: 10.7 G/DL (ref 12–15.9)
IMM GRANULOCYTES # BLD AUTO: 0.01 10*3/MM3 (ref 0–0.05)
IMM GRANULOCYTES NFR BLD AUTO: 0.2 % (ref 0–0.5)
IRON 24H UR-MRATE: 82 MCG/DL (ref 37–145)
IRON SATN MFR SERPL: 26 % (ref 20–50)
LYMPHOCYTES # BLD AUTO: 1 10*3/MM3 (ref 0.7–3.1)
LYMPHOCYTES NFR BLD AUTO: 15.2 % (ref 19.6–45.3)
MCH RBC QN AUTO: 29.6 PG (ref 26.6–33)
MCHC RBC AUTO-ENTMCNC: 31.9 G/DL (ref 31.5–35.7)
MCV RBC AUTO: 92.8 FL (ref 79–97)
MONOCYTES # BLD AUTO: 0.38 10*3/MM3 (ref 0.1–0.9)
MONOCYTES NFR BLD AUTO: 5.8 % (ref 5–12)
NEUTROPHILS NFR BLD AUTO: 4.93 10*3/MM3 (ref 1.7–7)
NEUTROPHILS NFR BLD AUTO: 74.7 % (ref 42.7–76)
NRBC BLD AUTO-RTO: 0 /100 WBC (ref 0–0.2)
PHOSPHATE SERPL-MCNC: 4.2 MG/DL (ref 2.5–4.5)
PLATELET # BLD AUTO: 216 10*3/MM3 (ref 140–450)
PMV BLD AUTO: 9.9 FL (ref 6–12)
POTASSIUM SERPL-SCNC: 4.9 MMOL/L (ref 3.5–5.2)
PROT SERPL-MCNC: 6.7 G/DL (ref 6–8.5)
PTH-INTACT SERPL-MCNC: 95 PG/ML (ref 15–65)
RBC # BLD AUTO: 3.61 10*6/MM3 (ref 3.77–5.28)
SODIUM SERPL-SCNC: 134 MMOL/L (ref 136–145)
TIBC SERPL-MCNC: 313 MCG/DL (ref 298–536)
TRANSFERRIN SERPL-MCNC: 210 MG/DL (ref 200–360)
URATE SERPL-MCNC: 7.1 MG/DL (ref 2.4–5.7)
VIT B12 BLD-MCNC: 467 PG/ML (ref 211–946)
WBC NRBC COR # BLD AUTO: 6.59 10*3/MM3 (ref 3.4–10.8)

## 2025-05-09 PROCEDURE — 25010000002 EPOETIN ALFA PER 1000 UNITS: Performed by: NURSE PRACTITIONER

## 2025-05-09 PROCEDURE — 36415 COLL VENOUS BLD VENIPUNCTURE: CPT

## 2025-05-09 PROCEDURE — 83970 ASSAY OF PARATHORMONE: CPT | Performed by: NURSE PRACTITIONER

## 2025-05-09 PROCEDURE — 82306 VITAMIN D 25 HYDROXY: CPT | Performed by: NURSE PRACTITIONER

## 2025-05-09 PROCEDURE — 84466 ASSAY OF TRANSFERRIN: CPT | Performed by: NURSE PRACTITIONER

## 2025-05-09 PROCEDURE — 82728 ASSAY OF FERRITIN: CPT | Performed by: NURSE PRACTITIONER

## 2025-05-09 PROCEDURE — 82607 VITAMIN B-12: CPT | Performed by: NURSE PRACTITIONER

## 2025-05-09 PROCEDURE — 83540 ASSAY OF IRON: CPT | Performed by: NURSE PRACTITIONER

## 2025-05-09 PROCEDURE — 80053 COMPREHEN METABOLIC PANEL: CPT | Performed by: NURSE PRACTITIONER

## 2025-05-09 PROCEDURE — 84100 ASSAY OF PHOSPHORUS: CPT | Performed by: NURSE PRACTITIONER

## 2025-05-09 PROCEDURE — 82746 ASSAY OF FOLIC ACID SERUM: CPT | Performed by: NURSE PRACTITIONER

## 2025-05-09 PROCEDURE — 96372 THER/PROPH/DIAG INJ SC/IM: CPT

## 2025-05-09 PROCEDURE — 84550 ASSAY OF BLOOD/URIC ACID: CPT | Performed by: NURSE PRACTITIONER

## 2025-05-09 PROCEDURE — 85025 COMPLETE CBC W/AUTO DIFF WBC: CPT | Performed by: NURSE PRACTITIONER

## 2025-05-09 RX ADMIN — ERYTHROPOIETIN 10000 UNITS: 10000 INJECTION, SOLUTION INTRAVENOUS; SUBCUTANEOUS at 11:28

## 2025-05-23 ENCOUNTER — HOSPITAL ENCOUNTER (OUTPATIENT)
Dept: INFUSION THERAPY | Facility: HOSPITAL | Age: 89
Discharge: HOME | End: 2025-05-23
Payer: MEDICARE

## 2025-05-23 ENCOUNTER — TELEPHONE (OUTPATIENT)
Dept: NEUROLOGY | Facility: CLINIC | Age: 89
End: 2025-05-23
Payer: MEDICARE

## 2025-05-23 VITALS
HEART RATE: 61 BPM | TEMPERATURE: 97.4 F | OXYGEN SATURATION: 100 % | DIASTOLIC BLOOD PRESSURE: 66 MMHG | RESPIRATION RATE: 16 BRPM | SYSTOLIC BLOOD PRESSURE: 168 MMHG

## 2025-05-23 DIAGNOSIS — D63.8 ANEMIA OF CHRONIC DISEASE: Primary | ICD-10-CM

## 2025-05-23 LAB
HCT VFR BLD AUTO: 31.6 % (ref 34–46.6)
HGB BLD-MCNC: 10.1 G/DL (ref 12–15.9)

## 2025-05-23 PROCEDURE — 96372 THER/PROPH/DIAG INJ SC/IM: CPT

## 2025-05-23 PROCEDURE — 85014 HEMATOCRIT: CPT | Performed by: NURSE PRACTITIONER

## 2025-05-23 PROCEDURE — 85018 HEMOGLOBIN: CPT | Performed by: NURSE PRACTITIONER

## 2025-05-23 PROCEDURE — 36415 COLL VENOUS BLD VENIPUNCTURE: CPT

## 2025-05-23 PROCEDURE — 25010000002 EPOETIN ALFA PER 1000 UNITS: Performed by: NURSE PRACTITIONER

## 2025-05-23 RX ADMIN — ERYTHROPOIETIN 10000 UNITS: 10000 INJECTION, SOLUTION INTRAVENOUS; SUBCUTANEOUS at 11:45

## 2025-05-23 NOTE — TELEPHONE ENCOUNTER
Spoke to pt in daughter (on verbal) in regards to r/s appt due to provider being out of office. Agreed to r/s for June 11th.

## 2025-05-29 ENCOUNTER — DOCUMENTATION (OUTPATIENT)
Dept: ONCOLOGY | Facility: HOSPITAL | Age: 89
End: 2025-05-29
Payer: MEDICARE

## 2025-05-30 ENCOUNTER — INFUSION (OUTPATIENT)
Dept: ONCOLOGY | Facility: HOSPITAL | Age: 89
End: 2025-05-30
Payer: MEDICARE

## 2025-05-30 DIAGNOSIS — M81.0 SENILE OSTEOPOROSIS: Primary | ICD-10-CM

## 2025-05-30 PROCEDURE — 96372 THER/PROPH/DIAG INJ SC/IM: CPT

## 2025-05-30 PROCEDURE — 25010000002 DENOSUMAB 60 MG/ML SOLUTION PREFILLED SYRINGE: Performed by: FAMILY MEDICINE

## 2025-05-30 RX ADMIN — DENOSUMAB 60 MG: 60 INJECTION SUBCUTANEOUS at 11:23

## 2025-05-30 NOTE — NURSING NOTE
Arrived for prolia injection. Indication and side effects reviewed. Denies recent dental work. Labs and medications verified. Prolia administered in right arm without incidence. Instructed to call prescribing MD for any concerns or questions.  Appt card provided with scheduling number and instructed on how to schedule future appts.  Pt vu and discharged in stable condition.

## 2025-06-05 ENCOUNTER — TELEPHONE (OUTPATIENT)
Dept: CARDIOLOGY | Age: 89
End: 2025-06-05

## 2025-06-05 NOTE — TELEPHONE ENCOUNTER
Form complete. Ok to proceed and hold eliquis 1 day prior. Needs to resume eliquis ASAP after oral surgery.      ENRRIQUE Curtis  [facility]   [address]  [place] [address]  Ph: [phone]  Fax: [phone]         Principal Discharge DX:	Pain of left foot   1

## 2025-06-05 NOTE — TELEPHONE ENCOUNTER
[facility] is requesting a blood thinner clearance request for Pt to hold Eliquis to be held for 1 day prior to oral surgery. This will be done by Dr. Markos Webb Jr.      Pt saw you last on 10/31/24    I will place form in your inbox.    May be faxed [phone] or [phone]

## 2025-06-06 ENCOUNTER — HOSPITAL ENCOUNTER (OUTPATIENT)
Dept: INFUSION THERAPY | Facility: HOSPITAL | Age: 89
Discharge: HOME | End: 2025-06-06
Payer: MEDICARE

## 2025-06-06 VITALS
HEART RATE: 66 BPM | OXYGEN SATURATION: 98 % | SYSTOLIC BLOOD PRESSURE: 169 MMHG | TEMPERATURE: 96.9 F | DIASTOLIC BLOOD PRESSURE: 67 MMHG | RESPIRATION RATE: 16 BRPM

## 2025-06-06 DIAGNOSIS — D63.8 ANEMIA OF CHRONIC DISEASE: Primary | ICD-10-CM

## 2025-06-06 LAB
HCT VFR BLD AUTO: 34.6 % (ref 34–46.6)
HGB BLD-MCNC: 11 G/DL (ref 12–15.9)

## 2025-06-06 PROCEDURE — 85018 HEMOGLOBIN: CPT | Performed by: NURSE PRACTITIONER

## 2025-06-06 PROCEDURE — 85014 HEMATOCRIT: CPT | Performed by: NURSE PRACTITIONER

## 2025-06-06 PROCEDURE — 96372 THER/PROPH/DIAG INJ SC/IM: CPT

## 2025-06-06 PROCEDURE — 25010000002 EPOETIN ALFA PER 1000 UNITS: Performed by: NURSE PRACTITIONER

## 2025-06-06 PROCEDURE — 36415 COLL VENOUS BLD VENIPUNCTURE: CPT

## 2025-06-06 RX ADMIN — ERYTHROPOIETIN 10000 UNITS: 10000 INJECTION, SOLUTION INTRAVENOUS; SUBCUTANEOUS at 11:29

## 2025-06-10 ENCOUNTER — PATIENT OUTREACH (OUTPATIENT)
Dept: CASE MANAGEMENT | Facility: OTHER | Age: 89
End: 2025-06-10
Payer: MEDICARE

## 2025-06-10 DIAGNOSIS — R41.0 CONFUSION: Primary | ICD-10-CM

## 2025-06-10 DIAGNOSIS — R41.82 ALTERED MENTAL STATUS, UNSPECIFIED ALTERED MENTAL STATUS TYPE: ICD-10-CM

## 2025-06-10 NOTE — PLAN OF CARE
Problem: Wellness  Goal: Enhance My Mental Skills  Outcome: Progressing  Goal: Eat Healthy  Outcome: Progressing  Goal: Manage My Cholesterol  Outcome: Progressing  Goal: Learn More About My Health  Outcome: Progressing  Goal: Keep Myself Safe  Outcome: Progressing  Goal: Manage My Medicine  Outcome: Progressing  Goal: Keep My Home Safe  Outcome: Progressing  Goal: Optimal Wellbeing  Outcome: Progressing  Intervention: Identify and Optimize Mental Processes  Flowsheets (Taken 6/10/2025 5501)  Identify and Optimize Mental Processes:   caregiver involvement in care and education encouraged   consistent daily routine encouraged   cognitive-stimulating activities promoted

## 2025-06-10 NOTE — OUTREACH NOTE
AMBULATORY CASE MANAGEMENT NOTE    Names and Relationships of Patient/Support Persons: Contact: NEAL HERNANDEZ; Relationship: Emergency Contact -     Advance Care Planning   ACP discussion was held with the patient during this visit.  Patient family does have a POA and Advanced Directives. States that they did meet with an  recently to update     CCM Interim Update    Spoke with patient daughter/ POA. Verified by name and verbal release.    States that overall patient is doing about the same.     Review of last Neurology comment regarding next steps further reaffirming [facility] continued outreach to monitor patient changes, and caregiver stressors and responsibilities. Daughter expressing gratitude for [facility] continued outreach, and remains agreeable to further monthly outreach at this time.    [facility] was able to provide active listening, and dementia assistance, regarding ACP and next steps of care, and when to begin those next steps. Discussed possible memory care needs. Offered to collaborate with [facility] for transitioning assistance, when needed.    No further needs at this time. Next outreach has been scheduled.         Education Documentation  Provider Follow-Up, taught by Yoselin Gomez RN at 6/10/2025 12:09 PM.  Learner: Family, Caregiver  Readiness: Acceptance  Method: Explanation, Teach Back  Response: Verbalizes Understanding    Long-Term Care, taught by Yoselin Gomez RN at 6/10/2025 12:09 PM.  Learner: Family, Caregiver  Readiness: Acceptance  Method: Explanation, Teach Back  Response: Verbalizes Understanding    Home Safety, taught by Yoselin Gomez RN at 6/10/2025 12:09 PM.  Learner: Family, Caregiver  Readiness: Acceptance  Method: Explanation, Teach Back  Response: Verbalizes Understanding    Fluid/Food Intake, taught by Yoselin Gomez RN at 6/10/2025 12:09 PM.  Learner: Family, Caregiver  Readiness: Acceptance  Method: Explanation, Teach Back  Response: Verbalizes  Understanding    Coping with Confusion, taught by Yoselin Gomez, RN at 6/10/2025 12:09 PM.  Learner: Family, Caregiver  Readiness: Acceptance  Method: Explanation, Teach Back  Response: Verbalizes Understanding    Signs/Symptoms, taught by Yoselin Gomez, DUGLAS at 6/10/2025 12:09 PM.  Learner: Family, Caregiver  Readiness: Acceptance  Method: Explanation, Teach Back  Response: Verbalizes Understanding          Yoselin KIM  Ambulatory Case Management    6/10/2025, 11:40 EDT

## 2025-06-11 ENCOUNTER — OFFICE VISIT (OUTPATIENT)
Dept: NEUROLOGY | Facility: CLINIC | Age: 89
End: 2025-06-11
Payer: MEDICARE

## 2025-06-11 VITALS
SYSTOLIC BLOOD PRESSURE: 110 MMHG | OXYGEN SATURATION: 100 % | HEART RATE: 70 BPM | WEIGHT: 126.4 LBS | DIASTOLIC BLOOD PRESSURE: 64 MMHG | HEIGHT: 60 IN | BODY MASS INDEX: 24.81 KG/M2

## 2025-06-11 DIAGNOSIS — R51.9 NONINTRACTABLE HEADACHE, UNSPECIFIED CHRONICITY PATTERN, UNSPECIFIED HEADACHE TYPE: Primary | ICD-10-CM

## 2025-06-11 NOTE — PROGRESS NOTES
DOS: 2025  NAME: Marleni Hummel   : 1935  PCP: Ken Andujar MD  No chief complaint on file.      Chief complaint: Headache, visual, auditory and olfactory hallucinations  Subjective: Patient presented to the clinic accompanied by her daughter to follow-up recent hospitalization with altered mental status.  Her daughter stated that at that time she was started on muscle relaxants.  There was a concern about mixed dementia plan was to see Jacob needed on this follow-up.  Today she was very sharp, alert and oriented to month, year, year, location, she was able to remember 3/3 objects without struggle.  She reports to me multiple hallucinations including visual seeing people inside her phone, olfactory hallucination with delusions, she stated that she smells things that happen before people die, skin hallucination thinks that parasite moves all over her skin and inside her body.  She was started on Trintellix new medication 2 months ago for anxiety and depression, she is also on Remeron.  Regarding her headache she stated it much better after continuing on riboflavin.  Occasionally uses Tylenol as needed.  No strokelike symptoms.  She is on Eliquis 2.5 mg twice daily for history of A-fib and prior strokes.    Objective:  Vital signs: There were no vitals taken for this visit.   Exam:  vitals reviewed  MS: oriented x3, recent/remote memory intact she remembered 3/3 objects after 3 to 5 minutes, normal attention/concentration, follows simple and complex commands, language intact, no neglect.  CN: Hard of hearing, left hemianopsia baseline from prior stroke, PERRL, EOMI, no facial droop, no dysarthria  Motor: Moves all extremities against gravity, normal tone  Sensory: intact to cold temperature and vibration throughout  Gait: Normal, ambulates with a walker/rollator    Laboratory results:  Lab Results   Component Value Date    LDL 67 2025    LDL 66 2024    LDL 73 10/09/2024             Review and interpretation of imaging: No new images to review    Diagnoses:  - Chronic daily headache unspecified etiology currently improved on riboflavin  - Visual, auditory and olfactory hallucinations, rule out psychiatric etiology  - History of prior ischemic stroke with residual left hemianopsia    Comments: Her visual and auditory hallucination could be aggravated by prior strokes affecting her visual field and chronic hearing loss but her olfactory hallucination/delusion and tactile hallucination would not be explained by that.  Her prior B12 level was normal along with her TSH.    Plan:  1.  Discussed with her referral to a psychiatrist.  Daughter stated that she will discuss with her primary care doctor for the referral.  2.  Discussed with her referral to sleep apnea.  She stated that she tried in the past that she could not tolerate.  I counseled her to reevaluate for sleep apnea and uses CPAP machine if needed.  3.  I will hold on starting Namenda at this time as her memory seems intact to me.  I recommend that she see a psychiatrist to rule out psychiatric etiology for her hallucinations.  4.  Return to the clinic in 6 months or sooner if needed.    I spent a total of 30 minutes on this clinical encounter including chart/records review, review of laboratory data, personal review of imaging studies, patient counseling, and care coordination.

## 2025-06-11 NOTE — LETTER
2025     Ken Andujar MD  [id number] [facility]  [address]  [facility] [address]    Patient: Marleni Hummel   YOB: 1935   Date of Visit: 2025     Dear Ken Andujar MD:       Thank you for referring Marleni Hummel to me for evaluation. Below are the relevant portions of my assessment and plan of care.    If you have questions, please do not hesitate to call me. I look forward to following Marleni along with you.         Sincerely,        Naomi Gamboa MD        CC: No Recipients    Naomi Gamboa MD  [phone]  Sign when Signing Visit  DOS: 2025  NAME: Marleni Hummel   : 1935  PCP: Ken Andujar MD  No chief complaint on file.      Chief complaint: Headache, visual, auditory and olfactory hallucinations  Subjective: Patient presented to the clinic accompanied by her daughter to follow-up recent hospitalization with altered mental status.  Her daughter stated that at that time she was started on muscle relaxants.  There was a concern about mixed dementia plan was to see Jacob baugh on this follow-up.  Today she was very sharp, alert and oriented to month, year, year, location, she was able to remember 3/3 objects without struggle.  She reports to me multiple hallucinations including visual seeing people inside her phone, olfactory hallucination with delusions, she stated that she smells things that happen before people die, skin hallucination thinks that parasite moves all over her skin and inside her body.  She was started on Trintellix new medication 2 months ago for anxiety and depression, she is also on Remeron.  Regarding her headache she stated it much better after continuing on riboflavin.  Occasionally uses Tylenol as needed.  No strokelike symptoms.  She is on Eliquis 2.5 mg twice daily for history of A-fib and prior strokes.    Objective:  Vital signs: There were no vitals taken for this visit.   Exam:  vitals reviewed  MS: oriented x3,  recent/remote memory intact she remembered 3/3 objects after 3 to 5 minutes, normal attention/concentration, follows simple and complex commands, language intact, no neglect.  CN: Hard of hearing, left hemianopsia baseline from prior stroke, PERRL, EOMI, no facial droop, no dysarthria  Motor: Moves all extremities against gravity, normal tone  Sensory: intact to cold temperature and vibration throughout  Gait: Normal, ambulates with a walker/rollator    Laboratory results:  Lab Results   Component Value Date    LDL 67 01/03/2025    LDL 66 11/05/2024    LDL 73 10/09/2024            Review and interpretation of imaging: No new images to review    Diagnoses:  - Chronic daily headache unspecified etiology currently improved on riboflavin  - Visual, auditory and olfactory hallucinations, rule out psychiatric etiology  - History of prior ischemic stroke with residual left hemianopsia    Comments: Her visual and auditory hallucination could be aggravated by prior strokes affecting her visual field and chronic hearing loss but her olfactory hallucination/delusion and tactile hallucination would not be explained by that.  Her prior B12 level was normal along with her TSH.    Plan:  1.  Discussed with her referral to a psychiatrist.  Daughter stated that she will discuss with her primary care doctor for the referral.  2.  Discussed with her referral to sleep apnea.  She stated that she tried in the past that she could not tolerate.  I counseled her to reevaluate for sleep apnea and uses CPAP machine if needed.  3.  I will hold on starting Namenda at this time as her memory seems intact to me.  I recommend that she see a psychiatrist to rule out psychiatric etiology for her hallucinations.  4.  Return to the clinic in 6 months or sooner if needed.    I spent a total of 30 minutes on this clinical encounter including chart/records review, review of laboratory data, personal review of imaging studies, patient counseling, and  care coordination.

## 2025-06-20 ENCOUNTER — HOSPITAL ENCOUNTER (OUTPATIENT)
Dept: INFUSION THERAPY | Facility: HOSPITAL | Age: 89
Discharge: HOME | End: 2025-06-20
Payer: MEDICARE

## 2025-06-20 VITALS
SYSTOLIC BLOOD PRESSURE: 167 MMHG | HEART RATE: 61 BPM | TEMPERATURE: 96.6 F | OXYGEN SATURATION: 100 % | RESPIRATION RATE: 18 BRPM | DIASTOLIC BLOOD PRESSURE: 76 MMHG

## 2025-06-20 DIAGNOSIS — D63.8 ANEMIA OF CHRONIC DISEASE: Primary | ICD-10-CM

## 2025-06-20 LAB
HCT VFR BLD AUTO: 34.5 % (ref 34–46.6)
HGB BLD-MCNC: 11 G/DL (ref 12–15.9)

## 2025-06-20 PROCEDURE — 36415 COLL VENOUS BLD VENIPUNCTURE: CPT

## 2025-06-20 PROCEDURE — 85018 HEMOGLOBIN: CPT | Performed by: NURSE PRACTITIONER

## 2025-06-20 PROCEDURE — 25010000002 EPOETIN ALFA PER 1000 UNITS: Performed by: NURSE PRACTITIONER

## 2025-06-20 PROCEDURE — 96372 THER/PROPH/DIAG INJ SC/IM: CPT

## 2025-06-20 PROCEDURE — 85014 HEMATOCRIT: CPT | Performed by: NURSE PRACTITIONER

## 2025-06-20 RX ADMIN — ERYTHROPOIETIN 10000 UNITS: 10000 INJECTION, SOLUTION INTRAVENOUS; SUBCUTANEOUS at 11:32

## 2025-06-25 ENCOUNTER — OFFICE VISIT (OUTPATIENT)
Dept: CARDIOLOGY | Age: 89
End: 2025-06-25
Payer: MEDICARE

## 2025-06-25 VITALS
BODY MASS INDEX: 24.35 KG/M2 | HEART RATE: 61 BPM | DIASTOLIC BLOOD PRESSURE: 82 MMHG | SYSTOLIC BLOOD PRESSURE: 140 MMHG | HEIGHT: 60 IN | WEIGHT: 124 LBS

## 2025-06-25 DIAGNOSIS — I25.10 CHRONIC CORONARY ARTERY DISEASE: ICD-10-CM

## 2025-06-25 DIAGNOSIS — E78.00 HYPERCHOLESTEROLEMIA: ICD-10-CM

## 2025-06-25 DIAGNOSIS — I50.32 CHRONIC DIASTOLIC CONGESTIVE HEART FAILURE: ICD-10-CM

## 2025-06-25 DIAGNOSIS — I48.21 PERMANENT ATRIAL FIBRILLATION: Primary | ICD-10-CM

## 2025-06-25 PROCEDURE — 1160F RVW MEDS BY RX/DR IN RCRD: CPT | Performed by: INTERNAL MEDICINE

## 2025-06-25 PROCEDURE — 93000 ELECTROCARDIOGRAM COMPLETE: CPT | Performed by: INTERNAL MEDICINE

## 2025-06-25 PROCEDURE — 1159F MED LIST DOCD IN RCRD: CPT | Performed by: INTERNAL MEDICINE

## 2025-06-25 PROCEDURE — 99214 OFFICE O/P EST MOD 30 MIN: CPT | Performed by: INTERNAL MEDICINE

## 2025-06-25 RX ORDER — METOPROLOL SUCCINATE 25 MG/1
12.5 TABLET, EXTENDED RELEASE ORAL DAILY
Qty: 45 TABLET | Refills: 3 | Status: SHIPPED | OUTPATIENT
Start: 2025-06-25

## 2025-06-25 RX ORDER — ATORVASTATIN CALCIUM 40 MG/1
40 TABLET, FILM COATED ORAL NIGHTLY
Qty: 90 TABLET | Refills: 3 | Status: SHIPPED | OUTPATIENT
Start: 2025-06-25

## 2025-06-25 NOTE — PROGRESS NOTES
"      CARDIOLOGY    Sybil Parmar MD    ENCOUNTER DATE:  06/25/2025    Marleni Hummel / 89 y.o. / female        CHIEF COMPLAINT / REASON FOR OFFICE VISIT     Atrial Fibrillation      HISTORY OF PRESENT ILLNESS       Atrial Fibrillation  Past medical history includes atrial fibrillation.       Marleni Hummel is a 89 y.o. female     This is a patient who previously followed with Dr. Tai. She has a history of hypertension, hyperlipidemia, atrial fibrillation, stress induced cardiomyopathy, obstructive sleep apnea, left renal carcinoma status post nephrectomy.      She had atrial fibrillation with rapid ventricular response in the setting of abnormal thyroid studies and unremarkable echo. Nuclear stress test was also unremarkable. In March 2011 she went back into atrial fibrillation after cardioversion and was started on flecainide and had a repeat cardioversion. In October 2015 she had a non-ST elevation myocardial infarction and heart catheterization which showed a cardiomyopathy with an ejection fraction of 20%. She did have a lesion of her circumflex and was stented with a drug eluting stent. Flecainide was discontinued and switched to amiodarone. Her QT interval increased and so amiodarone was discontinued.     In 2018 she had an echocardiogram showing ejection fraction of 65% with borderline left ventricular hypertrophy and mild to moderate left atrial enlargement. She had no significant valve disease. In November 2019 she had shortness of breath and was started on Lasix but had an episode of near syncope and so that was discontinued. In 2019 she had shortness of breath and had a nuclear stress test 2019 which was negative for ischemia. Echo 2019 showed normal left ventricular function and was otherwise unchanged.         VITAL SIGNS     Visit Vitals  /82   Pulse 61   Ht 152.4 cm (60\")   Wt 56.2 kg (124 lb)   BMI 24.22 kg/m²         Wt Readings from Last 3 Encounters:   06/25/25 56.2 kg (124 lb) "   06/11/25 57.3 kg (126 lb 6.4 oz)   05/08/25 58 kg (127 lb 12.8 oz)     Body mass index is 24.22 kg/m².      PHYSICAL EXAMINATION     Constitutional:       General: Not in acute distress.  Neck:      Vascular: No carotid bruit or JVD.   Pulmonary:      Effort: Pulmonary effort is normal.      Breath sounds: Normal breath sounds.   Cardiovascular:      Normal rate. Irregularly irregular rhythm.      Murmurs: There is no murmur.   Psychiatric:         Mood and Affect: Mood and affect normal.           REVIEWED DATA       ECG 12 Lead    Date/Time: 6/25/2025 12:22 PM  Performed by: Sybil Parmar MD    Authorized by: Sybil Parmar MD  Comparison: compared with previous ECG from 4/24/2025  Rhythm: atrial fibrillation  Conduction: conduction normal  ST Segments: ST segments normal  T Waves: T waves normal    Clinical impression: abnormal EKG            Lipid Panel          10/9/2024    16:36 11/5/2024    11:13 1/3/2025    14:17   Lipid Panel   Total Cholesterol 168   152    Total Cholesterol  131     Triglycerides 231  202  152    HDL Cholesterol 57  31  59    VLDL Cholesterol 38  34  26    LDL Cholesterol  73  66  67    LDL/HDL Ratio 1.14   1.06        Lab Results   Component Value Date    GLUCOSE 111 (H) 05/09/2025    BUN 31 (H) 05/09/2025    CREATININE 2.03 (H) 05/09/2025     (L) 05/09/2025    K 4.9 05/09/2025    CL 98 05/09/2025    CALCIUM 9.6 05/09/2025    CALCIUM 9.7 05/09/2025    PROTEINTOT 6.7 05/09/2025    ALBUMIN 4.2 05/09/2025    ALT 6 05/09/2025    AST 19 05/09/2025    ALKPHOS 54 05/09/2025    BILITOT 0.8 05/09/2025    GLOB 2.5 05/09/2025    AGRATIO 1.7 05/09/2025    BCR 15.3 05/09/2025    ANIONGAP 10.8 05/09/2025    EGFR 23.4 (L) 05/09/2025       ASSESSMENT & PLAN      Diagnosis Plan   1. Permanent atrial fibrillation        2. Hypercholesterolemia        3. Chronic coronary artery disease        4. Chronic diastolic congestive heart failure            1.  Permanent atrial fibrillation.  Rate  controlled.  On Eliquis.  No bleeding issues.  She is tolerating metoprolol succinate 12.5 mg a day.  2.  Coronary artery disease status post drug-eluting stent to the circumflex in October 2015.  Normal nuclear stress test March 2023.  No anginal symptoms.  Continue medical therapy.  3.  Hypertension.  Monitor blood pressure at home.  4.  Hyperlipidemia.  Lipid panel reviewed as above.  5.  History of renal insufficiency with a history of nephrectomy due to cancer.  6.  History of prolonged QT interval on amiodarone.  8.  History of obstructive sleep apnea not currently on therapy.  9.  Chest pain.  This happened while she was hospitalized in March 2023 and was atypical with flat troponins.  She had a normal nuclear stress test in March 2023.  10.  Bradycardia.  Heart rate today 61 bpm.  No symptoms associated with bradycardia.  11.  Congestive heart failure.  She has normal LV function.  This was likely brought on by bradycardia she is euvolemic.  She has Lasix as needed.  12.  Insomnia.      Follow-up with [facility] in a year.    Orders Placed This Encounter   Procedures    ECG 12 Lead     This order was created via procedure documentation     Release to patient:   Routine Release [[id number]]           MEDICATIONS         Discharge Medications            Accurate as of June 25, 2025 12:26 PM. If you have any questions, ask your nurse or doctor.                Changes to Medications        Instructions Start Date   VITAMIN B2 PO  What changed:   how much to take  when to take this   Take  by mouth.             Continue These Medications        Instructions Start Date   acetaminophen 500 MG tablet  Commonly known as: TYLENOL   1 tablet, Every 4 Hours PRN      apixaban 2.5 MG tablet tablet  Commonly known as: Eliquis   2.5 mg, Oral, Every 12 Hours Scheduled      atorvastatin 40 MG tablet  Commonly known as: LIPITOR   40 mg, Oral, Nightly      estradiol 0.1 MG/GM vaginal cream  Commonly known as: ESTRACE   1 g, 3 Times  Weekly      fexofenadine 60 MG tablet  Commonly known as: ALLEGRA   1 tablet, Daily PRN      furosemide 20 MG tablet  Commonly known as: LASIX   20 mg, Oral, Daily PRN      Gemtesa 75 MG tablet  Generic drug: Vibegron   75 mg, Oral, Daily      melatonin 5 MG tablet tablet   1 tablet, Nightly PRN      metoprolol succinate XL 25 MG 24 hr tablet  Commonly known as: TOPROL-XL   12.5 mg, Oral, Daily      mirtazapine 7.5 MG tablet  Commonly known as: REMERON   7.5 mg, Oral, Nightly      sodium bicarbonate 650 MG tablet   650 mg, Oral, 3 Times Daily      Vortioxetine HBr 10 MG tablet tablet  Commonly known as: TRINTELLIX   10 mg, Oral, Daily With Breakfast             Stop These Medications      aspirin 81 MG EC tablet  Stopped by: Sybil Parmar MD  06/25/25  12:26 EDT    Part of this note may be an electronic transcription/translation of spoken language to printed text using the Dragon dictation system.

## 2025-07-10 ENCOUNTER — PATIENT OUTREACH (OUTPATIENT)
Dept: CASE MANAGEMENT | Facility: OTHER | Age: 89
End: 2025-07-10
Payer: MEDICARE

## 2025-07-10 DIAGNOSIS — R41.82 ALTERED MENTAL STATUS, UNSPECIFIED ALTERED MENTAL STATUS TYPE: Primary | ICD-10-CM

## 2025-07-10 DIAGNOSIS — R41.0 CONFUSION: ICD-10-CM

## 2025-07-10 NOTE — OUTREACH NOTE
AMBULATORY CASE MANAGEMENT NOTE    Names and Relationships of Patient/Support Persons: Contact: NEAL HERNANDEZ; Relationship: Emergency Contact -     [facility] Interim Update    Spoke with patient daughter today. Verified from verbal release.    States that patient is doing well.    Reviewed latest Neurology report with daughter. Plan is to referral to psychiatrist, which will be address on 7/16/25 during OV. Recommendations will be provided.    Review with daughter patient achievements and milestones.    Daughter is ok to graduate with all goals met next month, with no further needs.    Next outreach has been scheduled.        Education Documentation  No documentation found.        Yoselin KIM  Ambulatory Case Management    7/10/2025, 14:38 EDT

## 2025-07-11 ENCOUNTER — HOSPITAL ENCOUNTER (OUTPATIENT)
Dept: INFUSION THERAPY | Facility: HOSPITAL | Age: 89
Discharge: HOME | End: 2025-07-11
Payer: MEDICARE

## 2025-07-11 VITALS
TEMPERATURE: 96.9 F | OXYGEN SATURATION: 99 % | DIASTOLIC BLOOD PRESSURE: 69 MMHG | HEART RATE: 70 BPM | SYSTOLIC BLOOD PRESSURE: 182 MMHG | RESPIRATION RATE: 18 BRPM

## 2025-07-11 DIAGNOSIS — D63.8 ANEMIA OF CHRONIC DISEASE: Primary | ICD-10-CM

## 2025-07-11 LAB
HCT VFR BLD AUTO: 36.1 % (ref 34–46.6)
HGB BLD-MCNC: 11.3 G/DL (ref 12–15.9)

## 2025-07-11 PROCEDURE — 85014 HEMATOCRIT: CPT | Performed by: NURSE PRACTITIONER

## 2025-07-11 PROCEDURE — 85018 HEMOGLOBIN: CPT | Performed by: NURSE PRACTITIONER

## 2025-07-11 PROCEDURE — 36415 COLL VENOUS BLD VENIPUNCTURE: CPT

## 2025-07-11 PROCEDURE — G0463 HOSPITAL OUTPT CLINIC VISIT: HCPCS

## 2025-07-16 ENCOUNTER — OFFICE VISIT (OUTPATIENT)
Dept: FAMILY MEDICINE CLINIC | Facility: CLINIC | Age: 89
End: 2025-07-16
Payer: MEDICARE

## 2025-07-16 VITALS
OXYGEN SATURATION: 100 % | HEART RATE: 62 BPM | SYSTOLIC BLOOD PRESSURE: 142 MMHG | DIASTOLIC BLOOD PRESSURE: 76 MMHG | BODY MASS INDEX: 24.35 KG/M2 | HEIGHT: 60 IN | WEIGHT: 124 LBS

## 2025-07-16 DIAGNOSIS — R44.2 OLFACTORY HALLUCINATIONS: ICD-10-CM

## 2025-07-16 DIAGNOSIS — I10 ESSENTIAL HYPERTENSION: Primary | ICD-10-CM

## 2025-07-16 DIAGNOSIS — F41.0 PANIC DISORDER: ICD-10-CM

## 2025-07-16 DIAGNOSIS — F32.89 OTHER DEPRESSION: ICD-10-CM

## 2025-07-16 DIAGNOSIS — N39.41 URGE INCONTINENCE OF URINE: ICD-10-CM

## 2025-07-16 DIAGNOSIS — E78.00 HYPERCHOLESTEROLEMIA: ICD-10-CM

## 2025-07-16 PROCEDURE — 1126F AMNT PAIN NOTED NONE PRSNT: CPT | Performed by: FAMILY MEDICINE

## 2025-07-16 PROCEDURE — G2211 COMPLEX E/M VISIT ADD ON: HCPCS | Performed by: FAMILY MEDICINE

## 2025-07-16 PROCEDURE — 99214 OFFICE O/P EST MOD 30 MIN: CPT | Performed by: FAMILY MEDICINE

## 2025-07-16 RX ORDER — METOPROLOL SUCCINATE 25 MG/1
12.5 TABLET, EXTENDED RELEASE ORAL DAILY
Qty: 45 TABLET | Refills: 2 | Status: SHIPPED | OUTPATIENT
Start: 2025-07-16 | End: 2026-04-12

## 2025-07-16 RX ORDER — VIBEGRON 75 MG/1
1 TABLET, FILM COATED ORAL DAILY
Qty: 90 TABLET | Refills: 2 | Status: SHIPPED | OUTPATIENT
Start: 2025-07-16 | End: 2026-04-12

## 2025-07-16 RX ORDER — MIRTAZAPINE 7.5 MG/1
7.5 TABLET, FILM COATED ORAL NIGHTLY
Qty: 90 TABLET | Refills: 2 | Status: SHIPPED | OUTPATIENT
Start: 2025-07-16 | End: 2026-04-12

## 2025-07-16 RX ORDER — ATORVASTATIN CALCIUM 40 MG/1
40 TABLET, FILM COATED ORAL NIGHTLY
Qty: 90 TABLET | Refills: 2 | Status: SHIPPED | OUTPATIENT
Start: 2025-07-16 | End: 2026-04-12

## 2025-07-16 NOTE — ASSESSMENT & PLAN NOTE
{Hyperlipidemia A/P Block (Optional):[id number]}  But stable.  Recheck labs 6 months  Orders:    atorvastatin (LIPITOR) 40 MG tablet; Take 1 tablet by mouth Every Night for 270 days.    CK; Future    Lipid Panel With / Chol / HDL Ratio; Future

## 2025-07-16 NOTE — PROGRESS NOTES
"Chief Complaint  Follow-up (3 month), Urge incontinence of urine, Panic disorder, Altered Mental Status, Depression, Vertigo as late effect of stroke, Hyperlipidemia, Hypertension, Acute thrombotic stroke, History of cardiomyopathy, Chronic Kidney Disease, Senile osteoporosis, Congestive Heart Failure, and Smell disturbance    Subjective        HPI      The patient is an 89-year-old female here for a 3-month follow-up.    She has avoided hospitalization and has been prescribed medications for appetite stimulation and anxiety. The bladder medication has been effective, reducing nocturia. She experiences occasional dizziness, which may be related to blood pressure. Her sleep has improved with mirtazapine, but she continues to experience persistent fatigue. She continues to take melatonin. Her current medications include Trintellix, Gemtesa, and mirtazapine.    She has a history of hallucinations, but none have occurred recently. She is uncertain about mood swings and has distressing olfactory hallucinations. There is a family history of suicide involving her father, brother, and stepdaughter.    She has a rash on her chest, possibly a side effect of Eliquis, which is relieved by cream. She also has an itchy scalp, possibly due to scabies. She takes Allegra and Flonase for allergies, which provide some relief.    FAMILY HISTORY  Family history of suicide (father, brother, stepdaughter).    MEDICATIONS  Current: Trintellix, Gemtesa, mirtazapine, melatonin, Lipitor, Eliquis, furosemide, metoprolol, Allegra, Flonase.           Vital Signs:   Vitals:    07/16/25 1333   BP: 142/76   Pulse: 62   SpO2: 100%   Weight: 56.2 kg (124 lb)   Height: 152.4 cm (60\")            4/16/2025     1:54 PM   PHQ-2/PHQ-9 Depression Screening   Little interest or pleasure in doing things Not at all   Feeling down, depressed, or hopeless Several days       BMI is within normal parameters. No other follow-up for BMI required.        Physical " Exam     General Appearance: Normal appearance, No acute distress.  Respiratory: No respiratory distress, lungs clear to auscultation with no wheezes or rubs.  Cardiovascular: regular rate and rhythm with no murmurs, rubs, or gallop.  Extremities: no pretibial edema bilaterally.  Psychiatric: normal affect, pleasant, cooperative with exam.  Other observations: no carotid bruits auscultated bilaterally.             Results                  Assessment and Plan      1. Anxiety and depression.  Symptoms are somewhat controlled with Trintellix and mirtazapine, though there is a potential side effect of fatigue. It is recommended to stop melatonin. A referral to psychiatry is suggested for olfactory hallucinations and fatigue.    2. Essential hypertension.  This condition is well-managed. A 90-day prescription for metoprolol 25 mg, to be taken as half a tablet daily, has been sent to [facility].    3. Hyperlipidemia.  Lipid levels are stable. A 90-day prescription for Lipitor has been sent to [facility], and labs will be rechecked in 6 months.    4. Bladder issues.  Gemtesa has been effective for nocturia with no significant side effects.    5. Sleep disturbances.  Sleep has improved with mirtazapine and melatonin, but persistent fatigue remains. It is recommended to discontinue melatonin.    6. Skin irritation.  The patient has dry skin and itching, possibly a side effect of Eliquis. Moisturizing creams and avoiding scratching are recommended.         Essential hypertension    Essential hypertension controlled.  Orders:    metoprolol succinate XL (TOPROL-XL) 25 MG 24 hr tablet; Take 0.5 tablets by mouth Daily for 270 days.    Comprehensive Metabolic Panel; Future    CBC & Differential; Future    TSH; Future    Panic disorder    Symptoms are somewhat controlled with Trintellix and mirtazapine.  Potential side effect of fatigue.  Recommendation to stop melatonin.  Orders:    Vortioxetine HBr (TRINTELLIX) 10 MG  tablet tablet; Take 1 tablet by mouth Daily With Breakfast for 270 days.    mirtazapine (REMERON) 7.5 MG tablet; Take 1 tablet by mouth Every Night for 270 days.    Ambulatory Referral to Psychiatry    Other depression    See above discussion  Orders:    Vortioxetine HBr (TRINTELLIX) 10 MG tablet tablet; Take 1 tablet by mouth Daily With Breakfast for 270 days.    mirtazapine (REMERON) 7.5 MG tablet; Take 1 tablet by mouth Every Night for 270 days.    Ambulatory Referral to Psychiatry    Urge incontinence of urine  The current medical regimen is effective;  continue present plan and medications.    Orders:    Vibegron (Gemtesa) 75 MG tablet; Take 1 tablet by mouth Daily for 270 days. Indications: Overactive Bladder, Urinary Incontinence    Hypercholesterolemia     But stable.  Recheck labs 6 months  Orders:    atorvastatin (LIPITOR) 40 MG tablet; Take 1 tablet by mouth Every Night for 270 days.    CK; Future    Lipid Panel With / Chol / HDL Ratio; Future    Olfactory hallucinations  Because of the incomplete control of olfactory hallucinations and potential side effects of fatigue, will ask for referral to psychiatry for evaluation of current medication mix and recommendations for helping this patient with her current symptoms  Orders:    Ambulatory Referral to Psychiatry       Return in about 6 months (around 1/16/2026) for recheck/refill medication.     Patient was given instructions and counseling regarding her condition or for health maintenance advice. Please see specific information pulled into the AVS if appropriate.     Patient or patient representative verbalized consent for the use of Ambient Listening during the visit with  Ken Andujar MD for chart documentation. 7/16/2025  14:10 EDT

## 2025-07-16 NOTE — ASSESSMENT & PLAN NOTE
Because of the incomplete control of olfactory hallucinations and potential side effects of fatigue, will ask for referral to psychiatry for evaluation of current medication mix and recommendations for helping this patient with her current symptoms  Orders:    Ambulatory Referral to Psychiatry

## 2025-07-16 NOTE — ASSESSMENT & PLAN NOTE
{Psychiatric illness (Optional):18686}  Symptoms are somewhat controlled with Trintellix and mirtazapine.  Potential side effect of fatigue.  Recommendation to stop melatonin.  Orders:    Vortioxetine HBr (TRINTELLIX) 10 MG tablet tablet; Take 1 tablet by mouth Daily With Breakfast for 270 days.    mirtazapine (REMERON) 7.5 MG tablet; Take 1 tablet by mouth Every Night for 270 days.    Ambulatory Referral to Psychiatry

## 2025-07-16 NOTE — ASSESSMENT & PLAN NOTE
{Hypertension is (optional):[id number]}  Essential hypertension controlled.  Orders:    metoprolol succinate XL (TOPROL-XL) 25 MG 24 hr tablet; Take 0.5 tablets by mouth Daily for 270 days.    Comprehensive Metabolic Panel; Future    CBC & Differential; Future    TSH; Future

## 2025-07-25 ENCOUNTER — HOSPITAL ENCOUNTER (OUTPATIENT)
Dept: INFUSION THERAPY | Facility: HOSPITAL | Age: 89
Discharge: HOME | End: 2025-07-25
Payer: MEDICARE

## 2025-07-25 VITALS
OXYGEN SATURATION: 99 % | HEART RATE: 61 BPM | TEMPERATURE: 97.1 F | RESPIRATION RATE: 18 BRPM | DIASTOLIC BLOOD PRESSURE: 73 MMHG | SYSTOLIC BLOOD PRESSURE: 168 MMHG

## 2025-07-25 DIAGNOSIS — N18.4 CKD (CHRONIC KIDNEY DISEASE) STAGE 4, GFR 15-29 ML/MIN: ICD-10-CM

## 2025-07-25 DIAGNOSIS — D63.8 ANEMIA OF CHRONIC DISEASE: Primary | ICD-10-CM

## 2025-07-25 LAB
25(OH)D3 SERPL-MCNC: 35.5 NG/ML (ref 30–100)
ALBUMIN SERPL-MCNC: 4.3 G/DL (ref 3.5–5.2)
ALBUMIN/GLOB SERPL: 1.7 G/DL
ALP SERPL-CCNC: 53 U/L (ref 39–117)
ALT SERPL W P-5'-P-CCNC: 7 U/L (ref 1–33)
ANION GAP SERPL CALCULATED.3IONS-SCNC: 12 MMOL/L (ref 5–15)
AST SERPL-CCNC: 20 U/L (ref 1–32)
BASOPHILS # BLD AUTO: 0.03 10*3/MM3 (ref 0–0.2)
BASOPHILS NFR BLD AUTO: 0.6 % (ref 0–1.5)
BILIRUB SERPL-MCNC: 0.7 MG/DL (ref 0–1.2)
BUN SERPL-MCNC: 30 MG/DL (ref 8–23)
BUN/CREAT SERPL: 14.9 (ref 7–25)
CALCIUM SPEC-SCNC: 8.9 MG/DL (ref 8.6–10.5)
CALCIUM SPEC-SCNC: 9 MG/DL (ref 8.6–10.5)
CHLORIDE SERPL-SCNC: 103 MMOL/L (ref 98–107)
CO2 SERPL-SCNC: 21 MMOL/L (ref 22–29)
CREAT SERPL-MCNC: 2.01 MG/DL (ref 0.57–1)
DEPRECATED RDW RBC AUTO: 45.5 FL (ref 37–54)
EGFRCR SERPLBLD CKD-EPI 2021: 23.6 ML/MIN/1.73
EOSINOPHIL # BLD AUTO: 0.28 10*3/MM3 (ref 0–0.4)
EOSINOPHIL NFR BLD AUTO: 5.7 % (ref 0.3–6.2)
ERYTHROCYTE [DISTWIDTH] IN BLOOD BY AUTOMATED COUNT: 13.4 % (ref 12.3–15.4)
FERRITIN SERPL-MCNC: 140 NG/ML (ref 13–150)
FOLATE SERPL-MCNC: >20 NG/ML (ref 4.78–24.2)
GLOBULIN UR ELPH-MCNC: 2.5 GM/DL
GLUCOSE SERPL-MCNC: 129 MG/DL (ref 65–99)
HCT VFR BLD AUTO: 32.5 % (ref 34–46.6)
HGB BLD-MCNC: 10.6 G/DL (ref 12–15.9)
IMM GRANULOCYTES # BLD AUTO: 0.01 10*3/MM3 (ref 0–0.05)
IMM GRANULOCYTES NFR BLD AUTO: 0.2 % (ref 0–0.5)
IRON 24H UR-MRATE: 83 MCG/DL (ref 37–145)
IRON SATN MFR SERPL: 26 % (ref 20–50)
LYMPHOCYTES # BLD AUTO: 1.12 10*3/MM3 (ref 0.7–3.1)
LYMPHOCYTES NFR BLD AUTO: 22.6 % (ref 19.6–45.3)
MCH RBC QN AUTO: 29.9 PG (ref 26.6–33)
MCHC RBC AUTO-ENTMCNC: 32.6 G/DL (ref 31.5–35.7)
MCV RBC AUTO: 91.8 FL (ref 79–97)
MONOCYTES # BLD AUTO: 0.26 10*3/MM3 (ref 0.1–0.9)
MONOCYTES NFR BLD AUTO: 5.3 % (ref 5–12)
NEUTROPHILS NFR BLD AUTO: 3.25 10*3/MM3 (ref 1.7–7)
NEUTROPHILS NFR BLD AUTO: 65.6 % (ref 42.7–76)
NRBC BLD AUTO-RTO: 0 /100 WBC (ref 0–0.2)
PHOSPHATE SERPL-MCNC: 3.3 MG/DL (ref 2.5–4.5)
PLATELET # BLD AUTO: 166 10*3/MM3 (ref 140–450)
PMV BLD AUTO: 10 FL (ref 6–12)
POTASSIUM SERPL-SCNC: 4.8 MMOL/L (ref 3.5–5.2)
PROT SERPL-MCNC: 6.8 G/DL (ref 6–8.5)
PTH-INTACT SERPL-MCNC: 216 PG/ML (ref 15–65)
RBC # BLD AUTO: 3.54 10*6/MM3 (ref 3.77–5.28)
SODIUM SERPL-SCNC: 136 MMOL/L (ref 136–145)
TIBC SERPL-MCNC: 322 MCG/DL (ref 298–536)
TRANSFERRIN SERPL-MCNC: 216 MG/DL (ref 200–360)
URATE SERPL-MCNC: 7 MG/DL (ref 2.4–5.7)
VIT B12 BLD-MCNC: 469 PG/ML (ref 211–946)
WBC NRBC COR # BLD AUTO: 4.95 10*3/MM3 (ref 3.4–10.8)

## 2025-07-25 PROCEDURE — 25010000002 EPOETIN ALFA PER 1000 UNITS: Performed by: NURSE PRACTITIONER

## 2025-07-25 PROCEDURE — 36415 COLL VENOUS BLD VENIPUNCTURE: CPT

## 2025-07-25 PROCEDURE — 96372 THER/PROPH/DIAG INJ SC/IM: CPT

## 2025-07-25 PROCEDURE — 82746 ASSAY OF FOLIC ACID SERUM: CPT | Performed by: NURSE PRACTITIONER

## 2025-07-25 PROCEDURE — 83970 ASSAY OF PARATHORMONE: CPT | Performed by: NURSE PRACTITIONER

## 2025-07-25 PROCEDURE — 82306 VITAMIN D 25 HYDROXY: CPT | Performed by: NURSE PRACTITIONER

## 2025-07-25 PROCEDURE — 85025 COMPLETE CBC W/AUTO DIFF WBC: CPT | Performed by: NURSE PRACTITIONER

## 2025-07-25 PROCEDURE — 82728 ASSAY OF FERRITIN: CPT | Performed by: NURSE PRACTITIONER

## 2025-07-25 PROCEDURE — 84100 ASSAY OF PHOSPHORUS: CPT | Performed by: NURSE PRACTITIONER

## 2025-07-25 PROCEDURE — 82607 VITAMIN B-12: CPT | Performed by: NURSE PRACTITIONER

## 2025-07-25 PROCEDURE — 83540 ASSAY OF IRON: CPT | Performed by: NURSE PRACTITIONER

## 2025-07-25 PROCEDURE — 84550 ASSAY OF BLOOD/URIC ACID: CPT | Performed by: NURSE PRACTITIONER

## 2025-07-25 PROCEDURE — 84466 ASSAY OF TRANSFERRIN: CPT | Performed by: NURSE PRACTITIONER

## 2025-07-25 PROCEDURE — 80053 COMPREHEN METABOLIC PANEL: CPT | Performed by: NURSE PRACTITIONER

## 2025-07-25 RX ADMIN — ERYTHROPOIETIN 10000 UNITS: 10000 INJECTION, SOLUTION INTRAVENOUS; SUBCUTANEOUS at 11:15

## 2025-08-08 ENCOUNTER — HOSPITAL ENCOUNTER (OUTPATIENT)
Dept: INFUSION THERAPY | Facility: HOSPITAL | Age: 89
Discharge: HOME | End: 2025-08-08
Payer: MEDICARE

## 2025-08-08 VITALS
HEART RATE: 65 BPM | DIASTOLIC BLOOD PRESSURE: 68 MMHG | SYSTOLIC BLOOD PRESSURE: 183 MMHG | TEMPERATURE: 96.9 F | RESPIRATION RATE: 18 BRPM | OXYGEN SATURATION: 100 %

## 2025-08-08 DIAGNOSIS — D63.8 ANEMIA OF CHRONIC DISEASE: Primary | ICD-10-CM

## 2025-08-08 LAB
HCT VFR BLD AUTO: 35.4 % (ref 34–46.6)
HGB BLD-MCNC: 11.1 G/DL (ref 12–15.9)

## 2025-08-08 PROCEDURE — G0463 HOSPITAL OUTPT CLINIC VISIT: HCPCS

## 2025-08-08 PROCEDURE — 85018 HEMOGLOBIN: CPT | Performed by: NURSE PRACTITIONER

## 2025-08-08 PROCEDURE — 85014 HEMATOCRIT: CPT | Performed by: NURSE PRACTITIONER

## 2025-08-08 PROCEDURE — 36415 COLL VENOUS BLD VENIPUNCTURE: CPT

## 2025-08-22 ENCOUNTER — HOSPITAL ENCOUNTER (OUTPATIENT)
Dept: INFUSION THERAPY | Facility: HOSPITAL | Age: 89
Discharge: HOME | End: 2025-08-22
Payer: MEDICARE

## (undated) DEVICE — DRSNG SURESITE WNDW 4X4.5

## (undated) DEVICE — OCCLUSIVE GAUZE STRIP,3% BISMUTH TRIBROMOPHENATE IN PETROLATUM BLEND: Brand: XEROFORM

## (undated) DEVICE — SOL ISO/ALC RUB 70PCT 4OZ

## (undated) DEVICE — BNDG ELAS CO-FLEX SLF ADHR 6IN 5YD LF STRL

## (undated) DEVICE — ROUND SHAPE BALLOON: Brand: PDB

## (undated) DEVICE — GUIDEPIN FIX/EXT PT TROC TP 3.2MM 12IN

## (undated) DEVICE — GLV SURG BIOGEL M LTX PF 8

## (undated) DEVICE — APPL CHLORAPREP W/TINT 26ML ORNG

## (undated) DEVICE — SPNG GZ WOVN 4X4IN 12PLY 10/BX STRL

## (undated) DEVICE — TRY SKINPREP DRYPREP

## (undated) DEVICE — ENDOPATH XCEL BLADELESS TROCARS WITH STABILITY SLEEVES: Brand: ENDOPATH XCEL

## (undated) DEVICE — SOL NACL 0.9PCT 1000ML

## (undated) DEVICE — UNDYED BRAIDED (POLYGLACTIN 910), SYNTHETIC ABSORBABLE SUTURE: Brand: COATED VICRYL

## (undated) DEVICE — SPNG LAP 18X18IN LF STRL PK/5

## (undated) DEVICE — SUT PDS LP 0 CTX VIO 60IN Z990G

## (undated) DEVICE — GLV SURG SENSICARE W/ALOE PF LF 8 STRL

## (undated) DEVICE — INTENDED FOR TISSUE SEPARATION, AND OTHER PROCEDURES THAT REQUIRE A SHARP SURGICAL BLADE TO PUNCTURE OR CUT.: Brand: BARD-PARKER ® CARBON RIB-BACK BLADES

## (undated) DEVICE — ADHS SKIN DERMABOND TOP ADVANCED

## (undated) DEVICE — STPLR SKIN VISISTAT WD 35CT

## (undated) DEVICE — Device

## (undated) DEVICE — DBD-DRAPE,LAP,CHOLE,W/TROUGHS,STERILE: Brand: MEDLINE

## (undated) DEVICE — SUT VIC 0 TIES 18IN J912G

## (undated) DEVICE — PENCL E/S ULTRAVAC TELESCP NOSE HOLSTR 10FT

## (undated) DEVICE — VISUALIZATION SYSTEM: Brand: CLEARIFY

## (undated) DEVICE — LAPAROSCOPIC SMOKE FILTRATION SYSTEM: Brand: PALL LAPAROSHIELD® PLUS LAPAROSCOPIC SMOKE FILTRATION SYSTEM

## (undated) DEVICE — LOU LAP CHOLE: Brand: MEDLINE INDUSTRIES, INC.

## (undated) DEVICE — ADHESIVE ISLAND DRESSING: Brand: TELFA

## (undated) DEVICE — ADHS SKIN DERMABOND

## (undated) DEVICE — GLV SURG BIOGEL LTX PF 8 1/2

## (undated) DEVICE — DRSNG TELFA PAD NONADH STR 1S 3X4IN

## (undated) DEVICE — DEV SUT GRSPR CLOSUR 15CM 14G

## (undated) DEVICE — SUT MNCRYL PLS ANTIB UD 4/0 PS2 18IN

## (undated) DEVICE — GLV SURG SIGNATURE ESSENTIAL PF LTX SZ8

## (undated) DEVICE — LEGGINGS, PAIR, CLEAR, STERILE: Brand: MEDLINE

## (undated) DEVICE — PK HIP PINNING 40

## (undated) DEVICE — ENDOPATH XCEL UNIVERSAL TROCAR STABLILITY SLEEVES: Brand: ENDOPATH XCEL

## (undated) DEVICE — TOTAL TRAY, 16FR 10ML SIL FOLEY, URN: Brand: MEDLINE

## (undated) DEVICE — APPL CHLORAPREP HI/LITE 26ML ORNG

## (undated) DEVICE — TROCAR SITE CLOSURE DEVICE: Brand: ENDO CLOSE

## (undated) DEVICE — COVER,MAYO STAND,STERILE: Brand: MEDLINE

## (undated) DEVICE — 3M™ STERI-STRIP™ REINFORCED ADHESIVE SKIN CLOSURES, R1547, 1/2 IN X 4 IN (12 MM X 100 MM), 6 STRIPS/ENVELOPE: Brand: 3M™ STERI-STRIP™

## (undated) DEVICE — GLV SURG BIOGEL LTX PF 8

## (undated) DEVICE — DEV COND GAS LAP INSUFLOW W/LUER CONN

## (undated) DEVICE — TRAP FLD MINIVAC MEGADYNE 100ML

## (undated) DEVICE — ENDOCUT SCISSOR TIP, DISPOSABLE: Brand: RENEW

## (undated) DEVICE — MAT FLR ABSORBENT LG 4FT 10 2.5FT

## (undated) DEVICE — BLUNT TIP TROCAR: Brand: AUTO SUTURE

## (undated) DEVICE — SUT VIC 0 CT1 36IN J946H

## (undated) DEVICE — HARMONIC ACE +7 LAPAROSCOPIC SHEARS ADVANCED HEMOSTASIS 5MM DIAMETER 36CM SHAFT LENGTH  FOR USE WITH GRAY HAND PIECE ONLY: Brand: HARMONIC ACE

## (undated) DEVICE — THE EASYGRIP FLO-41 PRECISION MIS DELIVERY SYSTEM (EASYGRIP FLO-41 SYSTEM) IS A STERILE, SINGLE-USE DEVICE THAT CONSISTS OF TWO COMPONENTS: (1) ONE APPLICATOR DEVICE WITH A 41 CM LONG CANNULA (5 MM OUTER DIAMETER) AND (2) ONE EMPTY 1.5 ML SYRINGE.: Brand: EASYGRIP FLO-41

## (undated) DEVICE — ENDOPATH ETS-FLEX45 ARTICULATING ENDOSCOPIC LINEAR CUTTER, NO RELOAD: Brand: ENDOPATH

## (undated) DEVICE — TOWEL,OR,DSP,ST,BLUE,STD,4/PK,20PK/CS: Brand: MEDLINE